# Patient Record
Sex: MALE | Race: WHITE | NOT HISPANIC OR LATINO | Employment: OTHER | URBAN - METROPOLITAN AREA
[De-identification: names, ages, dates, MRNs, and addresses within clinical notes are randomized per-mention and may not be internally consistent; named-entity substitution may affect disease eponyms.]

---

## 2017-01-03 ENCOUNTER — TELEPHONE (OUTPATIENT)
Dept: RHEUMATOLOGY | Facility: CLINIC | Age: 67
End: 2017-01-03

## 2017-01-03 DIAGNOSIS — Z79.899 HIGH RISK MEDICATIONS (NOT ANTICOAGULANTS) LONG-TERM USE: ICD-10-CM

## 2017-01-03 DIAGNOSIS — M06.09 RHEUMATOID ARTHRITIS OF MULTIPLE SITES WITH NEGATIVE RHEUMATOID FACTOR (H): ICD-10-CM

## 2017-01-03 DIAGNOSIS — M06.019 RHEUMATOID ARTHRITIS INVOLVING SHOULDER WITH NEGATIVE RHEUMATOID FACTOR, UNSPECIFIED LATERALITY (H): Primary | ICD-10-CM

## 2017-01-03 DIAGNOSIS — M79.674 PAIN IN TOES OF BOTH FEET: ICD-10-CM

## 2017-01-03 DIAGNOSIS — M79.675 PAIN IN TOES OF BOTH FEET: ICD-10-CM

## 2017-01-03 RX ORDER — HYDROXYCHLOROQUINE SULFATE 200 MG/1
200 TABLET, FILM COATED ORAL 2 TIMES DAILY
Qty: 180 TABLET | Refills: 0 | Status: SHIPPED
Start: 2017-01-03 | End: 2017-04-18

## 2017-01-03 NOTE — Clinical Note
Charmaine Valdes,    Regular eye exams are required while taking Hydroxychloroquine (Plaquenil). These may be yearly or as determined by your eye specialist.    Although vision problems and loss of sight while taking hydroxchloroquine are very rare, notify your doctor if you notice changes in your vision. Visual changes experienced early on the medication or seen early during regular eye exams usually improve after stopping the medication.     Eye exams should be completed by an ophthalmologist who is experienced in monitoring for Hydroxchloroquine toxicity.    Exam may include visual field testing and slit lamp exam or other testing as determined by the ophthalmologist.     We received a refill request from your pharmacy. In reviewing your medical record it is noted that you are due for your next eye exam in March of 2017. Your Hydroxychloroquine prescription has been refilled for 3 months.  Please request your eye clinic to fax or mail a copy of your eye exam report to our clinic indicating that testing was completed for toxicity screening.        Sincerely,  Rheumatology Staff

## 2017-01-03 NOTE — TELEPHONE ENCOUNTER
Pt called with three issues:    1) Pt saw Dr. Goodrich last week, who recommended pt see Dr. Camargo for possible injection into his hand.  Pt was apparently scheduled with Dr. Han when he was d/c from appointment.  He canceled that appointment and is requesting appt with Dr. Camargo.  I will ask BRI Kramer, to schedule appointment and f/u with pt re:  Date and time with Dr. Camargo.  He does leave to go to [place] in 2 weeks.    2) Pt wishes to tell Dr. Goodrich that he is concerned about the side effects of Celebrex, and though he filled the Rx, he does not wish to begin taking it (for muscle/joint pain and epicondilitis).  Instead, he would like to continue the Plaquenil, as he has felt stable with this medication.    3)  Pt requires a refill of his Plaquenil prior to going to [place] in 2 weeks.  I will send to refill team.

## 2017-01-03 NOTE — TELEPHONE ENCOUNTER
Plaquenil      Last Written Prescription Date:  7-13-16  Last Fill Quantity: 180,   # refills: 1  Last Office Visit: 12-28-16  Future Office visit: 6-26-17  Last Eye Exam:3-14-16    Message sent to patient notifying him that the refill has been completed. Another message was sent reminding him he is due for his next eye exam in March of 2017.      Kathleen M Doege RN

## 2017-01-10 ENCOUNTER — MYC MEDICAL ADVICE (OUTPATIENT)
Dept: FAMILY MEDICINE | Facility: CLINIC | Age: 67
End: 2017-01-10

## 2017-01-10 ENCOUNTER — OFFICE VISIT (OUTPATIENT)
Dept: FAMILY MEDICINE | Facility: CLINIC | Age: 67
End: 2017-01-10
Payer: MEDICARE

## 2017-01-10 VITALS
BODY MASS INDEX: 38.37 KG/M2 | OXYGEN SATURATION: 99 % | HEART RATE: 72 BPM | TEMPERATURE: 98.4 F | SYSTOLIC BLOOD PRESSURE: 126 MMHG | DIASTOLIC BLOOD PRESSURE: 78 MMHG | RESPIRATION RATE: 16 BRPM | WEIGHT: 299 LBS

## 2017-01-10 DIAGNOSIS — M06.09 RHEUMATOID ARTHRITIS OF MULTIPLE SITES WITH NEGATIVE RHEUMATOID FACTOR (H): ICD-10-CM

## 2017-01-10 DIAGNOSIS — J01.90 ACUTE SINUSITIS WITH SYMPTOMS > 10 DAYS: Primary | ICD-10-CM

## 2017-01-10 DIAGNOSIS — G62.9 PERIPHERAL POLYNEUROPATHY: ICD-10-CM

## 2017-01-10 DIAGNOSIS — M25.522 LEFT ELBOW PAIN: ICD-10-CM

## 2017-01-10 DIAGNOSIS — Z71.89 ADVANCED DIRECTIVES, COUNSELING/DISCUSSION: ICD-10-CM

## 2017-01-10 DIAGNOSIS — D84.9 IMMUNOSUPPRESSED STATUS (H): ICD-10-CM

## 2017-01-10 PROCEDURE — 99214 OFFICE O/P EST MOD 30 MIN: CPT | Performed by: FAMILY MEDICINE

## 2017-01-10 RX ORDER — PREGABALIN 50 MG/1
50 CAPSULE ORAL 3 TIMES DAILY
Qty: 270 CAPSULE | Refills: 1 | Status: SHIPPED | OUTPATIENT
Start: 2017-01-10 | End: 2017-07-07

## 2017-01-10 RX ORDER — AZITHROMYCIN 250 MG/1
TABLET, FILM COATED ORAL
Qty: 6 TABLET | Refills: 0 | Status: SHIPPED | OUTPATIENT
Start: 2017-01-10 | End: 2017-03-06

## 2017-01-10 NOTE — NURSING NOTE
"Chief Complaint   Patient presents with     Other     Neuropathy and Low WBC       Initial /78 mmHg  Pulse 72  Temp(Src) 98.4  F (36.9  C) (Oral)  Resp 16  Wt 299 lb (135.626 kg)  SpO2 99% Estimated body mass index is 38.37 kg/(m^2) as calculated from the following:    Height as of 12/28/16: 6' 2\" (1.88 m).    Weight as of this encounter: 299 lb (135.626 kg).  BP completed using cuff size: andrew Rivera CMA      "

## 2017-01-10 NOTE — ASSESSMENT & PLAN NOTE
Advance Care Planning 1/10/2017: ACP Review of Chart / Resources Provided:  Reviewed chart for advance care plan.  Lucio Daly has no plan or code status on file. Discussed available resources and provided with information. Confirmed code status reflects current choices pending further ACP discussions.  Confirmed/documented legally designated decision makers.  Added by Marlena Rivera

## 2017-01-10 NOTE — MR AVS SNAPSHOT
After Visit Summary   1/10/2017    Lucio Daly    MRN: [id number]           Patient Information     Date Of Birth          1950        Visit Information        Provider Department      1/10/2017 11:00 AM Jah Corley MD [facility]        Today's Diagnoses     Acute sinusitis with symptoms > 10 days    -  1     Peripheral polyneuropathy (H)         Rheumatoid arthritis of multiple sites with negative rheumatoid factor (H)         Immunosuppressed status (H)         Advanced directives, counseling/discussion         Left elbow pain            Follow-ups after your visit        Your next 10 appointments already scheduled     Mar 03, 2017  9:40 AM   (Arrive by 9:25 AM)   NEW HAND with Ramón Camargo MD   [facility] ([facility] [place] [facility])    [address]  [address] [facility] [id number] [phone]            May 11, 2017  9:30 AM   SIX MINUTE WALK with UC PFL 6 MINUTE WALK 1, UC PFL C   [facility] Pulmonary Function Testing ([facility])    [address]  [address] [facility] [id number] [phone]            May 11, 2017 10:30 AM   (Arrive by 10:15 AM)   Return Interstitial Lung with Harsha Mccarthy MD   [facility] ([facility])    [address] [place] [address]  [address] [facility] [id number] [phone]            Jun 26, 2017  9:30 AM   (Arrive by 9:15 AM)   Return Visit with MEGAN Ayers CNP   [facility] ([facility])    [address] [place] [address]  [address] [facility] [id number] [phone]            Aug 29, 2017  8:10 AM   (Arrive by 7:55 AM)   Return Movement Disorder with Thong Montes MD   [facility] Neurology ([facility])    [address] [place] [address]  [address] [facility] [id number] [phone]              Who to contact     If you  have questions or need follow up information about today's clinic visit or your schedule please contact [facility] directly at [phone].  Normal or non-critical lab and imaging results will be communicated to you by [employer], letter or phone within 4 business days after the clinic has received the results. If you do not hear from us within 7 days, please contact the clinic through [employer] or phone. If you have a critical or abnormal lab result, we will notify you by phone as soon as possible.  Submit refill requests through [employer] or call your pharmacy and they will forward the refill request to us. Please allow 3 business days for your refill to be completed.          Additional Information About Your Visit        [employer] Information     [employer] gives you secure access to your electronic health record. If you see a primary care provider, you can also send messages to your care team and make appointments. If you have questions, please call your primary care clinic.  If you do not have a primary care provider, please call [phone] and they will assist you.        Care EveryWhere ID     This is your Care EveryWhere ID. This could be used by other organizations to access your [place] medical records  [id number]        Your Vitals Were     Pulse Temperature Respirations Pulse Oximetry          72 98.4  F (36.9  C) (Oral) 16 99%         Blood Pressure from Last 3 Encounters:   01/10/17 126/78   12/28/16 157/98   12/07/16 157/78    Weight from Last 3 Encounters:   01/10/17 299 lb (135.626 kg)   12/28/16 299 lb (135.626 kg)   12/07/16 302 lb (136.986 kg)              Today, you had the following     No orders found for display         Today's Medication Changes          These changes are accurate as of: 1/10/17  5:11 PM.  If you have any questions, ask your nurse or doctor.               Start taking these medicines.        Dose/Directions    azithromycin 250 MG tablet   Commonly known as:   ZITHROMAX   Used for:  Acute sinusitis with symptoms > 10 days   Started by:  Jah Corley MD        Two tablets first day, then one tablet daily for four days.   Quantity:  6 tablet   Refills:  0            Where to get your medicines      These medications were sent to [facility] [place] - [facility] - [address]   [address], [place] [address]     Phone:  [phone]    - azithromycin 250 MG tablet      Some of these will need a paper prescription and others can be bought over the counter.  Ask your nurse if you have questions.     Bring a paper prescription for each of these medications    - pregabalin 50 MG capsule             Primary Care Provider Office Phone # Fax #    Jah Corley [phone]       [facility] [address]  [facility] [address]        Thank you!     Thank you for choosing [facility]  for your care. Our goal is always to provide you with excellent care. Hearing back from our patients is one way we can continue to improve our services. Please take a few minutes to complete the written survey that you may receive in the mail after your visit with us. Thank you!             Your Updated Medication List - Protect others around you: Learn how to safely use, store and throw away your medicines at www.disposemymeds.org.          This list is accurate as of: 1/10/17  5:11 PM.  Always use your most recent med list.                   Brand Name Dispense Instructions for use    azithromycin 250 MG tablet    ZITHROMAX    6 tablet    Two tablets first day, then one tablet daily for four days.       celecoxib 100 MG capsule    celeBREX    60 capsule    Take 1 capsule (100 mg) by mouth 2 times daily For pain       diclofenac 1 % Gel topical gel    VOLTAREN    100 g    Apply 4 grams to knees or 2 grams to hands four times daily using enclosed dosing card.       folic acid 1 MG tablet    FOLVITE    180  tablet    Take 2 tablets (2 mg) by mouth daily       hydroxychloroquine 200 MG tablet    PLAQUENIL    180 tablet    Take 1 tablet (200 mg) by mouth 2 times daily Send copy of recent eye exam as need for refills. Please have a copy of your eye exam faxed to [phone].       methotrexate 2.5 MG tablet CHEMO     28 tablet    Take 7 tablets (17.5 mg) by mouth once a week Labs due every 8-12 weeks - last done 5-5-16       metoprolol 50 MG tablet    LOPRESSOR    90 tablet    Take 1 tablet (50 mg) by mouth daily Take 1 daily       omeprazole 20 MG CR capsule    priLOSEC    180 capsule    TAKE ONE CAPSULE BY MOUTH TWICE A DAY       order for DME      Use your CPAP device as directed by your provider.       * order for DME     2 each    Please measure and distribute 1 pair of 20mmHg - 30mmHg knee high open or closed toe compression stockings. Jobst ultrasheer or equivalent.       * order for DME     1 each    Please measure and distribute 1 pair of 20mmHg - 30mmHg thigh high open or closed toe compression stockings. Jobst ultrasheer or equivalent.       oxybutynin 5 MG tablet    DITROPAN    90 tablet    Take 1 tablet (5 mg) by mouth daily       pregabalin 50 MG capsule    LYRICA    270 capsule    Take 1 capsule (50 mg) by mouth 3 times daily       selegiline 5 MG Tabs     180 tablet    1 tablet In the morning       TYLENOL 500 MG tablet   Generic drug:  acetaminophen      Take 1 tablet by mouth as needed. For pain       vitamin D 1000 UNITS capsule      Take 2 capsules by mouth daily.       * Notice:  This list has 2 medication(s) that are the same as other medications prescribed for you. Read the directions carefully, and ask your doctor or other care provider to review them with you.

## 2017-01-10 NOTE — PROGRESS NOTES
SUBJECTIVE:                                                    Lucio Daly is a 66 year old male who presents to clinic today for the following health issues:    1. Neuropathy-may be progressing slowly. He is followed by Dr Montes in nuerology but this is more for his movement disorder. The lyrica is helpful.     2. Low WBC-this was noted on last routine cbc from rheumatology. Overall his cbc has fluctuated from about 3.5-6 over the years.     3: uri/sinuses. Present for 10 days including sinus congestion, cough, lower energy, not draining much. Exposed to sick grandkids about xmas. Not improving.     His mood has been stable. He is concerned about the lower white count. He continues to have a lot of arthritis pain along with the muscular and neuropathic pain. 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 Has upcoming ortho appt.    He is planning to go to [place] the end of the month.     med hx, family hx, and soc hx reviewed and updated     OBJECTIVE: /78 mmHg  Pulse 72  Temp(Src) 98.4  F (36.9  C) (Oral)  Resp 16  Wt 299 lb (135.626 kg)  SpO2 99% no apparent distress   Exam:  GENERAL APPEARANCE: healthy, alert and no distress  EYES: Eyes grossly normal to inspection  HENT: ear canals and TM's normal, nose and mouth without ulcers or lesions and maxillary sinus tenderness bilateral  NECK: no adenopathy, no asymmetry, masses, or scars and thyroid normal to palpation  RESP: a few crackles at the bases.   CV: regular rates and rhythm, normal S1 S2, no S3 or S4 and no murmur, click or rub -  SKIN: no suspicious lesions or rashes  PSYCH: mentation appears normal and affect flat.       ICD-10-CM    1. Acute sinusitis with symptoms > 10 days J01.90 azithromycin (ZITHROMAX) 250 MG tablet   2. Peripheral polyneuropathy (H) G62.9 pregabalin (LYRICA) 50 MG capsule   3. Rheumatoid arthritis of multiple sites with negative rheumatoid factor (H)  M06.09    4. Immunosuppressed status (H) D89.9    5. Advanced directives, counseling/discussion Z71.89     given immunosuprression and persistent symptoms will treat with antibiotics. zpack has been beneficial in the past. lyrica for he neuropathy. rec he try the celebrex prescribed for arthritis and muscular pain.

## 2017-01-16 ENCOUNTER — TELEPHONE (OUTPATIENT)
Dept: RHEUMATOLOGY | Facility: CLINIC | Age: 67
End: 2017-01-16

## 2017-01-17 DIAGNOSIS — N39.498 OTHER URINARY INCONTINENCE: Primary | ICD-10-CM

## 2017-01-17 NOTE — TELEPHONE ENCOUNTER
Oxybutynin Chloride 5 mg tablet  Last Written Prescription Date: 05/23/16  Last Fill Quantity: 90,  # refills: 2  Last Office Visit with [facility], [facility] or [facility] prescribing provider: 01/10/17                                     Cinthia Leija CPhT  On Behalf of [facility]

## 2017-01-18 RX ORDER — OXYBUTYNIN CHLORIDE 5 MG/1
5 TABLET ORAL DAILY
Qty: 90 TABLET | Refills: 3 | Status: SHIPPED | OUTPATIENT
Start: 2017-01-18 | End: 2017-12-21

## 2017-01-18 NOTE — TELEPHONE ENCOUNTER
Prescription approved per [facility] Refill Protocol.  Patricia Gilmore PharmD   [facility]  [email or web address].

## 2017-01-31 ENCOUNTER — PRE VISIT (OUTPATIENT)
Dept: ORTHOPEDICS | Facility: CLINIC | Age: 67
End: 2017-01-31

## 2017-01-31 NOTE — TELEPHONE ENCOUNTER
1.  Date/reason for appt: 3/3/17 -- Primary osteoarthritis of both first carpometacarpal joints  2.  Referring provider: Jeremy Goodrich  3.  Call to patient (Yes / No - short description): no, referred  4.  Previous care at / records requested from: [facility] -- records and referral in Epic.  5.  Other: [facility] -- saw Dr. Campuzano -- records and imaging in epic/pacs

## 2017-02-01 ENCOUNTER — TELEPHONE (OUTPATIENT)
Dept: SLEEP MEDICINE | Facility: CLINIC | Age: 67
End: 2017-02-01

## 2017-02-01 NOTE — TELEPHONE ENCOUNTER
Orders have been rev'd from [facility] and have been signed, faxed back and scanned in.    CONNIE Pascual

## 2017-02-08 NOTE — TELEPHONE ENCOUNTER
[facility] Prior Authorization Team   Phone: [phone]  Fax: [phone]    Tier Exception Initiation    Medication: hydroxychloroquine (PLAQUENIL) 200 MG tablet  Insurance Company: [employer] Part D - Phone [phone] Fax [phone]  Pharmacy Filling the Rx: [facility] [place] - [address]   Filling Pharmacy Phone: [phone]  Filling Pharmacy Fax: [phone]  Start Date: 2/8/2017    Urgent tier exception request was submitted via phone.

## 2017-02-08 NOTE — TELEPHONE ENCOUNTER
Silver script called regarding additional questions of tried/failed meds.These info were relay to the insurance.

## 2017-02-14 NOTE — TELEPHONE ENCOUNTER
Tier Exception Authorization Approval    Authorization Effective Date: 11/10/2016  Authorization Expiration Date: 2/8/2018  Medication: hydroxychloroquine (PLAQUENIL) 200 MG tablet - TIER EXCEPTION APPROVED  Approved Dose/Quantity:   Reference #:     Insurance Company: [employer] Part D - Phone [phone] Fax [phone]  Expected CoPay: $30     CoPay Card Available:      Foundation Assistance Needed:    Which Pharmacy is filling the prescription (Not needed for infusion/clinic administered): [facility] - [address]   Pharmacy Notified: Yes  Patient Notified: Yes    Called pharmacy and pharmacist states pt paid $181. 1/21/17 - pharmacist reran script and copay went down to $30. Pharmacist is notifying patient to come back in to get reimbursed $151.00.

## 2017-02-17 ASSESSMENT — ENCOUNTER SYMPTOMS
DYSURIA: 0
ABDOMINAL PAIN: 1
ORTHOPNEA: 0
STIFFNESS: 1
TACHYCARDIA: 0
POOR WOUND HEALING: 1
COUGH: 1
SPEECH CHANGE: 0
HOARSE VOICE: 1
NECK MASS: 0
TREMORS: 1
SORE THROAT: 0
SNORES LOUDLY: 0
EYE REDNESS: 1
RECTAL PAIN: 0
JAUNDICE: 0
LEG PAIN: 1
BOWEL INCONTINENCE: 0
SMELL DISTURBANCE: 0
SINUS CONGESTION: 1
EYE PAIN: 1
CHILLS: 0
RESPIRATORY PAIN: 0
ARTHRALGIAS: 1
WEAKNESS: 1
TROUBLE SWALLOWING: 1
HYPERTENSION: 1
DISTURBANCES IN COORDINATION: 1
JOINT SWELLING: 1
WEIGHT GAIN: 0
SINUS PAIN: 1
EXERCISE INTOLERANCE: 1
SLEEP DISTURBANCES DUE TO BREATHING: 0
MEMORY LOSS: 0
HYPOTENSION: 0
DYSPNEA ON EXERTION: 1
SHORTNESS OF BREATH: 1
VOMITING: 0
HEARTBURN: 1
DIARRHEA: 1
POLYDIPSIA: 0
HEADACHES: 0
LEG SWELLING: 1
NIGHT SWEATS: 0
HEMOPTYSIS: 0
PARALYSIS: 0
HALLUCINATIONS: 0
CLAUDICATION: 1
RECTAL BLEEDING: 1
SEIZURES: 0
SPUTUM PRODUCTION: 0
COUGH DISTURBING SLEEP: 0
DIFFICULTY URINATING: 0
MUSCLE WEAKNESS: 1
FEVER: 0
LOSS OF CONSCIOUSNESS: 0
WEIGHT LOSS: 0
FLANK PAIN: 1
DOUBLE VISION: 1
FATIGUE: 1
BLOOD IN STOOL: 1
SYNCOPE: 0
EYE IRRITATION: 0
EYE WATERING: 0
POSTURAL DYSPNEA: 0
SKIN CHANGES: 0
NAUSEA: 0
PALPITATIONS: 1
TASTE DISTURBANCE: 0
TINGLING: 1
BACK PAIN: 1
NAIL CHANGES: 1
MUSCLE CRAMPS: 0
DIZZINESS: 1
NUMBNESS: 1
ALTERED TEMPERATURE REGULATION: 1
HEMATURIA: 0
NECK PAIN: 1
POLYPHAGIA: 0
MYALGIAS: 1
LIGHT-HEADEDNESS: 1
WHEEZING: 0
BLOATING: 1
DECREASED APPETITE: 0
CONSTIPATION: 1
INCREASED ENERGY: 0

## 2017-03-03 ENCOUNTER — OFFICE VISIT (OUTPATIENT)
Dept: ORTHOPEDICS | Facility: CLINIC | Age: 67
End: 2017-03-03

## 2017-03-03 DIAGNOSIS — M79.644 PAIN OF RIGHT THUMB: Primary | ICD-10-CM

## 2017-03-03 NOTE — MR AVS SNAPSHOT
After Visit Summary   3/3/2017    Lucio Daly    MRN: [id number]           Patient Information     Date Of Birth          1950        Visit Information        Provider Department      3/3/2017 9:40 AM Ramón Camargo MD [facility]        Today's Diagnoses     Pain of right thumb    -  1       Follow-ups after your visit        Additional Services     HAND THERAPY       Hand Therapy Referral    Thumb spica splint, Right CMC, hand based                  Your next 10 appointments already scheduled     Mar 17, 2017  9:45 AM CDT   LAB with  LAB   [facility] ([facility])    [address] [place] [address] [id number] [phone]           Patient must bring picture ID.  Patient should be prepared to give a urine specimen  Please do not eat 10-12 hours before your appointment if you are coming in fasting for labs on lipids, cholesterol, or glucose (sugar).  Pregnant women should follow their Care Team instructions. Water with medications is okay. Do not drink coffee or other fluids.   If you have concerns about taking  your medications, please ask at office or if scheduling via [employer], send a message by clicking on Secure Messaging, Message Your Care Team.            Mar 17, 2017 10:00 AM CDT   (Arrive by 9:45 AM)   ESTEFANY Hand with Myrna Campos OT   [facility] ([facility])    [address] [place] [id number] [phone]            Mar 22, 2017 11:00 AM CDT   ESTEFANY Hand with Myrna Campos OT   [facility] ([facility])    [address] [place] [id number] [phone]            May 05, 2017  9:30 AM CDT   (Arrive by 9:15 AM)   RETURN HAND with Ramón Camargo MD   [facility] ([facility])    [address] [place] [phone]    [phone]            May 18, 2017  9:00 AM CDT   SIX MINUTE WALK with UC PFL C, UC PFL 6 MINUTE WALK 2   [facility] Pul[facility] Function Testing ([facility])    [address] [place] [id number] [phone]            May 18, 2017 10:30 AM CDT   (Arrive by 10:15 AM)   Return Interstitial Lung with Harsha Mccarthy MD   [facility] ([facility])    [address] [place] [id number] [phone]            Jun 26, 2017  9:30 AM CDT   (Arrive by 9:15 AM)   Return Visit with MEGAN Ayers [facility] ([facility])    [address] [place] [id number] [phone]            Aug 29, 2017  8:10 AM CDT   (Arrive by 7:55 AM)   Return Movement Disorder with Thong Montes MD   [facility] ([facility])    [address] [place] [phone] [phone]            Mar 06, 2018  9:00 AM CST   Return Sleep Patient with Kristen Martinez MD   [facility] ([facility])    [address] [place] [address] [phone]              Who to contact     Please call your clinic at [phone] to:    Ask questions about your health    Make or cancel appointments    Discuss your medicines    Learn about your test results    Speak to your doctor   If you have compliments or concerns about an experience at your clinic, or if you wish to file a complaint, please contact [facility] Patient Relations at [phone] or email us at [email or web address]         Additional Information About Your Visit        MyChart Information     MyChart gives you secure access to your electronic health record. If you see a primary care provider, you can also  send messages to your care team and make appointments. If you have questions, please call your primary care clinic.  If you do not have a primary care provider, please call [phone] and they will assist you.      [employer] is an electronic gateway that provides easy, online access to your medical records. With [employer], you can request a clinic appointment, read your test results, renew a prescription or communicate with your care team.     To access your existing account, please contact your [facility] or call [phone] for assistance.        Care EveryWhere ID     This is your Care EveryWhere ID. This could be used by other organizations to access your [place] medical records  [id number]         Blood Pressure from Last 3 Encounters:   03/06/17 136/78   01/10/17 126/78   12/28/16 (!) 157/98    Weight from Last 3 Encounters:   03/06/17 (!) 137 kg (302 lb)   01/10/17 135.6 kg (299 lb)   12/28/16 135.6 kg (299 lb)              We Performed the Following     HAND THERAPY        Primary Care Provider Office Phone # Fax #    Jah Corley [phone]       [address]  [facility] [address]        Thank you!     Thank you for choosing [facility]  for your care. Our goal is always to provide you with excellent care. Hearing back from our patients is one way we can continue to improve our services. Please take a few minutes to complete the written survey that you may receive in the mail after your visit with us. Thank you!             Your Updated Medication List - Protect others around you: Learn how to safely use, store and throw away your medicines at www.disposemymeds.org.          This list is accurate as of: 3/3/17 11:59 PM.  Always use your most recent med list.                   Brand Name Dispense Instructions for use    folic acid 1 MG tablet    FOLVITE    180 tablet    Take 2 tablets (2 mg) by mouth daily        hydroxychloroquine 200 MG tablet    PLAQUENIL    180 tablet    Take 1 tablet (200 mg) by mouth 2 times daily Send copy of recent eye exam as need for refills. Please have a copy of your eye exam faxed to [phone].       methotrexate 2.5 MG tablet CHEMO     28 tablet    Take 7 tablets (17.5 mg) by mouth once a week Labs due every 8-12 weeks - last done 5-5-16       metoprolol 50 MG tablet    LOPRESSOR    90 tablet    Take 1 tablet (50 mg) by mouth daily Take 1 daily       omeprazole 20 MG CR capsule    priLOSEC    180 capsule    TAKE ONE CAPSULE BY MOUTH TWICE A DAY       order for DME      Use your CPAP device as directed by your provider.       * order for DME     2 each    Please measure and distribute 1 pair of 20mmHg - 30mmHg knee high open or closed toe compression stockings. Jobst ultrasheer or equivalent.       * order for DME     1 each    Please measure and distribute 1 pair of 20mmHg - 30mmHg thigh high open or closed toe compression stockings. Jobst ultrasheer or equivalent.       oxybutynin 5 MG tablet    DITROPAN    90 tablet    Take 1 tablet (5 mg) by mouth daily       pregabalin 50 MG capsule    LYRICA    270 capsule    Take 1 capsule (50 mg) by mouth 3 times daily       selegiline 5 MG Tabs     180 tablet    1 tablet In the morning       TYLENOL 500 MG tablet   Generic drug:  acetaminophen      Take 1 tablet by mouth as needed. For pain       vitamin D 1000 UNITS capsule      Take 2 capsules by mouth daily.       * Notice:  This list has 2 medication(s) that are the same as other medications prescribed for you. Read the directions carefully, and ask your doctor or other care provider to review them with you.

## 2017-03-03 NOTE — LETTER
3/3/2017       RE: Lucio Daly  [address]  [place] [address]     Dear Colleague,    Thank you for referring your patient, Lucio Daly, to the [facility] at [facility]. Please see a copy of my visit note below.    REFERRING PHYSICIAN:  Dr. Jeremy Goodrich from Rheumatology.      CHIEF COMPLAINT:  Right wrist pain.      HISTORY OF PRESENT ILLNESS:  Patient is a very pleasant 66-year-old gentleman who is left-hand dominant, who comes in today for primarily evaluation of his right wrist pain.  He has a notable history of rheumatoid arthritis, Sjogren's syndrome, peripheral neuropathy and interstitial lung disease.      He initially made the appointment to have his left elbow evaluated.  He has a diagnosis of tennis elbow which started late last summer.  The patient states that he has undergone physical therapy and over time it seems to have gotten better.  He states that at its worst over the summer it was about near 10/10 pain, but now he is in the low 2-3/10 pain occasionally.      The problem he wishes to discuss today is his right thumb; he states he has ongoing pain and aching sensation in his right thumb that is worse with activities.  He also notes that he occasionally has swelling over the base of the thumb.  He denies any injuries.  He reports that it was recommended to him by physical therapist that he try a brace so he had gotten an over-the-counter thumb spica brace from [employer] and has been wearing that occasionally.  He states that when he wears this, this does help with the pain symptoms at the base of the thumb.  He has not tried any other interventions.      PAST MEDICAL HISTORY:  Rheumatoid arthritis, interstitial lung disease, peripheral artery disease, peripheral polyneuropathy, gait disturbances with possible Parkinsonian's characteristics.   Urinary incontinence with neurogenic bladder, GALO and GERD.      PAST SURGICAL HISTORY:  No  significant past surgeries.      ALLERGIES:  Restasis and adhesive.      FAMILY HISTORY:  Noncontributory.      SOCIAL HISTORY:  The patient denies tobacco use, denies alcohol use.  He lives at home with his family.      REVIEW OF SYSTEMS:  See the addendum below.      PHYSICAL EXAMINATION:  In general, he is well-developed, well-nourished, in no acute distress.  Breathing comfortably on room air.  His pulse is regular rate and rhythm.  Focused exam of the right hand/wrist reveals that he has no significant swelling, erythema or skin breakdown.  He has some focal tenderness to palpation at the level of the CMC joint.  He has positive grind test.  He has no hyperextension deformity of the metacarpophalangeal joint.  Sensation is intact and his thumb is warm and well perfused.       IMAGING:  We have bilateral hand films available for review today which demonstrates early stage osteoarthritic changes seen in the CMC joint.  There is CMP hyperextension deformity seen on plain films.  No other significant pathology seen.      ASSESSMENT:  66-year-old gentleman, left-hand dominant, with rheumatoid arthritis and other multiple medical conditions who has a primary complaint of right thumb CMC joint osteoarthrosis.      PLAN:  We had a long conversation with the patient regarding the above-mentioned diagnosis.  We discussed treatment options, which would include splinting, occupational therapy, steroid injections, and of course surgery.      After reviewing the above, patient has elected to undergo a trial splinting alone.  He is a little concerned about steroid injections as he had a bad reaction to a steroid injection to a knee in the past.  He states that this affected his neurogenic bladder.      We have a CMC splint made for him today that he try using as much as possible during the day, especially when he is doing heavier activities and then he may consider sleeping in it.  We will see how this works for him.  There  is always the option of is trying a steroid injection if he wishes.      We will see the patient back in about 2-3 months for followup exam.  No x-rays needed at that visit.  We discussed how the splinting is going.      The patient was seen with Dr. Camargo who agrees with the above.         Again, thank you for allowing me to participate in the care of your patient.      Sincerely,    Ramón Camargo MD

## 2017-03-03 NOTE — NURSING NOTE
Reason For Visit:   Chief Complaint   Patient presents with     Musculoskeletal Problem     Consult Right thumb/forearm pain, Rheumatology referral, secondary left elbow concerns     Age: 66 year old    Date of injury: 6 years, chronic on/off    Date of surgery: NO surgical history    Smoker: No, former quit in 2000    Pain Assessment  Patient Currently in Pain: Yes  Primary Pain Location: Hand  Pain Orientation: Right, Left  Aggravating Factors:  (Gripping, complains of dropping things)    Uses cane/walker daily    Hand Dominance Evaluation  Hand Dominance: Left

## 2017-03-06 ENCOUNTER — OFFICE VISIT (OUTPATIENT)
Dept: SLEEP MEDICINE | Facility: CLINIC | Age: 67
End: 2017-03-06
Attending: INTERNAL MEDICINE
Payer: MEDICARE

## 2017-03-06 VITALS
HEART RATE: 52 BPM | SYSTOLIC BLOOD PRESSURE: 136 MMHG | RESPIRATION RATE: 18 BRPM | WEIGHT: 302 LBS | DIASTOLIC BLOOD PRESSURE: 78 MMHG | OXYGEN SATURATION: 95 % | HEIGHT: 74 IN | BODY MASS INDEX: 38.76 KG/M2

## 2017-03-06 DIAGNOSIS — G47.33 OSA TREATED WITH BIPAP: Primary | ICD-10-CM

## 2017-03-06 PROCEDURE — 99211 OFF/OP EST MAY X REQ PHY/QHP: CPT | Mod: ZF

## 2017-03-06 NOTE — PROGRESS NOTES
Sleep Follow-Up Visit:    Date on this visit: 3/6/2017    Lucio Daly comes in today for follow-up of GALO. PS2015 - AHI of 28.3 per hour and REM sleep was not observed during the diagnostic portion. Optimum titration with bilevel 15/10 cwp.    He continues to endorse benefit from bilevel therapy. Denies any shortness of breath while sleeping as he had prior to therapy. He does have chronic joint pain that does cause sleep disruptions throughout the night. If the pain were to be completed resolved, he believes that he would be sleeping throughout the night. He has a history of ILD possibly secondary to arthritis and is on treatment with plaquenil and methotrexate. He was evaluated by his Pulmonologist and Rheumatologist last November and  and was told that his lung function and arthritis are stable.    PAP download was reviewed with the patient. It indicates    Used the device 30/30 days  More than 4 hours of use: 100%  Average daily use on the days used was 6 hours 23 min  Pressure 15/10 cm H2O  Residual AHI 2.1  Leak 2 min    Past medical/surgical history, family history, social history, medications and allergies were reviewed.      Problem List:  Patient Active Problem List    Diagnosis Date Noted     [facility] 2011     Priority: High     EMERGENCY CARE PLAN  Presenting Problem Signs and Symptoms Treatment Plan    Questions or conerns during clinic hours    I will call the clinic directly     Questions or conerns outside clinic hours    I will call the 24 hour nurse line at [phone]    Patient needs to schedule an appointment    I will call the 24 hour scheduling team at [phone] or clinic directly    Same day treatment     I will call the clinic first, nurse line if after hours, urgent care and express care if needed                            DX V65.8 REPLACED WITH [id number] (2013)       Interstitial lung disease (H) 2016     Priority: Medium      PAD (peripheral artery disease) (H) 03/22/2016     Priority: Medium     Morbid obesity (H) 03/22/2016     Priority: Medium     Rheumatoid arthritis of multiple sites with negative rheumatoid factor (H) 03/21/2016     Priority: Medium     Peripheral polyneuropathy (H) 03/15/2016     Priority: Medium     Seronegative arthritis 11/18/2013     Priority: Medium     Adenomatous polyp of colon 02/26/2015     Pain in limb 04/07/2014     History of MRI of cervical spine 11/18/2013     EXAMINATION: CERVICAL SPINE G/E 5 VIEWS* 4/19/2013 4:18 PM    CLINICAL HISTORY: Pain in limb,Performing Location?->[facility]  ([facility]),    COMPARISON:    FINDINGS: AP and lateral views in flexion and extension, as well as  odontoid view of the cervical spine was obtained. There is no  comparison available. The vertebral bodies of the cervical spine are  normally aligned. There is posterior spurring and disk space  narrowing at the level of C5-C6. No change in alignment with flexion  and extension is noted. Uncinate spurring is visualized more  pronounced on the left than on the right.            Result Impression       IMPRESSION: Posterior spurring at the level of C5-C6. Normal  alignment of the cervical spine, which does not change in flexion or  extension.    JUTTA ELLERMAN, MD            Abnormal EMG 04/18/2013     EMG 2/2012 study by Dr. Gonzalez   Interpretation:   1. Attenuation of left sural sensory nerve action potential.  2. No evidence of myopathy  3.        Pain in joint, lower leg 04/05/2012     AK (actinic keratosis) 12/18/2011     Personal history of other malignant neoplasm of skin 12/06/2011     High risk medications (not anticoagulants) long-term use 11/23/2011     Tremor 11/01/2011     Visual changes 11/01/2011     Some blurring of vision and double vision. Eye evaluation was supposedly okay. Had noticed some problem with displacement of words. Has had some chronic right eye pain that radiates to his temple and jaw.         "Incomplete defecation 11/01/2011     Gait disorder 11/01/2011     Balance problems 11/01/2011     Fatigue 11/01/2011     Advanced directives, counseling/discussion 10/19/2011     Advance Directive Problem List Overview:   Name Relationship Phone    Primary Health Care Agent            Alternative Health Care Agent          Discussed advance care planning with patient; information given to patient to review. 10/19/2011          Sicca syndrome (H) 05/16/2011     Lip biopsy was done and was negative?  Has dry mouth.  Seronegative sicca complex       H. pylori infection 05/12/2011     Urinary incontinence 03/28/2011     Chronic maxillary sinusitis 09/14/2009     GALO (obstructive sleep apnea) 07/06/2009     Hx of melanoma of skin 08/06/2008     Rt leg Dr Ez NGYUEN-COMPLETE EXCISION       Malignant basal cell neoplasm of skin 08/06/2008     ARMS EXCISED DR EZ NGUYEN  Do you wish to do the replacement in the background? yes         Diaphragmatic hernia 06/30/2004     Problem list name updated by automated process. Provider to review       Esophageal reflux 06/30/2004        PHYSICAL EXAM:  /78 (BP Location: Right arm, Patient Position: Supine, Cuff Size: Adult Large)  Pulse 52  Resp 18  Ht 1.88 m (6' 2\")  Wt (!) 137 kg (302 lb)  SpO2 95%  BMI 38.77 kg/m2    GEN: obese male, in no apparent distrerss  CV: S1 S2 normal  PULM: CTAB  EXT: no edema      Impression/Plan:    Obstructive Sleep Apnea  Chronic insomnia - chronic joint pain    Data from his machine shows 100% compliance. His residual AHI is 2.1. Recommended to continue bilevel therapy at his current settings regularly during sleep and he was instructed to get the supplies replaced regularly. For his insomnia, recommend to continue follow up with Primary and Rheumatology for optimal pain management.    Encourage weight loss, healthy diet, and exercise.    Patient was strongly advised to avoid driving, operating any heavy machinery or other hazardous situations " while drowsy or sleepy.  Patient was counseled on the importance of driving while alert, to pull over if drowsy, or nap before getting into the vehicle if sleepy.        Follow up in 1 year or sooner if needed.  ?  Seen and examined with Dr. Martinez?  Gómez Pulido  Clinical Sleep Medicine Fellow    Attending: As the attending physician I was present with  Dr.James JUAN ANTONIO Pulido  during this clinic visit. I personally reviewed the bird aspects of the history, compliance measures and I agree with the above documentation.  Twentyfive minutes spent with patient> 50 % spent counseling and coordinating plan of care.      Kristen Martinez MD   of Medicine,  Division of Pulmonary/Sleep Medicine  [facility].

## 2017-03-06 NOTE — PATIENT INSTRUCTIONS

## 2017-03-06 NOTE — NURSING NOTE
"Chief Complaint   Patient presents with     RECHECK     Follow up cpap       Initial Ht 1.88 m (6' 2\")  Wt (!) 137 kg (302 lb)  BMI 38.77 kg/m2 Estimated body mass index is 38.77 kg/(m^2) as calculated from the following:    Height as of this encounter: 1.88 m (6' 2\").    Weight as of this encounter: 137 kg (302 lb).  Medication Reconciliation: complete     CONNIE Pascual        "

## 2017-03-06 NOTE — MR AVS SNAPSHOT
After Visit Summary   3/6/2017    Lucio Daly    MRN: [id number]           Patient Information     Date Of Birth          1950        Visit Information        Provider Department      3/6/2017 9:20 AM Gómez Pulido MD [facility] [place], Sleep Study        Today's Diagnoses     GALO treated with BiPAP    -  1      Care Instructions      Your BMI is Body mass index is 38.77 kg/(m^2).  Weight management is a personal decision.  If you are interested in exploring weight loss strategies, the following discussion covers the approaches that may be successful. Body mass index (BMI) is one way to tell whether you are at a healthy weight, overweight, or obese. It measures your weight in relation to your height.  A BMI of 18.5 to 24.9 is in the healthy range. A person with a BMI of 25 to 29.9 is considered overweight, and someone with a BMI of 30 or greater is considered obese. More than two-thirds of American adults are considered overweight or obese.  Being overweight or obese increases the risk for further weight gain. Excess weight may lead to heart disease and diabetes.  Creating and following plans for healthy eating and physical activity may help you improve your health.  Weight control is part of healthy lifestyle and includes exercise, emotional health, and healthy eating habits. Careful eating habits lifelong are the mainstay of weight control. Though there are significant health benefits from weight loss, long-term weight loss with diet alone may be very difficult to achieve- studies show long-term success with dietary management in less than 10% of people. Attaining a healthy weight may be especially difficult to achieve in those with severe obesity. In some cases, medications, devices and surgical management might be considered.  What can you do?  If you are overweight or obese and are interested in methods for weight loss, you should discuss this with your provider.     Consider reducing  daily calorie intake by 500 calories.     Keep a food journal.     Avoiding skipping meals, consider cutting portions instead.    Diet combined with exercise helps maintain muscle while optimizing fat loss. Strength training is particularly important for building and maintaining muscle mass. Exercise helps reduce stress, increase energy, and improves fitness. Increasing exercise without diet control, however, may not burn enough calories to loose weight.       Start walking three days a week 10-20 minutes at a time    Work towards walking thirty minutes five days a week     Eventually, increase the speed of your walking for 1-2 minutes at time    In addition, we recommend that you review healthy lifestyles and methods for weight loss available through the National Institutes of Health patient information sites:  http://win.niddk.nih.gov/publications/index.htm    And look into health and wellness programs that may be available through your health insurance provider, employer, local community center, or grace club.    Weight management plan: Patient was referred to their PCP to discuss a diet and exercise plan.            Follow-ups after your visit        Follow-up notes from your care team     Return in about 1 year (around 3/6/2018).      Your next 10 appointments already scheduled     Mar 17, 2017 10:00 AM CDT   (Arrive by 9:45 AM)   ESTEFANY Hand with Myrna Campos OT   [facility] ([facility])    [address] [place] [address]   [phone]            Mar 22, 2017 11:00 AM CDT   ESTEFANY Hand with Myrna Campos OT   [facility] ([facility])    [address] [place] [address]   [phone]            May 05, 2017  9:30 AM CDT   (Arrive by 9:15 AM)   RETURN HAND with Ramón Camargo MD   [facility] ([facility])    [address] [place] [address]  [facility] [address]   [phone]            May 18, 2017  9:00 AM CDT   SIX MINUTE WALK with UC PFL C, UC PFL 6 MINUTE WALK 2   [facility] Pulmonary Function Testing ([facility])    [address] [address]   [phone]            May 18, 2017 10:30 AM CDT   (Arrive by 10:15 AM)   Return Interstitial Lung with Harsha Mccarthy MD   [facility] ([facility])    [address] [place] [address]   [phone]            Jun 26, 2017  9:30 AM CDT   (Arrive by 9:15 AM)   Return Visit with MEGAN Ayers [facility] ([facility])    [address] [place] [address]   [phone]            Aug 29, 2017  8:10 AM CDT   (Arrive by 7:55 AM)   Return Movement Disorder with Thong Montes MD   [facility] Neurology ([facility])    [address] [place] [address]   [phone]            Mar 06, 2018  9:00 AM CST   Return Sleep Patient with Kristen Martinez MD   [facility])    [address] [place] [id number] [phone]              Who to contact     If you have questions or need follow up information about today's clinic visit or your schedule please contact [facility] directly at [phone].  Normal or non-critical lab and imaging results will be communicated to you by MyChart, letter or phone within 4 business days after the clinic has received the results. If you do not hear from us within 7 days, please contact the clinic through MyChart or phone. If you have a critical or abnormal lab result, we will notify you by phone as soon as possible.  Submit refill requests through [employer] or call your pharmacy and they  "will forward the refill request to us. Please allow 3 business days for your refill to be completed.          Additional Information About Your Visit        [employer] Information     [employer] gives you secure access to your electronic health record. If you see a primary care provider, you can also send messages to your care team and make appointments. If you have questions, please call your primary care clinic.  If you do not have a primary care provider, please call [phone] and they will assist you.        Care EveryWhere ID     This is your Care EveryWhere ID. This could be used by other organizations to access your [facility] medical records  [id number]        Your Vitals Were     Pulse Respirations Height Pulse Oximetry BMI (Body Mass Index)       52 18 1.88 m (6' 2\") 95% 38.77 kg/m2        Blood Pressure from Last 3 Encounters:   03/06/17 136/78   01/10/17 126/78   12/28/16 (!) 157/98    Weight from Last 3 Encounters:   03/06/17 (!) 137 kg (302 lb)   01/10/17 135.6 kg (299 lb)   12/28/16 135.6 kg (299 lb)              We Performed the Following     Comprehensive DME        Primary Care Provider Office Phone # Fax #    Jah Corley [phone]       [address] [address]        Thank you!     Thank you for choosing [facility]EP [facility]  for your care. Our goal is always to provide you with excellent care. Hearing back from our patients is one way we can continue to improve our services. Please take a few minutes to complete the written survey that you may receive in the mail after your visit with us. Thank you!             Your Updated Medication List - Protect others around you: Learn how to safely use, store and throw away your medicines at www.disposemymeds.org.          This list is accurate as of: 3/6/17 11:59 PM.  Always use your most recent med list.                   Brand Name Dispense Instructions for use    folic acid 1 MG tablet    " FOLVITE    180 tablet    Take 2 tablets (2 mg) by mouth daily       hydroxychloroquine 200 MG tablet    PLAQUENIL    180 tablet    Take 1 tablet (200 mg) by mouth 2 times daily Send copy of recent eye exam as need for refills. Please have a copy of your eye exam faxed to [phone].       methotrexate 2.5 MG tablet CHEMO     28 tablet    Take 7 tablets (17.5 mg) by mouth once a week Labs due every 8-12 weeks - last done 5-5-16       metoprolol 50 MG tablet    LOPRESSOR    90 tablet    Take 1 tablet (50 mg) by mouth daily Take 1 daily       omeprazole 20 MG CR capsule    priLOSEC    180 capsule    TAKE ONE CAPSULE BY MOUTH TWICE A DAY       order for DME      Use your CPAP device as directed by your provider.       * order for DME     2 each    Please measure and distribute 1 pair of 20mmHg - 30mmHg knee high open or closed toe compression stockings. Jobst ultrasheer or equivalent.       * order for DME     1 each    Please measure and distribute 1 pair of 20mmHg - 30mmHg thigh high open or closed toe compression stockings. Jobst ultrasheer or equivalent.       oxybutynin 5 MG tablet    DITROPAN    90 tablet    Take 1 tablet (5 mg) by mouth daily       pregabalin 50 MG capsule    LYRICA    270 capsule    Take 1 capsule (50 mg) by mouth 3 times daily       selegiline 5 MG Tabs     180 tablet    1 tablet In the morning       TYLENOL 500 MG tablet   Generic drug:  acetaminophen      Take 1 tablet by mouth as needed. For pain       vitamin D 1000 UNITS capsule      Take 2 capsules by mouth daily.       * Notice:  This list has 2 medication(s) that are the same as other medications prescribed for you. Read the directions carefully, and ask your doctor or other care provider to review them with you.

## 2017-03-17 ENCOUNTER — THERAPY VISIT (OUTPATIENT)
Dept: OCCUPATIONAL THERAPY | Facility: CLINIC | Age: 67
End: 2017-03-17
Payer: MEDICARE

## 2017-03-17 DIAGNOSIS — M79.644 PAIN OF RIGHT THUMB: Primary | ICD-10-CM

## 2017-03-17 DIAGNOSIS — M06.09 RHEUMATOID ARTHRITIS OF MULTIPLE SITES WITH NEGATIVE RHEUMATOID FACTOR (H): ICD-10-CM

## 2017-03-17 DIAGNOSIS — Z79.899 HIGH RISK MEDICATIONS (NOT ANTICOAGULANTS) LONG-TERM USE: ICD-10-CM

## 2017-03-17 LAB
ALBUMIN SERPL-MCNC: 3.6 G/DL (ref 3.4–5)
ALT SERPL W P-5'-P-CCNC: 23 U/L (ref 0–70)
AST SERPL W P-5'-P-CCNC: 14 U/L (ref 0–45)
CREAT SERPL-MCNC: 1.06 MG/DL (ref 0.66–1.25)
CRP SERPL-MCNC: 9.4 MG/L (ref 0–8)
ERYTHROCYTE [DISTWIDTH] IN BLOOD BY AUTOMATED COUNT: 13.4 % (ref 10–15)
GFR SERPL CREATININE-BSD FRML MDRD: 69 ML/MIN/1.7M2
HCT VFR BLD AUTO: 44.4 % (ref 40–53)
HGB BLD-MCNC: 14.4 G/DL (ref 13.3–17.7)
MCH RBC QN AUTO: 30.1 PG (ref 26.5–33)
MCHC RBC AUTO-ENTMCNC: 32.4 G/DL (ref 31.5–36.5)
MCV RBC AUTO: 93 FL (ref 78–100)
PLATELET # BLD AUTO: 178 10E9/L (ref 150–450)
RBC # BLD AUTO: 4.78 10E12/L (ref 4.4–5.9)
WBC # BLD AUTO: 4.9 10E9/L (ref 4–11)

## 2017-03-17 PROCEDURE — G8985 CARRY GOAL STATUS: HCPCS | Mod: GO | Performed by: OCCUPATIONAL THERAPIST

## 2017-03-17 PROCEDURE — 29130 APPL FINGER SPLINT STATIC: CPT | Mod: GO | Performed by: OCCUPATIONAL THERAPIST

## 2017-03-17 PROCEDURE — 97165 OT EVAL LOW COMPLEX 30 MIN: CPT | Mod: GO | Performed by: OCCUPATIONAL THERAPIST

## 2017-03-17 PROCEDURE — G8984 CARRY CURRENT STATUS: HCPCS | Mod: GO | Performed by: OCCUPATIONAL THERAPIST

## 2017-03-17 PROCEDURE — 97140 MANUAL THERAPY 1/> REGIONS: CPT | Mod: GO | Performed by: OCCUPATIONAL THERAPIST

## 2017-03-17 NOTE — LETTER
DEPARTMENT OF HEALTH AND HUMAN SERVICES  CENTERS FOR MEDICARE & MEDICAID SERVICES    PLAN/UPDATED PLAN OF PROGRESS FOR OUTPATIENT REHABILITATION    PATIENTS NAME:  Lucio Daly     : 1950    PROVIDER NUMBER:  [id number]    [id number]    PROVIDER NAME: [facility] [facility]    MEDICAL RECORD NUMBER: [id number]     START OF CARE DATE:    SOC Date: 17     TYPE:  OT    PRIMARY/TREATMENT DIAGNOSIS: (Pertinent Medical Diagnosis)  Pain of right thumb    VISITS FROM START OF CARE:  Rxs Used: 1     Hand Therapy Initial Evaluation  Current Date:  3/17/2017    Subjective:  Lucio Daly is a 66 year old left hand dominant male.    Diagnosis:   Right CMC DJD  DOI:  MD order 3/3/2017  Post:  > 3 years since onset of pain    Patient reports symptoms of pain, stiffness, weakness of the right thumb which occurred due to CMC DJD. Since onset symptoms are gradually getting worse - patient reports that it may be due to can use on R side that has exacerbated symptoms.  Special tests:  X ray.  Previous treatment: OTC thumb splint from [facility] - helps a little.    General health as reported by patient is fair.  Pertinent medical history includes:  Rheumatoid Arthritis, Osteoarthritis, cancer, overweight, high blood pressure, thyroid problems, sleep disorder/apnea, pain at rest/night, numbness/tingling, weakness, Sjogrens, peripheral neuropathy.  Medical allergies: adhesives.  Surgical history: skin cancers.  Medication history: anti-inflammatory, high blood pressure medication.    Current occupation is Retired from [employer]  Barriers include:  none  Prior functional level:  independent     Red flags:  none        PATIENTS NAME:  Lucio Daly     Additional Occupational Profile Information (patterns of daily living, interests, values and needs): patient ambulates with assist from cane, was having pain on left side/elbow so switched to using cane in R hand - has flared up pain at  CMC.    Functional Outcome Measure:   Upper Extremity Functional Index Score:  SCORE:   Column Totals: /80: 47   (A lower score indicates greater disability.)    Objective:    Pain Level Report - On scale 0-10/10  Date 3/17/17         Left Right Left Right Left Right Left Right Left Right Left Right   at Rest  3-4             with Activity  5-6               Primary Report:  Location: R CMCJ, thenars and MPJ  Radiation: none  Pain Quality: aching, intense ache and throbbing at times  Frequency: constant and intermittent  Pain is worse: early in the morning, afternoon/evening after use  Pain is exacerbated by: cane use, pinching, gripping, use of the hand  Pain is relieved by: OTC splint, tylenol helps a little  Progression since onset: gradually worsening    Tenderness:  Tender to touch over the Right CMC of the Thumb   5/10    Appearance:  Shoulder sign is present over the CMC  Swollen over the CMC joint.  Noted collapse of MP into hyperextension during pinch.    Thumb Range of Motion:  Date 3/17/17        AROM  (PROM) Left Right Left Right Left Right Left Right Left Right Left Right   MP ext 0 0             MP flex 35 37             IP ext 0 10             IP flex 35 37             RABD 50 45             PABD 60 50+               PATIENTS NAME:  Lucio Daly     Special Tests:  Grind test to CMC: +    Strength: (Measured in pounds)    Date 3/17/17        Trials Left Right Left Right Left Right Left Right Left Right Left Right   1 50 25             2               3               Avg 50 25               3 Point Pinch  Date 3/17/17        Trials Left Right Left Right Left Right Left Right Left Right Left Right   1 13 9+             2               3               Avg 13 9               Lateral Pinch  Date 3/17/17        Trials Left Right Left Right Left Right Left Right Left Right Left Right   1 12 11+             2               3               Avg 12 11               Assessment:  Patient presents with  symptoms consistent with diagnosis of CMC thumb arthritis, with conservative intervention.    Patient s limitations or Problem List includes: Pain, Decreased ROM/motion, weakness, decreased stability of the CMC joint, decreased  and pinch strength of the thumb which interferes with patients ability to perform  Self Care Tasks (dressing, eating, bathing, hygiene/toileting), Recreational Activities, Household Chores and ambulation with cane  as compared to previous level of function.    Rehab Potential: Good- Return to full activity, some limitations.    Patient will benefit from skilled Occupational Therapy to increase ROM, flexibility, pinch strength, and stability of the thumb and decrease pain to return to previous activity level and resume normal daily tasks and to reach their rehab potential.    Barriers to Learning:  No barrier      PATIENTS NAME:  Lucio Daly     Communication Issues: Patient appears to be able to clearly communicate and understand verbal and written communication and follow directions correctly.    Assessment of Occupational Performance:  3-5 Performance Deficits  Identified Performance Deficits: bathing/showering, dressing, hygiene and grooming, home establishment and management and meal preparation and cleanup      Clinical Decision Making (Complexity): Low complexity    Treatment Explanations:  The following has been discussed with the patient,  Rx ordered/plan of care  Anticipated outcomes  Possible risks and side effects    Treatment Plan:  Frequency:  1 X week, once daily  Duration:  for 4 weeks    Clinic:  Therapeutic Exercise: AROM, Isometrics, and Stabilization exercises of the Thumb CMC, including  active and resisted abduction, 1st DI strengthening.  Manual Techniques: Joint Mobilization or reseating of the trapezium  Splinting:  Hand based Thumb Spica, CMC support (allowing more thumb mobility)  Education: anatomy of CMC, joint protection principles, adaptive equipment as  "needed.    Home Program:  R HBTSS  Heat when sore/stiff  Ice if inflamed/swollen  Self massage to thenars with marble  Self adductor release with clip    Discharge Plan:  Achieve all LTG  [place] in home treatment program.  Reach maximal therapeutic benefit.  Please refer to the daily flowsheet for treatment today and total treatment time.      Next Visit:  Check splint  Patient tried on R Large+ comfort cool splint - may be interested in purchasing  Progress thumb stabilization  Future visits: joint protection principles and AE review      Caregiver Signature/Credentials ______________________________ Date ________       Treating Provider: Myrna Campos OTR/L    I have reviewed and certified the need for these services and plan of treatment while under my care.  PATIENTS NAME:  Lucio Daly       PHYSICIAN'S SIGNATURE:   _________________________________________  Date___________    Ramón Camargo MD    Certification period: Beginning of Cert date period: 03/17/17 End of Cert period date: 06/14/17     Functional Level Progress Report: Please see attached \"Goal Flow sheet for Functional level.\"    ___X_____ Continue Services or       ________ DC Services                Service dates: SOC Date: 03/17/17  to present                                                                     "

## 2017-03-17 NOTE — PROGRESS NOTES
Hand Therapy Initial Evaluation  Current Date:  3/17/2017    Subjective:  Lucio Daly is a 66 year old left hand dominant male.    Diagnosis:   Right CMC DJD  DOI:  MD order 3/3/2017  Post:  > 3 years since onset of pain    Patient reports symptoms of pain, stiffness, weakness of the right thumb which occurred due to CMC DJD. Since onset symptoms are gradually getting worse - patient reports that it may be due to can use on R side that has exacerbated symptoms.  Special tests:  X ray.  Previous treatment: OTC thumb splint from [employer] - helps a little.    General health as reported by patient is fair.  Pertinent medical history includes:  Rheumatoid Arthritis, Osteoarthritis, cancer, overweight, high blood pressure, thyroid problems, sleep disorder/apnea, pain at rest/night, numbness/tingling, weakness, Sjogrens, peripheral neuropathy.  Medical allergies: adhesives.  Surgical history: skin cancers.  Medication history: anti-inflammatory, high blood pressure medication.    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    Functional Outcome Measure:   Upper Extremity Functional Index Score:  SCORE:   Column Totals: /80: 47   (A lower score indicates greater disability.)    Objective:    Pain Level Report - On scale 0-10/10  Date 3/17/17         Left Right Left Right Left Right Left Right Left Right Left Right   at Rest  3-4             with Activity  5-6               Primary Report:  Location: R CMCJ, thenars and MPJ  Radiation: none  Pain Quality: aching, intense ache and throbbing at times  Frequency: constant and intermittent  Pain is worse: early in the morning,  afternoon/evening after use  Pain is exacerbated by: cane use, pinching, gripping, use of the hand  Pain is relieved by: OTC splint, tylenol helps a little  Progression since onset: gradually worsening    Tenderness:  Tender to touch over the Right CMC of the Thumb   5/10    Appearance:  Shoulder sign is present over the CMC  Swollen over the CMC joint.  Noted collapse of MP into hyperextension during pinch.    Thumb Range of Motion:  Date 3/17/17        AROM  (PROM) Left Right Left Right Left Right Left Right Left Right Left Right   MP ext 0 0             MP flex 35 37             IP ext 0 10             IP flex 35 37             RABD 50 45             PABD 60 50+               Special Tests:  Grind test to CMC: +    Strength: (Measured in pounds)    Date 3/17/17        Trials Left Right Left Right Left Right Left Right Left Right Left Right   1 50 25             2               3               Avg 50 25               3 Point Pinch  Date 3/17/17        Trials Left Right Left Right Left Right Left Right Left Right Left Right   1 13 9+             2               3               Avg 13 9               Lateral Pinch  Date 3/17/17        Trials Left Right Left Right Left Right Left Right Left Right Left Right   1 12 11+             2               3               Avg 12 11               Assessment:  Patient presents with symptoms consistent with diagnosis of CMC thumb arthritis, with conservative intervention.    Patient s limitations or Problem List includes: Pain, Decreased ROM/motion, weakness, decreased stability of the CMC joint, decreased  and pinch strength of the thumb which interferes with patients ability to perform  Self Care Tasks (dressing, eating, bathing, hygiene/toileting), Recreational Activities, Household Chores and ambulation with cane  as compared to previous level of function.    Rehab Potential: Good- Return to full activity, some limitations.    Patient will benefit from skilled  Occupational Therapy to increase ROM, flexibility, pinch strength, and stability of the thumb and decrease pain to return to previous activity level and resume normal daily tasks and to reach their rehab potential.    Barriers to Learning:  No barrier    Communication Issues: Patient appears to be able to clearly communicate and understand verbal and written communication and follow directions correctly.    Assessment of Occupational Performance:  3-5 Performance Deficits  Identified Performance Deficits: bathing/showering, dressing, hygiene and grooming, home establishment and management and meal preparation and cleanup      Clinical Decision Making (Complexity): Low complexity    Treatment Explanations:  The following has been discussed with the patient,  Rx ordered/plan of care  Anticipated outcomes  Possible risks and side effects    Treatment Plan:  Frequency:  1 X week, once daily  Duration:  for 4 weeks    Clinic:  Therapeutic Exercise: AROM, Isometrics, and Stabilization exercises of the Thumb CMC, including  active and resisted abduction, 1st DI strengthening.  Manual Techniques: Joint Mobilization or reseating of the trapezium  Splinting:  Hand based Thumb Spica, CMC support (allowing more thumb mobility)  Education: anatomy of CMC, joint protection principles, adaptive equipment as needed.    Home Program:  R HBTSS  Heat when sore/stiff  Ice if inflamed/swollen  Self massage to thenars with marble  Self adductor release with clip    Discharge Plan:  Achieve all LTG  [place] in home treatment program.  Reach maximal therapeutic benefit.  Please refer to the daily flowsheet for treatment today and total treatment time.      Next Visit:  Check splint  Patient tried on R Large+ comfort cool splint - may be interested in purchasing  Progress thumb stabilization  Future visits: joint protection principles and AE review

## 2017-03-17 NOTE — PROGRESS NOTES
The blood counts, liver, kidney labs are normal. Very mild elevated CRP     Sin GUZMAN, CNP, MSN  3/17/2017  3:24 PM

## 2017-03-23 ENCOUNTER — TELEPHONE (OUTPATIENT)
Dept: NEUROLOGY | Facility: CLINIC | Age: 67
End: 2017-03-23

## 2017-03-23 NOTE — TELEPHONE ENCOUNTER
Tier Exception Approval    Authorization Effective Date: 1/1/2017  Authorization Expiration Date: 3/23/2018  Medication: selegiline 5 MG TABS- approved  Approved Dose/Quantity:   Reference #:     Insurance Company: Medicare Blue - Phone [phone] Fax [phone]  Expected CoPay: $40.00     CoPay Card Available:      Foundation Assistance Needed:    Which Pharmacy is filling the prescription (Not needed for infusion/clinic administered): [facility] - [id number] [facility]   Pharmacy Notified: Yes  Patient Notified: Yes

## 2017-03-23 NOTE — TELEPHONE ENCOUNTER
[facility] Prior Authorization Team   Phone: [phone]  Fax: [phone]    [place] Initiation    Medication: selegiline 5 MG TABS  Insurance Company: Medicare Blue - Phone [phone] Fax [phone]  Pharmacy Filling the Rx: [facility] - [id number] [facility]   Filling Pharmacy Phone: [phone]  Filling Pharmacy Fax:    Start Date: 3/23/2017

## 2017-03-24 ENCOUNTER — THERAPY VISIT (OUTPATIENT)
Dept: OCCUPATIONAL THERAPY | Facility: CLINIC | Age: 67
End: 2017-03-24
Payer: MEDICARE

## 2017-03-24 DIAGNOSIS — M79.644 PAIN OF RIGHT THUMB: ICD-10-CM

## 2017-03-24 PROCEDURE — 97110 THERAPEUTIC EXERCISES: CPT | Mod: GO | Performed by: OCCUPATIONAL THERAPIST

## 2017-03-24 PROCEDURE — 97140 MANUAL THERAPY 1/> REGIONS: CPT | Mod: GO | Performed by: OCCUPATIONAL THERAPIST

## 2017-03-24 NOTE — PROGRESS NOTES
"SOAP note objective information for 3/24/2017.    Objective:    Pain Level Report - On scale 0-10/10  Date 3/17/17 3/24/17        Left Right Left Right Left Right Left Right Left Right Left Right   at Rest  3-4  4           with Activity  5-6  5-6             Primary Report:  Location: R CMCJ, thenars and MPJ  Radiation: none  Pain Quality: aching, intense ache and throbbing at times  Frequency: constant and intermittent  Pain is worse: early in the morning, afternoon/evening after use  Pain is exacerbated by: cane use, pinching, gripping, use of the hand  Pain is relieved by: OTC splint, tylenol helps a little  Progression since onset: gradually worsening    Tenderness:  Tender to touch over the Right CMC of the Thumb   3/10      Thumb Range of Motion:  Date 3/17/17        AROM  (PROM) Left Right Left Right Left Right Left Right Left Right Left Right   MP ext 0 0  0           MP flex 35 37  37           IP ext 0 10  0           IP flex 35 37  50           RABD 50 45  45           PABD 60 50+  49               Home Program:  R HBTSS  Heat when sore/stiff  Ice if inflamed/swollen  Self massage to thenars with marble  Self adductor release with clip  Isometric \"C\" and 1st DI isometrics      Next Visit:  Progress thumb stabilization  Future visits: joint protection principles and AE review  Paraffin trial for benefits of heat    Please refer to the daily flowsheet for treatment today, total treatment time and time spent performing 1:1 timed codes.       "

## 2017-03-24 NOTE — MR AVS SNAPSHOT
After Visit Summary   3/24/2017    Lucio Daly    MRN: [id number]           Patient Information     Date Of Birth          1950        Visit Information        Provider Department      3/24/2017 9:30 AM Amada Castañeda OT [facility]        Today's Diagnoses     Pain of right thumb           Follow-ups after your visit        Your next 10 appointments already scheduled     Apr 03, 2017 11:30 AM CDT   [facility] with Janet ALLISON [facility] ([facility]  )    [address] [facility]  [address]  [facility] [address]   [phone]            Apr 06, 2017  9:40 AM CDT   Office Visit with Jah Corley MD   [facility] ([facility]    [address] [place] [id number] [phone]           Bring a current list of meds and any records pertaining to this visit.  For Physicals, please bring immunization records and any forms needing to be filled out.  Please arrive 10 minutes early to complete paperwork.            May 05, 2017  9:30 AM CDT   (Arrive by 9:15 AM)   RETURN HAND with Ramón Camargo MD   [facility] ([facility])    [address] [id number] [phone]            May 18, 2017  9:00 AM CDT   SIX MINUTE WALK with UC PFL C, UC PFL 6 MINUTE WALK 2   [facility] Pulmonary Function Testing ([facility])    [address] [id number] [phone]            May 18, 2017 10:30 AM CDT   (Arrive by 10:15 AM)   Return Interstitial Lung with Harsha Mccarthy MD   [facility] ([facility])    [address] [id number] [phone]            Jun 26, 2017  9:30 AM CDT   (Arrive by 9:15 AM)   Return Visit with MEGAN Ayers [facility] ([facility])     [address] [facility]  [address] [facility] [address] [phone]            Aug 29, 2017  8:10 AM CDT   (Arrive by 7:55 AM)   Return Movement Disorder with Thong Montes MD   [facility] Neurology ([facility])    [address] [facility]  [address] [facility] [address] [phone]            Mar 06, 2018  9:00 AM CST   Return Sleep Patient with Kristen Martinez MD   [facility] [place] Sleep Study ([facility])    [address] [place] [id number] [phone]              Who to contact     If you have questions or need follow up information about today's clinic visit or your schedule please contact [facility] directly at [phone].  Normal or non-critical lab and imaging results will be communicated to you by MyChart, letter or phone within 4 business days after the clinic has received the results. If you do not hear from us within 7 days, please contact the clinic through [employer] or phone. If you have a critical or abnormal lab result, we will notify you by phone as soon as possible.  Submit refill requests through [employer] or call your pharmacy and they will forward the refill request to us. Please allow 3 business days for your refill to be completed.          Additional Information About Your Visit        MyChart Information     [employer] gives you secure access to your electronic health record. If you see a primary care provider, you can also send messages to your care team and make appointments. If you have questions, please call your primary care clinic.  If you do not have a primary care provider, please call [phone] and they will assist you.        Care EveryWhere ID     This is your Care EveryWhere ID. This could be used by other organizations to access your [place] medical records  [id number]         Blood Pressure from Last 3 Encounters:   03/06/17 136/78   01/10/17  126/78   12/28/16 (!) 157/98    Weight from Last 3 Encounters:   03/06/17 (!) 137 kg (302 lb)   01/10/17 135.6 kg (299 lb)   12/28/16 135.6 kg (299 lb)              We Performed the Following     MANUAL THER TECH,1+REGIONS,EA 15 MIN     THERAPEUTIC EXERCISES        Primary Care Provider Office Phone # Fax #    Jah Corley [phone]       [address] [address]        Thank you!     Thank you for choosing [facility]  for your care. Our goal is always to provide you with excellent care. Hearing back from our patients is one way we can continue to improve our services. Please take a few minutes to complete the written survey that you may receive in the mail after your visit with us. Thank you!             Your Updated Medication List - Protect others around you: Learn how to safely use, store and throw away your medicines at www.disposemymeds.org.          This list is accurate as of: 3/24/17 10:22 AM.  Always use your most recent med list.                   Brand Name Dispense Instructions for use    folic acid 1 MG tablet    FOLVITE    180 tablet    Take 2 tablets (2 mg) by mouth daily       hydroxychloroquine 200 MG tablet    PLAQUENIL    180 tablet    Take 1 tablet (200 mg) by mouth 2 times daily Send copy of recent eye exam as need for refills. Please have a copy of your eye exam faxed to [phone].       methotrexate 2.5 MG tablet CHEMO     28 tablet    Take 7 tablets (17.5 mg) by mouth once a week Labs due every 8-12 weeks - last done 5-5-16       metoprolol 50 MG tablet    LOPRESSOR    90 tablet    Take 1 tablet (50 mg) by mouth daily Take 1 daily       omeprazole 20 MG CR capsule    priLOSEC    180 capsule    TAKE ONE CAPSULE BY MOUTH TWICE A DAY       order for DME      Use your CPAP device as directed by your provider.       * order for DME     2 each    Please measure and distribute 1 pair of 20mmHg - 30mmHg knee high open or closed toe  compression stockings. Jobst ultrasheer or equivalent.       * order for DME     1 each    Please measure and distribute 1 pair of 20mmHg - 30mmHg thigh high open or closed toe compression stockings. Jobst ultrasheer or equivalent.       oxybutynin 5 MG tablet    DITROPAN    90 tablet    Take 1 tablet (5 mg) by mouth daily       pregabalin 50 MG capsule    LYRICA    270 capsule    Take 1 capsule (50 mg) by mouth 3 times daily       selegiline 5 MG Tabs     180 tablet    1 tablet In the morning       TYLENOL 500 MG tablet   Generic drug:  acetaminophen      Take 1 tablet by mouth as needed. For pain       vitamin D 1000 UNITS capsule      Take 2 capsules by mouth daily.       * Notice:  This list has 2 medication(s) that are the same as other medications prescribed for you. Read the directions carefully, and ask your doctor or other care provider to review them with you.

## 2017-04-07 ENCOUNTER — THERAPY VISIT (OUTPATIENT)
Dept: OCCUPATIONAL THERAPY | Facility: CLINIC | Age: 67
End: 2017-04-07
Payer: MEDICARE

## 2017-04-07 DIAGNOSIS — M79.644 PAIN OF RIGHT THUMB: ICD-10-CM

## 2017-04-07 PROCEDURE — 97535 SELF CARE MNGMENT TRAINING: CPT | Mod: GO | Performed by: OCCUPATIONAL THERAPIST

## 2017-04-07 NOTE — PROGRESS NOTES
"SOAP note objective information for 4/7/2017    Pain Level Report - On scale 0-10/10  Date 3/17/17 3/24/17 4/7/17       Left Right Left Right Left Right Left Right Left Right Left Right   at Rest  3-4  4  4         with Activity  5-6  5-6  5           Primary Report:  Location: R CMCJ, thenars and MPJ  Radiation: none  Pain Quality: aching, intense ache and throbbing at times   Frequency: constant and intermittent  Pain is worse: early in the morning, afternoon/evening after use  Pain is exacerbated by: cane use, pinching, gripping, use of the hand  Pain is relieved by: OTC splint, tylenol helps a little  Progression since onset: slightly improved    Home Program:  R HBTSS  Heat when sore/stiff  Ice if inflamed/swollen  Self massage to thenars with marble  Self adductor release with clip  Isometric \"C\" and 1st DI isometrics  Joint protection principles  AE review - recommend jar opener, electric mixers/choppers, book farrar, built up handles  Comfort Cool CMC splint - Large + purchased    Next Visit:  Check splint  Review thumb stabilization   Paraffin trial for benefits of heat   DC to HEP    Please refer to the daily flowsheet for treatment today, total treatment time and time spent performing 1:1 timed codes.       "

## 2017-04-07 NOTE — MR AVS SNAPSHOT
After Visit Summary   4/7/2017    Lucio Daly    MRN: [id number]           Patient Information     Date Of Birth          1950        Visit Information        Provider Department      4/7/2017 10:30 AM Myrna Campos OT [facility] Hand Therapy        Today's Diagnoses     Pain of right thumb           Follow-ups after your visit        Your next 10 appointments already scheduled     May 05, 2017  9:30 AM CDT   (Arrive by 9:15 AM)   RETURN HAND with Ramón Camargo MD   [facility] ([facility])    [address] [place] [id number]   [phone]            May 18, 2017  9:00 AM CDT   SIX MINUTE WALK with  PFL C, UC PFL 6 MINUTE WALK 2   [facility] Pulmonary Function Testing ([facility])    [address] [place] [address]   [phone]            May 18, 2017 10:30 AM CDT   (Arrive by 10:15 AM)   Return Interstitial Lung with Harsha Mccarthy MD   [facility] ([facility])    [address] [place] [address]   [phone]            Jun 26, 2017  9:30 AM CDT   (Arrive by 9:15 AM)   Return Visit with MEGAN Ayers [facility] ([facility])    [address] [place] [address]   [phone]            Aug 29, 2017  8:10 AM CDT   (Arrive by 7:55 AM)   Return Movement Disorder with Thong Montes MD   [facility] Neurology ([facility])    [address] [place] [address]   [phone]            Mar 06, 2018  9:00 AM CST   Return Sleep Patient with Kristen Martinez MD   [facility] [place], Sleep Study ([facility])    [address] [place] [address] [phone]              Who to contact      If you have questions or need follow up information about today's clinic visit or your schedule please contact  [facility] directly at [phone].  Normal or non-critical lab and imaging results will be communicated to you by MyChart, letter or phone within 4 business days after the clinic has received the results. If you do not hear from us within 7 days, please contact the clinic through [employer] or phone. If you have a critical or abnormal lab result, we will notify you by phone as soon as possible.  Submit refill requests through [employer] or call your pharmacy and they will forward the refill request to us. Please allow 3 business days for your refill to be completed.          Additional Information About Your Visit        [employer] Information     [employer] gives you secure access to your electronic health record. If you see a primary care provider, you can also send messages to your care team and make appointments. If you have questions, please call your primary care clinic.  If you do not have a primary care provider, please call [phone] and they will assist you.        Care EveryWhere ID     This is your Care EveryWhere ID. This could be used by other organizations to access your [facility] medical records  [id number]         Blood Pressure from Last 3 Encounters:   03/06/17 136/78   01/10/17 126/78   12/28/16 (!) 157/98    Weight from Last 3 Encounters:   03/06/17 (!) 137 kg (302 lb)   01/10/17 135.6 kg (299 lb)   12/28/16 135.6 kg (299 lb)              We Performed the Following     SELF CARE MNGMENT TRAINING        Primary Care Provider Office Phone # Fax #    Jah Corley [phone]       [facility] [address]  [facility] [address]        Thank you!     Thank you for choosing [facility]  for your care. Our goal is always to provide you with excellent care. Hearing back from our patients is one way we can continue to improve our services. Please  take a few minutes to complete the written survey that you may receive in the mail after your visit with us. Thank you!             Your Updated Medication List - Protect others around you: Learn how to safely use, store and throw away your medicines at www.disposemymeds.org.          This list is accurate as of: 4/7/17 11:10 AM.  Always use your most recent med list.                   Brand Name Dispense Instructions for use    folic acid 1 MG tablet    FOLVITE    180 tablet    Take 2 tablets (2 mg) by mouth daily       hydroxychloroquine 200 MG tablet    PLAQUENIL    180 tablet    Take 1 tablet (200 mg) by mouth 2 times daily Send copy of recent eye exam as need for refills. Please have a copy of your eye exam faxed to [phone].       methotrexate 2.5 MG tablet CHEMO     28 tablet    Take 7 tablets (17.5 mg) by mouth once a week Labs due every 8-12 weeks - last done 5-5-16       metoprolol 50 MG tablet    LOPRESSOR    90 tablet    Take 1 tablet (50 mg) by mouth daily Take 1 daily       omeprazole 20 MG CR capsule    priLOSEC    180 capsule    TAKE ONE CAPSULE BY MOUTH TWICE A DAY       order for DME      Use your CPAP device as directed by your provider.       * order for DME     2 each    Please measure and distribute 1 pair of 20mmHg - 30mmHg knee high open or closed toe compression stockings. Jobst ultrasheer or equivalent.       * order for DME     1 each    Please measure and distribute 1 pair of 20mmHg - 30mmHg thigh high open or closed toe compression stockings. Jobst ultrasheer or equivalent.       oxybutynin 5 MG tablet    DITROPAN    90 tablet    Take 1 tablet (5 mg) by mouth daily       pregabalin 50 MG capsule    LYRICA    270 capsule    Take 1 capsule (50 mg) by mouth 3 times daily       selegiline 5 MG Tabs     180 tablet    1 tablet In the morning       TYLENOL 500 MG tablet   Generic drug:  acetaminophen      Take 1 tablet by mouth as needed. For pain       vitamin D 1000 UNITS capsule       Take 2 capsules by mouth daily.       * Notice:  This list has 2 medication(s) that are the same as other medications prescribed for you. Read the directions carefully, and ask your doctor or other care provider to review them with you.

## 2017-04-18 DIAGNOSIS — M79.674 PAIN IN TOES OF BOTH FEET: ICD-10-CM

## 2017-04-18 DIAGNOSIS — M06.09 RHEUMATOID ARTHRITIS OF MULTIPLE SITES WITH NEGATIVE RHEUMATOID FACTOR (H): ICD-10-CM

## 2017-04-18 DIAGNOSIS — Z79.899 HIGH RISK MEDICATIONS (NOT ANTICOAGULANTS) LONG-TERM USE: ICD-10-CM

## 2017-04-18 DIAGNOSIS — M79.675 PAIN IN TOES OF BOTH FEET: ICD-10-CM

## 2017-04-18 DIAGNOSIS — M06.019 RHEUMATOID ARTHRITIS INVOLVING SHOULDER WITH NEGATIVE RHEUMATOID FACTOR, UNSPECIFIED LATERALITY (H): ICD-10-CM

## 2017-04-18 NOTE — TELEPHONE ENCOUNTER
Plaquenil 200mg      Last Written Prescription Date:  01/03/2017  Last Fill Quantity: 180,   # refills: 0  Last Office Visit with [facility], [facility] or [facility] prescribing provider: 12/28/2016  Future Office visit:       Routing refill request to provider for review/approval because:  Drug not on the [facility], [facility] or [facility] refill protocol or controlled substance      Jossy Bazan CPhT  [facility] [address]  Phone: [phone]  Fax: [phone]

## 2017-04-19 RX ORDER — HYDROXYCHLOROQUINE SULFATE 200 MG/1
200 TABLET, FILM COATED ORAL 2 TIMES DAILY
Qty: 180 TABLET | Refills: 0 | Status: SHIPPED | OUTPATIENT
Start: 2017-04-19 | End: 2017-07-25

## 2017-05-15 DIAGNOSIS — Z79.899 HIGH RISK MEDICATIONS (NOT ANTICOAGULANTS) LONG-TERM USE: ICD-10-CM

## 2017-05-15 DIAGNOSIS — M06.09 RHEUMATOID ARTHRITIS OF MULTIPLE SITES WITH NEGATIVE RHEUMATOID FACTOR (H): ICD-10-CM

## 2017-05-15 LAB
ALBUMIN SERPL-MCNC: 3.7 G/DL (ref 3.4–5)
ALT SERPL W P-5'-P-CCNC: 24 U/L (ref 0–70)
AST SERPL W P-5'-P-CCNC: 18 U/L (ref 0–45)
CREAT SERPL-MCNC: 1.06 MG/DL (ref 0.66–1.25)
CRP SERPL-MCNC: 4 MG/L (ref 0–8)
ERYTHROCYTE [DISTWIDTH] IN BLOOD BY AUTOMATED COUNT: 14.7 % (ref 10–15)
GFR SERPL CREATININE-BSD FRML MDRD: 69 ML/MIN/1.7M2
HCT VFR BLD AUTO: 43.3 % (ref 40–53)
HGB BLD-MCNC: 13.6 G/DL (ref 13.3–17.7)
MCH RBC QN AUTO: 30.3 PG (ref 26.5–33)
MCHC RBC AUTO-ENTMCNC: 31.4 G/DL (ref 31.5–36.5)
MCV RBC AUTO: 96 FL (ref 78–100)
PLATELET # BLD AUTO: 183 10E9/L (ref 150–450)
RBC # BLD AUTO: 4.49 10E12/L (ref 4.4–5.9)
WBC # BLD AUTO: 4.8 10E9/L (ref 4–11)

## 2017-05-15 ASSESSMENT — ENCOUNTER SYMPTOMS
HEMATURIA: 0
HYPOTENSION: 0
DOUBLE VISION: 1
PALPITATIONS: 1
SYNCOPE: 0
SPEECH CHANGE: 0
TREMORS: 1
HEMOPTYSIS: 0
NUMBNESS: 1
CONSTIPATION: 1
SORE THROAT: 0
CLAUDICATION: 0
SHORTNESS OF BREATH: 1
EYE PAIN: 1
BLOATING: 1
SINUS CONGESTION: 1
LIGHT-HEADEDNESS: 1
SEIZURES: 0
MUSCLE CRAMPS: 1
MUSCLE WEAKNESS: 1
STIFFNESS: 1
LOSS OF CONSCIOUSNESS: 0
HEARTBURN: 1
EXERCISE INTOLERANCE: 1
EYE WATERING: 0
BLOOD IN STOOL: 0
WHEEZING: 0
BOWEL INCONTINENCE: 0
HYPERTENSION: 0
DIFFICULTY URINATING: 0
TASTE DISTURBANCE: 0
DISTURBANCES IN COORDINATION: 1
RECTAL BLEEDING: 1
PARALYSIS: 0
TROUBLE SWALLOWING: 0
POSTURAL DYSPNEA: 0
DIZZINESS: 1
EYE REDNESS: 0
COUGH: 1
EYE IRRITATION: 1
JAUNDICE: 0
SLEEP DISTURBANCES DUE TO BREATHING: 0
TINGLING: 1
RESPIRATORY PAIN: 0
DYSURIA: 0
HEADACHES: 0
MYALGIAS: 1
JOINT SWELLING: 1
HOARSE VOICE: 0
FLANK PAIN: 1
ARTHRALGIAS: 1
VOMITING: 0
DYSPNEA ON EXERTION: 1
LEG PAIN: 1
RECTAL PAIN: 0
NAUSEA: 0
SNORES LOUDLY: 0
ORTHOPNEA: 0
LEG SWELLING: 0
MEMORY LOSS: 0
WEAKNESS: 1
DIARRHEA: 0
TACHYCARDIA: 0
NECK PAIN: 1
COUGH DISTURBING SLEEP: 0
SPUTUM PRODUCTION: 0
SMELL DISTURBANCE: 0
SINUS PAIN: 0
BACK PAIN: 1

## 2017-05-15 NOTE — PROGRESS NOTES
The blood counts, liver, kidney and CRP inflammation labs are normal.     Sin GUZMAN, CNP, MSN  5/15/2017  2:37 PM

## 2017-05-18 ENCOUNTER — OFFICE VISIT (OUTPATIENT)
Dept: PULMONOLOGY | Facility: CLINIC | Age: 67
End: 2017-05-18
Attending: INTERNAL MEDICINE
Payer: MEDICARE

## 2017-05-18 ENCOUNTER — TELEPHONE (OUTPATIENT)
Dept: CARDIOLOGY | Facility: CLINIC | Age: 67
End: 2017-05-18

## 2017-05-18 VITALS
BODY MASS INDEX: 38.89 KG/M2 | WEIGHT: 303 LBS | OXYGEN SATURATION: 94 % | SYSTOLIC BLOOD PRESSURE: 133 MMHG | HEIGHT: 74 IN | HEART RATE: 50 BPM | RESPIRATION RATE: 16 BRPM | DIASTOLIC BLOOD PRESSURE: 82 MMHG

## 2017-05-18 DIAGNOSIS — J84.9 INTERSTITIAL LUNG DISEASE (H): Primary | ICD-10-CM

## 2017-05-18 DIAGNOSIS — J84.9 ILD (INTERSTITIAL LUNG DISEASE) (H): ICD-10-CM

## 2017-05-18 DIAGNOSIS — K21.00 GASTROESOPHAGEAL REFLUX DISEASE WITH ESOPHAGITIS: ICD-10-CM

## 2017-05-18 DIAGNOSIS — R91.8 PULMONARY NODULES: Primary | ICD-10-CM

## 2017-05-18 LAB
6 MIN WALK (FT): 847 FT
6 MIN WALK (M): 258 M
DLCOCOR-%PRED-PRE: 101 %
DLCOCOR-PRE: 28.58 ML/MIN/MMHG
DLCOUNC-%PRED-PRE: 98 %
DLCOUNC-PRE: 27.74 ML/MIN/MMHG
DLCOUNC-PRED: 28.09 ML/MIN/MMHG
ERV-%PRED-PRE: 58 %
ERV-PRE: 0.47 L
ERV-PRED: 0.81 L
EXPTIME-PRE: 6.84 SEC
FEF2575-%PRED-PRE: 68 %
FEF2575-PRE: 1.88 L/SEC
FEF2575-PRED: 2.76 L/SEC
FEFMAX-%PRED-PRE: 95 %
FEFMAX-PRE: 8.94 L/SEC
FEFMAX-PRED: 9.41 L/SEC
FEV1-%PRED-PRE: 72 %
FEV1-PRE: 2.66 L
FEV1FEV6-PRE: 74 %
FEV1FEV6-PRED: 78 %
FEV1FVC-PRE: 74 %
FEV1FVC-PRED: 75 %
FEV1SVC-PRE: 85 %
FEV1SVC-PRED: 67 %
FIFMAX-PRE: 7.69 L/SEC
FRCPLETH-%PRED-PRE: 88 %
FRCPLETH-PRE: 3.46 L
FRCPLETH-PRED: 3.93 L
FVC-%PRED-PRE: 73 %
FVC-PRE: 3.59 L
FVC-PRED: 4.91 L
IC-%PRED-PRE: 55 %
IC-PRE: 2.61 L
IC-PRED: 4.68 L
RVPLETH-%PRED-PRE: 111 %
RVPLETH-PRE: 3.06 L
RVPLETH-PRED: 2.75 L
TLCPLETH-%PRED-PRE: 78 %
TLCPLETH-PRE: 6.2 L
TLCPLETH-PRED: 7.94 L
VA-%PRED-PRE: 64 %
VA-PRE: 4.9 L
VC-%PRED-PRE: 56 %
VC-PRE: 3.08 L
VC-PRED: 5.49 L

## 2017-05-18 PROCEDURE — 99212 OFFICE O/P EST SF 10 MIN: CPT | Mod: ZF

## 2017-05-18 ASSESSMENT — PAIN SCALES - GENERAL: PAINLEVEL: NO PAIN (0)

## 2017-05-18 NOTE — NURSING NOTE
Chief Complaint   Patient presents with     Interstitial Lung Disease (ILD)     Patient is being seen for ILD follow up      Nia Dooley CMA at 10:24 AM on 5/18/2017  Chief Complaint   Patient presents with     Interstitial Lung Disease (ILD)     Patient is being seen for ILD follow up      Nia Dooley CMA at 10:24 AM on 5/18/2017

## 2017-05-18 NOTE — TELEPHONE ENCOUNTER
Omeprazole 40mg requires a prior authorization for the twice daily dosing.    Please contact [employer] at [phone]    ID: [id number]  Group: [id number]  PCN: [id number]    Please let pharmacy know when this has been approved or denied.    Thank you!    Annamaria Mendoza CPhT  [facility]

## 2017-05-18 NOTE — PROGRESS NOTES
Reason for Visit  Lucio Daly is a 66 year old year old male who is being seen for Interstitial Lung Disease (ILD) (Patient is being seen for ILD follow up)    HPI    The patient was seen and examined by Harsha Mccarthy     Mr. Daly comes in today for a followup visit.  His last clinic visit in the ILD Clinic was in April, when the plan was to continue his current dose of Plaquenil and methotrexate and follow up with PFTs and plan for a chest CT for lung nodule followup.       He tells me that overall he has done quite well.  He feels quite well with the change in his overnight positive airway pressure support and is on bilevel ventilation at night.  He, however, does describe a number of GI symptoms including odynophagia, reflux and frequent belching.       He continues to have dyspnea on exertion, but he is trying to walk 10-15 minutes 3-4 times a week.  This has been stable.         Current Outpatient Prescriptions   Medication     omeprazole (PRILOSEC) 20 MG CR capsule     hydroxychloroquine (PLAQUENIL) 200 MG tablet     oxybutynin (DITROPAN) 5 MG tablet     pregabalin (LYRICA) 50 MG capsule     methotrexate 2.5 MG tablet CHEMO     folic acid (FOLVITE) 1 MG tablet     metoprolol (LOPRESSOR) 50 MG tablet     selegiline 5 MG TABS     ORDER FOR DME     ORDER FOR DME     ORDER FOR DME     acetaminophen (TYLENOL) 500 MG tablet     Cholecalciferol (VITAMIN D) 1000 UNITS capsule     No current facility-administered medications for this visit.      Allergies   Allergen Reactions     Restasis      Burning eyes, problems with breathing, tightness in chest     Adhesive Tape      Bandages misc     Allegra      EXCESSIVE URINATION AND WEAKNESS, LIGHT-HEADED     Allergy      Dust     Animal Dander      Benadryl Allergy      EXCESSIVE URINATION AND WEAKNESS, LIGHT-HEADED     Cephalexin      Joint pain or gerd aggravation. Bloating excessive urination      Cephalosporins      Doxycycline Hyclate Nausea      SWEATING,MIGRAINES,LOSS OF APPETITE,SWEATING,LIGHT HEADED, EXCESSIVE URINATION     Flonase [Fluticasone Propionate]      Gabapentin      Neurontin: mosd changes and excess urination     Iodine Solution [Povidone Iodine]      SKIN MELTS     Levaquin      From surgeon--? Joint pain ? Gerd aggravation. Insomnia, excess urination     Mylanta      EXCESSIVE URINATION AND WEAKNESS, LIGHT-HEADED     Prednisone      Weakness, elevated bp, headache, eye pain, congestion      Seafood [Seafood]      Shellfish Allergy      Hives       Sulfamethoxazole-Trimethoprim      Chest pain, angina     Trees      Trileptal      SEVERE JOINT AND TENDON PAIN, INSOMNIA, RESTLESSNESS, NAUSEA, EXCESS URINATION     Cortizone Rash     EXCESS URINATION,WEAKNESS,NAUSEA, HEADACHE     Past Medical History:   Diagnosis Date     Abnormal EMG 4/18/2013     Abnormal involuntary movements(781.0)     Movement Disorder     AK (actinic keratosis) 12/18/2011     Allergic rhinitis, cause unspecified     Allergic rhinitis     Balance problems 11/1/2011     Basal cell carcinoma      Bladder spasms 11/1/2011     Chronic osteoarthritis      Diaphragmatic hernia without mention of obstruction or gangrene      Earache or other ear, nose, or throat complaint      Esophageal reflux      Fatigue 11/1/2011     Fracture      H. pylori infection 5/12/2011     History of MRI of cervical spine 11/18/2013    EXAMINATION: CERVICAL SPINE G/E 5 VIEWS* 4/19/2013 4:18 PM  CLINICAL HISTORY: Pain in limb,Performing Location?->[facility]),  COMPARISON:  FINDINGS: AP and lateral views in flexion and extension, as well as odontoid view of the cervical spine was obtained. There is no comparison available. The vertebral bodies of the cervical spine are normally aligned. There is posterior spurring and d     Incomplete defecation 11/1/2011     Interstitial lung disease (H) 11/29/2016     Laboratory test 8/7/2012     Lung disease June 2015     Malignant basal cell neoplasm of  skin 8/6/2008     Melanoma (H) 8/6/2008     Melanoma in situ of lower leg (H)     R calf     Neuropathy (H) 5/16/2011     Other bladder disorder      Other color vision deficiencies      Other nervous system complications      Parkinsonism (H) 11/1/2011     Personal history of colonic polyps      Polyneuropathy in other diseases classified elsewhere (H)      RA (rheumatoid arthritis) (H)      Rheumatoid arthritis of multiple sites with negative rheumatoid factor (H) 3/21/2016     Seronegative arthritis 11/18/2013     Shortness of breath      Shoulder arthritis 2016    acromioclavicular joint      Somatization disorder 5/12/2011     Squamous cell carcinoma (H)      Tremor 11/1/2011     Unspecified essential hypertension      Unspecified hypothyroidism      Urinary tract infection      Urinary urgency 11/1/2011     Wears glasses 11/1/2011       Past Surgical History:   Procedure Laterality Date     BIOPSY OF SKIN LESION       COLONOSCOPY       HEMORRHOID SURGERY       lip biopsy      for sicca complex     MOHS MICROGRAPHIC PROCEDURE       SOFT TISSUE SURGERY      removeal of basel cell carcinoma       Social History     Social History     Marital status:      Spouse name: N/A     Number of children: N/A     Years of education: N/A     Occupational History     Not on file.     Social History Main Topics     Smoking status: Former Smoker     Packs/day: 1.50     Years: 37.00     Types: Cigarettes     Start date: 6/15/1963     Quit date: 9/30/2000     Smokeless tobacco: Never Used     Alcohol use No     Drug use: No     Sexual activity: No     Other Topics Concern     Parent/Sibling W/ Cabg, Mi Or Angioplasty Before 65f 55m? No     Social History Narrative    2013: Living in [place] in a townhouse with no steps    Has 3 sons that are doing okay.         Dairy/d 1 servings/d.     Caffeine 0 servings/d    Exercise 0 x week    Sunscreen used - No    Seatbelts used - Yes    Working smoke/CO detectors in the home - Yes     "Guns stored in the home - Yes    Self Breast Exams - NA    Self Testicular Exam - Yes    Eye Exam up to date - Yes 2008    Dental Exam up to date - Yes 2006    Pap Smear up to date - NA    Mammogram up to date - NA    PSA up to date - Yes 2008    Dexa Scan up to date - No    Flex Sig / Colonoscopy up to date - Yes less than 5 yrs ago    Immunizations up to date -today    Abuse: Current or Past(Physical, Sexual or Emotional)- No    Do you feel safe in your environment - Yes    2008                   ROS Pulmonary  A complete ROS was otherwise negative except as noted in the HPI.  /82 (BP Location: Right arm, Patient Position: Chair, Cuff Size: Adult Large)  Pulse 50  Resp 16  Ht 1.88 m (6' 2\")  Wt (!) 137.4 kg (303 lb)  SpO2 94%  BMI 38.9 kg/m2  Exam:   GENERAL APPEARANCE: Alert, and in no apparent distress.  EYES: PERRL, EOMI  MOUTH: Oral mucosa is moist, without any lesions,, no oropharyngeal exudate.  NECK: supple, no masses, no thyromegaly.  LYMPHATICS: No significant axillary, cervical, or supraclavicular nodes.  RESP: normal percussion, good air flow throughout.  No crackles. No rhonchi. No wheezes.  CV: Normal S1, S2, regular rhythm, normal rate. No murmur.  No rub. No gallop. No LE edema.   ABDOMEN:  Bowel sounds normal, soft, nontender, no HSM or masses.   MS: extremities normal. No clubbing. No cyanosis.  SKIN: no rash on limited exam  NEURO: Mentation intact, speech normal, normal strength and tone, normal gait and stance  Results:    PFTs done today were reviewed by me with the patient.  He also had a 6-minute walk test done.  DLCO was measured though it did not meet criteria for acceptability.  The 6-minute walk distance was lower than expected, suggesting some exercise limitation.  The patient tells me that he usually uses a walker when he walks.  There was no desaturation.  FVC is 3.59 liters, which is 73% of predicted.  FEV1 is 2.66 liters, which is 72% of predicted.  The ratio is 74.  " Total lung capacity is 6.07 liters, which is 76% of predicted.  Diffusion capacity is 27.74 which is 98% of predicted.  My interpretation is that there is mild restriction and normal diffusion capacity.  There is also evidence of exercise limitation based on the 6-minute walk test.  The DLCO measurement was not acceptable as they did not meet ATS criterion.      Assessment and plan: Mr. 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  He has poorly controlled reflux.   1.  Follicular bronchiolitis.  Stable PFTs.  We will continue current dose of methotrexate.  From a pulmonary standpoint, he should be able to tolerate a lower dose; it could at least be tried.   2.  Sleep-disordered breathing.  On Bi-level ventilation with good control.   3.  Lung nodules.  A chest CT is being ordered today.  This will complete a 2-year followup as his initial CT was in 04/2015.   4.  We will increase his Prilosec to 40 mg b.i.d.  He might also benefit from evaluation by GI.  The question is does he have an esophageal motility issue? Dr Corley would want a GI consult placed.     I will see the patient back in 6 months, or sooner if needed.                 Answers for HPI/ROS submitted by the patient on 5/15/2017   General Symptoms: No  Skin Symptoms: No  HENT Symptoms: Yes  EYE SYMPTOMS: Yes  HEART SYMPTOMS: Yes  LUNG SYMPTOMS: Yes  INTESTINAL SYMPTOMS: Yes  URINARY SYMPTOMS: Yes  REPRODUCTIVE SYMPTOMS: No  SKELETAL SYMPTOMS: Yes  BLOOD SYMPTOMS: No  NERVOUS SYSTEM SYMPTOMS: Yes  MENTAL HEALTH SYMPTOMS: No  Ear pain: No  Ear discharge: No  Hearing loss: No  Tinnitus: Yes  Nosebleeds: No  Congestion: Yes  Sinus pain: No  Trouble swallowing: No   Voice hoarseness: No  Mouth sores: No  Sore throat: No  Tooth pain: No  Gum tenderness: No  Bleeding gums: No  Change in taste: No  Change in sense of smell: No  Dry  mouth: Yes  Hearing aid used: No  Eye pain: Yes  Vision loss: No  Dry eyes: Yes  Watery eyes: No  Eye bulging: No  Double vision: Yes  Flashing of lights: No  Spots: No  Floaters: No  Redness: No  Crossed eyes: No  Tunnel Vision: No  Yellowing of eyes: No  Eye irritation: Yes  Cough: Yes  Sputum or phlegm: No  Coughing up blood: No  Difficulty breating or shortness of breath: Yes  Snoring: No  Wheezing: No  Difficulty breathing on exertion: Yes  Respiratory pain: No  Nighttime Cough: No  Difficulty breathing when lying flat: No  Chest pain or pressure: No  Fast or irregular heartbeat: Yes  Pain in legs with walking: Yes  Swelling in feet or ankles: No  Trouble breathing while lying down: No  Fingers or Toes appear blue: No  High blood pressure: No  Low blood pressure: No  Fainting: No  Murmurs: No  Chest pain on exertion: No  Chest pain at rest: No  Cramping pain in leg during exercise: No  Pacemaker: No  Varicose veins: No  Edema or swelling: No  Fast heart beat: No  Wake up at night with shortness of breath: No  Heart flutters: No  Light-headedness: Yes  Exercise intolerance: Yes  Heart burn or indigestion: Yes  Nausea: No  Vomiting: No  Bloating: Yes  Constipation: Yes  Diarrhea: No  Blood in stool: No  Black stools: No  Rectal or Anal pain: No  Fecal incontinence: No  Rectal bleeding: Yes  Yellowing of skin or eyes: No  Vomit with blood: No  Change in stools: No  Hemorrhoids: Yes  Trouble holding urine or incontinence: Yes  Pain or burning: No  Trouble starting or stopping: No  Increased frequency of urination: Yes  Blood in urine: No  Decreased frequency of urination: Yes  Frequent nighttime urination: No  Flank pain: Yes  Difficulty emptying bladder: No  Back pain: Yes  Muscle aches: Yes  Neck pain: Yes  Swollen joints: Yes  Joint pain: Yes  Bone pain: Yes  Muscle cramps: Yes  Muscle weakness: Yes  Joint stiffness: Yes  Bone fracture: No  Trouble with coordination: Yes  Dizziness or trouble with balance:  Yes  Fainting or black-out spells: No  Memory loss: No  Headache: No  Seizures: No  Speech problems: No  Tingling: Yes  Tremor: Yes  Weakness: Yes  Difficulty walking: Yes  Paralysis: No  Numbness: Yes

## 2017-05-18 NOTE — LETTER
5/18/2017       RE: Lucio Daly  [address]  [place] [address]     Dear Colleague,    Thank you for referring your patient, Lucio Daly, to the [facility]. Please see a copy of my visit note below.    Reason for Visit  Lucio Daly is a 66 year old year old male who is being seen for Interstitial Lung Disease (ILD) (Patient is being seen for ILD follow up)    HPI    The patient was seen and examined by Harsha Mccarthy     Mr. Daly comes in today for a followup visit.  His last clinic visit in the ILD Clinic was in April, when the plan was to continue his current dose of Plaquenil and methotrexate and follow up with PFTs and plan for a chest CT for lung nodule followup.       He tells me that overall he has done quite well.  He feels quite well with the change in his overnight positive airway pressure support and is on bilevel ventilation at night.  He, however, does describe a number of GI symptoms including odynophagia, reflux and frequent belching.       He continues to have dyspnea on exertion, but he is trying to walk 10-15 minutes 3-4 times a week.  This has been stable.         Current Outpatient Prescriptions   Medication     omeprazole (PRILOSEC) 20 MG CR capsule     hydroxychloroquine (PLAQUENIL) 200 MG tablet     oxybutynin (DITROPAN) 5 MG tablet     pregabalin (LYRICA) 50 MG capsule     methotrexate 2.5 MG tablet CHEMO     folic acid (FOLVITE) 1 MG tablet     metoprolol (LOPRESSOR) 50 MG tablet     selegiline 5 MG TABS     ORDER FOR DME     ORDER FOR DME     ORDER FOR DME     acetaminophen (TYLENOL) 500 MG tablet     Cholecalciferol (VITAMIN D) 1000 UNITS capsule     No current facility-administered medications for this visit.      Allergies   Allergen Reactions     Restasis      Burning eyes, problems with breathing, tightness in chest     Adhesive Tape      Bandages misc     Allegra      EXCESSIVE  URINATION AND WEAKNESS, LIGHT-HEADED     Allergy      Dust     Animal Dander      Benadryl Allergy      EXCESSIVE URINATION AND WEAKNESS, LIGHT-HEADED     Cephalexin      Joint pain or gerd aggravation. Bloating excessive urination      Cephalosporins      Doxycycline Hyclate Nausea     SWEATING,MIGRAINES,LOSS OF APPETITE,SWEATING,LIGHT HEADED, EXCESSIVE URINATION     Flonase [Fluticasone Propionate]      Gabapentin      Neurontin: mosd changes and excess urination     Iodine Solution [Povidone Iodine]      SKIN MELTS     Levaquin      From surgeon--? Joint pain ? Gerd aggravation. Insomnia, excess urination     Mylanta      EXCESSIVE URINATION AND WEAKNESS, LIGHT-HEADED     Prednisone      Weakness, elevated bp, headache, eye pain, congestion      Seafood [Seafood]      Shellfish Allergy      Hives       Sulfamethoxazole-Trimethoprim      Chest pain, angina     Trees      Trileptal      SEVERE JOINT AND TENDON PAIN, INSOMNIA, RESTLESSNESS, NAUSEA, EXCESS URINATION     Cortizone Rash     EXCESS URINATION,WEAKNESS,NAUSEA, HEADACHE     Past Medical History:   Diagnosis Date     Abnormal EMG 4/18/2013     Abnormal involuntary movements(781.0)     Movement Disorder     AK (actinic keratosis) 12/18/2011     Allergic rhinitis, cause unspecified     Allergic rhinitis     Balance problems 11/1/2011     Basal cell carcinoma      Bladder spasms 11/1/2011     Chronic osteoarthritis      Diaphragmatic hernia without mention of obstruction or gangrene      Earache or other ear, nose, or throat complaint      Esophageal reflux      Fatigue 11/1/2011     Fracture      H. pylori infection 5/12/2011     History of MRI of cervical spine 11/18/2013    EXAMINATION: CERVICAL SPINE G/E 5 VIEWS* 4/19/2013 4:18 PM  CLINICAL HISTORY: Pain in limb,Performing Location?->[facility]),  COMPARISON:  FINDINGS: AP and lateral views in flexion and extension, as well as odontoid view of the cervical spine was obtained. There is no  comparison available. The vertebral bodies of the cervical spine are normally aligned. There is posterior spurring and d     Incomplete defecation 11/1/2011     Interstitial lung disease (H) 11/29/2016     Laboratory test 8/7/2012     Lung disease June 2015     Malignant basal cell neoplasm of skin 8/6/2008     Melanoma (H) 8/6/2008     Melanoma in situ of lower leg (H)     R calf     Neuropathy (H) 5/16/2011     Other bladder disorder      Other color vision deficiencies      Other nervous system complications      Parkinsonism (H) 11/1/2011     Personal history of colonic polyps      Polyneuropathy in other diseases classified elsewhere (H)      RA (rheumatoid arthritis) (H)      Rheumatoid arthritis of multiple sites with negative rheumatoid factor (H) 3/21/2016     Seronegative arthritis 11/18/2013     Shortness of breath      Shoulder arthritis 2016    acromioclavicular joint      Somatization disorder 5/12/2011     Squamous cell carcinoma (H)      Tremor 11/1/2011     Unspecified essential hypertension      Unspecified hypothyroidism      Urinary tract infection      Urinary urgency 11/1/2011     Wears glasses 11/1/2011       Past Surgical History:   Procedure Laterality Date     BIOPSY OF SKIN LESION       COLONOSCOPY       HEMORRHOID SURGERY       lip biopsy      for sicca complex     MOHS MICROGRAPHIC PROCEDURE       SOFT TISSUE SURGERY      removeal of basel cell carcinoma       Social History     Social History     Marital status:      Spouse name: N/A     Number of children: N/A     Years of education: N/A     Occupational History     Not on file.     Social History Main Topics     Smoking status: Former Smoker     Packs/day: 1.50     Years: 37.00     Types: Cigarettes     Start date: 6/15/1963     Quit date: 9/30/2000     Smokeless tobacco: Never Used     Alcohol use No     Drug use: No     Sexual activity: No     Other Topics Concern     Parent/Sibling W/ Cabg, Mi Or Angioplasty Before 65f 55m?  "No     Social History Narrative    2013: Living in [place] in a townhouse with no steps    Has 3 sons that are doing okay.         Dairy/d 1 servings/d.     Caffeine 0 servings/d    Exercise 0 x week    Sunscreen used - No    Seatbelts used - Yes    Working smoke/CO detectors in the home - Yes    Guns stored in the home - Yes    Self Breast Exams - NA    Self Testicular Exam - Yes    Eye Exam up to date - Yes 2008    Dental Exam up to date - Yes 2006    Pap Smear up to date - NA    Mammogram up to date - NA    PSA up to date - Yes 2008    Dexa Scan up to date - No    Flex Sig / Colonoscopy up to date - Yes less than 5 yrs ago    Immunizations up to date -today    Abuse: Current or Past(Physical, Sexual or Emotional)- No    Do you feel safe in your environment - Yes    2008                   ROS Pulmonary  A complete ROS was otherwise negative except as noted in the HPI.  /82 (BP Location: Right arm, Patient Position: Chair, Cuff Size: Adult Large)  Pulse 50  Resp 16  Ht 1.88 m (6' 2\")  Wt (!) 137.4 kg (303 lb)  SpO2 94%  BMI 38.9 kg/m2  Exam:   GENERAL APPEARANCE: Alert, and in no apparent distress.  EYES: PERRL, EOMI  MOUTH: Oral mucosa is moist, without any lesions,, no oropharyngeal exudate.  NECK: supple, no masses, no thyromegaly.  LYMPHATICS: No significant axillary, cervical, or supraclavicular nodes.  RESP: normal percussion, good air flow throughout.  No crackles. No rhonchi. No wheezes.  CV: Normal S1, S2, regular rhythm, normal rate. No murmur.  No rub. No gallop. No LE edema.   ABDOMEN:  Bowel sounds normal, soft, nontender, no HSM or masses.   MS: extremities normal. No clubbing. No cyanosis.  SKIN: no rash on limited exam  NEURO: Mentation intact, speech normal, normal strength and tone, normal gait and stance  Results:    PFTs done today were reviewed by me with the patient.  He also had a 6-minute walk test done.  DLCO was measured though it did not meet criteria for acceptability.  The " 6-minute walk distance was lower than expected, suggesting some exercise limitation.  The patient tells me that he usually uses a walker when he walks.  There was no desaturation.  FVC is 3.59 liters, which is 73% of predicted.  FEV1 is 2.66 liters, which is 72% of predicted.  The ratio is 74.  Total lung capacity is 6.07 liters, which is 76% of predicted.  Diffusion capacity is 27.74 which is 98% of predicted.  My interpretation is that there is mild restriction and normal diffusion capacity.  There is also evidence of exercise limitation based on the 6-minute walk test.  The DLCO measurement was not acceptable as they did not meet ATS criterion.      Assessment and plan: Mr. 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  He has poorly controlled reflux.   1.  Follicular bronchiolitis.  Stable PFTs.  We will continue current dose of methotrexate.  From a pulmonary standpoint, he should be able to tolerate a lower dose; it could at least be tried.   2.  Sleep-disordered breathing.  On Bi-level ventilation with good control.   3.  Lung nodules.  A chest CT is being ordered today.  This will complete a 2-year followup as his initial CT was in 04/2015.   4.  We will increase his Prilosec to 40 mg b.i.d.  He might also benefit from evaluation by GI.  The question is does he have an esophageal motility issue? Dr Corley would want a GI consult placed.     I will see the patient back in 6 months, or sooner if needed.         Again, thank you for allowing me to participate in the care of your patient.      Sincerely,    Harsha Mccarthy MD

## 2017-05-18 NOTE — MR AVS SNAPSHOT
After Visit Summary   5/18/2017    Lucio Daly    MRN: [id number]           Patient Information     Date Of Birth          1950        Visit Information        Provider Department      5/18/2017 10:30 AM Harsha Mccarthy MD [facility]        Today's Diagnoses     Pulmonary nodules    -  1    ILD (interstitial lung disease) (H)        Gastroesophageal reflux disease with esophagitis           Follow-ups after your visit        Follow-up notes from your care team     Return in about 6 months (around 11/18/2017).      Your next 10 appointments already scheduled     May 18, 2017 12:00 PM CDT   (Arrive by 11:45 AM)   CT CHEST W/O CONTRAST with UCCT2   [facility] [place] [facility] ([facility])    [address] [place] [id number] [phone]           Please bring any scans or X-rays taken at other hospitals, if similar tests were done. Also bring a list of your medicines, including vitamins, minerals and over-the-counter drugs. It is safest to leave personal items at home.  Be sure to tell your doctor:   If you have any allergies.   If there s any chance you are pregnant.   If you are breastfeeding.   If you have any special needs.  You do not need to do anything special to prepare.  Please wear loose clothing, such as a sweat suit or jogging clothes. Avoid snaps, zippers and other metal. We may ask you to undress and put on a hospital gown.            Jun 26, 2017  9:30 AM CDT   (Arrive by 9:15 AM)   Return Visit with MEGAN Ayers CNP   [facility] Rheumatology ([facility])    [address] [place] [address] [phone]            Aug 29, 2017  8:10 AM CDT   (Arrive by 7:55 AM)   Return Movement Disorder with Thong Montes MD   [facility] Neurology ([facility])    [address] [place] [id number]    [phone]            Nov 16, 2017  9:30 AM CST   FULL PULMONARY FUNCTION with UC PFL A   [facility] Pulmonary Function Testing ([facility])    [address]  [address] [facility] [address] [phone]            Nov 16, 2017 10:30 AM CST   (Arrive by 10:15 AM)   Return Interstitial Lung with Harsha Mccarthy MD   [facility] ([facility])    [address]  [address] [facility] [address] [phone]            Mar 06, 2018  9:00 AM CST   Return Sleep Patient with Kristen Martinez MD   [facility] [place], Sleep Study ([facility])    [address] [place] [id number] [phone]              Future tests that were ordered for you today     Open Future Orders        Priority Expected Expires Ordered    6 minute walk test Routine  5/18/2018 5/18/2017    General PFT Lab (Please always keep checked) Routine  5/18/2018 5/18/2017    Pulmonary Function Test Routine  5/18/2018 5/18/2017    CT Chest w/o Contrast Routine  6/17/2017 5/18/2017            Who to contact     If you have questions or need follow up information about today's clinic visit or your schedule please contact [facility] directly at [phone].  Normal or non-critical lab and imaging results will be communicated to you by MyChart, letter or phone within 4 business days after the clinic has received the results. If you do not hear from us within 7 days, please contact the clinic through DoubleRecallhart or phone. If you have a critical or abnormal lab result, we will notify you by phone as soon as possible.  Submit refill requests through [employer] or call your pharmacy and they will forward the refill request to us. Please allow 3 business days for your refill to be completed.          Additional Information About Your Visit        DoubleRecallhart  "Information     Gerda gives you secure access to your electronic health record. If you see a primary care provider, you can also send messages to your care team and make appointments. If you have questions, please call your primary care clinic.  If you do not have a primary care provider, please call [phone] and they will assist you.        Care EveryWhere ID     This is your Care EveryWhere ID. This could be used by other organizations to access your [facility] medical records  [id number]        Your Vitals Were     Pulse Respirations Height Pulse Oximetry BMI (Body Mass Index)       50 16 1.88 m (6' 2\") 94% 38.9 kg/m2        Blood Pressure from Last 3 Encounters:   05/18/17 133/82   03/06/17 136/78   01/10/17 126/78    Weight from Last 3 Encounters:   05/18/17 (!) 137.4 kg (303 lb)   03/06/17 (!) 137 kg (302 lb)   01/10/17 135.6 kg (299 lb)              We Performed the Following     6 minute walk test     General PFT Lab (Please always keep checked)     Pulmonary Function Test          Today's Medication Changes          These changes are accurate as of: 5/18/17 11:23 AM.  If you have any questions, ask your nurse or doctor.               These medicines have changed or have updated prescriptions.        Dose/Directions    omeprazole 20 MG CR capsule   Commonly known as:  priLOSEC   This may have changed:  See the new instructions.   Used for:  Gastroesophageal reflux disease with esophagitis   Changed by:  Harsha Mccarthy MD        Dose:  40 mg   Take 2 capsules (40 mg) by mouth 2 times daily   Quantity:  60 capsule   Refills:  3            Where to get your medicines      These medications were sent to [facility] - [address]   [address]  [address], [place] [address]     Phone:  [phone]     omeprazole 20 MG CR capsule                Primary Care Provider Office Phone # Fax #    Jah Corley [phone]       Hope " [place] [id number]  [facility] [address]        Thank you!     Thank you for choosing [facility]  for your care. Our goal is always to provide you with excellent care. Hearing back from our patients is one way we can continue to improve our services. Please take a few minutes to complete the written survey that you may receive in the mail after your visit with us. Thank you!             Your Updated Medication List - Protect others around you: Learn how to safely use, store and throw away your medicines at www.disposemymeds.org.          This list is accurate as of: 5/18/17 11:23 AM.  Always use your most recent med list.                   Brand Name Dispense Instructions for use    folic acid 1 MG tablet    FOLVITE    180 tablet    Take 2 tablets (2 mg) by mouth daily       hydroxychloroquine 200 MG tablet    PLAQUENIL    180 tablet    Take 1 tablet (200 mg) by mouth 2 times daily Send copy of recent eye exam as need for refills. Please have a copy of your eye exam faxed to [phone].       methotrexate 2.5 MG tablet CHEMO     28 tablet    Take 7 tablets (17.5 mg) by mouth once a week Labs due every 8-12 weeks - last done 5-5-16       metoprolol 50 MG tablet    LOPRESSOR    90 tablet    Take 1 tablet (50 mg) by mouth daily Take 1 daily       omeprazole 20 MG CR capsule    priLOSEC    60 capsule    Take 2 capsules (40 mg) by mouth 2 times daily       order for DME      Use your CPAP device as directed by your provider.       * order for DME     2 each    Please measure and distribute 1 pair of 20mmHg - 30mmHg knee high open or closed toe compression stockings. Jobst ultrasheer or equivalent.       * order for DME     1 each    Please measure and distribute 1 pair of 20mmHg - 30mmHg thigh high open or closed toe compression stockings. Jobst ultrasheer or equivalent.       oxybutynin 5 MG tablet    DITROPAN    90 tablet    Take 1 tablet (5 mg) by mouth daily       pregabalin  50 MG capsule    LYRICA    270 capsule    Take 1 capsule (50 mg) by mouth 3 times daily       selegiline 5 MG Tabs     180 tablet    1 tablet In the morning       TYLENOL 500 MG tablet   Generic drug:  acetaminophen      Take 1 tablet by mouth as needed. For pain       vitamin D 1000 UNITS capsule      Take 2 capsules by mouth daily.       * Notice:  This list has 2 medication(s) that are the same as other medications prescribed for you. Read the directions carefully, and ask your doctor or other care provider to review them with you.

## 2017-05-19 ENCOUNTER — TELEPHONE (OUTPATIENT)
Dept: PULMONOLOGY | Facility: CLINIC | Age: 67
End: 2017-05-19

## 2017-05-19 PROBLEM — M79.644 PAIN OF RIGHT THUMB: Status: RESOLVED | Noted: 2017-03-17 | Resolved: 2017-04-07

## 2017-05-19 NOTE — PROGRESS NOTES
Discharge Summary - Hand Therapy    Patient did not return to therapy.  We will assume that patient's goals were met.    D/C from [facility].

## 2017-05-22 NOTE — TELEPHONE ENCOUNTER
[facility] Prior Authorization Team   Phone: [phone]  Fax: [phone]      PA Initiation    Medication: omeprazole (PRILOSEC) 20 MG CR capsule  Insurance Company: Silver Script Part D - Phone [phone] Fax [phone]  Pharmacy Filling the Rx: [facility] - [address]   Filling Pharmacy Phone: [phone]  Filling Pharmacy Fax: [phone]  Start Date: 5/22/2017

## 2017-05-23 NOTE — TELEPHONE ENCOUNTER
Prior Authorization Approval    Authorization Effective Date: 2/21/2017  Authorization Expiration Date: 5/22/2018  Medication: omeprazole (PRILOSEC) 20 MG CR capsule-APPROVED  Approved Dose/Quantity:   Reference #:     Insurance Company: Silver Script Part D - Phone [phone] Fax [phone]  Expected CoPay:       CoPay Card Available:      Foundation Assistance Needed:    Which Pharmacy is filling the prescription (Not needed for infusion/clinic administered): [facility] [place], [place] - [address]   Pharmacy Notified: Yes  Patient Notified: Yes      PHARMACY STATED [facility] WAS DOWN. THEY WILL RUN THE CLAIM ONCE IT IS BACK UP AND WILL CONTACT PATIENT. THANKS.

## 2017-05-26 DIAGNOSIS — K21.9 ESOPHAGEAL REFLUX: Primary | ICD-10-CM

## 2017-06-15 ENCOUNTER — TELEPHONE (OUTPATIENT)
Dept: FAMILY MEDICINE | Facility: CLINIC | Age: 67
End: 2017-06-15

## 2017-06-15 ENCOUNTER — OFFICE VISIT (OUTPATIENT)
Dept: FAMILY MEDICINE | Facility: CLINIC | Age: 67
End: 2017-06-15
Payer: MEDICARE

## 2017-06-15 VITALS
WEIGHT: 300 LBS | TEMPERATURE: 97.4 F | HEART RATE: 56 BPM | SYSTOLIC BLOOD PRESSURE: 127 MMHG | BODY MASS INDEX: 38.52 KG/M2 | OXYGEN SATURATION: 97 % | RESPIRATION RATE: 18 BRPM | DIASTOLIC BLOOD PRESSURE: 83 MMHG

## 2017-06-15 DIAGNOSIS — M48.061 SPINAL STENOSIS OF LUMBAR REGION: Primary | ICD-10-CM

## 2017-06-15 DIAGNOSIS — M48.061 SPINAL STENOSIS OF LUMBAR REGION WITHOUT NEUROGENIC CLAUDICATION: Primary | ICD-10-CM

## 2017-06-15 PROCEDURE — 99213 OFFICE O/P EST LOW 20 MIN: CPT | Performed by: FAMILY MEDICINE

## 2017-06-15 NOTE — TELEPHONE ENCOUNTER
Patient called explaining he seen Dr Gil Jacques today who gave a referral to a neurosurgeon however, patient states they will not schedule until he has an MRI    Patient states an MRI order has to come from PCP they will not order     Patient is requesting an order for MRI    Please advise and ok to leave a message

## 2017-06-15 NOTE — PATIENT INSTRUCTIONS
Consider loratadine 10mg daily.  Fluids.  Rest.    It is reasonable to see a neurosurgeon to review your symptoms, previous therapies tried, change in symptoms, and consider alternative interventions or studies. I also recommend checking in with Dr Corley before starting any new medications.

## 2017-06-15 NOTE — TELEPHONE ENCOUNTER
Dr. Gil Jacques,     Do you agree with MRI referral? Writer unsure which MRI options to choose. Please advise.     Thanks! Cinthia Hill RN

## 2017-06-15 NOTE — PROGRESS NOTES
SUBJECTIVE:                                                    Lucio Daly is a 66 year old male who presents to clinic today for the following health issues:      RESPIRATORY SYMPTOMS      Duration: 4 days    Description  cough, headache and hoarse voice    Severity: moderate    Accompanying signs and symptoms: chest tightness    History (predisposing factors):  none    Precipitating or alleviating factors: None    Therapies tried and outcome:  rest and fluids       Had laryngitis initially, but now voice returned.    He does take methotrexate. Wonders if this relates to being immunocompromised and getting a cold.    Does hurt to swallow a bit.    Back pain    During the last few months he has had increased pain in his spine and his left flank. He did have a diagnosis of spinal stenosis and a herniated disc. Saw a a sports medicine doctor for this. Did not have surgery. That consult was in 2014 with Dr Villatoro.    Takes lyrica for neuropathy and tylenol for pain. Takes 1000 mg of tylenol for rheumatology 3 times a day.    His concern is that he is having more pain than before, wants to know if that is a reason to have a re-evaluation or take a different course of treatment.    No difficulty with bowel or bladder control. No difficulty with erectile function. He is noting some numbness in feet. Legs and ankles have given out a bit now and then as well.    EXAM:  /83  Pulse 56  Temp 97.4  F (36.3  C) (Oral)  Resp 18  Wt 300 lb (136.1 kg)  SpO2 97%  BMI 38.52 kg/m2  Constitutional: Healthy, alert, no distress   Cardiovascular: RRR. No murmurs   Respiratory: Clear to auscultation   Musculoskeletal: No discomfort with palpation of lumbar region, patellar reflexes intact and equal bilaterally.    ASSESSMENT    ICD-10-CM    1. Spinal stenosis of lumbar region M48.06 NEUROSURGERY REFERRAL      Plan:  Patient Instructions   Consider loratadine 10mg daily.  Fluids.  Rest.    It is reasonable to see a  neurosurgeon to review your symptoms, previous therapies tried, change in symptoms, and consider alternative interventions or studies. I also recommend checking in with Dr Corley before starting any new medications.     Ivan Jacques MD  Family Medicine Physician

## 2017-06-15 NOTE — TELEPHONE ENCOUNTER
Writer called patient regarding below information. Patient stated MRI has to be completed within the last 6 months.     Thanks! Cinthia Hill RN

## 2017-06-15 NOTE — NURSING NOTE
"Chief Complaint   Patient presents with     URI     Back Pain       Initial /83  Pulse 56  Temp 97.4  F (36.3  C) (Oral)  Resp 18  Wt 300 lb (136.1 kg)  SpO2 97%  BMI 38.52 kg/m2 Estimated body mass index is 38.52 kg/(m^2) as calculated from the following:    Height as of 5/18/17: 6' 2\" (1.88 m).    Weight as of this encounter: 300 lb (136.1 kg).  Medication Reconciliation: complete     Joan Rodrigues MA    "

## 2017-06-15 NOTE — MR AVS SNAPSHOT
After Visit Summary   6/15/2017    Lucio Daly    MRN: [id number]           Patient Information     Date Of Birth          1950        Visit Information        Provider Department      6/15/2017 11:00 AM Ivan Aguilera MD [facility]        Today's Diagnoses     Spinal stenosis of lumbar region    -  1      Care Instructions    Consider loratadine 10mg daily.  Fluids.  Rest.    It is reasonable to see a neurosurgeon to review your symptoms, previous therapies tried, change in symptoms, and consider alternative interventions or studies. I also recommend checking in with Dr Corley before starting any new medications.          Follow-ups after your visit        Additional Services     NEUROSURGERY REFERRAL       Your provider has referred you to: [facility]: Neurosurgery Clinic [facility] [phone]   [email or web address]    Please be aware that coverage of these services is subject to the terms and limitations of your health insurance plan.  Call member services at your health plan with any benefit or coverage questions.      Please bring the following with you to your appointment:    (1) Any X-Rays, CTs or MRIs which have been performed.  Contact the facility where they were done to arrange for  prior to your scheduled appointment.   (2) List of current medications  (3) This referral request   (4) Any documents/labs given to you for this referral                  Your next 10 appointments already scheduled     Jun 26, 2017  9:30 AM CDT   (Arrive by 9:15 AM)   Return Visit with MEGAN Ayers CNP   [facility] Rheumatology ([facility])    [address] [place] [address]  [address]rd [facility] [id number] [phone]            Aug 29, 2017  8:10 AM CDT   (Arrive by 7:55 AM)   Return Movement Disorder with Thong Montes MD   [facility] Neurology ([facility])    [facility]  [address] [facility] [address]   [phone]            Oct 05, 2017 10:00 AM CDT   (Arrive by 9:45 AM)   New Patient Visit with MEGAN Pendleton CNP   [facility] Gastroenterology and IBD ([facility])    [address]  [address] [facility] [address]   [phone]            Nov 16, 2017  9:00 AM CST   Six Minute Walk with UC PFL 6 MINUTE WALK 1   [facility] Pulmonary Function Testing ([facility])    [address]  [address] [facility] [address]   [phone]            Nov 16, 2017  9:30 AM CST   FULL PULMONARY FUNCTION with UC PFL A   [facility] Pulmonary Function Testing ([facility])    [address] [place] [address]   [phone]            Nov 16, 2017 10:30 AM CST   (Arrive by 10:15 AM)   Return Interstitial Lung with Harsha Mccarthy MD   [facility] ([facility])    [address] [place] [address]  [address] [facility] [address]   [phone]            Mar 06, 2018  9:00 AM CST   Return Sleep Patient with Kristen Martinez MD   [facility] [place] Sleep Study ([facility])    [address] [place] [id number] [phone]              Who to contact     If you have questions or need follow up information about today's clinic visit or your schedule please contact [facility] directly at [phone].  Normal or non-critical lab and imaging results will be communicated to you by MyChart, letter or phone within 4 business days after the clinic has received the results. If you do not hear from us within 7 days, please contact the clinic through MyChart or phone. If you have a critical or abnormal lab result, we will notify you by phone as soon as possible.  Submit refill requests through [employer] or call your pharmacy  and they will forward the refill request to us. Please allow 3 business days for your refill to be completed.          Additional Information About Your Visit        Off & Awayhart Information     [employer] gives you secure access to your electronic health record. If you see a primary care provider, you can also send messages to your care team and make appointments. If you have questions, please call your primary care clinic.  If you do not have a primary care provider, please call [phone] and they will assist you.        Care EveryWhere ID     This is your Care EveryWhere ID. This could be used by other organizations to access your [facility] medical records  [id number]        Your Vitals Were     Pulse Temperature Respirations Pulse Oximetry BMI (Body Mass Index)       56 97.4  F (36.3  C) (Oral) 18 97% 38.52 kg/m2        Blood Pressure from Last 3 Encounters:   06/15/17 127/83   05/18/17 133/82   03/06/17 136/78    Weight from Last 3 Encounters:   06/15/17 300 lb (136.1 kg)   05/18/17 (!) 303 lb (137.4 kg)   03/06/17 (!) 302 lb (137 kg)              We Performed the Following     NEUROSURGERY REFERRAL        Primary Care Provider Office Phone # Fax #    Jah Corley [phone]       [address] [address]        Thank you!     Thank you for choosing [facility]  for your care. Our goal is always to provide you with excellent care. Hearing back from our patients is one way we can continue to improve our services. Please take a few minutes to complete the written survey that you may receive in the mail after your visit with us. Thank you!             Your Updated Medication List - Protect others around you: Learn how to safely use, store and throw away your medicines at www.disposemymeds.org.          This list is accurate as of: 6/15/17 11:38 AM.  Always use your most recent med list.                   Brand Name Dispense Instructions for use     folic acid 1 MG tablet    FOLVITE    180 tablet    Take 2 tablets (2 mg) by mouth daily       hydroxychloroquine 200 MG tablet    PLAQUENIL    180 tablet    Take 1 tablet (200 mg) by mouth 2 times daily Send copy of recent eye exam as need for refills. Please have a copy of your eye exam faxed to [phone].       methotrexate 2.5 MG tablet CHEMO     28 tablet    Take 7 tablets (17.5 mg) by mouth once a week Labs due every 8-12 weeks - last done 5-5-16       metoprolol 50 MG tablet    LOPRESSOR    30 tablet    TAKE ONE TABLET BY MOUTH EVERY DAY       omeprazole 20 MG CR capsule    priLOSEC    60 capsule    Take 2 capsules (40 mg) by mouth 2 times daily       order for DME      Use your CPAP device as directed by your provider.       * order for DME     2 each    Please measure and distribute 1 pair of 20mmHg - 30mmHg knee high open or closed toe compression stockings. Jobst ultrasheer or equivalent.       * order for DME     1 each    Please measure and distribute 1 pair of 20mmHg - 30mmHg thigh high open or closed toe compression stockings. Jobst ultrasheer or equivalent.       oxybutynin 5 MG tablet    DITROPAN    90 tablet    Take 1 tablet (5 mg) by mouth daily       pregabalin 50 MG capsule    LYRICA    270 capsule    Take 1 capsule (50 mg) by mouth 3 times daily       selegiline 5 MG Tabs     180 tablet    1 tablet In the morning       TYLENOL 500 MG tablet   Generic drug:  acetaminophen      Take 1 tablet by mouth as needed. For pain       vitamin D 1000 UNITS capsule      Take 2 capsules by mouth daily.       * Notice:  This list has 2 medication(s) that are the same as other medications prescribed for you. Read the directions carefully, and ask your doctor or other care provider to review them with you.

## 2017-06-19 DIAGNOSIS — I10 ESSENTIAL HYPERTENSION: ICD-10-CM

## 2017-06-19 RX ORDER — METOPROLOL TARTRATE 50 MG
50 TABLET ORAL DAILY
Qty: 30 TABLET | Refills: 0 | Status: SHIPPED | OUTPATIENT
Start: 2017-06-19 | End: 2017-07-07 | Stop reason: ALTCHOICE

## 2017-06-19 NOTE — TELEPHONE ENCOUNTER
Reason for Call:  Medication or medication refill:    Do you use a [facility]?  Name of the pharmacy and phone number for the current request:  [facility] - [phone]    Name of the medication requested: Metoprolol    Other request: Pt has appt scheduled for 7/7 with Dr. Corley and is requesting a temporary refill    Can we leave a detailed message on this number? YES    Phone number patient can be reached at: Home number on file [phone] (home) or [phone]    Best Time: Anytime    Call taken on 6/19/2017 at 11:10 AM by Kaya Gracia

## 2017-06-19 NOTE — TELEPHONE ENCOUNTER
Routing refill request to provider for review/approval because:  Tiffanie given x1 and patient did not follow up, please advise

## 2017-06-20 ENCOUNTER — OFFICE VISIT (OUTPATIENT)
Dept: URGENT CARE | Facility: URGENT CARE | Age: 67
End: 2017-06-20
Payer: MEDICARE

## 2017-06-20 VITALS
WEIGHT: 301 LBS | OXYGEN SATURATION: 96 % | HEART RATE: 52 BPM | TEMPERATURE: 96.6 F | SYSTOLIC BLOOD PRESSURE: 124 MMHG | BODY MASS INDEX: 38.65 KG/M2 | DIASTOLIC BLOOD PRESSURE: 66 MMHG

## 2017-06-20 DIAGNOSIS — H10.9 BACTERIAL CONJUNCTIVITIS OF BOTH EYES: ICD-10-CM

## 2017-06-20 DIAGNOSIS — J20.9 ACUTE BRONCHITIS WITH SYMPTOMS > 10 DAYS: Primary | ICD-10-CM

## 2017-06-20 DIAGNOSIS — B96.89 BACTERIAL CONJUNCTIVITIS OF BOTH EYES: ICD-10-CM

## 2017-06-20 PROCEDURE — 99214 OFFICE O/P EST MOD 30 MIN: CPT | Performed by: FAMILY MEDICINE

## 2017-06-20 RX ORDER — AZITHROMYCIN 250 MG/1
TABLET, FILM COATED ORAL
Qty: 6 TABLET | Refills: 0 | Status: SHIPPED | OUTPATIENT
Start: 2017-06-20 | End: 2017-07-07

## 2017-06-20 RX ORDER — TOBRAMYCIN 3 MG/ML
1 SOLUTION/ DROPS OPHTHALMIC EVERY 4 HOURS
Qty: 1 BOTTLE | Refills: 0 | Status: SHIPPED | OUTPATIENT
Start: 2017-06-20 | End: 2017-06-27

## 2017-06-20 NOTE — PATIENT INSTRUCTIONS
Take full course of antibiotic for bronchitis.  Use tobrex for both eyes until symptoms resolves, then use for 1 more day, should not exceed 7 days.      Bronchitis, Antibiotic Treatment (Adult)    Bronchitis is an infection of the air passages (bronchial tubes) in your lungs. It often occurs when you have a cold. This illness is contagious during the first few days and is spread through the air by coughing and sneezing, or by direct contact (touching the sick person and then touching your own eyes, nose, or mouth).  Symptoms of bronchitis include cough with mucus (phlegm) and low-grade fever. Bronchitis usually lasts 7 to 14 days. Mild cases can be treated with simple home remedies. More severe infection is treated with an antibiotic.  Home care  Follow these guidelines when caring for yourself at home:    If your symptoms are severe, rest at home for the first 2 to 3 days. When you go back to your usual activities, don't let yourself get too tired.    Do not smoke. Also avoid being exposed to secondhand smoke.    You may use over-the-counter medicines to control fever or pain, unless another medicine was prescribed. (Note: If you have chronic liver or kidney disease or have ever had a stomach ulcer or gastrointestinal bleeding, talk with your healthcare provider before using these medicines. Also talk to your provider if you are taking medicine to prevent blood clots.) Aspirin should never be given to anyone younger than 18 years of age who is ill with a viral infection or fever. It may cause severe liver or brain damage.    Your appetite may be poor, so a light diet is fine. Avoid dehydration by drinking 6 to 8 glasses of fluids per day (such as water, soft drinks, sports drinks, juices, tea, or soup). Extra fluids will help loosen secretions in the nose and lungs.    Over-the-counter cough, cold, and sore-throat medicines will not shorten the length of the illness, but they may be helpful to reduce symptoms.  (Note: Do not use decongestants if you have high blood pressure.)    Finish all antibiotic medicine. Do this even if you are feeling better after only a few days.  Follow-up care  Follow up with your healthcare provider, or as advised. If you had an X-ray or ECG (electrocardiogram), a specialist will review it. You will be notified of any new findings that may affect your care.  Note: If you are age 65 or older, or if you have a chronic lung disease or condition that affects your immune system, or you smoke, talk to your healthcare provider about having pneumococcal vaccinations and a yearly influenza vaccination (flu shot).  When to seek medical advice  Call your healthcare provider right away if any of these occur:    Fever of 100.4 F (38 C) or higher    Coughing up increased amounts of colored sputum    Weakness, drowsiness, headache, facial pain, ear pain, or a stiff neck  Call 911, or get immediate medical care  Contact emergency services right away if any of these occur.    Coughing up blood    Worsening weakness, drowsiness, headache, or stiff neck    Trouble breathing, wheezing, or pain with breathing  Date Last Reviewed: 9/13/2015 2000-2017 [employer]. [address] [place] [address]. All rights reserved. This information is not intended as a substitute for professional medical care. Always follow your healthcare professional's instructions.        Conjunctivitis, Bacterial    You have an infection in the membranes covering the white part of the eye. This part of the eye is called the conjunctiva. The infection is called conjunctivitis. The most common symptoms of conjunctivitis include a thick, pus-like discharge from the eye, swollen eyelids, redness, eyelids sticking together upon awakening, and a gritty or scratchy feeling in the eye. Your infection was caused by bacteria. It may be treated with medicine. With treatment, the infection takes about 7 to 10 days to  resolve.  Home care    Use prescribed antibiotic eye drops or ointment as directed to treat the infection.    Apply a warm compress (towel soaked in warm water) to the affected eye 3 to 4 times a day. Do this just before applying medicine to the eye.    Use a warm, wet cloth to wipe away crusting of the eyelids in the morning. This is caused by mucus drainage during the night. You may also use saline irrigating solution or artificial tears to rinse away mucus in the eye. Do not put a patch over the eye.    Wash your hands before and after touching the infected eye. This is to prevent spreading the infection to the other eye, and to other people. Do not share your towels or washcloths with others.    You may use acetaminophen or ibuprofen to control pain, unless another medicine was prescribed. (Note: If you have chronic liver or kidney disease or have ever had a stomach ulcer or gastrointestinal bleeding, talk with your doctor before using these medicines.)    Do not wear contact lenses until your eyes have healed and all symptoms are gone.  Follow-up care  Follow up with your healthcare provider, or as advised.  When to seek medical advice  Call your healthcare provider right away if any of these occur:    Worsening vision    Increasing pain in the eye    Increasing swelling or redness of the eyelid    Redness spreading around the eye  Date Last Reviewed: 6/14/2015 2000-2017 [employer]. [address] [place] [address], [place] [address]. All rights reserved. This information is not intended as a substitute for professional medical care. Always follow your healthcare professional's instructions.

## 2017-06-20 NOTE — NURSING NOTE
"Chief Complaint   Patient presents with     Urgent Care     Eye Problem     Pt states has had crust, redness, and mild pain in right eye.      URI     Pt states x 10 days has had cough and congestion.        Initial /66 (BP Location: Right arm, Patient Position: Chair, Cuff Size: Adult Large)  Pulse 52  Temp 96.6  F (35.9  C) (Tympanic)  Wt (!) 301 lb (136.5 kg)  SpO2 96%  BMI 38.65 kg/m2 Estimated body mass index is 38.65 kg/(m^2) as calculated from the following:    Height as of 5/18/17: 6' 2\" (1.88 m).    Weight as of this encounter: 301 lb (136.5 kg).  Medication Reconciliation: unable or not appropriate to perform   Vic Tran CMA ([facility]) 6/20/2017 9:29 AM    "

## 2017-06-20 NOTE — MR AVS SNAPSHOT
After Visit Summary   6/20/2017    Lucio Daly    MRN: [id number]           Patient Information     Date Of Birth          1950        Visit Information        Provider Department      6/20/2017 9:15 AM Stevo Garcia MD [facility]        Today's Diagnoses     Acute bronchitis with symptoms > 10 days    -  1    Bacterial conjunctivitis of both eyes          Care Instructions    Take full course of antibiotic for bronchitis.  Use tobrex for both eyes until symptoms resolves, then use for 1 more day, should not exceed 7 days.      Bronchitis, Antibiotic Treatment (Adult)    Bronchitis is an infection of the air passages (bronchial tubes) in your lungs. It often occurs when you have a cold. This illness is contagious during the first few days and is spread through the air by coughing and sneezing, or by direct contact (touching the sick person and then touching your own eyes, nose, or mouth).  Symptoms of bronchitis include cough with mucus (phlegm) and low-grade fever. Bronchitis usually lasts 7 to 14 days. Mild cases can be treated with simple home remedies. More severe infection is treated with an antibiotic.  Home care  Follow these guidelines when caring for yourself at home:    If your symptoms are severe, rest at home for the first 2 to 3 days. When you go back to your usual activities, don't let yourself get too tired.    Do not smoke. Also avoid being exposed to secondhand smoke.    You may use over-the-counter medicines to control fever or pain, unless another medicine was prescribed. (Note: If you have chronic liver or kidney disease or have ever had a stomach ulcer or gastrointestinal bleeding, talk with your healthcare provider before using these medicines. Also talk to your provider if you are taking medicine to prevent blood clots.) Aspirin should never be given to anyone younger than 18 years of age who is ill with a viral infection or fever. It may cause severe  liver or brain damage.    Your appetite may be poor, so a light diet is fine. Avoid dehydration by drinking 6 to 8 glasses of fluids per day (such as water, soft drinks, sports drinks, juices, tea, or soup). Extra fluids will help loosen secretions in the nose and lungs.    Over-the-counter cough, cold, and sore-throat medicines will not shorten the length of the illness, but they may be helpful to reduce symptoms. (Note: Do not use decongestants if you have high blood pressure.)    Finish all antibiotic medicine. Do this even if you are feeling better after only a few days.  Follow-up care  Follow up with your healthcare provider, or as advised. If you had an X-ray or ECG (electrocardiogram), a specialist will review it. You will be notified of any new findings that may affect your care.  Note: If you are age 65 or older, or if you have a chronic lung disease or condition that affects your immune system, or you smoke, talk to your healthcare provider about having pneumococcal vaccinations and a yearly influenza vaccination (flu shot).  When to seek medical advice  Call your healthcare provider right away if any of these occur:    Fever of 100.4 F (38 C) or higher    Coughing up increased amounts of colored sputum    Weakness, drowsiness, headache, facial pain, ear pain, or a stiff neck  Call 911, or get immediate medical care  Contact emergency services right away if any of these occur.    Coughing up blood    Worsening weakness, drowsiness, headache, or stiff neck    Trouble breathing, wheezing, or pain with breathing  Date Last Reviewed: 9/13/2015 2000-2017 [employer]. [address] [place] [address]. All rights reserved. This information is not intended as a substitute for professional medical care. Always follow your healthcare professional's instructions.        Conjunctivitis, Bacterial    You have an infection in the membranes covering the white part of the eye. This part of the  eye is called the conjunctiva. The infection is called conjunctivitis. The most common symptoms of conjunctivitis include a thick, pus-like discharge from the eye, swollen eyelids, redness, eyelids sticking together upon awakening, and a gritty or scratchy feeling in the eye. Your infection was caused by bacteria. It may be treated with medicine. With treatment, the infection takes about 7 to 10 days to resolve.  Home care    Use prescribed antibiotic eye drops or ointment as directed to treat the infection.    Apply a warm compress (towel soaked in warm water) to the affected eye 3 to 4 times a day. Do this just before applying medicine to the eye.    Use a warm, wet cloth to wipe away crusting of the eyelids in the morning. This is caused by mucus drainage during the night. You may also use saline irrigating solution or artificial tears to rinse away mucus in the eye. Do not put a patch over the eye.    Wash your hands before and after touching the infected eye. This is to prevent spreading the infection to the other eye, and to other people. Do not share your towels or washcloths with others.    You may use acetaminophen or ibuprofen to control pain, unless another medicine was prescribed. (Note: If you have chronic liver or kidney disease or have ever had a stomach ulcer or gastrointestinal bleeding, talk with your doctor before using these medicines.)    Do not wear contact lenses until your eyes have healed and all symptoms are gone.  Follow-up care  Follow up with your healthcare provider, or as advised.  When to seek medical advice  Call your healthcare provider right away if any of these occur:    Worsening vision    Increasing pain in the eye    Increasing swelling or redness of the eyelid    Redness spreading around the eye  Date Last Reviewed: 6/14/2015 2000-2017 [employer]. [address] [place] [address], [place] [address]. All rights reserved. This information is not intended as a substitute  for professional medical care. Always follow your healthcare professional's instructions.                Follow-ups after your visit        Your next 10 appointments already scheduled     Jun 26, 2017  9:30 AM CDT   (Arrive by 9:15 AM)   Return Visit with MEGAN Ayers [facility] ([facility])    [address] [place] [address]   [phone]            Jun 27, 2017  4:00 PM CDT   (Arrive by 3:45 PM)   MR LUMBAR SPINE W/O CONTRAST with [facility] ([facility])    [address] [id number] [phone]           Take your medicines as usual, unless your doctor tells you not to. Bring a list of your current medicines to your exam (including vitamins, minerals and over-the-counter drugs). Also bring the results of similar scans you may have had.  Please remove any body piercings and hair extensions before you arrive.  Follow your doctor s orders. If you do not, we may have to postpone your exam.  You will not have contrast for this exam. You do not need to do anything special to prepare.  The MRI machine uses a strong magnet. Please wear clothes without metal (snaps, zippers). A sweatsuit works well, or we may give you a hospital gown.   **IMPORTANT** THE INSTRUCTIONS BELOW ARE ONLY FOR THOSE PATIENTS WHO HAVE BEEN TOLD THEY WILL RECEIVE SEDATION OR GENERAL ANESTHESIA DURING THEIR MRI PROCEDURE:  IF YOU WILL RECEIVE SEDATION (take medicine to help you relax during your exam):   You must get the medicine from your doctor before you arrive. Bring the medicine to the exam. Do not take it at home.   Arrive one hour early. Bring someone who can take you home after the test. Your medicine will make you sleepy. After the exam, you may not drive, take a bus or take a taxi by yourself.   No eating 8 hours before your exam. You may have clear liquids up until 4 hours  before your exam. (Clear liquids include water, clear tea, black coffee and fruit juice without pulp.)  IF YOU WILL RECEIVE ANESTHESIA (be asleep for your exam):   Arrive 1 1/2 hours early. Bring someone who can take you home after the test. You may not drive, take a bus or take a taxi by yourself.   No eating 8 hours before your exam. You may have clear liquids up until 4 hours before your exam. (Clear liquids include water, clear tea, black coffee and fruit juice without pulp.)   You will spend four to five hours in the recovery room.  Please call the Imaging Department at your exam site with any questions.            Jul 07, 2017 11:00 AM CDT   [facility]   [facility]    [address] [place] [id number] [phone]           Bring a current list of meds and any records pertaining to this visit.  For Physicals, please bring immunization records and any forms needing to be filled out.  Please arrive 10 minutes early to complete paperwork.            Aug 29, 2017  8:10 AM CDT   (Arrive by 7:55 AM)   Return Movement Disorder with Thong Montes MD   [facility] ([facility])    [address] [place] [phone]   [phone]            Oct 05, 2017 10:00 AM CDT   (Arrive by 9:45 AM)   New Patient Visit with MEGAN Pendleton CNP   [facility] ([facility])    [address] [facility] [address]   [phone]            Nov 16, 2017  9:00 AM CST   Six Minute Walk with UC PFL 6 MINUTE WALK 1   [facility] ([facility])    [address] [address]   [phone]            Nov 16, 2017  9:30 AM CST   FULL PULMONARY FUNCTION with UC PFL A   [facility] ([facility])     [address] [facility] [address]   [phone]            Nov 16, 2017 10:30 AM CST   (Arrive by 10:15 AM)   Return Interstitial Lung with Harsha Mccarthy MD   [facility] ([facility])    [address] [facility] [address]   [phone]            Mar 06, 2018  9:00 AM CST   Return Sleep Patient with Kristen Martinez MD   [facility] [place], Sleep Study ([facility])    [address] [place] [id number] [phone]              Who to contact     If you have questions or need follow up information about today's clinic visit or your schedule please contact [facility] directly at [phone].  Normal or non-critical lab and imaging results will be communicated to you by MyChart, letter or phone within 4 business days after the clinic has received the results. If you do not hear from us within 7 days, please contact the clinic through [employer] or phone. If you have a critical or abnormal lab result, we will notify you by phone as soon as possible.  Submit refill requests through [employer] or call your pharmacy and they will forward the refill request to us. Please allow 3 business days for your refill to be completed.          Additional Information About Your Visit        [employer] Information     [employer] gives you secure access to your electronic health record. If you see a primary care provider, you can also send messages to your care team and make appointments. If you have questions, please call your primary care clinic.  If you do not have a primary care provider, please call [phone] and they will assist you.        Care EveryWhere ID     This is your Care EveryWhere ID. This could be used by other organizations to access your [facility] medical records  [id number]        Your Vitals Were     Pulse Temperature Pulse  Oximetry BMI (Body Mass Index)          52 96.6  F (35.9  C) (Tympanic) 96% 38.65 kg/m2         Blood Pressure from Last 3 Encounters:   06/20/17 124/66   06/15/17 127/83   05/18/17 133/82    Weight from Last 3 Encounters:   06/20/17 (!) 301 lb (136.5 kg)   06/15/17 300 lb (136.1 kg)   05/18/17 (!) 303 lb (137.4 kg)              Today, you had the following     No orders found for display         Today's Medication Changes          These changes are accurate as of: 6/20/17  9:47 AM.  If you have any questions, ask your nurse or doctor.               Start taking these medicines.        Dose/Directions    azithromycin 250 MG tablet   Commonly known as:  ZITHROMAX   Used for:  Acute bronchitis with symptoms > 10 days   Started by:  Stevo Garcia MD        Two tablets first day, then one tablet daily for four days.   Quantity:  6 tablet   Refills:  0       tobramycin 0.3 % ophthalmic solution   Commonly known as:  TOBREX   Used for:  Bacterial conjunctivitis of both eyes   Started by:  Stevo Garcia MD        Dose:  1 drop   Apply 1 drop to eye every 4 hours for 7 days   Quantity:  1 Bottle   Refills:  0            Where to get your medicines      These medications were sent to [facility] - [address]   [address]  [address], [place] [address]     Phone:  [phone]     azithromycin 250 MG tablet    tobramycin 0.3 % ophthalmic solution                Primary Care Provider Office Phone # Fax #    Jah Corley [phone]       [address]  [facility] [address]        Thank you!     Thank you for choosing [facility]  for your care. Our goal is always to provide you with excellent care. Hearing back from our patients is one way we can continue to improve our services. Please take a few minutes to complete the written survey that you may receive in the mail after your visit with us. Thank you!             Your  Updated Medication List - Protect others around you: Learn how to safely use, store and throw away your medicines at www.disposemymeds.org.          This list is accurate as of: 6/20/17  9:47 AM.  Always use your most recent med list.                   Brand Name Dispense Instructions for use    azithromycin 250 MG tablet    ZITHROMAX    6 tablet    Two tablets first day, then one tablet daily for four days.       folic acid 1 MG tablet    FOLVITE    180 tablet    Take 2 tablets (2 mg) by mouth daily       hydroxychloroquine 200 MG tablet    PLAQUENIL    180 tablet    Take 1 tablet (200 mg) by mouth 2 times daily Send copy of recent eye exam as need for refills. Please have a copy of your eye exam faxed to [phone].       methotrexate 2.5 MG tablet CHEMO     28 tablet    Take 7 tablets (17.5 mg) by mouth once a week Labs due every 8-12 weeks - last done 5-5-16       metoprolol 50 MG tablet    LOPRESSOR    30 tablet    Take 1 tablet (50 mg) by mouth daily       omeprazole 20 MG CR capsule    priLOSEC    60 capsule    Take 2 capsules (40 mg) by mouth 2 times daily       order for DME      Use your CPAP device as directed by your provider.       * order for DME     2 each    Please measure and distribute 1 pair of 20mmHg - 30mmHg knee high open or closed toe compression stockings. Jobst ultrasheer or equivalent.       * order for DME     1 each    Please measure and distribute 1 pair of 20mmHg - 30mmHg thigh high open or closed toe compression stockings. Jobst ultrasheer or equivalent.       oxybutynin 5 MG tablet    DITROPAN    90 tablet    Take 1 tablet (5 mg) by mouth daily       pregabalin 50 MG capsule    LYRICA    270 capsule    Take 1 capsule (50 mg) by mouth 3 times daily       selegiline 5 MG Tabs     180 tablet    1 tablet In the morning       tobramycin 0.3 % ophthalmic solution    TOBREX    1 Bottle    Apply 1 drop to eye every 4 hours for 7 days       TYLENOL 500 MG tablet   Generic drug:   acetaminophen      Take 1 tablet by mouth as needed. For pain       vitamin D 1000 UNITS capsule      Take 2 capsules by mouth daily.       * Notice:  This list has 2 medication(s) that are the same as other medications prescribed for you. Read the directions carefully, and ask your doctor or other care provider to review them with you.

## 2017-06-20 NOTE — PROGRESS NOTES
SUBJECTIVE:   Lucio Daly is a 66 year old male presenting with a chief complaint of nasal congestion and cough .  Had sore throat and hoarseness, went to his head but now more in his chest.  Eye started to bother him on Sunday, developed mattering the following day but now both eyes.  Tried lubricating eyedrops due to dry eyes but not helping.  Denies any fever.  Onset of symptoms was 12 day(s) ago.  Course of illness is worsening.    Severity moderate  Current and Associated symptoms: cough, congestion, right eye redness, pain and drainage.  Treatment measures tried include Fluids, OTC meds and Rest.  Predisposing factors include : immunosuppression, allergic rhinitis, interstitial lung disease.    Past Medical History:   Diagnosis Date     Abnormal EMG 4/18/2013     Abnormal involuntary movements(781.0)     Movement Disorder     AK (actinic keratosis) 12/18/2011     Allergic rhinitis, cause unspecified     Allergic rhinitis     Balance problems 11/1/2011     Basal cell carcinoma      Bladder spasms 11/1/2011     Chronic osteoarthritis      Diaphragmatic hernia without mention of obstruction or gangrene      Earache or other ear, nose, or throat complaint      Esophageal reflux      Fatigue 11/1/2011     Fracture      H. pylori infection 5/12/2011     History of MRI of cervical spine 11/18/2013    EXAMINATION: CERVICAL SPINE G/E 5 VIEWS* 4/19/2013 4:18 PM  CLINICAL HISTORY: Pain in limb,Performing Location?->[facility] ([facility]),  COMPARISON:  FINDINGS: AP and lateral views in flexion and extension, as well as odontoid view of the cervical spine was obtained. There is no comparison available. The vertebral bodies of the cervical spine are normally aligned. There is posterior spurring and d     Incomplete defecation 11/1/2011     Interstitial lung disease (H) 11/29/2016     Laboratory test 8/7/2012     Lung disease June 2015     Malignant basal cell neoplasm of skin 8/6/2008     Melanoma (H) 8/6/2008      Melanoma in situ of lower leg (H)     R calf     Neuropathy (H) 5/16/2011     Other bladder disorder      Other color vision deficiencies      Other nervous system complications      Parkinsonism (H) 11/1/2011     Personal history of colonic polyps      Polyneuropathy in other diseases classified elsewhere (H)      RA (rheumatoid arthritis) (H)      Rheumatoid arthritis of multiple sites with negative rheumatoid factor (H) 3/21/2016     Seronegative arthritis 11/18/2013     Shortness of breath      Shoulder arthritis 2016    acromioclavicular joint      Somatization disorder 5/12/2011     Squamous cell carcinoma (H)      Tremor 11/1/2011     Unspecified essential hypertension      Unspecified hypothyroidism      Urinary tract infection      Urinary urgency 11/1/2011     Wears glasses 11/1/2011     Current Outpatient Prescriptions   Medication Sig Dispense Refill     metoprolol (LOPRESSOR) 50 MG tablet Take 1 tablet (50 mg) by mouth daily 30 tablet 0     omeprazole (PRILOSEC) 20 MG CR capsule Take 2 capsules (40 mg) by mouth 2 times daily 60 capsule 3     hydroxychloroquine (PLAQUENIL) 200 MG tablet Take 1 tablet (200 mg) by mouth 2 times daily Send copy of recent eye exam as need for refills. Please have a copy of your eye exam faxed to [phone]. 180 tablet 0     oxybutynin (DITROPAN) 5 MG tablet Take 1 tablet (5 mg) by mouth daily 90 tablet 3     pregabalin (LYRICA) 50 MG capsule Take 1 capsule (50 mg) by mouth 3 times daily 270 capsule 1     methotrexate 2.5 MG tablet CHEMO Take 7 tablets (17.5 mg) by mouth once a week Labs due every 8-12 weeks - last done 5-5-16 28 tablet 4     folic acid (FOLVITE) 1 MG tablet Take 2 tablets (2 mg) by mouth daily 180 tablet 4     selegiline 5 MG TABS 1 tablet In the morning 180 tablet 3     ORDER FOR DME Please measure and distribute 1 pair of 20mmHg - 30mmHg thigh high open or closed toe compression stockings. Jobst ultrasheer or equivalent. 1 each 0     ORDER FOR DME Please  measure and distribute 1 pair of 20mmHg - 30mmHg knee high open or closed toe compression stockings. Jobst ultrasheer or equivalent. 2 each 10     ORDER FOR DME Use your CPAP device as directed by your provider.       acetaminophen (TYLENOL) 500 MG tablet Take 1 tablet by mouth as needed. For pain       Cholecalciferol (VITAMIN D) 1000 UNITS capsule Take 2 capsules by mouth daily.       Social History   Substance Use Topics     Smoking status: Former Smoker     Packs/day: 1.50     Years: 37.00     Types: Cigarettes     Start date: 6/15/1963     Quit date: 9/30/2000     Smokeless tobacco: Never Used     Alcohol use No       ROS:  CONSTITUTIONAL:NEGATIVE for fever, chills, change in weight and POSITIVE  for fatigue and malaise  EYES: POSITIVE for discharge, mattering and redness   ENT/MOUTH: NEGATIVE for ear, mouth and throat problems and POSITIVE for hoarseness, Hx sinus infections and nasal congestion  RESP:POSITIVE for cough-non productive, cough-productive and SOB/dyspnea  CV: NEGATIVE for chest pain, palpitations or peripheral edema  GI: NEGATIVE for nausea, abdominal pain, heartburn, or change in bowel habits  MUSCULOSKELETAL: POSITIVE  for arthralgias   PSYCHIATRIC: NEGATIVE for changes in mood or affect    OBJECTIVE  :/66 (BP Location: Right arm, Patient Position: Chair, Cuff Size: Adult Large)  Pulse 52  Temp 96.6  F (35.9  C) (Tympanic)  Wt (!) 301 lb (136.5 kg)  SpO2 96%  BMI 38.65 kg/m2  GENERAL APPEARANCE: healthy, alert and no distress  EYES: EOMI,  PERRL, conjunctiva mildly irritated bilaterally  HENT: ear canals and TM's normal.  Nose and mouth without ulcers, erythema or lesions.  No sinus tenderness  NECK: supple, nontender, no lymphadenopathy  RESP: lungs with right basilar rhonchi, no crackles or wheezes  CV: regular rates and rhythm, normal S1 S2, no murmur noted  Extremities: no peripheral edema or tenderness, peripheral pulses normal  NEURO: Normal strength and tone, sensory exam  grossly normal,  normal speech and mentation  SKIN: no suspicious lesions or rashes  PSYCH: mentation appears normal and affect normal/bright    ASSESSMENT/PLAN:  (J20.9) Acute bronchitis with symptoms > 10 days  (primary encounter diagnosis)  Plan: azithromycin (ZITHROMAX) 250 MG tablet            (H10.9) Bacterial conjunctivitis of both eyes  Plan: tobramycin (TOBREX) 0.3 % ophthalmic solution            Reassurance given, reviewed symptomatic treatment, plenty of fluids and rest.  RX for Zpak given for treatment due to worsening symptoms and prolonged symptoms.  Patient with underlying immunosuppression and discussed that may take longer for resolution of symptoms at times.  Offered inhaler but patient states that due to current medication for Parkinson that there was a contraindication, offered jessica AC but patient declined, states that has been able to sleep at night.  RX for tobrex for presumptive bacterial conjunctivitis.  Good hand washing.    Return to clinic if no resolution of symptoms.    Stevo Garcia MD  June 20, 2017 9:53 AM

## 2017-06-26 ENCOUNTER — OFFICE VISIT (OUTPATIENT)
Dept: RHEUMATOLOGY | Facility: CLINIC | Age: 67
End: 2017-06-26
Attending: NURSE PRACTITIONER
Payer: MEDICARE

## 2017-06-26 VITALS
HEIGHT: 74 IN | DIASTOLIC BLOOD PRESSURE: 88 MMHG | SYSTOLIC BLOOD PRESSURE: 139 MMHG | TEMPERATURE: 98 F | WEIGHT: 302.1 LBS | HEART RATE: 52 BPM | OXYGEN SATURATION: 96 % | BODY MASS INDEX: 38.77 KG/M2

## 2017-06-26 DIAGNOSIS — Z79.899 HIGH RISK MEDICATIONS (NOT ANTICOAGULANTS) LONG-TERM USE: ICD-10-CM

## 2017-06-26 DIAGNOSIS — M06.09 RHEUMATOID ARTHRITIS OF MULTIPLE SITES WITH NEGATIVE RHEUMATOID FACTOR (H): Primary | ICD-10-CM

## 2017-06-26 PROCEDURE — 99212 OFFICE O/P EST SF 10 MIN: CPT | Mod: ZF

## 2017-06-26 ASSESSMENT — PAIN SCALES - GENERAL: PAINLEVEL: SEVERE PAIN (6)

## 2017-06-26 NOTE — PATIENT INSTRUCTIONS
Thank-you for allowing me to participate in your care.     Sin GUZMAN, CNP, MSN  [facility]  Department of Rheumatology & Autoimmune Disorders  [facility]: [phone] Opt 2, then Opt 1 Scheduling   [facility]: [phone]; [phone] Option 7 scheduling

## 2017-06-26 NOTE — MR AVS SNAPSHOT
After Visit Summary   6/26/2017    Lucio Daly    MRN: [id number]           Patient Information     Date Of Birth          1950        Visit Information        Provider Department      6/26/2017 9:30 AM Sin Snow APRN CNP [facility]        Today's Diagnoses     Rheumatoid arthritis of multiple sites with negative rheumatoid factor (H)    -  1    High risk medications (not anticoagulants) long-term use          Care Instructions    Thank-you for allowing me to participate in your care.     Sin GUZMAN, BRODIE, MSN  [facility]  Department of Rheumatology & Autoimmune Disorders  [facility]: [phone] Opt 2, then Opt 1 Scheduling   [facility]: [phone]; [phone] Option 7 scheduling              Follow-ups after your visit        Follow-up notes from your care team     Return in about 6 months (around 12/26/2017) for Labs every 8-12 weeks.      Your next 10 appointments already scheduled     Jun 27, 2017  4:00 PM CDT   (Arrive by 3:45 PM)   MR LUMBAR SPINE W/O CONTRAST with [address] [facility] ([facility])    [address] [place] [address] [phone]           Take your medicines as usual, unless your doctor tells you not to. Bring a list of your current medicines to your exam (including vitamins, minerals and over-the-counter drugs). Also bring the results of similar scans you may have had.  Please remove any body piercings and hair extensions before you arrive.  Follow your doctor s orders. If you do not, we may have to postpone your exam.  You will not have contrast for this exam. You do not need to do anything special to prepare.  The MRI machine uses a strong magnet. Please wear clothes without metal (snaps, zippers). A sweatsuit works well, or we may give you a hospital gown.   **IMPORTANT** THE INSTRUCTIONS BELOW ARE ONLY FOR  THOSE PATIENTS WHO HAVE BEEN TOLD THEY WILL RECEIVE SEDATION OR GENERAL ANESTHESIA DURING THEIR MRI PROCEDURE:  IF YOU WILL RECEIVE SEDATION (take medicine to help you relax during your exam):   You must get the medicine from your doctor before you arrive. Bring the medicine to the exam. Do not take it at home.   Arrive one hour early. Bring someone who can take you home after the test. Your medicine will make you sleepy. After the exam, you may not drive, take a bus or take a taxi by yourself.   No eating 8 hours before your exam. You may have clear liquids up until 4 hours before your exam. (Clear liquids include water, clear tea, black coffee and fruit juice without pulp.)  IF YOU WILL RECEIVE ANESTHESIA (be asleep for your exam):   Arrive 1 1/2 hours early. Bring someone who can take you home after the test. You may not drive, take a bus or take a taxi by yourself.   No eating 8 hours before your exam. You may have clear liquids up until 4 hours before your exam. (Clear liquids include water, clear tea, black coffee and fruit juice without pulp.)   You will spend four to five hours in the recovery room.  Please call the Imaging Department at your exam site with any questions.            Jul 07, 2017 11:00 AM CDT   Office Visit with Jah Corley MD   [facility]    [address] [place] [id number] [phone]           Bring a current list of meds and any records pertaining to this visit.  For Physicals, please bring immunization records and any forms needing to be filled out.  Please arrive 10 minutes early to complete paperwork.            Aug 29, 2017  8:10 AM CDT   (Arrive by 7:55 AM)   Return Movement Disorder with Thong Montes MD   [facility] ([facility])    [address] [facility]  [address] [facility] [address] [phone]            Oct 05, 2017 10:00 AM CDT   (Arrive by 9:45 AM)   New Patient  Visit with MEGAN Pendleton CNP   [facility] IBD ([facility])    [address] [facility]  [address] [facility] [address]   [phone]            Nov 16, 2017  9:00 AM CST   Six Minute Walk with UC PFL 6 MINUTE WALK 1   [facility] Pulmonary Function Testing ([facility])    [address] [facility]  [address] [facility] [address]   [phone]            Nov 16, 2017  9:30 AM CST   FULL PULMONARY FUNCTION with UC PFL A   [facility] Pulmonary Function Testing ([facility])    [address] [facility]  [address] [facility] [address]   [phone]            Nov 16, 2017 10:30 AM CST   (Arrive by 10:15 AM)   Return Interstitial Lung with Harsha Mccarthy MD   [facility] ([facility])    [address] [address]   [phone]            Dec 20, 2017  9:30 AM CST   (Arrive by 9:15 AM)   Return Visit with Jeremy Goodrich MD   [facility] Rheumatology ([facility])    [address] [address]   [phone]            Mar 06, 2018  9:00 AM CST   Return Sleep Patient with Kristen Martinez MD   [facility] [place] Sleep [facility] ([facility])    [address] [place] [id number] [phone]              Future tests that were ordered for you today     Open Standing Orders        Priority Remaining Interval Expires Ordered    AST Routine 6/6 every 8-12 weeks 6/26/2018 6/26/2017    ALT Routine 6/6 every 8-12 weeks 6/26/2018 6/26/2017    Albumin level Routine 6/6 every 8-12 weeks 6/26/2018 6/26/2017    Creatinine Routine 6/6 every 8-12 weeks 6/26/2018 6/26/2017    CRP inflammation Routine 6/6 every 8-12 weeks 6/26/2018 6/26/2017    CBC with platelets Routine 6/6 every 8-12 weeks 6/26/2018 6/26/2017           "  Who to contact     If you have questions or need follow up information about today's clinic visit or your schedule please contact [facility] directly at [phone].  Normal or non-critical lab and imaging results will be communicated to you by "[employer]"hart, letter or phone within 4 business days after the clinic has received the results. If you do not hear from us within 7 days, please contact the clinic through [employer] or phone. If you have a critical or abnormal lab result, we will notify you by phone as soon as possible.  Submit refill requests through [employer] or call your pharmacy and they will forward the refill request to us. Please allow 3 business days for your refill to be completed.          Additional Information About Your Visit        [employer] Information     [employer] gives you secure access to your electronic health record. If you see a primary care provider, you can also send messages to your care team and make appointments. If you have questions, please call your primary care clinic.  If you do not have a primary care provider, please call [phone] and they will assist you.        Care EveryWhere ID     This is your Care EveryWhere ID. This could be used by other organizations to access your [place] medical records  [id number]        Your Vitals Were     Pulse Temperature Height Pulse Oximetry BMI (Body Mass Index)       52 98  F (36.7  C) (Oral) 1.88 m (6' 2\") 96% 38.79 kg/m2        Blood Pressure from Last 3 Encounters:   06/26/17 139/88   06/20/17 124/66   06/15/17 127/83    Weight from Last 3 Encounters:   06/26/17 (!) 137 kg (302 lb 1.6 oz)   06/20/17 (!) 136.5 kg (301 lb)   06/15/17 136.1 kg (300 lb)                 Today's Medication Changes          These changes are accurate as of: 6/26/17 10:35 AM.  If you have any questions, ask your nurse or doctor.               These medicines have changed or have updated prescriptions.        Dose/Directions    methotrexate 2.5 MG tablet " CHEMO   This may have changed:  additional instructions   Used for:  Rheumatoid arthritis of multiple sites with negative rheumatoid factor (H), High risk medications (not anticoagulants) long-term use   Changed by:  Sin Snow APRN CNP        Dose:  17.5 mg   Take 7 tablets (17.5 mg) by mouth once a week Labs due every 8-12 weeks   Quantity:  28 tablet   Refills:  4            Where to get your medicines      These medications were sent to [facility] - [address]   [address]  [address], [place] [address]     Phone:  [phone]     methotrexate 2.5 MG tablet CHEMO                Primary Care Provider Office Phone # Fax #    Jah Corley [phone]       [facility] [address]  [facility] [address]        Equal Access to Services     ALINE QUIÑONES : Hadii helen oneill hadasho Soomaali, waaxda luqadaha, qaybta kaalmada adebettyyaleonel, mariana salcedo . So [facility] [phone].    ATENCIÓN: Si habla español, tiene a rudd disposición servicios gratuitos de asistencia lingüística. [facility] [phone].    We comply with applicable federal civil rights laws and [place] laws. We do not discriminate on the basis of race, color, national origin, age, disability sex, sexual orientation or gender identity.            Thank you!     Thank you for choosing [facility] RHEUMATOLOG[facility]  for your care. Our goal is always to provide you with excellent care. Hearing back from our patients is one way we can continue to improve our services. Please take a few minutes to complete the written survey that you may receive in the mail after your visit with us. Thank you!             Your Updated Medication List - Protect others around you: Learn how to safely use, store and throw away your medicines at www.disposemymeds.org.          This list is accurate as of: 6/26/17 10:35 AM.  Always use your most recent med list.                    Brand Name Dispense Instructions for use Diagnosis    azithromycin 250 MG tablet    ZITHROMAX    6 tablet    Two tablets first day, then one tablet daily for four days.    Acute bronchitis with symptoms > 10 days       folic acid 1 MG tablet    FOLVITE    180 tablet    Take 2 tablets (2 mg) by mouth daily    Pain in toes of both feet, Rheumatoid arthritis of multiple sites with negative rheumatoid factor (H), Rheumatoid arthritis involving shoulder with negative rheumatoid factor, unspecified laterality (H), High risk medications (not anticoagulants) long-term use       hydroxychloroquine 200 MG tablet    PLAQUENIL    180 tablet    Take 1 tablet (200 mg) by mouth 2 times daily Send copy of recent eye exam as need for refills. Please have a copy of your eye exam faxed to [phone].    Rheumatoid arthritis involving shoulder with negative rheumatoid factor, unspecified laterality (H), High risk medications (not anticoagulants) long-term use, Pain in toes of both feet, Rheumatoid arthritis of multiple sites with negative rheumatoid factor (H)       methotrexate 2.5 MG tablet CHEMO     28 tablet    Take 7 tablets (17.5 mg) by mouth once a week Labs due every 8-12 weeks    Rheumatoid arthritis of multiple sites with negative rheumatoid factor (H), High risk medications (not anticoagulants) long-term use       metoprolol 50 MG tablet    LOPRESSOR    30 tablet    Take 1 tablet (50 mg) by mouth daily    Essential hypertension       omeprazole 20 MG CR capsule    priLOSEC    60 capsule    Take 2 capsules (40 mg) by mouth 2 times daily    Gastroesophageal reflux disease with esophagitis       order for DME      Use your CPAP device as directed by your provider.        * order for DME     2 each    Please measure and distribute 1 pair of 20mmHg - 30mmHg knee high open or closed toe compression stockings. Jobst ultrasheer or equivalent.    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 complications       * order for DME      1 each    Please measure and distribute 1 pair of 20mmHg - 30mmHg thigh high open or closed toe compression stockings. Jobst ultrasheer or equivalent.    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 complications       oxybutynin 5 MG tablet    DITROPAN    90 tablet    Take 1 tablet (5 mg) by mouth daily    Other urinary incontinence       pregabalin 50 MG capsule    LYRICA    270 capsule    Take 1 capsule (50 mg) by mouth 3 times daily    Peripheral polyneuropathy (H)       selegiline 5 MG Tabs     180 tablet    1 tablet In the morning    Paralysis agitans (H)       tobramycin 0.3 % ophthalmic solution    TOBREX    1 Bottle    Apply 1 drop to eye every 4 hours for 7 days    Bacterial conjunctivitis of both eyes       TYLENOL 500 MG tablet   Generic drug:  acetaminophen      Take 1 tablet by mouth as needed. For pain        vitamin D 1000 UNITS capsule      Take 2 capsules by mouth daily.    Other specified disorder of skin       * Notice:  This list has 2 medication(s) that are the same as other medications prescribed for you. Read the directions carefully, and ask your doctor or other care provider to review them with you.

## 2017-06-26 NOTE — PROGRESS NOTES
Rheumatology Visit     Lucio Daly MRN# [id number]   YOB: 1950 Age: 66 year old     Date of Visit: June 26, 2017  Last visit: December 28, 2016  Primary care provider: Jah Corley         Assessment and Plan:   1. Seronegative inflammatory arthritis RA: symptoms of inflammatory arthritis are stable; exam neg no active synovitis or tendonitis. Labs in 5-17 include normal CBC, stable NL renal and hepatic function and CRP WNL. RA activity=low. Based on this, will continue dose. Patient seeing gastro in the future for GI issues. If in the future need to switch the MTX,  We could substitute arava 20 mg daily for methotrexate if PFT decline and/or suggest increasing fibrosis. Plan  # Continue methotrexate at 17.5 mg weekly. While on drug  --High risk medication monitoring--labs q 3 mo AST/ALT, Albumin, CBC with plts, CRP   --Limit EtOH intake to 2 drinks weekly; use folate 2 mg daily  --Tylenol 325mg qid prn nausea/HA associated with dosing.    #  mg daily due to poor tolerance--annual eye exam done 2017. GABI to get recent exam   2. 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 Continue regular ophthalmologic care and dental care, and continue using topical therapies (ie. Artificial tears and lozenges).   3. 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 PFT in 5-17 stable. Doubt association with arthritis. Plan f/u Dr. Mccarthy.   4. L>R Knee pain:  OA and L anserine bursitis-future may need TKR. Continue isometric quad strengthening exercises twice daily to improve support around the joint.  5. Plantar fasciitis/heel pain with hx R foot drop/instability: stable s/p PTx  6. 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 Camargo. Plan continue splinting/hand therapy and acetaminophen 1000 mg tid ATC.  7. Lateral epicondylitis: suggested L-sided  "compression band for daily use for elbow pain. Stable   RTC 6 mos   Update us prn on spinal stenosis (getting MRI tomorrow)           History of Present Illness:   Lucio Daly \"Rich\" presents for f/u seronegative rheumatoid arthritis, sicca complex. Last seen 12-16 when MTX was continued at 17.5 mg per week and plaquenil stopped due to poor tolerance (he never stopped this as felt was not the cause)   Interval history:  He has ongoing pain in his R > L wrists, hands, and elbows. He was referred to PTx for L elbow pain several months ago; he has had only modest relief with a course of Ptx for 4-6 weeks. Now, L elbow is worse in the morning, worsening with use. Tylenol provides minimal relief. Using a cane exacerbates wrist/hand pain. He had some functional improvement with Ptx for his feet and ankles. EAS is < 30 minutes. The left knee is unstable with locking/buckling, but doesn't swell much- the R knee is also unstable. The left leg hurts and keeps him awake at night.  Reports ongoing R thumb/wrist pain, worse with movement, progressive despite use of tylenol and use of a thumb splint.  He reduced methotrexate to 7 tabs weekly; he uses HCQ but it continues to upset his stomach.    June 26, 2017  Dr. Goodrich seen him in Dec 2016   Back pain, seen PCP. MRI of his back to f/u spinal stenosis and herniated disc will be having this tomorrow then maybe seeing neurosurgeon (needed updated MRI) if this is indicated, did PT but stopped as was making this worse. Previous ortho was going to refer him to neurosurgeon but he didn't want surgery at this time. Last months, more increased numbness in feet and sciatica right thigh/calf \"uncomfortable\", more pain in mid and upper back then some in low back if stands too long, then gets this in rib cage. Had problems with \"numbness\" for some time, pain in arms. EMG testing in past peripheral polyneuropathy. Stumbling more, but had trouble with this in the past, and previous " "foot droop. More leg pain for \"quite a few months\".  Lay in bed or sit with feet up more in leg pain, back. Nueropathy is the bottom of his feet.   Tennis elbows, thumbs, hands pains are the same stable this comes and goes.   Continues the methotrexate 17.5 mg PO Q 7 days Tue or WED and the plaqeunil (he felt the stomach issues were due to other medications) this is tolerating well Didn't stop the plaquenil \"didnt want to rock the boat\" . Reports is RA is controlled, no flaring and is controlled with these medications so wishes to continue this plan.   Pulmonary Dr. Mccarthy had referred him to gastro for GERD in Oct. Seen him 5-2017 --ILD \"follicular bronchiolitis\" and pulmonary nodule stable (more SOB sit to stand then walking). His prilosec was increased. Checking neuromuscular \"thing\" when he sees him in Nov. Methotrexate was at 9 tab 22.5 mg a week tapered to 17.5 mg once a week --his RA remains controlled.   On ABX completed this Saturday for bronchitis, early pnuemonia and then infection in both his eyes. Started with laryngitis. ABX helped. No fever, chills, discolored sputum. Fatigue.    PMH   1. Sensory neuropathy of unclear etiology - negative work up, managed on Lyrica.  Has chronic dysesthesia in pads of feet.  2. Parkinsonisim/Tremor disorder; stable on selegiline; some somatic sensations present  3. Headaches   4. History of basal cell, squamous (most recent +bx 8-2014)  5. History of melanoma   6. GALO on CPAP.  7. HTN.   8. Seronegative RA, diagnosed 2009. SICCA  9. JUARES. Work-up 4-2015. 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 Lumbar stenosis per MRI   11. Bronchitis 5-2017, early pneumonia   Past Medical History:   Diagnosis Date     Abn involun movement NEC     Movement Disorder     Abnormal EMG 4/18/2013     AK (actinic keratosis) 12/18/2011     Allergic rhinitis, cause unspecified     Allergic rhinitis     Balance problems 11/1/2011     Basal cell carcinoma      Bladder spasms " 11/1/2011     Chronic osteoarthritis      Diaphragmatic hernia without mention of obstruction or gangrene      Earache or other ear, nose, or throat complaint      Esophageal reflux      Fatigue 11/1/2011     Fracture      H. pylori infection 5/12/2011     History of MRI of cervical spine 11/18/2013    EXAMINATION: CERVICAL SPINE G/E 5 VIEWS* 4/19/2013 4:18 PM  CLINICAL HISTORY: Pain in limb,Performing Location?->[facility]),  COMPARISON:  FINDINGS: AP and lateral views in flexion and extension, as well as odontoid view of the cervical spine was obtained. There is no comparison available. The vertebral bodies of the cervical spine are normally aligned. There is posterior spurring and d     Incomplete defecation 11/1/2011     Interstitial lung disease (H) 11/29/2016     Laboratory test 8/7/2012     Lung disease June 2015     Malignant basal cell neoplasm of skin 8/6/2008     Melanoma (H) 8/6/2008     Melanoma in situ of lower leg (H)     R calf     Neuropathy (H) 5/16/2011     Other bladder disorder      Other color vision deficiencies      Other nervous system complications      Parkinsonism (H) 11/1/2011     Personal history of colonic polyps      Polyneuropathy in other diseases classified elsewhere (H)      RA (rheumatoid arthritis) (H)      Rheumatoid arthritis of multiple sites with negative rheumatoid factor (H) 3/21/2016     Seronegative arthritis 11/18/2013     Shortness of breath      Shoulder arthritis 2016    acromioclavicular joint      Somatization disorder 5/12/2011     Squamous cell carcinoma      Tremor 11/1/2011     Unspecified essential hypertension      Unspecified hypothyroidism      Urinary tract infection      Urinary urgency 11/1/2011     Wears glasses 11/1/2011     Family Hx   MGM Psoriasis  Family History   Problem Relation Age of Onset     Hypertension Mother      HEART DISEASE Mother      a fib     Arthritis Mother      Other Cancer Mother      OSTEOPOROSIS Mother      CANCER  Mother      Skin Cancer Mother      Hypertension Brother      DIABETES Brother      Neurologic Disorder Sister      multiple sclerosis     Hypertension Sister      HEART DISEASE Sister      HEART DISEASE Sister      a fib     Arthritis Sister      Hypertension Father      CEREBROVASCULAR DISEASE Father      ,     Skin Cancer Father      Psoriasis Maternal Grandfather      CANCER Maternal Grandfather      Other Cancer Maternal Grandfather      Congenital Anomalies Other      CEREBROVASCULAR DISEASE Paternal Grandmother      ,     CEREBROVASCULAR DISEASE Paternal Grandfather      ,     Other Cancer Sister      OSTEOPOROSIS Sister      Melanoma No family hx of      Personal Hx   Home environment: No secondhand tobacco smoke in home.    History     Social History     Marital Status:      Spouse Name: N/A     Number of Children: N/A     Years of Education: N/A     Social History Main Topics     Smoking status: Former Smoker     Quit date: 09/30/2003     Smokeless tobacco: Never Used     Alcohol Use: No     Drug Use: No     Sexually Active: Not Currently -- Female partner(s)     Other Topics Concern     None     Social History Narrative    2013: [place] with no stepsHas 3 sons that are doing okay.             Review of Systems:     14 points all negative except what is mentioned in HPI.  See HPI               Past Surgical History:   Past Surgical History:   Procedure Laterality Date     BIOPSY OF SKIN LESION       COLONOSCOPY       HEMORRHOID SURGERY       lip biopsy      for sicca complex     MOHS MICROGRAPHIC PROCEDURE       SOFT TISSUE SURGERY      removeal of basel cell carcinoma              Allergies:   Allergies   Allergen Reactions     Restasis      Burning eyes, problems with breathing, tightness in chest     Adhesive Tape      Bandages misc     Allegra      EXCESSIVE URINATION AND WEAKNESS, LIGHT-HEADED     Allergy      Dust     Animal Dander      Benadryl Allergy       EXCESSIVE URINATION AND WEAKNESS, LIGHT-HEADED     Cephalexin      Joint pain or gerd aggravation. Bloating excessive urination      Cephalosporins      Doxycycline Hyclate Nausea     SWEATING,MIGRAINES,LOSS OF APPETITE,SWEATING,LIGHT HEADED, EXCESSIVE URINATION     Flonase [Fluticasone Propionate]      Gabapentin      Neurontin: mosd changes and excess urination     Iodine Solution [Povidone Iodine]      SKIN MELTS     Levaquin      From surgeon--? Joint pain ? Gerd aggravation. Insomnia, excess urination     Mylanta      EXCESSIVE URINATION AND WEAKNESS, LIGHT-HEADED     Prednisone      Weakness, elevated bp, headache, eye pain, congestion      Seafood [Seafood]      Shellfish Allergy      Hives       Sulfamethoxazole-Trimethoprim      Chest pain, angina     Trees      Trileptal      SEVERE JOINT AND TENDON PAIN, INSOMNIA, RESTLESSNESS, NAUSEA, EXCESS URINATION     Cortizone Rash     EXCESS URINATION,WEAKNESS,NAUSEA, HEADACHE            Medications:   Current Outpatient Prescriptions   Medication Sig Dispense Refill     methotrexate 2.5 MG tablet CHEMO Take 7 tablets (17.5 mg) by mouth once a week Labs due every 8-12 weeks 28 tablet 4     azithromycin (ZITHROMAX) 250 MG tablet Two tablets first day, then one tablet daily for four days. 6 tablet 0     tobramycin (TOBREX) 0.3 % ophthalmic solution Apply 1 drop to eye every 4 hours for 7 days 1 Bottle 0     metoprolol (LOPRESSOR) 50 MG tablet Take 1 tablet (50 mg) by mouth daily 30 tablet 0     omeprazole (PRILOSEC) 20 MG CR capsule Take 2 capsules (40 mg) by mouth 2 times daily 60 capsule 3     hydroxychloroquine (PLAQUENIL) 200 MG tablet Take 1 tablet (200 mg) by mouth 2 times daily Send copy of recent eye exam as need for refills. Please have a copy of your eye exam faxed to [phone]. 180 tablet 0     oxybutynin (DITROPAN) 5 MG tablet Take 1 tablet (5 mg) by mouth daily 90 tablet 3     pregabalin (LYRICA) 50 MG capsule Take 1 capsule (50 mg) by mouth 3 times  "daily 270 capsule 1     folic acid (FOLVITE) 1 MG tablet Take 2 tablets (2 mg) by mouth daily 180 tablet 4     selegiline 5 MG TABS 1 tablet In the morning 180 tablet 3     ORDER FOR DME Please measure and distribute 1 pair of 20mmHg - 30mmHg thigh high open or closed toe compression stockings. Jobst ultrasheer or equivalent. 1 each 0     ORDER FOR DME Please measure and distribute 1 pair of 20mmHg - 30mmHg knee high open or closed toe compression stockings. Jobst ultrasheer or equivalent. 2 each 10     ORDER FOR DME Use your CPAP device as directed by your provider.       acetaminophen (TYLENOL) 500 MG tablet Take 1 tablet by mouth as needed. For pain       Cholecalciferol (VITAMIN D) 1000 UNITS capsule Take 2 capsules by mouth daily.       [DISCONTINUED] methotrexate 2.5 MG tablet CHEMO Take 7 tablets (17.5 mg) by mouth once a week Labs due every 8-12 weeks - last done 5-5-16 28 tablet 4              Physical Exam:   Blood pressure 139/88, pulse 52, temperature 98  F (36.7  C), temperature source Oral, height 1.88 m (6' 2\"), weight (!) 137 kg (302 lb 1.6 oz), SpO2 96 %.  Wt Readings from Last 4 Encounters:   06/26/17 (!) 137 kg (302 lb 1.6 oz)   06/20/17 (!) 136.5 kg (301 lb)   06/15/17 136.1 kg (300 lb)   05/18/17 (!) 137.4 kg (303 lb)       Constitutional: obese, appearing stated age; cooperative  Eyes: nl EOM, PERRLA, vision, conjunctiva, sclera  ENT: nl external ears, nose, hearing, lips, teeth, gums, throat  No mucous membrane lesions, normal saliva pool  Neck: no mass or thyroid enlargement  Resp: lungs clear to auscultation, nl to palpation  CV: RRR, no murmurs, rubs or gallops, no edema  GI: no ABD mass or tenderness, no HSM  : not tested  Lymph: no cervical, supraclavicular, inguinal or epitrochlear nodes  MS:  All TMJ, neck, shoulder, elbow, wrist, MCP/PIP/DIP, spine, hip, knee, ankle, and foot MTP/IP joints were examined. Tender 1rst CMC on R. Wearing left thumb brace. Knees without effusion; + " crepitus on L; +tender L lateral epicondyle. No effusions. Mild Kyphosis. No S/T across the ankle joints. -MTP/MCP squeeze.  Skin: no nail pitting, alopecia, rash, nodules or lesions.  Neuro: No tremor today. Walks with cane  Psych: nl judgement, orientation, memory, affect.         Data:     Results for orders placed or performed in visit on 05/18/17   CT Chest w/o Contrast    Narrative    CT of the chest without contrast    HISTORY: Pulmonary nodules multiple.    COMPARISON STUDY: 7/13/2016, 10/12/2015, 4/24/2015    FINDINGS: There are no enlarged mediastinal, hilar or axillary lymph  nodes. Heart, Main pulmonary artery and aorta are not enlarged. Trace  coronary calcifications of the LAD. No pleural pericardial effusion.  Thin bands of linear atelectasis in the mid to lower lungs bilaterally  not significantly changed. Numerous scattered pulmonary nodules  measuring up to 8 x 5 mm are unchanged dating back to 4/24/2015. No  new nodules identified.    Evaluation of the upper abdomen is limited and is without contrast.  Scattered cysts again noted in the partially imaged liver and kidney.  Adrenal glands are not enlarged.    Bones: No suspicious bony lesions      Impression    IMPRESSION: Stable pulmonary nodules dating back to 4/24/2015. These  nodules have over 2 years of stability and are considered  statistically benign.    CATHIE ANDREWS MD     Component      Latest Ref Rng & Units 5/15/2017   WBC      4.0 - 11.0 10e9/L 4.8   RBC Count      4.4 - 5.9 10e12/L 4.49   Hemoglobin      13.3 - 17.7 g/dL 13.6   Hematocrit      40.0 - 53.0 % 43.3   MCV      78 - 100 fl 96   MCH      26.5 - 33.0 pg 30.3   MCHC      31.5 - 36.5 g/dL 31.4 (L)   RDW      10.0 - 15.0 % 14.7   Platelet Count      150 - 450 10e9/L 183   Creatinine      0.66 - 1.25 mg/dL 1.06   GFR Estimate      >60 mL/min/1.7m2 69   GFR Estimate If Black      >60 mL/min/1.7m2 84   Albumin      3.4 - 5.0 g/dL 3.7   ALT      0 - 70 U/L 24   AST      0 - 45 U/L  18   CRP Inflammation      0.0 - 8.0 mg/L 4.0     Recent Labs   Lab Test  05/05/16   1403  03/15/16   0958  01/04/16   1057  11/05/15   1546   06/26/15   1004   03/04/14   1548   11/30/12   0827   03/07/12   1144   05/27/10   1207   WBC  4.9   --   4.8  6.4   < >  4.6   < >   --    < >   --    < >  5.9   < >  5.9   HCT  42.3   --   42.8  42.6   < >  44.4   < >   --    < >   --    < >  44.2   < >  43.4   MCV  96   --   93  93   < >  93   < >   --    < >   --    < >  92   < >  87   RDW  14.6   --   15.1*  15.1*   < >  14.7   < >   --    < >   --    < >  15.1*   < >  14.3   PLT  171   --   174  180   < >  177   < >   --    < >   --    < >  196   < >  195   ALBUMIN  3.6   --   3.7  3.7   < >   --    < >   --    < >   --    < >  4.3   < >   --    AST  23   --   18  21   < >   --    < >   --    < >   --    < >  23   < >  28   ALT  37   --   28  26   < >   --    < >   --    < >   --    < >  21   < >  15   CRP  7.0   --   6.9  9.7*   < >  12.0*   < >   --    < >  13.5*   < >  16.2*   < >  16.3*   TSH   --   1.95   --    --    --    --    --   2.42   --    --    --   2.61   --   2.58   ANCA   --    --    --    --    --   <1:20  Reference range: <1:20  (Note)  The ANCA IFA is <1:20; therefore, no further testing will  be performed.  INTERPRETIVE INFORMATION: Anti-Neutrophil Cyto Ab, IgG  Neutrophil Cytoplasmic Antibodies (C-ANCA = granular  cytoplasmic staining, P-ANCA = perinuclear staining) are  found in the serum of over 90 percent of patients with  certain necrotizing systemic vasculitides, and usually in  less than 5 percent of patients with collagen vascular  disease or arthritis.  Performed by [facility],  [address] [place] [address],[place] [address] [phone]  [email or web address], Brad Fall MD, Lab. Director     --    --    --   <1:20 Reference range: <1:20 (Note) The ANCA IFA is <1:20; therefore, no further testing will be performed. INTERPRETIVE INFORMATION: Anti-Neutrophil Cyto Ab, IgG  Neutrophil Cytoplasmic  Antibodies (C-ANCA = granular cytoplasmic staining, P-ANCA = perinuclear staining) are found in the serum of over 90 percent of patients with certain necrotizing systemic vasculitides, and usually in less than 5 percent of patients with collagen vascular disease or arthritis. Performed by [facility], [address] [place] [address] [phone] [email or web address], Chely Leija MD, Lab. Director   --    --    --   <1:20    < > = values in this interval not displayed.     Thank-you for allowing me to participate in your care.     Sin GUZMAN, CNP, MSN  [facility]  Department of Rheumatology & Autoimmune Disorders  [facility]: [phone] Opt 2, then Opt 1 Scheduling   [facility]: [phone]; [phone] Option 7 scheduling

## 2017-06-26 NOTE — LETTER
6/26/2017      RE: Lucio Daly  [address]  [place] [address]       Rheumatology Visit     Lucio Daly MRN# [id number]   YOB: 1950 Age: 66 year old     Date of Visit: June 26, 2017  Last visit: December 28, 2016  Primary care provider: Jah Corley         Assessment and Plan:   1. Seronegative inflammatory arthritis RA: symptoms of inflammatory arthritis are stable; exam neg no active synovitis or tendonitis. Labs in 5-17 include normal CBC, stable NL renal and hepatic function and CRP WNL. RA activity=low. Based on this, will continue dose. Patient seeing gastro in the future for GI issues. If in the future need to switch the MTX,  We could substitute arava 20 mg daily for methotrexate if PFT decline and/or suggest increasing fibrosis. Plan  # Continue methotrexate at 17.5 mg weekly. While on drug  --High risk medication monitoring--labs q 3 mo AST/ALT, Albumin, CBC with plts, CRP   --Limit EtOH intake to 2 drinks weekly; use folate 2 mg daily  --Tylenol 325mg qid prn nausea/HA associated with dosing.    #  mg daily due to poor tolerance--annual eye exam done 2017. GABI to get recent exam   2. 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 Continue regular ophthalmologic care and dental care, and continue using topical therapies (ie. Artificial tears and lozenges).   3. 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 PFT in 5-17 stable. Doubt association with arthritis. Plan f/u Dr. Mccarthy.   4. L>R Knee pain:  OA and L anserine bursitis-future may need TKR. Continue isometric quad strengthening exercises twice daily to improve support around the joint.  5. Plantar fasciitis/heel pain with hx R foot drop/instability: stable s/p PTx  6. 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 Camargo. Plan continue splinting/hand therapy and  "acetaminophen 1000 mg tid ATC.  7. Lateral epicondylitis: suggested L-sided compression band for daily use for elbow pain. Stable   RTC 6 mos   Update us prn on spinal stenosis (getting MRI tomorrow)           History of Present Illness:   Lucio Dayl \"Rich\" presents for f/u seronegative rheumatoid arthritis, sicca complex. Last seen 12-16 when MTX was continued at 17.5 mg per week and plaquenil stopped due to poor tolerance (he never stopped this as felt was not the cause)   Interval history:  He has ongoing pain in his R > L wrists, hands, and elbows. He was referred to PTx for L elbow pain several months ago; he has had only modest relief with a course of Ptx for 4-6 weeks. Now, L elbow is worse in the morning, worsening with use. Tylenol provides minimal relief. Using a cane exacerbates wrist/hand pain. He had some functional improvement with Ptx for his feet and ankles. EAS is < 30 minutes. The left knee is unstable with locking/buckling, but doesn't swell much- the R knee is also unstable. The left leg hurts and keeps him awake at night.  Reports ongoing R thumb/wrist pain, worse with movement, progressive despite use of tylenol and use of a thumb splint.  He reduced methotrexate to 7 tabs weekly; he uses HCQ but it continues to upset his stomach.    June 26, 2017  Dr. Goodrich seen him in Dec 2016   Back pain, seen PCP. MRI of his back to f/u spinal stenosis and herniated disc will be having this tomorrow then maybe seeing neurosurgeon (needed updated MRI) if this is indicated, did PT but stopped as was making this worse. Previous ortho was going to refer him to neurosurgeon but he didn't want surgery at this time. Last months, more increased numbness in feet and sciatica right thigh/calf \"uncomfortable\", more pain in mid and upper back then some in low back if stands too long, then gets this in rib cage. Had problems with \"numbness\" for some time, pain in arms. EMG testing in past peripheral " "polyneuropathy. Stumbling more, but had trouble with this in the past, and previous foot droop. More leg pain for \"quite a few months\".  Lay in bed or sit with feet up more in leg pain, back. Nueropathy is the bottom of his feet.   Tennis elbows, thumbs, hands pains are the same stable this comes and goes.   Continues the methotrexate 17.5 mg PO Q 7 days Tue or WED and the plaqeunil (he felt the stomach issues were due to other medications) this is tolerating well Didn't stop the plaquenil \"didnt want to rock the boat\" . Reports is RA is controlled, no flaring and is controlled with these medications so wishes to continue this plan.   Pulmonary Dr. Mccarthy had referred him to gastro for GERD in Oct. Seen him 5-2017 --ILD \"follicular bronchiolitis\" and pulmonary nodule stable (more SOB sit to stand then walking). His prilosec was increased. Checking neuromuscular \"thing\" when he sees him in Nov. Methotrexate was at 9 tab 22.5 mg a week tapered to 17.5 mg once a week --his RA remains controlled.   On ABX completed this Saturday for bronchitis, early pnuemonia and then infection in both his eyes. Started with laryngitis. ABX helped. No fever, chills, discolored sputum. Fatigue.    PMH   1. Sensory neuropathy of unclear etiology - negative work up, managed on Lyrica.  Has chronic dysesthesia in pads of feet.  2. Parkinsonisim/Tremor disorder; stable on selegiline; some somatic sensations present  3. Headaches   4. History of basal cell, squamous (most recent +bx 8-2014)  5. History of melanoma   6. GALO on CPAP.  7. HTN.   8. Seronegative RA, diagnosed 2009. SICCA  9. JUARES. Work-up 4-2015. 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 Lumbar stenosis per MRI   11. Bronchitis 5-2017, early pneumonia   Past Medical History:   Diagnosis Date     Abn involun movement NEC     Movement Disorder     Abnormal EMG 4/18/2013     AK (actinic keratosis) 12/18/2011     Allergic rhinitis, cause unspecified     Allergic " rhinitis     Balance problems 11/1/2011     Basal cell carcinoma      Bladder spasms 11/1/2011     Chronic osteoarthritis      Diaphragmatic hernia without mention of obstruction or gangrene      Earache or other ear, nose, or throat complaint      Esophageal reflux      Fatigue 11/1/2011     Fracture      H. pylori infection 5/12/2011     History of MRI of cervical spine 11/18/2013    EXAMINATION: CERVICAL SPINE G/E 5 VIEWS* 4/19/2013 4:18 PM  CLINICAL HISTORY: Pain in limb,Performing Location?->[facility]),  COMPARISON:  FINDINGS: AP and lateral views in flexion and extension, as well as odontoid view of the cervical spine was obtained. There is no comparison available. The vertebral bodies of the cervical spine are normally aligned. There is posterior spurring and d     Incomplete defecation 11/1/2011     Interstitial lung disease (H) 11/29/2016     Laboratory test 8/7/2012     Lung disease June 2015     Malignant basal cell neoplasm of skin 8/6/2008     Melanoma (H) 8/6/2008     Melanoma in situ of lower leg (H)     R calf     Neuropathy (H) 5/16/2011     Other bladder disorder      Other color vision deficiencies      Other nervous system complications      Parkinsonism (H) 11/1/2011     Personal history of colonic polyps      Polyneuropathy in other diseases classified elsewhere (H)      RA (rheumatoid arthritis) (H)      Rheumatoid arthritis of multiple sites with negative rheumatoid factor (H) 3/21/2016     Seronegative arthritis 11/18/2013     Shortness of breath      Shoulder arthritis 2016    acromioclavicular joint      Somatization disorder 5/12/2011     Squamous cell carcinoma      Tremor 11/1/2011     Unspecified essential hypertension      Unspecified hypothyroidism      Urinary tract infection      Urinary urgency 11/1/2011     Wears glasses 11/1/2011     Family Hx   MGM Psoriasis  Family History   Problem Relation Age of Onset     Hypertension Mother      HEART DISEASE Mother      a fib      Arthritis Mother      Other Cancer Mother      OSTEOPOROSIS Mother      CANCER Mother      Skin Cancer Mother      Hypertension Brother      DIABETES Brother      Neurologic Disorder Sister      multiple sclerosis     Hypertension Sister      HEART DISEASE Sister      HEART DISEASE Sister      a fib     Arthritis Sister      Hypertension Father      CEREBROVASCULAR DISEASE Father      ,     Skin Cancer Father      Psoriasis Maternal Grandfather      CANCER Maternal Grandfather      Other Cancer Maternal Grandfather      Congenital Anomalies Other      CEREBROVASCULAR DISEASE Paternal Grandmother      ,     CEREBROVASCULAR DISEASE Paternal Grandfather      ,     Other Cancer Sister      OSTEOPOROSIS Sister      Melanoma No family hx of      Personal Hx   Home environment: No secondhand tobacco smoke in home.    History     Social History     Marital Status:      Spouse Name: N/A     Number of Children: N/A     Years of Education: N/A     Social History Main Topics     Smoking status: Former Smoker     Quit date: 09/30/2003     Smokeless tobacco: Never Used     Alcohol Use: No     Drug Use: No     Sexually Active: Not Currently -- Female partner(s)     Other Topics Concern     None     Social History Narrative    2013: Living in [place] townhouse with no stepsHas 3 sons that are doing okay.             Review of Systems:     14 points all negative except what is mentioned in HPI.  See HPI               Past Surgical History:   Past Surgical History:   Procedure Laterality Date     BIOPSY OF SKIN LESION       COLONOSCOPY       HEMORRHOID SURGERY       lip biopsy      for sicca complex     MOHS MICROGRAPHIC PROCEDURE       SOFT TISSUE SURGERY      removeal of basel cell carcinoma              Allergies:   Allergies   Allergen Reactions     Restasis      Burning eyes, problems with breathing, tightness in chest     Adhesive Tape      Bandages misc     Allegra      EXCESSIVE URINATION AND WEAKNESS,  LIGHT-HEADED     Allergy      Dust     Animal Dander      Benadryl Allergy      EXCESSIVE URINATION AND WEAKNESS, LIGHT-HEADED     Cephalexin      Joint pain or gerd aggravation. Bloating excessive urination      Cephalosporins      Doxycycline Hyclate Nausea     SWEATING,MIGRAINES,LOSS OF APPETITE,SWEATING,LIGHT HEADED, EXCESSIVE URINATION     Flonase [Fluticasone Propionate]      Gabapentin      Neurontin: mosd changes and excess urination     Iodine Solution [Povidone Iodine]      SKIN MELTS     Levaquin      From surgeon--? Joint pain ? Gerd aggravation. Insomnia, excess urination     Mylanta      EXCESSIVE URINATION AND WEAKNESS, LIGHT-HEADED     Prednisone      Weakness, elevated bp, headache, eye pain, congestion      Seafood [Seafood]      Shellfish Allergy      Hives       Sulfamethoxazole-Trimethoprim      Chest pain, angina     Trees      Trileptal      SEVERE JOINT AND TENDON PAIN, INSOMNIA, RESTLESSNESS, NAUSEA, EXCESS URINATION     Cortizone Rash     EXCESS URINATION,WEAKNESS,NAUSEA, HEADACHE            Medications:   Current Outpatient Prescriptions   Medication Sig Dispense Refill     methotrexate 2.5 MG tablet CHEMO Take 7 tablets (17.5 mg) by mouth once a week Labs due every 8-12 weeks 28 tablet 4     azithromycin (ZITHROMAX) 250 MG tablet Two tablets first day, then one tablet daily for four days. 6 tablet 0     tobramycin (TOBREX) 0.3 % ophthalmic solution Apply 1 drop to eye every 4 hours for 7 days 1 Bottle 0     metoprolol (LOPRESSOR) 50 MG tablet Take 1 tablet (50 mg) by mouth daily 30 tablet 0     omeprazole (PRILOSEC) 20 MG CR capsule Take 2 capsules (40 mg) by mouth 2 times daily 60 capsule 3     hydroxychloroquine (PLAQUENIL) 200 MG tablet Take 1 tablet (200 mg) by mouth 2 times daily Send copy of recent eye exam as need for refills. Please have a copy of your eye exam faxed to [phone]. 180 tablet 0     oxybutynin (DITROPAN) 5 MG tablet Take 1 tablet (5 mg) by mouth daily 90 tablet 3  "    pregabalin (LYRICA) 50 MG capsule Take 1 capsule (50 mg) by mouth 3 times daily 270 capsule 1     folic acid (FOLVITE) 1 MG tablet Take 2 tablets (2 mg) by mouth daily 180 tablet 4     selegiline 5 MG TABS 1 tablet In the morning 180 tablet 3     ORDER FOR DME Please measure and distribute 1 pair of 20mmHg - 30mmHg thigh high open or closed toe compression stockings. Jobst ultrasheer or equivalent. 1 each 0     ORDER FOR DME Please measure and distribute 1 pair of 20mmHg - 30mmHg knee high open or closed toe compression stockings. Jobst ultrasheer or equivalent. 2 each 10     ORDER FOR DME Use your CPAP device as directed by your provider.       acetaminophen (TYLENOL) 500 MG tablet Take 1 tablet by mouth as needed. For pain       Cholecalciferol (VITAMIN D) 1000 UNITS capsule Take 2 capsules by mouth daily.       [DISCONTINUED] methotrexate 2.5 MG tablet CHEMO Take 7 tablets (17.5 mg) by mouth once a week Labs due every 8-12 weeks - last done 5-5-16 28 tablet 4              Physical Exam:   Blood pressure 139/88, pulse 52, temperature 98  F (36.7  C), temperature source Oral, height 1.88 m (6' 2\"), weight (!) 137 kg (302 lb 1.6 oz), SpO2 96 %.  Wt Readings from Last 4 Encounters:   06/26/17 (!) 137 kg (302 lb 1.6 oz)   06/20/17 (!) 136.5 kg (301 lb)   06/15/17 136.1 kg (300 lb)   05/18/17 (!) 137.4 kg (303 lb)       Constitutional: obese, appearing stated age; cooperative  Eyes: nl EOM, PERRLA, vision, conjunctiva, sclera  ENT: nl external ears, nose, hearing, lips, teeth, gums, throat  No mucous membrane lesions, normal saliva pool  Neck: no mass or thyroid enlargement  Resp: lungs clear to auscultation, nl to palpation  CV: RRR, no murmurs, rubs or gallops, no edema  GI: no ABD mass or tenderness, no HSM  : not tested  Lymph: no cervical, supraclavicular, inguinal or epitrochlear nodes  MS:  All TMJ, neck, shoulder, elbow, wrist, MCP/PIP/DIP, spine, hip, knee, ankle, and foot MTP/IP joints were examined. " Tender 1rst CMC on R. Wearing left thumb brace. Knees without effusion; + crepitus on L; +tender L lateral epicondyle. No effusions. Mild Kyphosis. No S/T across the ankle joints. -MTP/MCP squeeze.  Skin: no nail pitting, alopecia, rash, nodules or lesions.  Neuro: No tremor today. Walks with cane  Psych: nl judgement, orientation, memory, affect.         Data:     Results for orders placed or performed in visit on 05/18/17   CT Chest w/o Contrast    Narrative    CT of the chest without contrast    HISTORY: Pulmonary nodules multiple.    COMPARISON STUDY: 7/13/2016, 10/12/2015, 4/24/2015    FINDINGS: There are no enlarged mediastinal, hilar or axillary lymph  nodes. Heart, Main pulmonary artery and aorta are not enlarged. Trace  coronary calcifications of the LAD. No pleural pericardial effusion.  Thin bands of linear atelectasis in the mid to lower lungs bilaterally  not significantly changed. Numerous scattered pulmonary nodules  measuring up to 8 x 5 mm are unchanged dating back to 4/24/2015. No  new nodules identified.    Evaluation of the upper abdomen is limited and is without contrast.  Scattered cysts again noted in the partially imaged liver and kidney.  Adrenal glands are not enlarged.    Bones: No suspicious bony lesions      Impression    IMPRESSION: Stable pulmonary nodules dating back to 4/24/2015. These  nodules have over 2 years of stability and are considered  statistically benign.    CATHIE ANDREWS MD     Component      Latest Ref Rng & Units 5/15/2017   WBC      4.0 - 11.0 10e9/L 4.8   RBC Count      4.4 - 5.9 10e12/L 4.49   Hemoglobin      13.3 - 17.7 g/dL 13.6   Hematocrit      40.0 - 53.0 % 43.3   MCV      78 - 100 fl 96   MCH      26.5 - 33.0 pg 30.3   MCHC      31.5 - 36.5 g/dL 31.4 (L)   RDW      10.0 - 15.0 % 14.7   Platelet Count      150 - 450 10e9/L 183   Creatinine      0.66 - 1.25 mg/dL 1.06   GFR Estimate      >60 mL/min/1.7m2 69   GFR Estimate If Black      >60 mL/min/1.7m2 84    Albumin      3.4 - 5.0 g/dL 3.7   ALT      0 - 70 U/L 24   AST      0 - 45 U/L 18   CRP Inflammation      0.0 - 8.0 mg/L 4.0     Recent Labs   Lab Test  05/05/16   1403  03/15/16   0958  01/04/16   1057  11/05/15   1546   06/26/15   1004   03/04/14   1548   11/30/12   0827   03/07/12   1144   05/27/10   1207   WBC  4.9   --   4.8  6.4   < >  4.6   < >   --    < >   --    < >  5.9   < >  5.9   HCT  42.3   --   42.8  42.6   < >  44.4   < >   --    < >   --    < >  44.2   < >  43.4   MCV  96   --   93  93   < >  93   < >   --    < >   --    < >  92   < >  87   RDW  14.6   --   15.1*  15.1*   < >  14.7   < >   --    < >   --    < >  15.1*   < >  14.3   PLT  171   --   174  180   < >  177   < >   --    < >   --    < >  196   < >  195   ALBUMIN  3.6   --   3.7  3.7   < >   --    < >   --    < >   --    < >  4.3   < >   --    AST  23   --   18  21   < >   --    < >   --    < >   --    < >  23   < >  28   ALT  37   --   28  26   < >   --    < >   --    < >   --    < >  21   < >  15   CRP  7.0   --   6.9  9.7*   < >  12.0*   < >   --    < >  13.5*   < >  16.2*   < >  16.3*   TSH   --   1.95   --    --    --    --    --   2.42   --    --    --   2.61   --   2.58   ANCA   --    --    --    --    --   <1:20  Reference range: <1:20  (Note)  The ANCA IFA is <1:20; therefore, no further testing will  be performed.  INTERPRETIVE INFORMATION: Anti-Neutrophil Cyto Ab, IgG  Neutrophil Cytoplasmic Antibodies (C-ANCA = granular  cytoplasmic staining, P-ANCA = perinuclear staining) are  found in the serum of over 90 percent of patients with  certain necrotizing systemic vasculitides, and usually in  less than 5 percent of patients with collagen vascular  disease or arthritis.  Performed by [facility],  [address] [place] [address] [phone]  [email or web address], Brad Fall MD, Lab. Director     --    --    --   <1:20 Reference range: <1:20 (Note) The ANCA IFA is <1:20; therefore, no further testing will be performed.  INTERPRETIVE INFORMATION: Anti-Neutrophil Cyto Ab, IgG  Neutrophil Cytoplasmic Antibodies (C-ANCA = granular cytoplasmic staining, P-ANCA = perinuclear staining) are found in the serum of over 90 percent of patients with certain necrotizing systemic vasculitides, and usually in less than 5 percent of patients with collagen vascular disease or arthritis. Performed by [facility], [address] [place] [id number] [phone] [email or web address], Chely Leija MD, Lab. Director   --    --    --   <1:20    < > = values in this interval not displayed.     Thank-you for allowing me to participate in your care.     Sin Snow APRN, CNP, MSN  [facility]  Department of Rheumatology & Autoimmune Disorders  [facility] Rheumatology: [phone] Opt 2, then Opt 1 Scheduling   [facility] [place]: [phone]; [phone] Option 7 scheduling

## 2017-06-26 NOTE — NURSING NOTE
"Chief Complaint   Patient presents with     RECHECK     Follow up RA.       Initial /88  Pulse 52  Temp 98  F (36.7  C) (Oral)  Ht 1.88 m (6' 2\")  Wt (!) 137 kg (302 lb 1.6 oz)  SpO2 96%  BMI 38.79 kg/m2 Estimated body mass index is 38.79 kg/(m^2) as calculated from the following:    Height as of this encounter: 1.88 m (6' 2\").    Weight as of this encounter: 137 kg (302 lb 1.6 oz).  Medication Reconciliation: complete   Jossy Price., CMA    "

## 2017-07-07 ENCOUNTER — OFFICE VISIT (OUTPATIENT)
Dept: FAMILY MEDICINE | Facility: CLINIC | Age: 67
End: 2017-07-07
Payer: MEDICARE

## 2017-07-07 ENCOUNTER — TELEPHONE (OUTPATIENT)
Dept: PALLIATIVE MEDICINE | Facility: CLINIC | Age: 67
End: 2017-07-07

## 2017-07-07 VITALS
SYSTOLIC BLOOD PRESSURE: 142 MMHG | WEIGHT: 298.2 LBS | TEMPERATURE: 97.7 F | HEART RATE: 54 BPM | RESPIRATION RATE: 16 BRPM | OXYGEN SATURATION: 96 % | DIASTOLIC BLOOD PRESSURE: 79 MMHG | BODY MASS INDEX: 38.29 KG/M2

## 2017-07-07 DIAGNOSIS — M48.062 SPINAL STENOSIS OF LUMBAR REGION WITH NEUROGENIC CLAUDICATION: Primary | ICD-10-CM

## 2017-07-07 DIAGNOSIS — Z23 NEED FOR PROPHYLACTIC VACCINATION AGAINST STREPTOCOCCUS PNEUMONIAE (PNEUMOCOCCUS): ICD-10-CM

## 2017-07-07 DIAGNOSIS — I10 ESSENTIAL HYPERTENSION: ICD-10-CM

## 2017-07-07 DIAGNOSIS — G62.9 PERIPHERAL POLYNEUROPATHY: ICD-10-CM

## 2017-07-07 DIAGNOSIS — G89.29 OTHER CHRONIC PAIN: ICD-10-CM

## 2017-07-07 LAB
ANION GAP SERPL CALCULATED.3IONS-SCNC: 6 MMOL/L (ref 3–14)
BUN SERPL-MCNC: 28 MG/DL (ref 7–30)
CALCIUM SERPL-MCNC: 9.2 MG/DL (ref 8.5–10.1)
CHLORIDE SERPL-SCNC: 109 MMOL/L (ref 94–109)
CHOLEST SERPL-MCNC: 185 MG/DL
CO2 SERPL-SCNC: 28 MMOL/L (ref 20–32)
CREAT SERPL-MCNC: 1.12 MG/DL (ref 0.66–1.25)
GFR SERPL CREATININE-BSD FRML MDRD: 65 ML/MIN/1.7M2
GLUCOSE SERPL-MCNC: 100 MG/DL (ref 70–99)
HDLC SERPL-MCNC: 49 MG/DL
LDLC SERPL CALC-MCNC: 112 MG/DL
NONHDLC SERPL-MCNC: 136 MG/DL
POTASSIUM SERPL-SCNC: 4.3 MMOL/L (ref 3.4–5.3)
SODIUM SERPL-SCNC: 143 MMOL/L (ref 133–144)
TRIGL SERPL-MCNC: 118 MG/DL

## 2017-07-07 PROCEDURE — 90732 PPSV23 VACC 2 YRS+ SUBQ/IM: CPT | Performed by: FAMILY MEDICINE

## 2017-07-07 PROCEDURE — G0009 ADMIN PNEUMOCOCCAL VACCINE: HCPCS | Performed by: FAMILY MEDICINE

## 2017-07-07 PROCEDURE — 80061 LIPID PANEL: CPT | Performed by: FAMILY MEDICINE

## 2017-07-07 PROCEDURE — 99214 OFFICE O/P EST MOD 30 MIN: CPT | Mod: 25 | Performed by: FAMILY MEDICINE

## 2017-07-07 PROCEDURE — 36415 COLL VENOUS BLD VENIPUNCTURE: CPT | Performed by: FAMILY MEDICINE

## 2017-07-07 PROCEDURE — 80048 BASIC METABOLIC PNL TOTAL CA: CPT | Performed by: FAMILY MEDICINE

## 2017-07-07 RX ORDER — METOPROLOL SUCCINATE 50 MG/1
50 TABLET, EXTENDED RELEASE ORAL DAILY
Qty: 90 TABLET | Refills: 3 | Status: SHIPPED | OUTPATIENT
Start: 2017-07-07 | End: 2018-05-22

## 2017-07-07 RX ORDER — PREGABALIN 50 MG/1
CAPSULE ORAL
Qty: 360 CAPSULE | Refills: 1 | Status: SHIPPED | OUTPATIENT
Start: 2017-07-07 | End: 2017-12-21

## 2017-07-07 NOTE — PATIENT INSTRUCTIONS
We will try a slightly higher dose of lyrica.    The once daily metoprolol formulation rather than the twice daily    See the pain clinic for you back.

## 2017-07-07 NOTE — LETTER
July 7, 2017    Lucio Daly  [address]  [place] [address]    Dear Lucio                                                                     Welcome to the [facility] at the [facility] [place]. We are located on the 6th floor, [address], of the [facility] located at [address] [place] [address]. For general parking, the [facility] [address] is the closest to our building.     Your appointment at the [facility] has been scheduled on Wednesday, August 16th at 9:30 am with Bibi Isabel NP.    At your first visit, you will meet your team of caregivers who will help you to develop pain management strategies that will last a lifetime. You will meet with our support staff to validate parking at a reduced rate, review your insurance information, and collect your co-payment if required by your insurance company. You will also meet with a medical pain specialist and care coordinator who will assess your pain and develop a plan of care for your successful pain rehabilitation. You should expect to spend 1-2 hours at your first visit with us. Usually, patients work with us for a period of 6-12 months, and eventually return to their primary doctor once their pain management has stabilized.      To help us make your visit go as smoothly as possible, please bring the following items with you on your visit:   Completed Pain Questionnaire enclosed in this packet.  If you do not bring the completed questionnaire, we may have to reschedule your appointment.  List of any medicines that you are currently taking or have been prescribed  Important NON-[place] medical information such as medical records or tests results (X-rays, or laboratory tests)  Your health insurance card  Financial resources to cover your co-payment or balance due at the time of service (cash, personal check, Visa, and MasterCard are acceptable methods of  payment)     Due to the demand for new patient evaluations, you must notify the scheduling department 48 hours in advance if you are not able to keep this appointment.  Failure to do so could affect your ability to reschedule with our clinic. Please do not assume that you will  receive any prescription medications at your first visit.    Please call [phone] with any questions regarding your appointment.  We look forward to meeting you and working to address your health care needs.       Sincerely,      [facility]

## 2017-07-07 NOTE — TELEPHONE ENCOUNTER
Pain Management Center Referral      1. Confirmed address with patient? Yes  2. Confirmed phone number with patient? Yes  3. Confirmed referring provider? Yes  4. Is the PCP the same as the referring provider? Yes  5. Has the patient been to any previous pain clinics? Yes, about a year ago at [facility]  (If yes, send GABI with welcome letter)  6. Which insurance are we to bill for this appointment?  BCBS and Medicare    7. Informed pt of cancellation (48 hour) policy? Yes    REGARDING OPIOID MEDICATIONS: We will always address appropriateness of opioid pain medications, but we generally will not automatically take on a prescribing role. When we do take on prescribing of opioids for chronic pain, it is in collaboration with the referring physician for an intermediate period of time (months), with an expectation that the primary physician or provider will assume the prescribing role if medications are effective at stable doses with demonstrated compliance. Therefore, please do not assume that your prescribing responsibilities end on the day of pain clinic consultation.  7. Informed pt of prescribing policy? Yes      8. Referring Provider: Jah Corley     9. Criteria for Triage Eval:   -Missed/Failed 1st DUAL appointment? N/A   -Medication Focused? N/A   -Mental Health Concerns? (e.g. Recent psych hospitalization/snap shot)? N/A   -Active substance abuse? N/A   -Patient behaviors (e.g. Offensive language/raised voice)? N/A      Tata GUERRA    [facility]

## 2017-07-07 NOTE — NURSING NOTE
Screening Questionnaire for Adult Immunization    Are you sick today?   No   Do you have allergies to medications, food, a vaccine component or latex?   Yes   Have you ever had a serious reaction after receiving a vaccination?   No   Do you have a long-term health problem with heart disease, lung disease, asthma, kidney disease, metabolic disease (e.g. diabetes), anemia, or other blood disorder?   Yes   Do you have cancer, leukemia, HIV/AIDS, or any other immune system problem?   No   In the past 3 months, have you taken medications that affect  your immune system, such as prednisone, other steroids, or anticancer drugs; drugs for the treatment of rheumatoid arthritis, Crohn s disease, or psoriasis; or have you had radiation treatments?   No   Have you had a seizure, or a brain or other nervous system problem?   No   During the past year, have you received a transfusion of blood or blood     products, or been given immune (gamma) globulin or antiviral drug?   No   For women: Are you pregnant or is there a chance you could become        pregnant during the next month?   No   Have you received any vaccinations in the past 4 weeks?   No     Immunization questionnaire was positive for at least one answer.  Notified Dr. Corley.      MNVFC doesn't apply on this patient    Per orders of Dr. Corley, injection of Pneumovax given by Symone Avelar. Patient instructed to remain in clinic for 15 minutes afterwards, and to report any adverse reaction to me immediately.     Prior to injection verified patient identity using patient's name and date of birth.    Screening performed by Symone Avelar on 7/7/2017 at 11:51 AM.

## 2017-07-07 NOTE — NURSING NOTE
"Chief Complaint   Patient presents with     Hypertension     check     Follow Up For     bronchitis - Urgent Care       Initial /79 (BP Location: Left arm, Patient Position: Chair, Cuff Size: Adult Large)  Pulse 54  Temp 97.7  F (36.5  C) (Oral)  Resp 16  Wt 298 lb 3.2 oz (135.3 kg)  SpO2 96%  BMI 38.29 kg/m2 Estimated body mass index is 38.29 kg/(m^2) as calculated from the following:    Height as of 6/26/17: 6' 2\" (1.88 m).    Weight as of this encounter: 298 lb 3.2 oz (135.3 kg).  Medication Reconciliation: complete     Symone Avelar CMA      "

## 2017-07-07 NOTE — MR AVS SNAPSHOT
After Visit Summary   7/7/2017    Lucio Daly    MRN: [id number]           Patient Information     Date Of Birth          1950        Visit Information        Provider Department      7/7/2017 11:00 AM Jah Corley MD [facility]        Today's Diagnoses     Spinal stenosis of lumbar region with neurogenic claudication    -  1    Need for prophylactic vaccination against Streptococcus pneumoniae (pneumococcus)        Essential hypertension        Other chronic pain        Peripheral polyneuropathy (H)          Care Instructions    We will try a slightly higher dose of lyrica.    The once daily metoprolol formulation rather than the twice daily    See the pain clinic for you back.           Follow-ups after your visit        Additional Services     [facility] ([place]) REFERRAL       Your provider has referred you to the [facility].    Reason for Referral: Comprehensive Evaluation and Management    Please complete the following questions:    What is your diagnosis for the patient's pain? Lumbar stenosis and multifactorial    Do you have any specific questions for the pain specialist? No    Are there any red flags that may impact the assessment or management of the patient? None    **ANY DIAGNOSTIC TESTS THAT ARE NOT IN EPIC SHOULD BE SENT TO THE PAIN CENTER**    Please note the Pre-Op Pain Consult must be scheduled 2-3 weeks prior to the patient's surgery.  Patient's trying to schedule within 2 weeks of surgery may not be accommodated.     Pre-Op Pain Consults are only good for 30 days.    REGARDING OPIOID MEDICATIONS:  We will always address appropriateness of opioid pain medications, but we generally will not automatically take on a prescribing role. When we do take on prescribing of opioids for chronic pain, it is in collaboration with the referring physician for an intermediate period of time (months), with an expectation that the  primary physician or provider will assume the prescribing role if medications are effective at stable doses with demonstrated compliance.  Therefore, please do not assume that your prescribing responsibilities end on the day of pain clinic consultation.  Is this agreeable to you? YES    For any questions, contact the [facility] at [phone].    Please be aware that coverage of these services is subject to the terms and limitations of your health insurance plan.  Call member services at your health plan with any benefit or coverage questions.      Please bring the following with you to your appointment:    (1) Any X-Rays, CTs or MRIs which have been performed.  Contact the facility where they were done to arrange for  prior to your scheduled appointment.    (2) List of current medications   (3) This referral request   (4) Any documents/labs given to you for this referral                  Your next 10 appointments already scheduled     Aug 29, 2017  8:10 AM CDT   (Arrive by 7:55 AM)   Return Movement Disorder with Thong Montes MD   [facility] Neurology ([facility])    [address] [place] [address]   [phone]            Oct 05, 2017 10:00 AM CDT   (Arrive by 9:45 AM)   New Patient Visit with MEGAN Pendleton [facility] Gastroenterology and IBD ([facility])    [address] [place] [address]   [phone]            Nov 16, 2017  9:00 AM CST   Six Minute Walk with UC PFL 6 MINUTE WALK 1   [facility] Pulmonary Function Testing ([facility])    [address] [place] [address]   [phone]            Nov 16, 2017  9:30 AM CST   FULL PULMONARY FUNCTION with UC PFL A   [facility] Pulmonary Function Testing ([facility])    [address] [place] [id number] [phone]             Nov 16, 2017 10:30 AM CST   (Arrive by 10:15 AM)   Return Interstitial Lung with Harsha Mccarthy MD   [facility] ([facility] [place])    [address] [facility] [address] [phone]            Dec 20, 2017  9:30 AM CST   (Arrive by 9:15 AM)   Return Visit with Jeremy Goodrich MD   [facility] Rheumatology ([facility])    [address] [address] [phone]            Mar 06, 2018  9:00 AM CST   Return Sleep Patient with Kristen Martinez MD   [facility] Sleep Study ([facility])    [address] [place] [id number] [phone]              Who to contact     If you have questions or need follow up information about today's clinic visit or your schedule please contact [facility] directly at [phone].  Normal or non-critical lab and imaging results will be communicated to you by "[employer]hart, letter or phone within 4 business days after the clinic has received the results. If you do not hear from us within 7 days, please contact the clinic through "[employer]hart or phone. If you have a critical or abnormal lab result, we will notify you by phone as soon as possible.  Submit refill requests through [employer] or call your pharmacy and they will forward the refill request to us. Please allow 3 business days for your refill to be completed.          Additional Information About Your Visit        "[employer] Information     [employer] gives you secure access to your electronic health record. If you see a primary care provider, you can also send messages to your care team and make appointments. If you have questions, please call your primary care clinic.  If you do not have a primary care provider, please call [phone] and they will assist you.        Care EveryWhere ID     This is your  Care EveryWhere ID. This could be used by other organizations to access your [place] medical records  [id number]        Your Vitals Were     Pulse Temperature Respirations Pulse Oximetry BMI (Body Mass Index)       54 97.7  F (36.5  C) (Oral) 16 96% 38.29 kg/m2        Blood Pressure from Last 3 Encounters:   07/07/17 142/79   06/26/17 139/88   06/20/17 124/66    Weight from Last 3 Encounters:   07/07/17 298 lb 3.2 oz (135.3 kg)   06/26/17 (!) 302 lb 1.6 oz (137 kg)   06/20/17 (!) 301 lb (136.5 kg)              We Performed the Following          ADMIN VACCINE, ADDL [07766]     ADMIN MEDICARE: Pneumococcal Vaccine ()     Basic metabolic panel     Lipid Profile with reflex to direct [facility]     [facility] ([place]) REFERRAL     Pneumococcal vaccine 23 valent PPSV23  (Pneumovax) [09560]          Today's Medication Changes          These changes are accurate as of: 7/7/17 11:44 AM.  If you have any questions, ask your nurse or doctor.               Start taking these medicines.        Dose/Directions    metoprolol 50 MG 24 hr tablet   Commonly known as:  TOPROL-XL   Used for:  Essential hypertension   Started by:  Jah Corley MD        Dose:  50 mg   Take 1 tablet (50 mg) by mouth daily   Quantity:  90 tablet   Refills:  3         These medicines have changed or have updated prescriptions.        Dose/Directions    pregabalin 50 MG capsule   Commonly known as:  LYRICA   This may have changed:    - how much to take  - how to take this  - when to take this  - additional instructions   Used for:  Peripheral polyneuropathy (H)   Changed by:  Jah Corley MD        One in the am, one in the afternoon and 2 at night.   Quantity:  360 capsule   Refills:  1         Stop taking these medicines if you haven't already. Please contact your care team if you have questions.     metoprolol 50 MG tablet   Commonly known as:  LOPRESSOR   Stopped by:  Jah Corley MD                Where to get your  medicines      These medications were sent to [facility] - [address]   [address]  [address], [place] [address]     Phone:  [phone]     metoprolol 50 MG 24 hr tablet         Some of these will need a paper prescription and others can be bought over the counter.  Ask your nurse if you have questions.     Bring a paper prescription for each of these medications     pregabalin 50 MG capsule                Primary Care Provider Office Phone # Fax #    Jah Corley [phone]       [facility] [address]  [facility] [address]        Equal Access to Services     [facility] Hospital[facility]ESH : Hadii aad ku hadasho Soomaali, waaxda luqadaha, qaybta kaalmada adeegyada, waxay aureain hayaan adebetty salcedo . So [facility] [phone].    ATENCIÓN: Si habla español, tiene a rudd disposición servicios gratuitos de asistencia lingüística. Ll[facility] Medical [facility] [place] [phone].    We comply with applicable federal civil rights laws and [place] laws. We do not discriminate on the basis of race, color, national origin, age, disability sex, sexual orientation or gender identity.            Thank you!     Thank you for choosing [facility]  for your care. Our goal is always to provide you with excellent care. Hearing back from our patients is one way we can continue to improve our services. Please take a few minutes to complete the written survey that you may receive in the mail after your visit with us. Thank you!             Your Updated Medication List - Protect others around you: Learn how to safely use, store and throw away your medicines at www.disposemymeds.org.          This list is accurate as of: 7/7/17 11:44 AM.  Always use your most recent med list.                   Brand Name Dispense Instructions for use Diagnosis    folic acid 1 MG tablet    FOLVITE    180 tablet    Take 2 tablets (2 mg) by mouth daily    Pain in toes of both feet,  Rheumatoid arthritis of multiple sites with negative rheumatoid factor (H), Rheumatoid arthritis involving shoulder with negative rheumatoid factor, unspecified laterality (H), High risk medications (not anticoagulants) long-term use       hydroxychloroquine 200 MG tablet    PLAQUENIL    180 tablet    Take 1 tablet (200 mg) by mouth 2 times daily Send copy of recent eye exam as need for refills. Please have a copy of your eye exam faxed to [phone].    Rheumatoid arthritis involving shoulder with negative rheumatoid factor, unspecified laterality (H), High risk medications (not anticoagulants) long-term use, Pain in toes of both feet, Rheumatoid arthritis of multiple sites with negative rheumatoid factor (H)       methotrexate 2.5 MG tablet CHEMO     28 tablet    Take 7 tablets (17.5 mg) by mouth once a week Labs due every 8-12 weeks    Rheumatoid arthritis of multiple sites with negative rheumatoid factor (H), High risk medications (not anticoagulants) long-term use       metoprolol 50 MG 24 hr tablet    TOPROL-XL    90 tablet    Take 1 tablet (50 mg) by mouth daily    Essential hypertension       omeprazole 20 MG CR capsule    priLOSEC    60 capsule    Take 2 capsules (40 mg) by mouth 2 times daily    Gastroesophageal reflux disease with esophagitis       order for DME      Use your CPAP device as directed by your provider.        * order for DME     2 each    Please measure and distribute 1 pair of 20mmHg - 30mmHg knee high open or closed toe compression stockings. Jobst ultrasheer or equivalent.    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 complications       * order for DME     1 each    Please measure and distribute 1 pair of 20mmHg - 30mmHg thigh high open or closed toe compression stockings. Jobst ultrasheer or equivalent.    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 complications       oxybutynin 5 MG tablet    DITROPAN    90 tablet    Take 1 tablet (5 mg) by mouth daily    Other urinary  incontinence       pregabalin 50 MG capsule    LYRICA    360 capsule    One in the am, one in the afternoon and 2 at night.    Peripheral polyneuropathy (H)       selegiline 5 MG Tabs     180 tablet    1 tablet In the morning    Paralysis agitans (H)       TYLENOL 500 MG tablet   Generic drug:  acetaminophen      Take 1 tablet by mouth as needed. For pain        vitamin D 1000 UNITS capsule      Take 2 capsules by mouth daily.    Other specified disorder of skin       * Notice:  This list has 2 medication(s) that are the same as other medications prescribed for you. Read the directions carefully, and ask your doctor or other care provider to review them with you.

## 2017-07-07 NOTE — PROGRESS NOTES
SUBJECTIVE:                                                    Lucio Daly is a 66 year old male who presents to clinic today for the following health issues:      Hypertension Follow-up      Outpatient blood pressures are not being checked.    Low Salt Diet: no added salt      Amount of exercise or physical activity: 6-7 days/week for an average of less than 15 minutes    Problems taking medications regularly: No    Medication side effects: none    Diet: regular (no restrictions)      Patient would like to review MRI. Done for worsening pain and numbness. Seen by MD who recommended neurogsurgery reevlaution due to the spinal stenosis symptoms worsneing and MRI showing the same.     He wonders about nonsurgical potions.     His mood has been a bit worse due to his multiple medication problems but not really depressed. No interested in counseling.     No cv, const symptoms except fatigue.     OBJECTIVE: /79 (BP Location: Left arm, Patient Position: Chair, Cuff Size: Adult Large)  Pulse 54  Temp 97.7  F (36.5  C) (Oral)  Resp 16  Wt 298 lb 3.2 oz (135.3 kg)  SpO2 96%  BMI 38.29 kg/m2 Exam:  GENERAL APPEARANCE: healthy, alert and no distress  HENT: ear canals and TM's normal and nose and mouth without ulcers or lesions  NECK: no adenopathy, no asymmetry, masses, or scars and thyroid normal to palpation  RESP: lungs clear to auscultation - no rales, rhonchi or wheezes  CV: regular rates and rhythm, normal S1 S2, no S3 or S4 and no murmur, click or rub -  ABDOMEN:  soft, nontender, no HSM or masses and bowel sounds normal       ICD-10-CM    1. Spinal stenosis of lumbar region with neurogenic claudication M48.06 [facility] ([place] REFERRAL   2. Essential hypertension I10 metoprolol (TOPROL-XL) 50 MG 24 hr tablet     Basic metabolic panel     Lipid Profile with reflex to direct LDL   3. Other chronic pain G89.29 [facility] ([place] REFERRAL   4. Peripheral polyneuropathy (H)  G62.9 pregabalin (LYRICA) 50 MG capsule   5. Need for prophylactic vaccination against Streptococcus pneumoniae (pneumococcus) Z23      ADMIN VACCINE, ADDL [70699]     Pneumococcal vaccine 23 valent PPSV23  (Pneumovax) [18974]     ADMIN MEDICARE: Pneumococcal Vaccine ()    increase nighttime dose of lyrica. Increase metoprolol to 50mg daily. To pain clinic for alternatives for the spinal stenosis.

## 2017-07-10 ENCOUNTER — MYC MEDICAL ADVICE (OUTPATIENT)
Dept: FAMILY MEDICINE | Facility: CLINIC | Age: 67
End: 2017-07-10

## 2017-07-17 ASSESSMENT — ENCOUNTER SYMPTOMS
INCREASED ENERGY: 1
HALLUCINATIONS: 0
VOMITING: 0
TROUBLE SWALLOWING: 0
JAUNDICE: 0
LIGHT-HEADEDNESS: 1
COUGH: 1
WHEEZING: 0
EYE REDNESS: 1
HOARSE VOICE: 1
EYE IRRITATION: 1
DIZZINESS: 1
LOSS OF CONSCIOUSNESS: 0
DOUBLE VISION: 1
WEIGHT GAIN: 0
SHORTNESS OF BREATH: 1
ABDOMINAL PAIN: 1
RESPIRATORY PAIN: 0
TASTE DISTURBANCE: 0
DEPRESSION: 0
EYE PAIN: 1
SNORES LOUDLY: 0
RECTAL PAIN: 0
TACHYCARDIA: 0
JOINT SWELLING: 0
POLYPHAGIA: 0
DISTURBANCES IN COORDINATION: 1
MEMORY LOSS: 0
EYE WATERING: 1
WEAKNESS: 1
SORE THROAT: 0
BOWEL INCONTINENCE: 0
RECTAL BLEEDING: 1
SINUS PAIN: 0
PARALYSIS: 0
NUMBNESS: 1
PANIC: 0
NIGHT SWEATS: 0
HEADACHES: 1
HYPERTENSION: 1
ORTHOPNEA: 0
POLYDIPSIA: 0
WEIGHT LOSS: 0
TINGLING: 1
NECK PAIN: 1
CLAUDICATION: 1
BACK PAIN: 1
DECREASED CONCENTRATION: 0
POSTURAL DYSPNEA: 0
LEG SWELLING: 0
DECREASED APPETITE: 0
CHILLS: 0
MYALGIAS: 1
BLOOD IN STOOL: 1
MUSCLE CRAMPS: 1
ALTERED TEMPERATURE REGULATION: 0
MUSCLE WEAKNESS: 1
DIARRHEA: 0
INSOMNIA: 0
HEARTBURN: 1
NERVOUS/ANXIOUS: 0
COUGH DISTURBING SLEEP: 0
SPEECH CHANGE: 0
NAUSEA: 0
HEMOPTYSIS: 0
HYPOTENSION: 0
CONSTIPATION: 1
LEG PAIN: 1
DYSPNEA ON EXERTION: 1
STIFFNESS: 1
SEIZURES: 0
TREMORS: 1
BLOATING: 1
SPUTUM PRODUCTION: 0
FATIGUE: 1
SYNCOPE: 0
ARTHRALGIAS: 1
SMELL DISTURBANCE: 0
SINUS CONGESTION: 1
SLEEP DISTURBANCES DUE TO BREATHING: 0
PALPITATIONS: 0
NECK MASS: 0
EXERCISE INTOLERANCE: 1
FEVER: 0

## 2017-07-20 ENCOUNTER — DOCUMENTATION ONLY (OUTPATIENT)
Dept: VASCULAR SURGERY | Facility: CLINIC | Age: 67
End: 2017-07-20

## 2017-07-20 DIAGNOSIS — Z13.6 ENCOUNTER FOR ABDOMINAL AORTIC ANEURYSM (AAA) SCREENING: Primary | ICD-10-CM

## 2017-07-24 DIAGNOSIS — M54.50 LUMBAGO: Primary | ICD-10-CM

## 2017-07-25 DIAGNOSIS — M79.675 PAIN IN TOES OF BOTH FEET: ICD-10-CM

## 2017-07-25 DIAGNOSIS — M06.09 RHEUMATOID ARTHRITIS OF MULTIPLE SITES WITH NEGATIVE RHEUMATOID FACTOR (H): ICD-10-CM

## 2017-07-25 DIAGNOSIS — M79.674 PAIN IN TOES OF BOTH FEET: ICD-10-CM

## 2017-07-25 DIAGNOSIS — Z79.899 HIGH RISK MEDICATIONS (NOT ANTICOAGULANTS) LONG-TERM USE: ICD-10-CM

## 2017-07-25 DIAGNOSIS — M06.019 RHEUMATOID ARTHRITIS INVOLVING SHOULDER WITH NEGATIVE RHEUMATOID FACTOR, UNSPECIFIED LATERALITY (H): ICD-10-CM

## 2017-07-25 RX ORDER — HYDROXYCHLOROQUINE SULFATE 200 MG/1
200 TABLET, FILM COATED ORAL 2 TIMES DAILY
Qty: 180 TABLET | Refills: 0 | Status: SHIPPED | OUTPATIENT
Start: 2017-07-25 | End: 2017-11-13

## 2017-07-25 NOTE — TELEPHONE ENCOUNTER
Plaquenil 200mg      Last Written Prescription Date:  04/19/2017  Last Fill Quantity: 180,   # refills: 0  Last Office Visit with [facility] prescribing provider: 06/26/2017  Future Office visit:       Routing refill request to provider for review/approval because:  Drug not on the [facility] refill protocol or controlled substance      Jossy Bazan CPhT  [facility]   Phone: [phone]  Fax: [phone]

## 2017-07-27 ENCOUNTER — OFFICE VISIT (OUTPATIENT)
Dept: NEUROSURGERY | Facility: CLINIC | Age: 67
End: 2017-07-27

## 2017-07-27 VITALS
SYSTOLIC BLOOD PRESSURE: 139 MMHG | WEIGHT: 301 LBS | HEIGHT: 73 IN | DIASTOLIC BLOOD PRESSURE: 82 MMHG | HEART RATE: 47 BPM | BODY MASS INDEX: 39.89 KG/M2

## 2017-07-27 DIAGNOSIS — M48.062 SPINAL STENOSIS OF LUMBAR REGION WITH NEUROGENIC CLAUDICATION: ICD-10-CM

## 2017-07-27 DIAGNOSIS — Z79.899 HIGH RISK MEDICATIONS (NOT ANTICOAGULANTS) LONG-TERM USE: ICD-10-CM

## 2017-07-27 DIAGNOSIS — M06.09 RHEUMATOID ARTHRITIS OF MULTIPLE SITES WITH NEGATIVE RHEUMATOID FACTOR (H): ICD-10-CM

## 2017-07-27 DIAGNOSIS — M47.12 CERVICAL SPONDYLOSIS WITH MYELOPATHY: Primary | ICD-10-CM

## 2017-07-27 DIAGNOSIS — M43.16 SPONDYLOLISTHESIS, LUMBAR REGION: ICD-10-CM

## 2017-07-27 LAB
ALBUMIN SERPL-MCNC: 3.8 G/DL (ref 3.4–5)
ALT SERPL W P-5'-P-CCNC: 23 U/L (ref 0–70)
AST SERPL W P-5'-P-CCNC: 18 U/L (ref 0–45)
CREAT SERPL-MCNC: 1.1 MG/DL (ref 0.66–1.25)
CRP SERPL-MCNC: 6.2 MG/L (ref 0–8)
ERYTHROCYTE [DISTWIDTH] IN BLOOD BY AUTOMATED COUNT: 14.1 % (ref 10–15)
GFR SERPL CREATININE-BSD FRML MDRD: 66 ML/MIN/1.7M2
HCT VFR BLD AUTO: 43.9 % (ref 40–53)
HGB BLD-MCNC: 13.7 G/DL (ref 13.3–17.7)
MCH RBC QN AUTO: 30.4 PG (ref 26.5–33)
MCHC RBC AUTO-ENTMCNC: 31.2 G/DL (ref 31.5–36.5)
MCV RBC AUTO: 97 FL (ref 78–100)
PLATELET # BLD AUTO: 166 10E9/L (ref 150–450)
RBC # BLD AUTO: 4.51 10E12/L (ref 4.4–5.9)
WBC # BLD AUTO: 3.9 10E9/L (ref 4–11)

## 2017-07-27 ASSESSMENT — PAIN SCALES - GENERAL: PAINLEVEL: SEVERE PAIN (6)

## 2017-07-27 NOTE — NURSING NOTE
Chief Complaint   Patient presents with     Consult     Spinal stenosis of lumbar region   Jessa Fuchs CMA

## 2017-07-27 NOTE — PROGRESS NOTES
Neurosurgery Clinic Consult  Date of Visit: 7/27/2017  Referred for: Back pain and bilateral leg pain left worse than right.  Referring Provider: Ivan Aguilera      Dear Dr. Gil Jacques,    We were happy to see Lucio Daly , a pleasant 66 year old year old male for back and leg pain.    HPI:  As you may recall this nice gentleman has a long and complicated past medical history. He's had suspicions of a rheumatologic disorder raised as far back as age 28 but since he is seronegative nothing was ever found. About 5 years ago, perhaps more, he started seeing Dr. Gonzalez for problems with neuropathy. There was no specific cause identified but there were some concerns about possible Parkinson's disease, a referral was made to  who pretty much ruled out Parkinson's but did note an elevated CRP and referred hand to rheumatology who ultimately did diagnose rheumatoid arthritis, sicca syndrome, and since then he's been noted to have multiple peripheral joints affected, his hands and knees ankles many fingers thumbs. He also has interstitial lung disease as a result, and while he is not on supplemental oxygen as yet his pulmonologist feels that he would be too bigger risk for surgery on his joints and expressed that opinion to the patient about 6 months ago.   Other medical history includes a malignant melanoma removed from his right leg 4 or 5 years ago, he believes it was a very low-grade, probably grade 1.    Regarding his back and the consult today, he first started developing back pain a few years ago and saw a sports medicine physician who diagnosed him with spinal stenosis after an MRI was done. He was told that surgery would probably be needed eventually and the patient was asked if he wanted to see a surgeon. He did not at that time so referral was not made. Physical therapy was tried, it made it worse. Over time his back pain is leg pain got worse so we went back to the sports medicine, the  same opinion was offered and he still declined a surgical opinion.  A number of years ago he had a few shots in the back but they didn't seem to help much.    Over the last couple of years pain in his back and his legs has gotten increasingly worse. Located in the bilateral lumbar spine down the backs of the legs to the calves. His feet are clumsy and he's catching his toes sometimes on stairs and on carpet.   He feels off balance. He has numbness in his calves and his toes. His pain and numbness is worse when he is standing and walking around, he can only walk sometimes a few feet. It's better if he sits down, if he is going shopping he must lean over a shopping cart or he can't make it all the way around the store.    Other symptoms include pain in the trapezii bilaterally, forearms on the way down to all the fingers both hands, numbness and fingers, clumsiness in the hands and fingers. He doesn't button buttons very well. His balance is off. He has to use a cane because of the balance. And his legs sometimes give out this can appear randomly, even if there is no pain. He's had a recent MRI of the lumbar spine and presents with that. Last MRI of the cervical spine was in 2006.    He's had no recent treatment to speak of.  His/Her current symptoms (pain, numbness and weakness), a detailed description of alleviating or exacerbating factors, and pain scores are outlined below.    Patient Supplied Answers To the  Pain Questionnaire  [facility] -  Patient Entered Questionnaire/Answers 7/17/2017   What number best describes your pain right now:  0 = No pain  to  10 = Worst pain imaginable 6   How would you describe the pain? burning, sharp, numbness, dull, aching, throbbing   Which of the following worsen your pain? lying down, standing, sitting, walking   Which of the following improve or reduce your pain?  sitting   What number best describes your average pain for the past week:  0 = No pain  to  10 = Worst pain  imaginable 6   What number best describes your LOWEST pain in past 24 hours:  0 = No pain  to  10 = Worst pain imaginable 2   What number best describes your WORST pain in past 24 hours:  0 = No pain  to  10 = Worst pain imaginable 9   When is your pain worst? AM, PM   What non-medicine treatments have you already had for your pain? physical therapy, spine injections (shots)   Have you tried treating your pain with medication?  Yes   Are you currently taking medications for your pain? Yes       strongly positive shopping cart sign.   *  PAIN:  See diagram  *  NUMBNESS:   Feet and both hands          QUALITY:   tingling,   *  WEAKNESS:  Left greater than right collapses after walking sometimes short distance. Sometimes random  *  BOWEL/BLADDER FUNCTION: No change  *  OTHER SYMPTOMS:   Balance. Hands feel uncoordinated      Current Outpatient Prescriptions:      hydroxychloroquine (PLAQUENIL) 200 MG tablet, Take 1 tablet (200 mg) by mouth 2 times daily Send copy of recent eye exam as need for refills. Please have a copy of your eye exam faxed to [phone]., Disp: 180 tablet, Rfl: 0     metoprolol (TOPROL-XL) 50 MG 24 hr tablet, Take 1 tablet (50 mg) by mouth daily, Disp: 90 tablet, Rfl: 3     pregabalin (LYRICA) 50 MG capsule, One in the am, one in the afternoon and 2 at night., Disp: 360 capsule, Rfl: 1     methotrexate 2.5 MG tablet CHEMO, Take 7 tablets (17.5 mg) by mouth once a week Labs due every 8-12 weeks, Disp: 28 tablet, Rfl: 4     omeprazole (PRILOSEC) 20 MG CR capsule, Take 2 capsules (40 mg) by mouth 2 times daily, Disp: 60 capsule, Rfl: 3     oxybutynin (DITROPAN) 5 MG tablet, Take 1 tablet (5 mg) by mouth daily, Disp: 90 tablet, Rfl: 3     folic acid (FOLVITE) 1 MG tablet, Take 2 tablets (2 mg) by mouth daily, Disp: 180 tablet, Rfl: 4     selegiline 5 MG TABS, 1 tablet In the morning, Disp: 180 tablet, Rfl: 3     ORDER FOR DME, Please measure and distribute 1 pair of 20mmHg - 30mmHg thigh high open or  closed toe compression stockings. Jobst ultrasheer or equivalent., Disp: 1 each, Rfl: 0     ORDER FOR DME, Please measure and distribute 1 pair of 20mmHg - 30mmHg knee high open or closed toe compression stockings. Jobst ultrasheer or equivalent., Disp: 2 each, Rfl: 10     ORDER FOR DME, Use your CPAP device as directed by your provider., Disp: , Rfl:      acetaminophen (TYLENOL) 500 MG tablet, Take 1 tablet by mouth as needed. For pain, Disp: , Rfl:      Cholecalciferol (VITAMIN D) 1000 UNITS capsule, Take 2 capsules by mouth daily., Disp: , Rfl:     Allergies   Allergen Reactions     Restasis      Burning eyes, problems with breathing, tightness in chest     Adhesive Tape      Bandages misc     Allegra      EXCESSIVE URINATION AND WEAKNESS, LIGHT-HEADED     Allergy      Dust     Animal Dander      Benadryl Allergy      EXCESSIVE URINATION AND WEAKNESS, LIGHT-HEADED     Cephalexin      Joint pain or gerd aggravation. Bloating excessive urination      Cephalosporins      Doxycycline Hyclate Nausea     SWEATING,MIGRAINES,LOSS OF APPETITE,SWEATING,LIGHT HEADED, EXCESSIVE URINATION     Flonase [Fluticasone Propionate]      Gabapentin      Neurontin: mosd changes and excess urination     Iodine Solution [Povidone Iodine]      SKIN MELTS     Levaquin      From surgeon--? Joint pain ? Gerd aggravation. Insomnia, excess urination     Mylanta      EXCESSIVE URINATION AND WEAKNESS, LIGHT-HEADED     Prednisone      Weakness, elevated bp, headache, eye pain, congestion      Seafood [Seafood]      Shellfish Allergy      Hives       Sulfamethoxazole-Trimethoprim      Chest pain, angina     Trees      Trileptal      SEVERE JOINT AND TENDON PAIN, INSOMNIA, RESTLESSNESS, NAUSEA, EXCESS URINATION     Cortizone Rash     EXCESS URINATION,WEAKNESS,NAUSEA, HEADACHE       Past Medical History:   Diagnosis Date     Abn involun movement NEC     Movement Disorder     Abnormal EMG 4/18/2013     AK (actinic keratosis) 12/18/2011     Allergic  rhinitis, cause unspecified     Allergic rhinitis     Balance problems 11/1/2011     Basal cell carcinoma      Bladder spasms 11/1/2011     Chronic osteoarthritis      Diaphragmatic hernia without mention of obstruction or gangrene      Earache or other ear, nose, or throat complaint      Esophageal reflux      Fatigue 11/1/2011     Fracture      H. pylori infection 5/12/2011     History of MRI of cervical spine 11/18/2013    EXAMINATION: CERVICAL SPINE G/E 5 VIEWS* 4/19/2013 4:18 PM  CLINICAL HISTORY: Pain in limb,Performing Location?->[facility] ([facility]),  COMPARISON:  FINDINGS: AP and lateral views in flexion and extension, as well as odontoid view of the cervical spine was obtained. There is no comparison available. The vertebral bodies of the cervical spine are normally aligned. There is posterior spurring and d     Incomplete defecation 11/1/2011     Interstitial lung disease (H) 11/29/2016     Laboratory test 8/7/2012     Lung disease June 2015     Malignant basal cell neoplasm of skin 8/6/2008     Melanoma (H) 8/6/2008     Melanoma in situ of lower leg (H)     R calf     Neuropathy (H) 5/16/2011     Other bladder disorder      Other color vision deficiencies      Other nervous system complications      Parkinsonism (H) 11/1/2011     Personal history of colonic polyps      Polyneuropathy in other diseases classified elsewhere (H)      RA (rheumatoid arthritis) (H)      Rheumatoid arthritis of multiple sites with negative rheumatoid factor (H) 3/21/2016     Seronegative arthritis 11/18/2013     Shortness of breath      Shoulder arthritis 2016    acromioclavicular joint      Somatization disorder 5/12/2011     Squamous cell carcinoma      Tremor 11/1/2011     Unspecified essential hypertension      Unspecified hypothyroidism      Urinary tract infection      Urinary urgency 11/1/2011     Wears glasses 11/1/2011       Past Surgical History:   Procedure Laterality Date     BIOPSY OF SKIN LESION        COLONOSCOPY       HEMORRHOID SURGERY       lip biopsy      for sicca complex     MOHS MICROGRAPHIC PROCEDURE       SOFT TISSUE SURGERY      removeal of basel cell carcinoma       Family History   Problem Relation Age of Onset     Hypertension Mother      HEART DISEASE Mother      a fib     Arthritis Mother      Other Cancer Mother      OSTEOPOROSIS Mother      CANCER Mother      Skin Cancer Mother      Hypertension Brother      DIABETES Brother      Neurologic Disorder Sister      multiple sclerosis     Hypertension Sister      HEART DISEASE Sister      HEART DISEASE Sister      a fib     Arthritis Sister      Hypertension Father      CEREBROVASCULAR DISEASE Father      ,     Skin Cancer Father      Psoriasis Maternal Grandfather      CANCER Maternal Grandfather      Other Cancer Maternal Grandfather      Congenital Anomalies Other      CEREBROVASCULAR DISEASE Paternal Grandmother      ,     CEREBROVASCULAR DISEASE Paternal Grandfather      ,     Other Cancer Sister      OSTEOPOROSIS Sister      Melanoma No family hx of        Social History     Social History     Marital status:      Spouse name: N/A     Number of children: N/A     Years of education: N/A     Occupational History     Not on file.     Social History Main Topics     Smoking status: Former Smoker     Packs/day: 1.50     Years: 37.00     Types: Cigarettes     Start date: 6/15/1963     Quit date: 9/30/2000     Smokeless tobacco: Never Used     Alcohol use No     Drug use: No     Sexual activity: No     Other Topics Concern     Parent/Sibling W/ Cabg, Mi Or Angioplasty Before 65f 55m? No     Social History Narrative    2013: Living in [place] in a townhouse with no steps    Has 3 sons that are doing okay.         Dairy/d 1 servings/d.     Caffeine 0 servings/d    Exercise 0 x week    Sunscreen used - No    Seatbelts used - Yes    Working smoke/CO detectors in the home - Yes    Guns stored in the home - Yes    Self Breast Exams - NA    Self  "Testicular Exam - Yes    Eye Exam up to date - Yes 2008    Dental Exam up to date - Yes 2006    Pap Smear up to date - NA    Mammogram up to date - NA    PSA up to date - Yes 2008    Dexa Scan up to date - No    Flex Sig / Colonoscopy up to date - Yes less than 5 yrs ago    Immunizations up to date -today    Abuse: Current or Past(Physical, Sexual or Emotional)- No    Do you feel safe in your environment - Yes    2008                 Problem list and 13 point review of systems: is reviewed in Epic and is negative with the exception of those symptoms associated with HPI and PMH.      OBJECTIVE:   /82  Pulse (!) 47  Ht 1.854 m (6' 1\")  Wt (!) 136.5 kg (301 lb)  BMI 39.71 kg/m2    Imaging:  These are the pertinent radiologist's findings from:  MRI lumbar WO 6/27/17@ [facility]. Spinal stenosis noted at L4 5 is not as severe as I would expect given the severity of the symptoms and the redundancy of the nerve roots. My suspicion is that his listhesis at that level worsens when he is standing. Standing flexion-extension views were performed today..   1. Multilevel lumbar spondylosis with increased moderate to severe  spinal canal stenosis at L4-5. Unchanged mild to moderate bilateral  neural foraminal stenosis at L5-S1.  2. Mild periarticular edema associated L5-S1 joints bilaterally, not  significantly changed from prior and likely represents active  degenerative arthropathy    Standing flexion-extension x-rays lumbar spine 7/27/17  The reading below indicates there is no evidence of motion.  The flexion images are extremely difficult to see, but in looking at them closely I would disagree with that assessment.  1. Grade I anterolisthesis of L4 on L5, as previously seen, with no  evidence of motion on flexion-extension views.  2.  Multilevel degenerative changes most pronounced of the lumbar  spine.  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 Compared to the previous study, there is some progression, " manifesting with significant drop of the right sural SNAP amplitude, whereas the left remains unchanged. Given that sural nerve responses are still elicitable in a 65 year-old patient, the neuropathy is still considered mild.  It is notable that the sural sensory responses were asymmetric in previous studies done in 2010 and 2012 (left >50% smaller than the right) and they have now become equal. 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 Clinical correlation is recommended.  2. No electrodiagnostic evidence of a neuromuscular junction disorder or myopathy.              3. Possible mild left median and ulnar sensory neuropathies at the wrist.    See the full report in EPIC/Media tab.  I personally reviewed the images with the patient.      Exam:  Pertinent positives:  He has brisk reflexes at the bilateral patella, bilateral biceps, no Sancho's or clonus and a mute Babinski's. Atrophy in distal musculature right gastroc, left foot. He has a thumb splint on the right hand/wrist. Sensation is diminished in fingers and toes bilaterally. His gait is slightly wide and tentative.  There is no focal specific weakness detectable.    *   Well developed, well nourished male found seated comfortably in exam room chair.  He is able to sit and rise independently.  He is unaccompanied.    *  Mental Status  A&O X3.  Bright, alert, affable, interactive. Language fluid, fund of knowledge intact. Good historian.  Mood and affect congruent and WNL.   *  Cranial Nerves  II: Able to read printed forms, VF full to gross confrontation.  III: IV, VI:  PERRLA, EOMI, No nystagmus, no ptosis.  V: Sensation intact in bilateral V1, V2, and V3. Jaw clench symmetric.    VII:  Intact to voice bilaterally   Pushes tongue against bilateral cheeks.  X:  Palate elevates, uvula midline, phonation intact.  XI: Elevates shoulders, head turn intact.  XII: Tongue midline. No fasciculations.  *  Cerebellar:  Finger nose slow but accurate.  Heel shin intact but slowed.     *  Frankie-cranial:    No drift.  *  Cervical Spine:  DTR's at Biceps 3/4 bilaterally. Triceps 2/4 bilaterally.   Sensation diminished in fingertips..    No Raygoza's. No Phalen's.  No Tinel's. No fasciculations.   Muscle bulk diminished distally.   tone WNL.  Upper Extremity Strength.              RIGHT                LEFT     Deltoid              5/5                   5/5       Biceps              5/5                   5/5        Triceps              5/5                   5/5       Wrist Extensor              5/5                   5/5                     5/5                    5/5       Interossei              5/5                   5/5       EPL    painful; did not examine                   5/5       Pinch              5/5                  5/5            *  Lumbar Spine:    DTR's Patellar 3/4 and Achilles 0/4 and symmetric.   No fasciculations.  Muscle bulk diminished distally as above and tone WNL.  No Clonus.  Sensation diminished distally.    Lower Extremity Strength                   RIGHT                   LEFT     Iliopsoas                    5/5                      5/5       Quad                    5/5                        5/5       Hamstring                      5/5                        5/5         Gastrocs                    5/5                        5/5       Tib. Anterior                     5/5                        5/5       EHL                      5/5                        5/5       Babinski               mute                                  Down                     *  Structural Exam  Inspection of the spine reveals no scoliosis, exaggerated kyphosis or lordosis. Cervical spine ROM tight. No spasming. No tenderness to palpation.  No Spurling's or L'Hermitte's.   Lumbar ROM tight  Able to doff and don shoes minimal difficulty. No spasming, no tenderness, no step offs. No SLR.  No SI tenderness. No trochanteric bursal tenderness.   Feet are warm, intact  dorsalis pedis pulses, pink, brisk capillary refill.  Gait slightly wide., tentative, no scissoring. No antalgia. Tandem gait tentative.   *  Sensory level intact.     ASSESSMENT/PLAN:  1. Cervical spondylosis with myelopathy    2. Spondylolisthesis, lumbar region    3. Spinal stenosis of lumbar region with neurogenic claudication      I believe Mr. Barbosa suffers from lumbar spondylolisthesis, and neurogenic claudication, worsened by mechanical slip of L4 on 5. A simple decompression would be reasonable, but because of the listhesis a fusion will most likely be required. This of course is a much more invasive surgery, much longer time under anesthesia, bigger blood loss, more stress on the system, a much bigger cardiopulmonary challenge.  The surgical challenges are daunting.   I discussed all this with him today. As a result my plan will be to try to manage him with nonoperative therapy for as long as possible.   He is already established with palliative/pain management and I plan to partner with them for ongoing care.  Should he come to need neurosurgical intervention we will work closely with his pulmonologist for pulmonary tune up and perioperative risk assessment and recommendations.  I answered all of his questions about this aspect of his care. He understands the issues and the concerns and is in agreement with the plan as we laid out today.    Additionally I believe he also showing signs of cervical myelopathy, his reflexes are significantly brisker than they were as documented year ago. As early as 2006 he had cervical spondylosis, but there was no cord impingement at that time. He's had a long history of gradually increasing numbness, clumsiness, and atrophy of musculature but without change in reflexes until now there was no reason to think spinal cord was playing a role. I'll plan to repeat his cervical MRI and see him in clinic after that's completed to review the findings.    I have not made interim  treatment plans    I discussed all this in depth with him. I answered his questions and those questions indicate he has a good understanding of the situation. We have supplied him with this as cards and telephone numbers and urged him to call if he has any questions comments or concerns.    Thank you for allowing us to participate in the care of this very pleasant patient. We appreciate your confidence in the [facility], Department of Neurosurgery.      Best Regards,    Cara Carrillo PA-C  [facility]  Department of Neurosurgery  Phone: [phone]  Fax: [phone]        Total time: 60 minutes with more than 30 minutes spent in direct face to face contact reviewing films, providing education, counseling, the importance of good health habits, non-operative therapies,and indications for surgery as well as further follow up.

## 2017-07-27 NOTE — PROGRESS NOTES
The blood counts, liver, kidney and CRP inflammation labs are normal.     Sin GUZMAN, CNP, MSN  7/27/2017  11:54 AM

## 2017-07-27 NOTE — MR AVS SNAPSHOT
After Visit Summary   7/27/2017    Lucio Daly    MRN: [id number]           Patient Information     Date Of Birth          1950        Visit Information        Provider Department      7/27/2017 11:00 AM Cara Carrillo PA-C [facility]        Today's Diagnoses     Cervical spondylosis with myelopathy    -  1    Spondylolisthesis, lumbar region        Spinal stenosis of lumbar region with neurogenic claudication           Follow-ups after your visit        Your next 10 appointments already scheduled     Aug 02, 2017  6:15 PM CDT   (Arrive by 6:00 PM)   MR CERVICAL SPINE W/O CONTRAST with [id number]   [facility] ([facility])    [address]  [facility] [address] [phone]           Take your medicines as usual, unless your doctor tells you not to. Bring a list of your current medicines to your exam (including vitamins, minerals and over-the-counter drugs). Also bring the results of similar scans you may have had.  Please remove any body piercings and hair extensions before you arrive.  Follow your doctor s orders. If you do not, we may have to postpone your exam.  You will not have contrast for this exam. You do not need to do anything special to prepare.  The MRI machine uses a strong magnet. Please wear clothes without metal (snaps, zippers). A sweatsuit works well, or we may give you a hospital gown.   **IMPORTANT** THE INSTRUCTIONS BELOW ARE ONLY FOR THOSE PATIENTS WHO HAVE BEEN TOLD THEY WILL RECEIVE SEDATION OR GENERAL ANESTHESIA DURING THEIR MRI PROCEDURE:  IF YOU WILL RECEIVE SEDATION (take medicine to help you relax during your exam):   You must get the medicine from your doctor before you arrive. Bring the medicine to the exam. Do not take it at home.   Arrive one hour early. Bring someone who can take you home after the test. Your medicine will make you sleepy. After the exam, you may not drive, take a  bus or take a taxi by yourself.   No eating 8 hours before your exam. You may have clear liquids up until 4 hours before your exam. (Clear liquids include water, clear tea, black coffee and fruit juice without pulp.)  IF YOU WILL RECEIVE ANESTHESIA (be asleep for your exam):   Arrive 1 1/2 hours early. Bring someone who can take you home after the test. You may not drive, take a bus or take a taxi by yourself.   No eating 8 hours before your exam. You may have clear liquids up until 4 hours before your exam. (Clear liquids include water, clear tea, black coffee and fruit juice without pulp.)   You will spend four to five hours in the recovery room.  Please call the Imaging Department at your exam site with any questions.            Aug 22, 2017  1:30 PM CDT   (Arrive by 1:15 PM)   Return Visit with Cara Carrillo PA-C   [facility] ([facility])    [address] [place] [address]   [phone]            Aug 24, 2017 10:00 AM CDT   New Visit with MEGAN Washington CNP   [facility] ([facility])    [address]  [address]  [facility] [address]   [phone]            Aug 29, 2017  8:10 AM CDT   (Arrive by 7:55 AM)   Return Movement Disorder with Thong Montes MD   [facility] ([facility])    [address] [place] [address]   [phone]            Oct 05, 2017 10:00 AM CDT   (Arrive by 9:45 AM)   New Patient Visit with MEGAN Pendleton CNP   [facility] IBD ([facility])    [address] [place] [address]  [address] [facility] [address]   [phone]            Nov 16, 2017  9:00 AM CST   Six Minute Walk with UC PFL 6 MINUTE WALK 1   [facility] ([facility])    [address] [place] [address]   [phone]            Nov 16,  2017  9:30 AM CST   FULL PULMONARY FUNCTION with UC PFL A   [facility] Pulmonary Function Testing ([facility])    [address] [facility]  [address]rd [facility] [id number] [phone]            Nov 16, 2017 10:30 AM CST   (Arrive by 10:15 AM)   Return Interstitial Lung with Harsha Mccarthy MD   [facility] ([facility])    [address] [facility]  [address] [facility] [id number] [phone]            Dec 20, 2017  9:30 AM CST   (Arrive by 9:15 AM)   Return Visit with Jeremy Goodrich MD   [facility] Rheumatology ([facility])    [address] [facility]  [address] [facility] [id number] [phone]            Mar 06, 2018  9:20 AM CST   Return Sleep Patient with Kristen Martinez MD   [facility] [place] ([facility])    [address] [place] [address] [phone]              Who to contact     Please call your clinic at [phone] to:    Ask questions about your health    Make or cancel appointments    Discuss your medicines    Learn about your test results    Speak to your doctor   If you have compliments or concerns about an experience at your clinic, or if you wish to file a complaint, please contact [facility] Patient Relations at [phone] or email us at [email or web address]         Additional Information About Your Visit        [employer] Information     [facility] gives you secure access to your electronic health record. If you see a primary care provider, you can also send messages to your care team and make appointments. If you have questions, please call your primary care clinic.  If you do not have a primary care provider, please call [phone] and they will assist you.      [facility] is an electronic gateway that provides easy, online access to your medical  "records. With [employer], you can request a clinic appointment, read your test results, renew a prescription or communicate with your care team.     To access your existing account, please contact your [facility] or call [phone] for assistance.        Care EveryWhere ID     This is your Care EveryWhere ID. This could be used by other organizations to access your [facility] medical records  [id number]        Your Vitals Were     Pulse Height BMI (Body Mass Index)             47 1.854 m (6' 1\") 39.71 kg/m2          Blood Pressure from Last 3 Encounters:   07/27/17 139/82   07/07/17 142/79   06/26/17 139/88    Weight from Last 3 Encounters:   07/27/17 (!) 136.5 kg (301 lb)   07/07/17 135.3 kg (298 lb 3.2 oz)   06/26/17 (!) 137 kg (302 lb 1.6 oz)              We Performed the Following     MRI Cervical spine without gadolinium [[id number]]        Primary Care Provider Office Phone # Fax #    Jah Corley [phone]       [address]  [facility] [address]        Equal Access to Services     TERENCE QUIÑONES : Hadii aad ku hadasho Soomaali, waaxda luqadaha, qaybta kaalmada adeegyada, waxay aureain hayanshuln arabella johnson. So [facility] [phone].    ATENCIÓN: Si habla español, tiene a rudd disposición servicios gratuitos de asistencia lingüística. Llame al [phone].    We comply with applicable federal civil rights laws and [place] laws. We do not discriminate on the basis of race, color, national origin, age, disability sex, sexual orientation or gender identity.            Thank you!     Thank you for choosing [facility]  for your care. Our goal is always to provide you with excellent care. Hearing back from our patients is one way we can continue to improve our services. Please take a few minutes to complete the written survey that you may receive in the mail after your visit with us. Thank you!             Your Updated Medication List " - Protect others around you: Learn how to safely use, store and throw away your medicines at www.disposemymeds.org.          This list is accurate as of: 7/27/17 12:27 PM.  Always use your most recent med list.                   Brand Name Dispense Instructions for use Diagnosis    folic acid 1 MG tablet    FOLVITE    180 tablet    Take 2 tablets (2 mg) by mouth daily    Pain in toes of both feet, Rheumatoid arthritis of multiple sites with negative rheumatoid factor (H), Rheumatoid arthritis involving shoulder with negative rheumatoid factor, unspecified laterality (H), High risk medications (not anticoagulants) long-term use       hydroxychloroquine 200 MG tablet    PLAQUENIL    180 tablet    Take 1 tablet (200 mg) by mouth 2 times daily Send copy of recent eye exam as need for refills. Please have a copy of your eye exam faxed to [phone].    Rheumatoid arthritis involving shoulder with negative rheumatoid factor, unspecified laterality (H), High risk medications (not anticoagulants) long-term use, Pain in toes of both feet, Rheumatoid arthritis of multiple sites with negative rheumatoid factor (H)       methotrexate 2.5 MG tablet CHEMO     28 tablet    Take 7 tablets (17.5 mg) by mouth once a week Labs due every 8-12 weeks    Rheumatoid arthritis of multiple sites with negative rheumatoid factor (H), High risk medications (not anticoagulants) long-term use       metoprolol 50 MG 24 hr tablet    TOPROL-XL    90 tablet    Take 1 tablet (50 mg) by mouth daily    Essential hypertension       omeprazole 20 MG CR capsule    priLOSEC    60 capsule    Take 2 capsules (40 mg) by mouth 2 times daily    Gastroesophageal reflux disease with esophagitis       order for DME      Use your CPAP device as directed by your provider.        * order for DME     2 each    Please measure and distribute 1 pair of 20mmHg - 30mmHg knee high open or closed toe compression stockings. Jobst ultrasheer or equivalent.    Varicose veins  of lower extremities with other complications       * order for DME     1 each    Please measure and distribute 1 pair of 20mmHg - 30mmHg thigh high open or closed toe compression stockings. Jobst ultrasheer or equivalent.    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 complications       oxybutynin 5 MG tablet    DITROPAN    90 tablet    Take 1 tablet (5 mg) by mouth daily    Other urinary incontinence       pregabalin 50 MG capsule    LYRICA    360 capsule    One in the am, one in the afternoon and 2 at night.    Peripheral polyneuropathy (H)       selegiline 5 MG Tabs     180 tablet    1 tablet In the morning    Paralysis agitans (H)       TYLENOL 500 MG tablet   Generic drug:  acetaminophen      Take 1 tablet by mouth as needed. For pain        vitamin D 1000 UNITS capsule      Take 2 capsules by mouth daily.    Other specified disorder of skin       * Notice:  This list has 2 medication(s) that are the same as other medications prescribed for you. Read the directions carefully, and ask your doctor or other care provider to review them with you.

## 2017-07-27 NOTE — LETTER
7/27/2017       RE: Lucio Daly  [address]  [place] [address]     Dear Colleague,    Thank you for referring your patient, Lucio Daly, to the [facility] NEUROSURGERY at [facility]. Please see a copy of my visit note below.      Neurosurgery Clinic Consult  Date of Visit: 7/27/2017  Referred for: Back pain and bilateral leg pain left worse than right.  Referring Provider: Ivan Aguilera    Dear Dr. Gil Jacques,    We were happy to see Lucio Daly , a pleasant 66 year old year old male for back and leg pain.    HPI:  As you may recall this nice gentleman has a long and complicated past medical history. He's had suspicions of a rheumatologic disorder raised as far back as age 28 but since he is seronegative nothing was ever found. About 5 years ago, perhaps more, he started seeing Dr. Gonzalez for problems with neuropathy. There was no specific cause identified but there were some concerns about possible Parkinson's disease, a referral was made to  who pretty much ruled out Parkinson's but did note an elevated CRP and referred hand to rheumatology who ultimately did diagnose rheumatoid arthritis, sicca syndrome, and since then he's been noted to have multiple peripheral joints affected, his hands and knees ankles many fingers thumbs. He also has interstitial lung disease as a result, and while he is not on supplemental oxygen as yet his pulmonologist feels that he would be too bigger risk for surgery on his joints and expressed that opinion to the patient about 6 months ago.   Other medical history includes a malignant melanoma removed from his right leg 4 or 5 years ago, he believes it was a very low-grade, probably grade 1.    Regarding his back and the consult today, he first started developing back pain a few years ago and saw a sports medicine physician who diagnosed him with spinal stenosis after an MRI was done. He was told that surgery would  probably be needed eventually and the patient was asked if he wanted to see a surgeon. He did not at that time so referral was not made. Physical therapy was tried, it made it worse. Over time his back pain is leg pain got worse so we went back to the sports medicine, the same opinion was offered and he still declined a surgical opinion.  A number of years ago he had a few shots in the back but they didn't seem to help much.    Over the last couple of years pain in his back and his legs has gotten increasingly worse. Located in the bilateral lumbar spine down the backs of the legs to the calves. His feet are clumsy and he's catching his toes sometimes on stairs and on carpet.   He feels off balance. He has numbness in his calves and his toes. His pain and numbness is worse when he is standing and walking around, he can only walk sometimes a few feet. It's better if he sits down, if he is going shopping he must lean over a shopping cart or he can't make it all the way around the store.    Other symptoms include pain in the trapezii bilaterally, forearms on the way down to all the fingers both hands, numbness and fingers, clumsiness in the hands and fingers. He doesn't button buttons very well. His balance is off. He has to use a cane because of the balance. And his legs sometimes give out this can appear randomly, even if there is no pain. He's had a recent MRI of the lumbar spine and presents with that. Last MRI of the cervical spine was in 2006.    He's had no recent treatment to speak of.  His/Her current symptoms (pain, numbness and weakness), a detailed description of alleviating or exacerbating factors, and pain scores are outlined below.    Patient Supplied Answers To the  Pain Questionnaire  [facility] -  Patient Entered Questionnaire/Answers 7/17/2017   What number best describes your pain right now:  0 = No pain  to  10 = Worst pain imaginable 6   How would you describe the pain? burning, sharp, numbness,  dull, aching, throbbing   Which of the following worsen your pain? lying down, standing, sitting, walking   Which of the following improve or reduce your pain?  sitting   What number best describes your average pain for the past week:  0 = No pain  to  10 = Worst pain imaginable 6   What number best describes your LOWEST pain in past 24 hours:  0 = No pain  to  10 = Worst pain imaginable 2   What number best describes your WORST pain in past 24 hours:  0 = No pain  to  10 = Worst pain imaginable 9   When is your pain worst? AM, PM   What non-medicine treatments have you already had for your pain? physical therapy, spine injections (shots)   Have you tried treating your pain with medication?  Yes   Are you currently taking medications for your pain? Yes       strongly positive shopping cart sign.   *  PAIN:  See diagram  *  NUMBNESS:   Feet and both hands          QUALITY:   tingling,   *  WEAKNESS:  Left greater than right collapses after walking sometimes short distance. Sometimes random  *  BOWEL/BLADDER FUNCTION: No change  *  OTHER SYMPTOMS:   Balance. Hands feel uncoordinated      Current Outpatient Prescriptions:      hydroxychloroquine (PLAQUENIL) 200 MG tablet, Take 1 tablet (200 mg) by mouth 2 times daily Send copy of recent eye exam as need for refills. Please have a copy of your eye exam faxed to [phone]., Disp: 180 tablet, Rfl: 0     metoprolol (TOPROL-XL) 50 MG 24 hr tablet, Take 1 tablet (50 mg) by mouth daily, Disp: 90 tablet, Rfl: 3     pregabalin (LYRICA) 50 MG capsule, One in the am, one in the afternoon and 2 at night., Disp: 360 capsule, Rfl: 1     methotrexate 2.5 MG tablet CHEMO, Take 7 tablets (17.5 mg) by mouth once a week Labs due every 8-12 weeks, Disp: 28 tablet, Rfl: 4     omeprazole (PRILOSEC) 20 MG CR capsule, Take 2 capsules (40 mg) by mouth 2 times daily, Disp: 60 capsule, Rfl: 3     oxybutynin (DITROPAN) 5 MG tablet, Take 1 tablet (5 mg) by mouth daily, Disp: 90 tablet, Rfl:  3     folic acid (FOLVITE) 1 MG tablet, Take 2 tablets (2 mg) by mouth daily, Disp: 180 tablet, Rfl: 4     selegiline 5 MG TABS, 1 tablet In the morning, Disp: 180 tablet, Rfl: 3     ORDER FOR DME, Please measure and distribute 1 pair of 20mmHg - 30mmHg thigh high open or closed toe compression stockings. Jobst ultrasheer or equivalent., Disp: 1 each, Rfl: 0     ORDER FOR DME, Please measure and distribute 1 pair of 20mmHg - 30mmHg knee high open or closed toe compression stockings. Jobst ultrasheer or equivalent., Disp: 2 each, Rfl: 10     ORDER FOR DME, Use your CPAP device as directed by your provider., Disp: , Rfl:      acetaminophen (TYLENOL) 500 MG tablet, Take 1 tablet by mouth as needed. For pain, Disp: , Rfl:      Cholecalciferol (VITAMIN D) 1000 UNITS capsule, Take 2 capsules by mouth daily., Disp: , Rfl:     Allergies   Allergen Reactions     Restasis      Burning eyes, problems with breathing, tightness in chest     Adhesive Tape      Bandages misc     Allegra      EXCESSIVE URINATION AND WEAKNESS, LIGHT-HEADED     Allergy      Dust     Animal Dander      Benadryl Allergy      EXCESSIVE URINATION AND WEAKNESS, LIGHT-HEADED     Cephalexin      Joint pain or gerd aggravation. Bloating excessive urination      Cephalosporins      Doxycycline Hyclate Nausea     SWEATING,MIGRAINES,LOSS OF APPETITE,SWEATING,LIGHT HEADED, EXCESSIVE URINATION     Flonase [Fluticasone Propionate]      Gabapentin      Neurontin: mosd changes and excess urination     Iodine Solution [Povidone Iodine]      SKIN MELTS     Levaquin      From surgeon--? Joint pain ? Gerd aggravation. Insomnia, excess urination     Mylanta      EXCESSIVE URINATION AND WEAKNESS, LIGHT-HEADED     Prednisone      Weakness, elevated bp, headache, eye pain, congestion      Seafood [Seafood]      Shellfish Allergy      Hives       Sulfamethoxazole-Trimethoprim      Chest pain, angina     Trees      Trileptal      SEVERE JOINT AND TENDON PAIN, INSOMNIA,  RESTLESSNESS, NAUSEA, EXCESS URINATION     Cortizone Rash     EXCESS URINATION,WEAKNESS,NAUSEA, HEADACHE       Past Medical History:   Diagnosis Date     Abn involun movement NEC     Movement Disorder     Abnormal EMG 4/18/2013     AK (actinic keratosis) 12/18/2011     Allergic rhinitis, cause unspecified     Allergic rhinitis     Balance problems 11/1/2011     Basal cell carcinoma      Bladder spasms 11/1/2011     Chronic osteoarthritis      Diaphragmatic hernia without mention of obstruction or gangrene      Earache or other ear, nose, or throat complaint      Esophageal reflux      Fatigue 11/1/2011     Fracture      H. pylori infection 5/12/2011     History of MRI of cervical spine 11/18/2013    EXAMINATION: CERVICAL SPINE G/E 5 VIEWS* 4/19/2013 4:18 PM  CLINICAL HISTORY: Pain in limb,Performing Location?->[facility]),  COMPARISON:  FINDINGS: AP and lateral views in flexion and extension, as well as odontoid view of the cervical spine was obtained. There is no comparison available. The vertebral bodies of the cervical spine are normally aligned. There is posterior spurring and d     Incomplete defecation 11/1/2011     Interstitial lung disease (H) 11/29/2016     Laboratory test 8/7/2012     Lung disease June 2015     Malignant basal cell neoplasm of skin 8/6/2008     Melanoma (H) 8/6/2008     Melanoma in situ of lower leg (H)     R calf     Neuropathy (H) 5/16/2011     Other bladder disorder      Other color vision deficiencies      Other nervous system complications      Parkinsonism (H) 11/1/2011     Personal history of colonic polyps      Polyneuropathy in other diseases classified elsewhere (H)      RA (rheumatoid arthritis) (H)      Rheumatoid arthritis of multiple sites with negative rheumatoid factor (H) 3/21/2016     Seronegative arthritis 11/18/2013     Shortness of breath      Shoulder arthritis 2016    acromioclavicular joint      Somatization disorder 5/12/2011     Squamous cell carcinoma       Tremor 11/1/2011     Unspecified essential hypertension      Unspecified hypothyroidism      Urinary tract infection      Urinary urgency 11/1/2011     Wears glasses 11/1/2011       Past Surgical History:   Procedure Laterality Date     BIOPSY OF SKIN LESION       COLONOSCOPY       HEMORRHOID SURGERY       lip biopsy      for sicca complex     MOHS MICROGRAPHIC PROCEDURE       SOFT TISSUE SURGERY      removeal of basel cell carcinoma       Family History   Problem Relation Age of Onset     Hypertension Mother      HEART DISEASE Mother      a fib     Arthritis Mother      Other Cancer Mother      OSTEOPOROSIS Mother      CANCER Mother      Skin Cancer Mother      Hypertension Brother      DIABETES Brother      Neurologic Disorder Sister      multiple sclerosis     Hypertension Sister      HEART DISEASE Sister      HEART DISEASE Sister      a fib     Arthritis Sister      Hypertension Father      CEREBROVASCULAR DISEASE Father      ,     Skin Cancer Father      Psoriasis Maternal Grandfather      CANCER Maternal Grandfather      Other Cancer Maternal Grandfather      Congenital Anomalies Other      CEREBROVASCULAR DISEASE Paternal Grandmother      ,     CEREBROVASCULAR DISEASE Paternal Grandfather      ,     Other Cancer Sister      OSTEOPOROSIS Sister      Melanoma No family hx of        Social History     Social History     Marital status:      Spouse name: N/A     Number of children: N/A     Years of education: N/A     Occupational History     Not on file.     Social History Main Topics     Smoking status: Former Smoker     Packs/day: 1.50     Years: 37.00     Types: Cigarettes     Start date: 6/15/1963     Quit date: 9/30/2000     Smokeless tobacco: Never Used     Alcohol use No     Drug use: No     Sexual activity: No     Other Topics Concern     Parent/Sibling W/ Cabg, Mi Or Angioplasty Before 65f 55m? No     Social History Narrative    2013: Living in [place] in a townhouse with no steps    Has 3 sons  "that are doing okay.         Dairy/d 1 servings/d.     Caffeine 0 servings/d    Exercise 0 x week    Sunscreen used - No    Seatbelts used - Yes    Working smoke/CO detectors in the home - Yes    Guns stored in the home - Yes    Self Breast Exams - NA    Self Testicular Exam - Yes    Eye Exam up to date - Yes 2008    Dental Exam up to date - Yes 2006    Pap Smear up to date - NA    Mammogram up to date - NA    PSA up to date - Yes 2008    Dexa Scan up to date - No    Flex Sig / Colonoscopy up to date - Yes less than 5 yrs ago    Immunizations up to date -today    Abuse: Current or Past(Physical, Sexual or Emotional)- No    Do you feel safe in your environment - Yes    2008                 Problem list and 13 point review of systems: is reviewed in Epic and is negative with the exception of those symptoms associated with HPI and PMH.      OBJECTIVE:   /82  Pulse (!) 47  Ht 1.854 m (6' 1\")  Wt (!) 136.5 kg (301 lb)  BMI 39.71 kg/m2    Imaging:  These are the pertinent radiologist's findings from:  MRI lumbar WO 6/27/17@ [facility]. Spinal stenosis noted at L4 5 is not as severe as I would expect given the severity of the symptoms and the redundancy of the nerve roots. My suspicion is that his listhesis at that level worsens when he is standing. Standing flexion-extension views were performed today..   1. Multilevel lumbar spondylosis with increased moderate to severe  spinal canal stenosis at L4-5. Unchanged mild to moderate bilateral  neural foraminal stenosis at L5-S1.  2. Mild periarticular edema associated L5-S1 joints bilaterally, not  significantly changed from prior and likely represents active  degenerative arthropathy    Standing flexion-extension x-rays lumbar spine 7/27/17  The reading below indicates there is no evidence of motion.  The flexion images are extremely difficult to see, but in looking at them closely I would disagree with that assessment.  1. Grade I anterolisthesis of L4 on L5, as " previously seen, with no  evidence of motion on flexion-extension views.  2.  Multilevel degenerative changes most pronounced of the lumbar  spine.  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 Compared to the previous study, there is some progression, manifesting with significant drop of the right sural SNAP amplitude, whereas the left remains unchanged. Given that sural nerve responses are still elicitable in a 65 year-old patient, the neuropathy is still considered mild.  It is notable that the sural sensory responses were asymmetric in previous studies done in 2010 and 2012 (left >50% smaller than the right) and they have now become equal. 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 Clinical correlation is recommended.  2. No electrodiagnostic evidence of a neuromuscular junction disorder or myopathy.              3. Possible mild left median and ulnar sensory neuropathies at the wrist.    See the full report in EPIC/Media tab.  I personally reviewed the images with the patient.    Exam:  Pertinent positives:  He has brisk reflexes at the bilateral patella, bilateral biceps, no Sancho's or clonus and a mute Babinski's. Atrophy in distal musculature right gastroc, left foot. He has a thumb splint on the right hand/wrist. Sensation is diminished in fingers and toes bilaterally. His gait is slightly wide and tentative.  There is no focal specific weakness detectable.    *   Well developed, well nourished male found seated comfortably in exam room chair.  He is able to sit and rise independently.  He is unaccompanied.    *  Mental Status  A&O X3.  Bright, alert, affable, interactive. Language fluid, fund of knowledge intact. Good historian.  Mood and affect congruent and WNL.   *  Cranial Nerves  II: Able to read printed forms, VF full to gross confrontation.  III: IV, VI:  PERRLA, EOMI, No nystagmus, no ptosis.  V: Sensation intact in bilateral V1, V2, and V3. Jaw clench symmetric.     VII:  Intact to voice bilaterally   Pushes tongue against bilateral cheeks.  X:  Palate elevates, uvula midline, phonation intact.  XI: Elevates shoulders, head turn intact.  XII: Tongue midline. No fasciculations.  *  Cerebellar:  Finger nose slow but accurate. Heel shin intact but slowed.     *  Frankie-cranial:    No drift.  *  Cervical Spine:  DTR's at Biceps 3/4 bilaterally. Triceps 2/4 bilaterally.   Sensation diminished in fingertips..    No Raygoza's. No Phalen's.  No Tinel's. No fasciculations.   Muscle bulk diminished distally.   tone WNL.  Upper Extremity Strength.              RIGHT                LEFT     Deltoid              5/5                   5/5       Biceps              5/5                   5/5        Triceps              5/5                   5/5       Wrist Extensor              5/5                   5/5                     5/5                    5/5       Interossei              5/5                   5/5       EPL    painful; did not examine                   5/5       Pinch              5/5                  5/5            *  Lumbar Spine:    DTR's Patellar 3/4 and Achilles 0/4 and symmetric.   No fasciculations.  Muscle bulk diminished distally as above and tone WNL.  No Clonus.  Sensation diminished distally.    Lower Extremity Strength                   RIGHT                   LEFT     Iliopsoas                    5/5                      5/5       Quad                    5/5                        5/5       Hamstring                      5/5                        5/5         Gastrocs                    5/5                        5/5       Tib. Anterior                     5/5                        5/5       EHL                      5/5                        5/5       Babinski               mute                                  Down                     *  Structural Exam  Inspection of the spine reveals no scoliosis, exaggerated kyphosis or lordosis. Cervical spine ROM tight. No  spasming. No tenderness to palpation.  No Spurling's or L'Hermitte's.   Lumbar ROM tight  Able to doff and don shoes minimal difficulty. No spasming, no tenderness, no step offs. No SLR.  No SI tenderness. No trochanteric bursal tenderness.   Feet are warm, intact dorsalis pedis pulses, pink, brisk capillary refill.  Gait slightly wide., tentative, no scissoring. No antalgia. Tandem gait tentative.   *  Sensory level intact.     ASSESSMENT/PLAN:  1. Cervical spondylosis with myelopathy    2. Spondylolisthesis, lumbar region    3. Spinal stenosis of lumbar region with neurogenic claudication      I believe Mr. Barbosa suffers from lumbar spondylolisthesis, and neurogenic claudication, worsened by mechanical slip of L4 on 5. A simple decompression would be reasonable, but because of the listhesis a fusion will most likely be required. This of course is a much more invasive surgery, much longer time under anesthesia, bigger blood loss, more stress on the system, a much bigger cardiopulmonary challenge.  The surgical challenges are daunting.   I discussed all this with him today. As a result my plan will be to try to manage him with nonoperative therapy for as long as possible.   He is already established with palliative/pain management and I plan to partner with them for ongoing care.  Should he come to need neurosurgical intervention we will work closely with his pulmonologist for pulmonary tune up and perioperative risk assessment and recommendations.  I answered all of his questions about this aspect of his care. He understands the issues and the concerns and is in agreement with the plan as we laid out today.    Additionally I believe he also showing signs of cervical myelopathy, his reflexes are significantly brisker than they were as documented year ago. As early as 2006 he had cervical spondylosis, but there was no cord impingement at that time. He's had a long history of gradually increasing numbness,  clumsiness, and atrophy of musculature but without change in reflexes until now there was no reason to think spinal cord was playing a role. I'll plan to repeat his cervical MRI and see him in clinic after that's completed to review the findings.    I have not made interim treatment plans    I discussed all this in depth with him. I answered his questions and those questions indicate he has a good understanding of the situation. We have supplied him with this as cards and telephone numbers and urged him to call if he has any questions comments or concerns.    Thank you for allowing us to participate in the care of this very pleasant patient. We appreciate your confidence in the [facility], Department of Neurosurgery.    Total time: 60 minutes with more than 30 minutes spent in direct face to face contact reviewing films, providing education, counseling, the importance of good health habits, non-operative therapies,and indications for surgery as well as further follow up.    Again, thank you for allowing me to participate in the care of your patient.      Sincerely,    Cara Carrillo PA-C

## 2017-08-22 ENCOUNTER — OFFICE VISIT (OUTPATIENT)
Dept: NEUROSURGERY | Facility: CLINIC | Age: 67
End: 2017-08-22

## 2017-08-22 VITALS
SYSTOLIC BLOOD PRESSURE: 148 MMHG | HEART RATE: 59 BPM | HEIGHT: 74 IN | DIASTOLIC BLOOD PRESSURE: 79 MMHG | WEIGHT: 305.1 LBS | BODY MASS INDEX: 39.16 KG/M2 | TEMPERATURE: 98.2 F | RESPIRATION RATE: 28 BRPM | OXYGEN SATURATION: 96 %

## 2017-08-22 DIAGNOSIS — M48.062 SPINAL STENOSIS OF LUMBAR REGION WITH NEUROGENIC CLAUDICATION: Primary | ICD-10-CM

## 2017-08-22 ASSESSMENT — PAIN SCALES - GENERAL: PAINLEVEL: SEVERE PAIN (6)

## 2017-08-22 NOTE — MR AVS SNAPSHOT
After Visit Summary   8/22/2017    Lucio Daly    MRN: [id number]           Patient Information     Date Of Birth          1950        Visit Information        Provider Department      8/22/2017 1:30 PM Cara Carrillo PA-C [facility]        Today's Diagnoses     Spinal stenosis of lumbar region with neurogenic claudication    -  1       Follow-ups after your visit        Your next 10 appointments already scheduled     Aug 29, 2017  8:10 AM CDT   (Arrive by 7:55 AM)   Return Movement Disorder with Thong Montes MD   [facility] ([facility] [place])    [address] [place] [address]   [phone]            Oct 05, 2017 10:00 AM CDT   (Arrive by 9:45 AM)   New Patient Visit with MEGAN Pendleton CNP   [facility] ([facility])    [address] [place] [id number]   [phone]            Oct 09, 2017  9:00 AM CDT   New Visit with MEGAN Washington CNP   [facility] ([facility])    [address]  [facility] [address]   [phone]            Nov 16, 2017  9:00 AM CST   Six Minute Walk with UC PFL 6 MINUTE WALK 1   [facility] Pul[facility] Function Testing ([facility])    [address] [place] [address]   [phone]            Nov 16, 2017  9:30 AM CST   FULL PULMONARY FUNCTION with UC PFL A   [facility] ([facility])    [address] [place] [address]   [phone]            Nov 16, 2017 10:30 AM CST   (Arrive by 10:15 AM)   Return Interstitial Lung with Harsha Mccarthy MD   [facility] ([facility])    [address] [place] [address]   [phone]            Dec 20, 2017  9:30 AM CST    (Arrive by 9:15 AM)   Return Visit with Jeremy Goodrich MD   [facility] ([facility])    [address]  [address] [facility] [address] [phone]            Mar 06, 2018  9:20 AM CST   Return Sleep Patient with Kristen Martinez MD   [facility] [place] Sleep Study ([facility])    [address] [place] [address] [phone]              Who to contact     Please call your clinic at [phone] to:    Ask questions about your health    Make or cancel appointments    Discuss your medicines    Learn about your test results    Speak to your doctor   If you have compliments or concerns about an experience at your clinic, or if you wish to file a complaint, please contact [facility] Patient Relations at [phone] or email us at [email or web address]         Additional Information About Your Visit        [employer] Information     [employer] gives you secure access to your electronic health record. If you see a primary care provider, you can also send messages to your care team and make appointments. If you have questions, please call your primary care clinic.  If you do not have a primary care provider, please call [phone] and they will assist you.      [employer] is an electronic gateway that provides easy, online access to your medical records. With [employer], you can request a clinic appointment, read your test results, renew a prescription or communicate with your care team.     To access your existing account, please contact your [facility] Clinic or call [phone] for assistance.        Care EveryWhere ID     This is your Care EveryWhere ID. This could be used by other organizations to access your [place] medical records  [id number]        Your Vitals Were     Pulse Temperature Respirations Height Pulse Oximetry BMI (Body  "Mass Index)    59 98.2  F (36.8  C) 28 1.88 m (6' 2\") 96% 39.17 kg/m2       Blood Pressure from Last 3 Encounters:   08/22/17 148/79   07/27/17 139/82   07/07/17 142/79    Weight from Last 3 Encounters:   08/22/17 (!) 138.4 kg (305 lb 1.6 oz)   07/27/17 (!) 136.5 kg (301 lb)   07/07/17 135.3 kg (298 lb 3.2 oz)              Today, you had the following     No orders found for display       Primary Care Provider Office Phone # Fax #    Jah Corley [phone] [address] [facility] [address]        Equal Access to Services     ALINE QUIÑONES : Hadmarcus campoo Sodionicio, waaxda luqadaha, qaybta kaalmada adeegyada, mariana salcedo . So [facility] [phone].    ATENCIÓN: Si habla español, tiene a rudd disposición servicios gratuitos de asistencia lingüística. Llame al [phone].    We comply with applicable federal civil rights laws and [place] laws. We do not discriminate on the basis of race, color, national origin, age, disability sex, sexual orientation or gender identity.            Thank you!     Thank you for choosing [facility]  for your care. Our goal is always to provide you with excellent care. Hearing back from our patients is one way we can continue to improve our services. Please take a few minutes to complete the written survey that you may receive in the mail after your visit with us. Thank you!             Your Updated Medication List - Protect others around you: Learn how to safely use, store and throw away your medicines at www.disposemymeds.org.          This list is accurate as of: 8/22/17 11:59 PM.  Always use your most recent med list.                   Brand Name Dispense Instructions for use Diagnosis    folic acid 1 MG tablet    FOLVITE    180 tablet    Take 2 tablets (2 mg) by mouth daily    Pain in toes of both feet, Rheumatoid arthritis of multiple sites with negative rheumatoid factor (H), Rheumatoid arthritis involving " shoulder with negative rheumatoid factor, unspecified laterality (H), High risk medications (not anticoagulants) long-term use       hydroxychloroquine 200 MG tablet    PLAQUENIL    180 tablet    Take 1 tablet (200 mg) by mouth 2 times daily Send copy of recent eye exam as need for refills. Please have a copy of your eye exam faxed to [phone].    Rheumatoid arthritis involving shoulder with negative rheumatoid factor, unspecified laterality (H), High risk medications (not anticoagulants) long-term use, Pain in toes of both feet, Rheumatoid arthritis of multiple sites with negative rheumatoid factor (H)       methotrexate 2.5 MG tablet CHEMO     28 tablet    Take 7 tablets (17.5 mg) by mouth once a week Labs due every 8-12 weeks    Rheumatoid arthritis of multiple sites with negative rheumatoid factor (H), High risk medications (not anticoagulants) long-term use       metoprolol 50 MG 24 hr tablet    TOPROL-XL    90 tablet    Take 1 tablet (50 mg) by mouth daily    Essential hypertension       omeprazole 20 MG CR capsule    priLOSEC    60 capsule    Take 2 capsules (40 mg) by mouth 2 times daily    Gastroesophageal reflux disease with esophagitis       order for DME      Use your CPAP device as directed by your provider.        oxybutynin 5 MG tablet    DITROPAN    90 tablet    Take 1 tablet (5 mg) by mouth daily    Other urinary incontinence       pregabalin 50 MG capsule    LYRICA    360 capsule    One in the am, one in the afternoon and 2 at night.    Peripheral polyneuropathy (H)       selegiline 5 MG Tabs     180 tablet    1 tablet In the morning    Paralysis agitans (H)       TYLENOL 500 MG tablet   Generic drug:  acetaminophen      Take 1 tablet by mouth as needed. For pain        vitamin D 1000 UNITS capsule      Take 2 capsules by mouth daily.    Other specified disorder of skin

## 2017-08-22 NOTE — NURSING NOTE
Chief Complaint   Patient presents with     RECHECK     [facility]- LOWER BACK PAIN, F/U AFTER MRI     Jr Samuel, CMA

## 2017-08-22 NOTE — PROGRESS NOTES
Neurosurgery Clinic   Date of Visit:   8/22/2017  Referred for: Back pain and bilateral leg pain left worse than right.  Referring Provider: Ivan Aguilera      Dear Dr. Gil Jacques,    We were happy to see Lucio Daly , a pleasant 66 year old year old male for back and leg pain.    HPI:  As you may recall this nice gentleman has a long and complicated past medical history. He's had suspicions of a rheumatologic disorder raised as far back as age 28 but since he is seronegative nothing was ever found. About 5 years ago, perhaps more, he started seeing Dr. Gonzalez for problems with neuropathy. There was no specific cause identified but there were some concerns about possible Parkinson's disease, a referral was made to  who pretty much ruled out Parkinson's but did note an elevated CRP and referred hand to rheumatology who ultimately did diagnose rheumatoid arthritis, sicca syndrome, and since then he's been noted to have multiple peripheral joints affected, his hands and knees ankles many fingers thumbs. He also has interstitial lung disease as a result, and while he is not on supplemental oxygen as yet his pulmonologist feels that he would be too bigger risk for surgery on his joints and expressed that opinion to the patient about 6 months ago.   ( pulmonologist, Dr Mccarthy. ) Other medical history includes a malignant melanoma removed from his right leg 4 or 5 years ago, he believes it was a very low-grade, probably grade 1.    Regarding his back, he first started developing back pain a few years ago and saw a sports medicine physician who diagnosed him with spinal stenosis after an MRI. He was told that surgery would probably be needed eventually.. Physical therapy was tried, it made it worse.  A number of years ago he had a few shots in the back but they didn't seem to help much.    Over the last couple of years pain in his back and his legs has gotten increasingly worse. Located in the  bilateral lumbar spine down the backs of the legs to the calves. His feet are clumsy and he's catching his toes sometimes on stairs and on carpet.   He feels off balance. He has numbness in his calves and his toes. His pain and numbness is worse when he is standing and walking around, he can only walk sometimes a few feet. It's better if he sits down, if he is going shopping he must lean over a shopping cart or he can't make it all the way around the store.    Other symptoms include pain in the trapezii bilaterally, forearms on the way down to all the fingers both hands, numbness and fingers, clumsiness in the hands and fingers. He doesn't button buttons very well. His balance is off. He has to use a cane because of the balance. And his legs sometimes give out this can appear randomly, even if there is no pain , that they can also give out if he's been walking too far.  He's had a recent MRI of the lumbar spine and presents with that. Last MRI of the cervical spine was in 2006 , which we updated now..    He's had no recent treatment to speak of.    At last visit his imaging was remarkable for L4/5 listhesis, mild canal stenosis at that level, and bilateral L5-S1 foraminal narrowing. I believed he  suffered from lumbar spondylosis, and neurogenic claudication, worsened by mechanical slip of L4 on 5. A simple decompression would be reasonable, but because of the listhesis a fusion will most likely be required. This of course is a much more invasive surgery, much longer time under anesthesia, bigger blood loss, more stress on the system, a much bigger cardiopulmonary challenge.  Because of the surgical challenges  my plan was to manage him with nonoperative therapy for as long as possible. He is already established with palliative/pain management.  However there is a chance we might be able to get by with a simple decompression and so I'm referring him on to a neurosurgeon for an opinion.  He hasn't decided if he wants  to me with a surgeon yet. He'll think about it and let us know. Should he come to need neurosurgical intervention we will work closely with his pulmonologist for pulmonary tune up and perioperative risk assessment and recommendations.     His exam was also remarkable for signs of cervical myelopathy, his reflexes  were significantly brisker than they were as documented year ago. As early as 2006 he had cervical spondylosis, but there was no cord impingement at that time. He's had a long history of gradually increasing numbness, clumsiness, and atrophy of musculature but without change in reflexes until now there was no reason to think spinal cord was playing a role.  His cervical MRI has been repeated and he presents for that review today.    Current Outpatient Prescriptions:      hydroxychloroquine (PLAQUENIL) 200 MG tablet, Take 1 tablet (200 mg) by mouth 2 times daily Send copy of recent eye exam as need for refills. Please have a copy of your eye exam faxed to [phone]., Disp: 180 tablet, Rfl: 0     metoprolol (TOPROL-XL) 50 MG 24 hr tablet, Take 1 tablet (50 mg) by mouth daily, Disp: 90 tablet, Rfl: 3     pregabalin (LYRICA) 50 MG capsule, One in the am, one in the afternoon and 2 at night., Disp: 360 capsule, Rfl: 1     methotrexate 2.5 MG tablet CHEMO, Take 7 tablets (17.5 mg) by mouth once a week Labs due every 8-12 weeks, Disp: 28 tablet, Rfl: 4     omeprazole (PRILOSEC) 20 MG CR capsule, Take 2 capsules (40 mg) by mouth 2 times daily, Disp: 60 capsule, Rfl: 3     oxybutynin (DITROPAN) 5 MG tablet, Take 1 tablet (5 mg) by mouth daily, Disp: 90 tablet, Rfl: 3     folic acid (FOLVITE) 1 MG tablet, Take 2 tablets (2 mg) by mouth daily, Disp: 180 tablet, Rfl: 4     selegiline 5 MG TABS, 1 tablet In the morning, Disp: 180 tablet, Rfl: 3     ORDER FOR DME, Use your CPAP device as directed by your provider., Disp: , Rfl:      acetaminophen (TYLENOL) 500 MG tablet, Take 1 tablet by mouth as needed. For pain,  "Disp: , Rfl:      Cholecalciferol (VITAMIN D) 1000 UNITS capsule, Take 2 capsules by mouth daily., Disp: , Rfl:     Allergies   Allergen Reactions     Restasis      Burning eyes, problems with breathing, tightness in chest     Adhesive Tape      Bandages misc     Allegra      EXCESSIVE URINATION AND WEAKNESS, LIGHT-HEADED     Allergy      Dust     Animal Dander      Benadryl Allergy      EXCESSIVE URINATION AND WEAKNESS, LIGHT-HEADED     Cephalexin      Joint pain or gerd aggravation. Bloating excessive urination      Cephalosporins      Doxycycline Hyclate Nausea     SWEATING,MIGRAINES,LOSS OF APPETITE,SWEATING,LIGHT HEADED, EXCESSIVE URINATION     Flonase [Fluticasone Propionate]      Gabapentin      Neurontin: mosd changes and excess urination     Iodine Solution [Povidone Iodine]      SKIN MELTS     Levaquin      From surgeon--? Joint pain ? Gerd aggravation. Insomnia, excess urination     Mylanta      EXCESSIVE URINATION AND WEAKNESS, LIGHT-HEADED     Prednisone      Weakness, elevated bp, headache, eye pain, congestion      Seafood [Seafood]      Shellfish Allergy      Hives       Sulfamethoxazole-Trimethoprim      Chest pain, angina     Trees      Trileptal      SEVERE JOINT AND TENDON PAIN, INSOMNIA, RESTLESSNESS, NAUSEA, EXCESS URINATION     Cortizone Rash     EXCESS URINATION,WEAKNESS,NAUSEA, HEADACHE      PMH, social, family, and Problem list : reviewed in Epic..    OBJECTIVE:   /79 (BP Location: Right arm, Patient Position: Sitting, Cuff Size: Adult Large)  Pulse 59  Temp 98.2  F (36.8  C)  Resp 28  Ht 1.88 m (6' 2\")  Wt (!) 138.4 kg (305 lb 1.6 oz)  SpO2 96%  BMI 39.17 kg/m2    Imaging:  These are the pertinent radiologist's findings from:  MRI cervical 7/27/17  Cervical spondylosis resulting in moderate bilateral neural foraminal  stenosis and mild spinal canal stenosis C5-C7, slightly progressed at  the C6-7 level since 3/21/2006. No cord signal abnormality.    MRI lumbar WO 6/27/17@ " [facility]. Spinal stenosis noted at L4 5 is not as severe as I would expect given the severity of the symptoms and the redundancy of the nerve roots. My suspicion is that his listhesis at that level worsens when he is standing. Standing flexion-extension views were performed today..   1. Multilevel lumbar spondylosis with increased moderate to severe  spinal canal stenosis at L4-5. Unchanged mild to moderate bilateral  neural foraminal stenosis at L5-S1.  2. Mild periarticular edema associated L5-S1 joints bilaterally, not  significantly changed from prior and likely represents active  degenerative arthropathy    Standing flexion-extension x-rays lumbar spine 7/27/17  The reading below indicates there is no evidence of motion.  The flexion images are extremely difficult to see, but in looking at them closely I would disagree with that assessment.  1. Grade I anterolisthesis of L4 on L5, as previously seen, with no  evidence of motion on flexion-extension views.  2.  Multilevel degenerative changes most pronounced of the lumbar  spine.  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 Compared to the previous study, there is some progression, manifesting with significant drop of the right sural SNAP amplitude, whereas the left remains unchanged. Given that sural nerve responses are still elicitable in a 65 year-old patient, the neuropathy is still considered mild.  It is notable that the sural sensory responses were asymmetric in previous studies done in 2010 and 2012 (left >50% smaller than the right) and they have now become equal. 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 Clinical correlation is recommended.  2. No electrodiagnostic evidence of a neuromuscular junction disorder or myopathy.              3. Possible mild left median and ulnar sensory neuropathies at the wrist.    See the full report in EPIC/Media tab.  I personally reviewed the images with the patient.      Exam:  Pertinent  positives:  He has brisk reflexes at the bilateral patella, bilateral biceps, no Sancho's or clonus and a mute Babinski's. Atrophy in distal musculature right gastroc. He has a thumb splint on the right hand/wrist. Sensation is diminished in fingers and toes bilaterally. His gait is slightly wide and tentative.  There is no focal specific weakness detectable.    *   Well developed, well nourished male found seated comfortably in exam room chair.  He is able to sit and rise independently.  He is unaccompanied.    *  Mental Status  A&O X3.  Bright, alert, affable, interactive. Language fluid, fund of knowledge intact. Good historian.  Mood and affect congruent and WNL.   I  *  Cerebellar:  Finger nose slow but accurate. Heel shin intact but slowed.     *  Frankie-cranial:    No drift.  *  Cervical Spine:  DTR's at Biceps 3/4 bilaterally. Triceps 2/4 bilaterally.   Sensation diminished in fingertips..    No Raygoza's. No Phalen's.  No Tinel's. No fasciculations.   Muscle bulk diminished distally.   tone WNL.  Upper Extremity Strength.              RIGHT                LEFT     Deltoid              5/5                   5/5       Biceps              5/5                   5/5        Triceps              5/5                   5/5       Wrist Extensor              5/5                   5/5                     5/5                    5/5       Interossei              5/5                   5/5       EPL    painful; did not examine                   5/5       Pinch              5/5                  5/5            *  Lumbar Spine:    DTR's Patellar 3/4 and Achilles 0/4 and symmetric.   No fasciculations.  Muscle bulk diminished distally as above and tone WNL.  No Clonus.  Sensation diminished distally.    Lower Extremity Strength                   RIGHT                   LEFT     Iliopsoas                    5/5                      5/5       Quad                    5/5                        5/5       Hamstring                       5/5                        5/5         Gastrocs                    5/5                        5/5       Tib. Anterior                     5/5                        5/5       EHL                      5/5                        5/5       Babinski               mute                                  Down                     *  Structural Exam  Inspection of the spine reveals no scoliosis, exaggerated kyphosis or lordosis. Cervical spine ROM tight. No spasming. No tenderness to palpation.  No Spurling's or L'Hermitte's.   Lumbar ROM tight  Able to doff and don shoes minimal difficulty. No spasming, no tenderness, no step offs. No SLR.  No SI tenderness. No trochanteric bursal tenderness.   Feet are warm, intact dorsalis pedis pulses, pink, brisk capillary refill.  Gait slightly wide., tentative, no scissoring. No antalgia. Tandem gait tentative.   *  Sensory level intact.     ASSESSMENT/PLAN:  1. Spinal stenosis of lumbar region with neurogenic claudication      He has L4/5 listhesis, mild canal stenosis at that level, and bilateral L5-S1 foraminal narrowing. I believe he suffers from lumbar spondylosis, and neurogenic claudication, worsened by mechanical slip of L4 on 5. A simple decompression would be reasonable, but because of the listhesis a fusion will most likely be required. This of course is a much more invasive surgery, much longer time under anesthesia, bigger blood loss, more stress on the system, a much bigger cardiopulmonary challenge.  Because of the surgical challenges  my plan was to manage him with nonoperative therapy for as long as possible. He is already established with palliative/pain management.  However there is a chance we might be able to get by with a simple decompression and so I'm referring him on to a neurosurgeon for an opinion.  He hasn't decided if he wants to me with a surgeon yet. He'll think about it and let us know. Should he come to need neurosurgical intervention we will  work closely with his pulmonologist for pulmonary tune up and perioperative risk assessment and recommendations.     His exam was also remarkable for signs of cervical myelopathy, his 4 limb reflexes were significantly brisker than they were as documented year ago, but he had no Sancho's clonus or Babinski's. As early as 2006 he had cervical spondylosis, without cord impingement. He's had a long history of gradually increasing numbness, clumsiness, and atrophy of musculature but without change in reflexes until now there was no reason to think spinal cord was playing a role.  His cervical MRI has been repeated and fortunately is unremarkable for clinically significant central stenosis or cord compromise.    I discussed all this in depth with him. I answered his questions and those questions indicate he has a good understanding of the situation. We have supplied him with this as cards and telephone numbers and urged him to call if he has any questions comments or concerns.    Thank you for allowing us to participate in the care of this very pleasant patient. We appreciate your confidence in the [facility], Department of Neurosurgery.      Best Regards,    Cara Carrillo PA-C  [facility]  Department of Neurosurgery  Phone: [phone]  Fax: [phone]    This note was generated using voice recognition software. While edited for content some inaccurate phrasing may be found.

## 2017-08-22 NOTE — LETTER
8/22/2017       RE: Lucio Daly  [address]  [place] [address]     Dear Colleague,    Thank you for referring your patient, Lucio Daly, to the [facility] NEUROSURGERY at [facility]. Please see a copy of my visit note below.      Neurosurgery Clinic   Date of Visit:   8/22/2017  Referred for: Back pain and bilateral leg pain left worse than right.  Referring Provider: Ivan Aguilera      Dear Dr. Gil Jacques,    We were happy to see Lucio Daly , a pleasant 66 year old year old male for back and leg pain.    HPI:  As you may recall this nice gentleman has a long and complicated past medical history. He's had suspicions of a rheumatologic disorder raised as far back as age 28 but since he is seronegative nothing was ever found. About 5 years ago, perhaps more, he started seeing Dr. Gonzalez for problems with neuropathy. There was no specific cause identified but there were some concerns about possible Parkinson's disease, a referral was made to  who pretty much ruled out Parkinson's but did note an elevated CRP and referred hand to rheumatology who ultimately did diagnose rheumatoid arthritis, sicca syndrome, and since then he's been noted to have multiple peripheral joints affected, his hands and knees ankles many fingers thumbs. He also has interstitial lung disease as a result, and while he is not on supplemental oxygen as yet his pulmonologist feels that he would be too bigger risk for surgery on his joints and expressed that opinion to the patient about 6 months ago.   ( pulmonologist, Dr Mccarthy. ) Other medical history includes a malignant melanoma removed from his right leg 4 or 5 years ago, he believes it was a very low-grade, probably grade 1.    Regarding his back, he first started developing back pain a few years ago and saw a sports medicine physician who diagnosed him with spinal stenosis after an MRI. He was told that surgery would  probably be needed eventually.. Physical therapy was tried, it made it worse.  A number of years ago he had a few shots in the back but they didn't seem to help much.    Over the last couple of years pain in his back and his legs has gotten increasingly worse. Located in the bilateral lumbar spine down the backs of the legs to the calves. His feet are clumsy and he's catching his toes sometimes on stairs and on carpet.   He feels off balance. He has numbness in his calves and his toes. His pain and numbness is worse when he is standing and walking around, he can only walk sometimes a few feet. It's better if he sits down, if he is going shopping he must lean over a shopping cart or he can't make it all the way around the store.    Other symptoms include pain in the trapezii bilaterally, forearms on the way down to all the fingers both hands, numbness and fingers, clumsiness in the hands and fingers. He doesn't button buttons very well. His balance is off. He has to use a cane because of the balance. And his legs sometimes give out this can appear randomly, even if there is no pain , that they can also give out if he's been walking too far.  He's had a recent MRI of the lumbar spine and presents with that. Last MRI of the cervical spine was in 2006 , which we updated now..    He's had no recent treatment to speak of.    At last visit his imaging was remarkable for L4/5 listhesis, mild canal stenosis at that level, and bilateral L5-S1 foraminal narrowing. I believed he  suffered from lumbar spondylosis, and neurogenic claudication, worsened by mechanical slip of L4 on 5. A simple decompression would be reasonable, but because of the listhesis a fusion will most likely be required. This of course is a much more invasive surgery, much longer time under anesthesia, bigger blood loss, more stress on the system, a much bigger cardiopulmonary challenge.  Because of the surgical challenges  my plan was to manage him with  nonoperative therapy for as long as possible. He is already established with palliative/pain management.  However there is a chance we might be able to get by with a simple decompression and so I'm referring him on to a neurosurgeon for an opinion.  He hasn't decided if he wants to me with a surgeon yet. He'll think about it and let us know. Should he come to need neurosurgical intervention we will work closely with his pulmonologist for pulmonary tune up and perioperative risk assessment and recommendations.     His exam was also remarkable for signs of cervical myelopathy, his reflexes  were significantly brisker than they were as documented year ago. As early as 2006 he had cervical spondylosis, but there was no cord impingement at that time. He's had a long history of gradually increasing numbness, clumsiness, and atrophy of musculature but without change in reflexes until now there was no reason to think spinal cord was playing a role.  His cervical MRI has been repeated and he presents for that review today.    Current Outpatient Prescriptions:      hydroxychloroquine (PLAQUENIL) 200 MG tablet, Take 1 tablet (200 mg) by mouth 2 times daily Send copy of recent eye exam as need for refills. Please have a copy of your eye exam faxed to [phone]., Disp: 180 tablet, Rfl: 0     metoprolol (TOPROL-XL) 50 MG 24 hr tablet, Take 1 tablet (50 mg) by mouth daily, Disp: 90 tablet, Rfl: 3     pregabalin (LYRICA) 50 MG capsule, One in the am, one in the afternoon and 2 at night., Disp: 360 capsule, Rfl: 1     methotrexate 2.5 MG tablet CHEMO, Take 7 tablets (17.5 mg) by mouth once a week Labs due every 8-12 weeks, Disp: 28 tablet, Rfl: 4     omeprazole (PRILOSEC) 20 MG CR capsule, Take 2 capsules (40 mg) by mouth 2 times daily, Disp: 60 capsule, Rfl: 3     oxybutynin (DITROPAN) 5 MG tablet, Take 1 tablet (5 mg) by mouth daily, Disp: 90 tablet, Rfl: 3     folic acid (FOLVITE) 1 MG tablet, Take 2 tablets (2 mg) by mouth  "daily, Disp: 180 tablet, Rfl: 4     selegiline 5 MG TABS, 1 tablet In the morning, Disp: 180 tablet, Rfl: 3     ORDER FOR DME, Use your CPAP device as directed by your provider., Disp: , Rfl:      acetaminophen (TYLENOL) 500 MG tablet, Take 1 tablet by mouth as needed. For pain, Disp: , Rfl:      Cholecalciferol (VITAMIN D) 1000 UNITS capsule, Take 2 capsules by mouth daily., Disp: , Rfl:     Allergies   Allergen Reactions     Restasis      Burning eyes, problems with breathing, tightness in chest     Adhesive Tape      Bandages misc     Allegra      EXCESSIVE URINATION AND WEAKNESS, LIGHT-HEADED     Allergy      Dust     Animal Dander      Benadryl Allergy      EXCESSIVE URINATION AND WEAKNESS, LIGHT-HEADED     Cephalexin      Joint pain or gerd aggravation. Bloating excessive urination      Cephalosporins      Doxycycline Hyclate Nausea     SWEATING,MIGRAINES,LOSS OF APPETITE,SWEATING,LIGHT HEADED, EXCESSIVE URINATION     Flonase [Fluticasone Propionate]      Gabapentin      Neurontin: mosd changes and excess urination     Iodine Solution [Povidone Iodine]      SKIN MELTS     Levaquin      From surgeon--? Joint pain ? Gerd aggravation. Insomnia, excess urination     Mylanta      EXCESSIVE URINATION AND WEAKNESS, LIGHT-HEADED     Prednisone      Weakness, elevated bp, headache, eye pain, congestion      Seafood [Seafood]      Shellfish Allergy      Hives       Sulfamethoxazole-Trimethoprim      Chest pain, angina     Trees      Trileptal      SEVERE JOINT AND TENDON PAIN, INSOMNIA, RESTLESSNESS, NAUSEA, EXCESS URINATION     Cortizone Rash     EXCESS URINATION,WEAKNESS,NAUSEA, HEADACHE      PMH, social, family, and Problem list : reviewed in Epic..    OBJECTIVE:   /79 (BP Location: Right arm, Patient Position: Sitting, Cuff Size: Adult Large)  Pulse 59  Temp 98.2  F (36.8  C)  Resp 28  Ht 1.88 m (6' 2\")  Wt (!) 138.4 kg (305 lb 1.6 oz)  SpO2 96%  BMI 39.17 kg/m2    Imaging:  These are the pertinent " radiologist's findings from:  MRI cervical 7/27/17  Cervical spondylosis resulting in moderate bilateral neural foraminal  stenosis and mild spinal canal stenosis C5-C7, slightly progressed at  the C6-7 level since 3/21/2006. No cord signal abnormality.    MRI lumbar WO 6/27/17@ [facility]. Spinal stenosis noted at L4 5 is not as severe as I would expect given the severity of the symptoms and the redundancy of the nerve roots. My suspicion is that his listhesis at that level worsens when he is standing. Standing flexion-extension views were performed today..   1. Multilevel lumbar spondylosis with increased moderate to severe  spinal canal stenosis at L4-5. Unchanged mild to moderate bilateral  neural foraminal stenosis at L5-S1.  2. Mild periarticular edema associated L5-S1 joints bilaterally, not  significantly changed from prior and likely represents active  degenerative arthropathy    Standing flexion-extension x-rays lumbar spine 7/27/17  The reading below indicates there is no evidence of motion.  The flexion images are extremely difficult to see, but in looking at them closely I would disagree with that assessment.  1. Grade I anterolisthesis of L4 on L5, as previously seen, with no  evidence of motion on flexion-extension views.  2.  Multilevel degenerative changes most pronounced of the lumbar  spine.  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 Compared to the previous study, there is some progression, manifesting with significant drop of the right sural SNAP amplitude, whereas the left remains unchanged. Given that sural nerve responses are still elicitable in a 65 year-old patient, the neuropathy is still considered mild.  It is notable that the sural sensory responses were asymmetric in previous studies done in 2010 and 2012 (left >50% smaller than the right) and they have now become equal. 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 Clinical correlation is recommended.  2. No  electrodiagnostic evidence of a neuromuscular junction disorder or myopathy.              3. Possible mild left median and ulnar sensory neuropathies at the wrist.    See the full report in EPIC/Media tab.  I personally reviewed the images with the patient.      Exam:  Pertinent positives:  He has brisk reflexes at the bilateral patella, bilateral biceps, no Sancho's or clonus and a mute Babinski's. Atrophy in distal musculature right gastroc. He has a thumb splint on the right hand/wrist. Sensation is diminished in fingers and toes bilaterally. His gait is slightly wide and tentative.  There is no focal specific weakness detectable.    *   Well developed, well nourished male found seated comfortably in exam room chair.  He is able to sit and rise independently.  He is unaccompanied.    *  Mental Status  A&O X3.  Bright, alert, affable, interactive. Language fluid, fund of knowledge intact. Good historian.  Mood and affect congruent and WNL.   I  *  Cerebellar:  Finger nose slow but accurate. Heel shin intact but slowed.     *  Frankie-cranial:    No drift.  *  Cervical Spine:  DTR's at Biceps 3/4 bilaterally. Triceps 2/4 bilaterally.   Sensation diminished in fingertips..    No Raygoza's. No Phalen's.  No Tinel's. No fasciculations.   Muscle bulk diminished distally.   tone WNL.  Upper Extremity Strength.              RIGHT                LEFT     Deltoid              5/5                   5/5       Biceps              5/5                   5/5        Triceps              5/5                   5/5       Wrist Extensor              5/5                   5/5                     5/5                    5/5       Interossei              5/5                   5/5       EPL    painful; did not examine                   5/5       Pinch              5/5                  5/5            *  Lumbar Spine:    DTR's Patellar 3/4 and Achilles 0/4 and symmetric.   No fasciculations.  Muscle bulk diminished distally as above and  tone WNL.  No Clonus.  Sensation diminished distally.    Lower Extremity Strength                   RIGHT                   LEFT     Iliopsoas                    5/5                      5/5       Quad                    5/5                        5/5       Hamstring                      5/5                        5/5         Gastrocs                    5/5                        5/5       Tib. Anterior                     5/5                        5/5       EHL                      5/5                        5/5       Babinski               mute                                  Down                     *  Structural Exam  Inspection of the spine reveals no scoliosis, exaggerated kyphosis or lordosis. Cervical spine ROM tight. No spasming. No tenderness to palpation.  No Spurling's or L'Hermitte's.   Lumbar ROM tight  Able to doff and don shoes minimal difficulty. No spasming, no tenderness, no step offs. No SLR.  No SI tenderness. No trochanteric bursal tenderness.   Feet are warm, intact dorsalis pedis pulses, pink, brisk capillary refill.  Gait slightly wide., tentative, no scissoring. No antalgia. Tandem gait tentative.   *  Sensory level intact.     ASSESSMENT/PLAN:  1. Spinal stenosis of lumbar region with neurogenic claudication      He has L4/5 listhesis, mild canal stenosis at that level, and bilateral L5-S1 foraminal narrowing. I believe he suffers from lumbar spondylosis, and neurogenic claudication, worsened by mechanical slip of L4 on 5. A simple decompression would be reasonable, but because of the listhesis a fusion will most likely be required. This of course is a much more invasive surgery, much longer time under anesthesia, bigger blood loss, more stress on the system, a much bigger cardiopulmonary challenge.  Because of the surgical challenges  my plan was to manage him with nonoperative therapy for as long as possible. He is already established with palliative/pain management.  However there is  a chance we might be able to get by with a simple decompression and so I'm referring him on to a neurosurgeon for an opinion.  He hasn't decided if he wants to me with a surgeon yet. He'll think about it and let us know. Should he come to need neurosurgical intervention we will work closely with his pulmonologist for pulmonary tune up and perioperative risk assessment and recommendations.     His exam was also remarkable for signs of cervical myelopathy, his 4 limb reflexes were significantly brisker than they were as documented year ago, but he had no Sancho's clonus or Babinski's. As early as 2006 he had cervical spondylosis, without cord impingement. He's had a long history of gradually increasing numbness, clumsiness, and atrophy of musculature but without change in reflexes until now there was no reason to think spinal cord was playing a role.  His cervical MRI has been repeated and fortunately is unremarkable for clinically significant central stenosis or cord compromise.    I discussed all this in depth with him. I answered his questions and those questions indicate he has a good understanding of the situation. We have supplied him with this as cards and telephone numbers and urged him to call if he has any questions comments or concerns.    Thank you for allowing us to participate in the care of this very pleasant patient. We appreciate your confidence in the [facility], Department of Neurosurgery.    This note was generated using voice recognition software. While edited for content some inaccurate phrasing may be found.    Again, thank you for allowing me to participate in the care of your patient.      Sincerely,    Cara Carrillo PA-C

## 2017-08-24 ASSESSMENT — ENCOUNTER SYMPTOMS
HALLUCINATIONS: 0
CHILLS: 0
DYSPNEA ON EXERTION: 1
DIARRHEA: 0
HEADACHES: 0
NUMBNESS: 1
SNORES LOUDLY: 0
COUGH DISTURBING SLEEP: 0
DOUBLE VISION: 1
FATIGUE: 1
DISTURBANCES IN COORDINATION: 1
ALTERED TEMPERATURE REGULATION: 1
CONSTIPATION: 1
SINUS CONGESTION: 1
TASTE DISTURBANCE: 0
BACK PAIN: 1
EYE IRRITATION: 1
RECTAL PAIN: 0
DIZZINESS: 1
BLOATING: 1
WHEEZING: 1
WEIGHT GAIN: 0
BOWEL INCONTINENCE: 0
LOSS OF CONSCIOUSNESS: 0
JOINT SWELLING: 1
MYALGIAS: 1
SPUTUM PRODUCTION: 1
SPEECH CHANGE: 0
HEMOPTYSIS: 0
HEMATURIA: 0
SINUS PAIN: 0
VOMITING: 0
MUSCLE CRAMPS: 1
HOARSE VOICE: 1
BLOOD IN STOOL: 1
COUGH: 1
NECK MASS: 0
EYE REDNESS: 1
SMELL DISTURBANCE: 0
ARTHRALGIAS: 1
RECTAL BLEEDING: 1
NIGHT SWEATS: 0
DIFFICULTY URINATING: 0
NECK PAIN: 1
EYE WATERING: 0
MEMORY LOSS: 0
TINGLING: 1
HEARTBURN: 1
POLYDIPSIA: 0
RESPIRATORY PAIN: 0
NAUSEA: 0
DYSURIA: 0
DECREASED APPETITE: 0
SHORTNESS OF BREATH: 1
SORE THROAT: 1
JAUNDICE: 0
MUSCLE WEAKNESS: 1
PARALYSIS: 0
POLYPHAGIA: 0
WEAKNESS: 1
EYE PAIN: 1
WEIGHT LOSS: 0
STIFFNESS: 1
INCREASED ENERGY: 1
SEIZURES: 0
ABDOMINAL PAIN: 1
FLANK PAIN: 1
FEVER: 0
TROUBLE SWALLOWING: 1
POSTURAL DYSPNEA: 0

## 2017-08-26 ENCOUNTER — OFFICE VISIT (OUTPATIENT)
Dept: URGENT CARE | Facility: URGENT CARE | Age: 67
End: 2017-08-26
Payer: MEDICARE

## 2017-08-26 VITALS
WEIGHT: 303 LBS | RESPIRATION RATE: 16 BRPM | OXYGEN SATURATION: 98 % | DIASTOLIC BLOOD PRESSURE: 70 MMHG | SYSTOLIC BLOOD PRESSURE: 110 MMHG | BODY MASS INDEX: 38.9 KG/M2 | TEMPERATURE: 98 F | HEART RATE: 72 BPM

## 2017-08-26 DIAGNOSIS — J20.9 ACUTE BRONCHITIS WITH COEXISTING CONDITION REQUIRING PROPHYLACTIC TREATMENT: Primary | ICD-10-CM

## 2017-08-26 DIAGNOSIS — J02.9 ACUTE PHARYNGITIS, UNSPECIFIED ETIOLOGY: ICD-10-CM

## 2017-08-26 LAB
DEPRECATED S PYO AG THROAT QL EIA: NORMAL
SPECIMEN SOURCE: NORMAL

## 2017-08-26 PROCEDURE — 99214 OFFICE O/P EST MOD 30 MIN: CPT | Performed by: PHYSICIAN ASSISTANT

## 2017-08-26 PROCEDURE — 87081 CULTURE SCREEN ONLY: CPT | Performed by: PHYSICIAN ASSISTANT

## 2017-08-26 PROCEDURE — 87880 STREP A ASSAY W/OPTIC: CPT | Performed by: PHYSICIAN ASSISTANT

## 2017-08-26 RX ORDER — AZITHROMYCIN 250 MG/1
TABLET, FILM COATED ORAL
Qty: 6 TABLET | Refills: 0 | Status: SHIPPED | OUTPATIENT
Start: 2017-08-26 | End: 2017-10-05

## 2017-08-26 NOTE — NURSING NOTE
"Lucio Daly is a 67 year old male.      Chief Complaint   Patient presents with     Urgent Care     Pharyngitis     pt is here for a ST and upper res issues that have been going on for a week - grandson has strep       Initial /70 (BP Location: Right arm, Cuff Size: Adult Large)  Pulse 72  Temp 98  F (36.7  C) (Oral)  Resp 16  Wt (!) 303 lb (137.4 kg)  SpO2 98%  BMI 38.9 kg/m2 Estimated body mass index is 38.9 kg/(m^2) as calculated from the following:    Height as of 8/22/17: 6' 2\" (1.88 m).    Weight as of this encounter: 303 lb (137.4 kg).  Medication Reconciliation: complete      Questioned patient about current smoking habits.  Pt. quit smoking some time ago.      Mmii Wright CMA      "

## 2017-08-26 NOTE — MR AVS SNAPSHOT
After Visit Summary   8/26/2017    Lucio Daly    MRN: [id number]           Patient Information     Date Of Birth          1950        Visit Information        Provider Department      8/26/2017 12:25 PM Yolanda Garsia PA-C [facility]        Today's Diagnoses     Acute bronchitis with coexisting condition requiring prophylactic treatment    -  1    Acute pharyngitis, unspecified etiology           Follow-ups after your visit        Your next 10 appointments already scheduled     Aug 29, 2017  8:10 AM CDT   (Arrive by 7:55 AM)   Return Movement Disorder with Thong Montes MD   [facility] ([facility] [place])    [address] [place] [address]  [address]rd [facility] [address]   [phone]            Oct 05, 2017 10:00 AM CDT   (Arrive by 9:45 AM)   New Patient Visit with MEGAN Pendleton CNP   [facility] ([facility])    [address] [place] [address]  4th [facility] [address]   [phone]            Oct 09, 2017  9:00 AM CDT   New Visit with MEGAN Washington CNP   [facility] ([facility])    [address]  [address]  [facility] [address]   [phone]            Nov 16, 2017  9:00 AM CST   Six Minute Walk with UC PFL 6 MINUTE WALK 1   [facility] Pulmonary Function Testing ([facility])    [address] [place] [address]  [address]rd [facility] [address]   [phone]            Nov 16, 2017  9:30 AM CST   FULL PULMONARY FUNCTION with UC PFL A   [facility] Pul[facility] Function Testing ([facility] [place])    [address] [place] [address]  [address]rd [facility] [address]   [phone]            Nov 16, 2017 10:30 AM CST   (Arrive by 10:15 AM)   Return Interstitial Lung with Harsha Mccarthy MD   [facility] ([facility])    [address] [place] [address]  Floor  [facility] [address]   [phone]            Dec 20, 2017  9:30 AM CST   (Arrive by 9:15 AM)   Return Visit with Jeremy Goodrich MD   [facility] ([facility])    [address]  3rd Floor  [facility] [address] [phone]            Mar 06, 2018  9:20 AM CST   Return Sleep Patient with Kristen Martinez MD   [facility] [place], Sleep Study ([facility])    [address] [place] [id number] [phone]              Who to contact     If you have questions or need follow up information about today's clinic visit or your schedule please contact [facility] directly at [phone].  Normal or non-critical lab and imaging results will be communicated to you by MyChart, letter or phone within 4 business days after the clinic has received the results. If you do not hear from us within 7 days, please contact the clinic through [employer] or phone. If you have a critical or abnormal lab result, we will notify you by phone as soon as possible.  Submit refill requests through [employer] or call your pharmacy and they will forward the refill request to us. Please allow 3 business days for your refill to be completed.          Additional Information About Your Visit        MyChart Information     [employer] gives you secure access to your electronic health record. If you see a primary care provider, you can also send messages to your care team and make appointments. If you have questions, please call your primary care clinic.  If you do not have a primary care provider, please call [phone] and they will assist you.        Care EveryWhere ID     This is your Care EveryWhere ID. This could be used by other organizations to access your [place] medical records  [id number]        Your Vitals Were     Pulse Temperature Respirations Pulse Oximetry BMI (Body Mass Index)       72 98  F  (36.7  C) (Oral) 16 98% 38.9 kg/m2        Blood Pressure from Last 3 Encounters:   08/26/17 110/70   08/22/17 148/79   07/27/17 139/82    Weight from Last 3 Encounters:   08/26/17 (!) 303 lb (137.4 kg)   08/22/17 (!) 305 lb 1.6 oz (138.4 kg)   07/27/17 (!) 301 lb (136.5 kg)              We Performed the Following     Beta strep group A culture     Rapid strep screen          Today's Medication Changes          These changes are accurate as of: 8/26/17  3:30 PM.  If you have any questions, ask your nurse or doctor.               Start taking these medicines.        Dose/Directions    azithromycin 250 MG tablet   Commonly known as:  ZITHROMAX   Used for:  Acute bronchitis with coexisting condition requiring prophylactic treatment   Started by:  Yolanda Garsia PA-C        Two tablets first day, then one tablet daily for four days.   Quantity:  6 tablet   Refills:  0            Where to get your medicines      These medications were sent to [facility] [address] [place] [address] [address] [place] [address]  [address] [facility] [address]     Phone:  [phone]     azithromycin 250 MG tablet                Primary Care Provider Office Phone # Fax #    Jah Corley [phone] [address] [facility] [address]        Equal Access to Services     [facility]: Hadii helen campoo Sodionicio, waaxda luqadaha, qaybta kaalmada richar, mariana johnson. [facility] [phone].    ATENCIÓN: Si habla español, tiene a rudd disposición servicios gratuitos de asistencia lingüística. Melina al [phone].    We comply with applicable federal civil rights laws and [place] laws. We do not discriminate on the basis of race, color, national origin, age, disability sex, sexual orientation or gender identity.            Thank you!     Thank you for choosing [facility]  for your care. Our goal is always to provide you with excellent care. Hearing back from our  patients is one way we can continue to improve our services. Please take a few minutes to complete the written survey that you may receive in the mail after your visit with us. Thank you!             Your Updated Medication List - Protect others around you: Learn how to safely use, store and throw away your medicines at www.disposemymeds.org.          This list is accurate as of: 8/26/17  3:30 PM.  Always use your most recent med list.                   Brand Name Dispense Instructions for use Diagnosis    azithromycin 250 MG tablet    ZITHROMAX    6 tablet    Two tablets first day, then one tablet daily for four days.    Acute bronchitis with coexisting condition requiring prophylactic treatment       folic acid 1 MG tablet    FOLVITE    180 tablet    Take 2 tablets (2 mg) by mouth daily    Pain in toes of both feet, Rheumatoid arthritis of multiple sites with negative rheumatoid factor (H), Rheumatoid arthritis involving shoulder with negative rheumatoid factor, unspecified laterality (H), High risk medications (not anticoagulants) long-term use       hydroxychloroquine 200 MG tablet    PLAQUENIL    180 tablet    Take 1 tablet (200 mg) by mouth 2 times daily Send copy of recent eye exam as need for refills. Please have a copy of your eye exam faxed to [phone].    Rheumatoid arthritis involving shoulder with negative rheumatoid factor, unspecified laterality (H), High risk medications (not anticoagulants) long-term use, Pain in toes of both feet, Rheumatoid arthritis of multiple sites with negative rheumatoid factor (H)       methotrexate 2.5 MG tablet CHEMO     28 tablet    Take 7 tablets (17.5 mg) by mouth once a week Labs due every 8-12 weeks    Rheumatoid arthritis of multiple sites with negative rheumatoid factor (H), High risk medications (not anticoagulants) long-term use       metoprolol 50 MG 24 hr tablet    TOPROL-XL    90 tablet    Take 1 tablet (50 mg) by mouth daily    Essential hypertension        omeprazole 20 MG CR capsule    priLOSEC    60 capsule    Take 2 capsules (40 mg) by mouth 2 times daily    Gastroesophageal reflux disease with esophagitis       order for DME      Use your CPAP device as directed by your provider.        oxybutynin 5 MG tablet    DITROPAN    90 tablet    Take 1 tablet (5 mg) by mouth daily    Other urinary incontinence       pregabalin 50 MG capsule    LYRICA    360 capsule    One in the am, one in the afternoon and 2 at night.    Peripheral polyneuropathy (H)       selegiline 5 MG Tabs     180 tablet    1 tablet In the morning    Paralysis agitans (H)       TYLENOL 500 MG tablet   Generic drug:  acetaminophen      Take 1 tablet by mouth as needed. For pain        vitamin D 1000 UNITS capsule      Take 2 capsules by mouth daily.    Other specified disorder of skin

## 2017-08-26 NOTE — PROGRESS NOTES
SUBJECTIVE:   Lucio Daly is a 67 year old male presenting with a chief complaint of ST and cough with chest congestion for over a week.  No fevers that aware of.  Also hoarse voice.  Cough is dry but feels like needs to cough stuff up.  Was just in [place] with Grandchildren and exposed to strep.  Hx of RA and on Methotrexate and Plaquenil .  Also scarring on lungs and hx of respiratory issues.    Onset of symptoms was over a week(s) ago.  Course of illness is worsening.    Severity moderate  Current and Associated symptoms: none  Does not have cold sx   Treatment measures tried include Fluids, OTC meds and Rest.  Predisposing factors include former smoker and hx as noted below.    Past Medical History:   Diagnosis Date     Abn involun movement NEC     Movement Disorder     Abnormal EMG 4/18/2013     AK (actinic keratosis) 12/18/2011     Allergic rhinitis, cause unspecified     Allergic rhinitis     Balance problems 11/1/2011     Basal cell carcinoma      Bladder spasms 11/1/2011     Chronic osteoarthritis      Diaphragmatic hernia without mention of obstruction or gangrene      Earache or other ear, nose, or throat complaint      Esophageal reflux      Fatigue 11/1/2011     Fracture      H. pylori infection 5/12/2011     History of MRI of cervical spine 11/18/2013    EXAMINATION: CERVICAL SPINE G/E 5 VIEWS* 4/19/2013 4:18 PM  CLINICAL HISTORY: Pain in limb,Performing Location?->[facility]),  COMPARISON:  FINDINGS: AP and lateral views in flexion and extension, as well as odontoid view of the cervical spine was obtained. There is no comparison available. The vertebral bodies of the cervical spine are normally aligned. There is posterior spurring and d     Incomplete defecation 11/1/2011     Interstitial lung disease (H) 11/29/2016     Laboratory test 8/7/2012     Lung disease June 2015     Malignant basal cell neoplasm of skin 8/6/2008     Melanoma (H) 8/6/2008     Melanoma in situ of lower leg (H)      R calf     Neuropathy (H) 5/16/2011     Other bladder disorder      Other color vision deficiencies      Other nervous system complications      Parkinsonism (H) 11/1/2011     Personal history of colonic polyps      Polyneuropathy in other diseases classified elsewhere (H)      RA (rheumatoid arthritis) (H)      Rheumatoid arthritis of multiple sites with negative rheumatoid factor (H) 3/21/2016     Seronegative arthritis 11/18/2013     Shortness of breath      Shoulder arthritis 2016    acromioclavicular joint      Somatization disorder 5/12/2011     Squamous cell carcinoma      Tremor 11/1/2011     Unspecified essential hypertension      Unspecified hypothyroidism      Urinary tract infection      Urinary urgency 11/1/2011     Wears glasses 11/1/2011     Current Outpatient Prescriptions   Medication Sig Dispense Refill     azithromycin (ZITHROMAX) 250 MG tablet Two tablets first day, then one tablet daily for four days. 6 tablet 0     hydroxychloroquine (PLAQUENIL) 200 MG tablet Take 1 tablet (200 mg) by mouth 2 times daily Send copy of recent eye exam as need for refills. Please have a copy of your eye exam faxed to [phone]. 180 tablet 0     metoprolol (TOPROL-XL) 50 MG 24 hr tablet Take 1 tablet (50 mg) by mouth daily 90 tablet 3     pregabalin (LYRICA) 50 MG capsule One in the am, one in the afternoon and 2 at night. 360 capsule 1     methotrexate 2.5 MG tablet CHEMO Take 7 tablets (17.5 mg) by mouth once a week Labs due every 8-12 weeks 28 tablet 4     omeprazole (PRILOSEC) 20 MG CR capsule Take 2 capsules (40 mg) by mouth 2 times daily 60 capsule 3     oxybutynin (DITROPAN) 5 MG tablet Take 1 tablet (5 mg) by mouth daily 90 tablet 3     folic acid (FOLVITE) 1 MG tablet Take 2 tablets (2 mg) by mouth daily 180 tablet 4     selegiline 5 MG TABS 1 tablet In the morning 180 tablet 3     ORDER FOR DME Use your CPAP device as directed by your provider.       acetaminophen (TYLENOL) 500 MG tablet Take 1  tablet by mouth as needed. For pain       Cholecalciferol (VITAMIN D) 1000 UNITS capsule Take 2 capsules by mouth daily.       Social History   Substance Use Topics     Smoking status: Former Smoker     Packs/day: 1.50     Years: 37.00     Types: Cigarettes     Start date: 6/15/1963     Quit date: 9/30/2000     Smokeless tobacco: Never Used     Alcohol use No       ROS:  Review of systems negative except as stated above.    OBJECTIVE  :/70 (BP Location: Right arm, Cuff Size: Adult Large)  Pulse 72  Temp 98  F (36.7  C) (Oral)  Resp 16  Wt (!) 303 lb (137.4 kg)  SpO2 98%  BMI 38.9 kg/m2  GENERAL APPEARANCE: healthy, alert and no distress  EYES: EOMI,  PERRL, conjunctiva clear  HENT: ear canals and TM's normal.  Nose and mouth without ulcers, erythema or lesions  NECK: supple, nontender, no lymphadenopathy  RESP: scattered rhonchi throughout.  No wheezing or crackles.    CV: regular rates and rhythm, normal S1 S2, no murmur noted  NEURO: Normal strength and tone, sensory exam grossly normal,  normal speech and mentation  SKIN: no suspicious lesions or rashes    Results for orders placed or performed in visit on 08/26/17   Rapid strep screen   Result Value Ref Range    Specimen Description Throat     Rapid Strep A Screen       NEGATIVE: No Group A streptococcal antigen detected by immunoassay, await culture report.         assessment/plan:  (J20.9) Acute bronchitis with coexisting condition requiring prophylactic treatment  (primary encounter diagnosis)  Comment:   Plan: azithromycin (ZITHROMAX) 250 MG tablet        Patient with over a week of cough and immunocompromised.  Will treat due to hx and risk of complications with infection.  Zithromax as directed.  OTC med for sx relief and increased clear fluids.  FU with PCP as needed if sx worsen or new sx develop    (J02.9) Acute pharyngitis, unspecified etiology  Comment:   Plan: Rapid strep screen, Beta strep group A culture

## 2017-08-27 LAB
BACTERIA SPEC CULT: NORMAL
SPECIMEN SOURCE: NORMAL

## 2017-08-28 PROBLEM — Z92.89 HISTORY OF MRI OF LUMBAR SPINE: Status: ACTIVE | Noted: 2017-06-27

## 2017-08-28 NOTE — PROGRESS NOTES
Diagnosis/Summary/Recommendations:    PATIENT: Lucio Daly  67 year old male     : 1950    EVELIA: 2017    Tremor - is about the same and is not markedly worse.  Selegiline 5 mg in the am  Held off on dopamine (datscan) scan - was concerned about iodine contrast.  Discussed weaning off the selegiline and the option of using rasagiline if needed.     Neck and back issues - seeing neurosurgery and has had imaging done of his cervical and lumbar spine  Mri c spine  Cervical spondylosis resulting in moderate bilateral neural foraminal  stenosis and mild spinal canal stenosis C5-C7, slightly progressed at  the C6-7 level since 3/21/2006. No cord signal abnormality.    Mri lumbar spine Impression:   1. Multilevel lumbar spondylosis with increased moderate to severe  spinal canal stenosis at L4-5. Unchanged mild to moderate bilateral  neural foraminal stenosis at L5-S1.  2. Mild periarticular edema associated L5-S1 joints bilaterally, not  significantly changed from prior and likely represents active  degenerative arthropathy.    Neuropathy  lyrica 3-4 times per day. 1-1-2 is his scheduled and medication is coming from his pcp  He had problems with trileptal and neurontin in the past.     Cognitive testing - has not been repeated.     RA issues.   On plaquenil and methotrexate.  Still taking folate, vitamin d    Taking zithromax for bronchitis    Taking metoprolol and omeprazole    Still using bipap    Bladder = remains on the ditropan    He is going to pain management clinic to see if there is anything besides pain medications for his symptoms, ie h e has not yet done pool therapy.      PLAN  Go off the selegiline by going to every other day for a week or so and then stop it and see how you are doing.     If needed you could go back on this or try 1mg of rasagiline    Return back in 9months to one year.       Over 50% of this visit was spent in patient care and care coordination.     History obtained  from patient    Total visit time was 25 minutes      Thong Montes MD     ______________________________________    Last visit date and details:     Tremor  Has intermittent tremors.   Has feeling of internal tremor  Taking selegiline 5mg once daily   May have had some improvement with this medication. Has not recently gone off this or tried rasagiline instead.  Potential interaction with other medications with selegiline; less so with the more expensive rasagline.      Neuropathy - worse based on testing.  - see Dr. Gonzalez's note below.  Presumed autoimmune vs idiopathic related.   No scheduled return to see Dr. Gonzalez at this time. Therapy had been arranged.   lyrica is coming from his primary doctor and he is taking 50mg 3/day     Seen by Dr. Ríos and had improvement in his ankle flexibility. Has had numbness in his feet.      Has had muscle pain. Arthritis  Methotrexate 2.5mg 7 tabs once weekly  Plaquenil 200mg 1 tablet twice daily  Taking folic acid  Has voltaren gel.   Sees Dr. Goodrich     Following dermatology  Dr. Corley who left and now seeing Dr. Najera     cv - hypertension.   Taking metroprolol 50mg per day      Lung problems. Presumed interstitial lung disease associated with RA  Dr. Mccarthy.   Not on inhaler because of possible interaction with the selegiline.   Not clear if rasagiline could be taken instead of selegiline.      Has had problems with sleep despite u sing bipap. Goes to bed at 11/12 and wakes up at 5am and alays in bed till 630  Has nightmares. Sees Dr. Martinez     Bowel issues with frequent bm's or constipation  Has had pain in esophagus, or other areas with pain on swallowing and has had gas, belching  Taking omeprazole 20mg twice daily      Bladder   Continues on the oxybutynin 5mg once daily     Has had changes in body temperature     Has had changes in his voice     Has had tinnitus and light headedness that is worse in evening     Has had increased saliva despite having a  dry mouth, eyes and skin  He has been emotional with low libido     Has had nasal congestion  Has had low libido     PLAN  Talked about mood issues and the possibility of seeing a counselor a few times to discuss his health. This is something i highly recommended to him to help him cope with his health issues as well as how to interact with his wife in travel plans, etc.   He is not keen on starting another medication to help his mood and does not think he has the same symptoms as his sister does (who is depressed); many antidepressants have the potential to interact with selegiline; less so interactions with rasagiline which is more expensive than selegiline.      Discussed considering weaning off selegiline or/and switching to rasagiline if rasagiline can be taken with less interactions.      He had cognitive testing in the past; would hold off on this for the time being - future order placed with Dr. Mojica for neuropsychological evaluation if desired.      He does not appear to have parkinson's disease. Discussed the pros and cons of doing a dopamine (DATSCAN) scan to evaluate for a dopamine deficiency as the cause of some of his symptoms, i.e., look for a degenerative parkinsonian condition which was be seen with an abnormal scan. I suspect that his scan would be normal. i put in a future order so that it could be done if desired.     Encouraged him to do pool therapy if able.      Return back in 9 months.     Neurosurgery Clinic Consult  Date of Visit: 7/27/2017  Referred for: Back pain and bilateral leg pain left worse than right.  Referring Provider: Ivan Aguilera        Dear Dr. Gil Jacques,     We were happy to see Lucio Daly , a pleasant 66 year old year old male for back and leg pain.     HPI:  As you may recall this nice gentleman has a long and complicated past medical history. He's had suspicions of a rheumatologic disorder raised as far back as age 28 but since he is seronegative  nothing was ever found. About 5 years ago, perhaps more, he started seeing Dr. Gonzalez for problems with neuropathy. There was no specific cause identified but there were some concerns about possible Parkinson's disease, a referral was made to  who pretty much ruled out Parkinson's but did note an elevated CRP and referred hand to rheumatology who ultimately did diagnose rheumatoid arthritis, sicca syndrome, and since then he's been noted to have multiple peripheral joints affected, his hands and knees ankles many fingers thumbs. He also has interstitial lung disease as a result, and while he is not on supplemental oxygen as yet his pulmonologist feels that he would be too bigger risk for surgery on his joints and expressed that opinion to the patient about 6 months ago.   Other medical history includes a malignant melanoma removed from his right leg 4 or 5 years ago, he believes it was a very low-grade, probably grade 1.     Regarding his back and the consult today, he first started developing back pain a few years ago and saw a sports medicine physician who diagnosed him with spinal stenosis after an MRI was done. He was told that surgery would probably be needed eventually and the patient was asked if he wanted to see a surgeon. He did not at that time so referral was not made. Physical therapy was tried, it made it worse. Over time his back pain is leg pain got worse so we went back to the sports medicine, the same opinion was offered and he still declined a surgical opinion.  A number of years ago he had a few shots in the back but they didn't seem to help much.     Over the last couple of years pain in his back and his legs has gotten increasingly worse. Located in the bilateral lumbar spine down the backs of the legs to the calves. His feet are clumsy and he's catching his toes sometimes on stairs and on carpet.   He feels off balance. He has numbness in his calves and his toes. His pain and numbness is  worse when he is standing and walking around, he can only walk sometimes a few feet. It's better if he sits down, if he is going shopping he must lean over a shopping cart or he can't make it all the way around the store.     Other symptoms include pain in the trapezii bilaterally, forearms on the way down to all the fingers both hands, numbness and fingers, clumsiness in the hands and fingers. He doesn't button buttons very well. His balance is off. He has to use a cane because of the balance. And his legs sometimes give out this can appear randomly, even if there is no pain. He's had a recent MRI of the lumbar spine and presents with that. Last MRI of the cervical spine was in 2006.     He's had no recent treatment to speak of.  His/Her current symptoms (pain, numbness and weakness), a detailed description of alleviating or exacerbating factors, and pain scores are outlined below.     Patient Supplied Answers To the  Pain Questionnaire   Pain -  Patient Entered Questionnaire/Answers 7/17/2017   What number best describes your pain right now:  0 = No pain  to  10 = Worst pain imaginable 6   How would you describe the pain? burning, sharp, numbness, dull, aching, throbbing   Which of the following worsen your pain? lying down, standing, sitting, walking   Which of the following improve or reduce your pain?  sitting   What number best describes your average pain for the past week:  0 = No pain  to  10 = Worst pain imaginable 6   What number best describes your LOWEST pain in past 24 hours:  0 = No pain  to  10 = Worst pain imaginable 2   What number best describes your WORST pain in past 24 hours:  0 = No pain  to  10 = Worst pain imaginable 9   When is your pain worst? AM, PM   What non-medicine treatments have you already had for your pain? physical therapy, spine injections (shots)   Have you tried treating your pain with medication?  Yes   Are you currently taking medications for your pain? Yes       strongly  positive shopping cart sign.   *  PAIN:  See diagram  *  NUMBNESS:   Feet and both hands          QUALITY:   tingling,   *  WEAKNESS:  Left greater than right collapses after walking sometimes short distance. Sometimes random  *  BOWEL/BLADDER FUNCTION: No change  *  OTHER SYMPTOMS:   Balance. Hands feel uncoordinated        Current Outpatient Prescriptions:      hydroxychloroquine (PLAQUENIL) 200 MG tablet, Take 1 tablet (200 mg) by mouth 2 times daily Send copy of recent eye exam as need for refills. Please have a copy of your eye exam faxed to [phone]., Disp: 180 tablet, Rfl: 0     metoprolol (TOPROL-XL) 50 MG 24 hr tablet, Take 1 tablet (50 mg) by mouth daily, Disp: 90 tablet, Rfl: 3     pregabalin (LYRICA) 50 MG capsule, One in the am, one in the afternoon and 2 at night., Disp: 360 capsule, Rfl: 1     methotrexate 2.5 MG tablet CHEMO, Take 7 tablets (17.5 mg) by mouth once a week Labs due every 8-12 weeks, Disp: 28 tablet, Rfl: 4     omeprazole (PRILOSEC) 20 MG CR capsule, Take 2 capsules (40 mg) by mouth 2 times daily, Disp: 60 capsule, Rfl: 3     oxybutynin (DITROPAN) 5 MG tablet, Take 1 tablet (5 mg) by mouth daily, Disp: 90 tablet, Rfl: 3     folic acid (FOLVITE) 1 MG tablet, Take 2 tablets (2 mg) by mouth daily, Disp: 180 tablet, Rfl: 4     selegiline 5 MG TABS, 1 tablet In the morning, Disp: 180 tablet, Rfl: 3     ORDER FOR DME, Please measure and distribute 1 pair of 20mmHg - 30mmHg thigh high open or closed toe compression stockings. Jobst ultrasheer or equivalent., Disp: 1 each, Rfl: 0     ORDER FOR DME, Please measure and distribute 1 pair of 20mmHg - 30mmHg knee high open or closed toe compression stockings. Jobst ultrasheer or equivalent., Disp: 2 each, Rfl: 10     ORDER FOR DME, Use your CPAP device as directed by your provider., Disp: , Rfl:      acetaminophen (TYLENOL) 500 MG tablet, Take 1 tablet by mouth as needed. For pain, Disp: , Rfl:      Cholecalciferol (VITAMIN D) 1000 UNITS  capsule, Take 2 capsules by mouth daily., Disp: , Rfl:           Allergies   Allergen Reactions     Restasis         Burning eyes, problems with breathing, tightness in chest     Adhesive Tape         Bandages misc     Allegra         EXCESSIVE URINATION AND WEAKNESS, LIGHT-HEADED     Allergy         Dust     Animal Dander       Benadryl Allergy         EXCESSIVE URINATION AND WEAKNESS, LIGHT-HEADED     Cephalexin         Joint pain or gerd aggravation. Bloating excessive urination      Cephalosporins       Doxycycline Hyclate Nausea       SWEATING,MIGRAINES,LOSS OF APPETITE,SWEATING,LIGHT HEADED, EXCESSIVE URINATION     Flonase [Fluticasone Propionate]       Gabapentin         Neurontin: mosd changes and excess urination     Iodine Solution [Povidone Iodine]         SKIN MELTS     Levaquin         From surgeon--? Joint pain ? Gerd aggravation. Insomnia, excess urination     Mylanta         EXCESSIVE URINATION AND WEAKNESS, LIGHT-HEADED     Prednisone         Weakness, elevated bp, headache, eye pain, congestion      Seafood [Seafood]       Shellfish Allergy         Hives     Sulfamethoxazole-Trimethoprim         Chest pain, angina     Trees       Trileptal         SEVERE JOINT AND TENDON PAIN, INSOMNIA, RESTLESSNESS, NAUSEA, EXCESS URINATION     Cortizone Rash       EXCESS URINATION,WEAKNESS,NAUSEA, HEADACHE        Past Medical History         Past Medical History:   Diagnosis Date     Abn involun movement NEC       Movement Disorder     Abnormal EMG 4/18/2013     AK (actinic keratosis) 12/18/2011     Allergic rhinitis, cause unspecified       Allergic rhinitis     Balance problems 11/1/2011     Basal cell carcinoma       Bladder spasms 11/1/2011     Chronic osteoarthritis       Diaphragmatic hernia without mention of obstruction or gangrene       Earache or other ear, nose, or throat complaint       Esophageal reflux       Fatigue 11/1/2011     Fracture       H. pylori infection 5/12/2011     History of MRI of  cervical spine 11/18/2013     EXAMINATION: CERVICAL SPINE G/E 5 VIEWS* 4/19/2013 4:18 PM  CLINICAL HISTORY: Pain in limb,Performing Location?->[facility]),  COMPARISON:  FINDINGS: AP and lateral views in flexion and extension, as well as odontoid view of the cervical spine was obtained. There is no comparison available. The vertebral bodies of the cervical spine are normally aligned. There is posterior spurring and d     Incomplete defecation 11/1/2011     Interstitial lung disease (H) 11/29/2016     Laboratory test 8/7/2012     Lung disease June 2015     Malignant basal cell neoplasm of skin 8/6/2008     Melanoma (H) 8/6/2008     Melanoma in situ of lower leg (H)       R calf     Neuropathy (H) 5/16/2011     Other bladder disorder       Other color vision deficiencies       Other nervous system complications       Parkinsonism (H) 11/1/2011     Personal history of colonic polyps       Polyneuropathy in other diseases classified elsewhere (H)       RA (rheumatoid arthritis) (H)       Rheumatoid arthritis of multiple sites with negative rheumatoid factor (H) 3/21/2016     Seronegative arthritis 11/18/2013     Shortness of breath       Shoulder arthritis 2016     acromioclavicular joint      Somatization disorder 5/12/2011     Squamous cell carcinoma       Tremor 11/1/2011     Unspecified essential hypertension       Unspecified hypothyroidism       Urinary tract infection       Urinary urgency 11/1/2011     Wears glasses 11/1/2011           Past Surgical History    Past Surgical History:   Procedure Laterality Date     BIOPSY OF SKIN LESION         COLONOSCOPY         HEMORRHOID SURGERY         lip biopsy         for sicca complex     MOHS MICROGRAPHIC PROCEDURE         SOFT TISSUE SURGERY         removeal of basel cell carcinoma           Family History           Family History   Problem Relation Age of Onset     Hypertension Mother       HEART DISEASE Mother         a fib     Arthritis Mother       Other  Cancer Mother       OSTEOPOROSIS Mother       CANCER Mother       Skin Cancer Mother       Hypertension Brother       DIABETES Brother       Neurologic Disorder Sister         multiple sclerosis     Hypertension Sister       HEART DISEASE Sister       HEART DISEASE Sister         a fib     Arthritis Sister       Hypertension Father       CEREBROVASCULAR DISEASE Father         ,     Skin Cancer Father       Psoriasis Maternal Grandfather       CANCER Maternal Grandfather       Other Cancer Maternal Grandfather       Congenital Anomalies Other       CEREBROVASCULAR DISEASE Paternal Grandmother         ,     CEREBROVASCULAR DISEASE Paternal Grandfather         ,     Other Cancer Sister       OSTEOPOROSIS Sister       Melanoma No family hx of             Social History    Social History            Social History     Marital status:        Spouse name: N/A     Number of children: N/A     Years of education: N/A          Occupational History     Not on file.            Social History Main Topics     Smoking status: Former Smoker       Packs/day: 1.50       Years: 37.00       Types: Cigarettes       Start date: 6/15/1963       Quit date: 9/30/2000     Smokeless tobacco: Never Used     Alcohol use No     Drug use: No     Sexual activity: No           Other Topics Concern     Parent/Sibling W/ Cabg, Mi Or Angioplasty Before 65f 55m? No          Social History Narrative     2013: Living in [place] in a townhouse with no steps     Has 3 sons that are doing okay.            Dairy/d 1 servings/d.      Caffeine 0 servings/d     Exercise 0 x week     Sunscreen used - No     Seatbelts used - Yes     Working smoke/CO detectors in the home - Yes     Guns stored in the home - Yes     Self Breast Exams - NA     Self Testicular Exam - Yes     Eye Exam up to date - Yes 2008     Dental Exam up to date - Yes 2006     Pap Smear up to date - NA     Mammogram up to date - NA     PSA up to date - Yes 2008     Dexa Scan up to date - No      "Flex Sig / Colonoscopy up to date - Yes less than 5 yrs ago     Immunizations up to date -today     Abuse: Current or Past(Physical, Sexual or Emotional)- No     Do you feel safe in your environment - Yes     2008                           Problem list and 13 point review of systems: is reviewed in Epic and is negative with the exception of those symptoms associated with HPI and PMH.       OBJECTIVE:   /82  Pulse (!) 47  Ht 1.854 m (6' 1\")  Wt (!) 136.5 kg (301 lb)  BMI 39.71 kg/m2     Imaging:  These are the pertinent radiologist's findings from:  MRI lumbar WO 6/27/17@ [facility]. Spinal stenosis noted at L4 5 is not as severe as I would expect given the severity of the symptoms and the redundancy of the nerve roots. My suspicion is that his listhesis at that level worsens when he is standing. Standing flexion-extension views were performed today..   1. Multilevel lumbar spondylosis with increased moderate to severe  spinal canal stenosis at L4-5. Unchanged mild to moderate bilateral  neural foraminal stenosis at L5-S1.  2. Mild periarticular edema associated L5-S1 joints bilaterally, not  significantly changed from prior and likely represents active  degenerative arthropathy     Standing flexion-extension x-rays lumbar spine 7/27/17  The reading below indicates there is no evidence of motion.  The flexion images are extremely difficult to see, but in looking at them closely I would disagree with that assessment.  1. Grade I anterolisthesis of L4 on L5, as previously seen, with no  evidence of motion on flexion-extension views.  2.  Multilevel degenerative changes most pronounced of the lumbar  spine.  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 Compared to the previous study, there is some progression, manifesting with significant drop of the right sural SNAP amplitude, whereas the left remains unchanged. Given that sural nerve responses are still elicitable in a 65 year-old patient, the neuropathy " is still considered mild.  It is notable that the sural sensory responses were asymmetric in previous studies done in 2010 and 2012 (left >50% smaller than the right) and they have now become equal. 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 Clinical correlation is recommended.  2. No electrodiagnostic evidence of a neuromuscular junction disorder or myopathy.              3. Possible mild left median and ulnar sensory neuropathies at the wrist.     See the full report in EPIC/Media tab.  I personally reviewed the images with the patient.       Exam:  Pertinent positives:  He has brisk reflexes at the bilateral patella, bilateral biceps, no Sancho's or clonus and a mute Babinski's. Atrophy in distal musculature right gastroc, left foot. He has a thumb splint on the right hand/wrist. Sensation is diminished in fingers and toes bilaterally. His gait is slightly wide and tentative.  There is no focal specific weakness detectable.     *   Well developed, well nourished male found seated comfortably in exam room chair.  He is able to sit and rise independently.  He is unaccompanied.    *  Mental Status  A&O X3.  Bright, alert, affable, interactive. Language fluid, fund of knowledge intact. Good historian.  Mood and affect congruent and WNL.   *  Cranial Nerves  II: Able to read printed forms, VF full to gross confrontation.  III: IV, VI:  PERRLA, EOMI, No nystagmus, no ptosis.  V: Sensation intact in bilateral V1, V2, and V3. Jaw clench symmetric.    VII:  Intact to voice bilaterally   Pushes tongue against bilateral cheeks.  X:  Palate elevates, uvula midline, phonation intact.  XI: Elevates shoulders, head turn intact.  XII: Tongue midline. No fasciculations.  *  Cerebellar:  Finger nose slow but accurate. Heel shin intact but slowed.     *  Frankie-cranial:    No drift.  *  Cervical Spine:  DTR's at Biceps 3/4 bilaterally. Triceps 2/4 bilaterally.   Sensation diminished in fingertips..    No Raygoza's.  No Phalen's.  No Tinel's. No fasciculations.   Muscle bulk diminished distally.   tone WNL.  Upper Extremity Strength.               RIGHT                LEFT     Deltoid              5/5                   5/5       Biceps              5/5                   5/5        Triceps              5/5                   5/5       Wrist Extensor              5/5                   5/5                     5/5                    5/5       Interossei              5/5                   5/5       EPL    painful; did not examine                   5/5       Pinch              5/5                  5/5                *  Lumbar Spine:    DTR's Patellar 3/4 and Achilles 0/4 and symmetric.   No fasciculations.  Muscle bulk diminished distally as above and tone WNL.  No Clonus.  Sensation diminished distally.    Lower Extremity Strength                    RIGHT                   LEFT     Iliopsoas                    5/5                      5/5       Quad                    5/5                        5/5       Hamstring                      5/5                        5/5         Gastrocs                    5/5                        5/5       Tib. Anterior                     5/5                        5/5       EHL                      5/5                        5/5       Babinski               mute                                  Down                         *  Structural Exam  Inspection of the spine reveals no scoliosis, exaggerated kyphosis or lordosis. Cervical spine ROM tight. No spasming. No tenderness to palpation.  No Spurling's or L'Hermitte's.   Lumbar ROM tight  Able to doff and don shoes minimal difficulty. No spasming, no tenderness, no step offs. No SLR.  No SI tenderness. No trochanteric bursal tenderness.   Feet are warm, intact dorsalis pedis pulses, pink, brisk capillary refill.  Gait slightly wide., tentative, no scissoring. No antalgia. Tandem gait tentative.   *  Sensory level intact.      ASSESSMENT/PLAN:  1.  Cervical spondylosis with myelopathy    2. Spondylolisthesis, lumbar region    3. Spinal stenosis of lumbar region with neurogenic claudication       I believe Mr. Barbosa suffers from lumbar spondylolisthesis, and neurogenic claudication, worsened by mechanical slip of L4 on 5. A simple decompression would be reasonable, but because of the listhesis a fusion will most likely be required. This of course is a much more invasive surgery, much longer time under anesthesia, bigger blood loss, more stress on the system, a much bigger cardiopulmonary challenge.  The surgical challenges are daunting.   I discussed all this with him today. As a result my plan will be to try to manage him with nonoperative therapy for as long as possible.   He is already established with palliative/pain management and I plan to partner with them for ongoing care.  Should he come to need neurosurgical intervention we will work closely with his pulmonologist for pulmonary tune up and perioperative risk assessment and recommendations.  I answered all of his questions about this aspect of his care. He understands the issues and the concerns and is in agreement with the plan as we laid out today.     Additionally I believe he also showing signs of cervical myelopathy, his reflexes are significantly brisker than they were as documented year ago. As early as 2006 he had cervical spondylosis, but there was no cord impingement at that time. He's had a long history of gradually increasing numbness, clumsiness, and atrophy of musculature but without change in reflexes until now there was no reason to think spinal cord was playing a role. I'll plan to repeat his cervical MRI and see him in clinic after that's completed to review the findings.     I have not made interim treatment plans     I discussed all this in depth with him. I answered his questions and those questions indicate he has a good understanding of the situation. We have supplied him with  this as cards and telephone numbers and urged him to call if he has any questions comments or concerns.     Thank you for allowing us to participate in the care of this very pleasant patient. We appreciate your confidence in the [facility], Department of Neurosurgery.        Best Regards,     Cara Carrillo PA-C  [facility]  Department of Neurosurgery  Phone: [phone]  Fax: [phone]           Total time: 60 minutes with more than 30 minutes spent in direct face to face contact reviewing films, providing education, counseling, the importance of good health habits, non-operative therapies,and indications for surgery as well as further follow up.      ______________________________________      Patient was asked about 14 Review of systems including changes in vision (dry eyes, double vision), hearing, heart, lungs, musculoskeletal, depression, anxiety, snoring, RBD, insomnia, urinary frequency, urinary urgency, constipation, swallowing problems, hematological, ID, allergies, skin problems: seborrhea, endocrinological: thyroid, diabetes, cholesterol; balance, weight changes, and other neurological problems and these were not significant at this time except for   Patient Active Problem List   Diagnosis     Diaphragmatic hernia     Esophageal reflux     Hx of melanoma of skin     Malignant basal cell neoplasm of skin     GALO (obstructive sleep apnea)     Chronic maxillary sinusitis     Urinary incontinence     H. pylori infection     Sicca syndrome (H)     Health Care Home     Advanced directives, counseling/discussion     Tremor     Visual changes     Incomplete defecation     Gait disorder     Balance problems     Fatigue     High risk medications (not anticoagulants) long-term use     Personal history of other malignant neoplasm of skin     AK (actinic keratosis)     Pain in joint, lower leg     Abnormal EMG     Seronegative arthritis     History of MRI of cervical spine      Pain in limb     Adenomatous polyp of colon     Peripheral polyneuropathy (H)     Rheumatoid arthritis of multiple sites with negative rheumatoid factor (H)     PAD (peripheral artery disease) (H)     Morbid obesity (H)     Interstitial lung disease (H)     History of MRI of lumbar spine          Allergies   Allergen Reactions     Restasis      Burning eyes, problems with breathing, tightness in chest     Adhesive Tape      Bandages misc     Allegra      EXCESSIVE URINATION AND WEAKNESS, LIGHT-HEADED     Allergy      Dust     Animal Dander      Benadryl Allergy      EXCESSIVE URINATION AND WEAKNESS, LIGHT-HEADED     Cephalexin      Joint pain or gerd aggravation. Bloating excessive urination      Cephalosporins      Doxycycline Hyclate Nausea     SWEATING,MIGRAINES,LOSS OF APPETITE,SWEATING,LIGHT HEADED, EXCESSIVE URINATION     Flonase [Fluticasone Propionate]      Gabapentin      Neurontin: mosd changes and excess urination     Iodine Solution [Povidone Iodine]      SKIN MELTS     Levaquin      From surgeon--? Joint pain ? Gerd aggravation. Insomnia, excess urination     Mylanta      EXCESSIVE URINATION AND WEAKNESS, LIGHT-HEADED     Prednisone      Weakness, elevated bp, headache, eye pain, congestion      Seafood [Seafood]      Shellfish Allergy      Hives       Sulfamethoxazole-Trimethoprim      Chest pain, angina     Trees      Trileptal      SEVERE JOINT AND TENDON PAIN, INSOMNIA, RESTLESSNESS, NAUSEA, EXCESS URINATION     Cortizone Rash     EXCESS URINATION,WEAKNESS,NAUSEA, HEADACHE     Past Surgical History:   Procedure Laterality Date     BIOPSY OF SKIN LESION       COLONOSCOPY       HEMORRHOID SURGERY       lip biopsy      for sicca complex     MOHS MICROGRAPHIC PROCEDURE       SOFT TISSUE SURGERY      removeal of basel cell carcinoma     Past Medical History:   Diagnosis Date     Abn involun movement NEC     Movement Disorder     Abnormal EMG 4/18/2013     AK (actinic keratosis) 12/18/2011     Allergic  rhinitis, cause unspecified     Allergic rhinitis     Balance problems 11/1/2011     Basal cell carcinoma      Bladder spasms 11/1/2011     Chronic osteoarthritis      Diaphragmatic hernia without mention of obstruction or gangrene      Earache or other ear, nose, or throat complaint      Esophageal reflux      Fatigue 11/1/2011     Fracture      H. pylori infection 5/12/2011     History of MRI of cervical spine 11/18/2013    EXAMINATION: CERVICAL SPINE G/E 5 VIEWS* 4/19/2013 4:18 PM  CLINICAL HISTORY: Pain in limb,Performing Location?->[facility]),  COMPARISON:  FINDINGS: AP and lateral views in flexion and extension, as well as odontoid view of the cervical spine was obtained. There is no comparison available. The vertebral bodies of the cervical spine are normally aligned. There is posterior spurring and d     Incomplete defecation 11/1/2011     Interstitial lung disease (H) 11/29/2016     Laboratory test 8/7/2012     Lung disease June 2015     Malignant basal cell neoplasm of skin 8/6/2008     Melanoma (H) 8/6/2008     Melanoma in situ of lower leg (H)     R calf     Neuropathy (H) 5/16/2011     Other bladder disorder      Other color vision deficiencies      Other nervous system complications      Parkinsonism (H) 11/1/2011     Personal history of colonic polyps      Polyneuropathy in other diseases classified elsewhere (H)      RA (rheumatoid arthritis) (H)      Rheumatoid arthritis of multiple sites with negative rheumatoid factor (H) 3/21/2016     Seronegative arthritis 11/18/2013     Shortness of breath      Shoulder arthritis 2016    acromioclavicular joint      Somatization disorder 5/12/2011     Squamous cell carcinoma      Tremor 11/1/2011     Unspecified essential hypertension      Unspecified hypothyroidism      Urinary tract infection      Urinary urgency 11/1/2011     Wears glasses 11/1/2011     Social History     Social History     Marital status:      Spouse name: N/A     Number  of children: N/A     Years of education: N/A     Occupational History     Not on file.     Social History Main Topics     Smoking status: Former Smoker     Packs/day: 1.50     Years: 37.00     Types: Cigarettes     Start date: 6/15/1963     Quit date: 9/30/2000     Smokeless tobacco: Never Used     Alcohol use No     Drug use: No     Sexual activity: No     Other Topics Concern     Parent/Sibling W/ Cabg, Mi Or Angioplasty Before 65f 55m? No     Social History Narrative    2013: Living in [place] in a townhouse with no steps    Has 3 sons that are doing okay.         Dairy/d 1 servings/d.     Caffeine 0 servings/d    Exercise 0 x week    Sunscreen used - No    Seatbelts used - Yes    Working smoke/CO detectors in the home - Yes    Guns stored in the home - Yes    Self Breast Exams - NA    Self Testicular Exam - Yes    Eye Exam up to date - Yes 2008    Dental Exam up to date - Yes 2006    Pap Smear up to date - NA    Mammogram up to date - NA    PSA up to date - Yes 2008    Dexa Scan up to date - No    Flex Sig / Colonoscopy up to date - Yes less than 5 yrs ago    Immunizations up to date -today    Abuse: Current or Past(Physical, Sexual or Emotional)- No    Do you feel safe in your environment - Yes    2008                   Drug and lactation database from the [place] [employer]:  http://toxnet.nlm.nih.gov/cgi-bin/sis/htmlgen?LACT      B/P: Data Unavailable, T: Data Unavailable, P: Data Unavailable, R: Data Unavailable 0 lbs 0 oz  There were no vitals taken for this visit., There is no height or weight on file to calculate BMI.  Medications and Vitals not listed above were documented in the cart and reviewed by me.     Current Outpatient Prescriptions   Medication Sig Dispense Refill     azithromycin (ZITHROMAX) 250 MG tablet Two tablets first day, then one tablet daily for four days. 6 tablet 0     hydroxychloroquine (PLAQUENIL) 200 MG tablet Take 1 tablet (200 mg) by mouth 2 times daily Send  copy of recent eye exam as need for refills. Please have a copy of your eye exam faxed to [phone]. 180 tablet 0     metoprolol (TOPROL-XL) 50 MG 24 hr tablet Take 1 tablet (50 mg) by mouth daily 90 tablet 3     pregabalin (LYRICA) 50 MG capsule One in the am, one in the afternoon and 2 at night. 360 capsule 1     methotrexate 2.5 MG tablet CHEMO Take 7 tablets (17.5 mg) by mouth once a week Labs due every 8-12 weeks 28 tablet 4     omeprazole (PRILOSEC) 20 MG CR capsule Take 2 capsules (40 mg) by mouth 2 times daily 60 capsule 3     oxybutynin (DITROPAN) 5 MG tablet Take 1 tablet (5 mg) by mouth daily 90 tablet 3     folic acid (FOLVITE) 1 MG tablet Take 2 tablets (2 mg) by mouth daily 180 tablet 4     selegiline 5 MG TABS 1 tablet In the morning 180 tablet 3     ORDER FOR DME Use your CPAP device as directed by your provider.       acetaminophen (TYLENOL) 500 MG tablet Take 1 tablet by mouth as needed. For pain       Cholecalciferol (VITAMIN D) 1000 UNITS capsule Take 2 capsules by mouth daily.           Thnog Yeh for HPI/ROS submitted by the patient on 8/24/2017   General Symptoms: Yes  Skin Symptoms: No  HENT Symptoms: Yes  EYE SYMPTOMS: Yes  HEART SYMPTOMS: No  LUNG SYMPTOMS: Yes  INTESTINAL SYMPTOMS: Yes  URINARY SYMPTOMS: Yes  REPRODUCTIVE SYMPTOMS: No  SKELETAL SYMPTOMS: Yes  BLOOD SYMPTOMS: No  NERVOUS SYSTEM SYMPTOMS: Yes  MENTAL HEALTH SYMPTOMS: No  Fever: No  Loss of appetite: No  Weight loss: No  Weight gain: No  Fatigue: Yes  Night sweats: No  Chills: No  Increased stress: No  Excessive hunger: No  Excessive thirst: No  Feeling hot or cold when others believe the temperature is normal: Yes  Loss of height: No  Post-operative complications: No  Surgical site pain: No  Hallucinations: No  Change in or Loss of Energy: Yes  Hyperactivity: No  Confusion: No  Ear pain: No  Ear discharge: No  Hearing loss: No  Tinnitus: Yes  Nosebleeds: No  Congestion: Yes  Sinus pain: No  Trouble swallowing:  Yes   Voice hoarseness: Yes  Mouth sores: No  Sore throat: Yes  Tooth pain: No  Gum tenderness: No  Bleeding gums: No  Change in taste: No  Change in sense of smell: No  Dry mouth: Yes  Hearing aid used: No  Neck lump: No  Eye pain: Yes  Vision loss: No  Dry eyes: Yes  Watery eyes: No  Eye bulging: No  Double vision: Yes  Flashing of lights: No  Spots: No  Floaters: No  Redness: Yes  Crossed eyes: No  Tunnel Vision: No  Yellowing of eyes: No  Eye irritation: Yes  Cough: Yes  Sputum or phlegm: Yes  Coughing up blood: No  Difficulty breating or shortness of breath: Yes  Snoring: No  Wheezing: Yes  Difficulty breathing on exertion: Yes  Respiratory pain: No  Nighttime Cough: No  Difficulty breathing when lying flat: No  Heart burn or indigestion: Yes  Nausea: No  Vomiting: No  Abdominal pain: Yes  Bloating: Yes  Constipation: Yes  Diarrhea: No  Blood in stool: Yes  Black stools: No  Rectal or Anal pain: No  Fecal incontinence: No  Rectal bleeding: Yes  Yellowing of skin or eyes: No  Vomit with blood: No  Change in stools: No  Hemorrhoids: Yes  Trouble holding urine or incontinence: Yes  Pain or burning: No  Trouble starting or stopping: No  Increased frequency of urination: No  Blood in urine: No  Decreased frequency of urination: No  Frequent nighttime urination: No  Flank pain: Yes  Difficulty emptying bladder: No  Back pain: Yes  Muscle aches: Yes  Neck pain: Yes  Swollen joints: Yes  Joint pain: Yes  Bone pain: Yes  Muscle cramps: Yes  Muscle weakness: Yes  Joint stiffness: Yes  Bone fracture: No  Trouble with coordination: Yes  Dizziness or trouble with balance: Yes  Fainting or black-out spells: No  Memory loss: No  Headache: No  Seizures: No  Speech problems: No  Tingling: Yes  Weakness: Yes  Difficulty walking: Yes  Paralysis: No  Numbness: Yes

## 2017-08-29 ENCOUNTER — OFFICE VISIT (OUTPATIENT)
Dept: NEUROLOGY | Facility: CLINIC | Age: 67
End: 2017-08-29

## 2017-08-29 VITALS
DIASTOLIC BLOOD PRESSURE: 85 MMHG | BODY MASS INDEX: 40.16 KG/M2 | SYSTOLIC BLOOD PRESSURE: 142 MMHG | HEART RATE: 59 BPM | WEIGHT: 303 LBS | HEIGHT: 73 IN

## 2017-08-29 DIAGNOSIS — G20.A1 PARALYSIS AGITANS (H): ICD-10-CM

## 2017-08-29 DIAGNOSIS — R25.1 TREMOR: Primary | ICD-10-CM

## 2017-08-29 RX ORDER — RASAGILINE 1 MG/1
1 TABLET ORAL DAILY
Qty: 90 TABLET | Refills: 3 | Status: SHIPPED | OUTPATIENT
Start: 2017-08-29 | End: 2017-10-06

## 2017-08-29 RX ORDER — SELEGILINE HYDROCHLORIDE 5 MG/1
TABLET ORAL
Qty: 180 TABLET | Refills: 3 | Status: SHIPPED | OUTPATIENT
Start: 2017-08-29 | End: 2017-10-06

## 2017-08-29 ASSESSMENT — PAIN SCALES - GENERAL: PAINLEVEL: NO PAIN (0)

## 2017-08-29 NOTE — MR AVS SNAPSHOT
After Visit Summary   2017    Lucio Daly    MRN: [id number]           Patient Information     Date Of Birth          1950        Visit Information        Provider Department      2017 8:10 AM Thong Montes MD [facility] Neurology        Today's Diagnoses     Tremor    -  1    Paralysis agitans (H)          Care Instructions    Diagnosis/Summary/Recommendations:    PATIENT: Lucio Daly  67 year old male     : 1950    EVELIA: 2017    Tremor - is about the same and is not markedly worse.  Selegiline 5 mg in the am  Held off on dopamine (datscan) scan - was concerned about iodine contrast.  Discussed weaning off the selegiline and the option of using rasagiline if needed.     Neck and back issues - seeing neurosurgery and has had imaging done of his cervical and lumbar spine  Mri c spine  Cervical spondylosis resulting in moderate bilateral neural foraminal  stenosis and mild spinal canal stenosis C5-C7, slightly progressed at  the C6-7 level since 3/21/2006. No cord signal abnormality.    Mri lumbar spine Impression:   1. Multilevel lumbar spondylosis with increased moderate to severe  spinal canal stenosis at L4-5. Unchanged mild to moderate bilateral  neural foraminal stenosis at L5-S1.  2. Mild periarticular edema associated L5-S1 joints bilaterally, not  significantly changed from prior and likely represents active  degenerative arthropathy.    Neuropathy  lyrica 3-4 times per day. 1-1-2 is his scheduled and medication is coming from his pcp  He had problems with trileptal and neurontin in the past.     Cognitive testing - has not been repeated.     RA issues.   On plaquenil and methotrexate.  Still taking folate, vitamin d    Taking zithromax for bronchitis    Taking metoprolol and omeprazole    Still using bipap    Bladder = remains on the ditropan    He is going to pain management clinic to see if there is anything besides pain medications for his  symptoms, ie h e has not yet done pool therapy.      PLAN  Go off the selegiline by going to every other day for a week or so and then stop it and see how you are doing.     If needed you could go back on this or try 1mg of rasagiline    Return back in 9months to one year.       Over 50% of this visit was spent in patient care and care coordination.     History obtained from patient    Total visit time was 25 minutes      Thong Montes MD           Follow-ups after your visit        Follow-up notes from your care team     Return in about 9 months (around 5/29/2018).      Your next 10 appointments already scheduled     Oct 05, 2017 10:00 AM CDT   (Arrive by 9:45 AM)   New Patient Visit with MEGAN Pendleton CNP   [facility] ([facility])    [address] [place] [address]  4th [facility] [address]   [phone]            Oct 09, 2017  9:00 AM CDT   New Visit with MEGAN Washington CNP   [facility] ([facility])    [address]  [facility] [address]   [phone]            Nov 16, 2017  9:00 AM CST   Six Minute Walk with UC PFL 6 MINUTE WALK 1   [facility] Pulmonary Function Testing ([facility])    [address] [address]   [phone]            Nov 16, 2017  9:30 AM CST   FULL PULMONARY FUNCTION with UC PFL A   [facility] Function Testing ([facility])    [address] [address]   [phone]            Nov 16, 2017 10:30 AM CST   (Arrive by 10:15 AM)   Return Interstitial Lung with Harsha Mccarthy MD   [facility] ([facility])    [address] [place] [address]   [phone]            Dec 20, 2017  9:30 AM CST   (Arrive by 9:15 AM)   Return Visit with Jeremy Goodrich MD   [facility]  Rheumatology ([facility])    [address] [facility]  3rd [facility] [address] [phone]            Mar 06, 2018  9:20 AM CST   Return Sleep Patient with Kristen Martinez MD   [facility] [place] Sleep Study ([facility])    [address] [place] [address]   [phone]            May 22, 2018  8:40 AM CDT   (Arrive by 8:25 AM)   Return Movement Disorder with Thong Montes MD   [facility] Neurology ([facility])    [address] [facility]  3rd [facility] [id number] [phone]              Who to contact     Please call your clinic at [phone] to:    Ask questions about your health    Make or cancel appointments    Discuss your medicines    Learn about your test results    Speak to your doctor   If you have compliments or concerns about an experience at your clinic, or if you wish to file a complaint, please contact [facility] Patient Relations at [phone] or email us at [email or web address]         Additional Information About Your Visit        Get[employer]dhar[employer] Futures Information     [employer] gives you secure access to your electronic health record. If you see a primary care provider, you can also send messages to your care team and make appointments. If you have questions, please call your primary care clinic.  If you do not have a primary care provider, please call [phone] and they will assist you.      [employer] is an electronic gateway that provides easy, online access to your medical records. With [employer], you can request a clinic appointment, read your test results, renew a prescription or communicate with your care team.     To access your existing account, please contact your [facility] or call [phone] for assistance.        Care EveryWhere ID     This is your Care EveryWhere ID. This could  "be used by other organizations to access your [facility] records  [id number]        Your Vitals Were     Pulse Height BMI (Body Mass Index)             59 1.854 m (6' 1\") 39.98 kg/m2          Blood Pressure from Last 3 Encounters:   08/29/17 142/85   08/26/17 110/70   08/22/17 148/79    Weight from Last 3 Encounters:   08/29/17 (!) 137.4 kg (303 lb)   08/26/17 (!) 137.4 kg (303 lb)   08/22/17 (!) 138.4 kg (305 lb 1.6 oz)              Today, you had the following     No orders found for display         Today's Medication Changes          These changes are accurate as of: 8/29/17  8:31 AM.  If you have any questions, ask your nurse or doctor.               Start taking these medicines.        Dose/Directions    rasagiline 1 MG Tabs tablet   Commonly known as:  AZILECT   Used for:  Tremor   Started by:  Thong Montes MD        Dose:  1 mg   Take 1 tablet (1 mg) by mouth daily   Quantity:  90 tablet   Refills:  3            Where to get your medicines      These medications were sent to [facility] - [address]   [address]  [address], [place] [address]     Phone:  [phone]     selegiline 5 MG Tabs         Some of these will need a paper prescription and others can be bought over the counter.  Ask your nurse if you have questions.     Bring a paper prescription for each of these medications     rasagiline 1 MG Tabs tablet                Primary Care Provider Office Phone # Fax #    Jah Corley [phone]       [address]  [facility] [address]        Equal Access to Services     ALINE QUIÑONES AH: Hadii helen kingston Sodionicio, waaxda luqadaha, qaybta kaalmariana madera. So [facility] [phone].    ATENCIÓN: Si habla español, tiene a rudd disposición servicios gratuitos de asistencia lingüística. Llame al [phone].    We comply with applicable federal civil rights laws and [place] laws. " We do not discriminate on the basis of race, color, national origin, age, disability sex, sexual orientation or gender identity.            Thank you!     Thank you for choosing [facility] NEUROLOGY  for your care. Our goal is always to provide you with excellent care. Hearing back from our patients is one way we can continue to improve our services. Please take a few minutes to complete the written survey that you may receive in the mail after your visit with us. Thank you!             Your Updated Medication List - Protect others around you: Learn how to safely use, store and throw away your medicines at www.disposemymeds.org.          This list is accurate as of: 8/29/17  8:31 AM.  Always use your most recent med list.                   Brand Name Dispense Instructions for use Diagnosis    azithromycin 250 MG tablet    ZITHROMAX    6 tablet    Two tablets first day, then one tablet daily for four days.    Acute bronchitis with coexisting condition requiring prophylactic treatment       folic acid 1 MG tablet    FOLVITE    180 tablet    Take 2 tablets (2 mg) by mouth daily    Pain in toes of both feet, Rheumatoid arthritis of multiple sites with negative rheumatoid factor (H), Rheumatoid arthritis involving shoulder with negative rheumatoid factor, unspecified laterality (H), High risk medications (not anticoagulants) long-term use       hydroxychloroquine 200 MG tablet    PLAQUENIL    180 tablet    Take 1 tablet (200 mg) by mouth 2 times daily Send copy of recent eye exam as need for refills. Please have a copy of your eye exam faxed to [phone].    Rheumatoid arthritis involving shoulder with negative rheumatoid factor, unspecified laterality (H), High risk medications (not anticoagulants) long-term use, Pain in toes of both feet, Rheumatoid arthritis of multiple sites with negative rheumatoid factor (H)       methotrexate 2.5 MG tablet CHEMO     28 tablet    Take 7 tablets (17.5 mg) by mouth once a week Labs  due every 8-12 weeks    Rheumatoid arthritis of multiple sites with negative rheumatoid factor (H), High risk medications (not anticoagulants) long-term use       metoprolol 50 MG 24 hr tablet    TOPROL-XL    90 tablet    Take 1 tablet (50 mg) by mouth daily    Essential hypertension       omeprazole 20 MG CR capsule    priLOSEC    60 capsule    Take 2 capsules (40 mg) by mouth 2 times daily    Gastroesophageal reflux disease with esophagitis       order for DME      Use your CPAP device as directed by your provider.        oxybutynin 5 MG tablet    DITROPAN    90 tablet    Take 1 tablet (5 mg) by mouth daily    Other urinary incontinence       pregabalin 50 MG capsule    LYRICA    360 capsule    One in the am, one in the afternoon and 2 at night.    Peripheral polyneuropathy (H)       rasagiline 1 MG Tabs tablet    AZILECT    90 tablet    Take 1 tablet (1 mg) by mouth daily    Tremor       selegiline 5 MG Tabs     180 tablet    1 tablet In the morning    Paralysis agitans (H)       TYLENOL 500 MG tablet   Generic drug:  acetaminophen      Take 1 tablet by mouth as needed. For pain        vitamin D 1000 UNITS capsule      Take 2 capsules by mouth daily.    Other specified disorder of skin

## 2017-08-29 NOTE — Clinical Note
2017       RE: Lucio Daly  [address]  [place] [address]     Dear Colleague,    Thank you for referring your patient, Lucio Daly, to the [facility] NEUROLOGY at [facility]. Please see a copy of my visit note below.    Diagnosis/Summary/Recommendations:    PATIENT: Lucio Daly  67 year old male     : 1950    EVELIA: 2017    tremor    Over 50% of this visit was spent in patient care and care coordination.     History obtained from patient    Total visit time was 25 minutes      Thong Montes MD     ______________________________________    Last visit date and details:     Tremor  Has intermittent tremors.   Has feeling of internal tremor  Taking selegiline 5mg once daily   May have had some improvement with this medication. Has not recently gone off this or tried rasagiline instead.  Potential interaction with other medications with selegiline; less so with the more expensive rasagline.      Neuropathy - worse based on testing.  - see Dr. Gonzalez's note below.  Presumed autoimmune vs idiopathic related.   No scheduled return to see Dr. Gonzalez at this time. Therapy had been arranged.   lyrica is coming from his primary doctor and he is taking 50mg 3/day     Seen by Dr. Ríos and had improvement in his ankle flexibility. Has had numbness in his feet.      Has had muscle pain. Arthritis  Methotrexate 2.5mg 7 tabs once weekly  Plaquenil 200mg 1 tablet twice daily  Taking folic acid  Has voltaren gel.   Sees Dr. Goodrich     Following dermatology  Dr. Corley who left and now seeing Dr. Najera     cv - hypertension.   Taking metroprolol 50mg per day      Lung problems. Presumed interstitial lung disease associated with RA  Dr. Mccarthy.   Not on inhaler because of possible interaction with the selegiline.   Not clear if rasagiline could be taken instead of selegiline.      Has had problems with sleep despite u sing bipap. Goes to bed at 11/12 and wakes up at  5am and alays in bed till 630  Has nightmares. Sees Dr. Martinez     Bowel issues with frequent bm's or constipation  Has had pain in esophagus, or other areas with pain on swallowing and has had gas, belching  Taking omeprazole 20mg twice daily      Bladder   Continues on the oxybutynin 5mg once daily     Has had changes in body temperature     Has had changes in his voice     Has had tinnitus and light headedness that is worse in evening     Has had increased saliva despite having a dry mouth, eyes and skin  He has been emotional with low libido     Has had nasal congestion  Has had low libido     PLAN  Talked about mood issues and the possibility of seeing a counselor a few times to discuss his health. This is something i highly recommended to him to help him cope with his health issues as well as how to interact with his wife in travel plans, etc.   He is not keen on starting another medication to help his mood and does not think he has the same symptoms as his sister does (who is depressed); many antidepressants have the potential to interact with selegiline; less so interactions with rasagiline which is more expensive than selegiline.      Discussed considering weaning off selegiline or/and switching to rasagiline if rasagiline can be taken with less interactions.      He had cognitive testing in the past; would hold off on this for the time being - future order placed with Dr. Mojica for neuropsychological evaluation if desired.      He does not appear to have parkinson's disease. Discussed the pros and cons of doing a dopamine (DATSCAN) scan to evaluate for a dopamine deficiency as the cause of some of his symptoms, i.e., look for a degenerative parkinsonian condition which was be seen with an abnormal scan. I suspect that his scan would be normal. i put in a future order so that it could be done if desired.     Encouraged him to do pool therapy if able.      Return back in 9 months.     Neurosurgery  Clinic Consult  Date of Visit: 7/27/2017  Referred for: Back pain and bilateral leg pain left worse than right.  Referring Provider: Ivan Aguilera        Dear Dr. Gil Jacques,     We were happy to see Lucio Daly , a pleasant 66 year old year old male for back and leg pain.     HPI:  As you may recall this nice gentleman has a long and complicated past medical history. He's had suspicions of a rheumatologic disorder raised as far back as age 28 but since he is seronegative nothing was ever found. About 5 years ago, perhaps more, he started seeing Dr. Gonzalez for problems with neuropathy. There was no specific cause identified but there were some concerns about possible Parkinson's disease, a referral was made to  who pretty much ruled out Parkinson's but did note an elevated CRP and referred hand to rheumatology who ultimately did diagnose rheumatoid arthritis, sicca syndrome, and since then he's been noted to have multiple peripheral joints affected, his hands and knees ankles many fingers thumbs. He also has interstitial lung disease as a result, and while he is not on supplemental oxygen as yet his pulmonologist feels that he would be too bigger risk for surgery on his joints and expressed that opinion to the patient about 6 months ago.   Other medical history includes a malignant melanoma removed from his right leg 4 or 5 years ago, he believes it was a very low-grade, probably grade 1.     Regarding his back and the consult today, he first started developing back pain a few years ago and saw a sports medicine physician who diagnosed him with spinal stenosis after an MRI was done. He was told that surgery would probably be needed eventually and the patient was asked if he wanted to see a surgeon. He did not at that time so referral was not made. Physical therapy was tried, it made it worse. Over time his back pain is leg pain got worse so we went back to the sports medicine, the same opinion  was offered and he still declined a surgical opinion.  A number of years ago he had a few shots in the back but they didn't seem to help much.     Over the last couple of years pain in his back and his legs has gotten increasingly worse. Located in the bilateral lumbar spine down the backs of the legs to the calves. His feet are clumsy and he's catching his toes sometimes on stairs and on carpet.   He feels off balance. He has numbness in his calves and his toes. His pain and numbness is worse when he is standing and walking around, he can only walk sometimes a few feet. It's better if he sits down, if he is going shopping he must lean over a shopping cart or he can't make it all the way around the store.     Other symptoms include pain in the trapezii bilaterally, forearms on the way down to all the fingers both hands, numbness and fingers, clumsiness in the hands and fingers. He doesn't button buttons very well. His balance is off. He has to use a cane because of the balance. And his legs sometimes give out this can appear randomly, even if there is no pain. He's had a recent MRI of the lumbar spine and presents with that. Last MRI of the cervical spine was in 2006.     He's had no recent treatment to speak of.  His/Her current symptoms (pain, numbness and weakness), a detailed description of alleviating or exacerbating factors, and pain scores are outlined below.     Patient Supplied Answers To the  Pain Questionnaire  [facility] -  Patient Entered Questionnaire/Answers 7/17/2017   What number best describes your pain right now:  0 = No pain  to  10 = Worst pain imaginable 6   How would you describe the pain? burning, sharp, numbness, dull, aching, throbbing   Which of the following worsen your pain? lying down, standing, sitting, walking   Which of the following improve or reduce your pain?  sitting   What number best describes your average pain for the past week:  0 = No pain  to  10 = Worst pain imaginable 6    What number best describes your LOWEST pain in past 24 hours:  0 = No pain  to  10 = Worst pain imaginable 2   What number best describes your WORST pain in past 24 hours:  0 = No pain  to  10 = Worst pain imaginable 9   When is your pain worst? AM, PM   What non-medicine treatments have you already had for your pain? physical therapy, spine injections (shots)   Have you tried treating your pain with medication?  Yes   Are you currently taking medications for your pain? Yes       strongly positive shopping cart sign.   *  PAIN:  See diagram  *  NUMBNESS:   Feet and both hands          QUALITY:   tingling,   *  WEAKNESS:  Left greater than right collapses after walking sometimes short distance. Sometimes random  *  BOWEL/BLADDER FUNCTION: No change  *  OTHER SYMPTOMS:   Balance. Hands feel uncoordinated        Current Outpatient Prescriptions:      hydroxychloroquine (PLAQUENIL) 200 MG tablet, Take 1 tablet (200 mg) by mouth 2 times daily Send copy of recent eye exam as need for refills. Please have a copy of your eye exam faxed to [phone]., Disp: 180 tablet, Rfl: 0     metoprolol (TOPROL-XL) 50 MG 24 hr tablet, Take 1 tablet (50 mg) by mouth daily, Disp: 90 tablet, Rfl: 3     pregabalin (LYRICA) 50 MG capsule, One in the am, one in the afternoon and 2 at night., Disp: 360 capsule, Rfl: 1     methotrexate 2.5 MG tablet CHEMO, Take 7 tablets (17.5 mg) by mouth once a week Labs due every 8-12 weeks, Disp: 28 tablet, Rfl: 4     omeprazole (PRILOSEC) 20 MG CR capsule, Take 2 capsules (40 mg) by mouth 2 times daily, Disp: 60 capsule, Rfl: 3     oxybutynin (DITROPAN) 5 MG tablet, Take 1 tablet (5 mg) by mouth daily, Disp: 90 tablet, Rfl: 3     folic acid (FOLVITE) 1 MG tablet, Take 2 tablets (2 mg) by mouth daily, Disp: 180 tablet, Rfl: 4     selegiline 5 MG TABS, 1 tablet In the morning, Disp: 180 tablet, Rfl: 3     ORDER FOR DME, Please measure and distribute 1 pair of 20mmHg - 30mmHg thigh high open or closed toe  compression stockings. Jobst ultrasheer or equivalent., Disp: 1 each, Rfl: 0     ORDER FOR DME, Please measure and distribute 1 pair of 20mmHg - 30mmHg knee high open or closed toe compression stockings. Jobst ultrasheer or equivalent., Disp: 2 each, Rfl: 10     ORDER FOR DME, Use your CPAP device as directed by your provider., Disp: , Rfl:      acetaminophen (TYLENOL) 500 MG tablet, Take 1 tablet by mouth as needed. For pain, Disp: , Rfl:      Cholecalciferol (VITAMIN D) 1000 UNITS capsule, Take 2 capsules by mouth daily., Disp: , Rfl:           Allergies   Allergen Reactions     Restasis         Burning eyes, problems with breathing, tightness in chest     Adhesive Tape         Bandages misc     Allegra         EXCESSIVE URINATION AND WEAKNESS, LIGHT-HEADED     Allergy         Dust     Animal Dander       Benadryl Allergy         EXCESSIVE URINATION AND WEAKNESS, LIGHT-HEADED     Cephalexin         Joint pain or gerd aggravation. Bloating excessive urination      Cephalosporins       Doxycycline Hyclate Nausea       SWEATING,MIGRAINES,LOSS OF APPETITE,SWEATING,LIGHT HEADED, EXCESSIVE URINATION     Flonase [Fluticasone Propionate]       Gabapentin         Neurontin: mosd changes and excess urination     Iodine Solution [Povidone Iodine]         SKIN MELTS     Levaquin         From surgeon--? Joint pain ? Gerd aggravation. Insomnia, excess urination     Mylanta         EXCESSIVE URINATION AND WEAKNESS, LIGHT-HEADED     Prednisone         Weakness, elevated bp, headache, eye pain, congestion      Seafood [Seafood]       Shellfish Allergy         Hives     Sulfamethoxazole-Trimethoprim         Chest pain, angina     Trees       Trileptal         SEVERE JOINT AND TENDON PAIN, INSOMNIA, RESTLESSNESS, NAUSEA, EXCESS URINATION     Cortizone Rash       EXCESS URINATION,WEAKNESS,NAUSEA, HEADACHE        Past Medical History         Past Medical History:   Diagnosis Date     Abn involun movement NEC       Movement Disorder      Abnormal EMG 4/18/2013     AK (actinic keratosis) 12/18/2011     Allergic rhinitis, cause unspecified       Allergic rhinitis     Balance problems 11/1/2011     Basal cell carcinoma       Bladder spasms 11/1/2011     Chronic osteoarthritis       Diaphragmatic hernia without mention of obstruction or gangrene       Earache or other ear, nose, or throat complaint       Esophageal reflux       Fatigue 11/1/2011     Fracture       H. pylori infection 5/12/2011     History of MRI of cervical spine 11/18/2013     EXAMINATION: CERVICAL SPINE G/E 5 VIEWS* 4/19/2013 4:18 PM  CLINICAL HISTORY: Pain in limb,Performing Location?->[facility]),  COMPARISON:  FINDINGS: AP and lateral views in flexion and extension, as well as odontoid view of the cervical spine was obtained. There is no comparison available. The vertebral bodies of the cervical spine are normally aligned. There is posterior spurring and d     Incomplete defecation 11/1/2011     Interstitial lung disease (H) 11/29/2016     Laboratory test 8/7/2012     Lung disease June 2015     Malignant basal cell neoplasm of skin 8/6/2008     Melanoma (H) 8/6/2008     Melanoma in situ of lower leg (H)       R calf     Neuropathy (H) 5/16/2011     Other bladder disorder       Other color vision deficiencies       Other nervous system complications       Parkinsonism (H) 11/1/2011     Personal history of colonic polyps       Polyneuropathy in other diseases classified elsewhere (H)       RA (rheumatoid arthritis) (H)       Rheumatoid arthritis of multiple sites with negative rheumatoid factor (H) 3/21/2016     Seronegative arthritis 11/18/2013     Shortness of breath       Shoulder arthritis 2016     acromioclavicular joint      Somatization disorder 5/12/2011     Squamous cell carcinoma       Tremor 11/1/2011     Unspecified essential hypertension       Unspecified hypothyroidism       Urinary tract infection       Urinary urgency 11/1/2011     Wears glasses  11/1/2011           Past Surgical History    Past Surgical History:   Procedure Laterality Date     BIOPSY OF SKIN LESION         COLONOSCOPY         HEMORRHOID SURGERY         lip biopsy         for sicca complex     MOHS MICROGRAPHIC PROCEDURE         SOFT TISSUE SURGERY         removeal of basel cell carcinoma           Family History           Family History   Problem Relation Age of Onset     Hypertension Mother       HEART DISEASE Mother         a fib     Arthritis Mother       Other Cancer Mother       OSTEOPOROSIS Mother       CANCER Mother       Skin Cancer Mother       Hypertension Brother       DIABETES Brother       Neurologic Disorder Sister         multiple sclerosis     Hypertension Sister       HEART DISEASE Sister       HEART DISEASE Sister         a fib     Arthritis Sister       Hypertension Father       CEREBROVASCULAR DISEASE Father         ,     Skin Cancer Father       Psoriasis Maternal Grandfather       CANCER Maternal Grandfather       Other Cancer Maternal Grandfather       Congenital Anomalies Other       CEREBROVASCULAR DISEASE Paternal Grandmother         ,     CEREBROVASCULAR DISEASE Paternal Grandfather         ,     Other Cancer Sister       OSTEOPOROSIS Sister       Melanoma No family hx of             Social History    Social History            Social History     Marital status:        Spouse name: N/A     Number of children: N/A     Years of education: N/A          Occupational History     Not on file.            Social History Main Topics     Smoking status: Former Smoker       Packs/day: 1.50       Years: 37.00       Types: Cigarettes       Start date: 6/15/1963       Quit date: 9/30/2000     Smokeless tobacco: Never Used     Alcohol use No     Drug use: No     Sexual activity: No           Other Topics Concern     Parent/Sibling W/ Cabg, Mi Or Angioplasty Before 65f 55m? No          Social History Narrative     2013: Living in [place] in a townhouse with no steps     Has 3  "sons that are doing okay.            Dairy/d 1 servings/d.      Caffeine 0 servings/d     Exercise 0 x week     Sunscreen used - No     Seatbelts used - Yes     Working smoke/CO detectors in the home - Yes     Guns stored in the home - Yes     Self Breast Exams - NA     Self Testicular Exam - Yes     Eye Exam up to date - Yes 2008     Dental Exam up to date - Yes 2006     Pap Smear up to date - NA     Mammogram up to date - NA     PSA up to date - Yes 2008     Dexa Scan up to date - No     Flex Sig / Colonoscopy up to date - Yes less than 5 yrs ago     Immunizations up to date -today     Abuse: Current or Past(Physical, Sexual or Emotional)- No     Do you feel safe in your environment - Yes     2008                           Problem list and 13 point review of systems: is reviewed in Epic and is negative with the exception of those symptoms associated with HPI and PMH.       OBJECTIVE:   /82  Pulse (!) 47  Ht 1.854 m (6' 1\")  Wt (!) 136.5 kg (301 lb)  BMI 39.71 kg/m2     Imaging:  These are the pertinent radiologist's findings from:  MRI lumbar WO 6/27/17@ [facility]. Spinal stenosis noted at L4 5 is not as severe as I would expect given the severity of the symptoms and the redundancy of the nerve roots. My suspicion is that his listhesis at that level worsens when he is standing. Standing flexion-extension views were performed today..   1. Multilevel lumbar spondylosis with increased moderate to severe  spinal canal stenosis at L4-5. Unchanged mild to moderate bilateral  neural foraminal stenosis at L5-S1.  2. Mild periarticular edema associated L5-S1 joints bilaterally, not  significantly changed from prior and likely represents active  degenerative arthropathy     Standing flexion-extension x-rays lumbar spine 7/27/17  The reading below indicates there is no evidence of motion.  The flexion images are extremely difficult to see, but in looking at them closely I would disagree with that assessment.  1. Grade I " anterolisthesis of L4 on L5, as previously seen, with no  evidence of motion on flexion-extension views.  2.  Multilevel degenerative changes most pronounced of the lumbar  spine.  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 Compared to the previous study, there is some progression, manifesting with significant drop of the right sural SNAP amplitude, whereas the left remains unchanged. Given that sural nerve responses are still elicitable in a 65 year-old patient, the neuropathy is still considered mild.  It is notable that the sural sensory responses were asymmetric in previous studies done in 2010 and 2012 (left >50% smaller than the right) and they have now become equal. 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 Clinical correlation is recommended.  2. No electrodiagnostic evidence of a neuromuscular junction disorder or myopathy.              3. Possible mild left median and ulnar sensory neuropathies at the wrist.     See the full report in EPIC/Media tab.  I personally reviewed the images with the patient.       Exam:  Pertinent positives:  He has brisk reflexes at the bilateral patella, bilateral biceps, no Sancho's or clonus and a mute Babinski's. Atrophy in distal musculature right gastroc, left foot. He has a thumb splint on the right hand/wrist. Sensation is diminished in fingers and toes bilaterally. His gait is slightly wide and tentative.  There is no focal specific weakness detectable.     *   Well developed, well nourished male found seated comfortably in exam room chair.  He is able to sit and rise independently.  He is unaccompanied.    *  Mental Status  A&O X3.  Bright, alert, affable, interactive. Language fluid, fund of knowledge intact. Good historian.  Mood and affect congruent and WNL.   *  Cranial Nerves  II: Able to read printed forms, VF full to gross confrontation.  III: IV, VI:  PERRLA, EOMI, No nystagmus, no ptosis.  V: Sensation intact in bilateral V1, V2,  and V3. Jaw clench symmetric.    VII:  Intact to voice bilaterally   Pushes tongue against bilateral cheeks.  X:  Palate elevates, uvula midline, phonation intact.  XI: Elevates shoulders, head turn intact.  XII: Tongue midline. No fasciculations.  *  Cerebellar:  Finger nose slow but accurate. Heel shin intact but slowed.     *  Frankie-cranial:    No drift.  *  Cervical Spine:  DTR's at Biceps 3/4 bilaterally. Triceps 2/4 bilaterally.   Sensation diminished in fingertips..    No Raygoza's. No Phalen's.  No Tinel's. No fasciculations.   Muscle bulk diminished distally.   tone WNL.  Upper Extremity Strength.               RIGHT                LEFT     Deltoid              5/5                   5/5       Biceps              5/5                   5/5        Triceps              5/5                   5/5       Wrist Extensor              5/5                   5/5                     5/5                    5/5       Interossei              5/5                   5/5       EPL    painful; did not examine                   5/5       Pinch              5/5                  5/5                *  Lumbar Spine:    DTR's Patellar 3/4 and Achilles 0/4 and symmetric.   No fasciculations.  Muscle bulk diminished distally as above and tone WNL.  No Clonus.  Sensation diminished distally.    Lower Extremity Strength                    RIGHT                   LEFT     Iliopsoas                    5/5                      5/5       Quad                    5/5                        5/5       Hamstring                      5/5                        5/5         Gastrocs                    5/5                        5/5       Tib. Anterior                     5/5                        5/5       EHL                      5/5                        5/5       Babinski               mute                                  Down                         *  Structural Exam  Inspection of the spine reveals no scoliosis, exaggerated kyphosis or  lordosis. Cervical spine ROM tight. No spasming. No tenderness to palpation.  No Spurling's or L'Hermitte's.   Lumbar ROM tight  Able to doff and don shoes minimal difficulty. No spasming, no tenderness, no step offs. No SLR.  No SI tenderness. No trochanteric bursal tenderness.   Feet are warm, intact dorsalis pedis pulses, pink, brisk capillary refill.  Gait slightly wide., tentative, no scissoring. No antalgia. Tandem gait tentative.   *  Sensory level intact.      ASSESSMENT/PLAN:  1. Cervical spondylosis with myelopathy    2. Spondylolisthesis, lumbar region    3. Spinal stenosis of lumbar region with neurogenic claudication       I believe Mr. Barbosa suffers from lumbar spondylolisthesis, and neurogenic claudication, worsened by mechanical slip of L4 on 5. A simple decompression would be reasonable, but because of the listhesis a fusion will most likely be required. This of course is a much more invasive surgery, much longer time under anesthesia, bigger blood loss, more stress on the system, a much bigger cardiopulmonary challenge.  The surgical challenges are daunting.   I discussed all this with him today. As a result my plan will be to try to manage him with nonoperative therapy for as long as possible.   He is already established with palliative/pain management and I plan to partner with them for ongoing care.  Should he come to need neurosurgical intervention we will work closely with his pulmonologist for pulmonary tune up and perioperative risk assessment and recommendations.  I answered all of his questions about this aspect of his care. He understands the issues and the concerns and is in agreement with the plan as we laid out today.     Additionally I believe he also showing signs of cervical myelopathy, his reflexes are significantly brisker than they were as documented year ago. As early as 2006 he had cervical spondylosis, but there was no cord impingement at that time. He's had a long history of  gradually increasing numbness, clumsiness, and atrophy of musculature but without change in reflexes until now there was no reason to think spinal cord was playing a role. I'll plan to repeat his cervical MRI and see him in clinic after that's completed to review the findings.     I have not made interim treatment plans     I discussed all this in depth with him. I answered his questions and those questions indicate he has a good understanding of the situation. We have supplied him with this as cards and telephone numbers and urged him to call if he has any questions comments or concerns.     Thank you for allowing us to participate in the care of this very pleasant patient. We appreciate your confidence in the [facility], Department of Neurosurgery.        Best Regards,     Cara Carrillo PA-C  [facility]  Department of Neurosurgery  Phone: [phone]  Fax: [phone]           Total time: 60 minutes with more than 30 minutes spent in direct face to face contact reviewing films, providing education, counseling, the importance of good health habits, non-operative therapies,and indications for surgery as well as further follow up.      ______________________________________      Patient was asked about 14 Review of systems including changes in vision (dry eyes, double vision), hearing, heart, lungs, musculoskeletal, depression, anxiety, snoring, RBD, insomnia, urinary frequency, urinary urgency, constipation, swallowing problems, hematological, ID, allergies, skin problems: seborrhea, endocrinological: thyroid, diabetes, cholesterol; balance, weight changes, and other neurological problems and these were not significant at this time except for   Patient Active Problem List   Diagnosis     Diaphragmatic hernia     Esophageal reflux     Hx of melanoma of skin     Malignant basal cell neoplasm of skin     GALO (obstructive sleep apnea)     Chronic maxillary sinusitis     Urinary  incontinence     H. pylori infection     Sicca syndrome (H)     Health Care Home     Advanced directives, counseling/discussion     Tremor     Visual changes     Incomplete defecation     Gait disorder     Balance problems     Fatigue     High risk medications (not anticoagulants) long-term use     Personal history of other malignant neoplasm of skin     AK (actinic keratosis)     Pain in joint, lower leg     Abnormal EMG     Seronegative arthritis     History of MRI of cervical spine     Pain in limb     Adenomatous polyp of colon     Peripheral polyneuropathy (H)     Rheumatoid arthritis of multiple sites with negative rheumatoid factor (H)     PAD (peripheral artery disease) (H)     Morbid obesity (H)     Interstitial lung disease (H)     History of MRI of lumbar spine          Allergies   Allergen Reactions     Restasis      Burning eyes, problems with breathing, tightness in chest     Adhesive Tape      Bandages misc     Allegra      EXCESSIVE URINATION AND WEAKNESS, LIGHT-HEADED     Allergy      Dust     Animal Dander      Benadryl Allergy      EXCESSIVE URINATION AND WEAKNESS, LIGHT-HEADED     Cephalexin      Joint pain or gerd aggravation. Bloating excessive urination      Cephalosporins      Doxycycline Hyclate Nausea     SWEATING,MIGRAINES,LOSS OF APPETITE,SWEATING,LIGHT HEADED, EXCESSIVE URINATION     Flonase [Fluticasone Propionate]      Gabapentin      Neurontin: mosd changes and excess urination     Iodine Solution [Povidone Iodine]      SKIN MELTS     Levaquin      From surgeon--? Joint pain ? Gerd aggravation. Insomnia, excess urination     Mylanta      EXCESSIVE URINATION AND WEAKNESS, LIGHT-HEADED     Prednisone      Weakness, elevated bp, headache, eye pain, congestion      Seafood [Seafood]      Shellfish Allergy      Hives       Sulfamethoxazole-Trimethoprim      Chest pain, angina     Trees      Trileptal      SEVERE JOINT AND TENDON PAIN, INSOMNIA, RESTLESSNESS, NAUSEA, EXCESS URINATION      Cortizone Rash     EXCESS URINATION,WEAKNESS,NAUSEA, HEADACHE     Past Surgical History:   Procedure Laterality Date     BIOPSY OF SKIN LESION       COLONOSCOPY       HEMORRHOID SURGERY       lip biopsy      for sicca complex     MOHS MICROGRAPHIC PROCEDURE       SOFT TISSUE SURGERY      removeal of basel cell carcinoma     Past Medical History:   Diagnosis Date     Abn involun movement NEC     Movement Disorder     Abnormal EMG 4/18/2013     AK (actinic keratosis) 12/18/2011     Allergic rhinitis, cause unspecified     Allergic rhinitis     Balance problems 11/1/2011     Basal cell carcinoma      Bladder spasms 11/1/2011     Chronic osteoarthritis      Diaphragmatic hernia without mention of obstruction or gangrene      Earache or other ear, nose, or throat complaint      Esophageal reflux      Fatigue 11/1/2011     Fracture      H. pylori infection 5/12/2011     History of MRI of cervical spine 11/18/2013    EXAMINATION: CERVICAL SPINE G/E 5 VIEWS* 4/19/2013 4:18 PM  CLINICAL HISTORY: Pain in limb,Performing Location?->[facility]),  COMPARISON:  FINDINGS: AP and lateral views in flexion and extension, as well as odontoid view of the cervical spine was obtained. There is no comparison available. The vertebral bodies of the cervical spine are normally aligned. There is posterior spurring and d     Incomplete defecation 11/1/2011     Interstitial lung disease (H) 11/29/2016     Laboratory test 8/7/2012     Lung disease June 2015     Malignant basal cell neoplasm of skin 8/6/2008     Melanoma (H) 8/6/2008     Melanoma in situ of lower leg (H)     R calf     Neuropathy (H) 5/16/2011     Other bladder disorder      Other color vision deficiencies      Other nervous system complications      Parkinsonism (H) 11/1/2011     Personal history of colonic polyps      Polyneuropathy in other diseases classified elsewhere (H)      RA (rheumatoid arthritis) (H)      Rheumatoid arthritis of multiple sites with negative  rheumatoid factor (H) 3/21/2016     Seronegative arthritis 11/18/2013     Shortness of breath      Shoulder arthritis 2016    acromioclavicular joint      Somatization disorder 5/12/2011     Squamous cell carcinoma      Tremor 11/1/2011     Unspecified essential hypertension      Unspecified hypothyroidism      Urinary tract infection      Urinary urgency 11/1/2011     Wears glasses 11/1/2011     Social History     Social History     Marital status:      Spouse name: N/A     Number of children: N/A     Years of education: N/A     Occupational History     Not on file.     Social History Main Topics     Smoking status: Former Smoker     Packs/day: 1.50     Years: 37.00     Types: Cigarettes     Start date: 6/15/1963     Quit date: 9/30/2000     Smokeless tobacco: Never Used     Alcohol use No     Drug use: No     Sexual activity: No     Other Topics Concern     Parent/Sibling W/ Cabg, Mi Or Angioplasty Before 65f 55m? No     Social History Narrative    2013: Living in [place] in a townhouse with no steps    Has 3 sons that are doing okay.         Dairy/d 1 servings/d.     Caffeine 0 servings/d    Exercise 0 x week    Sunscreen used - No    Seatbelts used - Yes    Working smoke/CO detectors in the home - Yes    Guns stored in the home - Yes    Self Breast Exams - NA    Self Testicular Exam - Yes    Eye Exam up to date - Yes 2008    Dental Exam up to date - Yes 2006    Pap Smear up to date - NA    Mammogram up to date - NA    PSA up to date - Yes 2008    Dexa Scan up to date - No    Flex Sig / Colonoscopy up to date - Yes less than 5 yrs ago    Immunizations up to date -today    Abuse: Current or Past(Physical, Sexual or Emotional)- No    Do you feel safe in your environment - Yes    2008                   Drug and lactation database from the [place] [employer]:  http://toxnet.nlm.nih.gov/cgi-bin/sis/htmlgen?LACT      B/P: Data Unavailable, T: Data Unavailable, P: Data Unavailable, R: Data  Unavailable 0 lbs 0 oz  There were no vitals taken for this visit., There is no height or weight on file to calculate BMI.  Medications and Vitals not listed above were documented in the cart and reviewed by me.     Current Outpatient Prescriptions   Medication Sig Dispense Refill     azithromycin (ZITHROMAX) 250 MG tablet Two tablets first day, then one tablet daily for four days. 6 tablet 0     hydroxychloroquine (PLAQUENIL) 200 MG tablet Take 1 tablet (200 mg) by mouth 2 times daily Send copy of recent eye exam as need for refills. Please have a copy of your eye exam faxed to [phone]. 180 tablet 0     metoprolol (TOPROL-XL) 50 MG 24 hr tablet Take 1 tablet (50 mg) by mouth daily 90 tablet 3     pregabalin (LYRICA) 50 MG capsule One in the am, one in the afternoon and 2 at night. 360 capsule 1     methotrexate 2.5 MG tablet CHEMO Take 7 tablets (17.5 mg) by mouth once a week Labs due every 8-12 weeks 28 tablet 4     omeprazole (PRILOSEC) 20 MG CR capsule Take 2 capsules (40 mg) by mouth 2 times daily 60 capsule 3     oxybutynin (DITROPAN) 5 MG tablet Take 1 tablet (5 mg) by mouth daily 90 tablet 3     folic acid (FOLVITE) 1 MG tablet Take 2 tablets (2 mg) by mouth daily 180 tablet 4     selegiline 5 MG TABS 1 tablet In the morning 180 tablet 3     ORDER FOR DME Use your CPAP device as directed by your provider.       acetaminophen (TYLENOL) 500 MG tablet Take 1 tablet by mouth as needed. For pain       Cholecalciferol (VITAMIN D) 1000 UNITS capsule Take 2 capsules by mouth daily.           Thong Yeh for HPI/ROS submitted by the patient on 8/24/2017   General Symptoms: Yes  Skin Symptoms: No  HENT Symptoms: Yes  EYE SYMPTOMS: Yes  HEART SYMPTOMS: No  LUNG SYMPTOMS: Yes  INTESTINAL SYMPTOMS: Yes  URINARY SYMPTOMS: Yes  REPRODUCTIVE SYMPTOMS: No  SKELETAL SYMPTOMS: Yes  BLOOD SYMPTOMS: No  NERVOUS SYSTEM SYMPTOMS: Yes  MENTAL HEALTH SYMPTOMS: No  Fever: No  Loss of appetite: No  Weight loss:  No  Weight gain: No  Fatigue: Yes  Night sweats: No  Chills: No  Increased stress: No  Excessive hunger: No  Excessive thirst: No  Feeling hot or cold when others believe the temperature is normal: Yes  Loss of height: No  Post-operative complications: No  Surgical site pain: No  Hallucinations: No  Change in or Loss of Energy: Yes  Hyperactivity: No  Confusion: No  Ear pain: No  Ear discharge: No  Hearing loss: No  Tinnitus: Yes  Nosebleeds: No  Congestion: Yes  Sinus pain: No  Trouble swallowing: Yes   Voice hoarseness: Yes  Mouth sores: No  Sore throat: Yes  Tooth pain: No  Gum tenderness: No  Bleeding gums: No  Change in taste: No  Change in sense of smell: No  Dry mouth: Yes  Hearing aid used: No  Neck lump: No  Eye pain: Yes  Vision loss: No  Dry eyes: Yes  Watery eyes: No  Eye bulging: No  Double vision: Yes  Flashing of lights: No  Spots: No  Floaters: No  Redness: Yes  Crossed eyes: No  Tunnel Vision: No  Yellowing of eyes: No  Eye irritation: Yes  Cough: Yes  Sputum or phlegm: Yes  Coughing up blood: No  Difficulty breating or shortness of breath: Yes  Snoring: No  Wheezing: Yes  Difficulty breathing on exertion: Yes  Respiratory pain: No  Nighttime Cough: No  Difficulty breathing when lying flat: No  Heart burn or indigestion: Yes  Nausea: No  Vomiting: No  Abdominal pain: Yes  Bloating: Yes  Constipation: Yes  Diarrhea: No  Blood in stool: Yes  Black stools: No  Rectal or Anal pain: No  Fecal incontinence: No  Rectal bleeding: Yes  Yellowing of skin or eyes: No  Vomit with blood: No  Change in stools: No  Hemorrhoids: Yes  Trouble holding urine or incontinence: Yes  Pain or burning: No  Trouble starting or stopping: No  Increased frequency of urination: No  Blood in urine: No  Decreased frequency of urination: No  Frequent nighttime urination: No  Flank pain: Yes  Difficulty emptying bladder: No  Back pain: Yes  Muscle aches: Yes  Neck pain: Yes  Swollen joints: Yes  Joint pain: Yes  Bone pain:  Yes  Muscle cramps: Yes  Muscle weakness: Yes  Joint stiffness: Yes  Bone fracture: No  Trouble with coordination: Yes  Dizziness or trouble with balance: Yes  Fainting or black-out spells: No  Memory loss: No  Headache: No  Seizures: No  Speech problems: No  Tingling: Yes  Weakness: Yes  Difficulty walking: Yes  Paralysis: No  Numbness: Yes      Again, thank you for allowing me to participate in the care of your patient.      Sincerely,    Thong Montes MD

## 2017-08-29 NOTE — PATIENT INSTRUCTIONS
Diagnosis/Summary/Recommendations:    PATIENT: Lucio Daly  67 year old male     : 1950    EVELIA: 2017    Tremor - is about the same and is not markedly worse.  Selegiline 5 mg in the am  Held off on dopamine (datscan) scan - was concerned about iodine contrast.  Discussed weaning off the selegiline and the option of using rasagiline if needed.     Neck and back issues - seeing neurosurgery and has had imaging done of his cervical and lumbar spine  Mri c spine  Cervical spondylosis resulting in moderate bilateral neural foraminal  stenosis and mild spinal canal stenosis C5-C7, slightly progressed at  the C6-7 level since 3/21/2006. No cord signal abnormality.    Mri lumbar spine Impression:   1. Multilevel lumbar spondylosis with increased moderate to severe  spinal canal stenosis at L4-5. Unchanged mild to moderate bilateral  neural foraminal stenosis at L5-S1.  2. Mild periarticular edema associated L5-S1 joints bilaterally, not  significantly changed from prior and likely represents active  degenerative arthropathy.    Neuropathy  lyrica 3-4 times per day. 1-1-2 is his scheduled and medication is coming from his pcp  He had problems with trileptal and neurontin in the past.     Cognitive testing - has not been repeated.     RA issues.   On plaquenil and methotrexate.  Still taking folate, vitamin d    Taking zithromax for bronchitis    Taking metoprolol and omeprazole    Still using bipap    Bladder = remains on the ditropan    He is going to pain management clinic to see if there is anything besides pain medications for his symptoms, ie h e has not yet done pool therapy.      PLAN  Go off the selegiline by going to every other day for a week or so and then stop it and see how you are doing.     If needed you could go back on this or try 1mg of rasagiline    Return back in 9months to one year.       Over 50% of this visit was spent in patient care and care coordination.     History obtained  from patient    Total visit time was 25 minutes      Thong Montes MD

## 2017-08-29 NOTE — LETTER
2017      RE: Lucio Daly  [address]  [place] [address]       Diagnosis/Summary/Recommendations:    PATIENT: Lucio Daly  67 year old male     : 1950    EVELIA: 2017    Tremor - is about the same and is not markedly worse.  Selegiline 5 mg in the am  Held off on dopamine (datscan) scan - was concerned about iodine contrast.  Discussed weaning off the selegiline and the option of using rasagiline if needed.     Neck and back issues - seeing neurosurgery and has had imaging done of his cervical and lumbar spine  Mri c spine  Cervical spondylosis resulting in moderate bilateral neural foraminal  stenosis and mild spinal canal stenosis C5-C7, slightly progressed at  the C6-7 level since 3/21/2006. No cord signal abnormality.    Mri lumbar spine Impression:   1. Multilevel lumbar spondylosis with increased moderate to severe  spinal canal stenosis at L4-5. Unchanged mild to moderate bilateral  neural foraminal stenosis at L5-S1.  2. Mild periarticular edema associated L5-S1 joints bilaterally, not  significantly changed from prior and likely represents active  degenerative arthropathy.    Neuropathy  lyrica 3-4 times per day. 1-1-2 is his scheduled and medication is coming from his pcp  He had problems with trileptal and neurontin in the past.     Cognitive testing - has not been repeated.     RA issues.   On plaquenil and methotrexate.  Still taking folate, vitamin d    Taking zithromax for bronchitis    Taking metoprolol and omeprazole    Still using bipap    Bladder = remains on the ditropan    He is going to pain management clinic to see if there is anything besides pain medications for his symptoms, ie h e has not yet done pool therapy.      PLAN  Go off the selegiline by going to every other day for a week or so and then stop it and see how you are doing.     If needed you could go back on this or try 1mg of rasagiline    Return back in 9months to one year.       Over 50% of this  visit was spent in patient care and care coordination.     History obtained from patient    Total visit time was 25 minutes      Thong Montes MD     ______________________________________    Last visit date and details:     Tremor  Has intermittent tremors.   Has feeling of internal tremor  Taking selegiline 5mg once daily   May have had some improvement with this medication. Has not recently gone off this or tried rasagiline instead.  Potential interaction with other medications with selegiline; less so with the more expensive rasagline.      Neuropathy - worse based on testing.  - see Dr. Gonzalez's note below.  Presumed autoimmune vs idiopathic related.   No scheduled return to see Dr. Gonzalez at this time. Therapy had been arranged.   lyrica is coming from his primary doctor and he is taking 50mg 3/day     Seen by Dr. Ríos and had improvement in his ankle flexibility. Has had numbness in his feet.      Has had muscle pain. Arthritis  Methotrexate 2.5mg 7 tabs once weekly  Plaquenil 200mg 1 tablet twice daily  Taking folic acid  Has voltaren gel.   Sees Dr. Goodrich     Following dermatology  Dr. Corley who left and now seeing Dr. Najera     cv - hypertension.   Taking metroprolol 50mg per day      Lung problems. Presumed interstitial lung disease associated with RA  Dr. Mccarthy.   Not on inhaler because of possible interaction with the selegiline.   Not clear if rasagiline could be taken instead of selegiline.      Has had problems with sleep despite u sing bipap. Goes to bed at 11/12 and wakes up at 5am and alays in bed till 630  Has nightmares. Sees Dr. Martinez     Bowel issues with frequent bm's or constipation  Has had pain in esophagus, or other areas with pain on swallowing and has had gas, belching  Taking omeprazole 20mg twice daily      Bladder   Continues on the oxybutynin 5mg once daily     Has had changes in body temperature     Has had changes in his voice     Has had tinnitus and light  headedness that is worse in evening     Has had increased saliva despite having a dry mouth, eyes and skin  He has been emotional with low libido     Has had nasal congestion  Has had low libido     PLAN  Talked about mood issues and the possibility of seeing a counselor a few times to discuss his health. This is something i highly recommended to him to help him cope with his health issues as well as how to interact with his wife in travel plans, etc.   He is not keen on starting another medication to help his mood and does not think he has the same symptoms as his sister does (who is depressed); many antidepressants have the potential to interact with selegiline; less so interactions with rasagiline which is more expensive than selegiline.      Discussed considering weaning off selegiline or/and switching to rasagiline if rasagiline can be taken with less interactions.      He had cognitive testing in the past; would hold off on this for the time being - future order placed with Dr. Mojica for neuropsychological evaluation if desired.      He does not appear to have parkinson's disease. Discussed the pros and cons of doing a dopamine (DATSCAN) scan to evaluate for a dopamine deficiency as the cause of some of his symptoms, i.e., look for a degenerative parkinsonian condition which was be seen with an abnormal scan. I suspect that his scan would be normal. i put in a future order so that it could be done if desired.     Encouraged him to do pool therapy if able.      Return back in 9 months.     Neurosurgery Clinic Consult  Date of Visit: 7/27/2017  Referred for: Back pain and bilateral leg pain left worse than right.  Referring Provider: Ivan Aguilera        Dear Dr. Gil Jacques,     We were happy to see Lucio GRIFFIN Malika , a pleasant 66 year old year old male for back and leg pain.     HPI:  As you may recall this nice gentleman has a long and complicated past medical history. He's had suspicions of a  rheumatologic disorder raised as far back as age 28 but since he is seronegative nothing was ever found. About 5 years ago, perhaps more, he started seeing Dr. Gonzalez for problems with neuropathy. There was no specific cause identified but there were some concerns about possible Parkinson's disease, a referral was made to  who pretty much ruled out Parkinson's but did note an elevated CRP and referred hand to rheumatology who ultimately did diagnose rheumatoid arthritis, sicca syndrome, and since then he's been noted to have multiple peripheral joints affected, his hands and knees ankles many fingers thumbs. He also has interstitial lung disease as a result, and while he is not on supplemental oxygen as yet his pulmonologist feels that he would be too bigger risk for surgery on his joints and expressed that opinion to the patient about 6 months ago.   Other medical history includes a malignant melanoma removed from his right leg 4 or 5 years ago, he believes it was a very low-grade, probably grade 1.     Regarding his back and the consult today, he first started developing back pain a few years ago and saw a sports medicine physician who diagnosed him with spinal stenosis after an MRI was done. He was told that surgery would probably be needed eventually and the patient was asked if he wanted to see a surgeon. He did not at that time so referral was not made. Physical therapy was tried, it made it worse. Over time his back pain is leg pain got worse so we went back to the sports medicine, the same opinion was offered and he still declined a surgical opinion.  A number of years ago he had a few shots in the back but they didn't seem to help much.     Over the last couple of years pain in his back and his legs has gotten increasingly worse. Located in the bilateral lumbar spine down the backs of the legs to the calves. His feet are clumsy and he's catching his toes sometimes on stairs and on carpet.   He feels  off balance. He has numbness in his calves and his toes. His pain and numbness is worse when he is standing and walking around, he can only walk sometimes a few feet. It's better if he sits down, if he is going shopping he must lean over a shopping cart or he can't make it all the way around the store.     Other symptoms include pain in the trapezii bilaterally, forearms on the way down to all the fingers both hands, numbness and fingers, clumsiness in the hands and fingers. He doesn't button buttons very well. His balance is off. He has to use a cane because of the balance. And his legs sometimes give out this can appear randomly, even if there is no pain. He's had a recent MRI of the lumbar spine and presents with that. Last MRI of the cervical spine was in 2006.     He's had no recent treatment to speak of.  His/Her current symptoms (pain, numbness and weakness), a detailed description of alleviating or exacerbating factors, and pain scores are outlined below.     Patient Supplied Answers To the  Pain Questionnaire  [facility] -  Patient Entered Questionnaire/Answers 7/17/2017   What number best describes your pain right now:  0 = No pain  to  10 = Worst pain imaginable 6   How would you describe the pain? burning, sharp, numbness, dull, aching, throbbing   Which of the following worsen your pain? lying down, standing, sitting, walking   Which of the following improve or reduce your pain?  sitting   What number best describes your average pain for the past week:  0 = No pain  to  10 = Worst pain imaginable 6   What number best describes your LOWEST pain in past 24 hours:  0 = No pain  to  10 = Worst pain imaginable 2   What number best describes your WORST pain in past 24 hours:  0 = No pain  to  10 = Worst pain imaginable 9   When is your pain worst? AM, PM   What non-medicine treatments have you already had for your pain? physical therapy, spine injections (shots)   Have you tried treating your pain with  medication?  Yes   Are you currently taking medications for your pain? Yes       strongly positive shopping cart sign.   *  PAIN:  See diagram  *  NUMBNESS:   Feet and both hands          QUALITY:   tingling,   *  WEAKNESS:  Left greater than right collapses after walking sometimes short distance. Sometimes random  *  BOWEL/BLADDER FUNCTION: No change  *  OTHER SYMPTOMS:   Balance. Hands feel uncoordinated        Current Outpatient Prescriptions:      hydroxychloroquine (PLAQUENIL) 200 MG tablet, Take 1 tablet (200 mg) by mouth 2 times daily Send copy of recent eye exam as need for refills. Please have a copy of your eye exam faxed to [phone]., Disp: 180 tablet, Rfl: 0     metoprolol (TOPROL-XL) 50 MG 24 hr tablet, Take 1 tablet (50 mg) by mouth daily, Disp: 90 tablet, Rfl: 3     pregabalin (LYRICA) 50 MG capsule, One in the am, one in the afternoon and 2 at night., Disp: 360 capsule, Rfl: 1     methotrexate 2.5 MG tablet CHEMO, Take 7 tablets (17.5 mg) by mouth once a week Labs due every 8-12 weeks, Disp: 28 tablet, Rfl: 4     omeprazole (PRILOSEC) 20 MG CR capsule, Take 2 capsules (40 mg) by mouth 2 times daily, Disp: 60 capsule, Rfl: 3     oxybutynin (DITROPAN) 5 MG tablet, Take 1 tablet (5 mg) by mouth daily, Disp: 90 tablet, Rfl: 3     folic acid (FOLVITE) 1 MG tablet, Take 2 tablets (2 mg) by mouth daily, Disp: 180 tablet, Rfl: 4     selegiline 5 MG TABS, 1 tablet In the morning, Disp: 180 tablet, Rfl: 3     ORDER FOR DME, Please measure and distribute 1 pair of 20mmHg - 30mmHg thigh high open or closed toe compression stockings. Jobst ultrasheer or equivalent., Disp: 1 each, Rfl: 0     ORDER FOR DME, Please measure and distribute 1 pair of 20mmHg - 30mmHg knee high open or closed toe compression stockings. Jobst ultrasheer or equivalent., Disp: 2 each, Rfl: 10     ORDER FOR DME, Use your CPAP device as directed by your provider., Disp: , Rfl:      acetaminophen (TYLENOL) 500 MG tablet, Take 1 tablet by  mouth as needed. For pain, Disp: , Rfl:      Cholecalciferol (VITAMIN D) 1000 UNITS capsule, Take 2 capsules by mouth daily., Disp: , Rfl:           Allergies   Allergen Reactions     Restasis         Burning eyes, problems with breathing, tightness in chest     Adhesive Tape         Bandages misc     Allegra         EXCESSIVE URINATION AND WEAKNESS, LIGHT-HEADED     Allergy         Dust     Animal Dander       Benadryl Allergy         EXCESSIVE URINATION AND WEAKNESS, LIGHT-HEADED     Cephalexin         Joint pain or gerd aggravation. Bloating excessive urination      Cephalosporins       Doxycycline Hyclate Nausea       SWEATING,MIGRAINES,LOSS OF APPETITE,SWEATING,LIGHT HEADED, EXCESSIVE URINATION     Flonase [Fluticasone Propionate]       Gabapentin         Neurontin: mosd changes and excess urination     Iodine Solution [Povidone Iodine]         SKIN MELTS     Levaquin         From surgeon--? Joint pain ? Gerd aggravation. Insomnia, excess urination     Mylanta         EXCESSIVE URINATION AND WEAKNESS, LIGHT-HEADED     Prednisone         Weakness, elevated bp, headache, eye pain, congestion      Seafood [Seafood]       Shellfish Allergy         Hives     Sulfamethoxazole-Trimethoprim         Chest pain, angina     Trees       Trileptal         SEVERE JOINT AND TENDON PAIN, INSOMNIA, RESTLESSNESS, NAUSEA, EXCESS URINATION     Cortizone Rash       EXCESS URINATION,WEAKNESS,NAUSEA, HEADACHE        Past Medical History         Past Medical History:   Diagnosis Date     Abn involun movement NEC       Movement Disorder     Abnormal EMG 4/18/2013     AK (actinic keratosis) 12/18/2011     Allergic rhinitis, cause unspecified       Allergic rhinitis     Balance problems 11/1/2011     Basal cell carcinoma       Bladder spasms 11/1/2011     Chronic osteoarthritis       Diaphragmatic hernia without mention of obstruction or gangrene       Earache or other ear, nose, or throat complaint       Esophageal reflux       Fatigue  11/1/2011     Fracture       H. pylori infection 5/12/2011     History of MRI of cervical spine 11/18/2013     EXAMINATION: CERVICAL SPINE G/E 5 VIEWS* 4/19/2013 4:18 PM  CLINICAL HISTORY: Pain in limb,Performing Location?->[facility] ([facility]),  COMPARISON:  FINDINGS: AP and lateral views in flexion and extension, as well as odontoid view of the cervical spine was obtained. There is no comparison available. The vertebral bodies of the cervical spine are normally aligned. There is posterior spurring and d     Incomplete defecation 11/1/2011     Interstitial lung disease (H) 11/29/2016     Laboratory test 8/7/2012     Lung disease June 2015     Malignant basal cell neoplasm of skin 8/6/2008     Melanoma (H) 8/6/2008     Melanoma in situ of lower leg (H)       R calf     Neuropathy (H) 5/16/2011     Other bladder disorder       Other color vision deficiencies       Other nervous system complications       Parkinsonism (H) 11/1/2011     Personal history of colonic polyps       Polyneuropathy in other diseases classified elsewhere (H)       RA (rheumatoid arthritis) (H)       Rheumatoid arthritis of multiple sites with negative rheumatoid factor (H) 3/21/2016     Seronegative arthritis 11/18/2013     Shortness of breath       Shoulder arthritis 2016     acromioclavicular joint      Somatization disorder 5/12/2011     Squamous cell carcinoma       Tremor 11/1/2011     Unspecified essential hypertension       Unspecified hypothyroidism       Urinary tract infection       Urinary urgency 11/1/2011     Wears glasses 11/1/2011           Past Surgical History          Past Surgical History:   Procedure Laterality Date     BIOPSY OF SKIN LESION         COLONOSCOPY         HEMORRHOID SURGERY         lip biopsy         for sicca complex     MOHS MICROGRAPHIC PROCEDURE         SOFT TISSUE SURGERY         removeal of basel cell carcinoma           Family History           Family History   Problem Relation Age of Onset      Hypertension Mother       HEART DISEASE Mother         a fib     Arthritis Mother       Other Cancer Mother       OSTEOPOROSIS Mother       CANCER Mother       Skin Cancer Mother       Hypertension Brother       DIABETES Brother       Neurologic Disorder Sister         multiple sclerosis     Hypertension Sister       HEART DISEASE Sister       HEART DISEASE Sister         a fib     Arthritis Sister       Hypertension Father       CEREBROVASCULAR DISEASE Father         ,     Skin Cancer Father       Psoriasis Maternal Grandfather       CANCER Maternal Grandfather       Other Cancer Maternal Grandfather       Congenital Anomalies Other       CEREBROVASCULAR DISEASE Paternal Grandmother         ,     CEREBROVASCULAR DISEASE Paternal Grandfather         ,     Other Cancer Sister       OSTEOPOROSIS Sister       Melanoma No family hx of             Social History    Social History            Social History     Marital status:        Spouse name: N/A     Number of children: N/A     Years of education: N/A          Occupational History     Not on file.            Social History Main Topics     Smoking status: Former Smoker       Packs/day: 1.50       Years: 37.00       Types: Cigarettes       Start date: 6/15/1963       Quit date: 9/30/2000     Smokeless tobacco: Never Used     Alcohol use No     Drug use: No     Sexual activity: No           Other Topics Concern     Parent/Sibling W/ Cabg, Mi Or Angioplasty Before 65f 55m? No          Social History Narrative     2013: Living in [place] in a townhouse with no steps     Has 3 sons that are doing okay.            Dairy/d 1 servings/d.      Caffeine 0 servings/d     Exercise 0 x week     Sunscreen used - No     Seatbelts used - Yes     Working smoke/CO detectors in the home - Yes     Guns stored in the home - Yes     Self Breast Exams - NA     Self Testicular Exam - Yes     Eye Exam up to date - Yes 2008     Dental Exam up to date - Yes 2006     Pap Smear up to date - NA  "    Mammogram up to date - NA     PSA up to date - Yes 2008     Dexa Scan up to date - No     Flex Sig / Colonoscopy up to date - Yes less than 5 yrs ago     Immunizations up to date -today     Abuse: Current or Past(Physical, Sexual or Emotional)- No     Do you feel safe in your environment - Yes     2008                           Problem list and 13 point review of systems: is reviewed in Epic and is negative with the exception of those symptoms associated with HPI and PMH.       OBJECTIVE:   /82  Pulse (!) 47  Ht 1.854 m (6' 1\")  Wt (!) 136.5 kg (301 lb)  BMI 39.71 kg/m2     Imaging:  These are the pertinent radiologist's findings from:  MRI lumbar WO 6/27/17@ [facility]. Spinal stenosis noted at L4 5 is not as severe as I would expect given the severity of the symptoms and the redundancy of the nerve roots. My suspicion is that his listhesis at that level worsens when he is standing. Standing flexion-extension views were performed today..   1. Multilevel lumbar spondylosis with increased moderate to severe  spinal canal stenosis at L4-5. Unchanged mild to moderate bilateral  neural foraminal stenosis at L5-S1.  2. Mild periarticular edema associated L5-S1 joints bilaterally, not  significantly changed from prior and likely represents active  degenerative arthropathy     Standing flexion-extension x-rays lumbar spine 7/27/17  The reading below indicates there is no evidence of motion.  The flexion images are extremely difficult to see, but in looking at them closely I would disagree with that assessment.  1. Grade I anterolisthesis of L4 on L5, as previously seen, with no  evidence of motion on flexion-extension views.  2.  Multilevel degenerative changes most pronounced of the lumbar  spine.  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 Compared to the previous study, there is some progression, manifesting with significant drop of the right sural SNAP amplitude, whereas the left remains unchanged. " Given that sural nerve responses are still elicitable in a 65 year-old patient, the neuropathy is still considered mild.  It is notable that the sural sensory responses were asymmetric in previous studies done in 2010 and 2012 (left >50% smaller than the right) and they have now become equal. 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 Clinical correlation is recommended.  2. No electrodiagnostic evidence of a neuromuscular junction disorder or myopathy.              3. Possible mild left median and ulnar sensory neuropathies at the wrist.     See the full report in EPIC/Media tab.  I personally reviewed the images with the patient.       Exam:  Pertinent positives:  He has brisk reflexes at the bilateral patella, bilateral biceps, no Sancho's or clonus and a mute Babinski's. Atrophy in distal musculature right gastroc, left foot. He has a thumb splint on the right hand/wrist. Sensation is diminished in fingers and toes bilaterally. His gait is slightly wide and tentative.  There is no focal specific weakness detectable.     *   Well developed, well nourished male found seated comfortably in exam room chair.  He is able to sit and rise independently.  He is unaccompanied.    *  Mental Status  A&O X3.  Bright, alert, affable, interactive. Language fluid, fund of knowledge intact. Good historian.  Mood and affect congruent and WNL.   *  Cranial Nerves  II: Able to read printed forms, VF full to gross confrontation.  III: IV, VI:  PERRLA, EOMI, No nystagmus, no ptosis.  V: Sensation intact in bilateral V1, V2, and V3. Jaw clench symmetric.    VII:  Intact to voice bilaterally   Pushes tongue against bilateral cheeks.  X:  Palate elevates, uvula midline, phonation intact.  XI: Elevates shoulders, head turn intact.  XII: Tongue midline. No fasciculations.  *  Cerebellar:  Finger nose slow but accurate. Heel shin intact but slowed.     *  Frankie-cranial:    No drift.  *  Cervical Spine:  DTR's at Biceps  3/4 bilaterally. Triceps 2/4 bilaterally.   Sensation diminished in fingertips..    No Raygoza's. No Phalen's.  No Tinel's. No fasciculations.   Muscle bulk diminished distally.   tone WNL.  Upper Extremity Strength.               RIGHT                LEFT     Deltoid              5/5                   5/5       Biceps              5/5                   5/5        Triceps              5/5                   5/5       Wrist Extensor              5/5                   5/5                     5/5                    5/5       Interossei              5/5                   5/5       EPL    painful; did not examine                   5/5       Pinch              5/5                  5/5                *  Lumbar Spine:    DTR's Patellar 3/4 and Achilles 0/4 and symmetric.   No fasciculations.  Muscle bulk diminished distally as above and tone WNL.  No Clonus.  Sensation diminished distally.    Lower Extremity Strength                    RIGHT                   LEFT     Iliopsoas                    5/5                      5/5       Quad                    5/5                        5/5       Hamstring                      5/5                        5/5         Gastrocs                    5/5                        5/5       Tib. Anterior                     5/5                        5/5       EHL                      5/5                        5/5       Babinski               mute                                  Down                         *  Structural Exam  Inspection of the spine reveals no scoliosis, exaggerated kyphosis or lordosis. Cervical spine ROM tight. No spasming. No tenderness to palpation.  No Spurling's or L'Hermitte's.   Lumbar ROM tight  Able to doff and don shoes minimal difficulty. No spasming, no tenderness, no step offs. No SLR.  No SI tenderness. No trochanteric bursal tenderness.   Feet are warm, intact dorsalis pedis pulses, pink, brisk capillary refill.  Gait slightly wide., tentative, no  scissoring. No antalgia. Tandem gait tentative.   *  Sensory level intact.      ASSESSMENT/PLAN:  1. Cervical spondylosis with myelopathy    2. Spondylolisthesis, lumbar region    3. Spinal stenosis of lumbar region with neurogenic claudication       I believe Mr. Barbosa suffers from lumbar spondylolisthesis, and neurogenic claudication, worsened by mechanical slip of L4 on 5. A simple decompression would be reasonable, but because of the listhesis a fusion will most likely be required. This of course is a much more invasive surgery, much longer time under anesthesia, bigger blood loss, more stress on the system, a much bigger cardiopulmonary challenge.  The surgical challenges are daunting.   I discussed all this with him today. As a result my plan will be to try to manage him with nonoperative therapy for as long as possible.   He is already established with palliative/pain management and I plan to partner with them for ongoing care.  Should he come to need neurosurgical intervention we will work closely with his pulmonologist for pulmonary tune up and perioperative risk assessment and recommendations.  I answered all of his questions about this aspect of his care. He understands the issues and the concerns and is in agreement with the plan as we laid out today.     Additionally I believe he also showing signs of cervical myelopathy, his reflexes are significantly brisker than they were as documented year ago. As early as 2006 he had cervical spondylosis, but there was no cord impingement at that time. He's had a long history of gradually increasing numbness, clumsiness, and atrophy of musculature but without change in reflexes until now there was no reason to think spinal cord was playing a role. I'll plan to repeat his cervical MRI and see him in clinic after that's completed to review the findings.     I have not made interim treatment plans     I discussed all this in depth with him. I answered his questions  and those questions indicate he has a good understanding of the situation. We have supplied him with this as cards and telephone numbers and urged him to call if he has any questions comments or concerns.     Thank you for allowing us to participate in the care of this very pleasant patient. We appreciate your confidence in the [facility], Department of Neurosurgery.        Best Regards,     Cara Carrillo PA-C  [facility]  Department of Neurosurgery  Phone: [phone]  Fax: [phone]           Total time: 60 minutes with more than 30 minutes spent in direct face to face contact reviewing films, providing education, counseling, the importance of good health habits, non-operative therapies,and indications for surgery as well as further follow up.      ______________________________________      Patient was asked about 14 Review of systems including changes in vision (dry eyes, double vision), hearing, heart, lungs, musculoskeletal, depression, anxiety, snoring, RBD, insomnia, urinary frequency, urinary urgency, constipation, swallowing problems, hematological, ID, allergies, skin problems: seborrhea, endocrinological: thyroid, diabetes, cholesterol; balance, weight changes, and other neurological problems and these were not significant at this time except for   Patient Active Problem List   Diagnosis     Diaphragmatic hernia     Esophageal reflux     Hx of melanoma of skin     Malignant basal cell neoplasm of skin     GALO (obstructive sleep apnea)     Chronic maxillary sinusitis     Urinary incontinence     H. pylori infection     Sicca syndrome (H)     Health Care Home     Advanced directives, counseling/discussion     Tremor     Visual changes     Incomplete defecation     Gait disorder     Balance problems     Fatigue     High risk medications (not anticoagulants) long-term use     Personal history of other malignant neoplasm of skin     AK (actinic keratosis)     Pain in  joint, lower leg     Abnormal EMG     Seronegative arthritis     History of MRI of cervical spine     Pain in limb     Adenomatous polyp of colon     Peripheral polyneuropathy (H)     Rheumatoid arthritis of multiple sites with negative rheumatoid factor (H)     PAD (peripheral artery disease) (H)     Morbid obesity (H)     Interstitial lung disease (H)     History of MRI of lumbar spine          Allergies   Allergen Reactions     Restasis      Burning eyes, problems with breathing, tightness in chest     Adhesive Tape      Bandages misc     Allegra      EXCESSIVE URINATION AND WEAKNESS, LIGHT-HEADED     Allergy      Dust     Animal Dander      Benadryl Allergy      EXCESSIVE URINATION AND WEAKNESS, LIGHT-HEADED     Cephalexin      Joint pain or gerd aggravation. Bloating excessive urination      Cephalosporins      Doxycycline Hyclate Nausea     SWEATING,MIGRAINES,LOSS OF APPETITE,SWEATING,LIGHT HEADED, EXCESSIVE URINATION     Flonase [Fluticasone Propionate]      Gabapentin      Neurontin: mosd changes and excess urination     Iodine Solution [Povidone Iodine]      SKIN MELTS     Levaquin      From surgeon--? Joint pain ? Gerd aggravation. Insomnia, excess urination     Mylanta      EXCESSIVE URINATION AND WEAKNESS, LIGHT-HEADED     Prednisone      Weakness, elevated bp, headache, eye pain, congestion      Seafood [Seafood]      Shellfish Allergy      Hives       Sulfamethoxazole-Trimethoprim      Chest pain, angina     Trees      Trileptal      SEVERE JOINT AND TENDON PAIN, INSOMNIA, RESTLESSNESS, NAUSEA, EXCESS URINATION     Cortizone Rash     EXCESS URINATION,WEAKNESS,NAUSEA, HEADACHE     Past Surgical History:   Procedure Laterality Date     BIOPSY OF SKIN LESION       COLONOSCOPY       HEMORRHOID SURGERY       lip biopsy      for sicca complex     MOHS MICROGRAPHIC PROCEDURE       SOFT TISSUE SURGERY      removeal of basel cell carcinoma     Past Medical History:   Diagnosis Date     Abn involun movement NEC      Movement Disorder     Abnormal EMG 4/18/2013     AK (actinic keratosis) 12/18/2011     Allergic rhinitis, cause unspecified     Allergic rhinitis     Balance problems 11/1/2011     Basal cell carcinoma      Bladder spasms 11/1/2011     Chronic osteoarthritis      Diaphragmatic hernia without mention of obstruction or gangrene      Earache or other ear, nose, or throat complaint      Esophageal reflux      Fatigue 11/1/2011     Fracture      H. pylori infection 5/12/2011     History of MRI of cervical spine 11/18/2013    EXAMINATION: CERVICAL SPINE G/E 5 VIEWS* 4/19/2013 4:18 PM  CLINICAL HISTORY: Pain in limb,Performing Location?->[facility]),  COMPARISON:  FINDINGS: AP and lateral views in flexion and extension, as well as odontoid view of the cervical spine was obtained. There is no comparison available. The vertebral bodies of the cervical spine are normally aligned. There is posterior spurring and d     Incomplete defecation 11/1/2011     Interstitial lung disease (H) 11/29/2016     Laboratory test 8/7/2012     Lung disease June 2015     Malignant basal cell neoplasm of skin 8/6/2008     Melanoma (H) 8/6/2008     Melanoma in situ of lower leg (H)     R calf     Neuropathy (H) 5/16/2011     Other bladder disorder      Other color vision deficiencies      Other nervous system complications      Parkinsonism (H) 11/1/2011     Personal history of colonic polyps      Polyneuropathy in other diseases classified elsewhere (H)      RA (rheumatoid arthritis) (H)      Rheumatoid arthritis of multiple sites with negative rheumatoid factor (H) 3/21/2016     Seronegative arthritis 11/18/2013     Shortness of breath      Shoulder arthritis 2016    acromioclavicular joint      Somatization disorder 5/12/2011     Squamous cell carcinoma      Tremor 11/1/2011     Unspecified essential hypertension      Unspecified hypothyroidism      Urinary tract infection      Urinary urgency 11/1/2011     Wears glasses 11/1/2011      Social History     Social History     Marital status:      Spouse name: N/A     Number of children: N/A     Years of education: N/A     Occupational History     Not on file.     Social History Main Topics     Smoking status: Former Smoker     Packs/day: 1.50     Years: 37.00     Types: Cigarettes     Start date: 6/15/1963     Quit date: 9/30/2000     Smokeless tobacco: Never Used     Alcohol use No     Drug use: No     Sexual activity: No     Other Topics Concern     Parent/Sibling W/ Cabg, Mi Or Angioplasty Before 65f 55m? No     Social History Narrative    2013: Living in [place] in a townhouse with no steps    Has 3 sons that are doing okay.         Dairy/d 1 servings/d.     Caffeine 0 servings/d    Exercise 0 x week    Sunscreen used - No    Seatbelts used - Yes    Working smoke/CO detectors in the home - Yes    Guns stored in the home - Yes    Self Breast Exams - NA    Self Testicular Exam - Yes    Eye Exam up to date - Yes 2008    Dental Exam up to date - Yes 2006    Pap Smear up to date - NA    Mammogram up to date - NA    PSA up to date - Yes 2008    Dexa Scan up to date - No    Flex Sig / Colonoscopy up to date - Yes less than 5 yrs ago    Immunizations up to date -today    Abuse: Current or Past(Physical, Sexual or Emotional)- No    Do you feel safe in your environment - Yes    2008                   Drug and lactation database from the [place] [employer] of Medicine:  http://toxnet.nlm.nih.gov/cgi-bin/sis/htmlgen?LACT      B/P: Data Unavailable, T: Data Unavailable, P: Data Unavailable, R: Data Unavailable 0 lbs 0 oz  There were no vitals taken for this visit., There is no height or weight on file to calculate BMI.  Medications and Vitals not listed above were documented in the cart and reviewed by me.     Current Outpatient Prescriptions   Medication Sig Dispense Refill     azithromycin (ZITHROMAX) 250 MG tablet Two tablets first day, then one tablet daily for four days. 6 tablet 0      hydroxychloroquine (PLAQUENIL) 200 MG tablet Take 1 tablet (200 mg) by mouth 2 times daily Send copy of recent eye exam as need for refills. Please have a copy of your eye exam faxed to [phone]. 180 tablet 0     metoprolol (TOPROL-XL) 50 MG 24 hr tablet Take 1 tablet (50 mg) by mouth daily 90 tablet 3     pregabalin (LYRICA) 50 MG capsule One in the am, one in the afternoon and 2 at night. 360 capsule 1     methotrexate 2.5 MG tablet CHEMO Take 7 tablets (17.5 mg) by mouth once a week Labs due every 8-12 weeks 28 tablet 4     omeprazole (PRILOSEC) 20 MG CR capsule Take 2 capsules (40 mg) by mouth 2 times daily 60 capsule 3     oxybutynin (DITROPAN) 5 MG tablet Take 1 tablet (5 mg) by mouth daily 90 tablet 3     folic acid (FOLVITE) 1 MG tablet Take 2 tablets (2 mg) by mouth daily 180 tablet 4     selegiline 5 MG TABS 1 tablet In the morning 180 tablet 3     ORDER FOR DME Use your CPAP device as directed by your provider.       acetaminophen (TYLENOL) 500 MG tablet Take 1 tablet by mouth as needed. For pain       Cholecalciferol (VITAMIN D) 1000 UNITS capsule Take 2 capsules by mouth daily.         Thong Montes MD

## 2017-09-26 ENCOUNTER — PRE VISIT (OUTPATIENT)
Dept: GASTROENTEROLOGY | Facility: CLINIC | Age: 67
End: 2017-09-26

## 2017-09-26 NOTE — TELEPHONE ENCOUNTER
Records received from [facility].    Included  Op/Procedure notes:colonoscopy 3/30/15  Upper GI endoscopy 3/30/15  Pathology reports: 3/30/15

## 2017-09-26 NOTE — TELEPHONE ENCOUNTER
1.  Date/reason for appt: 10/05/17 10AM GERD Sx's   2.  Referring provider: CAREY GUZMAN MD   3.  Call to patient (Yes / No - short description): No, pt was referred.   4.  Previous care at / records requested from:  Referral Fabio RAINES 5/26/17    Clinics Chauncey Corley MD -- Recs are in Epic / Images in PACS  Upper GI 3/30/15  GIOVANY Shaikh MD - Faxed cover sheet to req. recs

## 2017-09-29 ASSESSMENT — ENCOUNTER SYMPTOMS
RESPIRATORY PAIN: 0
BLOOD IN STOOL: 1
TINGLING: 1
POLYPHAGIA: 0
WEIGHT GAIN: 0
COUGH: 0
NAUSEA: 1
EYE WATERING: 0
SPEECH CHANGE: 0
INCREASED ENERGY: 0
CONSTIPATION: 1
EYE REDNESS: 0
PARALYSIS: 0
DIZZINESS: 1
BLOATING: 1
TREMORS: 1
WHEEZING: 1
VOMITING: 0
SHORTNESS OF BREATH: 1
COUGH DISTURBING SLEEP: 0
JOINT SWELLING: 1
MYALGIAS: 1
DOUBLE VISION: 1
NECK PAIN: 1
BOWEL INCONTINENCE: 0
SPUTUM PRODUCTION: 0
HEARTBURN: 1
NUMBNESS: 1
POSTURAL DYSPNEA: 0
FEVER: 0
NIGHT SWEATS: 0
DYSPNEA ON EXERTION: 1
LOSS OF CONSCIOUSNESS: 0
WEAKNESS: 1
HEADACHES: 0
DIARRHEA: 0
ARTHRALGIAS: 1
FATIGUE: 1
HALLUCINATIONS: 0
MUSCLE WEAKNESS: 1
EYE PAIN: 1
ALTERED TEMPERATURE REGULATION: 1
ABDOMINAL PAIN: 1
JAUNDICE: 0
SEIZURES: 0
SNORES LOUDLY: 0
MUSCLE CRAMPS: 1
STIFFNESS: 1
MEMORY LOSS: 0
RECTAL BLEEDING: 1
DISTURBANCES IN COORDINATION: 1
BACK PAIN: 1
HEMOPTYSIS: 0
RECTAL PAIN: 0
DECREASED APPETITE: 0
POLYDIPSIA: 0
WEIGHT LOSS: 0
CHILLS: 1
EYE IRRITATION: 0

## 2017-10-05 ENCOUNTER — OFFICE VISIT (OUTPATIENT)
Dept: GASTROENTEROLOGY | Facility: CLINIC | Age: 67
End: 2017-10-05

## 2017-10-05 VITALS
WEIGHT: 304 LBS | HEART RATE: 63 BPM | DIASTOLIC BLOOD PRESSURE: 98 MMHG | HEIGHT: 74 IN | SYSTOLIC BLOOD PRESSURE: 166 MMHG | OXYGEN SATURATION: 94 % | BODY MASS INDEX: 39.01 KG/M2

## 2017-10-05 DIAGNOSIS — R14.1 FLATULENCE, ERUCTATION AND GAS PAIN: ICD-10-CM

## 2017-10-05 DIAGNOSIS — D12.6 ADENOMATOUS POLYP OF COLON, UNSPECIFIED PART OF COLON: ICD-10-CM

## 2017-10-05 DIAGNOSIS — R10.84 ABDOMINAL PAIN, GENERALIZED: ICD-10-CM

## 2017-10-05 DIAGNOSIS — R10.13 ABDOMINAL PAIN, EPIGASTRIC: ICD-10-CM

## 2017-10-05 DIAGNOSIS — R14.3 FLATULENCE, ERUCTATION AND GAS PAIN: ICD-10-CM

## 2017-10-05 DIAGNOSIS — K21.9 GASTROESOPHAGEAL REFLUX DISEASE WITHOUT ESOPHAGITIS: Primary | ICD-10-CM

## 2017-10-05 DIAGNOSIS — R14.2 FLATULENCE, ERUCTATION AND GAS PAIN: ICD-10-CM

## 2017-10-05 RX ORDER — OMEPRAZOLE 40 MG/1
40 CAPSULE, DELAYED RELEASE ORAL DAILY
Qty: 30 CAPSULE | Refills: 3 | Status: SHIPPED | OUTPATIENT
Start: 2017-10-05 | End: 2017-11-14

## 2017-10-05 ASSESSMENT — PAIN SCALES - GENERAL: PAINLEVEL: NO PAIN (0)

## 2017-10-05 NOTE — NURSING NOTE
"Chief Complaint   Patient presents with     Consult     Gerd       Vitals:    10/05/17 1005   BP: (!) 166/98   Pulse: 63   SpO2: 94%   Weight: (!) 137.9 kg (304 lb)   Height: 1.88 m (6' 2\")       Body mass index is 39.03 kg/(m^2).                          "

## 2017-10-05 NOTE — LETTER
10/5/2017       RE: Lucio Daly  [address]  [place] [address]     Dear Colleague,    Thank you for referring your patient, Lucio Daly, to the [facility] AND IBD CLIN[facility] at [facility]. Please see a copy of my visit note below.    CHIEF COMPLAINT:  Acid reflux, also belch, bloat, esophageal spasm.      HISTORY OF PRESENT ILLNESS:  Lucio is a 67-year-old man treated for interstitial lung disease with seronegative Sjogren's syndrome diagnosed at the same time, perhaps about 2011.  He describes acid reflux over years with persistence of symptoms despite being on omeprazole daily.  He has also been on a few other proton pump inhibitors and medications.  He was instructed to increase Prilosec after his negative upper GI endoscopy about 2 years ago and rather split the dose because the Pharmacy spoke up with some questions and contrary thinking.  Lucio is using Omeprazole 20 mg daily with appropriate timing before meals.      Lucio feels as though his throat is on fire much of the time.  He notices also dyspepsia, burping or belching frequently 10-15 minutes after a meal and otherwise more often about 2 hours later.  He has hoarse voice off and on over the weeks and months.  He has a dry cough of uncertain cause though wonders whether it is related to acid reflux.  He tastes acid to the throat occasionally, either early or later in the day.  He does belch frequently and feels as though there is gas buildup.  His abdomen feels full and he has abdominal bloat with discomfort sometimes in the general abdomen, sometimes in the upper half of the abdomen.  He has had a sense of esophageal spasm several times.  The first time he went to his primary provider and had an EKG, which was fine.      During meals, Lucio feels fine at times but may other times feel food going down slowly.  He has gagging and choking during meals about 3 times a week.  At times, he will  be eating cold cereal with milk in the morning and be fine until midway through the cereal.  It takes his breath away and he feels he cannot get a breath.  He only has dysphagia with drier foods.  He has worse sense of either choking or acid reflux with vinaigrette and spice.  With very cold beverages, he has increase in this feeling of esophageal spasm.  He is now allowing his bottled water to remain at room temperature and has much less spasm.      Lucio is on Plaquenil and soon after taking it, has a sense of being lightheaded or weak.  He believes he has more symptoms from the Plaquenil than from the methotrexate.  He has been on Selegiline for years, which was stopped in late August, was given a refill as well as given an alternate medication, Rasagiline, neither of which he uses.      Lucio awakens at 4-5 a.m. and feels hot all over as well as has a sense of indigestion.  He typically passes gas at that time as well.  An hour or so later, he often wakes up and has a bowel movement and feels better.  He does sense that he has primarily an elimination problem intermittently.  He frequently has rapid gastrocolic reflex, even of normal stool.  In the distant past, he was treated for irritable bowel syndrome, though cannot describe exactly what that was or was like.      Lucio had very low vitamin D and since starting vitamin D, he has had less sinus infection.  He has followed with ENT and Allergy in the past.  He notices rapid increase in nasal congestion through the day several times, including anytime he talks on the phone.  He frequently has postnasal drip.  He has had bronchitis 2 times this summer, which rapidly responded to antibiotics.      MEDICAL HISTORY:  Reviewed in the medical record.  Most notable for interstitial lung disease, Sjogren's syndrome, rheumatoid arthritis with a negative rheumatoid factor, polyneuropathy, Parkinsonism, hypothyroidism, essential hypertension, and numerous others as  in the medical record.      SURGICAL HISTORY:  Negative for abdominal surgeries.  He has had MOHS procedures, colonoscopies, hemorrhoid surgery, skin and lip biopsies.      FAMILY HISTORY:  Positive for a sister with multiple sclerosis, brother with history of gallstone pancreatitis, all 3 of his siblings having had their gallbladder out.  Colon polyps were present in his father.  Maternal grandfather had stomach cancer.  No other GI disease or GI condition is known.  Additional autoimmune disorder in maternal grandfather with psoriasis.      SOCIAL HISTORY:  He is a former smoker with no history of smokeless tobacco.  He is not using alcohol or drugs.  He is  and retired with 3 well sons.      REVIEW OF SYSTEMS:  Reviewed and discussed in clinic.  Significant as in HPI.  He has blood from hemorrhoids, he believes, but has declined to have them treated.      OBJECTIVE:   VITAL SIGNS:  Reviewed.  Note hypertension at 166/98, which I do not see occurring previously in the record.  Heart rate 63.  Weight is 304 pounds is within his range of a number of years, at BMI 39.   GENERAL:  Clean, pleasant, well-nourished, casually groomed and dressed man who appears mildly chronically ill.  His eyes are clear.  His breathing is calm and grossly normal.  He has burp a couple times during the visit.  He wears braces on both feet up to the top of the calf.      RESULTS:   Upper GI endoscopy 03/30/2015 with monitored anesthesia care showing fundic gland polyps, normal esophagus and duodenum.  Statement that there had been previous [facility] endoscopies.   Upper GI endoscopy 05/2007 with normal esophagus, erythematous stomach, normal duodenum, H. pylori found.     Colonoscopy 03/30/2015 due to history of adenomatous polyps, small adenomas found ascending and descending 3-4 mm, repeat in 5 years.  These 2015 procedures were done with monitored anesthesia care.   H. pylori antigen was negative 12/2013.   Right  upper quadrant ultrasound 2003 showed gallbladder polyps, simple cysts in the liver.  Cysts in the liver are commented on again at chest CTs, but there are no studies that includes the gallbladder on review thus far.      Laboratory studies including near normal CBC, considering medications, normal complete metabolic panel and a lipid panel over the course of July.  Last normal TSH appears to have been 03/2016, uric acid normal that year.  Distant past normal methylmalonic acid and vitamin B12.      ASSESSMENT:  Lucio is a 67-year-old man with interstitial lung disease, likely Sjogren's syndrome and rheumatoid arthritis, with a number of GI complaints including inadequately treated acid reflux despite no visible signs of erosion at endoscopy 2015, no improvement with slight increase of omeprazole to 20 mg twice daily, additional symptoms of frequent burping, belching, history of constipation, history of IBS, now with occasional symptoms of esophageal spasm triggered primarily by cold, occasional choking, particularly after vinaigrette or spice, and sometimes associated with a sensation of regurgitation.  He may have had additional symptoms while on Selegiline, which is noted to have up to 30% nausea association.  He believes he has side effect soon after taking Plaquenil, moreso than any side effect from his methotrexate.      In view of normal upper GI endoscopy just over 2 years ago, with no odynophagia or dysphagia without spasm sensation and this largely reduced with avoiding cold triggers, we will approach his postprandial symptoms, which occur  10-15 minutes and 2 hours later, and followup on the gallbladder polyps seen years ago.  Recommendations will be made stepwise for his comfort and we will add further studies as needed thereafter.      PLAN:   1.  Abdominal ultrasound right upper quadrant.   2.  HIDA scan.   3.  Omeprazole 40 mg, increase to twice daily for 2 weeks, return to once daily, report how  that goes.   4.  Trial dairy-free to overlap with higher dose omeprazole if he still has symptoms, continue dairy-free at least 2 weeks before adding it back in related to GERD, bloat, flatus.   5.  Follow GERD precautions, including elevation of the head of the bed.   6.  After omeprazole 40 b.i.d., consideration with or without the second omeprazole dose for ranitidine 150-300 mg at bedtime.   7.  Trial recommended of Zyrtec 10 mg daily for 2 weeks.  Does he have less congestion, postnasal drip, dry cough?   8.  Later consideration for esophageal motility and 24-hour pH impedance.   9.  Consider scheduling an appointment with Colon and Rectal Surgery for hemorrhoid treatment.   10.  Practice Kegel exercises occasionally through the day and use them to identify relaxation, attempt to avoid strain.   11.  If he still has difficulty with evacuation or sense of incomplete evacuation, note first that it is common with irritable bowel syndrome and that he may do better adding docusate sodium 200-300 mg twice daily due to his Sjogrens.   12.  Note if stool is often too skinny, add soluble fiber, instructions provided.   13.  Return to GI Clinic in 2 months.      We reviewed his history in detail, discussed the above considerations and plan.  His 4-5 a.m. awakening feeling hot may be secondary to acid reflux.  His esophageal spasm sensation is likely exactly that as he has had normal cardiac studies.  With his interstitial lung disease and Sjogren's syndrome, we may proceed to esophageal motility to find additional associated disorders.  We did also discuss use of lilliana as well as peppermint, though the latter is more likely to increase GERD.      Total visit 50 minutes with counseling time 30 minutes.         MEGAN VELARDE CNP             D: 10/05/2017 14:49   T: 10/06/2017 15:12   MT: CARROLL      Name:     RANJIT AGUSTIN   MRN:      [id number]        Account:      [id number]   :      1950            Service Date: 10/05/2017      Document: [id number]        Again, thank you for allowing me to participate in the care of your patient.      Sincerely,    MEGAN Pendleton CNP

## 2017-10-05 NOTE — MR AVS SNAPSHOT
After Visit Summary   10/5/2017    Lucio Daly    MRN: [id number]           Patient Information     Date Of Birth          1950        Visit Information        Provider Department      10/5/2017 10:00 AM Blanche Meade APRN CNP [facility]        Today's Diagnoses     Gastroesophageal reflux disease without esophagitis    -  1    Abdominal pain, epigastric        Flatulence, eructation and gas pain        Abdominal pain, generalized        Adenomatous polyp of colon, unspecified part of colon          Care Instructions    Ultrasound of gall bladder.   If normal, then Hida scan of gall bladder function.    Follow lifestyle precautions against acid reflux (GERD)    Do not overeat.   Avoid meals and snacks within three or four hours of lying down.   Avoid alcohol and mint. Decrease or quit smoking.   Do not drink carbonation - it IS acid. Decrease or quit caffeine.   If you have nighttime symptoms, elevate the head of the bed    first 3 inches, then 6 to 8 inches.    A foam wedge from the hip may be helpful, if a person lies on their back.    Pillows do NOT work. The entire back must be straight.   Lose weight if you are overweight.    Even ten pounds weight loss can make a difference.  Some people also have specific triggers: tomato, spice, chocolate, citrus/orange juice.      Studies:  Ultrasound of the abdomen  If normal, Hida scan of gall bladder function.  (if already being sent to the surgeon, OK to cancel the Hida scan, if not done the same day )    Now increase omeprazole (Prilosec) 40 mg to two times daily   For two weeks.  Call or message in two weeks, dose this change anything for you.  If no better, return to 40 mg daily, or split 20 mg two times daily.     If you find such improvement on omeprazole (Prilosec) 40 mg two times daily, then methotrexate level needs to be checked.    [consideration for bedtime dose of ranitidine (Zantac) 150-300  mg]    Trial recommended of Zyrtec 10 mg daily for two weeks. Is there less congestion and post nasal drip ?    Trial of no dairy for 2-4 weeks.         Follow lifestyle precautions against acid reflux (GERD)    Do not overeat.   Avoid meals and snacks within three or four hours of lying down.   Avoid alcohol and mint. Decrease or quit smoking.   Do not drink carbonation - it IS acid. Decrease or quit caffeine.   If you have nighttime symptoms, elevate the head of the bed    first 3 inches, then 6 to 8 inches.    A foam wedge from the hip may be helpful, if a person lies on their back.    Pillows do NOT work. The entire back must be straight.   Lose weight if you are overweight.    Even ten pounds weight loss can make a difference.  Some people also have specific triggers: tomato, spice, chocolate, citrus/orange juice.      Other studies likely to be done: if still acid reflux (GERD) with two times daily omeprazole (Prilosec) 40 and after a week of dairy free. -     Esophageal manometry (motility) and pH impedence studies are advised.  This is done by a nurse in the Endoscopy department in the [facility] hospital.     To schedule/rescheduled the tests or for questions regarding the tests please call [phone].     Esophageal Manometry (AKA Motility Study) - Esophageal manometry is a test to measure the strength and function of the esophagus (the  food pipe ). During the test, lubrication will be placed in your nose and a small tube is placed through your nose and moved down your esophagus and into your stomach. You will usually lie down for this test. You will be given 10 swallows of salt water.   This test takes 1 hour. After this test is completed, a 24 hour PH test will be completed.     24 Hour PH Impedance - In this test, the first tube, from the Manometry, is replaced with a smaller one (the size of a spaghetti noodle). The tube is taped into place and attached to a recorder that you will wear home. This machine  "will record how many episodes of reflux you have during the next 24 hours.   You will return the next day to return the recorder and the tube will be removed (this part only takes 5 minutes).    Preparations for Manometry and 24 Hour PH Impedance Testing:   Be sure to talk to your doctor about medications you take.    Sometimes certain medications are stopped.   Be sure not to smoke or chew gum for up to 12 hours before the test.   Do not eat or drink for 4 hours before the test.     The results of these studies often take up to 4 weeks to get back.   If you have not received your results within 5 weeks please call the nurse coordinator to check the status at [phone].        Consider making an appointment with Colon and Rectal Surgery regarding hemorrhoids.  An option to consider is banding of hemorrhoids. This is a procedure done in clinic.   There is no colon preparation for this treatment and no need to take additional time off work. It usually requires two or three brief visits.  To schedule with Colon and Rectal Surgery, call [phone].  To schedule regarding hemorrhoids, ask for Susi Juarez NP.    Learn and do Kegel exercises:    slowly tighten muscles inside the bottom as if to hold urine or gas,    then relax those muscles.  Do this exercise a few times, a few times each day.  AND when you sit on the toilet,   do a Kegel and then relax those muscles to have a BM.  If still difficult to evacuate  Docusate sodium (Colace) 200-300 mg two times daily   Makes stool slide-y.    If stool is often skinney, then add soluble fiber   Soluble fiber sops up water and gives soft stool and formed stool.    This also supports healthy gut bacteria.    Is a prebiotic that supports the \"pro-biotics\"  Psyllium (Metamucil) is most natural.   Powder - mix and drink immediately, -  or use the capsules or tablets.  or  Wheat dextrin (Benefiber) which has no taste or texture.   Available as powder, capsule, " chewable.  When in doubt, return to powder.  If using the fiber discussed in clinic, start as discussed in clinic.  Or: Take the dose on the label.  If it causes distress, start at half the dose and work up to full dose.  If you have no improvement after two weeks, increase the dose and be sure to use it twice daily.  You may feel bloat at the beginning.   Improvement comes gradually.  Continue this regularly for a couple months before deciding whether it is helpful for you.        [facility]   For appointments call Genia, [phone]  Coordinator  [phone] [place] or call -  GI Nurse Triage  [phone] for Medical Questions, # 3  Fax results to                 Follow-ups after your visit        Follow-up notes from your care team     Return in about 2 months (around 12/5/2017).      Your next 10 appointments already scheduled     Nov 16, 2017  9:00 AM CST   Six Minute Walk with UC PFL 6 MINUTE WALK 1   [facility] Pulmonary Function Testing ([facility])    [address] [place] [id number]   [phone]            Nov 16, 2017  9:30 AM CST   FULL PULMONARY FUNCTION with UC PFL A   [facility] Pulmonary Function Testing ([facility])    [address] [place] [address]   [phone]            Nov 16, 2017 10:30 AM CST   (Arrive by 10:15 AM)   Return Interstitial Lung with Harsha Mccarthy MD   [facility] ([facility])    [address] [place] [address]   [phone]            Dec 20, 2017  9:30 AM CST   (Arrive by 9:15 AM)   Return Visit with Jeremy Goodrich MD   [facility] Rheumatology ([facility])    [address] [place] [address]   [phone]            Mar 06, 2018  9:20 AM CST   Return Sleep Patient with Kristen Harrell  MD Juan   [facility] [place], Sleep Study ([facility])    [address] [place] [id number] [phone]            May 22, 2018  8:40 AM CDT   (Arrive by 8:25 AM)   Return Movement Disorder with Thong Montes MD   [facility] ([facility] [place])    [address] [facility]  [address] [facility] [address] [phone]              Future tests that were ordered for you today     Open Future Orders        Priority Expected Expires Ordered    US Abdomen Limited Routine  10/5/2018 10/5/2017    NM Hepatobiliary Scan w GB EF Routine  10/5/2018 10/5/2017            Who to contact     Please call your clinic at [phone] to:    Ask questions about your health    Make or cancel appointments    Discuss your medicines    Learn about your test results    Speak to your doctor   If you have compliments or concerns about an experience at your clinic, or if you wish to file a complaint, please contact [facility] Patient Relations at [phone] or email us at [email or web address]         Additional Information About Your Visit        Ayannah Information     Ayannah gives you secure access to your electronic health record. If you see a primary care provider, you can also send messages to your care team and make appointments. If you have questions, please call your primary care clinic.  If you do not have a primary care provider, please call [phone] and they will assist you.      Ayannah is an electronic gateway that provides easy, online access to your medical records. With Ayannah, you can request a clinic appointment, read your test results, renew a prescription or communicate with your care team.     To access your existing account, please contact your [facility] or call [phone] for assistance.        Care EveryWhere ID     This is your Care  "EveryWhere ID. This could be used by other organizations to access your [place] medical records  [id number]        Your Vitals Were     Pulse Height Pulse Oximetry BMI (Body Mass Index)          63 1.88 m (6' 2\") 94% 39.03 kg/m2         Blood Pressure from Last 3 Encounters:   10/05/17 (!) 166/98   08/29/17 142/85   08/26/17 110/70    Weight from Last 3 Encounters:   10/05/17 (!) 137.9 kg (304 lb)   08/29/17 (!) 137.4 kg (303 lb)   08/26/17 (!) 137.4 kg (303 lb)                 Today's Medication Changes          These changes are accurate as of: 10/5/17 11:40 AM.  If you have any questions, ask your nurse or doctor.               These medicines have changed or have updated prescriptions.        Dose/Directions    * omeprazole 20 MG CR capsule   Commonly known as:  priLOSEC   This may have changed:  how much to take   Used for:  Gastroesophageal reflux disease with esophagitis   Changed by:  Harsha Mccarthy MD        Dose:  40 mg   Take 2 capsules (40 mg) by mouth 2 times daily   Quantity:  60 capsule   Refills:  3       * omeprazole 40 MG capsule   Commonly known as:  priLOSEC   This may have changed:  You were already taking a medication with the same name, and this prescription was added. Make sure you understand how and when to take each.   Used for:  Gastroesophageal reflux disease without esophagitis   Changed by:  Blanche Meade APRN CNP        Dose:  40 mg   Take 1 capsule (40 mg) by mouth daily   Quantity:  30 capsule   Refills:  3       * Notice:  This list has 2 medication(s) that are the same as other medications prescribed for you. Read the directions carefully, and ask your doctor or other care provider to review them with you.         Where to get your medicines      These medications were sent to [facility] [address]   [address]  [address], [place] [address]     Phone:  [phone]     omeprazole 40 MG capsule                " Primary Care Provider Office Phone # Fax #    Jah Corley [phone]       [address]  [facility] [address]        Equal Access to Services     DOTTERENCE ISHMAEL : Hadii aad ku hadharpal Ga, waguillermoda lubilly, abdonta kajulio mcqueen, mariana torrez laAnamikarosetta johnson. So [facility] [phone].    ATENCIÓN: Si habla español, tiene a rudd disposición servicios gratuitos de asistencia lingüística. Llame al [phone].    We comply with applicable federal civil rights laws and [place] laws. We do not discriminate on the basis of race, color, national origin, age, disability, sex, sexual orientation, or gender identity.            Thank you!     Thank you for choosing [facility]  for your care. Our goal is always to provide you with excellent care. Hearing back from our patients is one way we can continue to improve our services. Please take a few minutes to complete the written survey that you may receive in the mail after your visit with us. Thank you!             Your Updated Medication List - Protect others around you: Learn how to safely use, store and throw away your medicines at www.disposemymeds.org.          This list is accurate as of: 10/5/17 11:40 AM.  Always use your most recent med list.                   Brand Name Dispense Instructions for use Diagnosis    folic acid 1 MG tablet    FOLVITE    180 tablet    Take 2 tablets (2 mg) by mouth daily    Pain in toes of both feet, Rheumatoid arthritis of multiple sites with negative rheumatoid factor (H), Rheumatoid arthritis involving shoulder with negative rheumatoid factor, unspecified laterality (H), High risk medications (not anticoagulants) long-term use       hydroxychloroquine 200 MG tablet    PLAQUENIL    180 tablet    Take 1 tablet (200 mg) by mouth 2 times daily Send copy of recent eye exam as need for refills. Please have a copy of your eye exam faxed to [phone].    Rheumatoid arthritis  involving shoulder with negative rheumatoid factor, unspecified laterality (H), High risk medications (not anticoagulants) long-term use, Pain in toes of both feet, Rheumatoid arthritis of multiple sites with negative rheumatoid factor (H)       methotrexate 2.5 MG tablet CHEMO     28 tablet    Take 7 tablets (17.5 mg) by mouth once a week Labs due every 8-12 weeks    Rheumatoid arthritis of multiple sites with negative rheumatoid factor (H), High risk medications (not anticoagulants) long-term use       metoprolol 50 MG 24 hr tablet    TOPROL-XL    90 tablet    Take 1 tablet (50 mg) by mouth daily    Essential hypertension       * omeprazole 20 MG CR capsule    priLOSEC    60 capsule    Take 2 capsules (40 mg) by mouth 2 times daily    Gastroesophageal reflux disease with esophagitis       * omeprazole 40 MG capsule    priLOSEC    30 capsule    Take 1 capsule (40 mg) by mouth daily    Gastroesophageal reflux disease without esophagitis       order for DME      Use your CPAP device as directed by your provider.        oxybutynin 5 MG tablet    DITROPAN    90 tablet    Take 1 tablet (5 mg) by mouth daily    Other urinary incontinence       pregabalin 50 MG capsule    LYRICA    360 capsule    One in the am, one in the afternoon and 2 at night.    Peripheral polyneuropathy       rasagiline 1 MG Tabs tablet    AZILECT    90 tablet    Take 1 tablet (1 mg) by mouth daily    Tremor       selegiline 5 MG Tabs     180 tablet    1 tablet In the morning    Paralysis agitans (H)       TYLENOL 500 MG tablet   Generic drug:  acetaminophen      Take 1 tablet by mouth as needed. For pain        vitamin D 1000 UNITS capsule      Take 2 capsules by mouth daily.    Other specified disorder of skin       * Notice:  This list has 2 medication(s) that are the same as other medications prescribed for you. Read the directions carefully, and ask your doctor or other care provider to review them with you.

## 2017-10-05 NOTE — PATIENT INSTRUCTIONS
Ultrasound of gall bladder.   If normal, then Hida scan of gall bladder function.    Follow lifestyle precautions against acid reflux (GERD)    Do not overeat.   Avoid meals and snacks within three or four hours of lying down.   Avoid alcohol and mint. Decrease or quit smoking.   Do not drink carbonation - it IS acid. Decrease or quit caffeine.   If you have nighttime symptoms, elevate the head of the bed    first 3 inches, then 6 to 8 inches.    A foam wedge from the hip may be helpful, if a person lies on their back.    Pillows do NOT work. The entire back must be straight.   Lose weight if you are overweight.    Even ten pounds weight loss can make a difference.  Some people also have specific triggers: tomato, spice, chocolate, citrus/orange juice.      Studies:  Ultrasound of the abdomen  If normal, Hida scan of gall bladder function.  (if already being sent to the surgeon, OK to cancel the Hida scan, if not done the same day )    Now increase omeprazole (Prilosec) 40 mg to two times daily   For two weeks.  Call or message in two weeks, dose this change anything for you.  If no better, return to 40 mg daily, or split 20 mg two times daily.     If you find such improvement on omeprazole (Prilosec) 40 mg two times daily, then methotrexate level needs to be checked.    [consideration for bedtime dose of ranitidine (Zantac) 150-300 mg]    Trial recommended of Zyrtec 10 mg daily for two weeks. Is there less congestion and post nasal drip ?    Trial of no dairy for 2-4 weeks.         Follow lifestyle precautions against acid reflux (GERD)    Do not overeat.   Avoid meals and snacks within three or four hours of lying down.   Avoid alcohol and mint. Decrease or quit smoking.   Do not drink carbonation - it IS acid. Decrease or quit caffeine.   If you have nighttime symptoms, elevate the head of the bed    first 3 inches, then 6 to 8 inches.    A foam wedge from the hip may be helpful, if a person lies on their  back.    Pillows do NOT work. The entire back must be straight.   Lose weight if you are overweight.    Even ten pounds weight loss can make a difference.  Some people also have specific triggers: tomato, spice, chocolate, citrus/orange juice.      Other studies likely to be done: if still acid reflux (GERD) with two times daily omeprazole (Prilosec) 40 and after a week of dairy free. -     Esophageal manometry (motility) and pH impedence studies are advised.  This is done by a nurse in the Endoscopy department in the [facility] hospital.     To schedule/rescheduled the tests or for questions regarding the tests please call [phone].     Esophageal Manometry (AKA Motility Study) - Esophageal manometry is a test to measure the strength and function of the esophagus (the  food pipe ). During the test, lubrication will be placed in your nose and a small tube is placed through your nose and moved down your esophagus and into your stomach. You will usually lie down for this test. You will be given 10 swallows of salt water.   This test takes 1 hour. After this test is completed, a 24 hour PH test will be completed.     24 Hour PH Impedance - In this test, the first tube, from the Manometry, is replaced with a smaller one (the size of a spaghetti noodle). The tube is taped into place and attached to a recorder that you will wear home. This machine will record how many episodes of reflux you have during the next 24 hours.   You will return the next day to return the recorder and the tube will be removed (this part only takes 5 minutes).    Preparations for Manometry and 24 Hour PH Impedance Testing:   Be sure to talk to your doctor about medications you take.    Sometimes certain medications are stopped.   Be sure not to smoke or chew gum for up to 12 hours before the test.   Do not eat or drink for 4 hours before the test.     The results of these studies often take up to 4 weeks to get back.   If you have not received your  "results within 5 weeks please call the nurse coordinator to check the status at [phone].        Consider making an appointment with Colon and Rectal Surgery regarding hemorrhoids.  An option to consider is banding of hemorrhoids. This is a procedure done in clinic.   There is no colon preparation for this treatment and no need to take additional time off work. It usually requires two or three brief visits.  To schedule with Colon and Rectal Surgery, call [phone].  To schedule regarding hemorrhoids, ask for Susi Juarez NP.    Learn and do Kegel exercises:    slowly tighten muscles inside the bottom as if to hold urine or gas,    then relax those muscles.  Do this exercise a few times, a few times each day.  AND when you sit on the toilet,   do a Kegel and then relax those muscles to have a BM.  If still difficult to evacuate  Docusate sodium (Colace) 200-300 mg two times daily   Makes stool slide-y.    If stool is often skinney, then add soluble fiber   Soluble fiber sops up water and gives soft stool and formed stool.    This also supports healthy gut bacteria.    Is a prebiotic that supports the \"pro-biotics\"  Psyllium (Metamucil) is most natural.   Powder - mix and drink immediately, -  or use the capsules or tablets.  or  Wheat dextrin (Benefiber) which has no taste or texture.   Available as powder, capsule, chewable.  When in doubt, return to powder.  If using the fiber discussed in clinic, start as discussed in clinic.  Or: Take the dose on the label.  If it causes distress, start at half the dose and work up to full dose.  If you have no improvement after two weeks, increase the dose and be sure to use it twice daily.  You may feel bloat at the beginning.   Improvement comes gradually.  Continue this regularly for a couple months before deciding whether it is helpful for you.        [facility]   For appointments call Genia [phone]  Coordinator  " [phone] [place] or call -  GI Nurse Triage  [phone] for Medical Questions, # 3  Fax results to

## 2017-10-06 ENCOUNTER — TELEPHONE (OUTPATIENT)
Dept: NEUROLOGY | Facility: CLINIC | Age: 67
End: 2017-10-06

## 2017-10-06 NOTE — TELEPHONE ENCOUNTER
Patient discontinued his selegiline as he did not think it was doing much and his gall bladder may need to come out. [place] cabin. He will be seeing an ENT doctor to see if this coordination problem has to do with his ears. When he went off of the selegiline his coordination decreased, he does feel like it is a bit better these last couple of days. He has been resting and taking his turns slower and that has been helping. He did not go on carbidopa/levodopa due to his history of malignant melanoma. I explained that there seems to be an increased risk of melanoma in Parkinson's disease patients but we don't know if it is due to the disease or the medications to treat it.    Rheumatoid arthritis in ankles/knees, neuropathy, Sjogren's - he believes his problem could be combination of all the issues. Med list updated.

## 2017-10-06 NOTE — PROGRESS NOTES
CHIEF COMPLAINT:  Acid reflux, also belch, bloat, esophageal spasm.      HISTORY OF PRESENT ILLNESS:  Lucio is a 67-year-old man treated for interstitial lung disease with seronegative Sjogren's syndrome diagnosed at the same time, perhaps about 2011.  He describes acid reflux over years with persistence of symptoms despite being on omeprazole daily.  He has also been on a few other proton pump inhibitors and medications.  He was instructed to increase Prilosec after his negative upper GI endoscopy about 2 years ago and rather split the dose because the Pharmacy spoke up with some questions and contrary thinking.  Lucio is using Omeprazole 20 mg daily with appropriate timing before meals.      Lucio feels as though his throat is on fire much of the time.  He notices also dyspepsia, burping or belching frequently 10-15 minutes after a meal and otherwise more often about 2 hours later.  He has hoarse voice off and on over the weeks and months.  He has a dry cough of uncertain cause though wonders whether it is related to acid reflux.  He tastes acid to the throat occasionally, either early or later in the day.  He does belch frequently and feels as though there is gas buildup.  His abdomen feels full and he has abdominal bloat with discomfort sometimes in the general abdomen, sometimes in the upper half of the abdomen.  He has had a sense of esophageal spasm several times.  The first time he went to his primary provider and had an EKG, which was fine.      During meals, Lucio feels fine at times but may other times feel food going down slowly.  He has gagging and choking during meals about 3 times a week.  At times, he will be eating cold cereal with milk in the morning and be fine until midway through the cereal.  It takes his breath away and he feels he cannot get a breath.  He only has dysphagia with drier foods.  He has worse sense of either choking or acid reflux with vinaigrette and spice.  With very  cold beverages, he has increase in this feeling of esophageal spasm.  He is now allowing his bottled water to remain at room temperature and has much less spasm.      Lucio is on Plaquenil and soon after taking it, has a sense of being lightheaded or weak.  He believes he has more symptoms from the Plaquenil than from the methotrexate.  He has been on Selegiline for years, which was stopped in late August, was given a refill as well as given an alternate medication, Rasagiline, neither of which he uses.      Lucio awakens at 4-5 a.m. and feels hot all over as well as has a sense of indigestion.  He typically passes gas at that time as well.  An hour or so later, he often wakes up and has a bowel movement and feels better.  He does sense that he has primarily an elimination problem intermittently.  He frequently has rapid gastrocolic reflex, even of normal stool.  In the distant past, he was treated for irritable bowel syndrome, though cannot describe exactly what that was or was like.      Lucio had very low vitamin D and since starting vitamin D, he has had less sinus infection.  He has followed with ENT and Allergy in the past.  He notices rapid increase in nasal congestion through the day several times, including anytime he talks on the phone.  He frequently has postnasal drip.  He has had bronchitis 2 times this summer, which rapidly responded to antibiotics.      MEDICAL HISTORY:  Reviewed in the medical record.  Most notable for interstitial lung disease, Sjogren's syndrome, rheumatoid arthritis with a negative rheumatoid factor, polyneuropathy, Parkinsonism, hypothyroidism, essential hypertension, and numerous others as in the medical record.      SURGICAL HISTORY:  Negative for abdominal surgeries.  He has had MOHS procedures, colonoscopies, hemorrhoid surgery, skin and lip biopsies.      FAMILY HISTORY:  Positive for a sister with multiple sclerosis, brother with history of gallstone pancreatitis, all  3 of his siblings having had their gallbladder out.  Colon polyps were present in his father.  Maternal grandfather had stomach cancer.  No other GI disease or GI condition is known.  Additional autoimmune disorder in maternal grandfather with psoriasis.      SOCIAL HISTORY:  He is a former smoker with no history of smokeless tobacco.  He is not using alcohol or drugs.  He is  and retired with 3 well sons.      REVIEW OF SYSTEMS:  Reviewed and discussed in clinic.  Significant as in HPI.  He has blood from hemorrhoids, he believes, but has declined to have them treated.      OBJECTIVE:   VITAL SIGNS:  Reviewed.  Note hypertension at 166/98, which I do not see occurring previously in the record.  Heart rate 63.  Weight is 304 pounds is within his range of a number of years, at BMI 39.   GENERAL:  Clean, pleasant, well-nourished, casually groomed and dressed man who appears mildly chronically ill.  His eyes are clear.  His breathing is calm and grossly normal.  He has burp a couple times during the visit.  He wears braces on both feet up to the top of the calf.      RESULTS:   Upper GI endoscopy 03/30/2015 with monitored anesthesia care showing fundic gland polyps, normal esophagus and duodenum.  Statement that there had been previous [facility] endoscopies.   Upper GI endoscopy 05/2007 with normal esophagus, erythematous stomach, normal duodenum, H. pylori found.     Colonoscopy 03/30/2015 due to history of adenomatous polyps, small adenomas found ascending and descending 3-4 mm, repeat in 5 years.  These 2015 procedures were done with monitored anesthesia care.   H. pylori antigen was negative 12/2013.   Right upper quadrant ultrasound 2003 showed gallbladder polyps, simple cysts in the liver.  Cysts in the liver are commented on again at chest CTs, but there are no studies that includes the gallbladder on review thus far.      Laboratory studies including near normal CBC, considering  medications, normal complete metabolic panel and a lipid panel over the course of July.  Last normal TSH appears to have been 03/2016, uric acid normal that year.  Distant past normal methylmalonic acid and vitamin B12.      ASSESSMENT:  Lucio is a 67-year-old man with interstitial lung disease, likely Sjogren's syndrome and rheumatoid arthritis, with a number of GI complaints including inadequately treated acid reflux despite no visible signs of erosion at endoscopy 2015, no improvement with slight increase of omeprazole to 20 mg twice daily, additional symptoms of frequent burping, belching, history of constipation, history of IBS, now with occasional symptoms of esophageal spasm triggered primarily by cold, occasional choking, particularly after vinaigrette or spice, and sometimes associated with a sensation of regurgitation.  He may have had additional symptoms while on Selegiline, which is noted to have up to 30% nausea association.  He believes he has side effect soon after taking Plaquenil, moreso than any side effect from his methotrexate.      In view of normal upper GI endoscopy just over 2 years ago, with no odynophagia or dysphagia without spasm sensation and this largely reduced with avoiding cold triggers, we will approach his postprandial symptoms, which occur  10-15 minutes and 2 hours later, and followup on the gallbladder polyps seen years ago.  Recommendations will be made stepwise for his comfort and we will add further studies as needed thereafter.      PLAN:   1.  Abdominal ultrasound right upper quadrant.   2.  HIDA scan.   3.  Omeprazole 40 mg, increase to twice daily for 2 weeks, return to once daily, report how that goes.   4.  Trial dairy-free to overlap with higher dose omeprazole if he still has symptoms, continue dairy-free at least 2 weeks before adding it back in related to GERD, bloat, flatus.   5.  Follow GERD precautions, including elevation of the head of the bed.   6.  After  omeprazole 40 b.i.d., consideration with or without the second omeprazole dose for ranitidine 150-300 mg at bedtime.   7.  Trial recommended of Zyrtec 10 mg daily for 2 weeks.  Does he have less congestion, postnasal drip, dry cough?   8.  Later consideration for esophageal motility and 24-hour pH impedance.   9.  Consider scheduling an appointment with Colon and Rectal Surgery for hemorrhoid treatment.   10.  Practice Kegel exercises occasionally through the day and use them to identify relaxation, attempt to avoid strain.   11.  If he still has difficulty with evacuation or sense of incomplete evacuation, note first that it is common with irritable bowel syndrome and that he may do better adding docusate sodium 200-300 mg twice daily due to his Sjogrens.   12.  Note if stool is often too skinny, add soluble fiber, instructions provided.   13.  Return to GI Clinic in 2 months.      We reviewed his history in detail, discussed the above considerations and plan.  His 4-5 a.m. awakening feeling hot may be secondary to acid reflux.  His esophageal spasm sensation is likely exactly that as he has had normal cardiac studies.  With his interstitial lung disease and Sjogren's syndrome, we may proceed to esophageal motility to find additional associated disorders.  We did also discuss use of lilliana as well as peppermint, though the latter is more likely to increase GERD.      Total visit 50 minutes with counseling time 30 minutes.         MEGAN VELARDE CNP             D: 10/05/2017 14:49   T: 10/06/2017 15:12   MT: CARROLL      Name:     RANJIT AGUSTIN   MRN:      [id number]        Account:      [id number]   :      1950           Service Date: 10/05/2017      Document: [id number]

## 2017-10-28 ENCOUNTER — OFFICE VISIT (OUTPATIENT)
Dept: URGENT CARE | Facility: URGENT CARE | Age: 67
End: 2017-10-28
Payer: MEDICARE

## 2017-10-28 VITALS
HEART RATE: 64 BPM | RESPIRATION RATE: 16 BRPM | OXYGEN SATURATION: 98 % | SYSTOLIC BLOOD PRESSURE: 132 MMHG | WEIGHT: 304 LBS | TEMPERATURE: 97.7 F | DIASTOLIC BLOOD PRESSURE: 78 MMHG | BODY MASS INDEX: 39.03 KG/M2

## 2017-10-28 DIAGNOSIS — J00 ACUTE RHINITIS, UNSPECIFIED TYPE: Primary | ICD-10-CM

## 2017-10-28 PROCEDURE — 99213 OFFICE O/P EST LOW 20 MIN: CPT | Performed by: FAMILY MEDICINE

## 2017-10-28 RX ORDER — AZITHROMYCIN 250 MG/1
TABLET, FILM COATED ORAL
Qty: 6 TABLET | Refills: 0 | Status: SHIPPED | OUTPATIENT
Start: 2017-10-28 | End: 2017-12-08

## 2017-10-28 NOTE — MR AVS SNAPSHOT
After Visit Summary   10/28/2017    Lucio Daly    MRN: [id number]           Patient Information     Date Of Birth          1950        Visit Information        Provider Department      10/28/2017 11:45 AM Sandeep Jamil MD [facility]        Care Instructions    Steam    Drink plenty of liquids    Saline nasal rinses    Fill the printed prescription for Azithromycin if not better in 2-3 days.     follow up with your primary care provider if not better in 10 days.           Follow-ups after your visit        Your next 10 appointments already scheduled     Nov 16, 2017  9:00 AM CST   Six Minute Walk with UC PFL 6 MINUTE WALK 1   [facility] Pulmonary Function Testing ([facility])    [address] [address]   [phone]            Nov 16, 2017  9:30 AM CST   FULL PULMONARY FUNCTION with UC PFL A   [facility] Pulmonary Function Testing ([facility])    [address] [place] [address]   [phone]            Nov 16, 2017 10:30 AM CST   (Arrive by 10:15 AM)   Return Interstitial Lung with Harsha Mccarthy MD   [facility] ([facility])    [address] [place] [address]   [phone]            Dec 08, 2017 11:00 AM CST   (Arrive by 10:45 AM)   Return Visit with MEGAN Pendleton CNP   [facility] ([facility])    [address] [place] [address]   [phone]            Dec 20, 2017  9:30 AM CST   (Arrive by 9:15 AM)   Return Visit with Jeremy Goodrich MD   [facility] Rheumatology ([facility])    [address] [place] [address]   [phone]            Mar 06, 2018  9:20 AM CST   Return Sleep Patient with Kristen Martinez MD   [facility]  [place] ([facility])    [address] [place] [id number] [phone]            May 22, 2018  8:40 AM CDT   (Arrive by 8:25 AM)   Return Movement Disorder with Thong Montes MD   [facility] Neurology ([facility])    [address] [facility]  3rd Floor  [facility] [id number] [phone]              Who to contact     If you have questions or need follow up information about today's clinic visit or your schedule please contact [facility] directly at [phone].  Normal or non-critical lab and imaging results will be communicated to you by MyChart, letter or phone within 4 business days after the clinic has received the results. If you do not hear from us within 7 days, please contact the clinic through [email or web address] or phone. If you have a critical or abnormal lab result, we will notify you by phone as soon as possible.  Submit refill requests through [employer] or call your pharmacy and they will forward the refill request to us. Please allow 3 business days for your refill to be completed.          Additional Information About Your Visit        365[employer] Information     [employer] gives you secure access to your electronic health record. If you see a primary care provider, you can also send messages to your care team and make appointments. If you have questions, please call your primary care clinic.  If you do not have a primary care provider, please call [phone] and they will assist you.        Care EveryWhere ID     This is your Care EveryWhere ID. This could be used by other organizations to access your [place] medical records  [id number]        Your Vitals Were     Pulse Temperature Respirations Pulse Oximetry BMI (Body Mass Index)       64 97.7  F (36.5  C) (Oral) 16 98% 39.03 kg/m2        Blood Pressure from Last 3 Encounters:   10/28/17 132/78   10/05/17 (!) 166/98   08/29/17 142/85    Weight  from Last 3 Encounters:   10/28/17 (!) 304 lb (137.9 kg)   10/05/17 (!) 304 lb (137.9 kg)   08/29/17 (!) 303 lb (137.4 kg)              Today, you had the following     No orders found for display         Today's Medication Changes          These changes are accurate as of: 10/28/17  1:01 PM.  If you have any questions, ask your nurse or doctor.               These medicines have changed or have updated prescriptions.        Dose/Directions    * omeprazole 20 MG CR capsule   Commonly known as:  priLOSEC   This may have changed:  how much to take   Used for:  Gastroesophageal reflux disease with esophagitis   Changed by:  Harsha Mccarthy MD        Dose:  40 mg   Take 2 capsules (40 mg) by mouth 2 times daily   Quantity:  60 capsule   Refills:  3       * omeprazole 40 MG capsule   Commonly known as:  priLOSEC   This may have changed:  Another medication with the same name was changed. Make sure you understand how and when to take each.   Used for:  Gastroesophageal reflux disease without esophagitis   Changed by:  Blanche Meade APRN CNP        Dose:  40 mg   Take 1 capsule (40 mg) by mouth daily   Quantity:  30 capsule   Refills:  3       * Notice:  This list has 2 medication(s) that are the same as other medications prescribed for you. Read the directions carefully, and ask your doctor or other care provider to review them with you.             Primary Care Provider Office Phone # Fax #    Jah Adan Corley [phone]       [address]  [facility] [address]        Equal Access to Services     ALINE QUIÑONES AH: Hadii helen oneill hadasho Soomaali, waaxda luqadaha, qaybta kaalmada adeegyada, mariana salcedo . So [facility] [phone].    ATENCIÓN: Si habla español, tiene a rudd disposición servicios gratuitos de asistencia lingüística. Llame al [phone].    We comply with applicable federal civil rights laws and [place] laws. We do not discriminate on the basis of race, color,  national origin, age, disability, sex, sexual orientation, or gender identity.            Thank you!     Thank you for choosing [facility]  for your care. Our goal is always to provide you with excellent care. Hearing back from our patients is one way we can continue to improve our services. Please take a few minutes to complete the written survey that you may receive in the mail after your visit with us. Thank you!             Your Updated Medication List - Protect others around you: Learn how to safely use, store and throw away your medicines at www.disposemymeds.org.          This list is accurate as of: 10/28/17  1:01 PM.  Always use your most recent med list.                   Brand Name Dispense Instructions for use Diagnosis    folic acid 1 MG tablet    FOLVITE    180 tablet    Take 2 tablets (2 mg) by mouth daily    Pain in toes of both feet, Rheumatoid arthritis of multiple sites with negative rheumatoid factor (H), Rheumatoid arthritis involving shoulder with negative rheumatoid factor, unspecified laterality (H), High risk medications (not anticoagulants) long-term use       hydroxychloroquine 200 MG tablet    PLAQUENIL    180 tablet    Take 1 tablet (200 mg) by mouth 2 times daily Send copy of recent eye exam as need for refills. Please have a copy of your eye exam faxed to [phone].    Rheumatoid arthritis involving shoulder with negative rheumatoid factor, unspecified laterality (H), High risk medications (not anticoagulants) long-term use, Pain in toes of both feet, Rheumatoid arthritis of multiple sites with negative rheumatoid factor (H)       methotrexate 2.5 MG tablet CHEMO     28 tablet    Take 7 tablets (17.5 mg) by mouth once a week Labs due every 8-12 weeks    Rheumatoid arthritis of multiple sites with negative rheumatoid factor (H), High risk medications (not anticoagulants) long-term use       metoprolol 50 MG 24 hr tablet    TOPROL-XL    90 tablet    Take 1 tablet (50  mg) by mouth daily    Essential hypertension       * omeprazole 20 MG CR capsule    priLOSEC    60 capsule    Take 2 capsules (40 mg) by mouth 2 times daily    Gastroesophageal reflux disease with esophagitis       * omeprazole 40 MG capsule    priLOSEC    30 capsule    Take 1 capsule (40 mg) by mouth daily    Gastroesophageal reflux disease without esophagitis       order for DME      Use your CPAP device as directed by your provider.        oxybutynin 5 MG tablet    DITROPAN    90 tablet    Take 1 tablet (5 mg) by mouth daily    Other urinary incontinence       pregabalin 50 MG capsule    LYRICA    360 capsule    One in the am, one in the afternoon and 2 at night.    Peripheral polyneuropathy       TYLENOL 500 MG tablet   Generic drug:  acetaminophen      Take 1 tablet by mouth as needed. For pain        vitamin D 1000 UNITS capsule      Take 2 capsules by mouth daily.    Other specified disorder of skin       * Notice:  This list has 2 medication(s) that are the same as other medications prescribed for you. Read the directions carefully, and ask your doctor or other care provider to review them with you.

## 2017-10-28 NOTE — PATIENT INSTRUCTIONS
Steam    Drink plenty of liquids    Saline nasal rinses    Fill the printed prescription for Azithromycin if not better in 2-3 days.     follow up with your primary care provider if not better in 10 days.

## 2017-10-28 NOTE — PROGRESS NOTES
SUBJECTIVE:   Lucio Daly is a 67 year old male presenting with a chief complaint of sinus congestion, head congestion (stuffy nose), headache (at the bilateral forehead), chest congestion.  No fevers. No nasal discharge.   Onset of symptoms was one week ago.  Course of illness is still present. .    Severity severe  Current and Associated symptoms: as listed above.   Treatment measures tried include Saline nasal spray.  Predisposing factors include none. .    Past Medical History:   Diagnosis Date     Abnormal EMG 4/18/2013     Abnormal involuntary movements(781.0)     Movement Disorder     AK (actinic keratosis) 12/18/2011     Allergic rhinitis, cause unspecified     Allergic rhinitis     Balance problems 11/1/2011     Basal cell carcinoma      Bladder spasms 11/1/2011     Chronic osteoarthritis      Diaphragmatic hernia without mention of obstruction or gangrene      Earache or other ear, nose, or throat complaint      Esophageal reflux      Fatigue 11/1/2011     Fracture      H. pylori infection 5/12/2011     History of MRI of cervical spine 11/18/2013    EXAMINATION: CERVICAL SPINE G/E 5 VIEWS* 4/19/2013 4:18 PM  CLINICAL HISTORY: Pain in limb,Performing Location?->[facility]),  COMPARISON:  FINDINGS: AP and lateral views in flexion and extension, as well as odontoid view of the cervical spine was obtained. There is no comparison available. The vertebral bodies of the cervical spine are normally aligned. There is posterior spurring and d     Incomplete defecation 11/1/2011     Interstitial lung disease (H) 11/29/2016     Laboratory test 8/7/2012     Lung disease June 2015     Malignant basal cell neoplasm of skin 8/6/2008     Melanoma (H) 8/6/2008     Melanoma in situ of lower leg (H)     R calf     Neuropathy 5/16/2011     Other bladder disorder      Other color vision deficiencies      Other nervous system complications      Parkinsonism (H) 11/1/2011     Personal history of colonic polyps       Polyneuropathy in other diseases classified elsewhere (H)      RA (rheumatoid arthritis) (H)      Rheumatoid arthritis of multiple sites with negative rheumatoid factor (H) 3/21/2016     Seronegative arthritis 11/18/2013     Shortness of breath      Shoulder arthritis 2016    acromioclavicular joint      Somatization disorder 5/12/2011     Squamous cell carcinoma      Tremor 11/1/2011     Unspecified essential hypertension      Unspecified hypothyroidism      Urinary tract infection      Urinary urgency 11/1/2011     Wears glasses 11/1/2011     Current Outpatient Prescriptions   Medication Sig Dispense Refill     omeprazole (PRILOSEC) 40 MG capsule Take 1 capsule (40 mg) by mouth daily 30 capsule 3     hydroxychloroquine (PLAQUENIL) 200 MG tablet Take 1 tablet (200 mg) by mouth 2 times daily Send copy of recent eye exam as need for refills. Please have a copy of your eye exam faxed to [phone]. 180 tablet 0     metoprolol (TOPROL-XL) 50 MG 24 hr tablet Take 1 tablet (50 mg) by mouth daily 90 tablet 3     pregabalin (LYRICA) 50 MG capsule One in the am, one in the afternoon and 2 at night. 360 capsule 1     methotrexate 2.5 MG tablet CHEMO Take 7 tablets (17.5 mg) by mouth once a week Labs due every 8-12 weeks 28 tablet 4     omeprazole (PRILOSEC) 20 MG CR capsule Take 2 capsules (40 mg) by mouth 2 times daily (Patient taking differently: Take 20 mg by mouth 2 times daily ) 60 capsule 3     oxybutynin (DITROPAN) 5 MG tablet Take 1 tablet (5 mg) by mouth daily 90 tablet 3     folic acid (FOLVITE) 1 MG tablet Take 2 tablets (2 mg) by mouth daily 180 tablet 4     ORDER FOR DME Use your CPAP device as directed by your provider.       acetaminophen (TYLENOL) 500 MG tablet Take 1 tablet by mouth as needed. For pain       Cholecalciferol (VITAMIN D) 1000 UNITS capsule Take 2 capsules by mouth daily.       Social History   Substance Use Topics     Smoking status: Former Smoker     Packs/day: 1.50     Years: 37.00      Types: Cigarettes     Start date: 6/15/1963     Quit date: 9/30/2000     Smokeless tobacco: Never Used     Alcohol use No       ROS:  Review of systems negative except as stated above.    OBJECTIVE:  /78  Pulse 64  Temp 97.7  F (36.5  C) (Oral)  Resp 16  Wt (!) 304 lb (137.9 kg)  SpO2 98%  BMI 39.03 kg/m2  GENERAL APPEARANCE: healthy, alert and no distress.  Voice sounds as if the nose is congested.   HENT: TM's normal bilaterally, nasal turbinates erythematous, swollen and oral mucous membranes moist, no erythema noted  NECK: supple, nontender, no lymphadenopathy  RESP: lungs clear to auscultation - no rales, rhonchi or wheezes  CV: regular rates and rhythm, normal S1 S2, no murmur noted    ASSESSMENT:  Acute Rhinitis    PLAN:  Saline nasal rinses  Steam  Fill the printed Rx for Azithromycin if not better in 2-3 days.   follow up with the primary care provider if not better in 10 days.   See orders in Epic    Sandeep Jamil MD

## 2017-10-30 ENCOUNTER — TRANSFERRED RECORDS (OUTPATIENT)
Dept: HEALTH INFORMATION MANAGEMENT | Facility: CLINIC | Age: 67
End: 2017-10-30

## 2017-11-03 ENCOUNTER — TELEPHONE (OUTPATIENT)
Dept: RHEUMATOLOGY | Facility: CLINIC | Age: 67
End: 2017-11-03

## 2017-11-03 DIAGNOSIS — D72.819 LEUKOPENIA, UNSPECIFIED TYPE: Primary | ICD-10-CM

## 2017-11-03 DIAGNOSIS — M06.09 RHEUMATOID ARTHRITIS OF MULTIPLE SITES WITH NEGATIVE RHEUMATOID FACTOR (H): ICD-10-CM

## 2017-11-03 DIAGNOSIS — Z79.899 HIGH RISK MEDICATIONS (NOT ANTICOAGULANTS) LONG-TERM USE: ICD-10-CM

## 2017-11-03 LAB
ALBUMIN SERPL-MCNC: 3.4 G/DL (ref 3.4–5)
ALT SERPL W P-5'-P-CCNC: 24 U/L (ref 0–70)
AST SERPL W P-5'-P-CCNC: 16 U/L (ref 0–45)
CREAT SERPL-MCNC: 1.08 MG/DL (ref 0.66–1.25)
CRP SERPL-MCNC: 5.6 MG/L (ref 0–8)
ERYTHROCYTE [DISTWIDTH] IN BLOOD BY AUTOMATED COUNT: 14.1 % (ref 10–15)
GFR SERPL CREATININE-BSD FRML MDRD: 68 ML/MIN/1.7M2
HCT VFR BLD AUTO: 41.5 % (ref 40–53)
HGB BLD-MCNC: 13.2 G/DL (ref 13.3–17.7)
MCH RBC QN AUTO: 30.5 PG (ref 26.5–33)
MCHC RBC AUTO-ENTMCNC: 31.8 G/DL (ref 31.5–36.5)
MCV RBC AUTO: 96 FL (ref 78–100)
PLATELET # BLD AUTO: 141 10E9/L (ref 150–450)
RBC # BLD AUTO: 4.33 10E12/L (ref 4.4–5.9)
WBC # BLD AUTO: 3.2 10E9/L (ref 4–11)

## 2017-11-03 NOTE — TELEPHONE ENCOUNTER
Labs--reduced WBC and PLT    Please call patient for an update. Hold MTX for 2 weeks. Repeat labs next week CBC, ALT, AST, creat . Notify on-call if any concerns       Results for orders placed or performed in visit on 11/03/17   AST   Result Value Ref Range    AST 16 0 - 45 U/L   ALT   Result Value Ref Range    ALT 24 0 - 70 U/L   Albumin level   Result Value Ref Range    Albumin 3.4 3.4 - 5.0 g/dL   Creatinine   Result Value Ref Range    Creatinine 1.08 0.66 - 1.25 mg/dL    GFR Estimate 68 >60 mL/min/1.7m2    GFR Estimate If Black 82 >60 mL/min/1.7m2   CRP inflammation   Result Value Ref Range    CRP Inflammation 5.6 0.0 - 8.0 mg/L   CBC with platelets   Result Value Ref Range    WBC 3.2 (L) 4.0 - 11.0 10e9/L    RBC Count 4.33 (L) 4.4 - 5.9 10e12/L    Hemoglobin 13.2 (L) 13.3 - 17.7 g/dL    Hematocrit 41.5 40.0 - 53.0 %    MCV 96 78 - 100 fl    MCH 30.5 26.5 - 33.0 pg    MCHC 31.8 31.5 - 36.5 g/dL    RDW 14.1 10.0 - 15.0 %    Platelet Count 141 (L) 150 - 450 10e9/L

## 2017-11-06 ENCOUNTER — CARE COORDINATION (OUTPATIENT)
Dept: PULMONOLOGY | Facility: CLINIC | Age: 67
End: 2017-11-06

## 2017-11-06 DIAGNOSIS — J01.90 ACUTE INFECTION OF NASAL SINUS: ICD-10-CM

## 2017-11-06 DIAGNOSIS — R05.9 COUGH: Primary | ICD-10-CM

## 2017-11-06 NOTE — PROGRESS NOTES
Telephone Encounter: Incoming    Reason for Call: Has had bronchitis 4 time in the last few month, has been treated at urgent cares with Zpak's. Current bronchitis/sinus infection is still hanging on. Coughing, not bring much up, feels looser but hard to get any production up. Still having sinus pain. Lab done for Rheumatologist showed low WBC and PLT, was told to hold Methotrexate. Wondering if he should do anything before his appointment with Dr Mccarthy next week.     Nursing Action/Patient Instruction: Discussed with Dr Mccarthy, want to get a sputum sample and will treat him with Augmentin. Informed patient.     Patient Response/Questions/Concerns: Agreed with plan, will go to [facility] lab to do sputum sample.

## 2017-11-07 ASSESSMENT — ENCOUNTER SYMPTOMS
WEAKNESS: 1
DOUBLE VISION: 1
SPUTUM PRODUCTION: 1
NIGHT SWEATS: 0
VOMITING: 0
HEARTBURN: 1
ABDOMINAL PAIN: 0
NUMBNESS: 1
DECREASED APPETITE: 0
TINGLING: 1
DIZZINESS: 1
SINUS CONGESTION: 1
HALLUCINATIONS: 0
SLEEP DISTURBANCES DUE TO BREATHING: 0
STIFFNESS: 1
SNORES LOUDLY: 0
LEG PAIN: 1
EXERCISE INTOLERANCE: 1
LOSS OF CONSCIOUSNESS: 0
HOARSE VOICE: 1
POLYDIPSIA: 0
EYE IRRITATION: 0
NAIL CHANGES: 0
COUGH: 1
NAUSEA: 0
ORTHOPNEA: 0
RECTAL PAIN: 0
EYE REDNESS: 1
INCREASED ENERGY: 1
BLOATING: 1
SORE THROAT: 1
BRUISES/BLEEDS EASILY: 0
WHEEZING: 0
JOINT SWELLING: 1
SYNCOPE: 0
NECK PAIN: 1
POLYPHAGIA: 0
FEVER: 1
HEMOPTYSIS: 0
TREMORS: 1
SHORTNESS OF BREATH: 1
MUSCLE CRAMPS: 1
WEIGHT LOSS: 0
POSTURAL DYSPNEA: 0
CONSTIPATION: 1
MUSCLE WEAKNESS: 1
HEADACHES: 1
BLOOD IN STOOL: 1
TASTE DISTURBANCE: 1
NECK MASS: 0
ALTERED TEMPERATURE REGULATION: 1
POOR WOUND HEALING: 1
BOWEL INCONTINENCE: 0
SMELL DISTURBANCE: 1
MYALGIAS: 1
BACK PAIN: 1
PALPITATIONS: 0
CHILLS: 1
DISTURBANCES IN COORDINATION: 1
ARTHRALGIAS: 1
WEIGHT GAIN: 0
SEIZURES: 0
DYSPNEA ON EXERTION: 1
COUGH DISTURBING SLEEP: 0
LIGHT-HEADEDNESS: 1
EYE WATERING: 1
PARALYSIS: 0
HYPERTENSION: 0
DIARRHEA: 0
FATIGUE: 1
TROUBLE SWALLOWING: 1
SINUS PAIN: 1
MEMORY LOSS: 0
JAUNDICE: 0
SKIN CHANGES: 0
SPEECH CHANGE: 0
HYPOTENSION: 0
EYE PAIN: 1
SWOLLEN GLANDS: 0

## 2017-11-10 DIAGNOSIS — D72.819 LEUKOPENIA, UNSPECIFIED TYPE: ICD-10-CM

## 2017-11-10 LAB
ALBUMIN SERPL-MCNC: 3.6 G/DL (ref 3.4–5)
ALT SERPL W P-5'-P-CCNC: 25 U/L (ref 0–70)
AST SERPL W P-5'-P-CCNC: 16 U/L (ref 0–45)
ERYTHROCYTE [DISTWIDTH] IN BLOOD BY AUTOMATED COUNT: 14.3 % (ref 10–15)
HCT VFR BLD AUTO: 42.7 % (ref 40–53)
HGB BLD-MCNC: 13.5 G/DL (ref 13.3–17.7)
MCH RBC QN AUTO: 30.6 PG (ref 26.5–33)
MCHC RBC AUTO-ENTMCNC: 31.6 G/DL (ref 31.5–36.5)
MCV RBC AUTO: 97 FL (ref 78–100)
PLATELET # BLD AUTO: 173 10E9/L (ref 150–450)
RBC # BLD AUTO: 4.41 10E12/L (ref 4.4–5.9)
WBC # BLD AUTO: 3.4 10E9/L (ref 4–11)

## 2017-11-13 ENCOUNTER — TELEPHONE (OUTPATIENT)
Dept: GASTROENTEROLOGY | Facility: CLINIC | Age: 67
End: 2017-11-13

## 2017-11-13 DIAGNOSIS — M79.675 PAIN IN TOES OF BOTH FEET: ICD-10-CM

## 2017-11-13 DIAGNOSIS — Z79.899 HIGH RISK MEDICATIONS (NOT ANTICOAGULANTS) LONG-TERM USE: ICD-10-CM

## 2017-11-13 DIAGNOSIS — M06.019 RHEUMATOID ARTHRITIS INVOLVING SHOULDER WITH NEGATIVE RHEUMATOID FACTOR, UNSPECIFIED LATERALITY (H): ICD-10-CM

## 2017-11-13 DIAGNOSIS — M79.674 PAIN IN TOES OF BOTH FEET: ICD-10-CM

## 2017-11-13 DIAGNOSIS — M06.09 RHEUMATOID ARTHRITIS OF MULTIPLE SITES WITH NEGATIVE RHEUMATOID FACTOR (H): ICD-10-CM

## 2017-11-13 RX ORDER — HYDROXYCHLOROQUINE SULFATE 200 MG/1
200 TABLET, FILM COATED ORAL 2 TIMES DAILY
Qty: 180 TABLET | Refills: 0 | Status: SHIPPED | OUTPATIENT
Start: 2017-11-13 | End: 2018-05-22

## 2017-11-13 RX ORDER — OMEPRAZOLE 40 MG/1
40 CAPSULE, DELAYED RELEASE ORAL
Qty: 60 CAPSULE | Refills: 1 | Status: CANCELLED | OUTPATIENT
Start: 2017-11-13

## 2017-11-13 NOTE — TELEPHONE ENCOUNTER
Plaqeunil 200mg    Last Written Prescription Date:  07/25/2017  Last Fill Quantity: 180,   # refills: 0  Future Office visit:       Routing refill request to provider for review/approval because:  Drug not on the [facility], [facility] or [facility] refill protocol or controlled substance      Jossy Bazan CPhT  [facility]   Phone: [phone]  Fax: [phone]

## 2017-11-14 DIAGNOSIS — K21.9 GASTROESOPHAGEAL REFLUX DISEASE WITHOUT ESOPHAGITIS: Primary | ICD-10-CM

## 2017-11-14 DIAGNOSIS — Z79.899 MEDICATION MANAGEMENT: ICD-10-CM

## 2017-11-14 RX ORDER — OMEPRAZOLE 40 MG/1
40 CAPSULE, DELAYED RELEASE ORAL
Qty: 60 CAPSULE | Refills: 0 | Status: SHIPPED | OUTPATIENT
Start: 2017-11-14 | End: 2017-12-08

## 2017-11-16 ENCOUNTER — OFFICE VISIT (OUTPATIENT)
Dept: PULMONOLOGY | Facility: CLINIC | Age: 67
End: 2017-11-16
Attending: INTERNAL MEDICINE
Payer: MEDICARE

## 2017-11-16 VITALS
HEART RATE: 58 BPM | RESPIRATION RATE: 18 BRPM | HEIGHT: 74 IN | DIASTOLIC BLOOD PRESSURE: 105 MMHG | BODY MASS INDEX: 39.01 KG/M2 | OXYGEN SATURATION: 96 % | SYSTOLIC BLOOD PRESSURE: 164 MMHG | WEIGHT: 304 LBS

## 2017-11-16 DIAGNOSIS — J84.9 INTERSTITIAL LUNG DISEASE (H): Primary | ICD-10-CM

## 2017-11-16 DIAGNOSIS — J84.9 ILD (INTERSTITIAL LUNG DISEASE) (H): Primary | ICD-10-CM

## 2017-11-16 DIAGNOSIS — J84.9 ILD (INTERSTITIAL LUNG DISEASE) (H): ICD-10-CM

## 2017-11-16 LAB
6 MIN WALK (FT): 860 FT
6 MIN WALK (M): 262 M

## 2017-11-16 PROCEDURE — 99212 OFFICE O/P EST SF 10 MIN: CPT | Mod: ZF

## 2017-11-16 ASSESSMENT — PAIN SCALES - GENERAL: PAINLEVEL: NO PAIN (0)

## 2017-11-16 NOTE — LETTER
11/16/2017       RE: Lucio Daly  [address]  [place] [address]     Dear Colleague,    Thank you for referring your patient, Lucio Daly, to the [facility] at [facility]. Please see a copy of my visit note below.    Reason for Visit  Lucio Daly is a 67 year old year old male who is being seen for   HPI    The patient was seen and examined by Harsha Mccarthy      Mr. Daly comes in for followup of his connective tissue disease-related ILD.  He follows up in rheumatology  with Dr. Goodrich and Ms. Snow for her seronegative rheumatoid arthritis.  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  He was on Plaquenil and methotrexate.       He called our clinic on 11/06 stating that he has had multiple episodes of bronchitis and pharyngitis which have not improved with antibiotics.  Considering that he had a number of allergies, the choice of antibiotics was somewhat difficult.  The plan was to get a sputum culture and consider adding Augmentin, as he had some sinus symptoms also.  He tells me that the next day while he was getting sputum sample, it got contaminated, but he has since started feeling better and is back to his baseline right now.  He has been working with Rheumatology Clinic for management of his methotrexate around the time of this infection.  His WBC count has been on the lower side and the methotrexate has been held since 10/24/2017.       Overall, he tells me that his endurance is back into the normal range.  He denies any cough or sputum production.  He does not have any chest pain.       Current Outpatient Prescriptions   Medication     omeprazole (PRILOSEC) 40 MG capsule     hydroxychloroquine (PLAQUENIL) 200 MG tablet     azithromycin (ZITHROMAX) 250 MG tablet      metoprolol (TOPROL-XL) 50 MG 24 hr tablet     pregabalin (LYRICA) 50 MG capsule     methotrexate 2.5 MG tablet CHEMO     oxybutynin (DITROPAN) 5 MG tablet     folic acid (FOLVITE) 1 MG tablet     ORDER FOR DME     acetaminophen (TYLENOL) 500 MG tablet     Cholecalciferol (VITAMIN D) 1000 UNITS capsule     No current facility-administered medications for this visit.      Allergies   Allergen Reactions     Restasis      Burning eyes, problems with breathing, tightness in chest     Adhesive Tape      Bandages misc     Allegra      EXCESSIVE URINATION AND WEAKNESS, LIGHT-HEADED     Allergy      Dust     Animal Dander      Benadryl Allergy      EXCESSIVE URINATION AND WEAKNESS, LIGHT-HEADED     Cephalexin      Joint pain or gerd aggravation. Bloating excessive urination      Cephalosporins      Diphenhydramine Other (See Comments)     Doxycycline Hyclate Nausea     SWEATING,MIGRAINES,LOSS OF APPETITE,SWEATING,LIGHT HEADED, EXCESSIVE URINATION     Fexofenadine Other (See Comments)     Flonase [Fluticasone Propionate]      Gabapentin      Neurontin: mosd changes and excess urination     Iodine Solution [Povidone Iodine]      SKIN MELTS     Levaquin      From surgeon--? Joint pain ? Gerd aggravation. Insomnia, excess urination     Mylanta      EXCESSIVE URINATION AND WEAKNESS, LIGHT-HEADED     Prednisone      Weakness, elevated bp, headache, eye pain, congestion      Seafood [Seafood]      Shellfish Allergy      Hives       Sulfamethoxazole-Trimethoprim      Chest pain, angina     Trees      Trileptal      SEVERE JOINT AND TENDON PAIN, INSOMNIA, RESTLESSNESS, NAUSEA, EXCESS URINATION     Cortizone Rash     EXCESS URINATION,WEAKNESS,NAUSEA, HEADACHE     Past Medical History:   Diagnosis Date     Abnormal EMG 4/18/2013     Abnormal involuntary movements(781.0)     Movement Disorder     AK (actinic keratosis) 12/18/2011     Allergic rhinitis, cause unspecified     Allergic rhinitis     Balance problems 11/1/2011     Basal cell  carcinoma      Bladder spasms 11/1/2011     Chronic osteoarthritis      Diaphragmatic hernia without mention of obstruction or gangrene      Earache or other ear, nose, or throat complaint      Esophageal reflux      Fatigue 11/1/2011     Fracture      H. pylori infection 5/12/2011     History of MRI of cervical spine 11/18/2013    EXAMINATION: CERVICAL SPINE G/E 5 VIEWS* 4/19/2013 4:18 PM  CLINICAL HISTORY: Pain in limb,Performing Location?->[facility] ([facility]),  COMPARISON:  FINDINGS: AP and lateral views in flexion and extension, as well as odontoid view of the cervical spine was obtained. There is no comparison available. The vertebral bodies of the cervical spine are normally aligned. There is posterior spurring and d     Incomplete defecation 11/1/2011     Interstitial lung disease (H) 11/29/2016     Laboratory test 8/7/2012     Lung disease June 2015     Malignant basal cell neoplasm of skin 8/6/2008     Melanoma (H) 8/6/2008     Melanoma in situ of lower leg (H)     R calf     Neuropathy 5/16/2011     Other bladder disorder      Other color vision deficiencies      Other nervous system complications      Parkinsonism (H) 11/1/2011     Personal history of colonic polyps      Polyneuropathy in other diseases classified elsewhere (H)      RA (rheumatoid arthritis) (H)      Rheumatoid arthritis of multiple sites with negative rheumatoid factor (H) 3/21/2016     Seronegative arthritis 11/18/2013     Shortness of breath      Shoulder arthritis 2016    acromioclavicular joint      Somatization disorder 5/12/2011     Squamous cell carcinoma      Tremor 11/1/2011     Unspecified essential hypertension      Unspecified hypothyroidism      Urinary tract infection      Urinary urgency 11/1/2011     Wears glasses 11/1/2011       Past Surgical History:   Procedure Laterality Date     BIOPSY OF SKIN LESION       COLONOSCOPY       HEMORRHOID SURGERY       lip biopsy      for sicca complex     MOHS MICROGRAPHIC PROCEDURE    "    SOFT TISSUE SURGERY      removeal of basel cell carcinoma       Social History     Social History     Marital status:      Spouse name: N/A     Number of children: N/A     Years of education: N/A     Occupational History     Not on file.     Social History Main Topics     Smoking status: Former Smoker     Packs/day: 1.50     Years: 37.00     Types: Cigarettes     Start date: 6/15/1963     Quit date: 9/30/2000     Smokeless tobacco: Never Used     Alcohol use No     Drug use: No     Sexual activity: No     Other Topics Concern     Parent/Sibling W/ Cabg, Mi Or Angioplasty Before 65f 55m? No     Social History Narrative    2013: Living in [place] in a townhouse with no steps    Has 3 sons that are doing okay.         Dairy/d 1 servings/d.     Caffeine 0 servings/d    Exercise 0 x week    Sunscreen used - No    Seatbelts used - Yes    Working smoke/CO detectors in the home - Yes    Guns stored in the home - Yes    Self Breast Exams - NA    Self Testicular Exam - Yes    Eye Exam up to date - Yes 2008    Dental Exam up to date - Yes 2006    Pap Smear up to date - NA    Mammogram up to date - NA    PSA up to date - Yes 2008    Dexa Scan up to date - No    Flex Sig / Colonoscopy up to date - Yes less than 5 yrs ago    Immunizations up to date -today    Abuse: Current or Past(Physical, Sexual or Emotional)- No    Do you feel safe in your environment - Yes    2008                   ROS Pulmonary  A complete ROS was otherwise negative except as noted in the HPI.  BP (!) 164/105  Pulse 58  Resp 18  Ht 1.88 m (6' 2\")  Wt (!) 137.9 kg (304 lb)  SpO2 96%  BMI 39.03 kg/m2  Exam:   GENERAL APPEARANCE: Alert, and in no apparent distress.  EYES: PERRL, EOMI  MOUTH: Oral mucosa is moist, without any lesions,, no oropharyngeal exudate.  NECK: supple, no masses, no thyromegaly.  LYMPHATICS: No significant axillary, cervical, or supraclavicular nodes.  RESP: normal percussion, good air flow throughout.  No crackles. No " rhonchi. No wheezes.  CV: Normal S1, S2, regular rhythm, normal rate. No murmur.  No rub. No gallop. No LE edema.   ABDOMEN:  Bowel sounds normal, soft, nontender, no HSM or masses.   MS: extremities normal. No clubbing. No cyanosis.  SKIN: no rash on limited exam  NEURO: Mentation intact, speech normal, normal strength and tone, normal gait and stance    Results:    PFTs done today were reviewed by me with the patient.  He also had a 6-minute walk test done which he did using a cane.  His 6-minute walk distance was below the lower limit of normal.  He did not have any O2 saturation problems.  FVC is 3.61 liters which is 73% of predicted.  FEV1 is 2.67 which is 72% of predicted.  The ratio is 74.  Total lung capacity is 6.87 which is 86% of predicted.  DLCO is 27.42 which is 98% of predicted.  He also had a maximum expiratory pressure (MEP) which was 65 cm of water and MIP which was -25 cm of water.  The MIP is slightly lower than what it was in the past, but MEP is slightly better.  They were -40 and +45 earlier.       Assessment and plan:   1. Mr. Daly is a very pleasant 67-year-old male with a history of seronegative rheumatoid arthritis, on Plaquenil and methotrexate (currently methotrexate last dose was 10/24/2017) who is here for routine followup.  PFTs appear to be significantly better, as his total lung capacity is 6.87 liters which is up from his previous total lung capacity of 6.2 liters in May.  The DLCO is also in the normal range.  The patient is planning to reach out to Rheumatology to restart his methotrexate.  His WBC count was low and he will follow up with Rheumatology on that.   2.  Lung nodules which have been deemed to be stable.   3.  Sleep-disordered breathing, on bilevel ventilation with good control.    4.  Gastroesophageal reflux, now being managed with assistance from GI with a followup scheduled in December.      I will see him back in 3-4 months or sooner if needed.        Sincerely,    Harsha Mccarthy MD

## 2017-11-16 NOTE — NURSING NOTE
Chief Complaint   Patient presents with     Interstitial Lung Disease (ILD)     Follow up on Lucio and his ILD     Sarbjit Pennington CMA at 10:30 AM on 11/16/2017

## 2017-11-16 NOTE — MR AVS SNAPSHOT
After Visit Summary   11/16/2017    Lucio Daly    MRN: [id number]           Patient Information     Date Of Birth          1950        Visit Information        Provider Department      11/16/2017 10:30 AM Harsha Mccarthy MD [facility]        Today's Diagnoses     ILD (interstitial lung disease) (H)    -  1       Follow-ups after your visit        Follow-up notes from your care team     Return in about 6 months (around 5/16/2018).      Your next 10 appointments already scheduled     Dec 08, 2017 11:00 AM CST   (Arrive by 10:45 AM)   Return Visit with MEGAN Pendleton CNP   [facility] Gastroenterology and IBD Clinic ([facility])    [address]  [address] [facility] [id number] [phone]            Dec 20, 2017  9:30 AM CST   (Arrive by 9:15 AM)   Return Visit with Jeremy Goodrich MD   [facility] Rheumatology ([facility])    [address] [place] [address]  [address] [facility] [id number] [phone]            Mar 06, 2018  9:20 AM CST   Return Sleep Patient with Kristen Martinez MD   [facility] [place] Sleep Study ([facility])    [address] [place] [id number] [phone]            May 15, 2018  1:00 PM CDT   SIX MINUTE WALK with UC PFL C, UC PFL 6 MINUTE WALK 2   [facility] Pulmonary Function Testing ([facility])    [address]  [address] [facility] [id number] [phone]            May 15, 2018  2:00 PM CDT   (Arrive by 1:45 PM)   Return Interstitial Lung with Harsha Mccarthy MD   [facility] ([facility])    [address]  [address] [facility] [id number] [phone]            May 22, 2018  8:40 AM CDT   (Arrive by 8:25 AM)   Return Movement Disorder with Thong Montes MD   [facility] Neurology  "([facility])    [address]  3rd Floor  [facility] [address] [phone]              Who to contact     If you have questions or need follow up information about today's clinic visit or your schedule please contact [facility] directly at [phone].  Normal or non-critical lab and imaging results will be communicated to you by MyChart, letter or phone within 4 business days after the clinic has received the results. If you do not hear from us within 7 days, please contact the clinic through [employer] or phone. If you have a critical or abnormal lab result, we will notify you by phone as soon as possible.  Submit refill requests through [employer] or call your pharmacy and they will forward the refill request to us. Please allow 3 business days for your refill to be completed.          Additional Information About Your Visit        [employer] Information     [employer] gives you secure access to your electronic health record. If you see a primary care provider, you can also send messages to your care team and make appointments. If you have questions, please call your primary care clinic.  If you do not have a primary care provider, please call [phone] and they will assist you.        Care EveryWhere ID     This is your Care EveryWhere ID. This could be used by other organizations to access your [facility] medical records  [id number]        Your Vitals Were     Pulse Respirations Height Pulse Oximetry BMI (Body Mass Index)       58 18 1.88 m (6' 2\") 96% 39.03 kg/m2        Blood Pressure from Last 3 Encounters:   11/16/17 (!) 164/105   10/28/17 132/78   10/05/17 (!) 166/98    Weight from Last 3 Encounters:   11/16/17 (!) 137.9 kg (304 lb)   10/28/17 (!) 137.9 kg (304 lb)   10/05/17 (!) 137.9 kg (304 lb)               Primary Care Provider Office Phone # Fax #    Jah Corley [phone]       [address]  [facility] [address]   "      Equal Access to Services     [facility] UCLAMAHESH : Hadii aad ku hadcarmelaaisha Katali, waguillermoda luqadaha, qaybta herminioyaritzamariana jackson. So [facility] [phone].    ATENCIÓN: Si habla español, tiene a rudd disposición servicios gratuitos de asistencia lingüística. Llame al [phone].    We comply with applicable federal civil rights laws and [place] laws. We do not discriminate on the basis of race, color, national origin, age, disability, sex, sexual orientation, or gender identity.            Thank you!     Thank you for choosing [facility]  for your care. Our goal is always to provide you with excellent care. Hearing back from our patients is one way we can continue to improve our services. Please take a few minutes to complete the written survey that you may receive in the mail after your visit with us. Thank you!             Your Updated Medication List - Protect others around you: Learn how to safely use, store and throw away your medicines at www.disposemymeds.org.          This list is accurate as of: 11/16/17 11:59 PM.  Always use your most recent med list.                   Brand Name Dispense Instructions for use Diagnosis    azithromycin 250 MG tablet    ZITHROMAX    6 tablet    Two tablets first day, then one tablet daily for four days.    Acute rhinitis, unspecified type       folic acid 1 MG tablet    FOLVITE    180 tablet    Take 2 tablets (2 mg) by mouth daily    Pain in toes of both feet, Rheumatoid arthritis of multiple sites with negative rheumatoid factor (H), Rheumatoid arthritis involving shoulder with negative rheumatoid factor, unspecified laterality (H), High risk medications (not anticoagulants) long-term use       hydroxychloroquine 200 MG tablet    PLAQUENIL    180 tablet    Take 1 tablet (200 mg) by mouth 2 times daily Send copy of recent eye exam as need for refills. Please have a copy of your eye exam faxed to  [phone].    Rheumatoid arthritis involving shoulder with negative rheumatoid factor, unspecified laterality (H), High risk medications (not anticoagulants) long-term use, Pain in toes of both feet, Rheumatoid arthritis of multiple sites with negative rheumatoid factor (H)       methotrexate 2.5 MG tablet CHEMO     28 tablet    Take 7 tablets (17.5 mg) by mouth once a week Labs due every 8-12 weeks    Rheumatoid arthritis of multiple sites with negative rheumatoid factor (H), High risk medications (not anticoagulants) long-term use       metoprolol 50 MG 24 hr tablet    TOPROL-XL    90 tablet    Take 1 tablet (50 mg) by mouth daily    Essential hypertension       omeprazole 40 MG capsule    priLOSEC    60 capsule    Take 1 capsule (40 mg) by mouth 2 times daily (before meals)    Gastroesophageal reflux disease without esophagitis       order for DME      Use your CPAP device as directed by your provider.        oxybutynin 5 MG tablet    DITROPAN    90 tablet    Take 1 tablet (5 mg) by mouth daily    Other urinary incontinence       pregabalin 50 MG capsule    LYRICA    360 capsule    One in the am, one in the afternoon and 2 at night.    Peripheral polyneuropathy       TYLENOL 500 MG tablet   Generic drug:  acetaminophen      Take 1 tablet by mouth as needed. For pain        vitamin D 1000 UNITS capsule      Take 2 capsules by mouth daily.    Other specified disorder of skin

## 2017-11-16 NOTE — PROGRESS NOTES
Reason for Visit  Lucio Daly is a 67 year old year old male who is being seen for   HPI    The patient was seen and examined by Harsha Mccarthy      Mr. Daly comes in for followup of his connective tissue disease-related ILD.  He follows up in rheumatology  with Dr. Goodrich and Ms. Snow for her seronegative rheumatoid arthritis.  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  He was on Plaquenil and methotrexate.       He called our clinic on 11/06 stating that he has had multiple episodes of bronchitis and pharyngitis which have not improved with antibiotics.  Considering that he had a number of allergies, the choice of antibiotics was somewhat difficult.  The plan was to get a sputum culture and consider adding Augmentin, as he had some sinus symptoms also.  He tells me that the next day while he was getting sputum sample, it got contaminated, but he has since started feeling better and is back to his baseline right now.  He has been working with Rheumatology Clinic for management of his methotrexate around the time of this infection.  His WBC count has been on the lower side and the methotrexate has been held since 10/24/2017.       Overall, he tells me that his endurance is back into the normal range.  He denies any cough or sputum production.  He does not have any chest pain.       Current Outpatient Prescriptions   Medication     omeprazole (PRILOSEC) 40 MG capsule     hydroxychloroquine (PLAQUENIL) 200 MG tablet     azithromycin (ZITHROMAX) 250 MG tablet     metoprolol (TOPROL-XL) 50 MG 24 hr tablet     pregabalin (LYRICA) 50 MG capsule     methotrexate 2.5 MG tablet CHEMO     oxybutynin (DITROPAN) 5 MG tablet     folic acid (FOLVITE) 1 MG tablet     ORDER FOR DME     acetaminophen (TYLENOL) 500 MG tablet     Cholecalciferol (VITAMIN D) 1000 UNITS capsule      No current facility-administered medications for this visit.      Allergies   Allergen Reactions     Restasis      Burning eyes, problems with breathing, tightness in chest     Adhesive Tape      Bandages misc     Allegra      EXCESSIVE URINATION AND WEAKNESS, LIGHT-HEADED     Allergy      Dust     Animal Dander      Benadryl Allergy      EXCESSIVE URINATION AND WEAKNESS, LIGHT-HEADED     Cephalexin      Joint pain or gerd aggravation. Bloating excessive urination      Cephalosporins      Diphenhydramine Other (See Comments)     Doxycycline Hyclate Nausea     SWEATING,MIGRAINES,LOSS OF APPETITE,SWEATING,LIGHT HEADED, EXCESSIVE URINATION     Fexofenadine Other (See Comments)     Flonase [Fluticasone Propionate]      Gabapentin      Neurontin: mosd changes and excess urination     Iodine Solution [Povidone Iodine]      SKIN MELTS     Levaquin      From surgeon--? Joint pain ? Gerd aggravation. Insomnia, excess urination     Mylanta      EXCESSIVE URINATION AND WEAKNESS, LIGHT-HEADED     Prednisone      Weakness, elevated bp, headache, eye pain, congestion      Seafood [Seafood]      Shellfish Allergy      Hives       Sulfamethoxazole-Trimethoprim      Chest pain, angina     Trees      Trileptal      SEVERE JOINT AND TENDON PAIN, INSOMNIA, RESTLESSNESS, NAUSEA, EXCESS URINATION     Cortizone Rash     EXCESS URINATION,WEAKNESS,NAUSEA, HEADACHE     Past Medical History:   Diagnosis Date     Abnormal EMG 4/18/2013     Abnormal involuntary movements(781.0)     Movement Disorder     AK (actinic keratosis) 12/18/2011     Allergic rhinitis, cause unspecified     Allergic rhinitis     Balance problems 11/1/2011     Basal cell carcinoma      Bladder spasms 11/1/2011     Chronic osteoarthritis      Diaphragmatic hernia without mention of obstruction or gangrene      Earache or other ear, nose, or throat complaint      Esophageal reflux      Fatigue 11/1/2011     Fracture      H. pylori infection 5/12/2011     History of MRI  of cervical spine 11/18/2013    EXAMINATION: CERVICAL SPINE G/E 5 VIEWS* 4/19/2013 4:18 PM  CLINICAL HISTORY: Pain in limb,Performing Location?->[facility]),  COMPARISON:  FINDINGS: AP and lateral views in flexion and extension, as well as odontoid view of the cervical spine was obtained. There is no comparison available. The vertebral bodies of the cervical spine are normally aligned. There is posterior spurring and d     Incomplete defecation 11/1/2011     Interstitial lung disease (H) 11/29/2016     Laboratory test 8/7/2012     Lung disease June 2015     Malignant basal cell neoplasm of skin 8/6/2008     Melanoma (H) 8/6/2008     Melanoma in situ of lower leg (H)     R calf     Neuropathy 5/16/2011     Other bladder disorder      Other color vision deficiencies      Other nervous system complications      Parkinsonism (H) 11/1/2011     Personal history of colonic polyps      Polyneuropathy in other diseases classified elsewhere (H)      RA (rheumatoid arthritis) (H)      Rheumatoid arthritis of multiple sites with negative rheumatoid factor (H) 3/21/2016     Seronegative arthritis 11/18/2013     Shortness of breath      Shoulder arthritis 2016    acromioclavicular joint      Somatization disorder 5/12/2011     Squamous cell carcinoma      Tremor 11/1/2011     Unspecified essential hypertension      Unspecified hypothyroidism      Urinary tract infection      Urinary urgency 11/1/2011     Wears glasses 11/1/2011       Past Surgical History:   Procedure Laterality Date     BIOPSY OF SKIN LESION       COLONOSCOPY       HEMORRHOID SURGERY       lip biopsy      for sicca complex     MOHS MICROGRAPHIC PROCEDURE       SOFT TISSUE SURGERY      removeal of basel cell carcinoma       Social History     Social History     Marital status:      Spouse name: N/A     Number of children: N/A     Years of education: N/A     Occupational History     Not on file.     Social History Main Topics     Smoking status:  "Former Smoker     Packs/day: 1.50     Years: 37.00     Types: Cigarettes     Start date: 6/15/1963     Quit date: 9/30/2000     Smokeless tobacco: Never Used     Alcohol use No     Drug use: No     Sexual activity: No     Other Topics Concern     Parent/Sibling W/ Cabg, Mi Or Angioplasty Before 65f 55m? No     Social History Narrative    2013: Living in [place] in a townhouse with no steps    Has 3 sons that are doing okay.         Dairy/d 1 servings/d.     Caffeine 0 servings/d    Exercise 0 x week    Sunscreen used - No    Seatbelts used - Yes    Working smoke/CO detectors in the home - Yes    Guns stored in the home - Yes    Self Breast Exams - NA    Self Testicular Exam - Yes    Eye Exam up to date - Yes 2008    Dental Exam up to date - Yes 2006    Pap Smear up to date - NA    Mammogram up to date - NA    PSA up to date - Yes 2008    Dexa Scan up to date - No    Flex Sig / Colonoscopy up to date - Yes less than 5 yrs ago    Immunizations up to date -today    Abuse: Current or Past(Physical, Sexual or Emotional)- No    Do you feel safe in your environment - Yes    2008                   ROS Pulmonary  A complete ROS was otherwise negative except as noted in the HPI.  BP (!) 164/105  Pulse 58  Resp 18  Ht 1.88 m (6' 2\")  Wt (!) 137.9 kg (304 lb)  SpO2 96%  BMI 39.03 kg/m2  Exam:   GENERAL APPEARANCE: Alert, and in no apparent distress.  EYES: PERRL, EOMI  MOUTH: Oral mucosa is moist, without any lesions,, no oropharyngeal exudate.  NECK: supple, no masses, no thyromegaly.  LYMPHATICS: No significant axillary, cervical, or supraclavicular nodes.  RESP: normal percussion, good air flow throughout.  No crackles. No rhonchi. No wheezes.  CV: Normal S1, S2, regular rhythm, normal rate. No murmur.  No rub. No gallop. No LE edema.   ABDOMEN:  Bowel sounds normal, soft, nontender, no HSM or masses.   MS: extremities normal. No clubbing. No cyanosis.  SKIN: no rash on limited exam  NEURO: Mentation intact, speech " normal, normal strength and tone, normal gait and stance    Results:    PFTs done today were reviewed by me with the patient.  He also had a 6-minute walk test done which he did using a cane.  His 6-minute walk distance was below the lower limit of normal.  He did not have any O2 saturation problems.  FVC is 3.61 liters which is 73% of predicted.  FEV1 is 2.67 which is 72% of predicted.  The ratio is 74.  Total lung capacity is 6.87 which is 86% of predicted.  DLCO is 27.42 which is 98% of predicted.  He also had a maximum expiratory pressure (MEP) which was 65 cm of water and MIP which was -25 cm of water.  The MIP is slightly lower than what it was in the past, but MEP is slightly better.  They were -40 and +45 earlier.       Assessment and plan:   1. Mr. Daly is a very pleasant 67-year-old male with a history of seronegative rheumatoid arthritis, on Plaquenil and methotrexate (currently methotrexate last dose was 10/24/2017) who is here for routine followup.  PFTs appear to be significantly better, as his total lung capacity is 6.87 liters which is up from his previous total lung capacity of 6.2 liters in May.  The DLCO is also in the normal range.  The patient is planning to reach out to Rheumatology to restart his methotrexate.  His WBC count was low and he will follow up with Rheumatology on that.   2.  Lung nodules which have been deemed to be stable.   3.  Sleep-disordered breathing, on bilevel ventilation with good control.    4.  Gastroesophageal reflux, now being managed with assistance from GI with a followup scheduled in December.      I will see him back in 3-4 months or sooner if needed.               Answers for HPI/ROS submitted by the patient on 11/7/2017   General Symptoms: Yes  Skin Symptoms: Yes  HENT Symptoms: Yes  EYE SYMPTOMS: Yes  HEART SYMPTOMS: Yes  LUNG SYMPTOMS: Yes  INTESTINAL SYMPTOMS: Yes  URINARY SYMPTOMS: No  REPRODUCTIVE SYMPTOMS: No  SKELETAL SYMPTOMS: Yes  BLOOD SYMPTOMS:  Yes  NERVOUS SYSTEM SYMPTOMS: Yes  MENTAL HEALTH SYMPTOMS: No  Fever: Yes  Loss of appetite: No  Weight loss: No  Weight gain: No  Fatigue: Yes  Night sweats: No  Chills: Yes  Increased stress: No  Excessive hunger: No  Excessive thirst: No  Feeling hot or cold when others believe the temperature is normal: Yes  Loss of height: No  Post-operative complications: No  Surgical site pain: No  Hallucinations: No  Change in or Loss of Energy: Yes  Hyperactivity: No  Confusion: No  Changes in hair: No  Changes in moles/birth marks: No  Itching: No  Rashes: No  Changes in nails: No  Acne: No  Change in facial hair: No  Warts: No  Non-healing sores: Yes  Scarring: No  Flaking of skin: No  Color changes of hands/feet in cold : No  Sun sensitivity: No  Skin thickening: No  Ear pain: Yes  Ear discharge: No  Hearing loss: No  Tinnitus: Yes  Nosebleeds: No  Congestion: Yes  Sinus pain: Yes  Trouble swallowing: Yes   Voice hoarseness: Yes  Mouth sores: No  Sore throat: Yes  Tooth pain: No  Gum tenderness: No  Bleeding gums: No  Change in taste: Yes  Change in sense of smell: Yes  Dry mouth: Yes  Hearing aid used: No  Neck lump: No  Eye pain: Yes  Vision loss: No  Dry eyes: Yes  Watery eyes: Yes  Eye bulging: No  Double vision: Yes  Flashing of lights: No  Spots: No  Floaters: Yes  Redness: Yes  Crossed eyes: No  Tunnel Vision: No  Yellowing of eyes: No  Eye irritation: No  Cough: Yes  Sputum or phlegm: Yes  Coughing up blood: No  Difficulty breating or shortness of breath: Yes  Snoring: No  Wheezing: No  Difficulty breathing on exertion: Yes  Nighttime Cough: No  Difficulty breathing when lying flat: No  Chest pain or pressure: Yes  Fast or irregular heartbeat: No  Pain in legs with walking: Yes  Trouble breathing while lying down: No  Fingers or toes appear blue: No  High blood pressure: No  Low blood pressure: No  Fainting: No  Pacemaker: No  Varicose veins: No  Edema or swelling: No  Wake up at night with shortness of breath:  No  Light-headedness: Yes  Exercise intolerance: Yes  Heart burn or indigestion: Yes  Nausea: No  Vomiting: No  Abdominal pain: No  Bloating: Yes  Constipation: Yes  Diarrhea: No  Blood in stool: Yes  Black stools: No  Rectal or Anal pain: No  Fecal incontinence: No  Yellowing of skin or eyes: No  Vomit with blood: No  Change in stools: No  Back pain: Yes  Muscle aches: Yes  Neck pain: Yes  Swollen joints: Yes  Joint pain: Yes  Bone pain: Yes  Muscle cramps: Yes  Muscle weakness: Yes  Joint stiffness: Yes  Bone fracture: No  Anemia: Yes  Swollen glands: No  Easy bleeding or bruising: No  Trouble with coordination: Yes  Dizziness or trouble with balance: Yes  Fainting or black-out spells: No  Memory loss: No  Headache: Yes  Seizures: No  Speech problems: No  Tingling: Yes  Tremor: Yes  Weakness: Yes  Difficulty walking: Yes  Paralysis: No  Numbness: Yes

## 2017-11-17 NOTE — PROGRESS NOTES
The blood counts--improved and liver, kidney labs are normal.     Sin GUZMAN, CNP, MSN  11/17/2017  12:07 PM

## 2017-11-21 LAB
DLCOCOR-%PRED-PRE: 101 %
DLCOCOR-PRE: 28.34 ML/MIN/MMHG
DLCOUNC-%PRED-PRE: 98 %
DLCOUNC-PRE: 27.42 ML/MIN/MMHG
DLCOUNC-PRED: 27.98 ML/MIN/MMHG
ERV-%PRED-PRE: 22 %
ERV-PRE: 0.18 L
ERV-PRED: 0.8 L
EXPTIME-PRE: 8.57 SEC
FEF2575-%PRED-PRE: 69 %
FEF2575-PRE: 1.89 L/SEC
FEF2575-PRED: 2.73 L/SEC
FEFMAX-%PRED-PRE: 92 %
FEFMAX-PRE: 8.7 L/SEC
FEFMAX-PRED: 9.36 L/SEC
FEV1-%PRED-PRE: 72 %
FEV1-PRE: 2.67 L
FEV1FEV6-PRE: 76 %
FEV1FEV6-PRED: 78 %
FEV1FVC-PRE: 74 %
FEV1FVC-PRED: 75 %
FEV1SVC-PRE: 74 %
FEV1SVC-PRED: 67 %
FIFMAX-PRE: 7.46 L/SEC
FRCPLETH-%PRED-PRE: 87 %
FRCPLETH-PRE: 3.46 L
FRCPLETH-PRED: 3.93 L
FVC-%PRED-PRE: 73 %
FVC-PRE: 3.61 L
FVC-PRED: 4.89 L
IC-%PRED-PRE: 72 %
IC-PRE: 3.42 L
IC-PRED: 4.68 L
MEP-PRE: 65 CMH2O
MIP-PRE: -25 CMH2O
RVPLETH-%PRED-PRE: 118 %
RVPLETH-PRE: 3.27 L
RVPLETH-PRED: 2.76 L
TLCPLETH-%PRED-PRE: 86 %
TLCPLETH-PRE: 6.87 L
TLCPLETH-PRED: 7.94 L
VA-%PRED-PRE: 59 %
VA-PRE: 4.54 L
VC-%PRED-PRE: 65 %
VC-PRE: 3.6 L
VC-PRED: 5.48 L

## 2017-12-05 ASSESSMENT — ENCOUNTER SYMPTOMS
BACK PAIN: 1
WEAKNESS: 1
HOARSE VOICE: 0
JAUNDICE: 0
FEVER: 0
TINGLING: 1
SKIN CHANGES: 0
SEIZURES: 0
BRUISES/BLEEDS EASILY: 0
NIGHT SWEATS: 0
BOWEL INCONTINENCE: 0
SPEECH CHANGE: 0
HALLUCINATIONS: 0
SPUTUM PRODUCTION: 0
SHORTNESS OF BREATH: 1
ALTERED TEMPERATURE REGULATION: 1
CONSTIPATION: 1
NUMBNESS: 1
NAUSEA: 1
HEMOPTYSIS: 0
DIARRHEA: 0
SINUS CONGESTION: 1
WHEEZING: 1
DYSPNEA ON EXERTION: 1
MEMORY LOSS: 0
TASTE DISTURBANCE: 0
STIFFNESS: 1
ABDOMINAL PAIN: 1
SNORES LOUDLY: 0
COUGH: 0
VOMITING: 0
CHILLS: 0
RECTAL PAIN: 0
DISTURBANCES IN COORDINATION: 1
POLYPHAGIA: 0
INCREASED ENERGY: 0
TREMORS: 1
JOINT SWELLING: 1
DECREASED APPETITE: 0
POOR WOUND HEALING: 1
WEIGHT LOSS: 0
COUGH DISTURBING SLEEP: 0
MYALGIAS: 1
DIZZINESS: 1
SORE THROAT: 0
TROUBLE SWALLOWING: 1
NAIL CHANGES: 0
NECK MASS: 0
PARALYSIS: 0
BLOOD IN STOOL: 0
EYE IRRITATION: 0
BLOATING: 0
SWOLLEN GLANDS: 0
MUSCLE CRAMPS: 1
MUSCLE WEAKNESS: 1
SMELL DISTURBANCE: 1
EYE WATERING: 0
NECK PAIN: 1
ARTHRALGIAS: 1
HEADACHES: 0
LOSS OF CONSCIOUSNESS: 0
EYE REDNESS: 0
SINUS PAIN: 1
POLYDIPSIA: 0
HEARTBURN: 1
DOUBLE VISION: 1
FATIGUE: 1
EYE PAIN: 1
WEIGHT GAIN: 0
POSTURAL DYSPNEA: 0

## 2017-12-06 ENCOUNTER — TELEPHONE (OUTPATIENT)
Dept: GASTROENTEROLOGY | Facility: CLINIC | Age: 67
End: 2017-12-06

## 2017-12-08 ENCOUNTER — OFFICE VISIT (OUTPATIENT)
Dept: GASTROENTEROLOGY | Facility: CLINIC | Age: 67
End: 2017-12-08

## 2017-12-08 VITALS
DIASTOLIC BLOOD PRESSURE: 87 MMHG | WEIGHT: 303.8 LBS | BODY MASS INDEX: 38.99 KG/M2 | HEIGHT: 74 IN | TEMPERATURE: 97.6 F | HEART RATE: 56 BPM | SYSTOLIC BLOOD PRESSURE: 150 MMHG | OXYGEN SATURATION: 98 %

## 2017-12-08 DIAGNOSIS — K21.9 GASTROESOPHAGEAL REFLUX DISEASE WITHOUT ESOPHAGITIS: ICD-10-CM

## 2017-12-08 DIAGNOSIS — M35.9 CONNECTIVE TISSUE DISORDER (H): Primary | ICD-10-CM

## 2017-12-08 RX ORDER — OMEPRAZOLE 40 MG/1
40 CAPSULE, DELAYED RELEASE ORAL
Qty: 60 CAPSULE | Refills: 0 | COMMUNITY
Start: 2017-11-29 | End: 2017-12-08

## 2017-12-08 RX ORDER — OMEPRAZOLE 40 MG/1
40 CAPSULE, DELAYED RELEASE ORAL
Qty: 180 CAPSULE | Refills: 3 | Status: SHIPPED | OUTPATIENT
Start: 2017-12-08 | End: 2018-05-22

## 2017-12-08 ASSESSMENT — PAIN SCALES - GENERAL: PAINLEVEL: NO PAIN (0)

## 2017-12-08 NOTE — NURSING NOTE
"Chief Complaint   Patient presents with     RECHECK     F/U       Vitals:    12/08/17 1058   BP: 150/87   BP Location: Left arm   Patient Position: Chair   Cuff Size: Adult Regular   Pulse: 56   Temp: 97.6  F (36.4  C)   SpO2: 98%   Weight: (!) 303 lb 12.8 oz   Height: 6' 2\"       Body mass index is 39.01 kg/(m^2).      Linh Engel                          "

## 2017-12-08 NOTE — PATIENT INSTRUCTIONS
Even with normal magnesium, would you have less lower extremity cramping with a supplement? Is it safe for you?    You are better with the PPI medication  High dose and two times daily.   Now on omeprazole (Prilosec). Stronger for = mg is likely pantoprazole (Protonix) .     Still OK to add ranitidine (Zantac) as needed 150 or 300 mg night time.   Best is to avoid the need.    Re stomach emptying.   A Gastric Emptying study is recommended.  A gastric emptying scan checks the time it takes for your stomach to empty. You will eat scrambled eggs and toast that contain a tiny amount of radioactive material. We will then take a set of pictures for the next 11/2 to 4 hours. Please allow 2 to 5 hours for this exam.    You may schedule this at . Specify with oatmeal (rather than with toast)  Come to the [facility] waiting room.  Preparation  Do not eat or drink after midnight before your study.  Do not use medications against nausea for 48 hours before this study.   These include ondansetron (Zofran), prochlorperazine (Compazine) and others.  If you are using metoclopramide (Reglan) or erythromycin, call GI if you do not know  whether you are to be on the medication or off of it for the study.      When the stomach does not empty normally, but rather very slowly, this is called gastroparesis. It may cause a person to lose appetite, to feel full fast eating only small amounts, or to have nausea.    Gastroparesis may occur with diabetes, in which case it is worse any time blood sugar is very high and also worse over time when blood sugar is chronically or repeatedly high. Gastroparesis also occurs with certain other conditions including with 35% of people with connective tissue disorder. Slow stomach emptying also occurs as a side effect of medications, especially with opioid pain medications and a few others.    The most important treatment for gastroparesis is to eat low fat  "and low fiber meals. Fat and fiber both stay in the stomach longer; they cause slower stomach emptying in normal stomachs. They generally make gastroparesis much worse. We recommend a visit with a dietitian focused on how to eat this diet and maintain good nutrition.    Brief tips for the gastroparesis diet are here:  In the low fat diet, do not eat anything deep fried or fried in oil, anything that makes your fingers greasy, or leaves oily spots on a paper napkin. Read labels to eat foods with 20% fat or less.  For low fiber, most important is to avoid popcorn, corn, and raw fruits and vegetables - especially the skins and seeds. Also avoid or eat very little peanut butter, nuts and legumes. Iceberg lettuce is low fiber, but other greens have higher fiber content, with cabbage being especially high fiber.    If there is ANY slow stomach emptying, then eating even further before bedtime helps.      Always  Follow lifestyle precautions against acid reflux (GERD)    Do not overeat.   Avoid meals and snacks within three or four hours of lying down.   Avoid alcohol and mint. Decrease or quit smoking.   Do not drink carbonation - it IS acid. Decrease or quit caffeine.   If you have nighttime symptoms, elevate the head of the bed    first 3 inches, then 6 to 8 inches.    A foam wedge from the hip may be helpful, if a person lies on their back.    Pillows do NOT work. The entire back must be straight.   Lose weight if you are overweight.    Even ten pounds weight loss can make a difference.  Some people also have specific triggers: tomato, spice, chocolate, citrus/orange juice.    Swallowing precautions for esophageal dysmotility and for after anti-reflux surgery.    (poor muscle action in the esophagus)   Take small bites.   Chew well   Moisten solid or dry food with applesauce, yogurt or other sauces.   Take sips of water between bites.   \"Swallow\" twice for each bite of food.   Wait for each bite to go down before " "taking the next bite.   Drink liquids slowly, a swallow at a time, rather than in continuous gulps.    Best for stool form is psyllium of benefiber. These are NOT laxatives.  Will not overcome the effects of lactose.    Soluble fiber sops up water and gives soft stool and formed stool.    This also supports healthy gut bacteria.    Is a prebiotic that supports the \"pro-biotics\"  Psyllium (Metamucil) is most natural.   Powder - mix and drink immediately, -  or use the capsules or tablets.  or  Wheat dextrin (Benefiber) which has no taste or texture.   Available as powder, capsule, chewable.  When in doubt, return to powder.  If using the fiber discussed in clinic, start as discussed in clinic.  Or: Take the dose on the label.  If it causes distress, start at half the dose and work up to full dose.  If you have no improvement after two weeks, increase the dose and be sure to use it twice daily.  You may feel bloat at the beginning.   Improvement comes gradually.  Continue this regularly for a couple months before deciding whether it is helpful for you.    [facility]       Return to GI Clinic as needed     [facility]   For appointments call [facility] [phone]  For GI Nurse Triage  [phone], then, \"For Medical Questions, option 3\"  Fax results to         "

## 2017-12-08 NOTE — MR AVS SNAPSHOT
After Visit Summary   12/8/2017    Lucio Daly    MRN: [id number]           Patient Information     Date Of Birth          1950        Visit Information        Provider Department      12/8/2017 11:00 AM Blanche Meade, MEGAN [facility]        Today's Diagnoses     Gastroesophageal reflux disease without esophagitis          Care Instructions    Even with normal magnesium, would you have less lower extremity cramping with a supplement? Is it safe for you?    You are better with the PPI medication  High dose and two times daily.   Now on omeprazole (Prilosec). Stronger for = mg is likely pantoprazole (Protonix) .     Still OK to add ranitidine (Zantac) as needed 150 or 300 mg night time.   Best is to avoid the need.    Re stomach emptying.   A Gastric Emptying study is recommended.  A gastric emptying scan checks the time it takes for your stomach to empty. You will eat scrambled eggs and toast that contain a tiny amount of radioactive material. We will then take a set of pictures for the next 11/2 to 4 hours. Please allow 2 to 5 hours for this exam.    You may schedule this at . Specify with oatmeal (rather than with toast)  Come to the [facility] waiting room.  Preparation  Do not eat or drink after midnight before your study.  Do not use medications against nausea for 48 hours before this study.   These include ondansetron (Zofran), prochlorperazine (Compazine) and others.  If you are using metoclopramide (Reglan) or erythromycin, call GI if you do not know  whether you are to be on the medication or off of it for the study.      When the stomach does not empty normally, but rather very slowly, this is called gastroparesis. It may cause a person to lose appetite, to feel full fast eating only small amounts, or to have nausea.    Gastroparesis may occur with diabetes, in which case it is worse any time blood  sugar is very high and also worse over time when blood sugar is chronically or repeatedly high. Gastroparesis also occurs with certain other conditions including with 35% of people with connective tissue disorder. Slow stomach emptying also occurs as a side effect of medications, especially with opioid pain medications and a few others.    The most important treatment for gastroparesis is to eat low fat and low fiber meals. Fat and fiber both stay in the stomach longer; they cause slower stomach emptying in normal stomachs. They generally make gastroparesis much worse. We recommend a visit with a dietitian focused on how to eat this diet and maintain good nutrition.    Brief tips for the gastroparesis diet are here:  In the low fat diet, do not eat anything deep fried or fried in oil, anything that makes your fingers greasy, or leaves oily spots on a paper napkin. Read labels to eat foods with 20% fat or less.  For low fiber, most important is to avoid popcorn, corn, and raw fruits and vegetables - especially the skins and seeds. Also avoid or eat very little peanut butter, nuts and legumes. Iceberg lettuce is low fiber, but other greens have higher fiber content, with cabbage being especially high fiber.    If there is ANY slow stomach emptying, then eating even further before bedtime helps.      Always  Follow lifestyle precautions against acid reflux (GERD)    Do not overeat.   Avoid meals and snacks within three or four hours of lying down.   Avoid alcohol and mint. Decrease or quit smoking.   Do not drink carbonation - it IS acid. Decrease or quit caffeine.   If you have nighttime symptoms, elevate the head of the bed    first 3 inches, then 6 to 8 inches.    A foam wedge from the hip may be helpful, if a person lies on their back.    Pillows do NOT work. The entire back must be straight.   Lose weight if you are overweight.    Even ten pounds weight loss can make a difference.  Some people also have specific  "triggers: tomato, spice, chocolate, citrus/orange juice.    Swallowing precautions for esophageal dysmotility and for after anti-reflux surgery.    (poor muscle action in the esophagus)   Take small bites.   Chew well   Moisten solid or dry food with applesauce, yogurt or other sauces.   Take sips of water between bites.   \"Swallow\" twice for each bite of food.   Wait for each bite to go down before taking the next bite.   Drink liquids slowly, a swallow at a time, rather than in continuous gulps.    Best for stool form is psyllium of benefiber. These are NOT laxatives.  Will not overcome the effects of lactose.    Soluble fiber sops up water and gives soft stool and formed stool.    This also supports healthy gut bacteria.    Is a prebiotic that supports the \"pro-biotics\"  Psyllium (Metamucil) is most natural.   Powder - mix and drink immediately, -  or use the capsules or tablets.  or  Wheat dextrin (Benefiber) which has no taste or texture.   Available as powder, capsule, chewable.  When in doubt, return to powder.  If using the fiber discussed in clinic, start as discussed in clinic.  Or: Take the dose on the label.  If it causes distress, start at half the dose and work up to full dose.  If you have no improvement after two weeks, increase the dose and be sure to use it twice daily.  You may feel bloat at the beginning.   Improvement comes gradually.  Continue this regularly for a couple months before deciding whether it is helpful for you.    [facility]       Return to GI Clinic as needed     [facility]   For appointments call [facility] [phone]  For GI Nurse Triage  [phone], then, \"For Medical Questions, option 3\"  Fax results to                 Follow-ups after your visit        Your next 10 appointments already scheduled     Dec 20, 2017  9:30 AM CST   (Arrive by 9:15 AM)   Return Visit with Jeremy Goodrich MD   [facility] " Rheumatology ([facility])    [address] [id number] [phone]            Mar 06, 2018  9:20 AM CST   Return Sleep Patient with Kristen Martinez MD   [facility] [place], Sleep Study ([facility])    [address] [place] [id number] [phone]            May 15, 2018  1:00 PM CDT   SIX MINUTE WALK with UC PFL C, UC PFL 6 MINUTE WALK 2   [facility] Pulmonary Function Testing ([facility])    [address] [id number] [phone]            May 15, 2018  2:00 PM CDT   (Arrive by 1:45 PM)   Return Interstitial Lung with Harsha Mccarthy MD   [facility] ([facility])    [address] [place] [id number] [phone]            May 22, 2018  8:40 AM CDT   (Arrive by 8:25 AM)   Return Movement Disorder with Thong Montes MD   [facility] Neurology ([facility])    [address] [place] [id number] [phone]              Who to contact     Please call your clinic at [phone] to:    Ask questions about your health    Make or cancel appointments    Discuss your medicines    Learn about your test results    Speak to your doctor   If you have compliments or concerns about an experience at your clinic, or if you wish to file a complaint, please contact [facility] Patient Relations at [phone] or email us at [email or web address]         Additional Information About Your Visit        MyChart Information     [employer] gives you secure access to your electronic health record. If you see a primary care provider, you can also send messages to your care team and make appointments. If you have questions, please call your primary care clinic.  If you do not  "have a primary care provider, please call [phone] and they will assist you.      [employer] is an electronic gateway that provides easy, online access to your medical records. With [employer], you can request a clinic appointment, read your test results, renew a prescription or communicate with your care team.     To access your existing account, please contact your [facility] or call [phone] for assistance.        Care EveryWhere ID     This is your Care EveryWhere ID. This could be used by other organizations to access your [place] medical records  [id number]        Your Vitals Were     Pulse Temperature Height Pulse Oximetry BMI (Body Mass Index)       56 97.6  F (36.4  C) 1.88 m (6' 2\") 98% 39.01 kg/m2        Blood Pressure from Last 3 Encounters:   12/08/17 150/87   11/16/17 (!) 164/105   10/28/17 132/78    Weight from Last 3 Encounters:   12/08/17 (!) 137.8 kg (303 lb 12.8 oz)   11/16/17 (!) 137.9 kg (304 lb)   10/28/17 (!) 137.9 kg (304 lb)              Today, you had the following     No orders found for display         Today's Medication Changes          These changes are accurate as of: 12/8/17 11:55 AM.  If you have any questions, ask your nurse or doctor.               Start taking these medicines.        Dose/Directions    omeprazole 40 MG capsule   Commonly known as:  priLOSEC   Used for:  Gastroesophageal reflux disease without esophagitis   Started by:  Blanche Meade APRN CNP        Dose:  40 mg   Take 1 capsule (40 mg) by mouth 2 times daily (before meals)   Quantity:  180 capsule   Refills:  3            Where to get your medicines      These medications were sent to [facility] - [place], [place] - [address] [place] [address]  [address] [facility] [address], [facility] [address]    Hours:  Mon-Fri 8:30am-5:00pm Toll Free [phone] Phone:  [phone]     omeprazole 40 MG capsule                Primary Care Provider Office Phone # Fax #    " Jah Corley [phone]       [address]  [facility] [address]        Equal Access to Services     ALINE QUIÑONES : Hadmarcus aad ku hadharpal Ga, kevenda ingrissusanha, abdonta herminiojulio mcqueen, mariana jimdaryl elizabeth. So [facility] [phone].    ATENCIÓN: Si habla español, tiene a rudd disposición servicios gratuitos de asistencia lingüística. Llame al [phone].    We comply with applicable federal civil rights laws and [place] laws. We do not discriminate on the basis of race, color, national origin, age, disability, sex, sexual orientation, or gender identity.            Thank you!     Thank you for choosing [facility]  for your care. Our goal is always to provide you with excellent care. Hearing back from our patients is one way we can continue to improve our services. Please take a few minutes to complete the written survey that you may receive in the mail after your visit with us. Thank you!             Your Updated Medication List - Protect others around you: Learn how to safely use, store and throw away your medicines at www.disposemymeds.org.          This list is accurate as of: 12/8/17 11:55 AM.  Always use your most recent med list.                   Brand Name Dispense Instructions for use Diagnosis    folic acid 1 MG tablet    FOLVITE    180 tablet    Take 2 tablets (2 mg) by mouth daily    Pain in toes of both feet, Rheumatoid arthritis of multiple sites with negative rheumatoid factor (H), Rheumatoid arthritis involving shoulder with negative rheumatoid factor, unspecified laterality (H), High risk medications (not anticoagulants) long-term use       hydroxychloroquine 200 MG tablet    PLAQUENIL    180 tablet    Take 1 tablet (200 mg) by mouth 2 times daily Send copy of recent eye exam as need for refills. Please have a copy of your eye exam faxed to [phone].    Rheumatoid arthritis involving shoulder with negative rheumatoid  factor, unspecified laterality (H), High risk medications (not anticoagulants) long-term use, Pain in toes of both feet, Rheumatoid arthritis of multiple sites with negative rheumatoid factor (H)       methotrexate 2.5 MG tablet CHEMO     28 tablet    Take 7 tablets (17.5 mg) by mouth once a week Labs due every 8-12 weeks    Rheumatoid arthritis of multiple sites with negative rheumatoid factor (H), High risk medications (not anticoagulants) long-term use       metoprolol 50 MG 24 hr tablet    TOPROL-XL    90 tablet    Take 1 tablet (50 mg) by mouth daily    Essential hypertension       omeprazole 40 MG capsule    priLOSEC    180 capsule    Take 1 capsule (40 mg) by mouth 2 times daily (before meals)    Gastroesophageal reflux disease without esophagitis       order for DME      Use your CPAP device as directed by your provider.        oxybutynin 5 MG tablet    DITROPAN    90 tablet    Take 1 tablet (5 mg) by mouth daily    Other urinary incontinence       pregabalin 50 MG capsule    LYRICA    360 capsule    One in the am, one in the afternoon and 2 at night.    Peripheral polyneuropathy       TYLENOL 500 MG tablet   Generic drug:  acetaminophen      Take 1 tablet by mouth as needed. For pain        vitamin D 1000 UNITS capsule      Take 2 capsules by mouth daily.    Other specified disorder of skin

## 2017-12-08 NOTE — LETTER
12/8/2017       RE: Lucio Daly  [address]  [place] [address]     Dear Colleague,    Thank you for referring your patient, Lucio Daly, to the [facility] at [facility]. Please see a copy of my visit note below.    CHIEF COMPLAINT:  Acid reflux, followup.      HISTORY OF PRESENT ILLNESS:  Lucio is a 67-year-old man whom I met 10/05/2017 regarding the above complaint.  He also had bloat, belch, esophageal spasm and disadvantageous stool form.  His background is one of interstitial lung disease, seronegative Sjogren syndrome and recurrent sinusitis.  He has a distant past history of H. pylori documented absent.  His heartburn had been going on for many years, but symptoms were increased.  Please see the previous note for details.  We reviewed GERD precautions, recommended the proton pump inhibitor twice daily for now and if no better to return to once daily.  If he is better twice daily, methotrexate level needed to be checked.  He could consider added ranitidine for bedtime at 150 or 300 mg as needed.  Question regarding a trial of Zyrtec daily was raised.  Also, a trial of dairy-free 2-4 weeks was recommended.  He could call to update us as needed, and if he wished to proceed, esophageal motility and pH impedance testing would be ordered.  Regarding his bowel habits, he was informed at the [facility] and instructed on Kegel exercises and soluble fiber.      Lucio tried to increase dose of proton pump inhibitor and believed that he had considerably improved his symptoms.  He also found that the esophageal spasm was partially triggered by cold and is able to avoid that.  He was off methotrexate due to recurrent upper respiratory infection.  During that time off, his stomach felt better.  He restarted his weekly dose 11/29 and had his second quickly dose 12/06.  Even now, he continues to feel okay in his  stomach.  He also has decreased esophageal dysphagia, decreased belch and bloat with a higher dose proton pump inhibitor.  He considers whether the selegiline also worsened his GI symptoms.  He recalls that he was dairy-free some years ago and that decreased his congestion.  At this time, he eats low gluten but not gluten-free diet and low dairy.  He notes that even walking down the bread isle in the grocery store will increase his congestion.  He wonders whether that somehow relates to yeast or something else.      MEDICAL HISTORY, MEDICATIONS, ADVERSE DRUG REACTIONS:  Reviewed.      REVIEW OF SYSTEMS:  He has finished his azathioprine for his recent infection.  Weight is stable.  Energy is stable.      Questionnaire reviewed, significant elements discussed.      OBJECTIVE:   VITAL SIGNS:  Reviewed, stable.  He is hypertensive and aware.   GENERAL:  Clean, snf age, neatly groomed, large man who shows no distress.  He is fluent, and speech is logical.   HEENT:  Eyes are clear.   RESPIRATIONS:  Breathing is calm and grossly normal.        Not otherwise examined.      RESULTS:  Right upper quadrant ultrasound showing stable cysts, increased echogenicity of steatosis, normal bile ducts and gallbladder.      Other results reviewed.       ASSESSMENT:  A 67-year-old man with interstitial lung disease and Sjogren's syndrome who seems to have more than dysphagia contributing to his acid reflux.  He has met with considerable benefit using the higher dose proton pump inhibitor omeprazole 40 mg twice daily and coming closer to following GERD precautions.      PLAN:   1.  Continue the higher dose omeprazole 40 mg twice daily with optimal timing.   2.  Continue to follow GERD precautions.   3.  He may add ranitidine as needed, as he anticipates bedtime or nighttime symptoms due to timing ingestions.   4.  Follow through with methotrexate level within the next 2 weeks.   5.  Gastric emptying study, as he reports tasting  food from the day before in a.m., sometimes fullness despite hours between meals.   6.  We discussed esophageal motility and pH impedance testing.  He does not wish to do these before his 3 months in [place] but expressed a call to schedule these on return, perhaps April.  He would like the information as to whether his dysmotility includes lower esophageal sphincter or anything beyond what was suggested by an esophagram.      Last EGD and colonoscopy were both 03/30/2015 at the [facility] done with monitored anesthesia care.  He had 2 small polyps, tubular adenoma, with the recommendation for repeat in 5 years.  There were no biopsies taken from the upper GI endoscopy.  The esophagus appeared normal.  The small polyps in the stomach had previously been found to be fundic gland polyps.      Lucio has lower extremity muscle cramps at times.  Though his magnesium has tested positive in the past, this may yet relate to proton pump inhibitor use and may decrease with the use of magnesium.  He may ask Dr. Corley about that.      We discussed the assessment and plan.  We also discussed possible long-term risks of proton pump inhibitors which may be dose related and certainly are suggested to be duration related.  These risks increase with increasing age and include decreased intestinal absorption, though that has not proved to coincide with increase fractures in most studies, and decreased iron absorption, though the level of decrease in hemoglobin has not yet been significant.  The question of dementia has been disproved.  The question renal failure is confirmed and also presents with H2 blockers.  Lucio, however, with interstitial lung disease is advised to manage his acid reflux aggressively.  We did speak of his high weight as a possible contributor and that less would be healthier for him in many regards.      Total visit 30 minutes with counseling time 20 minutes.         MEGAN VELARDE CNP              D: 12/10/2017 15:07   T: 2017 09:33   MT: JUANCARLOS      Name:     RANJIT AGUSTIN   MRN:      [id number]        Account:      [id number]   :      1950           Service Date: 2017      Document: [id number]       Again, thank you for allowing me to participate in the care of your patient.      Sincerely,    [facility] CNP

## 2017-12-11 NOTE — PROGRESS NOTES
CHIEF COMPLAINT:  Acid reflux, followup.      HISTORY OF PRESENT ILLNESS:  Lucio is a 67-year-old man whom I met 10/05/2017 regarding the above complaint.  He also had bloat, belch, esophageal spasm and disadvantageous stool form.  His background is one of interstitial lung disease, seronegative Sjogren syndrome and recurrent sinusitis.  He has a distant past history of H. pylori documented absent.  His heartburn had been going on for many years, but symptoms were increased.  Please see the previous note for details.  We reviewed GERD precautions, recommended the proton pump inhibitor twice daily for now and if no better to return to once daily.  If he is better twice daily, methotrexate level needed to be checked.  He could consider added ranitidine for bedtime at 150 or 300 mg as needed.  Question regarding a trial of Zyrtec daily was raised.  Also, a trial of dairy-free 2-4 weeks was recommended.  He could call to update us as needed, and if he wished to proceed, esophageal motility and pH impedance testing would be ordered.  Regarding his bowel habits, he was informed [facility] and instructed on Kegel exercises and soluble fiber.      Lucio tried to increase dose of proton pump inhibitor and believed that he had considerably improved his symptoms.  He also found that the esophageal spasm was partially triggered by cold intake and is able to avoid that.  He was off methotrexate due to recurrent upper respiratory infection.  During that time off, his stomach felt better.  He restarted his weekly dose 11/29 and had his second dose 12/06.  Even now, he continues to feel okay in his stomach.  He also has decreased esophageal dysphagia, decreased belch and bloat with a higher dose proton pump inhibitor.  He considers whether the selegiline also worsened his GI symptoms.  He recalls that he was dairy-free some years ago and that decreased his congestion.  At this time, he eats  low gluten but not gluten-free diet and low dairy.  He notes that even walking down the bread isle in the grocery store will increase his congestion.  He wonders whether that somehow relates to yeast or something else.      MEDICAL HISTORY, MEDICATIONS, ADVERSE DRUG REACTIONS:  Reviewed.      REVIEW OF SYSTEMS:  He has finished his azathioprine for his recent infection.  Weight is stable.  Energy is stable.   Questionnaire reviewed, significant elements discussed.      OBJECTIVE:   VITAL SIGNS:  Reviewed, stable.  He is hypertensive and aware.   GENERAL:  Clean, long term age, neatly groomed, large man who shows no distress.  He is fluent, and speech is logical.   HEENT:  Eyes are clear.   RESPIRATIONS:  Breathing is calm and grossly normal.        Not otherwise examined.      RESULTS:  Right upper quadrant ultrasound showing stable cysts, increased echogenicity of steatosis, normal bile ducts and gallbladder.   Recall previous upper GI endoscopy (EGD) with no Cuellar's esophagus, normal esophagus visually, most recently 3/2015.   Other results reviewed.       ASSESSMENT:  A 67-year-old man with interstitial lung disease and Sjogren's syndrome who seems to have more than dysphagia contributing to his acid reflux. His weight may contribute.  He has been shown to have no Cuellar's esophagus. He has met with considerable benefit using the higher dose proton pump inhibitor omeprazole 40 mg twice daily and coming closer to following GERD precautions.      PLAN:   1.  Continue the higher dose omeprazole 40 mg twice daily with optimal timing.   2.  Continue to follow GERD precautions.   3.  He may add ranitidine as needed, as he anticipates bedtime or nighttime symptoms due to timing ingestions.   4.  Follow through with methotrexate level within the next 2 weeks.   5.  Gastric emptying study, as he reports tasting food from the day before in a.m., sometimes fullness despite hours between meals.   6.  We discussed  esophageal motility and pH impedance testing.  He does not wish to do these before his 3 months in [place] but expressed he is likely to call to schedule these on return, perhaps April.  He would like the information as to whether his dysmotility includes lower esophageal sphincter disorder or anything beyond what was suggested by an esophagram.      Last EGD and colonoscopy were both 03/30/2015 at the [facility] done with monitored anesthesia care.  He had 2 small polyps, tubular adenoma, with the recommendation for repeat in 5 years.  There were no biopsies taken from the upper GI endoscopy.  The esophagus appeared normal.  The small polyps in the stomach had previously been found to be fundic gland polyps.      Lucio has lower extremity muscle cramps at times.  Though his magnesium has tested positive in the past, this may yet relate to proton pump inhibitor use and may decrease with the use of magnesium.  He may ask Dr. Corley about that.      We discussed the assessment and plan.  We also discussed possible long-term risks of proton pump inhibitors which may be dose related and certainly are suggested to be duration related.  These risks increase with increasing age and include decreased intestinal absorption, though that has not proved to coincide with increase fractures in most studies, and decreased iron absorption, though the level of decrease in hemoglobin has not yet been significant.  The question of dementia has been disproved.  The risk of renal failure is confirmed and also presents with H2 blockers, though number of persons affected appears to be very small.  Lucio, however, with interstitial lung disease is advised to manage his acid reflux aggressively.  We did speak of his high weight as a possible contributor and that less would be healthier for him in many regards.      Total visit 30 minutes with counseling time 20 minutes.         MEGAN VELARDE CNP             D:  12/10/2017 15:07   T: 2017 09:33   MT: JUANCARLOS      Name:     RANJIT AGUSTIN   MRN:      [id number]        Account:      [id number]   :      1950           Service Date: 2017      Document: [id number]

## 2017-12-20 ENCOUNTER — OFFICE VISIT (OUTPATIENT)
Dept: RHEUMATOLOGY | Facility: CLINIC | Age: 67
End: 2017-12-20
Attending: INTERNAL MEDICINE
Payer: MEDICARE

## 2017-12-20 VITALS
HEART RATE: 52 BPM | BODY MASS INDEX: 38.94 KG/M2 | SYSTOLIC BLOOD PRESSURE: 155 MMHG | HEIGHT: 74 IN | TEMPERATURE: 97.7 F | WEIGHT: 303.4 LBS | DIASTOLIC BLOOD PRESSURE: 91 MMHG | OXYGEN SATURATION: 95 %

## 2017-12-20 DIAGNOSIS — M06.09 RHEUMATOID ARTHRITIS OF MULTIPLE SITES WITHOUT RHEUMATOID FACTOR (H): Primary | ICD-10-CM

## 2017-12-20 DIAGNOSIS — M06.019 RHEUMATOID ARTHRITIS INVOLVING SHOULDER WITH NEGATIVE RHEUMATOID FACTOR, UNSPECIFIED LATERALITY (H): ICD-10-CM

## 2017-12-20 DIAGNOSIS — M06.09 RHEUMATOID ARTHRITIS OF MULTIPLE SITES WITH NEGATIVE RHEUMATOID FACTOR (H): ICD-10-CM

## 2017-12-20 DIAGNOSIS — M79.675 PAIN IN TOES OF BOTH FEET: ICD-10-CM

## 2017-12-20 DIAGNOSIS — M79.674 PAIN IN TOES OF BOTH FEET: ICD-10-CM

## 2017-12-20 DIAGNOSIS — Z79.899 HIGH RISK MEDICATIONS (NOT ANTICOAGULANTS) LONG-TERM USE: ICD-10-CM

## 2017-12-20 PROCEDURE — 99212 OFFICE O/P EST SF 10 MIN: CPT | Mod: ZF

## 2017-12-20 RX ORDER — HYDROXYCHLOROQUINE SULFATE 200 MG/1
TABLET, FILM COATED ORAL
Qty: 180 TABLET | Refills: 3 | Status: SHIPPED | OUTPATIENT
Start: 2017-12-20 | End: 2018-05-22

## 2017-12-20 ASSESSMENT — PAIN SCALES - GENERAL: PAINLEVEL: SEVERE PAIN (7)

## 2017-12-20 NOTE — NURSING NOTE
"Chief Complaint   Patient presents with     RECHECK     Follow up RA.       Initial BP (!) 155/91  Pulse 52  Temp 97.7  F (36.5  C) (Oral)  Ht 1.88 m (6' 2\")  Wt (!) 137.6 kg (303 lb 6.4 oz)  SpO2 95%  BMI 38.95 kg/m2 Estimated body mass index is 38.95 kg/(m^2) as calculated from the following:    Height as of this encounter: 1.88 m (6' 2\").    Weight as of this encounter: 137.6 kg (303 lb 6.4 oz).  Medication Reconciliation: complete   Jossy Price., CMA.    "

## 2017-12-20 NOTE — MR AVS SNAPSHOT
After Visit Summary   12/20/2017    Lucio Daly    MRN: [id number]           Patient Information     Date Of Birth          1950        Visit Information        Provider Department      12/20/2017 9:30 AM Jeremy Goodrich MD [facility] Rheumatology        Today's Diagnoses     Rheumatoid arthritis of multiple sites without rheumatoid factor (H)    -  1    Rheumatoid arthritis involving shoulder with negative rheumatoid factor, unspecified laterality (H)        High risk medications (not anticoagulants) long-term use        Pain in toes of both feet        Rheumatoid arthritis of multiple sites with negative rheumatoid factor (H)          Care Instructions    Increase methotrexate to 8 tabs weekly.  Continue plaquenil 2 tabs daily.          Follow-ups after your visit        Follow-up notes from your care team     Return in about 6 months (around 6/20/2018).      Your next 10 appointments already scheduled     Mar 06, 2018  9:30 AM CST   Return Sleep Patient with Kristen Martinez MD   [facility] [place], Sleep Study ([facility])    [address] [place] [id number]   [phone]            May 15, 2018  1:00 PM CDT   SIX MINUTE WALK with UC PFL C, UC PFL 6 MINUTE WALK 2   [facility] Pulmonary Function Testing ([facility])    [address] [place] [id number] [phone]            May 15, 2018  2:00 PM CDT   (Arrive by 1:45 PM)   Return Interstitial Lung with Harsha Mccarthy MD   [facility] ([facility])    [address] [place] [id number]   [phone]            May 22, 2018  8:40 AM CDT   (Arrive by 8:25 AM)   Return Movement Disorder with Thong Montes MD   [facility] Neurology ([facility])    [address] [place] [id number]    [phone]            Jun 20, 2018  9:00 AM CDT   (Arrive by 8:45 AM)   Return Visit with MEGAN Ayers CNP   [facility] Rheumatology ([facility])    [address] [id number] [phone]              Future tests that were ordered for you today     Open Future Orders        Priority Expected Expires Ordered    CRP inflammation Routine  12/20/2018 12/20/2017    ALT Routine  12/20/2018 12/20/2017    AST Routine  12/20/2018 12/20/2017    Creatinine Routine  12/20/2018 12/20/2017            Who to contact     If you have questions or need follow up information about today's clinic visit or your schedule please contact [facility] RHEUMATOLOGY directly at [phone].  Normal or non-critical lab and imaging results will be communicated to you by [employer], letter or phone within 4 business days after the clinic has received the results. If you do not hear from us within 7 days, please contact the clinic through T[employer] or phone. If you have a critical or abnormal lab result, we will notify you by phone as soon as possible.  Submit refill requests through [employer] or call your pharmacy and they will forward the refill request to us. Please allow 3 business days for your refill to be completed.          Additional Information About Your Visit        [employer] Information     [employer] gives you secure access to your electronic health record. If you see a primary care provider, you can also send messages to your care team and make appointments. If you have questions, please call your primary care clinic.  If you do not have a primary care provider, please call [phone] and they will assist you.        Care EveryWhere ID     This is your Care EveryWhere ID. This could be used by other organizations to access your [facility] medical records  [id number]        Your Vitals Were     Pulse Temperature Height Pulse Oximetry BMI (Body Mass Index)       52 97.7  F  "(36.5  C) (Oral) 1.88 m (6' 2\") 95% 38.95 kg/m2        Blood Pressure from Last 3 Encounters:   12/20/17 (!) 155/91   12/08/17 150/87   11/16/17 (!) 164/105    Weight from Last 3 Encounters:   12/20/17 (!) 137.6 kg (303 lb 6.4 oz)   12/08/17 (!) 137.8 kg (303 lb 12.8 oz)   11/16/17 (!) 137.9 kg (304 lb)              We Performed the Following     Magnesium          Today's Medication Changes          These changes are accurate as of: 12/20/17 10:18 AM.  If you have any questions, ask your nurse or doctor.               These medicines have changed or have updated prescriptions.        Dose/Directions    * hydroxychloroquine 200 MG tablet   Commonly known as:  PLAQUENIL   This may have changed:  Another medication with the same name was added. Make sure you understand how and when to take each.   Used for:  Rheumatoid arthritis involving shoulder with negative rheumatoid factor, unspecified laterality (H), High risk medications (not anticoagulants) long-term use, Pain in toes of both feet, Rheumatoid arthritis of multiple sites with negative rheumatoid factor (H)   Changed by:  Jeremy Goodrich MD        Dose:  200 mg   Take 1 tablet (200 mg) by mouth 2 times daily Send copy of recent eye exam as need for refills. Please have a copy of your eye exam faxed to [phone].   Quantity:  180 tablet   Refills:  0       * hydroxychloroquine 200 MG tablet   Commonly known as:  PLAQUENIL   This may have changed:  You were already taking a medication with the same name, and this prescription was added. Make sure you understand how and when to take each.   Used for:  Rheumatoid arthritis involving shoulder with negative rheumatoid factor, unspecified laterality (H)   Changed by:  Jeremy Goodrich MD        2 tabs daily.   Quantity:  180 tablet   Refills:  3       methotrexate 2.5 MG tablet CHEMO   This may have changed:  how much to take   Used for:  Rheumatoid arthritis of multiple sites with negative rheumatoid factor " (H), High risk medications (not anticoagulants) long-term use   Changed by:  Jeremy Goodrich MD        Dose:  20 mg   Take 8 tablets (20 mg) by mouth once a week Labs due every 8-12 weeks   Quantity:  96 tablet   Refills:  1       * Notice:  This list has 2 medication(s) that are the same as other medications prescribed for you. Read the directions carefully, and ask your doctor or other care provider to review them with you.         Where to get your medicines      These medications were sent to [facility] - [place], [place] - [address]  [address] [facility] [address]    Hours:  Mon-Fri 8:30am-5:00pm Toll Free [phone] Phone:  [phone]     hydroxychloroquine 200 MG tablet    methotrexate 2.5 MG tablet CHEMO                Primary Care Provider Office Phone # Fax #    Jah Corley [phone] [address]  [facility] [address]        Equal Access to Services     [facility] AH: Hadii aad ku hadasho Soomaali, waaxda luqadaha, qaybta kaalmada adeegyada, waxay idiin hayanshuln arabella salcedo . So [facility] [phone].    ATENCIÓN: Si habla español, tiene a rudd disposición servicios gratuitos de asistencia lingüística. [facility] [phone].    We comply with applicable federal civil rights laws and [place] laws. We do not discriminate on the basis of race, color, national origin, age, disability, sex, sexual orientation, or gender identity.            Thank you!     Thank you for choosing [facility] RHEUMATOLOGY  for your care. Our goal is always to provide you with excellent care. Hearing back from our patients is one way we can continue to improve our services. Please take a few minutes to complete the written survey that you may receive in the mail after your visit with us. Thank you!             Your Updated Medication List - Protect others around you: Learn how to safely use, store and throw away your medicines at  www.disposemymeds.org.          This list is accurate as of: 12/20/17 10:18 AM.  Always use your most recent med list.                   Brand Name Dispense Instructions for use Diagnosis    folic acid 1 MG tablet    FOLVITE    180 tablet    Take 2 tablets (2 mg) by mouth daily    Pain in toes of both feet, Rheumatoid arthritis of multiple sites with negative rheumatoid factor (H), Rheumatoid arthritis involving shoulder with negative rheumatoid factor, unspecified laterality (H), High risk medications (not anticoagulants) long-term use       * hydroxychloroquine 200 MG tablet    PLAQUENIL    180 tablet    Take 1 tablet (200 mg) by mouth 2 times daily Send copy of recent eye exam as need for refills. Please have a copy of your eye exam faxed to [phone].    Rheumatoid arthritis involving shoulder with negative rheumatoid factor, unspecified laterality (H), High risk medications (not anticoagulants) long-term use, Pain in toes of both feet, Rheumatoid arthritis of multiple sites with negative rheumatoid factor (H)       * hydroxychloroquine 200 MG tablet    PLAQUENIL    180 tablet    2 tabs daily.    Rheumatoid arthritis involving shoulder with negative rheumatoid factor, unspecified laterality (H)       methotrexate 2.5 MG tablet CHEMO     96 tablet    Take 8 tablets (20 mg) by mouth once a week Labs due every 8-12 weeks    Rheumatoid arthritis of multiple sites with negative rheumatoid factor (H), High risk medications (not anticoagulants) long-term use       metoprolol 50 MG 24 hr tablet    TOPROL-XL    90 tablet    Take 1 tablet (50 mg) by mouth daily    Essential hypertension       omeprazole 40 MG capsule    priLOSEC    180 capsule    Take 1 capsule (40 mg) by mouth 2 times daily (before meals)    Gastroesophageal reflux disease without esophagitis       order for DME      Use your CPAP device as directed by your provider.        oxybutynin 5 MG tablet    DITROPAN    90 tablet    Take 1 tablet (5 mg) by  mouth daily    Other urinary incontinence       pregabalin 50 MG capsule    LYRICA    360 capsule    One in the am, one in the afternoon and 2 at night.    Peripheral polyneuropathy       TYLENOL 500 MG tablet   Generic drug:  acetaminophen      Take 1 tablet by mouth as needed. For pain        vitamin D 1000 UNITS capsule      Take 2 capsules by mouth daily.    Other specified disorder of skin       * Notice:  This list has 2 medication(s) that are the same as other medications prescribed for you. Read the directions carefully, and ask your doctor or other care provider to review them with you.

## 2017-12-20 NOTE — LETTER
12/20/2017      RE: Lucio Daly  [address]  [place] [address]       Rheumatology Visit     Lucio Daly MRN# [id number]   YOB: 1950 Age: 67 year old     Date of Visit: December 20, 2017  Primary care provider: Jah Corley         Assessment and Plan:   1. Seronegative RA: symptoms of inflammatory arthritis are persistent; however, exam neg no active synovitis or tendonitis. Labs in 11-17 include normal CBC except for mild leukopenia, stable NL renal and hepatic function and CRP WNL. RA activity=low. Plan continue methotrexate. If in the future need to switch the MTX,  we could substitute arava 20 mg daily for methotrexate if PFT decline and/or suggest increasing fibrosis. Plan  # Increase methotrexate to 20 mg weekly. While on drug  --High risk medication monitoring--labs q 3 mo AST/ALT, Albumin, CBC with plts, CRP   --Limit EtOH intake to 2 drinks weekly; use folate 2 mg daily  --Tylenol 325mg qid prn nausea/HA associated with dosing.    #  mg daily--annual eye exam done in February 2017, planned in early 2018 in [facility].  2. 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 Continue regular ophthalmologic care and dental care, and continue using topical therapies (ie. Artificial tears and lozenges).   3. 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 PFT in 5-17 stable. Doubt association with arthritis. Plan f/u Dr. Mccarthy.   4. L>R Knee pain:  OA and L anserine bursitis- in future may need TKR. Continue isometric quad strengthening exercises twice daily to improve support around the joint.  5. Plantar fasciitis/heel pain with hx R foot drop/instability: stable s/p PTx  6. 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 Camargo. Plan continue splinting/hand therapy and acetaminophen 1000 mg tid ATC.  7. Lateral epicondylitis:  "suggested L-sided compression band for daily use for elbow pain. Stable   8. L subacromial bursitis: continue home exercise program    RTC 6 mos with AFTAB Goodrich MD  Rheumatologist, [facility]           History of Present Illness:   Lucio Daly \"Rich\" presents for f/u seronegative rheumatoid arthritis, sicca complex. Last seen by AFTAB Snow in 6-17, when MTX was continued at 17.5 mg per week and plaquenil continued at 400 mg daily.  Interval history:  He has ongoing pain in his R > L wrists, hands, and elbows. He was referred to PTx for L elbow pain several months ago; he has had only modest relief with a course of Ptx for 4-6 weeks. Now, L elbow is worse in the morning, worsening with use. Tylenol provides minimal relief. Using a cane exacerbates wrist/hand pain. He had some functional improvement with Ptx for his feet and ankles. EAS is < 30 minutes. The left knee is unstable with locking/buckling, but doesn't swell much- the R knee is also unstable. The left leg hurts and keeps him awake at night.  Reports ongoing R thumb/wrist pain, worse with movement, progressive despite use of tylenol and use of a thumb splint.  He reduced methotrexate to 7 tabs weekly; he uses HCQ but it continues to upset his stomach.    June 26, 2017  Dr. Goodrich saw him in Dec 2016   Back pain, seen PCP. MRI of his back to f/u spinal stenosis and herniated disc will be having this tomorrow then maybe seeing neurosurgeon (needed updated MRI) if this is indicated, did PT but stopped as was making this worse. Previous ortho was going to refer him to neurosurgeon but he didn't want surgery at this time. Last months, more increased numbness in feet and sciatica right thigh/calf \"uncomfortable\", more pain in mid and upper back then some in low back if stands too long, then gets this in rib cage. Had problems with \"numbness\" for some time, pain in arms. EMG testing in past peripheral polyneuropathy. Stumbling more, but had " "trouble with this in the past, and previous foot droop. More leg pain for \"quite a few months\".  Lay in bed or sit with feet up more in leg pain, back. Nueropathy is the bottom of his feet.   Tennis elbows, thumbs, hands pains are the same stable this comes and goes.   Continues the methotrexate 17.5 mg PO Q 7 days Tue or WED and the plaqeunil (he felt the stomach issues were due to other medications) this is tolerating well Didn't stop the plaquenil \"didnt want to rock the boat\" . Reports is RA is controlled, no flaring and is controlled with these medications so wishes to continue this plan.   Pulmonary Dr. Mccarthy had referred him to gastro for GERD in Oct. Seen him 5-2017 --ILD \"follicular bronchiolitis\" and pulmonary nodule stable (more SOB sit to stand then walking). His prilosec was increased. Checking neuromuscular \"thing\" when he sees him in Nov. Methotrexate was at 9 tab 22.5 mg a week tapered to 17.5 mg once a week --his RA remains controlled.   On ABX completed this Saturday for bronchitis, early pnuemonia and then infection in both his eyes. Started with laryngitis. ABX helped. No fever, chills, discolored sputum. Fatigue.    December 2017  H notes pain on the R side of his head. Pain is chronic, seems to be improved with Mtx administration.  \"Spine pain\" continues--he has been told that there is \"narrowing of the lumbar spine\" with stenosis, surgery has been offered, but he has declined.  Polyneuropathy pain in feet/calves persists; he continues lyrica with modest relief. He deals with frequent flares of pain by increasing walking. Walking is difficult due to \"weakness\" in the feet.  He was off mtx for a month in 10-17 due to bronchitis X 3. He had more discomfort in his fingers and swelling in his toes while off the drug. He restarted  17.5 mg per week in November 2017, and these symptoms improved. He thinks that he was better while taking 9 tabs weekly. He has continued plaquenil 1 tab twice " daily.  There is calf and shin pain bilaterally, worse with weight-bearing.    PMH   1. Sensory neuropathy of unclear etiology - negative work up, managed on Lyrica.  Has chronic dysesthesia in pads of feet.  2. Parkinsonisim/Tremor disorder; stable on selegiline; some somatic sensations present  3. Headaches   4. History of basal cell, squamous (most recent +bx 8-2014)  5. History of melanoma   6. GALO on CPAP.  7. HTN.   8. Seronegative RA, diagnosed 2009. SICCA  9. JUARES. Work-up 4-2015. 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 Lumbar stenosis per MRI   11. Bronchitis 5-2017, early pneumonia   Past Medical History:   Diagnosis Date     Abnormal EMG 4/18/2013     Abnormal involuntary movements(781.0)     Movement Disorder     AK (actinic keratosis) 12/18/2011     Allergic rhinitis, cause unspecified     Allergic rhinitis     Balance problems 11/1/2011     Basal cell carcinoma      Bladder spasms 11/1/2011     Chronic osteoarthritis      Diaphragmatic hernia without mention of obstruction or gangrene      Earache or other ear, nose, or throat complaint      Esophageal reflux      Fatigue 11/1/2011     Fracture      H. pylori infection 5/12/2011     History of MRI of cervical spine 11/18/2013    EXAMINATION: CERVICAL SPINE G/E 5 VIEWS* 4/19/2013 4:18 PM  CLINICAL HISTORY: Pain in limb,Performing Location?->[facility] ([facility]),  COMPARISON:  FINDINGS: AP and lateral views in flexion and extension, as well as odontoid view of the cervical spine was obtained. There is no comparison available. The vertebral bodies of the cervical spine are normally aligned. There is posterior spurring and d     Incomplete defecation 11/1/2011     Interstitial lung disease (H) 11/29/2016     Laboratory test 8/7/2012     Lung disease June 2015     Malignant basal cell neoplasm of skin 8/6/2008     Melanoma (H) 8/6/2008     Melanoma in situ of lower leg (H)     R calf     Neuropathy 5/16/2011     Other bladder disorder       "Other color vision deficiencies      Other nervous system complications      Parkinsonism (H) 11/1/2011     Personal history of colonic polyps      Polyneuropathy in other diseases classified elsewhere (H)      RA (rheumatoid arthritis) (H)      Rheumatoid arthritis of multiple sites with negative rheumatoid factor (H) 3/21/2016     Seronegative arthritis 11/18/2013     Shortness of breath      Shoulder arthritis 2016    acromioclavicular joint      Somatization disorder 5/12/2011     Squamous cell carcinoma      Tremor 11/1/2011     Unspecified essential hypertension      Unspecified hypothyroidism      Urinary tract infection      Urinary urgency 11/1/2011     Wears glasses 11/1/2011     Family Hx   MGM Psoriasis  Family History   Problem Relation Age of Onset     Hypertension Mother      HEART DISEASE Mother      a fib     Arthritis Mother      \"osteo\"     OSTEOPOROSIS Mother      Skin Cancer Mother      Uterine Cancer Mother      Hypertension Brother      DIABETES Brother      \" post pancreatitis\"     Neurologic Disorder Sister      multiple sclerosis     Hypertension Sister      HEART DISEASE Sister      HEART DISEASE Sister      a fib     Arthritis Sister      Hypertension Father      CEREBROVASCULAR DISEASE Father      ,     Skin Cancer Father      Colon Polyps Father      Psoriasis Maternal Grandfather      Stomach Cancer Maternal Grandfather      Congenital Anomalies Other      granddaughter with a chromosome defect     CEREBROVASCULAR DISEASE Paternal Grandmother      ,     CEREBROVASCULAR DISEASE Paternal Grandfather      ,     Melanoma No family hx of      Colon Cancer No family hx of      Personal Hx   Home environment: No secondhand tobacco smoke in home.    History     Social History     Marital Status:      Spouse Name: N/A     Number of Children: N/A     Years of Education: N/A     Social History Main Topics     Smoking status: Former Smoker     Quit date: 09/30/2003     Smokeless tobacco: " Never Used     Alcohol Use: No     Drug Use: No     Sexually Active: Not Currently -- Female partner(s)     Other Topics Concern     None     Social History Narrative    2013: [place] with no stepsHas 3 sons that are doing okay.             Review of Systems:     14 points all negative except what is mentioned in HPI.  See HPI               Past Surgical History:   Past Surgical History:   Procedure Laterality Date     BIOPSY OF SKIN LESION       COLONOSCOPY       HEMORRHOID SURGERY       lip biopsy      for sicca complex     MOHS MICROGRAPHIC PROCEDURE       SOFT TISSUE SURGERY      removeal of basel cell carcinoma              Allergies:   Allergies   Allergen Reactions     Restasis      Burning eyes, problems with breathing, tightness in chest     Adhesive Tape      Bandages misc     Allegra      EXCESSIVE URINATION AND WEAKNESS, LIGHT-HEADED     Allergy      Dust     Animal Dander      Benadryl Allergy      EXCESSIVE URINATION AND WEAKNESS, LIGHT-HEADED     Cephalexin      Joint pain or gerd aggravation. Bloating excessive urination      Cephalosporins      Diphenhydramine Other (See Comments)     Doxycycline Hyclate Nausea     SWEATING,MIGRAINES,LOSS OF APPETITE,SWEATING,LIGHT HEADED, EXCESSIVE URINATION     Fexofenadine Other (See Comments)     Flonase [Fluticasone Propionate]      Gabapentin      Neurontin: mosd changes and excess urination     Iodine Solution [Povidone Iodine]      SKIN MELTS     Levaquin      From surgeon--? Joint pain ? Gerd aggravation. Insomnia, excess urination     Mylanta      EXCESSIVE URINATION AND WEAKNESS, LIGHT-HEADED     Prednisone      Weakness, elevated bp, headache, eye pain, congestion      Seafood [Seafood]      Shellfish Allergy      Hives       Sulfamethoxazole-Trimethoprim      Chest pain, angina     Trees      Trileptal      SEVERE JOINT AND TENDON PAIN, INSOMNIA, RESTLESSNESS, NAUSEA, EXCESS URINATION     Cortizone Rash     EXCESS  "URINATION,WEAKNESS,NAUSEA, HEADACHE            Medications:   Current Outpatient Prescriptions   Medication Sig Dispense Refill     hydroxychloroquine (PLAQUENIL) 200 MG tablet 2 tabs daily. 180 tablet 3     methotrexate 2.5 MG tablet CHEMO Take 8 tablets (20 mg) by mouth once a week Labs due every 8-12 weeks 96 tablet 1     omeprazole (PRILOSEC) 40 MG capsule Take 1 capsule (40 mg) by mouth 2 times daily (before meals) 180 capsule 3     hydroxychloroquine (PLAQUENIL) 200 MG tablet Take 1 tablet (200 mg) by mouth 2 times daily Send copy of recent eye exam as need for refills. Please have a copy of your eye exam faxed to [phone]. 180 tablet 0     metoprolol (TOPROL-XL) 50 MG 24 hr tablet Take 1 tablet (50 mg) by mouth daily 90 tablet 3     folic acid (FOLVITE) 1 MG tablet Take 2 tablets (2 mg) by mouth daily 180 tablet 4     ORDER FOR DME Use your CPAP device as directed by your provider.       acetaminophen (TYLENOL) 500 MG tablet Take 1 tablet by mouth as needed. For pain       Cholecalciferol (VITAMIN D) 1000 UNITS capsule Take 2 capsules by mouth daily.       oxybutynin (DITROPAN) 5 MG tablet Take 1 tablet (5 mg) by mouth daily 90 tablet 1     pregabalin (LYRICA) 50 MG capsule One in the am, one in the afternoon and 2 at night. 360 capsule 0              Physical Exam:   Blood pressure (!) 155/91, pulse 52, temperature 97.7  F (36.5  C), temperature source Oral, height 1.88 m (6' 2\"), weight (!) 137.6 kg (303 lb 6.4 oz), SpO2 95 %.  Wt Readings from Last 4 Encounters:   12/20/17 (!) 137.6 kg (303 lb 6.4 oz)   12/08/17 (!) 137.8 kg (303 lb 12.8 oz)   11/16/17 (!) 137.9 kg (304 lb)   10/28/17 (!) 137.9 kg (304 lb)       Constitutional: obese, appearing stated age; cooperative  Eyes: nl EOM, PERRLA, vision, conjunctiva, sclera  ENT: nl external ears, nose, hearing, lips, teeth, gums, throat  No mucous membrane lesions, normal saliva pool  Neck: no mass or thyroid enlargement  Resp: lungs clear to auscultation, " nl to palpation  CV: RRR, no murmurs, rubs or gallops, no edema  GI: no ABD mass or tenderness, no HSM  : not tested  Lymph: no cervical, supraclavicular, inguinal or epitrochlear nodes  MS:  All TMJ, neck, shoulder, elbow, wrist, MCP/PIP/DIP, spine, hip, knee, ankle, and foot MTP/IP joints were examined. Tender 1rst CMC on R. Knees without effusion; + crepitus on L; +tender L lateral epicondyle. No effusions. Mild Kyphosis. No S/T across the ankle joints. -MTP/MCP squeeze.  Skin: no nail pitting, alopecia, rash, nodules or lesions.  Neuro: No tremor today. Walks with cane  Psych: nl judgement, orientation, memory, affect.         Data:     Results for orders placed or performed in visit on 11/16/17   General PFT Lab (Please always keep checked)   Result Value Ref Range    FVC-Pred 4.89 L    FVC-Pre 3.61 L    FVC-%Pred-Pre 73 %    FEV1-Pre 2.67 L    FEV1-%Pred-Pre 72 %    FEV1FVC-Pred 75 %    FEV1FVC-Pre 74 %    FEFMax-Pred 9.36 L/sec    FEFMax-Pre 8.70 L/sec    FEFMax-%Pred-Pre 92 %    FEF2575-Pred 2.73 L/sec    FEF2575-Pre 1.89 L/sec    CSY3371-%Pred-Pre 69 %    ExpTime-Pre 8.57 sec    FIFMax-Pre 7.46 L/sec    MEP-Pre 65 cmH2O    MIP-Pre -25 cmH2O    VC-Pred 5.48 L    VC-Pre 3.60 L    VC-%Pred-Pre 65 %    IC-Pred 4.68 L    IC-Pre 3.42 L    IC-%Pred-Pre 72 %    ERV-Pred 0.80 L    ERV-Pre 0.18 L    ERV-%Pred-Pre 22 %    FEV1FEV6-Pred 78 %    FEV1FEV6-Pre 76 %    FRCPleth-Pred 3.93 L    FRCPleth-Pre 3.46 L    FRCPleth-%Pred-Pre 87 %    RVPleth-Pred 2.76 L    RVPleth-Pre 3.27 L    RVPleth-%Pred-Pre 118 %    TLCPleth-Pred 7.94 L    TLCPleth-Pre 6.87 L    TLCPleth-%Pred-Pre 86 %    DLCOunc-Pred 27.98 ml/min/mmHg    DLCOunc-Pre 27.42 ml/min/mmHg    DLCOunc-%Pred-Pre 98 %    DLCOcor-Pre 28.34 ml/min/mmHg    DLCOcor-%Pred-Pre 101 %    VA-Pre 4.54 L    VA-%Pred-Pre 59 %    FEV1SVC-Pred 67 %    FEV1SVC-Pre 74 %    Narrative    The FEV1 and FVC are reduced but the FEV1/FVC ratio is normal.  TLC is within normal limits.     The diffusing capacity is normal.    IMPRESSION:  Non specific spirometry pattern.  Normal lung volumes.  Normal diffusion.  Six minute walk test:  Distance walked is reduced.  On room air, there is no significant desaturation or hypoxia   ____________________________________________Tiffani Thomson    6 minute walk test   Result Value Ref Range    6 min walk (FT) 860 ft    6 Min Walk (M) 262 m     *Note: Due to a large number of results and/or encounters for the requested time period, some results have not been displayed. A complete set of results can be found in Results Review.     Component      Latest Ref Rng & Units 5/15/2017   WBC      4.0 - 11.0 10e9/L 4.8   RBC Count      4.4 - 5.9 10e12/L 4.49   Hemoglobin      13.3 - 17.7 g/dL 13.6   Hematocrit      40.0 - 53.0 % 43.3   MCV      78 - 100 fl 96   MCH      26.5 - 33.0 pg 30.3   MCHC      31.5 - 36.5 g/dL 31.4 (L)   RDW      10.0 - 15.0 % 14.7   Platelet Count      150 - 450 10e9/L 183   Creatinine      0.66 - 1.25 mg/dL 1.06   GFR Estimate      >60 mL/min/1.7m2 69   GFR Estimate If Black      >60 mL/min/1.7m2 84   Albumin      3.4 - 5.0 g/dL 3.7   ALT      0 - 70 U/L 24   AST      0 - 45 U/L 18   CRP Inflammation      0.0 - 8.0 mg/L 4.0     RHEUM RESULTS Latest Ref Rng & Units 7/27/2017 11/3/2017 11/10/2017   DNA-DS 0 - 29 IU/mL - - -   SED RATE 0 - 20 mm/h - - -   CRP, INFLAMMATION 0.0 - 8.0 mg/L 6.2 5.6 -   CYCLIC CIT PEPT IGG <5 U/mL - - -   RHEUMATOID FACTOR 0 - 14 IU/mL - - -   MONICA SCREEN BY EIA 0 - 1.0 - - -   AST 0 - 45 U/L 18 16 16   ALT 0 - 70 U/L 23 24 25   ALBUMIN 3.4 - 5.0 g/dL 3.8 3.4 3.6   WBC 4.0 - 11.0 10e9/L 3.9(L) 3.2(L) 3.4(L)   RBC 4.4 - 5.9 10e12/L 4.51 4.33(L) 4.41   HGB 13.3 - 17.7 g/dL 13.7 13.2(L) 13.5   HCT 40.0 - 53.0 % 43.9 41.5 42.7   MCV 78 - 100 fl 97 96 97   MCHC 31.5 - 36.5 g/dL 31.2(L) 31.8 31.6   RDW 10.0 - 15.0 % 14.1 14.1 14.3    - 450 10e9/L 166 141(L) 173   CREATININE 0.66 - 1.25 mg/dL 1.10 1.08 -   GFR  ESTIMATE, IF BLACK >60 mL/min/1.7m2 80 82 -   GFR ESTIMATE >60 mL/min/1.7m2 66 68 -    - 1620 mg/dL - - -   IGA 70 - 380 mg/dL - - -   IGM 60 - 265 mg/dL - - -   HEPATITIS C ANTIBODY NEG - - -       Jeremy Goodrich MD

## 2017-12-21 DIAGNOSIS — G62.9 PERIPHERAL POLYNEUROPATHY: ICD-10-CM

## 2017-12-21 DIAGNOSIS — N39.498 OTHER URINARY INCONTINENCE: ICD-10-CM

## 2017-12-21 RX ORDER — PREGABALIN 50 MG/1
CAPSULE ORAL
Qty: 360 CAPSULE | Refills: 0 | Status: SHIPPED | OUTPATIENT
Start: 2017-12-21 | End: 2018-04-25

## 2017-12-21 RX ORDER — OXYBUTYNIN CHLORIDE 5 MG/1
5 TABLET ORAL DAILY
Qty: 90 TABLET | Refills: 1 | Status: SHIPPED | OUTPATIENT
Start: 2017-12-21 | End: 2018-05-22

## 2017-12-21 NOTE — TELEPHONE ENCOUNTER
Pt was last seen in clinic July 2017 by PCP Barney at which time Lyrica dose was increased.    Dr Corley out of office until next week. To covering provider for Lyrica request. (I did refill the oxybutinin per protocol)    Cristina Goodrich, RN, BSN

## 2017-12-22 NOTE — TELEPHONE ENCOUNTER
Faxed Rx for (LYRICA) 50 MG capsule to the [facility]. Fax:[phone]      Joan Rodrigues MA

## 2018-01-15 DIAGNOSIS — Z79.899 HIGH RISK MEDICATIONS (NOT ANTICOAGULANTS) LONG-TERM USE: ICD-10-CM

## 2018-01-15 DIAGNOSIS — M06.09 RHEUMATOID ARTHRITIS OF MULTIPLE SITES WITH NEGATIVE RHEUMATOID FACTOR (H): ICD-10-CM

## 2018-01-15 DIAGNOSIS — M06.09 RHEUMATOID ARTHRITIS OF MULTIPLE SITES WITHOUT RHEUMATOID FACTOR (H): ICD-10-CM

## 2018-01-15 LAB
ALBUMIN SERPL-MCNC: 3.6 G/DL (ref 3.4–5)
ALT SERPL W P-5'-P-CCNC: 28 U/L (ref 0–70)
AST SERPL W P-5'-P-CCNC: 18 U/L (ref 0–45)
CREAT SERPL-MCNC: 1.08 MG/DL (ref 0.66–1.25)
CRP SERPL-MCNC: 4.6 MG/L (ref 0–8)
ERYTHROCYTE [DISTWIDTH] IN BLOOD BY AUTOMATED COUNT: 13.8 % (ref 10–15)
GFR SERPL CREATININE-BSD FRML MDRD: 68 ML/MIN/1.7M2
HCT VFR BLD AUTO: 42.7 % (ref 40–53)
HGB BLD-MCNC: 13.6 G/DL (ref 13.3–17.7)
MAGNESIUM SERPL-MCNC: 2.1 MG/DL (ref 1.6–2.3)
MCH RBC QN AUTO: 30.2 PG (ref 26.5–33)
MCHC RBC AUTO-ENTMCNC: 31.9 G/DL (ref 31.5–36.5)
MCV RBC AUTO: 95 FL (ref 78–100)
MTX SERPL-SCNC: <0.04 UMOL/L
PLATELET # BLD AUTO: 169 10E9/L (ref 150–450)
RBC # BLD AUTO: 4.51 10E12/L (ref 4.4–5.9)
WBC # BLD AUTO: 4.5 10E9/L (ref 4–11)

## 2018-01-15 PROCEDURE — 80299 QUANTITATIVE ASSAY DRUG: CPT | Performed by: REGISTERED NURSE

## 2018-01-16 NOTE — PROGRESS NOTES
The blood counts, liver, kidney and CRP inflammation labs are normal.     Sin GUZMAN, CNP, MSN  1/16/2018  9:25 AM

## 2018-03-08 ENCOUNTER — TELEPHONE (OUTPATIENT)
Dept: FAMILY MEDICINE | Facility: CLINIC | Age: 68
End: 2018-03-08

## 2018-03-08 NOTE — TELEPHONE ENCOUNTER
"Patient's call warm transferred to writer via emergent line.    Per patient:  1. Calf pain   A. Left > Right   B. Duration: since returned home from [place], on 2/28/18   C. More persistent, but intermittent in pattern   D. Pain worse when laying down or sitting   E. Quality: cramping, like the beginning of a Vicente horse but feel different than a muscle/tendon pain   F. Intensity: \"nagging moderate\"  2. Has had vericose vein surgery of LLE  3. Sore to the touch  4. Denied:   A. Chest pain   B. SOB-\"no more than usual\"  Being treated for a lung issue related to rheumatoid arthritis, which is under control    C. Erythema    D. Edema  5. Lots of walking while in [place] and did experience left ankle and pedal edema, which resolved since returning home      Writer recommended:  1. Office visit for evaluation.  Appt scheduled on 3/9/18 with Dr. Corley.  Appt date, time and location confirmed with patient  2. If any chest pain/SOB develops, call 911  3. Any new, changing or worsening symptoms, call clinic    Patient verbalized understanding and in agreement with plan.  JUAN ANTONIO Blunt, ANTOINEN, RN        "

## 2018-03-09 ENCOUNTER — RADIANT APPOINTMENT (OUTPATIENT)
Dept: ULTRASOUND IMAGING | Facility: CLINIC | Age: 68
End: 2018-03-09
Attending: FAMILY MEDICINE
Payer: MEDICARE

## 2018-03-09 ENCOUNTER — OFFICE VISIT (OUTPATIENT)
Dept: FAMILY MEDICINE | Facility: CLINIC | Age: 68
End: 2018-03-09
Payer: MEDICARE

## 2018-03-09 VITALS
RESPIRATION RATE: 16 BRPM | SYSTOLIC BLOOD PRESSURE: 153 MMHG | OXYGEN SATURATION: 97 % | WEIGHT: 306.6 LBS | TEMPERATURE: 96.3 F | HEIGHT: 74 IN | HEART RATE: 55 BPM | DIASTOLIC BLOOD PRESSURE: 92 MMHG | BODY MASS INDEX: 39.35 KG/M2

## 2018-03-09 DIAGNOSIS — M79.662 PAIN OF LEFT CALF: Primary | ICD-10-CM

## 2018-03-09 DIAGNOSIS — M79.662 PAIN OF LEFT CALF: ICD-10-CM

## 2018-03-09 PROCEDURE — 99214 OFFICE O/P EST MOD 30 MIN: CPT | Performed by: FAMILY MEDICINE

## 2018-03-09 NOTE — MR AVS SNAPSHOT
After Visit Summary   3/9/2018    Lucio Daly    MRN: [id number]           Patient Information     Date Of Birth          1950        Visit Information        Provider Department      3/9/2018 10:40 AM Jah Corley MD [facility]        Today's Diagnoses     Pain of left calf    -  1       Follow-ups after your visit        Your next 10 appointments already scheduled     Mar 14, 2018  9:30 AM CDT   Return Sleep Patient with Kristen Martinez MD   [facility] [place], Sleep Study ([facility])    [address] [place] [address]   [phone]            May 15, 2018  1:00 PM CDT   SIX MINUTE WALK with UC PFL C, UC PFL 6 MINUTE WALK 2   [facility] Pulmonary Function Testing ([facility])    [address]  [address]rd [facility] [id number] [phone]            May 15, 2018  2:00 PM CDT   (Arrive by 1:45 PM)   Return Interstitial Lung with Harsha Mccarthy MD   [facility] ([facility])    [address]  [address]  [facility] [id number] [phone]            May 22, 2018  8:40 AM CDT   (Arrive by 8:25 AM)   Return Movement Disorder with Thong Montes MD   [facility] Neurology ([facility])    [address]  [address] [facility] [id number] [phone]            Jun 20, 2018  9:00 AM CDT   (Arrive by 8:45 AM)   Return Visit with MEGAN Ayers [facility] Rheumatology ([facility])    [address]  [address]  [facility] [id number] [phone]              Who to contact     If you have questions or need follow up information about today's clinic visit or your schedule please contact [facility] directly at [phone].  Normal or non-critical lab and imaging results will be communicated  "to you by Prestaderohart, letter or phone within 4 business days after the clinic has received the results. If you do not hear from us within 7 days, please contact the clinic through [employer] or phone. If you have a critical or abnormal lab result, we will notify you by phone as soon as possible.  Submit refill requests through [employer] or call your pharmacy and they will forward the refill request to us. Please allow 3 business days for your refill to be completed.          Additional Information About Your Visit        [employer] Information     [employer] gives you secure access to your electronic health record. If you see a primary care provider, you can also send messages to your care team and make appointments. If you have questions, please call your primary care clinic.  If you do not have a primary care provider, please call [phone] and they will assist you.        Care EveryWhere ID     This is your Care EveryWhere ID. This could be used by other organizations to access your [place] medical records  [id number]        Your Vitals Were     Pulse Temperature Respirations Height Pulse Oximetry BMI (Body Mass Index)    55 96.3  F (35.7  C) (Tympanic) 16 6' 2\" (1.88 m) 97% 39.37 kg/m2       Blood Pressure from Last 3 Encounters:   03/09/18 (!) 153/92   12/20/17 (!) 155/91   12/08/17 150/87    Weight from Last 3 Encounters:   03/09/18 (!) 306 lb 9.6 oz (139.1 kg)   12/20/17 (!) 303 lb 6.4 oz (137.6 kg)   12/08/17 (!) 303 lb 12.8 oz (137.8 kg)               Primary Care Provider Office Phone # Fax #    Jah Corley [phone] [facility] [address]        Equal Access to Services     ALINE QUIÑONES : Yifan Ga, seymour roca, qagianni kaalmada richar, maraina johnson. So [facility] [phone].    ATENCIÓN: Si habla español, tiene a rudd disposición servicios gratuitos de asistencia lingüística. Melina al [phone].    We comply with " applicable federal civil rights laws and [place] laws. We do not discriminate on the basis of race, color, national origin, age, disability, sex, sexual orientation, or gender identity.            Thank you!     Thank you for choosing [facility]  for your care. Our goal is always to provide you with excellent care. Hearing back from our patients is one way we can continue to improve our services. Please take a few minutes to complete the written survey that you may receive in the mail after your visit with us. Thank you!             Your Updated Medication List - Protect others around you: Learn how to safely use, store and throw away your medicines at www.disposemymeds.org.          This list is accurate as of 3/9/18  1:57 PM.  Always use your most recent med list.                   Brand Name Dispense Instructions for use Diagnosis    folic acid 1 MG tablet    FOLVITE    180 tablet    Take 2 tablets (2 mg) by mouth daily    Pain in toes of both feet, Rheumatoid arthritis of multiple sites with negative rheumatoid factor (H), Rheumatoid arthritis involving shoulder with negative rheumatoid factor, unspecified laterality (H), High risk medications (not anticoagulants) long-term use       * hydroxychloroquine 200 MG tablet    PLAQUENIL    180 tablet    Take 1 tablet (200 mg) by mouth 2 times daily Send copy of recent eye exam as need for refills. Please have a copy of your eye exam faxed to [phone].    Rheumatoid arthritis involving shoulder with negative rheumatoid factor, unspecified laterality (H), High risk medications (not anticoagulants) long-term use, Pain in toes of both feet, Rheumatoid arthritis of multiple sites with negative rheumatoid factor (H)       * hydroxychloroquine 200 MG tablet    PLAQUENIL    180 tablet    2 tabs daily.    Rheumatoid arthritis involving shoulder with negative rheumatoid factor, unspecified laterality (H)       methotrexate 2.5 MG tablet CHEMO     96  tablet    Take 8 tablets (20 mg) by mouth once a week Labs due every 8-12 weeks    Rheumatoid arthritis of multiple sites with negative rheumatoid factor (H), High risk medications (not anticoagulants) long-term use       metoprolol succinate 50 MG 24 hr tablet    TOPROL-XL    90 tablet    Take 1 tablet (50 mg) by mouth daily    Essential hypertension       omeprazole 40 MG capsule    priLOSEC    180 capsule    Take 1 capsule (40 mg) by mouth 2 times daily (before meals)    Gastroesophageal reflux disease without esophagitis       order for DME      Use your CPAP device as directed by your provider.        oxybutynin 5 MG tablet    DITROPAN    90 tablet    Take 1 tablet (5 mg) by mouth daily    Other urinary incontinence       pregabalin 50 MG capsule    LYRICA    360 capsule    One in the am, one in the afternoon and 2 at night.    Peripheral polyneuropathy       TYLENOL 500 MG tablet   Generic drug:  acetaminophen      Take 1 tablet by mouth as needed. For pain        vitamin D 1000 UNITS capsule      Take 2 capsules by mouth daily.    Other specified disorder of skin       * Notice:  This list has 2 medication(s) that are the same as other medications prescribed for you. Read the directions carefully, and ask your doctor or other care provider to review them with you.

## 2018-03-14 ENCOUNTER — OFFICE VISIT (OUTPATIENT)
Dept: SLEEP MEDICINE | Facility: CLINIC | Age: 68
End: 2018-03-14
Payer: MEDICARE

## 2018-03-14 VITALS
RESPIRATION RATE: 18 BRPM | WEIGHT: 308 LBS | HEART RATE: 53 BPM | DIASTOLIC BLOOD PRESSURE: 82 MMHG | SYSTOLIC BLOOD PRESSURE: 133 MMHG | BODY MASS INDEX: 39.53 KG/M2 | OXYGEN SATURATION: 96 % | HEIGHT: 74 IN

## 2018-03-14 DIAGNOSIS — G47.33 OSA TREATED WITH BIPAP: Primary | ICD-10-CM

## 2018-03-14 PROCEDURE — 99214 OFFICE O/P EST MOD 30 MIN: CPT | Performed by: INTERNAL MEDICINE

## 2018-03-14 NOTE — NURSING NOTE
"Chief Complaint   Patient presents with     CPAP Follow Up       Initial /82  Pulse 53  Resp 18  Ht 1.88 m (6' 2.02\")  Wt (!) 139.7 kg (308 lb)  SpO2 96%  BMI 39.53 kg/m2 Estimated body mass index is 39.53 kg/(m^2) as calculated from the following:    Height as of this encounter: 1.88 m (6' 2.02\").    Weight as of this encounter: 139.7 kg (308 lb).  Medication Reconciliation: complete   Cinthia Cowart [facility] ~[place]      "

## 2018-03-14 NOTE — PATIENT INSTRUCTIONS

## 2018-03-14 NOTE — MR AVS SNAPSHOT
After Visit Summary   3/14/2018    Lucio Daly    MRN: [id number]           Patient Information     Date Of Birth          1950        Visit Information        Provider Department      3/14/2018 9:30 AM Kristen Martinez MD [facility] [place], Sleep Study        Today's Diagnoses     GALO treated with BiPAP    -  1      Care Instructions      Your BMI is Body mass index is 39.53 kg/(m^2).  Weight management is a personal decision.  If you are interested in exploring weight loss strategies, the following discussion covers the approaches that may be successful. Body mass index (BMI) is one way to tell whether you are at a healthy weight, overweight, or obese. It measures your weight in relation to your height.  A BMI of 18.5 to 24.9 is in the healthy range. A person with a BMI of 25 to 29.9 is considered overweight, and someone with a BMI of 30 or greater is considered obese. More than two-thirds of American adults are considered overweight or obese.  Being overweight or obese increases the risk for further weight gain. Excess weight may lead to heart disease and diabetes.  Creating and following plans for healthy eating and physical activity may help you improve your health.  Weight control is part of healthy lifestyle and includes exercise, emotional health, and healthy eating habits. Careful eating habits lifelong are the mainstay of weight control. Though there are significant health benefits from weight loss, long-term weight loss with diet alone may be very difficult to achieve- studies show long-term success with dietary management in less than 10% of people. Attaining a healthy weight may be especially difficult to achieve in those with severe obesity. In some cases, medications, devices and surgical management might be considered.  What can you do?  If you are overweight or obese and are interested in methods for weight loss, you should discuss this with your provider.      Consider reducing daily calorie intake by 500 calories.     Keep a food journal.     Avoiding skipping meals, consider cutting portions instead.    Diet combined with exercise helps maintain muscle while optimizing fat loss. Strength training is particularly important for building and maintaining muscle mass. Exercise helps reduce stress, increase energy, and improves fitness. Increasing exercise without diet control, however, may not burn enough calories to loose weight.       Start walking three days a week 10-20 minutes at a time    Work towards walking thirty minutes five days a week     Eventually, increase the speed of your walking for 1-2 minutes at time    In addition, we recommend that you review healthy lifestyles and methods for weight loss available through the National Institutes of Health patient information sites:  http://win.niddk.nih.gov/publications/index.htm    And look into health and wellness programs that may be available through your health insurance provider, employer, local community center, or grace club.    Weight management plan: Patient was referred to their PCP to discuss a diet and exercise plan.              Follow-ups after your visit        Follow-up notes from your care team     Return in about 1 year (around 3/14/2019).      Your next 10 appointments already scheduled     May 15, 2018  1:00 PM CDT   SIX MINUTE WALK with UC PFL C, UC PFL 6 MINUTE WALK 2   [facility] Function Testing ([facility])    [address]  3rd [facility] [address]   [phone]            May 15, 2018  2:00 PM CDT   (Arrive by 1:45 PM)   Return Interstitial Lung with Harsha Mccarthy MD   [facility] ([facility])    [address] [place] [address]   [phone]            May 22, 2018  8:40 AM CDT   (Arrive by 8:25 AM)   Return Movement Disorder with Thong Stevens  "MD YESENIA Montes [facility] Neurology ([facility])    [address]  3rd Floor  [facility] [address] [phone]            Jun 20, 2018  9:00 AM CDT   (Arrive by 8:45 AM)   Return Visit with MEGAN Ayers CNP   [facility] Rheumatology ([facility])    [address]  [address]  [facility] [address] [phone]              Who to contact     If you have questions or need follow up information about today's clinic visit or your schedule please [facility] directly at [phone].  Normal or non-critical lab and imaging results will be communicated to you by Maven[employer], letter or phone within 4 business days after the clinic has received the results. If you do not hear from us within 7 days, please contact the clinic through [employer] or phone. If you have a critical or abnormal lab result, we will notify you by phone as soon as possible.  Submit refill requests through [employer] or call your pharmacy and they will forward the refill request to us. Please allow 3 business days for your refill to be completed.          Additional Information About Your Visit        [employer] Information     [employer] gives you secure access to your electronic health record. If you see a primary care provider, you can also send messages to your care team and make appointments. If you have questions, please call your primary care clinic.  If you do not have a primary care provider, please call [phone] and they will assist you.        Care EveryWhere ID     This is your Care EveryWhere ID. This could be used by other organizations to access your [place] medical records  [id number]        Your Vitals Were     Pulse Respirations Height Pulse Oximetry BMI (Body Mass Index)       53 18 1.88 m (6' 2.02\") 96% 39.53 kg/m2        Blood Pressure from Last 3 Encounters:   03/14/18 133/82   03/09/18 (!) 153/92   12/20/17 (!) 155/91    Weight from " Last 3 Encounters:   03/14/18 (!) 139.7 kg (308 lb)   03/09/18 (!) 139.1 kg (306 lb 9.6 oz)   12/20/17 (!) 137.6 kg (303 lb 6.4 oz)              We Performed the Following     Comprehensive DME        Primary Care Provider Office Phone # Fax #    Jah Corley [phone] [address]  [facility] [address]        Equal Access to Services     ALINE QUIÑONES AH: Hadii aad ku hadasho Soomaali, waaxda luqadaha, qaybta kaalmada adeegyada, waxay idiin hayaan adeeg kharatriston lasonia ah. So [facility] [phone].    ATENCIÓN: Si habla español, tiene a rudd disposición servicios gratuitos de asistencia lingüística. [facility] [phone].    We comply with applicable federal civil rights laws and [place] laws. We do not discriminate on the basis of race, color, national origin, age, disability, sex, sexual orientation, or gender identity.            Thank you!     Thank you for choosing [facility] [place], SLEEP STUDY  for your care. Our goal is always to provide you with excellent care. Hearing back from our patients is one way we can continue to improve our services. Please take a few minutes to complete the written survey that you may receive in the mail after your visit with us. Thank you!             Your Updated Medication List - Protect others around you: Learn how to safely use, store and throw away your medicines at www.disposemymeds.org.          This list is accurate as of 3/14/18 10:01 AM.  Always use your most recent med list.                   Brand Name Dispense Instructions for use Diagnosis    folic acid 1 MG tablet    FOLVITE    180 tablet    Take 2 tablets (2 mg) by mouth daily    Pain in toes of both feet, Rheumatoid arthritis of multiple sites with negative rheumatoid factor (H), Rheumatoid arthritis involving shoulder with negative rheumatoid factor, unspecified laterality (H), High risk medications (not anticoagulants) long-term use       * hydroxychloroquine 200 MG tablet    PLAQUENIL    180  tablet    Take 1 tablet (200 mg) by mouth 2 times daily Send copy of recent eye exam as need for refills. Please have a copy of your eye exam faxed to [phone].    Rheumatoid arthritis involving shoulder with negative rheumatoid factor, unspecified laterality (H), High risk medications (not anticoagulants) long-term use, Pain in toes of both feet, Rheumatoid arthritis of multiple sites with negative rheumatoid factor (H)       * hydroxychloroquine 200 MG tablet    PLAQUENIL    180 tablet    2 tabs daily.    Rheumatoid arthritis involving shoulder with negative rheumatoid factor, unspecified laterality (H)       methotrexate 2.5 MG tablet CHEMO     96 tablet    Take 8 tablets (20 mg) by mouth once a week Labs due every 8-12 weeks    Rheumatoid arthritis of multiple sites with negative rheumatoid factor (H), High risk medications (not anticoagulants) long-term use       metoprolol succinate 50 MG 24 hr tablet    TOPROL-XL    90 tablet    Take 1 tablet (50 mg) by mouth daily    Essential hypertension       omeprazole 40 MG capsule    priLOSEC    180 capsule    Take 1 capsule (40 mg) by mouth 2 times daily (before meals)    Gastroesophageal reflux disease without esophagitis       order for DME      Use your CPAP device as directed by your provider.        oxybutynin 5 MG tablet    DITROPAN    90 tablet    Take 1 tablet (5 mg) by mouth daily    Other urinary incontinence       pregabalin 50 MG capsule    LYRICA    360 capsule    One in the am, one in the afternoon and 2 at night.    Peripheral polyneuropathy       TYLENOL 500 MG tablet   Generic drug:  acetaminophen      Take 1 tablet by mouth as needed. For pain        vitamin D 1000 UNITS capsule      Take 2 capsules by mouth daily.    Other specified disorder of skin       * Notice:  This list has 2 medication(s) that are the same as other medications prescribed for you. Read the directions carefully, and ask your doctor or other care provider to review them with  you.

## 2018-03-15 NOTE — PROGRESS NOTES
Sleep Follow-Up Visit:     Date on this visit: 3/14/2018     Mr. Lucio Daly is a 67-year-old male who  comes in today for follow-up of GALO. PS2015 - AHI of 28.3 per hour and REM sleep was not observed during the diagnostic portion. Optimum titration with bilevel 15/10 cwp.    He reports using the BiPAP device regularly during sleep.  There are no reports of snoring or awakenings due to gasping for air or choking with the BiPAP use.  He reports continues to report positive benefits with the BiPAP use. He goes to bed between 11:30 PM to 12 midnight and wakes up  around 5:30 AM but sometimes does not leave the bed until  6:30 AM.  His [place] sleepiness score is 8 out of 24.  He weighed 302 pounds during his last sleep clinic visit and his current weight is 308 pounds.    Downloadable compliance data for the past 1 month shows 100% of usage of the device with an average use of 6 hours and 23 minutes on the days used.  There was no evidence of significant air leak.  Residual AHI was 2.1/hr.    Chronic joint pain has been the main factor that has been affecting his sleep quality and he has been following up the Rheumatology. 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 PFTs obtained at that visit:  He  had a 6-minute walk test done which he did using a cane.  His 6-minute walk distance was below the lower limit of normal.  He did not have any O2 saturation problems.  FVC is 3.61 liters which is 73% of predicted.  FEV1 is 2.67 which is 72% of predicted.  The ratio is 74.  Total lung capacity is 6.87 which is 86% of predicted.  DLCO is 27.42 which is 98% of predicted.  He also had a maximum expiratory pressure (MEP) which was 65 cm of water and MIP which was -25 cm of water.  The MIP is slightly lower than what it was in the past, but MEP is slightly better.  They were -40 and +45 earlier.      Current  meds:  Current Outpatient Prescriptions   Medication Sig Dispense Refill     oxybutynin (DITROPAN) 5 MG tablet Take 1 tablet (5 mg) by mouth daily 90 tablet 1     pregabalin (LYRICA) 50 MG capsule One in the am, one in the afternoon and 2 at night. 360 capsule 0     hydroxychloroquine (PLAQUENIL) 200 MG tablet 2 tabs daily. 180 tablet 3     methotrexate 2.5 MG tablet CHEMO Take 8 tablets (20 mg) by mouth once a week Labs due every 8-12 weeks 96 tablet 1     omeprazole (PRILOSEC) 40 MG capsule Take 1 capsule (40 mg) by mouth 2 times daily (before meals) 180 capsule 3     hydroxychloroquine (PLAQUENIL) 200 MG tablet Take 1 tablet (200 mg) by mouth 2 times daily Send copy of recent eye exam as need for refills. Please have a copy of your eye exam faxed to [phone]. 180 tablet 0     metoprolol (TOPROL-XL) 50 MG 24 hr tablet Take 1 tablet (50 mg) by mouth daily 90 tablet 3     folic acid (FOLVITE) 1 MG tablet Take 2 tablets (2 mg) by mouth daily 180 tablet 4     ORDER FOR DME Use your CPAP device as directed by your provider.       acetaminophen (TYLENOL) 500 MG tablet Take 1 tablet by mouth as needed. For pain       Cholecalciferol (VITAMIN D) 1000 UNITS capsule Take 2 capsules by mouth daily.       Past medical history:  Past Medical History:   Diagnosis Date     Abnormal EMG 4/18/2013     Abnormal involuntary movements(781.0)     Movement Disorder     AK (actinic keratosis) 12/18/2011     Allergic rhinitis, cause unspecified     Allergic rhinitis     Balance problems 11/1/2011     Basal cell carcinoma      Bladder spasms 11/1/2011     Chronic osteoarthritis      Diaphragmatic hernia without mention of obstruction or gangrene      Earache or other ear, nose, or throat complaint      Esophageal reflux      Fatigue 11/1/2011     Fracture      H. pylori infection 5/12/2011     History of MRI of cervical spine 11/18/2013    EXAMINATION: CERVICAL SPINE G/E 5 VIEWS* 4/19/2013 4:18 PM  CLINICAL HISTORY: Pain in  limb,Performing Location?->[facility] ([facility]),  COMPARISON:  FINDINGS: AP and lateral views in flexion and extension, as well as odontoid view of the cervical spine was obtained. There is no comparison available. The vertebral bodies of the cervical spine are normally aligned. There is posterior spurring and d     Incomplete defecation 11/1/2011     Interstitial lung disease (H) 11/29/2016     Laboratory test 8/7/2012     Lung disease June 2015     Malignant basal cell neoplasm of skin 8/6/2008     Melanoma (H) 8/6/2008     Melanoma in situ of lower leg (H)     R calf     Neuropathy 5/16/2011     Other bladder disorder      Other color vision deficiencies      Other nervous system complications      Parkinsonism (H) 11/1/2011     Personal history of colonic polyps      Polyneuropathy in other diseases classified elsewhere (H)      RA (rheumatoid arthritis) (H)      Rheumatoid arthritis of multiple sites with negative rheumatoid factor (H) 3/21/2016     Seronegative arthritis 11/18/2013     Shortness of breath      Shoulder arthritis 2016    acromioclavicular joint      Somatization disorder 5/12/2011     Squamous cell carcinoma      Tremor 11/1/2011     Unspecified essential hypertension      Unspecified hypothyroidism      Urinary tract infection      Urinary urgency 11/1/2011     Wears glasses 11/1/2011     Patient Active Problem List   Diagnosis     Diaphragmatic hernia     Esophageal reflux     Hx of melanoma of skin     Malignant basal cell neoplasm of skin     GALO (obstructive sleep apnea)     Chronic maxillary sinusitis     Urinary incontinence     H. pylori infection     Sicca syndrome (H)     Health Care Home     Advanced directives, counseling/discussion     Tremor     Visual changes     Incomplete defecation     Gait disorder     Balance problems     Fatigue     High risk medications (not anticoagulants) long-term use     Personal history of other malignant neoplasm of skin     AK (actinic keratosis)      Pain in joint, lower leg     Abnormal EMG     Seronegative arthritis     History of MRI of cervical spine     Pain in limb     Adenomatous polyp of colon     Peripheral polyneuropathy     Rheumatoid arthritis of multiple sites with negative rheumatoid factor (H)     PAD (peripheral artery disease) (H)     Morbid obesity (H)     Interstitial lung disease (H)     History of MRI of lumbar spine      Past surgical history:  Past Surgical History:   Procedure Laterality Date     BIOPSY OF SKIN LESION       COLONOSCOPY       HEMORRHOID SURGERY       lip biopsy      for sicca complex     MOHS MICROGRAPHIC PROCEDURE       SOFT TISSUE SURGERY      removeal of basel cell carcinoma     Allergies:  Allergies   Allergen Reactions     Restasis      Burning eyes, problems with breathing, tightness in chest     Adhesive Tape      Bandages misc     Allegra      EXCESSIVE URINATION AND WEAKNESS, LIGHT-HEADED     Allergy      Dust     Animal Dander      Benadryl Allergy      EXCESSIVE URINATION AND WEAKNESS, LIGHT-HEADED     Cephalexin      Joint pain or gerd aggravation. Bloating excessive urination      Cephalosporins      Diphenhydramine Other (See Comments)     Doxycycline Hyclate Nausea     SWEATING,MIGRAINES,LOSS OF APPETITE,SWEATING,LIGHT HEADED, EXCESSIVE URINATION     Fexofenadine Other (See Comments)     Flonase [Fluticasone Propionate]      Gabapentin      Neurontin: mosd changes and excess urination     Iodine Solution [Povidone Iodine]      SKIN MELTS     Levaquin      From surgeon--? Joint pain ? Gerd aggravation. Insomnia, excess urination     Mylanta      EXCESSIVE URINATION AND WEAKNESS, LIGHT-HEADED     Prednisone      Weakness, elevated bp, headache, eye pain, congestion      Seafood [Seafood]      Shellfish Allergy      Hives       Sulfamethoxazole-Trimethoprim      Chest pain, angina     Trees      Trileptal      SEVERE JOINT AND TENDON PAIN, INSOMNIA, RESTLESSNESS, NAUSEA, EXCESS URINATION     Cortizone Rash  "    EXCESS URINATION,WEAKNESS,NAUSEA, HEADACHE       Physical exam:  /82  Pulse 53  Resp 18  Ht 1.88 m (6' 2.02\")  Wt (!) 139.7 kg (308 lb)  SpO2 96%  BMI 39.53 kg/m2  General appearance:  in no apparent distress  Pt is dressed casually, cooperative with good eye contact.   Cardiovascular: regular S1 and S2 no gallops or murmurs  Respiratory: clear to auscultation bilaterally,  No crackles. No rhonchi. No wheezes.  Speech is spontaneous with regular rate and volume.   Mood: euthymic; affect congruent with full range and intensity.   Sensorium: awake, alert and oriented to person, place, time, and situation.    Assessment/plan:  Previously diagnosed moderate obstructive sleep apnea.  Patient reports adequate compliance of the BiPAP device and continues to report positive benefits from the device use.  Based on the compliance measures obstructive sleep apnea appears to be adequately controlled with the bilevel at the current settings.  Prescription was provided for renewal of all the BiPAP supplies today.  Patient was recommended to continue using the device regularly during sleep and was instructed to get the supplies for the device regularly replaced.    We discussed increasing the sleep duration to at least 7-8 hours per night by gradually advancing his bedtime . We discussed weight management with diet and exercise.  He was instructed to avoid driving and operating heavy machinery if drowsy or sleepy to prevent accidents.  Recommended to continue follow-up with primary care provider and rheumatology for optimizing the pain management since it has been a major contributing factor for his sleep disturbance.  He is scheduled to follow-up at the pulmonary clinic on May 15th 2018.    He will follow-up at the sleep clinic in 1 year or sooner if any concerns.      The above note was dictated using voice recognition software. Although reviewed after completion, some word and grammatical error may remain . " "Please contact the author for any clarifications.    \"I spent a total of 25 minutes face to face with Lucio Daly during today's office visit. Over 50% of this time was spent counseling the patient and  coordinating care regarding GALO, BIPAP use, weight management, chronic pain  .\"       Kristen Martinez MD   of Medicine,  Division of Pulmonary/Sleep Medicine  [facility].      "

## 2018-04-05 DIAGNOSIS — Z79.899 HIGH RISK MEDICATIONS (NOT ANTICOAGULANTS) LONG-TERM USE: ICD-10-CM

## 2018-04-05 DIAGNOSIS — M06.09 RHEUMATOID ARTHRITIS OF MULTIPLE SITES WITH NEGATIVE RHEUMATOID FACTOR (H): ICD-10-CM

## 2018-04-05 LAB
ALBUMIN SERPL-MCNC: 3.6 G/DL (ref 3.4–5)
ALT SERPL W P-5'-P-CCNC: 25 U/L (ref 0–70)
AST SERPL W P-5'-P-CCNC: 20 U/L (ref 0–45)
CREAT SERPL-MCNC: 1.18 MG/DL (ref 0.66–1.25)
CRP SERPL-MCNC: 5.4 MG/L (ref 0–8)
ERYTHROCYTE [DISTWIDTH] IN BLOOD BY AUTOMATED COUNT: 14.4 % (ref 10–15)
GFR SERPL CREATININE-BSD FRML MDRD: 61 ML/MIN/1.7M2
HCT VFR BLD AUTO: 41.9 % (ref 40–53)
HGB BLD-MCNC: 13.5 G/DL (ref 13.3–17.7)
MCH RBC QN AUTO: 30.6 PG (ref 26.5–33)
MCHC RBC AUTO-ENTMCNC: 32.2 G/DL (ref 31.5–36.5)
MCV RBC AUTO: 95 FL (ref 78–100)
PLATELET # BLD AUTO: 161 10E9/L (ref 150–450)
RBC # BLD AUTO: 4.41 10E12/L (ref 4.4–5.9)
WBC # BLD AUTO: 3.4 10E9/L (ref 4–11)

## 2018-04-05 NOTE — PROGRESS NOTES
The blood counts, liver, kidney and CRP inflammation labs are normal with stable mild reduced white blood cell counts     Sin GUZMAN, CNP, MSN  4/5/2018  1:28 PM

## 2018-04-09 ENCOUNTER — TRANSFERRED RECORDS (OUTPATIENT)
Dept: HEALTH INFORMATION MANAGEMENT | Facility: CLINIC | Age: 68
End: 2018-04-09

## 2018-04-25 DIAGNOSIS — G62.9 PERIPHERAL POLYNEUROPATHY: ICD-10-CM

## 2018-04-25 RX ORDER — PREGABALIN 50 MG/1
CAPSULE ORAL
Qty: 360 CAPSULE | Refills: 1
Start: 2018-04-25 | End: 2018-05-22

## 2018-04-25 NOTE — TELEPHONE ENCOUNTER
Problem List Complete:  Yes    Clinic visit frequency required: unspecified     Future Office visit:     Controlled substance agreement on file: No.     Processing:  may be called or faxed   checked in past 6 months?  No, route to RN no chronic pain - please advise RN if you would like [place]  review      Do you want office visit for controlled substance refill?    Thank you,  Suellen Gonzales RN

## 2018-04-25 NOTE — TELEPHONE ENCOUNTER
pregabalin (LYRICA) 50 MG capsule        Last Written Prescription Date:  12/21/2017  Last Fill Quantity: 360 capsule,   # refills: 0  Last Office Visit: 3/9/2018  Future Office visit:       Routing refill request to provider for review/approval because:  Drug not on the [facility], P or [facility] refill protocol or controlled substance

## 2018-05-09 ASSESSMENT — ENCOUNTER SYMPTOMS
EYE WATERING: 0
COUGH DISTURBING SLEEP: 0
ORTHOPNEA: 0
SKIN CHANGES: 0
HYPERTENSION: 1
NAUSEA: 0
DOUBLE VISION: 1
POOR WOUND HEALING: 0
FEVER: 0
POLYPHAGIA: 0
SPUTUM PRODUCTION: 0
HOARSE VOICE: 0
MUSCLE CRAMPS: 0
ALTERED TEMPERATURE REGULATION: 1
BACK PAIN: 1
SKIN CHANGES: 0
SNORES LOUDLY: 0
SMELL DISTURBANCE: 0
POLYPHAGIA: 0
WEIGHT LOSS: 0
POSTURAL DYSPNEA: 0
WEAKNESS: 1
MYALGIAS: 1
RECTAL PAIN: 0
DIARRHEA: 1
HOARSE VOICE: 0
HEADACHES: 0
BACK PAIN: 1
SINUS PAIN: 0
PALPITATIONS: 0
TREMORS: 1
WHEEZING: 1
SPEECH CHANGE: 0
SLEEP DISTURBANCES DUE TO BREATHING: 0
SHORTNESS OF BREATH: 1
EYE PAIN: 1
TINGLING: 1
DIARRHEA: 1
CHILLS: 0
STIFFNESS: 1
POOR WOUND HEALING: 0
JAUNDICE: 0
SYNCOPE: 0
RECTAL PAIN: 0
SPEECH CHANGE: 0
PALPITATIONS: 0
TROUBLE SWALLOWING: 0
WEIGHT GAIN: 1
NAUSEA: 0
BLOATING: 1
EYE WATERING: 0
SEIZURES: 0
JOINT SWELLING: 1
SORE THROAT: 0
FATIGUE: 1
JOINT SWELLING: 1
NECK MASS: 0
ABDOMINAL PAIN: 1
ALTERED TEMPERATURE REGULATION: 1
SHORTNESS OF BREATH: 1
WEIGHT LOSS: 0
DOUBLE VISION: 1
WHEEZING: 1
HALLUCINATIONS: 0
POSTURAL DYSPNEA: 0
WEAKNESS: 1
MEMORY LOSS: 0
BOWEL INCONTINENCE: 0
BLOATING: 1
MYALGIAS: 1
HEMOPTYSIS: 0
EYE PAIN: 1
NAIL CHANGES: 1
DIZZINESS: 1
CHILLS: 0
ARTHRALGIAS: 1
DYSPNEA ON EXERTION: 1
BLOOD IN STOOL: 1
SORE THROAT: 0
CONSTIPATION: 1
COUGH DISTURBING SLEEP: 0
NIGHT SWEATS: 1
HEADACHES: 0
SINUS CONGESTION: 0
MUSCLE CRAMPS: 0
LIGHT-HEADEDNESS: 0
BLOOD IN STOOL: 1
DECREASED APPETITE: 0
NECK PAIN: 1
TINGLING: 1
VOMITING: 0
DIZZINESS: 1
EXERCISE INTOLERANCE: 1
VOMITING: 0
EYE REDNESS: 0
CONSTIPATION: 1
EYE REDNESS: 0
TREMORS: 1
HEMOPTYSIS: 0
ARTHRALGIAS: 1
COUGH: 1
INCREASED ENERGY: 0
DECREASED APPETITE: 0
EYE IRRITATION: 1
TROUBLE SWALLOWING: 0
NIGHT SWEATS: 1
SYNCOPE: 0
SPUTUM PRODUCTION: 0
TASTE DISTURBANCE: 0
HEARTBURN: 1
LEG PAIN: 1
LIGHT-HEADEDNESS: 0
TASTE DISTURBANCE: 0
HEARTBURN: 1
STIFFNESS: 1
SINUS PAIN: 0
POLYDIPSIA: 0
SMELL DISTURBANCE: 0
SINUS CONGESTION: 0
NUMBNESS: 1
MUSCLE WEAKNESS: 1
INCREASED ENERGY: 0
MUSCLE WEAKNESS: 1
SNORES LOUDLY: 0
HALLUCINATIONS: 0
SEIZURES: 0
COUGH: 1
SLEEP DISTURBANCES DUE TO BREATHING: 0
NECK PAIN: 1
EYE IRRITATION: 1
FATIGUE: 1
WEIGHT GAIN: 1
LOSS OF CONSCIOUSNESS: 0
DISTURBANCES IN COORDINATION: 1
LOSS OF CONSCIOUSNESS: 0
DYSPNEA ON EXERTION: 1
ABDOMINAL PAIN: 1
DISTURBANCES IN COORDINATION: 1
EXERCISE INTOLERANCE: 1
MEMORY LOSS: 0
NUMBNESS: 1
ORTHOPNEA: 0
JAUNDICE: 0
POLYDIPSIA: 0
NECK MASS: 0
PARALYSIS: 0
BOWEL INCONTINENCE: 0
HYPERTENSION: 1
HYPOTENSION: 0
FEVER: 0
LEG PAIN: 1
PARALYSIS: 0
HYPOTENSION: 0
NAIL CHANGES: 1

## 2018-05-10 DIAGNOSIS — M06.019 RHEUMATOID ARTHRITIS INVOLVING SHOULDER WITH NEGATIVE RHEUMATOID FACTOR, UNSPECIFIED LATERALITY (H): ICD-10-CM

## 2018-05-10 DIAGNOSIS — M79.675 PAIN IN TOES OF BOTH FEET: ICD-10-CM

## 2018-05-10 DIAGNOSIS — M06.09 RHEUMATOID ARTHRITIS OF MULTIPLE SITES WITH NEGATIVE RHEUMATOID FACTOR (H): ICD-10-CM

## 2018-05-10 DIAGNOSIS — Z79.899 HIGH RISK MEDICATIONS (NOT ANTICOAGULANTS) LONG-TERM USE: ICD-10-CM

## 2018-05-10 DIAGNOSIS — M79.674 PAIN IN TOES OF BOTH FEET: ICD-10-CM

## 2018-05-11 RX ORDER — FOLIC ACID 1 MG/1
2 TABLET ORAL DAILY
Qty: 180 TABLET | Refills: 3 | Status: SHIPPED | OUTPATIENT
Start: 2018-05-11 | End: 2018-05-22

## 2018-05-14 NOTE — PROGRESS NOTES
Diagnosis/Summary/Recommendations:    PATIENT: Lucio Daly  67 year old male     : 1950    EVELIA: May 22, 2018    Last seen by me about 9 months ago.     Tremor - is about the same and is not markedly worse but varies. It is on the left side.  Not taking selegiline or other dopaminergic agent.   Held off on dopamine (datscan) scan - was concerned about iodine contrast.    Neuropathy  lyrica 3-4 times per day. 1-1-1 or -2 is his scheduled and medication is coming from his pcp  He had problems with trileptal and neurontin in the past.  He has had increased numbness in his feet and has more problems with perception in the bottom of his feet.     He has chronic plantar fascitis    He has increased left ankle swelling    He has had problems with the 6 minute walk due to a variety of factors.   He has muscle pain and problems moving    Wearing braces.    Cognitive testing - has not been repeated.     Neck and back issues   Had seen Neurosurgery - [facility] issues.   On plaquenil and methotrexate.  Still taking folate, vitamin d  Had eye examination.  Seeing MONROE Snow and ZACK Goodrich    Still having pain - seen pain clinic over a year ago.     Gastorenterology Dr. IDALIA Meade    Still using bipap  Seeing [facility] sleep medicine    intersititial lung disease seeing [facility] pulmonary medicine     Bladder = remains on the ditropan    Jah Corley is his pcp    Medications     7/730am 9am 2pm 4pm 11pm     Acetaminophen tylenol 500mg 2   2 2     Cholecalciferol vitamin D 1000 1 capsule         Folic acid folvite 1mg  1  1      Hydroxychloroquine plaquenil 200mg  1  1      Methotrexate 2.5mg   8 tabs on  @ 2pm       Metoprolol toprolol XL 50mg 24 hr tablet 1         Omeprazole prilosec 40mg 1   1      Oxybutynin ditropan 5mg    1      Pregabalin lyrica 50mg 1   1 2                 History obtained from patient     IN summary, will make no changes in his management as his tremor seems stable but he has a  constellation on going medical problems and will need to discuss whether there are any other pain strategies for his symptoms. He has not tried cbd oil or other products. He had been seen in pain management but they primarily were discussing narcotics and not medical marijuana, etc.     Return back to see me in one year.         Coding statement:   Duration of  Services: patient care and care coordination was 25 minutes  Greater than 50% of this visit was spent in counseling and coordination of care.     Thong Montes MD     ______________________________________    Last visit date and details:      PATIENT: Lucio Daly  67 year old male      : 1950     EVELIA: 2017     Tremor - is about the same and is not markedly worse.  Selegiline 5 mg in the am  Held off on dopamine (datscan) scan - was concerned about iodine contrast.  Discussed weaning off the selegiline and the option of using rasagiline if needed.      Neck and back issues - seeing neurosurgery and has had imaging done of his cervical and lumbar spine  Mri c spine  Cervical spondylosis resulting in moderate bilateral neural foraminal  stenosis and mild spinal canal stenosis C5-C7, slightly progressed at  the C6-7 level since 3/21/2006. No cord signal abnormality.     Mri lumbar spine Impression:   1. Multilevel lumbar spondylosis with increased moderate to severe  spinal canal stenosis at L4-5. Unchanged mild to moderate bilateral  neural foraminal stenosis at L5-S1.  2. Mild periarticular edema associated L5-S1 joints bilaterally, not  significantly changed from prior and likely represents active  degenerative arthropathy.     Neuropathy  lyrica 3-4 times per day. 1-1-2 is his scheduled and medication is coming from his pcp  He had problems with trileptal and neurontin in the past.      Cognitive testing - has not been repeated.      RA issues.   On plaquenil and methotrexate.  Still taking folate, vitamin d     Taking zithromax for  bronchitis     Taking metoprolol and omeprazole     Still using bipap     Bladder = remains on the ditropan     He is going to pain management clinic to see if there is anything besides pain medications for his symptoms, ie h e has not yet done pool therapy.        PLAN  Go off the selegiline by going to every other day for a week or so and then stop it and see how you are doing.      If needed you could go back on this or try 1mg of rasagiline     Return back in 9months to one year.            ______________________________________      Patient was asked about 14 Review of systems including changes in vision (dry eyes, double vision), hearing, heart, lungs, musculoskeletal, depression, anxiety, snoring, RBD, insomnia, urinary frequency, urinary urgency, constipation, swallowing problems, hematological, ID, allergies, skin problems: seborrhea, endocrinological: thyroid, diabetes, cholesterol; balance, weight changes, and other neurological problems and these were not significant at this time except for   Patient Active Problem List   Diagnosis     Diaphragmatic hernia     Esophageal reflux     Hx of melanoma of skin     Malignant basal cell neoplasm of skin     GALO (obstructive sleep apnea)     Chronic maxillary sinusitis     Urinary incontinence     H. pylori infection     Sicca syndrome (H)     Health Care Home     Advanced directives, counseling/discussion     Tremor     Visual changes     Incomplete defecation     Gait disorder     Balance problems     Fatigue     High risk medications (not anticoagulants) long-term use     Personal history of other malignant neoplasm of skin     AK (actinic keratosis)     Pain in joint, lower leg     Abnormal EMG     Seronegative arthritis     History of MRI of cervical spine     Pain in limb     Adenomatous polyp of colon     Peripheral polyneuropathy     Rheumatoid arthritis of multiple sites with negative rheumatoid factor (H)     PAD (peripheral artery disease) (H)      Morbid obesity (H)     Interstitial lung disease (H)     History of MRI of lumbar spine          Allergies   Allergen Reactions     Restasis      Burning eyes, problems with breathing, tightness in chest     Adhesive Tape      Bandages misc     Allegra      EXCESSIVE URINATION AND WEAKNESS, LIGHT-HEADED     Allergy      Dust     Animal Dander      Benadryl Allergy      EXCESSIVE URINATION AND WEAKNESS, LIGHT-HEADED     Cephalexin      Joint pain or gerd aggravation. Bloating excessive urination      Cephalosporins      Diphenhydramine Other (See Comments)     Doxycycline Hyclate Nausea     SWEATING,MIGRAINES,LOSS OF APPETITE,SWEATING,LIGHT HEADED, EXCESSIVE URINATION     Fexofenadine Other (See Comments)     Flonase [Fluticasone Propionate]      Gabapentin      Neurontin: mosd changes and excess urination     Iodine Solution [Povidone Iodine]      SKIN MELTS     Levaquin      From surgeon--? Joint pain ? Gerd aggravation. Insomnia, excess urination     Mylanta      EXCESSIVE URINATION AND WEAKNESS, LIGHT-HEADED     Prednisone      Weakness, elevated bp, headache, eye pain, congestion      Seafood [Seafood]      Shellfish Allergy      Hives       Sulfamethoxazole-Trimethoprim      Chest pain, angina     Trees      Trileptal      SEVERE JOINT AND TENDON PAIN, INSOMNIA, RESTLESSNESS, NAUSEA, EXCESS URINATION     Cortizone Rash     EXCESS URINATION,WEAKNESS,NAUSEA, HEADACHE     Past Surgical History:   Procedure Laterality Date     BIOPSY OF SKIN LESION       COLONOSCOPY       HEMORRHOID SURGERY       lip biopsy      for sicca complex     MOHS MICROGRAPHIC PROCEDURE       SOFT TISSUE SURGERY      removeal of basel cell carcinoma     Past Medical History:   Diagnosis Date     Abnormal EMG 4/18/2013     Abnormal involuntary movements(781.0)     Movement Disorder     AK (actinic keratosis) 12/18/2011     Allergic rhinitis, cause unspecified     Allergic rhinitis     Balance problems 11/1/2011     Basal cell carcinoma       Bladder spasms 11/1/2011     Chronic osteoarthritis      Diaphragmatic hernia without mention of obstruction or gangrene      Earache or other ear, nose, or throat complaint      Esophageal reflux      Fatigue 11/1/2011     Fracture      H. pylori infection 5/12/2011     History of MRI of cervical spine 11/18/2013    EXAMINATION: CERVICAL SPINE G/E 5 VIEWS* 4/19/2013 4:18 PM  CLINICAL HISTORY: Pain in limb,Performing Location?->[facility] ([facility]),  COMPARISON:  FINDINGS: AP and lateral views in flexion and extension, as well as odontoid view of the cervical spine was obtained. There is no comparison available. The vertebral bodies of the cervical spine are normally aligned. There is posterior spurring and d     Incomplete defecation 11/1/2011     Interstitial lung disease (H) 11/29/2016     Laboratory test 8/7/2012     Lung disease June 2015     Malignant basal cell neoplasm of skin 8/6/2008     Melanoma (H) 8/6/2008     Melanoma in situ of lower leg (H)     R calf     Neuropathy 5/16/2011     Other bladder disorder      Other color vision deficiencies      Other nervous system complications      Parkinsonism (H) 11/1/2011     Personal history of colonic polyps      Polyneuropathy in other diseases classified elsewhere (H)      RA (rheumatoid arthritis) (H)      Rheumatoid arthritis of multiple sites with negative rheumatoid factor (H) 3/21/2016     Seronegative arthritis 11/18/2013     Shortness of breath      Shoulder arthritis 2016    acromioclavicular joint      Somatization disorder 5/12/2011     Squamous cell carcinoma      Tremor 11/1/2011     Unspecified essential hypertension      Unspecified hypothyroidism      Urinary tract infection      Urinary urgency 11/1/2011     Wears glasses 11/1/2011     Social History     Social History     Marital status:      Spouse name: N/A     Number of children: N/A     Years of education: N/A     Occupational History     Not on file.     Social History Main  Topics     Smoking status: Former Smoker     Packs/day: 1.50     Years: 37.00     Types: Cigarettes     Start date: 6/15/1963     Quit date: 9/30/2000     Smokeless tobacco: Never Used     Alcohol use No     Drug use: No     Sexual activity: No     Other Topics Concern     Parent/Sibling W/ Cabg, Mi Or Angioplasty Before 65f 55m? No     Social History Narrative    2013: Living in [place] in a townhouse with no steps    Has 3 sons that are doing okay.         Dairy/d 1 servings/d.     Caffeine 0 servings/d    Exercise 0 x week    Sunscreen used - No    Seatbelts used - Yes    Working smoke/CO detectors in the home - Yes    Guns stored in the home - Yes    Self Breast Exams - NA    Self Testicular Exam - Yes    Eye Exam up to date - Yes 2008    Dental Exam up to date - Yes 2006    Pap Smear up to date - NA    Mammogram up to date - NA    PSA up to date - Yes 2008    Dexa Scan up to date - No    Flex Sig / Colonoscopy up to date - Yes less than 5 yrs ago    Immunizations up to date -today    Abuse: Current or Past(Physical, Sexual or Emotional)- No    Do you feel safe in your environment - Yes    2008                   Drug and lactation database from the [place] National [employer] of Medicine:  http://toxnet.nlm.nih.gov/cgi-bin/sis/htmlgen?LACT      B/P: Data Unavailable, T: Data Unavailable, P: Data Unavailable, R: Data Unavailable 0 lbs 0 oz  There were no vitals taken for this visit., There is no height or weight on file to calculate BMI.  Medications and Vitals not listed above were documented in the cart and reviewed by me.     Current Outpatient Prescriptions   Medication Sig Dispense Refill     acetaminophen (TYLENOL) 500 MG tablet Take 1 tablet by mouth as needed. For pain       Cholecalciferol (VITAMIN D) 1000 UNITS capsule Take 2 capsules by mouth daily.       folic acid (FOLVITE) 1 MG tablet Take 2 tablets (2 mg) by mouth daily 180 tablet 3     hydroxychloroquine (PLAQUENIL) 200 MG tablet Take 1 tablet  (200 mg) by mouth 2 times daily Send copy of recent eye exam as need for refills. Please have a copy of your eye exam faxed to [phone]. 180 tablet 0     hydroxychloroquine (PLAQUENIL) 200 MG tablet 2 tabs daily. 180 tablet 3     methotrexate 2.5 MG tablet CHEMO Take 8 tablets (20 mg) by mouth once a week Labs due every 8-12 weeks 96 tablet 1     metoprolol (TOPROL-XL) 50 MG 24 hr tablet Take 1 tablet (50 mg) by mouth daily 90 tablet 3     omeprazole (PRILOSEC) 40 MG capsule Take 1 capsule (40 mg) by mouth 2 times daily (before meals) 180 capsule 3     ORDER FOR DME Use your CPAP device as directed by your provider.       oxybutynin (DITROPAN) 5 MG tablet Take 1 tablet (5 mg) by mouth daily 90 tablet 1     pregabalin (LYRICA) 50 MG capsule One in the am, one in the afternoon and 2 at night. 360 capsule 1     Answers for HPI/ROS submitted by the patient on 5/9/2018   General Symptoms: Yes  Skin Symptoms: Yes  HENT Symptoms: Yes  EYE SYMPTOMS: Yes  HEART SYMPTOMS: Yes  LUNG SYMPTOMS: Yes  INTESTINAL SYMPTOMS: Yes  URINARY SYMPTOMS: No  REPRODUCTIVE SYMPTOMS: No  SKELETAL SYMPTOMS: Yes  BLOOD SYMPTOMS: No  NERVOUS SYSTEM SYMPTOMS: Yes  MENTAL HEALTH SYMPTOMS: No  Fever: No  Loss of appetite: No  Weight loss: No  Weight gain: Yes  Fatigue: Yes  Night sweats: Yes  Chills: No  Increased stress: No  Excessive hunger: No  Excessive thirst: No  Feeling hot or cold when others believe the temperature is normal: Yes  Loss of height: No  Post-operative complications: No  Surgical site pain: No  Hallucinations: No  Change in or Loss of Energy: No  Hyperactivity: No  Confusion: No  Changes in hair: No  Changes in moles/birth marks: No  Itching: No  Rashes: No  Changes in nails: Yes  Acne: No  Change in facial hair: No  Warts: No  Non-healing sores: No  Scarring: No  Flaking of skin: Yes  Color changes of hands/feet in cold : No  Sun sensitivity: No  Skin thickening: No  Ear pain: No  Ear discharge: No  Hearing loss:  No  Tinnitus: Yes  Nosebleeds: No  Congestion: No  Sinus pain: No  Trouble swallowing: No   Voice hoarseness: No  Mouth sores: No  Sore throat: No  Tooth pain: No  Gum tenderness: No  Bleeding gums: No  Change in taste: No  Change in sense of smell: No  Dry mouth: Yes  Hearing aid used: No  Neck lump: No  Eye pain: Yes  Vision loss: No  Dry eyes: Yes  Watery eyes: No  Eye bulging: No  Double vision: Yes  Flashing of lights: No  Spots: No  Floaters: No  Redness: No  Crossed eyes: No  Tunnel Vision: No  Yellowing of eyes: No  Eye irritation: Yes  Cough: Yes  Sputum or phlegm: No  Coughing up blood: No  Difficulty breating or shortness of breath: Yes  Snoring: No  Wheezing: Yes  Difficulty breathing on exertion: Yes  Nighttime Cough: No  Difficulty breathing when lying flat: No  Chest pain or pressure: No  Fast or irregular heartbeat: No  Pain in legs with walking: Yes  Trouble breathing while lying down: No  Fingers or toes appear blue: No  High blood pressure: Yes  Low blood pressure: No  Fainting: No  Murmurs: No  Pacemaker: No  Varicose veins: No  Edema or swelling: Yes  Wake up at night with shortness of breath: No  Light-headedness: No  Exercise intolerance: Yes  Heart burn or indigestion: Yes  Nausea: No  Vomiting: No  Abdominal pain: Yes  Bloating: Yes  Constipation: Yes  Diarrhea: Yes  Blood in stool: Yes  Black stools: No  Rectal or Anal pain: No  Fecal incontinence: No  Yellowing of skin or eyes: No  Vomit with blood: No  Change in stools: No  Back pain: Yes  Muscle aches: Yes  Neck pain: Yes  Swollen joints: Yes  Joint pain: Yes  Bone pain: No  Muscle cramps: No  Muscle weakness: Yes  Joint stiffness: Yes  Bone fracture: No  Trouble with coordination: Yes  Dizziness or trouble with balance: Yes  Fainting or black-out spells: No  Memory loss: No  Headache: No  Seizures: No  Speech problems: No  Tingling: Yes  Tremor: Yes  Weakness: Yes  Difficulty walking: Yes  Paralysis: No  Numbness: Yes          Thong  Jyoti RAINES

## 2018-05-15 ENCOUNTER — OFFICE VISIT (OUTPATIENT)
Dept: PULMONOLOGY | Facility: CLINIC | Age: 68
End: 2018-05-15
Attending: INTERNAL MEDICINE
Payer: MEDICARE

## 2018-05-15 VITALS
RESPIRATION RATE: 18 BRPM | HEART RATE: 66 BPM | HEIGHT: 74 IN | OXYGEN SATURATION: 95 % | BODY MASS INDEX: 39.14 KG/M2 | WEIGHT: 305 LBS | SYSTOLIC BLOOD PRESSURE: 143 MMHG | DIASTOLIC BLOOD PRESSURE: 82 MMHG

## 2018-05-15 DIAGNOSIS — J84.9 ILD (INTERSTITIAL LUNG DISEASE) (H): ICD-10-CM

## 2018-05-15 DIAGNOSIS — J84.9 ILD (INTERSTITIAL LUNG DISEASE) (H): Primary | ICD-10-CM

## 2018-05-15 DIAGNOSIS — R06.00 DYSPNEA, UNSPECIFIED TYPE: ICD-10-CM

## 2018-05-15 LAB
6 MIN WALK (FT): 930 FT
6 MIN WALK (M): 283 M
ALBUMIN SERPL-MCNC: 4 G/DL (ref 3.4–5)
ALP SERPL-CCNC: 68 U/L (ref 40–150)
ALT SERPL W P-5'-P-CCNC: 32 U/L (ref 0–70)
ANION GAP SERPL CALCULATED.3IONS-SCNC: 4 MMOL/L (ref 3–14)
AST SERPL W P-5'-P-CCNC: 25 U/L (ref 0–45)
BASOPHILS # BLD AUTO: 0.1 10E9/L (ref 0–0.2)
BASOPHILS NFR BLD AUTO: 0.8 %
BILIRUB DIRECT SERPL-MCNC: 0.1 MG/DL (ref 0–0.2)
BILIRUB SERPL-MCNC: 0.4 MG/DL (ref 0.2–1.3)
BUN SERPL-MCNC: 28 MG/DL (ref 7–30)
CALCIUM SERPL-MCNC: 9.1 MG/DL (ref 8.5–10.1)
CHLORIDE SERPL-SCNC: 112 MMOL/L (ref 94–109)
CK SERPL-CCNC: 149 U/L (ref 30–300)
CO2 SERPL-SCNC: 27 MMOL/L (ref 20–32)
CREAT SERPL-MCNC: 1.16 MG/DL (ref 0.66–1.25)
DIFFERENTIAL METHOD BLD: NORMAL
EOSINOPHIL # BLD AUTO: 0.1 10E9/L (ref 0–0.7)
EOSINOPHIL NFR BLD AUTO: 1 %
ERYTHROCYTE [DISTWIDTH] IN BLOOD BY AUTOMATED COUNT: 14.6 % (ref 10–15)
GFR SERPL CREATININE-BSD FRML MDRD: 62 ML/MIN/1.7M2
GLUCOSE SERPL-MCNC: 96 MG/DL (ref 70–99)
HCT VFR BLD AUTO: 42.6 % (ref 40–53)
HGB BLD-MCNC: 14.1 G/DL (ref 13.3–17.7)
IMM GRANULOCYTES # BLD: 0 10E9/L (ref 0–0.4)
IMM GRANULOCYTES NFR BLD: 0.3 %
LYMPHOCYTES # BLD AUTO: 1.1 10E9/L (ref 0.8–5.3)
LYMPHOCYTES NFR BLD AUTO: 17.7 %
MCH RBC QN AUTO: 31.2 PG (ref 26.5–33)
MCHC RBC AUTO-ENTMCNC: 33.1 G/DL (ref 31.5–36.5)
MCV RBC AUTO: 94 FL (ref 78–100)
MONOCYTES # BLD AUTO: 0.3 10E9/L (ref 0–1.3)
MONOCYTES NFR BLD AUTO: 5 %
NEUTROPHILS # BLD AUTO: 4.5 10E9/L (ref 1.6–8.3)
NEUTROPHILS NFR BLD AUTO: 75.2 %
NRBC # BLD AUTO: 0 10*3/UL
NRBC BLD AUTO-RTO: 0 /100
NT-PROBNP SERPL-MCNC: 272 PG/ML (ref 0–125)
PLATELET # BLD AUTO: 183 10E9/L (ref 150–450)
POTASSIUM SERPL-SCNC: 4.2 MMOL/L (ref 3.4–5.3)
PROT SERPL-MCNC: 7.5 G/DL (ref 6.8–8.8)
RBC # BLD AUTO: 4.52 10E12/L (ref 4.4–5.9)
SODIUM SERPL-SCNC: 143 MMOL/L (ref 133–144)
WBC # BLD AUTO: 6 10E9/L (ref 4–11)

## 2018-05-15 PROCEDURE — 83880 ASSAY OF NATRIURETIC PEPTIDE: CPT | Performed by: INTERNAL MEDICINE

## 2018-05-15 PROCEDURE — 36415 COLL VENOUS BLD VENIPUNCTURE: CPT | Performed by: INTERNAL MEDICINE

## 2018-05-15 PROCEDURE — 80076 HEPATIC FUNCTION PANEL: CPT | Performed by: INTERNAL MEDICINE

## 2018-05-15 PROCEDURE — G0463 HOSPITAL OUTPT CLINIC VISIT: HCPCS | Mod: ZF

## 2018-05-15 PROCEDURE — 80048 BASIC METABOLIC PNL TOTAL CA: CPT | Performed by: INTERNAL MEDICINE

## 2018-05-15 PROCEDURE — 82550 ASSAY OF CK (CPK): CPT | Performed by: INTERNAL MEDICINE

## 2018-05-15 PROCEDURE — 85025 COMPLETE CBC W/AUTO DIFF WBC: CPT | Performed by: INTERNAL MEDICINE

## 2018-05-15 ASSESSMENT — PAIN SCALES - GENERAL: PAINLEVEL: NO PAIN (0)

## 2018-05-15 NOTE — MR AVS SNAPSHOT
After Visit Summary   5/15/2018    Lucio Daly    MRN: [id number]           Patient Information     Date Of Birth          1950        Visit Information        Provider Department      5/15/2018 2:00 PM Harsha Mccarthy MD [facility]        Today's Diagnoses     ILD (interstitial lung disease) (H)    -  1    Dyspnea, unspecified type           Follow-ups after your visit        Follow-up notes from your care team     Return in about 3 months (around 8/15/2018).      Your next 10 appointments already scheduled     May 15, 2018  3:00 PM CDT   Lab with UC LAB   [facility] ([facility])    [address]  1st Floor  [facility] [address]   [phone]            May 22, 2018  8:40 AM CDT   (Arrive by 8:25 AM)   Return Movement Disorder with Thong Montes MD   [facility] Neurology ([facility])    [address] [place] [address]  [address] [facility] [address]   [phone]            Jun 20, 2018  9:00 AM CDT   (Arrive by 8:45 AM)   Return Visit with MEGAN Ayers [facility] Rheumatology ([facility])    [address] [place] [address]  [address]  [facility] [address]   [phone]            Aug 21, 2018  1:30 PM CDT   Six Minute Walk with UC PFL 6 MINUTE WALK 1   [facility] Pulmonary Function Testing ([facility])    [address]  3rd Floor  [facility] [address]   [phone]            Aug 21, 2018  2:00 PM CDT   FULL PULMONARY FUNCTION with UC PFL C   [facility] Pulmonary Function Testing ([facility])    [address] [place] [address]  [address] [facility] [address]   [phone]            Aug 21, 2018  3:00 PM CDT   (Arrive by 2:45 PM)   Return Interstitial Lung with Harsha Mccarthy MD   [facility] ([facility])    [address] [place] [address]  [address]  [facility] [id number] [phone]              Future tests that were ordered for you today     Open Future Orders        Priority Expected Expires Ordered    MIP/MEP Routine  6/14/2018 5/15/2018    General PFT Lab (Please always keep checked) Routine  5/15/2019 5/15/2018    6 minute walk test Routine  5/15/2019 5/15/2018    Pulmonary Function Test Routine  5/15/2019 5/15/2018    Hepatic panel Today  6/14/2018 5/15/2018    CBC with platelets differential Today  6/14/2018 5/15/2018    Basic metabolic panel Today  6/14/2018 5/15/2018    CK total Today  6/14/2018 5/15/2018    N terminal pro BNP outpatient Today  6/14/2018 5/15/2018            Who to contact     If you have questions or need follow up information about today's clinic visit or your schedule please contact [facility] directly at [phone].  Normal or non-critical lab and imaging results will be communicated to you by Vendigihart, letter or phone within 4 business days after the clinic has received the results. If you do not hear from us within 7 days, please contact the clinic through Siteheart or phone. If you have a critical or abnormal lab result, we will notify you by phone as soon as possible.  Submit refill requests through Siteheart or call your pharmacy and they will forward the refill request to us. Please allow 3 business days for your refill to be completed.          Additional Information About Your Visit        VendigiharImagine K12 Information     Siteheart gives you secure access to your electronic health record. If you see a primary care provider, you can also send messages to your care team and make appointments. If you have questions, please call your primary care clinic.  If you do not have a primary care provider, please call [phone] and they will assist you.        Care EveryWhere ID     This is your Care EveryWhere ID. This could be used by other organizations to access your [facility]  "records  [id number]        Your Vitals Were     Pulse Respirations Height Pulse Oximetry BMI (Body Mass Index)       66 18 1.88 m (6' 2\") 95% 39.16 kg/m2        Blood Pressure from Last 3 Encounters:   05/15/18 143/82   03/14/18 133/82   03/09/18 (!) 153/92    Weight from Last 3 Encounters:   05/15/18 138.3 kg (305 lb)   03/14/18 (!) 139.7 kg (308 lb)   03/09/18 (!) 139.1 kg (306 lb 9.6 oz)               Primary Care Provider Office Phone # Fax #    Jah Corley [phone] [address] [facility] [address]        Equal Access to Services     ALINE QUIÑONES : Hadii helen oneill hadasho Soomaali, waaxda luqadaha, qaybta kaalmada adeegyada, mariana salcedo . So [facility] [phone].    ATENCIÓN: Si habla español, tiene a rudd disposición servicios gratuitos de asistencia lingüística. Llame al [phone].    We comply with applicable federal civil rights laws and [place] laws. We do not discriminate on the basis of race, color, national origin, age, disability, sex, sexual orientation, or gender identity.            Thank you!     Thank you for choosing [facility]  for your care. Our goal is always to provide you with excellent care. Hearing back from our patients is one way we can continue to improve our services. Please take a few minutes to complete the written survey that you may receive in the mail after your visit with us. Thank you!             Your Updated Medication List - Protect others around you: Learn how to safely use, store and throw away your medicines at www.disposemymeds.org.          This list is accurate as of 5/15/18  2:52 PM.  Always use your most recent med list.                   Brand Name Dispense Instructions for use Diagnosis    folic acid 1 MG tablet    FOLVITE    180 tablet    Take 2 tablets (2 mg) by mouth daily    Pain in toes of both feet, Rheumatoid arthritis of multiple sites with negative rheumatoid factor " (H), Rheumatoid arthritis involving shoulder with negative rheumatoid factor, unspecified laterality (H), High risk medications (not anticoagulants) long-term use       * hydroxychloroquine 200 MG tablet    PLAQUENIL    180 tablet    Take 1 tablet (200 mg) by mouth 2 times daily Send copy of recent eye exam as need for refills. Please have a copy of your eye exam faxed to [phone].    Rheumatoid arthritis involving shoulder with negative rheumatoid factor, unspecified laterality (H), High risk medications (not anticoagulants) long-term use, Pain in toes of both feet, Rheumatoid arthritis of multiple sites with negative rheumatoid factor (H)       * hydroxychloroquine 200 MG tablet    PLAQUENIL    180 tablet    2 tabs daily.    Rheumatoid arthritis involving shoulder with negative rheumatoid factor, unspecified laterality (H)       methotrexate 2.5 MG tablet CHEMO     96 tablet    Take 8 tablets (20 mg) by mouth once a week Labs due every 8-12 weeks    Rheumatoid arthritis of multiple sites with negative rheumatoid factor (H), High risk medications (not anticoagulants) long-term use       metoprolol succinate 50 MG 24 hr tablet    TOPROL-XL    90 tablet    Take 1 tablet (50 mg) by mouth daily    Essential hypertension       omeprazole 40 MG capsule    priLOSEC    180 capsule    Take 1 capsule (40 mg) by mouth 2 times daily (before meals)    Gastroesophageal reflux disease without esophagitis       order for DME      Use your CPAP device as directed by your provider.        oxybutynin 5 MG tablet    DITROPAN    90 tablet    Take 1 tablet (5 mg) by mouth daily    Other urinary incontinence       pregabalin 50 MG capsule    LYRICA    360 capsule    One in the am, one in the afternoon and 2 at night.    Peripheral polyneuropathy       TYLENOL 500 MG tablet   Generic drug:  acetaminophen      Take 1 tablet by mouth as needed. For pain        vitamin D 1000 units capsule      Take 2 capsules by mouth daily.    Other  specified disorder of skin       * Notice:  This list has 2 medication(s) that are the same as other medications prescribed for you. Read the directions carefully, and ask your doctor or other care provider to review them with you.

## 2018-05-15 NOTE — LETTER
5/15/2018       RE: Lucio Daly  [id number]  [place] [address]     Dear Colleague,    Thank you for referring your patient, Lucio Daly, to the [facility]. Please see a copy of my visit note below.    Reason for Visit  Lucio Daly is a 67 year old year old male who is being seen for connective tissue disease-related ILD.      The patient was seen and examined by Germán L. Vélez Reyes (MS3) and Dr. Harsha Mccarthy       Mr. Daly comes in for followup of his connective tissue disease-related ILD.  He follows up in rheumatology with Dr. Goodrich and Ms. Snow for her seronegative rheumatoid arthritis.  He was last seen in the Pulmonary Clinic on 1/16/2017. He is currently on plaquenil and methotrexate. He is here for routine follow up.     Mr Daly says that he has been feeling worse since last time he was seen in clinic. He has been having trouble walking long distances, even with the help of a cane, and waling upstairs is quite difficult at the moment. He  Gets short of breath with activity. He says he can do work around the house and yard, but needs some help from his wife. Everything takes longer as he needs to take breaks. He also says that he feel short of breath when he sits down, either sitting with his back upright or laying supine. This shortness of breath is accompanied by feeling like his heart skipped a beat. He also says that he has to put a lot more effort into his breathing. Mr Daly denies,any cough or sputum production.  He does not have any chest pain, headches, night sweats, wheezing, and unexplained weight loss.         Current Outpatient Prescriptions   Medication     omeprazole (PRILOSEC) 40 MG capsule     hydroxychloroquine (PLAQUENIL) 200 MG tablet     azithromycin (ZITHROMAX) 250 MG tablet     metoprolol (TOPROL-XL) 50 MG 24 hr tablet     pregabalin (LYRICA) 50 MG capsule     methotrexate  "2.5 MG tablet CHEMO     oxybutynin (DITROPAN) 5 MG tablet     folic acid (FOLVITE) 1 MG tablet     ORDER FOR DME     acetaminophen (TYLENOL) 500 MG tablet     Cholecalciferol (VITAMIN D) 1000 UNITS capsule     ROS Pulmonary  A complete ROS was otherwise negative except as noted in the HPI.  BP (!) 143/82  Pulse 66  Resp 18  Ht 1.88 m (6' 2\")  Wt (!) 137.9 kg (305 lb)  SpO2 95%   Exam:   GENERAL APPEARANCE: Alert, and in no apparent distress.  EYES: PERRL, EOMI  MOUTH: Oral mucosa is moist, without any lesions,, no oropharyngeal exudate.  NECK: supple, no masses, no thyromegaly.  LYMPHATICS: No significant axillary, cervical, or supraclavicular nodes.  RESP: normal percussion, good air flow throughout.  No crackles. No rhonchi. No wheezes.  CV:  regular heartbeat. Distant S1, loud S2.   ABDOMEN:  Bowel sounds normal, soft, nontender, no HSM or masses.   MS: extremities normal. No clubbing. No cyanosis.  SKIN: no rash on limited exam  NEURO: Mentation intact, speech normal, normal strength and tone, normal gait and stance     Results:     PFTs done today were reviewed by me with the patient.  He also had a 6-minute walk test done which he did using a cane.  His 6-minute walk distance was below the lower limit of normal by 441ft.  He did not have any O2 saturation problems.  FVC is 3.72 liters which is 76% of predicted.  FEV1 is 2.80 which is 76% of predicted.  The ratio is 75.  Total lung capacity is 6.17 which is 77% of predicted.  DLCO is 27.50 which is 98% of predicted. My interpretation is that he has mild restriction with normal diffusion capacity. TLC is lower than the last visit.           Assessment and plan:    Mr. 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     1. Possible CTD related ILD: he has worsening " symptoms, decreased endurance and possibly more shortness of breath, He is also describing dyspnea on laying down which improves when he puts on his NIPPV mas. He believes that he has granulized weakness that is contributing to this (for which he is seeing neurology), I think he might have some respiratory muscle weakness. For this I would like to get a MIP and a MEP to determine if he has compromized  respiratory muscle strength. We wll also evaluate him for other possible etiologies. We are obtain a CK, BNP, EKG and CBC.   2.  Sleep-disordered breathing, on bilevel ventilation with good control.    3.  Gastroesophageal reflux, now being managed with assistance from GI. He is now taking 20mg of omeprazole a day.    4. Drug monitoring: CBC with differential and LFTs.     I will see him back in 3 months    Addendum: EKG, MIP and MEP not done yesterday. Will ask care coordinators to follow up.        Again, thank you for allowing me to participate in the care of your patient.      Sincerely,    Harsha Mccarthy MD

## 2018-05-15 NOTE — NURSING NOTE
Chief Complaint   Patient presents with     Interstitial Lung Disease (ILD)     RA/ILD     Kennedy Munguia

## 2018-05-15 NOTE — PROGRESS NOTES
Reason for Visit  Lucio Daly is a 67 year old year old male who is being seen for connective tissue disease-related ILD.      The patient was seen and examined by Germán L. Vélez Reyes (MS3) and Dr. Harsha Mccarthy       Mr. Daly comes in for followup of his connective tissue disease-related ILD.  He follows up in rheumatology with Dr. Goodrich and Ms. Snow for her seronegative rheumatoid arthritis.  He was last seen in the Pulmonary Clinic on 1/16/2017. He is currently on plaquenil and methotrexate. He is here for routine follow up.     Mr Daly says that he has been feeling worse since last time he was seen in clinic. He has been having trouble walking long distances, even with the help of a cane, and waling upstairs is quite difficult at the moment. He  Gets short of breath with activity. He says he can do work around the house and yard, but needs some help from his wife. Everything takes longer as he needs to take breaks. He also says that he feel short of breath when he sits down, either sitting with his back upright or laying supine. This shortness of breath is accompanied by feeling like his heart skipped a beat. He also says that he has to put a lot more effort into his breathing. Mr Daly denies,any cough or sputum production.  He does not have any chest pain, headches, night sweats, wheezing, and unexplained weight loss.     Current Outpatient Prescriptions   Medication     omeprazole (PRILOSEC) 40 MG capsule     hydroxychloroquine (PLAQUENIL) 200 MG tablet     azithromycin (ZITHROMAX) 250 MG tablet     metoprolol (TOPROL-XL) 50 MG 24 hr tablet     pregabalin (LYRICA) 50 MG capsule     methotrexate 2.5 MG tablet CHEMO     oxybutynin (DITROPAN) 5 MG tablet     folic acid (FOLVITE) 1 MG tablet     ORDER FOR DME     acetaminophen (TYLENOL) 500 MG tablet     Cholecalciferol (VITAMIN D) 1000 UNITS capsule     ROS Pulmonary  A complete ROS was otherwise negative except as noted in the HPI.  BP (!)  "143/82  Pulse 66  Resp 18  Ht 1.88 m (6' 2\")  Wt (!) 137.9 kg (305 lb)  SpO2 95%   Exam:   GENERAL APPEARANCE: Alert, and in no apparent distress.  EYES: PERRL, EOMI  MOUTH: Oral mucosa is moist, without any lesions,, no oropharyngeal exudate.  NECK: supple, no masses, no thyromegaly.  LYMPHATICS: No significant axillary, cervical, or supraclavicular nodes.  RESP: normal percussion, good air flow throughout.  No crackles. No rhonchi. No wheezes.  CV:  regular heartbeat. Distant S1, loud S2.   ABDOMEN:  Bowel sounds normal, soft, nontender, no HSM or masses.   MS: extremities normal. No clubbing. No cyanosis.  SKIN: no rash on limited exam  NEURO: Mentation intact, speech normal, normal strength and tone, normal gait and stance     Results:     PFTs done today were reviewed by me with the patient.  He also had a 6-minute walk test done which he did using a cane.  His 6-minute walk distance was below the lower limit of normal by 441ft.  He did not have any O2 saturation problems.  FVC is 3.72 liters which is 76% of predicted.  FEV1 is 2.80 which is 76% of predicted.  The ratio is 75.  Total lung capacity is 6.17 which is 77% of predicted.  DLCO is 27.50 which is 98% of predicted. My interpretation is that he has mild restriction with normal diffusion capacity. TLC is lower than the last visit.           Assessment and plan:    Mr. 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     1. Possible CTD related ILD: he has worsening symptoms, decreased endurance and possibly more shortness of breath, He is also describing dyspnea on laying down which improves when he puts on his NIPPV mas. He believes that he has granulized weakness that is contributing to this (for which he is seeing neurology), I think he might have some " respiratory muscle weakness. For this I would like to get a MIP and a MEP to determine if he has compromized  respiratory muscle strength. We wll also evaluate him for other possible etiologies. We are obtain a CK, BNP, EKG and CBC.   2.  Sleep-disordered breathing, on bilevel ventilation with good control.    3.  Gastroesophageal reflux, now being managed with assistance from GI. He is now taking 20mg of omeprazole a day.    4. Drug monitoring: CBC with differential and LFTs.     I will see him back in 3 months    Addendum: EKG, MIP and MEP not done yesterday. Will ask care coordinators to follow up.      Answers for HPI/ROS submitted by the patient on 5/9/2018   General Symptoms: Yes  Skin Symptoms: Yes  HENT Symptoms: Yes  EYE SYMPTOMS: Yes  HEART SYMPTOMS: Yes  LUNG SYMPTOMS: Yes  INTESTINAL SYMPTOMS: Yes  URINARY SYMPTOMS: No  REPRODUCTIVE SYMPTOMS: No  SKELETAL SYMPTOMS: Yes  BLOOD SYMPTOMS: No  NERVOUS SYSTEM SYMPTOMS: Yes  MENTAL HEALTH SYMPTOMS: No  Fever: No  Loss of appetite: No  Weight loss: No  Weight gain: Yes  Fatigue: Yes  Night sweats: Yes  Chills: No  Increased stress: No  Excessive hunger: No  Excessive thirst: No  Feeling hot or cold when others believe the temperature is normal: Yes  Loss of height: No  Post-operative complications: No  Surgical site pain: No  Hallucinations: No  Change in or Loss of Energy: No  Hyperactivity: No  Confusion: No  Changes in hair: No  Changes in moles/birth marks: No  Itching: No  Rashes: No  Changes in nails: Yes  Acne: No  Change in facial hair: No  Warts: No  Non-healing sores: No  Scarring: No  Flaking of skin: Yes  Color changes of hands/feet in cold : No  Sun sensitivity: No  Skin thickening: No  Ear pain: No  Ear discharge: No  Hearing loss: No  Tinnitus: Yes  Nosebleeds: No  Congestion: No  Sinus pain: No  Trouble swallowing: No   Voice hoarseness: No  Mouth sores: No  Sore throat: No  Tooth pain: No  Gum tenderness: No  Bleeding gums: No  Change in taste:  No  Change in sense of smell: No  Dry mouth: Yes  Hearing aid used: No  Neck lump: No  Eye pain: Yes  Vision loss: No  Dry eyes: Yes  Watery eyes: No  Eye bulging: No  Double vision: Yes  Flashing of lights: No  Spots: No  Floaters: No  Redness: No  Crossed eyes: No  Tunnel Vision: No  Yellowing of eyes: No  Eye irritation: Yes  Cough: Yes  Sputum or phlegm: No  Coughing up blood: No  Difficulty breating or shortness of breath: Yes  Snoring: No  Wheezing: Yes  Difficulty breathing on exertion: Yes  Nighttime Cough: No  Difficulty breathing when lying flat: No  Chest pain or pressure: No  Fast or irregular heartbeat: No  Pain in legs with walking: Yes  Trouble breathing while lying down: No  Fingers or toes appear blue: No  High blood pressure: Yes  Low blood pressure: No  Fainting: No  Murmurs: No  Pacemaker: No  Varicose veins: No  Edema or swelling: Yes  Wake up at night with shortness of breath: No  Light-headedness: No  Exercise intolerance: Yes  Heart burn or indigestion: Yes  Nausea: No  Vomiting: No  Abdominal pain: Yes  Bloating: Yes  Constipation: Yes  Diarrhea: Yes  Blood in stool: Yes  Black stools: No  Rectal or Anal pain: No  Fecal incontinence: No  Yellowing of skin or eyes: No  Vomit with blood: No  Change in stools: No  Back pain: Yes  Muscle aches: Yes  Neck pain: Yes  Swollen joints: Yes  Joint pain: Yes  Bone pain: No  Muscle cramps: No  Muscle weakness: Yes  Joint stiffness: Yes  Bone fracture: No  Trouble with coordination: Yes  Dizziness or trouble with balance: Yes  Fainting or black-out spells: No  Memory loss: No  Headache: No  Seizures: No  Speech problems: No  Tingling: Yes  Tremor: Yes  Weakness: Yes  Difficulty walking: Yes  Paralysis: No  Numbness: Yes

## 2018-05-18 DIAGNOSIS — J84.9 ILD (INTERSTITIAL LUNG DISEASE) (H): Primary | ICD-10-CM

## 2018-05-22 ENCOUNTER — OFFICE VISIT (OUTPATIENT)
Dept: NEUROLOGY | Facility: CLINIC | Age: 68
End: 2018-05-22
Payer: MEDICARE

## 2018-05-22 ENCOUNTER — APPOINTMENT (OUTPATIENT)
Dept: GENERAL RADIOLOGY | Facility: CLINIC | Age: 68
End: 2018-05-22
Payer: MEDICARE

## 2018-05-22 VITALS
SYSTOLIC BLOOD PRESSURE: 152 MMHG | DIASTOLIC BLOOD PRESSURE: 85 MMHG | HEIGHT: 74 IN | OXYGEN SATURATION: 95 % | WEIGHT: 308.6 LBS | BODY MASS INDEX: 39.61 KG/M2 | HEART RATE: 64 BPM

## 2018-05-22 DIAGNOSIS — M79.674 PAIN IN TOES OF BOTH FEET: ICD-10-CM

## 2018-05-22 DIAGNOSIS — R25.1 TREMOR: Primary | ICD-10-CM

## 2018-05-22 DIAGNOSIS — I10 ESSENTIAL HYPERTENSION: ICD-10-CM

## 2018-05-22 DIAGNOSIS — G62.9 PERIPHERAL POLYNEUROPATHY: ICD-10-CM

## 2018-05-22 DIAGNOSIS — M79.675 PAIN IN TOES OF BOTH FEET: ICD-10-CM

## 2018-05-22 DIAGNOSIS — N39.498 OTHER URINARY INCONTINENCE: ICD-10-CM

## 2018-05-22 DIAGNOSIS — K21.9 GASTROESOPHAGEAL REFLUX DISEASE WITHOUT ESOPHAGITIS: ICD-10-CM

## 2018-05-22 DIAGNOSIS — M06.09 RHEUMATOID ARTHRITIS OF MULTIPLE SITES WITH NEGATIVE RHEUMATOID FACTOR (H): ICD-10-CM

## 2018-05-22 DIAGNOSIS — M06.019 RHEUMATOID ARTHRITIS INVOLVING SHOULDER WITH NEGATIVE RHEUMATOID FACTOR, UNSPECIFIED LATERALITY (H): ICD-10-CM

## 2018-05-22 DIAGNOSIS — Z79.899 HIGH RISK MEDICATIONS (NOT ANTICOAGULANTS) LONG-TERM USE: ICD-10-CM

## 2018-05-22 DIAGNOSIS — J84.9 ILD (INTERSTITIAL LUNG DISEASE) (H): ICD-10-CM

## 2018-05-22 RX ORDER — METOPROLOL SUCCINATE 50 MG/1
TABLET, EXTENDED RELEASE ORAL
Qty: 90 TABLET | Refills: 3 | COMMUNITY
Start: 2018-05-22 | End: 2018-08-21

## 2018-05-22 RX ORDER — OXYBUTYNIN CHLORIDE 5 MG/1
TABLET ORAL
Qty: 90 TABLET | Refills: 1 | COMMUNITY
Start: 2018-05-22 | End: 2018-08-23

## 2018-05-22 RX ORDER — ACETAMINOPHEN 500 MG
TABLET ORAL
COMMUNITY
Start: 2018-05-22

## 2018-05-22 RX ORDER — PREGABALIN 50 MG/1
CAPSULE ORAL
Qty: 360 CAPSULE | Refills: 3 | COMMUNITY
Start: 2018-05-22 | End: 2018-12-07

## 2018-05-22 RX ORDER — HYDROXYCHLOROQUINE SULFATE 200 MG/1
TABLET, FILM COATED ORAL
Qty: 180 TABLET | Refills: 3 | COMMUNITY
Start: 2018-05-22 | End: 2019-02-13

## 2018-05-22 RX ORDER — OMEPRAZOLE 40 MG/1
CAPSULE, DELAYED RELEASE ORAL
Qty: 180 CAPSULE | Refills: 3 | COMMUNITY
Start: 2018-05-22 | End: 2018-12-07

## 2018-05-22 RX ORDER — FOLIC ACID 1 MG/1
TABLET ORAL
Qty: 180 TABLET | Refills: 3 | COMMUNITY
Start: 2018-05-22 | End: 2018-12-03

## 2018-05-22 ASSESSMENT — PAIN SCALES - GENERAL: PAINLEVEL: MODERATE PAIN (5)

## 2018-05-22 NOTE — PATIENT INSTRUCTIONS
PATIENT: Lucio Daly  67 year old male     : 1950    EVELIA: May 22, 2018    Last seen by me about 9 months ago.     Tremor - is about the same and is not markedly worse but varies. It is on the left side.  Not taking selegiline or other dopaminergic agent.   Held off on dopamine (datscan) scan - was concerned about iodine contrast.    Neuropathy  lyrica 3-4 times per day. - or -2 is his scheduled and medication is coming from his pcp  He had problems with trileptal and neurontin in the past.  He has had increased numbness in his feet and has more problems with perception in the bottom of his feet.     He has chronic plantar fascitis    He has increased left ankle swelling    He has had problems with the 6 minute walk due to a variety of factors.   He has muscle pain and problems moving    Wearing braces.    Cognitive testing - has not been repeated.     Neck and back issues   Had seen Neurosurgery - Carrillo    RA issues.   On plaquenil and methotrexate.  Still taking folate, vitamin d  Had eye examination.  Seeing MONROE Snow and ZACK Goodrich    Still having pain - seen pain clinic over a year ago.     Gastorenterology Dr. IDALIA Meade    Still using bipap  Seeing [facility] sleep medicine    intersititial lung disease seeing [facility] pulmonary medicine     Bladder = remains on the ditropan    Jah Corley is his pcp    Medications     7/730am 9am 2pm 4pm 11pm     Acetaminophen tylenol 500mg 2   2 2     Cholecalciferol vitamin D 1000 1 capsule         Folic acid folvite 1mg  1  1      Hydroxychloroquine plaquenil 200mg  1  1      Methotrexate 2.5mg   8 tabs on  @ 2pm       Metoprolol toprolol XL 50mg 24 hr tablet 1         Omeprazole prilosec 40mg 1   1      Oxybutynin ditropan 5mg    1      Pregabalin lyrica 50mg 1   1 2                 History obtained from patient     IN summary, will make no changes in his management as his tremor seems stable but he has a constellation on going medical  problems and will need to discuss whether there are any other pain strategies for his symptoms. He has not tried cbd oil or other products. He had been seen in pain management but they primarily were discussing narcotics and not medical marijuana, etc.     Return back to see me in one year.

## 2018-05-22 NOTE — LETTER
2018       RE: Lucio Daly  [address]  [place] [address]     Dear Colleague,    Thank you for referring your patient, Lucio Daly, to the [facility] NEUROLOGY at [facility]. Please see a copy of my visit note below.    Diagnosis/Summary/Recommendations:    PATIENT: Lucio Daly  67 year old male     : 1950    EVELIA: May 22, 2018    Last seen by me about 9 months ago.     Tremor - is about the same and is not markedly worse but varies. It is on the left side.  Not taking selegiline or other dopaminergic agent.   Held off on dopamine (datscan) scan - was concerned about iodine contrast.    Neuropathy  lyrica 3-4 times per day. - or -2 is his scheduled and medication is coming from his pcp  He had problems with trileptal and neurontin in the past.  He has had increased numbness in his feet and has more problems with perception in the bottom of his feet.     He has chronic plantar fascitis    He has increased left ankle swelling    He has had problems with the 6 minute walk due to a variety of factors.   He has muscle pain and problems moving    Wearing braces.    Cognitive testing - has not been repeated.     Neck and back issues   Had seen Neurosurgery - Carrillo    RA issues.   On plaquenil and methotrexate.  Still taking folate, vitamin d  Had eye examination.  Seeing MONROE Snow and ZACK Goodrich    Still having pain - seen pain clinic over a year ago.     Gastorenterology Dr. IDALIA Meade    Still using bipap  Seeing [facility] sleep medicine    intersititial lung disease seeing [facility] pulmonary medicine     Bladder = remains on the ditropan    Jah Corley is his pcp    Medications     7/730am 9am 2pm 4pm 11pm     Acetaminophen tylenol 500mg 2   2 2     Cholecalciferol vitamin D 1000 1 capsule         Folic acid folvite 1mg  1  1      Hydroxychloroquine plaquenil 200mg  1  1      Methotrexate 2.5mg   8 tabs on  @ 2pm       Metoprolol toprolol XL  50mg 24 hr tablet 1         Omeprazole prilosec 40mg 1   1      Oxybutynin ditropan 5mg    1      Pregabalin lyrica 50mg 1   1 2                 History obtained from patient     IN summary, will make no changes in his management as his tremor seems stable but he has a constellation on going medical problems and will need to discuss whether there are any other pain strategies for his symptoms. He has not tried cbd oil or other products. He had been seen in pain management but they primarily were discussing narcotics and not medical marijuana, etc.     Return back to see me in one year.         Coding statement:   Duration of  Services: patient care and care coordination was 25 minutes  Greater than 50% of this visit was spent in counseling and coordination of care.     Thong Montes MD     ______________________________________    Last visit date and details:      PATIENT: Lucio Daly  67 year old male      : 1950     EVELIA: 2017     Tremor - is about the same and is not markedly worse.  Selegiline 5 mg in the am  Held off on dopamine (datscan) scan - was concerned about iodine contrast.  Discussed weaning off the selegiline and the option of using rasagiline if needed.      Neck and back issues - seeing neurosurgery and has had imaging done of his cervical and lumbar spine  Mri c spine  Cervical spondylosis resulting in moderate bilateral neural foraminal  stenosis and mild spinal canal stenosis C5-C7, slightly progressed at  the C6-7 level since 3/21/2006. No cord signal abnormality.     Mri lumbar spine Impression:   1. Multilevel lumbar spondylosis with increased moderate to severe  spinal canal stenosis at L4-5. Unchanged mild to moderate bilateral  neural foraminal stenosis at L5-S1.  2. Mild periarticular edema associated L5-S1 joints bilaterally, not  significantly changed from prior and likely represents active  degenerative arthropathy.     Neuropathy  lyrica 3-4 times per day. 1-1-2 is  his scheduled and medication is coming from his pcp  He had problems with trileptal and neurontin in the past.      Cognitive testing - has not been repeated.      RA issues.   On plaquenil and methotrexate.  Still taking folate, vitamin d     Taking zithromax for bronchitis     Taking metoprolol and omeprazole     Still using bipap     Bladder = remains on the ditropan     He is going to pain management clinic to see if there is anything besides pain medications for his symptoms, ie h e has not yet done pool therapy.        PLAN  Go off the selegiline by going to every other day for a week or so and then stop it and see how you are doing.      If needed you could go back on this or try 1mg of rasagiline     Return back in 9months to one year.            ______________________________________      Patient was asked about 14 Review of systems including changes in vision (dry eyes, double vision), hearing, heart, lungs, musculoskeletal, depression, anxiety, snoring, RBD, insomnia, urinary frequency, urinary urgency, constipation, swallowing problems, hematological, ID, allergies, skin problems: seborrhea, endocrinological: thyroid, diabetes, cholesterol; balance, weight changes, and other neurological problems and these were not significant at this time except for   Patient Active Problem List   Diagnosis     Diaphragmatic hernia     Esophageal reflux     Hx of melanoma of skin     Malignant basal cell neoplasm of skin     GALO (obstructive sleep apnea)     Chronic maxillary sinusitis     Urinary incontinence     H. pylori infection     Sicca syndrome (H)     Health Care Home     Advanced directives, counseling/discussion     Tremor     Visual changes     Incomplete defecation     Gait disorder     Balance problems     Fatigue     High risk medications (not anticoagulants) long-term use     Personal history of other malignant neoplasm of skin     AK (actinic keratosis)     Pain in joint, lower leg     Abnormal EMG      Seronegative arthritis     History of MRI of cervical spine     Pain in limb     Adenomatous polyp of colon     Peripheral polyneuropathy     Rheumatoid arthritis of multiple sites with negative rheumatoid factor (H)     PAD (peripheral artery disease) (H)     Morbid obesity (H)     Interstitial lung disease (H)     History of MRI of lumbar spine          Allergies   Allergen Reactions     Restasis      Burning eyes, problems with breathing, tightness in chest     Adhesive Tape      Bandages misc     Allegra      EXCESSIVE URINATION AND WEAKNESS, LIGHT-HEADED     Allergy      Dust     Animal Dander      Benadryl Allergy      EXCESSIVE URINATION AND WEAKNESS, LIGHT-HEADED     Cephalexin      Joint pain or gerd aggravation. Bloating excessive urination      Cephalosporins      Diphenhydramine Other (See Comments)     Doxycycline Hyclate Nausea     SWEATING,MIGRAINES,LOSS OF APPETITE,SWEATING,LIGHT HEADED, EXCESSIVE URINATION     Fexofenadine Other (See Comments)     Flonase [Fluticasone Propionate]      Gabapentin      Neurontin: mosd changes and excess urination     Iodine Solution [Povidone Iodine]      SKIN MELTS     Levaquin      From surgeon--? Joint pain ? Gerd aggravation. Insomnia, excess urination     Mylanta      EXCESSIVE URINATION AND WEAKNESS, LIGHT-HEADED     Prednisone      Weakness, elevated bp, headache, eye pain, congestion      Seafood [Seafood]      Shellfish Allergy      Hives       Sulfamethoxazole-Trimethoprim      Chest pain, angina     Trees      Trileptal      SEVERE JOINT AND TENDON PAIN, INSOMNIA, RESTLESSNESS, NAUSEA, EXCESS URINATION     Cortizone Rash     EXCESS URINATION,WEAKNESS,NAUSEA, HEADACHE     Past Surgical History:   Procedure Laterality Date     BIOPSY OF SKIN LESION       COLONOSCOPY       HEMORRHOID SURGERY       lip biopsy      for sicca complex     MOHS MICROGRAPHIC PROCEDURE       SOFT TISSUE SURGERY      removeal of basel cell carcinoma     Past Medical History:    Diagnosis Date     Abnormal EMG 4/18/2013     Abnormal involuntary movements(781.0)     Movement Disorder     AK (actinic keratosis) 12/18/2011     Allergic rhinitis, cause unspecified     Allergic rhinitis     Balance problems 11/1/2011     Basal cell carcinoma      Bladder spasms 11/1/2011     Chronic osteoarthritis      Diaphragmatic hernia without mention of obstruction or gangrene      Earache or other ear, nose, or throat complaint      Esophageal reflux      Fatigue 11/1/2011     Fracture      H. pylori infection 5/12/2011     History of MRI of cervical spine 11/18/2013    EXAMINATION: CERVICAL SPINE G/E 5 VIEWS* 4/19/2013 4:18 PM  CLINICAL HISTORY: Pain in limb,Performing Location?->[facility]  COMPARISON:  FINDINGS: AP and lateral views in flexion and extension, as well as odontoid view of the cervical spine was obtained. There is no comparison available. The vertebral bodies of the cervical spine are normally aligned. There is posterior spurring and d     Incomplete defecation 11/1/2011     Interstitial lung disease (H) 11/29/2016     Laboratory test 8/7/2012     Lung disease June 2015     Malignant basal cell neoplasm of skin 8/6/2008     Melanoma (H) 8/6/2008     Melanoma in situ of lower leg (H)     R calf     Neuropathy 5/16/2011     Other bladder disorder      Other color vision deficiencies      Other nervous system complications      Parkinsonism (H) 11/1/2011     Personal history of colonic polyps      Polyneuropathy in other diseases classified elsewhere (H)      RA (rheumatoid arthritis) (H)      Rheumatoid arthritis of multiple sites with negative rheumatoid factor (H) 3/21/2016     Seronegative arthritis 11/18/2013     Shortness of breath      Shoulder arthritis 2016    acromioclavicular joint      Somatization disorder 5/12/2011     Squamous cell carcinoma      Tremor 11/1/2011     Unspecified essential hypertension      Unspecified hypothyroidism      Urinary tract infection       Urinary urgency 11/1/2011     Wears glasses 11/1/2011     Social History     Social History     Marital status:      Spouse name: N/A     Number of children: N/A     Years of education: N/A     Occupational History     Not on file.     Social History Main Topics     Smoking status: Former Smoker     Packs/day: 1.50     Years: 37.00     Types: Cigarettes     Start date: 6/15/1963     Quit date: 9/30/2000     Smokeless tobacco: Never Used     Alcohol use No     Drug use: No     Sexual activity: No     Other Topics Concern     Parent/Sibling W/ Cabg, Mi Or Angioplasty Before 65f 55m? No     Social History Narrative    2013: Living in [place] in a townhouse with no steps    Has 3 sons that are doing okay.         Dairy/d 1 servings/d.     Caffeine 0 servings/d    Exercise 0 x week    Sunscreen used - No    Seatbelts used - Yes    Working smoke/CO detectors in the home - Yes    Guns stored in the home - Yes    Self Breast Exams - NA    Self Testicular Exam - Yes    Eye Exam up to date - Yes 2008    Dental Exam up to date - Yes 2006    Pap Smear up to date - NA    Mammogram up to date - NA    PSA up to date - Yes 2008    Dexa Scan up to date - No    Flex Sig / Colonoscopy up to date - Yes less than 5 yrs ago    Immunizations up to date -today    Abuse: Current or Past(Physical, Sexual or Emotional)- No    Do you feel safe in your environment - Yes    2008                   Drug and lactation database from the [place] [employer]:  http://toxnet.nlm.nih.gov/cgi-bin/sis/htmlgen?LACT      B/P: Data Unavailable, T: Data Unavailable, P: Data Unavailable, R: Data Unavailable 0 lbs 0 oz  There were no vitals taken for this visit., There is no height or weight on file to calculate BMI.  Medications and Vitals not listed above were documented in the cart and reviewed by me.     Current Outpatient Prescriptions   Medication Sig Dispense Refill     acetaminophen (TYLENOL) 500 MG tablet Take 1 tablet  by mouth as needed. For pain       Cholecalciferol (VITAMIN D) 1000 UNITS capsule Take 2 capsules by mouth daily.       folic acid (FOLVITE) 1 MG tablet Take 2 tablets (2 mg) by mouth daily 180 tablet 3     hydroxychloroquine (PLAQUENIL) 200 MG tablet Take 1 tablet (200 mg) by mouth 2 times daily Send copy of recent eye exam as need for refills. Please have a copy of your eye exam faxed to [phone]. 180 tablet 0     hydroxychloroquine (PLAQUENIL) 200 MG tablet 2 tabs daily. 180 tablet 3     methotrexate 2.5 MG tablet CHEMO Take 8 tablets (20 mg) by mouth once a week Labs due every 8-12 weeks 96 tablet 1     metoprolol (TOPROL-XL) 50 MG 24 hr tablet Take 1 tablet (50 mg) by mouth daily 90 tablet 3     omeprazole (PRILOSEC) 40 MG capsule Take 1 capsule (40 mg) by mouth 2 times daily (before meals) 180 capsule 3     ORDER FOR DME Use your CPAP device as directed by your provider.       oxybutynin (DITROPAN) 5 MG tablet Take 1 tablet (5 mg) by mouth daily 90 tablet 1     pregabalin (LYRICA) 50 MG capsule One in the am, one in the afternoon and 2 at night. 360 capsule 1       Thong Montes MD

## 2018-05-22 NOTE — NURSING NOTE
"Chief Complaint   Patient presents with     RECHECK     movement disorder      /85  Pulse 64  Ht 1.88 m (6' 2\")  Wt 140 kg (308 lb 9.6 oz)  SpO2 95%  BMI 39.62 kg/m2  Noel Abad, ABIMAEL    "

## 2018-05-22 NOTE — MR AVS SNAPSHOT
After Visit Summary   2018    Lucio Daly    MRN: [id number]           Patient Information     Date Of Birth          1950        Visit Information        Provider Department      2018 8:40 AM Thong Montes MD [facility] Neurology        Today's Diagnoses     Tremor    -  1    Pain in toes of both feet        Rheumatoid arthritis of multiple sites with negative rheumatoid factor (H)        Rheumatoid arthritis involving shoulder with negative rheumatoid factor, unspecified laterality (H)        High risk medications (not anticoagulants) long-term use        Essential hypertension        Gastroesophageal reflux disease without esophagitis        Other urinary incontinence        Peripheral polyneuropathy          Care Instructions      PATIENT: Lucio Daly  67 year old male     : 1950    EVELIA: May 22, 2018    Last seen by me about 9 months ago.     Tremor - is about the same and is not markedly worse but varies. It is on the left side.  Not taking selegiline or other dopaminergic agent.   Held off on dopamine (datscan) scan - was concerned about iodine contrast.    Neuropathy  lyrica 3-4 times per day. - or -2 is his scheduled and medication is coming from his pcp  He had problems with trileptal and neurontin in the past.  He has had increased numbness in his feet and has more problems with perception in the bottom of his feet.     He has chronic plantar fascitis    He has increased left ankle swelling    He has had problems with the 6 minute walk due to a variety of factors.   He has muscle pain and problems moving    Wearing braces.    Cognitive testing - has not been repeated.     Neck and back issues   Had seen Neurosurgery - Carrillo    RA issues.   On plaquenil and methotrexate.  Still taking folate, vitamin d  Had eye examination.  Seeing MONROE Snow and ZACK Goodrich    Still having pain - seen pain clinic over a year ago.     Gastorenterology Dr. FRIEDMAN  [place]    Still using bipap  [facility]    intersititial lung disease seeing [facility] pulmonary medicine     Bladder = remains on the ditropan    Jah Corley is his pcp    Medications     7/730am 9am 2pm 4pm 11pm     Acetaminophen tylenol 500mg 2   2 2     Cholecalciferol vitamin D 1000 1 capsule         Folic acid folvite 1mg  1  1      Hydroxychloroquine plaquenil 200mg  1  1      Methotrexate 2.5mg   8 tabs on Mondays @ 2pm       Metoprolol toprolol XL 50mg 24 hr tablet 1         Omeprazole prilosec 40mg 1   1      Oxybutynin ditropan 5mg    1      Pregabalin lyrica 50mg 1   1 2                 History obtained from patient     IN summary, will make no changes in his management as his tremor seems stable but he has a constellation on going medical problems and will need to discuss whether there are any other pain strategies for his symptoms. He has not tried cbd oil or other products. He had been seen in pain management but they primarily were discussing narcotics and not medical marijuana, etc.     Return back to see me in one year.             Follow-ups after your visit        Follow-up notes from your care team     Return in about 1 year (around 5/22/2019).      Your next 10 appointments already scheduled     May 22, 2018  9:30 AM CDT   PFT VISIT with VAIBHAV PFL ENID   [facility] Pulmonary Function Testing ([facility])    [address]  3rd Floor  [facility] [id number] [phone]            May 22, 2018 10:00 AM CDT   ecg with  CV EKG   [facility] Xray ([facility])    [address] [place] [address]  1st Floor  [facility] [id number] [phone]            Jun 20, 2018  9:00 AM CDT   (Arrive by 8:45 AM)   Return Visit with MEGAN Ayers CNP   [facility] Rheumatology ([facility])    [address] [place] [address]  [address]  [facility] [id number] [phone]            Aug 21, 2018  1:30 PM  CDT   Six Minute Walk with UC PFL 6 MINUTE WALK 1   [facility] Pulmonary Function Testing ([facility])    [address]  3rd [facility] [address]   [phone]            Aug 21, 2018  2:00 PM CDT   FULL PULMONARY FUNCTION with UC PFL C   [facility] Pulmonary Function Testing ([facility])    [address]  3rd Floor  [facility] [address]   [phone]            Aug 21, 2018  3:00 PM CDT   (Arrive by 2:45 PM)   Return Interstitial Lung with Harsha Mccarthy MD   [facility] ([facility])    [address]  [address]  [facility] [address]   [phone]            May 21, 2019  8:40 AM CDT   (Arrive by 8:25 AM)   Return Movement Disorder with Thong Montes MD   [facility] Neurology ([facility])    [address]  3rd [facility] [id number] [phone]              Who to contact     Please call your clinic at [phone] to:    Ask questions about your health    Make or cancel appointments    Discuss your medicines    Learn about your test results    Speak to your doctor            Additional Information About Your Visit        [email or web address] Information     [email or web address] gives you secure access to your electronic health record. If you see a primary care provider, you can also send messages to your care team and make appointments. If you have questions, please call your primary care clinic.  If you do not have a primary care provider, please call [phone] and they will assist you.      [email or web address] is an electronic gateway that provides easy, online access to your medical records. With [email or web address], you can request a clinic appointment, read your test results, renew a prescription or communicate with your care team.     To access your existing account, please contact your [facility] or call [phone] for  "assistance.        Care EveryWhere ID     This is your Care EveryWhere ID. This could be used by other organizations to access your [facility] medical records  [id number]        Your Vitals Were     Pulse Height Pulse Oximetry BMI (Body Mass Index)          64 1.88 m (6' 2\") 95% 39.62 kg/m2         Blood Pressure from Last 3 Encounters:   05/22/18 152/85   05/15/18 143/82   03/14/18 133/82    Weight from Last 3 Encounters:   05/22/18 140 kg (308 lb 9.6 oz)   05/15/18 138.3 kg (305 lb)   03/14/18 (!) 139.7 kg (308 lb)              Today, you had the following     No orders found for display         Today's Medication Changes          These changes are accurate as of 5/22/18  9:04 AM.  If you have any questions, ask your nurse or doctor.               These medicines have changed or have updated prescriptions.        Dose/Directions    folic acid 1 MG tablet   Commonly known as:  FOLVITE   This may have changed:    - how much to take  - how to take this  - when to take this  - additional instructions   Used for:  Pain in toes of both feet, Rheumatoid arthritis of multiple sites with negative rheumatoid factor (H), Rheumatoid arthritis involving shoulder with negative rheumatoid factor, unspecified laterality (H), High risk medications (not anticoagulants) long-term use   Changed by:  Thong Montes MD        1mg tab by mouth twice daily @ 9am and 4pm   Quantity:  180 tablet   Refills:  3       LYRICA 50 MG capsule   This may have changed:  additional instructions   Used for:  Peripheral polyneuropathy   Generic drug:  pregabalin   Changed by:  Thong Montes MD        1 tab by mouth @ 7/730am, 1 tab @ 4pm and 1-2 tab @ 11pm   Quantity:  360 capsule   Refills:  3       metoprolol succinate 50 MG 24 hr tablet   Commonly known as:  TOPROL-XL   This may have changed:    - how much to take  - how to take this  - when to take this  - additional instructions   Used for:  Essential hypertension   Changed by:  Jyoti" Thong Stevens MD        50mg tab by mouth daily @ 7/730am   Quantity:  90 tablet   Refills:  3       omeprazole 40 MG capsule   Commonly known as:  priLOSEC   This may have changed:    - how much to take  - how to take this  - when to take this  - additional instructions   Used for:  Gastroesophageal reflux disease without esophagitis   Changed by:  Thong Montes MD        40mg capsule by mouth twice daily @ 7/730am and 4pm   Quantity:  180 capsule   Refills:  3       oxybutynin 5 MG tablet   Commonly known as:  DITROPAN   This may have changed:    - how much to take  - how to take this  - when to take this  - additional instructions   Used for:  Other urinary incontinence   Changed by:  Thong Montes MD        5mg tab by mouth daily @ 4pm   Quantity:  90 tablet   Refills:  1       PLAQUENIL 200 MG tablet   This may have changed:    - additional instructions  - Another medication with the same name was removed. Continue taking this medication, and follow the directions you see here.   Used for:  Rheumatoid arthritis involving shoulder with negative rheumatoid factor, unspecified laterality (H)   Generic drug:  hydroxychloroquine   Changed by:  Thong Montes MD        200mg tab by mouth twice daily @ 9am and 4pm   Quantity:  180 tablet   Refills:  3       TYLENOL 500 MG tablet   This may have changed:    - how much to take  - how to take this  - when to take this  - reasons to take this  - additional instructions   Generic drug:  acetaminophen   Changed by:  Thong Montes MD        2 x 500mg by mouth 3 times per day: 7/730am, 4pm and 11pm  = 6/day   Refills:  0       vitamin D3 1000 units Caps   This may have changed:    - how much to take  - how to take this  - when to take this  - additional instructions   Changed by:  Thong Montes MD        1000 unit capsule by mouth daily @ 7am   Quantity:  30 capsule   Refills:  0                Primary Care Provider Office Phone # Fax #    Jah  Adan Corley [phone]       [address]  [facility] [address]        Equal Access to Services     ALINE QUIÑONES : Hadii aad ku hadharpal Stephnesali, seymour ingrissusanha, niurka herminiojulio mcqueen, mariana jimdaryl elizabeth. So [facility] [phone].    ATENCIÓN: Si habla español, tiene a rudd disposición servicios gratuitos de asistencia lingüística. Llame al [phone].    We comply with applicable federal civil rights laws and [place] laws. We do not discriminate on the basis of race, color, national origin, age, disability, sex, sexual orientation, or gender identity.            Thank you!     Thank you for choosing [facility]UROLOG[facility]  for your care. Our goal is always to provide you with excellent care. Hearing back from our patients is one way we can continue to improve our services. Please take a few minutes to complete the written survey that you may receive in the mail after your visit with us. Thank you!             Your Updated Medication List - Protect others around you: Learn how to safely use, store and throw away your medicines at www.disposemymeds.org.          This list is accurate as of 5/22/18  9:04 AM.  Always use your most recent med list.                   Brand Name Dispense Instructions for use Diagnosis    folic acid 1 MG tablet    FOLVITE    180 tablet    1mg tab by mouth twice daily @ 9am and 4pm    Pain in toes of both feet, Rheumatoid arthritis of multiple sites with negative rheumatoid factor (H), Rheumatoid arthritis involving shoulder with negative rheumatoid factor, unspecified laterality (H), High risk medications (not anticoagulants) long-term use       LYRICA 50 MG capsule   Generic drug:  pregabalin     360 capsule    1 tab by mouth @ 7/730am, 1 tab @ 4pm and 1-2 tab @ 11pm    Peripheral polyneuropathy       methotrexate 2.5 MG tablet CHEMO     96 tablet    Take 8 tablets (20 mg) by mouth once a week Labs due every 8-12 weeks    Rheumatoid arthritis of  multiple sites with negative rheumatoid factor (H), High risk medications (not anticoagulants) long-term use       metoprolol succinate 50 MG 24 hr tablet    TOPROL-XL    90 tablet    50mg tab by mouth daily @ 7/730am    Essential hypertension       omeprazole 40 MG capsule    priLOSEC    180 capsule    40mg capsule by mouth twice daily @ 7/730am and 4pm    Gastroesophageal reflux disease without esophagitis       order for DME      Use your CPAP device as directed by your provider.        oxybutynin 5 MG tablet    DITROPAN    90 tablet    5mg tab by mouth daily @ 4pm    Other urinary incontinence       PLAQUENIL 200 MG tablet   Generic drug:  hydroxychloroquine     180 tablet    200mg tab by mouth twice daily @ 9am and 4pm    Rheumatoid arthritis involving shoulder with negative rheumatoid factor, unspecified laterality (H)       TYLENOL 500 MG tablet   Generic drug:  acetaminophen      2 x 500mg by mouth 3 times per day: 7/730am, 4pm and 11pm  = 6/day        vitamin D3 1000 units Caps     30 capsule    1000 unit capsule by mouth daily @ 7am

## 2018-05-24 LAB — INTERPRETATION ECG - MUSE: NORMAL

## 2018-05-27 LAB
MEP-PRE: 60 CMH2O
MIP-PRE: -45 CMH2O

## 2018-05-29 LAB
DLCOUNC-%PRED-PRE: 98 %
DLCOUNC-PRE: 27.5 ML/MIN/MMHG
DLCOUNC-PRED: 27.86 ML/MIN/MMHG
ERV-%PRED-PRE: 56 %
ERV-PRE: 0.43 L
ERV-PRED: 0.76 L
EXPTIME-PRE: 7.7 SEC
FEF2575-%PRED-PRE: 77 %
FEF2575-PRE: 2.11 L/SEC
FEF2575-PRED: 2.71 L/SEC
FEFMAX-%PRED-PRE: 101 %
FEFMAX-PRE: 9.46 L/SEC
FEFMAX-PRED: 9.31 L/SEC
FEV1-%PRED-PRE: 76 %
FEV1-PRE: 2.8 L
FEV1FEV6-PRE: 76 %
FEV1FEV6-PRED: 78 %
FEV1FVC-PRE: 75 %
FEV1FVC-PRED: 74 %
FEV1SVC-PRE: 80 %
FEV1SVC-PRED: 67 %
FIFMAX-PRE: 7.61 L/SEC
FRCPLETH-%PRED-PRE: 78 %
FRCPLETH-PRE: 3.11 L
FRCPLETH-PRED: 3.94 L
FVC-%PRED-PRE: 76 %
FVC-PRE: 3.72 L
FVC-PRED: 4.87 L
IC-%PRED-PRE: 65 %
IC-PRE: 3.06 L
IC-PRED: 4.71 L
RVPLETH-%PRED-PRE: 96 %
RVPLETH-PRE: 2.68 L
RVPLETH-PRED: 2.77 L
TLCPLETH-%PRED-PRE: 77 %
TLCPLETH-PRE: 6.17 L
TLCPLETH-PRED: 7.94 L
VA-%PRED-PRE: 53 %
VA-PRE: 4.08 L
VC-%PRED-PRE: 63 %
VC-PRE: 3.49 L
VC-PRED: 5.47 L

## 2018-06-01 ENCOUNTER — CARE COORDINATION (OUTPATIENT)
Dept: PULMONOLOGY | Facility: CLINIC | Age: 68
End: 2018-06-01

## 2018-06-01 NOTE — PROGRESS NOTES
Telephone Encounter: Incoming    Reason for Call: Wondering about result of recent testing EKG and PFT's.    Nursing Action/Patient Instruction: Reviewed results with Dr Mccarthy, both looked okay, MIP/MEP slightly improved from last one and EKG show sinus bradycardia and arrhythmia but unchanged from 2 years ago. Nothing too concerning but could follow up with PCP regarding EKG and any further testing or evaluation recommended. Informed patient.    Patient Response/Questions/Concerns: Will discuss with PCP at visit coming up soon.

## 2018-06-08 DIAGNOSIS — Z79.899 HIGH RISK MEDICATIONS (NOT ANTICOAGULANTS) LONG-TERM USE: ICD-10-CM

## 2018-06-08 DIAGNOSIS — M06.09 RHEUMATOID ARTHRITIS OF MULTIPLE SITES WITH NEGATIVE RHEUMATOID FACTOR (H): ICD-10-CM

## 2018-06-09 ENCOUNTER — TELEPHONE (OUTPATIENT)
Dept: PEDIATRICS | Facility: CLINIC | Age: 68
End: 2018-06-09

## 2018-06-09 NOTE — TELEPHONE ENCOUNTER
Prior Authorization Retail Medication Request    Medication/Dose: PA Required on Omeprazole 40mg  ICD code (if different than what is on RX):  K219  Previously Tried and Failed:  n/a  Rationale:  PA Renewal    Insurance Name:  [employer] Part D   Insurance ID:  [id number]       Pharmacy Information (if different than what is on RX)  Name:  [facility]  Phone:  [phone]        [facility] [place]   Phone: [phone]  Fax: [phone]

## 2018-06-11 NOTE — TELEPHONE ENCOUNTER
methotrexate 2.5 MG   Last Written Prescription Date:  12/20/17  Last Fill Quantity: 96  # refills: 1  Last Office Visit: 12/20/17  Future Office visit: 6/20/18    CBC RESULTS:   Recent Labs   Lab Test  05/15/18   1512   WBC  6.0   RBC  4.52   HGB  14.1   HCT  42.6   MCV  94   MCH  31.2   MCHC  33.1   RDW  14.6   PLT  183       Creatinine   Date Value Ref Range Status   05/15/2018 1.16 0.66 - 1.25 mg/dL Final   ]    Liver Function Studies -   Recent Labs   Lab Test  05/15/18   1512   PROTTOTAL  7.5   ALBUMIN  4.0   BILITOTAL  0.4   ALKPHOS  68   AST  25   ALT  32       Routing refill request to provider for review/approval DMARD        PENDING APPT.  6/20/17

## 2018-06-11 NOTE — TELEPHONE ENCOUNTER
Prior Authorization Approval    Authorization Effective Date: 3/13/2018  Authorization Expiration Date: 6/11/2019  Medication: PA Required on Omeprazole 40mg  Approved Dose/Quantity:    Reference #:     Insurance Company: [employer] - Phone [phone] Fax [phone]  Expected CoPay:       CoPay Card Available:      Foundation Assistance Needed:    Which Pharmacy is filling the prescription (Not needed for infusion/clinic administered): [facility] [place], [place] - [address]   Pharmacy Notified: Yes  Patient Notified: Yes

## 2018-06-11 NOTE — TELEPHONE ENCOUNTER
[facility] Call Center    Phone Message    May a detailed message be left on voicemail: yes    Reason for Call: Other: Patient is out of his Methotrexate.  Please sign the RX and send back asap, TODAY     Action Taken: Message routed to:  [facility] ([facility] GRACE

## 2018-06-11 NOTE — TELEPHONE ENCOUNTER
Central Prior Authorization Team   Phone: [phone]    PA Initiation    Medication: PA Required on Omeprazole 40mg  Insurance Company: [employer] - Phone [phone] Fax [phone]  Pharmacy Filling the Rx: [facility] - [address]   Filling Pharmacy Phone: [phone]  Filling Pharmacy Fax:    Start Date: 6/11/2018

## 2018-06-20 ENCOUNTER — OFFICE VISIT (OUTPATIENT)
Dept: RHEUMATOLOGY | Facility: CLINIC | Age: 68
End: 2018-06-20
Attending: NURSE PRACTITIONER
Payer: MEDICARE

## 2018-06-20 VITALS
OXYGEN SATURATION: 97 % | RESPIRATION RATE: 18 BRPM | WEIGHT: 309.1 LBS | TEMPERATURE: 97.6 F | HEIGHT: 74 IN | SYSTOLIC BLOOD PRESSURE: 147 MMHG | BODY MASS INDEX: 39.67 KG/M2 | DIASTOLIC BLOOD PRESSURE: 89 MMHG | HEART RATE: 51 BPM

## 2018-06-20 DIAGNOSIS — M06.09 RHEUMATOID ARTHRITIS OF MULTIPLE SITES WITH NEGATIVE RHEUMATOID FACTOR (H): Primary | ICD-10-CM

## 2018-06-20 DIAGNOSIS — D72.819 LEUKOPENIA, UNSPECIFIED TYPE: ICD-10-CM

## 2018-06-20 DIAGNOSIS — Z79.899 HIGH RISK MEDICATIONS (NOT ANTICOAGULANTS) LONG-TERM USE: ICD-10-CM

## 2018-06-20 DIAGNOSIS — M06.09 RHEUMATOID ARTHRITIS OF MULTIPLE SITES WITH NEGATIVE RHEUMATOID FACTOR (H): ICD-10-CM

## 2018-06-20 LAB
ALBUMIN SERPL-MCNC: 3.7 G/DL (ref 3.4–5)
ALT SERPL W P-5'-P-CCNC: 24 U/L (ref 0–70)
AST SERPL W P-5'-P-CCNC: 17 U/L (ref 0–45)
CREAT SERPL-MCNC: 1.12 MG/DL (ref 0.66–1.25)
CRP SERPL-MCNC: 6.1 MG/L (ref 0–8)
ERYTHROCYTE [DISTWIDTH] IN BLOOD BY AUTOMATED COUNT: 14.4 % (ref 10–15)
GFR SERPL CREATININE-BSD FRML MDRD: 65 ML/MIN/1.7M2
HCT VFR BLD AUTO: 42.7 % (ref 40–53)
HGB BLD-MCNC: 13.7 G/DL (ref 13.3–17.7)
MCH RBC QN AUTO: 30.7 PG (ref 26.5–33)
MCHC RBC AUTO-ENTMCNC: 32.1 G/DL (ref 31.5–36.5)
MCV RBC AUTO: 96 FL (ref 78–100)
PLATELET # BLD AUTO: 148 10E9/L (ref 150–450)
RBC # BLD AUTO: 4.46 10E12/L (ref 4.4–5.9)
WBC # BLD AUTO: 3.6 10E9/L (ref 4–11)

## 2018-06-20 PROCEDURE — 85027 COMPLETE CBC AUTOMATED: CPT | Performed by: NURSE PRACTITIONER

## 2018-06-20 PROCEDURE — 84460 ALANINE AMINO (ALT) (SGPT): CPT | Performed by: NURSE PRACTITIONER

## 2018-06-20 PROCEDURE — 82040 ASSAY OF SERUM ALBUMIN: CPT | Performed by: NURSE PRACTITIONER

## 2018-06-20 PROCEDURE — G0463 HOSPITAL OUTPT CLINIC VISIT: HCPCS | Mod: ZF

## 2018-06-20 PROCEDURE — 86140 C-REACTIVE PROTEIN: CPT | Performed by: NURSE PRACTITIONER

## 2018-06-20 PROCEDURE — 82565 ASSAY OF CREATININE: CPT | Performed by: NURSE PRACTITIONER

## 2018-06-20 PROCEDURE — 36415 COLL VENOUS BLD VENIPUNCTURE: CPT | Performed by: NURSE PRACTITIONER

## 2018-06-20 PROCEDURE — 84450 TRANSFERASE (AST) (SGOT): CPT | Performed by: NURSE PRACTITIONER

## 2018-06-20 ASSESSMENT — PAIN SCALES - GENERAL: PAINLEVEL: SEVERE PAIN (6)

## 2018-06-20 NOTE — PROGRESS NOTES
All labs reviewed and discussed with patient at office visit. Instructed to call for any further questions.       Sin GUZMAN CNP, MSN  6/20/2018  10:07 AM

## 2018-06-20 NOTE — LETTER
6/20/2018      RE: Lucio Daly  [address]  [place] [address]       Rheumatology Visit     Lucio Daly MRN# [id number]   YOB: 1950 Age: 67 year old            Assessment and Plan:   1. Seronegative inflammatory arthritis RA: symptoms of inflammatory arthritis are stable; exam neg no active synovitis or tendonitis. Some improvement in ankles since incresaed MTX.  Labs today showing mild leukopenia and lower PLT. No s/sx infection. Repeat labs in 2-3 week if low, then will have to reduce MTX. No SLE sx. RA activity=low. If in the future need to switch the MTX,  We could substitute arava 20 mg daily for methotrexate if PFT decline and/or suggest increasing fibrosis. Plan  # Continue methotrexate at 20 mg weekly, folic acid 1 mg day. While on drug  --High risk medication monitoring--labs q 3 mo AST/ALT, Albumin, CBC with plts, CRP   --Limit EtOH intake to 2 drinks weekly; use folate 2 mg daily  --Tylenol 325mg qid prn nausea/HA associated with dosing.    #  mg BID -annual eye exam done March 2018. I gave him the letter and form for eye provider to fill out and fax back. .   2. 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 Continue regular ophthalmologic care and dental care, and continue using topical therapies (ie. Artificial tears and lozenges).   3. 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 Mild restriction. Plan f/u Dr. Mccarthy in Aug.   4. L>R Knee pain:  OA and L anserine bursitis-future may need TKR. Continue isometric quad strengthening exercises twice daily to improve support around the joint.  5. Plantar fasciitis/heel pain with hx R foot drop/instability: stable s/p PTx  6. 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 Camargo. Plan continue splinting/hand therapy  "and acetaminophen 1000 mg tid ATC.  Spinal stenosis, informed if he wishes to see physiatry to notify us or PCP.F/U PCP and other providers. Informed of redflags, when call 911 or if any falls.          History of Present Illness:   Lucio Daly \"Rich\" presents for f/u seronegative rheumatoid arthritis, sicca complex. Last seen 12-17 when MTX was increased 20 mg per week  and continued plaquenil     Copy forward June 26, 2017  Dr. Goodrich seen him in Dec 2016   Back pain, seen PCP. MRI of his back to f/u spinal stenosis and herniated disc will be having this tomorrow then maybe seeing neurosurgeon (needed updated MRI) if this is indicated, did PT but stopped as was making this worse. Previous ortho was going to refer him to neurosurgeon but he didn't want surgery at this time. Last months, more increased numbness in feet and sciatica right thigh/calf \"uncomfortable\", more pain in mid and upper back then some in low back if stands too long, then gets this in rib cage. Had problems with \"numbness\" for some time, pain in arms. EMG testing in past peripheral polyneuropathy. Stumbling more, but had trouble with this in the past, and previous foot droop. More leg pain for \"quite a few months\".  Lay in bed or sit with feet up more in leg pain, back. Nueropathy is the bottom of his feet.   Tennis elbows, thumbs, hands pains are the same stable this comes and goes.   Continues the methotrexate 17.5 mg PO Q 7 days Tue or WED and the plaqeunil (he felt the stomach issues were due to other medications) this is tolerating well Didn't stop the plaquenil \"didnt want to rock the boat\" . Reports is RA is controlled, no flaring and is controlled with these medications so wishes to continue this plan.   Pulmonary Dr. Mccarthy had referred him to gastro for GERD in Oct. Seen him 5-2017 --ILD \"follicular bronchiolitis\" and pulmonary nodule stable (more SOB sit to stand then walking). His prilosec was increased. Checking " "neuromuscular \"thing\" when he sees him in Nov. Methotrexate was at 9 tab 22.5 mg a week tapered to 17.5 mg once a week --his RA remains controlled.   On ABX completed this Saturday for bronchitis, early pnuemonia and then infection in both his eyes. Started with laryngitis. ABX helped. No fever, chills, discolored sputum. Fatigue.    June 20, 2018  Seen Dr. Mccarthy 5-2018 pulmonary, f/u Aug. \"Stable\" \"didnt do as well on the walking\" \"more physical with legs\" will repeat testing in august. EKG similar to low pulse and \"similar to arrthymias\" will be talking to Dr. Corley about this and ASCVD.   Seen NATALEE Carrillo PAC-C low back pain and neck pain advised seeing neurosurgery didn't seen the neurosurgeon not seen does not wish to do surgery and in the past advised no surgery \"not a good candidate\" Dr. Mccarthy. Nerve pain into calves into feet, this leg pain due to his back/neck pain. Seen Dr. Villatoro in sports medicine  and then seen Dr. Campuzano for hands. PT for back. Dr Montes recommended pain management, but he didn't want narcotics.  Prior 2014 had injections of low back \"neuromas\" in the spine, injection not lasted and reaction \"neurologic bladder\" and same as when cortisone injections. Did PT in the past. Dr. Montes recommended possible CBD oils so will take to PCP. Nerve, tendon pain and joint pain. \"if this for a while right thigh severe pain and cant stand on leg\" \"left drop foot\" told was d/t spine. On lyrica   Taking methotrexate 20 mg PO Q 7 days WED (mild stomach 1-2 day, not every time or every dose), folic acid 1 mg day and the plaqeunil 200 mg BID (6 days week, then WED once day). Tolerating. Dr. Goodrich increased this d/t justus swelling, this is improved. Some swelling on and off with walking. Stiff in back, in the mornings hands stiff \"fingers will curl\" this is unchanged[place] [employer] [place] with this hand. Reports x-rays were not fracture     Seen gastro who " "increased omprezole 40 mg BID, this has helped. Pain affected by change in seasons. Higher MTX dose is helping since increased in . Feet, ankles, toes and fingers pain lingers. \"stepped out of pain left heel and left ankle\" went to [facility] strain achilled sprain left ankle, this was about 3 weeks ago. Has AFO brace, and feet/ankle brace from Dr. Martinez now wearing, got a gel heel insert insert this is helping still wearing. Has GERD and esophagus so hard to tell so will see PCP for complete CVD workup. Nuc 6-2016 NL. Sees dermatology, no skin issues. Has golf cart to get around. No tick bites      Goes to [facility], no notes in EHR. Last visit in March 2018 did given him the letter. Other [facility] reviewed and updated.      PMH   1. Sensory neuropathy of unclear etiology - negative work up, managed on Lyrica.  Has chronic dysesthesia in pads of feet.  2. Parkinsonisim/Tremor disorder; sees neurology   3. Headaches   4. History of basal cell, squamous (most recent +bx 8-2014)  5. History of melanoma   6. GALO on CPAP.  7. HTN.   8. Seronegative RA, diagnosed 2009. SICCA  9. JUARES. Work-up 4-2015. 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 Lumbar stenosis per MRI 2017   11. Bronchitis 5-2017, early pneumonia   12. Chicken pox   Past Medical History:   Diagnosis Date     Abnormal EMG 4/18/2013     Abnormal involuntary movements(781.0)     Movement Disorder     AK (actinic keratosis) 12/18/2011     Allergic rhinitis, cause unspecified     Allergic rhinitis     Balance problems 11/1/2011     Basal cell carcinoma      Bladder spasms 11/1/2011     Chronic osteoarthritis      Diaphragmatic hernia without mention of obstruction or gangrene      Earache or other ear, nose, or throat complaint      Esophageal reflux      Fatigue 11/1/2011     Fracture      H. pylori infection 5/12/2011     History of MRI of cervical spine 11/18/2013    EXAMINATION: CERVICAL SPINE G/E 5 VIEWS* 4/19/2013 4:18 PM  CLINICAL " "HISTORY: Pain in limb,Performing Location?->[facility] ([facility]),  COMPARISON:  FINDINGS: AP and lateral views in flexion and extension, as well as odontoid view of the cervical spine was obtained. There is no comparison available. The vertebral bodies of the cervical spine are normally aligned. There is posterior spurring and d     Incomplete defecation 11/1/2011     Interstitial lung disease (H) 11/29/2016     Laboratory test 8/7/2012     Lung disease June 2015     Malignant basal cell neoplasm of skin 8/6/2008     Melanoma (H) 8/6/2008     Melanoma in situ of lower leg (H)     R calf     Neuropathy 5/16/2011     Other bladder disorder      Other color vision deficiencies      Other nervous system complications      Parkinsonism (H) 11/1/2011     Personal history of colonic polyps      Polyneuropathy in other diseases classified elsewhere (H)      RA (rheumatoid arthritis) (H)      Rheumatoid arthritis of multiple sites with negative rheumatoid factor (H) 3/21/2016     Seronegative arthritis 11/18/2013     Shortness of breath      Shoulder arthritis 2016    acromioclavicular joint      Somatization disorder 5/12/2011     Squamous cell carcinoma      Tremor 11/1/2011     Unspecified essential hypertension      Unspecified hypothyroidism      Urinary tract infection      Urinary urgency 11/1/2011     Wears glasses 11/1/2011     Injuries-ankle sprains, left heel fracture    Family Hx   MGM Psoriasis  Sister -PPM  Family History   Problem Relation Age of Onset     Hypertension Mother      HEART DISEASE Mother      a fib     Arthritis Mother      \"osteo\"     Osteoperosis Mother      Skin Cancer Mother      Uterine Cancer Mother      Hypertension Brother      Diabetes Brother      \" post pancreatitis\"     Neurologic Disorder Sister      multiple sclerosis     Hypertension Sister      HEART DISEASE Sister      HEART DISEASE Sister      a fib     Arthritis Sister      Hypertension Father      Cerebrovascular Disease " Father      ,     Skin Cancer Father      Colon Polyps Father      Psoriasis Maternal Grandfather      Stomach Cancer Maternal Grandfather      Congenital Anomalies Other      granddaughter with a chromosome defect     Cerebrovascular Disease Paternal Grandmother      ,     Cerebrovascular Disease Paternal Grandfather      ,     Melanoma No family hx of      Colon Cancer No family hx of      Personal Hx   Home environment: No secondhand tobacco smoke in home.    History     Social History     Marital Status:      Spouse Name: N/A     Number of Children: N/A     Years of Education: N/A     Social History Main Topics     Smoking status: Former Smoker     Quit date: 09/30/2003     Smokeless tobacco: Never Used     Alcohol Use: No     Drug Use: No     Sexually Active: Not Currently -- Female partner(s)     Other Topics Concern     None     Social History Narrative    2013: Living in [place] in a townhouse with no stepsHas 3 sons that are doing okay.             Review of Systems:     14 points all negative except what is mentioned in HPI.  Review Of Systems  Skin: negative  Eyes: negative  Ears/Nose/Throat: negative  Respiratory: No shortness of breath, dyspnea on exertion, cough, or hemoptysis  Cardiovascular: negative  Gastrointestinal: negative  Genitourinary: negative  Musculoskeletal: as above  Neurologic: as above  Psychiatric: negative  Hematologic/Lymphatic/Immunologic: negative  Endocrine: negative             Past Surgical History:   Past Surgical History:   Procedure Laterality Date     BIOPSY OF SKIN LESION       COLONOSCOPY       HEMORRHOID SURGERY       lip biopsy      for sicca complex     MOHS MICROGRAPHIC PROCEDURE       SOFT TISSUE SURGERY      removeal of basel cell carcinoma              Allergies:   Allergies   Allergen Reactions     Restasis      Burning eyes, problems with breathing, tightness in chest     Adhesive Tape      Bandages misc     Allegra      EXCESSIVE URINATION AND  WEAKNESS, LIGHT-HEADED     Allergy      Dust     Animal Dander      Benadryl Allergy      EXCESSIVE URINATION AND WEAKNESS, LIGHT-HEADED     Cephalexin      Joint pain or gerd aggravation. Bloating excessive urination      Cephalosporins      Diphenhydramine Other (See Comments)     Doxycycline Hyclate Nausea     SWEATING,MIGRAINES,LOSS OF APPETITE,SWEATING,LIGHT HEADED, EXCESSIVE URINATION     Fexofenadine Other (See Comments)     Flonase [Fluticasone Propionate]      Gabapentin      Neurontin: mosd changes and excess urination     Iodine Solution [Povidone Iodine]      SKIN MELTS     Levaquin      From surgeon--? Joint pain ? Gerd aggravation. Insomnia, excess urination     Mylanta      EXCESSIVE URINATION AND WEAKNESS, LIGHT-HEADED     Prednisone      Weakness, elevated bp, headache, eye pain, congestion      Seafood [Seafood]      Shellfish Allergy      Hives       Sulfamethoxazole-Trimethoprim      Chest pain, angina     Trees      Trileptal      SEVERE JOINT AND TENDON PAIN, INSOMNIA, RESTLESSNESS, NAUSEA, EXCESS URINATION     Cortizone Rash     EXCESS URINATION,WEAKNESS,NAUSEA, HEADACHE            Medications:   Current Outpatient Prescriptions   Medication Sig Dispense Refill     acetaminophen (TYLENOL) 500 MG tablet 2 x 500mg by mouth 3 times per day: 7/730am, 4pm and 11pm  = 6/day       Cholecalciferol (VITAMIN D3) 1000 units CAPS 1000 unit capsule by mouth daily @ 7am 30 capsule      folic acid (FOLVITE) 1 MG tablet 1mg tab by mouth twice daily @ 9am and 4pm 180 tablet 3     hydroxychloroquine (PLAQUENIL) 200 MG tablet 200mg tab by mouth twice daily @ 9am and 4pm 180 tablet 3     methotrexate 2.5 MG tablet CHEMO TAKE 8 TABLETS BY MOUTH ONCE WEEKLY. LABS DUE EVERY 8 - 12 WEEKS. 96 tablet 1     metoprolol succinate (TOPROL-XL) 50 MG 24 hr tablet 50mg tab by mouth daily @ 7/730am 90 tablet 3     omeprazole (PRILOSEC) 40 MG capsule 40mg capsule by mouth twice daily @ 7/730am and 4pm 180 capsule 3     ORDER  "FOR DME Use your CPAP device as directed by your provider.       oxybutynin (DITROPAN) 5 MG tablet 5mg tab by mouth daily @ 4pm 90 tablet 1     pregabalin (LYRICA) 50 MG capsule 1 tab by mouth @ 7/730am, 1 tab @ 4pm and 1-2 tab @ 11pm 360 capsule 3              Physical Exam:   Blood pressure 147/89, pulse 51, temperature 97.6  F (36.4  C), temperature source Oral, resp. rate 18, height 1.88 m (6' 2.02\"), weight 140.2 kg (309 lb 1.6 oz), SpO2 97 %.  Wt Readings from Last 4 Encounters:   06/20/18 140.2 kg (309 lb 1.6 oz)   05/22/18 140 kg (308 lb 9.6 oz)   05/15/18 138.3 kg (305 lb)   03/14/18 (!) 139.7 kg (308 lb)       Constitutional: obese, appearing stated age; cooperative  Eyes: nl EOM, PERRLA, vision, conjunctiva, sclera  ENT: nl external ears, nose, hearing, lips, teeth, gums, throat  No mucous membrane lesions, normal saliva pool  Neck: no mass or thyroid enlargement  Resp: lungs clear to auscultation, nl to palpation  CV: RRR, no murmurs, rubs or gallops, no edema  GI: no ABD mass or tenderness, no HSM  : not tested  Lymph: no cervical, supraclavicular, inguinal or epitrochlear nodes  MS:  All TMJ, neck, shoulder, elbow, wrist, MCP/PIP/DIP, spine, hip, knee, ankle, and foot MTP/IP joints were examined.  Knees without effusion; Mild Kyphosis. No S/T across the ankle joints. -MTP/MCP squeeze.  Skin: no nail pitting, alopecia, rash, nodules or lesions.  Neuro: No tremor today. Walks with cane  Psych: nl judgement, orientation, memory, affect.         Data:       Results for orders placed or performed in visit on 06/20/18   AST   Result Value Ref Range    AST 17 0 - 45 U/L   ALT   Result Value Ref Range    ALT 24 0 - 70 U/L   Albumin level   Result Value Ref Range    Albumin 3.7 3.4 - 5.0 g/dL   Creatinine   Result Value Ref Range    Creatinine 1.12 0.66 - 1.25 mg/dL    GFR Estimate 65 >60 mL/min/1.7m2    GFR Estimate If Black 78 >60 mL/min/1.7m2   CRP inflammation   Result Value Ref Range    CRP Inflammation " 6.1 0.0 - 8.0 mg/L   CBC with platelets   Result Value Ref Range    WBC 3.6 (L) 4.0 - 11.0 10e9/L    RBC Count 4.46 4.4 - 5.9 10e12/L    Hemoglobin 13.7 13.3 - 17.7 g/dL    Hematocrit 42.7 40.0 - 53.0 %    MCV 96 78 - 100 fl    MCH 30.7 26.5 - 33.0 pg    MCHC 32.1 31.5 - 36.5 g/dL    RDW 14.4 10.0 - 15.0 %    Platelet Count 148 (L) 150 - 450 10e9/L     *Note: Due to a large number of results and/or encounters for the requested time period, some results have not been displayed. A complete set of results can be found in Results Review.       Thank-you for allowing me to participate in your care.     Sin GUZMAN, CNP, MSN  [facility]  Department of Rheumatology & Autoimmune Disorders  [facility] Rheumatology: [phone] Opt 2, then Opt 1 Scheduling   [facility]: [phone]; [phone] Option 7 scheduling

## 2018-06-20 NOTE — NURSING NOTE
"Chief Complaint   Patient presents with     RECHECK     RA       /89 (BP Location: Right arm, Patient Position: Sitting, Cuff Size: Adult Large)  Pulse 51  Temp 97.6  F (36.4  C) (Oral)  Resp 18  Ht 1.88 m (6' 2.02\")  Wt 140.2 kg (309 lb 1.6 oz)  SpO2 97%  BMI 39.67 kg/m2    Stacey Duron [facility]  6/20/2018 9:07 AM      "

## 2018-06-20 NOTE — PROGRESS NOTES
Rheumatology Visit     Lucio Daly MRN# [id number]   YOB: 1950 Age: 67 year old            Assessment and Plan:   1. Seronegative inflammatory arthritis RA: symptoms of inflammatory arthritis are stable; exam neg no active synovitis or tendonitis. Some improvement in ankles since incresaed MTX.  Labs today showing mild leukopenia and lower PLT. No s/sx infection. Repeat labs in 2-3 week if low, then will have to reduce MTX. No SLE sx. RA activity=low. If in the future need to switch the MTX,  We could substitute arava 20 mg daily for methotrexate if PFT decline and/or suggest increasing fibrosis. Plan  # Continue methotrexate at 20 mg weekly, folic acid 1 mg day. While on drug  --High risk medication monitoring--labs q 3 mo AST/ALT, Albumin, CBC with plts, CRP   --Limit EtOH intake to 2 drinks weekly; use folate 2 mg daily  --Tylenol 325mg qid prn nausea/HA associated with dosing.    #  mg BID -annual eye exam done March 2018. I gave him the letter and form for eye provider to fill out and fax back. .   2. 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 Continue regular ophthalmologic care and dental care, and continue using topical therapies (ie. Artificial tears and lozenges).   3. 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 Mild restriction. Plan f/u Dr. Mccarthy in Aug.   4. L>R Knee pain:  OA and L anserine bursitis-future may need TKR. Continue isometric quad strengthening exercises twice daily to improve support around the joint.  5. Plantar fasciitis/heel pain with hx R foot drop/instability: stable s/p PTx  6. 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 Camargo. Plan continue splinting/hand therapy and acetaminophen 1000 mg tid ATC.  Spinal stenosis, informed if he wishes  "to see physiatry to notify us or PCP.F/U PCP and other providers. Informed of redflags, when call 911 or if any falls.          History of Present Illness:   Lucio Daly \"Rich\" presents for f/u seronegative rheumatoid arthritis, sicca complex. Last seen 12-17 when MTX was increased 20 mg per week  and continued plaquenil     Copy forward June 26, 2017  Dr. Goodrich seen him in Dec 2016   Back pain, seen PCP. MRI of his back to f/u spinal stenosis and herniated disc will be having this tomorrow then maybe seeing neurosurgeon (needed updated MRI) if this is indicated, did PT but stopped as was making this worse. Previous ortho was going to refer him to neurosurgeon but he didn't want surgery at this time. Last months, more increased numbness in feet and sciatica right thigh/calf \"uncomfortable\", more pain in mid and upper back then some in low back if stands too long, then gets this in rib cage. Had problems with \"numbness\" for some time, pain in arms. EMG testing in past peripheral polyneuropathy. Stumbling more, but had trouble with this in the past, and previous foot droop. More leg pain for \"quite a few months\".  Lay in bed or sit with feet up more in leg pain, back. Nueropathy is the bottom of his feet.   Tennis elbows, thumbs, hands pains are the same stable this comes and goes.   Continues the methotrexate 17.5 mg PO Q 7 days Tue or WED and the plaqeunil (he felt the stomach issues were due to other medications) this is tolerating well Didn't stop the plaquenil \"didnt want to rock the boat\" . Reports is RA is controlled, no flaring and is controlled with these medications so wishes to continue this plan.   Pulmonary Dr. Mccarthy had referred him to gastro for GERD in Oct. Seen him 5-2017 --ILD \"follicular bronchiolitis\" and pulmonary nodule stable (more SOB sit to stand then walking). His prilosec was increased. Checking neuromuscular \"thing\" when he sees him in Nov. Methotrexate was at 9 tab 22.5 mg a " "week tapered to 17.5 mg once a week --his RA remains controlled.   On ABX completed this Saturday for bronchitis, early pnuemonia and then infection in both his eyes. Started with laryngitis. ABX helped. No fever, chills, discolored sputum. Fatigue.    June 20, 2018  Seen Dr. Mccarthy 5-2018 pulmonary, f/u Aug. \"Stable\" \"didnt do as well on the walking\" \"more physical with legs\" will repeat testing in august. EKG similar to low pulse and \"similar to arrthymias\" will be talking to Dr. Corley about this and ASCVD.   Seen NATALEE Carrillo PAC-C low back pain and neck pain advised seeing neurosurgery didn't seen the neurosurgeon not seen does not wish to do surgery and in the past advised no surgery \"not a good candidate\" Dr. Mccarthy. Nerve pain into calves into feet, this leg pain due to his back/neck pain. Seen Dr. Villatoro in sports medicine  and then seen Dr. Campuzano for hands. PT for back. Dr Montes recommended pain management, but he didn't want narcotics.  Prior 2014 had injections of low back \"neuromas\" in the spine, injection not lasted and reaction \"neurologic bladder\" and same as when cortisone injections. Did PT in the past. Dr. Montes recommended possible CBD oils so will take to PCP. Nerve, tendon pain and joint pain. \"if this for a while right thigh severe pain and cant stand on leg\" \"left drop foot\" told was d/t spine. On lyrica   Taking methotrexate 20 mg PO Q 7 days WED (mild stomach 1-2 day, not every time or every dose), folic acid 1 mg day and the plaqeunil 200 mg BID (6 days week, then WED once day). Tolerating. Dr. Goodrich increased this d/t justus swelling, this is improved. Some swelling on and off with walking. Stiff in back, in the mornings hands stiff \"fingers will curl\" this is unchanged[place] [employer] boat with this hand. Reports x-rays were not fracture     Seen gastro who increased omprezole 40 mg BID, this has helped. Pain affected by change in seasons. Higher " "MTX dose is helping since increased in . Feet, ankles, toes and fingers pain lingers. \"stepped out of pain left heel and left ankle\" went to [facility] strain achilled sprain left ankle, this was about 3 weeks ago. Has AFO brace, and feet/ankle brace from Dr. Martinez now wearing, got a gel heel insert insert this is helping still wearing. Has GERD and esophagus so hard to tell so will see PCP for complete CVD workup. Nuc 6-2016 NL. Sees dermatology, no skin issues. Has golf cart to get around. No tick bites      Goes to [facility], no notes in EHR. Last visit in March 2018 did given him the letter. Other [facility] reviewed and updated.      PMH   1. Sensory neuropathy of unclear etiology - negative work up, managed on Lyrica.  Has chronic dysesthesia in pads of feet.  2. Parkinsonisim/Tremor disorder; sees neurology   3. Headaches   4. History of basal cell, squamous (most recent +bx 8-2014)  5. History of melanoma   6. GALO on CPAP.  7. HTN.   8. Seronegative RA, diagnosed 2009. SICCA  9. JUARES. Work-up 4-2015. 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 Lumbar stenosis per MRI 2017   11. Bronchitis 5-2017, early pneumonia   12. Chicken pox   Past Medical History:   Diagnosis Date     Abnormal EMG 4/18/2013     Abnormal involuntary movements(781.0)     Movement Disorder     AK (actinic keratosis) 12/18/2011     Allergic rhinitis, cause unspecified     Allergic rhinitis     Balance problems 11/1/2011     Basal cell carcinoma      Bladder spasms 11/1/2011     Chronic osteoarthritis      Diaphragmatic hernia without mention of obstruction or gangrene      Earache or other ear, nose, or throat complaint      Esophageal reflux      Fatigue 11/1/2011     Fracture      H. pylori infection 5/12/2011     History of MRI of cervical spine 11/18/2013    EXAMINATION: CERVICAL SPINE G/E 5 VIEWS* 4/19/2013 4:18 PM  CLINICAL HISTORY: Pain in limb,Performing Location?->[facility]),  COMPARISON:  FINDINGS: AP and " "lateral views in flexion and extension, as well as odontoid view of the cervical spine was obtained. There is no comparison available. The vertebral bodies of the cervical spine are normally aligned. There is posterior spurring and d     Incomplete defecation 11/1/2011     Interstitial lung disease (H) 11/29/2016     Laboratory test 8/7/2012     Lung disease June 2015     Malignant basal cell neoplasm of skin 8/6/2008     Melanoma (H) 8/6/2008     Melanoma in situ of lower leg (H)     R calf     Neuropathy 5/16/2011     Other bladder disorder      Other color vision deficiencies      Other nervous system complications      Parkinsonism (H) 11/1/2011     Personal history of colonic polyps      Polyneuropathy in other diseases classified elsewhere (H)      RA (rheumatoid arthritis) (H)      Rheumatoid arthritis of multiple sites with negative rheumatoid factor (H) 3/21/2016     Seronegative arthritis 11/18/2013     Shortness of breath      Shoulder arthritis 2016    acromioclavicular joint      Somatization disorder 5/12/2011     Squamous cell carcinoma      Tremor 11/1/2011     Unspecified essential hypertension      Unspecified hypothyroidism      Urinary tract infection      Urinary urgency 11/1/2011     Wears glasses 11/1/2011     Injuries-ankle sprains, left heel fracture    Family Hx   MGM Psoriasis  Sister -PPM  Family History   Problem Relation Age of Onset     Hypertension Mother      HEART DISEASE Mother      a fib     Arthritis Mother      \"osteo\"     Osteoperosis Mother      Skin Cancer Mother      Uterine Cancer Mother      Hypertension Brother      Diabetes Brother      \" post pancreatitis\"     Neurologic Disorder Sister      multiple sclerosis     Hypertension Sister      HEART DISEASE Sister      HEART DISEASE Sister      a fib     Arthritis Sister      Hypertension Father      Cerebrovascular Disease Father      ,     Skin Cancer Father      Colon Polyps Father      Psoriasis Maternal Grandfather      " Stomach Cancer Maternal Grandfather      Congenital Anomalies Other      granddaughter with a chromosome defect     Cerebrovascular Disease Paternal Grandmother      ,     Cerebrovascular Disease Paternal Grandfather      ,     Melanoma No family hx of      Colon Cancer No family hx of      Personal Hx   Home environment: No secondhand tobacco smoke in home.    History     Social History     Marital Status:      Spouse Name: N/A     Number of Children: N/A     Years of Education: N/A     Social History Main Topics     Smoking status: Former Smoker     Quit date: 09/30/2003     Smokeless tobacco: Never Used     Alcohol Use: No     Drug Use: No     Sexually Active: Not Currently -- Female partner(s)     Other Topics Concern     None     Social History Narrative    2013: Living in [place] in a townhouse with no stepsHas 3 sons that are doing okay.             Review of Systems:     14 points all negative except what is mentioned in HPI.  Review Of Systems  Skin: negative  Eyes: negative  Ears/Nose/Throat: negative  Respiratory: No shortness of breath, dyspnea on exertion, cough, or hemoptysis  Cardiovascular: negative  Gastrointestinal: negative  Genitourinary: negative  Musculoskeletal: as above  Neurologic: as above  Psychiatric: negative  Hematologic/Lymphatic/Immunologic: negative  Endocrine: negative             Past Surgical History:   Past Surgical History:   Procedure Laterality Date     BIOPSY OF SKIN LESION       COLONOSCOPY       HEMORRHOID SURGERY       lip biopsy      for sicca complex     MOHS MICROGRAPHIC PROCEDURE       SOFT TISSUE SURGERY      removeal of basel cell carcinoma              Allergies:   Allergies   Allergen Reactions     Restasis      Burning eyes, problems with breathing, tightness in chest     Adhesive Tape      Bandages misc     Allegra      EXCESSIVE URINATION AND WEAKNESS, LIGHT-HEADED     Allergy      Dust     Animal Dander      Benadryl Allergy      EXCESSIVE URINATION  AND WEAKNESS, LIGHT-HEADED     Cephalexin      Joint pain or gerd aggravation. Bloating excessive urination      Cephalosporins      Diphenhydramine Other (See Comments)     Doxycycline Hyclate Nausea     SWEATING,MIGRAINES,LOSS OF APPETITE,SWEATING,LIGHT HEADED, EXCESSIVE URINATION     Fexofenadine Other (See Comments)     Flonase [Fluticasone Propionate]      Gabapentin      Neurontin: mosd changes and excess urination     Iodine Solution [Povidone Iodine]      SKIN MELTS     Levaquin      From surgeon--? Joint pain ? Gerd aggravation. Insomnia, excess urination     Mylanta      EXCESSIVE URINATION AND WEAKNESS, LIGHT-HEADED     Prednisone      Weakness, elevated bp, headache, eye pain, congestion      Seafood [Seafood]      Shellfish Allergy      Hives       Sulfamethoxazole-Trimethoprim      Chest pain, angina     Trees      Trileptal      SEVERE JOINT AND TENDON PAIN, INSOMNIA, RESTLESSNESS, NAUSEA, EXCESS URINATION     Cortizone Rash     EXCESS URINATION,WEAKNESS,NAUSEA, HEADACHE            Medications:   Current Outpatient Prescriptions   Medication Sig Dispense Refill     acetaminophen (TYLENOL) 500 MG tablet 2 x 500mg by mouth 3 times per day: 7/730am, 4pm and 11pm  = 6/day       Cholecalciferol (VITAMIN D3) 1000 units CAPS 1000 unit capsule by mouth daily @ 7am 30 capsule      folic acid (FOLVITE) 1 MG tablet 1mg tab by mouth twice daily @ 9am and 4pm 180 tablet 3     hydroxychloroquine (PLAQUENIL) 200 MG tablet 200mg tab by mouth twice daily @ 9am and 4pm 180 tablet 3     methotrexate 2.5 MG tablet CHEMO TAKE 8 TABLETS BY MOUTH ONCE WEEKLY. LABS DUE EVERY 8 - 12 WEEKS. 96 tablet 1     metoprolol succinate (TOPROL-XL) 50 MG 24 hr tablet 50mg tab by mouth daily @ 7/730am 90 tablet 3     omeprazole (PRILOSEC) 40 MG capsule 40mg capsule by mouth twice daily @ 7/730am and 4pm 180 capsule 3     ORDER FOR DME Use your CPAP device as directed by your provider.       oxybutynin (DITROPAN) 5 MG tablet 5mg tab by  "mouth daily @ 4pm 90 tablet 1     pregabalin (LYRICA) 50 MG capsule 1 tab by mouth @ 7/730am, 1 tab @ 4pm and 1-2 tab @ 11pm 360 capsule 3              Physical Exam:   Blood pressure 147/89, pulse 51, temperature 97.6  F (36.4  C), temperature source Oral, resp. rate 18, height 1.88 m (6' 2.02\"), weight 140.2 kg (309 lb 1.6 oz), SpO2 97 %.  Wt Readings from Last 4 Encounters:   06/20/18 140.2 kg (309 lb 1.6 oz)   05/22/18 140 kg (308 lb 9.6 oz)   05/15/18 138.3 kg (305 lb)   03/14/18 (!) 139.7 kg (308 lb)       Constitutional: obese, appearing stated age; cooperative  Eyes: nl EOM, PERRLA, vision, conjunctiva, sclera  ENT: nl external ears, nose, hearing, lips, teeth, gums, throat  No mucous membrane lesions, normal saliva pool  Neck: no mass or thyroid enlargement  Resp: lungs clear to auscultation, nl to palpation  CV: RRR, no murmurs, rubs or gallops, no edema  GI: no ABD mass or tenderness, no HSM  : not tested  Lymph: no cervical, supraclavicular, inguinal or epitrochlear nodes  MS:  All TMJ, neck, shoulder, elbow, wrist, MCP/PIP/DIP, spine, hip, knee, ankle, and foot MTP/IP joints were examined.  Knees without effusion; Mild Kyphosis. No S/T across the ankle joints. -MTP/MCP squeeze.  Skin: no nail pitting, alopecia, rash, nodules or lesions.  Neuro: No tremor today. Walks with cane  Psych: nl judgement, orientation, memory, affect.         Data:       Results for orders placed or performed in visit on 06/20/18   AST   Result Value Ref Range    AST 17 0 - 45 U/L   ALT   Result Value Ref Range    ALT 24 0 - 70 U/L   Albumin level   Result Value Ref Range    Albumin 3.7 3.4 - 5.0 g/dL   Creatinine   Result Value Ref Range    Creatinine 1.12 0.66 - 1.25 mg/dL    GFR Estimate 65 >60 mL/min/1.7m2    GFR Estimate If Black 78 >60 mL/min/1.7m2   CRP inflammation   Result Value Ref Range    CRP Inflammation 6.1 0.0 - 8.0 mg/L   CBC with platelets   Result Value Ref Range    WBC 3.6 (L) 4.0 - 11.0 10e9/L    RBC Count " 4.46 4.4 - 5.9 10e12/L    Hemoglobin 13.7 13.3 - 17.7 g/dL    Hematocrit 42.7 40.0 - 53.0 %    MCV 96 78 - 100 fl    MCH 30.7 26.5 - 33.0 pg    MCHC 32.1 31.5 - 36.5 g/dL    RDW 14.4 10.0 - 15.0 %    Platelet Count 148 (L) 150 - 450 10e9/L     *Note: Due to a large number of results and/or encounters for the requested time period, some results have not been displayed. A complete set of results can be found in Results Review.       Thank-you for allowing me to participate in your care.     Sin GUZMAN, CNP, MSN  [facility]  Department of Rheumatology & Autoimmune Disorders  [facility] Rheumatology: [phone] Opt 2, then Opt 1 Scheduling   [facility]: [phone]; [phone] Option 7 scheduling

## 2018-06-20 NOTE — MR AVS SNAPSHOT
After Visit Summary   6/20/2018    Lucio Daly    MRN: [id number]           Patient Information     Date Of Birth          1950        Visit Information        Provider Department      6/20/2018 9:00 AM Sin Snow APRN CNP [facility]        Today's Diagnoses     Rheumatoid arthritis of multiple sites with negative rheumatoid factor (H)    -  1    High risk medications (not anticoagulants) long-term use        Leukopenia, unspecified type          Care Instructions    PHYSIATRY OR PHYSICAL MEDICINE (NON SURGERICAL OPTIONS FOR SPINE)          Follow-ups after your visit        Follow-up notes from your care team     Return in about 6 months (around 12/20/2018) for Labs every 8-12 weeks; labs in 2-3 week , Labs in 1 month-Schedule.      Your next 10 appointments already scheduled     Aug 21, 2018  1:30 PM CDT   Six Minute Walk with UC PFL 6 MINUTE WALK 1   [facility] ([facility])    [address]  [address] [facility] [address]   [phone]            Aug 21, 2018  2:00 PM CDT   FULL PULMONARY FUNCTION with UC PFL C   [facility] ([facility])    [address] [address]   [phone]            Aug 21, 2018  3:00 PM CDT   (Arrive by 2:45 PM)   Return Interstitial Lung with Harsha Mccarthy MD   [facility] ([facility])    [address]  [address]  [facility] [address]   [phone]            Dec 19, 2018  9:30 AM CST   (Arrive by 9:15 AM)   Return Visit with Jeremy Goodrich MD   [facility] ([facility])    [address]  [address]  [facility] [address]   [phone]            May 21, 2019  8:40 AM CDT   (Arrive by 8:25 AM)   Return Movement Disorder with Thong Montes MD   [facility] ([facility])    [address] [address]  [address] [facility] [address] [phone]              Future tests that were ordered for you today     Open Standing Orders        Priority Remaining Interval Expires Ordered    AST Routine 5/5 every 8-12 weeks 6/20/2019 6/20/2018    ALT Routine 5/5 every 8-12 weeks 6/20/2019 6/20/2018    Albumin level Routine 5/5 every 8-12 weeks 6/20/2019 6/20/2018    Creatinine Routine 5/5 every 8-12 weeks 6/20/2019 6/20/2018    CBC with platelets differential Routine 5/5 every 8-12 weeks 6/20/2019 6/20/2018    CRP inflammation Routine 5/5 every 8-12 weeks 6/20/2019 6/20/2018          Open Future Orders        Priority Expected Expires Ordered    CBC with platelets Routine  6/20/2019 6/20/2018            Who to contact     If you have questions or need follow up information about today's clinic visit or your schedule please contact [facility] directly at [phone].  Normal or non-critical lab and imaging results will be communicated to you by [employer], letter or phone within 4 business days after the clinic has received the results. If you do not hear from us within 7 days, please contact the clinic through [employer] or phone. If you have a critical or abnormal lab result, we will notify you by phone as soon as possible.  Submit refill requests through [employer] or call your pharmacy and they will forward the refill request to us. Please allow 3 business days for your refill to be completed.          Additional Information About Your Visit        [employer] Information     [employer] gives you secure access to your electronic health record. If you see a primary care provider, you can also send messages to your care team and make appointments. If you have questions, please call your primary care clinic.  If you do not have a primary care provider, please call [phone] and they will assist you.        Care EveryWhere ID     This is your Care EveryWhere ID. This  "could be used by other organizations to access your [place] medical records  [id number]        Your Vitals Were     Pulse Temperature Respirations Height Pulse Oximetry BMI (Body Mass Index)    51 97.6  F (36.4  C) (Oral) 18 1.88 m (6' 2.02\") 97% 39.67 kg/m2       Blood Pressure from Last 3 Encounters:   06/20/18 147/89   05/22/18 152/85   05/15/18 143/82    Weight from Last 3 Encounters:   06/20/18 140.2 kg (309 lb 1.6 oz)   05/22/18 140 kg (308 lb 9.6 oz)   05/15/18 138.3 kg (305 lb)               Primary Care Provider Office Phone # Fax #    Jah Corley [phone] [address]  [facility] [address]        Equal Access to Services     [facility] : Hadii helen kingston Sodionicio, waaxda luqadaha, qaybta kaalmada adebettyyaleonel, mariana salcedo . So [facility] [phone].    ATENCIÓN: Si habla español, tiene a rudd disposición servicios gratuitos de asistencia lingüística. Llame al [phone].    We comply with applicable federal civil rights laws and [place] laws. We do not discriminate on the basis of race, color, national origin, age, disability, sex, sexual orientation, or gender identity.            Thank you!     Thank you for choosing [facility]  for your care. Our goal is always to provide you with excellent care. Hearing back from our patients is one way we can continue to improve our services. Please take a few minutes to complete the written survey that you may receive in the mail after your visit with us. Thank you!             Your Updated Medication List - Protect others around you: Learn how to safely use, store and throw away your medicines at www.disposemymeds.org.          This list is accurate as of 6/20/18 10:20 AM.  Always use your most recent med list.                   Brand Name Dispense Instructions for use Diagnosis    folic acid 1 MG tablet    FOLVITE    180 tablet    1mg tab by mouth twice daily @ 9am and 4pm    Pain in toes of " both feet, Rheumatoid arthritis of multiple sites with negative rheumatoid factor (H), Rheumatoid arthritis involving shoulder with negative rheumatoid factor, unspecified laterality (H), High risk medications (not anticoagulants) long-term use       LYRICA 50 MG capsule   Generic drug:  pregabalin     360 capsule    1 tab by mouth @ 7/730am, 1 tab @ 4pm and 1-2 tab @ 11pm    Peripheral polyneuropathy       methotrexate 2.5 MG tablet CHEMO     96 tablet    TAKE 8 TABLETS BY MOUTH ONCE WEEKLY. LABS DUE EVERY 8 - 12 WEEKS.    Rheumatoid arthritis of multiple sites with negative rheumatoid factor (H), High risk medications (not anticoagulants) long-term use       metoprolol succinate 50 MG 24 hr tablet    TOPROL-XL    90 tablet    50mg tab by mouth daily @ 7/730am    Essential hypertension       omeprazole 40 MG capsule    priLOSEC    180 capsule    40mg capsule by mouth twice daily @ 7/730am and 4pm    Gastroesophageal reflux disease without esophagitis       order for DME      Use your CPAP device as directed by your provider.        oxybutynin 5 MG tablet    DITROPAN    90 tablet    5mg tab by mouth daily @ 4pm    Other urinary incontinence       PLAQUENIL 200 MG tablet   Generic drug:  hydroxychloroquine     180 tablet    200mg tab by mouth twice daily @ 9am and 4pm    Rheumatoid arthritis involving shoulder with negative rheumatoid factor, unspecified laterality (H)       TYLENOL 500 MG tablet   Generic drug:  acetaminophen      2 x 500mg by mouth 3 times per day: 7/730am, 4pm and 11pm  = 6/day        vitamin D3 1000 units Caps     30 capsule    1000 unit capsule by mouth daily @ 7am

## 2018-06-20 NOTE — LETTER
Dear Lucio Daly,     Regular eye exams are required while taking hydroxychloroquine (Plaquenil). Eye exams should be completed by an eye specialist who is experienced in monitoring for hydroxychloroquine toxicity (a rare effect of the drug that can damage your eyes and vision).  These may be yearly or as determined by your eye specialist.     Although vision problems and loss of sight while taking hydroxychloroquine are very rare, notify your doctor immediately if you notice changes in your vision. The goal of screening is to detect toxicity before your vision is significantly or noticeably impacted. Failing to get proper screening exams puts you at risk of vision changes which may or may not be reversible.    Per the American Academy of Ophthalmology recommendations (2016), screening tests performed may include a 10-2 visual field test, spectral-domain optical coherence tomography (SD OCT), or other screening tests as determined by the eye specialist, including a multifocal electroretinogram (mfERG) or fundus auto-fluorescence (FAF).    We received a refill request from your pharmacy. A copy of your current eye exam was not found in your medical record. Your hydroxychloroquine prescription has been refilled with a limited supply pending confirmation you have had an eye exam to test for hydroxychloroquine toxicity.      We encourage you to bring this letter to your eye exam to discuss the exams that will be performed during your visit. Please request your eye clinic fax or mail a copy of your eye exam report to our clinic indicating that testing was completed for hydroxychloroquine toxicity screening. The exam notes must specifically comment on the potential for hydroxychloroquine toxicity and outline recommended follow-up.    If you have questions about hydroxychloroquine or the information in this letter, please call the clinic at [phone] or talk to your provider at your next office  visit.                        1    Eye Specialist Letter  Dear Eye Specialist,  To ensure safe use of Plaquenil (hydroxychloroquine), we are requesting your assistance in providing the following information. Please return this form to our clinic via mail or fax, or incorporate this information into your visit summary. Your note must indicate whether or not there is evidence of toxicity from Plaquenil use. For questions regarding this form, please call [phone].  Patient Name:   :             Date of Exam:    The following exams were completed during this visit in accordance with the American Academy of Ophthalmology recommendations (2016):  ? 10-2 automated visual field  ? Spectral-domain optical coherence tomography (SD OCT)  ? Multifocal electroretinogram (mfERG)  ? Fundus autofluorescence (FAF)  ? Other (please specify)  Please select from the following:  ? The findings from the above exams are not suggestive of toxicity from Plaquenil (hydroxychloroquine).  ? The findings from the above exams may suggest toxicity from Plaquenil (hydroxychloroquine).  Directly contact the clinic and fax this form.  Please provide additional guidance on whether or not the medication may be continued from your perspective:  Date of next recommended Plaquenil (hydroxychloroquine) screening eye exam:   ? 5 years  ? 1 year  ? 6 months  Other (please specify):   Eye Specialist Name (print):                                                                Date:  Eye Specialist Signature:

## 2018-06-20 NOTE — LETTER
6/20/2018       RE: Lucio Daly  [address]  [place] [address]     Dear Colleague,    Thank you for referring your patient, Lucio Daly, to the [facility] RHEUMATOLOGY at [facility]. Please see a copy of my visit note below.    Rheumatology Visit     Lucio Daly MRN# [id number]   YOB: 1950 Age: 67 year old            Assessment and Plan:   1. Seronegative inflammatory arthritis RA: symptoms of inflammatory arthritis are stable; exam neg no active synovitis or tendonitis. Some improvement in ankles since incresaed MTX.  Labs today showing mild leukopenia and lower PLT. No s/sx infection. Repeat labs in 2-3 week if low, then will have to reduce MTX. No SLE sx. RA activity=low. If in the future need to switch the MTX,  We could substitute arava 20 mg daily for methotrexate if PFT decline and/or suggest increasing fibrosis. Plan  # Continue methotrexate at 20 mg weekly, folic acid 1 mg day. While on drug  --High risk medication monitoring--labs q 3 mo AST/ALT, Albumin, CBC with plts, CRP   --Limit EtOH intake to 2 drinks weekly; use folate 2 mg daily  --Tylenol 325mg qid prn nausea/HA associated with dosing.    #  mg BID -annual eye exam done March 2018. I gave him the letter and form for eye provider to fill out and fax back. .   2. 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 Continue regular ophthalmologic care and dental care, and continue using topical therapies (ie. Artificial tears and lozenges).   3. 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 Mild restriction. Plan f/u Dr. Mccarthy in Aug.   4. L>R Knee pain:  OA and L anserine bursitis-future may need TKR. Continue isometric quad strengthening exercises twice daily to improve support around the joint.  5. Plantar fasciitis/heel pain  "with hx R foot drop/instability: stable s/p PTx  6. 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 Camargo. Plan continue splinting/hand therapy and acetaminophen 1000 mg tid ATC.  Spinal stenosis, informed if he wishes to see physiatry to notify us or PCP.F/U PCP and other providers. Informed of redflags, when call 911 or if any falls.          History of Present Illness:   Lucoi GRIFFIN Gradyen \"Rich\" presents for f/u seronegative rheumatoid arthritis, sicca complex. Last seen 12-17 when MTX was increased 20 mg per week  and continued plaquenil     Copy forward June 26, 2017  Dr. Goodrich seen him in Dec 2016   Back pain, seen PCP. MRI of his back to f/u spinal stenosis and herniated disc will be having this tomorrow then maybe seeing neurosurgeon (needed updated MRI) if this is indicated, did PT but stopped as was making this worse. Previous ortho was going to refer him to neurosurgeon but he didn't want surgery at this time. Last months, more increased numbness in feet and sciatica right thigh/calf \"uncomfortable\", more pain in mid and upper back then some in low back if stands too long, then gets this in rib cage. Had problems with \"numbness\" for some time, pain in arms. EMG testing in past peripheral polyneuropathy. Stumbling more, but had trouble with this in the past, and previous foot droop. More leg pain for \"quite a few months\".  Lay in bed or sit with feet up more in leg pain, back. Nueropathy is the bottom of his feet.   Tennis elbows, thumbs, hands pains are the same stable this comes and goes.   Continues the methotrexate 17.5 mg PO Q 7 days Tue or WED and the plaqeunil (he felt the stomach issues were due to other medications) this is tolerating well Didn't stop the plaquenil \"didnt want to rock the boat\" . Reports is RA is controlled, no flaring and is controlled with these medications so wishes to continue this plan.   Pulmonary Dr. Mccarthy had " "referred him to gastro for GERD in Oct. Seen him 5-2017 --ILD \"follicular bronchiolitis\" and pulmonary nodule stable (more SOB sit to stand then walking). His prilosec was increased. Checking neuromuscular \"thing\" when he sees him in Nov. Methotrexate was at 9 tab 22.5 mg a week tapered to 17.5 mg once a week --his RA remains controlled.   On ABX completed this Saturday for bronchitis, early pnuemonia and then infection in both his eyes. Started with laryngitis. ABX helped. No fever, chills, discolored sputum. Fatigue.    June 20, 2018  Seen Dr. Mccarthy 5-2018 pulmonary, f/u Aug. \"Stable\" \"didnt do as well on the walking\" \"more physical with legs\" will repeat testing in august. EKG similar to low pulse and \"similar to arrthymias\" will be talking to Dr. Corley about this and ASCVD.   Seen NATALEE Carrillo PAC-C low back pain and neck pain advised seeing neurosurgery didn't seen the neurosurgeon not seen does not wish to do surgery and in the past advised no surgery \"not a good candidate\" Dr. Mccarthy. Nerve pain into calves into feet, this leg pain due to his back/neck pain. Seen Dr. Villatoro in sports medicine  and then seen Dr. Campuzano for hands. PT for back. Dr Montes recommended pain management, but he didn't want narcotics.  Prior 2014 had injections of low back \"neuromas\" in the spine, injection not lasted and reaction \"neurologic bladder\" and same as when cortisone injections. Did PT in the past. Dr. Montes recommended possible CBD oils so will take to PCP. Nerve, tendon pain and joint pain. \"if this for a while right thigh severe pain and cant stand on leg\" \"left drop foot\" told was d/t spine. On lyrica   Taking methotrexate 20 mg PO Q 7 days WED (mild stomach 1-2 day, not every time or every dose), folic acid 1 mg day and the plaqeunil 200 mg BID (6 days week, then WED once day). Tolerating. Dr. Goodrich increased this d/t justus swelling, this is improved. Some swelling on and off with walking. Stiff in back, in the " "mornings hands stiff \"fingers will curl\" this is unchanged[place] pontoon boat with this hand. Reports x-rays were not fracture     Seen gastro who increased omprezole 40 mg BID, this has helped. Pain affected by change in seasons. Higher MTX dose is helping since increased in . Feet, ankles, toes and fingers pain lingers. \"stepped out of pain left heel and left ankle\" [facility] left ankle, this was about 3 weeks ago. Has AFO brace, and feet/ankle brace from Dr. Martinez now wearing, got a gel heel insert insert this is helping still wearing. Has GERD and esophagus so hard to tell so will see PCP for complete CVD workup. Nuc 6-2016 NL. Sees dermatology, no skin issues. Has golf cart to get around. No tick bites      Goes to [facility], no notes in EHR. Last visit in March 2018 did given him the letter. Other [facility] reviewed and updated.      PMH   1. Sensory neuropathy of unclear etiology - negative work up, managed on Lyrica.  Has chronic dysesthesia in pads of feet.  2. Parkinsonisim/Tremor disorder; sees neurology   3. Headaches   4. History of basal cell, squamous (most recent +bx 8-2014)  5. History of melanoma   6. GALO on CPAP.  7. HTN.   8. Seronegative RA, diagnosed 2009. SICCA  9. JUARES. Work-up 4-2015. 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 Lumbar stenosis per MRI 2017   11. Bronchitis 5-2017, early pneumonia   12. Chicken pox   Past Medical History:   Diagnosis Date     Abnormal EMG 4/18/2013     Abnormal involuntary movements(781.0)     Movement Disorder     AK (actinic keratosis) 12/18/2011     Allergic rhinitis, cause unspecified     Allergic rhinitis     Balance problems 11/1/2011     Basal cell carcinoma      Bladder spasms 11/1/2011     Chronic osteoarthritis      Diaphragmatic hernia without mention of obstruction or gangrene      Earache or other ear, nose, or throat complaint      Esophageal reflux      Fatigue " "11/1/2011     Fracture      H. pylori infection 5/12/2011     History of MRI of cervical spine 11/18/2013    EXAMINATION: CERVICAL SPINE G/E 5 VIEWS* 4/19/2013 4:18 PM  CLINICAL HISTORY: Pain in limb,Performing Location?->[facility] ([facility]),  COMPARISON:  FINDINGS: AP and lateral views in flexion and extension, as well as odontoid view of the cervical spine was obtained. There is no comparison available. The vertebral bodies of the cervical spine are normally aligned. There is posterior spurring and d     Incomplete defecation 11/1/2011     Interstitial lung disease (H) 11/29/2016     Laboratory test 8/7/2012     Lung disease June 2015     Malignant basal cell neoplasm of skin 8/6/2008     Melanoma (H) 8/6/2008     Melanoma in situ of lower leg (H)     R calf     Neuropathy 5/16/2011     Other bladder disorder      Other color vision deficiencies      Other nervous system complications      Parkinsonism (H) 11/1/2011     Personal history of colonic polyps      Polyneuropathy in other diseases classified elsewhere (H)      RA (rheumatoid arthritis) (H)      Rheumatoid arthritis of multiple sites with negative rheumatoid factor (H) 3/21/2016     Seronegative arthritis 11/18/2013     Shortness of breath      Shoulder arthritis 2016    acromioclavicular joint      Somatization disorder 5/12/2011     Squamous cell carcinoma      Tremor 11/1/2011     Unspecified essential hypertension      Unspecified hypothyroidism      Urinary tract infection      Urinary urgency 11/1/2011     Wears glasses 11/1/2011     Injuries-ankle sprains, left heel fracture    Family Hx   MGM Psoriasis  Sister -PPM  Family History   Problem Relation Age of Onset     Hypertension Mother      HEART DISEASE Mother      a fib     Arthritis Mother      \"osteo\"     Osteoperosis Mother      Skin Cancer Mother      Uterine Cancer Mother      Hypertension Brother      Diabetes Brother      \" post pancreatitis\"     Neurologic Disorder Sister      " multiple sclerosis     Hypertension Sister      HEART DISEASE Sister      HEART DISEASE Sister      a fib     Arthritis Sister      Hypertension Father      Cerebrovascular Disease Father      ,     Skin Cancer Father      Colon Polyps Father      Psoriasis Maternal Grandfather      Stomach Cancer Maternal Grandfather      Congenital Anomalies Other      granddaughter with a chromosome defect     Cerebrovascular Disease Paternal Grandmother      ,     Cerebrovascular Disease Paternal Grandfather      ,     Melanoma No family hx of      Colon Cancer No family hx of      Personal Hx   Home environment: No secondhand tobacco smoke in home.    History     Social History     Marital Status:      Spouse Name: N/A     Number of Children: N/A     Years of Education: N/A     Social History Main Topics     Smoking status: Former Smoker     Quit date: 09/30/2003     Smokeless tobacco: Never Used     Alcohol Use: No     Drug Use: No     Sexually Active: Not Currently -- Female partner(s)     Other Topics Concern     None     Social History Narrative    2013: Living in [place] in a townhouse with no stepsHas 3 sons that are doing okay.             Review of Systems:     14 points all negative except what is mentioned in HPI.  Review Of Systems  Skin: negative  Eyes: negative  Ears/Nose/Throat: negative  Respiratory: No shortness of breath, dyspnea on exertion, cough, or hemoptysis  Cardiovascular: negative  Gastrointestinal: negative  Genitourinary: negative  Musculoskeletal: as above  Neurologic: as above  Psychiatric: negative  Hematologic/Lymphatic/Immunologic: negative  Endocrine: negative             Past Surgical History:   Past Surgical History:   Procedure Laterality Date     BIOPSY OF SKIN LESION       COLONOSCOPY       HEMORRHOID SURGERY       lip biopsy      for sicca complex     MOHS MICROGRAPHIC PROCEDURE       SOFT TISSUE SURGERY      removeal of basel cell carcinoma              Allergies:   Allergies    Allergen Reactions     Restasis      Burning eyes, problems with breathing, tightness in chest     Adhesive Tape      Bandages misc     Allegra      EXCESSIVE URINATION AND WEAKNESS, LIGHT-HEADED     Allergy      Dust     Animal Dander      Benadryl Allergy      EXCESSIVE URINATION AND WEAKNESS, LIGHT-HEADED     Cephalexin      Joint pain or gerd aggravation. Bloating excessive urination      Cephalosporins      Diphenhydramine Other (See Comments)     Doxycycline Hyclate Nausea     SWEATING,MIGRAINES,LOSS OF APPETITE,SWEATING,LIGHT HEADED, EXCESSIVE URINATION     Fexofenadine Other (See Comments)     Flonase [Fluticasone Propionate]      Gabapentin      Neurontin: mosd changes and excess urination     Iodine Solution [Povidone Iodine]      SKIN MELTS     Levaquin      From surgeon--? Joint pain ? Gerd aggravation. Insomnia, excess urination     Mylanta      EXCESSIVE URINATION AND WEAKNESS, LIGHT-HEADED     Prednisone      Weakness, elevated bp, headache, eye pain, congestion      Seafood [Seafood]      Shellfish Allergy      Hives       Sulfamethoxazole-Trimethoprim      Chest pain, angina     Trees      Trileptal      SEVERE JOINT AND TENDON PAIN, INSOMNIA, RESTLESSNESS, NAUSEA, EXCESS URINATION     Cortizone Rash     EXCESS URINATION,WEAKNESS,NAUSEA, HEADACHE            Medications:   Current Outpatient Prescriptions   Medication Sig Dispense Refill     acetaminophen (TYLENOL) 500 MG tablet 2 x 500mg by mouth 3 times per day: 7/730am, 4pm and 11pm  = 6/day       Cholecalciferol (VITAMIN D3) 1000 units CAPS 1000 unit capsule by mouth daily @ 7am 30 capsule      folic acid (FOLVITE) 1 MG tablet 1mg tab by mouth twice daily @ 9am and 4pm 180 tablet 3     hydroxychloroquine (PLAQUENIL) 200 MG tablet 200mg tab by mouth twice daily @ 9am and 4pm 180 tablet 3     methotrexate 2.5 MG tablet CHEMO TAKE 8 TABLETS BY MOUTH ONCE WEEKLY. LABS DUE EVERY 8 - 12 WEEKS. 96 tablet 1     metoprolol succinate (TOPROL-XL) 50 MG 24  "hr tablet 50mg tab by mouth daily @ 7/730am 90 tablet 3     omeprazole (PRILOSEC) 40 MG capsule 40mg capsule by mouth twice daily @ 7/730am and 4pm 180 capsule 3     ORDER FOR DME Use your CPAP device as directed by your provider.       oxybutynin (DITROPAN) 5 MG tablet 5mg tab by mouth daily @ 4pm 90 tablet 1     pregabalin (LYRICA) 50 MG capsule 1 tab by mouth @ 7/730am, 1 tab @ 4pm and 1-2 tab @ 11pm 360 capsule 3              Physical Exam:   Blood pressure 147/89, pulse 51, temperature 97.6  F (36.4  C), temperature source Oral, resp. rate 18, height 1.88 m (6' 2.02\"), weight 140.2 kg (309 lb 1.6 oz), SpO2 97 %.  Wt Readings from Last 4 Encounters:   06/20/18 140.2 kg (309 lb 1.6 oz)   05/22/18 140 kg (308 lb 9.6 oz)   05/15/18 138.3 kg (305 lb)   03/14/18 (!) 139.7 kg (308 lb)       Constitutional: obese, appearing stated age; cooperative  Eyes: nl EOM, PERRLA, vision, conjunctiva, sclera  ENT: nl external ears, nose, hearing, lips, teeth, gums, throat  No mucous membrane lesions, normal saliva pool  Neck: no mass or thyroid enlargement  Resp: lungs clear to auscultation, nl to palpation  CV: RRR, no murmurs, rubs or gallops, no edema  GI: no ABD mass or tenderness, no HSM  : not tested  Lymph: no cervical, supraclavicular, inguinal or epitrochlear nodes  MS:  All TMJ, neck, shoulder, elbow, wrist, MCP/PIP/DIP, spine, hip, knee, ankle, and foot MTP/IP joints were examined.  Knees without effusion; Mild Kyphosis. No S/T across the ankle joints. -MTP/MCP squeeze.  Skin: no nail pitting, alopecia, rash, nodules or lesions.  Neuro: No tremor today. Walks with cane  Psych: nl judgement, orientation, memory, affect.         Data:       Results for orders placed or performed in visit on 06/20/18   AST   Result Value Ref Range    AST 17 0 - 45 U/L   ALT   Result Value Ref Range    ALT 24 0 - 70 U/L   Albumin level   Result Value Ref Range    Albumin 3.7 3.4 - 5.0 g/dL   Creatinine   Result Value Ref Range    " Creatinine 1.12 0.66 - 1.25 mg/dL    GFR Estimate 65 >60 mL/min/1.7m2    GFR Estimate If Black 78 >60 mL/min/1.7m2   CRP inflammation   Result Value Ref Range    CRP Inflammation 6.1 0.0 - 8.0 mg/L   CBC with platelets   Result Value Ref Range    WBC 3.6 (L) 4.0 - 11.0 10e9/L    RBC Count 4.46 4.4 - 5.9 10e12/L    Hemoglobin 13.7 13.3 - 17.7 g/dL    Hematocrit 42.7 40.0 - 53.0 %    MCV 96 78 - 100 fl    MCH 30.7 26.5 - 33.0 pg    MCHC 32.1 31.5 - 36.5 g/dL    RDW 14.4 10.0 - 15.0 %    Platelet Count 148 (L) 150 - 450 10e9/L     *Note: Due to a large number of results and/or encounters for the requested time period, some results have not been displayed. A complete set of results can be found in Results Review.       Thank-you for allowing me to participate in your care.     Sin Snow APRN, CNP, MSN  [facility]  Department of Rheumatology & Autoimmune Disorders  [facility] Rheumatology: [phone] Opt 2, then Opt 1 Scheduling   [facility]: [phone]; [phone] Option 7 scheduling

## 2018-07-05 ENCOUNTER — DOCUMENTATION ONLY (OUTPATIENT)
Dept: RHEUMATOLOGY | Facility: CLINIC | Age: 68
End: 2018-07-05

## 2018-07-05 VITALS — WEIGHT: 309.08 LBS | BODY MASS INDEX: 39.67 KG/M2

## 2018-07-11 DIAGNOSIS — M06.09 RHEUMATOID ARTHRITIS OF MULTIPLE SITES WITH NEGATIVE RHEUMATOID FACTOR (H): ICD-10-CM

## 2018-07-11 DIAGNOSIS — Z79.899 HIGH RISK MEDICATIONS (NOT ANTICOAGULANTS) LONG-TERM USE: ICD-10-CM

## 2018-07-11 LAB
ALBUMIN SERPL-MCNC: 3.7 G/DL (ref 3.4–5)
ALT SERPL W P-5'-P-CCNC: 26 U/L (ref 0–70)
AST SERPL W P-5'-P-CCNC: 20 U/L (ref 0–45)
BASOPHILS # BLD AUTO: 0 10E9/L (ref 0–0.2)
BASOPHILS NFR BLD AUTO: 0.9 %
CREAT SERPL-MCNC: 1.13 MG/DL (ref 0.66–1.25)
CRP SERPL-MCNC: 7.3 MG/L (ref 0–8)
DIFFERENTIAL METHOD BLD: ABNORMAL
EOSINOPHIL # BLD AUTO: 0.1 10E9/L (ref 0–0.7)
EOSINOPHIL NFR BLD AUTO: 1.7 %
ERYTHROCYTE [DISTWIDTH] IN BLOOD BY AUTOMATED COUNT: 14.3 % (ref 10–15)
GFR SERPL CREATININE-BSD FRML MDRD: 64 ML/MIN/1.7M2
HCT VFR BLD AUTO: 43 % (ref 40–53)
HGB BLD-MCNC: 13.7 G/DL (ref 13.3–17.7)
IMM GRANULOCYTES # BLD: 0 10E9/L (ref 0–0.4)
IMM GRANULOCYTES NFR BLD: 0.3 %
LYMPHOCYTES # BLD AUTO: 0.7 10E9/L (ref 0.8–5.3)
LYMPHOCYTES NFR BLD AUTO: 20.7 %
MCH RBC QN AUTO: 30 PG (ref 26.5–33)
MCHC RBC AUTO-ENTMCNC: 31.9 G/DL (ref 31.5–36.5)
MCV RBC AUTO: 94 FL (ref 78–100)
MONOCYTES # BLD AUTO: 0.2 10E9/L (ref 0–1.3)
MONOCYTES NFR BLD AUTO: 6.6 %
NEUTROPHILS # BLD AUTO: 2.4 10E9/L (ref 1.6–8.3)
NEUTROPHILS NFR BLD AUTO: 69.8 %
NRBC # BLD AUTO: 0 10*3/UL
NRBC BLD AUTO-RTO: 0 /100
PLATELET # BLD AUTO: 160 10E9/L (ref 150–450)
RBC # BLD AUTO: 4.56 10E12/L (ref 4.4–5.9)
WBC # BLD AUTO: 3.5 10E9/L (ref 4–11)

## 2018-07-13 NOTE — PROGRESS NOTES
Your white blood cell counts are mildly reduced but stable. It appears it has been reduced, and even before the increase in methotrexate from 7 (17.5mg)  to 8 tablet (20 mg) in December 2017. If you havent had infections, then we can continue this dose.     Sin WILHELM, CNP

## 2018-07-18 DIAGNOSIS — I10 ESSENTIAL HYPERTENSION: ICD-10-CM

## 2018-07-18 NOTE — TELEPHONE ENCOUNTER
"Requested Prescriptions   Pending Prescriptions Disp Refills     metoprolol succinate (TOPROL-XL) 50 MG 24 hr tablet [Pharmacy Med Name: METOPROLOL SUCCINATE ER 50MG TB24]      Last Written Prescription Date:  5/22/2018  Last Fill Quantity: 90 tablet    ,  # refills: 3   Last Office Visit: 3/9/2018   Future Office Visit:      90 tablet 3     Sig: TAKE ONE TABLET BY MOUTH EVERY DAY    Beta-Blockers Protocol Failed    7/18/2018  3:44 PM       Failed - Blood pressure under 140/90 in past 12 months    BP Readings from Last 3 Encounters:   06/20/18 147/89   05/22/18 152/85   05/15/18 143/82          Passed - Patient is age 6 or older       Passed - Recent (12 mo) or future (30 days) visit within the authorizing provider's specialty    Patient had office visit in the last 12 months or has a visit in the next 30 days with authorizing provider or within the authorizing provider's specialty.  See \"Patient Info\" tab in inbasket, or \"Choose Columns\" in Meds & Orders section of the refill encounter.              "

## 2018-07-20 NOTE — TELEPHONE ENCOUNTER
Patient called to inquire about the status of this request. States he is out and needs this through the weekend. Please assist. Thanks!

## 2018-07-23 RX ORDER — METOPROLOL SUCCINATE 50 MG/1
TABLET, EXTENDED RELEASE ORAL
Qty: 90 TABLET | Refills: 0 | Status: SHIPPED | OUTPATIENT
Start: 2018-07-23 | End: 2018-08-21

## 2018-07-23 NOTE — TELEPHONE ENCOUNTER
Routing refill request to provider for review/approval because:  --Last blood pressure's not at goal.  --On 5/22/18 neuro entered a historical entry with a different form of metoprolol so this needs [facility] PCP to authorize this time.  --I sent MyChart to patient asking him to schedule blood pressure f/u visit as soon as he has time.    BP Readings from Last 6 Encounters:   06/20/18 147/89   05/22/18 152/85   05/15/18 143/82   03/14/18 133/82   03/09/18 (!) 153/92   12/20/17 (!) 155/91

## 2018-08-17 ASSESSMENT — ENCOUNTER SYMPTOMS
RECTAL PAIN: 0
COUGH: 0
SMELL DISTURBANCE: 0
JOINT SWELLING: 1
DEPRESSION: 0
LOSS OF CONSCIOUSNESS: 0
PALPITATIONS: 1
HEMATURIA: 0
NUMBNESS: 1
POLYPHAGIA: 0
NIGHT SWEATS: 0
LEG PAIN: 1
SNORES LOUDLY: 0
EYE WATERING: 0
SKIN CHANGES: 0
ARTHRALGIAS: 1
EYE PAIN: 0
DECREASED CONCENTRATION: 0
PARALYSIS: 0
ABDOMINAL PAIN: 1
FATIGUE: 1
PANIC: 0
BACK PAIN: 1
HEMOPTYSIS: 0
POOR WOUND HEALING: 0
DOUBLE VISION: 1
DECREASED APPETITE: 0
SHORTNESS OF BREATH: 1
JAUNDICE: 0
WEAKNESS: 1
CONSTIPATION: 0
DIZZINESS: 1
SYNCOPE: 0
SPEECH CHANGE: 0
TREMORS: 1
WEIGHT LOSS: 0
HYPERTENSION: 1
POLYDIPSIA: 0
SEIZURES: 0
INCREASED ENERGY: 0
STIFFNESS: 1
NAIL CHANGES: 1
FLANK PAIN: 1
HALLUCINATIONS: 0
BOWEL INCONTINENCE: 0
NECK PAIN: 1
SINUS PAIN: 0
FEVER: 0
NAUSEA: 0
DISTURBANCES IN COORDINATION: 1
TROUBLE SWALLOWING: 1
SINUS CONGESTION: 0
LIGHT-HEADEDNESS: 0
COUGH DISTURBING SLEEP: 0
VOMITING: 0
TINGLING: 1
DYSURIA: 0
MEMORY LOSS: 0
DIARRHEA: 0
POSTURAL DYSPNEA: 0
HEARTBURN: 1
MUSCLE WEAKNESS: 1
HYPOTENSION: 0
BLOATING: 1
ORTHOPNEA: 0
MUSCLE CRAMPS: 1
SLEEP DISTURBANCES DUE TO BREATHING: 0
BLOOD IN STOOL: 1
EYE REDNESS: 0
EXERCISE INTOLERANCE: 1
NECK MASS: 0
SPUTUM PRODUCTION: 0
HOARSE VOICE: 1
WEIGHT GAIN: 0
DIFFICULTY URINATING: 0
TASTE DISTURBANCE: 0
HEADACHES: 0
INSOMNIA: 0
NERVOUS/ANXIOUS: 0
MYALGIAS: 1
SORE THROAT: 0
DYSPNEA ON EXERTION: 1
ALTERED TEMPERATURE REGULATION: 1
EYE IRRITATION: 0
WHEEZING: 1

## 2018-08-21 ENCOUNTER — OFFICE VISIT (OUTPATIENT)
Dept: FAMILY MEDICINE | Facility: CLINIC | Age: 68
End: 2018-08-21
Payer: MEDICARE

## 2018-08-21 ENCOUNTER — OFFICE VISIT (OUTPATIENT)
Dept: PULMONOLOGY | Facility: CLINIC | Age: 68
End: 2018-08-21
Attending: INTERNAL MEDICINE
Payer: MEDICARE

## 2018-08-21 VITALS
OXYGEN SATURATION: 96 % | WEIGHT: 309.38 LBS | DIASTOLIC BLOOD PRESSURE: 90 MMHG | BODY MASS INDEX: 39.7 KG/M2 | TEMPERATURE: 97.9 F | HEART RATE: 55 BPM | RESPIRATION RATE: 16 BRPM | SYSTOLIC BLOOD PRESSURE: 142 MMHG

## 2018-08-21 VITALS
DIASTOLIC BLOOD PRESSURE: 83 MMHG | TEMPERATURE: 97.9 F | BODY MASS INDEX: 39.66 KG/M2 | OXYGEN SATURATION: 96 % | WEIGHT: 309 LBS | RESPIRATION RATE: 16 BRPM | SYSTOLIC BLOOD PRESSURE: 146 MMHG | HEIGHT: 74 IN

## 2018-08-21 DIAGNOSIS — J84.9 INTERSTITIAL LUNG DISEASE (H): Primary | ICD-10-CM

## 2018-08-21 DIAGNOSIS — R06.00 DYSPNEA, UNSPECIFIED TYPE: Primary | ICD-10-CM

## 2018-08-21 DIAGNOSIS — D84.9 IMMUNOSUPPRESSED STATUS (H): ICD-10-CM

## 2018-08-21 DIAGNOSIS — I10 ESSENTIAL HYPERTENSION: Primary | ICD-10-CM

## 2018-08-21 DIAGNOSIS — Z23 NEED FOR TDAP VACCINATION: ICD-10-CM

## 2018-08-21 DIAGNOSIS — G70.9 NEUROMUSCULAR DISEASE (H): ICD-10-CM

## 2018-08-21 DIAGNOSIS — G20.A1 PARALYSIS AGITANS (H): ICD-10-CM

## 2018-08-21 DIAGNOSIS — F39 MOOD DISORDER (H): ICD-10-CM

## 2018-08-21 DIAGNOSIS — R06.00 DYSPNEA, UNSPECIFIED TYPE: ICD-10-CM

## 2018-08-21 DIAGNOSIS — E66.01 MORBID OBESITY (H): ICD-10-CM

## 2018-08-21 LAB
6 MIN WALK (FT): 780 FT
6 MIN WALK (M): 238 M
DLCOUNC-%PRED-PRE: 100 %
DLCOUNC-PRE: 27.96 ML/MIN/MMHG
DLCOUNC-PRED: 27.8 ML/MIN/MMHG
ERV-%PRED-PRE: 21 %
ERV-PRE: 0.16 L
ERV-PRED: 0.74 L
EXPTIME-PRE: 8.82 SEC
FEF2575-%PRED-PRE: 57 %
FEF2575-PRE: 1.54 L/SEC
FEF2575-PRED: 2.69 L/SEC
FEFMAX-%PRED-PRE: 90 %
FEFMAX-PRE: 8.38 L/SEC
FEFMAX-PRED: 9.28 L/SEC
FEV1-%PRED-PRE: 64 %
FEV1-PRE: 2.36 L
FEV1FEV6-PRE: 73 %
FEV1FEV6-PRED: 78 %
FEV1FVC-PRE: 72 %
FEV1FVC-PRED: 74 %
FEV1SVC-PRE: 63 %
FEV1SVC-PRED: 67 %
FIFMAX-PRE: 6.81 L/SEC
FRCPLETH-%PRED-PRE: 78 %
FRCPLETH-PRE: 3.07 L
FRCPLETH-PRED: 3.94 L
FVC-%PRED-PRE: 67 %
FVC-PRE: 3.27 L
FVC-PRED: 4.86 L
IC-%PRED-PRE: 75 %
IC-PRE: 3.58 L
IC-PRED: 4.72 L
MEP-PRE: 65 CMH2O
MIP-PRE: -25 CMH2O
RVPLETH-%PRED-PRE: 105 %
RVPLETH-PRE: 2.91 L
RVPLETH-PRED: 2.77 L
TLCPLETH-%PRED-PRE: 83 %
TLCPLETH-PRE: 6.65 L
TLCPLETH-PRED: 7.94 L
VA-%PRED-PRE: 61 %
VA-PRE: 4.7 L
VC-%PRED-PRE: 68 %
VC-PRE: 3.74 L
VC-PRED: 5.46 L

## 2018-08-21 PROCEDURE — 99214 OFFICE O/P EST MOD 30 MIN: CPT | Performed by: FAMILY MEDICINE

## 2018-08-21 PROCEDURE — G0463 HOSPITAL OUTPT CLINIC VISIT: HCPCS | Mod: ZF

## 2018-08-21 RX ORDER — ALBUTEROL SULFATE 90 UG/1
2 AEROSOL, METERED RESPIRATORY (INHALATION) EVERY 6 HOURS PRN
Qty: 1 INHALER | Refills: 0 | Status: SHIPPED | OUTPATIENT
Start: 2018-08-21 | End: 2019-01-11

## 2018-08-21 RX ORDER — METOPROLOL SUCCINATE 50 MG/1
50 TABLET, EXTENDED RELEASE ORAL DAILY
Qty: 90 TABLET | Refills: 11 | Status: SHIPPED | OUTPATIENT
Start: 2018-08-21 | End: 2018-12-07

## 2018-08-21 ASSESSMENT — PAIN SCALES - GENERAL: PAINLEVEL: NO PAIN (0)

## 2018-08-21 NOTE — PROGRESS NOTES
Reason for Visit  Lucio Daly is a 68 year old year old man who is being seen for connective tissue disease-related ILD.    The patient was seen and examined by Germán L. Vélez Reyes (MS3) and Dr. Harsha Mccarthy .    Pulmonary HPI:     Mr. Daly comes in for followup of his connective tissue disease-related ILD.  He follows up in rheumatology for seronegative rheumatoid arthritis.  He was last seen in the Pulmonary Clinic on 1/16/2017. He is currently on plaquenil and methotrexate. He is here for routine follow up.     In June 20, 2018 he was found to have lymphopenia after a CBC with differential was ordered by Rheumatology (Dr. Goodrich) for drug monitoring. CRP was also elevated, showing inflammation and he was continued on methotrexate, up now to 20mg weekly.       Today, Mr Daly feels like he is doing well from a breathing standpoint. He is still having problem with positional dyspnea worse with laying down. He is on NIPPV at night and last evaluated in the sleep clinic in March with a recommendation to increase sleep duration to 7-8 hours / night.    He denies any changes in endurance.  He is still able to go on walks for 15-20 minutes without taking a break, but he cannot talk during. He avoids stairs. He continues to have shortness of breath with activity with occasional wheezing at night.     Heart burn is better with 40mg of omeprazole twice daily. Neuropathy continues to be a problem, with increasing numbness of his feet.    Current Outpatient Prescriptions   Medication     acetaminophen (TYLENOL) 500 MG tablet     Cholecalciferol (VITAMIN D3) 1000 units CAPS     folic acid (FOLVITE) 1 MG tablet     hydroxychloroquine (PLAQUENIL) 200 MG tablet     methotrexate 2.5 MG tablet CHEMO     metoprolol succinate (TOPROL-XL) 50 MG 24 hr tablet     omeprazole (PRILOSEC) 40 MG capsule     ORDER FOR DME     oxybutynin (DITROPAN) 5 MG tablet     pregabalin (LYRICA) 50 MG capsule     Tdap,  tetanus-diphtheria-acell pertussis, (ADACEL) 5-2-15.5 LF-MCG/0.5 injection     [DISCONTINUED] metoprolol succinate (TOPROL-XL) 50 MG 24 hr tablet     [DISCONTINUED] metoprolol succinate (TOPROL-XL) 50 MG 24 hr tablet     No current facility-administered medications for this visit.       PAST MEDICAL HISTORY:   Past Medical History:   Diagnosis Date     Abnormal EMG 4/18/2013     Abnormal involuntary movements(781.0)     Movement Disorder     AK (actinic keratosis) 12/18/2011     Allergic rhinitis, cause unspecified     Allergic rhinitis     Balance problems 11/1/2011     Basal cell carcinoma      Bladder spasms 11/1/2011     Chronic osteoarthritis      Diaphragmatic hernia without mention of obstruction or gangrene      Earache or other ear, nose, or throat complaint      Esophageal reflux      Fatigue 11/1/2011     Fracture      H. pylori infection 5/12/2011     History of MRI of cervical spine 11/18/2013    EXAMINATION: CERVICAL SPINE G/E 5 VIEWS* 4/19/2013 4:18 PM  CLINICAL HISTORY: Pain in limb,Performing Location?->[facility]),  COMPARISON:  FINDINGS: AP and lateral views in flexion and extension, as well as odontoid view of the cervical spine was obtained. There is no comparison available. The vertebral bodies of the cervical spine are normally aligned. There is posterior spurring and d     Incomplete defecation 11/1/2011     Interstitial lung disease (H) 11/29/2016     Laboratory test 8/7/2012     Lung disease June 2015     Malignant basal cell neoplasm of skin 8/6/2008     Melanoma (H) 8/6/2008     Melanoma in situ of lower leg (H)     R calf     Neuropathy 5/16/2011     Other bladder disorder      Other color vision deficiencies      Other nervous system complications      Parkinsonism (H) 11/1/2011     Personal history of colonic polyps      Polyneuropathy in other diseases classified elsewhere (H)      RA (rheumatoid arthritis) (H)      Rheumatoid arthritis of multiple sites with negative  "rheumatoid factor (H) 3/21/2016     Seronegative arthritis 11/18/2013     Shortness of breath      Shoulder arthritis 2016    acromioclavicular joint      Somatization disorder 5/12/2011     Squamous cell carcinoma      Tremor 11/1/2011     Unspecified essential hypertension      Unspecified hypothyroidism      Urinary tract infection      Urinary urgency 11/1/2011     Wears glasses 11/1/2011       PAST SURGICAL HISTORY:   Past Surgical History:   Procedure Laterality Date     BIOPSY OF SKIN LESION       COLONOSCOPY       HEMORRHOID SURGERY       lip biopsy      for sicca complex     MOHS MICROGRAPHIC PROCEDURE       SOFT TISSUE SURGERY      removeal of basel cell carcinoma       FAMILY HISTORY:   Family History   Problem Relation Age of Onset     Hypertension Mother      HEART DISEASE Mother      a fib     Arthritis Mother      \"osteo\"     Osteoperosis Mother      Skin Cancer Mother      Uterine Cancer Mother      Hypertension Brother      Diabetes Brother      \" post pancreatitis\"     Neurologic Disorder Sister      multiple sclerosis     Hypertension Sister      HEART DISEASE Sister      HEART DISEASE Sister      a fib     Arthritis Sister      Hypertension Father      Cerebrovascular Disease Father      ,     Skin Cancer Father      Colon Polyps Father      Psoriasis Maternal Grandfather      Stomach Cancer Maternal Grandfather      Congenital Anomalies Other      granddaughter with a chromosome defect     Cerebrovascular Disease Paternal Grandmother      ,     Cerebrovascular Disease Paternal Grandfather      ,     Melanoma No family hx of      Colon Cancer No family hx of        SOCIAL HISTORY:   Social History   Substance Use Topics     Smoking status: Former Smoker     Packs/day: 1.50     Years: 37.00     Types: Cigarettes     Start date: 6/15/1963     Quit date: 9/30/2000     Smokeless tobacco: Never Used     Alcohol use No    ROS Pulmonary  A complete ROS was otherwise negative except as noted in the " "HPI.  BP (!) 146/93  Pulse 55  Resp 16  Ht 1.88 m (6' 2\")  Wt (!) 130.3 kg (309 lb 6oz)  SpO2 96%   Exam:   GENERAL APPEARANCE: Alert, and in no apparent distress.  EYES: PERRL, EOMI  MOUTH: Oral mucosa is moist, without any lesions,, no oropharyngeal exudate.  NECK: supple, no masses, no thyromegaly.  LYMPHATICS: No significant axillary, cervical, or supraclavicular nodes.  RESP: normal percussion, diminished heart sounds.  No crackles. No rhonchi. No wheezes.  CV:  regular heartbeat. Distant S1, loud S2.   MS: extremities normal. No clubbing. No cyanosis.  SKIN: dry skin. No rash on limited exam  NEURO: Mentation intact, speech normal. No obvious focal findings.     Results:    PFTs done today were reviewed by me with the patient.  He also had a 6-minute walk test done which he did using a cane.  His 6-minute walk distance was below the lower limit of normal by 572ft.  He did not have any O2 saturation problems.  FVC is 3.27 liters which is 67% of predicted.  FEV1 is 2.36 which is 64% of predicted.  The ratio is 72. Total lung capacity is 6.65 which is 83% of predicted.  DLCO is 27.96 which is 100% of predicted. My interpretation is that he has mild restriction with normal diffusion capacity. TLC increased to previous levels in comparison to last visit. FVC however is decreased by ~450 ml.     A MIP done today was -25 and MEP 65. On May 22, 2018 a MIP was -45 and a MEP of 60.       EKG showed sinus bradychardia and sinus arrhythmia, with no significat changes when compared to an EKG from 6/26/2015. BNP was increased, at 272 (125 ULN), and LFTs were normal.       Assessment and plan:    Mr. 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 He is not " able to walk more than 15 - 20 minutes because of increasing shortness of breath. He may benefit form . This was previously contraindicated as he was on selegiline. He has now been taken off for months. A new prescription has been provieded.     2. Respiratory Muscle Weakness: MEP and MIP reflect respiratory muscle weakness. We will discuss this with neurology.     3.  Sleep-disordered breathing, on bilevel ventilation with good compliance    4. Orthopnea:. A cardiac echocardiogram was ordered, Differential includes diaphragmatic weakness.     4.  Gastroesophageal reflux, now being managed with assistance from GI. He is now taking 40mg of omeprazole twice daily    5. Drug monitoring: Labs last done by Rheumatology on 7/11.      I will see him back in 3 months    Attending statement:    The patient was seen and examined by me with house staff.  The case was discussed at length.    Vitals, lab results and imaging from today were reviewed.  The note reflects our joint assessment and plan.    Harsha Mccarthy MD  Pulmonary, Critical Care Medicine    Answers for HPI/ROS submitted by the patient on 8/17/2018   General Symptoms: Yes  Skin Symptoms: Yes  HENT Symptoms: Yes  EYE SYMPTOMS: Yes  HEART SYMPTOMS: Yes  LUNG SYMPTOMS: Yes  INTESTINAL SYMPTOMS: Yes  URINARY SYMPTOMS: Yes  REPRODUCTIVE SYMPTOMS: No  SKELETAL SYMPTOMS: Yes  BLOOD SYMPTOMS: No  NERVOUS SYSTEM SYMPTOMS: Yes  MENTAL HEALTH SYMPTOMS: Yes  Fever: No  Loss of appetite: No  Weight loss: No  Weight gain: No  Fatigue: Yes  Night sweats: No  Increased stress: No  Excessive hunger: No  Excessive thirst: No  Feeling hot or cold when others believe the temperature is normal: Yes  Loss of height: No  Post-operative complications: No  Surgical site pain: No  Hallucinations: No  Change in or Loss of Energy: No  Hyperactivity: No  Confusion: No  Changes in hair: No  Changes in moles/birth marks: No  Itching: Yes  Rashes: No  Changes in nails: Yes  Acne: No  Change in  facial hair: No  Warts: No  Non-healing sores: No  Scarring: No  Flaking of skin: No  Color changes of hands/feet in cold : No  Sun sensitivity: Yes  Skin thickening: No  Ear pain: No  Ear discharge: No  Hearing loss: No  Tinnitus: Yes  Nosebleeds: No  Congestion: No  Sinus pain: No  Trouble swallowing: Yes   Voice hoarseness: Yes  Mouth sores: No  Sore throat: No  Tooth pain: No  Gum tenderness: No  Bleeding gums: No  Change in taste: No  Change in sense of smell: No  Dry mouth: Yes  Hearing aid used: No  Neck lump: No  Eye pain: No  Vision loss: No  Dry eyes: Yes  Watery eyes: No  Eye bulging: No  Double vision: Yes  Flashing of lights: No  Spots: No  Floaters: No  Redness: No  Crossed eyes: No  Tunnel Vision: No  Yellowing of eyes: No  Eye irritation: No  Cough: No  Sputum or phlegm: No  Coughing up blood: No  Difficulty breating or shortness of breath: Yes  Snoring: No  Wheezing: Yes  Difficulty breathing on exertion: Yes  Nighttime Cough: No  Difficulty breathing when lying flat: No  Fast or irregular heartbeat: Yes  Pain in legs with walking: Yes  Trouble breathing while lying down: No  Fingers or toes appear blue: No  High blood pressure: Yes  Low blood pressure: No  Fainting: No  Murmurs: No  Pacemaker: No  Varicose veins: No  Edema or swelling: No  Wake up at night with shortness of breath: No  Light-headedness: No  Exercise intolerance: Yes  Heart burn or indigestion: Yes  Nausea: No  Vomiting: No  Abdominal pain: Yes  Bloating: Yes  Constipation: No  Diarrhea: No  Blood in stool: Yes  Black stools: No  Rectal or Anal pain: No  Fecal incontinence: No  Yellowing of skin or eyes: No  Vomit with blood: No  Change in stools: No  Trouble holding urine or incontinence: Yes  Pain or burning: No  Trouble starting or stopping: No  Increased frequency of urination: No  Blood in urine: No  Decreased frequency of urination: No  Frequent nighttime urination: No  Flank pain: Yes  Difficulty emptying bladder: No  Back  pain: Yes  Muscle aches: Yes  Neck pain: Yes  Swollen joints: Yes  Joint pain: Yes  Bone pain: No  Muscle cramps: Yes  Muscle weakness: Yes  Joint stiffness: Yes  Bone fracture: No  Trouble with coordination: Yes  Dizziness or trouble with balance: Yes  Fainting or black-out spells: No  Memory loss: No  Headache: No  Seizures: No  Speech problems: No  Tingling: Yes  Tremor: Yes  Weakness: Yes  Difficulty walking: Yes  Paralysis: No  Numbness: Yes  Nervous or Anxious: No  Depression: No  Trouble sleeping: No  Trouble thinking or concentrating: No  Mood changes: Yes  Panic attacks: No

## 2018-08-21 NOTE — MR AVS SNAPSHOT
After Visit Summary   8/21/2018    Lucio Daly    MRN: [id number]           Patient Information     Date Of Birth          1950        Visit Information        Provider Department      8/21/2018 3:00 PM Harsha Mccarthy MD [facility]        Today's Diagnoses     Dyspnea, unspecified type    -  1       Follow-ups after your visit        Follow-up notes from your care team     Return in about 3 months (around 11/21/2018).      Your next 10 appointments already scheduled     Nov 21, 2018  7:30 AM CST   FULL PULMONARY FUNCTION with UC PFL C   [facility] Pulmonary Function Testing ([facility])    [address]  3rd [address]  [facility] [address]   [phone]            Nov 21, 2018  8:30 AM CST   Six Minute Walk with UC PFL 6 MINUTE WALK 1   [facility] Pulmonary Function Testing ([facility])    [address]  [address] Floor  [facility] [address]   [phone]            Nov 21, 2018  9:00 AM CST   (Arrive by 8:45 AM)   Return Interstitial Lung with Harsha Mccarthy MD   [facility] ([facility])    [address]  [address]  [facility] [address]   [phone]            Dec 19, 2018  9:30 AM CST   (Arrive by 9:15 AM)   Return Visit with Jeremy Goodrich MD   [facility] Rheumatology ([facility])    [address]  [address]  [facility] [address]   [phone]            May 21, 2019  8:40 AM CDT   (Arrive by 8:25 AM)   Return Movement Disorder with Thong Montes MD   [facility] Neurology ([facility])    [address]  [address]  [facility] [address]   [phone]              Future tests that were ordered for you today     Open Future Orders        Priority Expected Expires Ordered    General PFT Lab (Please always keep checked) Routine  8/21/2019  "8/21/2018    6 minute walk test Routine  8/21/2019 8/21/2018    Pulmonary Function Test Routine  8/21/2019 8/21/2018    Echo Complete Bubble Routine  8/21/2019 8/21/2018            Who to contact     If you have questions or need follow up information about today's clinic visit or your schedule please contact [facility] directly at [phone].  Normal or non-critical lab and imaging results will be communicated to you by DocOnYouhart, letter or phone within 4 business days after the clinic has received the results. If you do not hear from us within 7 days, please contact the clinic through Plexisoft or phone. If you have a critical or abnormal lab result, we will notify you by phone as soon as possible.  Submit refill requests through Plexisoft or call your pharmacy and they will forward the refill request to us. Please allow 3 business days for your refill to be completed.          Additional Information About Your Visit        Plex[employer]ft Information     P[employer] gives you secure access to your electronic health record. If you see a primary care provider, you can also send messages to your care team and make appointments. If you have questions, please call your primary care clinic.  If you do not have a primary care provider, please call [phone] and they will assist you.        Care EveryWhere ID     This is your Care EveryWhere ID. This could be used by other organizations to access your [facility] medical records  [id number]        Your Vitals Were     Temperature Respirations Height Pulse Oximetry BMI (Body Mass Index)       97.9  F (36.6  C) (Oral) 16 1.88 m (6' 2\") 96% 39.67 kg/m2        Blood Pressure from Last 3 Encounters:   08/21/18 146/83   08/21/18 142/90   06/20/18 147/89    Weight from Last 3 Encounters:   08/21/18 140.2 kg (309 lb)   08/21/18 140.3 kg (309 lb 6 oz)   07/05/18 140.2 kg (309 lb 1.4 oz)                 Today's Medication Changes          These changes are " accurate as of 8/21/18  3:44 PM.  If you have any questions, ask your nurse or doctor.               Start taking these medicines.        Dose/Directions    albuterol 108 (90 Base) MCG/ACT inhaler   Commonly known as:  PROAIR HFA/PROVENTIL HFA/VENTOLIN HFA   Used for:  Dyspnea, unspecified type   Started by:  Harsha Mccarthy MD        Dose:  2 puff   Inhale 2 puffs into the lungs every 6 hours as needed for shortness of breath / dyspnea or wheezing   Quantity:  1 Inhaler   Refills:  0       Tdap (tetanus-diphtheria-acell pertussis) 5-2-15.5 LF-MCG/0.5 injection   Commonly known as:  ADACEL   Used for:  Need for Tdap vaccination   Started by:  Jah Corley MD        Dose:  0.5 mL   Inject 0.5 mLs into the muscle once for 1 dose   Quantity:  0.5 mL   Refills:  0         These medicines have changed or have updated prescriptions.        Dose/Directions    metoprolol succinate 50 MG 24 hr tablet   Commonly known as:  TOPROL-XL   This may have changed:    - See the new instructions.  - Another medication with the same name was removed. Continue taking this medication, and follow the directions you see here.   Used for:  Essential hypertension   Changed by:  Jah Corley MD        Dose:  50 mg   Take 1 tablet (50 mg) by mouth daily   Quantity:  90 tablet   Refills:  11            Where to get your medicines      These medications were sent to [facility] - [address]   [address]  [address], [place] [address]     Phone:  [phone]     albuterol 108 (90 Base) MCG/ACT inhaler    metoprolol succinate 50 MG 24 hr tablet         These medications were sent to [facility] [place], [place] - [address]  [address] [place] [address]     Phone:  [phone]     Tdap (tetanus-diphtheria-acell pertussis) 5-2-15.5 LF-MCG/0.5 injection                Primary Care Provider Office Phone # Fax #    Jah Corley [phone]  [phone]       [address]  [facility] [address]        Equal Access to Services     ALINE QUIÑONES : Hadii helen oneill vashti Ga, waguillermoda luqcarina, qaybta kayaritzada guccikrystian, mariana aureain hayaadaryl duckworthbetty torrez denisse johnson. So [facility] [phone].    ATENCIÓN: Si habla español, tiene a rudd disposición servicios gratuitos de asistencia lingüística. Jeanneame al [phone].    We comply with applicable federal civil rights laws and [place] laws. We do not discriminate on the basis of race, color, national origin, age, disability, sex, sexual orientation, or gender identity.            Thank you!     Thank you for choosing [facility]  for your care. Our goal is always to provide you with excellent care. Hearing back from our patients is one way we can continue to improve our services. Please take a few minutes to complete the written survey that you may receive in the mail after your visit with us. Thank you!             Your Updated Medication List - Protect others around you: Learn how to safely use, store and throw away your medicines at www.disposemymeds.org.          This list is accurate as of 8/21/18  3:44 PM.  Always use your most recent med list.                   Brand Name Dispense Instructions for use Diagnosis    albuterol 108 (90 Base) MCG/ACT inhaler    PROAIR HFA/PROVENTIL HFA/VENTOLIN HFA    1 Inhaler    Inhale 2 puffs into the lungs every 6 hours as needed for shortness of breath / dyspnea or wheezing    Dyspnea, unspecified type       folic acid 1 MG tablet    FOLVITE    180 tablet    1mg tab by mouth twice daily @ 9am and 4pm    Pain in toes of both feet, Rheumatoid arthritis of multiple sites with negative rheumatoid factor (H), Rheumatoid arthritis involving shoulder with negative rheumatoid factor, unspecified laterality (H), High risk medications (not anticoagulants) long-term use       LYRICA 50 MG capsule   Generic drug:  pregabalin     360 capsule    1 tab by mouth  @ 7/730am, 1 tab @ 4pm and 1-2 tab @ 11pm    Peripheral polyneuropathy       methotrexate 2.5 MG tablet CHEMO     96 tablet    TAKE 8 TABLETS BY MOUTH ONCE WEEKLY. LABS DUE EVERY 8 - 12 WEEKS.    Rheumatoid arthritis of multiple sites with negative rheumatoid factor (H), High risk medications (not anticoagulants) long-term use       metoprolol succinate 50 MG 24 hr tablet    TOPROL-XL    90 tablet    Take 1 tablet (50 mg) by mouth daily    Essential hypertension       omeprazole 40 MG capsule    priLOSEC    180 capsule    40mg capsule by mouth twice daily @ 7/730am and 4pm    Gastroesophageal reflux disease without esophagitis       order for DME      Use your CPAP device as directed by your provider.        oxybutynin 5 MG tablet    DITROPAN    90 tablet    5mg tab by mouth daily @ 4pm    Other urinary incontinence       PLAQUENIL 200 MG tablet   Generic drug:  hydroxychloroquine     180 tablet    200mg tab by mouth twice daily @ 9am and 4pm    Rheumatoid arthritis involving shoulder with negative rheumatoid factor, unspecified laterality (H)       Tdap (tetanus-diphtheria-acell pertussis) 5-2-15.5 LF-MCG/0.5 injection    ADACEL    0.5 mL    Inject 0.5 mLs into the muscle once for 1 dose    Need for Tdap vaccination       TYLENOL 500 MG tablet   Generic drug:  acetaminophen      2 x 500mg by mouth 3 times per day: 7/730am, 4pm and 11pm  = 6/day        vitamin D3 1000 units Caps     30 capsule    1000 unit capsule by mouth daily @ 7am

## 2018-08-21 NOTE — MR AVS SNAPSHOT
After Visit Summary   8/21/2018    Lucio Daly    MRN: [id number]           Patient Information     Date Of Birth          1950        Visit Information        Provider Department      8/21/2018 9:40 AM Jah Corley MD [facility]        Today's Diagnoses     Essential hypertension    -  1    Need for Tdap vaccination        BMI > 35 with comorbidity        Immunosuppressed status (H)        Mood disorder (H)        Neuromuscular disease (H)        Paralysis agitans (H)           Follow-ups after your visit        Follow-up notes from your care team     Return in about 6 months (around 2/21/2019) for Routine Visit.      Your next 10 appointments already scheduled     Aug 21, 2018  1:30 PM CDT   Six Minute Walk with UC PFL 6 MINUTE WALK 1   [facility] Pulmonary Function Testing ([facility])    [address] [place] [address]  3rd [facility] [address]   [phone]            Aug 21, 2018  2:00 PM CDT   FULL PULMONARY FUNCTION with UC PFL C   [facility] Function Testing ([facility])    [address] [place] [address]  3rd [facility] [address]   [phone]            Aug 21, 2018  3:00 PM CDT   (Arrive by 2:45 PM)   Return Interstitial Lung with Harsha Mccarthy MD   [facility] ([facility]    [address] [place] [address]  [address]  [facility] [address]   [phone]            Dec 19, 2018  9:30 AM CST   (Arrive by 9:15 AM)   Return Visit with Jeremy Goodrich MD   [facility] Rheumatology ([facility])    [address] [place] [address]  [address]  [facility] [address]   [phone]            May 21, 2019  8:40 AM CDT   (Arrive by 8:25 AM)   Return Movement Disorder with Thong Montes MD   [facility] Neurology ([facility])    [address] [place] [address]   [phone]               Who to contact     If you have questions or need follow up information about today's clinic visit or your schedule please contact [facility] directly at [phone].  Normal or non-critical lab and imaging results will be communicated to you by [employer], letter or phone within 4 business days after the clinic has received the results. If you do not hear from us within 7 days, please contact the clinic through [employer] or phone. If you have a critical or abnormal lab result, we will notify you by phone as soon as possible.  Submit refill requests through [employer] or call your pharmacy and they will forward the refill request to us. Please allow 3 business days for your refill to be completed.          Additional Information About Your Visit        [employer] Information     [employer] gives you secure access to your electronic health record. If you see a primary care provider, you can also send messages to your care team and make appointments. If you have questions, please call your primary care clinic.  If you do not have a primary care provider, please call [phone] and they will assist you.        Care EveryWhere ID     This is your Care EveryWhere ID. This could be used by other organizations to access your [facility] medical records  [id number]        Your Vitals Were     Pulse Temperature Respirations Pulse Oximetry BMI (Body Mass Index)       55 97.9  F (36.6  C) (Oral) 16 96% 39.7 kg/m2        Blood Pressure from Last 3 Encounters:   08/21/18 146/83   06/20/18 147/89   05/22/18 152/85    Weight from Last 3 Encounters:   08/21/18 309 lb 6 oz (140.3 kg)   07/05/18 309 lb 1.4 oz (140.2 kg)   06/20/18 309 lb 1.6 oz (140.2 kg)              Today, you had the following     No orders found for display         Today's Medication Changes          These changes are accurate as of 8/21/18 10:15 AM.  If you have any questions, ask your nurse or doctor.               Start taking these medicines.         Dose/Directions    Tdap (tetanus-diphtheria-acell pertussis) 5-2-15.5 LF-MCG/0.5 injection   Commonly known as:  ADACEL   Used for:  Need for Tdap vaccination   Started by:  Jah Corley MD        Dose:  0.5 mL   Inject 0.5 mLs into the muscle once for 1 dose   Quantity:  0.5 mL   Refills:  0         These medicines have changed or have updated prescriptions.        Dose/Directions    metoprolol succinate 50 MG 24 hr tablet   Commonly known as:  TOPROL-XL   This may have changed:    - See the new instructions.  - Another medication with the same name was removed. Continue taking this medication, and follow the directions you see here.   Used for:  Essential hypertension   Changed by:  Jah Corley MD        Dose:  50 mg   Take 1 tablet (50 mg) by mouth daily   Quantity:  90 tablet   Refills:  11            Where to get your medicines      These medications were sent to [facility] - [address]  [address]  [address], [place] [address]     Phone:  [phone]     metoprolol succinate 50 MG 24 hr tablet         These medications were sent to [facility] - [place], [place] - [address]  [address], [place] [address]     Phone:  [phone]     Tdap (tetanus-diphtheria-acell pertussis) 5-2-15.5 LF-MCG/0.5 injection                Primary Care Provider Office Phone # Fax #    Jah Corley [phone]       [address] [facility] [facility] [address]        Equal Access to Services     [facility] AH: Hadii aad ku hadasho Soomaali, waaxda luqadaha, qaybta kaalmada adeegyada, waxay emmy johnson. So [facility] [phone].    ATENCIÓN: Si faizala español, tiene a rudd disposición servicios gratuitos de asistencia lingüística. Llame al [phone].    We comply with applicable federal civil rights laws and [place] laws. We do not discriminate on the basis of race, color, national origin, age,  disability, sex, sexual orientation, or gender identity.            Thank you!     Thank you for choosing [facility]  for your care. Our goal is always to provide you with excellent care. Hearing back from our patients is one way we can continue to improve our services. Please take a few minutes to complete the written survey that you may receive in the mail after your visit with us. Thank you!             Your Updated Medication List - Protect others around you: Learn how to safely use, store and throw away your medicines at www.disposemymeds.org.          This list is accurate as of 8/21/18 10:15 AM.  Always use your most recent med list.                   Brand Name Dispense Instructions for use Diagnosis    folic acid 1 MG tablet    FOLVITE    180 tablet    1mg tab by mouth twice daily @ 9am and 4pm    Pain in toes of both feet, Rheumatoid arthritis of multiple sites with negative rheumatoid factor (H), Rheumatoid arthritis involving shoulder with negative rheumatoid factor, unspecified laterality (H), High risk medications (not anticoagulants) long-term use       LYRICA 50 MG capsule   Generic drug:  pregabalin     360 capsule    1 tab by mouth @ 7/730am, 1 tab @ 4pm and 1-2 tab @ 11pm    Peripheral polyneuropathy       methotrexate 2.5 MG tablet CHEMO     96 tablet    TAKE 8 TABLETS BY MOUTH ONCE WEEKLY. LABS DUE EVERY 8 - 12 WEEKS.    Rheumatoid arthritis of multiple sites with negative rheumatoid factor (H), High risk medications (not anticoagulants) long-term use       metoprolol succinate 50 MG 24 hr tablet    TOPROL-XL    90 tablet    Take 1 tablet (50 mg) by mouth daily    Essential hypertension       omeprazole 40 MG capsule    priLOSEC    180 capsule    40mg capsule by mouth twice daily @ 7/730am and 4pm    Gastroesophageal reflux disease without esophagitis       order for DME      Use your CPAP device as directed by your provider.        oxybutynin 5 MG tablet    DITROPAN    90  tablet    5mg tab by mouth daily @ 4pm    Other urinary incontinence       PLAQUENIL 200 MG tablet   Generic drug:  hydroxychloroquine     180 tablet    200mg tab by mouth twice daily @ 9am and 4pm    Rheumatoid arthritis involving shoulder with negative rheumatoid factor, unspecified laterality (H)       Tdap (tetanus-diphtheria-acell pertussis) 5-2-15.5 LF-MCG/0.5 injection    ADACEL    0.5 mL    Inject 0.5 mLs into the muscle once for 1 dose    Need for Tdap vaccination       TYLENOL 500 MG tablet   Generic drug:  acetaminophen      2 x 500mg by mouth 3 times per day: 7/730am, 4pm and 11pm  = 6/day        vitamin D3 1000 units Caps     30 capsule    1000 unit capsule by mouth daily @ 7am

## 2018-08-21 NOTE — LETTER
8/21/2018       RE: Lucio Daly  [address]  [place] [address]     Dear Colleague,    Thank you for referring your patient, Lucio Daly, to the [facility] at [facility]. Please see a copy of my visit note below.    Reason for Visit  Lucio Daly is a 68 year old year old man who is being seen for connective tissue disease-related ILD.    The patient was seen and examined by Germán L. Vélez Reyes (MS3) and Dr. Harsha Mccarthy .    Pulmonary HPI:     Mr. Daly comes in for followup of his connective tissue disease-related ILD.  He follows up in rheumatology for seronegative rheumatoid arthritis.  He was last seen in the Pulmonary Clinic on 1/16/2017. He is currently on plaquenil and methotrexate. He is here for routine follow up.     In June 20, 2018 he was found to have lymphopenia after a CBC with differential was ordered by Rheumatology (Dr. Goodrich) for drug monitoring. CRP was also elevated, showing inflammation and he was continued on methotrexate, up now to 20mg weekly.       Today, Mr Daly feels like he is doing well from a breathing standpoint. He is still having problem with positional dyspnea worse with laying down. He is on NIPPV at night and last evaluated in the sleep clinic in March with a recommendation to increase sleep duration to 7-8 hours / night.    He denies any changes in endurance.  He is still able to go on walks for 15-20 minutes without taking a break, but he cannot talk during. He avoids stairs. He continues to have shortness of breath with activity with occasional wheezing at night.     Heart burn is better with 40mg of omeprazole twice daily. Neuropathy continues to be a problem, with increasing numbness of his feet.    Current Outpatient Prescriptions   Medication     acetaminophen (TYLENOL) 500 MG tablet     Cholecalciferol (VITAMIN D3) 1000 units CAPS     folic acid (FOLVITE) 1 MG tablet      hydroxychloroquine (PLAQUENIL) 200 MG tablet     methotrexate 2.5 MG tablet CHEMO     metoprolol succinate (TOPROL-XL) 50 MG 24 hr tablet     omeprazole (PRILOSEC) 40 MG capsule     ORDER FOR DME     oxybutynin (DITROPAN) 5 MG tablet     pregabalin (LYRICA) 50 MG capsule     Tdap, tetanus-diphtheria-acell pertussis, (ADACEL) 5-2-15.5 LF-MCG/0.5 injection     [DISCONTINUED] metoprolol succinate (TOPROL-XL) 50 MG 24 hr tablet     [DISCONTINUED] metoprolol succinate (TOPROL-XL) 50 MG 24 hr tablet     No current facility-administered medications for this visit.       PAST MEDICAL HISTORY:   Past Medical History:   Diagnosis Date     Abnormal EMG 4/18/2013     Abnormal involuntary movements(781.0)     Movement Disorder     AK (actinic keratosis) 12/18/2011     Allergic rhinitis, cause unspecified     Allergic rhinitis     Balance problems 11/1/2011     Basal cell carcinoma      Bladder spasms 11/1/2011     Chronic osteoarthritis      Diaphragmatic hernia without mention of obstruction or gangrene      Earache or other ear, nose, or throat complaint      Esophageal reflux      Fatigue 11/1/2011     Fracture      H. pylori infection 5/12/2011     History of MRI of cervical spine 11/18/2013    EXAMINATION: CERVICAL SPINE G/E 5 VIEWS* 4/19/2013 4:18 PM  CLINICAL HISTORY: Pain in limb,Performing Location?->[facility]),  COMPARISON:  FINDINGS: AP and lateral views in flexion and extension, as well as odontoid view of the cervical spine was obtained. There is no comparison available. The vertebral bodies of the cervical spine are normally aligned. There is posterior spurring and d     Incomplete defecation 11/1/2011     Interstitial lung disease (H) 11/29/2016     Laboratory test 8/7/2012     Lung disease June 2015     Malignant basal cell neoplasm of skin 8/6/2008     Melanoma (H) 8/6/2008     Melanoma in situ of lower leg (H)     R calf     Neuropathy 5/16/2011     Other bladder disorder      Other color vision  "deficiencies      Other nervous system complications      Parkinsonism (H) 11/1/2011     Personal history of colonic polyps      Polyneuropathy in other diseases classified elsewhere (H)      RA (rheumatoid arthritis) (H)      Rheumatoid arthritis of multiple sites with negative rheumatoid factor (H) 3/21/2016     Seronegative arthritis 11/18/2013     Shortness of breath      Shoulder arthritis 2016    acromioclavicular joint      Somatization disorder 5/12/2011     Squamous cell carcinoma      Tremor 11/1/2011     Unspecified essential hypertension      Unspecified hypothyroidism      Urinary tract infection      Urinary urgency 11/1/2011     Wears glasses 11/1/2011       PAST SURGICAL HISTORY:   Past Surgical History:   Procedure Laterality Date     BIOPSY OF SKIN LESION       COLONOSCOPY       HEMORRHOID SURGERY       lip biopsy      for sicca complex     MOHS MICROGRAPHIC PROCEDURE       SOFT TISSUE SURGERY      removeal of basel cell carcinoma       FAMILY HISTORY:   Family History   Problem Relation Age of Onset     Hypertension Mother      HEART DISEASE Mother      a fib     Arthritis Mother      \"osteo\"     Osteoperosis Mother      Skin Cancer Mother      Uterine Cancer Mother      Hypertension Brother      Diabetes Brother      \" post pancreatitis\"     Neurologic Disorder Sister      multiple sclerosis     Hypertension Sister      HEART DISEASE Sister      HEART DISEASE Sister      a fib     Arthritis Sister      Hypertension Father      Cerebrovascular Disease Father      ,     Skin Cancer Father      Colon Polyps Father      Psoriasis Maternal Grandfather      Stomach Cancer Maternal Grandfather      Congenital Anomalies Other      granddaughter with a chromosome defect     Cerebrovascular Disease Paternal Grandmother      ,     Cerebrovascular Disease Paternal Grandfather      ,     Melanoma No family hx of      Colon Cancer No family hx of        SOCIAL HISTORY:   Social History   Substance Use Topics " "    Smoking status: Former Smoker     Packs/day: 1.50     Years: 37.00     Types: Cigarettes     Start date: 6/15/1963     Quit date: 9/30/2000     Smokeless tobacco: Never Used     Alcohol use No    ROS Pulmonary  A complete ROS was otherwise negative except as noted in the HPI.  BP (!) 146/93  Pulse 55  Resp 16  Ht 1.88 m (6' 2\")  Wt (!) 130.3 kg (309 lb  6oz)  SpO2 96%   Exam:   GENERAL APPEARANCE: Alert, and in no apparent distress.  EYES: PERRL, EOMI  MOUTH: Oral mucosa is moist, without any lesions,, no oropharyngeal exudate.  NECK: supple, no masses, no thyromegaly.  LYMPHATICS: No significant axillary, cervical, or supraclavicular nodes.  RESP: normal percussion, diminished heart sounds.  No crackles. No rhonchi. No wheezes.  CV:  regular heartbeat. Distant S1, loud S2.   MS: extremities normal. No clubbing. No cyanosis.  SKIN: dry skin. No rash on limited exam  NEURO: Mentation intact, speech normal. No obvious focal findings.     Results:    PFTs done today were reviewed by me with the patient.  He also had a 6-minute walk test done which he did using a cane.  His 6-minute walk distance was below the lower limit of normal by 572ft.  He did not have any O2 saturation problems.  FVC is 3.27 liters which is 67% of predicted.  FEV1 is 2.36 which is 64% of predicted.  The ratio is 72. Total lung capacity is 6.65 which is 83% of predicted.  DLCO is 27.96 which is 100% of predicted. My interpretation is that he has mild restriction with normal diffusion capacity. TLC increased to previous levels in comparison to last visit. FVC however is decreased by ~450 ml.     A MIP done today was -25 and MEP 65. On May 22, 2018 a MIP was -45 and a MEP of 60.       EKG showed sinus bradychardia and sinus arrhythmia, with no significat changes when compared to an EKG from 6/26/2015. BNP was increased, at 272 (125 ULN), and LFTs were normal.       Assessment and plan:    Mr. 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 He is not able to walk more than 15 - 20 minutes because of increasing shortness of breath. He may benefit form . This was previously contraindicated as he was on selegiline. He has now been taken off for months. A new prescription has been provieded.     2. Respiratory Muscle Weakness: MEP and MIP reflect respiratory muscle weakness. We will discuss this with neurology.     3.  Sleep-disordered breathing, on bilevel ventilation with good compliance    4. Orthopnea:. A cardiac echocardiogram was ordered, Differential includes diaphragmatic weakness.     4.  Gastroesophageal reflux, now being managed with assistance from GI. He is now taking 40mg of omeprazole twice daily    5. Drug monitoring: Labs last done by Rheumatology on 7/11.      I will see him back in 3 months    Attending statement:    The patient was seen and examined by me with house staff.  The case was discussed at length.    Vitals, lab results and imaging from today were reviewed.  The note reflects our joint assessment and plan.    Harsha Mccarthy MD  Pulmonary, Critical Care Medicine

## 2018-08-21 NOTE — PROGRESS NOTES
SUBJECTIVE:   Lucio Daly is a 68 year old male who presents to clinic today for the following health issues:    Hypertension Follow-up      Outpatient blood pressures are not being checked.    Low Salt Diet: not monitoring salt      Amount of exercise or physical activity: 2-3 days/week for an average of 15-30 minutes    Problems taking medications regularly: No    Medication side effects: mood changes     Diet: regular (no restrictions)    He is followed by neurology for parkinsons no currently on any meds for this. He also has neuropathy. For which he takes lyrica. This is tolerated well.     His mood has been a bit worse as of late. Not interested in meds nor counseling at present.     No const, cv, neuro symptoms otherwise.     OBJECTIVE: /90  Pulse 55  Temp 97.9  F (36.6  C) (Oral)  Resp 16  Wt 309 lb 6 oz (140.3 kg)  SpO2 96%  BMI 39.7 kg/m2 Exam:  GENERAL APPEARANCE: healthy, alert and no distress  EYES: Eyes grossly normal to inspection  HENT: ear canals and TM's normal and nose and mouth without ulcers or lesions  NECK: no adenopathy, no asymmetry, masses, or scars and thyroid normal to palpation  RESP: lungs clear to auscultation - no rales, rhonchi or wheezes  CV: regular rates and rhythm, normal S1 S2, no S3 or S4 and no murmur, click or rub -  ABDOMEN:  soft, nontender, no HSM or masses and bowel sounds normal  SKIN: no suspicious lesions or rashes     Peripheral pulses are normal.        ICD-10-CM    1. Essential hypertension I10 metoprolol succinate (TOPROL-XL) 50 MG 24 hr tablet   2. Need for Tdap vaccination Z23 Tdap, tetanus-diphtheria-acell pertussis, (ADACEL) 5-2-15.5 LF-MCG/0.5 injection   3. BMI > 35 with comorbidity E66.01    4. Immunosuppressed status (H) D89.9    5. Mood disorder (H) F39    6. Neuromuscular disease (H) G70.9    7. Paralysis agitans (H) G20     overall stable. Discussed meds for mood briefly. follow up 6 months or sooner. Did not change the dose of his  blood pressure meds at present. Would add a med rather than increase metoprolol. Updated tetanus. Weight discussed briefly.

## 2018-08-22 DIAGNOSIS — G62.9 NEUROPATHY: ICD-10-CM

## 2018-08-22 DIAGNOSIS — G70.9 NEUROMUSCULAR RESPIRATORY WEAKNESS (H): Primary | ICD-10-CM

## 2018-08-22 DIAGNOSIS — J99 NEUROMUSCULAR RESPIRATORY WEAKNESS (H): Primary | ICD-10-CM

## 2018-08-23 DIAGNOSIS — N39.498 OTHER URINARY INCONTINENCE: ICD-10-CM

## 2018-08-23 NOTE — TELEPHONE ENCOUNTER
"Requested Prescriptions   Pending Prescriptions Disp Refills     oxybutynin (DITROPAN) 5 MG tablet  Last Written Prescription Date:  18  Last Fill Quantity: 90 tab,  # refills: 1   Last office visit: 2018 with prescribing provider:  Jah Corley    Future Office Visit:    90 tablet 1     Simg tab by mouth daily @ 4pm    Muscarinic Antagonists (Urinary Incontinence Agents) Passed    2018  9:22 AM       Passed - Recent (12 mo) or future (30 days) visit within the authorizing provider's specialty    Patient had office visit in the last 12 months or has a visit in the next 30 days with authorizing provider or within the authorizing provider's specialty.  See \"Patient Info\" tab in inbasket, or \"Choose Columns\" in Meds & Orders section of the refill encounter.           Passed - Medication is Oxybutynin and patient is 5 years of age or older       Passed - Patient does not have a diagnosis of glaucoma on the problem list    If glaucoma diagnosis is new, refer refill to physician.         Passed - Patient is 18 years of age or older          "

## 2018-08-23 NOTE — TELEPHONE ENCOUNTER
"Requested Prescriptions   Pending Prescriptions Disp Refills     oxybutynin (DITROPAN) 5 MG tablet [Pharmacy Med Name: OXYBUTYNIN CHLORIDE 5MG TABS]  Last Written Prescription Date:  5/22/2018  Last Fill Quantity: 90 tablet,  # refills: 1   Last Office Visit: 8/21/2018   Future Office Visit:      90 tablet 1     Sig: TAKE ONE TABLET BY MOUTH EVERY DAY    Muscarinic Antagonists (Urinary Incontinence Agents) Passed    8/23/2018  1:24 PM       Passed - Recent (12 mo) or future (30 days) visit within the authorizing provider's specialty    Patient had office visit in the last 12 months or has a visit in the next 30 days with authorizing provider or within the authorizing provider's specialty.  See \"Patient Info\" tab in inbasket, or \"Choose Columns\" in Meds & Orders section of the refill encounter.           Passed - Medication is Oxybutynin and patient is 5 years of age or older       Passed - Patient does not have a diagnosis of glaucoma on the problem list    If glaucoma diagnosis is new, refer refill to physician.       Passed - Patient is 18 years of age or older          "

## 2018-08-24 RX ORDER — OXYBUTYNIN CHLORIDE 5 MG/1
TABLET ORAL
Qty: 90 TABLET | Refills: 1 | Status: SHIPPED | OUTPATIENT
Start: 2018-08-24 | End: 2018-12-07

## 2018-08-30 ENCOUNTER — RADIANT APPOINTMENT (OUTPATIENT)
Dept: CARDIOLOGY | Facility: CLINIC | Age: 68
End: 2018-08-30
Attending: INTERNAL MEDICINE
Payer: MEDICARE

## 2018-08-30 DIAGNOSIS — R06.00 DYSPNEA, UNSPECIFIED TYPE: ICD-10-CM

## 2018-08-30 RX ORDER — ACYCLOVIR 200 MG/1
12 CAPSULE ORAL ONCE
Status: DISCONTINUED | OUTPATIENT
Start: 2018-08-30 | End: 2018-08-30 | Stop reason: HOSPADM

## 2018-08-30 RX ADMIN — Medication 4 ML: at 08:45

## 2018-09-07 ASSESSMENT — ENCOUNTER SYMPTOMS
TREMORS: 1
SMELL DISTURBANCE: 0
HEADACHES: 0
WEAKNESS: 1
TASTE DISTURBANCE: 0
INCREASED ENERGY: 0
WEIGHT LOSS: 0
DIFFICULTY URINATING: 0
SORE THROAT: 0
WEIGHT GAIN: 0
MEMORY LOSS: 0
PARALYSIS: 0
DOUBLE VISION: 1
POLYDIPSIA: 0
SEIZURES: 0
SLEEP DISTURBANCES DUE TO BREATHING: 0
NECK MASS: 0
HALLUCINATIONS: 0
ORTHOPNEA: 1
SPUTUM PRODUCTION: 0
MUSCLE WEAKNESS: 1
SPEECH CHANGE: 0
SINUS PAIN: 0
BLOOD IN STOOL: 1
SNORES LOUDLY: 0
PANIC: 0
EYE WATERING: 0
LIGHT-HEADEDNESS: 0
HYPOTENSION: 0
EYE PAIN: 1
NUMBNESS: 1
DIZZINESS: 1
JAUNDICE: 0
DEPRESSION: 0
DYSPNEA ON EXERTION: 1
DISTURBANCES IN COORDINATION: 1
EYE IRRITATION: 1
JOINT SWELLING: 1
HEMATURIA: 0
DECREASED APPETITE: 0
DYSURIA: 0
SKIN CHANGES: 0
HEMOPTYSIS: 0
CHILLS: 0
POSTURAL DYSPNEA: 1
MUSCLE CRAMPS: 1
POOR WOUND HEALING: 0
STIFFNESS: 1
POLYPHAGIA: 0
PALPITATIONS: 1
SYNCOPE: 0
TINGLING: 1
FLANK PAIN: 1
EXERCISE INTOLERANCE: 1
NERVOUS/ANXIOUS: 0
NECK PAIN: 1
BOWEL INCONTINENCE: 0
MYALGIAS: 1
WHEEZING: 1
FEVER: 0
LEG PAIN: 1
COUGH: 0
HOARSE VOICE: 1
ALTERED TEMPERATURE REGULATION: 1
COUGH DISTURBING SLEEP: 0
FATIGUE: 1
EYE REDNESS: 0
INSOMNIA: 0
DECREASED CONCENTRATION: 0
VOMITING: 0
LOSS OF CONSCIOUSNESS: 0
DIARRHEA: 0
SINUS CONGESTION: 0
BLOATING: 1
NAUSEA: 0
BACK PAIN: 1
RECTAL PAIN: 0
ABDOMINAL PAIN: 1
ARTHRALGIAS: 1
SHORTNESS OF BREATH: 1
NAIL CHANGES: 0
HEARTBURN: 1
CONSTIPATION: 1
NIGHT SWEATS: 0
HYPERTENSION: 1
TROUBLE SWALLOWING: 1

## 2018-09-12 ENCOUNTER — PRE VISIT (OUTPATIENT)
Dept: NEUROLOGY | Facility: CLINIC | Age: 68
End: 2018-09-12

## 2018-09-12 ENCOUNTER — OFFICE VISIT (OUTPATIENT)
Dept: NEUROLOGY | Facility: CLINIC | Age: 68
End: 2018-09-12
Payer: MEDICARE

## 2018-09-12 VITALS
OXYGEN SATURATION: 97 % | HEIGHT: 73 IN | HEART RATE: 55 BPM | SYSTOLIC BLOOD PRESSURE: 188 MMHG | WEIGHT: 313.23 LBS | TEMPERATURE: 97.5 F | DIASTOLIC BLOOD PRESSURE: 110 MMHG | BODY MASS INDEX: 41.51 KG/M2

## 2018-09-12 DIAGNOSIS — M06.09 RHEUMATOID ARTHRITIS OF MULTIPLE SITES WITH NEGATIVE RHEUMATOID FACTOR (H): ICD-10-CM

## 2018-09-12 DIAGNOSIS — J99 NEUROMUSCULAR RESPIRATORY WEAKNESS (H): Primary | ICD-10-CM

## 2018-09-12 DIAGNOSIS — G70.9 NEUROMUSCULAR RESPIRATORY WEAKNESS (H): Primary | ICD-10-CM

## 2018-09-12 DIAGNOSIS — G60.9 HEREDITARY AND IDIOPATHIC PERIPHERAL NEUROPATHY: ICD-10-CM

## 2018-09-12 DIAGNOSIS — R25.1 TREMOR: ICD-10-CM

## 2018-09-12 DIAGNOSIS — Z79.899 HIGH RISK MEDICATIONS (NOT ANTICOAGULANTS) LONG-TERM USE: ICD-10-CM

## 2018-09-12 LAB
ALBUMIN SERPL-MCNC: 3.8 G/DL (ref 3.4–5)
ALT SERPL W P-5'-P-CCNC: 29 U/L (ref 0–70)
AST SERPL W P-5'-P-CCNC: 17 U/L (ref 0–45)
BASOPHILS # BLD AUTO: 0 10E9/L (ref 0–0.2)
BASOPHILS NFR BLD AUTO: 0.9 %
CREAT SERPL-MCNC: 1.19 MG/DL (ref 0.66–1.25)
CRP SERPL-MCNC: 5.7 MG/L (ref 0–8)
DIFFERENTIAL METHOD BLD: NORMAL
EOSINOPHIL # BLD AUTO: 0.1 10E9/L (ref 0–0.7)
EOSINOPHIL NFR BLD AUTO: 1.1 %
ERYTHROCYTE [DISTWIDTH] IN BLOOD BY AUTOMATED COUNT: 14.6 % (ref 10–15)
GFR SERPL CREATININE-BSD FRML MDRD: 60 ML/MIN/1.7M2
HCT VFR BLD AUTO: 42.8 % (ref 40–53)
HGB BLD-MCNC: 13.6 G/DL (ref 13.3–17.7)
IMM GRANULOCYTES # BLD: 0 10E9/L (ref 0–0.4)
IMM GRANULOCYTES NFR BLD: 0.5 %
LYMPHOCYTES # BLD AUTO: 1 10E9/L (ref 0.8–5.3)
LYMPHOCYTES NFR BLD AUTO: 21.6 %
MCH RBC QN AUTO: 30.9 PG (ref 26.5–33)
MCHC RBC AUTO-ENTMCNC: 31.8 G/DL (ref 31.5–36.5)
MCV RBC AUTO: 97 FL (ref 78–100)
MONOCYTES # BLD AUTO: 0.3 10E9/L (ref 0–1.3)
MONOCYTES NFR BLD AUTO: 7 %
NEUTROPHILS # BLD AUTO: 3 10E9/L (ref 1.6–8.3)
NEUTROPHILS NFR BLD AUTO: 68.9 %
NRBC # BLD AUTO: 0 10*3/UL
NRBC BLD AUTO-RTO: 0 /100
PLATELET # BLD AUTO: 176 10E9/L (ref 150–450)
RBC # BLD AUTO: 4.4 10E12/L (ref 4.4–5.9)
WBC # BLD AUTO: 4.4 10E9/L (ref 4–11)

## 2018-09-12 ASSESSMENT — PAIN SCALES - GENERAL: PAINLEVEL: MODERATE PAIN (5)

## 2018-09-12 NOTE — LETTER
9/12/2018       RE: Lucio Daly  [address]  [place] [address]     Dear Colleague,    Thank you for referring your patient, Lucio Daly, to the [facility] NEUROLOGY at [facility]. Please see a copy of my visit note below.    [facility] Neuromuscular Neuropathy Clinic  09/12/18    68 year old man with PMH of interstitial lung disease, rheumatoid arthritis, GALO on CPAP  presents for evaluation of 6 months history of increasing fatigue and shortness of breath, especially when walking and bending over. His pulmonologist referred him for changes seen in PFTs. There is no specific time of day when he is especially short of breath. When sitting in his recliner or lying back in bed, he reports having trouble catching his breath. He needs 2 pillows when sleeping at night. He wears his BiPAP every night for a year now and reports sleeping well. He does drop items from his hands, has a hard time walking up the stairs and getting up out of a chair mostly because of fatigue.  In the morning his voice gets weak and raspy and when he feels fatigue, when he talks for long periods of time. He occasionally drools while talking or watching TV. He gets soreness in legs, arms and trunk especially after activity that can last a day or 2. He gets cramps in his calves ocasionally. He feels he's lost muscle mass in his legs distally and his arms proximally. He has gained weight over the past year.   He wears leg braces for difficulty raising his feet with his left more than right, worse with exertion.   He sometimes stumbles but never falls. He uses a cane and a walker for longer walks. ADLs and IADLs. Of note, he follows with Dr. Vanegas for left tremor and rigidity. He was previously on Selegline but was taken off; he was not officially diagnosed with Parkinsonism. He follows with rheumatology continues on Plaquenil and Methotrexate.   He will occasionally get blurred  especially when exerting himself or changing position from sitting to standing. This doesn't occur with headaches. He denies ptosis, head drop, chest pain, palpitations, twitching, change in bowel or bladder function (on Oxybutynin). He has not started any new medications.       Labs:  8/30/2018 EF 55-60%    8/21/2018   PFTs:   MIP -25 (was this same value 11/2017, -45 In 5/2018)  MEP 65  FVC- 72%    He had had normal muscle enzymes (CK) and the normal muscle EMG 3/2016.        8 sec left, 0 sec on on R  5 sec mall, left 5 sec  Reduced proprioception at the toes   hyperreflexive knees   Absent ankles   Can stand on his toes and heels  Unable to tandem walk    Right artihritis thumb- wears brace      Past Medical History:     Past Surgical History:    Family history:    There is no known family history of hereditary neuropathies or other neuromuscular disorders.    Social History:    She denies tobacco, alcohol, or illicit drug use. There is no known exposure to toxins or heavy metals.     Medical Allergies:  NKDA     Current Medications:      Review of Systems: A complete review of systems was obtained and was negative except for what was noted above.    Physical examination:      General Appearance: NAD, morbidly obese    Neurologic examination:  Mental status:  Patient is alert, attentive, and oriented x 3.  Language is coherent and fluent without dysarthria or aphasia.  Memory, comprehension and ability to follow commands were intact.       Cranial nerves:  Pupils were round and reacted to light.  Extraocular movements were full. No fatiguable ptosis, There was no face, jaw, palate or tongue weakness or atrophy. Hearing was grossly intact.  Shoulder shrug was normal.      Motor exam: Reduced muscle bulk at the distal legs. No action or percussion myotonia   Manual muscle testing revealed the following MRC grade muscle power:   Right Left   Neck flexion 5 5   Neck extension: 5 5   Shoulder abduction:  5 5   Elbow  extension: 5 5   Elbow flexion:  5 5   Wrist flexion:  5 5   Wrist extension:  5 5   Finger flexion 5 5   FDI 5 5   ADM 5 5   Hip flexion 5 5   Hip extension 5 5   Knee flexion 5 5   Knee extension 5 5   Dorsiflexion 5 5   Plantar flexion 5 5     No dysmetria on finger to nose.   Left postural and action tremor  Reduced CALLIE left side; slow foot tap and left    Sensory exam   Absent vibration left toe, 3 seconds on right. 5 seconds b/l media malleoli.  Reduced proprioception at the toes.   Pinprick and temperature were decreased to the ankles bilaterally.    +Romberg sign     Wide based gait; uses a cane. Difficulty clearing right foot when walking.   He was able to stand on his heels, toes  Unable to tandem without any difficulty.       Deep tendon reflexes:   Right Left   Triceps 2 2   Biceps 2 2   Brachioradialis 2 2   Knee jerk 3 3   Ankle jerk 0 0   Plantar responses were flexor bilaterally.       Assessment:    68 year old man with PMH of interstitial lung disease, rheumatoid arthritis, GALO on CPAP  presents for evaluation of 6 months history of increasing fatigue and shortness of breath. Given an unremarkable neuromuscular exam, normal previous CKs and EMG in 2016, the most likely cause of his dyspnea is a combination of deconditioning, obesity hypoventilation syndrome and his interstitial lung disease.     Plan:      - encouraged the patient to lose weight  - no further neuromuscular work up recommended     Kathy See DO  [facility] Neuromuscular Fellow [id number]    ATTENDING ADDENDUM: Patient seen and examined with Fellow Dr See today at the Neuromuscular Clinic. Agree with her assessment and plan as above. TT spent for patient care 45 minutes; more than half was counseling.Mr Daly does have signs of diaphragm muscle weakness (low MIP, restrictive respiratory physiology, orthopnea requiring BiPAP) but I do not believe there is any systemic neuromuscular disorder to explain this for several  reasons, includin) Normal exam of cranial and proximal muscles showing no signs of myasthenia, myopathy, MND, etc, 2) Normal needle EMG and RNS studies performed by myself, for the same reason, in 3/2016 (See Epic), and 3) No real progression of respiratory dysfunction between 2015 and 2018. If one looks at the PFT results, the FVC is identical in 2015 and 2018 (72% predicted). MIP was low even back in  (-45). If patient had a progressive neuromuscular disorder causing diaphragm weakness this should have declared itself during those 3 years. Suspect deconditioning/morbid obesity. Recommendations as above.  The patient has a couple of other neurological issues that we did not analyze further. The one is a very longstanding neuropathy. The patient was followed for many years by Dr Gonzalez, Director of the Neuromuscular Division, for this neuropathy. He did not feel that additional investigations would be beneficial at the last visit in , and felt this is idiopathic neuropathy or related to connective tissue disorder. This does not have anything to do with the respiratory problem and frankly I have nothing to add. Second, there is a postural tremor of the left>right hand. Our Fellow found signs of bradykinesia of left hand and cogwheel rigidity. We will leave further management of this to Dr Montes. Follow up frankie Reyes MD

## 2018-09-12 NOTE — PROGRESS NOTES
The blood counts, liver, kidney (reduced but stable) and CRP inflammation labs are normal.     Sin GUZMAN, CNP, MSN  9/12/2018  3:45 PM

## 2018-09-12 NOTE — NURSING NOTE
Chief Complaint   Patient presents with     Consult     [facility] - NEUROPATHY       Mike Flores, EMT

## 2018-09-12 NOTE — MR AVS SNAPSHOT
After Visit Summary   9/12/2018    Lucio Daly    MRN: [id number]           Patient Information     Date Of Birth          1950        Visit Information        Provider Department      9/12/2018 10:50 AM Shon Reyes MD [facility] Neurology        Care Instructions    I do not believe you have a serious neuromuscular disorder explaining the weakness of your breathing muscles. This is ruled out by your neurological exam, the normal muscle enzymes (CK) and the normal muscle EMG that I did on you for the same reason, on 3/2016.    I think your problem reflects deconditioning, aging, and excess weight. I would recommend attempting to lose weight, regular exercise, and follow up with Pulmonology (Dr Joaquin) and Dr Montes for your tremor.             Follow-ups after your visit        Your next 10 appointments already scheduled     Nov 21, 2018  7:30 AM CST   FULL PULMONARY FUNCTION with UC PFL C   [facility] Pulmonary Function Testing ([facility])    [address] [address]   [phone]            Nov 21, 2018  8:30 AM CST   Six Minute Walk with UC PFL 6 MINUTE WALK 1   [facility] Pulmonary Function Testing ([facility])    [address]  [address] [place] [address]   [phone]            Nov 21, 2018  9:00 AM CST   (Arrive by 8:45 AM)   Return Interstitial Lung with Harsha Mccarthy MD   [facility] ([facility])    [address]  [address]  [facility] [facility] [address]   [phone]            Dec 19, 2018  9:30 AM CST   (Arrive by 9:15 AM)   Return Visit with Jeremy Goodrich MD   [facility] Rheumatology ([facility])    [address]  [address]  [place] [facility] [place] [address]   [phone]            May 21, 2019  8:40 AM CDT   (Arrive by 8:25 AM)   Return Movement Disorder with Thong Stevens  "MD Jyoti   [facility] Neurology ([facility])    [address]  3rd [address]  [facility] [address] [phone]              Who to contact     Please call your clinic at [phone] to:    Ask questions about your health    Make or cancel appointments    Discuss your medicines    Learn about your test results    Speak to your doctor            Additional Information About Your Visit        [employer] Information     [employer] gives you secure access to your electronic health record. If you see a primary care provider, you can also send messages to your care team and make appointments. If you have questions, please call your primary care clinic.  If you do not have a primary care provider, please call [phone] and they will assist you.      [employer] is an electronic gateway that provides easy, online access to your medical records. With [employer], you can request a clinic appointment, read your test results, renew a prescription or communicate with your care team.     To access your existing account, please contact your [facility] or call [phone] for assistance.        Care EveryWhere ID     This is your Care EveryWhere ID. This could be used by other organizations to access your [facility] medical records  [id number]        Your Vitals Were     Pulse Temperature Height Pulse Oximetry BMI (Body Mass Index)       55 97.5  F (36.4  C) (Oral) 1.854 m (6' 1\") 97% 41.33 kg/m2        Blood Pressure from Last 3 Encounters:   09/12/18 (!) 188/110   08/21/18 146/83   08/21/18 142/90    Weight from Last 3 Encounters:   09/12/18 142.1 kg (313 lb 3.7 oz)   08/21/18 140.2 kg (309 lb)   08/21/18 140.3 kg (309 lb 6 oz)              Today, you had the following     No orders found for display       Primary Care Provider Office Phone # Fax #    Jah Corley [phone]       [address]  [facility] [address]        Equal Access to Services     " ALINE QUIÑONES : Hadii aad ku vashti Ga, waaxda luqadaha, qaybta kaalmada richar, mariana aureain hayaadaryl duckworthbetty torrez denisse . So [facility] [phone].    ATENCIÓN: Si habla lisandro, tiene a rudd disposición servicios gratuitos de asistencia lingüística. Llame al [phone].    We comply with applicable federal civil rights laws and [place] laws. We do not discriminate on the basis of race, color, national origin, age, disability, sex, sexual orientation, or gender identity.            Thank you!     Thank you for choosing [facility] NEUROLOGY  for your care. Our goal is always to provide you with excellent care. Hearing back from our patients is one way we can continue to improve our services. Please take a few minutes to complete the written survey that you may receive in the mail after your visit with us. Thank you!             Your Updated Medication List - Protect others around you: Learn how to safely use, store and throw away your medicines at www.disposemymeds.org.          This list is accurate as of 9/12/18 12:11 PM.  Always use your most recent med list.                   Brand Name Dispense Instructions for use Diagnosis    albuterol 108 (90 Base) MCG/ACT inhaler    PROAIR HFA/PROVENTIL HFA/VENTOLIN HFA    1 Inhaler    Inhale 2 puffs into the lungs every 6 hours as needed for shortness of breath / dyspnea or wheezing    Dyspnea, unspecified type       folic acid 1 MG tablet    FOLVITE    180 tablet    1mg tab by mouth twice daily @ 9am and 4pm    Pain in toes of both feet, Rheumatoid arthritis of multiple sites with negative rheumatoid factor (H), Rheumatoid arthritis involving shoulder with negative rheumatoid factor, unspecified laterality (H), High risk medications (not anticoagulants) long-term use       LYRICA 50 MG capsule   Generic drug:  pregabalin     360 capsule    1 tab by mouth @ 7/730am, 1 tab @ 4pm and 1-2 tab @ 11pm    Peripheral polyneuropathy       methotrexate 2.5 MG tablet CHEMO      96 tablet    TAKE 8 TABLETS BY MOUTH ONCE WEEKLY. LABS DUE EVERY 8 - 12 WEEKS.    Rheumatoid arthritis of multiple sites with negative rheumatoid factor (H), High risk medications (not anticoagulants) long-term use       metoprolol succinate 50 MG 24 hr tablet    TOPROL-XL    90 tablet    Take 1 tablet (50 mg) by mouth daily    Essential hypertension       omeprazole 40 MG capsule    priLOSEC    180 capsule    40mg capsule by mouth twice daily @ 7/730am and 4pm    Gastroesophageal reflux disease without esophagitis       order for DME      Use your CPAP device as directed by your provider.        * oxybutynin 5 MG tablet    DITROPAN    90 tablet    5mg tab by mouth daily @ 4pm    Other urinary incontinence       * oxybutynin 5 MG tablet    DITROPAN    90 tablet    TAKE ONE TABLET BY MOUTH EVERY DAY    Other urinary incontinence       PLAQUENIL 200 MG tablet   Generic drug:  hydroxychloroquine     180 tablet    200mg tab by mouth twice daily @ 9am and 4pm    Rheumatoid arthritis involving shoulder with negative rheumatoid factor, unspecified laterality (H)       TYLENOL 500 MG tablet   Generic drug:  acetaminophen      2 x 500mg by mouth 3 times per day: 7/730am, 4pm and 11pm  = 6/day        vitamin D3 1000 units Caps     30 capsule    1000 unit capsule by mouth daily @ 7am        * Notice:  This list has 2 medication(s) that are the same as other medications prescribed for you. Read the directions carefully, and ask your doctor or other care provider to review them with you.

## 2018-09-12 NOTE — PATIENT INSTRUCTIONS
I do not believe you have a serious neuromuscular disorder explaining the weakness of your breathing muscles. This is ruled out by your neurological exam, the normal muscle enzymes (CK) and the normal muscle EMG that I did on you for the same reason, on 3/2016.    I think your problem reflects deconditioning, aging, and excess weight. I would recommend attempting to lose weight, regular exercise, and follow up with Pulmonology (Dr Joaquin) and Dr Montes for your tremor.

## 2018-09-12 NOTE — TELEPHONE ENCOUNTER
FUTURE VISIT INFORMATION      FUTURE VISIT INFORMATION:    Date: 09/12/2017    Time: 10:50 am     [place] [facility] [place]   REFERRAL INFORMATION:  Referring provider:  Thong Montes        Referring providers clinic:      Reason for visit/diagnosis  APPT PER PATIENT, REFERRED BY DR. MONTES FOR NEUROPATHY. RECORDS AND REFERRAL IN EPIC. PT FORMER PT OF DR. HELM'S, HE DOES NOT WANT TO SCHEDULE WITH DR. HELM.       NOTES (FOR ALL VISITS) STATUS DETAILS   OFFICE NOTE from referring provider Internal  08/22/2014,    OFFICE NOTE from other specialist N/A    DISCHARGE SUMMARY from hospital Internal 07/29/2015, 06/30/2015   DISCHARGE REPORT from the ER Internal 12/04/2013,    OPERATIVE REPORT Internal 06/06/2005   MEDICATION LIST Internal    IMAGING  (FOR ALL VISITS)     EMG Internal 08/22/2018, 05/22/2018,    EEG N/A    ECT N/A    MRI (HEAD, NECK, SPINE) Internal  PACS  08/07/2017, 06/27/2017, 07/27/2017, 06/27/2017, 12/02/2014, 02/14/2012   CT (HEAD, NECK, SPINE) Internal  PACS  02/24/2006   OTHER     X-ray Lumbar spine G/E 4 views (AP, lateral, flexion, extension - standing views preferred) [[id number]]    XR Cervical Spine Comp w Obl & Flex/Ext    XR Cervical Spine Comp w Obl & Flex/Ext Internal   PACS 07/27/2017,         04/18/2013,

## 2018-11-02 ENCOUNTER — OFFICE VISIT (OUTPATIENT)
Dept: ORTHOPEDICS | Facility: CLINIC | Age: 68
End: 2018-11-02
Payer: MEDICARE

## 2018-11-02 VITALS
SYSTOLIC BLOOD PRESSURE: 168 MMHG | WEIGHT: 313 LBS | HEART RATE: 75 BPM | BODY MASS INDEX: 41.48 KG/M2 | DIASTOLIC BLOOD PRESSURE: 99 MMHG | HEIGHT: 73 IN

## 2018-11-02 DIAGNOSIS — M48.062 LUMBAR STENOSIS WITH NEUROGENIC CLAUDICATION: Primary | ICD-10-CM

## 2018-11-02 DIAGNOSIS — M79.631 PAIN IN RIGHT FOREARM: ICD-10-CM

## 2018-11-02 NOTE — PROGRESS NOTES
[facility]  Orthopedics  Heladio Calderón,   2018     Name: Lucio Daly  MRN: [id number]  Age: 68 year old  : 1950  Referring provider: Referred Self     Chief Complaint:   Lumbar back pain with sciatica      History of Present Illness:   Lucio Daly is a 68 year old, right handed male who presents today for evaluation of multiple complaints, the worst being his sciatica. The patient has a history of underlying lower extremity neuropathy, lumbar spondylosis, neurogenic claudication, and cervical spondylosis with possible cervical myelopathy diagnosed in 2017, seronegative rheumatoid arthritis, and lumbar spondylolisthesis of L4-L5. He has had extensive evaluation with Cara Carrillo PA-C of neurosurgery. Treatment has included physical therapy - which worsened his symptoms, cortisone injections - allergic reaction to this, Gabapentin - allergic, Prednisone - allergic, AFO braces, foot and ankle braces. He has also been evaluated at a pain clinic but he was not interested in narcotics. Neurosurgery recommended decompression and fusion; however, his pulmonologist in the past has recommended that he avoid all surgery due to his interstitial lung disease.     Today, he is here primarily for a second opinion on how to manage his symptoms going forward.     Lumbar: the patient reports that he has bilateral sciatica and lumbar back pain, worse on the left than the right. He feels that his current weakness in the lower extremities is unchanged today. He has left foot drop, but this has not caused him to be terribly clumsy. He has an AFO brace that he can use for this. His last MRI revealed  Mod to severe L4-L5 stenosis.      Cervical: the patient states that his right arm pain begins in the hand and radiates up to the elbow. It was initially painful only with use but has now become painful even at rest. He is taking Lyrica as well as 3000 mg Tylenol daily for pain only mild pain relief. In the  past, Dr. Meléndez has noted the possibility of right median/ulnar neuropathy of the wrist. MRI without significant compression.  He endorses quite a bit of tingling to his bilateral hands as well as clumsiness. He used to have a walker but this was painful for his hands; using a cane today.     EMG 2016 possible median neuropathy.    Review of Systems:   A 10-point review of systems was obtained and is negative except for as noted in the HPI.     Medications:     Current Outpatient Prescriptions:      acetaminophen (TYLENOL) 500 MG tablet, 2 x 500mg by mouth 3 times per day: 7/730am, 4pm and 11pm  = 6/day, Disp: , Rfl:      albuterol (PROAIR HFA/PROVENTIL HFA/VENTOLIN HFA) 108 (90 Base) MCG/ACT inhaler, Inhale 2 puffs into the lungs every 6 hours as needed for shortness of breath / dyspnea or wheezing, Disp: 1 Inhaler, Rfl: 0     Cholecalciferol (VITAMIN D3) 1000 units CAPS, 1000 unit capsule by mouth daily @ 7am, Disp: 30 capsule, Rfl:      folic acid (FOLVITE) 1 MG tablet, 1mg tab by mouth twice daily @ 9am and 4pm, Disp: 180 tablet, Rfl: 3     hydroxychloroquine (PLAQUENIL) 200 MG tablet, 200mg tab by mouth twice daily @ 9am and 4pm, Disp: 180 tablet, Rfl: 3     methotrexate 2.5 MG tablet CHEMO, TAKE 8 TABLETS BY MOUTH ONCE WEEKLY. LABS DUE EVERY 8 - 12 WEEKS., Disp: 96 tablet, Rfl: 1     metoprolol succinate (TOPROL-XL) 50 MG 24 hr tablet, Take 1 tablet (50 mg) by mouth daily, Disp: 90 tablet, Rfl: 11     omeprazole (PRILOSEC) 40 MG capsule, 40mg capsule by mouth twice daily @ 7/730am and 4pm, Disp: 180 capsule, Rfl: 3     ORDER FOR DME, Use your CPAP device as directed by your provider., Disp: , Rfl:      oxybutynin (DITROPAN) 5 MG tablet, 5mg tab by mouth daily @ 4pm, Disp: 90 tablet, Rfl: 1     oxybutynin (DITROPAN) 5 MG tablet, TAKE ONE TABLET BY MOUTH EVERY DAY, Disp: 90 tablet, Rfl: 1     pregabalin (LYRICA) 50 MG capsule, 1 tab by mouth @ 7/730am, 1 tab @ 4pm and 1-2 tab @ 11pm, Disp: 360 capsule, Rfl:  3    Allergies:  Restasis - burning eyes, dyspnea, chest tightness  Allegra - excessive urination and weakness  Cephalexin - joint pain or GERD aggravation. Bloating excessive urination   Cephalosporins   Diphenhydramine   Doxycycline hyclate - nausea   Gabapentin - mood changes and excess urination   Levaquin - joint pain, GERD, insomnia   Mylanta - urination and weakness   Prednisone - weakness - elevated BP, headache, eye pain, congestion   Sulfamethoxazole-Trimethoprim - chest pain   Trileptal - severe joint pain and tendon pain, insomnia, restlessness, nausea, excess urination   Cortizone - excess urination, weakness, nausea, headache   Iodine solution     Past Medical History:  Abnormal EMG   Involuntary movements   AK  Balance problems   BCC  Bladder spasms   Chronic osteoarthritis   Diaphragmatic hernia without obstruction or gangrene  Esophageal reflux   H. Pylori   Incomplete defecation    Interstitial lung disease  Malignant basal cell neoplasm of skin   Melanoma   Neuropathy   Color vision deficiencies   Parkinsonism   Colonic polyps   RA at multiple sites   Acromioclavicular joint arthritis   Somatization disorder  Tremor   Essential hypertension   Hypothyroidism   UTI     Past Surgical History:  Biopsy of skin lesion   Colonoscopy    Hemorrhoid surgery   Lip biopsy   MOHS micrographic procedure   Soft tissue surgery, removal BCC     Social History:  Presents alone   Exercise: None  Tobacco use: Former smoker, quit in 2000 after 37 years at 1.5 PPD   Alcohol consumption: No   Marital status:     PCP: Jah Corley       Family History:  Mother - hypertension, atrial fibrillation, arthritis, osteoporosis, skin cancer, uterine cancer  Brother  - hypertension, diabetes mellitus post pancreatitis  Sister - MS, hypertension, atrial fibrillation, arthritis   Father - hypertension, cerebrovascular disease, skin cancer, colon polyps  Maternal grandfather  - psoriasis, stomach cancer     Physical  "Examination:  Blood pressure (!) 168/99, pulse 75, height 1.854 m (6' 1\"), weight 142 kg (313 lb).    General  - normal appearance, in no obvious distress  CV  - normal peripheral perfusion  Pulm  - normal respiratory pattern, non-labored  Musculoskeletal - cervical spine  - inspection: normal bone and joint alignment, no obvious kyphosis  - palpation: no paravertebral or bony tenderness  - ROM: normal and painless flexion, extension, left and right sidebending and rotation  - strength:  strength 4/5 but symmetric.  5/5 shoulder shrug, 5/5 biceps, 5/5 triceps, 5/5 wrist extension, 5/5 hand intrinsics.   - special tests:  (-) Spurling bilaterally  (-) Sancho's reflex  Lumbar spine:   Palpation: L3-L5 paraspinous tenderness. Left gluteal region tenderness.   Strength: hip flexion 4/5 on the left , 5/5 on right, ankle dorsi flexion 4-/5 on the left, 5/5 on the right, 5/5 hallux extension bilaterally, 4/5 eversion on left   (-)Slump test negative   (-)SLR  Neuro  - no sensory deficit, grossly normal coordination, normal muscle tone  Skin  - no ecchymosis, erythema, warmth, or induration, no obvious rash  Psych  - interactive, appropriate, normal mood and affect    Imaging:   MRI of the cervical spine without contrast on 7/27/18:  Impression:   Cervical spondylosis resulting in moderate bilateral neural foraminal  stenosis and mild spinal canal stenosis C5-C7, slightly progressed at  the C6-7 level since 3/21/2006. No cord signal abnormality.  Per radiology report     MRI lumbar spine without contrast 6/27/18  Impression:   1. Multilevel lumbar spondylosis with increased moderate to severe  spinal canal stenosis at L4-5. Unchanged mild to moderate bilateral  neural foraminal stenosis at L5-S1.  2. Mild periarticular edema associated L5-S1 joints bilaterally, not  significantly changed from prior and likely represents active  degenerative arthropathy.  Per radiology report     Electrodiagnostic Studies:   EMG " performed on 3/8/2016, interpreted by Dr. Shon Reyes:   1. 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 Compared to the previous study, there is some progression, manifesting with  significant drop of the right sural SNAP amplitude, whereas the left remains unchanged. Given that sural nerve  responses are still elicitable in a 65 year-old patient, the neuropathy is still considered mild.   It is notable that the sural sensory responses were asymmetric in previous studies done in 2010 and 2012 (left >50%  smaller than the right) and they have now become equal. 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  Clinical correlation is recommended.  2. No electrodiagnostic evidence of a neuromuscular junction disorder or myopathy.  3. Possible mild left median and ulnar sensory neuropathies at the wrist.    EMG performed on 10/15/2010, interpreted by Dr. Heladio Gonzalez:   1. No evidence of large-fiber polyneuropathy.  2. Probable mild bilateral median neuropathy at the wrist.  3. Probable mild to moderate right ulnar neuropathy at the elbow. Right ulnar neuropathy at the wrist may also be present    Assessment:   68 year old male with past medical history of lumbar stenosis with neurologic claudication and left foot drop, seronegative rheumatoid arthritis, interstitial lung disease, polyneuropathy, and right upper extremity pain presenting for second opinion of lumbar stenosis and pain of right upper extremity.  Patient has seen and been followed with neurology as well as neurosurgery and pain management. At this point, we discussed that his nonsurgical options are quit limited due to failed physical therapy, injections, and gabapentin use. At this point, I recommend that he discusses second opinion with one of our spine orthopedics specialists to explore options including surgical intervention.     Plan:   1. EMG of the upper extremities to identify any progression of median or ulnar neuropathies.   2. Continue  AFO for left foot drop   3. Follow-up with spine orthopedic surgery     IHeladio DO, have reviewed the above note and agree with the scribe's notation as written.    Scribe Disclosure:   I, Nikunj Thorne, am serving as a scribe to document services personally performed by Heladio Calderón DO at this visit, based upon the provider's statements to me. All documentation has been reviewed by the aforementioned provider prior to being entered into the official medical record.

## 2018-11-02 NOTE — PROGRESS NOTES
SPORTS & ORTHOPEDIC WALK-IN VISIT 11/2/2018    Primary Care Physician: Dr. Corley    Trouble with back and sciatica pain. Been going on for some time. Would like to focus on back. Sciatica on left side but back pain is bilateral. Also having problems with left foot and ankle. Has been told he has arthritis and neuropathy, history of ankle sprain and achilles strain. Has weakness and numbness. Has seen neurosurgery, repeated lumbar and cervical MRIs (last MRIs done summer 2017). Has been told he needs surgery but pulmonologist told him he shouldn't have surgery. Has done PT, has had allergic reactions to prednisone and cortisone. Not sure what his other options are. Pain is worse in the morning, subsides once he gets up and walking.   Also has pain in right arm from hand up to elbow, has had this for quite a while too. Now having pain in anterior arm from hand up to armpit. Has had a similar problem in the past but not able to get any relief.       Reason for visit:     What part of your body is injured / painful?  bilateral low back    What caused the injury /pain? chronic    How long ago did your injury occur or pain begin? problem is longstanding    What are your most bothersome symptoms? Pain, Numbness, Tingling and Weakness, swelling in left foot and ankle off and on    How would you characterize your symptom?  aching and sharp    What makes your symptoms better? Nothing, PT made things worse. Used to get injections in spine which didn't help, caused allergic reactions, not interested in narcotics, takes 3000mg tylenol/day, takes lyrica for nerve pain    What makes your symptoms worse? Sitting for long periods of time    Have you been previously seen for this problem? Yes, Neurosurgery    Medical History:    Any recent changes to your medical history? No    Any new medication prescribed since last visit? No    Have you had surgery on this body part before? No    Social History:    Occupation:  Retired    Handedness: Left    Exercise:     Review of Systems:    Do you have fever, chills, weight loss? No    Do you have any vision problems? Yes, Neuropathy in eye    Do you have any chest pain or edema? No    Do you have any shortness of breath or wheezing?  Yes, RA, interstitial lung disease    Do you have stomach problems? Yes, Reflux Disease    Do you have any numbness or focal weakness? Yes, neuropathy    Do you have diabetes? No    Do you have problems with bleeding or clotting? No    Do you have an rashes or other skin lesions? No

## 2018-11-02 NOTE — LETTER
11/2/2018     RE: Lucio Daly  [address]  [place] [address]     Dear Colleague,    Thank you for referring your patient, Lucio Daly, to the [facility] SPORTS AND ORTHOPAEDIC WALK IN CLINIC at [facility]. Please see a copy of my visit note below.          SPORTS & ORTHOPEDIC WALK-IN VISIT 11/2/2018    Primary Care Physician: Dr. Corley    Trouble with back and sciatica pain. Been going on for some time. Would like to focus on back. Sciatica on left side but back pain is bilateral. Also having problems with left foot and ankle. Has been told he has arthritis and neuropathy, history of ankle sprain and achilles strain. Has weakness and numbness. Has seen neurosurgery, repeated lumbar and cervical MRIs (last MRIs done summer 2017). Has been told he needs surgery but pulmonologist told him he shouldn't have surgery. Has done PT, has had allergic reactions to prednisone and cortisone. Not sure what his other options are. Pain is worse in the morning, subsides once he gets up and walking.   Also has pain in right arm from hand up to elbow, has had this for quite a while too. Now having pain in anterior arm from hand up to armpit. Has had a similar problem in the past but not able to get any relief.       Reason for visit:     What part of your body is injured / painful?  bilateral low back    What caused the injury /pain? chronic    How long ago did your injury occur or pain begin? problem is longstanding    What are your most bothersome symptoms? Pain, Numbness, Tingling and Weakness, swelling in left foot and ankle off and on    How would you characterize your symptom?  aching and sharp    What makes your symptoms better? Nothing, PT made things worse. Used to get injections in spine which didn't help, caused allergic reactions, not interested in narcotics, takes 3000mg tylenol/day, takes lyrica for nerve pain    What makes your symptoms worse? Sitting for long periods of  time    Have you been previously seen for this problem? Yes, Neurosurgery    Medical History:    Any recent changes to your medical history? No    Any new medication prescribed since last visit? No    Have you had surgery on this body part before? No    Social History:    Occupation: Retired    Handedness: Left    Exercise:     Review of Systems:    Do you have fever, chills, weight loss? No    Do you have any vision problems? Yes, Neuropathy in eye    Do you have any chest pain or edema? No    Do you have any shortness of breath or wheezing?  Yes, RA, interstitial lung disease    Do you have stomach problems? Yes, Reflux Disease    Do you have any numbness or focal weakness? Yes, neuropathy    Do you have diabetes? No    Do you have problems with bleeding or clotting? No    Do you have an rashes or other skin lesions? No           [facility]  Orthopedics  Heladio Calderón,   2018     Name: Lucio Daly  MRN: [id number]  Age: 68 year old  : 1950  Referring provider: Referred Self     Chief Complaint:   Lumbar back pain with sciatica      History of Present Illness:   Lucio Daly is a 68 year old, right handed male who presents today for evaluation of multiple complaints, the worst being his sciatica. The patient has a history of underlying lower extremity neuropathy, lumbar spondylosis, neurogenic claudication, and cervical spondylosis with possible cervical myelopathy diagnosed in 2017, seronegative rheumatoid arthritis, and lumbar spondylolisthesis of L4-L5. He has had extensive evaluation with Cara Carrillo PA-C of neurosurgery. Treatment has included physical therapy - which worsened his symptoms, cortisone injections - allergic reaction to this, Gabapentin - allergic, Prednisone - allergic, AFO braces, foot and ankle braces. He has also been evaluated at a pain clinic but he was not interested in narcotics. Neurosurgery recommended decompression and fusion; however, his pulmonologist  in the past has recommended that he avoid all surgery due to his interstitial lung disease.     Today, he is here primarily for a second opinion on how to manage his symptoms going forward.     Lumbar: the patient reports that he has bilateral sciatica and lumbar back pain, worse on the left than the right. He feels that his current weakness in the lower extremities is unchanged today. He has left foot drop, but this has not caused him to be terribly clumsy. He has an AFO brace that he can use for this. His last MRI revealed  Mod to severe L4-L5 stenosis.      Cervical: the patient states that his right arm pain begins in the hand and radiates up to the elbow. It was initially painful only with use but has now become painful even at rest. He is taking Lyrica as well as 3000 mg Tylenol daily for pain only mild pain relief. In the past, Dr. Meléndez has noted the possibility of right median/ulnar neuropathy of the wrist. MRI without significant compression.  He endorses quite a bit of tingling to his bilateral hands as well as clumsiness. He used to have a walker but this was painful for his hands; using a cane today.     EMG 2016 possible median neuropathy.    Review of Systems:   A 10-point review of systems was obtained and is negative except for as noted in the HPI.     Medications:     Current Outpatient Prescriptions:      acetaminophen (TYLENOL) 500 MG tablet, 2 x 500mg by mouth 3 times per day: 7/730am, 4pm and 11pm  = 6/day, Disp: , Rfl:      albuterol (PROAIR HFA/PROVENTIL HFA/VENTOLIN HFA) 108 (90 Base) MCG/ACT inhaler, Inhale 2 puffs into the lungs every 6 hours as needed for shortness of breath / dyspnea or wheezing, Disp: 1 Inhaler, Rfl: 0     Cholecalciferol (VITAMIN D3) 1000 units CAPS, 1000 unit capsule by mouth daily @ 7am, Disp: 30 capsule, Rfl:      folic acid (FOLVITE) 1 MG tablet, 1mg tab by mouth twice daily @ 9am and 4pm, Disp: 180 tablet, Rfl: 3     hydroxychloroquine (PLAQUENIL) 200 MG tablet,  200mg tab by mouth twice daily @ 9am and 4pm, Disp: 180 tablet, Rfl: 3     methotrexate 2.5 MG tablet CHEMO, TAKE 8 TABLETS BY MOUTH ONCE WEEKLY. LABS DUE EVERY 8 - 12 WEEKS., Disp: 96 tablet, Rfl: 1     metoprolol succinate (TOPROL-XL) 50 MG 24 hr tablet, Take 1 tablet (50 mg) by mouth daily, Disp: 90 tablet, Rfl: 11     omeprazole (PRILOSEC) 40 MG capsule, 40mg capsule by mouth twice daily @ 7/730am and 4pm, Disp: 180 capsule, Rfl: 3     ORDER FOR DME, Use your CPAP device as directed by your provider., Disp: , Rfl:      oxybutynin (DITROPAN) 5 MG tablet, 5mg tab by mouth daily @ 4pm, Disp: 90 tablet, Rfl: 1     oxybutynin (DITROPAN) 5 MG tablet, TAKE ONE TABLET BY MOUTH EVERY DAY, Disp: 90 tablet, Rfl: 1     pregabalin (LYRICA) 50 MG capsule, 1 tab by mouth @ 7/730am, 1 tab @ 4pm and 1-2 tab @ 11pm, Disp: 360 capsule, Rfl: 3    Allergies:  Restasis - burning eyes, dyspnea, chest tightness  Allegra - excessive urination and weakness  Cephalexin - joint pain or GERD aggravation. Bloating excessive urination   Cephalosporins   Diphenhydramine   Doxycycline hyclate - nausea   Gabapentin - mood changes and excess urination   Levaquin - joint pain, GERD, insomnia   Mylanta - urination and weakness   Prednisone - weakness - elevated BP, headache, eye pain, congestion   Sulfamethoxazole-Trimethoprim - chest pain   Trileptal - severe joint pain and tendon pain, insomnia, restlessness, nausea, excess urination   Cortizone - excess urination, weakness, nausea, headache   Iodine solution     Past Medical History:  Abnormal EMG   Involuntary movements   AK  Balance problems   BCC  Bladder spasms   Chronic osteoarthritis   Diaphragmatic hernia without obstruction or gangrene  Esophageal reflux   H. Pylori   Incomplete defecation    Interstitial lung disease  Malignant basal cell neoplasm of skin   Melanoma   Neuropathy   Color vision deficiencies   Parkinsonism   Colonic polyps   RA at multiple sites   Acromioclavicular joint  "arthritis   Somatization disorder  Tremor   Essential hypertension   Hypothyroidism   UTI     Past Surgical History:  Biopsy of skin lesion   Colonoscopy    Hemorrhoid surgery   Lip biopsy   MOHS micrographic procedure   Soft tissue surgery, removal BCC     Social History:  Presents alone   Exercise: None  Tobacco use: Former smoker, quit in 2000 after 37 years at 1.5 PPD   Alcohol consumption: No   Marital status:     PCP: Jah Corley       Family History:  Mother - hypertension, atrial fibrillation, arthritis, osteoporosis, skin cancer, uterine cancer  Brother  - hypertension, diabetes mellitus post pancreatitis  Sister - MS, hypertension, atrial fibrillation, arthritis   Father - hypertension, cerebrovascular disease, skin cancer, colon polyps  Maternal grandfather  - psoriasis, stomach cancer     Physical Examination:  Blood pressure (!) 168/99, pulse 75, height 1.854 m (6' 1\"), weight 142 kg (313 lb).    General  - normal appearance, in no obvious distress  CV  - normal peripheral perfusion  Pulm  - normal respiratory pattern, non-labored  Musculoskeletal - cervical spine  - inspection: normal bone and joint alignment, no obvious kyphosis  - palpation: no paravertebral or bony tenderness  - ROM: normal and painless flexion, extension, left and right sidebending and rotation  - strength:  strength 4/5 but symmetric.  5/5 shoulder shrug, 5/5 biceps, 5/5 triceps, 5/5 wrist extension, 5/5 hand intrinsics.   - special tests:  (-) Spurling bilaterally  (-) Sancho's reflex  Lumbar spine:   Palpation: L3-L5 paraspinous tenderness. Left gluteal region tenderness.   Strength: hip flexion 4/5 on the left , 5/5 on right, ankle dorsi flexion 4-/5 on the left, 5/5 on the right, 5/5 hallux extension bilaterally, 4/5 eversion on left   (-)Slump test negative   (-)SLR  Neuro  - no sensory deficit, grossly normal coordination, normal muscle tone  Skin  - no ecchymosis, erythema, warmth, or induration, no obvious " rash  Psych  - interactive, appropriate, normal mood and affect    Imaging:   MRI of the cervical spine without contrast on 7/27/18:  Impression:   Cervical spondylosis resulting in moderate bilateral neural foraminal  stenosis and mild spinal canal stenosis C5-C7, slightly progressed at  the C6-7 level since 3/21/2006. No cord signal abnormality.  Per radiology report     MRI lumbar spine without contrast 6/27/18  Impression:   1. Multilevel lumbar spondylosis with increased moderate to severe  spinal canal stenosis at L4-5. Unchanged mild to moderate bilateral  neural foraminal stenosis at L5-S1.  2. Mild periarticular edema associated L5-S1 joints bilaterally, not  significantly changed from prior and likely represents active  degenerative arthropathy.  Per radiology report     Electrodiagnostic Studies:   EMG performed on 3/8/2016, interpreted by Dr. Shon Reyes:   10. 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 Compared to the previous study, there is some progression, manifesting with  significant drop of the right sural SNAP amplitude, whereas the left remains unchanged. Given that sural nerve  responses are still elicitable in a 65 year-old patient, the neuropathy is still considered mild.   It is notable that the sural sensory responses were asymmetric in previous studies done in 2010 and 2012 (left >50%  smaller than the right) and they have now become equal. 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  Clinical correlation is recommended.  11. No electrodiagnostic evidence of a neuromuscular junction disorder or myopathy.  12. Possible mild left median and ulnar sensory neuropathies at the wrist.    EMG performed on 10/15/2010, interpreted by Dr. Heladio Gonzalez:   4. No evidence of large-fiber polyneuropathy.  5. Probable mild bilateral median neuropathy at the wrist.  6. Probable mild to moderate right ulnar neuropathy at the elbow. Right ulnar neuropathy at the wrist may also be  present    Assessment:   68 year old male with past medical history of lumbar stenosis with neurologic claudication and left foot drop, seronegative rheumatoid arthritis, interstitial lung disease, polyneuropathy, and right upper extremity pain presenting for second opinion of lumbar stenosis and pain of right upper extremity.  Patient has seen and been followed with neurology as well as neurosurgery and pain management. At this point, we discussed that his nonsurgical options are quit limited due to failed physical therapy, injections, and gabapentin use. At this point, I recommend that he discusses second opinion with one of our spine orthopedics specialists to explore options including surgical intervention.     Plan:   9. EMG of the upper extremities to identify any progression of median or ulnar neuropathies.   10. Continue AFO for left foot drop   11. Follow-up with spine orthopedic surgery     I, Heladio Calderón DO, have reviewed the above note and agree with the scribe's notation as written.    Scribe Disclosure:   I, Nikunj Thorne, am serving as a scribe to document services personally performed by Heladio Calderón DO at this visit, based upon the provider's statements to me. All documentation has been reviewed by the aforementioned provider prior to being entered into the official medical record.     Again, thank you for allowing me to participate in the care of your patient.      Sincerely,    Heladio Calderón DO

## 2018-11-02 NOTE — MR AVS SNAPSHOT
After Visit Summary   11/2/2018    Lucio Daly    MRN: [id number]           Patient Information     Date Of Birth          1950        Visit Information        Provider Department      11/2/2018 11:10 AM Heladio Calderón DO [facility]        Today's Diagnoses     Lumbar stenosis with neurogenic claudication    -  1    Pain in right forearm           Follow-ups after your visit        Additional Services     ORTHOPEDICS ADULT REFERRAL       Your provider has referred you to: Spine Orthopedic Surgery.    Please be aware that coverage of these services is subject to the terms and limitations of your health insurance plan.  Call member services at your health plan with any benefit or coverage questions.      Please bring the following to your appointment:    >>   Any x-rays, CTs or MRIs which have been performed.  Contact the facility where they were done to arrange for  prior to your scheduled appointment.    >>   List of current medications   >>   This referral request   >>   Any documents/labs given to you for this referral                  Your next 10 appointments already scheduled     Nov 15, 2018  9:00 AM CST   (Arrive by 8:45 AM)   EMG with Rodney Garcia MD   [facility] ([facility] [place])    [address] [place] [address]   [phone]            Nov 20, 2018  9:00 AM CST   FULL PULMONARY FUNCTION with UC PFL D   [facility] Function Testing ([facility])    [address] [place] [address]   [phone]            Nov 20, 2018 10:00 AM CST   Six Minute Walk with UC PFL 6 MINUTE WALK 2   [facility] ([facility])    [address] [place] [address]   [phone]            Nov 20, 2018 10:00 AM CST   (Arrive by 9:45 AM)   Return Interstitial Lung with Harsha  MD Fabio   [facility] ([facility] [place])    [address]  [address]  [place] [address]   [phone]            Nov 27, 2018  9:00 AM CST   (Arrive by 8:30 AM)   NEW SPINE with Cuong Khan MD   [facility] ([facility] [place])    [address]  4th [address]  [place] [id number] [phone]            Dec 19, 2018  9:30 AM CST   (Arrive by 9:15 AM)   Return Visit with Jeremy Goodrich MD   [facility] Rheumatology ([facility] [place])    [address]  [address]  [place] [id number] [phone]            May 21, 2019  8:40 AM CDT   (Arrive by 8:25 AM)   Return Movement Disorder with Thong Montes MD   [facility] Neurology ([facility] [place])    [address]  3rd Floor  [place] [id number] [phone]              Future tests that were ordered for you today     Open Future Orders        Priority Expected Expires Ordered    EMG Routine  1/1/2019 11/2/2018            Who to contact     Please call your clinic at [phone] to:    Ask questions about your health    Make or cancel appointments    Discuss your medicines    Learn about your test results    Speak to your doctor            Additional Information About Your Visit        [employer] Information     [employer] gives you secure access to your electronic health record. If you see a primary care provider, you can also send messages to your care team and make appointments. If you have questions, please call your primary care clinic.  If you do not have a primary care provider, please call [phone] and they will assist you.      [employer] is an electronic gateway that provides easy, online access to your medical records. With [employer], you can request a clinic appointment, read your test results, renew a prescription or communicate with your care team.     To access your  "existing account, please contact your [facility] or call [phone] for assistance.        Care EveryWhere ID     This is your Care EveryWhere ID. This could be used by other organizations to access your [place] medical records  [id number]        Your Vitals Were     Pulse Height BMI (Body Mass Index)             75 1.854 m (6' 1\") 41.3 kg/m2          Blood Pressure from Last 3 Encounters:   11/02/18 (!) 168/99   09/12/18 (!) 188/110   08/21/18 146/83    Weight from Last 3 Encounters:   11/02/18 142 kg (313 lb)   09/12/18 142.1 kg (313 lb 3.7 oz)   08/21/18 140.2 kg (309 lb)              We Performed the Following     ORTHOPEDICS ADULT REFERRAL        Primary Care Provider Office Phone # Fax #    Jah Corley [phone] [address]  [facility] [address]        Equal Access to Services     ALINE QUIÑONES : Hadii aad ku hadasho Soomaali, waaxda luqadaha, qaybta kaalmada adeegyada, waxay idiin hayaan arabella chinaratriston lasonia . So [facility] [phone].    ATENCIÓN: Si habla español, tiene a rudd disposición servicios gratuitos de asistencia lingüística. Llame al [phone].    We comply with applicable federal civil rights laws and [place] laws. We do not discriminate on the basis of race, color, national origin, age, disability, sex, sexual orientation, or gender identity.            Thank you!     Thank you for choosing [facility] SPORTS AND ORTHOPAEDIC WALK IN CLINIC  for your care. Our goal is always to provide you with excellent care. Hearing back from our patients is one way we can continue to improve our services. Please take a few minutes to complete the written survey that you may receive in the mail after your visit with us. Thank you!             Your Updated Medication List - Protect others around you: Learn how to safely use, store and throw away your medicines at www.disposemymeds.org.          This list is accurate as of 11/2/18 12:12 PM.  Always use " your most recent med list.                   Brand Name Dispense Instructions for use Diagnosis    albuterol 108 (90 Base) MCG/ACT inhaler    PROAIR HFA/PROVENTIL HFA/VENTOLIN HFA    1 Inhaler    Inhale 2 puffs into the lungs every 6 hours as needed for shortness of breath / dyspnea or wheezing    Dyspnea, unspecified type       folic acid 1 MG tablet    FOLVITE    180 tablet    1mg tab by mouth twice daily @ 9am and 4pm    Pain in toes of both feet, Rheumatoid arthritis of multiple sites with negative rheumatoid factor (H), Rheumatoid arthritis involving shoulder with negative rheumatoid factor, unspecified laterality (H), High risk medications (not anticoagulants) long-term use       LYRICA 50 MG capsule   Generic drug:  pregabalin     360 capsule    1 tab by mouth @ 7/730am, 1 tab @ 4pm and 1-2 tab @ 11pm    Peripheral polyneuropathy       methotrexate 2.5 MG tablet CHEMO     96 tablet    TAKE 8 TABLETS BY MOUTH ONCE WEEKLY. LABS DUE EVERY 8 - 12 WEEKS.    Rheumatoid arthritis of multiple sites with negative rheumatoid factor (H), High risk medications (not anticoagulants) long-term use       metoprolol succinate 50 MG 24 hr tablet    TOPROL-XL    90 tablet    Take 1 tablet (50 mg) by mouth daily    Essential hypertension       omeprazole 40 MG capsule    priLOSEC    180 capsule    40mg capsule by mouth twice daily @ 7/730am and 4pm    Gastroesophageal reflux disease without esophagitis       order for DME      Use your CPAP device as directed by your provider.        * oxybutynin 5 MG tablet    DITROPAN    90 tablet    5mg tab by mouth daily @ 4pm    Other urinary incontinence       * oxybutynin 5 MG tablet    DITROPAN    90 tablet    TAKE ONE TABLET BY MOUTH EVERY DAY    Other urinary incontinence       PLAQUENIL 200 MG tablet   Generic drug:  hydroxychloroquine     180 tablet    200mg tab by mouth twice daily @ 9am and 4pm    Rheumatoid arthritis involving shoulder with negative rheumatoid factor, unspecified  laterality (H)       TYLENOL 500 MG tablet   Generic drug:  acetaminophen      2 x 500mg by mouth 3 times per day: 7/730am, 4pm and 11pm  = 6/day        vitamin D3 1000 units Caps     30 capsule    1000 unit capsule by mouth daily @ 7am        * Notice:  This list has 2 medication(s) that are the same as other medications prescribed for you. Read the directions carefully, and ask your doctor or other care provider to review them with you.

## 2018-11-07 ASSESSMENT — ENCOUNTER SYMPTOMS
POSTURAL DYSPNEA: 0
NECK MASS: 0
DIARRHEA: 0
VOMITING: 0
DISTURBANCES IN COORDINATION: 1
DECREASED APPETITE: 0
MUSCLE WEAKNESS: 1
ARTHRALGIAS: 1
HEMATURIA: 0
HALLUCINATIONS: 0
DYSURIA: 0
SMELL DISTURBANCE: 0
LIGHT-HEADEDNESS: 0
BOWEL INCONTINENCE: 0
COUGH DISTURBING SLEEP: 0
DYSPNEA ON EXERTION: 1
NECK MASS: 0
SPUTUM PRODUCTION: 0
BLOOD IN STOOL: 1
ALTERED TEMPERATURE REGULATION: 0
SHORTNESS OF BREATH: 1
PARALYSIS: 0
LOSS OF CONSCIOUSNESS: 0
SINUS PAIN: 0
TINGLING: 1
RECTAL PAIN: 0
CHILLS: 0
RECTAL PAIN: 0
CONSTIPATION: 1
MUSCLE CRAMPS: 0
WHEEZING: 1
SYNCOPE: 0
HOARSE VOICE: 1
SORE THROAT: 0
LEG PAIN: 1
FATIGUE: 1
WEAKNESS: 1
EYE WATERING: 0
TROUBLE SWALLOWING: 0
FEVER: 0
DOUBLE VISION: 1
NECK PAIN: 1
BACK PAIN: 1
HEADACHES: 0
SYNCOPE: 0
FLANK PAIN: 1
SPEECH CHANGE: 0
SPEECH CHANGE: 0
HYPOTENSION: 0
POLYDIPSIA: 0
PARALYSIS: 0
NAUSEA: 0
DISTURBANCES IN COORDINATION: 1
NECK PAIN: 1
EYE PAIN: 1
COUGH: 0
DIFFICULTY URINATING: 0
DECREASED APPETITE: 0
POLYPHAGIA: 0
POLYPHAGIA: 0
DIFFICULTY URINATING: 0
WEIGHT GAIN: 0
ALTERED TEMPERATURE REGULATION: 0
TREMORS: 1
HYPERTENSION: 1
LEG PAIN: 1
DIZZINESS: 1
SLEEP DISTURBANCES DUE TO BREATHING: 0
HOARSE VOICE: 1
FATIGUE: 1
EXERCISE INTOLERANCE: 1
COUGH: 0
EYE REDNESS: 0
DIZZINESS: 1
BACK PAIN: 1
DYSURIA: 0
HALLUCINATIONS: 0
WEIGHT GAIN: 0
HYPOTENSION: 0
MEMORY LOSS: 0
LOSS OF CONSCIOUSNESS: 0
TREMORS: 1
SLEEP DISTURBANCES DUE TO BREATHING: 0
POLYDIPSIA: 0
DIARRHEA: 0
MYALGIAS: 1
BLOATING: 1
JAUNDICE: 0
TASTE DISTURBANCE: 0
PALPITATIONS: 1
ORTHOPNEA: 0
VOMITING: 0
MEMORY LOSS: 0
JOINT SWELLING: 1
MUSCLE CRAMPS: 0
SEIZURES: 0
BOWEL INCONTINENCE: 0
HEMOPTYSIS: 0
INCREASED ENERGY: 0
HYPERTENSION: 1
SMELL DISTURBANCE: 0
WEIGHT LOSS: 0
HEMOPTYSIS: 0
WEIGHT LOSS: 0
MYALGIAS: 1
STIFFNESS: 1
ARTHRALGIAS: 1
SPUTUM PRODUCTION: 0
ORTHOPNEA: 0
JAUNDICE: 0
CHILLS: 0
EYE PAIN: 1
HEMATURIA: 0
FLANK PAIN: 1
TINGLING: 1
SNORES LOUDLY: 0
COUGH DISTURBING SLEEP: 0
DYSPNEA ON EXERTION: 1
POSTURAL DYSPNEA: 0
LIGHT-HEADEDNESS: 0
HEADACHES: 0
NUMBNESS: 1
EYE WATERING: 0
MUSCLE WEAKNESS: 1
NUMBNESS: 1
INCREASED ENERGY: 0
EXERCISE INTOLERANCE: 1
DOUBLE VISION: 1
SINUS CONGESTION: 0
EYE REDNESS: 0
BLOATING: 1
STIFFNESS: 1
FEVER: 0
PALPITATIONS: 1
CONSTIPATION: 1
SNORES LOUDLY: 0
SINUS PAIN: 0
TASTE DISTURBANCE: 0
JOINT SWELLING: 1
SEIZURES: 0
NIGHT SWEATS: 0
WHEEZING: 1
SHORTNESS OF BREATH: 1
WEAKNESS: 1
NAUSEA: 0
BLOOD IN STOOL: 1
EYE IRRITATION: 0
NIGHT SWEATS: 0
SORE THROAT: 0
EYE IRRITATION: 0
TROUBLE SWALLOWING: 0
SINUS CONGESTION: 0

## 2018-11-12 NOTE — TELEPHONE ENCOUNTER
FUTURE VISIT INFORMATION      FUTURE VISIT INFORMATION:    Date: 11/20/18    Time: 0800    Location: ORTHO  REFERRAL INFORMATION:    Referring provider:  DR HILARIO    Referring providers clinic:  WALK IN    Reason for visit/diagnosis  LUMBAR STENOSIS     RECORDS REQUESTED FROM:       Clinic name Comments Records Status Imaging Status                                         RECORDS AND IMAGES IN CHART

## 2018-11-15 ENCOUNTER — OFFICE VISIT (OUTPATIENT)
Dept: NEUROLOGY | Facility: CLINIC | Age: 68
End: 2018-11-15
Payer: MEDICARE

## 2018-11-15 DIAGNOSIS — M79.631 PAIN IN RIGHT FOREARM: ICD-10-CM

## 2018-11-15 NOTE — MR AVS SNAPSHOT
After Visit Summary   11/15/2018    Lucio Daly    MRN: [id number]           Patient Information     Date Of Birth          1950        Visit Information        Provider Department      11/15/2018 9:00 AM Rodney Garcia MD [facility] EMG        Today's Diagnoses     Pain in right forearm           Follow-ups after your visit        Your next 10 appointments already scheduled     Nov 20, 2018  8:00 AM CST   (Arrive by 7:30 AM)   NEW SPINE with Cuong Khan MD   [facility] ([facility])    [address] [place] [address]  [address] [facility] [address]   [phone]            Nov 20, 2018  9:00 AM CST   FULL PULMONARY FUNCTION with UC PFL D   [facility] Pulmonary Function Testing ([facility])    [address] [place] [address]  [address] [facility] [address]   [phone]            Nov 20, 2018 10:00 AM CST   Six Minute Walk with UC PFL 6 MINUTE WALK 2   [facility] Pul[facility] Function Testing ([facility])    [address] [place] [address]  [address] [facility] [address]   [phone]            Nov 20, 2018 10:00 AM CST   (Arrive by 9:45 AM)   Return Interstitial Lung with Harsha Mccarthy MD   [facility] ([facility])    [address] [place] [address]  [address]  [facility] [address]   [phone]            Dec 19, 2018  9:30 AM CST   (Arrive by 9:15 AM)   Return Visit with Jeremy Goodrich MD   [facility] Rheumatology ([facility])    [address] [place] [address]  [address]  [facility] [address]   [phone]            May 21, 2019  8:40 AM CDT   (Arrive by 8:25 AM)   Return Movement Disorder with Thong Montes MD   [facility] Neurology ([facility])    [address] [place] [address]  [address] [facility] [address]   [phone]              Future tests that were ordered for you today     Open Future  Orders        Priority Expected Expires Ordered    Needle EMG Each Extremity w/Paraspinal Area Complete (99917) Routine  11/15/2019 11/15/2018            Who to contact     Please call your clinic at [phone] to:    Ask questions about your health    Make or cancel appointments    Discuss your medicines    Learn about your test results    Speak to your doctor            Additional Information About Your Visit        [employer] Information     [facility] gives you secure access to your electronic health record. If you see a primary care provider, you can also send messages to your care team and make appointments. If you have questions, please call your primary care clinic.  If you do not have a primary care provider, please call [phone] and they will assist you.      [facility] [address] is an electronic gateway that provides easy, online access to your medical records. With [facility] [address], you can request a clinic appointment, read your test results, renew a prescription or communicate with your care team.     To access your existing account, please contact your [facility] or call [phone] for assistance.        Care EveryWhere ID     This is your Care EveryWhere ID. This could be used by other organizations to access your [facility] medical records  [id number]         Blood Pressure from Last 3 Encounters:   11/02/18 (!) 168/99   09/12/18 (!) 188/110   08/21/18 146/83    Weight from Last 3 Encounters:   11/02/18 142 kg (313 lb)   09/12/18 142.1 kg (313 lb 3.7 oz)   08/21/18 140.2 kg (309 lb)              We Performed the Following     EMG     NCS Motor with or without F-Wave, 7-8 nerves (00540)        Primary Care Provider Office Phone # Fax #    Jah Corley [phone] [address] [facility] [address]        Equal Access to Services     ALINE QUIÑONES : Yifan Ga, seymour roca, mariana gama .  So [facility] [phone].    ATENCIÓN: Si jyoti mcmahon, tiene a rudd disposición servicios gratuitos de asistencia lingüística. Melina mcmanus [phone].    We comply with applicable federal civil rights laws and [place] laws. We do not discriminate on the basis of race, color, national origin, age, disability, sex, sexual orientation, or gender identity.            Thank you!     Thank you for choosing [facility]  for your care. Our goal is always to provide you with excellent care. Hearing back from our patients is one way we can continue to improve our services. Please take a few minutes to complete the written survey that you may receive in the mail after your visit with us. Thank you!             Your Updated Medication List - Protect others around you: Learn how to safely use, store and throw away your medicines at www.disposemymeds.org.          This list is accurate as of 11/15/18 10:37 AM.  Always use your most recent med list.                   Brand Name Dispense Instructions for use Diagnosis    albuterol 108 (90 Base) MCG/ACT inhaler    PROAIR HFA/PROVENTIL HFA/VENTOLIN HFA    1 Inhaler    Inhale 2 puffs into the lungs every 6 hours as needed for shortness of breath / dyspnea or wheezing    Dyspnea, unspecified type       folic acid 1 MG tablet    FOLVITE    180 tablet    1mg tab by mouth twice daily @ 9am and 4pm    Pain in toes of both feet, Rheumatoid arthritis of multiple sites with negative rheumatoid factor (H), Rheumatoid arthritis involving shoulder with negative rheumatoid factor, unspecified laterality (H), High risk medications (not anticoagulants) long-term use       LYRICA 50 MG capsule   Generic drug:  pregabalin     360 capsule    1 tab by mouth @ 7/730am, 1 tab @ 4pm and 1-2 tab @ 11pm    Peripheral polyneuropathy       methotrexate 2.5 MG tablet CHEMO     96 tablet    TAKE 8 TABLETS BY MOUTH ONCE WEEKLY. LABS DUE EVERY 8 - 12 WEEKS.    Rheumatoid arthritis of multiple sites with negative  rheumatoid factor (H), High risk medications (not anticoagulants) long-term use       metoprolol succinate 50 MG 24 hr tablet    TOPROL-XL    90 tablet    Take 1 tablet (50 mg) by mouth daily    Essential hypertension       omeprazole 40 MG capsule    priLOSEC    180 capsule    40mg capsule by mouth twice daily @ 7/730am and 4pm    Gastroesophageal reflux disease without esophagitis       order for DME      Use your CPAP device as directed by your provider.        * oxybutynin 5 MG tablet    DITROPAN    90 tablet    5mg tab by mouth daily @ 4pm    Other urinary incontinence       * oxybutynin 5 MG tablet    DITROPAN    90 tablet    TAKE ONE TABLET BY MOUTH EVERY DAY    Other urinary incontinence       PLAQUENIL 200 MG tablet   Generic drug:  hydroxychloroquine     180 tablet    200mg tab by mouth twice daily @ 9am and 4pm    Rheumatoid arthritis involving shoulder with negative rheumatoid factor, unspecified laterality (H)       TYLENOL 500 MG tablet   Generic drug:  acetaminophen      2 x 500mg by mouth 3 times per day: 7/730am, 4pm and 11pm  = 6/day        vitamin D3 1000 units Caps     30 capsule    1000 unit capsule by mouth daily @ 7am        * Notice:  This list has 2 medication(s) that are the same as other medications prescribed for you. Read the directions carefully, and ask your doctor or other care provider to review them with you.

## 2018-11-15 NOTE — LETTER
11/15/2018       RE: Lucio Daly  [address]  [place] [address]     Dear Colleague,    Thank you for referring your patient, Lucio Daly, to the [facility] EMG at [facility]. Please see a copy of my visit note below.      [facility]  Electrodiagnostic Laboratory    Nerve Conduction & EMG Report          Patient:       Lucio Daly  Patient ID:    [id number]  Gender:        Male  YOB: 1950  Age:           68 Years 3 Months        History & Examination: This is a 68-year-old gentleman with a known history of peripheral neuropathy.  He is having increasing numbness in his hands.  We are asked to evaluate for worsening ulnar neuropathy or carpal tunnel syndrome.    Techniques: Motor and sensory conduction studies were done with surface recording electrodes. EMG was done with a concentric needle electrode.        Results: Median sensory nerve action potentials are normal bilaterally.  There is mild slowing of distal median sensory conduction on the left.  The ulnar sensory nerve action potentials are reduced bilaterally.  The right median palmar sensory distal latency is mildly prolonged when compared to its  ulnar study.  Median motor conduction studies are normal.  Bilateral ulnar motor conduction studies revealed focal conduction slowing of left ulnar motor conduction at the elbow.    Needle examination revealed markedly prolonged motor unit potential durations in the left ulnar muscles including the flexor carpi ulnaris.  Mild motor unit potential duration prolongation was noted in the right first dorsal interosseous muscle.      Interpretation: The EMG is abnormal.  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  There is also evidence for mild bilateral median neuropathies at the wrists (carpal tunnel syndrome) that have not changed from the previous  exam.  There is no evidence for worsening sensorimotor peripheral neuropathy or for an active right or left cervical radiculopathy on this examination.    EMG Physician: Rodney Garcia MD         Sensory NCS      Nerve / Sites Rec. Site Onset Peak Ref. NP Amp Ref. PP Amp Dist Robert Ref. Temp     ms ms ms  V  V  V cm m/s m/s  C   R MEDIAN - Dig II Anti      Wrist Dig II 2.81 3.75  11.8 10.0 12.9 14 49.8 48.0 33.6   L MEDIAN - Dig II Anti      Wrist Dig II 3.18 4.01  14.2 10.0 30.0 14 44.1 48.0 33.9   R ULNAR - Dig V Anti      Wrist Dig V 2.97 3.65  6.2 8.0 5.4 12.5 42.1 48.0 33.8   L ULNAR - Dig V Anti      Wrist Dig V 2.71 3.23  2.6 8.0 3.5 12.5 46.2 48.0 33.3   R MEDIAN - Ulnar - Palmar      Median Wrist 1.98 2.50 2.40 10.6  16.9 8 40.4  34      Ulnar Wrist 1.51 2.08 2.40 11.1  10.4 8 53.0  33.9   L MEDIAN - Ulnar - Palmar      Median Wrist 1.93 2.40 2.40 23.4  87.6 8 41.5  33.4      Ulnar Wrist 1.56 1.98 2.40 3.1  1.9 8 51.2  33.4       Motor NCS      Nerve / Sites Rec. Site Lat Ref. Amp Ref. Rel Amp Dist Robert Ref. Dur. Area Temp.     ms ms mV mV % cm m/s m/s ms %  C   R MEDIAN - APB      Wrist APB 3.96 4.40 10.8 5.0 100 8   5.73 100 33.9      Elbow APB 8.75  10.2  94.4 27 56.3 48.0 6.09 95 34   L MEDIAN - APB      Wrist APB 4.17 4.40 11.9 5.0 100 8   6.72 100 33.4      Elbow APB 8.75  10.9  91.8 26.5 57.8 48.0 6.98 92.2 33.4   R ULNAR - ADM      Wrist ADM 2.97 3.50 8.2 5.0 100 8   7.14 100 33.6      B.Elbow ADM 7.45  7.8  94.7 24 53.6 48.0 7.19 98.4 33.6      A.Elbow ADM 9.64  7.3  89.2 11 50.3 48.0 7.60 95 33.9   L ULNAR - ADM      Wrist ADM 2.76 3.50 6.7 5.0 100 8   7.76 100 33.5      B.Elbow ADM 6.93  6.5  96.2 23.5 56.4 48.0 7.81 105 33.5      A.Elbow ADM 9.48  6.5  97 11 43.1 48.0 7.97 101 33.6      Axilla ADM 10.63  6.5  97 7 61.1 48.0 7.71 100 33.8   L ULNAR - FDI      Wrist FDI 3.96  10.4  100    4.53 100 34      B.Elbow FDI 8.33  8.9  85.9 23.5 53.7  4.74 94.3 34      A.Elbow FDI 10.10  8.1  78 11 62.1   5.63 90.4 33.9       Needle EMG (W)      EMG Summary Table     Spontaneous MUAP Recruitment    IA Fib/PSW Fasc H.F. Amp Dur. PPP Pattern   R. FIRST D INTEROSS N None None None 1+ 1+ N N   R. EXT INDICIS N None None None N N N N   R. PRON TERES N None None None N N N N   R. BICEPS N None None None 1+ 1+ N N   R. TRICEPS N None None None N N N N   R. DELTOID N None None None N N N N   L. FIRST D INTEROSS N None None None 2+ 2+ N Moderately Reduced   L. EXT DIG COMM N None None None N N N N   L. FLEX CARPI ULN N None None None Giant 3+ N Severly reduced   L. BICEPS N None None None N N N N   L. TRICEPS N None None None N N N N                                  Again, thank you for allowing me to participate in the care of your patient.      Sincerely,    Rodney Garcia MD

## 2018-11-15 NOTE — PROGRESS NOTES
[facility]  Electrodiagnostic Laboratory    Nerve Conduction & EMG Report          Patient:       Lucio Daly  Patient ID:    [id number]  Gender:        Male  YOB: 1950  Age:           68 Years 3 Months        History & Examination: This is a 68-year-old gentleman with a known history of peripheral neuropathy.  He is having increasing numbness in his hands.  We are asked to evaluate for worsening ulnar neuropathy or carpal tunnel syndrome.    Techniques: Motor and sensory conduction studies were done with surface recording electrodes. EMG was done with a concentric needle electrode.        Results: Median sensory nerve action potentials are normal bilaterally.  There is mild slowing of distal median sensory conduction on the left.  The ulnar sensory nerve action potentials are reduced bilaterally.  The right median palmar sensory distal latency is mildly prolonged when compared to its  ulnar study.  Median motor conduction studies are normal.  Bilateral ulnar motor conduction studies revealed focal conduction slowing of left ulnar motor conduction at the elbow.    Needle examination revealed markedly prolonged motor unit potential durations in the left ulnar muscles including the flexor carpi ulnaris.  Mild motor unit potential duration prolongation was noted in the right first dorsal interosseous muscle.      Interpretation: The EMG is abnormal.  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  There is also evidence for mild bilateral median neuropathies at the wrists (carpal tunnel syndrome) that have not changed from the previous exam.  There is no evidence for worsening sensorimotor peripheral neuropathy or for an active right or left cervical radiculopathy on this examination.    EMG Physician: Rodney Garcia MD         Sensory NCS      Nerve / Sites Rec. Site Onset Peak Ref. NP Amp Ref. PP  Amp Dist Robert Ref. Temp     ms ms ms  V  V  V cm m/s m/s  C   R MEDIAN - Dig II Anti      Wrist Dig II 2.81 3.75  11.8 10.0 12.9 14 49.8 48.0 33.6   L MEDIAN - Dig II Anti      Wrist Dig II 3.18 4.01  14.2 10.0 30.0 14 44.1 48.0 33.9   R ULNAR - Dig V Anti      Wrist Dig V 2.97 3.65  6.2 8.0 5.4 12.5 42.1 48.0 33.8   L ULNAR - Dig V Anti      Wrist Dig V 2.71 3.23  2.6 8.0 3.5 12.5 46.2 48.0 33.3   R MEDIAN - Ulnar - Palmar      Median Wrist 1.98 2.50 2.40 10.6  16.9 8 40.4  34      Ulnar Wrist 1.51 2.08 2.40 11.1  10.4 8 53.0  33.9   L MEDIAN - Ulnar - Palmar      Median Wrist 1.93 2.40 2.40 23.4  87.6 8 41.5  33.4      Ulnar Wrist 1.56 1.98 2.40 3.1  1.9 8 51.2  33.4       Motor NCS      Nerve / Sites Rec. Site Lat Ref. Amp Ref. Rel Amp Dist Robert Ref. Dur. Area Temp.     ms ms mV mV % cm m/s m/s ms %  C   R MEDIAN - APB      Wrist APB 3.96 4.40 10.8 5.0 100 8   5.73 100 33.9      Elbow APB 8.75  10.2  94.4 27 56.3 48.0 6.09 95 34   L MEDIAN - APB      Wrist APB 4.17 4.40 11.9 5.0 100 8   6.72 100 33.4      Elbow APB 8.75  10.9  91.8 26.5 57.8 48.0 6.98 92.2 33.4   R ULNAR - ADM      Wrist ADM 2.97 3.50 8.2 5.0 100 8   7.14 100 33.6      B.Elbow ADM 7.45  7.8  94.7 24 53.6 48.0 7.19 98.4 33.6      A.Elbow ADM 9.64  7.3  89.2 11 50.3 48.0 7.60 95 33.9   L ULNAR - ADM      Wrist ADM 2.76 3.50 6.7 5.0 100 8   7.76 100 33.5      B.Elbow ADM 6.93  6.5  96.2 23.5 56.4 48.0 7.81 105 33.5      A.Elbow ADM 9.48  6.5  97 11 43.1 48.0 7.97 101 33.6      Axilla ADM 10.63  6.5  97 7 61.1 48.0 7.71 100 33.8   L ULNAR - FDI      Wrist FDI 3.96  10.4  100    4.53 100 34      B.Elbow FDI 8.33  8.9  85.9 23.5 53.7  4.74 94.3 34      A.Elbow FDI 10.10  8.1  78 11 62.1  5.63 90.4 33.9       Needle EMG (W)      EMG Summary Table     Spontaneous MUAP Recruitment    IA Fib/PSW Fasc H.F. Amp Dur. PPP Pattern   R. FIRST D INTEROSS N None None None 1+ 1+ N N   R. EXT INDICIS N None None None N N N N   R. PRON TERES N None None None N N N N    R. BICEPS N None None None 1+ 1+ N N   R. TRICEPS N None None None N N N N   R. DELTOID N None None None N N N N   L. FIRST D INTEROSS N None None None 2+ 2+ N Moderately Reduced   L. EXT DIG COMM N None None None N N N N   L. FLEX CARPI ULN N None None None Giant 3+ N Severly reduced   L. BICEPS N None None None N N N N   L. TRICEPS N None None None N N N N

## 2018-11-16 DIAGNOSIS — M54.9 BACK PAIN: Primary | ICD-10-CM

## 2018-11-20 ENCOUNTER — PRE VISIT (OUTPATIENT)
Dept: ORTHOPEDICS | Facility: CLINIC | Age: 68
End: 2018-11-20

## 2018-11-20 ENCOUNTER — TELEPHONE (OUTPATIENT)
Dept: FAMILY MEDICINE | Facility: CLINIC | Age: 68
End: 2018-11-20

## 2018-11-20 ENCOUNTER — RADIANT APPOINTMENT (OUTPATIENT)
Dept: GENERAL RADIOLOGY | Facility: CLINIC | Age: 68
End: 2018-11-20
Attending: ORTHOPAEDIC SURGERY
Payer: MEDICARE

## 2018-11-20 ENCOUNTER — OFFICE VISIT (OUTPATIENT)
Dept: PULMONOLOGY | Facility: CLINIC | Age: 68
End: 2018-11-20
Attending: INTERNAL MEDICINE
Payer: MEDICARE

## 2018-11-20 ENCOUNTER — OFFICE VISIT (OUTPATIENT)
Dept: ORTHOPEDICS | Facility: CLINIC | Age: 68
End: 2018-11-20
Payer: MEDICARE

## 2018-11-20 VITALS — WEIGHT: 315 LBS | HEIGHT: 73 IN | BODY MASS INDEX: 41.75 KG/M2

## 2018-11-20 VITALS
HEIGHT: 73 IN | BODY MASS INDEX: 41.75 KG/M2 | SYSTOLIC BLOOD PRESSURE: 129 MMHG | DIASTOLIC BLOOD PRESSURE: 86 MMHG | OXYGEN SATURATION: 96 % | RESPIRATION RATE: 18 BRPM | HEART RATE: 65 BPM | WEIGHT: 315 LBS

## 2018-11-20 DIAGNOSIS — Z79.52 LONG TERM CURRENT USE OF SYSTEMIC STEROIDS: ICD-10-CM

## 2018-11-20 DIAGNOSIS — Z79.899 HIGH RISK MEDICATION USE: ICD-10-CM

## 2018-11-20 DIAGNOSIS — M43.16 SPONDYLOLISTHESIS OF LUMBAR REGION: Primary | ICD-10-CM

## 2018-11-20 DIAGNOSIS — M54.9 BACK PAIN: ICD-10-CM

## 2018-11-20 DIAGNOSIS — R06.00 DYSPNEA: Primary | ICD-10-CM

## 2018-11-20 DIAGNOSIS — Z79.899 HIGH RISK MEDICATIONS (NOT ANTICOAGULANTS) LONG-TERM USE: ICD-10-CM

## 2018-11-20 DIAGNOSIS — M06.09 RHEUMATOID ARTHRITIS OF MULTIPLE SITES WITH NEGATIVE RHEUMATOID FACTOR (H): ICD-10-CM

## 2018-11-20 DIAGNOSIS — R06.00 DYSPNEA, UNSPECIFIED TYPE: ICD-10-CM

## 2018-11-20 DIAGNOSIS — J84.9 ILD (INTERSTITIAL LUNG DISEASE) (H): Primary | ICD-10-CM

## 2018-11-20 DIAGNOSIS — M06.09 RHEUMATOID ARTHRITIS OF MULTIPLE SITES WITH NEGATIVE RHEUMATOID FACTOR (H): Primary | ICD-10-CM

## 2018-11-20 LAB
6 MIN WALK (FT): 800 FT
6 MIN WALK (M): 244 M
ALBUMIN SERPL-MCNC: 3.9 G/DL (ref 3.4–5)
ALT SERPL W P-5'-P-CCNC: 28 U/L (ref 0–70)
AST SERPL W P-5'-P-CCNC: 20 U/L (ref 0–45)
BASOPHILS # BLD AUTO: 0 10E9/L (ref 0–0.2)
BASOPHILS NFR BLD AUTO: 0.8 %
CREAT SERPL-MCNC: 1.13 MG/DL (ref 0.66–1.25)
CRP SERPL-MCNC: 6.4 MG/L (ref 0–8)
DIFFERENTIAL METHOD BLD: ABNORMAL
EOSINOPHIL # BLD AUTO: 0 10E9/L (ref 0–0.7)
EOSINOPHIL NFR BLD AUTO: 0.8 %
ERYTHROCYTE [DISTWIDTH] IN BLOOD BY AUTOMATED COUNT: 13.9 % (ref 10–15)
GFR SERPL CREATININE-BSD FRML MDRD: 64 ML/MIN/1.7M2
HCT VFR BLD AUTO: 42.2 % (ref 40–53)
HGB BLD-MCNC: 13.9 G/DL (ref 13.3–17.7)
IMM GRANULOCYTES # BLD: 0 10E9/L (ref 0–0.4)
IMM GRANULOCYTES NFR BLD: 0.6 %
LYMPHOCYTES # BLD AUTO: 0.8 10E9/L (ref 0.8–5.3)
LYMPHOCYTES NFR BLD AUTO: 14.6 %
MCH RBC QN AUTO: 31.7 PG (ref 26.5–33)
MCHC RBC AUTO-ENTMCNC: 32.9 G/DL (ref 31.5–36.5)
MCV RBC AUTO: 96 FL (ref 78–100)
MONOCYTES # BLD AUTO: 0.2 10E9/L (ref 0–1.3)
MONOCYTES NFR BLD AUTO: 4.7 %
NEUTROPHILS # BLD AUTO: 4 10E9/L (ref 1.6–8.3)
NEUTROPHILS NFR BLD AUTO: 78.5 %
NRBC # BLD AUTO: 0 10*3/UL
NRBC BLD AUTO-RTO: 0 /100
PLATELET # BLD AUTO: 185 10E9/L (ref 150–450)
RBC # BLD AUTO: 4.39 10E12/L (ref 4.4–5.9)
WBC # BLD AUTO: 5.1 10E9/L (ref 4–11)

## 2018-11-20 PROCEDURE — 85025 COMPLETE CBC W/AUTO DIFF WBC: CPT | Performed by: NURSE PRACTITIONER

## 2018-11-20 PROCEDURE — 84460 ALANINE AMINO (ALT) (SGPT): CPT | Performed by: NURSE PRACTITIONER

## 2018-11-20 PROCEDURE — 86140 C-REACTIVE PROTEIN: CPT | Performed by: NURSE PRACTITIONER

## 2018-11-20 PROCEDURE — 82565 ASSAY OF CREATININE: CPT | Performed by: NURSE PRACTITIONER

## 2018-11-20 PROCEDURE — 36415 COLL VENOUS BLD VENIPUNCTURE: CPT | Performed by: NURSE PRACTITIONER

## 2018-11-20 PROCEDURE — 84450 TRANSFERASE (AST) (SGOT): CPT | Performed by: NURSE PRACTITIONER

## 2018-11-20 PROCEDURE — G0463 HOSPITAL OUTPT CLINIC VISIT: HCPCS | Mod: ZF

## 2018-11-20 PROCEDURE — 82040 ASSAY OF SERUM ALBUMIN: CPT | Performed by: NURSE PRACTITIONER

## 2018-11-20 ASSESSMENT — PAIN SCALES - GENERAL: PAINLEVEL: NO PAIN (0)

## 2018-11-20 NOTE — LETTER
11/20/2018       RE: Lucio Daly  [address]  [place] [address]     Dear Colleague,    Thank you for referring your patient, Lucio Daly, to the [facility]. Please see a copy of my visit note below.    REASON FOR CONSULTATION: Consult (Low back pain. Lumbar stenosis. )     REFERRING PHYSICIAN: Heladio Calderón   PCP:Jah Corley    History of Present Illness:  68/m, back pain x many years (since he was in his 30s/40s).  Over the years, has had several MRI's showing spinal stenosis and disc problems.  Last 2 yrs, sciatica pain had gotten worse.  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    Back 60%, Leg 40%,  Left > Right.  Posterolateral thigh, calf.  Worse: Standing, walking (worse for the back).  Walking distance 15 mins /1 block; then needs to sit.  Better: sitting (better for back pain, but after a while, leg pain sets in); moving around.  (+) shopping cart.    Previous treatment:   PT (2 yrs ago at [place]) - stopped because therapist thought it was making things worse.  Had ROLA ~ 10 yrs ago (c/o neurologist) - no relief.  Developed adverse reaction (to prednisone/cortisone).  No surgeries.    Takes Lyrica 75mg/tab (3-4x/day).  Tylenol 500 mg (6 per day).  For RA - takes methotrexate 8 tabs once/week; Plaquenil 2 tabs/day.    Worked up by Dr. Vanegas (Neurology) for balance problems, movement disorder, stiffness.  Did not think it was Parkinson's per se.  (+) hx of melanoma (R leg) 5-7 yrs ago.  In total, has had ~ 13 skin cancer resections (basal cell CA, SCCA).  One of them was complicated by infection (L leg) that took 1 yr to heal.    No previous dx of osteoporosis.  No previous DEXA.  Previously diagnosed with Vit D deficiency; on Vit D supplements.  PCP Dr. Jah Corley ([facility]).        Oswestry (TIMBO) Questionnaire    OSWESTRY DISABILITY INDEX 11/7/2018   Count 9   Sum 16    Oswestry Score (%) 35.56   Some recent data might be hidden        Visual Analog Pain Scale  Back Pain Scale 0-10: 6  Right leg pain: 2  Left leg pain: 5.5    PROMIS-10 Scores  Global Mental Health Score: (P) 13  Global Physical Health Score: (P) 11  PROMIS TOTAL - SUBSCORES: (P) 24    ROS:  A 12-point review of systems was completed and is negative except for otherwise noted above in the history of present illness.    Med Hx:  Past Medical History:   Diagnosis Date     Abnormal EMG 4/18/2013     Abnormal involuntary movements(781.0)     Movement Disorder     AK (actinic keratosis) 12/18/2011     Allergic rhinitis, cause unspecified     Allergic rhinitis     Balance problems 11/1/2011     Basal cell carcinoma      Bladder spasms 11/1/2011     Chronic osteoarthritis      Diaphragmatic hernia without mention of obstruction or gangrene      Earache or other ear, nose, or throat complaint      Esophageal reflux      Fatigue 11/1/2011     Fracture      H. pylori infection 5/12/2011     History of MRI of cervical spine 11/18/2013    EXAMINATION: CERVICAL SPINE G/E 5 VIEWS* 4/19/2013 4:18 PM  CLINICAL HISTORY: Pain in limb,Performing Location?->[facility] ([facility]),  COMPARISON:  FINDINGS: AP and lateral views in flexion and extension, as well as odontoid view of the cervical spine was obtained. There is no comparison available. The vertebral bodies of the cervical spine are normally aligned. There is posterior spurring and d     Incomplete defecation 11/1/2011     Interstitial lung disease (H) 11/29/2016     Laboratory test 8/7/2012     Lung disease June 2015     Malignant basal cell neoplasm of skin 8/6/2008     Melanoma (H) 8/6/2008     Melanoma in situ of lower leg (H)     R calf     Neuropathy 5/16/2011     Other bladder disorder      Other color vision deficiencies      Other nervous system complications      Parkinsonism (H) 11/1/2011     Personal history of colonic polyps      Polyneuropathy in other diseases  classified elsewhere (H)      RA (rheumatoid arthritis) (H)      Rheumatoid arthritis of multiple sites with negative rheumatoid factor (H) 3/21/2016     Seronegative arthritis 11/18/2013     Shortness of breath      Shoulder arthritis 2016    acromioclavicular joint      Somatization disorder 5/12/2011     Squamous cell carcinoma      Tremor 11/1/2011     Unspecified essential hypertension      Unspecified hypothyroidism      Urinary tract infection      Urinary urgency 11/1/2011     Wears glasses 11/1/2011       Surg Hx:  Past Surgical History:   Procedure Laterality Date     BIOPSY OF SKIN LESION       COLONOSCOPY       HEMORRHOID SURGERY       lip biopsy      for sicca complex     MOHS MICROGRAPHIC PROCEDURE       SOFT TISSUE SURGERY      removeal of basel cell carcinoma       Allergies:  Allergies   Allergen Reactions     Restasis      Burning eyes, problems with breathing, tightness in chest     Adhesive Tape      Bandages misc     Allegra      EXCESSIVE URINATION AND WEAKNESS, LIGHT-HEADED     Allergy      Dust     Animal Dander      Benadryl Allergy      EXCESSIVE URINATION AND WEAKNESS, LIGHT-HEADED     Cephalexin      Joint pain or gerd aggravation. Bloating excessive urination      Cephalosporins      Diphenhydramine Other (See Comments)     Doxycycline Hyclate Nausea     SWEATING,MIGRAINES,LOSS OF APPETITE,SWEATING,LIGHT HEADED, EXCESSIVE URINATION     Fexofenadine Other (See Comments)     Flonase [Fluticasone Propionate]      Gabapentin      Neurontin: mosd changes and excess urination     Iodine Solution [Povidone Iodine]      SKIN MELTS     Levaquin      From surgeon--? Joint pain ? Gerd aggravation. Insomnia, excess urination     Mylanta      EXCESSIVE URINATION AND WEAKNESS, LIGHT-HEADED     Prednisone      Weakness, elevated bp, headache, eye pain, congestion      Seafood [Seafood]      Shellfish Allergy      Hives       Sulfamethoxazole-Trimethoprim      Chest pain, angina     Trees      Trileptal  "     SEVERE JOINT AND TENDON PAIN, INSOMNIA, RESTLESSNESS, NAUSEA, EXCESS URINATION     Cortizone Rash     EXCESS URINATION,WEAKNESS,NAUSEA, HEADACHE       Meds:  Current Outpatient Prescriptions   Medication     acetaminophen (TYLENOL) 500 MG tablet     albuterol (PROAIR HFA/PROVENTIL HFA/VENTOLIN HFA) 108 (90 Base) MCG/ACT inhaler     Cholecalciferol (VITAMIN D3) 1000 units CAPS     folic acid (FOLVITE) 1 MG tablet     hydroxychloroquine (PLAQUENIL) 200 MG tablet     methotrexate 2.5 MG tablet CHEMO     metoprolol succinate (TOPROL-XL) 50 MG 24 hr tablet     omeprazole (PRILOSEC) 40 MG capsule     ORDER FOR DME     oxybutynin (DITROPAN) 5 MG tablet     oxybutynin (DITROPAN) 5 MG tablet     pregabalin (LYRICA) 50 MG capsule     No current facility-administered medications for this visit.        Fam Hx:  Family History   Problem Relation Age of Onset     Hypertension Mother      HEART DISEASE Mother      a fib     Arthritis Mother      \"osteo\"     Osteoporosis Mother      Skin Cancer Mother      Uterine Cancer Mother      Hypertension Brother      Diabetes Brother      \" post pancreatitis\"     Neurologic Disorder Sister      multiple sclerosis     Hypertension Sister      HEART DISEASE Sister      HEART DISEASE Sister      a fib     Arthritis Sister      Hypertension Father      Cerebrovascular Disease Father      ,     Skin Cancer Father      Colon Polyps Father      Psoriasis Maternal Grandfather      Stomach Cancer Maternal Grandfather      Congenital Anomalies Other      granddaughter with a chromosome defect     Cerebrovascular Disease Paternal Grandmother      ,     Cerebrovascular Disease Paternal Grandfather      ,     Melanoma No family hx of      Colon Cancer No family hx of        P/S Hx:  Social History   Substance Use Topics     Smoking status: Former Smoker     Packs/day: 1.50     Years: 37.00     Types: Cigarettes     Start date: 6/15/1963     Quit date: 9/30/2000     Smokeless tobacco: Never Used     " "Alcohol use No     Went on disability in late 50's.    , lives with wife; kids all  and moved (has 8 grandchildren).  Lives in [address] [facility].  Quit smoking 2000.  (-) EtOH      Physical Exam:  Very pleasant, healthy appearing, alert, oriented x 3, cooperative.  Normal mood and affect.  Not in cardiorespiratory distress.  Ht 1.854 m (6' 1\")  Wt 143.3 kg (316 lb)  BMI 41.69 kg/m2  Slightly hunched forward posture.  Ambulates with cane; however, able to walk without it.  Slow gait, small steps, but not shuffling.  Mild imbalance.  Some difficulty walking on toes/heels.  Says it just feels weak; seems to have some imbalance when attempting to do so.  Back: no deformity, no skin lesions or surgical scars.  Localizes pain across lower lumbar area, slightly more to R paraspinal.  (+) R lower lumbar paraspinal tenderness R>L.  (-) PSIS tenderness.  Limited extension (barely beyond neutral) and flexion (can only reach down to knees).  Not much discomfort.    Wears R wrist brace (neoprene).    Neuro Exam:  Motor: 5/5 strength for all muscle groups in both LE's.  Sensory:  Intact to light touch in both LE's; slightly decreased at R S1 (lateral foot).   Reflexes:  Knee 2+ bilat.  Ankle 0 bilat.  (-) Babinski, (-) clonus.    Lower Extremity:  Equal leg lengths, palpable but not strong pulses, (-) atrophy / asymmetry.  Full painless passive knee and ankle motion.  Straight leg raise: (-) right, (-) left.  Hip impingement: (-) right, (-) left.  PRAVEEN/Bipin's: (-) right, (-) left.          Imaging:   Lumbar MRI 6/27/17 shows L4-5 degenerative spondylolisthesis with moderate-severe stenosis.      Full spine ap-lat x-rays today show Gr 1 degen spondy L4-5;  (+) thoracic hyperkyphosis, with positive sagittal alignment.    Lumbar flex-ext x-rays show worsened spondylolisthesis compared to neutral/supine imaging; indicative of abnormal motion / instability at this level.    Chest CT 05/2017 opportunistic BMD " measurement L1 shows very poor bone density = 48 HU.    Reveiwed UE EMG (Dr. Garcia 11/15/18) with patient.  (+) L cubital tunnel synd; (+) mild bilateral CTS.  No evidence of cervical radiculopathy; nor of worsening neuropathy.      Impression:   - Gr. 1 degenerative spondylolisthesis with instability L4-5.  - Spinal stenosis with neurogenic claudication L4-5.  - Multiple medical co-morbidities, including Class III obesity (BMI 41.69), interstitial lung disease, GALO on CPAP, neuropathy, rheumatoid arthritis (on methotrexate and plaquenil), parkinsonism, hx of skin CA (melanoma, basal cell CA, SCCA).     Plan:   Had good long discussion with patient.  Discussed his lumbar condition, its degenerative nature, and nonoperative and operative tx options; including observation, rpt PT, rpt ROLA, ultimately surgery in form of fusion-decompression L4-5.  May be done via MIS lateral approach and percutaneous screw fixation +/- SPO or laminectomy.    Will send copy of note to PCP; recommend BMD testing with DEXA scans, Vit D and Ca levels, etc.  If low, recommend aggressive BMD treatment.    Also discussed possible referral to Pain Clinic for further discussion of nonop treatment options.    All questions and concerns were answered to the patient's apparent satisfaction before leaving the clinic.     Total visit time > 45 mins, > 50% counseling and coordination of care.    Respectfully,    Cuong Khan MD    Orthopaedic Spine Surgery  Dept Orthopaedic Surgery, [facility]  [phone] office, [phone] pager  [email or web address]

## 2018-11-20 NOTE — PROGRESS NOTES
Reason for Visit  Lucio Daly is a 68 year old year old male who is being seen for Sarcoidosis  Pulmonary HPI    The patient was seen and examined by Harsha Mccarthy     Mr. Daly comes in for followup.  He was last seen in the Pulmonary Clinic on 08/21/2018 and at that time he had orthopnea for which an echo was ordered.  He also had a neurologic evaluation since then.  He is currently on Plaquenil and methotrexate for seronegative rheumatoid arthritis and tolerating them well.      Overall, he tells that he is doing quite well.  He denies any new pulmonary symptoms.  Somewhat limited in the amount of physical activity he can perform due to back pain.  He saw a back spine surgeon today and there is a concern about osteopenia for which workup is currently being pursued.      No fever, chills, cough, sputum production or other pulmonary symptoms.  He continues to have some exertional dyspnea and orthopnea.       Current Outpatient Prescriptions   Medication     acetaminophen (TYLENOL) 500 MG tablet     albuterol (PROAIR HFA/PROVENTIL HFA/VENTOLIN HFA) 108 (90 Base) MCG/ACT inhaler     Cholecalciferol (VITAMIN D3) 1000 units CAPS     folic acid (FOLVITE) 1 MG tablet     hydroxychloroquine (PLAQUENIL) 200 MG tablet     methotrexate 2.5 MG tablet CHEMO     metoprolol succinate (TOPROL-XL) 50 MG 24 hr tablet     omeprazole (PRILOSEC) 40 MG capsule     ORDER FOR DME     oxybutynin (DITROPAN) 5 MG tablet     oxybutynin (DITROPAN) 5 MG tablet     pregabalin (LYRICA) 50 MG capsule     No current facility-administered medications for this visit.      Allergies   Allergen Reactions     Restasis      Burning eyes, problems with breathing, tightness in chest     Adhesive Tape      Bandages misc     Allegra      EXCESSIVE URINATION AND WEAKNESS, LIGHT-HEADED     Allergy      Dust     Animal Dander      Benadryl Allergy      EXCESSIVE URINATION AND WEAKNESS, LIGHT-HEADED     Cephalexin      Joint pain or gerd aggravation.  Bloating excessive urination      Cephalosporins      Diphenhydramine Other (See Comments)     Doxycycline Hyclate Nausea     SWEATING,MIGRAINES,LOSS OF APPETITE,SWEATING,LIGHT HEADED, EXCESSIVE URINATION     Fexofenadine Other (See Comments)     Flonase [Fluticasone Propionate]      Gabapentin      Neurontin: mosd changes and excess urination     Iodine Solution [Povidone Iodine]      SKIN MELTS     Levaquin      From surgeon--? Joint pain ? Gerd aggravation. Insomnia, excess urination     Mylanta      EXCESSIVE URINATION AND WEAKNESS, LIGHT-HEADED     Prednisone      Weakness, elevated bp, headache, eye pain, congestion      Seafood [Seafood]      Shellfish Allergy      Hives       Sulfamethoxazole-Trimethoprim      Chest pain, angina     Trees      Trileptal      SEVERE JOINT AND TENDON PAIN, INSOMNIA, RESTLESSNESS, NAUSEA, EXCESS URINATION     Cortizone Rash     EXCESS URINATION,WEAKNESS,NAUSEA, HEADACHE     Past Medical History:   Diagnosis Date     Abnormal EMG 4/18/2013     Abnormal involuntary movements(781.0)     Movement Disorder     AK (actinic keratosis) 12/18/2011     Allergic rhinitis, cause unspecified     Allergic rhinitis     Balance problems 11/1/2011     Basal cell carcinoma      Bladder spasms 11/1/2011     Chronic osteoarthritis      Diaphragmatic hernia without mention of obstruction or gangrene      Earache or other ear, nose, or throat complaint      Esophageal reflux      Fatigue 11/1/2011     Fracture      H. pylori infection 5/12/2011     History of MRI of cervical spine 11/18/2013    EXAMINATION: CERVICAL SPINE G/E 5 VIEWS* 4/19/2013 4:18 PM  CLINICAL HISTORY: Pain in limb,Performing Location?->[facility] ([facility]),  COMPARISON:  FINDINGS: AP and lateral views in flexion and extension, as well as odontoid view of the cervical spine was obtained. There is no comparison available. The vertebral bodies of the cervical spine are normally aligned. There is posterior spurring and d      Incomplete defecation 11/1/2011     Interstitial lung disease (H) 11/29/2016     Laboratory test 8/7/2012     Lung disease June 2015     Malignant basal cell neoplasm of skin 8/6/2008     Melanoma (H) 8/6/2008     Melanoma in situ of lower leg (H)     R calf     Neuropathy 5/16/2011     Other bladder disorder      Other color vision deficiencies      Other nervous system complications      Parkinsonism (H) 11/1/2011     Personal history of colonic polyps      Polyneuropathy in other diseases classified elsewhere (H)      RA (rheumatoid arthritis) (H)      Rheumatoid arthritis of multiple sites with negative rheumatoid factor (H) 3/21/2016     Seronegative arthritis 11/18/2013     Shortness of breath      Shoulder arthritis 2016    acromioclavicular joint      Somatization disorder 5/12/2011     Squamous cell carcinoma      Tremor 11/1/2011     Unspecified essential hypertension      Unspecified hypothyroidism      Urinary tract infection      Urinary urgency 11/1/2011     Wears glasses 11/1/2011       Past Surgical History:   Procedure Laterality Date     BIOPSY OF SKIN LESION       COLONOSCOPY       HEMORRHOID SURGERY       lip biopsy      for sicca complex     MOHS MICROGRAPHIC PROCEDURE       SOFT TISSUE SURGERY      removeal of basel cell carcinoma       Social History     Social History     Marital status:      Spouse name: N/A     Number of children: N/A     Years of education: N/A     Occupational History     Not on file.     Social History Main Topics     Smoking status: Former Smoker     Packs/day: 1.50     Years: 37.00     Types: Cigarettes     Start date: 6/15/1963     Quit date: 9/30/2000     Smokeless tobacco: Never Used     Alcohol use No     Drug use: No     Sexual activity: No     Other Topics Concern     Parent/Sibling W/ Cabg, Mi Or Angioplasty Before 65f 55m? No     Social History Narrative    2013: Living in [place] in a townhouse with no steps    Has 3 sons that are doing okay.          "Dairy/d 1 servings/d.     Caffeine 0 servings/d    Exercise 0 x week    Sunscreen used - No    Seatbelts used - Yes    Working smoke/CO detectors in the home - Yes    Guns stored in the home - Yes    Self Breast Exams - NA    Self Testicular Exam - Yes    Eye Exam up to date - Yes 2008    Dental Exam up to date - Yes 2006    Pap Smear up to date - NA    Mammogram up to date - NA    PSA up to date - Yes 2008    Dexa Scan up to date - No    Flex Sig / Colonoscopy up to date - Yes less than 5 yrs ago    Immunizations up to date -today    Abuse: Current or Past(Physical, Sexual or Emotional)- No    Do you feel safe in your environment - Yes    2008                   ROS Pulmonary  A complete ROS was otherwise negative except as noted in the HPI.  /86  Pulse 65  Resp 18  Ht 1.854 m (6' 1\")  Wt 143.3 kg (316 lb)  SpO2 96%  BMI 41.69 kg/m2  Exam:   GENERAL APPEARANCE: Well developed, well nourished, alert, and in no apparent distress.  EYES: PERRL, EOMI  HENT: Nasal mucosa with no edema and no hyperemia. No nasal polyps.  MOUTH: Oral mucosa is moist, without any lesions, no tonsillar enlargement, no oropharyngeal exudate.  NECK: supple, no masses, no thyromegaly.  LYMPHATICS: No significant axillary, cervical, or supraclavicular nodes.  RESP: normal percussion, good air flow throughout.  No crackles. No rhonchi. No wheezes.  CV: Normal S1, S2, regular rhythm, normal rate. No murmur.  No rub. No gallop. No LE edema.   ABDOMEN:  Bowel sounds normal, soft, nontender, no HSM or masses.   MS: extremities normal. No clubbing. No cyanosis.  SKIN: no rash on limited exam  NEURO: Mentation intact, speech normal, normal strength and tone, normal gait and stance  Results PFTs done today were reviewed by me with the patient.  FVC is 3.89 liters, which is 80% of predicted.  FEV1 is 2.88 liters, which is 79% predicted.  The ratio is 74.  Total lung capacity is 6.43 which is 80% of predicted.  DLCO not corrected for " hemoglobin is 27.90 which is 100% predicted.  He also had a 6-minute walk test done where the 6-minute walk distance was below the lower limit of normal.  No significant desaturation was noted.     Assessment and plan: Mr. Daly is a pleasant 68-year-old male with rheumatoid arthritis, currently on Plaquenil and methotrexate.  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   1.  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  PFTs are stable with minimal symptoms.  I have asked him to see if he can do a daily exercise routine on most days of the week to improve his conditioning.   2.  Diaphragmatic muscle weakness, seen by Neurology.  Unlikely to be related to an active process.  Plan to continue NIPPV at night.  I think he has a clinic visit scheduled with Neurology in the future.   3.  Sleep disordered breathing.  Will continue NIPPV.   4.  Gastroesophageal reflux.  Continue omeprazole.   5.  Drug monitoring has been performed by Rheumatology.      I will see Mr. Daly back in my clinic in 6 months.             Spine problems worse. Not walking as much due to pain.       Shortness of breath better. No new symptoms          Answers for HPI/ROS submitted by the patient on 11/7/2018   General Symptoms: Yes  Skin Symptoms: No  HENT Symptoms: Yes  EYE SYMPTOMS: Yes  HEART SYMPTOMS: Yes  LUNG SYMPTOMS: Yes  INTESTINAL SYMPTOMS: Yes  URINARY SYMPTOMS: Yes  REPRODUCTIVE SYMPTOMS: No  SKELETAL SYMPTOMS: Yes  BLOOD SYMPTOMS: No  NERVOUS SYSTEM SYMPTOMS: Yes  MENTAL HEALTH SYMPTOMS: No  Fever: No  Loss of appetite: No  Weight loss: No  Weight gain: No  Fatigue: Yes  Night sweats: No  Chills: No  Increased stress: No  Excessive hunger: No  Excessive thirst: No  Feeling hot or cold when others believe the temperature is normal: No  Loss of height: No  Post-operative complications: No  Surgical site pain: No  Hallucinations: No  Change in or Loss of Energy: No  Hyperactivity: No  Confusion: No  Ear pain: No  Ear discharge:  No  Hearing loss: No  Tinnitus: Yes  Nosebleeds: No  Congestion: No  Sinus pain: No  Trouble swallowing: No   Voice hoarseness: Yes  Mouth sores: No  Sore throat: No  Tooth pain: No  Gum tenderness: No  Bleeding gums: No  Change in taste: No  Change in sense of smell: No  Dry mouth: Yes  Hearing aid used: No  Neck lump: No  Eye pain: Yes  Vision loss: No  Dry eyes: Yes  Watery eyes: No  Eye bulging: No  Double vision: Yes  Flashing of lights: No  Spots: No  Floaters: No  Redness: No  Crossed eyes: No  Tunnel Vision: No  Yellowing of eyes: No  Eye irritation: No  Cough: No  Sputum or phlegm: No  Coughing up blood: No  Difficulty breating or shortness of breath: Yes  Snoring: No  Wheezing: Yes  Difficulty breathing on exertion: Yes  Nighttime Cough: No  Difficulty breathing when lying flat: No  Chest pain or pressure: No  Fast or irregular heartbeat: Yes  Pain in legs with walking: Yes  Trouble breathing while lying down: No  Fingers or toes appear blue: No  High blood pressure: Yes  Low blood pressure: No  Fainting: No  Murmurs: No  Pacemaker: No  Varicose veins: No  Edema or swelling: No  Wake up at night with shortness of breath: No  Light-headedness: No  Exercise intolerance: Yes  Nausea: No  Vomiting: No  Bloating: Yes  Constipation: Yes  Diarrhea: No  Blood in stool: Yes  Black stools: No  Rectal or Anal pain: No  Fecal incontinence: No  Yellowing of skin or eyes: No  Vomit with blood: No  Change in stools: No  Trouble holding urine or incontinence: Yes  Pain or burning: No  Trouble starting or stopping: No  Increased frequency of urination: No  Blood in urine: No  Decreased frequency of urination: No  Frequent nighttime urination: No  Flank pain: Yes  Difficulty emptying bladder: No  Back pain: Yes  Muscle aches: Yes  Neck pain: Yes  Swollen joints: Yes  Joint pain: Yes  Bone pain: Yes  Muscle cramps: No  Muscle weakness: Yes  Joint stiffness: Yes  Bone fracture: No  Trouble with coordination: Yes  Dizziness or  trouble with balance: Yes  Fainting or black-out spells: No  Memory loss: No  Headache: No  Seizures: No  Speech problems: No  Tingling: Yes  Tremor: Yes  Weakness: Yes  Difficulty walking: Yes  Paralysis: No  Numbness: Yes

## 2018-11-20 NOTE — NURSING NOTE
"Reason For Visit:   Chief Complaint   Patient presents with     Consult     Low back pain. Lumbar stenosis.        Primary MD: Jah Corley      ?  No  Occupation Retired, .    Date of injury: None    Date of surgery: None    Smoker: No      Ht 1.854 m (6' 1\")  Wt 143.3 kg (316 lb)  BMI 41.69 kg/m2    Pain Assessment  Patient Currently in Pain: Yes  0-10 Pain Scale: 6  Primary Pain Location: Back  Pain Orientation: Lower  Pain Descriptors: Aching, Discomfort  Aggravating Factors: Movement    Oswestry (TIMBO) Questionnaire    OSWESTRY DISABILITY INDEX 11/7/2018   Count 9   Sum 16   Oswestry Score (%) 35.56   Some recent data might be hidden          Visual Analog Pain Scale  Back Pain Scale 0-10: 6  Right leg pain: 2  Left leg pain: 5.5    Promis 10 Assessment    PROMIS 10 11/7/2018   In general, would you say your health is: Good   In general, would you say your quality of life is: Good   In general, how would you rate your physical health? Fair   In general, how would you rate your mental health, including your mood and your ability to think? Very good   In general, how would you rate your satisfaction with your social activities and relationships? Good   In general, please rate how well you carry out your usual social activities and roles Good   To what extent are you able to carry out your everyday physical activities such as walking, climbing stairs, carrying groceries, or moving a chair? Moderately   How often have you been bothered by emotional problems such as feeling anxious, depressed or irritable? Sometimes   How would you rate your fatigue on average? Moderate   How would you rate your pain on average?   0 = No Pain  to  10 = Worst Imaginable Pain 6   Global Physical Health Score : Raw Score -   Global Mental Health Score : Raw Score -   Total (Physical + Mental Health Score) -   In general, would you say your health is: 3   In general, would you say your quality of life is: 3   In general, " how would you rate your physical health? 2   In general, how would you rate your mental health, including your mood and your ability to think? 4   In general, how would you rate your satisfaction with your social activities and relationships? 3   In general, please rate how well you carry out your usual social activities and roles. (This includes activities at home, at work and in your community, and responsibilities as a parent, child, spouse, employee, friend, etc.) 3   To what extent are you able to carry out your everyday physical activities such as walking, climbing stairs, carrying groceries, or moving a chair? 3   In the past 7 days, how often have you been bothered by emotional problems such as feeling anxious, depressed, or irritable? 3   In the past 7 days, how would you rate your fatigue on average? 3   In the past 7 days, how would you rate your pain on average, where 0 means no pain, and 10 means worst imaginable pain? 6   Global Mental Health Score 13   Global Physical Health Score 11   PROMIS TOTAL - SUBSCORES 24   Some recent data might be hidden                Edda Vivar LPN

## 2018-11-20 NOTE — MR AVS SNAPSHOT
After Visit Summary   11/20/2018    Lucio Daly    MRN: [id number]           Patient Information     Date Of Birth          1950        Visit Information        Provider Department      11/20/2018 8:00 AM Cuong Khan MD [facility]         Follow-ups after your visit        Your next 10 appointments already scheduled     Nov 20, 2018  9:00 AM CST   FULL PULMONARY FUNCTION with UC PFL D   [facility] ([facility])    [address]  3rd Floor  [place] [facility] [address]   [phone]            Nov 20, 2018 10:00 AM CST   Six Minute Walk with UC PFL 6 MINUTE WALK 2   [facility] ([facility])    [address]  [address] [facility] [address]   [phone]            Nov 20, 2018 10:00 AM CST   (Arrive by 9:45 AM)   Return Interstitial Lung with Harsha Mccarthy MD   [facility] ([facility])    [address] [place] [address]  [address]  [facility] [address]   [phone]            Nov 20, 2018 10:45 AM CST   Lab with UC LAB   [facility] ([facility])    [address]  [address] [facility] [id number] [phone]            Dec 19, 2018  9:30 AM CST   (Arrive by 9:15 AM)   Return Visit with Jeremy Goodrich MD   [facility] ([facility])    [address] [place] [address]  [address]  [facility] [id number] [phone]            May 21, 2019  8:40 AM CDT   (Arrive by 8:25 AM)   Return Movement Disorder with Thong Montes MD   [facility] ([facility])    [address]  [address] [facility] [id number] [phone]              Who to contact     Please call your clinic at [phone] to:    Ask questions about your health    Make or cancel appointments    Discuss your  "medicines    Learn about your test results    Speak to your doctor            Additional Information About Your Visit        MyChart Information     [employer] gives you secure access to your electronic health record. If you see a primary care provider, you can also send messages to your care team and make appointments. If you have questions, please call your primary care clinic.  If you do not have a primary care provider, please call [phone] and they will assist you.      [employer] is an electronic gateway that provides easy, online access to your medical records. With [employer], you can request a clinic appointment, read your test results, renew a prescription or communicate with your care team.     To access your existing account, please contact your [facility] or call [phone] for assistance.        Care EveryWhere ID     This is your Care EveryWhere ID. This could be used by other organizations to access your [facility] medical records  [id number]        Your Vitals Were     Height BMI (Body Mass Index)                1.854 m (6' 1\") 41.69 kg/m2           Blood Pressure from Last 3 Encounters:   11/02/18 (!) 168/99   09/12/18 (!) 188/110   08/21/18 146/83    Weight from Last 3 Encounters:   11/20/18 143.3 kg (316 lb)   11/02/18 142 kg (313 lb)   09/12/18 142.1 kg (313 lb 3.7 oz)              Today, you had the following     No orders found for display       Primary Care Provider Office Phone # Fax #    Jah Corley [phone]       [address] [facility] [address]        Equal Access to Services     ALINE QUIÑONES AH: Hadii helen oneill hadcarmelao Sodionicio, waaxda luqadaha, qaybta kaalmada adekrystian, mariana johnson. So [facility] [phone].    ATENCIÓN: Si habla español, tiene a rudd disposición servicios gratuitos de asistencia lingüística. Llame al [phone].    We comply with applicable federal civil rights laws and [place] laws. We do not " discriminate on the basis of race, color, national origin, age, disability, sex, sexual orientation, or gender identity.            Thank you!     Thank you for choosing [facility]  for your care. Our goal is always to provide you with excellent care. Hearing back from our patients is one way we can continue to improve our services. Please take a few minutes to complete the written survey that you may receive in the mail after your visit with us. Thank you!             Your Updated Medication List - Protect others around you: Learn how to safely use, store and throw away your medicines at www.disposemymeds.org.          This list is accurate as of 11/20/18  8:56 AM.  Always use your most recent med list.                   Brand Name Dispense Instructions for use Diagnosis    albuterol 108 (90 Base) MCG/ACT inhaler    PROAIR HFA/PROVENTIL HFA/VENTOLIN HFA    1 Inhaler    Inhale 2 puffs into the lungs every 6 hours as needed for shortness of breath / dyspnea or wheezing    Dyspnea, unspecified type       folic acid 1 MG tablet    FOLVITE    180 tablet    1mg tab by mouth twice daily @ 9am and 4pm    Pain in toes of both feet, Rheumatoid arthritis of multiple sites with negative rheumatoid factor (H), Rheumatoid arthritis involving shoulder with negative rheumatoid factor, unspecified laterality (H), High risk medications (not anticoagulants) long-term use       LYRICA 50 MG capsule   Generic drug:  pregabalin     360 capsule    1 tab by mouth @ 7/730am, 1 tab @ 4pm and 1-2 tab @ 11pm    Peripheral polyneuropathy       methotrexate 2.5 MG tablet CHEMO     96 tablet    TAKE 8 TABLETS BY MOUTH ONCE WEEKLY. LABS DUE EVERY 8 - 12 WEEKS.    Rheumatoid arthritis of multiple sites with negative rheumatoid factor (H), High risk medications (not anticoagulants) long-term use       metoprolol succinate 50 MG 24 hr tablet    TOPROL-XL    90 tablet    Take 1 tablet (50 mg) by mouth daily    Essential hypertension        omeprazole 40 MG capsule    priLOSEC    180 capsule    40mg capsule by mouth twice daily @ 7/730am and 4pm    Gastroesophageal reflux disease without esophagitis       order for DME      Use your CPAP device as directed by your provider.        * oxybutynin 5 MG tablet    DITROPAN    90 tablet    5mg tab by mouth daily @ 4pm    Other urinary incontinence       * oxybutynin 5 MG tablet    DITROPAN    90 tablet    TAKE ONE TABLET BY MOUTH EVERY DAY    Other urinary incontinence       PLAQUENIL 200 MG tablet   Generic drug:  hydroxychloroquine     180 tablet    200mg tab by mouth twice daily @ 9am and 4pm    Rheumatoid arthritis involving shoulder with negative rheumatoid factor, unspecified laterality (H)       TYLENOL 500 MG tablet   Generic drug:  acetaminophen      2 x 500mg by mouth 3 times per day: 7/730am, 4pm and 11pm  = 6/day        vitamin D3 1000 units Caps     30 capsule    1000 unit capsule by mouth daily @ 7am        * Notice:  This list has 2 medication(s) that are the same as other medications prescribed for you. Read the directions carefully, and ask your doctor or other care provider to review them with you.

## 2018-11-20 NOTE — PROGRESS NOTES
REASON FOR CONSULTATION: Consult (Low back pain. Lumbar stenosis. )     REFERRING PHYSICIAN: Heladio Calderón   PCP:Jah Corley    History of Present Illness:  68/m, back pain x many years (since he was in his 30s/40s).  Over the years, has had several MRI's showing spinal stenosis and disc problems.  Last 2 yrs, sciatica pain had gotten worse.  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    Back 60%, Leg 40%,  Left > Right.  Posterolateral thigh, calf.  Worse: Standing, walking (worse for the back).  Walking distance 15 mins /1 block; then needs to sit.  Better: sitting (better for back pain, but after a while, leg pain sets in); moving around.  (+) shopping cart.    Previous treatment:   PT (2 yrs ago at [place]) - stopped because therapist thought it was making things worse.  Had ROLA ~ 10 yrs ago (c/o neurologist) - no relief.  Developed adverse reaction (to prednisone/cortisone).  No surgeries.    Takes Lyrica 75mg/tab (3-4x/day).  Tylenol 500 mg (6 per day).  For RA - takes methotrexate 8 tabs once/week; Plaquenil 2 tabs/day.    Worked up by Dr. Vanegas (Neurology) for balance problems, movement disorder, stiffness.  Did not think it was Parkinson's per se.  (+) hx of melanoma (R leg) 5-7 yrs ago.  In total, has had ~ 13 skin cancer resections (basal cell CA, SCCA).  One of them was complicated by infection (L leg) that took 1 yr to heal.    No previous dx of osteoporosis.  No previous DEXA.  Previously diagnosed with Vit D deficiency; on Vit D supplements.  PCP Dr. Jah Corley ([facility]).        Oswestry (TIMBO) Questionnaire    OSWESTRY DISABILITY INDEX 11/7/2018   Count 9   Sum 16   Oswestry Score (%) 35.56   Some recent data might be hidden        Visual Analog Pain Scale  Back Pain Scale 0-10: 6  Right leg pain: 2  Left leg pain: 5.5    PROMIS-10 Scores  Global Mental Health Score: (P) 13  Global Physical Health Score: (P) 11  PROMIS TOTAL - SUBSCORES: (P)  24    ROS:  A 12-point review of systems was completed and is negative except for otherwise noted above in the history of present illness.    Med Hx:  Past Medical History:   Diagnosis Date     Abnormal EMG 4/18/2013     Abnormal involuntary movements(781.0)     Movement Disorder     AK (actinic keratosis) 12/18/2011     Allergic rhinitis, cause unspecified     Allergic rhinitis     Balance problems 11/1/2011     Basal cell carcinoma      Bladder spasms 11/1/2011     Chronic osteoarthritis      Diaphragmatic hernia without mention of obstruction or gangrene      Earache or other ear, nose, or throat complaint      Esophageal reflux      Fatigue 11/1/2011     Fracture      H. pylori infection 5/12/2011     History of MRI of cervical spine 11/18/2013    EXAMINATION: CERVICAL SPINE G/E 5 VIEWS* 4/19/2013 4:18 PM  CLINICAL HISTORY: Pain in limb,Performing Location?[facility]),  COMPARISON:  FINDINGS: AP and lateral views in flexion and extension, as well as odontoid view of the cervical spine was obtained. There is no comparison available. The vertebral bodies of the cervical spine are normally aligned. There is posterior spurring and d     Incomplete defecation 11/1/2011     Interstitial lung disease (H) 11/29/2016     Laboratory test 8/7/2012     Lung disease June 2015     Malignant basal cell neoplasm of skin 8/6/2008     Melanoma (H) 8/6/2008     Melanoma in situ of lower leg (H)     R calf     Neuropathy 5/16/2011     Other bladder disorder      Other color vision deficiencies      Other nervous system complications      Parkinsonism (H) 11/1/2011     Personal history of colonic polyps      Polyneuropathy in other diseases classified elsewhere (H)      RA (rheumatoid arthritis) (H)      Rheumatoid arthritis of multiple sites with negative rheumatoid factor (H) 3/21/2016     Seronegative arthritis 11/18/2013     Shortness of breath      Shoulder arthritis 2016    acromioclavicular joint      Somatization  disorder 5/12/2011     Squamous cell carcinoma      Tremor 11/1/2011     Unspecified essential hypertension      Unspecified hypothyroidism      Urinary tract infection      Urinary urgency 11/1/2011     Wears glasses 11/1/2011       Surg Hx:  Past Surgical History:   Procedure Laterality Date     BIOPSY OF SKIN LESION       COLONOSCOPY       HEMORRHOID SURGERY       lip biopsy      for sicca complex     MOHS MICROGRAPHIC PROCEDURE       SOFT TISSUE SURGERY      removeal of basel cell carcinoma       Allergies:  Allergies   Allergen Reactions     Restasis      Burning eyes, problems with breathing, tightness in chest     Adhesive Tape      Bandages misc     Allegra      EXCESSIVE URINATION AND WEAKNESS, LIGHT-HEADED     Allergy      Dust     Animal Dander      Benadryl Allergy      EXCESSIVE URINATION AND WEAKNESS, LIGHT-HEADED     Cephalexin      Joint pain or gerd aggravation. Bloating excessive urination      Cephalosporins      Diphenhydramine Other (See Comments)     Doxycycline Hyclate Nausea     SWEATING,MIGRAINES,LOSS OF APPETITE,SWEATING,LIGHT HEADED, EXCESSIVE URINATION     Fexofenadine Other (See Comments)     Flonase [Fluticasone Propionate]      Gabapentin      Neurontin: mosd changes and excess urination     Iodine Solution [Povidone Iodine]      SKIN MELTS     Levaquin      From surgeon--? Joint pain ? Gerd aggravation. Insomnia, excess urination     Mylanta      EXCESSIVE URINATION AND WEAKNESS, LIGHT-HEADED     Prednisone      Weakness, elevated bp, headache, eye pain, congestion      Seafood [Seafood]      Shellfish Allergy      Hives       Sulfamethoxazole-Trimethoprim      Chest pain, angina     Trees      Trileptal      SEVERE JOINT AND TENDON PAIN, INSOMNIA, RESTLESSNESS, NAUSEA, EXCESS URINATION     Cortizone Rash     EXCESS URINATION,WEAKNESS,NAUSEA, HEADACHE       Meds:  Current Outpatient Prescriptions   Medication     acetaminophen (TYLENOL) 500 MG tablet     albuterol (PROAIR  "HFA/PROVENTIL HFA/VENTOLIN HFA) 108 (90 Base) MCG/ACT inhaler     Cholecalciferol (VITAMIN D3) 1000 units CAPS     folic acid (FOLVITE) 1 MG tablet     hydroxychloroquine (PLAQUENIL) 200 MG tablet     methotrexate 2.5 MG tablet CHEMO     metoprolol succinate (TOPROL-XL) 50 MG 24 hr tablet     omeprazole (PRILOSEC) 40 MG capsule     ORDER FOR DME     oxybutynin (DITROPAN) 5 MG tablet     oxybutynin (DITROPAN) 5 MG tablet     pregabalin (LYRICA) 50 MG capsule     No current facility-administered medications for this visit.        Fam Hx:  Family History   Problem Relation Age of Onset     Hypertension Mother      HEART DISEASE Mother      a fib     Arthritis Mother      \"osteo\"     Osteoporosis Mother      Skin Cancer Mother      Uterine Cancer Mother      Hypertension Brother      Diabetes Brother      \" post pancreatitis\"     Neurologic Disorder Sister      multiple sclerosis     Hypertension Sister      HEART DISEASE Sister      HEART DISEASE Sister      a fib     Arthritis Sister      Hypertension Father      Cerebrovascular Disease Father      ,     Skin Cancer Father      Colon Polyps Father      Psoriasis Maternal Grandfather      Stomach Cancer Maternal Grandfather      Congenital Anomalies Other      granddaughter with a chromosome defect     Cerebrovascular Disease Paternal Grandmother      ,     Cerebrovascular Disease Paternal Grandfather      ,     Melanoma No family hx of      Colon Cancer No family hx of        P/S Hx:  Social History   Substance Use Topics     Smoking status: Former Smoker     Packs/day: 1.50     Years: 37.00     Types: Cigarettes     Start date: 6/15/1963     Quit date: 9/30/2000     Smokeless tobacco: Never Used     Alcohol use No     Went on disability in late 50's.    , lives with wife; kids all  and moved (has 8 grandchildren).  Lives in [address] [place] [address].  Quit smoking 2000.  (-) EtOH      Physical Exam:  Very pleasant, healthy appearing, alert, oriented x 3, " "cooperative.  Normal mood and affect.  Not in cardiorespiratory distress.  Ht 1.854 m (6' 1\")  Wt 143.3 kg (316 lb)  BMI 41.69 kg/m2  Slightly hunched forward posture.  Ambulates with cane; however, able to walk without it.  Slow gait, small steps, but not shuffling.  Mild imbalance.  Some difficulty walking on toes/heels.  Says it just feels weak; seems to have some imbalance when attempting to do so.  Back: no deformity, no skin lesions or surgical scars.  Localizes pain across lower lumbar area, slightly more to R paraspinal.  (+) R lower lumbar paraspinal tenderness R>L.  (-) PSIS tenderness.  Limited extension (barely beyond neutral) and flexion (can only reach down to knees).  Not much discomfort.    Wears R wrist brace (neoprene).    Neuro Exam:  Motor: 5/5 strength for all muscle groups in both LE's.  Sensory:  Intact to light touch in both LE's; slightly decreased at R S1 (lateral foot).   Reflexes:  Knee 2+ bilat.  Ankle 0 bilat.  (-) Babinski, (-) clonus.    Lower Extremity:  Equal leg lengths, palpable but not strong pulses, (-) atrophy / asymmetry.  Full painless passive knee and ankle motion.  Straight leg raise: (-) right, (-) left.  Hip impingement: (-) right, (-) left.  PRAVEEN/Bipin's: (-) right, (-) left.          Imaging:   Lumbar MRI 6/27/17 shows L4-5 degenerative spondylolisthesis with moderate-severe stenosis.      Full spine ap-lat x-rays today show Gr 1 degen spondy L4-5;  (+) thoracic hyperkyphosis, with positive sagittal alignment.    Lumbar flex-ext x-rays show worsened spondylolisthesis compared to neutral/supine imaging; indicative of abnormal motion / instability at this level.    Chest CT 05/2017 opportunistic BMD measurement L1 shows very poor bone density = 48 HU.    Reveiwed UE EMG (Dr. Garcia 11/15/18) with patient.  (+) L cubital tunnel synd; (+) mild bilateral CTS.  No evidence of cervical radiculopathy; nor of worsening neuropathy.      Impression:   - Gr. 1 degenerative " spondylolisthesis with instability L4-5.  - Spinal stenosis with neurogenic claudication L4-5.  - Multiple medical co-morbidities, including Class III obesity (BMI 41.69), interstitial lung disease, GALO on CPAP, neuropathy, rheumatoid arthritis (on methotrexate and plaquenil), parkinsonism, hx of skin CA (melanoma, basal cell CA, SCCA).     Plan:   Had good long discussion with patient.  Discussed his lumbar condition, its degenerative nature, and nonoperative and operative tx options; including observation, rpt PT, rpt ROLA, ultimately surgery in form of fusion-decompression L4-5.  May be done via MIS lateral approach and percutaneous screw fixation +/- SPO or laminectomy.    Will send copy of note to PCP; recommend BMD testing with DEXA scans, Vit D and Ca levels, etc.  If low, recommend aggressive BMD treatment.    Also discussed possible referral to Pain Clinic for further discussion of nonop treatment options.    All questions and concerns were answered to the patient's apparent satisfaction before leaving the clinic.     Total visit time > 45 mins, > 50% counseling and coordination of care.    Respectfully,    Cuong Khan MD    Orthopaedic Spine Surgery  Dept Orthopaedic Surgery, [facility]  [phone] office, [phone] pager  [email or web address]    Answers for HPI/ROS submitted by the patient on 11/7/2018   General Symptoms: Yes  Skin Symptoms: No  HENT Symptoms: Yes  EYE SYMPTOMS: Yes  HEART SYMPTOMS: Yes  LUNG SYMPTOMS: Yes  INTESTINAL SYMPTOMS: Yes  URINARY SYMPTOMS: Yes  REPRODUCTIVE SYMPTOMS: No  SKELETAL SYMPTOMS: Yes  BLOOD SYMPTOMS: No  NERVOUS SYSTEM SYMPTOMS: Yes  MENTAL HEALTH SYMPTOMS: No  Fever: No  Loss of appetite: No  Weight loss: No  Weight gain: No  Fatigue: Yes  Night sweats: No  Chills: No  Increased stress: No  Excessive hunger: No  Excessive thirst: No  Feeling hot or cold when others believe the temperature is normal: No  Loss of height:  No  Post-operative complications: No  Surgical site pain: No  Hallucinations: No  Change in or Loss of Energy: No  Hyperactivity: No  Confusion: No  Ear pain: No  Ear discharge: No  Hearing loss: No  Tinnitus: Yes  Nosebleeds: No  Congestion: No  Sinus pain: No  Trouble swallowing: No   Voice hoarseness: Yes  Mouth sores: No  Sore throat: No  Tooth pain: No  Gum tenderness: No  Bleeding gums: No  Change in taste: No  Change in sense of smell: No  Dry mouth: Yes  Hearing aid used: No  Neck lump: No  Eye pain: Yes  Vision loss: No  Dry eyes: Yes  Watery eyes: No  Eye bulging: No  Double vision: Yes  Flashing of lights: No  Spots: No  Floaters: No  Redness: No  Crossed eyes: No  Tunnel Vision: No  Yellowing of eyes: No  Eye irritation: No  Cough: No  Sputum or phlegm: No  Coughing up blood: No  Difficulty breating or shortness of breath: Yes  Snoring: No  Wheezing: Yes  Difficulty breathing on exertion: Yes  Nighttime Cough: No  Difficulty breathing when lying flat: No  Chest pain or pressure: No  Fast or irregular heartbeat: Yes  Pain in legs with walking: Yes  Trouble breathing while lying down: No  Fingers or toes appear blue: No  High blood pressure: Yes  Low blood pressure: No  Fainting: No  Murmurs: No  Pacemaker: No  Varicose veins: No  Edema or swelling: No  Wake up at night with shortness of breath: No  Light-headedness: No  Exercise intolerance: Yes  Nausea: No  Vomiting: No  Bloating: Yes  Constipation: Yes  Diarrhea: No  Blood in stool: Yes  Black stools: No  Rectal or Anal pain: No  Fecal incontinence: No  Yellowing of skin or eyes: No  Vomit with blood: No  Change in stools: No  Trouble holding urine or incontinence: Yes  Pain or burning: No  Trouble starting or stopping: No  Increased frequency of urination: No  Blood in urine: No  Decreased frequency of urination: No  Frequent nighttime urination: No  Flank pain: Yes  Difficulty emptying bladder: No  Back pain: Yes  Muscle aches: Yes  Neck pain:  Yes  Swollen joints: Yes  Joint pain: Yes  Bone pain: Yes  Muscle cramps: No  Muscle weakness: Yes  Joint stiffness: Yes  Bone fracture: No  Trouble with coordination: Yes  Dizziness or trouble with balance: Yes  Fainting or black-out spells: No  Memory loss: No  Headache: No  Seizures: No  Speech problems: No  Tingling: Yes  Tremor: Yes  Weakness: Yes  Difficulty walking: Yes  Paralysis: No  Numbness: Yes

## 2018-11-20 NOTE — LETTER
11/20/2018       RE: Lucio Daly  [address]  [place] [address]     Dear Colleague,    Thank you for referring your patient, Lucio Daly, to the [facility] at [facility]. Please see a copy of my visit note below.    Reason for Visit  Lucio Daly is a 68 year old year old male who is being seen for Sarcoidosis  Pulmonary HPI    The patient was seen and examined by Harsha Mccarthy     Mr. Daly comes in for followup.  He was last seen in the Pulmonary Clinic on 08/21/2018 and at that time he had orthopnea for which an echo was ordered.  He also had a neurologic evaluation since then.  He is currently on Plaquenil and methotrexate for seronegative rheumatoid arthritis and tolerating them well.      Overall, he tells that he is doing quite well.  He denies any new pulmonary symptoms.  Somewhat limited in the amount of physical activity he can perform due to back pain.  He saw a back spine surgeon today and there is a concern about osteopenia for which workup is currently being pursued.      No fever, chills, cough, sputum production or other pulmonary symptoms.  He continues to have some exertional dyspnea and orthopnea.       Current Outpatient Prescriptions   Medication     acetaminophen (TYLENOL) 500 MG tablet     albuterol (PROAIR HFA/PROVENTIL HFA/VENTOLIN HFA) 108 (90 Base) MCG/ACT inhaler     Cholecalciferol (VITAMIN D3) 1000 units CAPS     folic acid (FOLVITE) 1 MG tablet     hydroxychloroquine (PLAQUENIL) 200 MG tablet     methotrexate 2.5 MG tablet CHEMO     metoprolol succinate (TOPROL-XL) 50 MG 24 hr tablet     omeprazole (PRILOSEC) 40 MG capsule     ORDER FOR DME     oxybutynin (DITROPAN) 5 MG tablet     oxybutynin (DITROPAN) 5 MG tablet     pregabalin (LYRICA) 50 MG capsule     No current facility-administered medications for this visit.      Allergies   Allergen Reactions     Restasis      Burning eyes, problems with  breathing, tightness in chest     Adhesive Tape      Bandages misc     Allegra      EXCESSIVE URINATION AND WEAKNESS, LIGHT-HEADED     Allergy      Dust     Animal Dander      Benadryl Allergy      EXCESSIVE URINATION AND WEAKNESS, LIGHT-HEADED     Cephalexin      Joint pain or gerd aggravation. Bloating excessive urination      Cephalosporins      Diphenhydramine Other (See Comments)     Doxycycline Hyclate Nausea     SWEATING,MIGRAINES,LOSS OF APPETITE,SWEATING,LIGHT HEADED, EXCESSIVE URINATION     Fexofenadine Other (See Comments)     Flonase [Fluticasone Propionate]      Gabapentin      Neurontin: mosd changes and excess urination     Iodine Solution [Povidone Iodine]      SKIN MELTS     Levaquin      From surgeon--? Joint pain ? Gerd aggravation. Insomnia, excess urination     Mylanta      EXCESSIVE URINATION AND WEAKNESS, LIGHT-HEADED     Prednisone      Weakness, elevated bp, headache, eye pain, congestion      Seafood [Seafood]      Shellfish Allergy      Hives       Sulfamethoxazole-Trimethoprim      Chest pain, angina     Trees      Trileptal      SEVERE JOINT AND TENDON PAIN, INSOMNIA, RESTLESSNESS, NAUSEA, EXCESS URINATION     Cortizone Rash     EXCESS URINATION,WEAKNESS,NAUSEA, HEADACHE     Past Medical History:   Diagnosis Date     Abnormal EMG 4/18/2013     Abnormal involuntary movements(781.0)     Movement Disorder     AK (actinic keratosis) 12/18/2011     Allergic rhinitis, cause unspecified     Allergic rhinitis     Balance problems 11/1/2011     Basal cell carcinoma      Bladder spasms 11/1/2011     Chronic osteoarthritis      Diaphragmatic hernia without mention of obstruction or gangrene      Earache or other ear, nose, or throat complaint      Esophageal reflux      Fatigue 11/1/2011     Fracture      H. pylori infection 5/12/2011     History of MRI of cervical spine 11/18/2013    EXAMINATION: CERVICAL SPINE G/E 5 VIEWS* 4/19/2013 4:18 PM  CLINICAL HISTORY: Pain in limb,Performing  Location?->[facility] ([facility]),  COMPARISON:  FINDINGS: AP and lateral views in flexion and extension, as well as odontoid view of the cervical spine was obtained. There is no comparison available. The vertebral bodies of the cervical spine are normally aligned. There is posterior spurring and d     Incomplete defecation 11/1/2011     Interstitial lung disease (H) 11/29/2016     Laboratory test 8/7/2012     Lung disease June 2015     Malignant basal cell neoplasm of skin 8/6/2008     Melanoma (H) 8/6/2008     Melanoma in situ of lower leg (H)     R calf     Neuropathy 5/16/2011     Other bladder disorder      Other color vision deficiencies      Other nervous system complications      Parkinsonism (H) 11/1/2011     Personal history of colonic polyps      Polyneuropathy in other diseases classified elsewhere (H)      RA (rheumatoid arthritis) (H)      Rheumatoid arthritis of multiple sites with negative rheumatoid factor (H) 3/21/2016     Seronegative arthritis 11/18/2013     Shortness of breath      Shoulder arthritis 2016    acromioclavicular joint      Somatization disorder 5/12/2011     Squamous cell carcinoma      Tremor 11/1/2011     Unspecified essential hypertension      Unspecified hypothyroidism      Urinary tract infection      Urinary urgency 11/1/2011     Wears glasses 11/1/2011       Past Surgical History:   Procedure Laterality Date     BIOPSY OF SKIN LESION       COLONOSCOPY       HEMORRHOID SURGERY       lip biopsy      for sicca complex     MOHS MICROGRAPHIC PROCEDURE       SOFT TISSUE SURGERY      removeal of basel cell carcinoma       Social History     Social History     Marital status:      Spouse name: N/A     Number of children: N/A     Years of education: N/A     Occupational History     Not on file.     Social History Main Topics     Smoking status: Former Smoker     Packs/day: 1.50     Years: 37.00     Types: Cigarettes     Start date: 6/15/1963     Quit date: 9/30/2000      "Smokeless tobacco: Never Used     Alcohol use No     Drug use: No     Sexual activity: No     Other Topics Concern     Parent/Sibling W/ Cabg, Mi Or Angioplasty Before 65f 55m? No     Social History Narrative    2013: Living in [place] in a townhouse with no steps    Has 3 sons that are doing okay.         Dairy/d 1 servings/d.     Caffeine 0 servings/d    Exercise 0 x week    Sunscreen used - No    Seatbelts used - Yes    Working smoke/CO detectors in the home - Yes    Guns stored in the home - Yes    Self Breast Exams - NA    Self Testicular Exam - Yes    Eye Exam up to date - Yes 2008    Dental Exam up to date - Yes 2006    Pap Smear up to date - NA    Mammogram up to date - NA    PSA up to date - Yes 2008    Dexa Scan up to date - No    Flex Sig / Colonoscopy up to date - Yes less than 5 yrs ago    Immunizations up to date -today    Abuse: Current or Past(Physical, Sexual or Emotional)- No    Do you feel safe in your environment - Yes    2008                   ROS Pulmonary  A complete ROS was otherwise negative except as noted in the HPI.  /86  Pulse 65  Resp 18  Ht 1.854 m (6' 1\")  Wt 143.3 kg (316 lb)  SpO2 96%  BMI 41.69 kg/m2  Exam:   GENERAL APPEARANCE: Well developed, well nourished, alert, and in no apparent distress.  EYES: PERRL, EOMI  HENT: Nasal mucosa with no edema and no hyperemia. No nasal polyps.  MOUTH: Oral mucosa is moist, without any lesions, no tonsillar enlargement, no oropharyngeal exudate.  NECK: supple, no masses, no thyromegaly.  LYMPHATICS: No significant axillary, cervical, or supraclavicular nodes.  RESP: normal percussion, good air flow throughout.  No crackles. No rhonchi. No wheezes.  CV: Normal S1, S2, regular rhythm, normal rate. No murmur.  No rub. No gallop. No LE edema.   ABDOMEN:  Bowel sounds normal, soft, nontender, no HSM or masses.   MS: extremities normal. No clubbing. No cyanosis.  SKIN: no rash on limited exam  NEURO: Mentation intact, speech normal, normal " strength and tone, normal gait and stance  Results PFTs done today were reviewed by me with the patient.  FVC is 3.89 liters, which is 80% of predicted.  FEV1 is 2.88 liters, which is 79% predicted.  The ratio is 74.  Total lung capacity is 6.43 which is 80% of predicted.  DLCO not corrected for hemoglobin is 27.90 which is 100% predicted.  He also had a 6-minute walk test done where the 6-minute walk distance was below the lower limit of normal.  No significant desaturation was noted.     Assessment and plan: Mr. Daly is a pleasant 68-year-old male with rheumatoid arthritis, currently on Plaquenil and methotrexate.  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   1.  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  PFTs are stable with minimal symptoms.  I have asked him to see if he can do a daily exercise routine on most days of the week to improve his conditioning.   2.  Diaphragmatic muscle weakness, seen by Neurology.  Unlikely to be related to an active process.  Plan to continue NIPPV at night.  I think he has a clinic visit scheduled with Neurology in the future.   3.  Sleep disordered breathing.  Will continue NIPPV.   4.  Gastroesophageal reflux.  Continue omeprazole.   5.  Drug monitoring has been performed by Rheumatology.      I will see Mr. Daly back in my clinic in 6 months.     Spine problems worse. Not walking as much due to pain.     Shortness of breath better. No new symptoms    Mr. Daly comes in for routine followup.  He was last seen in the Pulmonary Clinic on      Again, thank you for allowing me to participate in the care of your patient.      Sincerely,    Harsha Mccarthy MD

## 2018-11-20 NOTE — TELEPHONE ENCOUNTER
Phone call to patient     1. Patient was seen today by ortho spine surgeon at [facility] who advised no surgery at this point     2. Patient was advised to have DEXA and labs completed (see note below)   Will send copy of note to PCP; recommend BMD testing with DEXA scans, Vit D and Ca levels, etc.  If low, recommend aggressive BMD treatment.    Patient is advised that pcp is out of the office at this time,  Patient is okay to wait for pcp return to address     Sandra Stevenson Registered Nurse   [facility]

## 2018-11-20 NOTE — MR AVS SNAPSHOT
After Visit Summary   11/20/2018    Lucio Daly    MRN: [id number]           Patient Information     Date Of Birth          1950        Visit Information        Provider Department      11/20/2018 10:00 AM Harsha Mccarthy MD [facility]        Today's Diagnoses     ILD (interstitial lung disease) (H)    -  1       Follow-ups after your visit        Follow-up notes from your care team     Return in about 6 months (around 5/20/2019).      Your next 10 appointments already scheduled     Nov 20, 2018 10:45 AM CST   Lab with UC LAB   [facility] Lab ([facility])    [address]  [place] [address]   [phone]            Dec 19, 2018  9:30 AM CST   (Arrive by 9:15 AM)   Return Visit with Jeremy Goodrich MD   [facility] Rheumatology ([facility])    [address]  [address]  [place] [address]   [phone]            May 15, 2019  9:00 AM CDT   FULL PULMONARY FUNCTION with  PFL A   [facility] Pulmonary Function Testing ([facility])    [address]  [address] [place] [address]   [phone]            May 15, 2019 10:00 AM CDT   Six Minute Walk with UC PFL 6 MINUTE WALK 2   [facility] Pulmonary Function Testing ([facility])    [address]  [address] [place] [address]   [phone]            May 15, 2019 10:30 AM CDT   (Arrive by 10:15 AM)   Return Interstitial Lung with Harsha Mccarthy MD   [facility] ([facility])    [address] [place] [address]  [address]  [place] [address]   [phone]            May 21, 2019  8:40 AM CDT   (Arrive by 8:25 AM)   Return Movement Disorder with Thong Montes MD   [facility] Neurology ([facility])    [address]  [address] [address]   [phone]  "             Future tests that were ordered for you today     Open Future Orders        Priority Expected Expires Ordered    General PFT Lab (Please always keep checked) Routine  11/20/2019 11/20/2018    6 minute walk test Routine  11/20/2019 11/20/2018    Pulmonary Function Test Routine  11/20/2019 11/20/2018            Who to contact     If you have questions or need follow up information about today's clinic visit or your schedule please contact [facility] directly at [phone].  Normal or non-critical lab and imaging results will be communicated to you by [employer], letter or phone within 4 business days after the clinic has received the results. If you do not hear from us within 7 days, please contact the clinic through [employer] or phone. If you have a critical or abnormal lab result, we will notify you by phone as soon as possible.  Submit refill requests through [employer] or call your pharmacy and they will forward the refill request to us. Please allow 3 business days for your refill to be completed.          Additional Information About Your Visit        [employer] Information     [employer] gives you secure access to your electronic health record. If you see a primary care provider, you can also send messages to your care team and make appointments. If you have questions, please call your primary care clinic.  If you do not have a primary care provider, please call [phone] and they will assist you.        Care EveryWhere ID     This is your Care EveryWhere ID. This could be used by other organizations to access your [place] medical records  [id number]        Your Vitals Were     Pulse Respirations Height Pulse Oximetry BMI (Body Mass Index)       65 18 1.854 m (6' 1\") 96% 41.69 kg/m2        Blood Pressure from Last 3 Encounters:   11/20/18 129/86   11/02/18 (!) 168/99   09/12/18 (!) 188/110    Weight from Last 3 Encounters:   11/20/18 143.3 kg (316 lb)   11/20/18 143.3 kg " (316 lb)   11/02/18 142 kg (313 lb)               Primary Care Provider Office Phone # Fax #    Jah Adan Corley [phone]  [facility] [address]        Equal Access to Services     ALINE QUIÑONES : Hadii helen ku hadcarmelao Soomaali, waaxda luqadaha, qaybta kaalmada adeegyada, mariana booth hayanshuln guccibetty torrez denisse johnson. So [facility] [phone].    ATENCIÓN: Si habla español, tiene a rudd disposición servicios gratuitos de asistencia lingüística. Llame al [phone].    We comply with applicable federal civil rights laws and [place] laws. We do not discriminate on the basis of race, color, national origin, age, disability, sex, sexual orientation, or gender identity.            Thank you!     Thank you for choosing [facility]  for your care. Our goal is always to provide you with excellent care. Hearing back from our patients is one way we can continue to improve our services. Please take a few minutes to complete the written survey that you may receive in the mail after your visit with us. Thank you!             Your Updated Medication List - Protect others around you: Learn how to safely use, store and throw away your medicines at www.disposemymeds.org.          This list is accurate as of 11/20/18 10:17 AM.  Always use your most recent med list.                   Brand Name Dispense Instructions for use Diagnosis    albuterol 108 (90 Base) MCG/ACT inhaler    PROAIR HFA/PROVENTIL HFA/VENTOLIN HFA    1 Inhaler    Inhale 2 puffs into the lungs every 6 hours as needed for shortness of breath / dyspnea or wheezing    Dyspnea, unspecified type       folic acid 1 MG tablet    FOLVITE    180 tablet    1mg tab by mouth twice daily @ 9am and 4pm    Pain in toes of both feet, Rheumatoid arthritis of multiple sites with negative rheumatoid factor (H), Rheumatoid arthritis involving shoulder with negative rheumatoid factor, unspecified laterality (H), High risk  medications (not anticoagulants) long-term use       LYRICA 50 MG capsule   Generic drug:  pregabalin     360 capsule    1 tab by mouth @ 7/730am, 1 tab @ 4pm and 1-2 tab @ 11pm    Peripheral polyneuropathy       methotrexate 2.5 MG tablet CHEMO     96 tablet    TAKE 8 TABLETS BY MOUTH ONCE WEEKLY. LABS DUE EVERY 8 - 12 WEEKS.    Rheumatoid arthritis of multiple sites with negative rheumatoid factor (H), High risk medications (not anticoagulants) long-term use       metoprolol succinate 50 MG 24 hr tablet    TOPROL-XL    90 tablet    Take 1 tablet (50 mg) by mouth daily    Essential hypertension       omeprazole 40 MG capsule    priLOSEC    180 capsule    40mg capsule by mouth twice daily @ 7/730am and 4pm    Gastroesophageal reflux disease without esophagitis       order for DME      Use your CPAP device as directed by your provider.        * oxybutynin 5 MG tablet    DITROPAN    90 tablet    5mg tab by mouth daily @ 4pm    Other urinary incontinence       * oxybutynin 5 MG tablet    DITROPAN    90 tablet    TAKE ONE TABLET BY MOUTH EVERY DAY    Other urinary incontinence       PLAQUENIL 200 MG tablet   Generic drug:  hydroxychloroquine     180 tablet    200mg tab by mouth twice daily @ 9am and 4pm    Rheumatoid arthritis involving shoulder with negative rheumatoid factor, unspecified laterality (H)       TYLENOL 500 MG tablet   Generic drug:  acetaminophen      2 x 500mg by mouth 3 times per day: 7/730am, 4pm and 11pm  = 6/day        vitamin D3 1000 units Caps     30 capsule    1000 unit capsule by mouth daily @ 7am        * Notice:  This list has 2 medication(s) that are the same as other medications prescribed for you. Read the directions carefully, and ask your doctor or other care provider to review them with you.

## 2018-11-28 LAB
DLCOUNC-%PRED-PRE: 100 %
DLCOUNC-PRE: 27.9 ML/MIN/MMHG
DLCOUNC-PRED: 27.74 ML/MIN/MMHG
ERV-%PRED-PRE: 33 %
ERV-PRE: 0.25 L
ERV-PRED: 0.73 L
EXPTIME-PRE: 9.24 SEC
FEF2575-%PRED-PRE: 76 %
FEF2575-PRE: 2.05 L/SEC
FEF2575-PRED: 2.68 L/SEC
FEFMAX-%PRED-PRE: 98 %
FEFMAX-PRE: 9.11 L/SEC
FEFMAX-PRED: 9.26 L/SEC
FEV1-%PRED-PRE: 79 %
FEV1-PRE: 2.88 L
FEV1FEV6-PRE: 76 %
FEV1FEV6-PRED: 78 %
FEV1FVC-PRE: 74 %
FEV1FVC-PRED: 74 %
FEV1SVC-PRE: 87 %
FEV1SVC-PRED: 66 %
FIFMAX-PRE: 8 L/SEC
FRCPLETH-%PRED-PRE: 85 %
FRCPLETH-PRE: 3.36 L
FRCPLETH-PRED: 3.94 L
FVC-%PRED-PRE: 80 %
FVC-PRE: 3.89 L
FVC-PRED: 4.85 L
IC-%PRED-PRE: 64 %
IC-PRE: 3.07 L
IC-PRED: 4.73 L
RVPLETH-%PRED-PRE: 112 %
RVPLETH-PRE: 3.11 L
RVPLETH-PRED: 2.78 L
TLCPLETH-%PRED-PRE: 80 %
TLCPLETH-PRE: 6.43 L
TLCPLETH-PRED: 7.94 L
VA-%PRED-PRE: 64 %
VA-PRE: 4.91 L
VC-%PRED-PRE: 60 %
VC-PRE: 3.32 L
VC-PRED: 5.46 L

## 2018-12-03 ENCOUNTER — TELEPHONE (OUTPATIENT)
Dept: NEUROLOGY | Facility: CLINIC | Age: 68
End: 2018-12-03

## 2018-12-03 ENCOUNTER — TELEPHONE (OUTPATIENT)
Dept: GASTROENTEROLOGY | Facility: CLINIC | Age: 68
End: 2018-12-03

## 2018-12-03 ENCOUNTER — RADIANT APPOINTMENT (OUTPATIENT)
Dept: BONE DENSITY | Facility: CLINIC | Age: 68
End: 2018-12-03
Attending: FAMILY MEDICINE
Payer: MEDICARE

## 2018-12-03 DIAGNOSIS — Z79.52 LONG TERM CURRENT USE OF SYSTEMIC STEROIDS: ICD-10-CM

## 2018-12-03 DIAGNOSIS — M79.675 PAIN IN TOES OF BOTH FEET: ICD-10-CM

## 2018-12-03 DIAGNOSIS — M06.09 RHEUMATOID ARTHRITIS OF MULTIPLE SITES WITH NEGATIVE RHEUMATOID FACTOR (H): ICD-10-CM

## 2018-12-03 DIAGNOSIS — Z79.899 HIGH RISK MEDICATIONS (NOT ANTICOAGULANTS) LONG-TERM USE: ICD-10-CM

## 2018-12-03 DIAGNOSIS — Z79.899 HIGH RISK MEDICATION USE: ICD-10-CM

## 2018-12-03 DIAGNOSIS — M06.019 RHEUMATOID ARTHRITIS INVOLVING SHOULDER WITH NEGATIVE RHEUMATOID FACTOR, UNSPECIFIED LATERALITY (H): ICD-10-CM

## 2018-12-03 DIAGNOSIS — M79.674 PAIN IN TOES OF BOTH FEET: ICD-10-CM

## 2018-12-03 PROCEDURE — 82310 ASSAY OF CALCIUM: CPT | Performed by: FAMILY MEDICINE

## 2018-12-03 PROCEDURE — 82306 VITAMIN D 25 HYDROXY: CPT | Performed by: FAMILY MEDICINE

## 2018-12-03 PROCEDURE — 36415 COLL VENOUS BLD VENIPUNCTURE: CPT | Performed by: FAMILY MEDICINE

## 2018-12-03 PROCEDURE — 77080 DXA BONE DENSITY AXIAL: CPT | Performed by: FAMILY MEDICINE

## 2018-12-03 RX ORDER — FOLIC ACID 1 MG/1
TABLET ORAL
Qty: 180 TABLET | Refills: 3 | Status: SHIPPED | OUTPATIENT
Start: 2018-12-03 | End: 2019-12-09

## 2018-12-03 NOTE — TELEPHONE ENCOUNTER
[facility] Center    Phone Message    May a detailed message be left on voicemail: yes    Reason for Call: Medication Question or concern regarding medication   Prescription Clarification  Name of Medication: omeprazole (PRILOSEC) 40 MG capsule  Prescribing Provider: [facility]   Pharmacy:    What on the order needs clarification? Refill request but Pt would like to discuss weather he should just get the over the counter option.          Action Taken: Message routed to:  [facility] ([facility]): Please call the Pt to discuss

## 2018-12-03 NOTE — TELEPHONE ENCOUNTER
Discussed with pt that he can make an appt with one of our physicain assistant or he can check with his primary care provider. We can not refill by gi until seen in clinic. Pt is in agreement with the plan to start with his primary car provider first and has number to call if GI appt is needed.

## 2018-12-03 NOTE — TELEPHONE ENCOUNTER
Health Call Center    Phone Message    May a detailed message be left on voicemail: yes    Reason for Call: Medication Refill Request    Has the patient contacted the pharmacy for the refill? Yes --pharmacy changed since last refill  Name of medication being requested: methotrexate 2.5 MG tablet CHEMO  Provider who prescribed the medication: Sin Snow NP  Pharmacy: [id number] - [place], [place] - [address] AT [facility]  Date medication is needed: ASAP--pt states he is out of med.       Action Taken: Message routed to:  Other: Med Refills

## 2018-12-03 NOTE — TELEPHONE ENCOUNTER
hydroxychloroquine (PLAQUENIL) 200 MG tablet 180 tablet 3 2018  No   Simg tab by mouth twice daily @ 9am and 4pm   Class: Historical   Order: [id number]     folic acid (FOLVITE) 1 MG tablet 180 tablet 3 12/3/2018  No   Simg tab by mouth twice daily @ 9am and 4pm   Class: E-Prescribe   Order: [id number]   E-Prescribing Status: Receipt confirmed by pharmacy (12/3/2018 12:12 PM CST)         Patient called regarding request for FOLVITE and PLAQUENIL refill. He was informed that we were able to refill the FOLVITE and that the PLAQUENIL will have to be refilled by his rheumatologist. Patient verbalized understanding of this.

## 2018-12-03 NOTE — TELEPHONE ENCOUNTER
[facility]    Phone Message    May a detailed message be left on voicemail: yes    Reason for Call: Medication Refill Request    Has the patient contacted the pharmacy for the refill? Yes   Name of medication being requested: folic acid (FOLVITE) 1 MG tablet and hydroxychloroquine (PLAQUENIL) 200 MG tablet  Provider who prescribed the medication: Dr. Thong Montes  Pharmacy: [id number] - [place], [place] - [address] AT [facility]  Date medication is needed: Next available--pt states he is due for refill of folic acid on 12/7, and isn't due for refill for hydroxycholoroquine until later, but he is going to [place] for the winter and would like to be able to obtain the med refill before he leaves. Please advise.       Action Taken: Message routed to:  [facility] ([facility]): Neurology

## 2018-12-03 NOTE — TELEPHONE ENCOUNTER
[facility] Call Center    Phone Message    May a detailed message be left on voicemail: yes    Reason for Call: Other: Pt called to make a follow up appt with Gardenia Coffey as he needs a refill of omeprazole so needs to see someone. I don't see that we have been given the go ahead to schedule for her. I looked at all of the providers that schedule for GERD, as a new pt the first avail is 3.2019. I said I would send a message to Gi about Gardenia, but strongly urged him to go to hios PCP asap to discuss this med as her thinks it is affecting his bones. Please call the pt if you are able to schedule with Gardenia. thanks.     Action Taken: Message routed to:  [facility] ([facility]): uc med gi

## 2018-12-03 NOTE — TELEPHONE ENCOUNTER
Left message notifying patient that dexa scan and lab orders were placed.    Berkley VILLANUEVA  [place]

## 2018-12-03 NOTE — TELEPHONE ENCOUNTER
Called pt back and let him know he is not a new pt and that we can schedule him with Ms. Coffey on December 19 at 11 am.  Did let pt know that his concerns about  Bone loss should probably be addressed with his primary. Pt does have an appt with is primary.

## 2018-12-03 NOTE — TELEPHONE ENCOUNTER
methotrexate 2.5 MG tablet      Last Written Prescription Date:  6-11-18  Last Fill Quantity: 96,   # refills: 1  Last Office Visit: 6-20-18  Future Office visit:  12-19-18    CBC RESULTS:   Recent Labs   Lab Test  11/20/18   1040   WBC  5.1   RBC  4.39*   HGB  13.9   HCT  42.2   MCV  96   MCH  31.7   MCHC  32.9   RDW  13.9   PLT  185       Creatinine   Date Value Ref Range Status   11/20/2018 1.13 0.66 - 1.25 mg/dL Final   ]    Liver Function Studies -   Recent Labs   Lab Test  11/20/18   1040   05/15/18   1512   PROTTOTAL   --    --   7.5   ALBUMIN  3.9   < >  4.0   BILITOTAL   --    --   0.4   ALKPHOS   --    --   68   AST  20   < >  25   ALT  28   < >  32    < > = values in this interval not displayed.       Kathleen M Doege RN

## 2018-12-04 LAB
CALCIUM SERPL-MCNC: 9.3 MG/DL (ref 8.5–10.1)
DEPRECATED CALCIDIOL+CALCIFEROL SERPL-MC: 38 UG/L (ref 20–75)

## 2018-12-07 ENCOUNTER — OFFICE VISIT (OUTPATIENT)
Dept: FAMILY MEDICINE | Facility: CLINIC | Age: 68
End: 2018-12-07
Payer: MEDICARE

## 2018-12-07 VITALS
DIASTOLIC BLOOD PRESSURE: 92 MMHG | BODY MASS INDEX: 40.56 KG/M2 | HEIGHT: 73 IN | SYSTOLIC BLOOD PRESSURE: 144 MMHG | RESPIRATION RATE: 16 BRPM | HEART RATE: 64 BPM | TEMPERATURE: 98.1 F | WEIGHT: 306 LBS

## 2018-12-07 DIAGNOSIS — N39.498 OTHER URINARY INCONTINENCE: ICD-10-CM

## 2018-12-07 DIAGNOSIS — M54.9 BACK PAIN, UNSPECIFIED BACK LOCATION, UNSPECIFIED BACK PAIN LATERALITY, UNSPECIFIED CHRONICITY: Primary | ICD-10-CM

## 2018-12-07 DIAGNOSIS — K21.9 GASTROESOPHAGEAL REFLUX DISEASE WITHOUT ESOPHAGITIS: ICD-10-CM

## 2018-12-07 DIAGNOSIS — I10 ESSENTIAL HYPERTENSION: ICD-10-CM

## 2018-12-07 DIAGNOSIS — L30.9 DERMATITIS: ICD-10-CM

## 2018-12-07 DIAGNOSIS — G62.9 PERIPHERAL POLYNEUROPATHY: ICD-10-CM

## 2018-12-07 DIAGNOSIS — L57.0 ACTINIC KERATOSES: ICD-10-CM

## 2018-12-07 PROCEDURE — 17003 DESTRUCT PREMALG LES 2-14: CPT | Performed by: FAMILY MEDICINE

## 2018-12-07 PROCEDURE — 17000 DESTRUCT PREMALG LESION: CPT | Performed by: FAMILY MEDICINE

## 2018-12-07 PROCEDURE — 99214 OFFICE O/P EST MOD 30 MIN: CPT | Mod: 25 | Performed by: FAMILY MEDICINE

## 2018-12-07 RX ORDER — OXYBUTYNIN CHLORIDE 5 MG/1
TABLET ORAL
Qty: 90 TABLET | Refills: 1 | Status: SHIPPED | OUTPATIENT
Start: 2018-12-07 | End: 2019-08-13

## 2018-12-07 RX ORDER — METOPROLOL SUCCINATE 50 MG/1
75 TABLET, EXTENDED RELEASE ORAL DAILY
Qty: 135 TABLET | Refills: 11 | Status: SHIPPED | OUTPATIENT
Start: 2018-12-07 | End: 2019-11-05

## 2018-12-07 RX ORDER — PREGABALIN 50 MG/1
CAPSULE ORAL
Qty: 360 CAPSULE | Refills: 3 | Status: SHIPPED | OUTPATIENT
Start: 2018-12-07 | End: 2019-05-23

## 2018-12-07 RX ORDER — OMEPRAZOLE 40 MG/1
CAPSULE, DELAYED RELEASE ORAL
Qty: 180 CAPSULE | Refills: 3 | Status: SHIPPED | OUTPATIENT
Start: 2018-12-07 | End: 2019-05-23

## 2018-12-07 RX ORDER — TRIAMCINOLONE ACETONIDE 1 MG/G
CREAM TOPICAL
Qty: 15 G | Refills: 0 | Status: SHIPPED | OUTPATIENT
Start: 2018-12-07 | End: 2019-05-23

## 2018-12-07 NOTE — PROGRESS NOTES
"  SUBJECTIVE:   Lucio Daly is a 68 year old male who presents to clinic today for the following health issues: follow up on multiple medical issue    Chronic back pain- he has been seen by orthopedics who thought his bones looked to be too weak to allow for successful surgery. The dexa scan however looked ok. He would prefer to avoid surgery and wonders about other options.     GERD-controlled on prilosec 40mg twice daily his insurance would like him to cut back to once daily or a lower dose. The GERD was not as well controlled when used once daily. His last EGD about 3 years ago was essentially normal. He does not have dysphagia.     Actinic keratoses-he has noted a new on of these on his left arm.    Skin lesion- present about a month asymptomatic except when used timi hc cream became a bit itchy. Nothing else tried. Located behind his right ear. No new contact exposures.     Neuropathy-stable. Uses lyrica and is abot due for a refill.     Incontinence. Uses ditropan. toerated well. Helpful. Soon will need a refill.     med hx, family hx, and soc hx reviewed and updated     No other cv, resp symptoms     OBJECTIVE: BP (!) 144/92  Pulse 64  Temp 98.1  F (36.7  C) (Oral)  Resp 16  Ht 6' 1\" (1.854 m)  Wt 306 lb (138.8 kg)  BMI 40.37 kg/m2 Exam:  GENERAL APPEARANCE: healthy, alert and no distress  EYES: Eyes grossly normal to inspection  HENT: ear canals and TM's normal and nose and mouth without ulcers or lesions  NECK: no adenopathy, no asymmetry, masses, or scars and thyroid normal to palpation  RESP: lungs clear to auscultation - no rales, rhonchi or wheezes  CV: regular rates and rhythm, normal S1 S2, no S3 or S4 and no murmur, click or rub -  ABDOMEN:  soft, nontender, no HSM or masses and bowel sounds normal  SKIN: keratoses - actinic # 2 on left arm and 2cm round erythematous plaque behind his right ear with scale.        1. Gastroesophageal reflux disease without esophagitis  Refilled. He esmer ltry " to cut back to once daily of the omeprazole  - omeprazole (PRILOSEC) 40 MG DR capsule; 40mg capsule by mouth twice daily @ 7/730am and 4pm  Dispense: 180 capsule; Refill: 3    2. Back pain, unspecified back location, unspecified back pain laterality, unspecified chronicity  offerred another surgery opinion or forwarded the results of the dexa to dr marie but wishes to start with pain mgmt.   - PAIN MANAGEMENT REFERRAL    3. Peripheral polyneuropathy  stable  - pregabalin (LYRICA) 50 MG capsule; 1 tab by mouth @ 7/730am, 1 tab @ 4pm and 1-2 tab @ 11pm  Dispense: 360 capsule; Refill: 3    4. Other urinary incontinence  stable  - oxybutynin (DITROPAN) 5 MG tablet; 5mg tab by mouth daily @ 4pm  Dispense: 90 tablet; Refill: 1    5. Essential hypertension  Uncontrolled. not at goal for htn. Increase toprol to 75mg daily  - metoprolol succinate ER (TOPROL-XL) 50 MG 24 hr tablet; Take 1.5 tablets (75 mg) by mouth daily  Dispense: 135 tablet; Refill: 11    6. Dermatitis  Does not appear to be fungal no bacterial. Trial tc crema for 2 weeks  - triamcinolone (KENALOG) 0.1 % external cream; Apply sparingly to affected area three times daily for 14 days.  Dispense: 15 g; Refill: 0    7. Actinic keratoses   I froze 2 areas with liquid N2 after risks and options discussed. Wound cares discussed.    - DESTRUCT PREMALIGNANT LESION, FIRST  - DESTRUCT PREMALIGNANT LESION, 2-14

## 2018-12-07 NOTE — MR AVS SNAPSHOT
After Visit Summary   12/7/2018    Lucio Daly    MRN: [id number]           Patient Information     Date Of Birth          1950        Visit Information        Provider Department      12/7/2018 3:00 PM Jah Corley MD [facility]        Today's Diagnoses     Back pain, unspecified back location, unspecified back pain laterality, unspecified chronicity    -  1    Gastroesophageal reflux disease without esophagitis        Peripheral polyneuropathy        Other urinary incontinence        Essential hypertension        Dermatitis        Actinic keratoses          Care Instructions    Increase the metoprolol to 75mg daily.               Follow-ups after your visit        Additional Services     PAIN MANAGEMENT REFERRAL       Your provider has referred you to: [facility]: [facility] -    Reason for Referral: Evaluation for comprehensive services- patients will be evaluated if appropriate for comprehensive service including medication changes, procedures, pain psychology, and pain physical therapy.  While involved with comprehensive services, pain providers will work with referring provider/PCP to stabilize appropriate medication management, with long-term plan of transition of prescribing back to referring provider/PCP upon completion of comprehensive services.      Please complete the following questions:    Do you have any specific questions for the pain specialist? No    Are there any red flags that may impact the assessment or management of the patient? None      What is your diagnosis for the patient's pain? Disc disease      For any questions, contact the [facility] at [phone].     **ANY DIAGNOSTIC TESTS THAT ARE NOT IN EPIC SHOULD BE SENT TO THE PAIN CENTER**    REGARDING OPIOID MEDICATIONS:  The discussion of opioids management, appropriateness of therapy, and dosing will be discussed in patients being seen for evaluation.   The pain management clinics are not long-term prescribing clinics, with transition of prescribing of medications ultimately going back to the referring provider/PCP.  If prescribing is taken over at the pain clinic, it is in actively involved patients whom are appropriate for opioids, urine drug screening is completed, and long-term prescribing plan has been determined.  Therefore, we will not be automatically taking over prescribing at the patient's first visit.  Is this agreeable to you? agrees.     Please be aware that coverage of these services is subject to the terms and limitations of your health insurance plan.  Call member services at your health plan with any benefit or coverage questions.      Please bring the following with you to your appointment:    (1) Any X-Rays, CTs or MRIs which have been performed.  Contact the facility where they were done to arrange for  prior to your scheduled appointment.    (2) List of current medications   (3) This referral request   (4) Any documents/labs given to you for this referral                  Follow-up notes from your care team     Return in about 4 months (around 4/7/2019) for Annual wellness exam.      Your next 10 appointments already scheduled     Dec 19, 2018  9:30 AM CST   (Arrive by 9:15 AM)   Return Visit with Jeremy Goodrich MD   [facility] Rheumatology ([facility])    [address]  [address]  [place] [address]   [phone]            Dec 19, 2018 11:00 AM CST   (Arrive by 10:45 AM)   Return Visit with Gardenia Coffey PA-C   [facility] Gastroenterology and IBD Clinic ([facility])    [address]  4th [address]  [place] [address]   [phone]            May 15, 2019  9:00 AM CDT   FULL PULMONARY FUNCTION with UC PFL A   [facility] Pulmonary Function Testing ([facility])    [address]  3rd Floor  [place] [address]    [phone]            May 15, 2019 10:00 AM CDT   Six Minute Walk with UC PFL 6 MINUTE WALK 2   [facility] Pulmonary Function Testing ([facility])    [address]  [address] [place] [address]   [phone]            May 15, 2019 10:30 AM CDT   (Arrive by 10:15 AM)   Return Interstitial Lung with Harsha Mccarthy MD   [facility] ([facility])    [address]  [address] [place] [address]   [phone]            May 21, 2019  8:40 AM CDT   (Arrive by 8:25 AM)   Return Movement Disorder with Thong Montes MD   [facility] Neurology ([facility])    [address]  [address] [facility] [address] [phone]              Who to contact     If you have questions or need follow up information about today's clinic visit or your schedule please contact [facility] directly at [phone].  Normal or non-critical lab and imaging results will be communicated to you by [employer], letter or phone within 4 business days after the clinic has received the results. If you do not hear from us within 7 days, please contact the clinic through [employer] or phone. If you have a critical or abnormal lab result, we will notify you by phone as soon as possible.  Submit refill requests through [employer] or call your pharmacy and they will forward the refill request to us. Please allow 3 business days for your refill to be completed.          Additional Information About Your Visit        [employer] Information     [employer] gives you secure access to your electronic health record. If you see a primary care provider, you can also send messages to your care team and make appointments. If you have questions, please call your primary care clinic.  If you do not have a primary care provider, please call [phone] and they will assist you.        Care EveryWhere ID     This  "is your Care EveryWhere ID. This could be used by other organizations to access your [facility] medical records  [id number]        Your Vitals Were     Pulse Temperature Respirations Height BMI (Body Mass Index)       64 98.1  F (36.7  C) (Oral) 16 6' 1\" (1.854 m) 40.37 kg/m2        Blood Pressure from Last 3 Encounters:   12/07/18 (!) 144/92   11/20/18 129/86   11/02/18 (!) 168/99    Weight from Last 3 Encounters:   12/07/18 306 lb (138.8 kg)   11/20/18 316 lb (143.3 kg)   11/20/18 316 lb (143.3 kg)              We Performed the Following     DESTRUCT PREMALIGNANT LESION, 2-14     DESTRUCT PREMALIGNANT LESION, FIRST     PAIN MANAGEMENT REFERRAL          Today's Medication Changes          These changes are accurate as of 12/7/18  4:00 PM.  If you have any questions, ask your nurse or doctor.               Start taking these medicines.        Dose/Directions    triamcinolone 0.1 % external cream   Commonly known as:  KENALOG   Used for:  Dermatitis   Started by:  Jah Corley MD        Apply sparingly to affected area three times daily for 14 days.   Quantity:  15 g   Refills:  0         These medicines have changed or have updated prescriptions.        Dose/Directions    metoprolol succinate ER 50 MG 24 hr tablet   Commonly known as:  TOPROL-XL   This may have changed:  how much to take   Used for:  Essential hypertension   Changed by:  Jah Corley MD        Dose:  75 mg   Take 1.5 tablets (75 mg) by mouth daily   Quantity:  135 tablet   Refills:  11       oxybutynin 5 MG tablet   Commonly known as:  DITROPAN   This may have changed:  Another medication with the same name was removed. Continue taking this medication, and follow the directions you see here.   Used for:  Other urinary incontinence   Changed by:  Jah Corley MD        5mg tab by mouth daily @ 4pm   Quantity:  90 tablet   Refills:  1            Where to get your medicines      These medications were sent to [id number] - [place], " [place] - [address] AT [place] [address], [place] [address]     Phone:  [phone]     metoprolol succinate ER 50 MG 24 hr tablet    omeprazole 40 MG DR capsule    oxybutynin 5 MG tablet    triamcinolone 0.1 % external cream         Some of these will need a paper prescription and others can be bought over the counter.  Ask your nurse if you have questions.     Bring a paper prescription for each of these medications     pregabalin 50 MG capsule                Primary Care Provider Office Phone # Fax #    Jah Corley [phone] [address]  [facility] [address]        Equal Access to Services     ALINE QUIÑONES : Hadii helen kingston Sodionicio, waaxda chanelle, qaybta kaalmada richar, mariana salcedo . So [facility] [phone].    ATENCIÓN: Si habla español, tiene a rudd disposición servicios gratuitos de asistencia lingüística. Llame al [phone].    We comply with applicable federal civil rights laws and [place] laws. We do not discriminate on the basis of race, color, national origin, age, disability, sex, sexual orientation, or gender identity.            Thank you!     Thank you for choosing [facility]  for your care. Our goal is always to provide you with excellent care. Hearing back from our patients is one way we can continue to improve our services. Please take a few minutes to complete the written survey that you may receive in the mail after your visit with us. Thank you!             Your Updated Medication List - Protect others around you: Learn how to safely use, store and throw away your medicines at www.disposemymeds.org.          This list is accurate as of 12/7/18  4:00 PM.  Always use your most recent med list.                   Brand Name Dispense Instructions for use Diagnosis    albuterol 108 (90 Base) MCG/ACT inhaler    PROAIR HFA/PROVENTIL HFA/VENTOLIN HFA    1 Inhaler    Inhale 2  puffs into the lungs every 6 hours as needed for shortness of breath / dyspnea or wheezing    Dyspnea, unspecified type       folic acid 1 MG tablet    FOLVITE    180 tablet    1mg tab by mouth twice daily @ 9am and 4pm    Pain in toes of both feet, Rheumatoid arthritis of multiple sites with negative rheumatoid factor (H), Rheumatoid arthritis involving shoulder with negative rheumatoid factor, unspecified laterality (H), High risk medications (not anticoagulants) long-term use       methotrexate 2.5 MG tablet     96 tablet    TAKE 8 TABLETS BY MOUTH ONCE WEEKLY. LABS DUE EVERY 8 - 12 WEEKS.    Rheumatoid arthritis of multiple sites with negative rheumatoid factor (H), High risk medications (not anticoagulants) long-term use       metoprolol succinate ER 50 MG 24 hr tablet    TOPROL-XL    135 tablet    Take 1.5 tablets (75 mg) by mouth daily    Essential hypertension       omeprazole 40 MG DR capsule    priLOSEC    180 capsule    40mg capsule by mouth twice daily @ 7/730am and 4pm    Gastroesophageal reflux disease without esophagitis       order for DME      Use your CPAP device as directed by your provider.        oxybutynin 5 MG tablet    DITROPAN    90 tablet    5mg tab by mouth daily @ 4pm    Other urinary incontinence       PLAQUENIL 200 MG tablet   Generic drug:  hydroxychloroquine     180 tablet    200mg tab by mouth twice daily @ 9am and 4pm    Rheumatoid arthritis involving shoulder with negative rheumatoid factor, unspecified laterality (H)       pregabalin 50 MG capsule    LYRICA    360 capsule    1 tab by mouth @ 7/730am, 1 tab @ 4pm and 1-2 tab @ 11pm    Peripheral polyneuropathy       triamcinolone 0.1 % external cream    KENALOG    15 g    Apply sparingly to affected area three times daily for 14 days.    Dermatitis       TYLENOL 500 MG tablet   Generic drug:  acetaminophen      2 x 500mg by mouth 3 times per day: 7/730am, 4pm and 11pm  = 6/day        vitamin D3 1000 units Caps     30 capsule     1000 unit capsule by mouth daily @ 7am

## 2018-12-08 ENCOUNTER — TELEPHONE (OUTPATIENT)
Dept: PALLIATIVE MEDICINE | Facility: CLINIC | Age: 68
End: 2018-12-08

## 2018-12-08 NOTE — LETTER
December 8, 2018    Lucio Daly  [address]  [place] [address]    Dear Lucio                                                                   Welcome to the [facility] at the [facility]. We are located at [address], [address], [place] [address]. Your appointment has been scheduled on Wednesday January 9, 2019 at 9:00 AM with Mercedes Nuñez MD .    At your first visit, you will meet your team of caregivers who will help you to develop pain management strategies that will last a lifetime. You will meet with our support staff to review your insurance information and collect your co-payment if required by your insurance company. You will meet with a medical pain specialist and care coordinator who will assess your pain and develop a plan of care for your successful pain rehabilitation. You should expect to spend 1-2 hours at your first visit with us. Usually, patients work with us for a period of 6-12 months, and eventually return to their primary doctor once their pain management has stabilized.      To help us make your visit go as smoothly as possible, please bring the following items with you on your visit:   Completed Pain Questionnaire enclosed in this packet.  If you do not bring the completed questionnaire, we may have to reschedule your appointment.  List of any medicines that you are currently taking or have been prescribed  Pertinent NON-[place] medical information such as medical records or tests results (X-rays, or laboratory tests)  Your health insurance card  Financial resources to cover your co-payment or balance due at the time of service (cash, personal check, Visa, and MasterCard are acceptable methods of payment)     Due to the high demand for new patient evaluations, you must notify the scheduling department 48 hours in advance if you are not able to keep this appointment.  Failure to do so could affect your ability to reschedule  with our clinic. Please do not assume that you will receive any prescription medications at your first visit.    Please call [phone] with any questions regarding your appointment. We look forward to meeting you and working to address your health care needs.     Sincerely,    [facility]

## 2018-12-08 NOTE — TELEPHONE ENCOUNTER
Pain Management Center Referral      1. Confirmed address with patient? Yes  2. Confirmed phone number with patient? Yes  3. Confirmed referring provider? Yes  4. Is the PCP the same as the referring provider? Yes  5. Has the patient been to any previous pain clinics? No  (If yes, send GABI with welcome letter)  6. Which insurance are we to bill for this appointment?  Medicare/bcbs    7. Informed pt of cancellation (48 hour) policy? Yes    REGARDING OPIOID MEDICATIONS: We will always address appropriateness of opioid pain medications, but we generally will not automatically take on a prescribing role. When we do take on prescribing of opioids for chronic pain, it is in collaboration with the referring physician for an intermediate period of time (months), with an expectation that the primary physician or provider will assume the prescribing role if medications are effective at stable doses with demonstrated compliance. Therefore, please do not assume that your prescribing responsibilities end on the day of pain clinic consultation.  7. Informed pt of prescribing policy? Yes      8. Referring Provider: Jah Corley     9. Criteria for Triage Eval:   -Missed/Failed 1st DUAL appointment? N/A   -Medication Focused? N/A   -Mental Health Concerns? (e.g. Recent psych hospitalization/snap shot)? N/A   -Active substance abuse? N/A   -Patient behaviors (e.g. Offensive language/raised voice)? N/A

## 2018-12-14 ENCOUNTER — TELEPHONE (OUTPATIENT)
Dept: GASTROENTEROLOGY | Facility: CLINIC | Age: 68
End: 2018-12-14

## 2018-12-18 ENCOUNTER — MYC MEDICAL ADVICE (OUTPATIENT)
Dept: ORTHOPEDICS | Facility: CLINIC | Age: 68
End: 2018-12-18

## 2018-12-18 DIAGNOSIS — M48.061 LUMBAR SPINAL STENOSIS: ICD-10-CM

## 2018-12-18 DIAGNOSIS — G89.29 CHRONIC LOW BACK PAIN: ICD-10-CM

## 2018-12-18 DIAGNOSIS — M47.816 LUMBAR SPONDYLOSIS: Primary | ICD-10-CM

## 2018-12-18 DIAGNOSIS — M54.50 CHRONIC LOW BACK PAIN: ICD-10-CM

## 2018-12-19 ENCOUNTER — OFFICE VISIT (OUTPATIENT)
Dept: RHEUMATOLOGY | Facility: CLINIC | Age: 68
End: 2018-12-19
Attending: INTERNAL MEDICINE
Payer: MEDICARE

## 2018-12-19 VITALS
HEIGHT: 73 IN | SYSTOLIC BLOOD PRESSURE: 160 MMHG | DIASTOLIC BLOOD PRESSURE: 92 MMHG | WEIGHT: 314.3 LBS | OXYGEN SATURATION: 94 % | HEART RATE: 55 BPM | BODY MASS INDEX: 41.65 KG/M2 | TEMPERATURE: 97.8 F

## 2018-12-19 DIAGNOSIS — G89.29 CHRONIC PAIN OF LEFT ANKLE: Primary | ICD-10-CM

## 2018-12-19 DIAGNOSIS — Z79.899 HIGH RISK MEDICATIONS (NOT ANTICOAGULANTS) LONG-TERM USE: ICD-10-CM

## 2018-12-19 DIAGNOSIS — M06.09 RHEUMATOID ARTHRITIS OF MULTIPLE SITES WITH NEGATIVE RHEUMATOID FACTOR (H): ICD-10-CM

## 2018-12-19 DIAGNOSIS — M25.572 CHRONIC PAIN OF LEFT ANKLE: Primary | ICD-10-CM

## 2018-12-19 PROCEDURE — G0463 HOSPITAL OUTPT CLINIC VISIT: HCPCS | Mod: ZF

## 2018-12-19 ASSESSMENT — MIFFLIN-ST. JEOR: SCORE: 2239.54

## 2018-12-19 ASSESSMENT — PAIN SCALES - GENERAL: PAINLEVEL: SEVERE PAIN (6)

## 2018-12-19 NOTE — PATIENT INSTRUCTIONS
1. Inflammatory arthritis: controlled overall.  Plan continue methotrexate and plaquenil   Bloodwork in 2-19    2. Left heel/ankle pain: query post-traumatic.  Plan MRI left ankle. Referral to Podiatry or Foot/ankle surgery pending results.

## 2018-12-19 NOTE — LETTER
2018      RE: Lucio Daly  [address]  [place] [address]           [facility]  Rheumatology Clinic  Jeremy Goodrich MD  2018     Name: Lucio Daly  MRN: [id number]  Age: 68 year old  : 1950  Referring provider: Jah Corley     Assessment and Plan:  # Seronegative rheumatoid arthritis:   Symptoms are stable overall with minimal morning stiffness, altered range of motion, or small joint pain. Exam shows no synovitis or altered range of motion. Labs from 2018 show stable kidney function, normal liver function, normal CRP, and normal white and red blood cell counts.    Seronegative inflammatory arthritis is overall well controlled. I recommend continuing methotrexate 20 mg weekly and hydroxychloroquine 400 mg daily. Patient should undergo annual retinal screening exam while on plaquenil, and should obtain blood work every 10-12 weeks while on methotrexate.     # Chronic, progressive left ankle pain:   Exam reveal tenderness at the medial malleolus, medial heel, and achilles insertion. I am most concerned about a ligamentous injury, as plain film performed in August reportedly showed no fracture. Pain is significantly affecting patient's ability to walk. Further investigation is warranted. I recommend MRI with & without contrast to evaluate for bone marrow edema, synovitis, and ligamentous integrity. Referral to orthopedics foot and ankle or sports medicine may be in order if inflammatory changes are not seen.     # Right elbow pain:   Exam is compatible with mild medial epicondylitis. I recommend alternating heat and ice prn.     Follow-up: follow up in 6 months with Sin Snow     HPI:   Lucio Daly is a 68 year old male with a history of seronegative rheumatoid arthritis, sicca complex who presents for follow up. The patient was last seen on 2018, at which time symptoms of inflammatory arthritis were stable. He had some improvement in his ankles since increasing  methotrexate. Plan was to continue methotrexate 20 mg weekly and plaquenil 200 mg BID.    Today, the patient reports that he has had increased pain in his left ankle and left heel. This is worse with weight bearing but also wakes him up at night. This summer, when stepping out of his car, he had sudden onset of left foot and ankle pain. He was seen at urgent care and impression was sprained ankle and strained achilles tendon. He also has chronic plantar fascitis and had a heel spur in the past. In the past month and a half, he has had severe intermittent lateral and medial left ankle pain. He had swelling to the ankle and top of the foot but this is now improved. He has been trying to rest and elevate the foot. He uses tylenol 1000 mg TID and lyrica for pain. He does have full length shoe inserts. He continues on methotrexate 20 mg weekly and plaquenil 400 mg daily.     He additionally reports right elbow pain and decreased  strength, although his pain is now somewhat improved. He does have bilateral carpal tunnel syndrome and a left ulnar nerve problem.    The patient was evaluated by Dr. Mccarthy of pulmonology on 11/20/2018, at which time PTSs were stable with minimal symptoms. Recommendation was made to start daily exercise program to improve conditioning.     The patient was evaluated by neurology on 09/12/2018, at which time the patient had signs of  diaphragm muscle weakness but no systemic neuromuscular disorder to explain for this. Dr. Reyes suspected deconditioning/morbid obesity.     Review of Systems:   Pertinent items are noted in HPI or as below, remainder of complete ROS is negative.      No recent problems with hearing or vision. No swallowing problems.   No breathing difficulty, shortness of breath, coughing, or wheezing  No chest pain or palpitations  No heart burn, indigestion, abdominal pain, nausea, vomiting, diarrhea  No urination problems, no bloody, cloudy urine, no dysuria  No  numbing, tingling, weakness  No headaches or confusion  No rashes. No easy bleeding or bruising.   + left ankle and foot pain   + right elbow pain     Active Medications:     Current Outpatient Medications:      acetaminophen (TYLENOL) 500 MG tablet, 2 x 500mg by mouth 3 times per day: 7/730am, 4pm and 11pm  = 6/day, Disp: , Rfl:      albuterol (PROAIR HFA/PROVENTIL HFA/VENTOLIN HFA) 108 (90 Base) MCG/ACT inhaler, Inhale 2 puffs into the lungs every 6 hours as needed for shortness of breath / dyspnea or wheezing, Disp: 1 Inhaler, Rfl: 0     Cholecalciferol (VITAMIN D3) 1000 units CAPS, 1000 unit capsule by mouth daily @ 7am, Disp: 30 capsule, Rfl:      folic acid (FOLVITE) 1 MG tablet, 1mg tab by mouth twice daily @ 9am and 4pm, Disp: 180 tablet, Rfl: 3     hydroxychloroquine (PLAQUENIL) 200 MG tablet, 200mg tab by mouth twice daily @ 9am and 4pm, Disp: 180 tablet, Rfl: 3     methotrexate 2.5 MG tablet, TAKE 8 TABLETS BY MOUTH ONCE WEEKLY. LABS DUE EVERY 8 - 12 WEEKS., Disp: 96 tablet, Rfl: 4     metoprolol succinate ER (TOPROL-XL) 50 MG 24 hr tablet, Take 1.5 tablets (75 mg) by mouth daily, Disp: 135 tablet, Rfl: 11     omeprazole (PRILOSEC) 40 MG DR capsule, 40mg capsule by mouth twice daily @ 7/730am and 4pm, Disp: 180 capsule, Rfl: 3     ORDER FOR DME, Use your CPAP device as directed by your provider., Disp: , Rfl:      oxybutynin (DITROPAN) 5 MG tablet, 5mg tab by mouth daily @ 4pm, Disp: 90 tablet, Rfl: 1     pregabalin (LYRICA) 50 MG capsule, 1 tab by mouth @ 7/730am, 1 tab @ 4pm and 1-2 tab @ 11pm, Disp: 360 capsule, Rfl: 3     triamcinolone (KENALOG) 0.1 % external cream, Apply sparingly to affected area three times daily for 14 days., Disp: 15 g, Rfl: 0      Allergies:   Restasis; Adhesive tape; Allegra; Allergy; Animal dander; Benadryl allergy; Cephalexin; Cephalosporins; Diphenhydramine; Doxycycline hyclate; Fexofenadine; Flonase [fluticasone propionate]; Gabapentin; Iodine solution [povidone iodine];  "Levaquin; Mylanta; Prednisone; Seafood [seafood]; Shellfish allergy; Sulfamethoxazole-trimethoprim; Trees; Trileptal; and Cortizone      Past Medical History:  Sensory neuropathy of unclear etiology - negative work up, managed on Lyrica.  Has chronic dysesthesia in pads of feet.  Basal cell carcinoma   Osteoarthritis   Diaphragmatic hernia  Esophageal reflux   H. Pylori infection   Interstitial lung disease   Melanoma   Parkinsonism   Rheumatoid arthritis   Somatization disorder   Tremor   Hypertension   Hypothyroidism    Obstructive sleep apnea   Chronic maxillary sinusitis   Sicca syndrome   Morbid obesity   Paralysis agitans     Past Surgical History:  Skin lesion biopsy   Colonoscopy   Hemorrhoid surgery   MOHS procedure   Removal of basal cell carcinoma     Family History:   Mother: hypertension, heart disease, atrial fibrillation, osteoarthritis, osteoporosis, skin cancer   Brother: hypertension, diabetes mellitus  Sister: multiple sclerosis, hypertension, heart disease, atrial fibrillation, arthritis, PPM  Father: hypertension, cerebrovascular disease, skin cancer   Maternal grandmother: psoriasis, stomach cancer  Paternal grandmother: heart disease   Paternal grandfather: heart disease       Social History:   Former smoker, 55 pack years, quit date 9/30/2000.   No alcohol use.      Physical Exam:   BP (!) 160/92   Pulse 55   Temp 97.8  F (36.6  C) (Oral)   Ht 1.854 m (6' 1\")   Wt 142.6 kg (314 lb 4.8 oz)   SpO2 94%   BMI 41.47 kg/m      Wt Readings from Last 4 Encounters:   12/19/18 142.6 kg (314 lb 4.8 oz)   12/07/18 138.8 kg (306 lb)   11/20/18 143.3 kg (316 lb)   11/20/18 143.3 kg (316 lb)     Constitutional: Well-developed, appearing stated age; cooperative  Eyes: Normal EOM, PERRLA, vision, conjunctiva, sclera  ENT: Normal external ears, nose, hearing, lips, teeth, gums, throat. No mucous membrane lesions, normal saliva pool  Neck: No mass or thyroid enlargement  Resp: Lungs clear to auscultation, " nl to palpation  CV: RRR, no murmurs, rubs or gallops, no edema  GI: No ABD mass or tenderness, no HSM  : Not tested  Lymph: No cervical, supraclavicular, inguinal or epitrochlear nodes  MS: No extra soft tissue around the knuckles or small joints today. More tenderness at the lateral than medial epicondyle at the right. Minimal tenderness of the true elbow joint. No olecranon mass or swelling. Full extension of the right elbow and full flexion. Right ankle without visible swelling. No pain with MTP compression. Heel cord is skinny. Left ankle is slightly warm. Some tenderness along the tibiotalar joint. Some tenderness at the insertion of the achilles tendon. No thickening of the tendon sheath. Tender first MTP on the left. Knees are cool without major effusion.   Skin: No nail pitting, alopecia, rash, nodules or lesions  Neuro: Normal cranial nerves, strength, sensation, DTRs.   Psych: Normal judgement, orientation, memory, affect.      EMG 11/15/2018:   The EMG is abnormal.  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  There is also evidence for mild bilateral median neuropathies at the wrists (carpal tunnel syndrome) that have not changed from the previous exam.  There is no evidence for worsening sensorimotor peripheral neuropathy or for an active right or left cervical radiculopathy on this     Imaging:   Right elbow x-ray from 2 years ago shows no loss of joint space, no fracture, and no effusion.     Laboratory:  RHEUM RESULTS Latest Ref Rng & Units 7/11/2018 9/12/2018 11/20/2018   DNA-DS 0 - 29 IU/mL - - -   SED RATE 0 - 20 mm/h - - -   CRP, INFLAMMATION 0.0 - 8.0 mg/L 7.3 5.7 6.4   CYCLIC CIT PEPT IGG <5 U/mL - - -   CK TOTAL 30 - 300 U/L - - -   RHEUMATOID FACTOR 0 - 14 IU/mL - - -   MONICA SCREEN BY EIA 0 - 1.0 - - -   AST 0 - 45 U/L 20 17 20   ALT 0 - 70 U/L 26 29 28   ALBUMIN 3.4 - 5.0 g/dL 3.7 3.8 3.9   WBC 4.0 - 11.0  10e9/L 3.5(L) 4.4 5.1   RBC 4.4 - 5.9 10e12/L 4.56 4.40 4.39(L)   HGB 13.3 - 17.7 g/dL 13.7 13.6 13.9   HCT 40.0 - 53.0 % 43.0 42.8 42.2   MCV 78 - 100 fl 94 97 96   MCHC 31.5 - 36.5 g/dL 31.9 31.8 32.9   RDW 10.0 - 15.0 % 14.3 14.6 13.9    - 450 10e9/L 160 176 185   CREATININE 0.66 - 1.25 mg/dL 1.13 1.19 1.13   GFR ESTIMATE, IF BLACK >60 mL/min/1.7m2 78 73 78   GFR ESTIMATE >60 mL/min/1.7m2 64 60(L) 64    - 1,620 mg/dL - - -   IGA 70 - 380 mg/dL - - -   IGM 60 - 265 mg/dL - - -   HEPATITIS C ANTIBODY NEG - - -       Rheumatoid Factor   Date Value Ref Range Status   05/27/2010 <7 0 - 14 IU/mL Final   ,  ,  ,   Cyclic Citrullinated Peptide IgG   Date Value Ref Range Status   05/27/2010 <2 <5 U/mL Final     Comment:     Interpretation:  Negative   ,  ,  ,   MONICA Screen by EIA   Date Value Ref Range Status   05/27/2010 <1.0 0 - 1.0 Final     Comment:     Interpretation:  Negative   ,   DNA-ds   Date Value Ref Range Status   05/27/2010 <15 0 - 29 IU/mL Final     Comment:     Interpretation:  Negative   ,  ,  ,  ,  ,  ,  ,  ,  ,  ,  ,  ,  ,  ,  ,  ,  ,   Neutrophil Cytoplasmic IgG Antibody   Date Value Ref Range Status   06/26/2015   Final    <1:20  Reference range: <1:20  (Note)  The ANCA IFA is <1:20; therefore, no further testing will  be performed.  INTERPRETIVE INFORMATION: Anti-Neutrophil Cyto Ab, IgG  Neutrophil Cytoplasmic Antibodies (C-ANCA = granular  cytoplasmic staining, P-ANCA = perinuclear staining) are  found in the serum of over 90 percent of patients with  certain necrotizing systemic vasculitides, and usually in  less than 5 percent of patients with collagen vascular  disease or arthritis.  Performed by [facility],  [address] [place] [id number] [phone]  [email or web address], Brad Fall MD, Lab. Director       ,  ,  ,  ,  ,   Albumin Fraction   Date Value Ref Range Status   05/27/2010 4.0 3.7 - 5.1 g/dL Final     Alpha 2 Fraction   Date Value Ref Range Status   05/27/2010 0.6  0.5 - 0.9 g/dL Final     Beta Fraction   Date Value Ref Range Status   05/27/2010 1.0 0.6 - 1.0 g/dL Final     Gamma Fraction   Date Value Ref Range Status   05/27/2010 1.0 0.7 - 1.6 g/dL Final     Monoclonal Peak   Date Value Ref Range Status   05/27/2010 0.0 0.0 g/dL Final     ELP Interpretation:   Date Value Ref Range Status   05/27/2010   Final    Essentially normal electrophoretic pattern.  No monoclonal protein seen.     Comment:      Pathologic significance requires clinical correlation.  ASHLEY Noble M.D.,   Ph.D., Pathologist ()   ,  ,   Immunofixation ELP   Date Value Ref Range Status   06/09/2016   Final    No monoclonal protein seen on immunofixation.  Pathological significance   requires clinical correlation.   ASHLEY Noble M.D., Ph.D   Pathologist ([phone])       IGG   Date Value Ref Range Status   06/09/2016 1,020 695 - 1,620 mg/dL Final     IGA   Date Value Ref Range Status   06/09/2016 374 70 - 380 mg/dL Final     IGM   Date Value Ref Range Status   06/09/2016 81 60 - 265 mg/dL Final     Scribe Disclosure:   I, Nafisa Acosta, am serving as a scribe to document services personally performed by Jeremy Goodrich MD at this visit, based upon the provider's statements to me. All documentation has been reviewed by the aforementioned provider prior to being entered into the official medical record.     Portions of this medical record were completed by a scribe. UPON MY REVIEW AND AUTHENTICATION BY ELECTRONIC SIGNATURE, this confirms (a) I performed the applicable clinical services, and (b) the record is accurate.  Jeremy Goodrich MD  Staff Rheumatologist, [facility]    Jeremy Goodrich MD

## 2018-12-19 NOTE — PROGRESS NOTES
[facility]  Rheumatology Clinic  Jeremy Goodrich MD  2018     Name: Lucio Daly  MRN: [id number]  Age: 68 year old  : 1950  Referring provider: Jah Corley     Assessment and Plan:  # Seronegative rheumatoid arthritis:   Symptoms are stable overall with minimal morning stiffness, altered range of motion, or small joint pain. Exam shows no synovitis or altered range of motion. Labs from 2018 show stable kidney function, normal liver function, normal CRP, and normal white and red blood cell counts.    Seronegative inflammatory arthritis is overall well controlled. I recommend continuing methotrexate 20 mg weekly and hydroxychloroquine 400 mg daily. Patient should undergo annual retinal screening exam while on plaquenil, and should obtain blood work every 10-12 weeks while on methotrexate.     # Chronic, progressive left ankle pain:   Exam reveal tenderness at the medial malleolus, medial heel, and achilles insertion. I am most concerned about a ligamentous injury, as plain film performed in August reportedly showed no fracture. Pain is significantly affecting patient's ability to walk. Further investigation is warranted. I recommend MRI with & without contrast to evaluate for bone marrow edema, synovitis, and ligamentous integrity. Referral to orthopedics foot and ankle or sports medicine may be in order if inflammatory changes are not seen.     # Right elbow pain:   Exam is compatible with mild medial epicondylitis. I recommend alternating heat and ice prn.     Follow-up: follow up in 6 months with Sin Snow     HPI:   Lucio Daly is a 68 year old male with a history of seronegative rheumatoid arthritis, sicca complex who presents for follow up. The patient was last seen on 2018, at which time symptoms of inflammatory arthritis were stable. He had some improvement in his ankles since increasing methotrexate. Plan was to continue methotrexate 20 mg weekly and plaquenil 200 mg  BID.    Today, the patient reports that he has had increased pain in his left ankle and left heel. This is worse with weight bearing but also wakes him up at night. This summer, when stepping out of his car, he had sudden onset of left foot and ankle pain. He was seen at urgent care and impression was sprained ankle and strained achilles tendon. He also has chronic plantar fascitis and had a heel spur in the past. In the past month and a half, he has had severe intermittent lateral and medial left ankle pain. He had swelling to the ankle and top of the foot but this is now improved. He has been trying to rest and elevate the foot. He uses tylenol 1000 mg TID and lyrica for pain. He does have full length shoe inserts. He continues on methotrexate 20 mg weekly and plaquenil 400 mg daily.     He additionally reports right elbow pain and decreased  strength, although his pain is now somewhat improved. He does have bilateral carpal tunnel syndrome and a left ulnar nerve problem.    The patient was evaluated by Dr. Mccarthy of pulmonology on 11/20/2018, at which time PTSs were stable with minimal symptoms. Recommendation was made to start daily exercise program to improve conditioning.     The patient was evaluated by neurology on 09/12/2018, at which time the patient had signs of  diaphragm muscle weakness but no systemic neuromuscular disorder to explain for this. Dr. Reyes suspected deconditioning/morbid obesity.     Review of Systems:   Pertinent items are noted in HPI or as below, remainder of complete ROS is negative.      No recent problems with hearing or vision. No swallowing problems.   No breathing difficulty, shortness of breath, coughing, or wheezing  No chest pain or palpitations  No heart burn, indigestion, abdominal pain, nausea, vomiting, diarrhea  No urination problems, no bloody, cloudy urine, no dysuria  No numbing, tingling, weakness  No headaches or confusion  No rashes. No easy bleeding or  bruising.   + left ankle and foot pain   + right elbow pain     Active Medications:     Current Outpatient Medications:      acetaminophen (TYLENOL) 500 MG tablet, 2 x 500mg by mouth 3 times per day: 7/730am, 4pm and 11pm  = 6/day, Disp: , Rfl:      albuterol (PROAIR HFA/PROVENTIL HFA/VENTOLIN HFA) 108 (90 Base) MCG/ACT inhaler, Inhale 2 puffs into the lungs every 6 hours as needed for shortness of breath / dyspnea or wheezing, Disp: 1 Inhaler, Rfl: 0     Cholecalciferol (VITAMIN D3) 1000 units CAPS, 1000 unit capsule by mouth daily @ 7am, Disp: 30 capsule, Rfl:      folic acid (FOLVITE) 1 MG tablet, 1mg tab by mouth twice daily @ 9am and 4pm, Disp: 180 tablet, Rfl: 3     hydroxychloroquine (PLAQUENIL) 200 MG tablet, 200mg tab by mouth twice daily @ 9am and 4pm, Disp: 180 tablet, Rfl: 3     methotrexate 2.5 MG tablet, TAKE 8 TABLETS BY MOUTH ONCE WEEKLY. LABS DUE EVERY 8 - 12 WEEKS., Disp: 96 tablet, Rfl: 4     metoprolol succinate ER (TOPROL-XL) 50 MG 24 hr tablet, Take 1.5 tablets (75 mg) by mouth daily, Disp: 135 tablet, Rfl: 11     omeprazole (PRILOSEC) 40 MG DR capsule, 40mg capsule by mouth twice daily @ 7/730am and 4pm, Disp: 180 capsule, Rfl: 3     ORDER FOR DME, Use your CPAP device as directed by your provider., Disp: , Rfl:      oxybutynin (DITROPAN) 5 MG tablet, 5mg tab by mouth daily @ 4pm, Disp: 90 tablet, Rfl: 1     pregabalin (LYRICA) 50 MG capsule, 1 tab by mouth @ 7/730am, 1 tab @ 4pm and 1-2 tab @ 11pm, Disp: 360 capsule, Rfl: 3     triamcinolone (KENALOG) 0.1 % external cream, Apply sparingly to affected area three times daily for 14 days., Disp: 15 g, Rfl: 0      Allergies:   Restasis; Adhesive tape; Allegra; Allergy; Animal dander; Benadryl allergy; Cephalexin; Cephalosporins; Diphenhydramine; Doxycycline hyclate; Fexofenadine; Flonase [fluticasone propionate]; Gabapentin; Iodine solution [povidone iodine]; Levaquin; Mylanta; Prednisone; Seafood [seafood]; Shellfish allergy;  "Sulfamethoxazole-trimethoprim; Trees; Trileptal; and Cortizone      Past Medical History:  Sensory neuropathy of unclear etiology - negative work up, managed on Lyrica.  Has chronic dysesthesia in pads of feet.  Basal cell carcinoma   Osteoarthritis   Diaphragmatic hernia  Esophageal reflux   H. Pylori infection   Interstitial lung disease   Melanoma   Parkinsonism   Rheumatoid arthritis   Somatization disorder   Tremor   Hypertension   Hypothyroidism    Obstructive sleep apnea   Chronic maxillary sinusitis   Sicca syndrome   Morbid obesity   Paralysis agitans     Past Surgical History:  Skin lesion biopsy   Colonoscopy   Hemorrhoid surgery   MOHS procedure   Removal of basal cell carcinoma     Family History:   Mother: hypertension, heart disease, atrial fibrillation, osteoarthritis, osteoporosis, skin cancer   Brother: hypertension, diabetes mellitus  Sister: multiple sclerosis, hypertension, heart disease, atrial fibrillation, arthritis, PPM  Father: hypertension, cerebrovascular disease, skin cancer   Maternal grandmother: psoriasis, stomach cancer  Paternal grandmother: heart disease   Paternal grandfather: heart disease       Social History:   Former smoker, 55 pack years, quit date 9/30/2000.   No alcohol use.      Physical Exam:   BP (!) 160/92   Pulse 55   Temp 97.8  F (36.6  C) (Oral)   Ht 1.854 m (6' 1\")   Wt 142.6 kg (314 lb 4.8 oz)   SpO2 94%   BMI 41.47 kg/m     Wt Readings from Last 4 Encounters:   12/19/18 142.6 kg (314 lb 4.8 oz)   12/07/18 138.8 kg (306 lb)   11/20/18 143.3 kg (316 lb)   11/20/18 143.3 kg (316 lb)     Constitutional: Well-developed, appearing stated age; cooperative  Eyes: Normal EOM, PERRLA, vision, conjunctiva, sclera  ENT: Normal external ears, nose, hearing, lips, teeth, gums, throat. No mucous membrane lesions, normal saliva pool  Neck: No mass or thyroid enlargement  Resp: Lungs clear to auscultation, nl to palpation  CV: RRR, no murmurs, rubs or gallops, no edema  GI: " No ABD mass or tenderness, no HSM  : Not tested  Lymph: No cervical, supraclavicular, inguinal or epitrochlear nodes  MS: No extra soft tissue around the knuckles or small joints today. More tenderness at the lateral than medial epicondyle at the right. Minimal tenderness of the true elbow joint. No olecranon mass or swelling. Full extension of the right elbow and full flexion. Right ankle without visible swelling. No pain with MTP compression. Heel cord is skinny. Left ankle is slightly warm. Some tenderness along the tibiotalar joint. Some tenderness at the insertion of the achilles tendon. No thickening of the tendon sheath. Tender first MTP on the left. Knees are cool without major effusion.   Skin: No nail pitting, alopecia, rash, nodules or lesions  Neuro: Normal cranial nerves, strength, sensation, DTRs.   Psych: Normal judgement, orientation, memory, affect.      EMG 11/15/2018:   The EMG is abnormal.  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  There is also evidence for mild bilateral median neuropathies at the wrists (carpal tunnel syndrome) that have not changed from the previous exam.  There is no evidence for worsening sensorimotor peripheral neuropathy or for an active right or left cervical radiculopathy on this     Imaging:   Right elbow x-ray from 2 years ago shows no loss of joint space, no fracture, and no effusion.     Laboratory:  RHEUM RESULTS Latest Ref Rng & Units 7/11/2018 9/12/2018 11/20/2018   DNA-DS 0 - 29 IU/mL - - -   SED RATE 0 - 20 mm/h - - -   CRP, INFLAMMATION 0.0 - 8.0 mg/L 7.3 5.7 6.4   CYCLIC CIT PEPT IGG <5 U/mL - - -   CK TOTAL 30 - 300 U/L - - -   RHEUMATOID FACTOR 0 - 14 IU/mL - - -   MONICA SCREEN BY EIA 0 - 1.0 - - -   AST 0 - 45 U/L 20 17 20   ALT 0 - 70 U/L 26 29 28   ALBUMIN 3.4 - 5.0 g/dL 3.7 3.8 3.9   WBC 4.0 - 11.0 10e9/L 3.5(L) 4.4 5.1   RBC 4.4 - 5.9 10e12/L 4.56 4.40 4.39(L)   HGB  13.3 - 17.7 g/dL 13.7 13.6 13.9   HCT 40.0 - 53.0 % 43.0 42.8 42.2   MCV 78 - 100 fl 94 97 96   MCHC 31.5 - 36.5 g/dL 31.9 31.8 32.9   RDW 10.0 - 15.0 % 14.3 14.6 13.9    - 450 10e9/L 160 176 185   CREATININE 0.66 - 1.25 mg/dL 1.13 1.19 1.13   GFR ESTIMATE, IF BLACK >60 mL/min/1.7m2 78 73 78   GFR ESTIMATE >60 mL/min/1.7m2 64 60(L) 64    - 1,620 mg/dL - - -   IGA 70 - 380 mg/dL - - -   IGM 60 - 265 mg/dL - - -   HEPATITIS C ANTIBODY NEG - - -       Rheumatoid Factor   Date Value Ref Range Status   05/27/2010 <7 0 - 14 IU/mL Final   ,  ,  ,   Cyclic Citrullinated Peptide IgG   Date Value Ref Range Status   05/27/2010 <2 <5 U/mL Final     Comment:     Interpretation:  Negative   ,  ,  ,   MONICA Screen by EIA   Date Value Ref Range Status   05/27/2010 <1.0 0 - 1.0 Final     Comment:     Interpretation:  Negative   ,   DNA-ds   Date Value Ref Range Status   05/27/2010 <15 0 - 29 IU/mL Final     Comment:     Interpretation:  Negative   ,  ,  ,  ,  ,  ,  ,  ,  ,  ,  ,  ,  ,  ,  ,  ,  ,   Neutrophil Cytoplasmic IgG Antibody   Date Value Ref Range Status   06/26/2015   Final    <1:20  Reference range: <1:20  (Note)  The ANCA IFA is <1:20; therefore, no further testing will  be performed.  INTERPRETIVE INFORMATION: Anti-Neutrophil Cyto Ab, IgG  Neutrophil Cytoplasmic Antibodies (C-ANCA = granular  cytoplasmic staining, P-ANCA = perinuclear staining) are  found in the serum of over 90 percent of patients with  certain necrotizing systemic vasculitides, and usually in  less than 5 percent of patients with collagen vascular  disease or arthritis.  Performed by [email or web address],  [address] [place] [id number] [phone]  [email or web address], Brad Fall MD, Lab. Director       ,  ,  ,  ,  ,   Albumin Fraction   Date Value Ref Range Status   05/27/2010 4.0 3.7 - 5.1 g/dL Final     Alpha 2 Fraction   Date Value Ref Range Status   05/27/2010 0.6 0.5 - 0.9 g/dL Final     Beta Fraction   Date Value Ref Range Status    05/27/2010 1.0 0.6 - 1.0 g/dL Final     Gamma Fraction   Date Value Ref Range Status   05/27/2010 1.0 0.7 - 1.6 g/dL Final     Monoclonal Peak   Date Value Ref Range Status   05/27/2010 0.0 0.0 g/dL Final     ELP Interpretation:   Date Value Ref Range Status   05/27/2010   Final    Essentially normal electrophoretic pattern.  No monoclonal protein seen.     Comment:      Pathologic significance requires clinical correlation.  ASHLEY Noble M.D.,   Ph.D., Pathologist ()   ,  ,   Immunofixation ELP   Date Value Ref Range Status   06/09/2016   Final    No monoclonal protein seen on immunofixation.  Pathological significance   requires clinical correlation.   ASHLEY Noble M.D., Ph.D   Pathologist ([phone])       IGG   Date Value Ref Range Status   06/09/2016 1,020 695 - 1,620 mg/dL Final     IGA   Date Value Ref Range Status   06/09/2016 374 70 - 380 mg/dL Final     IGM   Date Value Ref Range Status   06/09/2016 81 60 - 265 mg/dL Final     Scribe Disclosure:   I, Nafisa Acosta, am serving as a scribe to document services personally performed by Jeremy Goodrich MD at this visit, based upon the provider's statements to me. All documentation has been reviewed by the aforementioned provider prior to being entered into the official medical record.     Portions of this medical record were completed by a scribe. UPON MY REVIEW AND AUTHENTICATION BY ELECTRONIC SIGNATURE, this confirms (a) I performed the applicable clinical services, and (b) the record is accurate.  Jeremy Goodrich MD  Staff Rheumatologist,  Health

## 2018-12-19 NOTE — TELEPHONE ENCOUNTER
See My Chart  Message. See dictation.  I called pt. & reviewed  dictation plan.  Pt. States he wants  To  do nonop treatment now & he already made a new appt., with  pain clinic in [place] for eval.  For Jan 9.  Call back prn. Pt agreed.  W.GRIFFIN./Jaida Campbell RN.

## 2018-12-19 NOTE — LETTER
2018       RE: Lucio Daly  [address]  [place] [address]     Dear Colleague,    Thank you for referring your patient, Lucio Daly, to the [facility] RHEUMATOLOGY at [facility]. Please see a copy of my visit note below.        [facility]  Rheumatology Clinic  Jeremy Goodrich MD  2018     Name: Lucio Daly  MRN: [id number]  Age: 68 year old  : 1950  Referring provider: Jah Corley     Assessment and Plan:  # Seronegative rheumatoid arthritis:   Symptoms are stable overall with minimal morning stiffness, altered range of motion, or small joint pain. Exam shows no synovitis or altered range of motion. Labs from 2018 show stable kidney function, normal liver function, normal CRP, and normal white and red blood cell counts.    Seronegative inflammatory arthritis is overall well controlled. I recommend continuing methotrexate 20 mg weekly and hydroxychloroquine 400 mg daily. Patient should undergo annual retinal screening exam while on plaquenil, and should obtain blood work every 10-12 weeks while on methotrexate.     # Chronic, progressive left ankle pain:   Exam reveal tenderness at the medial malleolus, medial heel, and achilles insertion. I am most concerned about a ligamentous injury, as plain film performed in August reportedly showed no fracture. Pain is significantly affecting patient's ability to walk. Further investigation is warranted. I recommend MRI with & without contrast to evaluate for bone marrow edema, synovitis, and ligamentous integrity. Referral to orthopedics foot and ankle or sports medicine may be in order if inflammatory changes are not seen.     # Right elbow pain:   Exam is compatible with mild medial epicondylitis. I recommend alternating heat and ice prn.     Follow-up: follow up in 6 months with [facility]     HPI:   Lucio Daly is a 68 year old male with a history of seronegative rheumatoid arthritis, sicca  complex who presents for follow up. The patient was last seen on 06/20/2018, at which time symptoms of inflammatory arthritis were stable. He had some improvement in his ankles since increasing methotrexate. Plan was to continue methotrexate 20 mg weekly and plaquenil 200 mg BID.    Today, the patient reports that he has had increased pain in his left ankle and left heel. This is worse with weight bearing but also wakes him up at night. This summer, when stepping out of his car, he had sudden onset of left foot and ankle pain. He was seen at urgent care and impression was sprained ankle and strained achilles tendon. He also has chronic plantar fascitis and had a heel spur in the past. In the past month and a half, he has had severe intermittent lateral and medial left ankle pain. He had swelling to the ankle and top of the foot but this is now improved. He has been trying to rest and elevate the foot. He uses tylenol 1000 mg TID and lyrica for pain. He does have full length shoe inserts. He continues on methotrexate 20 mg weekly and plaquenil 400 mg daily.     He additionally reports right elbow pain and decreased  strength, although his pain is now somewhat improved. He does have bilateral carpal tunnel syndrome and a left ulnar nerve problem.    The patient was evaluated by Dr. Mccarthy of pulmonology on 11/20/2018, at which time PTSs were stable with minimal symptoms. Recommendation was made to start daily exercise program to improve conditioning.     The patient was evaluated by neurology on 09/12/2018, at which time the patient had signs of  diaphragm muscle weakness but no systemic neuromuscular disorder to explain for this. Dr. Reyes suspected deconditioning/morbid obesity.     Review of Systems:   Pertinent items are noted in HPI or as below, remainder of complete ROS is negative.      No recent problems with hearing or vision. No swallowing problems.   No breathing difficulty, shortness of breath,  coughing, or wheezing  No chest pain or palpitations  No heart burn, indigestion, abdominal pain, nausea, vomiting, diarrhea  No urination problems, no bloody, cloudy urine, no dysuria  No numbing, tingling, weakness  No headaches or confusion  No rashes. No easy bleeding or bruising.   + left ankle and foot pain   + right elbow pain     Active Medications:     Current Outpatient Medications:      acetaminophen (TYLENOL) 500 MG tablet, 2 x 500mg by mouth 3 times per day: 7/730am, 4pm and 11pm  = 6/day, Disp: , Rfl:      albuterol (PROAIR HFA/PROVENTIL HFA/VENTOLIN HFA) 108 (90 Base) MCG/ACT inhaler, Inhale 2 puffs into the lungs every 6 hours as needed for shortness of breath / dyspnea or wheezing, Disp: 1 Inhaler, Rfl: 0     Cholecalciferol (VITAMIN D3) 1000 units CAPS, 1000 unit capsule by mouth daily @ 7am, Disp: 30 capsule, Rfl:      folic acid (FOLVITE) 1 MG tablet, 1mg tab by mouth twice daily @ 9am and 4pm, Disp: 180 tablet, Rfl: 3     hydroxychloroquine (PLAQUENIL) 200 MG tablet, 200mg tab by mouth twice daily @ 9am and 4pm, Disp: 180 tablet, Rfl: 3     methotrexate 2.5 MG tablet, TAKE 8 TABLETS BY MOUTH ONCE WEEKLY. LABS DUE EVERY 8 - 12 WEEKS., Disp: 96 tablet, Rfl: 4     metoprolol succinate ER (TOPROL-XL) 50 MG 24 hr tablet, Take 1.5 tablets (75 mg) by mouth daily, Disp: 135 tablet, Rfl: 11     omeprazole (PRILOSEC) 40 MG DR capsule, 40mg capsule by mouth twice daily @ 7/730am and 4pm, Disp: 180 capsule, Rfl: 3     ORDER FOR DME, Use your CPAP device as directed by your provider., Disp: , Rfl:      oxybutynin (DITROPAN) 5 MG tablet, 5mg tab by mouth daily @ 4pm, Disp: 90 tablet, Rfl: 1     pregabalin (LYRICA) 50 MG capsule, 1 tab by mouth @ 7/730am, 1 tab @ 4pm and 1-2 tab @ 11pm, Disp: 360 capsule, Rfl: 3     triamcinolone (KENALOG) 0.1 % external cream, Apply sparingly to affected area three times daily for 14 days., Disp: 15 g, Rfl: 0      Allergies:   Restasis; Adhesive tape; Allegra; Allergy; Animal  "dander; Benadryl allergy; Cephalexin; Cephalosporins; Diphenhydramine; Doxycycline hyclate; Fexofenadine; Flonase [fluticasone propionate]; Gabapentin; Iodine solution [povidone iodine]; Levaquin; Mylanta; Prednisone; Seafood [seafood]; Shellfish allergy; Sulfamethoxazole-trimethoprim; Trees; Trileptal; and Cortizone      Past Medical History:  Sensory neuropathy of unclear etiology - negative work up, managed on Lyrica.  Has chronic dysesthesia in pads of feet.  Basal cell carcinoma   Osteoarthritis   Diaphragmatic hernia  Esophageal reflux   H. Pylori infection   Interstitial lung disease   Melanoma   Parkinsonism   Rheumatoid arthritis   Somatization disorder   Tremor   Hypertension   Hypothyroidism    Obstructive sleep apnea   Chronic maxillary sinusitis   Sicca syndrome   Morbid obesity   Paralysis agitans     Past Surgical History:  Skin lesion biopsy   Colonoscopy   Hemorrhoid surgery   MOHS procedure   Removal of basal cell carcinoma     Family History:   Mother: hypertension, heart disease, atrial fibrillation, osteoarthritis, osteoporosis, skin cancer   Brother: hypertension, diabetes mellitus  Sister: multiple sclerosis, hypertension, heart disease, atrial fibrillation, arthritis, PPM  Father: hypertension, cerebrovascular disease, skin cancer   Maternal grandmother: psoriasis, stomach cancer  Paternal grandmother: heart disease   Paternal grandfather: heart disease       Social History:   Former smoker, 55 pack years, quit date 9/30/2000.   No alcohol use.      Physical Exam:   BP (!) 160/92   Pulse 55   Temp 97.8  F (36.6  C) (Oral)   Ht 1.854 m (6' 1\")   Wt 142.6 kg (314 lb 4.8 oz)   SpO2 94%   BMI 41.47 kg/m      Wt Readings from Last 4 Encounters:   12/19/18 142.6 kg (314 lb 4.8 oz)   12/07/18 138.8 kg (306 lb)   11/20/18 143.3 kg (316 lb)   11/20/18 143.3 kg (316 lb)     Constitutional: Well-developed, appearing stated age; cooperative  Eyes: Normal EOM, PERRLA, vision, conjunctiva, " sclera  ENT: Normal external ears, nose, hearing, lips, teeth, gums, throat. No mucous membrane lesions, normal saliva pool  Neck: No mass or thyroid enlargement  Resp: Lungs clear to auscultation, nl to palpation  CV: RRR, no murmurs, rubs or gallops, no edema  GI: No ABD mass or tenderness, no HSM  : Not tested  Lymph: No cervical, supraclavicular, inguinal or epitrochlear nodes  MS: No extra soft tissue around the knuckles or small joints today. More tenderness at the lateral than medial epicondyle at the right. Minimal tenderness of the true elbow joint. No olecranon mass or swelling. Full extension of the right elbow and full flexion. Right ankle without visible swelling. No pain with MTP compression. Heel cord is skinny. Left ankle is slightly warm. Some tenderness along the tibiotalar joint. Some tenderness at the insertion of the achilles tendon. No thickening of the tendon sheath. Tender first MTP on the left. Knees are cool without major effusion.   Skin: No nail pitting, alopecia, rash, nodules or lesions  Neuro: Normal cranial nerves, strength, sensation, DTRs.   Psych: Normal judgement, orientation, memory, affect.      EMG 11/15/2018:   The EMG is abnormal.  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  There is also evidence for mild bilateral median neuropathies at the wrists (carpal tunnel syndrome) that have not changed from the previous exam.  There is no evidence for worsening sensorimotor peripheral neuropathy or for an active right or left cervical radiculopathy on this     Imaging:   Right elbow x-ray from 2 years ago shows no loss of joint space, no fracture, and no effusion.     Laboratory:  RHEUM RESULTS Latest Ref Rng & Units 7/11/2018 9/12/2018 11/20/2018   DNA-DS 0 - 29 IU/mL - - -   SED RATE 0 - 20 mm/h - - -   CRP, INFLAMMATION 0.0 - 8.0 mg/L 7.3 5.7 6.4   CYCLIC CIT PEPT IGG <5 U/mL - - -   CK TOTAL 30  - 300 U/L - - -   RHEUMATOID FACTOR 0 - 14 IU/mL - - -   MONICA SCREEN BY EIA 0 - 1.0 - - -   AST 0 - 45 U/L 20 17 20   ALT 0 - 70 U/L 26 29 28   ALBUMIN 3.4 - 5.0 g/dL 3.7 3.8 3.9   WBC 4.0 - 11.0 10e9/L 3.5(L) 4.4 5.1   RBC 4.4 - 5.9 10e12/L 4.56 4.40 4.39(L)   HGB 13.3 - 17.7 g/dL 13.7 13.6 13.9   HCT 40.0 - 53.0 % 43.0 42.8 42.2   MCV 78 - 100 fl 94 97 96   MCHC 31.5 - 36.5 g/dL 31.9 31.8 32.9   RDW 10.0 - 15.0 % 14.3 14.6 13.9    - 450 10e9/L 160 176 185   CREATININE 0.66 - 1.25 mg/dL 1.13 1.19 1.13   GFR ESTIMATE, IF BLACK >60 mL/min/1.7m2 78 73 78   GFR ESTIMATE >60 mL/min/1.7m2 64 60(L) 64    - 1,620 mg/dL - - -   IGA 70 - 380 mg/dL - - -   IGM 60 - 265 mg/dL - - -   HEPATITIS C ANTIBODY NEG - - -       Rheumatoid Factor   Date Value Ref Range Status   05/27/2010 <7 0 - 14 IU/mL Final   ,  ,  ,   Cyclic Citrullinated Peptide IgG   Date Value Ref Range Status   05/27/2010 <2 <5 U/mL Final     Comment:     Interpretation:  Negative   ,  ,  ,   MONICA Screen by EIA   Date Value Ref Range Status   05/27/2010 <1.0 0 - 1.0 Final     Comment:     Interpretation:  Negative   ,   DNA-ds   Date Value Ref Range Status   05/27/2010 <15 0 - 29 IU/mL Final     Comment:     Interpretation:  Negative   ,  ,  ,  ,  ,  ,  ,  ,  ,  ,  ,  ,  ,  ,  ,  ,  ,   Neutrophil Cytoplasmic IgG Antibody   Date Value Ref Range Status   06/26/2015   Final    <1:20  Reference range: <1:20  (Note)  The ANCA IFA is <1:20; therefore, no further testing will  be performed.  INTERPRETIVE INFORMATION: Anti-Neutrophil Cyto Ab, IgG  Neutrophil Cytoplasmic Antibodies (C-ANCA = granular  cytoplasmic staining, P-ANCA = perinuclear staining) are  found in the serum of over 90 percent of patients with  certain necrotizing systemic vasculitides, and usually in  less than 5 percent of patients with collagen vascular  disease or arthritis.  Performed by [facility],  [address] [place] [id number] [phone]  [email or web address], Brad DEGROOT  MD [place], Lab. Director       ,  ,  ,  ,  ,   Albumin Fraction   Date Value Ref Range Status   05/27/2010 4.0 3.7 - 5.1 g/dL Final     Alpha 2 Fraction   Date Value Ref Range Status   05/27/2010 0.6 0.5 - 0.9 g/dL Final     Beta Fraction   Date Value Ref Range Status   05/27/2010 1.0 0.6 - 1.0 g/dL Final     Gamma Fraction   Date Value Ref Range Status   05/27/2010 1.0 0.7 - 1.6 g/dL Final     Monoclonal Peak   Date Value Ref Range Status   05/27/2010 0.0 0.0 g/dL Final     ELP Interpretation:   Date Value Ref Range Status   05/27/2010   Final    Essentially normal electrophoretic pattern.  No monoclonal protein seen.     Comment:      Pathologic significance requires clinical correlation.  ASHLEY Noble M.D.,   Ph.D., Pathologist ()   ,  ,   Immunofixation ELP   Date Value Ref Range Status   06/09/2016   Final    No monoclonal protein seen on immunofixation.  Pathological significance   requires clinical correlation.   ASHLEY Noble M.D., Ph.D   Pathologist ([phone])       IGG   Date Value Ref Range Status   06/09/2016 1,020 695 - 1,620 mg/dL Final     IGA   Date Value Ref Range Status   06/09/2016 374 70 - 380 mg/dL Final     IGM   Date Value Ref Range Status   06/09/2016 81 60 - 265 mg/dL Final     Scribe Disclosure:   I, Nafisa Acosta, am serving as a scribe to document services personally performed by Jeremy Goodrich MD at this visit, based upon the provider's statements to me. All documentation has been reviewed by the aforementioned provider prior to being entered into the official medical record.     Portions of this medical record were completed by a scribe. UPON MY REVIEW AND AUTHENTICATION BY ELECTRONIC SIGNATURE, this confirms (a) I performed the applicable clinical services, and (b) the record is accurate.  Jeremy Goodrich MD  Staff Rheumatologist, [facility], thank you for allowing me to participate in the care of your patient.      Sincerely,    Jeremy Goodrich,  MD

## 2018-12-19 NOTE — NURSING NOTE
Chief Complaint   Patient presents with     RECHECK     RA   Pt roomed, vitals, meds, and allergies reviewed with pt. Pt ready for provider.  Cristobal Lombardi, CMA

## 2018-12-21 ENCOUNTER — TELEPHONE (OUTPATIENT)
Dept: FAMILY MEDICINE | Facility: CLINIC | Age: 68
End: 2018-12-21

## 2018-12-21 ENCOUNTER — HOSPITAL ENCOUNTER (OUTPATIENT)
Dept: MRI IMAGING | Facility: CLINIC | Age: 68
Discharge: HOME | End: 2018-12-21
Attending: INTERNAL MEDICINE | Admitting: INTERNAL MEDICINE
Payer: MEDICARE

## 2018-12-21 DIAGNOSIS — M25.572 CHRONIC PAIN OF LEFT ANKLE: ICD-10-CM

## 2018-12-21 DIAGNOSIS — G89.29 CHRONIC PAIN OF LEFT ANKLE: ICD-10-CM

## 2018-12-21 LAB
CREAT BLD-MCNC: 1.3 MG/DL (ref 0.66–1.25)
GFR SERPL CREATININE-BSD FRML MDRD: 55 ML/MIN/{1.73_M2}

## 2018-12-21 PROCEDURE — 73723 MRI JOINT LWR EXTR W/O&W/DYE: CPT | Mod: LT

## 2018-12-21 PROCEDURE — 25500064 ZZH RX 255 OP 636: Performed by: INTERNAL MEDICINE

## 2018-12-21 PROCEDURE — A9585 GADOBUTROL INJECTION: HCPCS | Performed by: INTERNAL MEDICINE

## 2018-12-21 PROCEDURE — 82565 ASSAY OF CREATININE: CPT

## 2018-12-21 RX ORDER — GADOBUTROL 604.72 MG/ML
15 INJECTION INTRAVENOUS ONCE
Status: COMPLETED | OUTPATIENT
Start: 2018-12-21 | End: 2018-12-21

## 2018-12-21 RX ADMIN — GADOBUTROL 14 ML: 604.72 INJECTION INTRAVENOUS at 07:41

## 2018-12-21 NOTE — TELEPHONE ENCOUNTER
Reason for Call:  Other call back    Detailed comments: Patient got the first shingrix shot done at [facility] pharmacy on 10/18 & had an allergic reaction to it. He's wondering if he should get the 2nd one. Please advise, thank you!    Phone Number Patient can be reached at: Cell number on file:    Telephone Information:   Mobile [phone]       Best Time: anytime    Can we leave a detailed message on this number? YES    Call taken on 12/21/2018 at 10:48 AM by Berkley Post

## 2018-12-21 NOTE — TELEPHONE ENCOUNTER
"Dr. Corley review: Patient reports an exaggerated reaction  To the shingrix vaccine given in October. Wonders if he should get the second one.    Reported 10 day swelling and redness of injection site (4-5 inches diameter) \"it was hard as a rock\"   Also had some nasal congestion and mild SOB during the days following the injection.  Malaise.  Also talked about his neurogenic bladder and how after the shot it was aggravated and causing some bladder symptoms.    Please advise patient if he should get the second shot.  Sonia Fairchild RN        "

## 2019-01-05 ENCOUNTER — MYC MEDICAL ADVICE (OUTPATIENT)
Dept: RHEUMATOLOGY | Facility: CLINIC | Age: 69
End: 2019-01-05

## 2019-01-05 DIAGNOSIS — M06.09 RHEUMATOID ARTHRITIS OF MULTIPLE SITES WITH NEGATIVE RHEUMATOID FACTOR (H): Primary | ICD-10-CM

## 2019-01-05 DIAGNOSIS — M25.572 LEFT ANKLE PAIN: ICD-10-CM

## 2019-01-08 NOTE — TELEPHONE ENCOUNTER
[facility] Call Center    Phone Message    May a detailed message be left on voicemail: yes    Reason for Call: Other: Pt following up to MyChart msg from 1/ 7. He states first that he would like the referral to orthopedics for the ankle pain, as he would like to schedule with their clinic.    Second, pt states he would do the creatinine lab as ordered, but wanted to know if he should complete the standing orders by Edy. He states he is not due for those draws till 1/20, but he is leaving for [place] next week and will not return till March. He wants to know if he should have these drawn next week, or wait until March. Please advise when available.    Action Taken: Message routed to:  [facility] ([facility] Rheumatology

## 2019-01-08 NOTE — TELEPHONE ENCOUNTER
Entered ortho referral order per Dr. Goodrich. Notified pt via phone. Will have a  contact pt to make appt. Advised pt to get labs from [facility]  [facility] before he leaves for his trip on 1/20. He also plans to get his repeat creatinine drawn next week.    Marianna Washington RN

## 2019-01-11 ENCOUNTER — OFFICE VISIT (OUTPATIENT)
Dept: URGENT CARE | Facility: URGENT CARE | Age: 69
End: 2019-01-11
Payer: MEDICARE

## 2019-01-11 VITALS
WEIGHT: 309 LBS | HEART RATE: 52 BPM | OXYGEN SATURATION: 96 % | TEMPERATURE: 98.3 F | DIASTOLIC BLOOD PRESSURE: 86 MMHG | BODY MASS INDEX: 40.77 KG/M2 | RESPIRATION RATE: 20 BRPM | SYSTOLIC BLOOD PRESSURE: 136 MMHG

## 2019-01-11 DIAGNOSIS — J20.9 ACUTE BRONCHITIS WITH COEXISTING CONDITION REQUIRING PROPHYLACTIC TREATMENT: Primary | ICD-10-CM

## 2019-01-11 PROCEDURE — 94640 AIRWAY INHALATION TREATMENT: CPT | Performed by: PHYSICIAN ASSISTANT

## 2019-01-11 PROCEDURE — 99214 OFFICE O/P EST MOD 30 MIN: CPT | Mod: 25 | Performed by: PHYSICIAN ASSISTANT

## 2019-01-11 RX ORDER — AZITHROMYCIN 250 MG/1
TABLET, FILM COATED ORAL
Qty: 6 TABLET | Refills: 0 | Status: SHIPPED | OUTPATIENT
Start: 2019-01-11 | End: 2019-03-09

## 2019-01-11 RX ORDER — ALBUTEROL SULFATE 0.83 MG/ML
2.5 SOLUTION RESPIRATORY (INHALATION) ONCE
Status: COMPLETED | OUTPATIENT
Start: 2019-01-11 | End: 2019-01-11

## 2019-01-11 RX ORDER — ALBUTEROL SULFATE 90 UG/1
2 AEROSOL, METERED RESPIRATORY (INHALATION) EVERY 4 HOURS PRN
Qty: 1 INHALER | Refills: 0 | Status: SHIPPED | OUTPATIENT
Start: 2019-01-11 | End: 2019-01-11

## 2019-01-11 RX ADMIN — ALBUTEROL SULFATE 2.5 MG: 0.83 SOLUTION RESPIRATORY (INHALATION) at 09:45

## 2019-01-11 NOTE — NURSING NOTE
The following nebulizer treatment was given:     MEDICATION: Albuterol Sulfate 2.5 mg  : [facility]  LOT #: [id number]  EXPIRATION DATE:  7/31/20  NDC # 32971-250-17     Nebulizer Start Time:  945 am  Nebulizer Stop Time:  955 am  See Vital Signs Flowsheet    Cory Sandoval CMA ([facility])

## 2019-01-11 NOTE — PROGRESS NOTES
SUBJECTIVE:   Lucio Daly is a 68 year old male presenting with a chief complaint of cough and wheezing.  Onset of symptoms was 10 days ago.  Course of illness is worsening, he has noted wheezing over the last couple of days. He has an albuterol inhaler, but has not been using inhaler  Severity moderate  Current and Associated symptoms: sinus congestion and runny nose  Treatment measures tried include OTC Cough med, Saline nasal rinse  Predisposing factors include Hx of ILD.    Denies fever/chills, HA, CP, pleuritic chest pain, hemoptysis, abd pain, N/V/D, rash, or any other symptoms. Patient denies history of DVT/PE, tobacco use, leg pain or swelling.    He has had recent travel to [place] in December. He has a history of melanoma.   Immunosuppressed with methotrexate.  Past Medical History:   Diagnosis Date     Abnormal EMG 4/18/2013     Abnormal involuntary movements(781.0)     Movement Disorder     AK (actinic keratosis) 12/18/2011     Allergic rhinitis, cause unspecified     Allergic rhinitis     Balance problems 11/1/2011     Basal cell carcinoma      Bladder spasms 11/1/2011     Chronic osteoarthritis      Diaphragmatic hernia without mention of obstruction or gangrene      Earache or other ear, nose, or throat complaint      Esophageal reflux      Fatigue 11/1/2011     Fracture      H. pylori infection 5/12/2011     History of MRI of cervical spine 11/18/2013    EXAMINATION: CERVICAL SPINE G/E 5 VIEWS* 4/19/2013 4:18 PM  CLINICAL HISTORY: Pain in limb,Performing Location?->[facility] ([facility]),  COMPARISON:  FINDINGS: AP and lateral views in flexion and extension, as well as odontoid view of the cervical spine was obtained. There is no comparison available. The vertebral bodies of the cervical spine are normally aligned. There is posterior spurring and d     Incomplete defecation 11/1/2011     Interstitial lung disease (H) 11/29/2016     Laboratory test 8/7/2012     Lung disease June 2015      Malignant basal cell neoplasm of skin 8/6/2008     Melanoma (H) 8/6/2008     Melanoma in situ of lower leg (H)     R calf     Neuropathy 5/16/2011     Other bladder disorder      Other color vision deficiencies      Other nervous system complications      Parkinsonism (H) 11/1/2011     Personal history of colonic polyps      Polyneuropathy in other diseases classified elsewhere (H)      RA (rheumatoid arthritis) (H)      Rheumatoid arthritis of multiple sites with negative rheumatoid factor (H) 3/21/2016     Seronegative arthritis 11/18/2013     Shortness of breath      Shoulder arthritis 2016    acromioclavicular joint      Somatization disorder 5/12/2011     Squamous cell carcinoma      Tremor 11/1/2011     Unspecified essential hypertension      Unspecified hypothyroidism      Urinary tract infection      Urinary urgency 11/1/2011     Wears glasses 11/1/2011     Current Outpatient Medications   Medication Sig Dispense Refill     acetaminophen (TYLENOL) 500 MG tablet 2 x 500mg by mouth 3 times per day: 7/730am, 4pm and 11pm  = 6/day       Cholecalciferol (VITAMIN D3) 1000 units CAPS 1000 unit capsule by mouth daily @ 7am 30 capsule      folic acid (FOLVITE) 1 MG tablet 1mg tab by mouth twice daily @ 9am and 4pm 180 tablet 3     hydroxychloroquine (PLAQUENIL) 200 MG tablet 200mg tab by mouth twice daily @ 9am and 4pm 180 tablet 3     methotrexate 2.5 MG tablet TAKE 8 TABLETS BY MOUTH ONCE WEEKLY. LABS DUE EVERY 8 - 12 WEEKS. 96 tablet 4     metoprolol succinate ER (TOPROL-XL) 50 MG 24 hr tablet Take 1.5 tablets (75 mg) by mouth daily 135 tablet 11     omeprazole (PRILOSEC) 40 MG DR capsule 40mg capsule by mouth twice daily @ 7/730am and 4pm 180 capsule 3     ORDER FOR DME Use your CPAP device as directed by your provider.       oxybutynin (DITROPAN) 5 MG tablet 5mg tab by mouth daily @ 4pm 90 tablet 1     pregabalin (LYRICA) 50 MG capsule 1 tab by mouth @ 7/730am, 1 tab @ 4pm and 1-2 tab @ 11pm 360 capsule 3      triamcinolone (KENALOG) 0.1 % external cream Apply sparingly to affected area three times daily for 14 days. 15 g 0     Social History     Tobacco Use     Smoking status: Former Smoker     Packs/day: 1.50     Years: 37.00     Pack years: 55.50     Types: Cigarettes     Start date: 6/15/1963     Last attempt to quit: 2000     Years since quittin.2     Smokeless tobacco: Never Used   Substance Use Topics     Alcohol use: No     Alcohol/week: 0.0 oz       ROS:  Review of systems otherwise negative except as stated above.    OBJECTIVE:  /86   Pulse 52   Temp 98.3  F (36.8  C) (Tympanic)   Resp 20   Wt 140.2 kg (309 lb)   SpO2 96%   BMI 40.77 kg/m    GENERAL APPEARANCE: healthy, alert and no distress  EYES: EOMI,  PERRL, conjunctiva clear  HENT: TM's normal bilaterally, nasal turbinates erythematous, swollen and oral mucous membranes moist, no erythema noted  NECK: supple, nontender, no lymphadenopathy  RESP: rales L lower posterior. Expiratory wheezing throughout  CV: regular rates and rhythm, normal S1 S2, no murmur noted  Extremities: Neg Homans sign  NEURO: Normal strength and tone, sensory exam grossly normal,  normal speech and mentation  SKIN: no suspicious lesions or rashes    ASSESSMENT / PLAN:  1. Acute bronchitis with coexisting condition requiring prophylactic treatment  Patient with 10d cough, now with wheezing for the last couple of days. Is currently immunosuppressed with Methotrexate.  He is given one duoneb treatment, which clears all expiratory wheezing, but rales still appreciated in RLL -- likely early pneumonia. Treat with azithromycin, as he has tolerated well in the past and albuterol to aid in bronchospasm. Follow-up with PCP in one week for recheck.   - azithromycin (ZITHROMAX) 250 MG tablet; Two tablets first day, then one tablet daily for four days.  Dispense: 6 tablet; Refill: 0  - albuterol (PROVENTIL) neb solution 2.5 mg; Take 3 mLs (2.5 mg) by nebulization once    I  have discussed the patient's diagnosis and my plan of treatment with the patient. We went over any labs or imaging. Patient is aware to come back in with worsening symptoms or if no relief despite treatment plan.  Patient verbalizes understanding. All questions were addressed and answered.   Melissa Gonzalez PA-C

## 2019-01-14 ENCOUNTER — OFFICE VISIT (OUTPATIENT)
Dept: ORTHOPEDICS | Facility: CLINIC | Age: 69
End: 2019-01-14
Payer: MEDICARE

## 2019-01-14 DIAGNOSIS — M25.572 LEFT ANKLE PAIN, UNSPECIFIED CHRONICITY: Primary | ICD-10-CM

## 2019-01-14 DIAGNOSIS — M06.09 RHEUMATOID ARTHRITIS OF MULTIPLE SITES WITH NEGATIVE RHEUMATOID FACTOR (H): ICD-10-CM

## 2019-01-14 DIAGNOSIS — M19.072 PRIMARY LOCALIZED OSTEOARTHROSIS OF LEFT ANKLE AND FOOT: ICD-10-CM

## 2019-01-14 DIAGNOSIS — M72.2 PLANTAR FASCIITIS: ICD-10-CM

## 2019-01-14 DIAGNOSIS — Z79.899 HIGH RISK MEDICATIONS (NOT ANTICOAGULANTS) LONG-TERM USE: ICD-10-CM

## 2019-01-14 LAB
ALBUMIN SERPL-MCNC: 3.7 G/DL (ref 3.4–5)
ALT SERPL W P-5'-P-CCNC: 24 U/L (ref 0–70)
AST SERPL W P-5'-P-CCNC: 18 U/L (ref 0–45)
BASOPHILS # BLD AUTO: 0.1 10E9/L (ref 0–0.2)
BASOPHILS NFR BLD AUTO: 1.2 %
CREAT SERPL-MCNC: 1.09 MG/DL (ref 0.66–1.25)
CRP SERPL-MCNC: 8.4 MG/L (ref 0–8)
DIFFERENTIAL METHOD BLD: ABNORMAL
EOSINOPHIL # BLD AUTO: 0.1 10E9/L (ref 0–0.7)
EOSINOPHIL NFR BLD AUTO: 1.9 %
ERYTHROCYTE [DISTWIDTH] IN BLOOD BY AUTOMATED COUNT: 13.2 % (ref 10–15)
GFR SERPL CREATININE-BSD FRML MDRD: 68 ML/MIN/{1.73_M2}
HCT VFR BLD AUTO: 42.8 % (ref 40–53)
HGB BLD-MCNC: 13.3 G/DL (ref 13.3–17.7)
IMM GRANULOCYTES # BLD: 0 10E9/L (ref 0–0.4)
IMM GRANULOCYTES NFR BLD: 0.5 %
LYMPHOCYTES # BLD AUTO: 0.8 10E9/L (ref 0.8–5.3)
LYMPHOCYTES NFR BLD AUTO: 19.6 %
MCH RBC QN AUTO: 29.6 PG (ref 26.5–33)
MCHC RBC AUTO-ENTMCNC: 31.1 G/DL (ref 31.5–36.5)
MCV RBC AUTO: 95 FL (ref 78–100)
MONOCYTES # BLD AUTO: 0.3 10E9/L (ref 0–1.3)
MONOCYTES NFR BLD AUTO: 6.1 %
NEUTROPHILS # BLD AUTO: 2.9 10E9/L (ref 1.6–8.3)
NEUTROPHILS NFR BLD AUTO: 70.7 %
NRBC # BLD AUTO: 0 10*3/UL
NRBC BLD AUTO-RTO: 0 /100
PLATELET # BLD AUTO: 208 10E9/L (ref 150–450)
RBC # BLD AUTO: 4.5 10E12/L (ref 4.4–5.9)
WBC # BLD AUTO: 4.1 10E9/L (ref 4–11)

## 2019-01-14 NOTE — PROGRESS NOTES
Past Medical History:   Diagnosis Date     Abnormal EMG 4/18/2013     Abnormal involuntary movements(781.0)     Movement Disorder     AK (actinic keratosis) 12/18/2011     Allergic rhinitis, cause unspecified     Allergic rhinitis     Balance problems 11/1/2011     Basal cell carcinoma      Bladder spasms 11/1/2011     Chronic osteoarthritis      Diaphragmatic hernia without mention of obstruction or gangrene      Earache or other ear, nose, or throat complaint      Esophageal reflux      Fatigue 11/1/2011     Fracture      H. pylori infection 5/12/2011     History of MRI of cervical spine 11/18/2013    EXAMINATION: CERVICAL SPINE G/E 5 VIEWS* 4/19/2013 4:18 PM  CLINICAL HISTORY: Pain in limb,Performing Location?->[facility] ([facility]),  COMPARISON:  FINDINGS: AP and lateral views in flexion and extension, as well as odontoid view of the cervical spine was obtained. There is no comparison available. The vertebral bodies of the cervical spine are normally aligned. There is posterior spurring and d     Incomplete defecation 11/1/2011     Interstitial lung disease (H) 11/29/2016     Laboratory test 8/7/2012     Lung disease June 2015     Malignant basal cell neoplasm of skin 8/6/2008     Melanoma (H) 8/6/2008     Melanoma in situ of lower leg (H)     R calf     Neuropathy 5/16/2011     Other bladder disorder      Other color vision deficiencies      Other nervous system complications      Parkinsonism (H) 11/1/2011     Personal history of colonic polyps      Polyneuropathy in other diseases classified elsewhere (H)      RA (rheumatoid arthritis) (H)      Rheumatoid arthritis of multiple sites with negative rheumatoid factor (H) 3/21/2016     Seronegative arthritis 11/18/2013     Shortness of breath      Shoulder arthritis 2016    acromioclavicular joint      Somatization disorder 5/12/2011     Squamous cell carcinoma      Tremor 11/1/2011     Unspecified essential hypertension      Unspecified hypothyroidism       Urinary tract infection      Urinary urgency 11/1/2011     Wears glasses 11/1/2011     Patient Active Problem List   Diagnosis     Diaphragmatic hernia     Esophageal reflux     Hx of melanoma of skin     Malignant basal cell neoplasm of skin     GALO (obstructive sleep apnea)     Chronic maxillary sinusitis     Urinary incontinence     H. pylori infection     Sicca syndrome (H)     Health Care Home     Advanced directives, counseling/discussion     Tremor     Visual changes     Incomplete defecation     Gait disorder     Balance problems     Fatigue     High risk medications (not anticoagulants) long-term use     Personal history of other malignant neoplasm of skin     AK (actinic keratosis)     Pain in joint, lower leg     Abnormal EMG     Seronegative arthritis     History of MRI of cervical spine     Pain in limb     Adenomatous polyp of colon     Peripheral polyneuropathy     Rheumatoid arthritis of multiple sites with negative rheumatoid factor (H)     Morbid obesity (H)     Interstitial lung disease (H)     History of MRI of lumbar spine     Mood disorder (H)     Paralysis agitans (H)     Past Surgical History:   Procedure Laterality Date     BIOPSY OF SKIN LESION       COLONOSCOPY       HEMORRHOID SURGERY       lip biopsy      for sicca complex     MOHS MICROGRAPHIC PROCEDURE       SOFT TISSUE SURGERY      removeal of basel cell carcinoma     Social History     Socioeconomic History     Marital status:      Spouse name: Not on file     Number of children: Not on file     Years of education: Not on file     Highest education level: Not on file   Social Needs     Financial resource strain: Not on file     Food insecurity - worry: Not on file     Food insecurity - inability: Not on file     Transportation needs - medical: Not on file     Transportation needs - non-medical: Not on file   Occupational History     Not on file   Tobacco Use     Smoking status: Former Smoker     Packs/day: 1.50     Years:  "37.00     Pack years: 55.50     Types: Cigarettes     Start date: 6/15/1963     Last attempt to quit: 2000     Years since quittin.3     Smokeless tobacco: Never Used   Substance and Sexual Activity     Alcohol use: No     Alcohol/week: 0.0 oz     Drug use: No     Sexual activity: No     Partners: Female     Birth control/protection: None   Other Topics Concern     Parent/sibling w/ CABG, MI or angioplasty before 65F 55M? No   Social History Narrative    2013: Living in [place] in a townhouse with no steps    Has 3 sons that are doing okay.         Dairy/d 1 servings/d.     Caffeine 0 servings/d    Exercise 0 x week    Sunscreen used - No    Seatbelts used - Yes    Working smoke/CO detectors in the home - Yes    Guns stored in the home - Yes    Self Breast Exams - NA    Self Testicular Exam - Yes    Eye Exam up to date - Yes     Dental Exam up to date - Yes     Pap Smear up to date - NA    Mammogram up to date - NA    PSA up to date - Yes 2008    Dexa Scan up to date - No    Flex Sig / Colonoscopy up to date - Yes less than 5 yrs ago    Immunizations up to date -today    Abuse: Current or Past(Physical, Sexual or Emotional)- No    Do you feel safe in your environment - Yes    2008                 Family History   Problem Relation Age of Onset     Hypertension Mother      Heart Disease Mother         a fib     Arthritis Mother         \"osteo\"     Osteoporosis Mother      Skin Cancer Mother      Uterine Cancer Mother      Hypertension Brother      Diabetes Brother         \" post pancreatitis\"     Neurologic Disorder Sister         multiple sclerosis     Hypertension Sister      Heart Disease Sister      Heart Disease Sister         a fib     Arthritis Sister      Hypertension Father      Cerebrovascular Disease Father         ,     Skin Cancer Father      Colon Polyps Father      Psoriasis Maternal Grandfather      Stomach Cancer Maternal Grandfather      Congenital Anomalies Other         " granddaughter with a chromosome defect     Cerebrovascular Disease Paternal Grandmother         ,     Cerebrovascular Disease Paternal Grandfather         ,     Melanoma No family hx of      Colon Cancer No family hx of      Lab Results   Component Value Date    WBC 4.1 01/14/2019     Lab Results   Component Value Date    RBC 4.50 01/14/2019     Lab Results   Component Value Date    HGB 13.3 01/14/2019     Lab Results   Component Value Date    HCT 42.8 01/14/2019     No components found for: MCT  Lab Results   Component Value Date    MCV 95 01/14/2019     Lab Results   Component Value Date    MCH 29.6 01/14/2019     Lab Results   Component Value Date    MCHC 31.1 01/14/2019     Lab Results   Component Value Date    RDW 13.2 01/14/2019     Lab Results   Component Value Date     01/14/2019     Lab Results   Component Value Date    ALBUMIN 3.9 11/20/2018     SUBJECTIVE FINDINGS:  A 68-year-old male returns to clinic for left ankle pain and foot pain.  Relates no injuries.  He relates it is getting worse.  He has an AFO and a Js brace.  He does not like wearing the Js brace.  It feels good but it squeaks in the shoe and he cannot figure out what is squeaking.  It is worn, and he has had it for several years.  He has seen Rheumatology, and they sent him over here for further evaluation and management.  Relates it hurts in the anterior ankle, through the whole ankle and then the lateral ankle.      OBJECTIVE FINDINGS:  DP and PT are 2/4, left.  He has pain on palpation of the anterior and lateral ankle.  There is a negative anterior drawer sign.  He has pain on palpation of the lateral ankle ligaments.  Negative anterior drawer sign.  He has varus foot and ankle.  He relates he has ankle instability and his ankle wants to roll.  He intermittently wears either the Js brace or the AFO he has.  His AFO is wearing.      ASSESSMENT AND PLAN:  Ankle pain, left.  He is getting plantar fasciitis.  He has pain  on palpation of the plantar fascia and the heel as well today on physical exam.  He has got osteoarthritic changes on MRI.  Those were reviewed with him in clinic today.  He is getting ankle instability.  Diagnosis and treatment options were discussed with him.  I gave him a prescription for a new Js brace and use discussed with him.  He will return to clinic and see me when he gets back from [place] in March.  I also gave him an order for physical therapy.  He was not sure if he had started that yet or not.

## 2019-01-14 NOTE — LETTER
1/14/2019        RE: Lucio Daly  [address]  [place] [address]     Dear Colleague,    Thank you for referring your patient, Lucio Daly, to the [facility]. Please see a copy of my visit note below.    Past Medical History:   Diagnosis Date     Abnormal EMG 4/18/2013     Abnormal involuntary movements(781.0)     Movement Disorder     AK (actinic keratosis) 12/18/2011     Allergic rhinitis, cause unspecified     Allergic rhinitis     Balance problems 11/1/2011     Basal cell carcinoma      Bladder spasms 11/1/2011     Chronic osteoarthritis      Diaphragmatic hernia without mention of obstruction or gangrene      Earache or other ear, nose, or throat complaint      Esophageal reflux      Fatigue 11/1/2011     Fracture      H. pylori infection 5/12/2011     History of MRI of cervical spine 11/18/2013    EXAMINATION: CERVICAL SPINE G/E 5 VIEWS* 4/19/2013 4:18 PM  CLINICAL HISTORY: Pain in limb,Performing Location?->[facility] ([facility]),  COMPARISON:  FINDINGS: AP and lateral views in flexion and extension, as well as odontoid view of the cervical spine was obtained. There is no comparison available. The vertebral bodies of the cervical spine are normally aligned. There is posterior spurring and d     Incomplete defecation 11/1/2011     Interstitial lung disease (H) 11/29/2016     Laboratory test 8/7/2012     Lung disease June 2015     Malignant basal cell neoplasm of skin 8/6/2008     Melanoma (H) 8/6/2008     Melanoma in situ of lower leg (H)     R calf     Neuropathy 5/16/2011     Other bladder disorder      Other color vision deficiencies      Other nervous system complications      Parkinsonism (H) 11/1/2011     Personal history of colonic polyps      Polyneuropathy in other diseases classified elsewhere (H)      RA (rheumatoid arthritis) (H)      Rheumatoid arthritis of multiple sites with negative rheumatoid factor (H) 3/21/2016      Seronegative arthritis 11/18/2013     Shortness of breath      Shoulder arthritis 2016    acromioclavicular joint      Somatization disorder 5/12/2011     Squamous cell carcinoma      Tremor 11/1/2011     Unspecified essential hypertension      Unspecified hypothyroidism      Urinary tract infection      Urinary urgency 11/1/2011     Wears glasses 11/1/2011     Patient Active Problem List   Diagnosis     Diaphragmatic hernia     Esophageal reflux     Hx of melanoma of skin     Malignant basal cell neoplasm of skin     GALO (obstructive sleep apnea)     Chronic maxillary sinusitis     Urinary incontinence     H. pylori infection     Sicca syndrome (H)     Health Care Home     Advanced directives, counseling/discussion     Tremor     Visual changes     Incomplete defecation     Gait disorder     Balance problems     Fatigue     High risk medications (not anticoagulants) long-term use     Personal history of other malignant neoplasm of skin     AK (actinic keratosis)     Pain in joint, lower leg     Abnormal EMG     Seronegative arthritis     History of MRI of cervical spine     Pain in limb     Adenomatous polyp of colon     Peripheral polyneuropathy     Rheumatoid arthritis of multiple sites with negative rheumatoid factor (H)     Morbid obesity (H)     Interstitial lung disease (H)     History of MRI of lumbar spine     Mood disorder (H)     Paralysis agitans (H)     Past Surgical History:   Procedure Laterality Date     BIOPSY OF SKIN LESION       COLONOSCOPY       HEMORRHOID SURGERY       lip biopsy      for sicca complex     MOHS MICROGRAPHIC PROCEDURE       SOFT TISSUE SURGERY      removeal of basel cell carcinoma     Social History     Socioeconomic History     Marital status:      Spouse name: Not on file     Number of children: Not on file     Years of education: Not on file     Highest education level: Not on file   Social Needs     Financial resource strain: Not on file     Food insecurity - worry:  "Not on file     Food insecurity - inability: Not on file     Transportation needs - medical: Not on file     Transportation needs - non-medical: Not on file   Occupational History     Not on file   Tobacco Use     Smoking status: Former Smoker     Packs/day: 1.50     Years: 37.00     Pack years: 55.50     Types: Cigarettes     Start date: 6/15/1963     Last attempt to quit: 2000     Years since quittin.3     Smokeless tobacco: Never Used   Substance and Sexual Activity     Alcohol use: No     Alcohol/week: 0.0 oz     Drug use: No     Sexual activity: No     Partners: Female     Birth control/protection: None   Other Topics Concern     Parent/sibling w/ CABG, MI or angioplasty before 65F 55M? No   Social History Narrative    2013: Living in [place] in a townhouse with no steps    Has 3 sons that are doing okay.         Dairy/d 1 servings/d.     Caffeine 0 servings/d    Exercise 0 x week    Sunscreen used - No    Seatbelts used - Yes    Working smoke/CO detectors in the home - Yes    Guns stored in the home - Yes    Self Breast Exams - NA    Self Testicular Exam - Yes    Eye Exam up to date - Yes     Dental Exam up to date - Yes 2006    Pap Smear up to date - NA    Mammogram up to date - NA    PSA up to date - Yes 2008    Dexa Scan up to date - No    Flex Sig / Colonoscopy up to date - Yes less than 5 yrs ago    Immunizations up to date -today    Abuse: Current or Past(Physical, Sexual or Emotional)- No    Do you feel safe in your environment - Yes    2008                 Family History   Problem Relation Age of Onset     Hypertension Mother      Heart Disease Mother         a fib     Arthritis Mother         \"osteo\"     Osteoporosis Mother      Skin Cancer Mother      Uterine Cancer Mother      Hypertension Brother      Diabetes Brother         \" post pancreatitis\"     Neurologic Disorder Sister         multiple sclerosis     Hypertension Sister      Heart Disease Sister      Heart Disease Sister         " a fib     Arthritis Sister      Hypertension Father      Cerebrovascular Disease Father         ,     Skin Cancer Father      Colon Polyps Father      Psoriasis Maternal Grandfather      Stomach Cancer Maternal Grandfather      Congenital Anomalies Other         granddaughter with a chromosome defect     Cerebrovascular Disease Paternal Grandmother         ,     Cerebrovascular Disease Paternal Grandfather         ,     Melanoma No family hx of      Colon Cancer No family hx of      Lab Results   Component Value Date    WBC 4.1 01/14/2019     Lab Results   Component Value Date    RBC 4.50 01/14/2019     Lab Results   Component Value Date    HGB 13.3 01/14/2019     Lab Results   Component Value Date    HCT 42.8 01/14/2019     No components found for: MCT  Lab Results   Component Value Date    MCV 95 01/14/2019     Lab Results   Component Value Date    MCH 29.6 01/14/2019     Lab Results   Component Value Date    MCHC 31.1 01/14/2019     Lab Results   Component Value Date    RDW 13.2 01/14/2019     Lab Results   Component Value Date     01/14/2019     Lab Results   Component Value Date    ALBUMIN 3.9 11/20/2018     SUBJECTIVE FINDINGS:  A 68-year-old male returns to clinic for left ankle pain and foot pain.  Relates no injuries.  He relates it is getting worse.  He has an AFO and a Js brace.  He does not like wearing the Js brace.  It feels good but it squeaks in the shoe and he cannot figure out what is squeaking.  It is worn, and he has had it for several years.  He has seen Rheumatology, and they sent him over here for further evaluation and management.  Relates it hurts in the anterior ankle, through the whole ankle and then the lateral ankle.      OBJECTIVE FINDINGS:  DP and PT are 2/4, left.  He has pain on palpation of the anterior and lateral ankle.  There is a negative anterior drawer sign.  He has pain on palpation of the lateral ankle ligaments.  Negative anterior drawer sign.  He has varus  foot and ankle.  He relates he has ankle instability and his ankle wants to roll.  He intermittently wears either the Js brace or the AFO he has.  His AFO is wearing.      ASSESSMENT AND PLAN:  Ankle pain, left.  He is getting plantar fasciitis.  He has pain on palpation of the plantar fascia and the heel as well today on physical exam.  He has got osteoarthritic changes on MRI.  Those were reviewed with him in clinic today.  He is getting ankle instability.  Diagnosis and treatment options were discussed with him.  I gave him a prescription for a new Js brace and use discussed with him.  He will return to clinic and see me when he gets back from [place] in March.  I also gave him an order for physical therapy.  He was not sure if he had started that yet or not.         Again, thank you for allowing me to participate in the care of your patient.      Sincerely,    Rodney Ríos DPM

## 2019-01-14 NOTE — NURSING NOTE
Reason For Visit:   Chief Complaint   Patient presents with     RECHECK     Left ankle and foot pain. Patient stated that he had an MRI completed that DR. Goodrich put orders in for. Patient stated that he was told to come back to DR. Ríos if he was still having issues.        Pain Assessment  Patient Currently in Pain: Yes  Primary Pain Location: Ankle(Left ankle)  Other Pain Locations: Left foot.   Alleviating Factors: (3,000 mg of tylenol and patient stated that he takes lyrica)  Aggravating Factors: Standing, Walking              Current Outpatient Medications   Medication Sig Dispense Refill     acetaminophen (TYLENOL) 500 MG tablet 2 x 500mg by mouth 3 times per day: 7/730am, 4pm and 11pm  = 6/day       azithromycin (ZITHROMAX) 250 MG tablet Two tablets first day, then one tablet daily for four days. 6 tablet 0     Cholecalciferol (VITAMIN D3) 1000 units CAPS 1000 unit capsule by mouth daily @ 7am 30 capsule      folic acid (FOLVITE) 1 MG tablet 1mg tab by mouth twice daily @ 9am and 4pm 180 tablet 3     hydroxychloroquine (PLAQUENIL) 200 MG tablet 200mg tab by mouth twice daily @ 9am and 4pm 180 tablet 3     methotrexate 2.5 MG tablet TAKE 8 TABLETS BY MOUTH ONCE WEEKLY. LABS DUE EVERY 8 - 12 WEEKS. 96 tablet 4     metoprolol succinate ER (TOPROL-XL) 50 MG 24 hr tablet Take 1.5 tablets (75 mg) by mouth daily 135 tablet 11     omeprazole (PRILOSEC) 40 MG DR capsule 40mg capsule by mouth twice daily @ 7/730am and 4pm 180 capsule 3     ORDER FOR DME Use your CPAP device as directed by your provider.       oxybutynin (DITROPAN) 5 MG tablet 5mg tab by mouth daily @ 4pm 90 tablet 1     pregabalin (LYRICA) 50 MG capsule 1 tab by mouth @ 7/730am, 1 tab @ 4pm and 1-2 tab @ 11pm 360 capsule 3     triamcinolone (KENALOG) 0.1 % external cream Apply sparingly to affected area three times daily for 14 days. 15 g 0          Allergies   Allergen Reactions     Restasis      Burning eyes, problems with breathing, tightness  in chest     Adhesive Tape      Bandages misc     Allegra      EXCESSIVE URINATION AND WEAKNESS, LIGHT-HEADED     Allergy      Dust     Animal Dander      Benadryl Allergy      EXCESSIVE URINATION AND WEAKNESS, LIGHT-HEADED     Cephalexin      Joint pain or gerd aggravation. Bloating excessive urination      Cephalosporins      Diphenhydramine Other (See Comments)     Doxycycline Hyclate Nausea     SWEATING,MIGRAINES,LOSS OF APPETITE,SWEATING,LIGHT HEADED, EXCESSIVE URINATION     Fexofenadine Other (See Comments)     Flonase [Fluticasone Propionate]      Gabapentin      Neurontin: mosd changes and excess urination     Iodine Solution [Povidone Iodine]      SKIN MELTS     Levaquin      From surgeon--? Joint pain ? Gerd aggravation. Insomnia, excess urination     Mylanta      EXCESSIVE URINATION AND WEAKNESS, LIGHT-HEADED     Prednisone      Weakness, elevated bp, headache, eye pain, congestion      Seafood [Seafood]      Shellfish Allergy      Hives       Sulfamethoxazole-Trimethoprim      Chest pain, angina     Trees      Trileptal      SEVERE JOINT AND TENDON PAIN, INSOMNIA, RESTLESSNESS, NAUSEA, EXCESS URINATION     Cortizone Rash     EXCESS URINATION,WEAKNESS,NAUSEA, HEADACHE

## 2019-01-14 NOTE — RESULT ENCOUNTER NOTE
Blood counts, liver and kidney tests are normal or stable. Elevated CRP I noted just dx with bronchitis. He should hold methotrexate if on antibiotics  And until he is healthy

## 2019-01-18 NOTE — PROGRESS NOTES
Rheumatology Visit     Lucio Daly MRN# [id number]   YOB: 1950 Age: 67 year old     Date of Visit: December 20, 2017  Primary care provider: Jah Corley         Assessment and Plan:   1. Seronegative RA: symptoms of inflammatory arthritis are persistent; however, exam neg no active synovitis or tendonitis. Labs in 11-17 include normal CBC except for mild leukopenia, stable NL renal and hepatic function and CRP WNL. RA activity=low. Plan continue methotrexate. If in the future need to switch the MTX,  we could substitute arava 20 mg daily for methotrexate if PFT decline and/or suggest increasing fibrosis. Plan  # Increase methotrexate to 20 mg weekly. While on drug  --High risk medication monitoring--labs q 3 mo AST/ALT, Albumin, CBC with plts, CRP   --Limit EtOH intake to 2 drinks weekly; use folate 2 mg daily  --Tylenol 325mg qid prn nausea/HA associated with dosing.    #  mg daily--annual eye exam done in February 2017, planned in early 2018 in [facility].  2. 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 Continue regular ophthalmologic care and dental care, and continue using topical therapies (ie. Artificial tears and lozenges).   3. 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 PFT in 5-17 stable. Doubt association with arthritis. Plan f/u Dr. Mccarthy.   4. L>R Knee pain:  OA and L anserine bursitis- in future may need TKR. Continue isometric quad strengthening exercises twice daily to improve support around the joint.  5. Plantar fasciitis/heel pain with hx R foot drop/instability: stable s/p PTx  6. 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 Camargo. Plan continue splinting/hand therapy and acetaminophen 1000 mg tid ATC.  7. Lateral epicondylitis: suggested L-sided compression band for daily use for elbow pain. Stable   8.  "L subacromial bursitis: continue home exercise program    RTC 6 mos with AFTAB Goodrich MD  Rheumatologist, [facility]           History of Present Illness:   Lucio Daly \"Rich\" presents for f/u seronegative rheumatoid arthritis, sicca complex. Last seen by AFTAB Snow in 6-17, when MTX was continued at 17.5 mg per week and plaquenil continued at 400 mg daily.  Interval history:  He has ongoing pain in his R > L wrists, hands, and elbows. He was referred to PTx for L elbow pain several months ago; he has had only modest relief with a course of Ptx for 4-6 weeks. Now, L elbow is worse in the morning, worsening with use. Tylenol provides minimal relief. Using a cane exacerbates wrist/hand pain. He had some functional improvement with Ptx for his feet and ankles. EAS is < 30 minutes. The left knee is unstable with locking/buckling, but doesn't swell much- the R knee is also unstable. The left leg hurts and keeps him awake at night.  Reports ongoing R thumb/wrist pain, worse with movement, progressive despite use of tylenol and use of a thumb splint.  He reduced methotrexate to 7 tabs weekly; he uses HCQ but it continues to upset his stomach.    June 26, 2017  Dr. Goodrich saw him in Dec 2016   Back pain, seen PCP. MRI of his back to f/u spinal stenosis and herniated disc will be having this tomorrow then maybe seeing neurosurgeon (needed updated MRI) if this is indicated, did PT but stopped as was making this worse. Previous ortho was going to refer him to neurosurgeon but he didn't want surgery at this time. Last months, more increased numbness in feet and sciatica right thigh/calf \"uncomfortable\", more pain in mid and upper back then some in low back if stands too long, then gets this in rib cage. Had problems with \"numbness\" for some time, pain in arms. EMG testing in past peripheral polyneuropathy. Stumbling more, but had trouble with this in the past, and previous foot droop. More leg pain for \"quite " "a few months\".  Lay in bed or sit with feet up more in leg pain, back. Nueropathy is the bottom of his feet.   Tennis elbows, thumbs, hands pains are the same stable this comes and goes.   Continues the methotrexate 17.5 mg PO Q 7 days Tue or WED and the plaqeunil (he felt the stomach issues were due to other medications) this is tolerating well Didn't stop the plaquenil \"didnt want to rock the boat\" . Reports is RA is controlled, no flaring and is controlled with these medications so wishes to continue this plan.   Pulmonary Dr. Mccarthy had referred him to gastro for GERD in Oct. Seen him 5-2017 --ILD \"follicular bronchiolitis\" and pulmonary nodule stable (more SOB sit to stand then walking). His prilosec was increased. Checking neuromuscular \"thing\" when he sees him in Nov. Methotrexate was at 9 tab 22.5 mg a week tapered to 17.5 mg once a week --his RA remains controlled.   On ABX completed this Saturday for bronchitis, early pnuemonia and then infection in both his eyes. Started with laryngitis. ABX helped. No fever, chills, discolored sputum. Fatigue.    December 2017  H notes pain on the R side of his head. Pain is chronic, seems to be improved with Mtx administration.  \"Spine pain\" continues--he has been told that there is \"narrowing of the lumbar spine\" with stenosis, surgery has been offered, but he has declined.  Polyneuropathy pain in feet/calves persists; he continues lyrica with modest relief. He deals with frequent flares of pain by increasing walking. Walking is difficult due to \"weakness\" in the feet.  He was off mtx for a month in 10-17 due to bronchitis X 3. He had more discomfort in his fingers and swelling in his toes while off the drug. He restarted  17.5 mg per week in November 2017, and these symptoms improved. He thinks that he was better while taking 9 tabs weekly. He has continued plaquenil 1 tab twice daily.  There is calf and shin pain bilaterally, worse with weight-bearing.    PMH   1. " Sensory neuropathy of unclear etiology - negative work up, managed on Lyrica.  Has chronic dysesthesia in pads of feet.  2. Parkinsonisim/Tremor disorder; stable on selegiline; some somatic sensations present  3. Headaches   4. History of basal cell, squamous (most recent +bx 8-2014)  5. History of melanoma   6. GALO on CPAP.  7. HTN.   8. Seronegative RA, diagnosed 2009. SICCA  9. JUARES. Work-up 4-2015. 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 Lumbar stenosis per MRI   11. Bronchitis 5-2017, early pneumonia   Past Medical History:   Diagnosis Date     Abnormal EMG 4/18/2013     Abnormal involuntary movements(781.0)     Movement Disorder     AK (actinic keratosis) 12/18/2011     Allergic rhinitis, cause unspecified     Allergic rhinitis     Balance problems 11/1/2011     Basal cell carcinoma      Bladder spasms 11/1/2011     Chronic osteoarthritis      Diaphragmatic hernia without mention of obstruction or gangrene      Earache or other ear, nose, or throat complaint      Esophageal reflux      Fatigue 11/1/2011     Fracture      H. pylori infection 5/12/2011     History of MRI of cervical spine 11/18/2013    EXAMINATION: CERVICAL SPINE G/E 5 VIEWS* 4/19/2013 4:18 PM  CLINICAL HISTORY: Pain in limb,Performing Location?->[facility]),  COMPARISON:  FINDINGS: AP and lateral views in flexion and extension, as well as odontoid view of the cervical spine was obtained. There is no comparison available. The vertebral bodies of the cervical spine are normally aligned. There is posterior spurring and d     Incomplete defecation 11/1/2011     Interstitial lung disease (H) 11/29/2016     Laboratory test 8/7/2012     Lung disease June 2015     Malignant basal cell neoplasm of skin 8/6/2008     Melanoma (H) 8/6/2008     Melanoma in situ of lower leg (H)     R calf     Neuropathy 5/16/2011     Other bladder disorder      Other color vision deficiencies      Other nervous system complications       Statement Selected Statement Selected Statement Selected Statement Selected "Parkinsonism (H) 11/1/2011     Personal history of colonic polyps      Polyneuropathy in other diseases classified elsewhere (H)      RA (rheumatoid arthritis) (H)      Rheumatoid arthritis of multiple sites with negative rheumatoid factor (H) 3/21/2016     Seronegative arthritis 11/18/2013     Shortness of breath      Shoulder arthritis 2016    acromioclavicular joint      Somatization disorder 5/12/2011     Squamous cell carcinoma      Tremor 11/1/2011     Unspecified essential hypertension      Unspecified hypothyroidism      Urinary tract infection      Urinary urgency 11/1/2011     Wears glasses 11/1/2011     Family Hx   MGM Psoriasis  Family History   Problem Relation Age of Onset     Hypertension Mother      HEART DISEASE Mother      a fib     Arthritis Mother      \"osteo\"     OSTEOPOROSIS Mother      Skin Cancer Mother      Uterine Cancer Mother      Hypertension Brother      DIABETES Brother      \" post pancreatitis\"     Neurologic Disorder Sister      multiple sclerosis     Hypertension Sister      HEART DISEASE Sister      HEART DISEASE Sister      a fib     Arthritis Sister      Hypertension Father      CEREBROVASCULAR DISEASE Father      ,     Skin Cancer Father      Colon Polyps Father      Psoriasis Maternal Grandfather      Stomach Cancer Maternal Grandfather      Congenital Anomalies Other      granddaughter with a chromosome defect     CEREBROVASCULAR DISEASE Paternal Grandmother      ,     CEREBROVASCULAR DISEASE Paternal Grandfather      ,     Melanoma No family hx of      Colon Cancer No family hx of      Personal Hx   Home environment: No secondhand tobacco smoke in home.    History     Social History     Marital Status:      Spouse Name: N/A     Number of Children: N/A     Years of Education: N/A     Social History Main Topics     Smoking status: Former Smoker     Quit date: 09/30/2003     Smokeless tobacco: Never Used     Alcohol Use: No     Drug Use: No     Sexually Active: Not " Currently -- Female partner(s)     Other Topics Concern     None     Social History Narrative    2013: [place] with no stepsHas 3 sons that are doing okay.             Review of Systems:     14 points all negative except what is mentioned in HPI.  See HPI               Past Surgical History:   Past Surgical History:   Procedure Laterality Date     BIOPSY OF SKIN LESION       COLONOSCOPY       HEMORRHOID SURGERY       lip biopsy      for sicca complex     MOHS MICROGRAPHIC PROCEDURE       SOFT TISSUE SURGERY      removeal of basel cell carcinoma              Allergies:   Allergies   Allergen Reactions     Restasis      Burning eyes, problems with breathing, tightness in chest     Adhesive Tape      Bandages misc     Allegra      EXCESSIVE URINATION AND WEAKNESS, LIGHT-HEADED     Allergy      Dust     Animal Dander      Benadryl Allergy      EXCESSIVE URINATION AND WEAKNESS, LIGHT-HEADED     Cephalexin      Joint pain or gerd aggravation. Bloating excessive urination      Cephalosporins      Diphenhydramine Other (See Comments)     Doxycycline Hyclate Nausea     SWEATING,MIGRAINES,LOSS OF APPETITE,SWEATING,LIGHT HEADED, EXCESSIVE URINATION     Fexofenadine Other (See Comments)     Flonase [Fluticasone Propionate]      Gabapentin      Neurontin: mosd changes and excess urination     Iodine Solution [Povidone Iodine]      SKIN MELTS     Levaquin      From surgeon--? Joint pain ? Gerd aggravation. Insomnia, excess urination     Mylanta      EXCESSIVE URINATION AND WEAKNESS, LIGHT-HEADED     Prednisone      Weakness, elevated bp, headache, eye pain, congestion      Seafood [Seafood]      Shellfish Allergy      Hives       Sulfamethoxazole-Trimethoprim      Chest pain, angina     Trees      Trileptal      SEVERE JOINT AND TENDON PAIN, INSOMNIA, RESTLESSNESS, NAUSEA, EXCESS URINATION     Cortizone Rash     EXCESS URINATION,WEAKNESS,NAUSEA, HEADACHE            Medications:   Current Outpatient  "Prescriptions   Medication Sig Dispense Refill     hydroxychloroquine (PLAQUENIL) 200 MG tablet 2 tabs daily. 180 tablet 3     methotrexate 2.5 MG tablet CHEMO Take 8 tablets (20 mg) by mouth once a week Labs due every 8-12 weeks 96 tablet 1     omeprazole (PRILOSEC) 40 MG capsule Take 1 capsule (40 mg) by mouth 2 times daily (before meals) 180 capsule 3     hydroxychloroquine (PLAQUENIL) 200 MG tablet Take 1 tablet (200 mg) by mouth 2 times daily Send copy of recent eye exam as need for refills. Please have a copy of your eye exam faxed to [phone]. 180 tablet 0     metoprolol (TOPROL-XL) 50 MG 24 hr tablet Take 1 tablet (50 mg) by mouth daily 90 tablet 3     folic acid (FOLVITE) 1 MG tablet Take 2 tablets (2 mg) by mouth daily 180 tablet 4     ORDER FOR DME Use your CPAP device as directed by your provider.       acetaminophen (TYLENOL) 500 MG tablet Take 1 tablet by mouth as needed. For pain       Cholecalciferol (VITAMIN D) 1000 UNITS capsule Take 2 capsules by mouth daily.       oxybutynin (DITROPAN) 5 MG tablet Take 1 tablet (5 mg) by mouth daily 90 tablet 1     pregabalin (LYRICA) 50 MG capsule One in the am, one in the afternoon and 2 at night. 360 capsule 0              Physical Exam:   Blood pressure (!) 155/91, pulse 52, temperature 97.7  F (36.5  C), temperature source Oral, height 1.88 m (6' 2\"), weight (!) 137.6 kg (303 lb 6.4 oz), SpO2 95 %.  Wt Readings from Last 4 Encounters:   12/20/17 (!) 137.6 kg (303 lb 6.4 oz)   12/08/17 (!) 137.8 kg (303 lb 12.8 oz)   11/16/17 (!) 137.9 kg (304 lb)   10/28/17 (!) 137.9 kg (304 lb)       Constitutional: obese, appearing stated age; cooperative  Eyes: nl EOM, PERRLA, vision, conjunctiva, sclera  ENT: nl external ears, nose, hearing, lips, teeth, gums, throat  No mucous membrane lesions, normal saliva pool  Neck: no mass or thyroid enlargement  Resp: lungs clear to auscultation, nl to palpation  CV: RRR, no murmurs, rubs or gallops, no edema  GI: no ABD mass " or tenderness, no HSM  : not tested  Lymph: no cervical, supraclavicular, inguinal or epitrochlear nodes  MS:  All TMJ, neck, shoulder, elbow, wrist, MCP/PIP/DIP, spine, hip, knee, ankle, and foot MTP/IP joints were examined. Tender 1rst CMC on R. Knees without effusion; + crepitus on L; +tender L lateral epicondyle. No effusions. Mild Kyphosis. No S/T across the ankle joints. -MTP/MCP squeeze.  Skin: no nail pitting, alopecia, rash, nodules or lesions.  Neuro: No tremor today. Walks with cane  Psych: nl judgement, orientation, memory, affect.         Data:     Results for orders placed or performed in visit on 11/16/17   General PFT Lab (Please always keep checked)   Result Value Ref Range    FVC-Pred 4.89 L    FVC-Pre 3.61 L    FVC-%Pred-Pre 73 %    FEV1-Pre 2.67 L    FEV1-%Pred-Pre 72 %    FEV1FVC-Pred 75 %    FEV1FVC-Pre 74 %    FEFMax-Pred 9.36 L/sec    FEFMax-Pre 8.70 L/sec    FEFMax-%Pred-Pre 92 %    FEF2575-Pred 2.73 L/sec    FEF2575-Pre 1.89 L/sec    LLE2087-%Pred-Pre 69 %    ExpTime-Pre 8.57 sec    FIFMax-Pre 7.46 L/sec    MEP-Pre 65 cmH2O    MIP-Pre -25 cmH2O    VC-Pred 5.48 L    VC-Pre 3.60 L    VC-%Pred-Pre 65 %    IC-Pred 4.68 L    IC-Pre 3.42 L    IC-%Pred-Pre 72 %    ERV-Pred 0.80 L    ERV-Pre 0.18 L    ERV-%Pred-Pre 22 %    FEV1FEV6-Pred 78 %    FEV1FEV6-Pre 76 %    FRCPleth-Pred 3.93 L    FRCPleth-Pre 3.46 L    FRCPleth-%Pred-Pre 87 %    RVPleth-Pred 2.76 L    RVPleth-Pre 3.27 L    RVPleth-%Pred-Pre 118 %    TLCPleth-Pred 7.94 L    TLCPleth-Pre 6.87 L    TLCPleth-%Pred-Pre 86 %    DLCOunc-Pred 27.98 ml/min/mmHg    DLCOunc-Pre 27.42 ml/min/mmHg    DLCOunc-%Pred-Pre 98 %    DLCOcor-Pre 28.34 ml/min/mmHg    DLCOcor-%Pred-Pre 101 %    VA-Pre 4.54 L    VA-%Pred-Pre 59 %    FEV1SVC-Pred 67 %    FEV1SVC-Pre 74 %    Narrative    The FEV1 and FVC are reduced but the FEV1/FVC ratio is normal.  TLC is within normal limits.    The diffusing capacity is normal.    IMPRESSION:  Non specific spirometry  pattern.  Normal lung volumes.  Normal diffusion.  Six minute walk test:  Distance walked is reduced.  On room air, there is no significant desaturation or hypoxia   ____________________________________________Tiffani Thomson    6 minute walk test   Result Value Ref Range    6 min walk (FT) 860 ft    6 Min Walk (M) 262 m     *Note: Due to a large number of results and/or encounters for the requested time period, some results have not been displayed. A complete set of results can be found in Results Review.     Component      Latest Ref Rng & Units 5/15/2017   WBC      4.0 - 11.0 10e9/L 4.8   RBC Count      4.4 - 5.9 10e12/L 4.49   Hemoglobin      13.3 - 17.7 g/dL 13.6   Hematocrit      40.0 - 53.0 % 43.3   MCV      78 - 100 fl 96   MCH      26.5 - 33.0 pg 30.3   MCHC      31.5 - 36.5 g/dL 31.4 (L)   RDW      10.0 - 15.0 % 14.7   Platelet Count      150 - 450 10e9/L 183   Creatinine      0.66 - 1.25 mg/dL 1.06   GFR Estimate      >60 mL/min/1.7m2 69   GFR Estimate If Black      >60 mL/min/1.7m2 84   Albumin      3.4 - 5.0 g/dL 3.7   ALT      0 - 70 U/L 24   AST      0 - 45 U/L 18   CRP Inflammation      0.0 - 8.0 mg/L 4.0     RHEUM RESULTS Latest Ref Rng & Units 7/27/2017 11/3/2017 11/10/2017   DNA-DS 0 - 29 IU/mL - - -   SED RATE 0 - 20 mm/h - - -   CRP, INFLAMMATION 0.0 - 8.0 mg/L 6.2 5.6 -   CYCLIC CIT PEPT IGG <5 U/mL - - -   RHEUMATOID FACTOR 0 - 14 IU/mL - - -   MONICA SCREEN BY EIA 0 - 1.0 - - -   AST 0 - 45 U/L 18 16 16   ALT 0 - 70 U/L 23 24 25   ALBUMIN 3.4 - 5.0 g/dL 3.8 3.4 3.6   WBC 4.0 - 11.0 10e9/L 3.9(L) 3.2(L) 3.4(L)   RBC 4.4 - 5.9 10e12/L 4.51 4.33(L) 4.41   HGB 13.3 - 17.7 g/dL 13.7 13.2(L) 13.5   HCT 40.0 - 53.0 % 43.9 41.5 42.7   MCV 78 - 100 fl 97 96 97   MCHC 31.5 - 36.5 g/dL 31.2(L) 31.8 31.6   RDW 10.0 - 15.0 % 14.1 14.1 14.3    - 450 10e9/L 166 141(L) 173   CREATININE 0.66 - 1.25 mg/dL 1.10 1.08 -   GFR ESTIMATE, IF BLACK >60 mL/min/1.7m2 80 82 -   GFR ESTIMATE >60 mL/min/1.7m2 66 68 -     - 1620 mg/dL - - -   IGA 70 - 380 mg/dL - - -   IGM 60 - 265 mg/dL - - -   HEPATITIS C ANTIBODY NEG - - -      Statement Selected Statement Selected Statement Selected

## 2019-01-22 NOTE — PROGRESS NOTES
"  SUBJECTIVE:   Lucio Daly is a 67 year old male who presents to clinic today for the following health issues:    Calf Pain      Duration: couple months but getting worse    Description (location/character/radiation): left calf pain- patient feels like the vein is tender.     Accompanying signs and symptoms: feels like charley horse almost. Did have some swelling the end of last month. Patient states his pain wakes him up. Also is having back pain- wondering if it is all related    History (similar episodes/previous evaluation): Patient had varicose vein surgery on that leg    Therapies tried and outcome: heating pad, tylenol        He has had sciatica down the legs in the past with pain with pain in the calf but this pain seem different. Sometimes feesl crampy. The pain is better when he is walking unless goes longer distances and often is bad when at rest or lying down    med hx, family hx, and soc hx reviewed and updated the problem list notes PAD but I cannot find the documentation around this.    No cv, resp symptoms along with the leg pain. He has neuropathy which has been slowly progressive over the years. The patient has a history of autoimmune disease. No hx of dvt/clotting disorder.     OBJECTIVE: BP (!) 153/92  Pulse 55  Temp 96.3  F (35.7  C) (Tympanic)  Resp 16  Ht 6' 2\" (1.88 m)  Wt (!) 306 lb 9.6 oz (139.1 kg)  SpO2 97%  BMI 39.37 kg/m2 Exam:  GENERAL APPEARANCE: healthy, alert and no distress  RESP: lungs clear to auscultation - no rales, rhonchi or wheezes  CV: regular rates and rhythm, normal S1 S2, no S3 or S4 and no murmur, click or rub -  ABDOMEN:  soft, nontender, no HSM or masses and bowel sounds normal  MS: some increase in size on the left compared to the right. About 1cm difference.   SKIN: varicose veins on the right > left some mild venous stasis changes.        ICD-10-CM    1. Pain of left calf M79.662 US Lower Extremity Venous Duplex Left   will do ultrasound today to rule " out DVT. DDX includes PAD and scaitica as causes of the pain along with neuropathy.    no nausea/no chills/no pain/no fever/no tingling/no decreased eating/drinking/no vomiting

## 2019-02-13 DIAGNOSIS — M06.019 RHEUMATOID ARTHRITIS INVOLVING SHOULDER WITH NEGATIVE RHEUMATOID FACTOR, UNSPECIFIED LATERALITY (H): ICD-10-CM

## 2019-02-14 RX ORDER — HYDROXYCHLOROQUINE SULFATE 200 MG/1
200 TABLET, FILM COATED ORAL 2 TIMES DAILY
Qty: 180 TABLET | Refills: 0 | Status: SHIPPED | OUTPATIENT
Start: 2019-02-14 | End: 2019-05-23

## 2019-02-14 NOTE — TELEPHONE ENCOUNTER
Aimee, please follow-up  On the hydroxychloroquine (plaquenil) monitoring and form. Document please. Follow-up  If this is not done     Julia if you can put a note in. Thanks

## 2019-02-14 NOTE — TELEPHONE ENCOUNTER
Hydroxychloroquine Monitoring  HCQ current dose: 200 mg BID    Current weight:   Wt Readings from Last 2 Encounters:   01/11/19 309 lb (140.2 kg)   12/19/18 314 lb 4.8 oz (142.6 kg)     Is the current dose < 5mg/kg? Yes 2.85 mg/kg    Patient risk factors for HCQ toxicity include: exposure of five years or greater. Per Epic, he has been on since at least 2010.  Estimated Creatinine Clearance: 92.8 mL/min (based on SCr of 1.09 mg/dL).    Date of last eye exam specifically commenting on HCQ toxicity: 4/9/2018 [facility]  The following were performed during the most recent eye exam: spectral-domain optical coherence tomography (SD OCT) and 10-2 visual field. Notes were read to the best of my ability - hand written, imaging included.     Date of next required eye exam: 1 year

## 2019-02-14 NOTE — TELEPHONE ENCOUNTER
Plaquenil      Last Written Prescription Date:  05/22/2018  Last Fill Quantity: 180,   # refills: 3  Last Office Visit: 12/19/2018  Future Office visit: 06/12/2019  Last Eye Exam:04/09/2018    Routing refill request to provider for review/approval because: dosing alert

## 2019-03-09 ENCOUNTER — OFFICE VISIT (OUTPATIENT)
Dept: URGENT CARE | Facility: URGENT CARE | Age: 69
End: 2019-03-09
Payer: MEDICARE

## 2019-03-09 VITALS
OXYGEN SATURATION: 99 % | WEIGHT: 314 LBS | BODY MASS INDEX: 40.3 KG/M2 | DIASTOLIC BLOOD PRESSURE: 82 MMHG | HEIGHT: 74 IN | SYSTOLIC BLOOD PRESSURE: 144 MMHG | HEART RATE: 68 BPM | TEMPERATURE: 100.2 F

## 2019-03-09 DIAGNOSIS — R30.0 DYSURIA: ICD-10-CM

## 2019-03-09 DIAGNOSIS — J10.1 INFLUENZA B: Primary | ICD-10-CM

## 2019-03-09 DIAGNOSIS — R50.9 FEVER AND CHILLS: ICD-10-CM

## 2019-03-09 LAB
ALBUMIN UR-MCNC: 30 MG/DL
APPEARANCE UR: CLEAR
BILIRUB UR QL STRIP: NEGATIVE
COLOR UR AUTO: YELLOW
FLUAV+FLUBV AG SPEC QL: NEGATIVE
FLUAV+FLUBV AG SPEC QL: POSITIVE
GLUCOSE UR STRIP-MCNC: NEGATIVE MG/DL
HGB UR QL STRIP: ABNORMAL
KETONES UR STRIP-MCNC: ABNORMAL MG/DL
LEUKOCYTE ESTERASE UR QL STRIP: NEGATIVE
MUCOUS THREADS #/AREA URNS LPF: PRESENT /LPF
NITRATE UR QL: NEGATIVE
NON-SQ EPI CELLS #/AREA URNS LPF: ABNORMAL /LPF
PH UR STRIP: 7 PH (ref 5–7)
RBC #/AREA URNS AUTO: ABNORMAL /HPF
SOURCE: ABNORMAL
SP GR UR STRIP: 1.02 (ref 1–1.03)
SPECIMEN SOURCE: ABNORMAL
UROBILINOGEN UR STRIP-ACNC: 0.2 EU/DL (ref 0.2–1)
WBC #/AREA URNS AUTO: ABNORMAL /HPF

## 2019-03-09 PROCEDURE — 99214 OFFICE O/P EST MOD 30 MIN: CPT | Performed by: PHYSICIAN ASSISTANT

## 2019-03-09 PROCEDURE — 81001 URINALYSIS AUTO W/SCOPE: CPT | Performed by: PHYSICIAN ASSISTANT

## 2019-03-09 PROCEDURE — 87804 INFLUENZA ASSAY W/OPTIC: CPT | Performed by: PHYSICIAN ASSISTANT

## 2019-03-09 RX ORDER — OSELTAMIVIR PHOSPHATE 75 MG/1
75 CAPSULE ORAL 2 TIMES DAILY
Qty: 10 CAPSULE | Refills: 0 | Status: SHIPPED | OUTPATIENT
Start: 2019-03-09 | End: 2019-05-17

## 2019-03-09 ASSESSMENT — MIFFLIN-ST. JEOR: SCORE: 2254.04

## 2019-03-09 NOTE — PATIENT INSTRUCTIONS
Patient Education     Influenza (Adult)    Influenza is also called the flu. It is a viral illness that affects the air passages of your lungs. It is different from the common cold. The flu can easily be passed from one to person to another. It may be spread through the air by coughing and sneezing. Or it can be spread by touching the sick person and then touching your own eyes, nose, or mouth.  The flu starts 1 to 3 days after you are exposed to the flu virus. It may last for 1 to 2 weeks but many people feel tired or fatigued for many weeks afterward. You usually don t need to take antibiotics unless you have a complication. This might be an ear or sinus infection or pneumonia.  Symptoms of the flu may be mild or severe. They can include extreme tiredness (wanting to stay in bed all day), chills, fevers, muscle aches, soreness with eye movement, headache, and a dry, hacking cough.  Home care  Follow these guidelines when caring for yourself at home:    Avoid being around cigarette smoke, whether yours or other people s.    Acetaminophen or ibuprofen will help ease your fever, muscle aches, and headache. Don t give aspirin to anyone younger than 18 who has the flu. Aspirin can harm the liver.    Nausea and loss of appetite are common with the flu. Eat light meals. Drink 6 to 8 glasses of liquids every day. Good choices are water, sport drinks, soft drinks without caffeine, juices, tea, and soup. Extra fluids will also help loosen secretions in your nose and lungs.    Over-the-counter cold medicines will not make the flu go away faster. But the medicines may help with coughing, sore throat, and congestion in your nose and sinuses. Don t use a decongestant if you have high blood pressure.    Stay home until your fever has been gone for at least 24 hours without using medicine to reduce fever.  Follow-up care  Follow up with your healthcare provider, or as advised, if you are not getting better over the next  week.  If you are age 65 or older, talk with your provider about getting a pneumococcal vaccine every 5 years. You should also get this vaccine if you have chronic asthma or COPD. All adults should get a flu vaccine every fall. Ask your provider about this.  When to seek medical advice  Call your healthcare provider right away if any of these occur:    Cough with lots of colored mucus (sputum) or blood in your mucus    Chest pain, shortness of breath, wheezing, or trouble breathing    Severe headache, or face, neck, or ear pain    New rash with fever    Fever of 100.4 F (38 C) or higher, or as directed by your healthcare provider    Confusion, behavior change, or seizure    Severe weakness or dizziness    You get a new fever or cough after getting better for a few days  Date Last Reviewed: 1/1/2017 2000-2018 [employer]. [address] [place] [address], [place] [address]. All rights reserved. This information is not intended as a substitute for professional medical care. Always follow your healthcare professional's instructions.

## 2019-03-09 NOTE — NURSING NOTE
"Lucio Daly;   Chief Complaint   Patient presents with     URI     onset this past week, chills, fever 100.3 today, chest congestion earlier in the week - no coughing      Urinary Problem     on oxybutynin, frequency, stomach was bothering him a little bit, stomach ache lower abdomen - denies dysuria, having urgency      Urgent Care     Initial /82 (BP Location: Right arm, Patient Position: Chair, Cuff Size: Adult Large)   Pulse 68   Temp 100.2  F (37.9  C) (Oral)   Ht 1.88 m (6' 2\")   Wt 142.4 kg (314 lb)   SpO2 99%   BMI 40.32 kg/m   Estimated body mass index is 40.32 kg/m  as calculated from the following:    Height as of this encounter: 1.88 m (6' 2\").    Weight as of this encounter: 142.4 kg (314 lb)..  BP completed using cuff size large.  Sandra Stevenson, Registered Nurse   [facility]   "

## 2019-03-09 NOTE — PROGRESS NOTES
SUBJECTIVE:   Lucio Daly is a 68 year old male presenting with a chief complaint of   Chief Complaint   Patient presents with     URI     onset this past week, chills, fever 100.3 today, chest congestion earlier in the week - no coughing      Urinary Problem     on oxybutynin, frequency, stomach was bothering him a little bit, stomach ache lower abdomen - denies dysuria, having urgency      Urgent Care       He is an established patient of [facility].    URI Adult  Onset of symptoms was 3 day(s) ago.  Course of illness is worsening.    Severity moderate  Current and Associated symptoms: fever, chills, runny nose, hoarse voice, headache and body aches  Treatment measures tried include APAP and throat lozenges .  Predisposing factors include ill contact: traveling through airports.    UTI  Onset of symptoms was 4 day(s).  Course of illness is same  Severity mild  Current and associated symptoms dysuria, frequency, urgency and chills  Treatment and measures tried oxybutynin and cranberry juice  Predisposing factors include history of UTIs in the past   Patient denies flank pain, temperature > 101 degrees F., vomiting and taking Coumadin. No history of prostatitis.     Review of Systems  Skin: negative  Eyes: negative  Ears/Nose/Throat: positive for negative, as above  Respiratory: No shortness of breath, dyspnea on exertion, cough, or hemoptysis  Cardiovascular: negative for and exertional chest pain or pressure  Gastrointestinal: negative  Genitourinary: positive for negative, dysuria and frequency  Musculoskeletal: negative  Neurologic: negative  Psychiatric: negative  Hematologic/Lymphatic/Immunologic: positive for negative, chills and fever      Past Medical History:   Diagnosis Date     Abnormal EMG 4/18/2013     Abnormal involuntary movements(781.0)     Movement Disorder     AK (actinic keratosis) 12/18/2011     Allergic rhinitis, cause unspecified     Allergic rhinitis     Balance problems 11/1/2011      "Basal cell carcinoma      Bladder spasms 11/1/2011     Chronic osteoarthritis      Diaphragmatic hernia without mention of obstruction or gangrene      Earache or other ear, nose, or throat complaint      Esophageal reflux      Fatigue 11/1/2011     Fracture      H. pylori infection 5/12/2011     History of MRI of cervical spine 11/18/2013    EXAMINATION: CERVICAL SPINE G/E 5 VIEWS* 4/19/2013 4:18 PM  CLINICAL HISTORY: Pain in limb,Performing Location?->[facility] ([facility]),  COMPARISON:  FINDINGS: AP and lateral views in flexion and extension, as well as odontoid view of the cervical spine was obtained. There is no comparison available. The vertebral bodies of the cervical spine are normally aligned. There is posterior spurring and d     Incomplete defecation 11/1/2011     Interstitial lung disease (H) 11/29/2016     Laboratory test 8/7/2012     Lung disease June 2015     Malignant basal cell neoplasm of skin 8/6/2008     Melanoma (H) 8/6/2008     Melanoma in situ of lower leg (H)     R calf     Neuropathy 5/16/2011     Other bladder disorder      Other color vision deficiencies      Other nervous system complications      Parkinsonism (H) 11/1/2011     Personal history of colonic polyps      Polyneuropathy in other diseases classified elsewhere (H)      RA (rheumatoid arthritis) (H)      Rheumatoid arthritis of multiple sites with negative rheumatoid factor (H) 3/21/2016     Seronegative arthritis 11/18/2013     Shortness of breath      Shoulder arthritis 2016    acromioclavicular joint      Somatization disorder 5/12/2011     Squamous cell carcinoma      Tremor 11/1/2011     Unspecified essential hypertension      Unspecified hypothyroidism      Urinary tract infection      Urinary urgency 11/1/2011     Wears glasses 11/1/2011     Family History   Problem Relation Age of Onset     Hypertension Mother      Heart Disease Mother         a fib     Arthritis Mother         \"osteo\"     Osteoporosis Mother      Skin " "Cancer Mother      Uterine Cancer Mother      Hypertension Brother      Diabetes Brother         \" post pancreatitis\"     Neurologic Disorder Sister         multiple sclerosis     Hypertension Sister      Heart Disease Sister      Heart Disease Sister         a fib     Arthritis Sister      Hypertension Father      Cerebrovascular Disease Father         ,     Skin Cancer Father      Colon Polyps Father      Psoriasis Maternal Grandfather      Stomach Cancer Maternal Grandfather      Congenital Anomalies Other         granddaughter with a chromosome defect     Cerebrovascular Disease Paternal Grandmother         ,     Cerebrovascular Disease Paternal Grandfather         ,     Melanoma No family hx of      Colon Cancer No family hx of      Current Outpatient Medications   Medication Sig Dispense Refill     acetaminophen (TYLENOL) 500 MG tablet 2 x 500mg by mouth 3 times per day: 7/730am, 4pm and 11pm  = 6/day       azithromycin (ZITHROMAX) 250 MG tablet Two tablets first day, then one tablet daily for four days. 6 tablet 0     Cholecalciferol (VITAMIN D3) 1000 units CAPS 1000 unit capsule by mouth daily @ 7am 30 capsule      folic acid (FOLVITE) 1 MG tablet 1mg tab by mouth twice daily @ 9am and 4pm 180 tablet 3     hydroxychloroquine (PLAQUENIL) 200 MG tablet Take 1 tablet (200 mg) by mouth 2 times daily Annual opthalmologist exam due 7-2019 180 tablet 0     methotrexate 2.5 MG tablet TAKE 8 TABLETS BY MOUTH ONCE WEEKLY. LABS DUE EVERY 8 - 12 WEEKS. 96 tablet 4     metoprolol succinate ER (TOPROL-XL) 50 MG 24 hr tablet Take 1.5 tablets (75 mg) by mouth daily 135 tablet 11     omeprazole (PRILOSEC) 40 MG DR capsule 40mg capsule by mouth twice daily @ 7/730am and 4pm 180 capsule 3     ORDER FOR DME Use your CPAP device as directed by your provider.       oxybutynin (DITROPAN) 5 MG tablet 5mg tab by mouth daily @ 4pm 90 tablet 1     pregabalin (LYRICA) 50 MG capsule 1 tab by mouth @ 7/730am, 1 tab @ 4pm and 1-2 tab @ " "11pm 360 capsule 3     triamcinolone (KENALOG) 0.1 % external cream Apply sparingly to affected area three times daily for 14 days. 15 g 0     Social History     Tobacco Use     Smoking status: Former Smoker     Packs/day: 1.50     Years: 37.00     Pack years: 55.50     Types: Cigarettes     Start date: 6/15/1963     Last attempt to quit: 2000     Years since quittin.4     Smokeless tobacco: Never Used   Substance Use Topics     Alcohol use: No     Alcohol/week: 0.0 oz       OBJECTIVE  /82 (BP Location: Right arm, Patient Position: Chair, Cuff Size: Adult Large)   Pulse 68   Temp 100.2  F (37.9  C) (Oral)   Ht 1.88 m (6' 2\")   Wt 142.4 kg (314 lb)   SpO2 99%   BMI 40.32 kg/m    Physical Exam  Constitutional: healthy, alert and no distress  Head: Normocephalic. No masses, lesions, tenderness or abnormalities  Neck: Neck supple. No adenopathy.   ENT: ENT exam normal, no neck nodes or sinus tenderness  Cardiovascular: Regular rate and rhythm  Respiratory: Clear lungs bilaterally, normal respiratory effort   Gastrointestinal: Abdomen soft, mild suprapubic tenderness. BS normal. No masses, organomegaly.  : Deferred.   Rectal: Prostate exam declined.   Musculoskeletal: extremities normal- no gross deformities noted, gait normal and normal muscle tone, no CVA tenderness  Psychiatric: mentation appears normal and affect normal/bright  Hematologic/Lymphatic/Immunologic: Normal cervical lymph nodes      Labs:  Results for orders placed or performed in visit on 19 (from the past 24 hour(s))   Influenza A/B antigen   Result Value Ref Range    Influenza A/B Agn Specimen Nasal     Influenza A Negative NEG^Negative    Influenza B Positive (A) NEG^Negative   *UA reflex to Microscopic and Culture ([place] [facility] (except [place] and [place])   Result Value Ref Range    Color Urine Yellow     Appearance Urine Clear     Glucose Urine Negative NEG^Negative mg/dL    Bilirubin Urine Negative " NEG^Negative    Ketones Urine Trace (A) NEG^Negative mg/dL    Specific Gravity Urine 1.020 1.003 - 1.035    Blood Urine Trace (A) NEG^Negative    pH Urine 7.0 5.0 - 7.0 pH    Protein Albumin Urine 30 (A) NEG^Negative mg/dL    Urobilinogen Urine 0.2 0.2 - 1.0 EU/dL    Nitrite Urine Negative NEG^Negative    Leukocyte Esterase Urine Negative NEG^Negative    Source Midstream Urine    Urine Microscopic   Result Value Ref Range    WBC Urine 0 - 5 OTO5^0 - 5 /HPF    RBC Urine 10-25 (A) OTO2^O - 2 /HPF    Squamous Epithelial /LPF Urine Few FEW^Few /LPF    Mucous Urine Present (A) NEG^Negative /LPF     *Note: Due to a large number of results and/or encounters for the requested time period, some results have not been displayed. A complete set of results can be found in Results Review.       X-Ray was not done.    ASSESSMENT:      ICD-10-CM    1. Dysuria R30.0 *UA reflex to Microscopic and Culture ([place] and [facility] (except [place] and [employer] [place])   2. Fever and chills R50.9 Influenza A/B antigen   3. Influenza B J10.1 oseltamivir (TAMIFLU) 75 MG capsule        Medical Decision Making:  Differential Diagnosis URI:  URI Adult/Peds:  Influenza, Viral pharyngitis, Viral syndrome and Viral upper respiratory illness    Differential Diagnosis UTI:  UTI: UTI, Dysuria, Urethritis, BPH and Prostatitis    Serious Comorbid Conditions:  Adult:  Rheumatoid Arthritis    PLAN:  URI Adult:  Tylenol, Ibuprofen, Fluids, Rest and RX influenza  Tamiflu 75 mg bid x 5 days  UTI Adult:  Patient reports his urinary symptoms sometimes flare when he is ill. Declined rectal exam today to inspect prostate. Advised patient he should follow up as soon as possible if his symptoms continue as could be dealing with acute prostatitis or other bladder pathology. Patient agreed with plan.       Followup:  If not improving or if condition worsens, follow up with your Primary Care Provider, If not improving or if conditions worsens over the next 12-24  hours, go to the Emergency Department    Patient Instructions     Patient Education     Influenza (Adult)    Influenza is also called the flu. It is a viral illness that affects the air passages of your lungs. It is different from the common cold. The flu can easily be passed from one to person to another. It may be spread through the air by coughing and sneezing. Or it can be spread by touching the sick person and then touching your own eyes, nose, or mouth.  The flu starts 1 to 3 days after you are exposed to the flu virus. It may last for 1 to 2 weeks but many people feel tired or fatigued for many weeks afterward. You usually don t need to take antibiotics unless you have a complication. This might be an ear or sinus infection or pneumonia.  Symptoms of the flu may be mild or severe. They can include extreme tiredness (wanting to stay in bed all day), chills, fevers, muscle aches, soreness with eye movement, headache, and a dry, hacking cough.  Home care  Follow these guidelines when caring for yourself at home:    Avoid being around cigarette smoke, whether yours or other people s.    Acetaminophen or ibuprofen will help ease your fever, muscle aches, and headache. Don t give aspirin to anyone younger than 18 who has the flu. Aspirin can harm the liver.    Nausea and loss of appetite are common with the flu. Eat light meals. Drink 6 to 8 glasses of liquids every day. Good choices are water, sport drinks, soft drinks without caffeine, juices, tea, and soup. Extra fluids will also help loosen secretions in your nose and lungs.    Over-the-counter cold medicines will not make the flu go away faster. But the medicines may help with coughing, sore throat, and congestion in your nose and sinuses. Don t use a decongestant if you have high blood pressure.    Stay home until your fever has been gone for at least 24 hours without using medicine to reduce fever.  Follow-up care  Follow up with your healthcare provider, or  as advised, if you are not getting better over the next week.  If you are age 65 or older, talk with your provider about getting a pneumococcal vaccine every 5 years. You should also get this vaccine if you have chronic asthma or COPD. All adults should get a flu vaccine every fall. Ask your provider about this.  When to seek medical advice  Call your healthcare provider right away if any of these occur:    Cough with lots of colored mucus (sputum) or blood in your mucus    Chest pain, shortness of breath, wheezing, or trouble breathing    Severe headache, or face, neck, or ear pain    New rash with fever    Fever of 100.4 F (38 C) or higher, or as directed by your healthcare provider    Confusion, behavior change, or seizure    Severe weakness or dizziness    You get a new fever or cough after getting better for a few days  Date Last Reviewed: 1/1/2017 2000-2018 [employer]. [address] [place] [address]. All rights reserved. This information is not intended as a substitute for professional medical care. Always follow your healthcare professional's instructions.               Saroj Tinoco PA-C

## 2019-03-11 ENCOUNTER — DOCUMENTATION ONLY (OUTPATIENT)
Dept: CARE COORDINATION | Facility: CLINIC | Age: 69
End: 2019-03-11

## 2019-04-05 DIAGNOSIS — M06.09 RHEUMATOID ARTHRITIS OF MULTIPLE SITES WITH NEGATIVE RHEUMATOID FACTOR (H): ICD-10-CM

## 2019-04-05 DIAGNOSIS — Z79.899 HIGH RISK MEDICATIONS (NOT ANTICOAGULANTS) LONG-TERM USE: ICD-10-CM

## 2019-04-05 LAB
ALBUMIN SERPL-MCNC: 3.7 G/DL (ref 3.4–5)
ALT SERPL W P-5'-P-CCNC: 26 U/L (ref 0–70)
AST SERPL W P-5'-P-CCNC: 16 U/L (ref 0–45)
BASOPHILS # BLD AUTO: 0 10E9/L (ref 0–0.2)
BASOPHILS NFR BLD AUTO: 0.8 %
CREAT SERPL-MCNC: 1.04 MG/DL (ref 0.66–1.25)
CRP SERPL-MCNC: 8.5 MG/L (ref 0–8)
DIFFERENTIAL METHOD BLD: ABNORMAL
EOSINOPHIL # BLD AUTO: 0.1 10E9/L (ref 0–0.7)
EOSINOPHIL NFR BLD AUTO: 2.1 %
ERYTHROCYTE [DISTWIDTH] IN BLOOD BY AUTOMATED COUNT: 14.6 % (ref 10–15)
GFR SERPL CREATININE-BSD FRML MDRD: 72 ML/MIN/{1.73_M2}
HCT VFR BLD AUTO: 43.1 % (ref 40–53)
HGB BLD-MCNC: 13.1 G/DL (ref 13.3–17.7)
IMM GRANULOCYTES # BLD: 0 10E9/L (ref 0–0.4)
IMM GRANULOCYTES NFR BLD: 0.3 %
LYMPHOCYTES # BLD AUTO: 1 10E9/L (ref 0.8–5.3)
LYMPHOCYTES NFR BLD AUTO: 25.1 %
MCH RBC QN AUTO: 29.1 PG (ref 26.5–33)
MCHC RBC AUTO-ENTMCNC: 30.4 G/DL (ref 31.5–36.5)
MCV RBC AUTO: 96 FL (ref 78–100)
MONOCYTES # BLD AUTO: 0.3 10E9/L (ref 0–1.3)
MONOCYTES NFR BLD AUTO: 6.7 %
NEUTROPHILS # BLD AUTO: 2.5 10E9/L (ref 1.6–8.3)
NEUTROPHILS NFR BLD AUTO: 65 %
NRBC # BLD AUTO: 0 10*3/UL
NRBC BLD AUTO-RTO: 0 /100
PLATELET # BLD AUTO: 142 10E9/L (ref 150–450)
RBC # BLD AUTO: 4.5 10E12/L (ref 4.4–5.9)
WBC # BLD AUTO: 3.9 10E9/L (ref 4–11)

## 2019-04-08 ENCOUNTER — TELEPHONE (OUTPATIENT)
Dept: RHEUMATOLOGY | Facility: CLINIC | Age: 69
End: 2019-04-08

## 2019-04-08 DIAGNOSIS — Z79.899 HIGH RISK MEDICATIONS (NOT ANTICOAGULANTS) LONG-TERM USE: ICD-10-CM

## 2019-04-08 DIAGNOSIS — D72.819 LEUKOPENIA, UNSPECIFIED TYPE: ICD-10-CM

## 2019-04-08 DIAGNOSIS — M06.09 RHEUMATOID ARTHRITIS OF MULTIPLE SITES WITH NEGATIVE RHEUMATOID FACTOR (H): Primary | ICD-10-CM

## 2019-04-08 NOTE — TELEPHONE ENCOUNTER
Left message for Syed to return my call at [phone].    Mary Beth Vivar MSN, RN  Rheumatology RN Care Coordinator  [facility]

## 2019-04-08 NOTE — TELEPHONE ENCOUNTER
[facility] Call Center    Phone Message    May a detailed message be left on voicemail: yes    Reason for Call: Other: Pt calling back to speak with Mary Beth. Please follow up when available.     Action Taken: Message routed to:  [facility] ([facility] Rheum

## 2019-04-08 NOTE — TELEPHONE ENCOUNTER
Labs reviewed and I note a reduced WBC and platelets, that he was ill influenza A in March. Follow-up call with him and if any concerns of infection, he should seek medical attention immediate. We should recheck his CBC in a week. If any infection, he should hold his immunosuppression or if on antibiotics      Results for orders placed or performed in visit on 04/05/19   CRP inflammation   Result Value Ref Range    CRP Inflammation 8.5 (H) 0.0 - 8.0 mg/L   CBC with platelets differential   Result Value Ref Range    WBC 3.9 (L) 4.0 - 11.0 10e9/L    RBC Count 4.50 4.4 - 5.9 10e12/L    Hemoglobin 13.1 (L) 13.3 - 17.7 g/dL    Hematocrit 43.1 40.0 - 53.0 %    MCV 96 78 - 100 fl    MCH 29.1 26.5 - 33.0 pg    MCHC 30.4 (L) 31.5 - 36.5 g/dL    RDW 14.6 10.0 - 15.0 %    Platelet Count 142 (L) 150 - 450 10e9/L    Diff Method Automated Method     % Neutrophils 65.0 %    % Lymphocytes 25.1 %    % Monocytes 6.7 %    % Eosinophils 2.1 %    % Basophils 0.8 %    % Immature Granulocytes 0.3 %    Nucleated RBCs 0 0 /100    Absolute Neutrophil 2.5 1.6 - 8.3 10e9/L    Absolute Lymphocytes 1.0 0.8 - 5.3 10e9/L    Absolute Monocytes 0.3 0.0 - 1.3 10e9/L    Absolute Eosinophils 0.1 0.0 - 0.7 10e9/L    Absolute Basophils 0.0 0.0 - 0.2 10e9/L    Abs Immature Granulocytes 0.0 0 - 0.4 10e9/L    Absolute Nucleated RBC 0.0    Creatinine   Result Value Ref Range    Creatinine 1.04 0.66 - 1.25 mg/dL    GFR Estimate 72 >60 mL/min/[1.73_m2]    GFR Estimate If Black 84 >60 mL/min/[1.73_m2]   Albumin level   Result Value Ref Range    Albumin 3.7 3.4 - 5.0 g/dL   ALT   Result Value Ref Range    ALT 26 0 - 70 U/L   AST   Result Value Ref Range    AST 16 0 - 45 U/L     *Note: Due to a large number of results and/or encounters for the requested time period, some results have not been displayed. A complete set of results can be found in Results Review.

## 2019-04-08 NOTE — TELEPHONE ENCOUNTER
Returned call to Syed and he will get his lab work next week as per Sin Snow.    Mary Beth Vivar MSN, RN  Rheumatology RN Care Coordinator  [facility]

## 2019-04-15 ENCOUNTER — TRANSFERRED RECORDS (OUTPATIENT)
Dept: HEALTH INFORMATION MANAGEMENT | Facility: CLINIC | Age: 69
End: 2019-04-15

## 2019-04-15 ENCOUNTER — HOSPITAL ENCOUNTER (OUTPATIENT)
Facility: CLINIC | Age: 69
Setting detail: SPECIMEN
Discharge: HOME | End: 2019-04-15
Admitting: NURSE PRACTITIONER
Payer: MEDICARE

## 2019-04-15 DIAGNOSIS — M06.09 RHEUMATOID ARTHRITIS OF MULTIPLE SITES WITH NEGATIVE RHEUMATOID FACTOR (H): ICD-10-CM

## 2019-04-15 DIAGNOSIS — D72.819 LEUKOPENIA, UNSPECIFIED TYPE: ICD-10-CM

## 2019-04-15 DIAGNOSIS — Z79.899 HIGH RISK MEDICATIONS (NOT ANTICOAGULANTS) LONG-TERM USE: ICD-10-CM

## 2019-04-15 LAB
ALBUMIN SERPL-MCNC: 3.6 G/DL (ref 3.4–5)
ALT SERPL W P-5'-P-CCNC: 23 U/L (ref 0–70)
AST SERPL W P-5'-P-CCNC: 20 U/L (ref 0–45)
CREAT SERPL-MCNC: 1.08 MG/DL (ref 0.66–1.25)
CRP SERPL-MCNC: 8 MG/L (ref 0–8)
ERYTHROCYTE [DISTWIDTH] IN BLOOD BY AUTOMATED COUNT: 14.6 % (ref 10–15)
GFR SERPL CREATININE-BSD FRML MDRD: 69 ML/MIN/{1.73_M2}
HCT VFR BLD AUTO: 42.9 % (ref 40–53)
HGB BLD-MCNC: 13.1 G/DL (ref 13.3–17.7)
MCH RBC QN AUTO: 29 PG (ref 26.5–33)
MCHC RBC AUTO-ENTMCNC: 30.5 G/DL (ref 31.5–36.5)
MCV RBC AUTO: 95 FL (ref 78–100)
PLATELET # BLD AUTO: 161 10E9/L (ref 150–450)
RBC # BLD AUTO: 4.51 10E12/L (ref 4.4–5.9)
WBC # BLD AUTO: 3.7 10E9/L (ref 4–11)

## 2019-04-15 PROCEDURE — 82565 ASSAY OF CREATININE: CPT | Performed by: NURSE PRACTITIONER

## 2019-04-15 PROCEDURE — 86140 C-REACTIVE PROTEIN: CPT | Performed by: NURSE PRACTITIONER

## 2019-04-15 PROCEDURE — 36415 COLL VENOUS BLD VENIPUNCTURE: CPT | Performed by: NURSE PRACTITIONER

## 2019-04-15 PROCEDURE — 82040 ASSAY OF SERUM ALBUMIN: CPT | Performed by: NURSE PRACTITIONER

## 2019-04-15 PROCEDURE — 84450 TRANSFERASE (AST) (SGOT): CPT | Performed by: NURSE PRACTITIONER

## 2019-04-15 PROCEDURE — 85027 COMPLETE CBC AUTOMATED: CPT | Performed by: NURSE PRACTITIONER

## 2019-04-15 PROCEDURE — 84460 ALANINE AMINO (ALT) (SGPT): CPT | Performed by: NURSE PRACTITIONER

## 2019-04-15 NOTE — RESULT ENCOUNTER NOTE
Dear Lucio,     Your blood counts (hemoglobin, platelets), liver (AST, ALT, platelets), kidney (creatinine, GFR) and CRP inflammation labs are normal. Your white blood cell count is reduced, but stable continue our plan. If your arthritis is controlled, you can try to come down on the methotrexate by 1 tablet (to 7 tablet or 17.5mg). If you dont feel well, please seek medical attention. Please contact our clinic if you have any questions.     Thank-you for allowing me to participate in your care.     Sin GUZMAN, CNP, MSN  [facility]  Department of Rheumatology & Autoimmune Disorders  [facility] Rheumatology: [phone] Opt 2, then Opt 1 Scheduling   [employer]: [phone] Option 7 scheduling

## 2019-04-18 ENCOUNTER — OFFICE VISIT (OUTPATIENT)
Dept: URGENT CARE | Facility: URGENT CARE | Age: 69
End: 2019-04-18
Payer: MEDICARE

## 2019-04-18 VITALS
TEMPERATURE: 97.6 F | WEIGHT: 314 LBS | DIASTOLIC BLOOD PRESSURE: 80 MMHG | OXYGEN SATURATION: 96 % | BODY MASS INDEX: 40.32 KG/M2 | RESPIRATION RATE: 20 BRPM | HEART RATE: 54 BPM | SYSTOLIC BLOOD PRESSURE: 132 MMHG

## 2019-04-18 DIAGNOSIS — M67.40 MUCOID CYST OF JOINT: Primary | ICD-10-CM

## 2019-04-18 PROCEDURE — 99213 OFFICE O/P EST LOW 20 MIN: CPT | Performed by: PHYSICIAN ASSISTANT

## 2019-04-18 NOTE — PROGRESS NOTES
"  SUBJECTIVE:      Lucio Daly is a 68 year old male, with an extensive pmhx outlined below that includes RA and OA,  who presents to  today for evaluation of \"hard lump\" over DIP joint index finger right hand x several weeks duration. Patient wonders if this could be an infection.     He denies any trauma to the area.  Denies any drainage. No fevers or chills noted.  No change in size of lump.     ROS:    Consitutional: Denies any fever or chills   RHEUM: Positive hx of RA with recent flare--working with rheumatologist   Skin: Positive as per above. Denies any new skin lesions elsewhere     Past Medical History:   Diagnosis Date     Abnormal EMG 4/18/2013     Abnormal involuntary movements(781.0)     Movement Disorder     AK (actinic keratosis) 12/18/2011     Allergic rhinitis, cause unspecified     Allergic rhinitis     Balance problems 11/1/2011     Basal cell carcinoma      Bladder spasms 11/1/2011     Chronic osteoarthritis      Diaphragmatic hernia without mention of obstruction or gangrene      Earache or other ear, nose, or throat complaint      Esophageal reflux      Fatigue 11/1/2011     Fracture      H. pylori infection 5/12/2011     History of MRI of cervical spine 11/18/2013    EXAMINATION: CERVICAL SPINE G/E 5 VIEWS* 4/19/2013 4:18 PM  CLINICAL HISTORY: Pain in limb,Performing Location?->[facility] ([facility]),  COMPARISON:  FINDINGS: AP and lateral views in flexion and extension, as well as odontoid view of the cervical spine was obtained. There is no comparison available. The vertebral bodies of the cervical spine are normally aligned. There is posterior spurring and d     Incomplete defecation 11/1/2011     Interstitial lung disease (H) 11/29/2016     Laboratory test 8/7/2012     Lung disease June 2015     Malignant basal cell neoplasm of skin 8/6/2008     Melanoma (H) 8/6/2008     Melanoma in situ of lower leg (H)     R calf     Neuropathy 5/16/2011     Other bladder disorder      Other color " vision deficiencies      Other nervous system complications      Parkinsonism (H) 11/1/2011     Personal history of colonic polyps      Polyneuropathy in other diseases classified elsewhere (H)      RA (rheumatoid arthritis) (H)      Rheumatoid arthritis of multiple sites with negative rheumatoid factor (H) 3/21/2016     Seronegative arthritis 11/18/2013     Shortness of breath      Shoulder arthritis 2016    acromioclavicular joint      Somatization disorder 5/12/2011     Squamous cell carcinoma      Tremor 11/1/2011     Unspecified essential hypertension      Unspecified hypothyroidism      Urinary tract infection      Urinary urgency 11/1/2011     Wears glasses 11/1/2011     Current Outpatient Medications   Medication Sig Dispense Refill     acetaminophen (TYLENOL) 500 MG tablet 2 x 500mg by mouth 3 times per day: 7/730am, 4pm and 11pm  = 6/day       Cholecalciferol (VITAMIN D3) 1000 units CAPS 1000 unit capsule by mouth daily @ 7am 30 capsule      folic acid (FOLVITE) 1 MG tablet 1mg tab by mouth twice daily @ 9am and 4pm 180 tablet 3     hydroxychloroquine (PLAQUENIL) 200 MG tablet Take 1 tablet (200 mg) by mouth 2 times daily Annual opthalmologist exam due 7-2019 180 tablet 0     methotrexate 2.5 MG tablet TAKE 8 TABLETS BY MOUTH ONCE WEEKLY. LABS DUE EVERY 8 - 12 WEEKS. 96 tablet 4     metoprolol succinate ER (TOPROL-XL) 50 MG 24 hr tablet Take 1.5 tablets (75 mg) by mouth daily 135 tablet 11     omeprazole (PRILOSEC) 40 MG DR capsule 40mg capsule by mouth twice daily @ 7/730am and 4pm 180 capsule 3     ORDER FOR DME Use your CPAP device as directed by your provider.       oxybutynin (DITROPAN) 5 MG tablet 5mg tab by mouth daily @ 4pm 90 tablet 1     pregabalin (LYRICA) 50 MG capsule 1 tab by mouth @ 7/730am, 1 tab @ 4pm and 1-2 tab @ 11pm 360 capsule 3     triamcinolone (KENALOG) 0.1 % external cream Apply sparingly to affected area three times daily for 14 days. 15 g 0     Social History     Tobacco Use      Smoking status: Former Smoker     Packs/day: 1.50     Years: 37.00     Pack years: 55.50     Types: Cigarettes     Start date: 6/15/1963     Last attempt to quit: 2000     Years since quittin.5     Smokeless tobacco: Never Used   Substance Use Topics     Alcohol use: No     Alcohol/week: 0.0 oz        Allergies   Allergen Reactions     Restasis      Burning eyes, problems with breathing, tightness in chest     Adhesive Tape      Bandages misc     Allegra      EXCESSIVE URINATION AND WEAKNESS, LIGHT-HEADED     Allergy      Dust     Animal Dander      Benadryl Allergy      EXCESSIVE URINATION AND WEAKNESS, LIGHT-HEADED     Cephalexin      Joint pain or gerd aggravation. Bloating excessive urination      Cephalosporins      Diphenhydramine Other (See Comments)     Doxycycline Hyclate Nausea     SWEATING,MIGRAINES,LOSS OF APPETITE,SWEATING,LIGHT HEADED, EXCESSIVE URINATION     Fexofenadine Other (See Comments)     Flonase [Fluticasone Propionate]      Gabapentin      Neurontin: mosd changes and excess urination     Iodine Solution [Povidone Iodine]      SKIN MELTS     Levaquin      From surgeon--? Joint pain ? Gerd aggravation. Insomnia, excess urination     Mylanta      EXCESSIVE URINATION AND WEAKNESS, LIGHT-HEADED     Prednisone      Weakness, elevated bp, headache, eye pain, congestion      Seafood [Seafood]      Shellfish Allergy      Hives       Sulfamethoxazole-Trimethoprim      Chest pain, angina     Trees      Trileptal      SEVERE JOINT AND TENDON PAIN, INSOMNIA, RESTLESSNESS, NAUSEA, EXCESS URINATION     Cortizone Rash     EXCESS URINATION,WEAKNESS,NAUSEA, HEADACHE             OBJECTIVE:  /80 (BP Location: Right arm, Patient Position: Sitting, Cuff Size: Adult Large)   Pulse 54   Temp 97.6  F (36.4  C)   Resp 20   Wt 142.4 kg (314 lb)   SpO2 96%   BMI 40.32 kg/m      Right Hand exam: PIP and DIP nodules multiple fingers right and left hand. Hard, white, smooth,round, cystic lesion  (approximately 2 mm in size) over right DIP. This does not extend to include nail bed. No erythema of cyst or  surrounding tissue.     ASSESSMENT/PLAN:    (M67.40) Mucoid cyst of joint  (primary encounter diagnosis)  Plan:   Patient advised to observe lesion closely for any acute growth or change in appearance. Follow-up with PCP if any growth, change or drainage. Advised he show lesion to rheumatologist and dermatologist at next pending visits.

## 2019-04-30 ENCOUNTER — DOCUMENTATION ONLY (OUTPATIENT)
Dept: RHEUMATOLOGY | Facility: CLINIC | Age: 69
End: 2019-04-30

## 2019-04-30 VITALS — BODY MASS INDEX: 40.31 KG/M2 | WEIGHT: 313.94 LBS

## 2019-05-17 RX ORDER — ALBUTEROL SULFATE 90 UG/1
AEROSOL, METERED RESPIRATORY (INHALATION)
Refills: 0 | COMMUNITY
Start: 2019-01-11 | End: 2019-06-12

## 2019-05-17 NOTE — PROGRESS NOTES
Diagnosis/Summary/Recommendations:    PATIENT: Lucio Daly  68 year old male     : 1950    EVELIA: May 23, 2019      Diaphragm weakness    emg      Interpretation: The EMG is abnormal.  There is clear evidence for a left ulnar neuropathy at the elbow which has worsened from the prior EMG study of 2016.  There is also evidence for mild bilateral median neuropathies at the wrists (carpal tunnel syndrome) that have not changed from the previous exam.  There is no evidence for worsening sensorimotor peripheral neuropathy or for an active right or left cervical radiculopathy on this examination.     EMG Physician: Rodney Garcia MD    Seen by dr linares.   Using bi pap at night.     He is stable overall.     He continues on the medication regimen as stated below with a reduction to one omeprazole per day.     Blood pressure has been good since his dose of metoprolol from 1 to 1.5 tabs.   166/81 and 55 heart rate.     Breather has been stable.     Wife Lo is good.     Neuropathy continues    Balance - using a cane  Still has issues with balance.     Not seeing neurosurgery anymore.   Had seen Lorena  Got another opinion and was not encouraged to get surgery.   This was for his back/spine issues.      RA issues.   On plaquenil and methotrexate.  Still taking folate, vitamin d  Had eye examination.  Seeing MONROE Snow and ZACK Goodrich     Still having pain - seen pain clinic over a year ago.      Gastorenterology - stable. Not seeing  Dr. IDALIA Meade anymore.   Has cut back on the omeprazole    Still having bladder issues.      Still using bipap  Seeing [facility] sleep medicine     intersititial lung disease seeing [facility] pulmonary medicine    pcp is Dr. Corley     Has problems sleeping - with waking at 2am - not every night.   It was happening for a few months and has not had this for the past week or so.         Medications     7/730am 9am 2pm 4pm 11pm       Acetaminophen tylenol 500mg 2     2 2        Cholecalciferol vitamin D 1000 1 capsule               Folic acid folvite 1mg   1   1         Hydroxychloroquine plaquenil 200mg   1   1         Methotrexate 2.5mg     8 tabs on Mondays @ 2pm           Metoprolol toprolol XL 50mg 24 hr tablet 1.5               Omeprazole prilosec 40mg 1              Oxybutynin ditropan 5mg       1         Pregabalin lyrica 50mg 1     1 1-2       Proair hfa 108 (90 base) mcg/act inhaler  As needed                Triamcinolone kenalog 0.1% external cream Not using                               History obtained from patient      No change in his regimen  Will continue to monitor how he is doing.   If he has more sleep issues then may need to review his medications and whether he should take melatonin at night or do other tests    Return back in one year.     Coding statement:   Duration of  Services: patient care and care coordination was 25 minutes  Greater than 50% of this visit was spent in counseling and coordination of care.     Thong Montes MD     ______________________________________    Last visit date and details:     Answers for HPI/ROS submitted by the patient on 5/21/2019   General Symptoms: Yes  Skin Symptoms: Yes  HENT Symptoms: Yes  EYE SYMPTOMS: Yes  HEART SYMPTOMS: Yes  LUNG SYMPTOMS: Yes  INTESTINAL SYMPTOMS: Yes  URINARY SYMPTOMS: Yes  REPRODUCTIVE SYMPTOMS: No  SKELETAL SYMPTOMS: Yes  BLOOD SYMPTOMS: No  NERVOUS SYSTEM SYMPTOMS: Yes  MENTAL HEALTH SYMPTOMS: No  Fever: No  Loss of appetite: No  Weight loss: No  Weight gain: No  Fatigue: Yes  Night sweats: No  Chills: No  Increased stress: No  Excessive hunger: No  Excessive thirst: No  Feeling hot or cold when others believe the temperature is normal: No  Loss of height: No  Post-operative complications: No  Surgical site pain: No  Hallucinations: No  Change in or Loss of Energy: No  Hyperactivity: No  Confusion: No  Changes in hair: No  Changes in moles/birth marks: No  Itching: No  Rashes: No  Changes in nails:  No  Acne: No  Change in facial hair: No  Warts: No  Non-healing sores: Yes  Scarring: No  Flaking of skin: No  Color changes of hands/feet in cold : No  Sun sensitivity: Yes  Skin thickening: No  Ear pain: No  Ear discharge: No  Hearing loss: No  Tinnitus: Yes  Nosebleeds: No  Congestion: No  Sinus pain: No  Trouble swallowing: No   Voice hoarseness: Yes  Mouth sores: No  Sore throat: No  Tooth pain: No  Gum tenderness: No  Bleeding gums: No  Change in taste: No  Change in sense of smell: No  Dry mouth: Yes  Hearing aid used: No  Neck lump: No  Eye pain: Yes  Vision loss: No  Dry eyes: Yes  Watery eyes: No  Eye bulging: No  Double vision: Yes  Flashing of lights: No  Spots: Yes  Floaters: No  Redness: No  Crossed eyes: No  Tunnel Vision: No  Yellowing of eyes: No  Eye irritation: Yes  Cough: No  Sputum or phlegm: No  Coughing up blood: No  Difficulty breating or shortness of breath: Yes  Snoring: No  Wheezing: No  Difficulty breathing on exertion: Yes  Nighttime Cough: No  Difficulty breathing when lying flat: No  Chest pain or pressure: No  Fast or irregular heartbeat: No  Pain in legs with walking: Yes  Trouble breathing while lying down: No  Fingers or toes appear blue: No  High blood pressure: No  Low blood pressure: No  Fainting: No  Murmurs: No  Pacemaker: No  Varicose veins: No  Edema or swelling: Yes  Wake up at night with shortness of breath: No  Light-headedness: No  Exercise intolerance: Yes  Heart burn or indigestion: Yes  Nausea: No  Vomiting: No  Abdominal pain: No  Bloating: Yes  Constipation: Yes  Diarrhea: No  Blood in stool: Yes  Black stools: No  Rectal or Anal pain: No  Fecal incontinence: No  Yellowing of skin or eyes: No  Vomit with blood: No  Change in stools: No  Trouble holding urine or incontinence: Yes  Pain or burning: No  Trouble starting or stopping: No  Increased frequency of urination: No  Blood in urine: No  Decreased frequency of urination: No  Frequent nighttime urination:  No  Flank pain: Yes  Difficulty emptying bladder: No  Back pain: Yes  Muscle aches: Yes  Neck pain: No  Swollen joints: Yes  Joint pain: Yes  Bone pain: No  Muscle cramps: Yes  Muscle weakness: Yes  Joint stiffness: Yes  Bone fracture: No  Trouble with coordination: Yes  Dizziness or trouble with balance: Yes  Fainting or black-out spells: No  Memory loss: No  Headache: No  Seizures: No  Speech problems: No  Tingling: Yes  Tremor: Yes  Weakness: Yes  Difficulty walking: Yes  Paralysis: No  Numbness: Yes       [facility]  09/12/18     68 year old man with PMH of interstitial lung disease, rheumatoid arthritis, GALO on CPAP  presents for evaluation of 6 months history of increasing fatigue and shortness of breath, especially when walking and bending over. His pulmonologist referred him for changes seen in PFTs. There is no specific time of day when he is especially short of breath. When sitting in his recliner or lying back in bed, he reports having trouble catching his breath. He needs 2 pillows when sleeping at night. He wears his BiPAP every night for a year now and reports sleeping well. He does drop items from his hands, has a hard time walking up the stairs and getting up out of a chair mostly because of fatigue.  In the morning his voice gets weak and raspy and when he feels fatigue, when he talks for long periods of time. He occasionally drools while talking or watching TV. He gets soreness in legs, arms and trunk especially after activity that can last a day or 2. He gets cramps in his calves ocasionally. He feels he's lost muscle mass in his legs distally and his arms proximally. He has gained weight over the past year.   He wears leg braces for difficulty raising his feet with his left more than right, worse with exertion.   He sometimes stumbles but never falls. He uses a cane and a walker for longer walks. ADLs and IADLs. Of note, he follows with  Dr. Vanegas for left tremor and rigidity. He was previously on Selegline but was taken off; he was not officially diagnosed with Parkinsonism. He follows with rheumatology continues on Plaquenil and Methotrexate.   He will occasionally get blurred especially when exerting himself or changing position from sitting to standing. This doesn't occur with headaches. He denies ptosis, head drop, chest pain, palpitations, twitching, change in bowel or bladder function (on Oxybutynin). He has not started any new medications.         Labs:  8/30/2018 EF 55-60%     8/21/2018   PFTs:   MIP -25 (was this same value 11/2017, -45 In 5/2018)  MEP 65  FVC- 72%     He had had normal muscle enzymes (CK) and the normal muscle EMG 3/2016.           8 sec left, 0 sec on on R  5 sec mall, left 5 sec  Reduced proprioception at the toes   hyperreflexive knees   Absent ankles   Can stand on his toes and heels  Unable to tandem walk     Right artihritis thumb- wears brace        Past Medical History:      Past Surgical History:     Family history:    There is no known family history of hereditary neuropathies or other neuromuscular disorders.     Social History:    She denies tobacco, alcohol, or illicit drug use. There is no known exposure to toxins or heavy metals.      Medical Allergies:  NKDA     Current Medications:      Review of Systems: A complete review of systems was obtained and was negative except for what was noted above.     Physical examination:       General Appearance: NAD, morbidly obese     Neurologic examination:  Mental status:  Patient is alert, attentive, and oriented x 3.  Language is coherent and fluent without dysarthria or aphasia.  Memory, comprehension and ability to follow commands were intact.        Cranial nerves:  Pupils were round and reacted to light.  Extraocular movements were full. No fatiguable ptosis, There was no face, jaw, palate or tongue weakness or atrophy. Hearing was grossly intact.  Shoulder  shrug was normal.       Motor exam: Reduced muscle bulk at the distal legs. No action or percussion myotonia   Manual muscle testing revealed the following MRC grade muscle power:    Right Left   Neck flexion 5 5   Neck extension: 5 5   Shoulder abduction:          5 5   Elbow extension: 5 5   Elbow flexion:              5 5   Wrist flexion:              5 5   Wrist extension:          5 5   Finger flexion 5 5   FDI 5 5   ADM 5 5   Hip flexion 5 5   Hip extension 5 5   Knee flexion 5 5   Knee extension 5 5   Dorsiflexion 5 5   Plantar flexion 5 5      No dysmetria on finger to nose.   Left postural and action tremor  Reduced CALLIE left side; slow foot tap and left     Sensory exam   Absent vibration left toe, 3 seconds on right. 5 seconds b/l media malleoli.  Reduced proprioception at the toes.   Pinprick and temperature were decreased to the ankles bilaterally.    +Romberg sign      Wide based gait; uses a cane. Difficulty clearing right foot when walking.   He was able to stand on his heels, toes  Unable to tandem without any difficulty.       Deep tendon reflexes:    Right Left   Triceps 2 2   Biceps 2 2   Brachioradialis 2 2   Knee jerk 3 3   Ankle jerk 0 0   Plantar responses were flexor bilaterally.       Assessment:    68 year old man with PMH of interstitial lung disease, rheumatoid arthritis, GALO on CPAP  presents for evaluation of 6 months history of increasing fatigue and shortness of breath. Given an unremarkable neuromuscular exam, normal previous CKs and EMG in 2016, the most likely cause of his dyspnea is a combination of deconditioning, obesity hypoventilation syndrome and his interstitial lung disease.      Plan:      - encouraged the patient to lose weight  - no further neuromuscular work up recommended      Kathy See DO  [facility] Neuromuscular Fellow [id number]     ATTENDING ADDENDUM: Patient seen and examined with Fellow Dr See today at the Neuromuscular Clinic. Agree with her  assessment and plan as above. TT spent for patient care 45 minutes; more than half was counseling.Mr Daly does have signs of diaphragm muscle weakness (low MIP, restrictive respiratory physiology, orthopnea requiring BiPAP) but I do not believe there is any systemic neuromuscular disorder to explain this for several reasons, includin) Normal exam of cranial and proximal muscles showing no signs of myasthenia, myopathy, MND, etc, 2) Normal needle EMG and RNS studies performed by myself, for the same reason, in 3/2016 (See Epic), and 3) No real progression of respiratory dysfunction between 2015 and 2018. If one looks at the PFT results, the FVC is identical in 2015 and 2018 (72% predicted). MIP was low even back in  (-45). If patient had a progressive neuromuscular disorder causing diaphragm weakness this should have declared itself during those 3 years. Suspect deconditioning/morbid obesity. Recommendations as above.  The patient has a couple of other neurological issues that we did not analyze further. The one is a very longstanding neuropathy. The patient was followed for many years by Dr Gonzalez, Director of the Neuromuscular Division, for this neuropathy. He did not feel that additional investigations would be beneficial at the last visit in , and felt this is idiopathic neuropathy or related to connective tissue disorder. This does not have anything to do with the respiratory problem and frankly I have nothing to add. Second, there is a postural tremor of the left>right hand. Our Fellow found signs of bradykinesia of left hand and cogwheel rigidity. We will leave further management of this to Dr Montes. Follow up prn     EVELIA: May 22, 2018     Last seen by me about 9 months ago.      Tremor - is about the same and is not markedly worse but varies. It is on the left side.  Not taking selegiline or other dopaminergic agent.   Held off on dopamine (datscan) scan - was concerned about iodine  contrast.     Neuropathy  lyrica 3-4 times per day. 1-1-1 or 1-1-2 is his scheduled and medication is coming from his pcp  He had problems with trileptal and neurontin in the past.  He has had increased numbness in his feet and has more problems with perception in the bottom of his feet.      He has chronic plantar fascitis     He has increased left ankle swelling     He has had problems with the 6 minute walk due to a variety of factors.   He has muscle pain and problems moving     Wearing braces.     Cognitive testing - has not been repeated.      Neck and back issues   Had seen Neurosurgery - Lorena JESSICA issues.   On plaquenil and methotrexate.  Still taking folate, vitamin d  Had eye examination.  Seeing MONROE Snow and ZACK Goodrich     Still having pain - seen pain clinic over a year ago.      Gastorenterology Dr. IDALIA Meade     Still using bipap  Seeing [facility] sleep medicine     intersititial lung disease seeing [facility] pulmonary medicine     Bladder = remains on the ditropan     Jah Corley is his pcp     Medications     7/730am 9am 2pm 4pm 11pm       Acetaminophen tylenol 500mg 2     2 2       Cholecalciferol vitamin D 1000 1 capsule               Folic acid folvite 1mg   1   1         Hydroxychloroquine plaquenil 200mg   1   1         Methotrexate 2.5mg     8 tabs on Mondays @ 2pm           Metoprolol toprolol XL 50mg 24 hr tablet 1               Omeprazole prilosec 40mg 1     1         Oxybutynin ditropan 5mg       1         Pregabalin lyrica 50mg 1     1 2                            History obtained from patient      IN summary, will make no changes in his management as his tremor seems stable but he has a constellation on going medical problems and will need to discuss whether there are any other pain strategies for his symptoms. He has not tried cbd oil or other products. He had been seen in pain management but they primarily were discussing narcotics and not medical marijuana, etc.      Return back  to see me in one year.           ______________________________________      Patient was asked about 14 Review of systems including changes in vision (dry eyes, double vision), hearing, heart, lungs, musculoskeletal, depression, anxiety, snoring, RBD, insomnia, urinary frequency, urinary urgency, constipation, swallowing problems, hematological, ID, allergies, skin problems: seborrhea, endocrinological: thyroid, diabetes, cholesterol; balance, weight changes, and other neurological problems and these were not significant at this time except for   Patient Active Problem List   Diagnosis     Diaphragmatic hernia     Esophageal reflux     Hx of melanoma of skin     Malignant basal cell neoplasm of skin     GALO (obstructive sleep apnea)     Chronic maxillary sinusitis     Urinary incontinence     H. pylori infection     Sicca syndrome (H)     Health Care Home     Advanced directives, counseling/discussion     Tremor     Visual changes     Incomplete defecation     Gait disorder     Balance problems     Fatigue     High risk medications (not anticoagulants) long-term use     Personal history of other malignant neoplasm of skin     AK (actinic keratosis)     Pain in joint, lower leg     Abnormal EMG     Seronegative arthritis     History of MRI of cervical spine     Pain in limb     Adenomatous polyp of colon     Peripheral polyneuropathy     Rheumatoid arthritis of multiple sites with negative rheumatoid factor (H)     Morbid obesity (H)     Interstitial lung disease (H)     History of MRI of lumbar spine     Mood disorder (H)     Paralysis agitans (H)          Allergies   Allergen Reactions     Restasis      Burning eyes, problems with breathing, tightness in chest     Adhesive Tape      Bandages misc     Allegra      EXCESSIVE URINATION AND WEAKNESS, LIGHT-HEADED     Allergy      Dust     Animal Dander      Benadryl Allergy      EXCESSIVE URINATION AND WEAKNESS, LIGHT-HEADED     Cephalexin      Joint pain or gerd  aggravation. Bloating excessive urination      Cephalosporins      Diphenhydramine Other (See Comments)     Doxycycline Hyclate Nausea     SWEATING,MIGRAINES,LOSS OF APPETITE,SWEATING,LIGHT HEADED, EXCESSIVE URINATION     Fexofenadine Other (See Comments)     Flonase [Fluticasone Propionate]      Gabapentin      Neurontin: mosd changes and excess urination     Iodine Solution [Povidone Iodine]      SKIN MELTS     Levaquin      From surgeon--? Joint pain ? Gerd aggravation. Insomnia, excess urination     Mylanta      EXCESSIVE URINATION AND WEAKNESS, LIGHT-HEADED     Prednisone      Weakness, elevated bp, headache, eye pain, congestion      Seafood [Seafood]      Shellfish Allergy      Hives       Sulfamethoxazole-Trimethoprim      Chest pain, angina     Trees      Trileptal      SEVERE JOINT AND TENDON PAIN, INSOMNIA, RESTLESSNESS, NAUSEA, EXCESS URINATION     Cortizone Rash     EXCESS URINATION,WEAKNESS,NAUSEA, HEADACHE     Past Surgical History:   Procedure Laterality Date     BIOPSY OF SKIN LESION       COLONOSCOPY       HEMORRHOID SURGERY       lip biopsy      for sicca complex     MOHS MICROGRAPHIC PROCEDURE       SOFT TISSUE SURGERY      removeal of basel cell carcinoma     Past Medical History:   Diagnosis Date     Abnormal EMG 4/18/2013     Abnormal involuntary movements(781.0)     Movement Disorder     AK (actinic keratosis) 12/18/2011     Allergic rhinitis, cause unspecified     Allergic rhinitis     Balance problems 11/1/2011     Basal cell carcinoma      Bladder spasms 11/1/2011     Chronic osteoarthritis      Diaphragmatic hernia without mention of obstruction or gangrene      Earache or other ear, nose, or throat complaint      Esophageal reflux      Fatigue 11/1/2011     Fracture      H. pylori infection 5/12/2011     History of MRI of cervical spine 11/18/2013    EXAMINATION: CERVICAL SPINE G/E 5 VIEWS* 4/19/2013 4:18 PM  CLINICAL HISTORY: Pain in limb,Performing Location?->[facility]),   COMPARISON:  FINDINGS: AP and lateral views in flexion and extension, as well as odontoid view of the cervical spine was obtained. There is no comparison available. The vertebral bodies of the cervical spine are normally aligned. There is posterior spurring and d     Incomplete defecation 11/1/2011     Interstitial lung disease (H) 11/29/2016     Laboratory test 8/7/2012     Lung disease June 2015     Malignant basal cell neoplasm of skin 8/6/2008     Melanoma (H) 8/6/2008     Melanoma in situ of lower leg (H)     R calf     Neuropathy 5/16/2011     Other bladder disorder      Other color vision deficiencies      Other nervous system complications      Parkinsonism (H) 11/1/2011     Personal history of colonic polyps      Polyneuropathy in other diseases classified elsewhere (H)      RA (rheumatoid arthritis) (H)      Rheumatoid arthritis of multiple sites with negative rheumatoid factor (H) 3/21/2016     Seronegative arthritis 11/18/2013     Shortness of breath      Shoulder arthritis 2016    acromioclavicular joint      Somatization disorder 5/12/2011     Squamous cell carcinoma      Tremor 11/1/2011     Unspecified essential hypertension      Unspecified hypothyroidism      Urinary tract infection      Urinary urgency 11/1/2011     Wears glasses 11/1/2011     Social History     Socioeconomic History     Marital status:      Spouse name: Not on file     Number of children: Not on file     Years of education: Not on file     Highest education level: Not on file   Occupational History     Not on file   Social Needs     Financial resource strain: Not on file     Food insecurity:     Worry: Not on file     Inability: Not on file     Transportation needs:     Medical: Not on file     Non-medical: Not on file   Tobacco Use     Smoking status: Former Smoker     Packs/day: 1.50     Years: 37.00     Pack years: 55.50     Types: Cigarettes     Start date: 6/15/1963     Last attempt to quit: 9/30/2000     Years since  quittin.6     Smokeless tobacco: Never Used   Substance and Sexual Activity     Alcohol use: No     Alcohol/week: 0.0 oz     Drug use: No     Sexual activity: Never     Partners: Female     Birth control/protection: None   Lifestyle     Physical activity:     Days per week: Not on file     Minutes per session: Not on file     Stress: Not on file   Relationships     Social connections:     Talks on phone: Not on file     Gets together: Not on file     Attends Orthodoxy service: Not on file     Active member of club or organization: Not on file     Attends meetings of clubs or organizations: Not on file     Relationship status: Not on file     Intimate partner violence:     Fear of current or ex partner: Not on file     Emotionally abused: Not on file     Physically abused: Not on file     Forced sexual activity: Not on file   Other Topics Concern     Parent/sibling w/ CABG, MI or angioplasty before 65F 55M? No   Social History Narrative    2013: Living in [place] in a townhouse with no steps    Has 3 sons that are doing okay.         Dairy/d 1 servings/d.     Caffeine 0 servings/d    Exercise 0 x week    Sunscreen used - No    Seatbelts used - Yes    Working smoke/CO detectors in the home - Yes    Guns stored in the home - Yes    Self Breast Exams - NA    Self Testicular Exam - Yes    Eye Exam up to date - Yes     Dental Exam up to date - Yes     Pap Smear up to date - NA    Mammogram up to date - NA    PSA up to date - Yes     Dexa Scan up to date - No    Flex Sig / Colonoscopy up to date - Yes less than 5 yrs ago    Immunizations up to date -today    Abuse: Current or Past(Physical, Sexual or Emotional)- No    Do you feel safe in your environment - Yes                       Drug and lactation database from the [place] [employer]:  http://toxnet.nlm.nih.gov/cgi-bin/sis/htmlgen?LACT      B/P: Data Unavailable, T: Data Unavailable, P: Data Unavailable, R: Data Unavailable 0  lbs 0 oz  There were no vitals taken for this visit., There is no height or weight on file to calculate BMI.  Medications and Vitals not listed above were documented in the cart and reviewed by me.     Current Outpatient Medications   Medication Sig Dispense Refill     acetaminophen (TYLENOL) 500 MG tablet 2 x 500mg by mouth 3 times per day: 7/730am, 4pm and 11pm  = 6/day       Cholecalciferol (VITAMIN D3) 1000 units CAPS 1000 unit capsule by mouth daily @ 7am 30 capsule      folic acid (FOLVITE) 1 MG tablet 1mg tab by mouth twice daily @ 9am and 4pm 180 tablet 3     hydroxychloroquine (PLAQUENIL) 200 MG tablet Take 1 tablet (200 mg) by mouth 2 times daily Annual opthalmologist exam due 7-2019 180 tablet 0     methotrexate 2.5 MG tablet TAKE 8 TABLETS BY MOUTH ONCE WEEKLY. LABS DUE EVERY 8 - 12 WEEKS. 96 tablet 4     metoprolol succinate ER (TOPROL-XL) 50 MG 24 hr tablet Take 1.5 tablets (75 mg) by mouth daily 135 tablet 11     omeprazole (PRILOSEC) 40 MG DR capsule 40mg capsule by mouth twice daily @ 7/730am and 4pm 180 capsule 3     ORDER FOR DME Use your CPAP device as directed by your provider.       oxybutynin (DITROPAN) 5 MG tablet 5mg tab by mouth daily @ 4pm 90 tablet 1     pregabalin (LYRICA) 50 MG capsule 1 tab by mouth @ 7/730am, 1 tab @ 4pm and 1-2 tab @ 11pm 360 capsule 3     triamcinolone (KENALOG) 0.1 % external cream Apply sparingly to affected area three times daily for 14 days. 15 g 0         Thong Montes MD

## 2019-05-21 RX ORDER — OSELTAMIVIR PHOSPHATE 75 MG/1
CAPSULE ORAL
Refills: 0 | COMMUNITY
Start: 2019-03-10 | End: 2019-05-23

## 2019-05-21 ASSESSMENT — ENCOUNTER SYMPTOMS
ARTHRALGIAS: 1
DYSURIA: 0
SPEECH CHANGE: 0
COUGH: 0
CONSTIPATION: 1
EYE REDNESS: 0
NECK MASS: 0
HALLUCINATIONS: 0
SORE THROAT: 0
NUMBNESS: 1
RECTAL PAIN: 0
INCREASED ENERGY: 0
TASTE DISTURBANCE: 0
EYE PAIN: 1
HEMOPTYSIS: 0
COUGH DISTURBING SLEEP: 0
DISTURBANCES IN COORDINATION: 1
ORTHOPNEA: 0
DYSPNEA ON EXERTION: 1
SYNCOPE: 0
SNORES LOUDLY: 0
SMELL DISTURBANCE: 0
FLANK PAIN: 1
JAUNDICE: 0
WEIGHT LOSS: 0
SKIN CHANGES: 0
DIARRHEA: 0
POSTURAL DYSPNEA: 0
TREMORS: 1
LEG PAIN: 1
NAIL CHANGES: 0
HYPERTENSION: 0
NECK PAIN: 0
FEVER: 0
POLYDIPSIA: 0
HOARSE VOICE: 1
DOUBLE VISION: 1
HEARTBURN: 1
DIZZINESS: 1
MUSCLE CRAMPS: 1
SEIZURES: 0
BOWEL INCONTINENCE: 0
POOR WOUND HEALING: 1
SINUS CONGESTION: 0
TINGLING: 1
SLEEP DISTURBANCES DUE TO BREATHING: 0
EYE IRRITATION: 1
BLOATING: 1
LIGHT-HEADEDNESS: 0
SPUTUM PRODUCTION: 0
NAUSEA: 0
WEIGHT GAIN: 0
VOMITING: 0
ABDOMINAL PAIN: 0
MUSCLE WEAKNESS: 1
SHORTNESS OF BREATH: 1
LOSS OF CONSCIOUSNESS: 0
NIGHT SWEATS: 0
HYPOTENSION: 0
BLOOD IN STOOL: 1
PARALYSIS: 0
WHEEZING: 0
BACK PAIN: 1
PALPITATIONS: 0
HEADACHES: 0
FATIGUE: 1
SINUS PAIN: 0
DECREASED APPETITE: 0
EYE WATERING: 0
EXERCISE INTOLERANCE: 1
WEAKNESS: 1
POLYPHAGIA: 0
CHILLS: 0
ALTERED TEMPERATURE REGULATION: 0
HEMATURIA: 0
MEMORY LOSS: 0
DIFFICULTY URINATING: 0
TROUBLE SWALLOWING: 0
MYALGIAS: 1
STIFFNESS: 1
JOINT SWELLING: 1

## 2019-05-23 ENCOUNTER — OFFICE VISIT (OUTPATIENT)
Dept: NEUROLOGY | Facility: CLINIC | Age: 69
End: 2019-05-23
Payer: MEDICARE

## 2019-05-23 VITALS
HEIGHT: 73 IN | HEART RATE: 55 BPM | SYSTOLIC BLOOD PRESSURE: 166 MMHG | WEIGHT: 315 LBS | DIASTOLIC BLOOD PRESSURE: 81 MMHG | OXYGEN SATURATION: 96 % | BODY MASS INDEX: 41.75 KG/M2

## 2019-05-23 DIAGNOSIS — G62.9 PERIPHERAL POLYNEUROPATHY: ICD-10-CM

## 2019-05-23 DIAGNOSIS — R25.1 TREMOR: ICD-10-CM

## 2019-05-23 DIAGNOSIS — G62.9 NEUROPATHY: ICD-10-CM

## 2019-05-23 DIAGNOSIS — M06.09 RHEUMATOID ARTHRITIS OF MULTIPLE SITES WITH NEGATIVE RHEUMATOID FACTOR (H): Primary | ICD-10-CM

## 2019-05-23 DIAGNOSIS — M06.019 RHEUMATOID ARTHRITIS INVOLVING SHOULDER WITH NEGATIVE RHEUMATOID FACTOR, UNSPECIFIED LATERALITY (H): ICD-10-CM

## 2019-05-23 DIAGNOSIS — K21.9 GASTROESOPHAGEAL REFLUX DISEASE WITHOUT ESOPHAGITIS: ICD-10-CM

## 2019-05-23 RX ORDER — OMEPRAZOLE 40 MG/1
CAPSULE, DELAYED RELEASE ORAL
Qty: 90 CAPSULE | Refills: 3 | COMMUNITY
Start: 2019-05-23 | End: 2019-12-30

## 2019-05-23 RX ORDER — PREGABALIN 50 MG/1
CAPSULE ORAL
Qty: 360 CAPSULE | Refills: 3 | Status: SHIPPED | OUTPATIENT
Start: 2019-05-23 | End: 2019-12-23

## 2019-05-23 RX ORDER — HYDROXYCHLOROQUINE SULFATE 200 MG/1
TABLET, FILM COATED ORAL
Qty: 180 TABLET | Refills: 3 | COMMUNITY
Start: 2019-05-23 | End: 2019-06-15

## 2019-05-23 ASSESSMENT — MIFFLIN-ST. JEOR: SCORE: 2245.43

## 2019-05-23 ASSESSMENT — PAIN SCALES - GENERAL: PAINLEVEL: NO PAIN (0)

## 2019-05-23 NOTE — NURSING NOTE
Chief Complaint   Patient presents with     Tremors     P RETURN - ANNUAL FOLLOW UP       Mike Flores, EMT

## 2019-05-23 NOTE — LETTER
2019       RE: Lucio Daly  [address]  [place] [address]     Dear Colleague,    Thank you for referring your patient, Lucio Daly, to the [facility] NEUROLOGY at [facility]. Please see a copy of my visit note below.    Diagnosis/Summary/Recommendations:    PATIENT: Lucio Daly  68 year old male     : 1950    EVELIA: May 23, 2019      Diaphragm weakness    emg      Interpretation: The EMG is abnormal.  There is clear evidence for a left ulnar neuropathy at the elbow which has worsened from the prior EMG study of 2016.  There is also evidence for mild bilateral median neuropathies at the wrists (carpal tunnel syndrome) that have not changed from the previous exam.  There is no evidence for worsening sensorimotor peripheral neuropathy or for an active right or left cervical radiculopathy on this examination.     EMG Physician: Rodney Garcia MD    Seen by dr linares.   Using bi pap at night.     He is stable overall.     He continues on the medication regimen as stated below with a reduction to one omeprazole per day.     Blood pressure has been good since his dose of metoprolol from 1 to 1.5 tabs.   166/81 and 55 heart rate.     Breather has been stable.     Wife Lo is good.     Neuropathy continues    Balance - using a cane  Still has issues with balance.     Not seeing neurosurgery anymore.   Had seen Lorena  Got another opinion and was not encouraged to get surgery.   This was for his back/spine issues.      RA issues.   On plaquenil and methotrexate.  Still taking folate, vitamin d  Had eye examination.  Seeing MONROE Snow and ZACK Goodrich     Still having pain - seen pain clinic over a year ago.      Gastorenterology - stable. Not seeing  Dr. IDALIA Meade anymore.   Has cut back on the omeprazole    Still having bladder issues.      Still using bipap  [facility]     intersititial lung disease seeing [facility] pulmonary  medicine    pcp is Dr. Corley     Has problems sleeping - with waking at 2am - not every night.   It was happening for a few months and has not had this for the past week or so.         Medications     7/730am 9am 2pm 4pm 11pm       Acetaminophen tylenol 500mg 2     2 2       Cholecalciferol vitamin D 1000 1 capsule               Folic acid folvite 1mg   1   1         Hydroxychloroquine plaquenil 200mg   1   1         Methotrexate 2.5mg     8 tabs on Mondays @ 2pm           Metoprolol toprolol XL 50mg 24 hr tablet 1.5               Omeprazole prilosec 40mg 1              Oxybutynin ditropan 5mg       1         Pregabalin lyrica 50mg 1     1 1-2       Proair hfa 108 (90 base) mcg/act inhaler  As needed                Triamcinolone kenalog 0.1% external cream Not using                               History obtained from patient      No change in his regimen  Will continue to monitor how he is doing.   If he has more sleep issues then may need to review his medications and whether he should take melatonin at night or do other tests    Return back in one year.     Coding statement:   Duration of  Services: patient care and care coordination was 25 minutes  Greater than 50% of this visit was spent in counseling and coordination of care.     Thong Montes MD     ______________________________________    Last visit date and details:     Answers for HPI/ROS submitted by the patient on 5/21/2019   General Symptoms: Yes  Skin Symptoms: Yes  HENT Symptoms: Yes  EYE SYMPTOMS: Yes  HEART SYMPTOMS: Yes  LUNG SYMPTOMS: Yes  INTESTINAL SYMPTOMS: Yes  URINARY SYMPTOMS: Yes  REPRODUCTIVE SYMPTOMS: No  SKELETAL SYMPTOMS: Yes  BLOOD SYMPTOMS: No  NERVOUS SYSTEM SYMPTOMS: Yes  MENTAL HEALTH SYMPTOMS: No  Fever: No  Loss of appetite: No  Weight loss: No  Weight gain: No  Fatigue: Yes  Night sweats: No  Chills: No  Increased stress: No  Excessive hunger: No  Excessive thirst: No  Feeling hot or cold when others believe the temperature is  normal: No  Loss of height: No  Post-operative complications: No  Surgical site pain: No  Hallucinations: No  Change in or Loss of Energy: No  Hyperactivity: No  Confusion: No  Changes in hair: No  Changes in moles/birth marks: No  Itching: No  Rashes: No  Changes in nails: No  Acne: No  Change in facial hair: No  Warts: No  Non-healing sores: Yes  Scarring: No  Flaking of skin: No  Color changes of hands/feet in cold : No  Sun sensitivity: Yes  Skin thickening: No  Ear pain: No  Ear discharge: No  Hearing loss: No  Tinnitus: Yes  Nosebleeds: No  Congestion: No  Sinus pain: No  Trouble swallowing: No   Voice hoarseness: Yes  Mouth sores: No  Sore throat: No  Tooth pain: No  Gum tenderness: No  Bleeding gums: No  Change in taste: No  Change in sense of smell: No  Dry mouth: Yes  Hearing aid used: No  Neck lump: No  Eye pain: Yes  Vision loss: No  Dry eyes: Yes  Watery eyes: No  Eye bulging: No  Double vision: Yes  Flashing of lights: No  Spots: Yes  Floaters: No  Redness: No  Crossed eyes: No  Tunnel Vision: No  Yellowing of eyes: No  Eye irritation: Yes  Cough: No  Sputum or phlegm: No  Coughing up blood: No  Difficulty breating or shortness of breath: Yes  Snoring: No  Wheezing: No  Difficulty breathing on exertion: Yes  Nighttime Cough: No  Difficulty breathing when lying flat: No  Chest pain or pressure: No  Fast or irregular heartbeat: No  Pain in legs with walking: Yes  Trouble breathing while lying down: No  Fingers or toes appear blue: No  High blood pressure: No  Low blood pressure: No  Fainting: No  Murmurs: No  Pacemaker: No  Varicose veins: No  Edema or swelling: Yes  Wake up at night with shortness of breath: No  Light-headedness: No  Exercise intolerance: Yes  Heart burn or indigestion: Yes  Nausea: No  Vomiting: No  Abdominal pain: No  Bloating: Yes  Constipation: Yes  Diarrhea: No  Blood in stool: Yes  Black stools: No  Rectal or Anal pain: No  Fecal incontinence: No  Yellowing of skin or eyes:  No  Vomit with blood: No  Change in stools: No  Trouble holding urine or incontinence: Yes  Pain or burning: No  Trouble starting or stopping: No  Increased frequency of urination: No  Blood in urine: No  Decreased frequency of urination: No  Frequent nighttime urination: No  Flank pain: Yes  Difficulty emptying bladder: No  Back pain: Yes  Muscle aches: Yes  Neck pain: No  Swollen joints: Yes  Joint pain: Yes  Bone pain: No  Muscle cramps: Yes  Muscle weakness: Yes  Joint stiffness: Yes  Bone fracture: No  Trouble with coordination: Yes  Dizziness or trouble with balance: Yes  Fainting or black-out spells: No  Memory loss: No  Headache: No  Seizures: No  Speech problems: No  Tingling: Yes  Tremor: Yes  Weakness: Yes  Difficulty walking: Yes  Paralysis: No  Numbness: Yes       [facility]  09/12/18     68 year old man with PMH of interstitial lung disease, rheumatoid arthritis, GALO on CPAP  presents for evaluation of 6 months history of increasing fatigue and shortness of breath, especially when walking and bending over. His pulmonologist referred him for changes seen in PFTs. There is no specific time of day when he is especially short of breath. When sitting in his recliner or lying back in bed, he reports having trouble catching his breath. He needs 2 pillows when sleeping at night. He wears his BiPAP every night for a year now and reports sleeping well. He does drop items from his hands, has a hard time walking up the stairs and getting up out of a chair mostly because of fatigue.  In the morning his voice gets weak and raspy and when he feels fatigue, when he talks for long periods of time. He occasionally drools while talking or watching TV. He gets soreness in legs, arms and trunk especially after activity that can last a day or 2. He gets cramps in his calves ocasionally. He feels he's lost muscle mass in his legs distally and his arms proximally. He  has gained weight over the past year.   He wears leg braces for difficulty raising his feet with his left more than right, worse with exertion.   He sometimes stumbles but never falls. He uses a cane and a walker for longer walks. ADLs and IADLs. Of note, he follows with Dr. Vanegas for left tremor and rigidity. He was previously on Selegline but was taken off; he was not officially diagnosed with Parkinsonism. He follows with rheumatology continues on Plaquenil and Methotrexate.   He will occasionally get blurred especially when exerting himself or changing position from sitting to standing. This doesn't occur with headaches. He denies ptosis, head drop, chest pain, palpitations, twitching, change in bowel or bladder function (on Oxybutynin). He has not started any new medications.         Labs:  8/30/2018 EF 55-60%     8/21/2018   PFTs:   MIP -25 (was this same value 11/2017, -45 In 5/2018)  MEP 65  FVC- 72%     He had had normal muscle enzymes (CK) and the normal muscle EMG 3/2016.           8 sec left, 0 sec on on R  5 sec mall, left 5 sec  Reduced proprioception at the toes   hyperreflexive knees   Absent ankles   Can stand on his toes and heels  Unable to tandem walk     Right artihritis thumb- wears brace        Past Medical History:      Past Surgical History:     Family history:    There is no known family history of hereditary neuropathies or other neuromuscular disorders.     Social History:    She denies tobacco, alcohol, or illicit drug use. There is no known exposure to toxins or heavy metals.      Medical Allergies:  NKDA     Current Medications:      Review of Systems: A complete review of systems was obtained and was negative except for what was noted above.     Physical examination:       General Appearance: NAD, morbidly obese     Neurologic examination:  Mental status:  Patient is alert, attentive, and oriented x 3.  Language is coherent and fluent without dysarthria or aphasia.  Memory,  comprehension and ability to follow commands were intact.        Cranial nerves:  Pupils were round and reacted to light.  Extraocular movements were full. No fatiguable ptosis, There was no face, jaw, palate or tongue weakness or atrophy. Hearing was grossly intact.  Shoulder shrug was normal.       Motor exam: Reduced muscle bulk at the distal legs. No action or percussion myotonia   Manual muscle testing revealed the following MRC grade muscle power:    Right Left   Neck flexion 5 5   Neck extension: 5 5   Shoulder abduction:          5 5   Elbow extension: 5 5   Elbow flexion:              5 5   Wrist flexion:              5 5   Wrist extension:          5 5   Finger flexion 5 5   FDI 5 5   ADM 5 5   Hip flexion 5 5   Hip extension 5 5   Knee flexion 5 5   Knee extension 5 5   Dorsiflexion 5 5   Plantar flexion 5 5      No dysmetria on finger to nose.   Left postural and action tremor  Reduced CALLIE left side; slow foot tap and left     Sensory exam   Absent vibration left toe, 3 seconds on right. 5 seconds b/l media malleoli.  Reduced proprioception at the toes.   Pinprick and temperature were decreased to the ankles bilaterally.    +Romberg sign      Wide based gait; uses a cane. Difficulty clearing right foot when walking.   He was able to stand on his heels, toes  Unable to tandem without any difficulty.       Deep tendon reflexes:    Right Left   Triceps 2 2   Biceps 2 2   Brachioradialis 2 2   Knee jerk 3 3   Ankle jerk 0 0   Plantar responses were flexor bilaterally.       Assessment:    68 year old man with PMH of interstitial lung disease, rheumatoid arthritis, GALO on CPAP  presents for evaluation of 6 months history of increasing fatigue and shortness of breath. Given an unremarkable neuromuscular exam, normal previous CKs and EMG in 2016, the most likely cause of his dyspnea is a combination of deconditioning, obesity hypoventilation syndrome and his interstitial lung disease.      Plan:      -  encouraged the patient to lose weight  - no further neuromuscular work up recommended      Kathy See DO  [facility] Neuromuscular Fellow [phone]     ATTENDING ADDENDUM: Patient seen and examined with Fellow Dr See today at the Neuromuscular Clinic. Agree with her assessment and plan as above. TT spent for patient care 45 minutes; more than half was counseling.Mr aDly does have signs of diaphragm muscle weakness (low MIP, restrictive respiratory physiology, orthopnea requiring BiPAP) but I do not believe there is any systemic neuromuscular disorder to explain this for several reasons, includin) Normal exam of cranial and proximal muscles showing no signs of myasthenia, myopathy, MND, etc, 2) Normal needle EMG and RNS studies performed by myself, for the same reason, in 3/2016 (See Epic), and 3) No real progression of respiratory dysfunction between 2015 and 2018. If one looks at the PFT results, the FVC is identical in 2015 and 2018 (72% predicted). MIP was low even back in  (-45). If patient had a progressive neuromuscular disorder causing diaphragm weakness this should have declared itself during those 3 years. Suspect deconditioning/morbid obesity. Recommendations as above.  The patient has a couple of other neurological issues that we did not analyze further. The one is a very longstanding neuropathy. The patient was followed for many years by Dr Gonzalez, Director of the Neuromuscular Division, for this neuropathy. He did not feel that additional investigations would be beneficial at the last visit in , and felt this is idiopathic neuropathy or related to connective tissue disorder. This does not have anything to do with the respiratory problem and frankly I have nothing to add. Second, there is a postural tremor of the left>right hand. Our Fellow found signs of bradykinesia of left hand and cogwheel rigidity. We will leave further management of this to Dr Montes. Follow up  prn     EVELIA: May 22, 2018     Last seen by me about 9 months ago.      Tremor - is about the same and is not markedly worse but varies. It is on the left side.  Not taking selegiline or other dopaminergic agent.   Held off on dopamine (datscan) scan - was concerned about iodine contrast.     Neuropathy  lyrica 3-4 times per day. 1-1-1 or 1-1-2 is his scheduled and medication is coming from his pcp  He had problems with trileptal and neurontin in the past.  He has had increased numbness in his feet and has more problems with perception in the bottom of his feet.      He has chronic plantar fascitis     He has increased left ankle swelling     He has had problems with the 6 minute walk due to a variety of factors.   He has muscle pain and problems moving     Wearing braces.     Cognitive testing - has not been repeated.      Neck and back issues   Had seen Neurosurgery - Lorena JESSICA issues.   On plaquenil and methotrexate.  Still taking folate, vitamin d  Had eye examination.  Seeing MONROE Snow and ZACK Goodrich     Still having pain - seen pain clinic over a year ago.      Gastorenterology Dr. IDALIA Meade     Still using bipap  Seeing [facility] sleep medicine     intersititial lung disease seeing [facility] pulmonary medicine     Bladder = remains on the ditropan     Jah Corley is his pcp     Medications     7/730am 9am 2pm 4pm 11pm       Acetaminophen tylenol 500mg 2     2 2       Cholecalciferol vitamin D 1000 1 capsule               Folic acid folvite 1mg   1   1         Hydroxychloroquine plaquenil 200mg   1   1         Methotrexate 2.5mg     8 tabs on Mondays @ 2pm           Metoprolol toprolol XL 50mg 24 hr tablet 1               Omeprazole prilosec 40mg 1     1         Oxybutynin ditropan 5mg       1         Pregabalin lyrica 50mg 1     1 2                            History obtained from patient      IN summary, will make no changes in his management as his tremor seems stable but he has a constellation on  going medical problems and will need to discuss whether there are any other pain strategies for his symptoms. He has not tried cbd oil or other products. He had been seen in pain management but they primarily were discussing narcotics and not medical marijuana, etc.      Return back to see me in one year.           ______________________________________      Patient was asked about 14 Review of systems including changes in vision (dry eyes, double vision), hearing, heart, lungs, musculoskeletal, depression, anxiety, snoring, RBD, insomnia, urinary frequency, urinary urgency, constipation, swallowing problems, hematological, ID, allergies, skin problems: seborrhea, endocrinological: thyroid, diabetes, cholesterol; balance, weight changes, and other neurological problems and these were not significant at this time except for   Patient Active Problem List   Diagnosis     Diaphragmatic hernia     Esophageal reflux     Hx of melanoma of skin     Malignant basal cell neoplasm of skin     GALO (obstructive sleep apnea)     Chronic maxillary sinusitis     Urinary incontinence     H. pylori infection     Sicca syndrome (H)     Health Care Home     Advanced directives, counseling/discussion     Tremor     Visual changes     Incomplete defecation     Gait disorder     Balance problems     Fatigue     High risk medications (not anticoagulants) long-term use     Personal history of other malignant neoplasm of skin     AK (actinic keratosis)     Pain in joint, lower leg     Abnormal EMG     Seronegative arthritis     History of MRI of cervical spine     Pain in limb     Adenomatous polyp of colon     Peripheral polyneuropathy     Rheumatoid arthritis of multiple sites with negative rheumatoid factor (H)     Morbid obesity (H)     Interstitial lung disease (H)     History of MRI of lumbar spine     Mood disorder (H)     Paralysis agitans (H)          Allergies   Allergen Reactions     Restasis      Burning eyes, problems with  breathing, tightness in chest     Adhesive Tape      Bandages misc     Allegra      EXCESSIVE URINATION AND WEAKNESS, LIGHT-HEADED     Allergy      Dust     Animal Dander      Benadryl Allergy      EXCESSIVE URINATION AND WEAKNESS, LIGHT-HEADED     Cephalexin      Joint pain or gerd aggravation. Bloating excessive urination      Cephalosporins      Diphenhydramine Other (See Comments)     Doxycycline Hyclate Nausea     SWEATING,MIGRAINES,LOSS OF APPETITE,SWEATING,LIGHT HEADED, EXCESSIVE URINATION     Fexofenadine Other (See Comments)     Flonase [Fluticasone Propionate]      Gabapentin      Neurontin: mosd changes and excess urination     Iodine Solution [Povidone Iodine]      SKIN MELTS     Levaquin      From surgeon--? Joint pain ? Gerd aggravation. Insomnia, excess urination     Mylanta      EXCESSIVE URINATION AND WEAKNESS, LIGHT-HEADED     Prednisone      Weakness, elevated bp, headache, eye pain, congestion      Seafood [Seafood]      Shellfish Allergy      Hives       Sulfamethoxazole-Trimethoprim      Chest pain, angina     Trees      Trileptal      SEVERE JOINT AND TENDON PAIN, INSOMNIA, RESTLESSNESS, NAUSEA, EXCESS URINATION     Cortizone Rash     EXCESS URINATION,WEAKNESS,NAUSEA, HEADACHE     Past Surgical History:   Procedure Laterality Date     BIOPSY OF SKIN LESION       COLONOSCOPY       HEMORRHOID SURGERY       lip biopsy      for sicca complex     MOHS MICROGRAPHIC PROCEDURE       SOFT TISSUE SURGERY      removeal of basel cell carcinoma     Past Medical History:   Diagnosis Date     Abnormal EMG 4/18/2013     Abnormal involuntary movements(781.0)     Movement Disorder     AK (actinic keratosis) 12/18/2011     Allergic rhinitis, cause unspecified     Allergic rhinitis     Balance problems 11/1/2011     Basal cell carcinoma      Bladder spasms 11/1/2011     Chronic osteoarthritis      Diaphragmatic hernia without mention of obstruction or gangrene      Earache or other ear, nose, or throat complaint       Esophageal reflux      Fatigue 11/1/2011     Fracture      H. pylori infection 5/12/2011     History of MRI of cervical spine 11/18/2013    EXAMINATION: CERVICAL SPINE G/E 5 VIEWS* 4/19/2013 4:18 PM  CLINICAL HISTORY: Pain in limb,Performing Location?->[facility]),  COMPARISON:  FINDINGS: AP and lateral views in flexion and extension, as well as odontoid view of the cervical spine was obtained. There is no comparison available. The vertebral bodies of the cervical spine are normally aligned. There is posterior spurring and d     Incomplete defecation 11/1/2011     Interstitial lung disease (H) 11/29/2016     Laboratory test 8/7/2012     Lung disease June 2015     Malignant basal cell neoplasm of skin 8/6/2008     Melanoma (H) 8/6/2008     Melanoma in situ of lower leg (H)     R calf     Neuropathy 5/16/2011     Other bladder disorder      Other color vision deficiencies      Other nervous system complications      Parkinsonism (H) 11/1/2011     Personal history of colonic polyps      Polyneuropathy in other diseases classified elsewhere (H)      RA (rheumatoid arthritis) (H)      Rheumatoid arthritis of multiple sites with negative rheumatoid factor (H) 3/21/2016     Seronegative arthritis 11/18/2013     Shortness of breath      Shoulder arthritis 2016    acromioclavicular joint      Somatization disorder 5/12/2011     Squamous cell carcinoma      Tremor 11/1/2011     Unspecified essential hypertension      Unspecified hypothyroidism      Urinary tract infection      Urinary urgency 11/1/2011     Wears glasses 11/1/2011     Social History     Socioeconomic History     Marital status:      Spouse name: Not on file     Number of children: Not on file     Years of education: Not on file     Highest education level: Not on file   Occupational History     Not on file   Social Needs     Financial resource strain: Not on file     Food insecurity:     Worry: Not on file     Inability: Not on file      Transportation needs:     Medical: Not on file     Non-medical: Not on file   Tobacco Use     Smoking status: Former Smoker     Packs/day: 1.50     Years: 37.00     Pack years: 55.50     Types: Cigarettes     Start date: 6/15/1963     Last attempt to quit: 2000     Years since quittin.6     Smokeless tobacco: Never Used   Substance and Sexual Activity     Alcohol use: No     Alcohol/week: 0.0 oz     Drug use: No     Sexual activity: Never     Partners: Female     Birth control/protection: None   Lifestyle     Physical activity:     Days per week: Not on file     Minutes per session: Not on file     Stress: Not on file   Relationships     Social connections:     Talks on phone: Not on file     Gets together: Not on file     Attends Taoist service: Not on file     Active member of club or organization: Not on file     Attends meetings of clubs or organizations: Not on file     Relationship status: Not on file     Intimate partner violence:     Fear of current or ex partner: Not on file     Emotionally abused: Not on file     Physically abused: Not on file     Forced sexual activity: Not on file   Other Topics Concern     Parent/sibling w/ CABG, MI or angioplasty before 65F 55M? No   Social History Narrative    2013: Living in [place] in a townhouse with no steps    Has 3 sons that are doing okay.         Dairy/d 1 servings/d.     Caffeine 0 servings/d    Exercise 0 x week    Sunscreen used - No    Seatbelts used - Yes    Working smoke/CO detectors in the home - Yes    Guns stored in the home - Yes    Self Breast Exams - NA    Self Testicular Exam - Yes    Eye Exam up to date - Yes     Dental Exam up to date - Yes     Pap Smear up to date - NA    Mammogram up to date - NA    PSA up to date - Yes     Dexa Scan up to date - No    Flex Sig / Colonoscopy up to date - Yes less than 5 yrs ago    Immunizations up to date -today    Abuse: Current or Past(Physical, Sexual or Emotional)- No    Do you feel  safe in your environment - Yes    2008                   Drug and lactation database from the [place] [employer] of Medicine:  http://toxnet.nlm.nih.gov/cgi-bin/sis/htmlgen?LACT      B/P: Data Unavailable, T: Data Unavailable, P: Data Unavailable, R: Data Unavailable 0 lbs 0 oz  There were no vitals taken for this visit., There is no height or weight on file to calculate BMI.  Medications and Vitals not listed above were documented in the cart and reviewed by me.     Current Outpatient Medications   Medication Sig Dispense Refill     acetaminophen (TYLENOL) 500 MG tablet 2 x 500mg by mouth 3 times per day: 7/730am, 4pm and 11pm  = 6/day       Cholecalciferol (VITAMIN D3) 1000 units CAPS 1000 unit capsule by mouth daily @ 7am 30 capsule      folic acid (FOLVITE) 1 MG tablet 1mg tab by mouth twice daily @ 9am and 4pm 180 tablet 3     hydroxychloroquine (PLAQUENIL) 200 MG tablet Take 1 tablet (200 mg) by mouth 2 times daily Annual opthalmologist exam due 7-2019 180 tablet 0     methotrexate 2.5 MG tablet TAKE 8 TABLETS BY MOUTH ONCE WEEKLY. LABS DUE EVERY 8 - 12 WEEKS. 96 tablet 4     metoprolol succinate ER (TOPROL-XL) 50 MG 24 hr tablet Take 1.5 tablets (75 mg) by mouth daily 135 tablet 11     omeprazole (PRILOSEC) 40 MG DR capsule 40mg capsule by mouth twice daily @ 7/730am and 4pm 180 capsule 3     ORDER FOR DME Use your CPAP device as directed by your provider.       oxybutynin (DITROPAN) 5 MG tablet 5mg tab by mouth daily @ 4pm 90 tablet 1     pregabalin (LYRICA) 50 MG capsule 1 tab by mouth @ 7/730am, 1 tab @ 4pm and 1-2 tab @ 11pm 360 capsule 3     triamcinolone (KENALOG) 0.1 % external cream Apply sparingly to affected area three times daily for 14 days. 15 g 0         Thong Montes MD    Again, thank you for allowing me to participate in the care of your patient.      Sincerely,    Thogn Montes MD

## 2019-05-23 NOTE — PATIENT INSTRUCTIONS
Medications     7/730am 9am 2pm 4pm 11pm       Acetaminophen tylenol 500mg 2     2 2       Cholecalciferol vitamin D 1000 1 capsule               Folic acid folvite 1mg   1   1         Hydroxychloroquine plaquenil 200mg   1   1         Methotrexate 2.5mg     8 tabs on Mondays @ 2pm           Metoprolol toprolol XL 50mg 24 hr tablet 1.5               Omeprazole prilosec 40mg 1              Oxybutynin ditropan 5mg       1         Pregabalin lyrica 50mg 1     1 1-2       Proair hfa 108 (90 base) mcg/act inhaler  As needed                Triamcinolone kenalog 0.1% external cream Not using                                     Diaphragm weakness    emg      Interpretation: The EMG is abnormal.  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  There is also evidence for mild bilateral median neuropathies at the wrists (carpal tunnel syndrome) that have not changed from the previous exam.  There is no evidence for worsening sensorimotor peripheral neuropathy or for an active right or left cervical radiculopathy on this examination.     EMG Physician: Rodney Garcia MD    Seen by dr linares.   Using bi pap at night.     He is stable overall.     He continues on the medication regimen as stated below with a reduction to one omeprazole per day.     Blood pressure has been good since his dose of metoprolol from 1 to 1.5 tabs.   166/81 and 55 heart rate.     Breather has been stable.     Wife Lo is good.     Neuropathy continues    Balance - using a cane  Still has issues with balance.     Not seeing neurosurgery anymore.   Had seen Carrillo  Got another opinion and was not encouraged to get surgery.   This was for his back/spine issues.      RA issues.   On plaquenil and methotrexate.  Still taking folate, vitamin d  Had eye examination.  Seeing MONROE Snow and ZACK Goodrich     Still having pain - seen pain clinic over a year ago.       Gastorenterology - stable. Not seeing  Dr. IDALIA Meade anymore.   Has cut back on the omeprazole    Still having bladder issues.      Still using bipap  Seeing [facility] sleep medicine     intersititial lung disease seeing [facility] pulmonary medicine    pcp is Dr. Corley     Has problems sleeping - with waking at 2am - not every night.   It was happening for a few months and has not had this for the past week or so.           No change in his regimen  Will continue to monitor how he is doing.   If he has more sleep issues then may need to review his medications and whether he should take melatonin at night or do other tests    Return back in one year.

## 2019-06-06 ASSESSMENT — ENCOUNTER SYMPTOMS
SKIN CHANGES: 0
MUSCLE CRAMPS: 1
EYE IRRITATION: 0
EYE PAIN: 1
DYSPNEA ON EXERTION: 1
HEMOPTYSIS: 0
RECTAL PAIN: 0
SEIZURES: 0
JOINT SWELLING: 1
VOMITING: 0
SHORTNESS OF BREATH: 1
ALTERED TEMPERATURE REGULATION: 1
DIARRHEA: 0
SLEEP DISTURBANCES DUE TO BREATHING: 0
HYPOTENSION: 0
HOARSE VOICE: 1
MYALGIAS: 1
WEIGHT GAIN: 0
FLANK PAIN: 1
HALLUCINATIONS: 0
DYSURIA: 0
CONSTIPATION: 1
EYE REDNESS: 0
BLOOD IN STOOL: 1
NAIL CHANGES: 0
LEG PAIN: 1
FEVER: 0
NUMBNESS: 1
PARALYSIS: 0
HEARTBURN: 1
DIZZINESS: 1
SNORES LOUDLY: 0
WEAKNESS: 1
STIFFNESS: 1
ARTHRALGIAS: 1
POSTURAL DYSPNEA: 0
SPEECH CHANGE: 0
BLOATING: 1
NECK PAIN: 1
EYE WATERING: 0
MEMORY LOSS: 0
MUSCLE WEAKNESS: 1
TROUBLE SWALLOWING: 0
POLYDIPSIA: 0
SYNCOPE: 0
LIGHT-HEADEDNESS: 1
DISTURBANCES IN COORDINATION: 1
TREMORS: 1
POOR WOUND HEALING: 1
HEADACHES: 0
BOWEL INCONTINENCE: 0
EXERCISE INTOLERANCE: 1
DOUBLE VISION: 1
HYPERTENSION: 1
ORTHOPNEA: 0
LOSS OF CONSCIOUSNESS: 0
TASTE DISTURBANCE: 0
CHILLS: 0
COUGH: 0
COUGH DISTURBING SLEEP: 0
WEIGHT LOSS: 0
DIFFICULTY URINATING: 0
SORE THROAT: 0
SINUS CONGESTION: 0
JAUNDICE: 0
TINGLING: 1
POLYPHAGIA: 0
SINUS PAIN: 0
FATIGUE: 1
NECK MASS: 0
HEMATURIA: 0
INCREASED ENERGY: 0
BACK PAIN: 1
ABDOMINAL PAIN: 1
WHEEZING: 0
NIGHT SWEATS: 0
SPUTUM PRODUCTION: 0
SMELL DISTURBANCE: 0
PALPITATIONS: 1
DECREASED APPETITE: 0
NAUSEA: 0

## 2019-06-12 ENCOUNTER — OFFICE VISIT (OUTPATIENT)
Dept: RHEUMATOLOGY | Facility: CLINIC | Age: 69
End: 2019-06-12
Attending: NURSE PRACTITIONER
Payer: MEDICARE

## 2019-06-12 ENCOUNTER — OFFICE VISIT (OUTPATIENT)
Dept: PULMONOLOGY | Facility: CLINIC | Age: 69
End: 2019-06-12
Attending: NURSE PRACTITIONER
Payer: MEDICARE

## 2019-06-12 VITALS
HEIGHT: 73 IN | TEMPERATURE: 98.3 F | BODY MASS INDEX: 41.75 KG/M2 | SYSTOLIC BLOOD PRESSURE: 159 MMHG | OXYGEN SATURATION: 94 % | DIASTOLIC BLOOD PRESSURE: 99 MMHG | WEIGHT: 315 LBS | HEART RATE: 65 BPM

## 2019-06-12 VITALS
HEART RATE: 65 BPM | HEIGHT: 73 IN | OXYGEN SATURATION: 94 % | DIASTOLIC BLOOD PRESSURE: 99 MMHG | SYSTOLIC BLOOD PRESSURE: 159 MMHG | BODY MASS INDEX: 41.64 KG/M2 | TEMPERATURE: 98.3 F

## 2019-06-12 DIAGNOSIS — J84.9 ILD (INTERSTITIAL LUNG DISEASE) (H): Primary | ICD-10-CM

## 2019-06-12 DIAGNOSIS — Z79.899 HIGH RISK MEDICATIONS (NOT ANTICOAGULANTS) LONG-TERM USE: ICD-10-CM

## 2019-06-12 DIAGNOSIS — J45.909 ASTHMA: Primary | ICD-10-CM

## 2019-06-12 DIAGNOSIS — M06.09 RHEUMATOID ARTHRITIS OF MULTIPLE SITES WITH NEGATIVE RHEUMATOID FACTOR (H): Primary | ICD-10-CM

## 2019-06-12 DIAGNOSIS — J44.81 OBLITERATIVE BRONCHIOLITIS (H): Primary | ICD-10-CM

## 2019-06-12 DIAGNOSIS — M06.09 RHEUMATOID ARTHRITIS OF MULTIPLE SITES WITH NEGATIVE RHEUMATOID FACTOR (H): ICD-10-CM

## 2019-06-12 LAB
6 MIN WALK (FT): 750 FT
6 MIN WALK (M): 229 M
ALBUMIN SERPL-MCNC: 3.8 G/DL (ref 3.4–5)
ALT SERPL W P-5'-P-CCNC: 23 U/L (ref 0–70)
AST SERPL W P-5'-P-CCNC: 16 U/L (ref 0–45)
CREAT SERPL-MCNC: 1.05 MG/DL (ref 0.66–1.25)
CRP SERPL-MCNC: 12.7 MG/L (ref 0–8)
ERYTHROCYTE [DISTWIDTH] IN BLOOD BY AUTOMATED COUNT: 14.7 % (ref 10–15)
GFR SERPL CREATININE-BSD FRML MDRD: 71 ML/MIN/{1.73_M2}
HCT VFR BLD AUTO: 43.3 % (ref 40–53)
HGB BLD-MCNC: 13.8 G/DL (ref 13.3–17.7)
MCH RBC QN AUTO: 30.8 PG (ref 26.5–33)
MCHC RBC AUTO-ENTMCNC: 31.9 G/DL (ref 31.5–36.5)
MCV RBC AUTO: 97 FL (ref 78–100)
PLATELET # BLD AUTO: 160 10E9/L (ref 150–450)
RBC # BLD AUTO: 4.48 10E12/L (ref 4.4–5.9)
WBC # BLD AUTO: 5.1 10E9/L (ref 4–11)

## 2019-06-12 PROCEDURE — G0463 HOSPITAL OUTPT CLINIC VISIT: HCPCS | Mod: ZF

## 2019-06-12 RX ORDER — ALBUTEROL SULFATE 90 UG/1
2 AEROSOL, METERED RESPIRATORY (INHALATION) EVERY 4 HOURS PRN
Qty: 1 INHALER | Refills: 3 | Status: SHIPPED | OUTPATIENT
Start: 2019-06-12 | End: 2021-12-23

## 2019-06-12 RX ORDER — ALBUTEROL SULFATE 90 UG/1
2 AEROSOL, METERED RESPIRATORY (INHALATION) EVERY 4 HOURS PRN
Qty: 1 INHALER | Refills: 3 | Status: SHIPPED | OUTPATIENT
Start: 2019-06-12 | End: 2019-11-05

## 2019-06-12 RX ORDER — ALBUTEROL SULFATE 90 UG/1
2 AEROSOL, METERED RESPIRATORY (INHALATION) EVERY 4 HOURS PRN
Qty: 1 INHALER | Refills: 3 | Status: SHIPPED | OUTPATIENT
Start: 2019-06-12 | End: 2019-06-12

## 2019-06-12 ASSESSMENT — PAIN SCALES - GENERAL: PAINLEVEL: SEVERE PAIN (6)

## 2019-06-12 ASSESSMENT — MIFFLIN-ST. JEOR: SCORE: 2245.43

## 2019-06-12 NOTE — PROGRESS NOTES
Reason for Visit  Lucio Daly is a 68 year old year old male who is being seen for possible follicular bronchilitis  HPI    The patient was seen and examined by Harsha Mccarthy      Mr. 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  He was last seen in the Pulmonary Clinic on 11/20/2018 when he was doing quite well from a pulmonary  standpoint.  He had stable PFTs.  He was on methotrexate and Plaquenil.  The dose was 20 mg per week of Plaquenil and 400 mg daily for hydroxychloroquine.  He has since been evaluated by Rheumatology and he continues to be on methotrexate 20 mg per week and Plaquenil.      He tells me that over the winter months he had a 3 different episodes of bronchitis.  When he had symptoms he reported to an urgent care close to his home and was given antibiotics.  He also needed inhaled bronchodilators, mostly inhalers, which improved his symptoms.      Today, he reports doing well.  He is trying to walk 3/4 of a mile 3-4 times a week and is able to do that without any major problems.  Denies any cough or sputum production.  He has a good energy level.  He denies any changes in his endurance.  He uses NIPPV for sleep disordered breathing and is tolerating this well.       Current Outpatient Medications   Medication     acetaminophen (TYLENOL) 500 MG tablet     albuterol (PROAIR HFA) 108 (90 Base) MCG/ACT inhaler     Cholecalciferol (VITAMIN D3) 1000 units CAPS     folic acid (FOLVITE) 1 MG tablet     hydroxychloroquine (PLAQUENIL) 200 MG tablet     methotrexate 2.5 MG tablet     metoprolol succinate ER (TOPROL-XL) 50 MG 24 hr tablet     omeprazole (PRILOSEC) 40 MG DR capsule     ORDER FOR DME     oxybutynin (DITROPAN) 5 MG tablet     pregabalin (LYRICA) 50 MG capsule     albuterol (PROAIR HFA/PROVENTIL HFA/VENTOLIN HFA) 108 (90 Base) MCG/ACT inhaler     No current facility-administered medications for this visit.      Allergies    Allergen Reactions     Restasis      Burning eyes, problems with breathing, tightness in chest     Adhesive Tape      Bandages misc     Allegra      EXCESSIVE URINATION AND WEAKNESS, LIGHT-HEADED     Allergy      Dust     Animal Dander      Benadryl Allergy      EXCESSIVE URINATION AND WEAKNESS, LIGHT-HEADED     Cephalexin      Joint pain or gerd aggravation. Bloating excessive urination      Cephalosporins      Diphenhydramine Other (See Comments)     Doxycycline Hyclate Nausea     SWEATING,MIGRAINES,LOSS OF APPETITE,SWEATING,LIGHT HEADED, EXCESSIVE URINATION     Fexofenadine Other (See Comments)     Flonase [Fluticasone Propionate]      Gabapentin      Neurontin: mosd changes and excess urination     Iodine Solution [Povidone Iodine]      SKIN MELTS     Levaquin      From surgeon--? Joint pain ? Gerd aggravation. Insomnia, excess urination     Mylanta      EXCESSIVE URINATION AND WEAKNESS, LIGHT-HEADED     Prednisone      Weakness, elevated bp, headache, eye pain, congestion      Seafood [Seafood]      Shellfish Allergy      Hives       Sulfamethoxazole-Trimethoprim      Chest pain, angina     Trees      Trileptal      SEVERE JOINT AND TENDON PAIN, INSOMNIA, RESTLESSNESS, NAUSEA, EXCESS URINATION     Cortizone Rash     EXCESS URINATION,WEAKNESS,NAUSEA, HEADACHE     Past Medical History:   Diagnosis Date     Abnormal EMG 4/18/2013     Abnormal involuntary movements(781.0)     Movement Disorder     AK (actinic keratosis) 12/18/2011     Allergic rhinitis, cause unspecified     Allergic rhinitis     Balance problems 11/1/2011     Basal cell carcinoma      Bladder spasms 11/1/2011     Chronic osteoarthritis      Diaphragmatic hernia without mention of obstruction or gangrene      Earache or other ear, nose, or throat complaint      Esophageal reflux      Fatigue 11/1/2011     Fracture      H. pylori infection 5/12/2011     History of MRI of cervical spine 11/18/2013    EXAMINATION: CERVICAL SPINE G/E 5 VIEWS*  4/19/2013 4:18 PM  CLINICAL HISTORY: Pain in limb,Performing Location?->[facility]),  COMPARISON:  FINDINGS: AP and lateral views in flexion and extension, as well as odontoid view of the cervical spine was obtained. There is no comparison available. The vertebral bodies of the cervical spine are normally aligned. There is posterior spurring and d     Incomplete defecation 11/1/2011     Interstitial lung disease (H) 11/29/2016     Laboratory test 8/7/2012     Lung disease June 2015     Malignant basal cell neoplasm of skin 8/6/2008     Melanoma (H) 8/6/2008     Melanoma in situ of lower leg (H)     R calf     Neuropathy 5/16/2011     Other bladder disorder      Other color vision deficiencies      Other nervous system complications      Parkinsonism (H) 11/1/2011     Personal history of colonic polyps      Polyneuropathy in other diseases classified elsewhere (H)      RA (rheumatoid arthritis) (H)      Rheumatoid arthritis of multiple sites with negative rheumatoid factor (H) 3/21/2016     Seronegative arthritis 11/18/2013     Shortness of breath      Shoulder arthritis 2016    acromioclavicular joint      Somatization disorder 5/12/2011     Squamous cell carcinoma      Tremor 11/1/2011     Unspecified essential hypertension      Unspecified hypothyroidism      Urinary tract infection      Urinary urgency 11/1/2011     Wears glasses 11/1/2011       Past Surgical History:   Procedure Laterality Date     BIOPSY OF SKIN LESION       COLONOSCOPY       HEMORRHOID SURGERY       lip biopsy      for sicca complex     MOHS MICROGRAPHIC PROCEDURE       SOFT TISSUE SURGERY      removeal of basel cell carcinoma       Social History     Socioeconomic History     Marital status:      Spouse name: Not on file     Number of children: Not on file     Years of education: Not on file     Highest education level: Not on file   Occupational History     Not on file   Social Needs     Financial resource strain: Not on  file     Food insecurity:     Worry: Not on file     Inability: Not on file     Transportation needs:     Medical: Not on file     Non-medical: Not on file   Tobacco Use     Smoking status: Former Smoker     Packs/day: 1.50     Years: 37.00     Pack years: 55.50     Types: Cigarettes     Start date: 6/15/1963     Last attempt to quit: 2000     Years since quittin.7     Smokeless tobacco: Never Used   Substance and Sexual Activity     Alcohol use: No     Alcohol/week: 0.0 oz     Drug use: No     Sexual activity: Never     Partners: Female     Birth control/protection: None   Lifestyle     Physical activity:     Days per week: Not on file     Minutes per session: Not on file     Stress: Not on file   Relationships     Social connections:     Talks on phone: Not on file     Gets together: Not on file     Attends [employer] service: Not on file     Active member of club or organization: Not on file     Attends meetings of clubs or organizations: Not on file     Relationship status: Not on file     Intimate partner violence:     Fear of current or ex partner: Not on file     Emotionally abused: Not on file     Physically abused: Not on file     Forced sexual activity: Not on file   Other Topics Concern     Parent/sibling w/ CABG, MI or angioplasty before 65F 55M? No   Social History Narrative    2013: Living in [place] in a townhouse with no steps    Has 3 sons that are doing okay.         Dairy/d 1 servings/d.     Caffeine 0 servings/d    Exercise 0 x week    Sunscreen used - No    Seatbelts used - Yes    Working smoke/CO detectors in the home - Yes    Guns stored in the home - Yes    Self Breast Exams - NA    Self Testicular Exam - Yes    Eye Exam up to date - Yes     Dental Exam up to date - Yes     Pap Smear up to date - NA    Mammogram up to date - NA    PSA up to date - Yes 2008    Dexa Scan up to date - No    Flex Sig / Colonoscopy up to date - Yes less than 5 yrs ago    Immunizations up to date  "-today    Abuse: Current or Past(Physical, Sexual or Emotional)- No    Do you feel safe in your environment - Yes    2008                   ROS Pulmonary  A complete ROS was otherwise negative except as noted in the HPI.  BP (!) 159/99   Pulse 65   Temp 98.3  F (36.8  C) (Oral)   Ht 1.854 m (6' 1\")   Wt 143.2 kg (315 lb 9.6 oz)   SpO2 94%   BMI 41.64 kg/m    Exam:   GENERAL APPEARANCE: Alert, and in no apparent distress.  EYES: PERRL, EOMI  MOUTH: Oral mucosa is moist, without any lesions,, no oropharyngeal exudate.  NECK: supple, no masses, no thyromegaly.  LYMPHATICS: No significant axillary, cervical, or supraclavicular nodes.  RESP: normal percussion, good air flow throughout.  No crackles. No rhonchi. No wheezes.  CV: Normal S1, S2, regular rhythm, normal rate. No murmur.  No rub. No gallop. No LE edema.   ABDOMEN:  Bowel sounds normal, soft, nontender, no HSM or masses.   MS: extremities normal. No clubbing. No cyanosis.  SKIN: no rash on limited exam  NEURO: Mentation intact, speech normal, normal strength and tone, normal gait and stance  Results:  PFTs done today were reviewed by me with the patient.  His FVC is 3.17 liters, which is 67% of predicted.  FEV1 is 3.35 liters, which is 66% of predicted.  The ratio of 74.  No significant bronchodilator response noted on the FVC and FEV1, but the mid flow rates increased from 1.66 liters to 2.18 liters, a 31% change.  Residual volume is 3.06, which is 111% of predicted.  Total lung capacity 7.20 which is 93% of predicted.  DLCO is 30.06 which is 105% of predicted.  My interpretation is restrictive physiology observed on spirometry that was not confirmed by total lung capacity.  Mild air trapping present as evidenced by increased residual volume, likely related to small airways disease with decreased mid-flow rates and a significant bronchodilator response.  Diffusion capacity is in the normal range.     While spirometry is lower, the lung volumes are higher " than last visit.    Assessment and plan: Mr. Daly is a pleasant 68-year-old male with seronegative rheumatoid arthritis, currently on methotrexate 20 mg per week and Plaquenil 400 mg daily with ILD likely follicular bronchiolitis related to his connective tissue disease.   1.  Interstitial lung disease.  His spirometry is lower today, however total lung capacity is much higher without a significant increase in residual volume.  I do not see any convincing evidence to suggest that the decreased spirometry is related to worsening follicular bronciolitis.  He does not have any changes in his endurance either.  We want to continue to monitor without any intervention for now.  If he notices a change in endurance he will call us and imaging studies could be considered at that point.   2.  Diaphragmatic muscle weakness.  Continue on NIPPV.   3.  Sleep disordered breathing.  We will need to set up a visit with Dr. Joseph.   4.  Gastroesophageal reflux, on omeprazole, will follow.      We discussed the plan for use of the inhaled medications at the time of respiratory infections.  The strategy could be more aggressive use of MDIs versus having nebulized medications or a short burst of prednisone with antibiotics.  I think it might also be reasonable to consider inhaled long-acting beta agonist with inhaled steroids as he does have some reversible component on his spirometry today specifically mid flow rates..        Answers for HPI/ROS submitted by the patient on 6/6/2019   General Symptoms: Yes  Skin Symptoms: Yes  HENT Symptoms: Yes  EYE SYMPTOMS: Yes  HEART SYMPTOMS: Yes  LUNG SYMPTOMS: Yes  INTESTINAL SYMPTOMS: Yes  URINARY SYMPTOMS: Yes  REPRODUCTIVE SYMPTOMS: No  SKELETAL SYMPTOMS: Yes  BLOOD SYMPTOMS: No  NERVOUS SYSTEM SYMPTOMS: Yes  MENTAL HEALTH SYMPTOMS: No  Fever: No  Loss of appetite: No  Weight loss: No  Weight gain: No  Fatigue: Yes  Night sweats: No  Chills: No  Increased stress: No  Excessive hunger:  No  Excessive thirst: No  Feeling hot or cold when others believe the temperature is normal: Yes  Loss of height: No  Post-operative complications: No  Surgical site pain: No  Hallucinations: No  Change in or Loss of Energy: No  Hyperactivity: No  Confusion: No  Changes in hair: No  Changes in moles/birth marks: No  Itching: No  Rashes: No  Changes in nails: No  Acne: No  Change in facial hair: No  Warts: No  Non-healing sores: Yes  Scarring: No  Flaking of skin: No  Color changes of hands/feet in cold : No  Sun sensitivity: Yes  Skin thickening: No  Ear pain: No  Ear discharge: No  Hearing loss: No  Tinnitus: Yes  Nosebleeds: No  Congestion: No  Sinus pain: No  Trouble swallowing: No   Voice hoarseness: Yes  Mouth sores: No  Sore throat: No  Tooth pain: No  Gum tenderness: No  Bleeding gums: No  Change in taste: No  Change in sense of smell: No  Dry mouth: Yes  Hearing aid used: No  Neck lump: No  Eye pain: Yes  Vision loss: No  Dry eyes: Yes  Watery eyes: No  Eye bulging: No  Double vision: Yes  Flashing of lights: No  Spots: Yes  Floaters: No  Redness: No  Crossed eyes: No  Tunnel Vision: Yes  Yellowing of eyes: No  Eye irritation: No  Cough: No  Sputum or phlegm: No  Coughing up blood: No  Difficulty breating or shortness of breath: Yes  Snoring: No  Wheezing: No  Difficulty breathing on exertion: Yes  Nighttime Cough: No  Difficulty breathing when lying flat: No  Chest pain or pressure: No  Fast or irregular heartbeat: Yes  Pain in legs with walking: Yes  Trouble breathing while lying down: No  Fingers or toes appear blue: No  High blood pressure: Yes  Low blood pressure: No  Fainting: No  Murmurs: No  Pacemaker: No  Varicose veins: No  Edema or swelling: Yes  Wake up at night with shortness of breath: No  Light-headedness: Yes  Exercise intolerance: Yes  Heart burn or indigestion: Yes  Nausea: No  Vomiting: No  Abdominal pain: Yes  Bloating: Yes  Constipation: Yes  Diarrhea: No  Blood in stool: Yes  Black  stools: No  Rectal or Anal pain: No  Fecal incontinence: No  Yellowing of skin or eyes: No  Vomit with blood: No  Change in stools: No  Trouble holding urine or incontinence: Yes  Pain or burning: No  Trouble starting or stopping: No  Increased frequency of urination: No  Blood in urine: No  Decreased frequency of urination: No  Frequent nighttime urination: No  Flank pain: Yes  Difficulty emptying bladder: No  Back pain: Yes  Muscle aches: Yes  Neck pain: Yes  Swollen joints: Yes  Joint pain: Yes  Bone pain: No  Muscle cramps: Yes  Muscle weakness: Yes  Joint stiffness: Yes  Bone fracture: No  Trouble with coordination: Yes  Dizziness or trouble with balance: Yes  Fainting or black-out spells: No  Memory loss: No  Headache: No  Seizures: No  Speech problems: No  Tingling: Yes  Tremor: Yes  Weakness: Yes  Difficulty walking: Yes  Paralysis: No  Numbness: Yes

## 2019-06-12 NOTE — PROGRESS NOTES
"Rheumatology Visit     Lucio Daly MRN# [id number]   YOB: 1950 Age: 67 year old            Assessment and Plan:   1. Seronegative inflammatory arthritis RA: controlled no active inflammatory arthritis, stable.  I dont feel more immunosuppressive therapy is indicated. I would advise reduction of hydroxychloroquine (plaquenil) in the future or stop given on 9 years.   Plan  # Continue methotrexate at 20 mg weekly, folic acid 1 mg day. While on drug  --High risk medication monitoring--labs q 3 mo AST/ALT, Albumin, CBC with plts, CRP   --Limit EtOH intake to 2 drinks weekly; use folate 2 mg daily  --Tylenol 325mg qid prn nausea/HA associated with dosing.    # hydroxychloroquine (plaquenil) 200 mg BID -annual eye exam done April 2019 [facility]. Taper next visit to reduce risk of retinal toxicity as on since 2010 approx. No vision issues   2. 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 Mccarthy today for follow-up    3. Osteoarthritis knees, ankle and hands/thumbs. Continue isometric quad strengthening exercises twice daily to improve support around the joint. Left ankle osteoarthritis and overall osteoarthritis  is contributing to the overall pain. Continue splinting/hand therapy and acetaminophen 1000 mg tid ATC.         History of Present Illness:   Lucio Daly \"Rich\" presents for f/u seronegative rheumatoid arthritis, sicca complex.   Copy forward June 26, 2017  Dr. Goodrich seen him in Dec 2016   Back pain, seen PCP. MRI of his back to f/u spinal stenosis and herniated disc will be having this tomorrow then maybe seeing neurosurgeon (needed updated MRI) if this is indicated, did PT but stopped as was making this worse. Previous ortho was going to refer him to neurosurgeon but he didn't want surgery at this time. Last months, more increased numbness in feet and sciatica right thigh/calf \"uncomfortable\", more pain in mid and upper " "back then some in low back if stands too long, then gets this in rib cage. Had problems with \"numbness\" for some time, pain in arms. EMG testing in past peripheral polyneuropathy. Stumbling more, but had trouble with this in the past, and previous foot droop. More leg pain for \"quite a few months\".  Lay in bed or sit with feet up more in leg pain, back. Nueropathy is the bottom of his feet.   Tennis elbows, thumbs, hands pains are the same stable this comes and goes.   Continues the methotrexate 17.5 mg PO Q 7 days Tue or WED and the plaqeunil (he felt the stomach issues were due to other medications) this is tolerating well Didn't stop the plaquenil \"didnt want to rock the boat\" . Reports is RA is controlled, no flaring and is controlled with these medications so wishes to continue this plan.   Pulmonary Dr. Mccarthy had referred him to gastro for GERD in Oct. Seen him 5-2017 --ILD \"follicular bronchiolitis\" and pulmonary nodule stable (more SOB sit to stand then walking). His prilosec was increased. Checking neuromuscular \"thing\" when he sees him in Nov. Methotrexate was at 9 tab 22.5 mg a week tapered to 17.5 mg once a week --his RA remains controlled.   On ABX completed this Saturday for bronchitis, early pnuemonia and then infection in both his eyes. Started with laryngitis. ABX helped. No fever, chills, discolored sputum. Fatigue.    June 20, 2018  Seen Dr. Mccarthy 5-2018 pulmonary, f/u Aug. \"Stable\" \"didnt do as well on the walking\" \"more physical with legs\" will repeat testing in august. EKG similar to low pulse and \"similar to arrthymias\" will be talking to Dr. Corley about this and ASCVD.   Seen NATALEE Carrillo PAC-C low back pain and neck pain advised seeing neurosurgery didn't seen the neurosurgeon not seen does not wish to do surgery and in the past advised no surgery \"not a good candidate\" Dr. Mccarthy. Nerve pain into calves into feet, this leg pain due to his back/neck pain. Seen Dr. Villatoro in sports " "medicine  and then seen Dr. Campuzano for hands. PT for back. Dr Montes recommended pain management, but he didn't want narcotics.  Prior 2014 had injections of low back \"neuromas\" in the spine, injection not lasted and reaction \"neurologic bladder\" and same as when cortisone injections. Did PT in the past. Dr. Montes recommended possible CBD oils so will take to PCP. Nerve, tendon pain and joint pain. \"if this for a while right thigh severe pain and cant stand on leg\" \"left drop foot\" told was d/t spine. On lyrica   Taking methotrexate 20 mg PO Q 7 days WED (mild stomach 1-2 day, not every time or every dose), folic acid 1 mg day and the plaqeunil 200 mg BID (6 days week, then WED once day). Tolerating. Dr. Goodrich increased this d/t justus swelling, this is improved. Some swelling on and off with walking. Stiff in back, in the mornings hands stiff \"fingers will curl\" this is unchanged[place] [employer] boat with this hand. Reports x-rays were not fracture     Seen gastro who increased omprezole 40 mg BID, this has helped. Pain affected by change in seasons. Higher MTX dose is helping since increased in . Feet, ankles, toes and fingers pain lingers. \"stepped out of pain left heel and left ankle\" went to [facility] strain achilled sprain left ankle, this was about 3 weeks ago. Has AFO brace, and feet/ankle brace from Dr. Martinez now wearing, got a gel heel insert insert this is helping still wearing. Has GERD and esophagus so hard to tell so will see PCP for complete CVD workup. Nuc 6-2016 NL. Sees dermatology, no skin issues. Has golf cart to get around. No tick bites      Goes to [facility], no notes in EHR. Last visit in March 2018 did given him the letter. Other [facility] reviewed and updated.     June 12, 2019 Jan 2019 Bronchitis then early pneumonia Dec, then Influenza B this winter March 2019. Denies any fever, chills, SOB, JUARES, night sweats, or chest pain, or cough. " "Reports healthy    Opthalmologist  Exam 4-2019 may continue hydroxychloroquine (plaquenil). No vision issues.   Lungs \"stable\" some SOB but waling 3/4 miles few times a week   Chronic ankle pain, this is not changed. Seen Dr. Martinez 1-2019, did not do Js brace or new orthotics as felt was not going to help and due to cost. Chronic sciatica pain and back \"Stenosis', down right leg and some in pelvic pain, hard to sleep and lay.  Seen spine specialist this year, no surgery not a good candidate due to risks.  Intermittent hand pain joints, no swelling. Parkinsonism is stable, body stiffness and neuropathy seeing neurology Dr. Montes, muscle discomfort from this. Lyrica now is helping some weight gain. Reactions to the others. \"Stable\" . Left knee bone on bone, not pursuing surgery at this time.     Methotrexate  20 mg Q Tues, folic acid  1 mg day, hydroxychloroquine (plaquenil) 200 mg BID, tolerating rare will get stomach issues or hot   PMH   1. Sensory neuropathy of unclear etiology - negative work up, managed on Lyrica.  Has chronic dysesthesia in pads of feet.  2. Parkinsonisim/Tremor disorder; sees neurology   3. Headaches   4. History of basal cell, squamous (most recent +bx 8-2014)  5. History of melanoma   6. GALO on CPAP.  7. HTN.   8. Seronegative RA, diagnosed 2009. SICCA  9. JUARES. Work-up 4-2015. 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 Lumbar stenosis per MRI 2017   11. Bronchitis 5-2017, early pneumonia   12. Chicken pox   13. DEXA  Normal   14. Left ankle DJD, MRI  --seen Dr. Martinez 2-2019   Past Medical History:   Diagnosis Date     Abnormal EMG 4/18/2013     Abnormal involuntary movements(781.0)     Movement Disorder     AK (actinic keratosis) 12/18/2011     Allergic rhinitis, cause unspecified     Allergic rhinitis     Balance problems 11/1/2011     Basal cell carcinoma      Bladder spasms 11/1/2011     Chronic osteoarthritis      Diaphragmatic hernia without mention of " "obstruction or gangrene      Earache or other ear, nose, or throat complaint      Esophageal reflux      Fatigue 11/1/2011     Fracture      H. pylori infection 5/12/2011     History of MRI of cervical spine 11/18/2013    EXAMINATION: CERVICAL SPINE G/E 5 VIEWS* 4/19/2013 4:18 PM  CLINICAL HISTORY: Pain in limb,Performing Location?->[facility] ([facility]),  COMPARISON:  FINDINGS: AP and lateral views in flexion and extension, as well as odontoid view of the cervical spine was obtained. There is no comparison available. The vertebral bodies of the cervical spine are normally aligned. There is posterior spurring and d     Incomplete defecation 11/1/2011     Interstitial lung disease (H) 11/29/2016     Laboratory test 8/7/2012     Lung disease June 2015     Malignant basal cell neoplasm of skin 8/6/2008     Melanoma (H) 8/6/2008     Melanoma in situ of lower leg (H)     R calf     Neuropathy 5/16/2011     Other bladder disorder      Other color vision deficiencies      Other nervous system complications      Parkinsonism (H) 11/1/2011     Personal history of colonic polyps      Polyneuropathy in other diseases classified elsewhere (H)      RA (rheumatoid arthritis) (H)      Rheumatoid arthritis of multiple sites with negative rheumatoid factor (H) 3/21/2016     Seronegative arthritis 11/18/2013     Shortness of breath      Shoulder arthritis 2016    acromioclavicular joint      Somatization disorder 5/12/2011     Squamous cell carcinoma      Tremor 11/1/2011     Unspecified essential hypertension      Unspecified hypothyroidism      Urinary tract infection      Urinary urgency 11/1/2011     Wears glasses 11/1/2011     Injuries-ankle sprains, left heel fracture    Family Hx   MGM Psoriasis  Sister -PPM  Family History   Problem Relation Age of Onset     Hypertension Mother      Heart Disease Mother         a fib     Arthritis Mother         \"osteo\"     Osteoporosis Mother      Skin Cancer Mother      Uterine Cancer " "Mother      Hypertension Brother      Diabetes Brother         \" post pancreatitis\"     Neurologic Disorder Sister         multiple sclerosis     Hypertension Sister      Heart Disease Sister      Multiple Sclerosis Sister      Heart Disease Sister         a fib     Arthritis Sister      Heart Failure Sister      Kidney Disease Sister      Dementia Sister      Hypertension Father      Cerebrovascular Disease Father         ,     Skin Cancer Father      Colon Polyps Father      Other - See Comments Son         [place]     Other - See Comments Abdon wagner     Other - See Comments Abdon gonzalez     Psoriasis Maternal Grandfather      Stomach Cancer Maternal Grandfather      Congenital Anomalies Other         granddaughter with a chromosome defect     Cerebrovascular Disease Paternal Grandmother         ,     Cerebrovascular Disease Paternal Grandfather         ,     Cancer Granddaughter         Langerhans Cell Histiocytosis     Genetic Disease Granddaughter         gene translocation     Melanoma No family hx of      Colon Cancer No family hx of      Personal Hx   Home environment: No secondhand tobacco smoke in home.    History     Social History     Marital Status:      Spouse Name: N/A     Number of Children: N/A     Years of Education: N/A     Social History Main Topics     Smoking status: Former Smoker     Quit date: 09/30/2003     Smokeless tobacco: Never Used     Alcohol Use: No     Drug Use: No     Sexually Active: Not Currently -- Female partner(s)     Other Topics Concern     None     Social History Narrative    2013: Living in [place] in a townhouse with no stepsHas 3 sons that are doing okay.             Review of Systems:     14 points all negative except what is mentioned in HPI.  Review Of Systems  Skin: negative  Eyes: negative  Ears/Nose/Throat: negative  Respiratory: No shortness of breath, dyspnea on exertion, cough, or hemoptysis  Cardiovascular: " "negative  Gastrointestinal: negative  Genitourinary: negative  Musculoskeletal: as above  Neurologic: as above  Psychiatric: negative  Hematologic/Lymphatic/Immunologic: negative  Endocrine: negative             Past Surgical History:   Past Surgical History:   Procedure Laterality Date     BIOPSY OF SKIN LESION       COLONOSCOPY       HEMORRHOID SURGERY       lip biopsy      for sicca complex     MOHS MICROGRAPHIC PROCEDURE       SOFT TISSUE SURGERY      removeal of basel cell carcinoma            Physical Exam:   Blood pressure (!) 159/99, pulse 65, temperature 98.3  F (36.8  C), temperature source Oral, height 1.854 m (6' 1\"), SpO2 94 %.  Wt Readings from Last 4 Encounters:   05/23/19 143.2 kg (315 lb 9.6 oz)   04/30/19 142.4 kg (313 lb 15 oz)   04/18/19 142.4 kg (314 lb)   03/09/19 142.4 kg (314 lb)       Constitutional: obese, appearing stated age; cooperative  Eyes: nl EOM, PERRLA, vision, conjunctiva, sclera  ENT: nl external ears, nose, hearing, lips, teeth, gums, throat  No mucous membrane lesions, normal saliva pool  Neck: no mass or thyroid enlargement  Resp: lungs clear to auscultation, nl to palpation  CV: RRR, no murmurs, rubs or gallops, no edema  GI: no ABD mass or tenderness, no HSM  : not tested  Lymph: no cervical, supraclavicular, inguinal or epitrochlear nodes  MS:  All TMJ, neck, shoulder, elbow, wrist, MCP/PIP/DIP, spine, hip, knee, ankle, and foot MTP/IP joints were examined.  Knees without effusion but +creptitus; Mild Kyphosis. Left ankle midfoot arthritis changes and base of thumbs. No S/T across the ankle joints. -MTP/MCP squeeze.  Skin: no nail pitting, alopecia, rash, nodules or lesions.  Neuro: No tremor today. Walks with cane  Psych: nl judgement, orientation, memory, affect.         Data:       Results for orders placed or performed in visit on 06/12/19   6 minute walk test   Result Value Ref Range    6 min walk (FT) 750 ft    6 Min Walk (M) 229 m   General PFT Lab (Please always " keep checked)   Result Value Ref Range    FVC-Pred 4.70 L    FVC-Pre 3.17 L    FVC-%Pred-Pre 67 %    FEV1-Pre 2.35 L    FEV1-%Pred-Pre 66 %    FEV1FVC-Pred 75 %    FEV1FVC-Pre 74 %    FEFMax-Pred 9.05 L/sec    FEFMax-Pre 8.10 L/sec    FEFMax-%Pred-Pre 89 %    FEF2575-Pred 2.63 L/sec    FEF2575-Pre 1.66 L/sec    OVA4559-%Pred-Pre 62 %    ExpTime-Pre 8.13 sec    FIFMax-Pre 7.00 L/sec    VC-Pred 5.29 L    VC-Pre 4.14 L    VC-%Pred-Pre 78 %    IC-Pred 4.66 L    IC-Pre 3.99 L    IC-%Pred-Pre 85 %    ERV-Pred 0.62 L    ERV-Pre 0.15 L    ERV-%Pred-Pre 23 %    FEV1FEV6-Pred 78 %    FEV1FEV6-Pre 74 %    FRCPleth-Pred 3.88 L    FRCPleth-Pre 3.21 L    FRCPleth-%Pred-Pre 82 %    RVPleth-Pred 2.74 L    RVPleth-Pre 3.06 L    RVPleth-%Pred-Pre 111 %    TLCPleth-Pred 7.74 L    TLCPleth-Pre 7.20 L    TLCPleth-%Pred-Pre 93 %    DLCOunc-Pred 28.38 ml/min/mmHg    DLCOunc-Pre 30.06 ml/min/mmHg    DLCOunc-%Pred-Pre 105 %    VA-Pre 5.07 L    VA-%Pred-Pre 71 %    FEV1SVC-Pred 67 %    FEV1SVC-Pre 57 %     *Note: Due to a large number of results and/or encounters for the requested time period, some results have not been displayed. A complete set of results can be found in Results Review.     Component      Latest Ref Rng & Units 4/15/2019 6/12/2019   WBC      4.0 - 11.0 10e9/L 3.7 (L) 5.1   RBC Count      4.4 - 5.9 10e12/L 4.51 4.48   Hemoglobin      13.3 - 17.7 g/dL 13.1 (L) 13.8   Hematocrit      40.0 - 53.0 % 42.9 43.3   MCV      78 - 100 fl 95 97   MCH      26.5 - 33.0 pg 29.0 30.8   MCHC      31.5 - 36.5 g/dL 30.5 (L) 31.9   RDW      10.0 - 15.0 % 14.6 14.7   Platelet Count      150 - 450 10e9/L 161 160   Creatinine      0.66 - 1.25 mg/dL 1.08 1.05   GFR Estimate      >60 mL/min/1.73:m2 69 71   GFR Estimate If Black      >60 mL/min/1.73:m2 80 82   CRP Inflammation      0.0 - 8.0 mg/L 8.0 12.7 (H)   AST      0 - 45 U/L 20 16   ALT      0 - 70 U/L 23 23   Albumin      3.4 - 5.0 g/dL 3.6 3.8       Thank-you for allowing me to participate  in your care.     Sin GUZMAN, CNP, MSN  [facility]  Department of Rheumatology & Autoimmune Disorders  [facility]: [phone] Opt 2, then Opt 1 Scheduling   [facility]: [phone]; [phone] Option 7 scheduling

## 2019-06-12 NOTE — LETTER
6/12/2019       RE: Lucio Daly  [address]  [place] [address]     Dear Colleague,    Thank you for referring your patient, Lucio Daly, to the [facility] at [facility]. Please see a copy of my visit note below.      Reason for Visit  Lucio Daly is a 68 year old year old male who is being seen for possible follicular bronchilitis  HPI    The patient was seen and examined by Harsha Mccarthy      Mr. 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  He was last seen in the Pulmonary Clinic on 11/20/2018 when he was doing quite well from a pulmonary  standpoint.  He had stable PFTs.  He was on methotrexate and Plaquenil.  The dose was 20 mg per week of Plaquenil and 400 mg daily for hydroxychloroquine.  He has since been evaluated by Rheumatology and he continues to be on methotrexate 20 mg per week and Plaquenil.      He tells me that over the winter months he had a 3 different episodes of bronchitis.  When he had symptoms he reported to an urgent care close to his home and was given antibiotics.  He also needed inhaled bronchodilators, mostly inhalers, which improved his symptoms.      Today, he reports doing well.  He is trying to walk 3/4 of a mile 3-4 times a week and is able to do that without any major problems.  Denies any cough or sputum production.  He has a good energy level.  He denies any changes in his endurance.  He uses NIPPV for sleep disordered breathing and is tolerating this well.       Current Outpatient Medications   Medication     acetaminophen (TYLENOL) 500 MG tablet     albuterol (PROAIR HFA) 108 (90 Base) MCG/ACT inhaler     Cholecalciferol (VITAMIN D3) 1000 units CAPS     folic acid (FOLVITE) 1 MG tablet     hydroxychloroquine (PLAQUENIL) 200 MG tablet     methotrexate 2.5 MG tablet     metoprolol succinate ER (TOPROL-XL) 50 MG 24 hr tablet      omeprazole (PRILOSEC) 40 MG DR capsule     ORDER FOR DME     oxybutynin (DITROPAN) 5 MG tablet     pregabalin (LYRICA) 50 MG capsule     albuterol (PROAIR HFA/PROVENTIL HFA/VENTOLIN HFA) 108 (90 Base) MCG/ACT inhaler     No current facility-administered medications for this visit.      Allergies   Allergen Reactions     Restasis      Burning eyes, problems with breathing, tightness in chest     Adhesive Tape      Bandages misc     Allegra      EXCESSIVE URINATION AND WEAKNESS, LIGHT-HEADED     Allergy      Dust     Animal Dander      Benadryl Allergy      EXCESSIVE URINATION AND WEAKNESS, LIGHT-HEADED     Cephalexin      Joint pain or gerd aggravation. Bloating excessive urination      Cephalosporins      Diphenhydramine Other (See Comments)     Doxycycline Hyclate Nausea     SWEATING,MIGRAINES,LOSS OF APPETITE,SWEATING,LIGHT HEADED, EXCESSIVE URINATION     Fexofenadine Other (See Comments)     Flonase [Fluticasone Propionate]      Gabapentin      Neurontin: mosd changes and excess urination     Iodine Solution [Povidone Iodine]      SKIN MELTS     Levaquin      From surgeon--? Joint pain ? Gerd aggravation. Insomnia, excess urination     Mylanta      EXCESSIVE URINATION AND WEAKNESS, LIGHT-HEADED     Prednisone      Weakness, elevated bp, headache, eye pain, congestion      Seafood [Seafood]      Shellfish Allergy      Hives       Sulfamethoxazole-Trimethoprim      Chest pain, angina     Trees      Trileptal      SEVERE JOINT AND TENDON PAIN, INSOMNIA, RESTLESSNESS, NAUSEA, EXCESS URINATION     Cortizone Rash     EXCESS URINATION,WEAKNESS,NAUSEA, HEADACHE     Past Medical History:   Diagnosis Date     Abnormal EMG 4/18/2013     Abnormal involuntary movements(781.0)     Movement Disorder     AK (actinic keratosis) 12/18/2011     Allergic rhinitis, cause unspecified     Allergic rhinitis     Balance problems 11/1/2011     Basal cell carcinoma      Bladder spasms 11/1/2011     Chronic osteoarthritis      Diaphragmatic  hernia without mention of obstruction or gangrene      Earache or other ear, nose, or throat complaint      Esophageal reflux      Fatigue 11/1/2011     Fracture      H. pylori infection 5/12/2011     History of MRI of cervical spine 11/18/2013    EXAMINATION: CERVICAL SPINE G/E 5 VIEWS* 4/19/2013 4:18 PM  CLINICAL HISTORY: Pain in limb,Performing Location?->[facility] ([facility]),  COMPARISON:  FINDINGS: AP and lateral views in flexion and extension, as well as odontoid view of the cervical spine was obtained. There is no comparison available. The vertebral bodies of the cervical spine are normally aligned. There is posterior spurring and d     Incomplete defecation 11/1/2011     Interstitial lung disease (H) 11/29/2016     Laboratory test 8/7/2012     Lung disease June 2015     Malignant basal cell neoplasm of skin 8/6/2008     Melanoma (H) 8/6/2008     Melanoma in situ of lower leg (H)     R calf     Neuropathy 5/16/2011     Other bladder disorder      Other color vision deficiencies      Other nervous system complications      Parkinsonism (H) 11/1/2011     Personal history of colonic polyps      Polyneuropathy in other diseases classified elsewhere (H)      RA (rheumatoid arthritis) (H)      Rheumatoid arthritis of multiple sites with negative rheumatoid factor (H) 3/21/2016     Seronegative arthritis 11/18/2013     Shortness of breath      Shoulder arthritis 2016    acromioclavicular joint      Somatization disorder 5/12/2011     Squamous cell carcinoma      Tremor 11/1/2011     Unspecified essential hypertension      Unspecified hypothyroidism      Urinary tract infection      Urinary urgency 11/1/2011     Wears glasses 11/1/2011       Past Surgical History:   Procedure Laterality Date     BIOPSY OF SKIN LESION       COLONOSCOPY       HEMORRHOID SURGERY       lip biopsy      for sicca complex     MOHS MICROGRAPHIC PROCEDURE       SOFT TISSUE SURGERY      removeal of basel cell carcinoma       Social History      Socioeconomic History     Marital status:      Spouse name: Not on file     Number of children: Not on file     Years of education: Not on file     Highest education level: Not on file   Occupational History     Not on file   Social Needs     Financial resource strain: Not on file     Food insecurity:     Worry: Not on file     Inability: Not on file     Transportation needs:     Medical: Not on file     Non-medical: Not on file   Tobacco Use     Smoking status: Former Smoker     Packs/day: 1.50     Years: 37.00     Pack years: 55.50     Types: Cigarettes     Start date: 6/15/1963     Last attempt to quit: 2000     Years since quittin.7     Smokeless tobacco: Never Used   Substance and Sexual Activity     Alcohol use: No     Alcohol/week: 0.0 oz     Drug use: No     Sexual activity: Never     Partners: Female     Birth control/protection: None   Lifestyle     Physical activity:     Days per week: Not on file     Minutes per session: Not on file     Stress: Not on file   Relationships     Social connections:     Talks on phone: Not on file     Gets together: Not on file     Attends Islam service: Not on file     Active member of club or organization: Not on file     Attends meetings of clubs or organizations: Not on file     Relationship status: Not on file     Intimate partner violence:     Fear of current or ex partner: Not on file     Emotionally abused: Not on file     Physically abused: Not on file     Forced sexual activity: Not on file   Other Topics Concern     Parent/sibling w/ CABG, MI or angioplasty before 65F 55M? No   Social History Narrative    2013: Living in [place] in a townhouse with no steps    Has 3 sons that are doing okay.         Dairy/d 1 servings/d.     Caffeine 0 servings/d    Exercise 0 x week    Sunscreen used - No    Seatbelts used - Yes    Working smoke/CO detectors in the home - Yes    Guns stored in the home - Yes    Self Breast Exams - NA    Self Testicular Exam  "- Yes    Eye Exam up to date - Yes 2008    Dental Exam up to date - Yes 2006    Pap Smear up to date - NA    Mammogram up to date - NA    PSA up to date - Yes 2008    Dexa Scan up to date - No    Flex Sig / Colonoscopy up to date - Yes less than 5 yrs ago    Immunizations up to date -today    Abuse: Current or Past(Physical, Sexual or Emotional)- No    Do you feel safe in your environment - Yes    2008                   ROS Pulmonary  A complete ROS was otherwise negative except as noted in the HPI.  BP (!) 159/99   Pulse 65   Temp 98.3  F (36.8  C) (Oral)   Ht 1.854 m (6' 1\")   Wt 143.2 kg (315 lb 9.6 oz)   SpO2 94%   BMI 41.64 kg/m     Exam:   GENERAL APPEARANCE: Alert, and in no apparent distress.  EYES: PERRL, EOMI  MOUTH: Oral mucosa is moist, without any lesions,, no oropharyngeal exudate.  NECK: supple, no masses, no thyromegaly.  LYMPHATICS: No significant axillary, cervical, or supraclavicular nodes.  RESP: normal percussion, good air flow throughout.  No crackles. No rhonchi. No wheezes.  CV: Normal S1, S2, regular rhythm, normal rate. No murmur.  No rub. No gallop. No LE edema.   ABDOMEN:  Bowel sounds normal, soft, nontender, no HSM or masses.   MS: extremities normal. No clubbing. No cyanosis.  SKIN: no rash on limited exam  NEURO: Mentation intact, speech normal, normal strength and tone, normal gait and stance  Results:  PFTs done today were reviewed by me with the patient.  His FVC is 3.17 liters, which is 67% of predicted.  FEV1 is 3.35 liters, which is 66% of predicted.  The ratio of 74.  No significant bronchodilator response noted on the FVC and FEV1, but the mid flow rates increased from 1.66 liters to 2.18 liters, a 31% change.  Residual volume is 3.06, which is 111% of predicted.  Total lung capacity 7.20 which is 93% of predicted.  DLCO is 30.06 which is 105% of predicted.  My interpretation is restrictive physiology observed on spirometry that was not confirmed by total lung capacity.  " Mild air trapping present as evidenced by increased residual volume, likely related to small airways disease with decreased mid-flow rates and a significant bronchodilator response.  Diffusion capacity is in the normal range.     While spirometry is lower, the lung volumes are higher than last visit.    Assessment and plan: Mr. Daly is a pleasant 68-year-old male with seronegative rheumatoid arthritis, currently on methotrexate 20 mg per week and Plaquenil 400 mg daily with ILD likely follicular bronchiolitis related to his connective tissue disease.   1.  Interstitial lung disease.  His spirometry is lower today, however total lung capacity is much higher without a significant increase in residual volume.  I do not see any convincing evidence to suggest that the decreased spirometry is related to worsening follicular bronciolitis.  He does not have any changes in his endurance either.  We want to continue to monitor without any intervention for now.  If he notices a change in endurance he will call us and imaging studies could be considered at that point.   2.  Diaphragmatic muscle weakness.  Continue on NIPPV.   3.  Sleep disordered breathing.  We will need to set up a visit with Dr. Joseph.   4.  Gastroesophageal reflux, on omeprazole, will follow.      We discussed the plan for use of the inhaled medications at the time of respiratory infections.  The strategy could be more aggressive use of MDIs versus having nebulized medications or a short burst of prednisone with antibiotics.  I think it might also be reasonable to consider inhaled long-acting beta agonist with inhaled steroids as he does have some reversible component on his spirometry today specifically mid flow rates..        Answers for HPI/ROS submitted by the patient on 6/6/2019   General Symptoms: Yes  Skin Symptoms: Yes  HENT Symptoms: Yes  EYE SYMPTOMS: Yes  HEART SYMPTOMS: Yes  LUNG SYMPTOMS: Yes  INTESTINAL SYMPTOMS: Yes  URINARY SYMPTOMS:  Yes  REPRODUCTIVE SYMPTOMS: No  SKELETAL SYMPTOMS: Yes  BLOOD SYMPTOMS: No  NERVOUS SYSTEM SYMPTOMS: Yes  MENTAL HEALTH SYMPTOMS: No  Fever: No  Loss of appetite: No  Weight loss: No  Weight gain: No  Fatigue: Yes  Night sweats: No  Chills: No  Increased stress: No  Excessive hunger: No  Excessive thirst: No  Feeling hot or cold when others believe the temperature is normal: Yes  Loss of height: No  Post-operative complications: No  Surgical site pain: No  Hallucinations: No  Change in or Loss of Energy: No  Hyperactivity: No  Confusion: No  Changes in hair: No  Changes in moles/birth marks: No  Itching: No  Rashes: No  Changes in nails: No  Acne: No  Change in facial hair: No  Warts: No  Non-healing sores: Yes  Scarring: No  Flaking of skin: No  Color changes of hands/feet in cold : No  Sun sensitivity: Yes  Skin thickening: No  Ear pain: No  Ear discharge: No  Hearing loss: No  Tinnitus: Yes  Nosebleeds: No  Congestion: No  Sinus pain: No  Trouble swallowing: No   Voice hoarseness: Yes  Mouth sores: No  Sore throat: No  Tooth pain: No  Gum tenderness: No  Bleeding gums: No  Change in taste: No  Change in sense of smell: No  Dry mouth: Yes  Hearing aid used: No  Neck lump: No  Eye pain: Yes  Vision loss: No  Dry eyes: Yes  Watery eyes: No  Eye bulging: No  Double vision: Yes  Flashing of lights: No  Spots: Yes  Floaters: No  Redness: No  Crossed eyes: No  Tunnel Vision: Yes  Yellowing of eyes: No  Eye irritation: No  Cough: No  Sputum or phlegm: No  Coughing up blood: No  Difficulty breating or shortness of breath: Yes  Snoring: No  Wheezing: No  Difficulty breathing on exertion: Yes  Nighttime Cough: No  Difficulty breathing when lying flat: No  Chest pain or pressure: No  Fast or irregular heartbeat: Yes  Pain in legs with walking: Yes  Trouble breathing while lying down: No  Fingers or toes appear blue: No  High blood pressure: Yes  Low blood pressure: No  Fainting: No  Murmurs: No  Pacemaker: No  Varicose  veins: No  Edema or swelling: Yes  Wake up at night with shortness of breath: No  Light-headedness: Yes  Exercise intolerance: Yes  Heart burn or indigestion: Yes  Nausea: No  Vomiting: No  Abdominal pain: Yes  Bloating: Yes  Constipation: Yes  Diarrhea: No  Blood in stool: Yes  Black stools: No  Rectal or Anal pain: No  Fecal incontinence: No  Yellowing of skin or eyes: No  Vomit with blood: No  Change in stools: No  Trouble holding urine or incontinence: Yes  Pain or burning: No  Trouble starting or stopping: No  Increased frequency of urination: No  Blood in urine: No  Decreased frequency of urination: No  Frequent nighttime urination: No  Flank pain: Yes  Difficulty emptying bladder: No  Back pain: Yes  Muscle aches: Yes  Neck pain: Yes  Swollen joints: Yes  Joint pain: Yes  Bone pain: No  Muscle cramps: Yes  Muscle weakness: Yes  Joint stiffness: Yes  Bone fracture: No  Trouble with coordination: Yes  Dizziness or trouble with balance: Yes  Fainting or black-out spells: No  Memory loss: No  Headache: No  Seizures: No  Speech problems: No  Tingling: Yes  Tremor: Yes  Weakness: Yes  Difficulty walking: Yes  Paralysis: No  Numbness: Yes      Again, thank you for allowing me to participate in the care of your patient.      Sincerely,    Harsha Mccarthy MD

## 2019-06-12 NOTE — NURSING NOTE
Chief Complaint   Patient presents with     RECHECK     ILD    .Medications reviewed and vital signs taken.   Jessa Fuchs CMA

## 2019-06-12 NOTE — NURSING NOTE
"Chief Complaint   Patient presents with     RECHECK     RA     BP (!) 159/99 (BP Location: Left arm, Patient Position: Sitting, Cuff Size: Adult Regular)   Pulse 65   Temp 98.3  F (36.8  C) (Oral)   Ht 1.854 m (6' 1\")   SpO2 94%   BMI 41.64 kg/m    Pari Marie CMA    6/12/2019 9:10 AM      "

## 2019-06-12 NOTE — RESULT ENCOUNTER NOTE
All labs reviewed and discussed with patient at office visit. Instructed to call for any further questions.       Sin GUZMAN CNP, MSN  6/12/2019  4:29 PM

## 2019-06-12 NOTE — LETTER
"6/12/2019      RE: Lucio Daly  [address] [facility]  [place] [address]       Rheumatology Visit     Lucio Daly MRN# [id number]   YOB: 1950 Age: 67 year old            Assessment and Plan:   1. Seronegative inflammatory arthritis RA: controlled no active inflammatory arthritis, stable.  I dont feel more immunosuppressive therapy is indicated. I would advise reduction of hydroxychloroquine (plaquenil) in the future or stop given on 9 years.   Plan  # Continue methotrexate at 20 mg weekly, folic acid 1 mg day. While on drug  --High risk medication monitoring--labs q 3 mo AST/ALT, Albumin, CBC with plts, CRP   --Limit EtOH intake to 2 drinks weekly; use folate 2 mg daily  --Tylenol 325mg qid prn nausea/HA associated with dosing.    # hydroxychloroquine (plaquenil) 200 mg BID -annual eye exam done April 2019 [facility]. Taper next visit to reduce risk of retinal toxicity as on since 2010 approx. No vision issues   2. 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 Mccarthy today for follow-up    3. Osteoarthritis knees, ankle and hands/thumbs. Continue isometric quad strengthening exercises twice daily to improve support around the joint. Left ankle osteoarthritis and overall osteoarthritis  is contributing to the overall pain. Continue splinting/hand therapy and acetaminophen 1000 mg tid ATC.         History of Present Illness:   Lucio Daly \"Rich\" presents for f/u seronegative rheumatoid arthritis, sicca complex.   Copy forward June 26, 2017  Dr. Goodrich seen him in Dec 2016   Back pain, seen PCP. MRI of his back to f/u spinal stenosis and herniated disc will be having this tomorrow then maybe seeing neurosurgeon (needed updated MRI) if this is indicated, did PT but stopped as was making this worse. Previous ortho was going to refer him to neurosurgeon but he didn't want surgery at this time. Last months, more increased numbness in feet and " "sciatica right thigh/calf \"uncomfortable\", more pain in mid and upper back then some in low back if stands too long, then gets this in rib cage. Had problems with \"numbness\" for some time, pain in arms. EMG testing in past peripheral polyneuropathy. Stumbling more, but had trouble with this in the past, and previous foot droop. More leg pain for \"quite a few months\".  Lay in bed or sit with feet up more in leg pain, back. Nueropathy is the bottom of his feet.   Tennis elbows, thumbs, hands pains are the same stable this comes and goes.   Continues the methotrexate 17.5 mg PO Q 7 days Tue or WED and the plaqeunil (he felt the stomach issues were due to other medications) this is tolerating well Didn't stop the plaquenil \"didnt want to rock the boat\" . Reports is RA is controlled, no flaring and is controlled with these medications so wishes to continue this plan.   Pulmonary Dr. Mccarthy had referred him to gastro for GERD in Oct. Seen him 5-2017 --ILD \"follicular bronchiolitis\" and pulmonary nodule stable (more SOB sit to stand then walking). His prilosec was increased. Checking neuromuscular \"thing\" when he sees him in Nov. Methotrexate was at 9 tab 22.5 mg a week tapered to 17.5 mg once a week --his RA remains controlled.   On ABX completed this Saturday for bronchitis, early pnuemonia and then infection in both his eyes. Started with laryngitis. ABX helped. No fever, chills, discolored sputum. Fatigue.    June 20, 2018  Seen Dr. Mccarthy 5-2018 pulmonary, f/u Aug. \"Stable\" \"didnt do as well on the walking\" \"more physical with legs\" will repeat testing in august. EKG similar to low pulse and \"similar to arrthymias\" will be talking to Dr. Corley about this and ASCVD.   Seen NATALEE Carrillo PAC-C low back pain and neck pain advised seeing neurosurgery didn't seen the neurosurgeon not seen does not wish to do surgery and in the past advised no surgery \"not a good candidate\" Dr. Mccarthy. Nerve pain into calves into feet, this " "leg pain due to his back/neck pain. Seen Dr. Villatoro in sports medicine  and then seen Dr. Campuzano for hands. PT for back. Dr Montes recommended pain management, but he didn't want narcotics.  Prior 2014 had injections of low back \"neuromas\" in the spine, injection not lasted and reaction \"neurologic bladder\" and same as when cortisone injections. Did PT in the past. Dr. Montes recommended possible CBD oils so will take to PCP. Nerve, tendon pain and joint pain. \"if this for a while right thigh severe pain and cant stand on leg\" \"left drop foot\" told was d/t spine. On lyrica   Taking methotrexate 20 mg PO Q 7 days WED (mild stomach 1-2 day, not every time or every dose), folic acid 1 mg day and the plaqeunil 200 mg BID (6 days week, then WED once day). Tolerating. Dr. Goodrich increased this d/t justus swelling, this is improved. Some swelling on and off with walking. Stiff in back, in the mornings hands stiff \"fingers will curl\" this is unchanged[place] [employer] boat with this hand. Reports x-rays were not fracture     Seen gastro who increased omprezole 40 mg BID, this has helped. Pain affected by change in seasons. Higher MTX dose is helping since increased in . Feet, ankles, toes and fingers pain lingers. \"stepped out of pain left heel and left ankle\" went to [place] [facility] strain achilled sprain left ankle, this was about 3 weeks ago. Has AFO brace, and feet/ankle brace from Dr. Martinez now wearing, got a gel heel insert insert this is helping still wearing. Has GERD and esophagus so hard to tell so will see PCP for complete CVD workup. Nuc 6-2016 NL. Sees dermatology, no skin issues. Has golf cart to get around. No tick bites      Goes to [facility], no notes in EHR. Last visit in March 2018 did given him the letter. Other [facility] reviewed and updated.     June 12, 2019 Jan 2019 Bronchitis then early pneumonia Dec, then Influenza B this winter March 2019. Denies any " "fever, chills, SOB, JUARES, night sweats, or chest pain, or cough. Reports healthy    Opthalmologist  Exam 4-2019 may continue hydroxychloroquine (plaquenil). No vision issues.   Lungs \"stable\" some SOB but waling 3/4 miles few times a week   Chronic ankle pain, this is not changed. Seen Dr. Martinez 1-2019, did not do Js brace or new orthotics as felt was not going to help and due to cost. Chronic sciatica pain and back \"Stenosis', down right leg and some in pelvic pain, hard to sleep and lay.  Seen spine specialist this year, no surgery not a good candidate due to risks.  Intermittent hand pain joints, no swelling. Parkinsonism is stable, body stiffness and neuropathy seeing neurology Dr. Montes, muscle discomfort from this. Lyrica now is helping some weight gain. Reactions to the others. \"Stable\" . Left knee bone on bone, not pursuing surgery at this time.     Methotrexate  20 mg Q Tues, folic acid  1 mg day, hydroxychloroquine (plaquenil) 200 mg BID, tolerating rare will get stomach issues or hot   PMH   1. Sensory neuropathy of unclear etiology - negative work up, managed on Lyrica.  Has chronic dysesthesia in pads of feet.  2. Parkinsonisim/Tremor disorder; sees neurology   3. Headaches   4. History of basal cell, squamous (most recent +bx 8-2014)  5. History of melanoma   6. GALO on CPAP.  7. HTN.   8. Seronegative RA, diagnosed 2009. SICCA  9. JUARES. Work-up 4-2015. 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 Lumbar stenosis per MRI 2017   11. Bronchitis 5-2017, early pneumonia   12. Chicken pox   13. DEXA  Normal   14. Left ankle DJD, MRI  --seen Dr. Martinez 2-2019   Past Medical History:   Diagnosis Date     Abnormal EMG 4/18/2013     Abnormal involuntary movements(781.0)     Movement Disorder     AK (actinic keratosis) 12/18/2011     Allergic rhinitis, cause unspecified     Allergic rhinitis     Balance problems 11/1/2011     Basal cell carcinoma      Bladder spasms 11/1/2011     Chronic " "osteoarthritis      Diaphragmatic hernia without mention of obstruction or gangrene      Earache or other ear, nose, or throat complaint      Esophageal reflux      Fatigue 11/1/2011     Fracture      H. pylori infection 5/12/2011     History of MRI of cervical spine 11/18/2013    EXAMINATION: CERVICAL SPINE G/E 5 VIEWS* 4/19/2013 4:18 PM  CLINICAL HISTORY: Pain in limb,Performing Location?->[facility] ([facility]),  COMPARISON:  FINDINGS: AP and lateral views in flexion and extension, as well as odontoid view of the cervical spine was obtained. There is no comparison available. The vertebral bodies of the cervical spine are normally aligned. There is posterior spurring and d     Incomplete defecation 11/1/2011     Interstitial lung disease (H) 11/29/2016     Laboratory test 8/7/2012     Lung disease June 2015     Malignant basal cell neoplasm of skin 8/6/2008     Melanoma (H) 8/6/2008     Melanoma in situ of lower leg (H)     R calf     Neuropathy 5/16/2011     Other bladder disorder      Other color vision deficiencies      Other nervous system complications      Parkinsonism (H) 11/1/2011     Personal history of colonic polyps      Polyneuropathy in other diseases classified elsewhere (H)      RA (rheumatoid arthritis) (H)      Rheumatoid arthritis of multiple sites with negative rheumatoid factor (H) 3/21/2016     Seronegative arthritis 11/18/2013     Shortness of breath      Shoulder arthritis 2016    acromioclavicular joint      Somatization disorder 5/12/2011     Squamous cell carcinoma      Tremor 11/1/2011     Unspecified essential hypertension      Unspecified hypothyroidism      Urinary tract infection      Urinary urgency 11/1/2011     Wears glasses 11/1/2011     Injuries-ankle sprains, left heel fracture    Family Hx   MGM Psoriasis  Sister -PPM  Family History   Problem Relation Age of Onset     Hypertension Mother      Heart Disease Mother         a fib     Arthritis Mother         \"osteo\"     " "Osteoporosis Mother      Skin Cancer Mother      Uterine Cancer Mother      Hypertension Brother      Diabetes Brother         \" post pancreatitis\"     Neurologic Disorder Sister         multiple sclerosis     Hypertension Sister      Heart Disease Sister      Multiple Sclerosis Sister      Heart Disease Sister         a fib     Arthritis Sister      Heart Failure Sister      Kidney Disease Sister      Dementia Sister      Hypertension Father      Cerebrovascular Disease Father         ,     Skin Cancer Father      Colon Polyps Father      Other - See Comments Son         [place]     Other - See Comments Abdon wagner     Other - See Comments Abdon gonzalez     Psoriasis Maternal Grandfather      Stomach Cancer Maternal Grandfather      Congenital Anomalies Other         granddaughter with a chromosome defect     Cerebrovascular Disease Paternal Grandmother         ,     Cerebrovascular Disease Paternal Grandfather         ,     Cancer Granddaughter         Langerhans Cell Histiocytosis     Genetic Disease Granddaughter         gene translocation     Melanoma No family hx of      Colon Cancer No family hx of      Personal Hx   Home environment: No secondhand tobacco smoke in home.    History     Social History     Marital Status:      Spouse Name: N/A     Number of Children: N/A     Years of Education: N/A     Social History Main Topics     Smoking status: Former Smoker     Quit date: 09/30/2003     Smokeless tobacco: Never Used     Alcohol Use: No     Drug Use: No     Sexually Active: Not Currently -- Female partner(s)     Other Topics Concern     None     Social History Narrative    2013: Living in [place] in a townhouse with no stepsHas 3 sons that are doing okay.             Review of Systems:     14 points all negative except what is mentioned in HPI.  Review Of Systems  Skin: negative  Eyes: negative  Ears/Nose/Throat: negative  Respiratory: No shortness of breath, " "dyspnea on exertion, cough, or hemoptysis  Cardiovascular: negative  Gastrointestinal: negative  Genitourinary: negative  Musculoskeletal: as above  Neurologic: as above  Psychiatric: negative  Hematologic/Lymphatic/Immunologic: negative  Endocrine: negative             Past Surgical History:   Past Surgical History:   Procedure Laterality Date     BIOPSY OF SKIN LESION       COLONOSCOPY       HEMORRHOID SURGERY       lip biopsy      for sicca complex     MOHS MICROGRAPHIC PROCEDURE       SOFT TISSUE SURGERY      removeal of basel cell carcinoma            Physical Exam:   Blood pressure (!) 159/99, pulse 65, temperature 98.3  F (36.8  C), temperature source Oral, height 1.854 m (6' 1\"), SpO2 94 %.  Wt Readings from Last 4 Encounters:   05/23/19 143.2 kg (315 lb 9.6 oz)   04/30/19 142.4 kg (313 lb 15 oz)   04/18/19 142.4 kg (314 lb)   03/09/19 142.4 kg (314 lb)       Constitutional: obese, appearing stated age; cooperative  Eyes: nl EOM, PERRLA, vision, conjunctiva, sclera  ENT: nl external ears, nose, hearing, lips, teeth, gums, throat  No mucous membrane lesions, normal saliva pool  Neck: no mass or thyroid enlargement  Resp: lungs clear to auscultation, nl to palpation  CV: RRR, no murmurs, rubs or gallops, no edema  GI: no ABD mass or tenderness, no HSM  : not tested  Lymph: no cervical, supraclavicular, inguinal or epitrochlear nodes  MS:  All TMJ, neck, shoulder, elbow, wrist, MCP/PIP/DIP, spine, hip, knee, ankle, and foot MTP/IP joints were examined.  Knees without effusion but +creptitus; Mild Kyphosis. Left ankle midfoot arthritis changes and base of thumbs. No S/T across the ankle joints. -MTP/MCP squeeze.  Skin: no nail pitting, alopecia, rash, nodules or lesions.  Neuro: No tremor today. Walks with cane  Psych: nl judgement, orientation, memory, affect.         Data:       Results for orders placed or performed in visit on 06/12/19   6 minute walk test   Result Value Ref Range    6 min walk (FT) 750 ft "    6 Min Walk (M) 229 m   General PFT Lab (Please always keep checked)   Result Value Ref Range    FVC-Pred 4.70 L    FVC-Pre 3.17 L    FVC-%Pred-Pre 67 %    FEV1-Pre 2.35 L    FEV1-%Pred-Pre 66 %    FEV1FVC-Pred 75 %    FEV1FVC-Pre 74 %    FEFMax-Pred 9.05 L/sec    FEFMax-Pre 8.10 L/sec    FEFMax-%Pred-Pre 89 %    FEF2575-Pred 2.63 L/sec    FEF2575-Pre 1.66 L/sec    UEW6707-%Pred-Pre 62 %    ExpTime-Pre 8.13 sec    FIFMax-Pre 7.00 L/sec    VC-Pred 5.29 L    VC-Pre 4.14 L    VC-%Pred-Pre 78 %    IC-Pred 4.66 L    IC-Pre 3.99 L    IC-%Pred-Pre 85 %    ERV-Pred 0.62 L    ERV-Pre 0.15 L    ERV-%Pred-Pre 23 %    FEV1FEV6-Pred 78 %    FEV1FEV6-Pre 74 %    FRCPleth-Pred 3.88 L    FRCPleth-Pre 3.21 L    FRCPleth-%Pred-Pre 82 %    RVPleth-Pred 2.74 L    RVPleth-Pre 3.06 L    RVPleth-%Pred-Pre 111 %    TLCPleth-Pred 7.74 L    TLCPleth-Pre 7.20 L    TLCPleth-%Pred-Pre 93 %    DLCOunc-Pred 28.38 ml/min/mmHg    DLCOunc-Pre 30.06 ml/min/mmHg    DLCOunc-%Pred-Pre 105 %    VA-Pre 5.07 L    VA-%Pred-Pre 71 %    FEV1SVC-Pred 67 %    FEV1SVC-Pre 57 %     *Note: Due to a large number of results and/or encounters for the requested time period, some results have not been displayed. A complete set of results can be found in Results Review.     Component      Latest Ref Rng & Units 4/15/2019 6/12/2019   WBC      4.0 - 11.0 10e9/L 3.7 (L) 5.1   RBC Count      4.4 - 5.9 10e12/L 4.51 4.48   Hemoglobin      13.3 - 17.7 g/dL 13.1 (L) 13.8   Hematocrit      40.0 - 53.0 % 42.9 43.3   MCV      78 - 100 fl 95 97   MCH      26.5 - 33.0 pg 29.0 30.8   MCHC      31.5 - 36.5 g/dL 30.5 (L) 31.9   RDW      10.0 - 15.0 % 14.6 14.7   Platelet Count      150 - 450 10e9/L 161 160   Creatinine      0.66 - 1.25 mg/dL 1.08 1.05   GFR Estimate      >60 mL/min/1.73:m2 69 71   GFR Estimate If Black      >60 mL/min/1.73:m2 80 82   CRP Inflammation      0.0 - 8.0 mg/L 8.0 12.7 (H)   AST      0 - 45 U/L 20 16   ALT      0 - 70 U/L 23 23   Albumin      3.4 - 5.0 g/dL  3.6 3.8       Thank-you for allowing me to participate in your care.     Sin GUZMAN, CNP, MSN  [facility]  Department of Rheumatology & Autoimmune Disorders  [facility]: [phone] Opt 2, then Opt 1 Scheduling   [facility]: [phone]; [phone] Option 7 scheduling

## 2019-06-14 DIAGNOSIS — J84.9 ILD (INTERSTITIAL LUNG DISEASE) (H): Primary | ICD-10-CM

## 2019-06-14 RX ORDER — BUDESONIDE AND FORMOTEROL FUMARATE DIHYDRATE 160; 4.5 UG/1; UG/1
2 AEROSOL RESPIRATORY (INHALATION) 2 TIMES DAILY
Qty: 1 INHALER | Refills: 11 | Status: SHIPPED | OUTPATIENT
Start: 2019-06-14 | End: 2019-07-03 | Stop reason: SINTOL

## 2019-06-15 DIAGNOSIS — M06.019 RHEUMATOID ARTHRITIS INVOLVING SHOULDER WITH NEGATIVE RHEUMATOID FACTOR, UNSPECIFIED LATERALITY (H): ICD-10-CM

## 2019-06-17 RX ORDER — HYDROXYCHLOROQUINE SULFATE 200 MG/1
TABLET, FILM COATED ORAL
Qty: 180 TABLET | Refills: 3 | Status: SHIPPED | OUTPATIENT
Start: 2019-06-17 | End: 2019-12-10

## 2019-06-17 NOTE — TELEPHONE ENCOUNTER
"Last Clinic Visit: 6/12/2019  [facility]  Future Visit: 12/10/19  Last Eye Exam: 4/15/19     *reviewed plan last visit-- \"Taper next visit to reduce risk of retinal toxicity as on since 2010 approx. No vision issues\"    Refilled thru future visit scheduled for 12/10/19        "

## 2019-06-19 LAB
DLCOUNC-%PRED-PRE: 105 %
DLCOUNC-PRE: 30.06 ML/MIN/MMHG
DLCOUNC-PRED: 28.38 ML/MIN/MMHG
ERV-%PRED-PRE: 23 %
ERV-PRE: 0.15 L
ERV-PRED: 0.62 L
EXPTIME-PRE: 8.13 SEC
FEF2575-%PRED-POST: 82 %
FEF2575-%PRED-PRE: 62 %
FEF2575-POST: 2.18 L/SEC
FEF2575-PRE: 1.66 L/SEC
FEF2575-PRED: 2.63 L/SEC
FEFMAX-%PRED-PRE: 89 %
FEFMAX-PRE: 8.1 L/SEC
FEFMAX-PRED: 9.05 L/SEC
FEV1-%PRED-PRE: 66 %
FEV1-PRE: 2.35 L
FEV1FEV6-PRE: 74 %
FEV1FEV6-PRED: 78 %
FEV1FVC-PRE: 74 %
FEV1FVC-PRED: 75 %
FEV1SVC-PRE: 57 %
FEV1SVC-PRED: 67 %
FIFMAX-PRE: 7 L/SEC
FRCPLETH-%PRED-PRE: 82 %
FRCPLETH-PRE: 3.21 L
FRCPLETH-PRED: 3.88 L
FVC-%PRED-PRE: 67 %
FVC-PRE: 3.17 L
FVC-PRED: 4.7 L
IC-%PRED-PRE: 85 %
IC-PRE: 3.99 L
IC-PRED: 4.66 L
RVPLETH-%PRED-PRE: 111 %
RVPLETH-PRE: 3.06 L
RVPLETH-PRED: 2.74 L
TLCPLETH-%PRED-PRE: 93 %
TLCPLETH-PRE: 7.2 L
TLCPLETH-PRED: 7.74 L
VA-%PRED-PRE: 71 %
VA-PRE: 5.07 L
VC-%PRED-PRE: 78 %
VC-PRE: 4.14 L
VC-PRED: 5.29 L

## 2019-06-28 ENCOUNTER — TRANSFERRED RECORDS (OUTPATIENT)
Dept: HEALTH INFORMATION MANAGEMENT | Facility: CLINIC | Age: 69
End: 2019-06-28

## 2019-07-01 ENCOUNTER — TELEPHONE (OUTPATIENT)
Dept: PULMONOLOGY | Facility: CLINIC | Age: 69
End: 2019-07-01

## 2019-07-01 DIAGNOSIS — J84.9 ILD (INTERSTITIAL LUNG DISEASE) (H): Primary | ICD-10-CM

## 2019-07-01 NOTE — TELEPHONE ENCOUNTER
[facility] Call Center    Phone Message    May a detailed message be left on voicemail: yes    Reason for Call: Other: Pt is having some side effects from taking the Symbicort. Pt stated having a hoarse voice along with some chest and stomach discomfort. The medications does help with the breathing but also noticed these new symptoms Pt is having.      Action Taken: Message routed to:  [facility] ([facility] Pulm

## 2019-07-03 NOTE — TELEPHONE ENCOUNTER
Spoke with patient. Discussed with Dr Mccarthy, plan to try just a long acting bronchodilator due to patient reactions to steriods. Rx for Serevent sent to pharmacy. Patient informed.

## 2019-07-10 ENCOUNTER — OFFICE VISIT (OUTPATIENT)
Dept: PODIATRY | Facility: CLINIC | Age: 69
End: 2019-07-10
Payer: MEDICARE

## 2019-07-10 ENCOUNTER — ANCILLARY PROCEDURE (OUTPATIENT)
Dept: GENERAL RADIOLOGY | Facility: CLINIC | Age: 69
End: 2019-07-10
Attending: PODIATRIST
Payer: MEDICARE

## 2019-07-10 VITALS
BODY MASS INDEX: 41.26 KG/M2 | SYSTOLIC BLOOD PRESSURE: 138 MMHG | WEIGHT: 311.3 LBS | HEIGHT: 73 IN | DIASTOLIC BLOOD PRESSURE: 68 MMHG

## 2019-07-10 DIAGNOSIS — R60.0 EDEMA OF TOE: ICD-10-CM

## 2019-07-10 DIAGNOSIS — M19.079 ARTHRITIS OF BIG TOE: ICD-10-CM

## 2019-07-10 DIAGNOSIS — M79.89 SOFT TISSUE MASS: Primary | ICD-10-CM

## 2019-07-10 DIAGNOSIS — M79.89 SOFT TISSUE MASS: ICD-10-CM

## 2019-07-10 PROCEDURE — 73660 X-RAY EXAM OF TOE(S): CPT | Mod: LT

## 2019-07-10 PROCEDURE — 99203 OFFICE O/P NEW LOW 30 MIN: CPT | Performed by: PODIATRIST

## 2019-07-10 ASSESSMENT — MIFFLIN-ST. JEOR: SCORE: 2225.93

## 2019-07-10 NOTE — PATIENT INSTRUCTIONS
Thank you for choosing [facility]!    DR. CHILD'S CLINIC LOCATIONS     MONDAY - [place] WEDNESDAY (AM ONLY) - [place]   [address] [place] [id number] [address] [address]   [phone] / [phone] / [phone]       THURSDAY - [place] SCHEDULE SURGERY: [phone]   [address] APPOINTMENTS: [phone]   [place] [address] BILLING QUESTIONS: [phone] / [phone]               FYI: BODY WEIGHT AND YOUR FEET  The following information is included in the after visit summary for all patients. Body weight can be a sensitive issue to discuss in clinic, but we think the following information is very important. Although we focus on the feet and ankles, we do support the overall health of our patients.     Many things can cause foot and ankle problems. Foot structure, activity level, foot mechanics and injuries are common causes of pain. One very important issue that often goes unmentioned, is body weight. Extra weight can cause increased stress on muscles, ligaments, bones and tendons. Sometimes just a few extra pounds is all it takes to put one over her/his threshold. Without reducing that stress, it can be difficult to alleviate pain. As Foot & Ankle specialists, our job is addressing the lower extremity problem and possible causes. Regarding extra body weight, we encourage patients to discuss diet and weight management plans with their primary care doctors. It is this team approach that gives you the best opportunity for pain relief and getting you back on your feet.      [facility] has a Comprehensive Weight Management Program. This program includes counseling, education, non-surgical and surgical approaches to weight loss. If you are interested in learning more either talk to you primary care provider or call [phone].

## 2019-07-10 NOTE — LETTER
7/10/2019         RE: Lucio Daly  [address]  [place] [address]        Dear Colleague,    Thank you for referring your patient, Lucio Daly, to the [facility] [place]. Please see a copy of my visit note below.      ASSESSMENT/PLAN:  Encounter Diagnoses   Name Primary?     Soft tissue mass, right great toe Yes     Edema of toe      Arthritis of big toe, left      I reviewed the XR images with the patient.  The relevant anatomy and function was discussed, in relation to the problem.      We discussed the cause and nature of ganglionic cysts.  I explained that they are benign, but can cause discomfort due to foot wear irritation.  We discussed the options of aspiration and surgical excision.  He is to monitor and return if any changes in pain, size, presentation.     The left hallux has generalized swelling, but does not appear acute. He describes periodic redness and swelling. This suggests a crystalline arthropathy.  He is advised to rest, ice consider NSAIDs with flare ups.  If it becomes more frequent, he is to discuss gout managemen with Dr. Corley.  We could try aspiration and crystal analysis, but best done closer to a period of flare up.         Body mass index is 41.07 kg/m .    Weight management plan: Patient was referred to their PCP to discuss a diet and exercise plan.      Ramón Garnica DPM, FACFAS, MS    [facility]      ____________________________________________________________________    HPI:       Chief Complaint: left big toe joint pain and swelling (chronic)  Onset of problem: months  Pain/ discomfort is described as:  Burning, throbbing, tingling, shooting  Frequency:  intermittent    The pain is made worse with walking, standing, uneven surfaces  Previous treatment: Tylenol    Secondary concerns:  He also reports a bump on his right great toe.  Noticed weeks ago. Minimal pain.  No injury.     He uses bilateral Js braces prescribed by  another podiatrist. He said that he has some foot drop on both sides.    *  Patient Active Problem List   Diagnosis     Diaphragmatic hernia     Esophageal reflux     Hx of melanoma of skin     Malignant basal cell neoplasm of skin     GALO (obstructive sleep apnea)     Chronic maxillary sinusitis     Urinary incontinence     H. pylori infection     Sicca syndrome (H)     Health Care Home     Advanced directives, counseling/discussion     Tremor     Visual changes     Incomplete defecation     Gait disorder     Balance problems     Fatigue     High risk medications (not anticoagulants) long-term use     Personal history of other malignant neoplasm of skin     AK (actinic keratosis)     Pain in joint, lower leg     Abnormal EMG     Seronegative arthritis     History of MRI of cervical spine     Pain in limb     Adenomatous polyp of colon     Peripheral polyneuropathy     Rheumatoid arthritis of multiple sites with negative rheumatoid factor (H)     Morbid obesity (H)     Interstitial lung disease (H)     History of MRI of lumbar spine     Mood disorder (H)     Paralysis agitans (H)   *  *  Past Surgical History:   Procedure Laterality Date     BIOPSY OF SKIN LESION       COLONOSCOPY       HEMORRHOID SURGERY       lip biopsy      for sicca complex     MOHS MICROGRAPHIC PROCEDURE       SOFT TISSUE SURGERY      removeal of basel cell carcinoma   *  *  Current Outpatient Medications   Medication Sig Dispense Refill     acetaminophen (TYLENOL) 500 MG tablet 2 x 500mg by mouth 3 times per day: 7/730am, 4pm and 11pm  = 6/day       albuterol (PROAIR HFA/PROVENTIL HFA/VENTOLIN HFA) 108 (90 Base) MCG/ACT inhaler Inhale 2 puffs into the lungs every 4 hours as needed for shortness of breath / dyspnea or wheezing 1 Inhaler 3     Cholecalciferol (VITAMIN D3) 1000 units CAPS 1000 unit capsule by mouth daily @ 7am 30 capsule      folic acid (FOLVITE) 1 MG tablet 1mg tab by mouth twice daily @ 9am and 4pm 180 tablet 3      hydroxychloroquine (PLAQUENIL) 200 MG tablet 200mg tab by mouth twice daily @ 9am and 4pm 180 tablet 3     methotrexate 2.5 MG tablet TAKE 8 TABLETS BY MOUTH ONCE WEEKLY. LABS DUE EVERY 8 - 12 WEEKS. 96 tablet 4     metoprolol succinate ER (TOPROL-XL) 50 MG 24 hr tablet Take 1.5 tablets (75 mg) by mouth daily 135 tablet 11     omeprazole (PRILOSEC) 40 MG DR capsule 40mg capsule by mouth daily @ 7/730am 90 capsule 3     ORDER FOR DME Use your CPAP device as directed by your provider.       oxybutynin (DITROPAN) 5 MG tablet 5mg tab by mouth daily @ 4pm 90 tablet 1     pregabalin (LYRICA) 50 MG capsule 1 tab by mouth @ 7/730am, 1 tab @ 4pm and 1-2 tab @ 11pm 360 capsule 3     salmeterol (SEREVENT) 50 MCG/DOSE inhaler Inhale 1 puff into the lungs 2 times daily 1 Inhaler 11     albuterol (PROAIR HFA) 108 (90 Base) MCG/ACT inhaler Inhale 2 puffs into the lungs every 4 hours as needed for shortness of breath / dyspnea or wheezing INHALE 2 PUFFS INTO THE LUNGS EVERY 4 HOURS AS NEEDED FOR SHORTNESS OF BREATH/DYSPNEA OR WHEEZING (Patient not taking: Reported on 7/10/2019) 1 Inhaler 3       ROS:     A 10-point review of systems was performed and is positive for that noted above in the HPI and as seen below.  All other areas are negative.     Numbness in feet?  yes   Calf pain with walking? no  Recent foot/ankle injury? no  Weight change over past 12 months? no  Self perception as overweight? yes  Recent flu-like symptoms? no  Joint pain other than feet ? RA, peripheral neuropathy, OA of left knee, spinal stenosis    Social History: Employment:  no;  Exercise/Physical activity:  no;  Tobacco use:  no  Social History     Socioeconomic History     Marital status:      Spouse name: Not on file     Number of children: Not on file     Years of education: Not on file     Highest education level: Not on file   Occupational History     Not on file   Social Needs     Financial resource strain: Not on file     Food insecurity:      Worry: Not on file     Inability: Not on file     Transportation needs:     Medical: Not on file     Non-medical: Not on file   Tobacco Use     Smoking status: Former Smoker     Packs/day: 1.50     Years: 37.00     Pack years: 55.50     Types: Cigarettes     Start date: 6/15/1963     Last attempt to quit: 2000     Years since quittin.7     Smokeless tobacco: Never Used   Substance and Sexual Activity     Alcohol use: No     Alcohol/week: 0.0 oz     Drug use: No     Sexual activity: Never     Partners: Female     Birth control/protection: None   Lifestyle     Physical activity:     Days per week: Not on file     Minutes per session: Not on file     Stress: Not on file   Relationships     Social connections:     Talks on phone: Not on file     Gets together: Not on file     Attends Anabaptism service: Not on file     Active member of club or organization: Not on file     Attends meetings of clubs or organizations: Not on file     Relationship status: Not on file     Intimate partner violence:     Fear of current or ex partner: Not on file     Emotionally abused: Not on file     Physically abused: Not on file     Forced sexual activity: Not on file   Other Topics Concern     Parent/sibling w/ CABG, MI or angioplasty before 65F 55M? No   Social History Narrative    2013: Living in [place] in a townhouse with no steps    Has 3 sons that are doing okay.         Dairy/d 1 servings/d.     Caffeine 0 servings/d    Exercise 0 x week    Sunscreen used - No    Seatbelts used - Yes    Working smoke/CO detectors in the home - Yes    Guns stored in the home - Yes    Self Breast Exams - NA    Self Testicular Exam - Yes    Eye Exam up to date - Yes     Dental Exam up to date - Yes 2006    Pap Smear up to date - NA    Mammogram up to date - NA    PSA up to date - Yes 2008    Dexa Scan up to date - No    Flex Sig / Colonoscopy up to date - Yes less than 5 yrs ago    Immunizations up to date -today    Abuse: Current or  "Past(Physical, Sexual or Emotional)- No    Do you feel safe in your environment - Yes    2008                   Family history:  Family History   Problem Relation Age of Onset     Hypertension Mother      Heart Disease Mother         a fib     Arthritis Mother         \"osteo\"     Osteoporosis Mother      Skin Cancer Mother      Uterine Cancer Mother      Hypertension Brother      Diabetes Brother         \" post pancreatitis\"     Neurologic Disorder Sister         multiple sclerosis     Hypertension Sister      Heart Disease Sister      Multiple Sclerosis Sister      Heart Disease Sister         a fib     Arthritis Sister      Heart Failure Sister      Kidney Disease Sister      Dementia Sister      Hypertension Father      Cerebrovascular Disease Father         ,     Skin Cancer Father      Colon Polyps Father      Other - See Comments Son         [place]     Other - See Comments Abdon wagner     Other - See Comments Abdon gonzalez     Psoriasis Maternal Grandfather      Stomach Cancer Maternal Grandfather      Congenital Anomalies Other         granddaughter with a chromosome defect     Cerebrovascular Disease Paternal Grandmother         ,     Cerebrovascular Disease Paternal Grandfather         ,     Cancer Granddaughter         Langerhans Cell Histiocytosis     Genetic Disease Granddaughter         gene translocation     Melanoma No family hx of      Colon Cancer No family hx of        Rheumatoid arthritis:  no  Foot Problems: parent- arthritis  Diabetes: sibling      EXAM:    Vitals: /68   Ht 1.854 m (6' 1\")   Wt 141.2 kg (311 lb 4.8 oz)   BMI 41.07 kg/m     BMI: Body mass index is 41.07 kg/m .  Height: 6' 1\"    Constitutional/ general:  Pt is in no apparent distress, appears well-nourished.  Cooperative with history and physical exam.     Vascular:  Pedal pulses are palpable bilaterally for both the DP and PT arteries.  CFT < 3 sec.   Pedal hair growth noted.     Neuro:  " "Alert and oriented x 3. Coordinated gait.  Light touch sensation is intact to the L4, L5, S1 distributions. No obvious deficits.  No evidence of neurological-based weakness, spasticity, or contracture in the lower extremities.     Derm: Normal texture and turgor.  No erythema, ecchymosis, or cyanosis.  No open lesions.     Musculoskeletal:    Lower extremity muscle strength is normal.  Patient is ambulatory without an assistive device or brace .  Decrease in medial longitudinal arch with weight bearing.   Generalized edema of he left lower extremity when compared to the right.  Generalized edema of the left great toe.  Reduced interphalangeal joint ROM, left hallux    Radiographic Exam:  X-Ray Findings:  I personally reviewed the bilateral toe films.  Right is negative. Left shows degenerative changes of the hallux interphalangeal joint with lateral narrowing. There appears to be a \"rats bite\" lesion laterally that might suggest gout.      Ramón Garnica DPM, FACFAS, MS    [facility]le [facility]                Again, thank you for allowing me to participate in the care of your patient.        Sincerely,        Ramón Garnica DPM    "

## 2019-07-10 NOTE — PROGRESS NOTES
ASSESSMENT/PLAN:  Encounter Diagnoses   Name Primary?     Soft tissue mass, right great toe Yes     Edema of toe      Arthritis of big toe, left      I reviewed the XR images with the patient.  The relevant anatomy and function was discussed, in relation to the problem.      We discussed the cause and nature of ganglionic cysts.  I explained that they are benign, but can cause discomfort due to foot wear irritation.  We discussed the options of aspiration and surgical excision.  He is to monitor and return if any changes in pain, size, presentation.     The left hallux has generalized swelling, but does not appear acute. He describes periodic redness and swelling. This suggests a crystalline arthropathy.  He is advised to rest, ice consider NSAIDs with flare ups.  If it becomes more frequent, he is to discuss gout managemen with Dr. Corley.  We could try aspiration and crystal analysis, but best done closer to a period of flare up.         Body mass index is 41.07 kg/m .    Weight management plan: Patient was referred to their PCP to discuss a diet and exercise plan.      Ramón Garnica DPM, FACFAS, MS    [facility]      ____________________________________________________________________    HPI:       Chief Complaint: left big toe joint pain and swelling (chronic)  Onset of problem: months  Pain/ discomfort is described as:  Burning, throbbing, tingling, shooting  Frequency:  intermittent    The pain is made worse with walking, standing, uneven surfaces  Previous treatment: Tylenol    Secondary concerns:  He also reports a bump on his right great toe.  Noticed weeks ago. Minimal pain.  No injury.     He uses bilateral Js braces prescribed by another podiatrist. He said that he has some foot drop on both sides.    *  Patient Active Problem List   Diagnosis     Diaphragmatic hernia     Esophageal reflux     Hx of melanoma of skin     Malignant basal cell neoplasm of skin      GALO (obstructive sleep apnea)     Chronic maxillary sinusitis     Urinary incontinence     H. pylori infection     Sicca syndrome (H)     Health Care Home     Advanced directives, counseling/discussion     Tremor     Visual changes     Incomplete defecation     Gait disorder     Balance problems     Fatigue     High risk medications (not anticoagulants) long-term use     Personal history of other malignant neoplasm of skin     AK (actinic keratosis)     Pain in joint, lower leg     Abnormal EMG     Seronegative arthritis     History of MRI of cervical spine     Pain in limb     Adenomatous polyp of colon     Peripheral polyneuropathy     Rheumatoid arthritis of multiple sites with negative rheumatoid factor (H)     Morbid obesity (H)     Interstitial lung disease (H)     History of MRI of lumbar spine     Mood disorder (H)     Paralysis agitans (H)   *  *  Past Surgical History:   Procedure Laterality Date     BIOPSY OF SKIN LESION       COLONOSCOPY       HEMORRHOID SURGERY       lip biopsy      for sicca complex     MOHS MICROGRAPHIC PROCEDURE       SOFT TISSUE SURGERY      removeal of basel cell carcinoma   *  *  Current Outpatient Medications   Medication Sig Dispense Refill     acetaminophen (TYLENOL) 500 MG tablet 2 x 500mg by mouth 3 times per day: 7/730am, 4pm and 11pm  = 6/day       albuterol (PROAIR HFA/PROVENTIL HFA/VENTOLIN HFA) 108 (90 Base) MCG/ACT inhaler Inhale 2 puffs into the lungs every 4 hours as needed for shortness of breath / dyspnea or wheezing 1 Inhaler 3     Cholecalciferol (VITAMIN D3) 1000 units CAPS 1000 unit capsule by mouth daily @ 7am 30 capsule      folic acid (FOLVITE) 1 MG tablet 1mg tab by mouth twice daily @ 9am and 4pm 180 tablet 3     hydroxychloroquine (PLAQUENIL) 200 MG tablet 200mg tab by mouth twice daily @ 9am and 4pm 180 tablet 3     methotrexate 2.5 MG tablet TAKE 8 TABLETS BY MOUTH ONCE WEEKLY. LABS DUE EVERY 8 - 12 WEEKS. 96 tablet 4     metoprolol succinate ER  (TOPROL-XL) 50 MG 24 hr tablet Take 1.5 tablets (75 mg) by mouth daily 135 tablet 11     omeprazole (PRILOSEC) 40 MG DR capsule 40mg capsule by mouth daily @ 7/730am 90 capsule 3     ORDER FOR DME Use your CPAP device as directed by your provider.       oxybutynin (DITROPAN) 5 MG tablet 5mg tab by mouth daily @ 4pm 90 tablet 1     pregabalin (LYRICA) 50 MG capsule 1 tab by mouth @ 7/730am, 1 tab @ 4pm and 1-2 tab @ 11pm 360 capsule 3     salmeterol (SEREVENT) 50 MCG/DOSE inhaler Inhale 1 puff into the lungs 2 times daily 1 Inhaler 11     albuterol (PROAIR HFA) 108 (90 Base) MCG/ACT inhaler Inhale 2 puffs into the lungs every 4 hours as needed for shortness of breath / dyspnea or wheezing INHALE 2 PUFFS INTO THE LUNGS EVERY 4 HOURS AS NEEDED FOR SHORTNESS OF BREATH/DYSPNEA OR WHEEZING (Patient not taking: Reported on 7/10/2019) 1 Inhaler 3       ROS:     A 10-point review of systems was performed and is positive for that noted above in the HPI and as seen below.  All other areas are negative.     Numbness in feet?  yes   Calf pain with walking? no  Recent foot/ankle injury? no  Weight change over past 12 months? no  Self perception as overweight? yes  Recent flu-like symptoms? no  Joint pain other than feet ? RA, peripheral neuropathy, OA of left knee, spinal stenosis    Social History: Employment:  no;  Exercise/Physical activity:  no;  Tobacco use:  no  Social History     Socioeconomic History     Marital status:      Spouse name: Not on file     Number of children: Not on file     Years of education: Not on file     Highest education level: Not on file   Occupational History     Not on file   Social Needs     Financial resource strain: Not on file     Food insecurity:     Worry: Not on file     Inability: Not on file     Transportation needs:     Medical: Not on file     Non-medical: Not on file   Tobacco Use     Smoking status: Former Smoker     Packs/day: 1.50     Years: 37.00     Pack years: 55.50      Types: Cigarettes     Start date: 6/15/1963     Last attempt to quit: 2000     Years since quittin.7     Smokeless tobacco: Never Used   Substance and Sexual Activity     Alcohol use: No     Alcohol/week: 0.0 oz     Drug use: No     Sexual activity: Never     Partners: Female     Birth control/protection: None   Lifestyle     Physical activity:     Days per week: Not on file     Minutes per session: Not on file     Stress: Not on file   Relationships     Social connections:     Talks on phone: Not on file     Gets together: Not on file     Attends Congregational service: Not on file     Active member of club or organization: Not on file     Attends meetings of clubs or organizations: Not on file     Relationship status: Not on file     Intimate partner violence:     Fear of current or ex partner: Not on file     Emotionally abused: Not on file     Physically abused: Not on file     Forced sexual activity: Not on file   Other Topics Concern     Parent/sibling w/ CABG, MI or angioplasty before 65F 55M? No   Social History Narrative    2013: Living in [place] in a townhouse with no steps    Has 3 sons that are doing okay.         Dairy/d 1 servings/d.     Caffeine 0 servings/d    Exercise 0 x week    Sunscreen used - No    Seatbelts used - Yes    Working smoke/CO detectors in the home - Yes    Guns stored in the home - Yes    Self Breast Exams - NA    Self Testicular Exam - Yes    Eye Exam up to date - Yes     Dental Exam up to date - Yes     Pap Smear up to date - NA    Mammogram up to date - NA    PSA up to date - Yes 2008    Dexa Scan up to date - No    Flex Sig / Colonoscopy up to date - Yes less than 5 yrs ago    Immunizations up to date -today    Abuse: Current or Past(Physical, Sexual or Emotional)- No    Do you feel safe in your environment - Yes    2008                   Family history:  Family History   Problem Relation Age of Onset     Hypertension Mother      Heart Disease Mother         a fib  "    Arthritis Mother         \"osteo\"     Osteoporosis Mother      Skin Cancer Mother      Uterine Cancer Mother      Hypertension Brother      Diabetes Brother         \" post pancreatitis\"     Neurologic Disorder Sister         multiple sclerosis     Hypertension Sister      Heart Disease Sister      Multiple Sclerosis Sister      Heart Disease Sister         a fib     Arthritis Sister      Heart Failure Sister      Kidney Disease Sister      Dementia Sister      Hypertension Father      Cerebrovascular Disease Father         ,     Skin Cancer Father      Colon Polyps Father      Other - See Comments Son         [place]     Other - See Comments Abdon wagner     Other - See Comments Abdon gonzalez     Psoriasis Maternal Grandfather      Stomach Cancer Maternal Grandfather      Congenital Anomalies Other         granddaughter with a chromosome defect     Cerebrovascular Disease Paternal Grandmother         ,     Cerebrovascular Disease Paternal Grandfather         ,     Cancer Granddaughter         Langerhans Cell Histiocytosis     Genetic Disease Granddaughter         gene translocation     Melanoma No family hx of      Colon Cancer No family hx of        Rheumatoid arthritis:  no  Foot Problems: parent- arthritis  Diabetes: sibling      EXAM:    Vitals: /68   Ht 1.854 m (6' 1\")   Wt 141.2 kg (311 lb 4.8 oz)   BMI 41.07 kg/m    BMI: Body mass index is 41.07 kg/m .  Height: 6' 1\"    Constitutional/ general:  Pt is in no apparent distress, appears well-nourished.  Cooperative with history and physical exam.     Vascular:  Pedal pulses are palpable bilaterally for both the DP and PT arteries.  CFT < 3 sec.   Pedal hair growth noted.     Neuro:  Alert and oriented x 3. Coordinated gait.  Light touch sensation is intact to the L4, L5, S1 distributions. No obvious deficits.  No evidence of neurological-based weakness, spasticity, or contracture in the lower extremities.     Derm: " "Normal texture and turgor.  No erythema, ecchymosis, or cyanosis.  No open lesions.     Musculoskeletal:    Lower extremity muscle strength is normal.  Patient is ambulatory without an assistive device or brace .  Decrease in medial longitudinal arch with weight bearing.   Generalized edema of he left lower extremity when compared to the right.  Generalized edema of the left great toe.  Reduced interphalangeal joint ROM, left hallux    Radiographic Exam:  X-Ray Findings:  I personally reviewed the bilateral toe films.  Right is negative. Left shows degenerative changes of the hallux interphalangeal joint with lateral narrowing. There appears to be a \"rats bite\" lesion laterally that might suggest gout.      Ramón Garnica DPM, FACFAS, MS    [place] [facility]              "

## 2019-08-05 ENCOUNTER — TRANSFERRED RECORDS (OUTPATIENT)
Dept: HEALTH INFORMATION MANAGEMENT | Facility: CLINIC | Age: 69
End: 2019-08-05

## 2019-08-06 DIAGNOSIS — M06.09 RHEUMATOID ARTHRITIS OF MULTIPLE SITES WITH NEGATIVE RHEUMATOID FACTOR (H): ICD-10-CM

## 2019-08-06 DIAGNOSIS — Z79.899 HIGH RISK MEDICATIONS (NOT ANTICOAGULANTS) LONG-TERM USE: ICD-10-CM

## 2019-08-06 LAB
ALBUMIN SERPL-MCNC: 3.7 G/DL (ref 3.4–5)
ALT SERPL W P-5'-P-CCNC: 28 U/L (ref 0–70)
AST SERPL W P-5'-P-CCNC: 16 U/L (ref 0–45)
CREAT SERPL-MCNC: 1.09 MG/DL (ref 0.66–1.25)
CRP SERPL-MCNC: 7 MG/L (ref 0–8)
ERYTHROCYTE [DISTWIDTH] IN BLOOD BY AUTOMATED COUNT: 14.4 % (ref 10–15)
GFR SERPL CREATININE-BSD FRML MDRD: 68 ML/MIN/{1.73_M2}
HCT VFR BLD AUTO: 43.2 % (ref 40–53)
HGB BLD-MCNC: 13.2 G/DL (ref 13.3–17.7)
MCH RBC QN AUTO: 30.1 PG (ref 26.5–33)
MCHC RBC AUTO-ENTMCNC: 30.6 G/DL (ref 31.5–36.5)
MCV RBC AUTO: 98 FL (ref 78–100)
PLATELET # BLD AUTO: 161 10E9/L (ref 150–450)
RBC # BLD AUTO: 4.39 10E12/L (ref 4.4–5.9)
WBC # BLD AUTO: 4.2 10E9/L (ref 4–11)

## 2019-08-07 NOTE — RESULT ENCOUNTER NOTE
The blood counts, liver, kidney and CRP inflammation labs are normal.     Sin GUZMAN, CNP, MSN  8/7/2019  7:37 AM

## 2019-08-13 DIAGNOSIS — N39.498 OTHER URINARY INCONTINENCE: ICD-10-CM

## 2019-08-13 RX ORDER — OXYBUTYNIN CHLORIDE 5 MG/1
TABLET ORAL
Qty: 90 TABLET | Refills: 1 | Status: SHIPPED | OUTPATIENT
Start: 2019-08-13 | End: 2019-12-30

## 2019-08-13 NOTE — TELEPHONE ENCOUNTER
"Requested Prescriptions   Pending Prescriptions Disp Refills     oxybutynin (DITROPAN) 5 MG tablet [Pharmacy Med Name: OXYBUTYNIN 5 MG TABLET]  Last Written Prescription Date:  12-7-18  Last Fill Quantity: 90 tab,  # refills: 1   Last office visit: 12/7/2018 with prescribing provider:  Jah Corley    Future Office Visit:    90 tablet 0     Sig: TAKE 1 TABLET BY MOUTH EVERY DAY AT 4PM       Muscarinic Antagonists (Urinary Incontinence Agents) Passed - 8/13/2019  9:13 AM        Passed - Recent (12 mo) or future (30 days) visit within the authorizing provider's specialty     Patient had office visit in the last 12 months or has a visit in the next 30 days with authorizing provider or within the authorizing provider's specialty.  See \"Patient Info\" tab in inbasket, or \"Choose Columns\" in Meds & Orders section of the refill encounter.          Passed - Medication is Oxybutynin and patient is 5 years of age or older        Passed - Patient does not have a diagnosis of glaucoma on the problem list     If glaucoma diagnosis is new, refer refill to physician.        Passed - Medication is active on med list        Passed - Patient is 18 years of age or older         "

## 2019-08-14 NOTE — TELEPHONE ENCOUNTER
Signed Prescriptions:                        Disp   Refills    oxybutynin (DITROPAN) 5 MG tablet          90 tab*1        Sig: TAKE 1 TABLET BY MOUTH EVERY DAY AT 4PM  Authorizing Provider: MEDARDO NGUYEN  Ordering User: SARIAH BRANTLEY

## 2019-10-04 ENCOUNTER — HEALTH MAINTENANCE LETTER (OUTPATIENT)
Age: 69
End: 2019-10-04

## 2019-11-02 ASSESSMENT — ENCOUNTER SYMPTOMS
DIARRHEA: 0
EYE PAIN: 0
MYALGIAS: 1
JOINT SWELLING: 1
WEAKNESS: 0
NERVOUS/ANXIOUS: 0
HEADACHES: 0
DYSURIA: 0
PARESTHESIAS: 0
ABDOMINAL PAIN: 0
DIZZINESS: 0
HEMATURIA: 0
HEARTBURN: 0
NAUSEA: 0
CHILLS: 0
ARTHRALGIAS: 1
PALPITATIONS: 0
CONSTIPATION: 0
FEVER: 0
FREQUENCY: 0
HEMATOCHEZIA: 0
SORE THROAT: 0
COUGH: 0
SHORTNESS OF BREATH: 0

## 2019-11-02 ASSESSMENT — ACTIVITIES OF DAILY LIVING (ADL): CURRENT_FUNCTION: NO ASSISTANCE NEEDED

## 2019-11-05 ENCOUNTER — OFFICE VISIT (OUTPATIENT)
Dept: FAMILY MEDICINE | Facility: CLINIC | Age: 69
End: 2019-11-05
Payer: MEDICARE

## 2019-11-05 VITALS
TEMPERATURE: 97.8 F | WEIGHT: 306 LBS | SYSTOLIC BLOOD PRESSURE: 136 MMHG | RESPIRATION RATE: 14 BRPM | HEIGHT: 73 IN | BODY MASS INDEX: 40.56 KG/M2 | DIASTOLIC BLOOD PRESSURE: 82 MMHG | HEART RATE: 62 BPM | OXYGEN SATURATION: 97 %

## 2019-11-05 DIAGNOSIS — E66.01 MORBID OBESITY (H): ICD-10-CM

## 2019-11-05 DIAGNOSIS — R00.2 PALPITATIONS: ICD-10-CM

## 2019-11-05 DIAGNOSIS — G70.9 NEUROMUSCULAR DISEASE (H): ICD-10-CM

## 2019-11-05 DIAGNOSIS — R31.29 MICROHEMATURIA: ICD-10-CM

## 2019-11-05 DIAGNOSIS — F39 MOOD DISORDER (H): ICD-10-CM

## 2019-11-05 DIAGNOSIS — Z00.00 ENCOUNTER FOR MEDICARE ANNUAL WELLNESS EXAM: Primary | ICD-10-CM

## 2019-11-05 DIAGNOSIS — Z87.891 HISTORY OF SMOKING: ICD-10-CM

## 2019-11-05 DIAGNOSIS — Z13.6 SCREENING FOR AAA (ABDOMINAL AORTIC ANEURYSM): ICD-10-CM

## 2019-11-05 DIAGNOSIS — Z13.1 DIABETES MELLITUS SCREENING: ICD-10-CM

## 2019-11-05 DIAGNOSIS — I10 ESSENTIAL HYPERTENSION: ICD-10-CM

## 2019-11-05 DIAGNOSIS — D64.9 ANEMIA, UNSPECIFIED TYPE: ICD-10-CM

## 2019-11-05 DIAGNOSIS — D84.9 IMMUNOSUPPRESSED STATUS (H): ICD-10-CM

## 2019-11-05 DIAGNOSIS — Z13.220 LIPID SCREENING: ICD-10-CM

## 2019-11-05 LAB
ALBUMIN UR-MCNC: NEGATIVE MG/DL
APPEARANCE UR: CLEAR
BILIRUB UR QL STRIP: NEGATIVE
CHOLEST SERPL-MCNC: 176 MG/DL
COLOR UR AUTO: YELLOW
GLUCOSE SERPL-MCNC: 108 MG/DL (ref 70–99)
GLUCOSE UR STRIP-MCNC: NEGATIVE MG/DL
HDLC SERPL-MCNC: 46 MG/DL
HGB UR QL STRIP: ABNORMAL
KETONES UR STRIP-MCNC: NEGATIVE MG/DL
LDLC SERPL CALC-MCNC: 108 MG/DL
LEUKOCYTE ESTERASE UR QL STRIP: NEGATIVE
MUCOUS THREADS #/AREA URNS LPF: PRESENT /LPF
NITRATE UR QL: NEGATIVE
NONHDLC SERPL-MCNC: 130 MG/DL
PH UR STRIP: 5.5 PH (ref 5–7)
RBC #/AREA URNS AUTO: ABNORMAL /HPF
SOURCE: ABNORMAL
SP GR UR STRIP: >1.03 (ref 1–1.03)
TRIGL SERPL-MCNC: 109 MG/DL
UROBILINOGEN UR STRIP-ACNC: 0.2 EU/DL (ref 0.2–1)
VIT B12 SERPL-MCNC: 526 PG/ML (ref 193–986)
WBC #/AREA URNS AUTO: ABNORMAL /HPF

## 2019-11-05 PROCEDURE — 82607 VITAMIN B-12: CPT | Performed by: FAMILY MEDICINE

## 2019-11-05 PROCEDURE — 81001 URINALYSIS AUTO W/SCOPE: CPT | Performed by: FAMILY MEDICINE

## 2019-11-05 PROCEDURE — 99213 OFFICE O/P EST LOW 20 MIN: CPT | Mod: 25 | Performed by: FAMILY MEDICINE

## 2019-11-05 PROCEDURE — 80061 LIPID PANEL: CPT | Performed by: FAMILY MEDICINE

## 2019-11-05 PROCEDURE — 82947 ASSAY GLUCOSE BLOOD QUANT: CPT | Performed by: FAMILY MEDICINE

## 2019-11-05 PROCEDURE — 36415 COLL VENOUS BLD VENIPUNCTURE: CPT | Performed by: FAMILY MEDICINE

## 2019-11-05 PROCEDURE — G0439 PPPS, SUBSEQ VISIT: HCPCS | Performed by: FAMILY MEDICINE

## 2019-11-05 RX ORDER — METOPROLOL SUCCINATE 50 MG/1
75 TABLET, EXTENDED RELEASE ORAL DAILY
Qty: 135 TABLET | Refills: 11 | Status: SHIPPED | OUTPATIENT
Start: 2019-11-05 | End: 2020-12-05

## 2019-11-05 ASSESSMENT — ENCOUNTER SYMPTOMS
ARTHRALGIAS: 1
PARESTHESIAS: 0
CHILLS: 0
COUGH: 0
HEARTBURN: 0
JOINT SWELLING: 1
DIZZINESS: 0
PALPITATIONS: 0
EYE PAIN: 0
NERVOUS/ANXIOUS: 0
HEMATOCHEZIA: 0
FREQUENCY: 0
NAUSEA: 0
HEMATURIA: 0
WEAKNESS: 0
MYALGIAS: 1
SHORTNESS OF BREATH: 0
CONSTIPATION: 0
SORE THROAT: 0
DIARRHEA: 0
FEVER: 0
DYSURIA: 0
HEADACHES: 0
ABDOMINAL PAIN: 0

## 2019-11-05 ASSESSMENT — ANXIETY QUESTIONNAIRES
IF YOU CHECKED OFF ANY PROBLEMS ON THIS QUESTIONNAIRE, HOW DIFFICULT HAVE THESE PROBLEMS MADE IT FOR YOU TO DO YOUR WORK, TAKE CARE OF THINGS AT HOME, OR GET ALONG WITH OTHER PEOPLE: NOT DIFFICULT AT ALL
1. FEELING NERVOUS, ANXIOUS, OR ON EDGE: NOT AT ALL
3. WORRYING TOO MUCH ABOUT DIFFERENT THINGS: NOT AT ALL
5. BEING SO RESTLESS THAT IT IS HARD TO SIT STILL: NOT AT ALL
GAD7 TOTAL SCORE: 0
7. FEELING AFRAID AS IF SOMETHING AWFUL MIGHT HAPPEN: NOT AT ALL
6. BECOMING EASILY ANNOYED OR IRRITABLE: NOT AT ALL
2. NOT BEING ABLE TO STOP OR CONTROL WORRYING: NOT AT ALL

## 2019-11-05 ASSESSMENT — ACTIVITIES OF DAILY LIVING (ADL): CURRENT_FUNCTION: NO ASSISTANCE NEEDED

## 2019-11-05 ASSESSMENT — PATIENT HEALTH QUESTIONNAIRE - PHQ9
SUM OF ALL RESPONSES TO PHQ QUESTIONS 1-9: 0
5. POOR APPETITE OR OVEREATING: NOT AT ALL

## 2019-11-05 ASSESSMENT — MIFFLIN-ST. JEOR: SCORE: 2196.89

## 2019-11-05 NOTE — PATIENT INSTRUCTIONS
Patient Education   Personalized Prevention Plan  You are due for the preventive services outlined below.  Your care team is available to assist you in scheduling these services.  If you have already completed any of these items, please share that information with your care team to update in your medical record.  Health Maintenance Due   Topic Date Due     COPD Action Plan  1950     Annual Wellness Visit  08/04/2013     Zoster (Shingles) Vaccine (3 of 3) 12/13/2018       Understanding Digital Path MyPlate  The USDA (U.S. Department of Agriculture) has guidelines to help you make healthy food choices. These are called MyPlate. MyPlate shows the food groups that make up healthy meals using the image of a place setting. Before you eat, think about the healthiest choices for what to put onto your plate or into your cup or bowl. To learn more about building a healthy plate, visit www.iSkootplate.gov.    The food groups    Fruits. Any fruit or 100% fruit juice counts as part of the Fruit Group. Fruits may be fresh, canned, frozen, or dried, and may be whole, cut-up, or pureed. Make half your plate fruits and vegetables.    Vegetables. Any vegetable or 100% vegetable juice counts as a member of the Vegetable Group. Vegetables may be fresh, frozen, canned, or dried. They can be served raw or cooked and may be whole, cut-up, or mashed. Make half your plate fruits and vegetables.    Grains. All foods made from grains are part of the Grains Group. These include wheat, rice, oats, cornmeal, and barley such as bread, pasta, oatmeal, cereal, tortillas, and grits. Grains should be no more than a quarter of your plate. At least half of your grains should be whole grains.    Protein. This group includes meat, poultry, seafood, beans and peas, eggs, processed soy products (like tofu), nuts (including nut butters), and seeds. Make protein choices no more than a quarter of your plate. Meat and poultry choices should be lean or low  fat.    Dairy. All fluid milk products and foods made from milk that contain calcium, like yogurt and cheese, are part of the Dairy Group. (Foods that have little calcium, such as cream, butter, and cream cheese, are not part of the group.) Most dairy choices should be low-fat or fat-free.    Oils. These are fats that are liquid at room temperature. They include canola, corn, olive, soybean, and sunflower oil. Foods that are mainly oil include mayonnaise, certain salad dressings, and soft margarines. You should have only 5 to 7 teaspoons of oils a day. You probably already get this much from the food you eat.  Date Last Reviewed: 8/1/2017 2000-2018 [employer]. [address] [place] [address]. All rights reserved. This information is not intended as a substitute for professional medical care. Always follow your healthcare professional's instructions.          Urinary Incontinence (Male)    Urinary incontinence means not being able to control the release of urine from the bladder.  Causes  Common causes of urinary incontinence in men include:    Infection    Certain medicines    Aging    Poor pelvic muscle tone    Bladder spasms    Obesity    Urinary retention  Nervous system diseases, diabetes, sleep apnea, urinary tract infections, prostate surgery, and pelvic trauma can also cause incontinence. Constipation and smoking have also been identified as risk factors.  Symptoms    Urge incontinence (also called  overactive bladder ) is a sudden urge to urinate even though there may not be much urine in the bladder. The need to urinate often during the night is common. It is due to bladder spasms.    Stress incontinence is involuntary urine leakage that can occur with sneezing, coughing, and other actions that put stress on the bladder.  Treatment  Treatment of urinary incontinence depends on the cause. Infections of the bladder are treated with antibiotics. Urinary retention is treated with a  bladder catheter.  Home care  Follow these guidelines when caring for yourself at home:    Don't consume foods and drinks that may irritate the bladder. These include drinks containing alcohol, caffeinate, or carbonation; chocolate; and acidic fruits and juices.    Limit fluid intake to 6 to 8 cups a day.    Lose weight if you are overweight. This will reduce your symptoms.    If needed, wear absorbent pads to catch urine. Change pads frequently to maintain hygiene and prevent skin and bladder infections.    Bathe daily to maintain good hygiene.    If an antibiotic was prescribed to treat a bladder infection, be sure to take it until finished, even if you are feeling better before then. This is to make sure your infection has cleared.    If a catheter was left in place, it is important to keep bacteria from getting into the collection bag. Don't disconnect the catheter from the collection bag.    Use a leg band to secure the catheter drainage tube, so it does not pull on the catheter. Drain the collection bag when it becomes full using the drain spout at the bottom of the bag. Don't disconnect the bag from the catheter.    Don't pull on or try to remove a catheter. The catheter must be removed by a healthcare provider.  Follow-up care  Follow up with your healthcare provider, or as advised.  When to seek medical advice  Call your healthcare provider right away if any of these occur:    Fever over 100.4 F (38 C), or as directed by your healthcare provider    Bladder pain or fullness    Abdominal swelling, nausea, or vomiting    Back pain    Weakness, dizziness, or fainting    If a catheter was left in place, return if:  ? Catheter falls out  ? Catheter stops draining for 6 hours  Date Last Reviewed: 10/1/2017    2377-2503 [employer]. [address] [place] [address]. All rights reserved. This information is not intended as a substitute for professional medical care. Always follow your  healthcare professional's instructions.    To schedule your event monitor and abdomenal ultrasound, call [phone] for [facility] scheduling.

## 2019-11-05 NOTE — PROGRESS NOTES
"SUBJECTIVE:   Lucio Daly is a 69 year old male who presents for Preventive Visit.    htn- tolerating meds. He has been having sensation of his heart racing when he exerts himself this last about 10-15 minutes then resolves with rest. No cp with this. He had been told that he had afib in the past but is not on medication for this other than metoprolol.     Are you in the first 12 months of your Medicare coverage?  No    Healthy Habits:     In general, how would you rate your overall health?  Good    Frequency of exercise:  2-3 days/week    Duration of exercise:  15-30 minutes    Do you usually eat at least 4 servings of fruit and vegetables a day, include whole grains    & fiber and avoid regularly eating high fat or \"junk\" foods?  No    Taking medications regularly:  Not Applicable    Medication side effects:  Not applicable    Ability to successfully perform activities of daily living:  No assistance needed    Home Safety:  No safety concerns identified    Hearing Impairment:  No hearing concerns    In the past 6 months, have you been bothered by leaking of urine? Yes    In general, how would you rate your overall mental or emotional health?  Good      PHQ-2 Total Score: 0    Do you feel safe in your environment? Yes    Fall risk  Fallen 2 or more times in the past year?: No  Any fall with injury in the past year?: No    Cognitive Screening   1) Repeat 3 items (Leader, Season, Table)    2) Clock draw: NORMAL  3) 3 item recall: Recalls 3 objects  Results: 3 items recalled: COGNITIVE IMPAIRMENT LESS LIKELY    Mini-CogTM Copyright IDALIA Cárdenas. Licensed by the author for use in [facility]; reprinted with permission (raul@.[email or web address]). All rights reserved.      Do you have sleep apnea, excessive snoring or daytime drowsiness?: Yes. COPD    Reviewed and updated as needed this visit by clinical staff  Tobacco  Allergies  Meds         Reviewed and updated as needed this visit by Provider        Social " History     Tobacco Use     Smoking status: Former Smoker     Packs/day: 1.50     Years: 37.00     Pack years: 55.50     Types: Cigarettes     Start date: 6/15/1963     Last attempt to quit: 2000     Years since quittin.1     Smokeless tobacco: Never Used   Substance Use Topics     Alcohol use: No     Alcohol/week: 0.0 standard drinks         Alcohol Use 2019   Prescreen: >3 drinks/day or >7 drinks/week? Not Applicable       Current providers sharing in care for this patient include:   Patient Care Team:  Jah Corley MD as PCP - General (Family Practice)  Jeremy Goodrich MD as MD (Rheumatology)  Benjamin Ordoñez MD as MD (Dermapathology)  Kristen Martinez MD as MD (Family Medicine, Sleep Medicine)  Rhett Shah MD as Surgeon (Surgery)  Sin Snow APRN CNP as Nurse Practitioner (Nurse Practitioner)  Leonel Farnsworth MD as MD (Ophthalmology)  Gómez Velásquez DPM (Podiatry)  Rodney Ríos DPM (Podiatry)  Heladio Gonzalez MD as MD (Neurology)  Robb Zavala MD as MD (Ophthalmology)  Bipin Villatoro MD as MD (Family Practice)  Thong Montes MD as MD (Neurology)  Harsha Mccarthy MD as MD (Pulmonary)  Sin Snow APRN CNP as Referring Physician (Nurse Practitioner)  Jeremy Goodrich MD as MD (Rheumatology)  Jah Corley MD as Assigned PCP  Ramón Jeronimo MD as MD (Family Medicine - Sports Medicine)  Ramón Camargo MD as MD (Hand Surgery)  Rodney Garcia MD as MD (Clinical Neurophysiology)    The following health maintenance items are reviewed in Epic and correct as of today:  Health Maintenance   Topic Date Due     COPD ACTION PLAN  1950     MEDICARE ANNUAL WELLNESS VISIT  2013     ZOSTER IMMUNIZATION (3 of 3) 2018     EYE EXAM  04/15/2020     ASTHMA CONTROL TEST  2020     ASTHMA ACTION PLAN  2020     FALL RISK ASSESSMENT  2020     ADVANCE CARE PLANNING   "01/10/2022     LIPID  07/07/2022     COLONOSCOPY  05/23/2026     DTAP/TDAP/TD IMMUNIZATION (4 - Td) 08/21/2028     SPIROMETRY  Completed     HEPATITIS C SCREENING  Completed     PHQ-2  Completed     INFLUENZA VACCINE  Completed     PNEUMOCOCCAL IMMUNIZATION 65+ LOW/MEDIUM RISK  Completed     AORTIC ANEURYSM SCREENING (SYSTEM ASSIGNED)  Completed     IPV IMMUNIZATION  Aged Out     MENINGITIS IMMUNIZATION  Aged Out     Lab work is in process      Review of Systems   Constitutional: Negative for chills and fever.   HENT: Negative for congestion, ear pain, hearing loss and sore throat.    Eyes: Negative for pain and visual disturbance.   Respiratory: Negative for cough and shortness of breath.    Cardiovascular: Positive for peripheral edema. Negative for chest pain and palpitations.   Gastrointestinal: Negative for abdominal pain, constipation, diarrhea, heartburn, hematochezia and nausea.   Genitourinary: Negative for discharge, dysuria, frequency, genital sores, hematuria, impotence and urgency.   Musculoskeletal: Positive for arthralgias, joint swelling and myalgias.   Skin: Negative for rash.   Neurological: Negative for dizziness, weakness, headaches and paresthesias.   Psychiatric/Behavioral: Negative for mood changes. The patient is not nervous/anxious.      Connected with rhmeatology, and pulmonology and neurology for his chronic medical problems including bronchiolitis obliterans, neuropathy and RA    OBJECTIVE:   /82   Pulse 62   Temp 97.8  F (36.6  C)   Resp 14   Ht 1.854 m (6' 1\")   Wt 138.8 kg (306 lb)   SpO2 97%   BMI 40.37 kg/m   Estimated body mass index is 40.37 kg/m  as calculated from the following:    Height as of this encounter: 1.854 m (6' 1\").    Weight as of this encounter: 138.8 kg (306 lb).  Physical Exam  GENERAL: healthy, alert and no distress  NECK: no adenopathy, no asymmetry, masses, or scars and thyroid normal to palpation  RESP: lungs clear to auscultation - no rales, " "rhonchi or wheezes  CV: regular rate and rhythm, normal S1 S2, no S3 or S4, no murmur, click or rub, no peripheral edema and peripheral pulses strong  ABDOMEN: soft, nontender, no hepatosplenomegaly, no masses and bowel sounds normal  PSYCH: mentation appears normal, affect normal/bright    Diagnostic Test Results:  pending    ASSESSMENT / PLAN:   1. Encounter for Medicare annual wellness exam  Discussed HCM. Immunizations discussed and updated where needed.   - Urine Microscopic    2. Immunosuppressed status (H)  3. Neuromuscular disease (H)  Followed by rheum for RA and neurology    4. Mood disorder (H)  Stable. On current meds     5. Morbid obesity (H)  Stable weight.     6. Microhematuria  This was present about 10 months ago. Will recheck today.   - *UA reflex to Microscopic and Culture ([facility] (except [place] and [place])    7. Diabetes mellitus screening     - Glucose    8. Lipid screening     - Lipid panel reflex to direct LDL Fasting    9. Anemia, unspecified type   mcv approaching 100  - Vitamin B12    10. Essential hypertension  At goal   - metoprolol succinate ER (TOPROL-XL) 50 MG 24 hr tablet; Take 1.5 tablets (75 mg) by mouth daily  Dispense: 135 tablet; Refill: 11    11. Palpitations  ? Afib. Not on anticoagulation.   - Cardiac Mobile Telemetry Monitor; Future    12. Screening for AAA (abdominal aortic aneurysm)     - Abdominal Aortic Aneurysm Screening/Tracking  - US Aorta Medicare AAA Screening; Future    13. History of smoking     - Abdominal Aortic Aneurysm Screening/Tracking  - US Aorta Medicare AAA Screening; Future    COUNSELING:  Reviewed preventive health counseling, as reflected in patient instructions       Regular exercise       Healthy diet/nutrition       Colon cancer screening       Osteoporosis Prevention/Bone Health    Estimated body mass index is 40.37 kg/m  as calculated from the following:    Height as of this encounter: 1.854 m (6' 1\").    Weight as of this " encounter: 138.8 kg (306 lb).    Weight management plan: Discussed healthy diet and exercise guidelines     reports that he quit smoking about 19 years ago. His smoking use included cigarettes. He started smoking about 56 years ago. He has a 55.50 pack-year smoking history. He has never used smokeless tobacco.      Appropriate preventive services were discussed with this patient, including applicable screening as appropriate for cardiovascular disease, diabetes, osteopenia/osteoporosis, and glaucoma.  As appropriate for age/gender, discussed screening for colorectal cancer, prostate cancer, breast cancer, and cervical cancer. Checklist reviewing preventive services available has been given to the patient.    Reviewed patients plan of care and provided an AVS. The Complex Care Plan (for patients with higher acuity and needing more deliberate coordination of services) for Lucio meets the Care Plan requirement. This Care Plan has been established and reviewed with the Patient.    Counseling Resources:  ATP IV Guidelines  Pooled Cohorts Equation Calculator  Breast Cancer Risk Calculator  FRAX Risk Assessment  ICSI Preventive Guidelines  Dietary Guidelines for Americans, 2010  USDA's MyPlate  ASA Prophylaxis  Lung CA Screening    Jah Corley MD  [facility]    Identified Health Risks:    The patient was counseled and encouraged to consider modifying their diet and eating habits. He was provided with information on recommended healthy diet options.  Information on urinary incontinence and treatment options given to patient.

## 2019-11-06 ASSESSMENT — ASTHMA QUESTIONNAIRES: ACT_TOTALSCORE: 25

## 2019-11-06 ASSESSMENT — ANXIETY QUESTIONNAIRES: GAD7 TOTAL SCORE: 0

## 2019-11-13 ENCOUNTER — ANCILLARY PROCEDURE (OUTPATIENT)
Dept: CARDIOLOGY | Facility: CLINIC | Age: 69
End: 2019-11-13
Attending: FAMILY MEDICINE
Payer: MEDICARE

## 2019-11-13 ENCOUNTER — ANCILLARY PROCEDURE (OUTPATIENT)
Dept: ULTRASOUND IMAGING | Facility: CLINIC | Age: 69
End: 2019-11-13
Attending: FAMILY MEDICINE
Payer: MEDICARE

## 2019-11-13 DIAGNOSIS — Z13.6 SCREENING FOR AAA (ABDOMINAL AORTIC ANEURYSM): ICD-10-CM

## 2019-11-13 DIAGNOSIS — Z87.891 HISTORY OF SMOKING: ICD-10-CM

## 2019-11-13 DIAGNOSIS — R00.2 PALPITATIONS: ICD-10-CM

## 2019-11-13 DIAGNOSIS — M06.09 RHEUMATOID ARTHRITIS OF MULTIPLE SITES WITH NEGATIVE RHEUMATOID FACTOR (H): ICD-10-CM

## 2019-11-13 DIAGNOSIS — Z79.899 HIGH RISK MEDICATIONS (NOT ANTICOAGULANTS) LONG-TERM USE: ICD-10-CM

## 2019-11-13 LAB
ALBUMIN SERPL-MCNC: 3.9 G/DL (ref 3.4–5)
ALT SERPL W P-5'-P-CCNC: 29 U/L (ref 0–70)
AST SERPL W P-5'-P-CCNC: 22 U/L (ref 0–45)
CREAT SERPL-MCNC: 1.17 MG/DL (ref 0.66–1.25)
CRP SERPL-MCNC: 8.5 MG/L (ref 0–8)
ERYTHROCYTE [DISTWIDTH] IN BLOOD BY AUTOMATED COUNT: 14.4 % (ref 10–15)
GFR SERPL CREATININE-BSD FRML MDRD: 62 ML/MIN/{1.73_M2}
HCT VFR BLD AUTO: 44.2 % (ref 40–53)
HGB BLD-MCNC: 14.2 G/DL (ref 13.3–17.7)
MCH RBC QN AUTO: 30.5 PG (ref 26.5–33)
MCHC RBC AUTO-ENTMCNC: 32.1 G/DL (ref 31.5–36.5)
MCV RBC AUTO: 95 FL (ref 78–100)
PLATELET # BLD AUTO: 170 10E9/L (ref 150–450)
RBC # BLD AUTO: 4.65 10E12/L (ref 4.4–5.9)
WBC # BLD AUTO: 3.6 10E9/L (ref 4–11)

## 2019-11-13 PROCEDURE — 86140 C-REACTIVE PROTEIN: CPT | Performed by: NURSE PRACTITIONER

## 2019-11-13 PROCEDURE — 85027 COMPLETE CBC AUTOMATED: CPT | Performed by: NURSE PRACTITIONER

## 2019-11-13 PROCEDURE — 84460 ALANINE AMINO (ALT) (SGPT): CPT | Performed by: NURSE PRACTITIONER

## 2019-11-13 PROCEDURE — 84450 TRANSFERASE (AST) (SGOT): CPT | Performed by: NURSE PRACTITIONER

## 2019-11-13 PROCEDURE — 36415 COLL VENOUS BLD VENIPUNCTURE: CPT | Performed by: NURSE PRACTITIONER

## 2019-11-13 PROCEDURE — 82040 ASSAY OF SERUM ALBUMIN: CPT | Performed by: NURSE PRACTITIONER

## 2019-11-13 PROCEDURE — 40000988 CARDIAC MOBILE TELEMETRY MONITOR: Mod: ZF

## 2019-11-13 PROCEDURE — 82565 ASSAY OF CREATININE: CPT | Performed by: NURSE PRACTITIONER

## 2019-11-13 PROCEDURE — 93228 REMOTE 30 DAY ECG REV/REPORT: CPT | Performed by: INTERNAL MEDICINE

## 2019-11-13 NOTE — LETTER
January 22, 2020      Lucio GRIFFIN Daly  [address]  [place] [address]        Dear ,    We are writing to inform you of your test results.    Rich your event monitor did not show any dangerous rhythms associated with symptoms. There was no atrial fibrillation present either. Call or send us a [employer] message if you have questions      If you have any questions or concerns, please call the clinic at the number listed above.       Sincerely,    Jah Corley MD/nr

## 2019-11-13 NOTE — PROGRESS NOTES
Per Dr. Jah Corley, patient to have 21 day Cardionet monitor placed.  Diagnosis: Palpitations (R00.2)  Monitor placed: Yes  Patient Instructed: Yes  Patient verbalized understanding: Yes  Monitor S/N: [id number]  Kit ID: [id number]    Placed by: Livia Villatoro MA

## 2019-11-19 NOTE — TELEPHONE ENCOUNTER
Patient updated of provider's note. Patient stated understanding and will schedule an appointment with the lab department. Patient reported that he did not take Methotrexate,10/31/2017, as he was taking an antibiotic for a sinus infection and bronchitis, currently he isn't taking the antibiotic. Lucio would like to know if he should take Methotrexate on 11/14/2017 as this will be two weeks since hislast scheduled dose or 11/21/2017 according to provider's instructions. Writer will route to provider to advise. Patient updated that provider isn't in clinic until 11/12/2017 and patient is okay with waiting for provider's response.   Cathy Babcock LPN     8

## 2019-12-07 ASSESSMENT — ENCOUNTER SYMPTOMS
TREMORS: 1
WHEEZING: 1
HEADACHES: 0
ARTHRALGIAS: 1
SPEECH CHANGE: 0
LIGHT-HEADEDNESS: 0
MYALGIAS: 1
SORE THROAT: 0
HEMOPTYSIS: 0
NECK MASS: 0
NUMBNESS: 1
ABDOMINAL PAIN: 1
HYPOTENSION: 0
JAUNDICE: 0
NAUSEA: 0
DYSPNEA ON EXERTION: 1
RECTAL PAIN: 0
EYE IRRITATION: 0
ORTHOPNEA: 1
SLEEP DISTURBANCES DUE TO BREATHING: 0
CONSTIPATION: 1
LEG PAIN: 1
LOSS OF CONSCIOUSNESS: 0
HEARTBURN: 1
HYPERTENSION: 0
JOINT SWELLING: 1
POSTURAL DYSPNEA: 1
MUSCLE CRAMPS: 1
SINUS PAIN: 1
NECK PAIN: 0
WEAKNESS: 1
DISTURBANCES IN COORDINATION: 1
SMELL DISTURBANCE: 0
MUSCLE WEAKNESS: 1
PALPITATIONS: 1
COUGH: 0
SEIZURES: 0
EXERCISE INTOLERANCE: 0
DOUBLE VISION: 0
BLOATING: 1
SPUTUM PRODUCTION: 0
TINGLING: 1
BOWEL INCONTINENCE: 0
SNORES LOUDLY: 0
HOARSE VOICE: 0
DIARRHEA: 0
COUGH DISTURBING SLEEP: 0
VOMITING: 0
TASTE DISTURBANCE: 0
EYE REDNESS: 0
BLOOD IN STOOL: 1
EYE WATERING: 0
STIFFNESS: 1
SYNCOPE: 0
DIZZINESS: 1
PARALYSIS: 0
TROUBLE SWALLOWING: 0
BACK PAIN: 1
MEMORY LOSS: 0
SHORTNESS OF BREATH: 1
EYE PAIN: 1

## 2019-12-09 DIAGNOSIS — Z79.899 HIGH RISK MEDICATIONS (NOT ANTICOAGULANTS) LONG-TERM USE: ICD-10-CM

## 2019-12-09 DIAGNOSIS — M79.675 PAIN IN TOES OF BOTH FEET: ICD-10-CM

## 2019-12-09 DIAGNOSIS — M06.09 RHEUMATOID ARTHRITIS OF MULTIPLE SITES WITH NEGATIVE RHEUMATOID FACTOR (H): ICD-10-CM

## 2019-12-09 DIAGNOSIS — M06.019 RHEUMATOID ARTHRITIS INVOLVING SHOULDER WITH NEGATIVE RHEUMATOID FACTOR, UNSPECIFIED LATERALITY (H): ICD-10-CM

## 2019-12-09 DIAGNOSIS — M79.674 PAIN IN TOES OF BOTH FEET: ICD-10-CM

## 2019-12-09 RX ORDER — FOLIC ACID 1 MG/1
TABLET ORAL
Qty: 180 TABLET | Refills: 3 | Status: SHIPPED | OUTPATIENT
Start: 2019-12-09 | End: 2019-12-10

## 2019-12-10 ENCOUNTER — TELEPHONE (OUTPATIENT)
Dept: RHEUMATOLOGY | Facility: CLINIC | Age: 69
End: 2019-12-10

## 2019-12-10 ENCOUNTER — OFFICE VISIT (OUTPATIENT)
Dept: RHEUMATOLOGY | Facility: CLINIC | Age: 69
End: 2019-12-10
Attending: INTERNAL MEDICINE
Payer: MEDICARE

## 2019-12-10 VITALS
DIASTOLIC BLOOD PRESSURE: 92 MMHG | OXYGEN SATURATION: 96 % | HEART RATE: 53 BPM | BODY MASS INDEX: 38.59 KG/M2 | SYSTOLIC BLOOD PRESSURE: 153 MMHG | WEIGHT: 310.4 LBS | HEIGHT: 75 IN | TEMPERATURE: 97.5 F

## 2019-12-10 DIAGNOSIS — M06.09 RHEUMATOID ARTHRITIS OF MULTIPLE SITES WITH NEGATIVE RHEUMATOID FACTOR (H): ICD-10-CM

## 2019-12-10 DIAGNOSIS — M79.675 PAIN IN TOES OF BOTH FEET: Primary | ICD-10-CM

## 2019-12-10 DIAGNOSIS — M79.674 PAIN IN TOES OF BOTH FEET: Primary | ICD-10-CM

## 2019-12-10 DIAGNOSIS — M06.019 RHEUMATOID ARTHRITIS INVOLVING SHOULDER WITH NEGATIVE RHEUMATOID FACTOR, UNSPECIFIED LATERALITY (H): ICD-10-CM

## 2019-12-10 DIAGNOSIS — Z79.899 HIGH RISK MEDICATIONS (NOT ANTICOAGULANTS) LONG-TERM USE: ICD-10-CM

## 2019-12-10 DIAGNOSIS — J84.9 ILD (INTERSTITIAL LUNG DISEASE) (H): ICD-10-CM

## 2019-12-10 PROCEDURE — G0463 HOSPITAL OUTPT CLINIC VISIT: HCPCS | Mod: ZF

## 2019-12-10 RX ORDER — FOLIC ACID 1 MG/1
TABLET ORAL
Qty: 180 TABLET | Refills: 3 | Status: SHIPPED | OUTPATIENT
Start: 2019-12-10 | End: 2020-12-16

## 2019-12-10 RX ORDER — HYDROXYCHLOROQUINE SULFATE 200 MG/1
TABLET, FILM COATED ORAL
Qty: 180 TABLET | Refills: 3 | Status: SHIPPED | OUTPATIENT
Start: 2019-12-10 | End: 2020-07-24

## 2019-12-10 ASSESSMENT — MIFFLIN-ST. JEOR: SCORE: 2248.6

## 2019-12-10 ASSESSMENT — PAIN SCALES - GENERAL: PAINLEVEL: MODERATE PAIN (4)

## 2019-12-10 NOTE — PROGRESS NOTES
[facility]  Rheumatology Clinic  Jeremy Goodrich MD  12/10/2019     Name: Lucio Daly  MRN: [id number]  Age: 69 year old  : 1950  Referring provider: Jah Corley     Assessment and Plan:  # Seronegative inflammatory arthritis:   Patient relates stable, intermittent hand and foot pain without significant swelling. No significant morning stiffness. Exam shows left medial mid-foot varus; and tenderness at multiple MTPs. Otherwise no inflammatory joint changes. Blood work on 2019 showed creatinine, LFTs, and CBC normal except for stable decreased white count of 3.6. CRP was minimally elevated at 3.5. X-ray from 2019 of the right great toe with no soft tissue calcification or foreign body.     Seronegative inflammatory arthritis remains reasonably controlled on combination therapy. I recommend continuing hydroxychloroquine 400 mg daily and methotrexate 20 mg weekly. While on the latter drug, patient should undergo every 3 month CBC, creatinine, and LFTs. He should complete annual ophthalmology examinations.     # Left forefoot pain:   Osteoarthritis of the great toe and mid-foot deformity on the left may contribute to pain with weight bearing. I will ask for Podiatry input regarding appropriate shoe wear.     Follow-up: Return in about 6 months (around 6/10/2020) with Sin Snow     HPI:   Lucio Daly is a 69 year old male with a history of seronegative rheumatoid arthritis, sicca complex who presents for follow up. He was last seen by Sin Snow on 2019. Seronegative rheumatoid arthritis was well controlled and stable. Plan was to reduce or stop hydroxychloroquine in the future given 9 years of use. Methotrexate 20 mg weekly was continued.     Annual eye exam in 2019 at [facility] showed no plaquenil toxicity.     Today the patient reports intermittent joint discomfort in hands, elbows, forearms, feet, ankles, and heels. Heel pain feels different from achilles tendonitis. He  also has hand numbness in the mornings. He has been told that he has nerve entrapment but does not wear splints. He continues on methotrexate 20 mg weekly and does notice improvement after dosing, as well as worsening when he misses a dose. He also continues on plaquenil 400 mg daily, acetaminophen 3000 mg daily, and folic acid twice daily but has not used prednisone.     Review of Systems:   Pertinent items are noted in HPI or as below, remainder of complete ROS is negative.      No recent problems with hearing or vision. No swallowing problems.   No breathing difficulty, shortness of breath, coughing, or wheezing  No chest pain or palpitations  No heart burn, indigestion, abdominal pain, nausea, vomiting, diarrhea  No urination problems, no bloody, cloudy urine, no dysuria  No headaches or confusion  No rashes. No easy bleeding or bruising.     Active Medications:     Current Outpatient Medications:      acetaminophen (TYLENOL) 500 MG tablet, 2 x 500mg by mouth 3 times per day: 7/730am, 4pm and 11pm  = 6/day, Disp: , Rfl:      albuterol (PROAIR HFA/PROVENTIL HFA/VENTOLIN HFA) 108 (90 Base) MCG/ACT inhaler, Inhale 2 puffs into the lungs every 4 hours as needed for shortness of breath / dyspnea or wheezing, Disp: 1 Inhaler, Rfl: 3     Cholecalciferol (VITAMIN D3) 1000 units CAPS, 1000 unit capsule by mouth daily @ 7am, Disp: 30 capsule, Rfl:      folic acid (FOLVITE) 1 MG tablet, TAKE 1 TABLET (1MG) BY MOUTH TWICE DAILY AT 9AM AND AT 4PM, Disp: 180 tablet, Rfl: 3     hydroxychloroquine (PLAQUENIL) 200 MG tablet, 200mg tab by mouth twice daily @ 9am and 4pm, Disp: 180 tablet, Rfl: 3     methotrexate 2.5 MG tablet, TAKE 8 TABLETS BY MOUTH ONCE WEEKLY. LABS DUE EVERY 8 - 12 WEEKS., Disp: 96 tablet, Rfl: 4     metoprolol succinate ER (TOPROL-XL) 50 MG 24 hr tablet, Take 1.5 tablets (75 mg) by mouth daily, Disp: 135 tablet, Rfl: 11     omeprazole (PRILOSEC) 40 MG DR capsule, 40mg capsule by mouth daily @ 7/730am, Disp:  90 capsule, Rfl: 3     ORDER FOR DME, Use your CPAP device as directed by your provider., Disp: , Rfl:      oxybutynin (DITROPAN) 5 MG tablet, TAKE 1 TABLET BY MOUTH EVERY DAY AT 4PM, Disp: 90 tablet, Rfl: 1     pregabalin (LYRICA) 50 MG capsule, 1 tab by mouth @ 7/730am, 1 tab @ 4pm and 1-2 tab @ 11pm, Disp: 360 capsule, Rfl: 3     salmeterol (SEREVENT) 50 MCG/DOSE inhaler, Inhale 1 puff into the lungs 2 times daily, Disp: 1 Inhaler, Rfl: 11      Allergies:   Restasis; Adhesive tape; Allegra; Allergy; Animal dander; Benadryl allergy; Cephalexin; Cephalosporins; Diphenhydramine; Doxycycline hyclate; Fexofenadine; Flonase [fluticasone propionate]; Gabapentin; Iodine solution [povidone iodine]; Levaquin; Mylanta; Prednisone; Seafood [seafood]; Shellfish allergy; Sulfamethoxazole-trimethoprim; Trees; Trileptal; and Cortizone      Past Medical History:  Sensory neuropathy of unclear etiology - negative work up, managed on Lyrica.  Has chronic dysesthesia in pads of feet.  Basal cell carcinoma   Osteoarthritis   Diaphragmatic hernia  Esophageal reflux   H. Pylori infection   Interstitial lung disease   Melanoma   Parkinsonism   Rheumatoid arthritis   Somatization disorder   Tremor   Hypertension   Hypothyroidism    Obstructive sleep apnea   Chronic maxillary sinusitis   Sicca syndrome   Morbid obesity   Paralysis agitans     Past Surgical History:  Skin lesion biopsy   Colonoscopy   Hemorrhoid surgery   MOHS procedure   Removal of basal cell carcinoma      Family History:   Mother: hypertension, heart disease, atrial fibrillation, osteoarthritis, osteoporosis, skin cancer   Brother: hypertension, diabetes mellitus  Sister: multiple sclerosis, hypertension, heart disease, atrial fibrillation, arthritis, PPM  Father: hypertension, cerebrovascular disease, skin cancer   Maternal grandmother: psoriasis, stomach cancer  Paternal grandmother: heart disease   Paternal grandfather: heart disease       Social History:   Former  "smoker, 55 pack years, quit date 9/30/2000.   No alcohol use.      Physical Exam:   BP (!) 153/92   Pulse 53   Temp 97.5  F (36.4  C) (Oral)   Ht 1.905 m (6' 3\")   Wt 140.8 kg (310 lb 6.4 oz)   SpO2 96%   BMI 38.80 kg/m     Wt Readings from Last 4 Encounters:   12/10/19 140.8 kg (310 lb 6.4 oz)   11/05/19 138.8 kg (306 lb)   07/10/19 141.2 kg (311 lb 4.8 oz)   06/12/19 143.2 kg (315 lb 9.6 oz)     Constitutional: Well-developed, appearing stated age; cooperative  Eyes: Normal EOM, PERRLA, vision, conjunctiva, sclera  ENT: Normal external ears, nose, hearing, lips, teeth, gums, throat. No mucous membrane lesions, normal saliva pool  Neck: No mass or thyroid enlargement  Resp: Lungs clear to auscultation, nl to palpation  CV: RRR, no murmurs, rubs or gallops, no edema  GI: No ABD mass or tenderness, no HSM  : Not tested  Lymph: No cervical, supraclavicular, inguinal or epitrochlear nodes  MS: Incomplete extension at right and left 2nd PIPs. No visible swelling or asymmetry otherwise. Incomplete fist formation, especially at the left 3rd digit. Mildly reduced  strength. Tenderness at the right 4th MCP. No synovitis at the wrists or in the small joints of the hands. Tenderness in the right elbow. Mild tenderness at multiple MTPs. There is a firm swelling on the lateral aspect of the right great toe IP.   Skin: No nail pitting, alopecia, rash, nodules or lesions  Neuro: Normal cranial nerves, strength, sensation, DTRs.   Psych: Normal judgement, orientation, memory, affect.     Laboratory:   RHEUM RESULTS Latest Ref Rng & Units 6/12/2019 8/6/2019 11/13/2019   DNA-DS 0 - 29 IU/mL - - -   SED RATE 0 - 20 mm/h - - -   CRP, INFLAMMATION 0.0 - 8.0 mg/L 12.7(H) 7.0 8.5(H)   CYCLIC CIT PEPT IGG <5 U/mL - - -   CK TOTAL 30 - 300 U/L - - -   RHEUMATOID FACTOR 0 - 14 IU/mL - - -   MONICA SCREEN BY EIA 0 - 1.0 - - -   AST 0 - 45 U/L 16 16 22   ALT 0 - 70 U/L 23 28 29   ALBUMIN 3.4 - 5.0 g/dL 3.8 3.7 3.9   WBC 4.0 - 11.0 " 10e9/L 5.1 4.2 3.6(L)   RBC 4.4 - 5.9 10e12/L 4.48 4.39(L) 4.65   HGB 13.3 - 17.7 g/dL 13.8 13.2(L) 14.2   HCT 40.0 - 53.0 % 43.3 43.2 44.2   MCV 78 - 100 fl 97 98 95   MCHC 31.5 - 36.5 g/dL 31.9 30.6(L) 32.1   RDW 10.0 - 15.0 % 14.7 14.4 14.4    - 450 10e9/L 160 161 170   CREATININE 0.66 - 1.25 mg/dL 1.05 1.09 1.17   GFR ESTIMATE, IF BLACK >60 mL/min/[1.73:m2] 82 79 72   GFR ESTIMATE >60 mL/min/[1.73:m2] 71 68 62    - 1,620 mg/dL - - -   IGA 70 - 380 mg/dL - - -   IGM 60 - 265 mg/dL - - -   HEPATITIS C ANTIBODY NEG - - -       Rheumatoid Factor   Date Value Ref Range Status   05/27/2010 <7 0 - 14 IU/mL Final     Cyclic Citrullinated Peptide IgG   Date Value Ref Range Status   05/27/2010 <2 <5 U/mL Final     Comment:     Interpretation:  Negative     SSA (RO) Antibody IgG   Date Value Ref Range Status   05/27/2010 1  Final     Comment:     Reference range: 0 to 40  Unit: AU/mL  (Note)  REFERENCE INTERVALS: SSA (Ro) (ELAN) Ab, IgG   29 AU/mL or Less ............. Negative   30 - 40 AU/mL ................ Equivocal   41 AU/mL or Greater .......... Positive    SSA (Ro) antibody is seen in 70-75% of Sjogren syndrome  cases, 30-40% of systemic lupus erythematosus (SLE) and  5-10% of progressive systemic sclerosis (PSS).  Performed by [facility],  [address] [place] [address],[place] [address] [phone]  [email or web address], Chely Leija MD, Lab. Director     SSB (LA) Antibody IgG   Date Value Ref Range Status   05/27/2010 8  Final     Comment:     Reference range: 0 to 40  Unit: AU/mL  (Note)  REFERENCE INTERVALS: SSB (La) (ELAN) Ab, IgG   29 AU/mL or Less ............. Negative   30 - 40 AU/mL ................ Equivocal   41 AU/mL or Greater .......... Positive    SSB (La) antibody is seen in 50-60% of Sjogren syndrome  cases and is specific if it is the only ELAN antibody  present. 15-25% of patients with systemic lupus  erythematosus (SLE) and 5-10% of patients with progressive  systemic sclerosis (PSS) also  have this antibody.  Performed by [facility],  [address] [facility] [place] [address] [phone]  [email or web address], Chely Leija MD, Lab. Director     MONICA Screen by EIA   Date Value Ref Range Status   05/27/2010 <1.0 0 - 1.0 Final     Comment:     Interpretation:  Negative     DNA-ds   Date Value Ref Range Status   05/27/2010 <15 0 - 29 IU/mL Final     Comment:     Interpretation:  Negative     Hep B Surface Agn   Date Value Ref Range Status   05/27/2010 Negative NEG Final     Neutrophil Cytoplasmic IgG Antibody   Date Value Ref Range Status   06/26/2015   Final    <1:20  Reference range: <1:20  (Note)  The ANCA IFA is <1:20; therefore, no further testing will  be performed.  INTERPRETIVE INFORMATION: Anti-Neutrophil Cyto Ab, IgG  Neutrophil Cytoplasmic Antibodies (C-ANCA = granular  cytoplasmic staining, P-ANCA = perinuclear staining) are  found in the serum of over 90 percent of patients with  certain necrotizing systemic vasculitides, and usually in  less than 5 percent of patients with collagen vascular  disease or arthritis.  Performed by [facility],  [address] [facility] [place] [address] [phone]  [email or web address], Brad Fall MD, Lab. Director       Albumin Fraction   Date Value Ref Range Status   05/27/2010 4.0 3.7 - 5.1 g/dL Final     Alpha 2 Fraction   Date Value Ref Range Status   05/27/2010 0.6 0.5 - 0.9 g/dL Final     Beta Fraction   Date Value Ref Range Status   05/27/2010 1.0 0.6 - 1.0 g/dL Final     Gamma Fraction   Date Value Ref Range Status   05/27/2010 1.0 0.7 - 1.6 g/dL Final     Monoclonal Peak   Date Value Ref Range Status   05/27/2010 0.0 0.0 g/dL Final     ELP Interpretation:   Date Value Ref Range Status   05/27/2010   Final    Essentially normal electrophoretic pattern.  No monoclonal protein seen.     Comment:      Pathologic significance requires clinical correlation.  ASHLEY Noble M.D.,   Ph.D., Pathologist ()     Immunofixation ELP   Date Value Ref Range  Status   06/09/2016   Final    No monoclonal protein seen on immunofixation.  Pathological significance   requires clinical correlation.   ASHLEY Noble M.D., Ph.D   Pathologist ([phone])       IGG   Date Value Ref Range Status   06/09/2016 1,020 695 - 1,620 mg/dL Final     IGA   Date Value Ref Range Status   06/09/2016 374 70 - 380 mg/dL Final     IGM   Date Value Ref Range Status   06/09/2016 81 60 - 265 mg/dL Final     Scribe Disclosure:  INafisa, am serving as a scribe to document services personally performed by Jeremy Goodrich MD at this visit, based upon the provider's statements to me. All documentation has been reviewed by the aforementioned provider prior to being entered into the official medical record.

## 2019-12-10 NOTE — NURSING NOTE
"Chief Complaint   Patient presents with     RECHECK     RA     BP (!) 153/92   Pulse 53   Temp 97.5  F (36.4  C) (Oral)   Ht 1.905 m (6' 3\")   Wt 140.8 kg (310 lb 6.4 oz)   SpO2 96%   BMI 38.80 kg/m    Nalini Heredia CMA    "

## 2019-12-10 NOTE — LETTER
12/10/2019      RE: Lucio Daly  [address]  [place] [address]       [facility]  Rheumatology Clinic  Jeremy Goodrich MD  12/10/2019     Name: Lucio Daly  MRN: [id number]  Age: 69 year old  : 1950  Referring provider: Jah Corley     Assessment and Plan:  # Seronegative inflammatory arthritis:   Patient relates stable, intermittent hand and foot pain without significant swelling. No significant morning stiffness. Exam shows left medial mid-foot varus; and tenderness at multiple MTPs. Otherwise no inflammatory joint changes. Blood work on 2019 showed creatinine, LFTs, and CBC normal except for stable decreased white count of 3.6. CRP was minimally elevated at 3.5. X-ray from 2019 of the right great toe with no soft tissue calcification or foreign body.     Seronegative inflammatory arthritis remains reasonably controlled on combination therapy. I recommend continuing hydroxychloroquine 400 mg daily and methotrexate 20 mg weekly. While on the latter drug, patient should undergo every 3 month CBC, creatinine, and LFTs. He should complete annual ophthalmology examinations.     # Left forefoot pain:   Osteoarthritis of the great toe and mid-foot deformity on the left may contribute to pain with weight bearing. I will ask for Podiatry input regarding appropriate shoe wear.     Follow-up: Return in about 6 months (around 6/10/2020) with Sin Snow     HPI:   Lucio Daly is a 69 year old male with a history of seronegative rheumatoid arthritis, sicca complex who presents for follow up. He was last seen by Sin Snow on 2019. Seronegative rheumatoid arthritis was well controlled and stable. Plan was to reduce or stop hydroxychloroquine in the future given 9 years of use. Methotrexate 20 mg weekly was continued.     Annual eye exam in 2019 at [facility] showed no plaquenil toxicity.     Today the patient reports intermittent joint discomfort in hands, elbows, forearms, feet,  ankles, and heels. Heel pain feels different from achilles tendonitis. He also has hand numbness in the mornings. He has been told that he has nerve entrapment but does not wear splints. He continues on methotrexate 20 mg weekly and does notice improvement after dosing, as well as worsening when he misses a dose. He also continues on plaquenil 400 mg daily, acetaminophen 3000 mg daily, and folic acid twice daily but has not used prednisone.     Review of Systems:   Pertinent items are noted in HPI or as below, remainder of complete ROS is negative.      No recent problems with hearing or vision. No swallowing problems.   No breathing difficulty, shortness of breath, coughing, or wheezing  No chest pain or palpitations  No heart burn, indigestion, abdominal pain, nausea, vomiting, diarrhea  No urination problems, no bloody, cloudy urine, no dysuria  No headaches or confusion  No rashes. No easy bleeding or bruising.     Active Medications:     Current Outpatient Medications:      acetaminophen (TYLENOL) 500 MG tablet, 2 x 500mg by mouth 3 times per day: 7/730am, 4pm and 11pm  = 6/day, Disp: , Rfl:      albuterol (PROAIR HFA/PROVENTIL HFA/VENTOLIN HFA) 108 (90 Base) MCG/ACT inhaler, Inhale 2 puffs into the lungs every 4 hours as needed for shortness of breath / dyspnea or wheezing, Disp: 1 Inhaler, Rfl: 3     Cholecalciferol (VITAMIN D3) 1000 units CAPS, 1000 unit capsule by mouth daily @ 7am, Disp: 30 capsule, Rfl:      folic acid (FOLVITE) 1 MG tablet, TAKE 1 TABLET (1MG) BY MOUTH TWICE DAILY AT 9AM AND AT 4PM, Disp: 180 tablet, Rfl: 3     hydroxychloroquine (PLAQUENIL) 200 MG tablet, 200mg tab by mouth twice daily @ 9am and 4pm, Disp: 180 tablet, Rfl: 3     methotrexate 2.5 MG tablet, TAKE 8 TABLETS BY MOUTH ONCE WEEKLY. LABS DUE EVERY 8 - 12 WEEKS., Disp: 96 tablet, Rfl: 4     metoprolol succinate ER (TOPROL-XL) 50 MG 24 hr tablet, Take 1.5 tablets (75 mg) by mouth daily, Disp: 135 tablet, Rfl: 11     omeprazole  (PRILOSEC) 40 MG DR capsule, 40mg capsule by mouth daily @ 7/730am, Disp: 90 capsule, Rfl: 3     ORDER FOR DME, Use your CPAP device as directed by your provider., Disp: , Rfl:      oxybutynin (DITROPAN) 5 MG tablet, TAKE 1 TABLET BY MOUTH EVERY DAY AT 4PM, Disp: 90 tablet, Rfl: 1     pregabalin (LYRICA) 50 MG capsule, 1 tab by mouth @ 7/730am, 1 tab @ 4pm and 1-2 tab @ 11pm, Disp: 360 capsule, Rfl: 3     salmeterol (SEREVENT) 50 MCG/DOSE inhaler, Inhale 1 puff into the lungs 2 times daily, Disp: 1 Inhaler, Rfl: 11      Allergies:   Restasis; Adhesive tape; Allegra; Allergy; Animal dander; Benadryl allergy; Cephalexin; Cephalosporins; Diphenhydramine; Doxycycline hyclate; Fexofenadine; Flonase [fluticasone propionate]; Gabapentin; Iodine solution [povidone iodine]; Levaquin; Mylanta; Prednisone; Seafood [seafood]; Shellfish allergy; Sulfamethoxazole-trimethoprim; Trees; Trileptal; and Cortizone      Past Medical History:  Sensory neuropathy of unclear etiology - negative work up, managed on Lyrica.  Has chronic dysesthesia in pads of feet.  Basal cell carcinoma   Osteoarthritis   Diaphragmatic hernia  Esophageal reflux   H. Pylori infection   Interstitial lung disease   Melanoma   Parkinsonism   Rheumatoid arthritis   Somatization disorder   Tremor   Hypertension   Hypothyroidism    Obstructive sleep apnea   Chronic maxillary sinusitis   Sicca syndrome   Morbid obesity   Paralysis agitans     Past Surgical History:  Skin lesion biopsy   Colonoscopy   Hemorrhoid surgery   MOHS procedure   Removal of basal cell carcinoma      Family History:   Mother: hypertension, heart disease, atrial fibrillation, osteoarthritis, osteoporosis, skin cancer   Brother: hypertension, diabetes mellitus  Sister: multiple sclerosis, hypertension, heart disease, atrial fibrillation, arthritis, PPM  Father: hypertension, cerebrovascular disease, skin cancer   Maternal grandmother: psoriasis, stomach cancer  Paternal grandmother: heart  "disease   Paternal grandfather: heart disease       Social History:   Former smoker, 55 pack years, quit date 9/30/2000.   No alcohol use.      Physical Exam:   BP (!) 153/92   Pulse 53   Temp 97.5  F (36.4  C) (Oral)   Ht 1.905 m (6' 3\")   Wt 140.8 kg (310 lb 6.4 oz)   SpO2 96%   BMI 38.80 kg/m      Wt Readings from Last 4 Encounters:   12/10/19 140.8 kg (310 lb 6.4 oz)   11/05/19 138.8 kg (306 lb)   07/10/19 141.2 kg (311 lb 4.8 oz)   06/12/19 143.2 kg (315 lb 9.6 oz)     Constitutional: Well-developed, appearing stated age; cooperative  Eyes: Normal EOM, PERRLA, vision, conjunctiva, sclera  ENT: Normal external ears, nose, hearing, lips, teeth, gums, throat. No mucous membrane lesions, normal saliva pool  Neck: No mass or thyroid enlargement  Resp: Lungs clear to auscultation, nl to palpation  CV: RRR, no murmurs, rubs or gallops, no edema  GI: No ABD mass or tenderness, no HSM  : Not tested  Lymph: No cervical, supraclavicular, inguinal or epitrochlear nodes  MS: Incomplete extension at right and left 2nd PIPs. No visible swelling or asymmetry otherwise. Incomplete fist formation, especially at the left 3rd digit. Mildly reduced  strength. Tenderness at the right 4th MCP. No synovitis at the wrists or in the small joints of the hands. Tenderness in the right elbow. Mild tenderness at multiple MTPs. There is a firm swelling on the lateral aspect of the right great toe IP.   Skin: No nail pitting, alopecia, rash, nodules or lesions  Neuro: Normal cranial nerves, strength, sensation, DTRs.   Psych: Normal judgement, orientation, memory, affect.     Laboratory:   RHEUM RESULTS Latest Ref Rng & Units 6/12/2019 8/6/2019 11/13/2019   DNA-DS 0 - 29 IU/mL - - -   SED RATE 0 - 20 mm/h - - -   CRP, INFLAMMATION 0.0 - 8.0 mg/L 12.7(H) 7.0 8.5(H)   CYCLIC CIT PEPT IGG <5 U/mL - - -   CK TOTAL 30 - 300 U/L - - -   RHEUMATOID FACTOR 0 - 14 IU/mL - - -   MONICA SCREEN BY EIA 0 - 1.0 - - -   AST 0 - 45 U/L 16 16 22 "   ALT 0 - 70 U/L 23 28 29   ALBUMIN 3.4 - 5.0 g/dL 3.8 3.7 3.9   WBC 4.0 - 11.0 10e9/L 5.1 4.2 3.6(L)   RBC 4.4 - 5.9 10e12/L 4.48 4.39(L) 4.65   HGB 13.3 - 17.7 g/dL 13.8 13.2(L) 14.2   HCT 40.0 - 53.0 % 43.3 43.2 44.2   MCV 78 - 100 fl 97 98 95   MCHC 31.5 - 36.5 g/dL 31.9 30.6(L) 32.1   RDW 10.0 - 15.0 % 14.7 14.4 14.4    - 450 10e9/L 160 161 170   CREATININE 0.66 - 1.25 mg/dL 1.05 1.09 1.17   GFR ESTIMATE, IF BLACK >60 mL/min/[1.73:m2] 82 79 72   GFR ESTIMATE >60 mL/min/[1.73:m2] 71 68 62    - 1,620 mg/dL - - -   IGA 70 - 380 mg/dL - - -   IGM 60 - 265 mg/dL - - -   HEPATITIS C ANTIBODY NEG - - -       Rheumatoid Factor   Date Value Ref Range Status   05/27/2010 <7 0 - 14 IU/mL Final     Cyclic Citrullinated Peptide IgG   Date Value Ref Range Status   05/27/2010 <2 <5 U/mL Final     Comment:     Interpretation:  Negative     SSA (RO) Antibody IgG   Date Value Ref Range Status   05/27/2010 1  Final     Comment:     Reference range: 0 to 40  Unit: AU/mL  (Note)  REFERENCE INTERVALS: SSA (Ro) (ELAN) Ab, IgG   29 AU/mL or Less ............. Negative   30 - 40 AU/mL ................ Equivocal   41 AU/mL or Greater .......... Positive    SSA (Ro) antibody is seen in 70-75% of Sjogren syndrome  cases, 30-40% of systemic lupus erythematosus (SLE) and  5-10% of progressive systemic sclerosis (PSS).  Performed by [facility],  [address] [place] [address] [id number] [phone]  [email or web address], Chely Leija MD, Lab. Director     SSB (LA) Antibody IgG   Date Value Ref Range Status   05/27/2010 8  Final     Comment:     Reference range: 0 to 40  Unit: AU/mL  (Note)  REFERENCE INTERVALS: SSB (La) (ELAN) Ab, IgG   29 AU/mL or Less ............. Negative   30 - 40 AU/mL ................ Equivocal   41 AU/mL or Greater .......... Positive    SSB (La) antibody is seen in 50-60% of Sjogren syndrome  cases and is specific if it is the only ELAN antibody  present. 15-25% of patients with systemic  lupus  erythematosus (SLE) and 5-10% of patients with progressive  systemic sclerosis (PSS) also have this antibody.  Performed by [facility],  [address] [facility],[place] [id number] [phone]  [email or web address], Chely Leija MD, Lab. Director     MONICA Screen by EIA   Date Value Ref Range Status   05/27/2010 <1.0 0 - 1.0 Final     Comment:     Interpretation:  Negative     DNA-ds   Date Value Ref Range Status   05/27/2010 <15 0 - 29 IU/mL Final     Comment:     Interpretation:  Negative     Hep B Surface Agn   Date Value Ref Range Status   05/27/2010 Negative NEG Final     Neutrophil Cytoplasmic IgG Antibody   Date Value Ref Range Status   06/26/2015   Final    <1:20  Reference range: <1:20  (Note)  The ANCA IFA is <1:20; therefore, no further testing will  be performed.  INTERPRETIVE INFORMATION: Anti-Neutrophil Cyto Ab, IgG  Neutrophil Cytoplasmic Antibodies (C-ANCA = granular  cytoplasmic staining, P-ANCA = perinuclear staining) are  found in the serum of over 90 percent of patients with  certain necrotizing systemic vasculitides, and usually in  less than 5 percent of patients with collagen vascular  disease or arthritis.  Performed by [facility],  [address] [place] [address] [phone]  [email or web address], Brad Fall MD, Lab. Director       Albumin Fraction   Date Value Ref Range Status   05/27/2010 4.0 3.7 - 5.1 g/dL Final     Alpha 2 Fraction   Date Value Ref Range Status   05/27/2010 0.6 0.5 - 0.9 g/dL Final     Beta Fraction   Date Value Ref Range Status   05/27/2010 1.0 0.6 - 1.0 g/dL Final     Gamma Fraction   Date Value Ref Range Status   05/27/2010 1.0 0.7 - 1.6 g/dL Final     Monoclonal Peak   Date Value Ref Range Status   05/27/2010 0.0 0.0 g/dL Final     ELP Interpretation:   Date Value Ref Range Status   05/27/2010   Final    Essentially normal electrophoretic pattern.  No monoclonal protein seen.     Comment:      Pathologic significance requires clinical correlation.  ASHLEY  MICAELA Noble,   Ph.D., Pathologist ()     Immunofixation ELP   Date Value Ref Range Status   06/09/2016   Final    No monoclonal protein seen on immunofixation.  Pathological significance   requires clinical correlation.   ASHLEY Noble M.D., Ph.D   Pathologist ([phone])       IGG   Date Value Ref Range Status   06/09/2016 1,020 695 - 1,620 mg/dL Final     IGA   Date Value Ref Range Status   06/09/2016 374 70 - 380 mg/dL Final     IGM   Date Value Ref Range Status   06/09/2016 81 60 - 265 mg/dL Final     Scribe Disclosure:  I, Nafisa Acosta, am serving as a scribe to document services personally performed by Jeremy Goodrich MD at this visit, based upon the provider's statements to me. All documentation has been reviewed by the aforementioned provider prior to being entered into the official medical record.       Jeremy Goodrich MD

## 2019-12-10 NOTE — TELEPHONE ENCOUNTER
Spoke to patient , called with a reminder about there upcoming appointment , also instructed to bring medications or medication list.    Renetta Vincent CMA

## 2019-12-10 NOTE — PATIENT INSTRUCTIONS
Diagnosis: Inflammatory arthritis    Plan:   - Continue methotrexate 20 mg weekly   - Continue plaquenil 400 mg daily   - Follow-up with podiatry   - Annual Ophthalmology exam to screen for plaquenil toxicity       While on methotrexate:   -- Check labs every 3 months (AST/ALT, Albumin, CBC with platelets)   -- Limit alcohol intake to 2 drinks weekly; use folate 1 mg daily.  --Tylenol 500 mg can be used as needed up to three times daily for nausea/headache associated with dosing.

## 2019-12-11 ENCOUNTER — OFFICE VISIT (OUTPATIENT)
Dept: PULMONOLOGY | Facility: CLINIC | Age: 69
End: 2019-12-11
Attending: INTERNAL MEDICINE
Payer: MEDICARE

## 2019-12-11 VITALS
DIASTOLIC BLOOD PRESSURE: 83 MMHG | SYSTOLIC BLOOD PRESSURE: 135 MMHG | BODY MASS INDEX: 38.54 KG/M2 | HEART RATE: 57 BPM | RESPIRATION RATE: 18 BRPM | OXYGEN SATURATION: 95 % | WEIGHT: 310 LBS | HEIGHT: 75 IN

## 2019-12-11 DIAGNOSIS — J44.81 OBLITERATIVE BRONCHIOLITIS (H): ICD-10-CM

## 2019-12-11 DIAGNOSIS — J84.9 ILD (INTERSTITIAL LUNG DISEASE) (H): Primary | ICD-10-CM

## 2019-12-11 LAB
6 MIN WALK (FT): 900 FT
6 MIN WALK (M): 274 M

## 2019-12-11 PROCEDURE — G0463 HOSPITAL OUTPT CLINIC VISIT: HCPCS | Mod: ZF

## 2019-12-11 ASSESSMENT — MIFFLIN-ST. JEOR: SCORE: 2246.78

## 2019-12-11 ASSESSMENT — PAIN SCALES - GENERAL: PAINLEVEL: NO PAIN (0)

## 2019-12-11 NOTE — PROGRESS NOTES
Reason for Visit  Lucio Daly is a 69 year old year old male who is being seen for Interstitial Lung Disease (ILD) (Sarcoids-FPFT's )  HPI  The patient was seen and examined by Harsha Mccarthy MD     Mr. 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    He reports good control of his symptoms.  Since initiation of Solu-Medrol his breathing has gotten better. He has occasional dry cough.  Otherwise no new symptoms.  He also has orthopnea which is longstanding and is using bilevel ventilation.  There has been a concern about diaphragmatic dysfunction.  Last echo did not show any HF.      Current Outpatient Medications   Medication     acetaminophen (TYLENOL) 500 MG tablet     albuterol (PROAIR HFA/PROVENTIL HFA/VENTOLIN HFA) 108 (90 Base) MCG/ACT inhaler     Cholecalciferol (VITAMIN D3) 1000 units CAPS     folic acid (FOLVITE) 1 MG tablet     hydroxychloroquine (PLAQUENIL) 200 MG tablet     methotrexate 2.5 MG tablet     metoprolol succinate ER (TOPROL-XL) 50 MG 24 hr tablet     omeprazole (PRILOSEC) 40 MG DR capsule     ORDER FOR DME     oxybutynin (DITROPAN) 5 MG tablet     pregabalin (LYRICA) 50 MG capsule     salmeterol (SEREVENT) 50 MCG/DOSE inhaler     No current facility-administered medications for this visit.      Allergies   Allergen Reactions     Restasis      Burning eyes, problems with breathing, tightness in chest     Adhesive Tape      Bandages misc     Allegra      EXCESSIVE URINATION AND WEAKNESS, LIGHT-HEADED     Allergy      Dust     Animal Dander      Benadryl Allergy      EXCESSIVE URINATION AND WEAKNESS, LIGHT-HEADED     Cephalexin      Joint pain or gerd aggravation. Bloating excessive urination      Cephalosporins      Diphenhydramine Other (See Comments)     Doxycycline Hyclate Nausea      SWEATING,MIGRAINES,LOSS OF APPETITE,SWEATING,LIGHT HEADED, EXCESSIVE URINATION     Fexofenadine Other (See Comments)     Flonase [Fluticasone Propionate]      Gabapentin      Neurontin: mosd changes and excess urination     Iodine Solution [Povidone Iodine]      SKIN MELTS     Levaquin      From surgeon--? Joint pain ? Gerd aggravation. Insomnia, excess urination     Mylanta      EXCESSIVE URINATION AND WEAKNESS, LIGHT-HEADED     Prednisone      Weakness, elevated bp, headache, eye pain, congestion      Seafood [Seafood]      Shellfish Allergy      Hives       Sulfamethoxazole-Trimethoprim      Chest pain, angina     Trees      Trileptal      SEVERE JOINT AND TENDON PAIN, INSOMNIA, RESTLESSNESS, NAUSEA, EXCESS URINATION     Cortizone Rash     EXCESS URINATION,WEAKNESS,NAUSEA, HEADACHE     Past Medical History:   Diagnosis Date     Abnormal EMG 4/18/2013     Abnormal involuntary movements(781.0)     Movement Disorder     AK (actinic keratosis) 12/18/2011     Allergic rhinitis, cause unspecified     Allergic rhinitis     Balance problems 11/1/2011     Basal cell carcinoma      Bladder spasms 11/1/2011     Chronic osteoarthritis      Diaphragmatic hernia without mention of obstruction or gangrene      Earache or other ear, nose, or throat complaint      Esophageal reflux      Fatigue 11/1/2011     Fracture      H. pylori infection 5/12/2011     History of MRI of cervical spine 11/18/2013    EXAMINATION: CERVICAL SPINE G/E 5 VIEWS* 4/19/2013 4:18 PM  CLINICAL HISTORY: Pain in limb,Performing Location?->[facility]),  COMPARISON:  FINDINGS: AP and lateral views in flexion and extension, as well as odontoid view of the cervical spine was obtained. There is no comparison available. The vertebral bodies of the cervical spine are normally aligned. There is posterior spurring and d     Incomplete defecation 11/1/2011     Interstitial lung disease (H) 11/29/2016     Laboratory test 8/7/2012     Lung disease June 2015      Malignant basal cell neoplasm of skin 2008     Melanoma (H) 2008     Melanoma in situ of lower leg (H)     R calf     Neuropathy 2011     Other bladder disorder      Other color vision deficiencies      Other nervous system complications      Parkinsonism (H) 2011     Personal history of colonic polyps      Polyneuropathy in other diseases classified elsewhere (H)      RA (rheumatoid arthritis) (H)      Rheumatoid arthritis of multiple sites with negative rheumatoid factor (H) 3/21/2016     Seronegative arthritis 2013     Shortness of breath      Shoulder arthritis 2016    acromioclavicular joint      Somatization disorder 2011     Squamous cell carcinoma      Tremor 2011     Unspecified essential hypertension      Unspecified hypothyroidism      Urinary tract infection      Urinary urgency 2011     Wears glasses 2011       Past Surgical History:   Procedure Laterality Date     BIOPSY OF SKIN LESION       COLONOSCOPY       HEMORRHOID SURGERY       lip biopsy      for sicca complex     MOHS MICROGRAPHIC PROCEDURE       SOFT TISSUE SURGERY      removeal of basel cell carcinoma       Social History     Socioeconomic History     Marital status:      Spouse name: Not on file     Number of children: Not on file     Years of education: Not on file     Highest education level: Not on file   Occupational History     Not on file   Social Needs     Financial resource strain: Not on file     Food insecurity:     Worry: Not on file     Inability: Not on file     Transportation needs:     Medical: Not on file     Non-medical: Not on file   Tobacco Use     Smoking status: Former Smoker     Packs/day: 1.50     Years: 37.00     Pack years: 55.50     Types: Cigarettes     Start date: 6/15/1963     Last attempt to quit: 2000     Years since quittin.2     Smokeless tobacco: Never Used   Substance and Sexual Activity     Alcohol use: No     Alcohol/week: 0.0 standard  "drinks     Drug use: No     Sexual activity: Never     Partners: Female     Birth control/protection: None   Lifestyle     Physical activity:     Days per week: Not on file     Minutes per session: Not on file     Stress: Not on file   Relationships     Social connections:     Talks on phone: Not on file     Gets together: Not on file     Attends [employer] service: Not on file     Active member of club or organization: Not on file     Attends meetings of clubs or organizations: Not on file     Relationship status: Not on file     Intimate partner violence:     Fear of current or ex partner: Not on file     Emotionally abused: Not on file     Physically abused: Not on file     Forced sexual activity: Not on file   Other Topics Concern     Parent/sibling w/ CABG, MI or angioplasty before 65F 55M? No   Social History Narrative    2013: Living in [place] in a townhouse with no steps    Has 3 sons that are doing okay.         Dairy/d 1 servings/d.     Caffeine 0 servings/d    Exercise 0 x week    Sunscreen used - No    Seatbelts used - Yes    Working smoke/CO detectors in the home - Yes    Guns stored in the home - Yes    Self Breast Exams - NA    Self Testicular Exam - Yes    Eye Exam up to date - Yes 2008    Dental Exam up to date - Yes 2006    Pap Smear up to date - NA    Mammogram up to date - NA    PSA up to date - Yes 2008    Dexa Scan up to date - No    Flex Sig / Colonoscopy up to date - Yes less than 5 yrs ago    Immunizations up to date -today    Abuse: Current or Past(Physical, Sexual or Emotional)- No    Do you feel safe in your environment - Yes    2008                   ROS Pulmonary  A complete ROS was otherwise negative except as noted in the HPI.  /83 (BP Location: Right arm, Patient Position: Chair, Cuff Size: Adult Large)   Pulse 57   Resp 18   Ht 1.905 m (6' 3\")   Wt 140.6 kg (310 lb)   SpO2 95%   BMI 38.75 kg/m    Exam:   GENERAL APPEARANCE: Alert, and in no apparent distress.  EYES: PERRL, " EOMI  MOUTH: Oral mucosa is moist, without any lesions,, no oropharyngeal exudate.  NECK: supple, no masses, no thyromegaly.  LYMPHATICS: No significant axillary, cervical, or supraclavicular nodes.  RESP: normal percussion, good air flow throughout.  No crackles. No rhonchi. No wheezes.  CV: Normal S1, S2, regular rhythm, normal rate. No murmur.  No rub. No gallop. No LE edema.   ABDOMEN:  Bowel sounds normal, soft, nontender, no HSM or masses.   MS: extremities normal. No clubbing. No cyanosis.  SKIN: no rash on limited exam  NEURO: Mentation intact, speech normal, normal strength and tone, normal gait and stance  Results:PFTs done today were reviewed by me with the patient FVC 3.35 L (71%), FEV1 2.54 L (72%) and the ratio is 76.  Total lung capacity 6.27 (81%) and diffusion capacity 27.5 (97%).  My interpretation is that spirometry is suggestive of restrictive process and will need lung volumes for confirmation.    Assessment and plan: Mr. 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   1.  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  He reports improved dyspnea after this change.  However, he is wondering if it would be okay to substitute based on formulary for his health plan.  This should work well.  2.  Diaphragmatic muscle weakness: Currently on NIPPV but reports orthopnea intermittently.  He will continue positive pressure ventilation at night.  3.  Gastroesophageal reflux: On antireflux therapy which she will continue.  4. Drug monitoring: Methotrexate labs with Dr Goodrich.      Answers for HPI/ROS submitted by the patient on 12/7/2019   General Symptoms: No  Skin Symptoms: No  HENT  Symptoms: Yes  EYE SYMPTOMS: Yes  HEART SYMPTOMS: Yes  LUNG SYMPTOMS: Yes  INTESTINAL SYMPTOMS: Yes  URINARY SYMPTOMS: No  REPRODUCTIVE SYMPTOMS: No  SKELETAL SYMPTOMS: Yes  BLOOD SYMPTOMS: No  NERVOUS SYSTEM SYMPTOMS: Yes  MENTAL HEALTH SYMPTOMS: No  Ear pain: No  Ear discharge: No  Hearing loss: No  Tinnitus: Yes  Nosebleeds: No  Sinus pain: Yes  Trouble swallowing: No   Voice hoarseness: No  Mouth sores: No  Sore throat: No  Tooth pain: No  Gum tenderness: No  Bleeding gums: No  Change in taste: No  Change in sense of smell: No  Dry mouth: Yes  Hearing aid used: No  Neck lump: No  Eye pain: Yes  Vision loss: No  Dry eyes: Yes  Watery eyes: No  Eye bulging: No  Double vision: No  Flashing of lights: No  Spots: No  Floaters: No  Redness: No  Crossed eyes: No  Tunnel Vision: No  Yellowing of eyes: No  Eye irritation: No  Cough: No  Sputum or phlegm: No  Coughing up blood: No  Difficulty breating or shortness of breath: Yes  Snoring: No  Wheezing: Yes  Difficulty breathing on exertion: Yes  Nighttime Cough: No  Difficulty breathing when lying flat: Yes  Chest pain or pressure: No  Fast or irregular heartbeat: Yes  Pain in legs with walking: Yes  Trouble breathing while lying down: Yes  Fingers or toes appear blue: No  High blood pressure: No  Low blood pressure: No  Fainting: No  Murmurs: No  Pacemaker: No  Varicose veins: Yes  Edema or swelling: Yes  Wake up at night with shortness of breath: No  Light-headedness: No  Exercise intolerance: No  Heart burn or indigestion: Yes  Nausea: No  Vomiting: No  Abdominal pain: Yes  Bloating: Yes  Constipation: Yes  Diarrhea: No  Blood in stool: Yes  Black stools: No  Rectal or Anal pain: No  Fecal incontinence: No  Yellowing of skin or eyes: No  Vomit with blood: No  Change in stools: No  Back pain: Yes  Muscle aches: Yes  Neck pain: No  Swollen joints: Yes  Joint pain: Yes  Bone pain: No  Muscle cramps: Yes  Muscle weakness: Yes  Joint stiffness: Yes  Bone fracture:  No  Trouble with coordination: Yes  Dizziness or trouble with balance: Yes  Fainting or black-out spells: No  Memory loss: No  Headache: No  Seizures: No  Speech problems: No  Tingling: Yes  Tremor: Yes  Weakness: Yes  Difficulty walking: Yes  Paralysis: No  Numbness: Yes

## 2019-12-11 NOTE — LETTER
12/11/2019       RE: Lucio Daly  [address]  [place] [address]     Dear Colleague,    Thank you for referring your patient, Lucio Dlay, to the [facility] at [facility]. Please see a copy of my visit note below.    Reason for Visit  Lucio Daly is a 69 year old year old male who is being seen for Interstitial Lung Disease (ILD) (Sarcoids-FPFT's )  HPI  The patient was seen and examined by Harsha Mccarthy MD     Mr. 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    He reports good control of his symptoms.  Since initiation of Solu-Medrol his breathing has gotten better. He has occasional dry cough.  Otherwise no new symptoms.  He also has orthopnea which is longstanding and is using bilevel ventilation.  There has been a concern about diaphragmatic dysfunction.  Last echo did not show any HF.      Current Outpatient Medications   Medication     acetaminophen (TYLENOL) 500 MG tablet     albuterol (PROAIR HFA/PROVENTIL HFA/VENTOLIN HFA) 108 (90 Base) MCG/ACT inhaler     Cholecalciferol (VITAMIN D3) 1000 units CAPS     folic acid (FOLVITE) 1 MG tablet     hydroxychloroquine (PLAQUENIL) 200 MG tablet     methotrexate 2.5 MG tablet     metoprolol succinate ER (TOPROL-XL) 50 MG 24 hr tablet     omeprazole (PRILOSEC) 40 MG DR capsule     ORDER FOR DME     oxybutynin (DITROPAN) 5 MG tablet     pregabalin (LYRICA) 50 MG capsule     salmeterol (SEREVENT) 50 MCG/DOSE inhaler     No current facility-administered medications for this visit.      Allergies   Allergen Reactions     Restasis      Burning eyes, problems with breathing, tightness in chest     Adhesive Tape      Bandages misc     Allegra      EXCESSIVE URINATION AND WEAKNESS, LIGHT-HEADED      Allergy      Dust     Animal Dander      Benadryl Allergy      EXCESSIVE URINATION AND WEAKNESS, LIGHT-HEADED     Cephalexin      Joint pain or gerd aggravation. Bloating excessive urination      Cephalosporins      Diphenhydramine Other (See Comments)     Doxycycline Hyclate Nausea     SWEATING,MIGRAINES,LOSS OF APPETITE,SWEATING,LIGHT HEADED, EXCESSIVE URINATION     Fexofenadine Other (See Comments)     Flonase [Fluticasone Propionate]      Gabapentin      Neurontin: mosd changes and excess urination     Iodine Solution [Povidone Iodine]      SKIN MELTS     Levaquin      From surgeon--? Joint pain ? Gerd aggravation. Insomnia, excess urination     Mylanta      EXCESSIVE URINATION AND WEAKNESS, LIGHT-HEADED     Prednisone      Weakness, elevated bp, headache, eye pain, congestion      Seafood [Seafood]      Shellfish Allergy      Hives       Sulfamethoxazole-Trimethoprim      Chest pain, angina     Trees      Trileptal      SEVERE JOINT AND TENDON PAIN, INSOMNIA, RESTLESSNESS, NAUSEA, EXCESS URINATION     Cortizone Rash     EXCESS URINATION,WEAKNESS,NAUSEA, HEADACHE     Past Medical History:   Diagnosis Date     Abnormal EMG 4/18/2013     Abnormal involuntary movements(781.0)     Movement Disorder     AK (actinic keratosis) 12/18/2011     Allergic rhinitis, cause unspecified     Allergic rhinitis     Balance problems 11/1/2011     Basal cell carcinoma      Bladder spasms 11/1/2011     Chronic osteoarthritis      Diaphragmatic hernia without mention of obstruction or gangrene      Earache or other ear, nose, or throat complaint      Esophageal reflux      Fatigue 11/1/2011     Fracture      H. pylori infection 5/12/2011     History of MRI of cervical spine 11/18/2013    EXAMINATION: CERVICAL SPINE G/E 5 VIEWS* 4/19/2013 4:18 PM  CLINICAL HISTORY: Pain in limb,Performing Location?->[facility] ([facility]),  COMPARISON:  FINDINGS: AP and lateral views in flexion and extension, as well as odontoid view of the cervical  spine was obtained. There is no comparison available. The vertebral bodies of the cervical spine are normally aligned. There is posterior spurring and d     Incomplete defecation 11/1/2011     Interstitial lung disease (H) 11/29/2016     Laboratory test 8/7/2012     Lung disease June 2015     Malignant basal cell neoplasm of skin 8/6/2008     Melanoma (H) 8/6/2008     Melanoma in situ of lower leg (H)     R calf     Neuropathy 5/16/2011     Other bladder disorder      Other color vision deficiencies      Other nervous system complications      Parkinsonism (H) 11/1/2011     Personal history of colonic polyps      Polyneuropathy in other diseases classified elsewhere (H)      RA (rheumatoid arthritis) (H)      Rheumatoid arthritis of multiple sites with negative rheumatoid factor (H) 3/21/2016     Seronegative arthritis 11/18/2013     Shortness of breath      Shoulder arthritis 2016    acromioclavicular joint      Somatization disorder 5/12/2011     Squamous cell carcinoma      Tremor 11/1/2011     Unspecified essential hypertension      Unspecified hypothyroidism      Urinary tract infection      Urinary urgency 11/1/2011     Wears glasses 11/1/2011       Past Surgical History:   Procedure Laterality Date     BIOPSY OF SKIN LESION       COLONOSCOPY       HEMORRHOID SURGERY       lip biopsy      for sicca complex     MOHS MICROGRAPHIC PROCEDURE       SOFT TISSUE SURGERY      removeal of basel cell carcinoma       Social History     Socioeconomic History     Marital status:      Spouse name: Not on file     Number of children: Not on file     Years of education: Not on file     Highest education level: Not on file   Occupational History     Not on file   Social Needs     Financial resource strain: Not on file     Food insecurity:     Worry: Not on file     Inability: Not on file     Transportation needs:     Medical: Not on file     Non-medical: Not on file   Tobacco Use     Smoking status: Former Smoker      Packs/day: 1.50     Years: 37.00     Pack years: 55.50     Types: Cigarettes     Start date: 6/15/1963     Last attempt to quit: 2000     Years since quittin.2     Smokeless tobacco: Never Used   Substance and Sexual Activity     Alcohol use: No     Alcohol/week: 0.0 standard drinks     Drug use: No     Sexual activity: Never     Partners: Female     Birth control/protection: None   Lifestyle     Physical activity:     Days per week: Not on file     Minutes per session: Not on file     Stress: Not on file   Relationships     Social connections:     Talks on phone: Not on file     Gets together: Not on file     Attends [employer] service: Not on file     Active member of club or organization: Not on file     Attends meetings of clubs or organizations: Not on file     Relationship status: Not on file     Intimate partner violence:     Fear of current or ex partner: Not on file     Emotionally abused: Not on file     Physically abused: Not on file     Forced sexual activity: Not on file   Other Topics Concern     Parent/sibling w/ CABG, MI or angioplasty before 65F 55M? No   Social History Narrative    2013: Living in [place] in a townhouse with no steps    Has 3 sons that are doing okay.         Dairy/d 1 servings/d.     Caffeine 0 servings/d    Exercise 0 x week    Sunscreen used - No    Seatbelts used - Yes    Working smoke/CO detectors in the home - Yes    Guns stored in the home - Yes    Self Breast Exams - NA    Self Testicular Exam - Yes    Eye Exam up to date - Yes     Dental Exam up to date - Yes 2006    Pap Smear up to date - NA    Mammogram up to date - NA    PSA up to date - Yes 2008    Dexa Scan up to date - No    Flex Sig / Colonoscopy up to date - Yes less than 5 yrs ago    Immunizations up to date -today    Abuse: Current or Past(Physical, Sexual or Emotional)- No    Do you feel safe in your environment - Yes    2008                   ROS Pulmonary  A complete ROS was otherwise negative  "except as noted in the HPI.  /83 (BP Location: Right arm, Patient Position: Chair, Cuff Size: Adult Large)   Pulse 57   Resp 18   Ht 1.905 m (6' 3\")   Wt 140.6 kg (310 lb)   SpO2 95%   BMI 38.75 kg/m     Exam:   GENERAL APPEARANCE: Alert, and in no apparent distress.  EYES: PERRL, EOMI  MOUTH: Oral mucosa is moist, without any lesions,, no oropharyngeal exudate.  NECK: supple, no masses, no thyromegaly.  LYMPHATICS: No significant axillary, cervical, or supraclavicular nodes.  RESP: normal percussion, good air flow throughout.  No crackles. No rhonchi. No wheezes.  CV: Normal S1, S2, regular rhythm, normal rate. No murmur.  No rub. No gallop. No LE edema.   ABDOMEN:  Bowel sounds normal, soft, nontender, no HSM or masses.   MS: extremities normal. No clubbing. No cyanosis.  SKIN: no rash on limited exam  NEURO: Mentation intact, speech normal, normal strength and tone, normal gait and stance  Results:PFTs done today were reviewed by me with the patient FVC 3.35 L (71%), FEV1 2.54 L (72%) and the ratio is 76.  Total lung capacity 6.27 (81%) and diffusion capacity 27.5 (97%).  My interpretation is that spirometry is suggestive of restrictive process and will need lung volumes for confirmation.    Assessment and plan: Mr. 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   1.  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  He reports improved dyspnea after this change.  However, he is wondering if it would be okay to substitute based on formulary for his health plan.  This should work well.  2.  Diaphragmatic muscle weakness: Currently on NIPPV but reports orthopnea intermittently.  He will " continue positive pressure ventilation at night.  3.  Gastroesophageal reflux: On antireflux therapy which she will continue.  4. Drug monitoring: Methotrexate labs with Dr Goodrich.      Answers for HPI/ROS submitted by the patient on 12/7/2019   General Symptoms: No  Skin Symptoms: No  HENT Symptoms: Yes  EYE SYMPTOMS: Yes  HEART SYMPTOMS: Yes  LUNG SYMPTOMS: Yes  INTESTINAL SYMPTOMS: Yes  URINARY SYMPTOMS: No  REPRODUCTIVE SYMPTOMS: No  SKELETAL SYMPTOMS: Yes  BLOOD SYMPTOMS: No  NERVOUS SYSTEM SYMPTOMS: Yes  MENTAL HEALTH SYMPTOMS: No  Ear pain: No  Ear discharge: No  Hearing loss: No  Tinnitus: Yes  Nosebleeds: No  Sinus pain: Yes  Trouble swallowing: No   Voice hoarseness: No  Mouth sores: No  Sore throat: No  Tooth pain: No  Gum tenderness: No  Bleeding gums: No  Change in taste: No  Change in sense of smell: No  Dry mouth: Yes  Hearing aid used: No  Neck lump: No  Eye pain: Yes  Vision loss: No  Dry eyes: Yes  Watery eyes: No  Eye bulging: No  Double vision: No  Flashing of lights: No  Spots: No  Floaters: No  Redness: No  Crossed eyes: No  Tunnel Vision: No  Yellowing of eyes: No  Eye irritation: No  Cough: No  Sputum or phlegm: No  Coughing up blood: No  Difficulty breating or shortness of breath: Yes  Snoring: No  Wheezing: Yes  Difficulty breathing on exertion: Yes  Nighttime Cough: No  Difficulty breathing when lying flat: Yes  Chest pain or pressure: No  Fast or irregular heartbeat: Yes  Pain in legs with walking: Yes  Trouble breathing while lying down: Yes  Fingers or toes appear blue: No  High blood pressure: No  Low blood pressure: No  Fainting: No  Murmurs: No  Pacemaker: No  Varicose veins: Yes  Edema or swelling: Yes  Wake up at night with shortness of breath: No  Light-headedness: No  Exercise intolerance: No  Heart burn or indigestion: Yes  Nausea: No  Vomiting: No  Abdominal pain: Yes  Bloating: Yes  Constipation: Yes  Diarrhea: No  Blood in stool: Yes  Black stools: No  Rectal or Anal pain:  No  Fecal incontinence: No  Yellowing of skin or eyes: No  Vomit with blood: No  Change in stools: No  Back pain: Yes  Muscle aches: Yes  Neck pain: No  Swollen joints: Yes  Joint pain: Yes  Bone pain: No  Muscle cramps: Yes  Muscle weakness: Yes  Joint stiffness: Yes  Bone fracture: No  Trouble with coordination: Yes  Dizziness or trouble with balance: Yes  Fainting or black-out spells: No  Memory loss: No  Headache: No  Seizures: No  Speech problems: No  Tingling: Yes  Tremor: Yes  Weakness: Yes  Difficulty walking: Yes  Paralysis: No  Numbness: Yes      Again, thank you for allowing me to participate in the care of your patient.      Sincerely,    Harsha Mccarthy MD

## 2019-12-11 NOTE — NURSING NOTE
Chief Complaint   Patient presents with     RECHECK     Sarcoids     Medications reviewed and vital signs taken.   Jessa Fuchs CMA

## 2019-12-11 NOTE — NURSING NOTE
Chief Complaint   Patient presents with     Interstitial Lung Disease (ILD)     Sarcoids-FPFT's      Medications reviewed and updated.  Vitals taken  Cate Pham CMA

## 2019-12-12 LAB
DLCOUNC-%PRED-PRE: 97 %
DLCOUNC-PRE: 27.59 ML/MIN/MMHG
DLCOUNC-PRED: 28.2 ML/MIN/MMHG
ERV-%PRED-PRE: 76 %
ERV-PRE: 0.48 L
ERV-PRED: 0.63 L
EXPTIME-PRE: 8.57 SEC
FEF2575-%PRED-PRE: 71 %
FEF2575-PRE: 1.84 L/SEC
FEF2575-PRED: 2.58 L/SEC
FEFMAX-%PRED-PRE: 100 %
FEFMAX-PRE: 9.02 L/SEC
FEFMAX-PRED: 8.95 L/SEC
FEV1-%PRED-PRE: 72 %
FEV1-PRE: 2.54 L
FEV1FEV6-PRE: 75 %
FEV1FEV6-PRED: 78 %
FEV1FVC-PRE: 76 %
FEV1FVC-PRED: 75 %
FEV1SVC-PRE: 77 %
FEV1SVC-PRED: 66 %
FIFMAX-PRE: 6.11 L/SEC
FRCPLETH-%PRED-PRE: 88 %
FRCPLETH-PRE: 3.44 L
FRCPLETH-PRED: 3.89 L
FVC-%PRED-PRE: 71 %
FVC-PRE: 3.35 L
FVC-PRED: 4.66 L
IC-%PRED-PRE: 60 %
IC-PRE: 2.83 L
IC-PRED: 4.64 L
RVPLETH-%PRED-PRE: 107 %
RVPLETH-PRE: 2.97 L
RVPLETH-PRED: 2.76 L
TLCPLETH-%PRED-PRE: 81 %
TLCPLETH-PRE: 6.27 L
TLCPLETH-PRED: 7.74 L
VA-%PRED-PRE: 71 %
VA-PRE: 5.05 L
VC-%PRED-PRE: 62 %
VC-PRE: 3.31 L
VC-PRED: 5.26 L

## 2019-12-19 DIAGNOSIS — G62.9 PERIPHERAL POLYNEUROPATHY: ICD-10-CM

## 2019-12-20 NOTE — TELEPHONE ENCOUNTER
Requested Prescriptions   Pending Prescriptions Disp Refills     pregabalin (LYRICA) 50 MG capsule [Pharmacy Med Name: PREGABALIN 50 MG CAPSULE  Last Written Prescription Date:  5-23-19  Last Fill Quantity: 360 cap,   # refills: 3  Last Office Visit: 11-5-19  Future Office visit:       Routing refill request to provider for review/approval because:  Drug not on the [facility] or [facility] refill protocol or controlled substance 360 capsule      Sig: TAKE 1 TAB BY MOUTH AT 7-7:30 AM, 1 TAB AT 4 PM, AND 1-2 TABS AT 11 PM       There is no refill protocol information for this order

## 2019-12-23 RX ORDER — PREGABALIN 50 MG/1
CAPSULE ORAL
Qty: 360 CAPSULE | Refills: 0 | Status: SHIPPED | OUTPATIENT
Start: 2019-12-23 | End: 2020-04-16

## 2019-12-24 DIAGNOSIS — N39.498 OTHER URINARY INCONTINENCE: ICD-10-CM

## 2019-12-24 DIAGNOSIS — K21.9 GASTROESOPHAGEAL REFLUX DISEASE WITHOUT ESOPHAGITIS: ICD-10-CM

## 2019-12-24 NOTE — TELEPHONE ENCOUNTER
"Requested Prescriptions   Pending Prescriptions Disp Refills     oxybutynin (DITROPAN) 5 MG tablet [Pharmacy Med Name: OXYBUTYNIN 5 MG TABLET]  This maybe too early.   Last Written Prescription Date:  8-13-19  Last Fill Quantity: 90 tab,  # refills: 1   Last office visit: 11/5/2019 with prescribing provider:  Jah Corley    Future Office Visit:   90 tablet 1     Sig: TAKE 1 TABLET BY MOUTH EVERY DAY AT 4PM       Muscarinic Antagonists (Urinary Incontinence Agents) Passed - 12/24/2019  9:22 AM        Passed - Recent (12 mo) or future (30 days) visit within the authorizing provider's specialty     Patient has had an office visit with the authorizing provider or a provider within the authorizing providers department within the previous 12 mos or has a future within next 30 days. See \"Patient Info\" tab in inbasket, or \"Choose Columns\" in Meds & Orders section of the refill encounter.            Passed - Medication is Oxybutynin and patient is 5 years of age or older        Passed - Patient does not have a diagnosis of glaucoma on the problem list     If glaucoma diagnosis is new, refer refill to physician.        Passed - Medication is active on med list        Passed - Patient is 18 years of age or older     ____________________________________         omeprazole (PRILOSEC) 40 MG DR capsule [Pharmacy Med Name: OMEPRAZOLE DR 40 MG CAPSULE]  This maybe too early.   Last Written Prescription Date:  5-23-19  Last Fill Quantity: 90 cap,  # refills: 3   Last office visit: 11/5/2019 with prescribing provider:  Jah Corley    Future Office Visit:   180 capsule 3     Sig: TAKE 1 CAPSULE BY MOUTH TWICE DAILY AT 7-7:30 AM AND 4 PM       PPI Protocol Passed - 12/24/2019  9:22 AM        Passed - Not on Clopidogrel (unless Pantoprazole ordered)        Passed - No diagnosis of osteoporosis on record        Passed - Recent (12 mo) or future (30 days) visit within the authorizing provider's specialty     Patient has had an office visit " "with the authorizing provider or a provider within the authorizing providers department within the previous 12 mos or has a future within next 30 days. See \"Patient Info\" tab in inbasket, or \"Choose Columns\" in Meds & Orders section of the refill encounter.            Passed - Medication is active on med list        Passed - Patient is age 18 or older         "

## 2019-12-30 RX ORDER — OXYBUTYNIN CHLORIDE 5 MG/1
TABLET ORAL
Qty: 90 TABLET | Refills: 1 | Status: SHIPPED | OUTPATIENT
Start: 2019-12-30 | End: 2020-06-30

## 2019-12-30 RX ORDER — OMEPRAZOLE 40 MG/1
CAPSULE, DELAYED RELEASE ORAL
Qty: 180 CAPSULE | Refills: 3 | Status: SHIPPED | OUTPATIENT
Start: 2019-12-30 | End: 2021-03-10

## 2020-01-27 DIAGNOSIS — M06.09 RHEUMATOID ARTHRITIS OF MULTIPLE SITES WITH NEGATIVE RHEUMATOID FACTOR (H): ICD-10-CM

## 2020-01-27 DIAGNOSIS — Z79.899 HIGH RISK MEDICATIONS (NOT ANTICOAGULANTS) LONG-TERM USE: ICD-10-CM

## 2020-01-27 LAB
ALBUMIN SERPL-MCNC: 3.7 G/DL (ref 3.4–5)
ALT SERPL W P-5'-P-CCNC: 29 U/L (ref 0–70)
AST SERPL W P-5'-P-CCNC: 16 U/L (ref 0–45)
CREAT SERPL-MCNC: 1.07 MG/DL (ref 0.66–1.25)
CRP SERPL-MCNC: 6.5 MG/L (ref 0–8)
ERYTHROCYTE [DISTWIDTH] IN BLOOD BY AUTOMATED COUNT: 14.7 % (ref 10–15)
GFR SERPL CREATININE-BSD FRML MDRD: 69 ML/MIN/{1.73_M2}
HCT VFR BLD AUTO: 43 % (ref 40–53)
HGB BLD-MCNC: 13.5 G/DL (ref 13.3–17.7)
MCH RBC QN AUTO: 30.1 PG (ref 26.5–33)
MCHC RBC AUTO-ENTMCNC: 31.4 G/DL (ref 31.5–36.5)
MCV RBC AUTO: 96 FL (ref 78–100)
PLATELET # BLD AUTO: 172 10E9/L (ref 150–450)
RBC # BLD AUTO: 4.49 10E12/L (ref 4.4–5.9)
WBC # BLD AUTO: 5.2 10E9/L (ref 4–11)

## 2020-01-27 NOTE — RESULT ENCOUNTER NOTE
The blood counts, liver, kidney and CRP inflammation labs are normal.     Sin GUZMAN, CNP, MSN  1/27/2020  5:41 PM

## 2020-04-13 DIAGNOSIS — G62.9 PERIPHERAL POLYNEUROPATHY: ICD-10-CM

## 2020-04-13 NOTE — TELEPHONE ENCOUNTER
PREGABALIN 50 MG CAPSULE       Last Written Prescription Date:  12/23/2019  Last Fill Quantity: 360 capsule,   # refills: 0  Last Office Visit: 11/5/2019  Future Office visit:       Routing refill request to provider for review/approval because:  Drug not on the [facility], [facility] or [facility] refill protocol or controlled substance

## 2020-04-14 NOTE — TELEPHONE ENCOUNTER
"No ov for some time in regards to medicine and needs to establish with new provider.  Check on urgency of refill then offer virtual visit options.  Thanks  Renetta \"Ugo\" DEANNE Lane   "

## 2020-04-15 RX ORDER — PREGABALIN 50 MG/1
CAPSULE ORAL
Qty: 360 CAPSULE | Refills: 0 | OUTPATIENT
Start: 2020-04-15

## 2020-04-16 ENCOUNTER — VIRTUAL VISIT (OUTPATIENT)
Dept: FAMILY MEDICINE | Facility: CLINIC | Age: 70
End: 2020-04-16
Payer: MEDICARE

## 2020-04-16 DIAGNOSIS — G62.9 PERIPHERAL POLYNEUROPATHY: ICD-10-CM

## 2020-04-16 PROCEDURE — 99442 ZZC PHYSICIAN TELEPHONE EVALUATION 11-20 MIN: CPT | Performed by: PHYSICIAN ASSISTANT

## 2020-04-16 RX ORDER — PREGABALIN 50 MG/1
CAPSULE ORAL
Qty: 360 CAPSULE | Refills: 0 | Status: SHIPPED | OUTPATIENT
Start: 2020-04-16 | End: 2020-07-20

## 2020-04-16 NOTE — PROGRESS NOTES
"Lucio Daly is a 69 year old male who is being evaluated via a billable telephone visit.      The patient has been notified of following:     \"This telephone visit will be conducted via a call between you and your physician/provider. We have found that certain health care needs can be provided without the need for a physical exam.  This service lets us provide the care you need with a short phone conversation.  If a prescription is necessary we can send it directly to your pharmacy.  If lab work is needed we can place an order for that and you can then stop by our lab to have the test done at a later time.    Telephone visits are billed at different rates depending on your insurance coverage. During this emergency period, for some insurers they may be billed the same as an in-person visit.  Please reach out to your insurance provider with any questions.    If during the course of the call the physician/provider feels a telephone visit is not appropriate, you will not be charged for this service.\"    Patient has given verbal consent for Telephone visit?  Yes    How would you like to obtain your AVS? MyChart    Subjective     Lucio Daly is a 69 year old male who presents to clinic today for the following health issues:    Medication Followup of LYRICA 50MG     Taking Medication as prescribed: yes    Side Effects:  None    Medication Helping Symptoms:  yes     Reviewed and updated as needed this visit by Provider         Review of Systems   ROS COMP: Constitutional, HEENT, cardiovascular, pulmonary, gi and gu systems are negative, except as otherwise noted.       Objective   Reported vitals:  There were no vitals taken for this visit.   no distress  PSYCH: Alert and oriented times 3; coherent speech, normal   rate and volume, able to articulate logical thoughts, able   to abstract reason, no tangential thoughts, no hallucinations   or delusions  His affect is normal  RESP: No cough, no audible wheezing, able " to talk in full sentences  Remainder of exam unable to be completed due to telephone visits    Diagnostic Test Results:  Labs reviewed in Epic        Assessment/Plan:  1. Peripheral polyneuropathy  - pregabalin (LYRICA) 50 MG capsule; TAKE 1 TAB BY MOUTH AT 7-7:30 AM, 1 TAB AT 4 PM, AND 1-2 TABS AT 11 PM  Dispense: 360 capsule; Refill: 0    Return in about 3 months (around 7/16/2020) for Routine Visit.      Phone call duration:  15 minutes    Saroj Tinoco PA-C

## 2020-05-14 PROBLEM — J44.81 BRONCHIOLITIS OBLITERANS (H): Status: ACTIVE | Noted: 2020-05-14

## 2020-05-14 PROBLEM — I10 HYPERTENSION: Status: ACTIVE | Noted: 2020-05-14

## 2020-05-14 PROBLEM — M06.9 RHEUMATOID ARTHRITIS (H): Status: ACTIVE | Noted: 2020-05-14

## 2020-05-19 ENCOUNTER — TELEPHONE (OUTPATIENT)
Dept: RHEUMATOLOGY | Facility: CLINIC | Age: 70
End: 2020-05-19

## 2020-05-19 DIAGNOSIS — M06.09 RHEUMATOID ARTHRITIS OF MULTIPLE SITES WITH NEGATIVE RHEUMATOID FACTOR (H): Primary | ICD-10-CM

## 2020-05-19 DIAGNOSIS — Z79.899 HIGH RISK MEDICATIONS (NOT ANTICOAGULANTS) LONG-TERM USE: ICD-10-CM

## 2020-05-19 NOTE — TELEPHONE ENCOUNTER
[facility] Call Center    Phone Message    May a detailed message be left on voicemail: yes     Reason for Call: Other: Pt called in stating that he scheduled a lab on 20 but the lab orders are .  Please go in and either extend the expiration date or put new lab orders in please.  Pt would like a call when this has been done so he knows he can go to the 20 lab appt     Action Taken: Message routed to:  [facility] Rheum    Travel Screening: Not Applicable

## 2020-05-20 NOTE — TELEPHONE ENCOUNTER
Left a message for Syed to return my call if needed.  Lab orders have been placed.     Mary Beth Vivar MSN, RN  Rheumatology RN Care Coordinator  [facility]

## 2020-05-26 DIAGNOSIS — M06.09 RHEUMATOID ARTHRITIS OF MULTIPLE SITES WITH NEGATIVE RHEUMATOID FACTOR (H): ICD-10-CM

## 2020-05-26 DIAGNOSIS — Z79.899 HIGH RISK MEDICATIONS (NOT ANTICOAGULANTS) LONG-TERM USE: ICD-10-CM

## 2020-05-26 DIAGNOSIS — J44.81 OBLITERATIVE BRONCHIOLITIS (H): Primary | ICD-10-CM

## 2020-05-26 LAB
ALBUMIN SERPL-MCNC: 3.7 G/DL (ref 3.4–5)
ALT SERPL W P-5'-P-CCNC: 29 U/L (ref 0–70)
AST SERPL W P-5'-P-CCNC: 22 U/L (ref 0–45)
CREAT SERPL-MCNC: 1.09 MG/DL (ref 0.66–1.25)
CRP SERPL-MCNC: 7.7 MG/L (ref 0–8)
ERYTHROCYTE [DISTWIDTH] IN BLOOD BY AUTOMATED COUNT: 15 % (ref 10–15)
GFR SERPL CREATININE-BSD FRML MDRD: 68 ML/MIN/{1.73_M2}
HCT VFR BLD AUTO: 43.1 % (ref 40–53)
HGB BLD-MCNC: 13.5 G/DL (ref 13.3–17.7)
MCH RBC QN AUTO: 29.7 PG (ref 26.5–33)
MCHC RBC AUTO-ENTMCNC: 31.3 G/DL (ref 31.5–36.5)
MCV RBC AUTO: 95 FL (ref 78–100)
PLATELET # BLD AUTO: 165 10E9/L (ref 150–450)
RBC # BLD AUTO: 4.55 10E12/L (ref 4.4–5.9)
WBC # BLD AUTO: 4.3 10E9/L (ref 4–11)

## 2020-06-10 ENCOUNTER — VIRTUAL VISIT (OUTPATIENT)
Dept: PULMONOLOGY | Facility: CLINIC | Age: 70
End: 2020-06-10
Attending: INTERNAL MEDICINE
Payer: MEDICARE

## 2020-06-10 ENCOUNTER — VIRTUAL VISIT (OUTPATIENT)
Dept: RHEUMATOLOGY | Facility: CLINIC | Age: 70
End: 2020-06-10
Attending: NURSE PRACTITIONER
Payer: MEDICARE

## 2020-06-10 ENCOUNTER — NURSE TRIAGE (OUTPATIENT)
Dept: NURSING | Facility: CLINIC | Age: 70
End: 2020-06-10

## 2020-06-10 DIAGNOSIS — Z79.899 HIGH RISK MEDICATIONS (NOT ANTICOAGULANTS) LONG-TERM USE: ICD-10-CM

## 2020-06-10 DIAGNOSIS — M06.09 RHEUMATOID ARTHRITIS OF MULTIPLE SITES WITH NEGATIVE RHEUMATOID FACTOR (H): Primary | ICD-10-CM

## 2020-06-10 DIAGNOSIS — J84.9 ILD (INTERSTITIAL LUNG DISEASE) (H): Primary | ICD-10-CM

## 2020-06-10 NOTE — LETTER
Dear Lucio Daly,     Regular eye exams are required while taking hydroxychloroquine (Plaquenil). Eye exams should be completed by an eye specialist who is experienced in monitoring for hydroxychloroquine toxicity (a rare effect of the drug that can damage your eyes and vision).  These may be yearly or as determined by your eye specialist.     Although vision problems and loss of sight while taking hydroxychloroquine are very rare, notify your doctor immediately if you notice changes in your vision. The goal of screening is to detect toxicity before your vision is significantly or noticeably impacted. Failing to get proper screening exams puts you at risk of vision changes which may or may not be reversible.    Per the American Academy of Ophthalmology recommendations (2016), screening tests performed may include a 10-2 visual field test, spectral-domain optical coherence tomography (SD OCT), or other screening tests as determined by the eye specialist, including a multifocal electroretinogram (mfERG) or fundus auto-fluorescence (FAF).    We received a refill request from your pharmacy. A copy of your current eye exam was not found in your medical record. Your hydroxychloroquine prescription has been refilled with a limited supply pending confirmation you have had an eye exam to test for hydroxychloroquine toxicity.      We encourage you to bring this letter to your eye exam to discuss the exams that will be performed during your visit. Please request your eye clinic fax or mail a copy of your eye exam report to our clinic indicating that testing was completed for hydroxychloroquine toxicity screening. The exam notes must specifically comment on the potential for hydroxychloroquine toxicity and outline recommended follow-up.    If you have questions about hydroxychloroquine or the information in this letter, please call the clinic at [phone] or talk to your provider at your next office  visit.                        1    Eye Specialist Letter  Dear Eye Specialist,  To ensure safe use of Plaquenil (hydroxychloroquine), we are requesting your assistance in providing the following information. Please return this form to our clinic via mail or fax, or incorporate this information into your visit summary. Your note must indicate whether or not there is evidence of toxicity from Plaquenil use. For questions regarding this form, please call [phone].  Patient Name:   :             Date of Exam:    The following exams were completed during this visit in accordance with the American Academy of Ophthalmology recommendations (2016):  ? 10-2 automated visual field  ? Spectral-domain optical coherence tomography (SD OCT)  ? Multifocal electroretinogram (mfERG)  ? Fundus autofluorescence (FAF)  ? Other (please specify)  Please select from the following:  ? The findings from the above exams are not suggestive of toxicity from Plaquenil (hydroxychloroquine).  ? The findings from the above exams may suggest toxicity from Plaquenil (hydroxychloroquine).  Directly contact the clinic and fax this form.  Please provide additional guidance on whether or not the medication may be continued from your perspective:  Date of next recommended Plaquenil (hydroxychloroquine) screening eye exam:   ? 5 years  ? 1 year  ? 6 months  Other (please specify):   Eye Specialist Name (print):                                                                Date:  Eye Specialist Signature:

## 2020-06-10 NOTE — PROGRESS NOTES
"Lucio Daly is a 69 year old male who is being evaluated via a billable video visit.      The patient has been notified of following:     \"This video visit will be conducted via a call between you and your physician/provider. We have found that certain health care needs can be provided without the need for an in-person physical exam.  This service lets us provide the care you need with a video conversation.  If a prescription is necessary we can send it directly to your pharmacy.  If lab work is needed we can place an order for that and you can then stop by our lab to have the test done at a later time.    Video visits are billed at different rates depending on your insurance coverage.  Please reach out to your insurance provider with any questions.    If during the course of the call the physician/provider feels a video visit is not appropriate, you will not be charged for this service.\"    Patient has given verbal consent for Video visit? Yes    How would you like to obtain your AVS? [facility]    Patient would like the video invitation sent by: Text to cell phone: [phone]    Will anyone else be joining your video visit? No        Video-Visit Details    Type of service:  Video Visit    Video Start Time: 8:57 AM  Video End Time: 915 AM    Originating Location (pt. Location): Home    Distant Location (provider location):  [facility] RHEUMATOLOGY     Platform used for Video Visit: MEGAN Beebe CNP        "

## 2020-06-10 NOTE — TELEPHONE ENCOUNTER
Patient is calling with questions regarding a refill for the Omeprazole (Prilosec). It was ordered on 12/30/19 with 180 capsules and 3 refills. There should be some refills still available at the pharmacy. He will call the pharmacy to check on getting a refill.     Additional Information    Negative: Drug overdose and triager unable to answer question    Negative: Caller requesting information unrelated to medicine    Negative: Caller requesting a prescription for Strep throat and has a positive culture result    Negative: Rash while taking a medication or within 3 days of stopping it    Negative: Immunization reaction suspected    Negative: Asthma and having symptoms of asthma (cough, wheezing, etc.)    Negative: Breastfeeding questions about mother's medicines and diet    Negative: MORE THAN A DOUBLE DOSE of a prescription or over-the-counter (OTC) drug    Negative: DOUBLE DOSE (an extra dose or lesser amount) of over-the-counter (OTC) drug and any symptoms (e.g., dizziness, nausea, pain, sleepiness)    Negative: DOUBLE DOSE (an extra dose or lesser amount) of prescription drug and any symptoms (e.g., dizziness, nausea, pain, sleepiness)    Negative: Took another person's prescription drug    Negative: DOUBLE DOSE (an extra dose or lesser amount) of prescription drug and NO symptoms (Exception: a double dose of antibiotics)    Negative: Diabetes drug error or overdose (e.g., took wrong type of insulin or took extra dose)    Negative: Caller has medication question about med not prescribed by PCP and triager unable to answer question (e.g., compatibility with other med, storage)    Negative: Request for URGENT new prescription or refill of 'essential' medication (i.e., likelihood of harm to patient if not taken) and triager unable to fill per department policy    Negative: Prescription not at pharmacy and was prescribed today by PCP    Negative: Pharmacy calling with prescription questions and triager unable to  "answer question    Negative: Caller has urgent medication question about med that PCP prescribed and triager unable to answer question    Negative: Caller has NON-URGENT medication question about med that PCP prescribed and triager unable to answer question    Negative: Caller requesting a NON-URGENT new prescription or refill and triager unable to refill per department policy    Caller requesting a refill, no triage required, and triager able to refill per department policy    Answer Assessment - Initial Assessment Questions  1. SYMPTOMS: \"Do you have any symptoms?\"      no  2. SEVERITY: If symptoms are present, ask \"Are they mild, moderate or severe?\"      na    Protocols used: MEDICATION QUESTION CALL-A-[place]    Flower Khan RN on 6/10/2020 at 11:47 AM    "

## 2020-06-10 NOTE — PROGRESS NOTES
"Lucio Daly is a 69 year old male who is being evaluated via a billable video visit.      The patient has been notified of following:     \"This video visit will be conducted via a call between you and your physician/provider. We have found that certain health care needs can be provided without the need for an in-person physical exam.  This service lets us provide the care you need with a video conversation.  If a prescription is necessary we can send it directly to your pharmacy.  If lab work is needed we can place an order for that and you can then stop by our lab to have the test done at a later time.    Video visits are billed at different rates depending on your insurance coverage.  Please reach out to your insurance provider with any questions.    If during the course of the call the physician/provider feels a video visit is not appropriate, you will not be charged for this service.\"    Patient has given verbal consent for Video visit? Yes    How would you like to obtain your AVS? [facility]    Patient would like the video invitation sent by: Text to cell phone: [phone]    Will anyone else be joining your video visit? No         Reason for Visit  Lucio Daly is a 69 year old year old male who is being seen for follow-up of ILD.  HPI    Mr. 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    Since then he reports having fever for several days in December.  He was evaluated for different infections with a negative work-up.  The fever improved.  He reports that he is now back to his baseline.  He is trying to wal up to three quarters of a mile on most days of the week.  He feels well during the walks but occasionally reports shortness of breath.  He also reports occasional worsening of tremor and black spots.  He has annual eye " exams which was deferred because of the call with pandemic.  A neurology appointmentrecently was also canceled.      Current Outpatient Medications   Medication     acetaminophen (TYLENOL) 500 MG tablet     albuterol (PROAIR HFA/PROVENTIL HFA/VENTOLIN HFA) 108 (90 Base) MCG/ACT inhaler     cholecalciferol (HM VITAMIN D3) 25 MCG (1000 UT) TABS     folic acid (FOLVITE) 1 MG tablet     hydroxychloroquine (PLAQUENIL) 200 MG tablet     methotrexate 2.5 MG tablet     metoprolol succinate ER (TOPROL-XL) 50 MG 24 hr tablet     omeprazole (PRILOSEC) 40 MG DR capsule     ORDER FOR DME     oxybutynin (DITROPAN) 5 MG tablet     pregabalin (LYRICA) 50 MG capsule     salmeterol (SEREVENT DISKUS) 50 MCG/DOSE inhaler     salmeterol (SEREVENT) 50 MCG/DOSE inhaler     No current facility-administered medications for this visit.      Allergies   Allergen Reactions     Restasis      Burning eyes, problems with breathing, tightness in chest     Adhesive Tape      Bandages misc     Allegra      EXCESSIVE URINATION AND WEAKNESS, LIGHT-HEADED     Allergy      Dust     Animal Dander      Benadryl Allergy      EXCESSIVE URINATION AND WEAKNESS, LIGHT-HEADED     Cephalexin      Joint pain or gerd aggravation. Bloating excessive urination      Cephalosporins      Cyclosporine      Diphenhydramine Other (See Comments)     Doxycycline      Doxycycline Hyclate Nausea     SWEATING,MIGRAINES,LOSS OF APPETITE,SWEATING,LIGHT HEADED, EXCESSIVE URINATION     Fexofenadine Other (See Comments)     Flonase [Fluticasone Propionate]      Gabapentin      Neurontin: mosd changes and excess urination     Hydrocortisone      Iodine      Iodine Solution [Povidone Iodine]      SKIN MELTS     Levaquin      From surgeon--? Joint pain ? Gerd aggravation. Insomnia, excess urination     Levofloxacin      Mylanta      EXCESSIVE URINATION AND WEAKNESS, LIGHT-HEADED     Oxcarbazepine      Prednisone      Weakness, elevated bp, headache, eye pain, congestion       Prednisone      Seafood [Seafood]      Shellfish Allergy      Hives       Simethicone      Sulfamethoxazole W/Trimethoprim      Sulfamethoxazole-Trimethoprim      Chest pain, angina     Symbicort GI Disturbance and Nausea     Trees      Trileptal      SEVERE JOINT AND TENDON PAIN, INSOMNIA, RESTLESSNESS, NAUSEA, EXCESS URINATION     Cortizone Rash     EXCESS URINATION,WEAKNESS,NAUSEA, HEADACHE     Past Medical History:   Diagnosis Date     Abnormal EMG 4/18/2013     Abnormal involuntary movements(781.0)     Movement Disorder     AK (actinic keratosis) 12/18/2011     Allergic rhinitis, cause unspecified     Allergic rhinitis     Balance problems 11/1/2011     Basal cell carcinoma      Bladder spasms 11/1/2011     Chronic osteoarthritis      Diaphragmatic hernia without mention of obstruction or gangrene      Earache or other ear, nose, or throat complaint      Esophageal reflux      Fatigue 11/1/2011     Fracture      H. pylori infection 5/12/2011     History of MRI of cervical spine 11/18/2013    EXAMINATION: CERVICAL SPINE G/E 5 VIEWS* 4/19/2013 4:18 PM  CLINICAL HISTORY: Pain in limb,Performing Location?->[facility]),  COMPARISON:  FINDINGS: AP and lateral views in flexion and extension, as well as odontoid view of the cervical spine was obtained. There is no comparison available. The vertebral bodies of the cervical spine are normally aligned. There is posterior spurring and d     Incomplete defecation 11/1/2011     Interstitial lung disease (H) 11/29/2016     Laboratory test 8/7/2012     Lung disease June 2015     Malignant basal cell neoplasm of skin 8/6/2008     Melanoma (H) 8/6/2008     Melanoma in situ of lower leg (H)     R calf     Neuropathy 5/16/2011     Other bladder disorder      Other color vision deficiencies      Other nervous system complications      Parkinsonism (H) 11/1/2011     Personal history of colonic polyps      Polyneuropathy in other diseases classified elsewhere (H)      RA  (rheumatoid arthritis) (H)      Rheumatoid arthritis of multiple sites with negative rheumatoid factor (H) 3/21/2016     Seronegative arthritis 2013     Shortness of breath      Shoulder arthritis 2016    acromioclavicular joint      Somatization disorder 2011     Squamous cell carcinoma      Tremor 2011     Unspecified essential hypertension      Unspecified hypothyroidism      Urinary tract infection      Urinary urgency 2011     Wears glasses 2011       Past Surgical History:   Procedure Laterality Date     BIOPSY OF SKIN LESION       COLONOSCOPY       HEMORRHOID SURGERY       lip biopsy      for sicca complex     MOHS MICROGRAPHIC PROCEDURE       SOFT TISSUE SURGERY      removeal of basel cell carcinoma       Social History     Socioeconomic History     Marital status:      Spouse name: Not on file     Number of children: Not on file     Years of education: Not on file     Highest education level: Not on file   Occupational History     Not on file   Social Needs     Financial resource strain: Not on file     Food insecurity     Worry: Not on file     Inability: Not on file     Transportation needs     Medical: Not on file     Non-medical: Not on file   Tobacco Use     Smoking status: Former Smoker     Packs/day: 1.50     Years: 37.00     Pack years: 55.50     Types: Cigarettes     Start date: 6/15/1963     Last attempt to quit: 2000     Years since quittin.7     Smokeless tobacco: Never Used   Substance and Sexual Activity     Alcohol use: No     Alcohol/week: 0.0 standard drinks     Drug use: No     Sexual activity: Never     Partners: Female     Birth control/protection: None   Lifestyle     Physical activity     Days per week: Not on file     Minutes per session: Not on file     Stress: Not on file   Relationships     Social connections     Talks on phone: Not on file     Gets together: Not on file     Attends Yazidism service: Not on file     Active member of club  or organization: Not on file     Attends meetings of clubs or organizations: Not on file     Relationship status: Not on file     Intimate partner violence     Fear of current or ex partner: Not on file     Emotionally abused: Not on file     Physically abused: Not on file     Forced sexual activity: Not on file   Other Topics Concern     Parent/sibling w/ CABG, MI or angioplasty before 65F 55M? No   Social History Narrative    2013: Living in [place] in a townhouse with no steps    Has 3 sons that are doing okay.         Dairy/d 1 servings/d.     Caffeine 0 servings/d    Exercise 0 x week    Sunscreen used - No    Seatbelts used - Yes    Working smoke/CO detectors in the home - Yes    Guns stored in the home - Yes    Self Breast Exams - NA    Self Testicular Exam - Yes    Eye Exam up to date - Yes 2008    Dental Exam up to date - Yes 2006    Pap Smear up to date - NA    Mammogram up to date - NA    PSA up to date - Yes 2008    Dexa Scan up to date - No    Flex Sig / Colonoscopy up to date - Yes less than 5 yrs ago    Immunizations up to date -today    Abuse: Current or Past(Physical, Sexual or Emotional)- No    Do you feel safe in your environment - Yes    2008                   ROS Pulmonary  A complete ROS was otherwise negative except as noted in the HPI.  There were no vitals taken for this visit.  Exam:   Limited exam because of COVID PHP.  The visit was initiated as a video visit but the video call disconnected.  Completed the visit over the phone.  Mr. Barbosa did not look in any distress and the breathing looked comfortable.  Results:  Recent lab studies included creatinine 1.09 and normal LFTs.  CBC was also in the normal range.  These labs were done on 5/26/2020.    Assessment and plan: Mr. 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  He is currently on methotrexate and hydroxychloroquine with  stable symptoms.  1.  ILD: No significant change in symptoms.  Will need PFTs which can be obtained in 3 to 4 months at the next visit.  2.  Diaphragmatic muscle weakness unclear etiology on NIPPV  3.  Eye symptoms of unclear significance.  He will reach out to his ophthalmologist to see if follow-up sooner than currently planned needs to be done.  4.  Acute symptoms in December with unclear etiology.  Will check COVID antibody.    Follow 3-4 months with PFTs      Video-Visit Details    Type of service:  Video Visit    Video Start Time: 10:39 AM  Video End Time: 10:57 AM    Originating Location (pt. Location): Home    Distant Location (provider location):  [facility]     Platform used for Video Visit: Beth Mccarthy MD

## 2020-06-10 NOTE — PROGRESS NOTES
"Rheumatology Visit     Lucio Daly MRN# [id number]   YOB: 1950 Age: 67 year old            Assessment and Plan:   1. Seronegative inflammatory arthritis RA: controlled no active inflammatory arthritis, stable.  I dont feel more immunosuppressive therapy is indicated.  Liver, blood counts and kidney tests normal 5-2020   Plan  # Continue methotrexate at 20 mg weekly, folic acid 1 mg day. While on drug  --High risk medication monitoring--labs q 3 mo AST/ALT, Albumin, CBC with plts, CRP   --Limit EtOH intake to 2 drinks weekly; use folate 2 mg daily  --Tylenol 325mg qid prn nausea/HA associated with dosing.    # hydroxychloroquine (plaquenil) 200 mg QD -annual eye exam done April 2019 [facility]. Taper to reduce risk of retinal toxicity as on since 2010 approx. No vision issues   2. 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 Mccarthy today for follow-up    3. Osteoarthritis knees, ankle and hands/thumbs. Continue isometric quad strengthening exercises twice daily to improve support around the joint. Left ankle osteoarthritis and overall osteoarthritis  is contributing to the overall pain. Continue splinting/hand therapy and acetaminophen 1000 mg tid ATC.         History of Present Illness:   Lucio Daly \"Rich\" presents for f/u seronegative rheumatoid arthritis, sicca complex.   Copy forward June 26, 2017  Dr. Goodrich seen him in Dec 2016   Back pain, seen PCP. MRI of his back to f/u spinal stenosis and herniated disc will be having this tomorrow then maybe seeing neurosurgeon (needed updated MRI) if this is indicated, did PT but stopped as was making this worse. Previous ortho was going to refer him to neurosurgeon but he didn't want surgery at this time. Last months, more increased numbness in feet and sciatica right thigh/calf \"uncomfortable\", more pain in mid and upper back then some in low back if stands too long, then gets " "this in rib cage. Had problems with \"numbness\" for some time, pain in arms. EMG testing in past peripheral polyneuropathy. Stumbling more, but had trouble with this in the past, and previous foot droop. More leg pain for \"quite a few months\".  Lay in bed or sit with feet up more in leg pain, back. Nueropathy is the bottom of his feet.   Tennis elbows, thumbs, hands pains are the same stable this comes and goes.   Continues the methotrexate 17.5 mg PO Q 7 days Tue or WED and the plaqeunil (he felt the stomach issues were due to other medications) this is tolerating well Didn't stop the plaquenil \"didnt want to rock the boat\" . Reports is RA is controlled, no flaring and is controlled with these medications so wishes to continue this plan.   Pulmonary Dr. Mccarthy had referred him to gastro for GERD in Oct. Seen him 5-2017 --ILD \"follicular bronchiolitis\" and pulmonary nodule stable (more SOB sit to stand then walking). His prilosec was increased. Checking neuromuscular \"thing\" when he sees him in Nov. Methotrexate was at 9 tab 22.5 mg a week tapered to 17.5 mg once a week --his RA remains controlled.   On ABX completed this Saturday for bronchitis, early pnuemonia and then infection in both his eyes. Started with laryngitis. ABX helped. No fever, chills, discolored sputum. Fatigue.    June 20, 2018  Seen Dr. Mccarthy 5-2018 pulmonary, f/u Aug. \"Stable\" \"didnt do as well on the walking\" \"more physical with legs\" will repeat testing in august. EKG similar to low pulse and \"similar to arrthymias\" will be talking to Dr. Corley about this and ASCVD.   Seen NATALEE Carrillo PAC-C low back pain and neck pain advised seeing neurosurgery didn't seen the neurosurgeon not seen does not wish to do surgery and in the past advised no surgery \"not a good candidate\" Dr. Mccarthy. Nerve pain into calves into feet, this leg pain due to his back/neck pain. Seen Dr. Villatoro in sports medicine  and then seen Dr. Campuzano for hands. PT for " "back. Dr Montes recommended pain management, but he didn't want narcotics.  Prior 2014 had injections of low back \"neuromas\" in the spine, injection not lasted and reaction \"neurologic bladder\" and same as when cortisone injections. Did PT in the past. Dr. Montes recommended possible CBD oils so will take to PCP. Nerve, tendon pain and joint pain. \"if this for a while right thigh severe pain and cant stand on leg\" \"left drop foot\" told was d/t spine. On lyrica   Taking methotrexate 20 mg PO Q 7 days WED (mild stomach 1-2 day, not every time or every dose), folic acid 1 mg day and the plaqeunil 200 mg BID (6 days week, then WED once day). Tolerating. Dr. Goodrich increased this d/t justus swelling, this is improved. Some swelling on and off with walking. Stiff in back, in the mornings hands stiff \"fingers will curl\" this is unchanged[place] [employer] boat with this hand. Reports x-rays were not fracture     Seen gastro who increased omprezole 40 mg BID, this has helped. Pain affected by change in seasons. Higher MTX dose is helping since increased in . Feet, ankles, toes and fingers pain lingers. \"stepped out of pain left heel and left ankle\" [facility] sp[facility] left ankle, this was about 3 weeks ago. Has AFO brace, and feet/ankle brace from Dr. Martinez now wearing, got a gel heel insert insert this is helping still wearing. Has GERD and esophagus so hard to tell so will see PCP for complete CVD workup. Nuc 6-2016 NL. Sees dermatology, no skin issues. Has golf cart to get around. No tick bites      Goes to [facility], no notes in EHR. Last visit in March 2018 did given him the letter. Other [facility] reviewed and updated.     Elina 10, 2020  Healthy. No infections as he did winter 2019 with influenza B and pneumonia. Doing quarantine and social distancing, hand washing. Healthiest he has been in along time. Denies any fever, chills, SOB, JUARES, night sweats, or " "chest pain, high fever, cough, travel in last 14 days or exposure to covid-19 (coronavirus). Reports healthy. Lost 5-7 lb,. Walks 3/4 mile a day harder to walk longer distance due to peripheral neuropathy and foot drop    Rheumatoid arthritis (RA) stable, no flares. Migratory arthralgias as before. Chronic sciatica pain and back \"Stenosis', down right leg and some in pelvic pain, hard to sleep and lay.  Seen spine specialist this year, no surgery not a good candidate due to risks. Left great toe swells at times. Notices DIP osteoarthritis more then rheumatoid arthritis (RA).       Methotrexate  20 mg Q Tues mild GI and diarrhea that day then ok, folic acid  1 mg day, hydroxychloroquine (plaquenil) 200 mg BID except TUES 200 mg, tolerating. Will notice if he misses methotrexate by a day. Does not take NSAIDs as brother in law passed from kidney and liver issues felt contributed by celebrex     PMH   1. Sensory neuropathy of unclear etiology - negative work up, managed on Lyrica.  Has chronic dysesthesia in pads of feet.  2. Parkinsonisim/Tremor disorder; sees neurology   3. Headaches   4. History of basal cell, squamous (most recent +bx 8-2014)  5. History of melanoma   6. GALO on CPAP.  7. HTN.   8. Seronegative RA, diagnosed 2009. SICCA  9. JUARES. Work-up 4-2015. 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 Lumbar stenosis per MRI 2017   11. Bronchitis 5-2017, early pneumonia   12. Chicken pox   13. DEXA  Normal   14. Left ankle DJD, MRI  --seen Dr. Martinez 2-2019   Past Medical History:   Diagnosis Date     Abnormal EMG 4/18/2013     Abnormal involuntary movements(781.0)     Movement Disorder     AK (actinic keratosis) 12/18/2011     Allergic rhinitis, cause unspecified     Allergic rhinitis     Balance problems 11/1/2011     Basal cell carcinoma      Bladder spasms 11/1/2011     Chronic osteoarthritis      Diaphragmatic hernia without mention of obstruction or gangrene      Earache or other ear, " "nose, or throat complaint      Esophageal reflux      Fatigue 11/1/2011     Fracture      H. pylori infection 5/12/2011     History of MRI of cervical spine 11/18/2013    EXAMINATION: CERVICAL SPINE G/E 5 VIEWS* 4/19/2013 4:18 PM  CLINICAL HISTORY: Pain in limb,Performing Location?->[facility] ([facility]),  COMPARISON:  FINDINGS: AP and lateral views in flexion and extension, as well as odontoid view of the cervical spine was obtained. There is no comparison available. The vertebral bodies of the cervical spine are normally aligned. There is posterior spurring and d     Incomplete defecation 11/1/2011     Interstitial lung disease (H) 11/29/2016     Laboratory test 8/7/2012     Lung disease June 2015     Malignant basal cell neoplasm of skin 8/6/2008     Melanoma (H) 8/6/2008     Melanoma in situ of lower leg (H)     R calf     Neuropathy 5/16/2011     Other bladder disorder      Other color vision deficiencies      Other nervous system complications      Parkinsonism (H) 11/1/2011     Personal history of colonic polyps      Polyneuropathy in other diseases classified elsewhere (H)      RA (rheumatoid arthritis) (H)      Rheumatoid arthritis of multiple sites with negative rheumatoid factor (H) 3/21/2016     Seronegative arthritis 11/18/2013     Shortness of breath      Shoulder arthritis 2016    acromioclavicular joint      Somatization disorder 5/12/2011     Squamous cell carcinoma      Tremor 11/1/2011     Unspecified essential hypertension      Unspecified hypothyroidism      Urinary tract infection      Urinary urgency 11/1/2011     Wears glasses 11/1/2011     Injuries-ankle sprains, left heel fracture    Family Hx   MGM Psoriasis  Sister -PPM  Family History   Problem Relation Age of Onset     Hypertension Mother      Heart Disease Mother         a fib     Arthritis Mother         \"osteo\"     Osteoporosis Mother      Skin Cancer Mother      Uterine Cancer Mother      Hypertension Brother      Diabetes Brother  " "       \" post pancreatitis\"     Neurologic Disorder Sister         multiple sclerosis     Hypertension Sister      Heart Disease Sister      Multiple Sclerosis Sister      Heart Disease Sister         a fib     Arthritis Sister      Heart Failure Sister      Kidney Disease Sister      Dementia Sister      Hypertension Father      Cerebrovascular Disease Father         ,     Skin Cancer Father      Colon Polyps Father      Other - See Comments Son         [place]     Other - See Comments Abdon wagner     Other - See Comments Abdon gonzalez     Psoriasis Maternal Grandfather      Stomach Cancer Maternal Grandfather      Congenital Anomalies Other         granddaughter with a chromosome defect     Cerebrovascular Disease Paternal Grandmother         ,     Cerebrovascular Disease Paternal Grandfather         ,     Cancer Granddaughter         Langerhans Cell Histiocytosis     Genetic Disease Granddaughter         gene translocation     Melanoma No family hx of      Colon Cancer No family hx of      Personal Hx   Home environment: No secondhand tobacco smoke in home.    History     Social History     Marital Status:      Spouse Name: N/A     Number of Children: N/A     Years of Education: N/A     Social History Main Topics     Smoking status: Former Smoker     Quit date: 09/30/2003     Smokeless tobacco: Never Used     Alcohol Use: No     Drug Use: No     Sexually Active: Not Currently -- Female partner(s)     Other Topics Concern     None     Social History Narrative    2013: Living in [place] in a townhouse with no stepsHas 3 sons that are doing okay.             Review of Systems:     14 points all negative except what is mentioned in HPI.  Review Of Systems  Skin: negative  Eyes: negative  Ears/Nose/Throat: negative  Respiratory: No shortness of breath, dyspnea on exertion, cough, or hemoptysis  Cardiovascular: negative  Gastrointestinal: negative  Genitourinary: " negative  Musculoskeletal: as above  Neurologic: as above  Psychiatric: negative  Hematologic/Lymphatic/Immunologic: negative  Endocrine: negative             Past Surgical History:   Past Surgical History:   Procedure Laterality Date     BIOPSY OF SKIN LESION       COLONOSCOPY       HEMORRHOID SURGERY       lip biopsy      for sicca complex     MOHS MICROGRAPHIC PROCEDURE       SOFT TISSUE SURGERY      removeal of basel cell carcinoma     Assessment via video:    Constitutional: [id number] cooperative  Eyes: nl EOM, PERRLA, vision, conjunctiva, sclera  ENT: nl external ears, nose, hearing, lips, teeth, gums, throat  Neck: no mass or thyroid enlargement  RESP: No cough, no audible wheezing, able to talk in full sentences  Skin: no nail pitting, alopecia, rash  Neuro: nl cranial nerves, strength, sensation  Psych: nl judgement, orientation, memory, affect.  PSYCH: Alert and oriented times 3; coherent speech, normal rate and volume, able to articulate logical thoughts, able   to abstract reason, no tangential thoughts, no hallucinations or delusions  His affect is normal and pleasant  MSK: Left ankle midfoot arthritis changes and base of thumbs. Osteoarthritis changes in hands. All other TMJ, neck, shoulder, elbow, wrist, hand, knee, ankle, and foot joints found normal. No deformity or nodule. Full ROM.Normal prayer sign. Negative Lhermitte's sign. No periuncle erythema  Remainder of exam unable to be completed due to video visits    Due to efforts to reduce the spread of COVID-19 in the clinic, state, nation, telephone or video visits are encouraged currently. Patient understands that diagnose and advice is limited by the inability to exam him/her/them face-to-face.  We also agree that the benefits of continued immunosuppression outweigh the risks of COVID-19 infection. We also agree that the benefits of continued immunosuppression outweigh the risks of COVID-19 infection. 6. Discussed corovid-19 prevention, CDC  guidelines/resources including hand washing, social distance and what to do for exposure signs or symptoms and where to go, to follow CDC/MDH guidelines, and if any signs or symptoms of infection to stop immunosuppression and seek immediate medical att. That prednisone can increase change of serious infections so should be avoided. COVID-19 Precautions. Discussed the importance of good hand washing techniques with soap and water for at least 20 seconds, avoid touching eyes, nose, mouth, avoiding crowds, social distancing           Data:     Component      Latest Ref Rng & Units 5/26/2020   WBC      4.0 - 11.0 10e9/L 4.3   RBC Count      4.4 - 5.9 10e12/L 4.55   Hemoglobin      13.3 - 17.7 g/dL 13.5   Hematocrit      40.0 - 53.0 % 43.1   MCV      78 - 100 fl 95   MCH      26.5 - 33.0 pg 29.7   MCHC      31.5 - 36.5 g/dL 31.3 (L)   RDW      10.0 - 15.0 % 15.0   Platelet Count      150 - 450 10e9/L 165   Creatinine      0.66 - 1.25 mg/dL 1.09   GFR Estimate      >60 mL/min/1.73:m2 68   GFR Estimate If Black      >60 mL/min/1.73:m2 78   Albumin      3.4 - 5.0 g/dL 3.7   ALT      0 - 70 U/L 29   AST      0 - 45 U/L 22   CRP Inflammation      0.0 - 8.0 mg/L 7.7           Thank-you for allowing me to participate in your care.     Sin Snow APRN, CNP, MSN  [facility]  Department of Rheumatology & Autoimmune Disorders  [facility]: [phone] Opt 2, then Opt 1 Scheduling   [facility]: [phone]; [phone] Option 7 scheduling

## 2020-06-10 NOTE — LETTER
"    6/10/2020         RE: Lucio Daly  [address]  [place] [address]        Dear Colleague,    Thank you for referring your patient, Lucio Daly, to the [facility]. Please see a copy of my visit note below.    Lucio Daly is a 69 year old male who is being evaluated via a billable video visit.      The patient has been notified of following:     \"This video visit will be conducted via a call between you and your physician/provider. We have found that certain health care needs can be provided without the need for an in-person physical exam.  This service lets us provide the care you need with a video conversation.  If a prescription is necessary we can send it directly to your pharmacy.  If lab work is needed we can place an order for that and you can then stop by our lab to have the test done at a later time.    Video visits are billed at different rates depending on your insurance coverage.  Please reach out to your insurance provider with any questions.    If during the course of the call the physician/provider feels a video visit is not appropriate, you will not be charged for this service.\"    Patient has given verbal consent for Video visit? Yes    How would you like to obtain your AVS? [facility]    Patient would like the video invitation sent by: Text to cell phone: [phone]    Will anyone else be joining your video visit? No         Reason for Visit  Lucio Daly is a 69 year old year old male who is being seen for follow-up of ILD.  HPI    Mr. 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    Since then he reports having fever for several days in December.  He was evaluated for different infections with a negative work-up.  The fever improved.  He reports that he is now back to " his baseline.  He is trying to wal up to three quarters of a mile on most days of the week.  He feels well during the walks but occasionally reports shortness of breath.  He also reports occasional worsening of tremor and black spots.  He has annual eye exams which was deferred because of the call with pandemic.  A neurology appointmentrecently was also canceled.      Current Outpatient Medications   Medication     acetaminophen (TYLENOL) 500 MG tablet     albuterol (PROAIR HFA/PROVENTIL HFA/VENTOLIN HFA) 108 (90 Base) MCG/ACT inhaler     cholecalciferol (HM VITAMIN D3) 25 MCG (1000 UT) TABS     folic acid (FOLVITE) 1 MG tablet     hydroxychloroquine (PLAQUENIL) 200 MG tablet     methotrexate 2.5 MG tablet     metoprolol succinate ER (TOPROL-XL) 50 MG 24 hr tablet     omeprazole (PRILOSEC) 40 MG DR capsule     ORDER FOR DME     oxybutynin (DITROPAN) 5 MG tablet     pregabalin (LYRICA) 50 MG capsule     salmeterol (SEREVENT DISKUS) 50 MCG/DOSE inhaler     salmeterol (SEREVENT) 50 MCG/DOSE inhaler     No current facility-administered medications for this visit.      Allergies   Allergen Reactions     Restasis      Burning eyes, problems with breathing, tightness in chest     Adhesive Tape      Bandages misc     Allegra      EXCESSIVE URINATION AND WEAKNESS, LIGHT-HEADED     Allergy      Dust     Animal Dander      Benadryl Allergy      EXCESSIVE URINATION AND WEAKNESS, LIGHT-HEADED     Cephalexin      Joint pain or gerd aggravation. Bloating excessive urination      Cephalosporins      Cyclosporine      Diphenhydramine Other (See Comments)     Doxycycline      Doxycycline Hyclate Nausea     SWEATING,MIGRAINES,LOSS OF APPETITE,SWEATING,LIGHT HEADED, EXCESSIVE URINATION     Fexofenadine Other (See Comments)     Flonase [Fluticasone Propionate]      Gabapentin      Neurontin: mosd changes and excess urination     Hydrocortisone      Iodine      Iodine Solution [Povidone Iodine]      SKIN MELTS     Levaquin      From  surgeon--? Joint pain ? Gerd aggravation. Insomnia, excess urination     Levofloxacin      Mylanta      EXCESSIVE URINATION AND WEAKNESS, LIGHT-HEADED     Oxcarbazepine      Prednisone      Weakness, elevated bp, headache, eye pain, congestion      Prednisone      Seafood [Seafood]      Shellfish Allergy      Hives       Simethicone      Sulfamethoxazole W/Trimethoprim      Sulfamethoxazole-Trimethoprim      Chest pain, angina     Symbicort GI Disturbance and Nausea     Trees      Trileptal      SEVERE JOINT AND TENDON PAIN, INSOMNIA, RESTLESSNESS, NAUSEA, EXCESS URINATION     Cortizone Rash     EXCESS URINATION,WEAKNESS,NAUSEA, HEADACHE     Past Medical History:   Diagnosis Date     Abnormal EMG 4/18/2013     Abnormal involuntary movements(781.0)     Movement Disorder     AK (actinic keratosis) 12/18/2011     Allergic rhinitis, cause unspecified     Allergic rhinitis     Balance problems 11/1/2011     Basal cell carcinoma      Bladder spasms 11/1/2011     Chronic osteoarthritis      Diaphragmatic hernia without mention of obstruction or gangrene      Earache or other ear, nose, or throat complaint      Esophageal reflux      Fatigue 11/1/2011     Fracture      H. pylori infection 5/12/2011     History of MRI of cervical spine 11/18/2013    EXAMINATION: CERVICAL SPINE G/E 5 VIEWS* 4/19/2013 4:18 PM  CLINICAL HISTORY: Pain in limb,Performing Location?->[facility]  COMPARISON:  FINDINGS: AP and lateral views in flexion and extension, as well as odontoid view of the cervical spine was obtained. There is no comparison available. The vertebral bodies of the cervical spine are normally aligned. There is posterior spurring and d     Incomplete defecation 11/1/2011     Interstitial lung disease (H) 11/29/2016     Laboratory test 8/7/2012     Lung disease June 2015     Malignant basal cell neoplasm of skin 8/6/2008     Melanoma (H) 8/6/2008     Melanoma in situ of lower leg (H)     R calf     Neuropathy 5/16/2011      Other bladder disorder      Other color vision deficiencies      Other nervous system complications      Parkinsonism (H) 2011     Personal history of colonic polyps      Polyneuropathy in other diseases classified elsewhere (H)      RA (rheumatoid arthritis) (H)      Rheumatoid arthritis of multiple sites with negative rheumatoid factor (H) 3/21/2016     Seronegative arthritis 2013     Shortness of breath      Shoulder arthritis     acromioclavicular joint      Somatization disorder 2011     Squamous cell carcinoma      Tremor 2011     Unspecified essential hypertension      Unspecified hypothyroidism      Urinary tract infection      Urinary urgency 2011     Wears glasses 2011       Past Surgical History:   Procedure Laterality Date     BIOPSY OF SKIN LESION       COLONOSCOPY       HEMORRHOID SURGERY       lip biopsy      for sicca complex     MOHS MICROGRAPHIC PROCEDURE       SOFT TISSUE SURGERY      removeal of basel cell carcinoma       Social History     Socioeconomic History     Marital status:      Spouse name: Not on file     Number of children: Not on file     Years of education: Not on file     Highest education level: Not on file   Occupational History     Not on file   Social Needs     Financial resource strain: Not on file     Food insecurity     Worry: Not on file     Inability: Not on file     Transportation needs     Medical: Not on file     Non-medical: Not on file   Tobacco Use     Smoking status: Former Smoker     Packs/day: 1.50     Years: 37.00     Pack years: 55.50     Types: Cigarettes     Start date: 6/15/1963     Last attempt to quit: 2000     Years since quittin.7     Smokeless tobacco: Never Used   Substance and Sexual Activity     Alcohol use: No     Alcohol/week: 0.0 standard drinks     Drug use: No     Sexual activity: Never     Partners: Female     Birth control/protection: None   Lifestyle     Physical activity     Days per  week: Not on file     Minutes per session: Not on file     Stress: Not on file   Relationships     Social connections     Talks on phone: Not on file     Gets together: Not on file     Attends Anabaptist service: Not on file     Active member of club or organization: Not on file     Attends meetings of clubs or organizations: Not on file     Relationship status: Not on file     Intimate partner violence     Fear of current or ex partner: Not on file     Emotionally abused: Not on file     Physically abused: Not on file     Forced sexual activity: Not on file   Other Topics Concern     Parent/sibling w/ CABG, MI or angioplasty before 65F 55M? No   Social History Narrative    2013: Living in [place] in a townhouse with no steps    Has 3 sons that are doing okay.         Dairy/d 1 servings/d.     Caffeine 0 servings/d    Exercise 0 x week    Sunscreen used - No    Seatbelts used - Yes    Working smoke/CO detectors in the home - Yes    Guns stored in the home - Yes    Self Breast Exams - NA    Self Testicular Exam - Yes    Eye Exam up to date - Yes 2008    Dental Exam up to date - Yes 2006    Pap Smear up to date - NA    Mammogram up to date - NA    PSA up to date - Yes 2008    Dexa Scan up to date - No    Flex Sig / Colonoscopy up to date - Yes less than 5 yrs ago    Immunizations up to date -today    Abuse: Current or Past(Physical, Sexual or Emotional)- No    Do you feel safe in your environment - Yes    2008                   ROS Pulmonary  A complete ROS was otherwise negative except as noted in the HPI.  There were no vitals taken for this visit.  Exam:   Limited exam because of COVID PHP.  The visit was initiated as a video visit but the video call disconnected.  Completed the visit over the phone.  Mr. Barbosa did not look in any distress and the breathing looked comfortable.  Results:  Recent lab studies included creatinine 1.09 and normal LFTs.  CBC was also in the normal range.  These labs were done on  5/26/2020.    Assessment and plan: Mr. 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  He is currently on methotrexate and hydroxychloroquine with stable symptoms.  1.  ILD: No significant change in symptoms.  Will need PFTs which can be obtained in 3 to 4 months at the next visit.  2.  Diaphragmatic muscle weakness unclear etiology on NIPPV  3.  Eye symptoms of unclear significance.  He will reach out to his ophthalmologist to see if follow-up sooner than currently planned needs to be done.  4.  Acute symptoms in December with unclear etiology.  Will check COVID antibody.    Follow 3-4 months with PFTs      Video-Visit Details    Type of service:  Video Visit    Video Start Time: 10:39 AM  Video End Time: 10:57 AM    Originating Location (pt. Location): Home    Distant Location (provider location):  [facility]     Platform used for Video Visit: Beth Mccarthy MD

## 2020-06-10 NOTE — LETTER
"6/10/2020       RE: Lucio Daly  [address] [place] [address]     Dear Colleague,    Thank you for referring your patient, Lucio Daly, to the [facility] RHEUMATOLOGY at [facility]. Please see a copy of my visit note below.    Lucio Daly is a 69 year old male who is being evaluated via a billable video visit.      The patient has been notified of following:     \"This video visit will be conducted via a call between you and your physician/provider. We have found that certain health care needs can be provided without the need for an in-person physical exam.  This service lets us provide the care you need with a video conversation.  If a prescription is necessary we can send it directly to your pharmacy.  If lab work is needed we can place an order for that and you can then stop by our lab to have the test done at a later time.    Video visits are billed at different rates depending on your insurance coverage.  Please reach out to your insurance provider with any questions.    If during the course of the call the physician/provider feels a video visit is not appropriate, you will not be charged for this service.\"    Patient has given verbal consent for Video visit? Yes    How would you like to obtain your AVS? MyChart    Patient would like the video invitation sent by: Text to cell phone: [phone]    Will anyone else be joining your video visit? No        Video-Visit Details    Type of service:  Video Visit    Video Start Time: 8:57 AM  Video End Time: 915 AM    Originating Location (pt. Location): Home    Distant Location (provider location):  [facility] RHEUMATOLOGY     Platform used for Video Visit: MEGAN Beebe CNP          Rheumatology Visit     Lucio Daly MRN# [id number]   YOB: 1950 Age: 67 year old            Assessment and Plan:   1. Seronegative inflammatory arthritis RA: controlled no active inflammatory arthritis, " "stable.  I dont feel more immunosuppressive therapy is indicated.  Liver, blood counts and kidney tests normal 5-2020   Plan  # Continue methotrexate at 20 mg weekly, folic acid 1 mg day. While on drug  --High risk medication monitoring--labs q 3 mo AST/ALT, Albumin, CBC with plts, CRP   --Limit EtOH intake to 2 drinks weekly; use folate 2 mg daily  --Tylenol 325mg qid prn nausea/HA associated with dosing.    # hydroxychloroquine (plaquenil) 200 mg QD -annual eye exam done April 2019 [facility] due. Taper to reduce risk of retinal toxicity as on since 2010 approx. No vision issues   2. 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 Mccarthy today for follow-up    3. Osteoarthritis knees, ankle and hands/thumbs. Continue isometric quad strengthening exercises twice daily to improve support around the joint. Left ankle osteoarthritis and overall osteoarthritis  is contributing to the overall pain. Continue splinting/hand therapy and acetaminophen 1000 mg tid ATC.         History of Present Illness:   Lucio Daly \"Rich\" presents for f/u seronegative rheumatoid arthritis, sicca complex.   Copy forward June 26, 2017  Dr. Goodrich seen him in Dec 2016   Back pain, seen PCP. MRI of his back to f/u spinal stenosis and herniated disc will be having this tomorrow then maybe seeing neurosurgeon (needed updated MRI) if this is indicated, did PT but stopped as was making this worse. Previous ortho was going to refer him to neurosurgeon but he didn't want surgery at this time. Last months, more increased numbness in feet and sciatica right thigh/calf \"uncomfortable\", more pain in mid and upper back then some in low back if stands too long, then gets this in rib cage. Had problems with \"numbness\" for some time, pain in arms. EMG testing in past peripheral polyneuropathy. Stumbling more, but had trouble with this in the past, and previous foot droop. More leg pain for " "\"quite a few months\".  Lay in bed or sit with feet up more in leg pain, back. Nueropathy is the bottom of his feet.   Tennis elbows, thumbs, hands pains are the same stable this comes and goes.   Continues the methotrexate 17.5 mg PO Q 7 days Tue or WED and the plaqeunil (he felt the stomach issues were due to other medications) this is tolerating well Didn't stop the plaquenil \"didnt want to rock the boat\" . Reports is RA is controlled, no flaring and is controlled with these medications so wishes to continue this plan.   Pulmonary Dr. Mccarthy had referred him to gastro for GERD in Oct. Seen him 5-2017 --ILD \"follicular bronchiolitis\" and pulmonary nodule stable (more SOB sit to stand then walking). His prilosec was increased. Checking neuromuscular \"thing\" when he sees him in Nov. Methotrexate was at 9 tab 22.5 mg a week tapered to 17.5 mg once a week --his RA remains controlled.   On ABX completed this Saturday for bronchitis, early pnuemonia and then infection in both his eyes. Started with laryngitis. ABX helped. No fever, chills, discolored sputum. Fatigue.    June 20, 2018  Seen Dr. Mccarthy 5-2018 pulmonary, f/u Aug. \"Stable\" \"didnt do as well on the walking\" \"more physical with legs\" will repeat testing in august. EKG similar to low pulse and \"similar to arrthymias\" will be talking to Dr. Corley about this and ASCVD.   Seen NATALEE Carrillo PAC-C low back pain and neck pain advised seeing neurosurgery didn't seen the neurosurgeon not seen does not wish to do surgery and in the past advised no surgery \"not a good candidate\" Dr. Mccarthy. Nerve pain into calves into feet, this leg pain due to his back/neck pain. Seen Dr. Villatoro in sports medicine  and then seen Dr. Campuzano for hands. PT for back. Dr Montes recommended pain management, but he didn't want narcotics.  Prior 2014 had injections of low back \"neuromas\" in the spine, injection not lasted and reaction \"neurologic bladder\" and same as when cortisone " "injections. Did PT in the past. Dr. Montes recommended possible CBD oils so will take to PCP. Nerve, tendon pain and joint pain. \"if this for a while right thigh severe pain and cant stand on leg\" \"left drop foot\" told was d/t spine. On lyrica   Taking methotrexate 20 mg PO Q 7 days WED (mild stomach 1-2 day, not every time or every dose), folic acid 1 mg day and the plaqeunil 200 mg BID (6 days week, then WED once day). Tolerating. Dr. Goodrich increased this d/t justus swelling, this is improved. Some swelling on and off with walking. Stiff in back, in the mornings hands stiff \"fingers will curl\" this is unchanged[place] [employer] [place] with this hand. Reports x-rays were not fracture     Seen gastro who increased omprezole 40 mg BID, this has helped. Pain affected by change in seasons. Higher MTX dose is helping since increased in . Feet, ankles, toes and fingers pain lingers. \"stepped out of pain left heel and left ankle\" went to [facility] strain achilled sprain left ankle, this was about 3 weeks ago. Has AFO brace, and feet/ankle brace from Dr. Martinez now wearing, got a gel heel insert insert this is helping still wearing. Has GERD and esophagus so hard to tell so will see PCP for complete CVD workup. Nuc 6-2016 NL. Sees dermatology, no skin issues. Has golf cart to get around. No tick bites      Goes to [facility], no notes in EHR. Last visit in March 2018 did given him the letter. Other [facility] reviewed and updated.     Elina 10, 2020  Healthy. No infections as he did winter 2019 with influenza B and pneumonia. Doing quarantine and social distancing, hand washing. Healthiest he has been in along time. Denies any fever, chills, SOB, JUARES, night sweats, or chest pain, high fever, cough, travel in last 14 days or exposure to covid-19 (coronavirus). Reports healthy. Lost 5-7 lb,. Walks 3/4 mile a day harder to walk longer distance due to peripheral neuropathy and foot " "drop    Rheumatoid arthritis (RA) stable, no flares. Migratory arthralgias as before. Chronic sciatica pain and back \"Stenosis', down right leg and some in pelvic pain, hard to sleep and lay.  Seen spine specialist this year, no surgery not a good candidate due to risks. Left great toe swells at times. Notices DIP osteoarthritis more then rheumatoid arthritis (RA).       Methotrexate  20 mg Q Tues mild GI and diarrhea that day then ok, folic acid  1 mg day, hydroxychloroquine (plaquenil) 200 mg BID except TUES 200 mg, tolerating. Will notice if he misses methotrexate by a day. Does not take NSAIDs as brother in law passed from kidney and liver issues felt contributed by celebrex     PMH   1. Sensory neuropathy of unclear etiology - negative work up, managed on Lyrica.  Has chronic dysesthesia in pads of feet.  2. Parkinsonisim/Tremor disorder; sees neurology   3. Headaches   4. History of basal cell, squamous (most recent +bx 8-2014)  5. History of melanoma   6. GALO on CPAP.  7. HTN.   8. Seronegative RA, diagnosed 2009. SICCA  9. JUARES. Work-up 4-2015. 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 Lumbar stenosis per MRI 2017   11. Bronchitis 5-2017, early pneumonia   12. Chicken pox   13. DEXA  Normal   14. Left ankle DJD, MRI  --seen Dr. Martinez 2-2019   Past Medical History:   Diagnosis Date     Abnormal EMG 4/18/2013     Abnormal involuntary movements(781.0)     Movement Disorder     AK (actinic keratosis) 12/18/2011     Allergic rhinitis, cause unspecified     Allergic rhinitis     Balance problems 11/1/2011     Basal cell carcinoma      Bladder spasms 11/1/2011     Chronic osteoarthritis      Diaphragmatic hernia without mention of obstruction or gangrene      Earache or other ear, nose, or throat complaint      Esophageal reflux      Fatigue 11/1/2011     Fracture      H. pylori infection 5/12/2011     History of MRI of cervical spine 11/18/2013    EXAMINATION: CERVICAL SPINE G/E 5 VIEWS* " "4/19/2013 4:18 PM  CLINICAL HISTORY: Pain in limb,Performing Location?->[facility] ([facility]),  COMPARISON:  FINDINGS: AP and lateral views in flexion and extension, as well as odontoid view of the cervical spine was obtained. There is no comparison available. The vertebral bodies of the cervical spine are normally aligned. There is posterior spurring and d     Incomplete defecation 11/1/2011     Interstitial lung disease (H) 11/29/2016     Laboratory test 8/7/2012     Lung disease June 2015     Malignant basal cell neoplasm of skin 8/6/2008     Melanoma (H) 8/6/2008     Melanoma in situ of lower leg (H)     R calf     Neuropathy 5/16/2011     Other bladder disorder      Other color vision deficiencies      Other nervous system complications      Parkinsonism (H) 11/1/2011     Personal history of colonic polyps      Polyneuropathy in other diseases classified elsewhere (H)      RA (rheumatoid arthritis) (H)      Rheumatoid arthritis of multiple sites with negative rheumatoid factor (H) 3/21/2016     Seronegative arthritis 11/18/2013     Shortness of breath      Shoulder arthritis 2016    acromioclavicular joint      Somatization disorder 5/12/2011     Squamous cell carcinoma      Tremor 11/1/2011     Unspecified essential hypertension      Unspecified hypothyroidism      Urinary tract infection      Urinary urgency 11/1/2011     Wears glasses 11/1/2011     Injuries-ankle sprains, left heel fracture    Family Hx   MGM Psoriasis  Sister -PPM  Family History   Problem Relation Age of Onset     Hypertension Mother      Heart Disease Mother         a fib     Arthritis Mother         \"osteo\"     Osteoporosis Mother      Skin Cancer Mother      Uterine Cancer Mother      Hypertension Brother      Diabetes Brother         \" post pancreatitis\"     Neurologic Disorder Sister         multiple sclerosis     Hypertension Sister      Heart Disease Sister      Multiple Sclerosis Sister      Heart Disease Sister         a fib     " Arthritis Sister      Heart Failure Sister      Kidney Disease Sister      Dementia Sister      Hypertension Father      Cerebrovascular Disease Father         ,     Skin Cancer Father      Colon Polyps Father      Other - See Comments Son         [place]     Other - See Comments Son         [place]     Other - See Comments Son         day gonzalez     Psoriasis Maternal Grandfather      Stomach Cancer Maternal Grandfather      Congenital Anomalies Other         granddaughter with a chromosome defect     Cerebrovascular Disease Paternal Grandmother         ,     Cerebrovascular Disease Paternal Grandfather         ,     Cancer Granddaughter         Langerhans Cell Histiocytosis     Genetic Disease Granddaughter         gene translocation     Melanoma No family hx of      Colon Cancer No family hx of      Personal Hx   Home environment: No secondhand tobacco smoke in home.    History     Social History     Marital Status:      Spouse Name: N/A     Number of Children: N/A     Years of Education: N/A     Social History Main Topics     Smoking status: Former Smoker     Quit date: 09/30/2003     Smokeless tobacco: Never Used     Alcohol Use: No     Drug Use: No     Sexually Active: Not Currently -- Female partner(s)     Other Topics Concern     None     Social History Narrative    2013: Living in [place] a [place] with no stepsHas 3 sons that are doing okay.             Review of Systems:     14 points all negative except what is mentioned in HPI.  Review Of Systems  Skin: negative  Eyes: negative  Ears/Nose/Throat: negative  Respiratory: No shortness of breath, dyspnea on exertion, cough, or hemoptysis  Cardiovascular: negative  Gastrointestinal: negative  Genitourinary: negative  Musculoskeletal: as above  Neurologic: as above  Psychiatric: negative  Hematologic/Lymphatic/Immunologic: negative  Endocrine: negative             Past Surgical History:   Past Surgical History:   Procedure  Laterality Date     BIOPSY OF SKIN LESION       COLONOSCOPY       HEMORRHOID SURGERY       lip biopsy      for sicca complex     MOHS MICROGRAPHIC PROCEDURE       SOFT TISSUE SURGERY      removeal of basel cell carcinoma     Assessment via video:    Constitutional: WD-WN-WG cooperative  Eyes: nl EOM, PERRLA, vision, conjunctiva, sclera  ENT: nl external ears, nose, hearing, lips, teeth, gums, throat  Neck: no mass or thyroid enlargement  RESP: No cough, no audible wheezing, able to talk in full sentences  Skin: no nail pitting, alopecia, rash  Neuro: nl cranial nerves, strength, sensation  Psych: nl judgement, orientation, memory, affect.  PSYCH: Alert and oriented times 3; coherent speech, normal rate and volume, able to articulate logical thoughts, able   to abstract reason, no tangential thoughts, no hallucinations or delusions  His affect is normal and pleasant  MSK: Left ankle midfoot arthritis changes and base of thumbs. Osteoarthritis changes in hands. All other TMJ, neck, shoulder, elbow, wrist, hand, knee, ankle, and foot joints found normal. No deformity or nodule. Full ROM.Normal prayer sign. Negative Lhermitte's sign. No periuncle erythema  Remainder of exam unable to be completed due to video visits    Due to efforts to reduce the spread of COVID-19 in the clinic, state, nation, telephone or video visits are encouraged currently. Patient understands that diagnose and advice is limited by the inability to exam him/her/them face-to-face.  We also agree that the benefits of continued immunosuppression outweigh the risks of COVID-19 infection. We also agree that the benefits of continued immunosuppression outweigh the risks of COVID-19 infection. 6. Discussed corovid-19 prevention, CDC guidelines/resources including hand washing, social distance and what to do for exposure signs or symptoms and where to go, to follow CDC/MDH guidelines, and if any signs or symptoms of infection to stop immunosuppression and  seek immediate medical att. That prednisone can increase change of serious infections so should be avoided. COVID-19 Precautions. Discussed the importance of good hand washing techniques with soap and water for at least 20 seconds, avoid touching eyes, nose, mouth, avoiding crowds, social distancing           Data:     Component      Latest Ref Rng & Units 5/26/2020   WBC      4.0 - 11.0 10e9/L 4.3   RBC Count      4.4 - 5.9 10e12/L 4.55   Hemoglobin      13.3 - 17.7 g/dL 13.5   Hematocrit      40.0 - 53.0 % 43.1   MCV      78 - 100 fl 95   MCH      26.5 - 33.0 pg 29.7   MCHC      31.5 - 36.5 g/dL 31.3 (L)   RDW      10.0 - 15.0 % 15.0   Platelet Count      150 - 450 10e9/L 165   Creatinine      0.66 - 1.25 mg/dL 1.09   GFR Estimate      >60 mL/min/1.73:m2 68   GFR Estimate If Black      >60 mL/min/1.73:m2 78   Albumin      3.4 - 5.0 g/dL 3.7   ALT      0 - 70 U/L 29   AST      0 - 45 U/L 22   CRP Inflammation      0.0 - 8.0 mg/L 7.7           Thank-you for allowing me to participate in your care.     Sin GUZMAN, CNP, MSN  [facility]  Department of Rheumatology & Autoimmune Disorders  [facility]: [phone] Opt 2, then Opt 1 Scheduling   [facility] [place]: [phone]; [phone] Option 7 scheduling

## 2020-06-17 DIAGNOSIS — Z79.899 HIGH RISK MEDICATIONS (NOT ANTICOAGULANTS) LONG-TERM USE: ICD-10-CM

## 2020-06-17 DIAGNOSIS — M06.09 RHEUMATOID ARTHRITIS OF MULTIPLE SITES WITH NEGATIVE RHEUMATOID FACTOR (H): ICD-10-CM

## 2020-06-17 DIAGNOSIS — J84.9 ILD (INTERSTITIAL LUNG DISEASE) (H): ICD-10-CM

## 2020-06-17 PROCEDURE — 86769 SARS-COV-2 COVID-19 ANTIBODY: CPT | Performed by: INTERNAL MEDICINE

## 2020-06-17 PROCEDURE — 36415 COLL VENOUS BLD VENIPUNCTURE: CPT | Performed by: INTERNAL MEDICINE

## 2020-06-17 NOTE — LETTER
June 18, 2020        Lucio Daly  [address]  [place] [address]      COVID-19 Antibody, IgG   Date Value Ref Range Status   06/17/2020 Negative NEG^Negative Final     Comment:     Negative results do not rule out SARS-CoV-2 infection, particularly in those   who have been in contact with the virus.  Follow-up testing with a molecular   diagnostic should be considered to rule out infection in these individuals.  Results from antibody testing should not be used as the sole basis to diagnose   or exclude SARS-CoV-2 infection or to inform infection status.           You have tested NEGATIVE for COVID-19 antibodies. This suggests you have not had or been exposed to COVID-19. But it does not mean that for sure.     The test finds antibodies in most people 10 days after they get sick. For some people, it takes longer than 10 days for antibodies to show up. Others may never show antibodies against COVID-19, especially if they have weak immune systems.    If you have COVID-19 symptoms now, please stay home and away from others.     What is antibody testing?    This is a kind of blood test. We take a small sample of your blood, and then test it for something called  antibodies.      Your body makes antibodies to fight infection. If your blood has antibodies for a certain germ, it means you ve been infected with that germ in the past.     Sometimes, antibodies stay in your body for years after you ve had the infection. They can be there even if the germ didn t make you sick. They are a sign that your body fought off the infection.    Will this test find antibodies in everyone who s had COVID-19?    No. The test finds antibodies in most people 10 days after they get sick. For some people, it takes longer than 10 days for antibodies to show up. Others may never show antibodies against COVID-19, especially if they have weak immune systems.    What does it mean if the test finds COVID-19 antibodies?    If we find these  antibodies, it suggests:     This person has had the virus.     Their body s immune system fought the virus.     We don t know if this will help protect someone from getting COVID-19 again. Scientists are still learning about this.    What are the signs of COVID-19?    Signs of COVID-19 can appear from 2 to 14 days (up to 2 weeks) after you re infected. Some people have no symptoms or only mild symptoms. Others get very sick. The most common symptoms are:          Cough    Shortness of breath or trouble breathing  Or at least 2 of these symptoms:    Fever    Chills    Repeated shaking with chills    Muscle pain    Headache    Sore throat    Losing your sense of taste or smell    You may have other symptoms. Please contact your doctor or clinic for any symptoms that worry you.    Where can I get more information?     To learn the [facility] guidelines for staying home, please visit the [place] Department of Health website at https://www.health.[facility].mn.us/diseases/coronavirus/basics.html    To learn more about COVID-19 and how to care for yourself at home, please visit the CDC website at https://www.cdc.gov/coronavirus/2019-ncov/about/steps-when-sick.html    For more options for care at [facility], please visit our website at [email or web address]    Critical access hospital ([facility]) COVID-19 Hotline:  [phone]

## 2020-06-18 LAB
COVID-19 ANTIBODY IGG: NEGATIVE
LAB TEST METHOD: NORMAL

## 2020-06-30 DIAGNOSIS — N39.498 OTHER URINARY INCONTINENCE: ICD-10-CM

## 2020-06-30 RX ORDER — OXYBUTYNIN CHLORIDE 5 MG/1
TABLET ORAL
Qty: 90 TABLET | Refills: 0 | Status: SHIPPED | OUTPATIENT
Start: 2020-06-30 | End: 2020-09-22

## 2020-06-30 NOTE — TELEPHONE ENCOUNTER
Prescription approved per [facility] Refill Protocol.  Ariadna Arreola RN

## 2020-07-13 ENCOUNTER — TRANSFERRED RECORDS (OUTPATIENT)
Dept: HEALTH INFORMATION MANAGEMENT | Facility: CLINIC | Age: 70
End: 2020-07-13

## 2020-07-18 DIAGNOSIS — G62.9 PERIPHERAL POLYNEUROPATHY: ICD-10-CM

## 2020-07-20 RX ORDER — PREGABALIN 50 MG/1
CAPSULE ORAL
Qty: 360 CAPSULE | Refills: 0 | Status: SHIPPED | OUTPATIENT
Start: 2020-07-20 | End: 2020-09-08

## 2020-07-20 NOTE — TELEPHONE ENCOUNTER
Routing refill request to provider for review/approval because:  --Drug not on the [facility] refill protocol - pregabalin (LYRICA) 50 MG capsule.    --Last order sent:  pregabalin (LYRICA) 50 MG capsule  360 capsule  0  4/16/2020   No    Sig: TAKE 1 TAB BY MOUTH AT 7-7:30 AM, 1 TAB AT 4 PM, AND 1-2 TABS AT 11 PM

## 2020-07-24 ENCOUNTER — TELEPHONE (OUTPATIENT)
Dept: RHEUMATOLOGY | Facility: CLINIC | Age: 70
End: 2020-07-24

## 2020-07-24 DIAGNOSIS — M06.019 RHEUMATOID ARTHRITIS INVOLVING SHOULDER WITH NEGATIVE RHEUMATOID FACTOR, UNSPECIFIED LATERALITY (H): ICD-10-CM

## 2020-07-24 NOTE — TELEPHONE ENCOUNTER
Plaquenil eye exam results received from [facility] which showed no signs of plaquenil toxicity. Records have been scanned to chart.     Refill for plaquenil pended for provider review and signature if appropriate.     Last seen: 6/10/2020  Next Appt:  Last filled:  QTY:

## 2020-07-26 RX ORDER — HYDROXYCHLOROQUINE SULFATE 200 MG/1
TABLET, FILM COATED ORAL
Qty: 180 TABLET | Refills: 3 | Status: SHIPPED | OUTPATIENT
Start: 2020-07-26 | End: 2020-12-16

## 2020-09-03 DIAGNOSIS — M06.09 RHEUMATOID ARTHRITIS OF MULTIPLE SITES WITH NEGATIVE RHEUMATOID FACTOR (H): ICD-10-CM

## 2020-09-03 DIAGNOSIS — Z79.899 HIGH RISK MEDICATIONS (NOT ANTICOAGULANTS) LONG-TERM USE: ICD-10-CM

## 2020-09-03 LAB
ALBUMIN SERPL-MCNC: 3.6 G/DL (ref 3.4–5)
ALT SERPL W P-5'-P-CCNC: 22 U/L (ref 0–70)
AST SERPL W P-5'-P-CCNC: 14 U/L (ref 0–45)
CREAT SERPL-MCNC: 1.16 MG/DL (ref 0.66–1.25)
CRP SERPL-MCNC: 7.9 MG/L (ref 0–8)
ERYTHROCYTE [DISTWIDTH] IN BLOOD BY AUTOMATED COUNT: 14.7 % (ref 10–15)
GFR SERPL CREATININE-BSD FRML MDRD: 63 ML/MIN/{1.73_M2}
HCT VFR BLD AUTO: 42.8 % (ref 40–53)
HGB BLD-MCNC: 13.5 G/DL (ref 13.3–17.7)
MCH RBC QN AUTO: 30.1 PG (ref 26.5–33)
MCHC RBC AUTO-ENTMCNC: 31.5 G/DL (ref 31.5–36.5)
MCV RBC AUTO: 95 FL (ref 78–100)
PLATELET # BLD AUTO: 184 10E9/L (ref 150–450)
RBC # BLD AUTO: 4.49 10E12/L (ref 4.4–5.9)
WBC # BLD AUTO: 4.8 10E9/L (ref 4–11)

## 2020-09-03 NOTE — PROGRESS NOTES
VIDEO VISIT - am well not working. Telephone visit    Date of Visit: September 8, 2020  Name: Lucio Daly  Date of Birth 1950  [place] [address]  [phone] (H)  [phone] (M)  [email or web address]  [phone]     Has mychart  No proxy     Assessment:  (R25.1) Tremor  (primary encounter diagnosis)    Leg swelling   Variable use of leg braces  Tendons are tight in his legs    Left ulnar Neuropathy    Neuropathy    BiPAP  Still using bipap  Seeing [facility] sleep medicine    RA issues.   On plaquenil and methotrexate.  Still taking folate, vitamin d  Had eye examination.  Seeing MONROE Snow and ZACK Goodrich    intersititial lung disease seeing [facility]  Diaphragmatic muscle weakness unclear etiology on NIPPV     PCP = Saroj Tinoco    5-6 times per week - walking.      Medications     7/730am 9am 2pm 4pm 11pm       Acetaminophen tylenol 500mg 2     2 2       Albuterol proair hfa/proventil hfa/ventolin hfa 108 (90 base) mcg/act inhaler As needed         Cholecalciferol vitamin D 1000 1 capsule               Folic acid folvite 1mg   1   1         Hydroxychloroquine plaquenil 200mg   1   1         Methotrexate 2.5mg     8 tabs on Mondays @ 2pm           Metoprolol toprolol XL 50mg 24 hr tablet 1.5               Omeprazole prilosec 40mg 1     1 (if needed          Oxybutynin ditropan 5mg       1         Pregabalin lyrica 50mg 1     1 1-2       Proair hfa 108 (90 base) mcg/act inhaler  As needed                Salmeterol serevent disku 50mcg/dose inhaler Twice daily         Triamcinolone kenalog 0.1% external cream Not using                                                      Plan:    Exertional related visual changes - and associated with weakness and shakiness and has to rest to recover   Consider visit with cardiology or/and neurology or/and PCP to discuss further. He had an ECGO in 2018 and had Lexiscan in 2016 and had no signs of an aortic scan that did not  "reveal an aneurysm.    Consider another stress test and/or carotid study - would allow patient to talk with pcp and if needed can see cardiology or general neurology.     Continue on lyrica  - medications refilled    Return in 1 year    Consider visit with PMR and PT for his tight muscle and tendons.     I have reviewed the note as documented above.  This accurately captures the substance of my conversation with the patient.  Patient contact time  25  minutes. Over 50% of this visit was spent in patient care and care coordination.     Time of visit 940am - 1007am    Thong Montes MD      ------------------------------------------------------------------------------------------------------------------------------------------------------------------------    Video-Visit Details    The patient has been notified of following:     \"After a review of the patient s situation, this visit was changed from an in-person visit to a video visit to reduce the risk of COVID-19 exposure.   The patient is being evaluated via a billable video visit.\"    \"This video visit will be conducted via a call between you and your physician/provider. We have found that certain health care needs can be provided without the need for an in-person physical exam.  This service lets us provide the care you need with a video conversation.  If a prescription is necessary we can send it directly to your pharmacy.  If lab work is needed we can place an order for that and you can then stop by our lab to have the test done at a later time.    If during the course of the call the physician/provider feels a video visit is not appropriate, you will not be charged for this service.\"     Patient has given verbal consent for Video visit? Yes    Patient would like the video invitation sent by:     Type of service:  Video Visit    Video Start Time:     Video End Time (time video stopped):     Duration:  minutes - see above    Originating Location (pt. Location): "     Distant Location (provider location):  [facility] NEUROLOGY     Mode of Communication:  Video Conference via [employer] (and if not possible then doximity)      Thong Montes MD      --------------------------------------------------------------------------------------------------------------    Lucio Daly is a 70 year old male who is being evaluated via a billable video visit.      Charts reviewed  Consult from  Images reviewed        I have reviewed and updated the patient's Past Medical History, Social History, Family History and Medication List.    ALLERGIES  Restasis; Adhesive tape; Allegra; Allergy; Animal dander; Benadryl allergy; Cephalexin; Cephalosporins; Cyclosporine; Diphenhydramine; Doxycycline; Doxycycline hyclate; Fexofenadine; Flonase [fluticasone propionate]; Gabapentin; Hydrocortisone; Iodine; Iodine solution [povidone iodine]; Levaquin; Levofloxacin; Mylanta; Oxcarbazepine; Prednisone; Prednisone; Seafood [seafood]; Shellfish allergy; Simethicone; Sulfamethoxazole w/trimethoprim; Sulfamethoxazole-trimethoprim; Symbicort; Trees; Trileptal; and Cortizone    Lasts visit details if there was a last visit:         Medications                                                                                                                                                                                                              14 Review of systems  are negative except for   Patient Active Problem List   Diagnosis     Diaphragmatic hernia     Esophageal reflux     Hx of melanoma of skin     Malignant basal cell neoplasm of skin     GALO (obstructive sleep apnea)     Chronic maxillary sinusitis     Urinary incontinence     H. pylori infection     Sicca syndrome (H)     Health Care Home     Advanced directives, counseling/discussion     Tremor     Visual changes     Incomplete defecation     Gait disorder     Balance problems     Fatigue     High risk medications (not anticoagulants) long-term use      Personal history of other malignant neoplasm of skin     AK (actinic keratosis)     Pain in joint, lower leg     Abnormal EMG     Seronegative arthritis     History of MRI of cervical spine     Pain in limb     Adenomatous polyp of colon     Peripheral polyneuropathy     Rheumatoid arthritis of multiple sites with negative rheumatoid factor (H)     Morbid obesity (H)     Interstitial lung disease (H)     History of MRI of lumbar spine     Mood disorder (H)     Paralysis agitans (H)     Neuromuscular disease (H)     Bronchiolitis obliterans (H)     Hypertension     Rheumatoid arthritis (H)        Allergies   Allergen Reactions     Restasis      Burning eyes, problems with breathing, tightness in chest     Adhesive Tape      Bandages misc     Allegra      EXCESSIVE URINATION AND WEAKNESS, LIGHT-HEADED     Allergy      Dust     Animal Dander      Benadryl Allergy      EXCESSIVE URINATION AND WEAKNESS, LIGHT-HEADED     Cephalexin      Joint pain or gerd aggravation. Bloating excessive urination      Cephalosporins      Cyclosporine      Diphenhydramine Other (See Comments)     Doxycycline      Doxycycline Hyclate Nausea     SWEATING,MIGRAINES,LOSS OF APPETITE,SWEATING,LIGHT HEADED, EXCESSIVE URINATION     Fexofenadine Other (See Comments)     Flonase [Fluticasone Propionate]      Gabapentin      Neurontin: mosd changes and excess urination     Hydrocortisone      Iodine      Iodine Solution [Povidone Iodine]      SKIN MELTS     Levaquin      From surgeon--? Joint pain ? Gerd aggravation. Insomnia, excess urination     Levofloxacin      Mylanta      EXCESSIVE URINATION AND WEAKNESS, LIGHT-HEADED     Oxcarbazepine      Prednisone      Weakness, elevated bp, headache, eye pain, congestion      Prednisone      Seafood [Seafood]      Shellfish Allergy      Hives       Simethicone      Sulfamethoxazole W/Trimethoprim      Sulfamethoxazole-Trimethoprim      Chest pain, angina     Symbicort GI Disturbance and Nausea      Trees      Trileptal      SEVERE JOINT AND TENDON PAIN, INSOMNIA, RESTLESSNESS, NAUSEA, EXCESS URINATION     Cortizone Rash     EXCESS URINATION,WEAKNESS,NAUSEA, HEADACHE     Past Surgical History:   Procedure Laterality Date     BIOPSY OF SKIN LESION       COLONOSCOPY       HEMORRHOID SURGERY       lip biopsy      for sicca complex     MOHS MICROGRAPHIC PROCEDURE       SOFT TISSUE SURGERY      removeal of basel cell carcinoma     Past Medical History:   Diagnosis Date     Abnormal EMG 4/18/2013     Abnormal involuntary movements(781.0)     Movement Disorder     AK (actinic keratosis) 12/18/2011     Allergic rhinitis, cause unspecified     Allergic rhinitis     Balance problems 11/1/2011     Basal cell carcinoma      Bladder spasms 11/1/2011     Chronic osteoarthritis      Diaphragmatic hernia without mention of obstruction or gangrene      Earache or other ear, nose, or throat complaint      Esophageal reflux      Fatigue 11/1/2011     Fracture      H. pylori infection 5/12/2011     History of MRI of cervical spine 11/18/2013    EXAMINATION: CERVICAL SPINE G/E 5 VIEWS* 4/19/2013 4:18 PM  CLINICAL HISTORY: Pain in limb,Performing Location?->[facility] ([facility]),  COMPARISON:  FINDINGS: AP and lateral views in flexion and extension, as well as odontoid view of the cervical spine was obtained. There is no comparison available. The vertebral bodies of the cervical spine are normally aligned. There is posterior spurring and d     Incomplete defecation 11/1/2011     Interstitial lung disease (H) 11/29/2016     Laboratory test 8/7/2012     Lung disease June 2015     Malignant basal cell neoplasm of skin 8/6/2008     Melanoma (H) 8/6/2008     Melanoma in situ of lower leg (H)     R calf     Neuropathy 5/16/2011     Other bladder disorder      Other color vision deficiencies      Other nervous system complications      Parkinsonism (H) 11/1/2011     Personal history of colonic polyps      Polyneuropathy in other diseases  classified elsewhere (H)      RA (rheumatoid arthritis) (H)      Rheumatoid arthritis of multiple sites with negative rheumatoid factor (H) 3/21/2016     Seronegative arthritis 2013     Shortness of breath      Shoulder arthritis 2016    acromioclavicular joint      Somatization disorder 2011     Squamous cell carcinoma      Tremor 2011     Unspecified essential hypertension      Unspecified hypothyroidism      Urinary tract infection      Urinary urgency 2011     Wears glasses 2011     Social History     Socioeconomic History     Marital status:      Spouse name: Not on file     Number of children: Not on file     Years of education: Not on file     Highest education level: Not on file   Occupational History     Not on file   Social Needs     Financial resource strain: Not on file     Food insecurity     Worry: Not on file     Inability: Not on file     Transportation needs     Medical: Not on file     Non-medical: Not on file   Tobacco Use     Smoking status: Former Smoker     Packs/day: 1.50     Years: 37.00     Pack years: 55.50     Types: Cigarettes     Start date: 6/15/1963     Last attempt to quit: 2000     Years since quittin.9     Smokeless tobacco: Never Used   Substance and Sexual Activity     Alcohol use: No     Alcohol/week: 0.0 standard drinks     Drug use: No     Sexual activity: Never     Partners: Female     Birth control/protection: None   Lifestyle     Physical activity     Days per week: Not on file     Minutes per session: Not on file     Stress: Not on file   Relationships     Social connections     Talks on phone: Not on file     Gets together: Not on file     Attends Congregational service: Not on file     Active member of club or organization: Not on file     Attends meetings of clubs or organizations: Not on file     Relationship status: Not on file     Intimate partner violence     Fear of current or ex partner: Not on file     Emotionally abused: Not  "on file     Physically abused: Not on file     Forced sexual activity: Not on file   Other Topics Concern     Parent/sibling w/ CABG, MI or angioplasty before 65F 55M? No   Social History Narrative    2013: Living in [place] in a townhouse with no steps    Has 3 sons that are doing okay.         Dairy/d 1 servings/d.     Caffeine 0 servings/d    Exercise 0 x week    Sunscreen used - No    Seatbelts used - Yes    Working smoke/CO detectors in the home - Yes    Guns stored in the home - Yes    Self Breast Exams - NA    Self Testicular Exam - Yes    Eye Exam up to date - Yes 2008    Dental Exam up to date - Yes 2006    Pap Smear up to date - NA    Mammogram up to date - NA    PSA up to date - Yes 2008    Dexa Scan up to date - No    Flex Sig / Colonoscopy up to date - Yes less than 5 yrs ago    Immunizations up to date -today    Abuse: Current or Past(Physical, Sexual or Emotional)- No    Do you feel safe in your environment - Yes    2008                 Family History   Problem Relation Age of Onset     Hypertension Mother      Heart Disease Mother         a fib     Arthritis Mother         \"osteo\"     Osteoporosis Mother      Skin Cancer Mother      Uterine Cancer Mother      Hypertension Brother      Diabetes Brother         \" post pancreatitis\"     Neurologic Disorder Sister         multiple sclerosis     Hypertension Sister      Heart Disease Sister      Multiple Sclerosis Sister      Heart Disease Sister         a fib     Arthritis Sister      Heart Failure Sister      Kidney Disease Sister      Dementia Sister      Hypertension Father      Cerebrovascular Disease Father         ,     Skin Cancer Father      Colon Polyps Father      Other - See Comments Son         [place]     Other - See Comments Abdon wagner     Other - See Comments Abdon gonzalez     Psoriasis Maternal Grandfather      Stomach Cancer Maternal Grandfather      Congenital Anomalies Other         granddaughter with a " chromosome defect     Cerebrovascular Disease Paternal Grandmother         ,     Cerebrovascular Disease Paternal Grandfather         ,     Cancer Granddaughter         Langerhans Cell Histiocytosis     Genetic Disease Granddaughter         gene translocation     Melanoma No family hx of      Colon Cancer No family hx of      Current Outpatient Medications   Medication Sig Dispense Refill     acetaminophen (TYLENOL) 500 MG tablet 2 x 500mg by mouth 3 times per day: 7/730am, 4pm and 11pm  = 6/day       albuterol (PROAIR HFA/PROVENTIL HFA/VENTOLIN HFA) 108 (90 Base) MCG/ACT inhaler Inhale 2 puffs into the lungs every 4 hours as needed for shortness of breath / dyspnea or wheezing 1 Inhaler 3     cholecalciferol (HM VITAMIN D3) 25 MCG (1000 UT) TABS 1,000 Int'l Units       folic acid (FOLVITE) 1 MG tablet TAKE 1 TABLET (1MG) BY MOUTH TWICE DAILY AT 9AM AND AT 4PM 180 tablet 3     hydroxychloroquine (PLAQUENIL) 200 MG tablet 200mg tab by mouth twice daily @ 9am and 4pm 180 tablet 3     methotrexate 2.5 MG tablet 8 x 2.5mg tabs by mouth weekly = 20mg       metoprolol succinate ER (TOPROL-XL) 50 MG 24 hr tablet Take 1.5 tablets (75 mg) by mouth daily 135 tablet 11     omeprazole (PRILOSEC) 40 MG DR capsule TAKE 1 CAPSULE BY MOUTH TWICE DAILY AT 7-7:30 AM AND 4  capsule 3     ORDER FOR DME Use your CPAP device as directed by your provider.       oxybutynin (DITROPAN) 5 MG tablet TAKE 1 TABLET BY MOUTH EVERY DAY AT 4PM 90 tablet 0     pregabalin (LYRICA) 50 MG capsule TAKE 1 TAB BY MOUTH AT 7-7:30 AM, 1 TAB AT 4 PM, AND 1-2 TABS AT 11  capsule 0     salmeterol (SEREVENT DISKUS) 50 MCG/DOSE inhaler Inhale 1 puff into the lungs 2 times daily 3 Inhaler 3     salmeterol (SEREVENT) 50 MCG/DOSE inhaler Inhale 1 puff into the lungs 2 times daily (Patient not taking: Reported on 6/10/2020) 1 Inhaler 11

## 2020-09-08 ENCOUNTER — VIRTUAL VISIT (OUTPATIENT)
Dept: NEUROLOGY | Facility: CLINIC | Age: 70
End: 2020-09-08
Payer: MEDICARE

## 2020-09-08 DIAGNOSIS — M62.89 MUSCLE TIGHTNESS: ICD-10-CM

## 2020-09-08 DIAGNOSIS — R25.1 TREMOR: Primary | ICD-10-CM

## 2020-09-08 DIAGNOSIS — H53.9 VISUAL CHANGES: ICD-10-CM

## 2020-09-08 DIAGNOSIS — G62.9 PERIPHERAL POLYNEUROPATHY: ICD-10-CM

## 2020-09-08 RX ORDER — PREGABALIN 50 MG/1
CAPSULE ORAL
Qty: 360 CAPSULE | Refills: 3 | Status: SHIPPED | OUTPATIENT
Start: 2020-09-08 | End: 2020-10-25

## 2020-09-08 NOTE — PROGRESS NOTES
"Lucio Daly is a 70 year old male who is being evaluated via a billable video visit.      The patient has been notified of following:     \"This video visit will be conducted via a call between you and your physician/provider. We have found that certain health care needs can be provided without the need for an in-person physical exam.  This service lets us provide the care you need with a video conversation.  If a prescription is necessary we can send it directly to your pharmacy.  If lab work is needed we can place an order for that and you can then stop by our lab to have the test done at a later time.    Video visits are billed at different rates depending on your insurance coverage.  Please reach out to your insurance provider with any questions.    If during the course of the call the physician/provider feels a video visit is not appropriate, you will not be charged for this service.\"    Patient has given verbal consent for Video visit? YES  How would you like to obtain your AVS? MyChart  Will anyone else be joining your video visit? no        Video-Visit Details    Type of service:  Video Visit    Video Start Time:   Video End Time:     Originating Location (pt. Location): Home    Distant Location (provider location):  [facility] NEUROLOGY     Platform used for Video Visit: Beth Regan, EMT        "

## 2020-09-08 NOTE — PATIENT INSTRUCTIONS
Assessment:  (R25.1) Tremor  (primary encounter diagnosis)    Leg swelling   Variable use of leg braces  Tendons are tight in his legs    Left ulnar Neuropathy    Neuropathy    BiPAP  Still using bipap  Seeing Juan sleep medicine    RA issues.   On plaquenil and methotrexate.  Still taking folate, vitamin d  Had eye examination.  Seeing MONROE Snow and ZACK Goodrich    intersititial lung disease seeing [facility] pulmonary medicine  Diaphragmatic muscle weakness unclear etiology on NIPPV     PCP = Saroj Tinoco    5-6 times per week - walking.      Medications     7/730am 9am 2pm 4pm 11pm       Acetaminophen tylenol 500mg 2     2 2       Albuterol proair hfa/proventil hfa/ventolin hfa 108 (90 base) mcg/act inhaler As needed         Cholecalciferol vitamin D 1000 1 capsule               Folic acid folvite 1mg   1   1         Hydroxychloroquine plaquenil 200mg   1   1         Methotrexate 2.5mg     8 tabs on Mondays @ 2pm           Metoprolol toprolol XL 50mg 24 hr tablet 1.5               Omeprazole prilosec 40mg 1     1 (if needed          Oxybutynin ditropan 5mg       1         Pregabalin lyrica 50mg 1     1 1-2       Proair hfa 108 (90 base) mcg/act inhaler  As needed                Salmeterol serevent disku 50mcg/dose inhaler Twice daily         Triamcinolone kenalog 0.1% external cream Not using                                                      Plan:    Exertional related visual changes - and associated with weakness and shakiness and has to rest to recover   Consider visit with cardiology or/and neurology or/and PCP to discuss further. He had an ECGO in 2018 and had Lexiscan in 2016 and had no signs of an aortic scan that did not reveal an aneurysm.    Consider another stress test and/or carotid study - would allow patient to talk with pcp and if needed can see cardiology or general neurology.     Continue on lyrica  - medications refilled    Return in 1 year    Consider visit with PMR and PT for his tight  muscle and tendons.

## 2020-09-08 NOTE — LETTER
9/8/2020       RE: Lucio Daly  [address]  [place] [address]     Dear Colleague,    Thank you for referring your patient, Lucio Daly, to the [facility] NEUROLOGY at [facility]. Please see a copy of my visit note below.        VIDEO VISIT - am well not working. Telephone visit    Date of Visit: September 8, 2020  Name: Lucio Daly  Date of Birth 1950  [place] [address]  [phone] (H)  [phone] (M)  [email or web address]  [email or web address]  [phone]     Has mychart  No proxy     Assessment:  (R25.1) Tremor  (primary encounter diagnosis)    Leg swelling   Variable use of leg braces  Tendons are tight in his legs    Left ulnar Neuropathy    Neuropathy    BiPAP  Still using bipap  Seeing Juan sleep medicine    RA issues.   On plaquenil and methotrexate.  Still taking folate, vitamin d  Had eye examination.  Seeing MONROE Snow and ZACK Goodrich    intersititial lung disease seeing Fabio pulmonary medicine  Diaphragmatic muscle weakness unclear etiology on NIPPV     PCP = Saroj Tinoco    5-6 times per week - walking.      Medications     7/730am 9am 2pm 4pm 11pm       Acetaminophen tylenol 500mg 2     2 2       Albuterol proair hfa/proventil hfa/ventolin hfa 108 (90 base) mcg/act inhaler As needed         Cholecalciferol vitamin D 1000 1 capsule               Folic acid folvite 1mg   1   1         Hydroxychloroquine plaquenil 200mg   1   1         Methotrexate 2.5mg     8 tabs on Mondays @ 2pm           Metoprolol toprolol XL 50mg 24 hr tablet 1.5               Omeprazole prilosec 40mg 1     1 (if needed          Oxybutynin ditropan 5mg       1         Pregabalin lyrica 50mg 1     1 1-2       Proair hfa 108 (90 base) mcg/act inhaler  As needed                Salmeterol serevent disku 50mcg/dose inhaler Twice daily         Triamcinolone kenalog 0.1% external cream Not using                                                      Plan:    Exertional related  "visual changes - and associated with weakness and shakiness and has to rest to recover   Consider visit with cardiology or/and neurology or/and PCP to discuss further. He had an ECGO in 2018 and had Lexiscan in 2016 and had no signs of an aortic scan that did not reveal an aneurysm.    Consider another stress test and/or carotid study - would allow patient to talk with pcp and if needed can see cardiology or general neurology.     Continue on lyrica  - medications refilled    Return in 1 year    Consider visit with PMR and PT for his tight muscle and tendons.     I have reviewed the note as documented above.  This accurately captures the substance of my conversation with the patient.  Patient contact time  25  minutes. Over 50% of this visit was spent in patient care and care coordination.     Time of visit 940am - 1007am    Thong Montes MD      ------------------------------------------------------------------------------------------------------------------------------------------------------------------------    Video-Visit Details    The patient has been notified of following:     \"After a review of the patient s situation, this visit was changed from an in-person visit to a video visit to reduce the risk of COVID-19 exposure.   The patient is being evaluated via a billable video visit.\"    \"This video visit will be conducted via a call between you and your physician/provider. We have found that certain health care needs can be provided without the need for an in-person physical exam.  This service lets us provide the care you need with a video conversation.  If a prescription is necessary we can send it directly to your pharmacy.  If lab work is needed we can place an order for that and you can then stop by our lab to have the test done at a later time.    If during the course of the call the physician/provider feels a video visit is not appropriate, you will not be charged for this service.\"     Patient has given " verbal consent for Video visit? Yes    Patient would like the video invitation sent by:     Type of service:  Video Visit    Video Start Time:     Video End Time (time video stopped):     Duration:  minutes - see above    Originating Location (pt. Location):     Distant Location (provider location):  [facility] NEUROLOGY     Mode of Communication:  Video Conference via [employer] (and if not possible then doximity)      Thong Montes MD      --------------------------------------------------------------------------------------------------------------    Lucio Daly is a 70 year old male who is being evaluated via a billable video visit.      Charts reviewed  Consult from  Images reviewed        I have reviewed and updated the patient's Past Medical History, Social History, Family History and Medication List.    ALLERGIES  Restasis; Adhesive tape; Allegra; Allergy; Animal dander; Benadryl allergy; Cephalexin; Cephalosporins; Cyclosporine; Diphenhydramine; Doxycycline; Doxycycline hyclate; Fexofenadine; Flonase [fluticasone propionate]; Gabapentin; Hydrocortisone; Iodine; Iodine solution [povidone iodine]; Levaquin; Levofloxacin; Mylanta; Oxcarbazepine; Prednisone; Prednisone; Seafood [seafood]; Shellfish allergy; Simethicone; Sulfamethoxazole w/trimethoprim; Sulfamethoxazole-trimethoprim; Symbicort; Trees; Trileptal; and Cortizone    Lasts visit details if there was a last visit:         Medications                                                                                                                                                                                                              14 Review of systems  are negative except for   Patient Active Problem List   Diagnosis     Diaphragmatic hernia     Esophageal reflux     Hx of melanoma of skin     Malignant basal cell neoplasm of skin     GALO (obstructive sleep apnea)     Chronic maxillary sinusitis     Urinary incontinence     H. pylori infection      Sicca syndrome (H)     Health Care Home     Advanced directives, counseling/discussion     Tremor     Visual changes     Incomplete defecation     Gait disorder     Balance problems     Fatigue     High risk medications (not anticoagulants) long-term use     Personal history of other malignant neoplasm of skin     AK (actinic keratosis)     Pain in joint, lower leg     Abnormal EMG     Seronegative arthritis     History of MRI of cervical spine     Pain in limb     Adenomatous polyp of colon     Peripheral polyneuropathy     Rheumatoid arthritis of multiple sites with negative rheumatoid factor (H)     Morbid obesity (H)     Interstitial lung disease (H)     History of MRI of lumbar spine     Mood disorder (H)     Paralysis agitans (H)     Neuromuscular disease (H)     Bronchiolitis obliterans (H)     Hypertension     Rheumatoid arthritis (H)        Allergies   Allergen Reactions     Restasis      Burning eyes, problems with breathing, tightness in chest     Adhesive Tape      Bandages misc     Allegra      EXCESSIVE URINATION AND WEAKNESS, LIGHT-HEADED     Allergy      Dust     Animal Dander      Benadryl Allergy      EXCESSIVE URINATION AND WEAKNESS, LIGHT-HEADED     Cephalexin      Joint pain or gerd aggravation. Bloating excessive urination      Cephalosporins      Cyclosporine      Diphenhydramine Other (See Comments)     Doxycycline      Doxycycline Hyclate Nausea     SWEATING,MIGRAINES,LOSS OF APPETITE,SWEATING,LIGHT HEADED, EXCESSIVE URINATION     Fexofenadine Other (See Comments)     Flonase [Fluticasone Propionate]      Gabapentin      Neurontin: mosd changes and excess urination     Hydrocortisone      Iodine      Iodine Solution [Povidone Iodine]      SKIN MELTS     Levaquin      From surgeon--? Joint pain ? Gerd aggravation. Insomnia, excess urination     Levofloxacin      Mylanta      EXCESSIVE URINATION AND WEAKNESS, LIGHT-HEADED     Oxcarbazepine      Prednisone      Weakness, elevated bp,  headache, eye pain, congestion      Prednisone      Seafood [Seafood]      Shellfish Allergy      Hives       Simethicone      Sulfamethoxazole W/Trimethoprim      Sulfamethoxazole-Trimethoprim      Chest pain, angina     Symbicort GI Disturbance and Nausea     Trees      Trileptal      SEVERE JOINT AND TENDON PAIN, INSOMNIA, RESTLESSNESS, NAUSEA, EXCESS URINATION     Cortizone Rash     EXCESS URINATION,WEAKNESS,NAUSEA, HEADACHE     Past Surgical History:   Procedure Laterality Date     BIOPSY OF SKIN LESION       COLONOSCOPY       HEMORRHOID SURGERY       lip biopsy      for sicca complex     MOHS MICROGRAPHIC PROCEDURE       SOFT TISSUE SURGERY      removeal of basel cell carcinoma     Past Medical History:   Diagnosis Date     Abnormal EMG 4/18/2013     Abnormal involuntary movements(781.0)     Movement Disorder     AK (actinic keratosis) 12/18/2011     Allergic rhinitis, cause unspecified     Allergic rhinitis     Balance problems 11/1/2011     Basal cell carcinoma      Bladder spasms 11/1/2011     Chronic osteoarthritis      Diaphragmatic hernia without mention of obstruction or gangrene      Earache or other ear, nose, or throat complaint      Esophageal reflux      Fatigue 11/1/2011     Fracture      H. pylori infection 5/12/2011     History of MRI of cervical spine 11/18/2013    EXAMINATION: CERVICAL SPINE G/E 5 VIEWS* 4/19/2013 4:18 PM  CLINICAL HISTORY: Pain in limb,Performing Location?->[facility] ([facility]),  COMPARISON:  FINDINGS: AP and lateral views in flexion and extension, as well as odontoid view of the cervical spine was obtained. There is no comparison available. The vertebral bodies of the cervical spine are normally aligned. There is posterior spurring and d     Incomplete defecation 11/1/2011     Interstitial lung disease (H) 11/29/2016     Laboratory test 8/7/2012     Lung disease June 2015     Malignant basal cell neoplasm of skin 8/6/2008     Melanoma (H) 8/6/2008     Melanoma in situ of  lower leg (H)     R calf     Neuropathy 2011     Other bladder disorder      Other color vision deficiencies      Other nervous system complications      Parkinsonism (H) 2011     Personal history of colonic polyps      Polyneuropathy in other diseases classified elsewhere (H)      RA (rheumatoid arthritis) (H)      Rheumatoid arthritis of multiple sites with negative rheumatoid factor (H) 3/21/2016     Seronegative arthritis 2013     Shortness of breath      Shoulder arthritis 2016    acromioclavicular joint      Somatization disorder 2011     Squamous cell carcinoma      Tremor 2011     Unspecified essential hypertension      Unspecified hypothyroidism      Urinary tract infection      Urinary urgency 2011     Wears glasses 2011     Social History     Socioeconomic History     Marital status:      Spouse name: Not on file     Number of children: Not on file     Years of education: Not on file     Highest education level: Not on file   Occupational History     Not on file   Social Needs     Financial resource strain: Not on file     Food insecurity     Worry: Not on file     Inability: Not on file     Transportation needs     Medical: Not on file     Non-medical: Not on file   Tobacco Use     Smoking status: Former Smoker     Packs/day: 1.50     Years: 37.00     Pack years: 55.50     Types: Cigarettes     Start date: 6/15/1963     Last attempt to quit: 2000     Years since quittin.9     Smokeless tobacco: Never Used   Substance and Sexual Activity     Alcohol use: No     Alcohol/week: 0.0 standard drinks     Drug use: No     Sexual activity: Never     Partners: Female     Birth control/protection: None   Lifestyle     Physical activity     Days per week: Not on file     Minutes per session: Not on file     Stress: Not on file   Relationships     Social connections     Talks on phone: Not on file     Gets together: Not on file     Attends Yazidism service: Not on  "file     Active member of club or organization: Not on file     Attends meetings of clubs or organizations: Not on file     Relationship status: Not on file     Intimate partner violence     Fear of current or ex partner: Not on file     Emotionally abused: Not on file     Physically abused: Not on file     Forced sexual activity: Not on file   Other Topics Concern     Parent/sibling w/ CABG, MI or angioplasty before 65F 55M? No   Social History Narrative    2013: Living in [place] in a townhouse with no steps    Has 3 sons that are doing okay.         Dairy/d 1 servings/d.     Caffeine 0 servings/d    Exercise 0 x week    Sunscreen used - No    Seatbelts used - Yes    Working smoke/CO detectors in the home - Yes    Guns stored in the home - Yes    Self Breast Exams - NA    Self Testicular Exam - Yes    Eye Exam up to date - Yes 2008    Dental Exam up to date - Yes 2006    Pap Smear up to date - NA    Mammogram up to date - NA    PSA up to date - Yes 2008    Dexa Scan up to date - No    Flex Sig / Colonoscopy up to date - Yes less than 5 yrs ago    Immunizations up to date -today    Abuse: Current or Past(Physical, Sexual or Emotional)- No    Do you feel safe in your environment - Yes    2008                 Family History   Problem Relation Age of Onset     Hypertension Mother      Heart Disease Mother         a fib     Arthritis Mother         \"osteo\"     Osteoporosis Mother      Skin Cancer Mother      Uterine Cancer Mother      Hypertension Brother      Diabetes Brother         \" post pancreatitis\"     Neurologic Disorder Sister         multiple sclerosis     Hypertension Sister      Heart Disease Sister      Multiple Sclerosis Sister      Heart Disease Sister         a fib     Arthritis Sister      Heart Failure Sister      Kidney Disease Sister      Dementia Sister      Hypertension Father      Cerebrovascular Disease Father         ,     Skin Cancer Father      Colon Polyps Father      Other - See Comments Son  "        [place]     Other - See Comments Son         [place]     Other - See Comments Son         day gonzalez     Psoriasis Maternal Grandfather      Stomach Cancer Maternal Grandfather      Congenital Anomalies Other         granddaughter with a chromosome defect     Cerebrovascular Disease Paternal Grandmother         ,     Cerebrovascular Disease Paternal Grandfather         ,     Cancer Granddaughter         Langerhans Cell Histiocytosis     Genetic Disease Granddaughter         gene translocation     Melanoma No family hx of      Colon Cancer No family hx of      Current Outpatient Medications   Medication Sig Dispense Refill     acetaminophen (TYLENOL) 500 MG tablet 2 x 500mg by mouth 3 times per day: 7/730am, 4pm and 11pm  = 6/day       albuterol (PROAIR HFA/PROVENTIL HFA/VENTOLIN HFA) 108 (90 Base) MCG/ACT inhaler Inhale 2 puffs into the lungs every 4 hours as needed for shortness of breath / dyspnea or wheezing 1 Inhaler 3     cholecalciferol (HM VITAMIN D3) 25 MCG (1000 UT) TABS 1,000 Int'l Units       folic acid (FOLVITE) 1 MG tablet TAKE 1 TABLET (1MG) BY MOUTH TWICE DAILY AT 9AM AND AT 4PM 180 tablet 3     hydroxychloroquine (PLAQUENIL) 200 MG tablet 200mg tab by mouth twice daily @ 9am and 4pm 180 tablet 3     methotrexate 2.5 MG tablet 8 x 2.5mg tabs by mouth weekly = 20mg       metoprolol succinate ER (TOPROL-XL) 50 MG 24 hr tablet Take 1.5 tablets (75 mg) by mouth daily 135 tablet 11     omeprazole (PRILOSEC) 40 MG DR capsule TAKE 1 CAPSULE BY MOUTH TWICE DAILY AT 7-7:30 AM AND 4  capsule 3     ORDER FOR DME Use your CPAP device as directed by your provider.       oxybutynin (DITROPAN) 5 MG tablet TAKE 1 TABLET BY MOUTH EVERY DAY AT 4PM 90 tablet 0     pregabalin (LYRICA) 50 MG capsule TAKE 1 TAB BY MOUTH AT 7-7:30 AM, 1 TAB AT 4 PM, AND 1-2 TABS AT 11  capsule 0     salmeterol (SEREVENT DISKUS) 50 MCG/DOSE inhaler Inhale 1 puff into the lungs 2 times daily 3 Inhaler 3      "salmeterol (SEREVENT) 50 MCG/DOSE inhaler Inhale 1 puff into the lungs 2 times daily (Patient not taking: Reported on 6/10/2020) 1 Inhaler 11                       Lucio Daly is a 70 year old male who is being evaluated via a billable video visit.      The patient has been notified of following:     \"This video visit will be conducted via a call between you and your physician/provider. We have found that certain health care needs can be provided without the need for an in-person physical exam.  This service lets us provide the care you need with a video conversation.  If a prescription is necessary we can send it directly to your pharmacy.  If lab work is needed we can place an order for that and you can then stop by our lab to have the test done at a later time.    Video visits are billed at different rates depending on your insurance coverage.  Please reach out to your insurance provider with any questions.    If during the course of the call the physician/provider feels a video visit is not appropriate, you will not be charged for this service.\"    Patient has given verbal consent for Video visit? YES  How would you like to obtain your AVS? MyChart  Will anyone else be joining your video visit? no        Video-Visit Details    Type of service:  Video Visit    Video Start Time:   Video End Time:     Originating Location (pt. Location): Home    Distant Location (provider location):  [facility] NEUROLOGY     Platform used for Video Visit: Beth Regan EMT          Again, thank you for allowing me to participate in the care of your patient.      Sincerely,    Thong Montes MD      "

## 2020-09-14 ENCOUNTER — TELEPHONE (OUTPATIENT)
Dept: PHYSICAL MEDICINE AND REHAB | Facility: CLINIC | Age: 70
End: 2020-09-14

## 2020-09-14 NOTE — TELEPHONE ENCOUNTER
Spoke with the patient regarding scheduling an appointment with PMR clinic. He declined scheduling at this time. He was referred to PMR and PT. He would like to wait to schedule. He is currently trying to remain out of the clinics as much as possible. Phone number given. He will return call once ready to schedule.

## 2020-09-22 DIAGNOSIS — N39.498 OTHER URINARY INCONTINENCE: ICD-10-CM

## 2020-09-23 RX ORDER — OXYBUTYNIN CHLORIDE 5 MG/1
TABLET ORAL
Qty: 90 TABLET | Refills: 0 | Status: SHIPPED | OUTPATIENT
Start: 2020-09-23 | End: 2020-11-12

## 2020-09-29 ENCOUNTER — PATIENT OUTREACH (OUTPATIENT)
Dept: PULMONOLOGY | Facility: CLINIC | Age: 70
End: 2020-09-29

## 2020-09-29 ENCOUNTER — VIRTUAL VISIT (OUTPATIENT)
Dept: SLEEP MEDICINE | Facility: CLINIC | Age: 70
End: 2020-09-29
Payer: MEDICARE

## 2020-09-29 VITALS — HEIGHT: 74 IN | WEIGHT: 305 LBS | BODY MASS INDEX: 39.14 KG/M2

## 2020-09-29 DIAGNOSIS — G47.33 OSA TREATED WITH BIPAP: Primary | ICD-10-CM

## 2020-09-29 PROCEDURE — 99442 ZZC PHYSICIAN TELEPHONE EVALUATION 11-20 MIN: CPT | Performed by: INTERNAL MEDICINE

## 2020-09-29 ASSESSMENT — MIFFLIN-ST. JEOR: SCORE: 2203.22

## 2020-09-29 NOTE — PATIENT INSTRUCTIONS
Your BMI is Body mass index is 39.16 kg/m .  Weight management is a personal decision.  If you are interested in exploring weight loss strategies, the following discussion covers the approaches that may be successful. Body mass index (BMI) is one way to tell whether you are at a healthy weight, overweight, or obese. It measures your weight in relation to your height.  A BMI of 18.5 to 24.9 is in the healthy range. A person with a BMI of 25 to 29.9 is considered overweight, and someone with a BMI of 30 or greater is considered obese. More than two-thirds of American adults are considered overweight or obese.  Being overweight or obese increases the risk for further weight gain. Excess weight may lead to heart disease and diabetes.  Creating and following plans for healthy eating and physical activity may help you improve your health.  Weight control is part of healthy lifestyle and includes exercise, emotional health, and healthy eating habits. Careful eating habits lifelong are the mainstay of weight control. Though there are significant health benefits from weight loss, long-term weight loss with diet alone may be very difficult to achieve- studies show long-term success with dietary management in less than 10% of people. Attaining a healthy weight may be especially difficult to achieve in those with severe obesity. In some cases, medications, devices and surgical management might be considered.  What can you do?  If you are overweight or obese and are interested in methods for weight loss, you should discuss this with your provider.     Consider reducing daily calorie intake by 500 calories.     Keep a food journal.     Avoiding skipping meals, consider cutting portions instead.    Diet combined with exercise helps maintain muscle while optimizing fat loss. Strength training is particularly important for building and maintaining muscle mass. Exercise helps reduce stress, increase energy, and improves fitness.  Increasing exercise without diet control, however, may not burn enough calories to loose weight.       Start walking three days a week 10-20 minutes at a time    Work towards walking thirty minutes five days a week     Eventually, increase the speed of your walking for 1-2 minutes at time    In addition, we recommend that you review healthy lifestyles and methods for weight loss available through the National Institutes of Health patient information sites:  http://win.niddk.nih.gov/publications/index.htm    And look into health and wellness programs that may be available through your health insurance provider, employer, local community center, or grace club.    Weight management plan: Patient was referred to their PCP to discuss a diet and exercise plan.

## 2020-09-29 NOTE — TELEPHONE ENCOUNTER
Pt called for pulmonologist insight for flying to visit family.    Discussed with Dr. Mccarthy who highly advises against flying at ths time due to pandemic risk.    Informed patient who was appreciative and accepting of what he assumed would be the advice. Writer encouraged patient to call with further questions.

## 2020-09-29 NOTE — PROGRESS NOTES
"Lucio Daly is a 70 year old male who is being evaluated via a billable telephone visit.      The patient has been notified of following:     \"This telephone visit will be conducted via a call between you and your physician/provider. We have found that certain health care needs can be provided without the need for a physical exam.  This service lets us provide the care you need with a short phone conversation.  If a prescription is necessary we can send it directly to your pharmacy.  If lab work is needed we can place an order for that and you can then stop by our lab to have the test done at a later time.    Telephone visits are billed at different rates depending on your insurance coverage. During this emergency period, for some insurers they may be billed the same as an in-person visit.  Please reach out to your insurance provider with any questions.    If during the course of the call the physician/provider feels a telephone visit is not appropriate, you will not be charged for this service.\"    Patient has given verbal consent for Telephone visit?  Yes    What phone number would you like to be contacted at? [phone]    How would you like to obtain your AVS? MyChart    Phone call duration: 17 minutes(9:21AM-9:38 AM)    Kristen Martinez MD  [facility]   Floor [address], [address]   [address] [place] [address]. [place] [address]   Appointments: [phone]        Sleep Follow-Up Visit:     Date on this visit: 2020     Mr. Lucio Daly is a 70-year-old male who  presents for a telephone visit today for follow-up of GALO and to review BiPAP compliance.    He was last seen at the sleep clinic on 2018.    Previous sleep study:  PS2015 - AHI of 28.3 per hour and REM sleep was not observed during the diagnostic portion.   Optimum titration with bilevel 15/10 cwp.     He is currently being treated with BiPAP device " with pressure settings 15/10 cmH2O.  He reports using the BiPAP device regularly during sleep.   He uses FFM.  Mask works well for him. He  uses the  humidity option.  There are no reports of snoring with the BiPAP device.  He denies awakenings due to gasping for air with the BiPAP use.    He reports  positive benefits with the BiPAP use.   Pressure setting feel comfortable.  He goes to bed between 11:30 PM to 12 midnight and wakes up around 7 AM. Feels rested most mornings.  He reports fatigue but denies EDS  He denies drowsiness while driving.  Takes Lyrica and some other medications before dinner, has dinner at 530 pm. He reports napping between 6:30-8:30pm, does not use BiPAP during naps.    His Koppel sleepiness score is 5 out of 24.  (ESS reported on 3/2017 was 12/24)    He weighed 308 pounds during his last sleep clinic visit and his current weight is 305 pounds.     Downloadable compliance data from 8/10/2020- 9/8/2020 shows 100% of usage of the device with an average use of 7 hours. There was no evidence of significant air leak. Residual AHI was 2.5/hr.        Current meds:  Current Outpatient Medications   Medication Sig Dispense Refill     acetaminophen (TYLENOL) 500 MG tablet 2 x 500mg by mouth 3 times per day: 7/730am, 4pm and 11pm  = 6/day       albuterol (PROAIR HFA/PROVENTIL HFA/VENTOLIN HFA) 108 (90 Base) MCG/ACT inhaler Inhale 2 puffs into the lungs every 4 hours as needed for shortness of breath / dyspnea or wheezing 1 Inhaler 3     cholecalciferol (HM VITAMIN D3) 25 MCG (1000 UT) TABS 1000 unit tab by mouth daily       folic acid (FOLVITE) 1 MG tablet TAKE 1 TABLET (1MG) BY MOUTH TWICE DAILY AT 9AM AND AT 4PM 180 tablet 3     hydroxychloroquine (PLAQUENIL) 200 MG tablet 200mg tab by mouth twice daily @ 9am and 4pm 180 tablet 3     methotrexate 2.5 MG tablet 8 x 2.5mg tabs by mouth weekly = 20mg       metoprolol succinate ER (TOPROL-XL) 50 MG 24 hr tablet Take 1.5 tablets (75 mg) by mouth daily  "135 tablet 11     omeprazole (PRILOSEC) 40 MG DR capsule TAKE 1 CAPSULE BY MOUTH TWICE DAILY AT 7-7:30 AM AND 4  capsule 3     ORDER FOR DME Use your CPAP device as directed by your provider.       oxybutynin (DITROPAN) 5 MG tablet TAKE 1 TABLET BY MOUTH EVERY DAY AT 4PM 90 tablet 0     pregabalin (LYRICA) 50 MG capsule TAKE 1 TAB BY MOUTH AT 7-7:30 AM, 1 TAB AT 4 PM, AND 1-2 TABS AT 11  capsule 3     salmeterol (SEREVENT DISKUS) 50 MCG/DOSE inhaler Inhale 1 puff into the lungs 2 times daily 3 Inhaler 3     Problem list:  Patient Active Problem List   Diagnosis     Diaphragmatic hernia     Esophageal reflux     Hx of melanoma of skin     Malignant basal cell neoplasm of skin     GALO (obstructive sleep apnea)     Chronic maxillary sinusitis     Urinary incontinence     H. pylori infection     Sicca syndrome (H)     Health Care Home     Advanced directives, counseling/discussion     Tremor     Visual changes     Incomplete defecation     Gait disorder     Balance problems     Fatigue     High risk medications (not anticoagulants) long-term use     Personal history of other malignant neoplasm of skin     AK (actinic keratosis)     Pain in joint, lower leg     Abnormal EMG     Seronegative arthritis     History of MRI of cervical spine     Pain in limb     Adenomatous polyp of colon     Peripheral polyneuropathy     Rheumatoid arthritis of multiple sites with negative rheumatoid factor (H)     Morbid obesity (H)     Interstitial lung disease (H)     History of MRI of lumbar spine     Mood disorder (H)     Paralysis agitans (H)     Neuromuscular disease (H)     Bronchiolitis obliterans (H)     Hypertension     Rheumatoid arthritis (H)     Past medical history, Past surgical history, Allergies, Social history, Family history: reviewed, per EMR    Exam:  Ht 1.88 m (6' 2\")   Wt 138.3 kg (305 lb)   BMI 39.16 kg/m    GENERAL APPEARANCE:in no distress  PSYCH: Alert and oriented times 3; coherent speech, normal "   rate and volume, able to articulate logical thoughts, able   to abstract reason, no tangential thoughts, no hallucinations   or delusions  His affect is normal  RESP: No cough, no audible wheezing, able to talk in full sentences  Remainder of exam unable to be completed due to telephone visit    Other tests:  CBC RESULTS:   Recent Labs   Lab Test 09/03/20  1151   WBC 4.8   RBC 4.49   HGB 13.5   HCT 42.8   MCV 95   MCH 30.1   MCHC 31.5   RDW 14.7        Creatinine   Date Value Ref Range Status   09/03/2020 1.16 0.66 - 1.25 mg/dL Final       CRP Inflammation   Date Value Ref Range Status   09/03/2020 7.9 0.0 - 8.0 mg/L Final       Assessment/plan:  Obstructive sleep apnea: Patient reports adequate compliance with  BiPAP device and continues to report positive benefits from the device use.    Based on the compliance measures obstructive sleep apnea appears to be adequately controlled with the BiPAP at the current settings.    Prescription was provided for replacement BiPAP device and renewal of all the BiPAP supplies.    Recommended to continue using the device regularly during sleep including during naps.  He  was instructed to get the supplies for the device regularly replaced.      Obesity: We discussed weight management with diet and exercise.      Seronegative inflammatory arthritis RA: He is currently being treated with methotrexate, Plaquenil and is also on folic acid.  According to the rheumatology follow-up visit in June 2020, he was reported to be stable with no active inflammatory arthritis. Recommended to continue follow-up with Rheumatology.    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 Harsha Mccarthy in June 2020.  He will continue follow-up with pulmonary.    Patient was strongly advised to avoid driving, operating any heavy machinery or other hazardous situations while drowsy or sleepy.  Patient was counseled on the  "importance of driving while alert, to pull over if drowsy, or nap before getting into the vehicle if sleepy.     Plan is to follow-up at sleep clinic in 1 year.  If he obtains the replacement BiPAP he will  follow up in 2 months after obtaining the replacement equipment for reviewing the compliance measures.    The above note was dictated using voice recognition software. Although reviewed after completion, some word and grammatical error may remain . Please contact the author for any clarifications.     \"I spent a total of 17 minutes with Lucio Daly during today's telephone visit, all of which was spent counseling the patient and  coordinating care regarding obstructive sleep apnea, BiPAP treatment and going over PAP download/sleep study and chart review..\"     Kristen Martinez MD  [facility]   Floor 1, [address]   [address] [place], [place] [address] [address]   Appointments: [phone]    "

## 2020-10-24 DIAGNOSIS — G62.9 PERIPHERAL POLYNEUROPATHY: ICD-10-CM

## 2020-10-25 RX ORDER — PREGABALIN 50 MG/1
CAPSULE ORAL
Qty: 360 CAPSULE | Refills: 0 | Status: SHIPPED | OUTPATIENT
Start: 2020-10-25 | End: 2021-02-03

## 2020-11-05 ASSESSMENT — ENCOUNTER SYMPTOMS
DIZZINESS: 0
CONSTIPATION: 1
JOINT SWELLING: 1
MYALGIAS: 1
SORE THROAT: 0
DIARRHEA: 0
NERVOUS/ANXIOUS: 0
HEMATOCHEZIA: 1
COUGH: 1
DYSURIA: 0
PARESTHESIAS: 0
NAUSEA: 0
HEADACHES: 0
WEAKNESS: 1
CHILLS: 0
FEVER: 0
HEARTBURN: 0
PALPITATIONS: 0
EYE PAIN: 1
ARTHRALGIAS: 1
ABDOMINAL PAIN: 0
SHORTNESS OF BREATH: 1
HEMATURIA: 0
FREQUENCY: 0

## 2020-11-05 ASSESSMENT — ACTIVITIES OF DAILY LIVING (ADL): CURRENT_FUNCTION: NO ASSISTANCE NEEDED

## 2020-11-12 ENCOUNTER — OFFICE VISIT (OUTPATIENT)
Dept: FAMILY MEDICINE | Facility: CLINIC | Age: 70
End: 2020-11-12
Payer: MEDICARE

## 2020-11-12 VITALS
TEMPERATURE: 98.6 F | HEART RATE: 70 BPM | WEIGHT: 306.8 LBS | SYSTOLIC BLOOD PRESSURE: 135 MMHG | HEIGHT: 73 IN | RESPIRATION RATE: 16 BRPM | DIASTOLIC BLOOD PRESSURE: 90 MMHG | BODY MASS INDEX: 40.66 KG/M2

## 2020-11-12 DIAGNOSIS — A04.8 H. PYLORI INFECTION: ICD-10-CM

## 2020-11-12 DIAGNOSIS — M06.09 RHEUMATOID ARTHRITIS OF MULTIPLE SITES WITH NEGATIVE RHEUMATOID FACTOR (H): ICD-10-CM

## 2020-11-12 DIAGNOSIS — G62.9 PERIPHERAL POLYNEUROPATHY: ICD-10-CM

## 2020-11-12 DIAGNOSIS — Z12.11 SCREENING FOR COLORECTAL CANCER: ICD-10-CM

## 2020-11-12 DIAGNOSIS — Z12.12 SCREENING FOR COLORECTAL CANCER: ICD-10-CM

## 2020-11-12 DIAGNOSIS — Z00.00 ROUTINE GENERAL MEDICAL EXAMINATION AT A HEALTH CARE FACILITY: Primary | ICD-10-CM

## 2020-11-12 DIAGNOSIS — K21.9 GASTROESOPHAGEAL REFLUX DISEASE WITHOUT ESOPHAGITIS: ICD-10-CM

## 2020-11-12 DIAGNOSIS — N39.498 OTHER URINARY INCONTINENCE: ICD-10-CM

## 2020-11-12 DIAGNOSIS — Z79.899 HIGH RISK MEDICATIONS (NOT ANTICOAGULANTS) LONG-TERM USE: ICD-10-CM

## 2020-11-12 LAB
ALBUMIN SERPL-MCNC: 4.1 G/DL (ref 3.4–5)
ALT SERPL W P-5'-P-CCNC: 24 U/L (ref 0–70)
AST SERPL W P-5'-P-CCNC: 18 U/L (ref 0–45)
CHOLEST SERPL-MCNC: 181 MG/DL
CREAT SERPL-MCNC: 1.19 MG/DL (ref 0.66–1.25)
CRP SERPL-MCNC: 7.1 MG/L (ref 0–8)
ERYTHROCYTE [DISTWIDTH] IN BLOOD BY AUTOMATED COUNT: 14.9 % (ref 10–15)
GFR SERPL CREATININE-BSD FRML MDRD: 61 ML/MIN/{1.73_M2}
GLUCOSE SERPL-MCNC: 109 MG/DL (ref 70–99)
HBA1C MFR BLD: 5.6 % (ref 0–5.6)
HCT VFR BLD AUTO: 44.6 % (ref 40–53)
HDLC SERPL-MCNC: 47 MG/DL
HGB BLD-MCNC: 14.1 G/DL (ref 13.3–17.7)
LDLC SERPL CALC-MCNC: 111 MG/DL
MCH RBC QN AUTO: 29.9 PG (ref 26.5–33)
MCHC RBC AUTO-ENTMCNC: 31.6 G/DL (ref 31.5–36.5)
MCV RBC AUTO: 95 FL (ref 78–100)
NONHDLC SERPL-MCNC: 134 MG/DL
PLATELET # BLD AUTO: 189 10E9/L (ref 150–450)
RBC # BLD AUTO: 4.72 10E12/L (ref 4.4–5.9)
TRIGL SERPL-MCNC: 113 MG/DL
WBC # BLD AUTO: 5 10E9/L (ref 4–11)

## 2020-11-12 PROCEDURE — 82947 ASSAY GLUCOSE BLOOD QUANT: CPT | Performed by: PHYSICIAN ASSISTANT

## 2020-11-12 PROCEDURE — 82565 ASSAY OF CREATININE: CPT | Performed by: INTERNAL MEDICINE

## 2020-11-12 PROCEDURE — 85027 COMPLETE CBC AUTOMATED: CPT | Performed by: INTERNAL MEDICINE

## 2020-11-12 PROCEDURE — 36415 COLL VENOUS BLD VENIPUNCTURE: CPT | Performed by: PHYSICIAN ASSISTANT

## 2020-11-12 PROCEDURE — G0439 PPPS, SUBSEQ VISIT: HCPCS | Performed by: PHYSICIAN ASSISTANT

## 2020-11-12 PROCEDURE — 82040 ASSAY OF SERUM ALBUMIN: CPT | Performed by: INTERNAL MEDICINE

## 2020-11-12 PROCEDURE — 80061 LIPID PANEL: CPT | Performed by: PHYSICIAN ASSISTANT

## 2020-11-12 PROCEDURE — 86140 C-REACTIVE PROTEIN: CPT | Performed by: INTERNAL MEDICINE

## 2020-11-12 PROCEDURE — 83036 HEMOGLOBIN GLYCOSYLATED A1C: CPT | Performed by: PHYSICIAN ASSISTANT

## 2020-11-12 PROCEDURE — 99213 OFFICE O/P EST LOW 20 MIN: CPT | Mod: 25 | Performed by: PHYSICIAN ASSISTANT

## 2020-11-12 PROCEDURE — 84460 ALANINE AMINO (ALT) (SGPT): CPT | Performed by: INTERNAL MEDICINE

## 2020-11-12 PROCEDURE — 84450 TRANSFERASE (AST) (SGOT): CPT | Performed by: INTERNAL MEDICINE

## 2020-11-12 RX ORDER — PREGABALIN 50 MG/1
CAPSULE ORAL
Qty: 360 CAPSULE | Refills: 0 | Status: CANCELLED | OUTPATIENT
Start: 2020-11-12

## 2020-11-12 RX ORDER — OXYBUTYNIN CHLORIDE 5 MG/1
TABLET ORAL
Qty: 90 TABLET | Refills: 3 | Status: SHIPPED | OUTPATIENT
Start: 2020-11-12 | End: 2021-09-30

## 2020-11-12 ASSESSMENT — ENCOUNTER SYMPTOMS
EYE PAIN: 1
HEMATOCHEZIA: 1
FREQUENCY: 0
HEADACHES: 0
DYSURIA: 0
FEVER: 0
PALPITATIONS: 0
SHORTNESS OF BREATH: 1
WEAKNESS: 1
JOINT SWELLING: 1
DIZZINESS: 0
PARESTHESIAS: 0
ABDOMINAL PAIN: 0
NAUSEA: 0
NERVOUS/ANXIOUS: 0
DIARRHEA: 0
ARTHRALGIAS: 1
HEARTBURN: 0
CONSTIPATION: 1
HEMATURIA: 0
SORE THROAT: 0
COUGH: 1
CHILLS: 0
MYALGIAS: 1

## 2020-11-12 ASSESSMENT — MIFFLIN-ST. JEOR: SCORE: 2195.52

## 2020-11-12 ASSESSMENT — ACTIVITIES OF DAILY LIVING (ADL): CURRENT_FUNCTION: NO ASSISTANCE NEEDED

## 2020-11-12 NOTE — PROGRESS NOTES
"SUBJECTIVE:   Lucio Daly is a 70 year old male who presents for Preventive Visit.  Patient has been advised of split billing requirements and indicates understanding: Yes   Are you in the first 12 months of your Medicare coverage?  No    Healthy Habits:     In general, how would you rate your overall health?  Good    Frequency of exercise:  4-5 days/week    Duration of exercise:  15-30 minutes    Do you usually eat at least 4 servings of fruit and vegetables a day, include whole grains    & fiber and avoid regularly eating high fat or \"junk\" foods?  No    Taking medications regularly:  Yes    Medication side effects:  None    Ability to successfully perform activities of daily living:  No assistance needed    Home Safety:  No safety concerns identified    Hearing Impairment:  No hearing concerns    In the past 6 months, have you been bothered by leaking of urine? Yes    In general, how would you rate your overall mental or emotional health?  Good      PHQ-2 Total Score: 0    Additional concerns today:  Yes      70 year old male with a history of interstitial lung disease, RA, melanoma of lower leg, AK, urinary urgency, neuropathy, h pylori infection, GERD, hypertension, and GALO on BiPAP presenting for medicare annual wellness exam.    Things are going OK. Has been going for about a mile walk every day. Will need to figure out what to do this winter for exercise.     He has some left leg pain he thinks is related to varicose veins. History of vein ablations in the past. No redness or swelling.     He is due for shingles vaccine. Reports he had one dose but did poorly. Not going to get second. Also due for colonoscopy. He would like referral for endoscopy as well given his reflux symptoms and history of h pylori infection.       Do you feel safe in your environment? Yes    Have you ever done Advance Care Planning? (For example, a Health Directive, POLST, or a discussion with a medical provider or your loved ones " about your wishes): Yes, advance care planning is on file.    Fall risk  Fallen 2 or more times in the past year?: No  Any fall with injury in the past year?: No        Do you have sleep apnea, excessive snoring or daytime drowsiness?: yes    Reviewed and updated as needed this visit by clinical staff  Tobacco  Allergies  Meds   Med Hx  Surg Hx  Fam Hx  Soc Hx        Reviewed and updated as needed this visit by Provider                Social History     Tobacco Use     Smoking status: Former Smoker     Packs/day: 1.50     Years: 37.00     Pack years: 55.50     Types: Cigarettes     Start date: 6/15/1963     Quit date: 2000     Years since quittin.1     Smokeless tobacco: Never Used   Substance Use Topics     Alcohol use: Not Currently     Alcohol/week: 0.0 standard drinks       Alcohol Use 2020   Prescreen: >3 drinks/day or >7 drinks/week? Not Applicable         Current providers sharing in care for this patient include: Patient Care Team:  Saroj Tinoco PA-C as PCP - General (Physician Assistant)  Jeremy Goodrich MD as MD (Rheumatology)  Benjamin Ordoñez MD as MD (Dermapathology)  Kristen Martinez MD as MD (Family Medicine, Sleep Medicine)  Rhett Shah MD as Surgeon (Surgery)  Sin Snow APRN CNP as Nurse Practitioner (Nurse Practitioner)  Leonel Farnsworth MD as MD (Ophthalmology)  Gómez Velásquez DPM (Podiatry)  Rodney Ríos DPM (Podiatry)  Heladio Gonzalez MD as MD (Neurology)  Robb Zavala MD as MD (Ophthalmology)  Bipin Villatoro MD as MD (Family Practice)  Thong Montes MD as MD (Neurology)  Harsha Mccarthy MD as MD (Pulmonary Disease)  Sin Snow APRN CNP as Referring Physician (Nurse Practitioner)  Jeremy Goodrich MD as MD (Rheumatology)  Ramón Jeronimo MD as MD (Family Medicine - Sports Medicine)  Ramón Camargo MD as MD (Hand Surgery)  Rodney Garcia MD as MD (Clinical  Neurophysiology)  Saroj Tinoco PA-C as Assigned PCP  Thong Montes MD as Assigned Neuroscience Provider  Kristen Martinez MD as Assigned Sleep Provider  Jeremy Goodrich MD as Assigned Rheumatology Provider  Ramón Garnica DPM as Assigned Musculoskeletal Provider    The following health maintenance items are reviewed in Epic and correct as of today:  Health Maintenance   Topic Date Due     COPD ACTION PLAN  1950     ZOSTER IMMUNIZATION (3 of 3) 12/13/2018     FALL RISK ASSESSMENT  11/05/2020     COLORECTAL CANCER SCREENING  05/23/2021     EYE EXAM  07/13/2021     MEDICARE ANNUAL WELLNESS VISIT  11/12/2021     LIPID  11/12/2025     ADVANCE CARE PLANNING  11/12/2025     DTAP/TDAP/TD IMMUNIZATION (4 - Td) 08/21/2028     SPIROMETRY  Completed     HEPATITIS C SCREENING  Completed     PHQ-2  Completed     INFLUENZA VACCINE  Completed     Pneumococcal Vaccine: 65+ Years  Completed     AORTIC ANEURYSM SCREENING (SYSTEM ASSIGNED)  Completed     Pneumococcal Vaccine: Pediatrics (0 to 5 Years) and At-Risk Patients (6 to 64 Years)  Aged Out     IPV IMMUNIZATION  Aged Out     MENINGITIS IMMUNIZATION  Aged Out     HEPATITIS B IMMUNIZATION  Aged Out           Review of Systems   Constitutional: Negative for chills and fever.   HENT: Positive for congestion. Negative for ear pain, hearing loss and sore throat.    Eyes: Positive for pain and visual disturbance.   Respiratory: Positive for cough and shortness of breath.    Cardiovascular: Positive for peripheral edema. Negative for chest pain and palpitations.   Gastrointestinal: Positive for constipation and hematochezia. Negative for abdominal pain, diarrhea, heartburn and nausea.   Genitourinary: Negative for discharge, dysuria, frequency, genital sores, hematuria, impotence and urgency.   Musculoskeletal: Positive for arthralgias, joint swelling and myalgias.   Skin: Negative for rash.   Neurological: Positive for weakness. Negative for  "dizziness, headaches and paresthesias.   Psychiatric/Behavioral: Negative for mood changes. The patient is not nervous/anxious.      OBJECTIVE:   BP (!) 135/90   Pulse 70   Temp 98.6  F (37  C) (Oral)   Resp 16   Ht 1.854 m (6' 1\")   Wt 139.2 kg (306 lb 12.8 oz)   BMI 40.48 kg/m   Estimated body mass index is 40.48 kg/m  as calculated from the following:    Height as of this encounter: 1.854 m (6' 1\").    Weight as of this encounter: 139.2 kg (306 lb 12.8 oz).  Physical Exam  GENERAL: healthy, alert and no distress  EYES: Eyes grossly normal to inspection, PERRL and conjunctivae and sclerae normal  HENT: ear canals and TM's normal, nose and mouth without ulcers or lesions  NECK: no adenopathy, no asymmetry, masses, or scars and thyroid normal to palpation  RESP: lungs clear to auscultation - no rales, rhonchi or wheezes  CV: regular rate and rhythm, normal S1 S2, no S3 or S4, no murmur, click or rub, no peripheral edema and peripheral pulses strong  ABDOMEN: soft, nontender, no hepatosplenomegaly, no masses and bowel sounds normal  MS: no gross musculoskeletal defects noted, no edema  SKIN: no suspicious lesions or rashes  NEURO: Normal strength and tone, mentation intact and speech normal  PSYCH: mentation appears normal, affect normal/bright    Diagnostic Test Results:  Labs reviewed in Epic  Results for orders placed or performed in visit on 11/12/20 (from the past 24 hour(s))   Hemoglobin A1c   Result Value Ref Range    Hemoglobin A1C 5.6 0 - 5.6 %   CBC with platelets   Result Value Ref Range    WBC 5.0 4.0 - 11.0 10e9/L    RBC Count 4.72 4.4 - 5.9 10e12/L    Hemoglobin 14.1 13.3 - 17.7 g/dL    Hematocrit 44.6 40.0 - 53.0 %    MCV 95 78 - 100 fl    MCH 29.9 26.5 - 33.0 pg    MCHC 31.6 31.5 - 36.5 g/dL    RDW 14.9 10.0 - 15.0 %    Platelet Count 189 150 - 450 10e9/L     *Note: Due to a large number of results and/or encounters for the requested time period, some results have not been displayed. A complete " "set of results can be found in Results Review.       ASSESSMENT / PLAN:   Lucio was seen today for medicare visit.    Diagnoses and all orders for this visit:    Routine general medical examination at a health care facility  -     Hemoglobin A1c  -     Lipid panel reflex to direct LDL Fasting  -     Glucose    Peripheral polyneuropathy    Other urinary incontinence  -     oxybutynin (DITROPAN) 5 MG tablet; TAKE 1 TABLET BY MOUTH EVERY DAY AT 4PM    Gastroesophageal reflux disease without esophagitis  -     GASTROENTEROLOGY ADULT REF PROCEDURE ONLY; Future    H. pylori infection  -     GASTROENTEROLOGY ADULT REF PROCEDURE ONLY; Future    Screening for colorectal cancer  -     GASTROENTEROLOGY ADULT REF PROCEDURE ONLY; Future    Rheumatoid arthritis of multiple sites with negative rheumatoid factor (H)  -     CRP inflammation  -     Creatinine  -     CBC with platelets  -     AST  -     ALT  -     Albumin level    High risk medications (not anticoagulants) long-term use  -     CRP inflammation  -     Creatinine  -     CBC with platelets  -     AST  -     ALT  -     Albumin level      Chronic GERD symptoms, history of H pylori. Requesting repeat endoscopy today. Also due for colonoscopy screening. Orders for both placed.     Advised to follow up with vein specialist regarding leg pain s/p vein ablation    Continue regular exercise.     COUNSELING:  Reviewed preventive health counseling, as reflected in patient instructions       Regular exercise       Healthy diet/nutrition       Bladder control       Colon cancer screening    Estimated body mass index is 40.48 kg/m  as calculated from the following:    Height as of this encounter: 1.854 m (6' 1\").    Weight as of this encounter: 139.2 kg (306 lb 12.8 oz).    Weight management plan: Discussed healthy diet and exercise guidelines    He reports that he quit smoking about 20 years ago. His smoking use included cigarettes. He started smoking about 57 years ago. He has a " 55.50 pack-year smoking history. He has never used smokeless tobacco.      Appropriate preventive services were discussed with this patient, including applicable screening as appropriate for cardiovascular disease, diabetes, osteopenia/osteoporosis, and glaucoma.  As appropriate for age/gender, discussed screening for colorectal cancer, prostate cancer, breast cancer, and cervical cancer. Checklist reviewing preventive services available has been given to the patient.    Reviewed patients plan of care and provided an AVS. The Complex Care Plan (for patients with higher acuity and needing more deliberate coordination of services) for Lucio meets the Care Plan requirement. This Care Plan has been established and reviewed with the Patient.    Counseling Resources:  ATP IV Guidelines  Pooled Cohorts Equation Calculator  Breast Cancer Risk Calculator  Breast Cancer: Medication to Reduce Risk  FRAX Risk Assessment  ICSI Preventive Guidelines  Dietary Guidelines for Americans, 2010  USDA's MyPlate  ASA Prophylaxis  Lung CA Screening    Saroj Tinoco PA-C  [facility]    Identified Health Risks:

## 2020-11-13 NOTE — TELEPHONE ENCOUNTER
RECORDS RECEIVED FROM: orthotics recommendations- bilateral foot pain. per Amanda in transplant   DATE RECEIVED: Dec 14, 2020     NOTES STATUS DETAILS   OFFICE NOTE from referring provider Internal    OFFICE NOTE from other specialist N/A    DISCHARGE SUMMARY from hospital N/A    DISCHARGE REPORT from the ER N/A    OPERATIVE REPORT N/A    MEDICATION LIST Internal    IMPLANT RECORD/STICKER N/A    LABS     CBC/DIFF N/A    CULTURES N/A    INJECTIONS DONE IN RADIOLOGY N/A    MRI N/A    CT SCAN N/A    XRAYS (IMAGES & REPORTS) Internal    TUMOR     PATHOLOGY  Slides & report N/A    11/13/20   10:58 AM   Complete  Angie Dumont CMA

## 2020-11-17 ENCOUNTER — MYC MEDICAL ADVICE (OUTPATIENT)
Dept: FAMILY MEDICINE | Facility: CLINIC | Age: 70
End: 2020-11-17

## 2020-12-02 ASSESSMENT — ENCOUNTER SYMPTOMS
COUGH DISTURBING SLEEP: 0
HEARTBURN: 1
SKIN CHANGES: 0
WHEEZING: 1
PALPITATIONS: 0
NUMBNESS: 1
EYE IRRITATION: 0
EYE WATERING: 0
SMELL DISTURBANCE: 0
NAIL CHANGES: 0
SINUS PAIN: 0
PARALYSIS: 0
SLEEP DISTURBANCES DUE TO BREATHING: 0
HYPERTENSION: 0
DYSPNEA ON EXERTION: 1
HYPOTENSION: 0
HEMOPTYSIS: 0
JOINT SWELLING: 1
SYNCOPE: 0
SHORTNESS OF BREATH: 1
CONSTIPATION: 0
SNORES LOUDLY: 0
POOR WOUND HEALING: 0
SINUS CONGESTION: 0
ABDOMINAL PAIN: 0
COUGH: 1
TINGLING: 1
TREMORS: 1
LOSS OF CONSCIOUSNESS: 0
RECTAL PAIN: 0
BOWEL INCONTINENCE: 0
SPUTUM PRODUCTION: 0
NAUSEA: 0
DOUBLE VISION: 0
EYE PAIN: 0
HEADACHES: 0
BLOATING: 1
VOMITING: 0
JAUNDICE: 0
MUSCLE CRAMPS: 1
BLOOD IN STOOL: 1
SPEECH CHANGE: 0
ORTHOPNEA: 1
NECK PAIN: 0
ARTHRALGIAS: 1
EXERCISE INTOLERANCE: 0
DISTURBANCES IN COORDINATION: 1
SORE THROAT: 0
TASTE DISTURBANCE: 0
LIGHT-HEADEDNESS: 0
WEAKNESS: 1
HOARSE VOICE: 0
BACK PAIN: 1
NECK MASS: 0
DIARRHEA: 0
POSTURAL DYSPNEA: 1
SEIZURES: 0
LEG PAIN: 1
MUSCLE WEAKNESS: 1
TROUBLE SWALLOWING: 1
MYALGIAS: 1
DIZZINESS: 1
MEMORY LOSS: 0
STIFFNESS: 1
EYE REDNESS: 0

## 2020-12-04 ENCOUNTER — OFFICE VISIT (OUTPATIENT)
Dept: ORTHOPEDICS | Facility: CLINIC | Age: 70
End: 2020-12-04
Payer: MEDICARE

## 2020-12-04 ENCOUNTER — THERAPY VISIT (OUTPATIENT)
Dept: OCCUPATIONAL THERAPY | Facility: CLINIC | Age: 70
End: 2020-12-04
Payer: MEDICARE

## 2020-12-04 DIAGNOSIS — M79.646 THUMB PAIN: Primary | ICD-10-CM

## 2020-12-04 DIAGNOSIS — M18.0 PRIMARY OSTEOARTHRITIS OF BOTH FIRST CARPOMETACARPAL JOINTS: Primary | ICD-10-CM

## 2020-12-04 DIAGNOSIS — I10 ESSENTIAL HYPERTENSION: ICD-10-CM

## 2020-12-04 DIAGNOSIS — M18.0 PRIMARY OSTEOARTHRITIS OF BOTH FIRST CARPOMETACARPAL JOINTS: ICD-10-CM

## 2020-12-04 PROCEDURE — 99203 OFFICE O/P NEW LOW 30 MIN: CPT | Performed by: ORTHOPAEDIC SURGERY

## 2020-12-04 PROCEDURE — 97165 OT EVAL LOW COMPLEX 30 MIN: CPT | Mod: GO | Performed by: OCCUPATIONAL THERAPIST

## 2020-12-04 PROCEDURE — 97760 ORTHOTIC MGMT&TRAING 1ST ENC: CPT | Mod: GO | Performed by: OCCUPATIONAL THERAPIST

## 2020-12-04 NOTE — NURSING NOTE
Reason For Visit:   Chief Complaint   Patient presents with     Right Hand - Follow Up     Pain, thumb and index finger, wrist and radiates up the arm. Numbness and tinging upon waking in the morning.       Left Hand - Follow Up     Left shoulder and arm pain. Locking of ring and middle finger.        Pain Assessment  Patient Currently in Pain: Yes  Primary Pain Location: Hand(bilateral)        Allergies   Allergen Reactions     Restasis      Burning eyes, problems with breathing, tightness in chest     Adhesive Tape      Bandages misc     Allegra      EXCESSIVE URINATION AND WEAKNESS, LIGHT-HEADED     Allergy      Dust     Animal Dander      Benadryl Allergy      EXCESSIVE URINATION AND WEAKNESS, LIGHT-HEADED     Cephalexin      Joint pain or gerd aggravation. Bloating excessive urination      Cephalosporins      Cyclosporine      Diphenhydramine Other (See Comments)     Doxycycline      Doxycycline Hyclate Nausea     SWEATING,MIGRAINES,LOSS OF APPETITE,SWEATING,LIGHT HEADED, EXCESSIVE URINATION     Fexofenadine Other (See Comments)     Flonase [Fluticasone Propionate]      Gabapentin      Neurontin: mosd changes and excess urination     Hydrocortisone      Iodine      Iodine Solution [Povidone Iodine]      SKIN MELTS     Levaquin      From surgeon--? Joint pain ? Gerd aggravation. Insomnia, excess urination     Levofloxacin      Mylanta      EXCESSIVE URINATION AND WEAKNESS, LIGHT-HEADED     Oxcarbazepine      Prednisone      Weakness, elevated bp, headache, eye pain, congestion      Prednisone      Seafood [Seafood]      Shellfish Allergy      Hives       Simethicone      Sulfamethoxazole W/Trimethoprim      Sulfamethoxazole-Trimethoprim      Chest pain, angina     Symbicort GI Disturbance and Nausea     Trees      Trileptal      SEVERE JOINT AND TENDON PAIN, INSOMNIA, RESTLESSNESS, NAUSEA, EXCESS URINATION     Cortizone Rash     EXCESS URINATION,WEAKNESS,NAUSEA, HEADACHE           Kaitlynn Stratton LPN

## 2020-12-04 NOTE — LETTER
12/4/2020         RE: Lucio Daly  [address]  [place] [address]        Dear Colleague,    Thank you for referring your patient, Lucio Daly, to the [facility]. Please see a copy of my visit note below.    Syed is here for follow-up of both hands.  He complains of left middle finger and ring finger locking and catching.  He notes some right thumb CMC tenderness and pain.  Also has some pain in and around the index finger MP joint and PIP joint.  History of numbness and tingling, diffusely, the whole hand, intermittent.  He wakes up with his hands asleep.  Most of his pain seems to be coming from the right thumb CMC joint though.  He has had adverse reactions to cortisone injections in the past.    The past medical history was reviewed and documented in the chart.  This includes medications, surgeries, social history as well as review of systems.    Physical examination of the right hand demonstrates tenderness and crepitance in and around the thumb CMC joint.  Mild tenderness right index finger MP joint and PIP joint.  No locking catching of this digit.  But there is some tenderness over the left middle finger and ring finger A1 pulleys with palpable locking and catching.  Slightly decreased sensation bilaterally, diffusely, no particular peripheral nerve distribution.  He has brisk capillary refill bilaterally and a palpable radial pulse.  No overlying skin changes.  Mild tenderness over the first dorsal compartment on the right but a negative Finkelstein's.  Negative Tinel's, Phalen's, carpal tunnel compression test, negative elbow flexion test bilaterally.    I reviewed previous wrist x-rays bilaterally, they demonstrate significant right thumb CMC osteoarthritis.  I also reviewed a previous EMG which showed left ulnar neuropathy, mild bilateral median neuropathies.    Impression: Bilateral upper extremity pain, neuropathy, right thumb CMC osteoarthritis, left  middle finger, ring finger trigger digits    Plan: Rich and I had a discussion about treatment options.  His right thumb is most symptomatic at this point.  I have recommended that we start with a thumb spica splint, thermoplastic, he will wear that at night.  He can get a neoprene sleeve for daytime use which will give him some support but allow him to be more functional.  We discussed the possibility of a left middle finger, ring finger trigger digit release but he wants to wait on that for right now.  I will plan on seeing him back in a couple of months to reevaluate.  New films of bilateral hands at that point if he is having ongoing symptoms.  Answers for HPI/ROS submitted by the patient on 12/2/2020   General Symptoms: No  Skin Symptoms: Yes  HENT Symptoms: Yes  EYE SYMPTOMS: Yes  HEART SYMPTOMS: Yes  LUNG SYMPTOMS: Yes  INTESTINAL SYMPTOMS: Yes  URINARY SYMPTOMS: No  REPRODUCTIVE SYMPTOMS: No  SKELETAL SYMPTOMS: Yes  BLOOD SYMPTOMS: No  NERVOUS SYSTEM SYMPTOMS: Yes  MENTAL HEALTH SYMPTOMS: No  Changes in hair: No  Changes in moles/birth marks: No  Itching: Yes  Rashes: Yes  Changes in nails: No  Acne: No  Change in facial hair: No  Warts: No  Non-healing sores: No  Scarring: No  Flaking of skin: No  Color changes of hands/feet in cold : No  Sun sensitivity: No  Skin thickening: No  Ear pain: No  Ear discharge: No  Hearing loss: No  Tinnitus: Yes  Nosebleeds: No  Congestion: No  Sinus pain: No  Trouble swallowing: Yes   Voice hoarseness: No  Mouth sores: No  Sore throat: No  Tooth pain: No  Gum tenderness: No  Bleeding gums: No  Change in taste: No  Change in sense of smell: No  Dry mouth: Yes  Hearing aid used: No  Neck lump: No  Eye pain: No  Vision loss: No  Dry eyes: Yes  Watery eyes: No  Eye bulging: No  Double vision: No  Flashing of lights: No  Spots: No  Floaters: Yes  Redness: No  Crossed eyes: No  Tunnel Vision: No  Yellowing of eyes: No  Eye irritation: No  Cough: Yes  Sputum or phlegm: No  Coughing  up blood: No  Difficulty breating or shortness of breath: Yes  Snoring: No  Wheezing: Yes  Difficulty breathing on exertion: Yes  Nighttime Cough: No  Difficulty breathing when lying flat: Yes  Chest pain or pressure: No  Fast or irregular heartbeat: No  Pain in legs with walking: Yes  Trouble breathing while lying down: Yes  Fingers or toes appear blue: No  High blood pressure: No  Low blood pressure: No  Fainting: No  Murmurs: No  Pacemaker: No  Varicose veins: No  Edema or swelling: No  Wake up at night with shortness of breath: No  Light-headedness: No  Exercise intolerance: No  Heart burn or indigestion: Yes  Nausea: No  Vomiting: No  Abdominal pain: No  Bloating: Yes  Constipation: No  Diarrhea: No  Blood in stool: Yes  Black stools: No  Rectal or Anal pain: No  Fecal incontinence: No  Yellowing of skin or eyes: No  Vomit with blood: No  Change in stools: No  Back pain: Yes  Muscle aches: Yes  Neck pain: No  Swollen joints: Yes  Joint pain: Yes  Bone pain: No  Muscle cramps: Yes  Muscle weakness: Yes  Joint stiffness: Yes  Bone fracture: No  Trouble with coordination: Yes  Dizziness or trouble with balance: Yes  Fainting or black-out spells: No  Memory loss: No  Headache: No  Seizures: No  Speech problems: No  Tingling: Yes  Tremor: Yes  Weakness: Yes  Difficulty walking: Yes  Paralysis: No  Numbness: Yes          Ramón Camargo MD

## 2020-12-04 NOTE — PROGRESS NOTES
Syed is here for follow-up of both hands.  He complains of left middle finger and ring finger locking and catching.  He notes some right thumb CMC tenderness and pain.  Also has some pain in and around the index finger MP joint and PIP joint.  History of numbness and tingling, diffusely, the whole hand, intermittent.  He wakes up with his hands asleep.  Most of his pain seems to be coming from the right thumb CMC joint though.  He has had adverse reactions to cortisone injections in the past.    The past medical history was reviewed and documented in the chart.  This includes medications, surgeries, social history as well as review of systems.    Physical examination of the right hand demonstrates tenderness and crepitance in and around the thumb CMC joint.  Mild tenderness right index finger MP joint and PIP joint.  No locking catching of this digit.  But there is some tenderness over the left middle finger and ring finger A1 pulleys with palpable locking and catching.  Slightly decreased sensation bilaterally, diffusely, no particular peripheral nerve distribution.  He has brisk capillary refill bilaterally and a palpable radial pulse.  No overlying skin changes.  Mild tenderness over the first dorsal compartment on the right but a negative Finkelstein's.  Negative Tinel's, Phalen's, carpal tunnel compression test, negative elbow flexion test bilaterally.    I reviewed previous wrist x-rays bilaterally, they demonstrate significant right thumb CMC osteoarthritis.  I also reviewed a previous EMG which showed left ulnar neuropathy, mild bilateral median neuropathies.    Impression: Bilateral upper extremity pain, neuropathy, right thumb CMC osteoarthritis, left middle finger, ring finger trigger digits    Plan: Syed and I had a discussion about treatment options.  His right thumb is most symptomatic at this point.  I have recommended that we start with a thumb spica splint, thermoplastic, he will wear that at night.   He can get a neoprene sleeve for daytime use which will give him some support but allow him to be more functional.  We discussed the possibility of a left middle finger, ring finger trigger digit release but he wants to wait on that for right now.  I will plan on seeing him back in a couple of months to reevaluate.  New films of bilateral hands at that point if he is having ongoing symptoms.  Answers for HPI/ROS submitted by the patient on 12/2/2020   General Symptoms: No  Skin Symptoms: Yes  HENT Symptoms: Yes  EYE SYMPTOMS: Yes  HEART SYMPTOMS: Yes  LUNG SYMPTOMS: Yes  INTESTINAL SYMPTOMS: Yes  URINARY SYMPTOMS: No  REPRODUCTIVE SYMPTOMS: No  SKELETAL SYMPTOMS: Yes  BLOOD SYMPTOMS: No  NERVOUS SYSTEM SYMPTOMS: Yes  MENTAL HEALTH SYMPTOMS: No  Changes in hair: No  Changes in moles/birth marks: No  Itching: Yes  Rashes: Yes  Changes in nails: No  Acne: No  Change in facial hair: No  Warts: No  Non-healing sores: No  Scarring: No  Flaking of skin: No  Color changes of hands/feet in cold : No  Sun sensitivity: No  Skin thickening: No  Ear pain: No  Ear discharge: No  Hearing loss: No  Tinnitus: Yes  Nosebleeds: No  Congestion: No  Sinus pain: No  Trouble swallowing: Yes   Voice hoarseness: No  Mouth sores: No  Sore throat: No  Tooth pain: No  Gum tenderness: No  Bleeding gums: No  Change in taste: No  Change in sense of smell: No  Dry mouth: Yes  Hearing aid used: No  Neck lump: No  Eye pain: No  Vision loss: No  Dry eyes: Yes  Watery eyes: No  Eye bulging: No  Double vision: No  Flashing of lights: No  Spots: No  Floaters: Yes  Redness: No  Crossed eyes: No  Tunnel Vision: No  Yellowing of eyes: No  Eye irritation: No  Cough: Yes  Sputum or phlegm: No  Coughing up blood: No  Difficulty breating or shortness of breath: Yes  Snoring: No  Wheezing: Yes  Difficulty breathing on exertion: Yes  Nighttime Cough: No  Difficulty breathing when lying flat: Yes  Chest pain or pressure: No  Fast or irregular heartbeat: No  Pain  in legs with walking: Yes  Trouble breathing while lying down: Yes  Fingers or toes appear blue: No  High blood pressure: No  Low blood pressure: No  Fainting: No  Murmurs: No  Pacemaker: No  Varicose veins: No  Edema or swelling: No  Wake up at night with shortness of breath: No  Light-headedness: No  Exercise intolerance: No  Heart burn or indigestion: Yes  Nausea: No  Vomiting: No  Abdominal pain: No  Bloating: Yes  Constipation: No  Diarrhea: No  Blood in stool: Yes  Black stools: No  Rectal or Anal pain: No  Fecal incontinence: No  Yellowing of skin or eyes: No  Vomit with blood: No  Change in stools: No  Back pain: Yes  Muscle aches: Yes  Neck pain: No  Swollen joints: Yes  Joint pain: Yes  Bone pain: No  Muscle cramps: Yes  Muscle weakness: Yes  Joint stiffness: Yes  Bone fracture: No  Trouble with coordination: Yes  Dizziness or trouble with balance: Yes  Fainting or black-out spells: No  Memory loss: No  Headache: No  Seizures: No  Speech problems: No  Tingling: Yes  Tremor: Yes  Weakness: Yes  Difficulty walking: Yes  Paralysis: No  Numbness: Yes

## 2020-12-04 NOTE — LETTER
DEPARTMENT OF HEALTH AND HUMAN SERVICES  [facility]    PLAN/UPDATED PLAN OF PROGRESS FOR OUTPATIENT REHABILITATION          PATIENTS NAME:  Lucio Daly     : 1950    [facility]:    [id number]    [id number]     PROVIDER NAME: [facility]    MEDICAL RECORD NUMBER: [id number]     START OF CARE DATE:  SOC Date: 20   TYPE:  OT    PRIMARY/TREATMENT DIAGNOSIS: (Pertinent Medical Diagnosis)  Primary osteoarthritis of both first carpometacarpal joints  Thumb pain    VISITS FROM START OF CARE:  Rxs Used: 1     Hand Therapy Initial Evaluation  Current Date:  2020    Diagnosis: Primary osteoarthritis of both first carpometacarpal joints  DOI: 20 (Md order date)     Subjective:  Lucio Daly is a 70 year old male.  Patient reports symptoms of the bilateral thumbs (R>L) which occurred due to degenerative changes. Since onset symptoms are Unchanged  General health as reported by patient is fair.                                        Lucio Daly     Pertinent medical history includes:   Past Medical History:   Diagnosis Date     Abnormal EMG 2013     Abnormal involuntary movements(781.0)     Movement Disorder     AK (actinic keratosis) 2011     Allergic rhinitis, cause unspecified     Allergic rhinitis     Balance problems 2011     Basal cell carcinoma      Bladder spasms 2011     Chronic osteoarthritis      COPD (chronic obstructive pulmonary disease) (H)     Interstial lung disease, obliderans bronchiolitis     Diaphragmatic hernia without mention of obstruction or gangrene      Earache or other ear, nose, or throat complaint      Esophageal reflux      Fatigue 2011     Fracture      H. pylori infection 2011     History of MRI of cervical spine 2013    EXAMINATION: CERVICAL SPINE G/E 5 VIEWS* 2013 4:18 PM  CLINICAL HISTORY: Pain in limb,Performing Location?->[facility]),  COMPARISON:   FINDINGS: AP and lateral views in flexion and extension, as well as odontoid view of the cervical spine was obtained. There is no comparison available. The vertebral bodies of the cervical spine are normally aligned. There is posterior spurring and d     Incomplete defecation 11/1/2011     Interstitial lung disease (H) 11/29/2016     Laboratory test 8/7/2012     Lung disease June 2015     Malignant basal cell neoplasm of skin 8/6/2008     Melanoma (H) 8/6/2008     Melanoma in situ of lower leg (H)     R calf     Neuropathy 5/16/2011     Other bladder disorder      Other color vision deficiencies      Other nervous system complications      Parkinsonism (H) 11/1/2011     Personal history of colonic polyps      Polyneuropathy in other diseases classified elsewhere (H)      RA (rheumatoid arthritis) (H)      Rheumatoid arthritis of multiple sites with negative rheumatoid factor (H) 3/21/2016     Seronegative arthritis 11/18/2013     Shortness of breath      Shoulder arthritis 2016    acromioclavicular joint      Somatization disorder 5/12/2011     Squamous cell carcinoma      Tremor 11/1/2011     Unspecified essential hypertension      Unspecified hypothyroidism      Urinary tract infection      Urinary urgency 11/1/2011     Wears glasses 11/1/2011       Lucio Daly     Medical allergies:    Allergies   Allergen Reactions     Restasis      Burning eyes, problems with breathing, tightness in chest     Adhesive Tape      Bandages misc     Allegra      EXCESSIVE URINATION AND WEAKNESS, LIGHT-HEADED     Allergy      Dust     Animal Dander      Benadryl Allergy      EXCESSIVE URINATION AND WEAKNESS, LIGHT-HEADED     Cephalexin      Joint pain or gerd aggravation. Bloating excessive urination      Cephalosporins      Cyclosporine      Diphenhydramine Other (See Comments)     Doxycycline      Doxycycline Hyclate Nausea     SWEATING,MIGRAINES,LOSS OF APPETITE,SWEATING,LIGHT HEADED, EXCESSIVE URINATION     Fexofenadine  Other (See Comments)     Flonase [Fluticasone Propionate]      Gabapentin      Neurontin: mosd changes and excess urination     Hydrocortisone      Iodine      Iodine Solution [Povidone Iodine]      SKIN MELTS     Levaquin      From surgeon--? Joint pain ? Gerd aggravation. Insomnia, excess urination     Levofloxacin      Mylanta      EXCESSIVE URINATION AND WEAKNESS, LIGHT-HEADED     Oxcarbazepine      Prednisone      Weakness, elevated bp, headache, eye pain, congestion      Prednisone      Seafood [Seafood]      Shellfish Allergy      Hives       Simethicone      Sulfamethoxazole W/Trimethoprim      Sulfamethoxazole-Trimethoprim      Chest pain, angina     Symbicort GI Disturbance and Nausea     Trees      Trileptal      SEVERE JOINT AND TENDON PAIN, INSOMNIA, RESTLESSNESS, NAUSEA, EXCESS URINATION     Cortizone Rash     EXCESS URINATION,WEAKNESS,NAUSEA, HEADACHE       Lucio Daly     Surgical history:   Past Surgical History:   Procedure Laterality Date     BIOPSY OF SKIN LESION       COLONOSCOPY       HEMORRHOID SURGERY       lip biopsy      for sicca complex     MOHS MICROGRAPHIC PROCEDURE       SOFT TISSUE SURGERY      removeal of basel cell carcinoma     Medication history:   Current Outpatient Medications   Medication     acetaminophen (TYLENOL) 500 MG tablet     albuterol (PROAIR HFA/PROVENTIL HFA/VENTOLIN HFA) 108 (90 Base) MCG/ACT inhaler     cholecalciferol (HM VITAMIN D3) 25 MCG (1000 UT) TABS     folic acid (FOLVITE) 1 MG tablet     hydroxychloroquine (PLAQUENIL) 200 MG tablet     methotrexate 2.5 MG tablet     metoprolol succinate ER (TOPROL-XL) 50 MG 24 hr tablet     omeprazole (PRILOSEC) 40 MG DR capsule     ORDER FOR DME     oxybutynin (DITROPAN) 5 MG tablet     pregabalin (LYRICA) 50 MG capsule     salmeterol (SEREVENT DISKUS) 50 MCG/DOSE inhaler     No current facility-administered medications for this visit.    Current occupation is retired    Occupational Profile Information:  Right hand  dominant  Prior functional level:  no limitations  Patient reports symptoms of pain  Special tests:  x-ray.    Previous treatment: none  Barriers include:none  Mobility: Ambulates with aid of CANE  Transportation: drives  Leisure activities/hobbies: reading     Lucio Daly     Functional Outcome Measure:   Upper Extremity Functional Index Score:  SCORE:   Column Totals: /80: 54   (A lower score indicates greater disability.)    Objective:  Pain Level (Scale 0-10)   12/4/2020   At Rest 0   With Use 10     Pain Description  Date 12/4/2020   Location BL wrist, R > L    Pain Quality Aching, Dull, Shooting and Stabbing   Frequency intermittent     Pain is worst  daytime   Exacerbated by  gripping    Relieved by rest   Progression Unchanged      ROM  Thumb 12/4/2020 12/4/2020   AROM  (PROM) R L   MP 0/45 0/40   IP 0/60 0/60   RABD 31 47   PABD 38 45   Retropulsion (cm) nt nt   Kapandji Opposition Scale (0-10/10) 5 10     Thumb Observation/Appearance   - none  + mild    ++ moderate    +++ severe     12/4/2020   Shoulder deformity present over CMC R: -  L: -   Edema over the CMC joint R: +  L: -   Noted collapse of MP into hyperextension during pinch R: -  L: -             Lucio Daly     Provocative Tests  Pain Report:  - none    + mild    ++ moderate    +++ severe     12/4/2020   CMC Grind test R: nt  L: nt   Crepitus present R: -  L: -   CMC Adduction Stress Test R: -  L: -   CMC Extension Stress Test R: -  L: -   Finkelstein's R: -  L: -       Strength   (Measured in pounds)  Pain Report: - none  + mild    ++ moderate    +++ severe    12/4/2020 12/4/2020   Trials L R   1  2  3 25 15   Average 25 15     Lat Pinch 12/4/2020 12/4/2020   Trials L R   1  2  3 10 8   Average 10 8     3 Pt Pinch 12/4/2020 12/4/2020   Trials L R   1  2  3 10 8   Average L R     Assessment:  Patient presents with symptoms consistent with diagnosis of bilateral thumb CMC thumb arthritis, with conservative intervention.    Patient's  limitations or Problem List includes:  Pain, Decreased ROM/motion, Increased edema and Weakness of the bilateral thumb which interferes with the patient's ability to perform Self Care Tasks (dressing, eating, bathing, hygiene/toileting), Recreational Activities and Household Chores as compared to previous level of function.    Rehab Potential:  Excellent - Return to full activity, no limitations    Patient will benefit from skilled Occupational Therapy to increase ROM and decrease pain and edema to return to previous activity level and resume normal daily tasks and to reach their rehab potential.    Lucio Daly     Barriers to Learning:  No barrier    Communication Issues:  Patient appears to be able to clearly communicate and understand verbal and written communication and follow directions correctly.    Chart Review: Chart Review and Brief history including review of medical and/or therapy records relating to the presenting problem    Identified Performance Deficits: bathing/showering, toileting, dressing, feeding, functional mobility, personal device care, hygiene and grooming, health management and maintenance, home establishment and management, meal preparation and cleanup and leisure activities    Assessment of Occupational Performance:  5 or more Performance Deficits    Clinical Decision Making (Complexity): Low complexity    Treatment Explanation:  The following has been discussed with the patient:    RX ordered/plan of care  Anticipated outcomes  Possible risks and side effects    Plan:  Frequency:  3 X a month, once daily  Duration:  for 2 months    Treatment Plan:    Modalities:    Paraffin   Therapeutic Exercise:    AROM, AAROM, PROM, Place and Hold, Isotonics, Isometrics and Stabilization  Therapeutic Activities:   Functional activities   Neuromuscular re-ed:   Kinesiotaping and Strain Counter Strain  Manual Techniques:   Joint mobilization, Myofascial release and Manual edema mobilization  Orthotic  "Fabrication:    Static orthosis  Self Care:    Self Care Tasks, Ergonomic Considerations and Community Transportation    Discharge Plan:  Achieve all LTG  [place] in home treatment program.  Reach maximal therapeutic benefit.                  Lucio Daly     Home Program:  Warmth  Self Myofascial Release  Self CMC mobilization   Thumb Stabilization Program   Hand based Thumb Spica Orthosis  Incorporate joint protection into daily functional activities  Adaptive equipment as needed  CMC neoprene orthosis during the day with ADL s per symptoms    Next Visit:  Place and hold pinch  Warmth   C thumb stabilization       Caregiver Signature/Credentials _____________________________ Date ________       Natalie Wyatt OT      I have reviewed and certified the need for these services and plan of treatment while under my care.        PHYSICIAN'S SIGNATURE:   _________________________________________  Date___________   Ramón Camargo MD    Certification period:  Beginning of Cert date period: 12/04/20 to  End of Cert period date: 02/01/21     Functional Level Progress Report: Please see attached \"Goal Flow sheet for Functional level.\"    ____X____ Continue Services or       ________ DC Services                Service dates: From  SOC Date: 12/04/20 date to present                         "

## 2020-12-04 NOTE — PROGRESS NOTES
Hand Therapy Initial Evaluation    Current Date:  12/4/2020    Diagnosis: Primary osteoarthritis of both first carpometacarpal joints  DOI: 12/4/20 (Md order date)     Subjective:  Lucio Daly is a 70 year old male.    Patient reports symptoms of the bilateral thumbs (R>L) which occurred due to degenerative changes. Since onset symptoms are Unchanged  General health as reported by patient is fair.      Pertinent medical history includes:   Past Medical History:   Diagnosis Date     Abnormal EMG 4/18/2013     Abnormal involuntary movements(781.0)     Movement Disorder     AK (actinic keratosis) 12/18/2011     Allergic rhinitis, cause unspecified     Allergic rhinitis     Balance problems 11/1/2011     Basal cell carcinoma      Bladder spasms 11/1/2011     Chronic osteoarthritis      COPD (chronic obstructive pulmonary disease) (H)     Interstial lung disease, obliderans bronchiolitis     Diaphragmatic hernia without mention of obstruction or gangrene      Earache or other ear, nose, or throat complaint      Esophageal reflux      Fatigue 11/1/2011     Fracture      H. pylori infection 5/12/2011     History of MRI of cervical spine 11/18/2013    EXAMINATION: CERVICAL SPINE G/E 5 VIEWS* 4/19/2013 4:18 PM  CLINICAL HISTORY: Pain in limb,Performing Location?->[facility] ([facility]),  COMPARISON:  FINDINGS: AP and lateral views in flexion and extension, as well as odontoid view of the cervical spine was obtained. There is no comparison available. The vertebral bodies of the cervical spine are normally aligned. There is posterior spurring and d     Incomplete defecation 11/1/2011     Interstitial lung disease (H) 11/29/2016     Laboratory test 8/7/2012     Lung disease June 2015     Malignant basal cell neoplasm of skin 8/6/2008     Melanoma (H) 8/6/2008     Melanoma in situ of lower leg (H)     R calf     Neuropathy 5/16/2011     Other bladder disorder      Other color vision deficiencies      Other nervous system  complications      Parkinsonism (H) 11/1/2011     Personal history of colonic polyps      Polyneuropathy in other diseases classified elsewhere (H)      RA (rheumatoid arthritis) (H)      Rheumatoid arthritis of multiple sites with negative rheumatoid factor (H) 3/21/2016     Seronegative arthritis 11/18/2013     Shortness of breath      Shoulder arthritis 2016    acromioclavicular joint      Somatization disorder 5/12/2011     Squamous cell carcinoma      Tremor 11/1/2011     Unspecified essential hypertension      Unspecified hypothyroidism      Urinary tract infection      Urinary urgency 11/1/2011     Wears glasses 11/1/2011       Medical allergies:    Allergies   Allergen Reactions     Restasis      Burning eyes, problems with breathing, tightness in chest     Adhesive Tape      Bandages misc     Allegra      EXCESSIVE URINATION AND WEAKNESS, LIGHT-HEADED     Allergy      Dust     Animal Dander      Benadryl Allergy      EXCESSIVE URINATION AND WEAKNESS, LIGHT-HEADED     Cephalexin      Joint pain or gerd aggravation. Bloating excessive urination      Cephalosporins      Cyclosporine      Diphenhydramine Other (See Comments)     Doxycycline      Doxycycline Hyclate Nausea     SWEATING,MIGRAINES,LOSS OF APPETITE,SWEATING,LIGHT HEADED, EXCESSIVE URINATION     Fexofenadine Other (See Comments)     Flonase [Fluticasone Propionate]      Gabapentin      Neurontin: mosd changes and excess urination     Hydrocortisone      Iodine      Iodine Solution [Povidone Iodine]      SKIN MELTS     Levaquin      From surgeon--? Joint pain ? Gerd aggravation. Insomnia, excess urination     Levofloxacin      Mylanta      EXCESSIVE URINATION AND WEAKNESS, LIGHT-HEADED     Oxcarbazepine      Prednisone      Weakness, elevated bp, headache, eye pain, congestion      Prednisone      Seafood [Seafood]      Shellfish Allergy      Hives       Simethicone      Sulfamethoxazole W/Trimethoprim      Sulfamethoxazole-Trimethoprim      Chest  pain, angina     Symbicort GI Disturbance and Nausea     Trees      Trileptal      SEVERE JOINT AND TENDON PAIN, INSOMNIA, RESTLESSNESS, NAUSEA, EXCESS URINATION     Cortizone Rash     EXCESS URINATION,WEAKNESS,NAUSEA, HEADACHE       Surgical history:   Past Surgical History:   Procedure Laterality Date     BIOPSY OF SKIN LESION       COLONOSCOPY       HEMORRHOID SURGERY       lip biopsy      for sicca complex     MOHS MICROGRAPHIC PROCEDURE       SOFT TISSUE SURGERY      removeal of basel cell carcinoma       Medication history:   Current Outpatient Medications   Medication     acetaminophen (TYLENOL) 500 MG tablet     albuterol (PROAIR HFA/PROVENTIL HFA/VENTOLIN HFA) 108 (90 Base) MCG/ACT inhaler     cholecalciferol (HM VITAMIN D3) 25 MCG (1000 UT) TABS     folic acid (FOLVITE) 1 MG tablet     hydroxychloroquine (PLAQUENIL) 200 MG tablet     methotrexate 2.5 MG tablet     metoprolol succinate ER (TOPROL-XL) 50 MG 24 hr tablet     omeprazole (PRILOSEC) 40 MG DR capsule     ORDER FOR DME     oxybutynin (DITROPAN) 5 MG tablet     pregabalin (LYRICA) 50 MG capsule     salmeterol (SEREVENT DISKUS) 50 MCG/DOSE inhaler     No current facility-administered medications for this visit.      Current occupation is retired    Occupational Profile Information:  Right hand dominant  Prior functional level:  no limitations  Patient reports symptoms of pain  Special tests:  x-ray.    Previous treatment: none  Barriers include:none  Mobility: Ambulates with aid of CANE  Transportation: drives  Leisure activities/hobbies: reading     Functional Outcome Measure:   Upper Extremity Functional Index Score:  SCORE:   Column Totals: /80: 54   (A lower score indicates greater disability.)    Objective:  Pain Level (Scale 0-10)   12/4/2020   At Rest 0   With Use 10     Pain Description  Date 12/4/2020   Location BL wrist, R > L    Pain Quality Aching, Dull, Shooting and Stabbing   Frequency intermittent     Pain is worst  daytime    Exacerbated by  gripping    Relieved by rest   Progression Unchanged      ROM  Thumb 12/4/2020 12/4/2020   AROM  (PROM) R L   MP 0/45 0/40   IP 0/60 0/60   RABD 31 47   PABD 38 45   Retropulsion (cm) nt nt   Kapandji Opposition Scale (0-10/10) 5 10     Thumb Observation/Appearance   - none  + mild    ++ moderate    +++ severe     12/4/2020   Shoulder deformity present over CMC R: -  L: -   Edema over the CMC joint R: +  L: -   Noted collapse of MP into hyperextension during pinch R: -  L: -      Provocative Tests  Pain Report:  - none    + mild    ++ moderate    +++ severe     12/4/2020   CMC Grind test R: nt  L: nt   Crepitus present R: -  L: -   CMC Adduction Stress Test R: -  L: -   CMC Extension Stress Test R: -  L: -   Finkelstein's R: -  L: -       Strength   (Measured in pounds)  Pain Report: - none  + mild    ++ moderate    +++ severe    12/4/2020 12/4/2020   Trials L R   1  2  3 25 15   Average 25 15     Lat Pinch 12/4/2020 12/4/2020   Trials L R   1  2  3 10 8   Average 10 8     3 Pt Pinch 12/4/2020 12/4/2020   Trials L R   1  2  3 10 8   Average L R     Assessment:  Patient presents with symptoms consistent with diagnosis of bilateral thumb CMC thumb arthritis, with conservative intervention.    Patient's limitations or Problem List includes:  Pain, Decreased ROM/motion, Increased edema and Weakness of the bilateral thumb which interferes with the patient's ability to perform Self Care Tasks (dressing, eating, bathing, hygiene/toileting), Recreational Activities and Household Chores as compared to previous level of function.    Rehab Potential:  Excellent - Return to full activity, no limitations    Patient will benefit from skilled Occupational Therapy to increase ROM and decrease pain and edema to return to previous activity level and resume normal daily tasks and to reach their rehab potential.    Barriers to Learning:  No barrier    Communication Issues:  Patient appears to be able to clearly  communicate and understand verbal and written communication and follow directions correctly.    Chart Review: Chart Review and Brief history including review of medical and/or therapy records relating to the presenting problem    Identified Performance Deficits: bathing/showering, toileting, dressing, feeding, functional mobility, personal device care, hygiene and grooming, health management and maintenance, home establishment and management, meal preparation and cleanup and leisure activities    Assessment of Occupational Performance:  5 or more Performance Deficits    Clinical Decision Making (Complexity): Low complexity    Treatment Explanation:  The following has been discussed with the patient:    RX ordered/plan of care  Anticipated outcomes  Possible risks and side effects    Plan:  Frequency:  3 X a month, once daily  Duration:  for 2 months    Treatment Plan:    Modalities:    Paraffin   Therapeutic Exercise:    AROM, AAROM, PROM, Place and Hold, Isotonics, Isometrics and Stabilization  Therapeutic Activities:   Functional activities   Neuromuscular re-ed:   Kinesiotaping and Strain Counter Strain  Manual Techniques:   Joint mobilization, Myofascial release and Manual edema mobilization  Orthotic Fabrication:    Static orthosis  Self Care:    Self Care Tasks, Ergonomic Considerations and Community Transportation    Discharge Plan:  Achieve all LTG  [place] in home treatment program.  Reach maximal therapeutic benefit.    Home Program:  Warmth  Self Myofascial Release  Self CMC mobilization   Thumb Stabilization Program   Hand based Thumb Spica Orthosis  Incorporate joint protection into daily functional activities  Adaptive equipment as needed  CMC neoprene orthosis during the day with ADL s per symptoms    Next Visit:  Place and hold pinch  Warmth   C thumb stabilization

## 2020-12-05 RX ORDER — METOPROLOL SUCCINATE 50 MG/1
TABLET, EXTENDED RELEASE ORAL
Qty: 135 TABLET | Refills: 1 | Status: SHIPPED | OUTPATIENT
Start: 2020-12-05 | End: 2021-06-02

## 2020-12-06 PROBLEM — M79.646 THUMB PAIN: Status: ACTIVE | Noted: 2017-03-17

## 2020-12-06 PROBLEM — M18.0 PRIMARY OSTEOARTHRITIS OF BOTH FIRST CARPOMETACARPAL JOINTS: Status: ACTIVE | Noted: 2020-12-06

## 2020-12-10 ENCOUNTER — CARE COORDINATION (OUTPATIENT)
Dept: SLEEP MEDICINE | Facility: CLINIC | Age: 70
End: 2020-12-10

## 2020-12-11 ENCOUNTER — THERAPY VISIT (OUTPATIENT)
Dept: OCCUPATIONAL THERAPY | Facility: CLINIC | Age: 70
End: 2020-12-11
Payer: MEDICARE

## 2020-12-11 DIAGNOSIS — M79.646 THUMB PAIN: ICD-10-CM

## 2020-12-11 DIAGNOSIS — M18.0 PRIMARY OSTEOARTHRITIS OF BOTH FIRST CARPOMETACARPAL JOINTS: ICD-10-CM

## 2020-12-11 PROCEDURE — 97110 THERAPEUTIC EXERCISES: CPT | Mod: GO | Performed by: OCCUPATIONAL THERAPIST

## 2020-12-11 PROCEDURE — 97140 MANUAL THERAPY 1/> REGIONS: CPT | Mod: GO | Performed by: OCCUPATIONAL THERAPIST

## 2020-12-11 NOTE — PROGRESS NOTES
Faxed signed CMN for Cpap supplies to [facility] [phone].  Copy of CMN sent to HIM to be scanned into epic.

## 2020-12-14 ENCOUNTER — ANCILLARY PROCEDURE (OUTPATIENT)
Dept: GENERAL RADIOLOGY | Facility: CLINIC | Age: 70
End: 2020-12-14
Attending: PODIATRIST
Payer: MEDICARE

## 2020-12-14 ENCOUNTER — OFFICE VISIT (OUTPATIENT)
Dept: ORTHOPEDICS | Facility: CLINIC | Age: 70
End: 2020-12-14
Payer: MEDICARE

## 2020-12-14 ENCOUNTER — PRE VISIT (OUTPATIENT)
Dept: ORTHOPEDICS | Facility: CLINIC | Age: 70
End: 2020-12-14

## 2020-12-14 DIAGNOSIS — M79.89 PAIN AND SWELLING OF TOE OF LEFT FOOT: ICD-10-CM

## 2020-12-14 DIAGNOSIS — M79.675 PAIN OF TOE OF LEFT FOOT: Primary | ICD-10-CM

## 2020-12-14 DIAGNOSIS — Z79.899 HIGH RISK MEDICATIONS (NOT ANTICOAGULANTS) LONG-TERM USE: ICD-10-CM

## 2020-12-14 DIAGNOSIS — M06.09 RHEUMATOID ARTHRITIS OF MULTIPLE SITES WITH NEGATIVE RHEUMATOID FACTOR (H): ICD-10-CM

## 2020-12-14 DIAGNOSIS — M79.675 PAIN AND SWELLING OF TOE OF LEFT FOOT: ICD-10-CM

## 2020-12-14 DIAGNOSIS — M19.072 PRIMARY LOCALIZED OSTEOARTHROSIS OF LEFT ANKLE AND FOOT: ICD-10-CM

## 2020-12-14 DIAGNOSIS — M79.675 PAIN OF TOE OF LEFT FOOT: ICD-10-CM

## 2020-12-14 LAB
ALBUMIN SERPL-MCNC: 3.8 G/DL (ref 3.4–5)
ALT SERPL W P-5'-P-CCNC: 26 U/L (ref 0–70)
AST SERPL W P-5'-P-CCNC: 14 U/L (ref 0–45)
CREAT SERPL-MCNC: 1.19 MG/DL (ref 0.66–1.25)
CRP SERPL-MCNC: 9 MG/L (ref 0–8)
ERYTHROCYTE [DISTWIDTH] IN BLOOD BY AUTOMATED COUNT: 14.6 % (ref 10–15)
GFR SERPL CREATININE-BSD FRML MDRD: 61 ML/MIN/{1.73_M2}
HCT VFR BLD AUTO: 44 % (ref 40–53)
HGB BLD-MCNC: 13.8 G/DL (ref 13.3–17.7)
MCH RBC QN AUTO: 30 PG (ref 26.5–33)
MCHC RBC AUTO-ENTMCNC: 31.4 G/DL (ref 31.5–36.5)
MCV RBC AUTO: 96 FL (ref 78–100)
PLATELET # BLD AUTO: 199 10E9/L (ref 150–450)
RBC # BLD AUTO: 4.6 10E12/L (ref 4.4–5.9)
URATE SERPL-MCNC: 6.2 MG/DL (ref 3.5–7.2)
WBC # BLD AUTO: 5.6 10E9/L (ref 4–11)

## 2020-12-14 PROCEDURE — 86140 C-REACTIVE PROTEIN: CPT | Performed by: PATHOLOGY

## 2020-12-14 PROCEDURE — 84460 ALANINE AMINO (ALT) (SGPT): CPT | Performed by: PATHOLOGY

## 2020-12-14 PROCEDURE — 99213 OFFICE O/P EST LOW 20 MIN: CPT | Performed by: PODIATRIST

## 2020-12-14 PROCEDURE — 82040 ASSAY OF SERUM ALBUMIN: CPT | Performed by: PATHOLOGY

## 2020-12-14 PROCEDURE — 85027 COMPLETE CBC AUTOMATED: CPT | Performed by: PATHOLOGY

## 2020-12-14 PROCEDURE — 84450 TRANSFERASE (AST) (SGOT): CPT | Performed by: PATHOLOGY

## 2020-12-14 PROCEDURE — 82565 ASSAY OF CREATININE: CPT | Performed by: PATHOLOGY

## 2020-12-14 PROCEDURE — 84550 ASSAY OF BLOOD/URIC ACID: CPT | Performed by: PATHOLOGY

## 2020-12-14 PROCEDURE — 73630 X-RAY EXAM OF FOOT: CPT | Mod: LT | Performed by: RADIOLOGY

## 2020-12-14 PROCEDURE — 36415 COLL VENOUS BLD VENIPUNCTURE: CPT | Performed by: PATHOLOGY

## 2020-12-14 NOTE — LETTER
12/14/2020         RE: Lucio Daly  [address]  [place] [address]        Dear Colleague,    Thank you for referring your patient, Lucio Daly, to the [facility] [place]. Please see a copy of my visit note below.    Past Medical History:   Diagnosis Date     Abnormal EMG 4/18/2013     Abnormal involuntary movements(781.0)     Movement Disorder     AK (actinic keratosis) 12/18/2011     Allergic rhinitis, cause unspecified     Allergic rhinitis     Balance problems 11/1/2011     Basal cell carcinoma      Bladder spasms 11/1/2011     Chronic osteoarthritis      COPD (chronic obstructive pulmonary disease) (H)     Interstial lung disease, obliderans bronchiolitis     Diaphragmatic hernia without mention of obstruction or gangrene      Earache or other ear, nose, or throat complaint      Esophageal reflux      Fatigue 11/1/2011     Fracture      H. pylori infection 5/12/2011     History of MRI of cervical spine 11/18/2013    EXAMINATION: CERVICAL SPINE G/E 5 VIEWS* 4/19/2013 4:18 PM  CLINICAL HISTORY: Pain in limb,Performing Location?->[facility] ([facility]),  COMPARISON:  FINDINGS: AP and lateral views in flexion and extension, as well as odontoid view of the cervical spine was obtained. There is no comparison available. The vertebral bodies of the cervical spine are normally aligned. There is posterior spurring and d     Incomplete defecation 11/1/2011     Interstitial lung disease (H) 11/29/2016     Laboratory test 8/7/2012     Lung disease June 2015     Malignant basal cell neoplasm of skin 8/6/2008     Melanoma (H) 8/6/2008     Melanoma in situ of lower leg (H)     R calf     Neuropathy 5/16/2011     Other bladder disorder      Other color vision deficiencies      Other nervous system complications      Parkinsonism (H) 11/1/2011     Personal history of colonic polyps      Polyneuropathy in other diseases classified elsewhere (H)      RA (rheumatoid arthritis) (H)       Rheumatoid arthritis of multiple sites with negative rheumatoid factor (H) 3/21/2016     Seronegative arthritis 11/18/2013     Shortness of breath      Shoulder arthritis 2016    acromioclavicular joint      Somatization disorder 5/12/2011     Squamous cell carcinoma      Tremor 11/1/2011     Unspecified essential hypertension      Unspecified hypothyroidism      Urinary tract infection      Urinary urgency 11/1/2011     Wears glasses 11/1/2011     Patient Active Problem List   Diagnosis     Diaphragmatic hernia     Esophageal reflux     Hx of melanoma of skin     Malignant basal cell neoplasm of skin     GALO (obstructive sleep apnea)     Chronic maxillary sinusitis     Urinary incontinence     H. pylori infection     Sicca syndrome (H)     Health Care Home     Advanced directives, counseling/discussion     Tremor     Visual changes     Incomplete defecation     Gait disorder     Balance problems     Fatigue     High risk medications (not anticoagulants) long-term use     Personal history of other malignant neoplasm of skin     AK (actinic keratosis)     Pain in joint, lower leg     Abnormal EMG     Seronegative arthritis     History of MRI of cervical spine     Pain in limb     Adenomatous polyp of colon     Peripheral polyneuropathy     Rheumatoid arthritis of multiple sites with negative rheumatoid factor (H)     Morbid obesity (H)     Interstitial lung disease (H)     Thumb pain     History of MRI of lumbar spine     Mood disorder (H)     Paralysis agitans (H)     Neuromuscular disease (H)     Bronchiolitis obliterans (H)     Hypertension     Rheumatoid arthritis (H)     Primary osteoarthritis of both first carpometacarpal joints     Past Surgical History:   Procedure Laterality Date     BIOPSY OF SKIN LESION       COLONOSCOPY       HEMORRHOID SURGERY       lip biopsy      for sicca complex     MOHS MICROGRAPHIC PROCEDURE       SOFT TISSUE SURGERY      removeal of basel cell carcinoma     Social History      Socioeconomic History     Marital status:      Spouse name: Not on file     Number of children: Not on file     Years of education: Not on file     Highest education level: Not on file   Occupational History     Not on file   Social Needs     Financial resource strain: Not on file     Food insecurity     Worry: Not on file     Inability: Not on file     Transportation needs     Medical: Not on file     Non-medical: Not on file   Tobacco Use     Smoking status: Former Smoker     Packs/day: 1.50     Years: 37.00     Pack years: 55.50     Types: Cigarettes     Start date: 6/15/1963     Quit date: 2000     Years since quittin.2     Smokeless tobacco: Never Used   Substance and Sexual Activity     Alcohol use: Not Currently     Alcohol/week: 0.0 standard drinks     Drug use: Never     Sexual activity: Not Currently     Partners: Female     Birth control/protection: None   Lifestyle     Physical activity     Days per week: Not on file     Minutes per session: Not on file     Stress: Not on file   Relationships     Social connections     Talks on phone: Not on file     Gets together: Not on file     Attends Jehovah's witness service: Not on file     Active member of club or organization: Not on file     Attends meetings of clubs or organizations: Not on file     Relationship status: Not on file     Intimate partner violence     Fear of current or ex partner: Not on file     Emotionally abused: Not on file     Physically abused: Not on file     Forced sexual activity: Not on file   Other Topics Concern     Parent/sibling w/ CABG, MI or angioplasty before 65F 55M? No   Social History Narrative    2013: Living in [place] in a townhouse with no steps    Has 3 sons that are doing okay.         Dairy/d 1 servings/d.     Caffeine 0 servings/d    Exercise 0 x week    Sunscreen used - No    Seatbelts used - Yes    Working smoke/CO detectors in the home - Yes    Guns stored in the home - Yes    Self Breast Exams - NA     "Self Testicular Exam - Yes    Eye Exam up to date - Yes 2008    Dental Exam up to date - Yes 2006    Pap Smear up to date - NA    Mammogram up to date - NA    PSA up to date - Yes 2008    Dexa Scan up to date - No    Flex Sig / Colonoscopy up to date - Yes less than 5 yrs ago    Immunizations up to date -today    Abuse: Current or Past(Physical, Sexual or Emotional)- No    Do you feel safe in your environment - Yes    2008                 Family History   Problem Relation Age of Onset     Hypertension Mother      Heart Disease Mother         a fib     Arthritis Mother         \"osteo\"     Osteoporosis Mother      Skin Cancer Mother      Uterine Cancer Mother      Other Cancer Mother      Hypertension Brother      Diabetes Brother         \" post pancreatitis\"     Neurologic Disorder Sister         multiple sclerosis     Hypertension Sister      Heart Disease Sister      Multiple Sclerosis Sister      Heart Disease Sister         a fib     Arthritis Sister      Heart Failure Sister      Kidney Disease Sister      Dementia Sister      Hypertension Father      Cerebrovascular Disease Father         ,     Skin Cancer Father      Colon Polyps Father      Other - See Comments Son         [place]     Other - See Comments Abdon wagner     Other - See Comments Son         day gonzalez     Psoriasis Maternal Grandfather      Stomach Cancer Maternal Grandfather      Congenital Anomalies Other         granddaughter with a chromosome defect     Cerebrovascular Disease Paternal Grandmother         ,     Cerebrovascular Disease Paternal Grandfather         ,     Cancer Granddaughter         Langerhans Cell Histiocytosis     Genetic Disease Granddaughter         gene translocation     Melanoma No family hx of      Colon Cancer No family hx of      Lab Results   Component Value Date    WBC 5.0 11/12/2020     Lab Results   Component Value Date    RBC 4.72 11/12/2020     Lab Results   Component Value Date    HGB 14.1 " 11/12/2020     Lab Results   Component Value Date    HCT 44.6 11/12/2020     No components found for: MCT  Lab Results   Component Value Date    MCV 95 11/12/2020     Lab Results   Component Value Date    MCH 29.9 11/12/2020     Lab Results   Component Value Date    MCHC 31.6 11/12/2020     Lab Results   Component Value Date    RDW 14.9 11/12/2020     Lab Results   Component Value Date     11/12/2020     Lab Results   Component Value Date    CRP 7.1 11/12/2020    CRP 7.3 02/08/2010     Lab Results   Component Value Date    A1C 5.6 11/12/2020    A1C 5.4 08/27/2010    A1C 5.6 04/28/2010    A1C 5.4 02/08/2010    A1C 5.2 08/11/2008     SUBJECTIVE FINDINGS:  A 70-year-old male returns to clinic for left big toe swelling and pain.  He relates it has been going on for quite some time.  He did see Rheumatology and at one point in time he thought they aspirated it to check for gout.  Relates it does not hurt, relates no specific relieving or aggravating factors, no specific injuries.  He relates he still gets the numbness across the ball of his foot and hammertoes.  He gets heel pain at times as well, ankle pain on the left.  He relates that he has not been wearing the Js brace or the orthotics.  He has misplaced the orthotics.  He had difficulty using them because they didn't fit into all of his shoes.       OBJECTIVE FINDINGS:  DP and PT are 2/4 bilaterally.  He has dorsally contracted digits 2 through 5 bilaterally.  He has a left hallux lymphedema, some venous congestion, no erythema, no odor, no calor.        X-rays were reviewed with the patient in clinic today.  He has joint space narrowing, subchondral sclerosis and spurring that has worsened from his 07/2019 x-rays.  There is no gross fracture.  He has elevated first metatarsal and plantar calcaneal spurring noted.  He also has what appears to be a punched-out type lesion in the medial first metatarsal head and laterally as well on the x-ray.       ASSESSMENT AND PLAN:  Osteoarthritis, IPJ of left hallux.  Hammertoes bilaterally.  He does have peripheral neuropathy and rheumatoid arthritis as well.  Also on x-ray, he has a laterally deviated distal phalanx and some gouty changes versus contour changes.  Diagnosis and treatment options were discussed with him.  X-rays were reviewed with him in clinic today.  Ace wrap dispensed and use discussed with him.  Metatarsal pads and toe splints dispensed and use discussed with him.  Uric acid ordered and use discussed with him.  Previous notes were reviewed as noted in the EMR.  He will return to clinic in about 6 weeks.     Uric Acid   Date Value Ref Range Status   12/14/2020 6.2 3.5 - 7.2 mg/dL Final     Lab Results   Component Value Date    CRP 9.0 12/14/2020    CRP 7.3 02/08/2010     Lab Results   Component Value Date    WBC 5.6 12/14/2020     Lab Results   Component Value Date    RBC 4.60 12/14/2020     Lab Results   Component Value Date    HGB 13.8 12/14/2020     Lab Results   Component Value Date    HCT 44.0 12/14/2020     No components found for: MCT  Lab Results   Component Value Date    MCV 96 12/14/2020     Lab Results   Component Value Date    MCH 30.0 12/14/2020     Lab Results   Component Value Date    MCHC 31.4 12/14/2020     Lab Results   Component Value Date    RDW 14.6 12/14/2020     Lab Results   Component Value Date     12/14/2020     Last Comprehensive Metabolic Panel:  Sodium   Date Value Ref Range Status   05/15/2018 143 133 - 144 mmol/L Final     Potassium   Date Value Ref Range Status   05/15/2018 4.2 3.4 - 5.3 mmol/L Final     Chloride   Date Value Ref Range Status   05/15/2018 112 (H) 94 - 109 mmol/L Final     Carbon Dioxide   Date Value Ref Range Status   05/15/2018 27 20 - 32 mmol/L Final     Anion Gap   Date Value Ref Range Status   05/15/2018 4 3 - 14 mmol/L Final     Glucose   Date Value Ref Range Status   11/12/2020 109 (H) 70 - 99 mg/dL Final     Comment:     Fasting specimen      Urea Nitrogen   Date Value Ref Range Status   05/15/2018 28 7 - 30 mg/dL Final     Creatinine   Date Value Ref Range Status   12/14/2020 1.19 0.66 - 1.25 mg/dL Final     GFR Estimate   Date Value Ref Range Status   12/14/2020 61 >60 mL/min/[1.73_m2] Final     Comment:     Non  GFR Calc  Starting 12/18/2018, serum creatinine based estimated GFR (eGFR) will be   calculated using the Chronic Kidney Disease Epidemiology Collaboration   (CKD-EPI) equation.       Calcium   Date Value Ref Range Status   12/03/2018 9.3 8.5 - 10.1 mg/dL Final     Lab Results   Component Value Date    AST 14 12/14/2020     Lab Results   Component Value Date    ALT 26 12/14/2020     No results found for: BILICONJ   Lab Results   Component Value Date    BILITOTAL 0.4 05/15/2018     Lab Results   Component Value Date    ALBUMIN 3.8 12/14/2020     Lab Results   Component Value Date    PROTTOTAL 7.5 05/15/2018      Lab Results   Component Value Date    ALKPHOS 68 05/15/2018           Again, thank you for allowing me to participate in the care of your patient.        Sincerely,        Rodney Ríos DPM

## 2020-12-14 NOTE — PROGRESS NOTES
Past Medical History:   Diagnosis Date     Abnormal EMG 4/18/2013     Abnormal involuntary movements(781.0)     Movement Disorder     AK (actinic keratosis) 12/18/2011     Allergic rhinitis, cause unspecified     Allergic rhinitis     Balance problems 11/1/2011     Basal cell carcinoma      Bladder spasms 11/1/2011     Chronic osteoarthritis      COPD (chronic obstructive pulmonary disease) (H)     Interstial lung disease, obliderans bronchiolitis     Diaphragmatic hernia without mention of obstruction or gangrene      Earache or other ear, nose, or throat complaint      Esophageal reflux      Fatigue 11/1/2011     Fracture      H. pylori infection 5/12/2011     History of MRI of cervical spine 11/18/2013    EXAMINATION: CERVICAL SPINE G/E 5 VIEWS* 4/19/2013 4:18 PM  CLINICAL HISTORY: Pain in limb,Performing Location?->[facility] ([facility]),  COMPARISON:  FINDINGS: AP and lateral views in flexion and extension, as well as odontoid view of the cervical spine was obtained. There is no comparison available. The vertebral bodies of the cervical spine are normally aligned. There is posterior spurring and d     Incomplete defecation 11/1/2011     Interstitial lung disease (H) 11/29/2016     Laboratory test 8/7/2012     Lung disease June 2015     Malignant basal cell neoplasm of skin 8/6/2008     Melanoma (H) 8/6/2008     Melanoma in situ of lower leg (H)     R calf     Neuropathy 5/16/2011     Other bladder disorder      Other color vision deficiencies      Other nervous system complications      Parkinsonism (H) 11/1/2011     Personal history of colonic polyps      Polyneuropathy in other diseases classified elsewhere (H)      RA (rheumatoid arthritis) (H)      Rheumatoid arthritis of multiple sites with negative rheumatoid factor (H) 3/21/2016     Seronegative arthritis 11/18/2013     Shortness of breath      Shoulder arthritis 2016    acromioclavicular joint      Somatization disorder 5/12/2011     Squamous cell  carcinoma      Tremor 11/1/2011     Unspecified essential hypertension      Unspecified hypothyroidism      Urinary tract infection      Urinary urgency 11/1/2011     Wears glasses 11/1/2011     Patient Active Problem List   Diagnosis     Diaphragmatic hernia     Esophageal reflux     Hx of melanoma of skin     Malignant basal cell neoplasm of skin     GALO (obstructive sleep apnea)     Chronic maxillary sinusitis     Urinary incontinence     H. pylori infection     Sicca syndrome (H)     Health Care Home     Advanced directives, counseling/discussion     Tremor     Visual changes     Incomplete defecation     Gait disorder     Balance problems     Fatigue     High risk medications (not anticoagulants) long-term use     Personal history of other malignant neoplasm of skin     AK (actinic keratosis)     Pain in joint, lower leg     Abnormal EMG     Seronegative arthritis     History of MRI of cervical spine     Pain in limb     Adenomatous polyp of colon     Peripheral polyneuropathy     Rheumatoid arthritis of multiple sites with negative rheumatoid factor (H)     Morbid obesity (H)     Interstitial lung disease (H)     Thumb pain     History of MRI of lumbar spine     Mood disorder (H)     Paralysis agitans (H)     Neuromuscular disease (H)     Bronchiolitis obliterans (H)     Hypertension     Rheumatoid arthritis (H)     Primary osteoarthritis of both first carpometacarpal joints     Past Surgical History:   Procedure Laterality Date     BIOPSY OF SKIN LESION       COLONOSCOPY       HEMORRHOID SURGERY       lip biopsy      for sicca complex     MOHS MICROGRAPHIC PROCEDURE       SOFT TISSUE SURGERY      removeal of basel cell carcinoma     Social History     Socioeconomic History     Marital status:      Spouse name: Not on file     Number of children: Not on file     Years of education: Not on file     Highest education level: Not on file   Occupational History     Not on file   Social Needs     Financial  resource strain: Not on file     Food insecurity     Worry: Not on file     Inability: Not on file     Transportation needs     Medical: Not on file     Non-medical: Not on file   Tobacco Use     Smoking status: Former Smoker     Packs/day: 1.50     Years: 37.00     Pack years: 55.50     Types: Cigarettes     Start date: 6/15/1963     Quit date: 2000     Years since quittin.2     Smokeless tobacco: Never Used   Substance and Sexual Activity     Alcohol use: Not Currently     Alcohol/week: 0.0 standard drinks     Drug use: Never     Sexual activity: Not Currently     Partners: Female     Birth control/protection: None   Lifestyle     Physical activity     Days per week: Not on file     Minutes per session: Not on file     Stress: Not on file   Relationships     Social connections     Talks on phone: Not on file     Gets together: Not on file     Attends Church service: Not on file     Active member of club or organization: Not on file     Attends meetings of clubs or organizations: Not on file     Relationship status: Not on file     Intimate partner violence     Fear of current or ex partner: Not on file     Emotionally abused: Not on file     Physically abused: Not on file     Forced sexual activity: Not on file   Other Topics Concern     Parent/sibling w/ CABG, MI or angioplasty before 65F 55M? No   Social History Narrative    2013: Living in [place] in a townhouse with no steps    Has 3 sons that are doing okay.         Dairy/d 1 servings/d.     Caffeine 0 servings/d    Exercise 0 x week    Sunscreen used - No    Seatbelts used - Yes    Working smoke/CO detectors in the home - Yes    Guns stored in the home - Yes    Self Breast Exams - NA    Self Testicular Exam - Yes    Eye Exam up to date - Yes     Dental Exam up to date - Yes     Pap Smear up to date - NA    Mammogram up to date - NA    PSA up to date - Yes 2008    Dexa Scan up to date - No    Flex Sig / Colonoscopy up to date - Yes less  "than 5 yrs ago    Immunizations up to date -today    Abuse: Current or Past(Physical, Sexual or Emotional)- No    Do you feel safe in your environment - Yes    2008                 Family History   Problem Relation Age of Onset     Hypertension Mother      Heart Disease Mother         a fib     Arthritis Mother         \"osteo\"     Osteoporosis Mother      Skin Cancer Mother      Uterine Cancer Mother      Other Cancer Mother      Hypertension Brother      Diabetes Brother         \" post pancreatitis\"     Neurologic Disorder Sister         multiple sclerosis     Hypertension Sister      Heart Disease Sister      Multiple Sclerosis Sister      Heart Disease Sister         a fib     Arthritis Sister      Heart Failure Sister      Kidney Disease Sister      Dementia Sister      Hypertension Father      Cerebrovascular Disease Father         ,     Skin Cancer Father      Colon Polyps Father      Other - See Comments Son         [place]     Other - See Comments Abdon wagner     Other - See Comments Abdon gonzalez     Psoriasis Maternal Grandfather      Stomach Cancer Maternal Grandfather      Congenital Anomalies Other         granddaughter with a chromosome defect     Cerebrovascular Disease Paternal Grandmother         ,     Cerebrovascular Disease Paternal Grandfather         ,     Cancer Granddaughter         Langerhans Cell Histiocytosis     Genetic Disease Granddaughter         gene translocation     Melanoma No family hx of      Colon Cancer No family hx of      Lab Results   Component Value Date    WBC 5.0 11/12/2020     Lab Results   Component Value Date    RBC 4.72 11/12/2020     Lab Results   Component Value Date    HGB 14.1 11/12/2020     Lab Results   Component Value Date    HCT 44.6 11/12/2020     No components found for: MCT  Lab Results   Component Value Date    MCV 95 11/12/2020     Lab Results   Component Value Date    MCH 29.9 11/12/2020     Lab Results   Component Value " Date    MCHC 31.6 11/12/2020     Lab Results   Component Value Date    RDW 14.9 11/12/2020     Lab Results   Component Value Date     11/12/2020     Lab Results   Component Value Date    CRP 7.1 11/12/2020    CRP 7.3 02/08/2010     Lab Results   Component Value Date    A1C 5.6 11/12/2020    A1C 5.4 08/27/2010    A1C 5.6 04/28/2010    A1C 5.4 02/08/2010    A1C 5.2 08/11/2008     SUBJECTIVE FINDINGS:  A 70-year-old male returns to clinic for left big toe swelling and pain.  He relates it has been going on for quite some time.  He did see Rheumatology and at one point in time he thought they aspirated it to check for gout.  Relates it does not hurt, relates no specific relieving or aggravating factors, no specific injuries.  He relates he still gets the numbness across the ball of his foot and hammertoes.  He gets heel pain at times as well, ankle pain on the left.  He relates that he has not been wearing the Js brace or the orthotics.  He has misplaced the orthotics.  He had difficulty using them because they didn't fit into all of his shoes.       OBJECTIVE FINDINGS:  DP and PT are 2/4 bilaterally.  He has dorsally contracted digits 2 through 5 bilaterally.  He has a left hallux lymphedema, some venous congestion, no erythema, no odor, no calor.        X-rays were reviewed with the patient in clinic today.  He has joint space narrowing, subchondral sclerosis and spurring that has worsened from his 07/2019 x-rays.  There is no gross fracture.  He has elevated first metatarsal and plantar calcaneal spurring noted.  He also has what appears to be a punched-out type lesion in the medial first metatarsal head and laterally as well on the x-ray.      ASSESSMENT AND PLAN:  Osteoarthritis, IPJ of left hallux.  Hammertoes bilaterally.  He does have peripheral neuropathy and rheumatoid arthritis as well.  Also on x-ray, he has a laterally deviated distal phalanx and some gouty changes versus contour changes.   Diagnosis and treatment options were discussed with him.  X-rays were reviewed with him in clinic today.  Ace wrap dispensed and use discussed with him.  Metatarsal pads and toe splints dispensed and use discussed with him.  Uric acid ordered and use discussed with him.  Previous notes were reviewed as noted in the EMR.  He will return to clinic in about 6 weeks.     Uric Acid   Date Value Ref Range Status   12/14/2020 6.2 3.5 - 7.2 mg/dL Final     Lab Results   Component Value Date    CRP 9.0 12/14/2020    CRP 7.3 02/08/2010     Lab Results   Component Value Date    WBC 5.6 12/14/2020     Lab Results   Component Value Date    RBC 4.60 12/14/2020     Lab Results   Component Value Date    HGB 13.8 12/14/2020     Lab Results   Component Value Date    HCT 44.0 12/14/2020     No components found for: MCT  Lab Results   Component Value Date    MCV 96 12/14/2020     Lab Results   Component Value Date    MCH 30.0 12/14/2020     Lab Results   Component Value Date    MCHC 31.4 12/14/2020     Lab Results   Component Value Date    RDW 14.6 12/14/2020     Lab Results   Component Value Date     12/14/2020     Last Comprehensive Metabolic Panel:  Sodium   Date Value Ref Range Status   05/15/2018 143 133 - 144 mmol/L Final     Potassium   Date Value Ref Range Status   05/15/2018 4.2 3.4 - 5.3 mmol/L Final     Chloride   Date Value Ref Range Status   05/15/2018 112 (H) 94 - 109 mmol/L Final     Carbon Dioxide   Date Value Ref Range Status   05/15/2018 27 20 - 32 mmol/L Final     Anion Gap   Date Value Ref Range Status   05/15/2018 4 3 - 14 mmol/L Final     Glucose   Date Value Ref Range Status   11/12/2020 109 (H) 70 - 99 mg/dL Final     Comment:     Fasting specimen     Urea Nitrogen   Date Value Ref Range Status   05/15/2018 28 7 - 30 mg/dL Final     Creatinine   Date Value Ref Range Status   12/14/2020 1.19 0.66 - 1.25 mg/dL Final     GFR Estimate   Date Value Ref Range Status   12/14/2020 61 >60 mL/min/[1.73_m2] Final      Comment:     Non  GFR Calc  Starting 12/18/2018, serum creatinine based estimated GFR (eGFR) will be   calculated using the Chronic Kidney Disease Epidemiology Collaboration   (CKD-EPI) equation.       Calcium   Date Value Ref Range Status   12/03/2018 9.3 8.5 - 10.1 mg/dL Final     Lab Results   Component Value Date    AST 14 12/14/2020     Lab Results   Component Value Date    ALT 26 12/14/2020     No results found for: BILICONJ   Lab Results   Component Value Date    BILITOTAL 0.4 05/15/2018     Lab Results   Component Value Date    ALBUMIN 3.8 12/14/2020     Lab Results   Component Value Date    PROTTOTAL 7.5 05/15/2018      Lab Results   Component Value Date    ALKPHOS 68 05/15/2018

## 2020-12-14 NOTE — NURSING NOTE
Reason For Visit:   Chief Complaint   Patient presents with     RECHECK     pt wanted to discuss his pain and swelling in his left big toe joint. general feet/ankle issues.        There were no vitals taken for this visit.    Pain Assessment  Patient Currently in Pain: Yes  0-10 Pain Scale: 5  Primary Pain Location: Foot    Antonina Ordoñez ATC

## 2020-12-16 ENCOUNTER — VIRTUAL VISIT (OUTPATIENT)
Dept: RHEUMATOLOGY | Facility: CLINIC | Age: 70
End: 2020-12-16
Attending: NURSE PRACTITIONER
Payer: MEDICARE

## 2020-12-16 DIAGNOSIS — M06.09 RHEUMATOID ARTHRITIS OF MULTIPLE SITES WITH NEGATIVE RHEUMATOID FACTOR (H): Primary | ICD-10-CM

## 2020-12-16 DIAGNOSIS — Z79.899 HIGH RISK MEDICATION USE: ICD-10-CM

## 2020-12-16 DIAGNOSIS — D84.9 IMMUNOSUPPRESSED STATUS (H): ICD-10-CM

## 2020-12-16 DIAGNOSIS — J44.81 OBLITERATIVE BRONCHIOLITIS (H): ICD-10-CM

## 2020-12-16 DIAGNOSIS — M15.0 PRIMARY OSTEOARTHRITIS INVOLVING MULTIPLE JOINTS: ICD-10-CM

## 2020-12-16 PROCEDURE — 99442 PR PHYSICIAN TELEPHONE EVALUATION 11-20 MIN: CPT | Mod: 95 | Performed by: NURSE PRACTITIONER

## 2020-12-16 RX ORDER — HYDROXYCHLOROQUINE SULFATE 200 MG/1
200 TABLET, FILM COATED ORAL DAILY
Qty: 90 TABLET | Refills: 1 | Status: SHIPPED | OUTPATIENT
Start: 2020-12-16 | End: 2021-04-13

## 2020-12-16 RX ORDER — FOLIC ACID 1 MG/1
2 TABLET ORAL DAILY
Qty: 180 TABLET | Refills: 3 | Status: SHIPPED | OUTPATIENT
Start: 2020-12-16 | End: 2021-12-01

## 2020-12-16 ASSESSMENT — PAIN SCALES - GENERAL: PAINLEVEL: MODERATE PAIN (5)

## 2020-12-16 NOTE — PROGRESS NOTES
"       Assessment and Plan:   1. Seronegative inflammatory arthritis RA: controlled no active inflammatory arthritis, stable.  I dont feel more immunosuppressive therapy is indicated.  Liver, blood counts and kidney tests normal  and uric acid    Plan  # Continue methotrexate at 20 mg weekly, folic acid 2 mg day. While on drug  --High risk medication monitoring--labs q 3 mo AST/ALT, Albumin, CBC with plts, CRP   --Limit EtOH intake to 2 drinks weekly; use folate 2 mg daily  --Tylenol 325mg qid prn nausea/HA associated with dosing.    # hydroxychloroquine (plaquenil) 200 mg QD -annual eye exam done July 2020 [facility] due. Taper to reduce risk of retinal toxicity as on since 2010 approx. No vision issues   2. 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 Mccarthy today for follow-up    3. Osteoarthritis knees, ankle, shoulder and neck and hands/thumbs. Continue isometric quad strengthening exercises twice daily to improve support around the joint. Left ankle osteoarthritis and overall osteoarthritis  is contributing to the overall pain. Continue splinting/hand therapy and acetaminophen 1000 mg tid ATC. Follow-up orthopedics. Currently doing Physical Therapy for thumb, follow-up  This provider about DJD shoulder if this could be causing this nerve pain   RTC 4 month Dr. Goodrich, me in 8 month          History of Present Illness:   Lucio Daly \"Rich\" presents for f/u seronegative rheumatoid arthritis, sicca complex. Co-manage with Dr. Goodrich   Copy forward June 26, 2017  Dr. Goodrich seen him in Dec 2016   Back pain, seen PCP. MRI of his back to f/u spinal stenosis and herniated disc will be having this tomorrow then maybe seeing neurosurgeon (needed updated MRI) if this is indicated, did PT but stopped as was making this worse. Previous ortho was going to refer him to neurosurgeon but he didn't want surgery at this time. Last months, more " "increased numbness in feet and sciatica right thigh/calf \"uncomfortable\", more pain in mid and upper back then some in low back if stands too long, then gets this in rib cage. Had problems with \"numbness\" for some time, pain in arms. EMG testing in past peripheral polyneuropathy. Stumbling more, but had trouble with this in the past, and previous foot droop. More leg pain for \"quite a few months\".  Lay in bed or sit with feet up more in leg pain, back. Nueropathy is the bottom of his feet.   Tennis elbows, thumbs, hands pains are the same stable this comes and goes.   Continues the methotrexate 17.5 mg PO Q 7 days Tue or WED and the plaqeunil (he felt the stomach issues were due to other medications) this is tolerating well Didn't stop the plaquenil \"didnt want to rock the boat\" . Reports is RA is controlled, no flaring and is controlled with these medications so wishes to continue this plan.   Pulmonary Dr. Mccarthy had referred him to gastro for GERD in Oct. Seen him 5-2017 --ILD \"follicular bronchiolitis\" and pulmonary nodule stable (more SOB sit to stand then walking). His prilosec was increased. Checking neuromuscular \"thing\" when he sees him in Nov. Methotrexate was at 9 tab 22.5 mg a week tapered to 17.5 mg once a week --his RA remains controlled.   On ABX completed this Saturday for bronchitis, early pnuemonia and then infection in both his eyes. Started with laryngitis. ABX helped. No fever, chills, discolored sputum. Fatigue.    Copy forward  June 20, 2018  Seen Dr. Mccarthy 5-2018 pulmonary, f/u Aug. \"Stable\" \"didnt do as well on the walking\" \"more physical with legs\" will repeat testing in august. EKG similar to low pulse and \"similar to arrthymias\" will be talking to Dr. Corley about this and ASCVD.   Seen NATALEE Carrillo PAC-C low back pain and neck pain advised seeing neurosurgery didn't seen the neurosurgeon not seen does not wish to do surgery and in the past advised no surgery \"not a good candidate\" Dr." "Fabio. Nerve pain into calves into feet, this leg pain due to his back/neck pain. Seen Dr. Villatoro in sports medicine  and then seen Dr. Campuzano for hands. PT for back. Dr Montes recommended pain management, but he didn't want narcotics.  Prior 2014 had injections of low back \"neuromas\" in the spine, injection not lasted and reaction \"neurologic bladder\" and same as when cortisone injections. Did PT in the past. Dr. Montes recommended possible CBD oils so will take to PCP. Nerve, tendon pain and joint pain. \"if this for a while right thigh severe pain and cant stand on leg\" \"left drop foot\" told was d/t spine. On lyrica   Taking methotrexate 20 mg PO Q 7 days WED (mild stomach 1-2 day, not every time or every dose), folic acid 1 mg day and the plaqeunil 200 mg BID (6 days week, then WED once day). Tolerating. Dr. Goodrich increased this d/t justus swelling, this is improved. Some swelling on and off with walking. Stiff in back, in the mornings hands stiff \"fingers will curl\" this is unchanged[place] [employer] [place] with this hand. Reports x-rays were not fracture     Seen gastro who increased omprezole 40 mg BID, this has helped. Pain affected by change in seasons. Higher MTX dose is helping since increased in . Feet, ankles, toes and fingers pain lingers. \"stepped out of pain left heel and left ankle\" went to [facility] strain achilled sprain left ankle, this was about 3 weeks ago. Has AFO brace, and feet/ankle brace from Dr. Martinez now wearing, got a gel heel insert insert this is helping still wearing. Has GERD and esophagus so hard to tell so will see PCP for complete CVD workup. Nuc 6-2016 NL. Sees dermatology, no skin issues. Has golf cart to get around. No tick bites      Goes to [facility], no notes in EHR. Last visit in March 2018 did given him the letter. Other [facility] reviewed and updated.     December 15, 2020  Healthy. No infections as he did winter 2019 " "with influenza B and pneumonia. Doing quarantine and social distancing, hand washing. Healthiest he has been in along time. Denies any fever, chills, SOB, JUARES, night sweats, or chest pain, high fever, cough, travel in last 14 days or exposure to covid-19 (coronavirus). Reports healthy. Walks 3/4 mile a day harder to walk longer distance due to peripheral neuropathy and foot drop.      Right thumb \"jammed\" pushing into wrist (into forearm then shoulder this pain shooting up arm) needs option fusion doing Physical Therapy, seeing 12-4 ortho in 8 weeks Dr. Camargo, now has thumb splica splint x 2 thinks this is nerve pain. \"lightening bolt\" Hard to walk with walker. Helps the hand pain not this arm.  Hard or wore when . If pushing grocery cart with gripping then goes up. Most intense when gripping. Told was neuropathy and tendonitis. Not carpel tunnel. For > 6 month. [place] use the throttle. Neck from cervical stenosis and DDD shoulder pain. Lumbar would need fusion but high risk. Has DDD right shoulder XR 4-2016. Neurology exhausted resources. Allergy reaction to cortisone and prednisone so that is not an option. Tried OTC cream for knee. Not try again. He did not reduce hydroxychloroquine (plaquenil) to once day     Feels in right shoulder, right wrist in this right arm not sure if coming down or going up. Left elbow dominique in left shoulder. pain on top of the hand then wrists pain to elbow then shoulder. Toe pain seeing Dr. Martinez the uric acid was normal. No red or swelling.     ILD seen pulmonary stable. Due for colonscopy    Methotrexate  20 mg Q Tues or Wed mild GI, and diarrhea that day then ok, folic acid 2 mg day, hydroxychloroquine (plaquenil) 200 mg BID except TUES  (he did not reduce) tolerating. Does not take NSAIDs as brother in law passed from kidney and liver issues felt contributed by celebrex     PMH   1. Sensory neuropathy of unclear etiology - negative work up, managed on Lyrica. "  Has chronic dysesthesia in pads of feet.  2. Parkinsonisim/Tremor disorder; sees neurology   3. Headaches   4. History of basal cell, squamous (most recent +bx 8-2014)  5. History of melanoma   6. GALO on CPAP.  7. HTN.   8. Seronegative RA, diagnosed 2009. SICCA  9.  JUARES. Work-up 4-2015. 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 Lumbar stenosis per MRI 2017   11. Bronchitis 5-2017, early pneumonia   12. Chicken pox   13. DEXA  Normal   14. Left ankle DJD, MRI  --seen Dr. Martinez 2-2019   Past Medical History:   Diagnosis Date     Abnormal EMG 4/18/2013     Abnormal involuntary movements(781.0)     Movement Disorder     AK (actinic keratosis) 12/18/2011     Allergic rhinitis, cause unspecified     Allergic rhinitis     Balance problems 11/1/2011     Basal cell carcinoma      Bladder spasms 11/1/2011     Chronic osteoarthritis      COPD (chronic obstructive pulmonary disease) (H)     Interstial lung disease, obliderans bronchiolitis     Diaphragmatic hernia without mention of obstruction or gangrene      Earache or other ear, nose, or throat complaint      Esophageal reflux      Fatigue 11/1/2011     Fracture      H. pylori infection 5/12/2011     History of MRI of cervical spine 11/18/2013    EXAMINATION: CERVICAL SPINE G/E 5 VIEWS* 4/19/2013 4:18 PM  CLINICAL HISTORY: Pain in limb,Performing Location?->[facility]),  COMPARISON:  FINDINGS: AP and lateral views in flexion and extension, as well as odontoid view of the cervical spine was obtained. There is no comparison available. The vertebral bodies of the cervical spine are normally aligned. There is posterior spurring and d     Incomplete defecation 11/1/2011     Interstitial lung disease (H) 11/29/2016     Laboratory test 8/7/2012     Lung disease June 2015     Malignant basal cell neoplasm of skin 8/6/2008     Melanoma (H) 8/6/2008     Melanoma in situ of lower leg (H)     R calf     Neuropathy 5/16/2011     Other bladder  "disorder      Other color vision deficiencies      Other nervous system complications      Parkinsonism (H) 11/1/2011     Personal history of colonic polyps      Polyneuropathy in other diseases classified elsewhere (H)      RA (rheumatoid arthritis) (H)      Rheumatoid arthritis of multiple sites with negative rheumatoid factor (H) 3/21/2016     Seronegative arthritis 11/18/2013     Shortness of breath      Shoulder arthritis 2016    acromioclavicular joint      Somatization disorder 5/12/2011     Squamous cell carcinoma      Tremor 11/1/2011     Unspecified essential hypertension      Unspecified hypothyroidism      Urinary tract infection      Urinary urgency 11/1/2011     Wears glasses 11/1/2011     Injuries-ankle sprains, left heel fracture    Family Hx   MGM Psoriasis  Sister -PPM  Family History   Problem Relation Age of Onset     Hypertension Mother      Heart Disease Mother         a fib     Arthritis Mother         \"osteo\"     Osteoporosis Mother      Skin Cancer Mother      Uterine Cancer Mother      Other Cancer Mother      Cancer Mother      Hypertension Brother      Diabetes Brother         \" post pancreatitis\"     Neurologic Disorder Sister         multiple sclerosis     Hypertension Sister      Heart Disease Sister      Multiple Sclerosis Sister      Heart Disease Sister         a fib     Arthritis Sister      Heart Failure Sister      Kidney Disease Sister      Dementia Sister      Hypertension Father      Cerebrovascular Disease Father         ,     Skin Cancer Father      Colon Polyps Father      Other - See Comments Son         [place]     Other - See Comments Abdon wagner     Other - See Comments Abdon gonzalez     Psoriasis Maternal Grandfather      Stomach Cancer Maternal Grandfather      Cancer Maternal Grandfather      Congenital Anomalies Other         granddaughter with a chromosome defect     Cerebrovascular Disease Paternal Grandmother         ,     " Cerebrovascular Disease Paternal Grandfather         ,     Cancer Granddaughter         Langerhans Cell Histiocytosis     Genetic Disease Granddaughter         gene translocation     Melanoma No family hx of      Colon Cancer No family hx of      Personal Hx   Home environment: No secondhand tobacco smoke in home.    History     Social History     Marital Status:      Spouse Name: N/A     Number of Children: N/A     Years of Education: N/A     Social History Main Topics     Smoking status: Former Smoker     Quit date: 09/30/2003     Smokeless tobacco: Never Used     Alcohol Use: No     Drug Use: No     Sexually Active: Not Currently -- Female partner(s)     Other Topics Concern     None     Social History Narrative    2013[place] stepsHas 3 sons that are doing okay.             Review of Systems:     14 points all negative except what is mentioned in HPI.  Review Of Systems  Skin: negative  Eyes: negative  Ears/Nose/Throat: negative  Respiratory: No shortness of breath, dyspnea on exertion, cough, or hemoptysis  Cardiovascular: negative  Gastrointestinal: negative  Genitourinary: negative  Musculoskeletal: as above  Neurologic: as above  Psychiatric: negative  Hematologic/Lymphatic/Immunologic: negative  Endocrine: negative             Past Surgical History:   Past Surgical History:   Procedure Laterality Date     BIOPSY OF SKIN LESION       COLONOSCOPY       HEMORRHOID SURGERY       lip biopsy      for sicca complex     MOHS MICROGRAPHIC PROCEDURE       SOFT TISSUE SURGERY      removeal of basel cell carcinoma     Telephone Assessment Objective:   PSYCH: Alert and oriented times 3; coherent speech, normal  rate and volume, able to articulate logical thoughts, able   to abstract reason, no tangential thoughts, no hallucinations or delusions  His affect is normal and pleasant  RESP: No cough, no audible wheezing, able to talk in full sentences  Neuro: Cranial nerves  grossly intact.  Mentation and speech appropriate for age.  Remainder of exam unable to be completed due to telephone visits    Due to efforts to reduce the spread of COVID-19 in the clinic, state, nation, telephone visits are encouraged currently. Patient understands that diagnose and advice is limited by the inability to exam him/her/them face-to-face. Discussed corovid-19 prevention, CDC guidelines/resources and to follow CDC/MD guidelines. If any signs or symptoms of infection to stop immunosuppression and seek immediate medical attn. That prednisone can increase change of serious infections so should be avoided. Discussed the importance of good hand washing techniques with soap and water for at least 20 seconds, avoid touching eyes, nose, mouth, avoiding crowds, social distancing. We also agree that the benefits of continued immunosuppression outweigh the risks of COVID-19 infection.            Data:     Component      Latest Ref Rng & Units 12/14/2020   WBC      4.0 - 11.0 10e9/L 5.6   RBC Count      4.4 - 5.9 10e12/L 4.60   Hemoglobin      13.3 - 17.7 g/dL 13.8   Hematocrit      40.0 - 53.0 % 44.0   MCV      78 - 100 fl 96   MCH      26.5 - 33.0 pg 30.0   MCHC      31.5 - 36.5 g/dL 31.4 (L)   RDW      10.0 - 15.0 % 14.6   Platelet Count      150 - 450 10e9/L 199   Creatinine      0.66 - 1.25 mg/dL 1.19   GFR Estimate      >60 mL/min/1.73:m2 61   GFR Estimate If Black      >60 mL/min/1.73:m2 70   CRP Inflammation      0.0 - 8.0 mg/L 9.0 (H)   AST      0 - 45 U/L 14   ALT      0 - 70 U/L 26   Albumin      3.4 - 5.0 g/dL 3.8   Uric Acid      3.5 - 7.2 mg/dL 6.2           Thank-you for allowing me to participate in your care.     Sin GUZMAN, CNP, MSN  [facility]  Department of Rheumatology & Autoimmune Disorders  [facility]ology: [phone] Opt 2, then Opt 1 Scheduling   [facility] [place]: [phone]; [phone] Option 7 scheduling        Per patient, it  "has not been a good 6 mos. A lot of pain, weakness in arm/hand.    Lucio Daly is a 70 year old male who is being evaluated via a billable video TELEPHONE  visit.      The patient has been notified of following:     \"This TELEPHONE visit will be conducted via a call between you and your physician/provider. We have found that certain health care needs can be provided without the need for an in-person physical exam.  This service lets us provide the care you need with a PHONE conversation.  If a prescription is necessary we can send it directly to your pharmacy.  If lab work is needed we can place an order for that and you can then stop by our lab to have the test done at a later time.    PHONE visits are billed at different rates depending on your insurance coverage.  Please reach out to your insurance provider with any questions.    If during the course of the call the physician/provider feels a video visit is not appropriate, you will not be charged for this service.\"    Patient has given verbal consent for PHONE visit? Yes  How would you like to obtain your AVS? MyChart    Will anyone else be joining your PHONE visit? No        Video-Visit Details CHANGE TO TELEPHONE not able to do AMWELL or DOXIMITY     Type of service:  Video Visit    Video Start Time: 945 AM  Video End Time: 1005 AM    Originating Location (pt. Location): Home    Distant Location (provider location):  [facility] [place]     Platform used for Video Visit: Other: TELEPHONE     MEGAN Dillon CNP          "

## 2020-12-16 NOTE — LETTER
"12/16/2020       RE: Lucio Daly  [address]  [place] [address]     Dear Colleague,    Thank you for referring your patient, Lucio Daly, to the [facility] RHEUMATOLOGY CLINIC [place] at [facility]. Please see a copy of my visit note below.           Assessment and Plan:   1. Seronegative inflammatory arthritis RA: controlled no active inflammatory arthritis, stable.  I dont feel more immunosuppressive therapy is indicated.  Liver, blood counts and kidney tests normal  and uric acid    Plan  # Continue methotrexate at 20 mg weekly, folic acid 2 mg day. While on drug  --High risk medication monitoring--labs q 3 mo AST/ALT, Albumin, CBC with plts, CRP   --Limit EtOH intake to 2 drinks weekly; use folate 2 mg daily  --Tylenol 325mg qid prn nausea/HA associated with dosing.    # hydroxychloroquine (plaquenil) 200 mg QD -annual eye exam done July 2020 [facility] due. Taper to reduce risk of retinal toxicity as on since 2010 approx. No vision issues   2. 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 Mccarthy today for follow-up    3. Osteoarthritis knees, ankle, shoulder and neck and hands/thumbs. Continue isometric quad strengthening exercises twice daily to improve support around the joint. Left ankle osteoarthritis and overall osteoarthritis  is contributing to the overall pain. Continue splinting/hand therapy and acetaminophen 1000 mg tid ATC. Follow-up orthopedics. Currently doing Physical Therapy for thumb, follow-up  This provider about DJD shoulder if this could be causing this nerve pain   RTC 4 month Dr. Goodrich, me in 8 month          History of Present Illness:   Lucio Daly \"Rich\" presents for f/u seronegative rheumatoid arthritis, sicca complex. Co-manage with Dr. Goodrich   Copy forward June 26, 2017  Dr. Goodrich seen him in Dec 2016   Back pain, seen PCP. MRI of his back to " "f/u spinal stenosis and herniated disc will be having this tomorrow then maybe seeing neurosurgeon (needed updated MRI) if this is indicated, did PT but stopped as was making this worse. Previous ortho was going to refer him to neurosurgeon but he didn't want surgery at this time. Last months, more increased numbness in feet and sciatica right thigh/calf \"uncomfortable\", more pain in mid and upper back then some in low back if stands too long, then gets this in rib cage. Had problems with \"numbness\" for some time, pain in arms. EMG testing in past peripheral polyneuropathy. Stumbling more, but had trouble with this in the past, and previous foot droop. More leg pain for \"quite a few months\".  Lay in bed or sit with feet up more in leg pain, back. Nueropathy is the bottom of his feet.   Tennis elbows, thumbs, hands pains are the same stable this comes and goes.   Continues the methotrexate 17.5 mg PO Q 7 days Tue or WED and the plaqeunil (he felt the stomach issues were due to other medications) this is tolerating well Didn't stop the plaquenil \"didnt want to rock the boat\" . Reports is RA is controlled, no flaring and is controlled with these medications so wishes to continue this plan.   Pulmonary Dr. Mccarthy had referred him to gastro for GERD in Oct. Seen him 5-2017 --ILD \"follicular bronchiolitis\" and pulmonary nodule stable (more SOB sit to stand then walking). His prilosec was increased. Checking neuromuscular \"thing\" when he sees him in Nov. Methotrexate was at 9 tab 22.5 mg a week tapered to 17.5 mg once a week --his RA remains controlled.   On ABX completed this Saturday for bronchitis, early pnuemonia and then infection in both his eyes. Started with laryngitis. ABX helped. No fever, chills, discolored sputum. Fatigue.    Copy forward  June 20, 2018  Seen Dr. Mccarthy 5-2018 pulmonary, f/u Aug. \"Stable\" \"didnt do as well on the walking\" \"more physical with legs\" will repeat testing in august. EKG similar " "to low pulse and \"similar to arrthymias\" will be talking to Dr. Corley about this and ASCVD.   Seen NATALEE Carrillo PAC-C low back pain and neck pain advised seeing neurosurgery didn't seen the neurosurgeon not seen does not wish to do surgery and in the past advised no surgery \"not a good candidate\" Dr. Mccarthy. Nerve pain into calves into feet, this leg pain due to his back/neck pain. Seen Dr. Villatoro in sports medicine  and then seen Dr. Campuzano for hands. PT for back. Dr Montes recommended pain management, but he didn't want narcotics.  Prior 2014 had injections of low back \"neuromas\" in the spine, injection not lasted and reaction \"neurologic bladder\" and same as when cortisone injections. Did PT in the past. Dr. Montes recommended possible CBD oils so will take to PCP. Nerve, tendon pain and joint pain. \"if this for a while right thigh severe pain and cant stand on leg\" \"left drop foot\" told was d/t spine. On lyrica   Taking methotrexate 20 mg PO Q 7 days WED (mild stomach 1-2 day, not every time or every dose), folic acid 1 mg day and the plaqeunil 200 mg BID (6 days week, then WED once day). Tolerating. Dr. Goodrich increased this d/t justus swelling, this is improved. Some swelling on and off with walking. Stiff in back, in the mornings hands stiff \"fingers will curl\" this is unchanged[place] [employer] [place] with this hand. Reports x-rays were not fracture     Seen gastro who increased omprezole 40 mg BID, this has helped. Pain affected by change in seasons. Higher MTX dose is helping since increased in . Feet, ankles, toes and fingers pain lingers. \"stepped out of pain left heel and left ankle\" went to [facility] strain achilled sprain left ankle, this was about 3 weeks ago. Has AFO brace, and feet/ankle brace from Dr. Martinez now wearing, got a gel heel insert insert this is helping still wearing. Has GERD and esophagus so hard to tell so will see PCP for complete CVD " "workup. Nuc 6-2016 NL. Sees dermatology, no skin issues. Has golf cart to get around. No tick bites      Goes to [facility], no notes in EHR. Last visit in March 2018 did given him the letter. Other [facility] reviewed and updated.     December 15, 2020  Healthy. No infections as he did winter 2019 with influenza B and pneumonia. Doing quarantine and social distancing, hand washing. Healthiest he has been in along time. Denies any fever, chills, SOB, JUARES, night sweats, or chest pain, high fever, cough, travel in last 14 days or exposure to covid-19 (coronavirus). Reports healthy. Walks 3/4 mile a day harder to walk longer distance due to peripheral neuropathy and foot drop.      Right thumb \"jammed\" pushing into wrist (into forearm then shoulder this pain shooting up arm) needs option fusion doing Physical Therapy, seeing 12-4 ortho in 8 weeks Dr. Camargo, now has thumb splica splint x 2 thinks this is nerve pain. \"lightening bolt\" Hard to walk with walker. Helps the hand pain not this arm.  Hard or wore when . If pushing grocery cart with gripping then goes up. Most intense when gripping. Told was neuropathy and tendonitis. Not carpel tunnel. For > 6 month. [place] use [place]. Neck from cervical stenosis and DDD shoulder pain. Lumbar would need fusion but high risk. Has DDD right shoulder XR 4-2016. Neurology exhausted resources. Allergy reaction to cortisone and prednisone so that is not an option. Tried OTC cream for knee. Not try again. He did not reduce hydroxychloroquine (plaquenil) to once day     Feels in right shoulder, right wrist in this right arm not sure if coming down or going up. Left elbow dominique in left shoulder. pain on top of the hand then wrists pain to elbow then shoulder. Toe pain seeing Dr. Martinez the uric acid was normal. No red or swelling.     ILD seen pulmonary stable. Due for colonscopy    Methotrexate  20 mg Q Tues or Wed mild GI, and diarrhea that day then ok, folic " acid 2 mg day, hydroxychloroquine (plaquenil) 200 mg BID except TUES  (he did not reduce) tolerating. Does not take NSAIDs as brother in law passed from kidney and liver issues felt contributed by celebrex     PMH   1. Sensory neuropathy of unclear etiology - negative work up, managed on Lyrica.  Has chronic dysesthesia in pads of feet.  2. Parkinsonisim/Tremor disorder; sees neurology   3. Headaches   4. History of basal cell, squamous (most recent +bx 8-2014)  5. History of melanoma   6. GALO on CPAP.  7. HTN.   8. Seronegative RA, diagnosed 2009. SICCA  9.  JUARES. Work-up 4-2015. 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 Lumbar stenosis per MRI 2017   11. Bronchitis 5-2017, early pneumonia   12. Chicken pox   13. DEXA  Normal   14. Left ankle DJD, MRI  --seen Dr. Martinez 2-2019   Past Medical History:   Diagnosis Date     Abnormal EMG 4/18/2013     Abnormal involuntary movements(781.0)     Movement Disorder     AK (actinic keratosis) 12/18/2011     Allergic rhinitis, cause unspecified     Allergic rhinitis     Balance problems 11/1/2011     Basal cell carcinoma      Bladder spasms 11/1/2011     Chronic osteoarthritis      COPD (chronic obstructive pulmonary disease) (H)     Interstial lung disease, obliderans bronchiolitis     Diaphragmatic hernia without mention of obstruction or gangrene      Earache or other ear, nose, or throat complaint      Esophageal reflux      Fatigue 11/1/2011     Fracture      H. pylori infection 5/12/2011     History of MRI of cervical spine 11/18/2013    EXAMINATION: CERVICAL SPINE G/E 5 VIEWS* 4/19/2013 4:18 PM  CLINICAL HISTORY: Pain in limb,Performing Location?->[facility] ([facility]),  COMPARISON:  FINDINGS: AP and lateral views in flexion and extension, as well as odontoid view of the cervical spine was obtained. There is no comparison available. The vertebral bodies of the cervical spine are normally aligned. There is posterior spurring and d     Incomplete  "defecation 11/1/2011     Interstitial lung disease (H) 11/29/2016     Laboratory test 8/7/2012     Lung disease June 2015     Malignant basal cell neoplasm of skin 8/6/2008     Melanoma (H) 8/6/2008     Melanoma in situ of lower leg (H)     R calf     Neuropathy 5/16/2011     Other bladder disorder      Other color vision deficiencies      Other nervous system complications      Parkinsonism (H) 11/1/2011     Personal history of colonic polyps      Polyneuropathy in other diseases classified elsewhere (H)      RA (rheumatoid arthritis) (H)      Rheumatoid arthritis of multiple sites with negative rheumatoid factor (H) 3/21/2016     Seronegative arthritis 11/18/2013     Shortness of breath      Shoulder arthritis 2016    acromioclavicular joint      Somatization disorder 5/12/2011     Squamous cell carcinoma      Tremor 11/1/2011     Unspecified essential hypertension      Unspecified hypothyroidism      Urinary tract infection      Urinary urgency 11/1/2011     Wears glasses 11/1/2011     Injuries-ankle sprains, left heel fracture    Family Hx   MGM Psoriasis  Sister -PPM  Family History   Problem Relation Age of Onset     Hypertension Mother      Heart Disease Mother         a fib     Arthritis Mother         \"osteo\"     Osteoporosis Mother      Skin Cancer Mother      Uterine Cancer Mother      Other Cancer Mother      Cancer Mother      Hypertension Brother      Diabetes Brother         \" post pancreatitis\"     Neurologic Disorder Sister         multiple sclerosis     Hypertension Sister      Heart Disease Sister      Multiple Sclerosis Sister      Heart Disease Sister         a fib     Arthritis Sister      Heart Failure Sister      Kidney Disease Sister      Dementia Sister      Hypertension Father      Cerebrovascular Disease Father         ,     Skin Cancer Father      Colon Polyps Father      Other - See Comments Son         [place]     Other - See Comments Abdon wagner     Other - See " Comments Son         Balmorhea     Psoriasis Maternal Grandfather      Stomach Cancer Maternal Grandfather      Cancer Maternal Grandfather      Congenital Anomalies Other         granddaughter with a chromosome defect     Cerebrovascular Disease Paternal Grandmother         ,     Cerebrovascular Disease Paternal Grandfather         ,     Cancer Granddaughter         Langerhans Cell Histiocytosis     Genetic Disease Granddaughter         gene translocation     Melanoma No family hx of      Colon Cancer No family hx of      Personal Hx   Home environment: No secondhand tobacco smoke in home.    History     Social History     Marital Status:      Spouse Name: N/A     Number of Children: N/A     Years of Education: N/A     Social History Main Topics     Smoking status: Former Smoker     Quit date: 09/30/2003     Smokeless tobacco: Never Used     Alcohol Use: No     Drug Use: No     Sexually Active: Not Currently -- Female partner(s)     Other Topics Concern     None     Social History Narrative    2013: Living in [place] in a townhouse with no stepsHas 3 sons that are doing okay.             Review of Systems:     14 points all negative except what is mentioned in HPI.  Review Of Systems  Skin: negative  Eyes: negative  Ears/Nose/Throat: negative  Respiratory: No shortness of breath, dyspnea on exertion, cough, or hemoptysis  Cardiovascular: negative  Gastrointestinal: negative  Genitourinary: negative  Musculoskeletal: as above  Neurologic: as above  Psychiatric: negative  Hematologic/Lymphatic/Immunologic: negative  Endocrine: negative             Past Surgical History:   Past Surgical History:   Procedure Laterality Date     BIOPSY OF SKIN LESION       COLONOSCOPY       HEMORRHOID SURGERY       lip biopsy      for sicca complex     MOHS MICROGRAPHIC PROCEDURE       SOFT TISSUE SURGERY      removeal of basel cell carcinoma     Telephone Assessment Objective:   PSYCH: Alert and oriented times 3;  coherent speech, normal  rate and volume, able to articulate logical thoughts, able   to abstract reason, no tangential thoughts, no hallucinations or delusions  His affect is normal and pleasant  RESP: No cough, no audible wheezing, able to talk in full sentences  Neuro: Cranial nerves grossly intact.  Mentation and speech appropriate for age.  Remainder of exam unable to be completed due to telephone visits    Due to efforts to reduce the spread of COVID-19 in the clinic, state, nation, telephone visits are encouraged currently. Patient understands that diagnose and advice is limited by the inability to exam him/her/them face-to-face. Discussed corovid-19 prevention, CDC guidelines/resources and to follow CDC/MD guidelines. If any signs or symptoms of infection to stop immunosuppression and seek immediate medical attn. That prednisone can increase change of serious infections so should be avoided. Discussed the importance of good hand washing techniques with soap and water for at least 20 seconds, avoid touching eyes, nose, mouth, avoiding crowds, social distancing. We also agree that the benefits of continued immunosuppression outweigh the risks of COVID-19 infection.            Data:     Component      Latest Ref Rng & Units 12/14/2020   WBC      4.0 - 11.0 10e9/L 5.6   RBC Count      4.4 - 5.9 10e12/L 4.60   Hemoglobin      13.3 - 17.7 g/dL 13.8   Hematocrit      40.0 - 53.0 % 44.0   MCV      78 - 100 fl 96   MCH      26.5 - 33.0 pg 30.0   MCHC      31.5 - 36.5 g/dL 31.4 (L)   RDW      10.0 - 15.0 % 14.6   Platelet Count      150 - 450 10e9/L 199   Creatinine      0.66 - 1.25 mg/dL 1.19   GFR Estimate      >60 mL/min/1.73:m2 61   GFR Estimate If Black      >60 mL/min/1.73:m2 70   CRP Inflammation      0.0 - 8.0 mg/L 9.0 (H)   AST      0 - 45 U/L 14   ALT      0 - 70 U/L 26   Albumin      3.4 - 5.0 g/dL 3.8   Uric Acid      3.5 - 7.2 mg/dL 6.2           Thank-you for allowing me to participate in your care.  "    Sin GUZMAN, CNP, MSN  [facility]  Department of Rheumatology & Autoimmune Disorders  [facility] Rheumatology: [phone] Opt 2, then Opt 1 Scheduling   [facility]: [phone]; [phone] Option 7 scheduling        Per patient, it has not been a good 6 mos. A lot of pain, weakness in arm/hand.    Lucio Daly is a 70 year old male who is being evaluated via a billable video TELEPHONE  visit.      The patient has been notified of following:     \"This TELEPHONE visit will be conducted via a call between you and your physician/provider. We have found that certain health care needs can be provided without the need for an in-person physical exam.  This service lets us provide the care you need with a PHONE conversation.  If a prescription is necessary we can send it directly to your pharmacy.  If lab work is needed we can place an order for that and you can then stop by our lab to have the test done at a later time.    PHONE visits are billed at different rates depending on your insurance coverage.  Please reach out to your insurance provider with any questions.    If during the course of the call the physician/provider feels a video visit is not appropriate, you will not be charged for this service.\"    Patient has given verbal consent for PHONE visit? Yes  How would you like to obtain your AVS? MyChart    Will anyone else be joining your PHONE visit? No        Video-Visit Details CHANGE TO TELEPHONE not able to do AMWELL or DOXIMITY     Type of service:  Video Visit    Video Start Time: 945 AM  Video End Time: 1005 AM    Originating Location (pt. Location): Home    Distant Location (provider location):  [facility] RHEUMATOLOGY CLINIC [place]     Platform used for Video Visit: Other: TELEPHONE     MEGAN Dillon CNP        "

## 2021-01-25 ENCOUNTER — OFFICE VISIT (OUTPATIENT)
Dept: ORTHOPEDICS | Facility: CLINIC | Age: 71
End: 2021-01-25
Payer: MEDICARE

## 2021-01-25 DIAGNOSIS — M06.09 RHEUMATOID ARTHRITIS OF MULTIPLE SITES WITH NEGATIVE RHEUMATOID FACTOR (H): ICD-10-CM

## 2021-01-25 DIAGNOSIS — M19.072 PRIMARY LOCALIZED OSTEOARTHROSIS OF LEFT ANKLE AND FOOT: ICD-10-CM

## 2021-01-25 DIAGNOSIS — Z79.899 HIGH RISK MEDICATIONS (NOT ANTICOAGULANTS) LONG-TERM USE: ICD-10-CM

## 2021-01-25 DIAGNOSIS — M79.675 PAIN AND SWELLING OF TOE OF LEFT FOOT: ICD-10-CM

## 2021-01-25 DIAGNOSIS — M79.675 PAIN OF TOE OF LEFT FOOT: Primary | ICD-10-CM

## 2021-01-25 DIAGNOSIS — M79.89 PAIN AND SWELLING OF TOE OF LEFT FOOT: ICD-10-CM

## 2021-01-25 LAB
ALBUMIN SERPL-MCNC: 3.6 G/DL (ref 3.4–5)
ALT SERPL W P-5'-P-CCNC: 22 U/L (ref 0–70)
AST SERPL W P-5'-P-CCNC: 14 U/L (ref 0–45)
CREAT SERPL-MCNC: 1.18 MG/DL (ref 0.66–1.25)
CRP SERPL-MCNC: 7.5 MG/L (ref 0–8)
ERYTHROCYTE [DISTWIDTH] IN BLOOD BY AUTOMATED COUNT: 14.6 % (ref 10–15)
GFR SERPL CREATININE-BSD FRML MDRD: 61 ML/MIN/{1.73_M2}
HCT VFR BLD AUTO: 44 % (ref 40–53)
HGB BLD-MCNC: 13.5 G/DL (ref 13.3–17.7)
MCH RBC QN AUTO: 29.1 PG (ref 26.5–33)
MCHC RBC AUTO-ENTMCNC: 30.7 G/DL (ref 31.5–36.5)
MCV RBC AUTO: 95 FL (ref 78–100)
PLATELET # BLD AUTO: 180 10E9/L (ref 150–450)
RBC # BLD AUTO: 4.64 10E12/L (ref 4.4–5.9)
WBC # BLD AUTO: 4.8 10E9/L (ref 4–11)

## 2021-01-25 PROCEDURE — 36415 COLL VENOUS BLD VENIPUNCTURE: CPT | Performed by: PATHOLOGY

## 2021-01-25 PROCEDURE — 86140 C-REACTIVE PROTEIN: CPT | Performed by: PATHOLOGY

## 2021-01-25 PROCEDURE — 99214 OFFICE O/P EST MOD 30 MIN: CPT | Performed by: PODIATRIST

## 2021-01-25 PROCEDURE — 84460 ALANINE AMINO (ALT) (SGPT): CPT | Performed by: PATHOLOGY

## 2021-01-25 PROCEDURE — 84450 TRANSFERASE (AST) (SGOT): CPT | Performed by: PATHOLOGY

## 2021-01-25 PROCEDURE — 82040 ASSAY OF SERUM ALBUMIN: CPT | Performed by: PATHOLOGY

## 2021-01-25 PROCEDURE — 85027 COMPLETE CBC AUTOMATED: CPT | Performed by: PATHOLOGY

## 2021-01-25 PROCEDURE — 82565 ASSAY OF CREATININE: CPT | Performed by: PATHOLOGY

## 2021-01-25 NOTE — PROGRESS NOTES
Past Medical History:   Diagnosis Date     Abnormal EMG 4/18/2013     Abnormal involuntary movements(781.0)     Movement Disorder     AK (actinic keratosis) 12/18/2011     Allergic rhinitis, cause unspecified     Allergic rhinitis     Balance problems 11/1/2011     Basal cell carcinoma      Bladder spasms 11/1/2011     Chronic osteoarthritis      COPD (chronic obstructive pulmonary disease) (H)     Interstial lung disease, obliderans bronchiolitis     Diaphragmatic hernia without mention of obstruction or gangrene      Earache or other ear, nose, or throat complaint      Esophageal reflux      Fatigue 11/1/2011     Fracture      H. pylori infection 5/12/2011     History of MRI of cervical spine 11/18/2013    EXAMINATION: CERVICAL SPINE G/E 5 VIEWS* 4/19/2013 4:18 PM  CLINICAL HISTORY: Pain in limb,Performing Location?->[facility] ([facility]),  COMPARISON:  FINDINGS: AP and lateral views in flexion and extension, as well as odontoid view of the cervical spine was obtained. There is no comparison available. The vertebral bodies of the cervical spine are normally aligned. There is posterior spurring and d     Incomplete defecation 11/1/2011     Interstitial lung disease (H) 11/29/2016     Laboratory test 8/7/2012     Lung disease June 2015     Malignant basal cell neoplasm of skin 8/6/2008     Melanoma (H) 8/6/2008     Melanoma in situ of lower leg (H)     R calf     Neuropathy 5/16/2011     Other bladder disorder      Other color vision deficiencies      Other nervous system complications      Parkinsonism (H) 11/1/2011     Personal history of colonic polyps      Polyneuropathy in other diseases classified elsewhere (H)      RA (rheumatoid arthritis) (H)      Rheumatoid arthritis of multiple sites with negative rheumatoid factor (H) 3/21/2016     Seronegative arthritis 11/18/2013     Shortness of breath      Shoulder arthritis 2016    acromioclavicular joint      Somatization disorder 5/12/2011     Squamous cell  carcinoma      Tremor 11/1/2011     Unspecified essential hypertension      Unspecified hypothyroidism      Urinary tract infection      Urinary urgency 11/1/2011     Wears glasses 11/1/2011     Patient Active Problem List   Diagnosis     Diaphragmatic hernia     Esophageal reflux     Hx of melanoma of skin     Malignant basal cell neoplasm of skin     GALO (obstructive sleep apnea)     Chronic maxillary sinusitis     Urinary incontinence     H. pylori infection     Sicca syndrome (H)     Health Care Home     Advanced directives, counseling/discussion     Tremor     Visual changes     Incomplete defecation     Gait disorder     Balance problems     Fatigue     High risk medications (not anticoagulants) long-term use     Personal history of other malignant neoplasm of skin     AK (actinic keratosis)     Pain in joint, lower leg     Abnormal EMG     Seronegative arthritis     History of MRI of cervical spine     Pain in limb     Adenomatous polyp of colon     Peripheral polyneuropathy     Rheumatoid arthritis of multiple sites with negative rheumatoid factor (H)     Morbid obesity (H)     Interstitial lung disease (H)     Thumb pain     History of MRI of lumbar spine     Mood disorder (H)     Paralysis agitans (H)     Neuromuscular disease (H)     Bronchiolitis obliterans (H)     Hypertension     Rheumatoid arthritis (H)     Primary osteoarthritis of both first carpometacarpal joints     Immunosuppressed status (H)     Past Surgical History:   Procedure Laterality Date     BIOPSY OF SKIN LESION       COLONOSCOPY       HEMORRHOID SURGERY       lip biopsy      for sicca complex     MOHS MICROGRAPHIC PROCEDURE       SOFT TISSUE SURGERY      removeal of basel cell carcinoma     Social History     Socioeconomic History     Marital status:      Spouse name: Not on file     Number of children: Not on file     Years of education: Not on file     Highest education level: Not on file   Occupational History     Not on file    Social Needs     Financial resource strain: Not on file     Food insecurity     Worry: Not on file     Inability: Not on file     Transportation needs     Medical: Not on file     Non-medical: Not on file   Tobacco Use     Smoking status: Former Smoker     Packs/day: 1.50     Years: 37.00     Pack years: 55.50     Types: Cigarettes     Start date: 6/15/1963     Quit date: 2000     Years since quittin.3     Smokeless tobacco: Never Used   Substance and Sexual Activity     Alcohol use: Not Currently     Alcohol/week: 0.0 standard drinks     Drug use: Never     Sexual activity: Not Currently     Partners: Female     Birth control/protection: None   Lifestyle     Physical activity     Days per week: Not on file     Minutes per session: Not on file     Stress: Not on file   Relationships     Social connections     Talks on phone: Not on file     Gets together: Not on file     Attends Latter-day service: Not on file     Active member of club or organization: Not on file     Attends meetings of clubs or organizations: Not on file     Relationship status: Not on file     Intimate partner violence     Fear of current or ex partner: Not on file     Emotionally abused: Not on file     Physically abused: Not on file     Forced sexual activity: Not on file   Other Topics Concern     Parent/sibling w/ CABG, MI or angioplasty before 65F 55M? No   Social History Narrative    2013: Living in [place] in a townhouse with no steps    Has 3 sons that are doing okay.         Dairy/d 1 servings/d.     Caffeine 0 servings/d    Exercise 0 x week    Sunscreen used - No    Seatbelts used - Yes    Working smoke/CO detectors in the home - Yes    Guns stored in the home - Yes    Self Breast Exams - NA    Self Testicular Exam - Yes    Eye Exam up to date - Yes     Dental Exam up to date - Yes 2006    Pap Smear up to date - NA    Mammogram up to date - NA    PSA up to date - Yes 2008    Dexa Scan up to date - No    Flex Sig /  "Colonoscopy up to date - Yes less than 5 yrs ago    Immunizations up to date -today    Abuse: Current or Past(Physical, Sexual or Emotional)- No    Do you feel safe in your environment - Yes    2008                 Family History   Problem Relation Age of Onset     Hypertension Mother      Heart Disease Mother         a fib     Arthritis Mother         \"osteo\"     Osteoporosis Mother      Skin Cancer Mother      Uterine Cancer Mother      Other Cancer Mother      Cancer Mother      Hypertension Brother      Diabetes Brother         \" post pancreatitis\"     Neurologic Disorder Sister         multiple sclerosis     Hypertension Sister      Heart Disease Sister      Multiple Sclerosis Sister      Heart Disease Sister         a fib     Arthritis Sister      Heart Failure Sister      Kidney Disease Sister      Dementia Sister      Hypertension Father      Cerebrovascular Disease Father         ,     Skin Cancer Father      Colon Polyps Father      Other - See Comments Son         [place]     Other - See Comments Abdon wagner     Other - See Comments Son         day gonzalez     Psoriasis Maternal Grandfather      Stomach Cancer Maternal Grandfather      Cancer Maternal Grandfather      Congenital Anomalies Other         granddaughter with a chromosome defect     Cerebrovascular Disease Paternal Grandmother         ,     Cerebrovascular Disease Paternal Grandfather         ,     Cancer Granddaughter         Langerhans Cell Histiocytosis     Genetic Disease Granddaughter         gene translocation     Melanoma No family hx of      Colon Cancer No family hx of      Lab Results   Component Value Date    A1C 5.6 11/12/2020    A1C 5.4 08/27/2010    A1C 5.6 04/28/2010    A1C 5.4 02/08/2010    A1C 5.2 08/11/2008     Uric Acid   Date Value Ref Range Status   12/14/2020 6.2 3.5 - 7.2 mg/dL Final   xrays reviewed.  SUBJECTIVE FINDINGS:  A 70-year-old male returns to clinic for left hallux pain.  He relates it is " doing okay.  He relates it still hurts a little bit.  It is still stiff, but he has been doing the stretching.  He is using the ACE wraps to keep the compression down.  He relates he still gets pain in the anterior ankle, lateral ankle, medial ankle.  He relates he does feel some instability of the lateral ankle at times.  It kind of wants to roll out.  He gets mid-foot and arch pain as well.  He relates he does have rheumatoid arthritis and peripheral neuropathy.      OBJECTIVE FINDINGS:  DP and PT are 2/4 bilaterally.  He still has residual edema in the left hallux and decreased range of motion of the left hallux.  He relates pain along the extensor tendons, peroneal tendons and mid-foot.  There is no erythema, no drainage, no odor, no calor, no gross tendon voids bilaterally.      ASSESSMENT AND PLAN:  Osteoarthritis, IPJ of the left hallux.  He is getting peroneal tendinopathy, left foot, left foot and ankle pain.  He has peripheral neuropathy.  He is getting some ankle instability.  He also has rheumatoid arthritis.  Diagnosis and treatment options discussed with the patient.  Prescription for physical therapy given and use discussed with him.  Ankle brace dispensed and use discussed with him, as well as ACE wraps.  Return to clinic and see me in about 8 weeks.

## 2021-01-25 NOTE — LETTER
1/25/2021         RE: Lucio Daly  [address]  [place] [address]        Dear Colleague,    Thank you for referring your patient, Lucio Daly, to the [facility]. Please see a copy of my visit note below.    Past Medical History:   Diagnosis Date     Abnormal EMG 4/18/2013     Abnormal involuntary movements(781.0)     Movement Disorder     AK (actinic keratosis) 12/18/2011     Allergic rhinitis, cause unspecified     Allergic rhinitis     Balance problems 11/1/2011     Basal cell carcinoma      Bladder spasms 11/1/2011     Chronic osteoarthritis      COPD (chronic obstructive pulmonary disease) (H)     Interstial lung disease, obliderans bronchiolitis     Diaphragmatic hernia without mention of obstruction or gangrene      Earache or other ear, nose, or throat complaint      Esophageal reflux      Fatigue 11/1/2011     Fracture      H. pylori infection 5/12/2011     History of MRI of cervical spine 11/18/2013    EXAMINATION: CERVICAL SPINE G/E 5 VIEWS* 4/19/2013 4:18 PM  CLINICAL HISTORY: Pain in limb,Performing Location?[facility]),  COMPARISON:  FINDINGS: AP and lateral views in flexion and extension, as well as odontoid view of the cervical spine was obtained. There is no comparison available. The vertebral bodies of the cervical spine are normally aligned. There is posterior spurring and d     Incomplete defecation 11/1/2011     Interstitial lung disease (H) 11/29/2016     Laboratory test 8/7/2012     Lung disease June 2015     Malignant basal cell neoplasm of skin 8/6/2008     Melanoma (H) 8/6/2008     Melanoma in situ of lower leg (H)     R calf     Neuropathy 5/16/2011     Other bladder disorder      Other color vision deficiencies      Other nervous system complications      Parkinsonism (H) 11/1/2011     Personal history of colonic polyps      Polyneuropathy in other diseases classified elsewhere (H)      RA (rheumatoid arthritis) (H)       Rheumatoid arthritis of multiple sites with negative rheumatoid factor (H) 3/21/2016     Seronegative arthritis 11/18/2013     Shortness of breath      Shoulder arthritis 2016    acromioclavicular joint      Somatization disorder 5/12/2011     Squamous cell carcinoma      Tremor 11/1/2011     Unspecified essential hypertension      Unspecified hypothyroidism      Urinary tract infection      Urinary urgency 11/1/2011     Wears glasses 11/1/2011     Patient Active Problem List   Diagnosis     Diaphragmatic hernia     Esophageal reflux     Hx of melanoma of skin     Malignant basal cell neoplasm of skin     GALO (obstructive sleep apnea)     Chronic maxillary sinusitis     Urinary incontinence     H. pylori infection     Sicca syndrome (H)     Health Care Home     Advanced directives, counseling/discussion     Tremor     Visual changes     Incomplete defecation     Gait disorder     Balance problems     Fatigue     High risk medications (not anticoagulants) long-term use     Personal history of other malignant neoplasm of skin     AK (actinic keratosis)     Pain in joint, lower leg     Abnormal EMG     Seronegative arthritis     History of MRI of cervical spine     Pain in limb     Adenomatous polyp of colon     Peripheral polyneuropathy     Rheumatoid arthritis of multiple sites with negative rheumatoid factor (H)     Morbid obesity (H)     Interstitial lung disease (H)     Thumb pain     History of MRI of lumbar spine     Mood disorder (H)     Paralysis agitans (H)     Neuromuscular disease (H)     Bronchiolitis obliterans (H)     Hypertension     Rheumatoid arthritis (H)     Primary osteoarthritis of both first carpometacarpal joints     Immunosuppressed status (H)     Past Surgical History:   Procedure Laterality Date     BIOPSY OF SKIN LESION       COLONOSCOPY       HEMORRHOID SURGERY       lip biopsy      for sicca complex     MOHS MICROGRAPHIC PROCEDURE       SOFT TISSUE SURGERY      removeal of basel cell  carcinoma     Social History     Socioeconomic History     Marital status:      Spouse name: Not on file     Number of children: Not on file     Years of education: Not on file     Highest education level: Not on file   Occupational History     Not on file   Social Needs     Financial resource strain: Not on file     Food insecurity     Worry: Not on file     Inability: Not on file     Transportation needs     Medical: Not on file     Non-medical: Not on file   Tobacco Use     Smoking status: Former Smoker     Packs/day: 1.50     Years: 37.00     Pack years: 55.50     Types: Cigarettes     Start date: 6/15/1963     Quit date: 2000     Years since quittin.3     Smokeless tobacco: Never Used   Substance and Sexual Activity     Alcohol use: Not Currently     Alcohol/week: 0.0 standard drinks     Drug use: Never     Sexual activity: Not Currently     Partners: Female     Birth control/protection: None   Lifestyle     Physical activity     Days per week: Not on file     Minutes per session: Not on file     Stress: Not on file   Relationships     Social connections     Talks on phone: Not on file     Gets together: Not on file     Attends Rastafarian service: Not on file     Active member of club or organization: Not on file     Attends meetings of clubs or organizations: Not on file     Relationship status: Not on file     Intimate partner violence     Fear of current or ex partner: Not on file     Emotionally abused: Not on file     Physically abused: Not on file     Forced sexual activity: Not on file   Other Topics Concern     Parent/sibling w/ CABG, MI or angioplasty before 65F 55M? No   Social History Narrative    2013: Living in [place] in a townhouse with no steps    Has 3 sons that are doing okay.         Dairy/d 1 servings/d.     Caffeine 0 servings/d    Exercise 0 x week    Sunscreen used - No    Seatbelts used - Yes    Working smoke/CO detectors in the home - Yes    Guns stored in the home - Yes  "   Self Breast Exams - NA    Self Testicular Exam - Yes    Eye Exam up to date - Yes 2008    Dental Exam up to date - Yes 2006    Pap Smear up to date - NA    Mammogram up to date - NA    PSA up to date - Yes 2008    Dexa Scan up to date - No    Flex Sig / Colonoscopy up to date - Yes less than 5 yrs ago    Immunizations up to date -today    Abuse: Current or Past(Physical, Sexual or Emotional)- No    Do you feel safe in your environment - Yes    2008                 Family History   Problem Relation Age of Onset     Hypertension Mother      Heart Disease Mother         a fib     Arthritis Mother         \"osteo\"     Osteoporosis Mother      Skin Cancer Mother      Uterine Cancer Mother      Other Cancer Mother      Cancer Mother      Hypertension Brother      Diabetes Brother         \" post pancreatitis\"     Neurologic Disorder Sister         multiple sclerosis     Hypertension Sister      Heart Disease Sister      Multiple Sclerosis Sister      Heart Disease Sister         a fib     Arthritis Sister      Heart Failure Sister      Kidney Disease Sister      Dementia Sister      Hypertension Father      Cerebrovascular Disease Father         ,     Skin Cancer Father      Colon Polyps Father      Other - See Comments Son         [place]     Other - See Comments Abdon wagner     Other - See Comments Abdon gonzalez     Psoriasis Maternal Grandfather      Stomach Cancer Maternal Grandfather      Cancer Maternal Grandfather      Congenital Anomalies Other         granddaughter with a chromosome defect     Cerebrovascular Disease Paternal Grandmother         ,     Cerebrovascular Disease Paternal Grandfather         ,     Cancer Granddaughter         Langerhans Cell Histiocytosis     Genetic Disease Granddaughter         gene translocation     Melanoma No family hx of      Colon Cancer No family hx of      Lab Results   Component Value Date    A1C 5.6 11/12/2020    A1C 5.4 08/27/2010    A1C " 5.6 04/28/2010    A1C 5.4 02/08/2010    A1C 5.2 08/11/2008     Uric Acid   Date Value Ref Range Status   12/14/2020 6.2 3.5 - 7.2 mg/dL Final   xrays reviewed.  SUBJECTIVE FINDINGS:  A 70-year-old male returns to clinic for left hallux pain.  He relates it is doing okay.  He relates it still hurts a little bit.  It is still stiff, but he has been doing the stretching.  He is using the ACE wraps to keep the compression down.  He relates he still gets pain in the anterior ankle, lateral ankle, medial ankle.  He relates he does feel some instability of the lateral ankle at times.  It kind of wants to roll out.  He gets mid-foot and arch pain as well.  He relates he does have rheumatoid arthritis and peripheral neuropathy.      OBJECTIVE FINDINGS:  DP and PT are 2/4 bilaterally.  He still has residual edema in the left hallux and decreased range of motion of the left hallux.  He relates pain along the extensor tendons, peroneal tendons and mid-foot.  There is no erythema, no drainage, no odor, no calor, no gross tendon voids bilaterally.      ASSESSMENT AND PLAN:  Osteoarthritis, IPJ of the left hallux.  He is getting peroneal tendinopathy, left foot, left foot and ankle pain.  He has peripheral neuropathy.  He is getting some ankle instability.  He also has rheumatoid arthritis.  Diagnosis and treatment options discussed with the patient.  Prescription for physical therapy given and use discussed with him.  Ankle brace dispensed and use discussed with him, as well as ACE wraps.  Return to clinic and see me in about 8 weeks.       Again, thank you for allowing me to participate in the care of your patient.        Sincerely,        Rodney Ríos DPM

## 2021-01-25 NOTE — NURSING NOTE
Reason For Visit:   Chief Complaint   Patient presents with     RECHECK     6 week follow up.        Pain Assessment  Patient Currently in Pain: Yes  Primary Pain Location: (Right great toe.)        Allergies   Allergen Reactions     Restasis      Burning eyes, problems with breathing, tightness in chest     Adhesive Tape      Bandages misc     Allegra      EXCESSIVE URINATION AND WEAKNESS, LIGHT-HEADED     Allergy      Dust     Animal Dander      Benadryl Allergy      EXCESSIVE URINATION AND WEAKNESS, LIGHT-HEADED     Cephalexin      Joint pain or gerd aggravation. Bloating excessive urination      Cephalosporins      Cyclosporine      Diphenhydramine Other (See Comments)     Doxycycline      Doxycycline Hyclate Nausea     SWEATING,MIGRAINES,LOSS OF APPETITE,SWEATING,LIGHT HEADED, EXCESSIVE URINATION     Fexofenadine Other (See Comments)     Flonase [Fluticasone Propionate]      Gabapentin      Neurontin: mosd changes and excess urination     Hydrocortisone      Iodine      Iodine Solution [Povidone Iodine]      SKIN MELTS     Levaquin      From surgeon--? Joint pain ? Gerd aggravation. Insomnia, excess urination     Levofloxacin      Mylanta      EXCESSIVE URINATION AND WEAKNESS, LIGHT-HEADED     Oxcarbazepine      Prednisone      Weakness, elevated bp, headache, eye pain, congestion      Prednisone      Seafood [Seafood]      Shellfish Allergy      Hives       Simethicone      Sulfamethoxazole W/Trimethoprim      Sulfamethoxazole-Trimethoprim      Chest pain, angina     Symbicort GI Disturbance and Nausea     Trees      Trileptal      SEVERE JOINT AND TENDON PAIN, INSOMNIA, RESTLESSNESS, NAUSEA, EXCESS URINATION     Cortizone Rash     EXCESS URINATION,WEAKNESS,NAUSEA, HEADACHE           Kaitlynn Stratton LPN

## 2021-01-29 DIAGNOSIS — G62.9 PERIPHERAL POLYNEUROPATHY: ICD-10-CM

## 2021-02-02 NOTE — TELEPHONE ENCOUNTER
Routing refill request to provider for review/approval because:  --Drug not on the [facility] refill protocol pregabalin (LYRICA) 50 MG capsule.        Last order -    Disp Refills Start End [facility]   pregabalin (LYRICA) 50 MG capsule 360 capsule 0 10/25/2020  No   Sig: TAKE 1 TAB BY MOUTH AT 7-7:30 AM, 1 TAB AT 4 PM, AND 1-2 TABS AT 11 PM           --Last visit:  11/12/2020

## 2021-02-03 RX ORDER — PREGABALIN 50 MG/1
CAPSULE ORAL
Qty: 360 CAPSULE | Refills: 0 | Status: SHIPPED | OUTPATIENT
Start: 2021-02-03 | End: 2021-04-13

## 2021-03-01 PROBLEM — M79.646 THUMB PAIN: Status: RESOLVED | Noted: 2017-03-17 | Resolved: 2021-03-01

## 2021-03-01 PROBLEM — M18.0 PRIMARY OSTEOARTHRITIS OF BOTH FIRST CARPOMETACARPAL JOINTS: Status: RESOLVED | Noted: 2020-12-06 | Resolved: 2021-03-01

## 2021-03-01 NOTE — PROGRESS NOTES
Discharge Summary - Hand Therapy    Patient did not return to therapy. Telephoned on 3/1/21. VM left. Assume all goals were met to patient's satisfaction. D/C from hand therapy.

## 2021-03-07 DIAGNOSIS — Z79.899 HIGH RISK MEDICATION USE: ICD-10-CM

## 2021-03-07 DIAGNOSIS — D84.9 IMMUNOSUPPRESSED STATUS (H): ICD-10-CM

## 2021-03-07 DIAGNOSIS — K21.9 GASTROESOPHAGEAL REFLUX DISEASE WITHOUT ESOPHAGITIS: ICD-10-CM

## 2021-03-07 DIAGNOSIS — M06.09 RHEUMATOID ARTHRITIS OF MULTIPLE SITES WITH NEGATIVE RHEUMATOID FACTOR (H): ICD-10-CM

## 2021-03-08 RX ORDER — METHOTREXATE 2.5 MG/1
8 TABLET ORAL
Qty: 96 TABLET | Refills: 0 | Status: SHIPPED | OUTPATIENT
Start: 2021-03-08 | End: 2021-04-13

## 2021-03-08 NOTE — TELEPHONE ENCOUNTER
methotrexate 2.5 MG tablet      Last Written Prescription Date:  12/16/2020  Last Fill Quantity: 96 tab,   # refills: 0  Last Office Visit: 12/16/2020  Future Office visit:  4/13/2021    CBC RESULTS:   Recent Labs   Lab Test 01/25/21 0921   WBC 4.8   RBC 4.64   HGB 13.5   HCT 44.0   MCV 95   MCH 29.1   MCHC 30.7*   RDW 14.6          Creatinine   Date Value Ref Range Status   01/25/2021 1.18 0.66 - 1.25 mg/dL Final   ]    Liver Function Studies -   Recent Labs   Lab Test 01/25/21  0921 05/15/18  1512 05/15/18  1512   PROTTOTAL  --   --  7.5   ALBUMIN 3.6   < > 4.0   BILITOTAL  --   --  0.4   ALKPHOS  --   --  68   AST 14   < > 25   ALT 22   < > 32    < > = values in this interval not displayed.       Routing refill request to provider for review/approval because:  DMARD medication.  - labs current

## 2021-03-10 RX ORDER — OMEPRAZOLE 40 MG/1
CAPSULE, DELAYED RELEASE ORAL
Qty: 180 CAPSULE | Refills: 2 | Status: SHIPPED | OUTPATIENT
Start: 2021-03-10 | End: 2021-09-30

## 2021-04-08 ENCOUNTER — TELEPHONE (OUTPATIENT)
Dept: RHEUMATOLOGY | Facility: CLINIC | Age: 71
End: 2021-04-08

## 2021-04-08 NOTE — TELEPHONE ENCOUNTER
Patient's lab orders for Dr Goodrich have . He would like the orders renewed, and then a call back to schedule bloodwork prior to his upcoming follow up with Dr Goodrich.

## 2021-04-12 NOTE — PROGRESS NOTES
Rheumatology Clinic Visit  Jeremy Goodrich M.D.     Lucio Daly MRN# [id number]   YOB: 1950 Age: 70 year old     Date of Visit: 4/13/21    Primary care provider: Saroj Tinoco         Assessment and Plan:   # Seronegative inflammatory arthritis: Patient relates sustained overall improvement in control of diffuse joint pain with combination therapy.  However, he notes continued quality of life degrading diffuse right upper extremity pain and waxing and waning left great toe pain.  Physical exam shows painful shoulder flexion beyond 120 degrees, but intact abduction and external rotation.  Creatinine, transaminases, CRP, and CBC were normal on January 25, 2021.  Seronegative inflammatory arthritis is improved with combination methotrexate and low-dose hydroxychloroquine.  I recommend continuing combination therapy.  Multiple factors may contribute to right upper extremity pain, including rotator cuff tendinitis or subacromial bursitis, neuropathy, and tendinitis at the elbow.  I recommend imaging of the shoulder to rule out osseous lesions.  If the shoulder: Is unrevealing, a trial of physical therapy for the shoulder and arm may be helpful.  Left great toe swelling is not likely to be crystalline in etiology, given the chronic, low-grade noninflammatory features of the discomfort.  I agree with continued symptomatic management through Dr. Martinez in podiatry.  Plan  # Continue methotrexate at 20 mg weekly, folic acid 2 mg day. While on drug  --High risk medication monitoring--labs q 3 mo AST/ALT, Albumin, CBC with plts, CRP   --Limit EtOH intake to 2 drinks weekly; use folate 2 mg daily  --Tylenol 325mg qid prn nausea/HA associated with dosing.    # hydroxychloroquine (plaquenil) 200 mg QD -annual eye exam done July 2020 [facility].   2. 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  Seeing Dr. Mccarthy for follow-up   "  3. Osteoarthritis knees, ankle, shoulder and neck and hands/thumbs. Continue isometric quad strengthening exercises twice daily to improve support around the joint. Left ankle osteoarthritis and overall osteoarthritis  is contributing to the overall pain. Continue splinting/hand therapy and acetaminophen 1000 mg tid ATC. Follow-up orthopedics. Currently doing Physical Therapy for thumb, follow-up  This provider about DJD shoulder if this could be causing this nerve pain     RTC 6 month    Jeremy Goodrich MD  Staff Rheumatologist, [facility]             History of Present Illness:   Lucio MOYA [place] \"Rich\" presents for f/u seronegative rheumatoid arthritis, sicca complex.  Last seen by Sin Rausch in December 2020.  Methotrexate and hydroxychloroquine were continued for seronegative arthritis at that time.  Interval history 04-:  Intermittent l 2nd toe pain, made worse with walking. +visible swelling. Altering shoegear per Dr. Martinez.  R arm remains painful. It is \"nerve pain\" like, affecting his hand, forearm, arm. Discomfort made worse with gripping R hand. A R thumb splint did not give much relief.  R thumb and index finger are painful when walking with a walker and gripping a grocery cart handle.  He continues with methotrexate, hydroxychloroquine 200 mg daily, and lyrica. He thinks that methotrexate is helpful.     December 15, 2020  Healthy. No infections as he did winter 2019 with influenza B and pneumonia. Doing quarantine and social distancing, hand washing. Healthiest he has been in along time. Denies any fever, chills, SOB, JUARES, night sweats, or chest pain, high fever, cough, travel in last 14 days or exposure to covid-19 (coronavirus). Reports healthy. Walks 3/4 mile a day harder to walk longer distance due to peripheral neuropathy and foot drop.      Right thumb \"jammed\" pushing into wrist (into forearm then shoulder this pain shooting up arm) needs option fusion doing Physical Therapy, seeing " "12-4 ortho in 8 weeks Dr. Camargo, now has thumb splica splint x 2 thinks this is nerve pain. \"lightening bolt\" Hard to walk with walker. Helps the hand pain not this arm.  Hard or wore when . If pushing grocery cart with gripping then goes up. Most intense when gripping. Told was neuropathy and tendonitis. Not carpel tunnel. For > 6 month. [place] to use the throttle. Neck from cervical stenosis and DDD shoulder pain. Lumbar would need fusion but high risk. Has DDD right shoulder XR 4-2016. Neurology exhausted resources. Allergy reaction to cortisone and prednisone so that is not an option. Tried OTC cream for knee. Not try again. He did not reduce hydroxychloroquine (plaquenil) to once day     Feels in right shoulder, right wrist in this right arm not sure if coming down or going up. Left elbow dominique in left shoulder. pain on top of the hand then wrists pain to elbow then shoulder. Toe pain seeing Dr. Martinez the uric acid was normal. No red or swelling.     ILD seen pulmonary stable. Due for colonscopy    Methotrexate  20 mg Q Tues or Wed mild GI, and diarrhea that day then ok, folic acid 2 mg day, hydroxychloroquine (plaquenil) 200 mg BID except TUES  (he did not reduce) tolerating. Does not take NSAIDs as brother in law passed from kidney and liver issues felt contributed by celebrex     PMH   1. Sensory neuropathy of unclear etiology - negative work up, managed on Lyrica.  Has chronic dysesthesia in pads of feet.  2. Parkinsonisim/Tremor disorder; sees neurology   3. Headaches   4. History of basal cell, squamous (most recent +bx 8-2014)  5. History of melanoma   6. GALO on CPAP.  7. HTN.   8. Seronegative RA, diagnosed 2009. SICCA  9.  JUARES. Work-up 4-2015. 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 Lumbar stenosis per MRI 2017   11. Bronchitis 5-2017, early pneumonia   12. Chicken pox   13. DEXA  Normal   14. Left ankle DJD, MRI  --seen Dr. Martinez 2-2019   Past Medical " History:   Diagnosis Date     Abnormal EMG 4/18/2013     Abnormal involuntary movements(781.0)     Movement Disorder     AK (actinic keratosis) 12/18/2011     Allergic rhinitis, cause unspecified     Allergic rhinitis     Balance problems 11/1/2011     Basal cell carcinoma      Bladder spasms 11/1/2011     Chronic osteoarthritis      COPD (chronic obstructive pulmonary disease) (H)     Interstial lung disease, obliderans bronchiolitis     Diaphragmatic hernia without mention of obstruction or gangrene      Earache or other ear, nose, or throat complaint      Esophageal reflux      Fatigue 11/1/2011     Fracture      H. pylori infection 5/12/2011     History of MRI of cervical spine 11/18/2013    EXAMINATION: CERVICAL SPINE G/E 5 VIEWS* 4/19/2013 4:18 PM  CLINICAL HISTORY: Pain in limb,Performing Location?->[facility]),  COMPARISON:  FINDINGS: AP and lateral views in flexion and extension, as well as odontoid view of the cervical spine was obtained. There is no comparison available. The vertebral bodies of the cervical spine are normally aligned. There is posterior spurring and d     Incomplete defecation 11/1/2011     Interstitial lung disease (H) 11/29/2016     Laboratory test 8/7/2012     Lung disease June 2015     Malignant basal cell neoplasm of skin 8/6/2008     Melanoma (H) 8/6/2008     Melanoma in situ of lower leg (H)     R calf     Neuropathy 5/16/2011     Other bladder disorder      Other color vision deficiencies      Other nervous system complications      Parkinsonism (H) 11/1/2011     Personal history of colonic polyps      Polyneuropathy in other diseases classified elsewhere (H)      RA (rheumatoid arthritis) (H)      Rheumatoid arthritis of multiple sites with negative rheumatoid factor (H) 3/21/2016     Seronegative arthritis 11/18/2013     Shortness of breath      Shoulder arthritis 2016    acromioclavicular joint      Somatization disorder 5/12/2011     Squamous cell carcinoma       "Tremor 11/1/2011     Unspecified essential hypertension      Unspecified hypothyroidism      Urinary tract infection      Urinary urgency 11/1/2011     Wears glasses 11/1/2011     Injuries-ankle sprains, left heel fracture    Family Hx   MGM Psoriasis  Sister -PPM  Family History   Problem Relation Age of Onset     Hypertension Mother      Heart Disease Mother         a fib     Arthritis Mother         \"osteo\"     Osteoporosis Mother      Skin Cancer Mother      Uterine Cancer Mother      Other Cancer Mother      Cancer Mother      Hypertension Brother      Diabetes Brother         \" post pancreatitis\"     Neurologic Disorder Sister         multiple sclerosis     Hypertension Sister      Heart Disease Sister      Multiple Sclerosis Sister      Heart Disease Sister         a fib     Arthritis Sister      Heart Failure Sister      Kidney Disease Sister      Dementia Sister      Hypertension Father      Cerebrovascular Disease Father         ,     Skin Cancer Father      Colon Polyps Father      Other - See Comments Son         [place]     Other - See Comments Abdon wagner     Other - See Comments Son         day gonzalez     Psoriasis Maternal Grandfather      Stomach Cancer Maternal Grandfather      Cancer Maternal Grandfather      Congenital Anomalies Other         granddaughter with a chromosome defect     Cerebrovascular Disease Paternal Grandmother         ,     Cerebrovascular Disease Paternal Grandfather         ,     Cancer Granddaughter         Langerhans Cell Histiocytosis     Genetic Disease Granddaughter         gene translocation     Melanoma No family hx of      Colon Cancer No family hx of      Personal Hx   Home environment: No secondhand tobacco smoke in home.    History     Social History     Marital Status:      Spouse Name: N/A     Number of Children: N/A     Years of Education: N/A     Social History Main Topics     Smoking status: Former Smoker     Quit date: 09/30/2003 "     Smokeless tobacco: Never Used     Alcohol Use: No     Drug Use: No     Sexually Active: Not Currently -- Female partner(s)     Other Topics Concern     None     Social History Narrative    2013: Living in [place] in a townhouse with no stepsHas 3 sons that are doing okay.             Review of Systems:     14 points all negative except what is mentioned in HPI.  Review Of Systems  Skin: negative  Eyes: negative  Ears/Nose/Throat: negative  Respiratory: No shortness of breath, dyspnea on exertion, cough, or hemoptysis  Cardiovascular: negative  Gastrointestinal: negative  Genitourinary: negative  Musculoskeletal: as above  Neurologic: as above  Psychiatric: negative  Hematologic/Lymphatic/Immunologic: negative  Endocrine: negative             Past Surgical History:   Past Surgical History:   Procedure Laterality Date     BIOPSY OF SKIN LESION       COLONOSCOPY       HEMORRHOID SURGERY       lip biopsy      for sicca complex     MOHS MICROGRAPHIC PROCEDURE       SOFT TISSUE SURGERY      removeal of basel cell carcinoma     Telephone Assessment Objective:   PSYCH: Alert and oriented times 3; coherent speech, normal  rate and volume, able to articulate logical thoughts, able   to abstract reason, no tangential thoughts, no hallucinations or delusions  His affect is normal and pleasant  RESP: No cough, no audible wheezing, able to talk in full sentences  Neuro: Cranial nerves grossly intact.  Mentation and speech appropriate for age.  Remainder of exam unable to be completed due to telephone visits    Due to efforts to reduce the spread of COVID-19 in the clinic, state, nation, telephone visits are encouraged currently. Patient understands that diagnose and advice is limited by the inability to exam him/her/them face-to-face. Discussed corovid-19 prevention, CDC guidelines/resources and to follow CDC/MDH guidelines. If any signs or symptoms of infection to stop immunosuppression and seek immediate medical attn.  That prednisone can increase change of serious infections so should be avoided. Discussed the importance of good hand washing techniques with soap and water for at least 20 seconds, avoid touching eyes, nose, mouth, avoiding crowds, social distancing. We also agree that the benefits of continued immunosuppression outweigh the risks of COVID-19 infection.            Data:     RHEUM RESULTS Latest Ref Rng & Units 11/12/2020 12/14/2020 1/25/2021   DNA-DS 0 - 29 IU/mL - - -   SED RATE 0 - 20 mm/h - - -   CRP, INFLAMMATION 0.0 - 8.0 mg/L 7.1 9.0(H) 7.5   CYCLIC CIT PEPT IGG <5 U/mL - - -   CK TOTAL 30 - 300 U/L - - -   RHEUMATOID FACTOR 0 - 14 IU/mL - - -   MONICA SCREEN BY EIA 0 - 1.0 - - -   AST 0 - 45 U/L 18 14 14   ALT 0 - 70 U/L 24 26 22   ALBUMIN 3.4 - 5.0 g/dL 4.1 3.8 3.6   WBC 4.0 - 11.0 10e9/L 5.0 5.6 4.8   RBC 4.4 - 5.9 10e12/L 4.72 4.60 4.64   HGB 13.3 - 17.7 g/dL 14.1 13.8 13.5   HCT 40.0 - 53.0 % 44.6 44.0 44.0   MCV 78 - 100 fl 95 96 95   MCHC 31.5 - 36.5 g/dL 31.6 31.4(L) 30.7(L)   RDW 10.0 - 15.0 % 14.9 14.6 14.6    - 450 10e9/L 189 199 180   CREATININE 0.66 - 1.25 mg/dL 1.19 1.19 1.18   GFR ESTIMATE, IF BLACK >60 mL/min/[1.73:m2] 70 70 71   GFR ESTIMATE >60 mL/min/[1.73:m2] 61 61 61    - 1,620 mg/dL - - -   IGA 70 - 380 mg/dL - - -   IGM 60 - 265 mg/dL - - -   HEPATITIS C ANTIBODY NEG - - -       Rheumatoid Factor   Date Value Ref Range Status   05/27/2010 <7 0 - 14 IU/mL Final   ,  ,  ,   Cyclic Citrullinated Peptide IgG   Date Value Ref Range Status   05/27/2010 <2 <5 U/mL Final     Comment:     Interpretation:  Negative   ,  ,   SSA (RO) Antibody IgG   Date Value Ref Range Status   05/27/2010 1  Final     Comment:     Reference range: 0 to 40  Unit: AU/mL  (Note)  REFERENCE INTERVALS: SSA (Ro) (ELAN) Ab, IgG   29 AU/mL or Less ............. Negative   30 - 40 AU/mL ................ Equivocal   41 AU/mL or Greater .......... Positive    SSA (Ro) antibody is seen in 70-75% of Sjogren  syndrome  cases, 30-40% of systemic lupus erythematosus (SLE) and  5-10% of progressive systemic sclerosis (PSS).  Performed by [facility],  [address] [facility],[place] [id number] [phone]  [email or web address], Chely Leija MD, Lab. Director     SSB (LA) Antibody IgG   Date Value Ref Range Status   05/27/2010 8  Final     Comment:     Reference range: 0 to 40  Unit: AU/mL  (Note)  REFERENCE INTERVALS: SSB (La) (ELAN) Ab, IgG   29 AU/mL or Less ............. Negative   30 - 40 AU/mL ................ Equivocal   41 AU/mL or Greater .......... Positive    SSB (La) antibody is seen in 50-60% of Sjogren syndrome  cases and is specific if it is the only ELAN antibody  present. 15-25% of patients with systemic lupus  erythematosus (SLE) and 5-10% of patients with progressive  systemic sclerosis (PSS) also have this antibody.  Performed by [facility],  [address] [place] [address] [phone]  [email or web address], Chely Leija MD, Lab. Director   ,  ,   MONICA Screen by EIA   Date Value Ref Range Status   05/27/2010 <1.0 0 - 1.0 Final     Comment:     Interpretation:  Negative   ,   DNA-ds   Date Value Ref Range Status   05/27/2010 <15 0 - 29 IU/mL Final     Comment:     Interpretation:  Negative   ,  ,  ,  ,  ,  ,  ,   Hep B Surface Agn   Date Value Ref Range Status   05/27/2010 Negative NEG Final   ,  ,  ,  ,  ,  ,  ,  ,  ,  ,  ,   Neutrophil Cytoplasmic IgG Antibody   Date Value Ref Range Status   06/26/2015   Final    <1:20  Reference range: <1:20  (Note)  The ANCA IFA is <1:20; therefore, no further testing will  be performed.  INTERPRETIVE INFORMATION: Anti-Neutrophil Cyto Ab, IgG  Neutrophil Cytoplasmic Antibodies (C-ANCA = granular  cytoplasmic staining, P-ANCA = perinuclear staining) are  found in the serum of over 90 percent of patients with  certain necrotizing systemic vasculitides, and usually in  less than 5 percent of patients with collagen vascular  disease or arthritis.  Performed by [facility],  [address] [place] [id number] [phone]  [email or web address], Brad Fall MD, Lab. Director       ,  ,  ,  ,  ,  ,  ,  ,  ,   Albumin Fraction   Date Value Ref Range Status   05/27/2010 4.0 3.7 - 5.1 g/dL Final     Alpha 2 Fraction   Date Value Ref Range Status   05/27/2010 0.6 0.5 - 0.9 g/dL Final     Beta Fraction   Date Value Ref Range Status   05/27/2010 1.0 0.6 - 1.0 g/dL Final     Gamma Fraction   Date Value Ref Range Status   05/27/2010 1.0 0.7 - 1.6 g/dL Final     Monoclonal Peak   Date Value Ref Range Status   05/27/2010 0.0 0.0 g/dL Final     ELP Interpretation:   Date Value Ref Range Status   05/27/2010   Final    Essentially normal electrophoretic pattern.  No monoclonal protein seen.     Comment:      Pathologic significance requires clinical correlation.  ASHLEY Noble M.D.,   Ph.D., Pathologist ()   ,  ,   Immunofixation ELP   Date Value Ref Range Status   06/09/2016   Final    No monoclonal protein seen on immunofixation.  Pathological significance   requires clinical correlation.   ASHLEY Noble M.D., Ph.D   Pathologist ([phone])       IGG   Date Value Ref Range Status   06/09/2016 1,020 695 - 1,620 mg/dL Final     IGA   Date Value Ref Range Status   06/09/2016 374 70 - 380 mg/dL Final     IGM   Date Value Ref Range Status   06/09/2016 81 60 - 265 mg/dL Final   ,  ,  ,  ,  ,  ,  ,

## 2021-04-13 ENCOUNTER — ANCILLARY PROCEDURE (OUTPATIENT)
Dept: GENERAL RADIOLOGY | Facility: CLINIC | Age: 71
End: 2021-04-13
Attending: INTERNAL MEDICINE
Payer: MEDICARE

## 2021-04-13 ENCOUNTER — OFFICE VISIT (OUTPATIENT)
Dept: RHEUMATOLOGY | Facility: CLINIC | Age: 71
End: 2021-04-13
Attending: INTERNAL MEDICINE
Payer: MEDICARE

## 2021-04-13 VITALS
SYSTOLIC BLOOD PRESSURE: 168 MMHG | OXYGEN SATURATION: 95 % | HEART RATE: 72 BPM | BODY MASS INDEX: 41.63 KG/M2 | TEMPERATURE: 97.9 F | WEIGHT: 315 LBS | DIASTOLIC BLOOD PRESSURE: 102 MMHG

## 2021-04-13 DIAGNOSIS — M06.09 RHEUMATOID ARTHRITIS OF MULTIPLE SITES WITH NEGATIVE RHEUMATOID FACTOR (H): ICD-10-CM

## 2021-04-13 DIAGNOSIS — Z79.899 HIGH RISK MEDICATION USE: ICD-10-CM

## 2021-04-13 DIAGNOSIS — D84.9 IMMUNOSUPPRESSED STATUS (H): ICD-10-CM

## 2021-04-13 DIAGNOSIS — G62.9 PERIPHERAL POLYNEUROPATHY: ICD-10-CM

## 2021-04-13 LAB
ALBUMIN SERPL-MCNC: 3.8 G/DL (ref 3.4–5)
ALT SERPL W P-5'-P-CCNC: 24 U/L (ref 0–70)
AST SERPL W P-5'-P-CCNC: 12 U/L (ref 0–45)
CREAT SERPL-MCNC: 1.25 MG/DL (ref 0.66–1.25)
CRP SERPL-MCNC: 6.6 MG/L (ref 0–8)
ERYTHROCYTE [DISTWIDTH] IN BLOOD BY AUTOMATED COUNT: 14.7 % (ref 10–15)
GFR SERPL CREATININE-BSD FRML MDRD: 57 ML/MIN/{1.73_M2}
HCT VFR BLD AUTO: 45 % (ref 40–53)
HGB BLD-MCNC: 14.1 G/DL (ref 13.3–17.7)
MCH RBC QN AUTO: 29.6 PG (ref 26.5–33)
MCHC RBC AUTO-ENTMCNC: 31.3 G/DL (ref 31.5–36.5)
MCV RBC AUTO: 94 FL (ref 78–100)
PLATELET # BLD AUTO: 186 10E9/L (ref 150–450)
RBC # BLD AUTO: 4.77 10E12/L (ref 4.4–5.9)
WBC # BLD AUTO: 6.7 10E9/L (ref 4–11)

## 2021-04-13 PROCEDURE — 73030 X-RAY EXAM OF SHOULDER: CPT | Mod: RT | Performed by: RADIOLOGY

## 2021-04-13 PROCEDURE — G0463 HOSPITAL OUTPT CLINIC VISIT: HCPCS

## 2021-04-13 PROCEDURE — 86140 C-REACTIVE PROTEIN: CPT | Performed by: PATHOLOGY

## 2021-04-13 PROCEDURE — 82565 ASSAY OF CREATININE: CPT | Performed by: PATHOLOGY

## 2021-04-13 PROCEDURE — 84460 ALANINE AMINO (ALT) (SGPT): CPT | Performed by: PATHOLOGY

## 2021-04-13 PROCEDURE — 84450 TRANSFERASE (AST) (SGOT): CPT | Performed by: PATHOLOGY

## 2021-04-13 PROCEDURE — 85027 COMPLETE CBC AUTOMATED: CPT | Performed by: PATHOLOGY

## 2021-04-13 PROCEDURE — 36415 COLL VENOUS BLD VENIPUNCTURE: CPT | Performed by: PATHOLOGY

## 2021-04-13 PROCEDURE — 99214 OFFICE O/P EST MOD 30 MIN: CPT | Performed by: INTERNAL MEDICINE

## 2021-04-13 PROCEDURE — 82040 ASSAY OF SERUM ALBUMIN: CPT | Performed by: PATHOLOGY

## 2021-04-13 RX ORDER — PREGABALIN 50 MG/1
CAPSULE ORAL
Qty: 360 CAPSULE | Refills: 1 | Status: SHIPPED | OUTPATIENT
Start: 2021-04-13 | End: 2021-09-30

## 2021-04-13 RX ORDER — METHOTREXATE 2.5 MG/1
8 TABLET ORAL
Qty: 96 TABLET | Refills: 3 | Status: SHIPPED | OUTPATIENT
Start: 2021-04-13 | End: 2021-10-01

## 2021-04-13 RX ORDER — HYDROXYCHLOROQUINE SULFATE 200 MG/1
200 TABLET, FILM COATED ORAL DAILY
Qty: 90 TABLET | Refills: 1 | Status: SHIPPED | OUTPATIENT
Start: 2021-04-13 | End: 2021-10-01

## 2021-04-13 NOTE — LETTER
4/13/2021       RE: Lucio Daly  [id number] [facility] [place] [address]     Dear Colleague,    Thank you for referring your patient, Lucio Daly, to the [facility] [place] [facility]. Please see a copy of my visit note below.    Rheumatology Clinic Visit  Jeremy Goodrich M.D.     Lucio Daly MRN# [id number]   YOB: 1950 Age: 70 year old     Date of Visit: 4/13/21    Primary care provider: Saroj Tinoco         Assessment and Plan:   # Seronegative inflammatory arthritis: Patient relates sustained overall improvement in control of diffuse joint pain with combination therapy.  However, he notes continued quality of life degrading diffuse right upper extremity pain and waxing and waning left great toe pain.  Physical exam shows painful shoulder flexion beyond 120 degrees, but intact abduction and external rotation.  Creatinine, transaminases, CRP, and CBC were normal on January 25, 2021.  Seronegative inflammatory arthritis is improved with combination methotrexate and low-dose hydroxychloroquine.  I recommend continuing combination therapy.  Multiple factors may contribute to right upper extremity pain, including rotator cuff tendinitis or subacromial bursitis, neuropathy, and tendinitis at the elbow.  I recommend imaging of the shoulder to rule out osseous lesions.  If the shoulder: Is unrevealing, a trial of physical therapy for the shoulder and arm may be helpful.  Left great toe swelling is not likely to be crystalline in etiology, given the chronic, low-grade noninflammatory features of the discomfort.  I agree with continued symptomatic management through Dr. Martinez in podiatry.  Plan  # Continue methotrexate at 20 mg weekly, folic acid 2 mg day. While on drug  --High risk medication monitoring--labs q 3 mo AST/ALT, Albumin, CBC with plts, CRP   --Limit EtOH intake to 2 drinks weekly; use folate 2 mg  "daily  --Tylenol 325mg qid prn nausea/HA associated with dosing.    # hydroxychloroquine (plaquenil) 200 mg QD -annual eye exam done July 2020 [facility] due.   2. 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  Seeing Dr. Mccarthy for follow-up    3. Osteoarthritis knees, ankle, shoulder and neck and hands/thumbs. Continue isometric quad strengthening exercises twice daily to improve support around the joint. Left ankle osteoarthritis and overall osteoarthritis  is contributing to the overall pain. Continue splinting/hand therapy and acetaminophen 1000 mg tid ATC. Follow-up orthopedics. Currently doing Physical Therapy for thumb, follow-up  This provider about DJD shoulder if this could be causing this nerve pain     RTC 6 month    Jeremy Goodrich MD  Staff Rheumatologist, [facility]             History of Present Illness:   Lucio Daly \"Rich\" presents for f/u seronegative rheumatoid arthritis, sicca complex.  Last seen by Sin Rausch in December 2020.  Methotrexate and hydroxychloroquine were continued for seronegative arthritis at that time.  Interval history 04-:  Intermittent l 2nd toe pain, made worse with walking. +visible swelling. Altering shoegear per Dr. Martinez.  R arm remains painful. It is \"nerve pain\" like, affecting his hand, forearm, arm. Discomfort made worse with gripping R hand. A R thumb splint did not give much relief.  R thumb and index finger are painful when walking with a walker and gripping a grocery cart handle.  He continues with methotrexate, hydroxychloroquine 200 mg daily, and lyrica. He thinks that methotrexate is helpful.     December 15, 2020  Healthy. No infections as he did winter 2019 with influenza B and pneumonia. Doing quarantine and social distancing, hand washing. Healthiest he has been in along time. Denies any fever, chills, SOB, JUARES, night sweats, or chest pain, high fever, cough, travel " "in last 14 days or exposure to covid-19 (coronavirus). Reports healthy. Walks 3/4 mile a day harder to walk longer distance due to peripheral neuropathy and foot drop.      Right thumb \"jammed\" pushing into wrist (into forearm then shoulder this pain shooting up arm) needs option fusion doing Physical Therapy, seeing 12-4 ortho in 8 weeks Dr. Camargo, now has thumb splica splint x 2 thinks this is nerve pain. \"lightening bolt\" Hard to walk with walker. Helps the hand pain not this arm.  Hard or wore when . If pushing grocery cart with gripping then goes up. Most intense when gripping. Told was neuropathy and tendonitis. Not carpel tunnel. For > 6 month. [place] to use the throttle. Neck from cervical stenosis and DDD shoulder pain. Lumbar would need fusion but high risk. Has DDD right shoulder XR 4-2016. Neurology exhausted resources. Allergy reaction to cortisone and prednisone so that is not an option. Tried OTC cream for knee. Not try again. He did not reduce hydroxychloroquine (plaquenil) to once day     Feels in right shoulder, right wrist in this right arm not sure if coming down or going up. Left elbow dominique in left shoulder. pain on top of the hand then wrists pain to elbow then shoulder. Toe pain seeing Dr. Martinez the uric acid was normal. No red or swelling.     ILD seen pulmonary stable. Due for colonscopy    Methotrexate  20 mg Q Tues or Wed mild GI, and diarrhea that day then ok, folic acid 2 mg day, hydroxychloroquine (plaquenil) 200 mg BID except TUES  (he did not reduce) tolerating. Does not take NSAIDs as brother in law passed from kidney and liver issues felt contributed by celebrex     PMH   1. Sensory neuropathy of unclear etiology - negative work up, managed on Lyrica.  Has chronic dysesthesia in pads of feet.  2. Parkinsonisim/Tremor disorder; sees neurology   3. Headaches   4. History of basal cell, squamous (most recent +bx 8-2014)  5. History of melanoma   6. GALO on CPAP.  7. " HTN.   8. Seronegative RA, diagnosed 2009. SICCA  9.  JUARES. Work-up 4-2015. 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 Lumbar stenosis per MRI 2017   11. Bronchitis 5-2017, early pneumonia   12. Chicken pox   13. DEXA  Normal   14. Left ankle DJD, MRI  --seen Dr. Martinez 2-2019   Past Medical History:   Diagnosis Date     Abnormal EMG 4/18/2013     Abnormal involuntary movements(781.0)     Movement Disorder     AK (actinic keratosis) 12/18/2011     Allergic rhinitis, cause unspecified     Allergic rhinitis     Balance problems 11/1/2011     Basal cell carcinoma      Bladder spasms 11/1/2011     Chronic osteoarthritis      COPD (chronic obstructive pulmonary disease) (H)     Interstial lung disease, obliderans bronchiolitis     Diaphragmatic hernia without mention of obstruction or gangrene      Earache or other ear, nose, or throat complaint      Esophageal reflux      Fatigue 11/1/2011     Fracture      H. pylori infection 5/12/2011     History of MRI of cervical spine 11/18/2013    EXAMINATION: CERVICAL SPINE G/E 5 VIEWS* 4/19/2013 4:18 PM  CLINICAL HISTORY: Pain in limb,Performing Location?->[facility] ([facility]),  COMPARISON:  FINDINGS: AP and lateral views in flexion and extension, as well as odontoid view of the cervical spine was obtained. There is no comparison available. The vertebral bodies of the cervical spine are normally aligned. There is posterior spurring and d     Incomplete defecation 11/1/2011     Interstitial lung disease (H) 11/29/2016     Laboratory test 8/7/2012     Lung disease June 2015     Malignant basal cell neoplasm of skin 8/6/2008     Melanoma (H) 8/6/2008     Melanoma in situ of lower leg (H)     R calf     Neuropathy 5/16/2011     Other bladder disorder      Other color vision deficiencies      Other nervous system complications      Parkinsonism (H) 11/1/2011     Personal history of colonic polyps      Polyneuropathy in other diseases classified elsewhere (H)   "    RA (rheumatoid arthritis) (H)      Rheumatoid arthritis of multiple sites with negative rheumatoid factor (H) 3/21/2016     Seronegative arthritis 11/18/2013     Shortness of breath      Shoulder arthritis 2016    acromioclavicular joint      Somatization disorder 5/12/2011     Squamous cell carcinoma      Tremor 11/1/2011     Unspecified essential hypertension      Unspecified hypothyroidism      Urinary tract infection      Urinary urgency 11/1/2011     Wears glasses 11/1/2011     Injuries-ankle sprains, left heel fracture    Family Hx   MGM Psoriasis  Sister -PPM  Family History   Problem Relation Age of Onset     Hypertension Mother      Heart Disease Mother         a fib     Arthritis Mother         \"osteo\"     Osteoporosis Mother      Skin Cancer Mother      Uterine Cancer Mother      Other Cancer Mother      Cancer Mother      Hypertension Brother      Diabetes Brother         \" post pancreatitis\"     Neurologic Disorder Sister         multiple sclerosis     Hypertension Sister      Heart Disease Sister      Multiple Sclerosis Sister      Heart Disease Sister         a fib     Arthritis Sister      Heart Failure Sister      Kidney Disease Sister      Dementia Sister      Hypertension Father      Cerebrovascular Disease Father         ,     Skin Cancer Father      Colon Polyps Father      Other - See Comments Son         [place]     Other - See Comments Abdon wagner     Other - See Comments Abdon gonzalez     Psoriasis Maternal Grandfather      Stomach Cancer Maternal Grandfather      Cancer Maternal Grandfather      Congenital Anomalies Other         granddaughter with a chromosome defect     Cerebrovascular Disease Paternal Grandmother         ,     Cerebrovascular Disease Paternal Grandfather         ,     Cancer Granddaughter         Langerhans Cell Histiocytosis     Genetic Disease Granddaughter         gene translocation     Melanoma No family hx of      Colon Cancer No " family hx of      Personal Hx   Home environment: No secondhand tobacco smoke in home.    History     Social History     Marital Status:      Spouse Name: N/A     Number of Children: N/A     Years of Education: N/A     Social History Main Topics     Smoking status: Former Smoker     Quit date: 09/30/2003     Smokeless tobacco: Never Used     Alcohol Use: No     Drug Use: No     Sexually Active: Not Currently -- Female partner(s)     Other Topics Concern     None     Social History Narrative    2013: Living in [place] in a townhouse with no stepsHas 3 sons that are doing okay.             Review of Systems:     14 points all negative except what is mentioned in HPI.  Review Of Systems  Skin: negative  Eyes: negative  Ears/Nose/Throat: negative  Respiratory: No shortness of breath, dyspnea on exertion, cough, or hemoptysis  Cardiovascular: negative  Gastrointestinal: negative  Genitourinary: negative  Musculoskeletal: as above  Neurologic: as above  Psychiatric: negative  Hematologic/Lymphatic/Immunologic: negative  Endocrine: negative             Past Surgical History:   Past Surgical History:   Procedure Laterality Date     BIOPSY OF SKIN LESION       COLONOSCOPY       HEMORRHOID SURGERY       lip biopsy      for sicca complex     MOHS MICROGRAPHIC PROCEDURE       SOFT TISSUE SURGERY      removeal of basel cell carcinoma     Telephone Assessment Objective:   PSYCH: Alert and oriented times 3; coherent speech, normal  rate and volume, able to articulate logical thoughts, able   to abstract reason, no tangential thoughts, no hallucinations or delusions  His affect is normal and pleasant  RESP: No cough, no audible wheezing, able to talk in full sentences  Neuro: Cranial nerves grossly intact.  Mentation and speech appropriate for age.  Remainder of exam unable to be completed due to telephone visits    Due to efforts to reduce the spread of COVID-19 in the clinic, state, nation, telephone visits are  encouraged currently. Patient understands that diagnose and advice is limited by the inability to exam him/her/them face-to-face. Discussed corovid-19 prevention, CDC guidelines/resources and to follow CDC/MD guidelines. If any signs or symptoms of infection to stop immunosuppression and seek immediate medical attn. That prednisone can increase change of serious infections so should be avoided. Discussed the importance of good hand washing techniques with soap and water for at least 20 seconds, avoid touching eyes, nose, mouth, avoiding crowds, social distancing. We also agree that the benefits of continued immunosuppression outweigh the risks of COVID-19 infection.            Data:     RHEUM RESULTS Latest Ref Rng & Units 11/12/2020 12/14/2020 1/25/2021   DNA-DS 0 - 29 IU/mL - - -   SED RATE 0 - 20 mm/h - - -   CRP, INFLAMMATION 0.0 - 8.0 mg/L 7.1 9.0(H) 7.5   CYCLIC CIT PEPT IGG <5 U/mL - - -   CK TOTAL 30 - 300 U/L - - -   RHEUMATOID FACTOR 0 - 14 IU/mL - - -   MONICA SCREEN BY EIA 0 - 1.0 - - -   AST 0 - 45 U/L 18 14 14   ALT 0 - 70 U/L 24 26 22   ALBUMIN 3.4 - 5.0 g/dL 4.1 3.8 3.6   WBC 4.0 - 11.0 10e9/L 5.0 5.6 4.8   RBC 4.4 - 5.9 10e12/L 4.72 4.60 4.64   HGB 13.3 - 17.7 g/dL 14.1 13.8 13.5   HCT 40.0 - 53.0 % 44.6 44.0 44.0   MCV 78 - 100 fl 95 96 95   MCHC 31.5 - 36.5 g/dL 31.6 31.4(L) 30.7(L)   RDW 10.0 - 15.0 % 14.9 14.6 14.6    - 450 10e9/L 189 199 180   CREATININE 0.66 - 1.25 mg/dL 1.19 1.19 1.18   GFR ESTIMATE, IF BLACK >60 mL/min/[1.73:m2] 70 70 71   GFR ESTIMATE >60 mL/min/[1.73:m2] 61 61 61    - 1,620 mg/dL - - -   IGA 70 - 380 mg/dL - - -   IGM 60 - 265 mg/dL - - -   HEPATITIS C ANTIBODY NEG - - -       Rheumatoid Factor   Date Value Ref Range Status   05/27/2010 <7 0 - 14 IU/mL Final   ,  ,  ,   Cyclic Citrullinated Peptide IgG   Date Value Ref Range Status   05/27/2010 <2 <5 U/mL Final     Comment:     Interpretation:  Negative   ,  ,   SSA (RO) Antibody IgG   Date Value Ref Range  Status   05/27/2010 1  Final     Comment:     Reference range: 0 to 40  Unit: AU/mL  (Note)  REFERENCE INTERVALS: SSA (Ro) (ELAN) Ab, IgG   29 AU/mL or Less ............. Negative   30 - 40 AU/mL ................ Equivocal   41 AU/mL or Greater .......... Positive    SSA (Ro) antibody is seen in 70-75% of Sjogren syndrome  cases, 30-40% of systemic lupus erythematosus (SLE) and  5-10% of progressive systemic sclerosis (PSS).  Performed by [facility],  [address] [facility],[place] [id number] [phone]  [email or web address], Chely Leija MD, Lab. Director     SSB (LA) Antibody IgG   Date Value Ref Range Status   05/27/2010 8  Final     Comment:     Reference range: 0 to 40  Unit: AU/mL  (Note)  REFERENCE INTERVALS: SSB (La) (ELAN) Ab, IgG   29 AU/mL or Less ............. Negative   30 - 40 AU/mL ................ Equivocal   41 AU/mL or Greater .......... Positive    SSB (La) antibody is seen in 50-60% of Sjogren syndrome  cases and is specific if it is the only ELAN antibody  present. 15-25% of patients with systemic lupus  erythematosus (SLE) and 5-10% of patients with progressive  systemic sclerosis (PSS) also have this antibody.  Performed by [facility],  [address] [address] [phone]  [email or web address], Chely Leija MD, Lab. Director   ,  ,   MONICA Screen by EIA   Date Value Ref Range Status   05/27/2010 <1.0 0 - 1.0 Final     Comment:     Interpretation:  Negative   ,   DNA-ds   Date Value Ref Range Status   05/27/2010 <15 0 - 29 IU/mL Final     Comment:     Interpretation:  Negative   ,  ,  ,  ,  ,  ,  ,   Hep B Surface Agn   Date Value Ref Range Status   05/27/2010 Negative NEG Final   ,  ,  ,  ,  ,  ,  ,  ,  ,  ,  ,   Neutrophil Cytoplasmic IgG Antibody   Date Value Ref Range Status   06/26/2015   Final    <1:20  Reference range: <1:20  (Note)  The ANCA IFA is <1:20; therefore, no further testing will  be performed.  INTERPRETIVE INFORMATION: Anti-Neutrophil Cyto Ab, IgG  Neutrophil  Cytoplasmic Antibodies (C-ANCA = granular  cytoplasmic staining, P-ANCA = perinuclear staining) are  found in the serum of over 90 percent of patients with  certain necrotizing systemic vasculitides, and usually in  less than 5 percent of patients with collagen vascular  disease or arthritis.  Performed by [facility],  [facility] [place] [address] [phone]  [email or web address], Brad Fall MD, Lab. Director       ,  ,  ,  ,  ,  ,  ,  ,  ,   Albumin Fraction   Date Value Ref Range Status   05/27/2010 4.0 3.7 - 5.1 g/dL Final     Alpha 2 Fraction   Date Value Ref Range Status   05/27/2010 0.6 0.5 - 0.9 g/dL Final     Beta Fraction   Date Value Ref Range Status   05/27/2010 1.0 0.6 - 1.0 g/dL Final     Gamma Fraction   Date Value Ref Range Status   05/27/2010 1.0 0.7 - 1.6 g/dL Final     Monoclonal Peak   Date Value Ref Range Status   05/27/2010 0.0 0.0 g/dL Final     ELP Interpretation:   Date Value Ref Range Status   05/27/2010   Final    Essentially normal electrophoretic pattern.  No monoclonal protein seen.     Comment:      Pathologic significance requires clinical correlation.  ASHLEY Noble M.D.,   Ph.D., Pathologist ()   ,  ,   Immunofixation ELP   Date Value Ref Range Status   06/09/2016   Final    No monoclonal protein seen on immunofixation.  Pathological significance   requires clinical correlation.   ASHLEY Noble M.D., Ph.D   Pathologist ([phone])       IGG   Date Value Ref Range Status   06/09/2016 1,020 695 - 1,620 mg/dL Final     IGA   Date Value Ref Range Status   06/09/2016 374 70 - 380 mg/dL Final     IGM   Date Value Ref Range Status   06/09/2016 81 60 - 265 mg/dL Final       Again, thank you for allowing me to participate in the care of your patient.      Sincerely,    Jeremy Goodrich MD

## 2021-04-13 NOTE — PATIENT INSTRUCTIONS
Diagnosis:  1.  Inflammatory arthritis: Overall good control of inflammatory joint symptoms is happening with combination low-dose hydroxychloroquine and methotrexate.  I recommend no change in the chronic treatment.  2.  Use associated right arm, forearm, and hand pain: There is likely contributions from nerve, muscle, and joint causing the combined pain.  I recommend continuing Lyrica, checking shoulder x-ray, and looking for blood inflammation.    Plan:  1.  Renew laboratory investigations, including C-reactive protein.  2.  Continue methotrexate 8 tablets (20 mg) once weekly.While on methotrexate:   -- Check labs every 3 months (AST/ALT, Albumin, CBC with platelets)   -- Limit alcohol intake to 2 drinks weekly; use folate 1 mg daily.  --Tylenol 500-1000 mg can be used as needed up to three times daily for nausea/headache associated with dosing.    3.  Continue hydroxychloroquine 200 mg once daily.  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  4.  Right shoulder x-ray today.  5.  Continue podiatry recommendations for management of left great toe swelling and pain.

## 2021-04-13 NOTE — NURSING NOTE
Chief Complaint   Patient presents with     RECHECK     RA follow up         BP (!) 168/102 (BP Location: Right arm, Patient Position: Sitting, Cuff Size: Adult Regular)   Pulse 72   Temp 97.9  F (36.6  C)   Wt 143.1 kg (315 lb 8 oz)   SpO2 95%   BMI 41.63 kg/m        Sin Lombardi, EMT

## 2021-05-29 DIAGNOSIS — Z79.899 HIGH RISK MEDICATION USE: ICD-10-CM

## 2021-05-29 DIAGNOSIS — M06.09 RHEUMATOID ARTHRITIS OF MULTIPLE SITES WITH NEGATIVE RHEUMATOID FACTOR (H): ICD-10-CM

## 2021-05-29 DIAGNOSIS — D84.9 IMMUNOSUPPRESSED STATUS (H): ICD-10-CM

## 2021-05-30 DIAGNOSIS — I10 ESSENTIAL HYPERTENSION: ICD-10-CM

## 2021-05-30 DIAGNOSIS — J44.81 OBLITERATIVE BRONCHIOLITIS (H): ICD-10-CM

## 2021-06-01 NOTE — TELEPHONE ENCOUNTER
Routing refill request to provider for review/approval because:  --Last blood pressures not at goal.              --Last visit:  11/12/2020     BP Readings from Last 6 Encounters:   04/13/21 (!) 168/102   11/12/20 (!) 135/90   12/11/19 135/83   12/10/19 (!) 153/92   11/05/19 136/82   07/10/19 138/68

## 2021-06-02 RX ORDER — METOPROLOL SUCCINATE 50 MG/1
TABLET, EXTENDED RELEASE ORAL
Qty: 135 TABLET | Refills: 1 | Status: SHIPPED | OUTPATIENT
Start: 2021-06-02 | End: 2021-09-30

## 2021-06-03 RX ORDER — METHOTREXATE 2.5 MG/1
8 TABLET ORAL
Qty: 96 TABLET | Refills: 3 | OUTPATIENT
Start: 2021-06-03

## 2021-06-24 DIAGNOSIS — J44.81 OBLITERATIVE BRONCHIOLITIS (H): ICD-10-CM

## 2021-07-09 DIAGNOSIS — M06.09 RHEUMATOID ARTHRITIS OF MULTIPLE SITES WITH NEGATIVE RHEUMATOID FACTOR (H): ICD-10-CM

## 2021-07-09 LAB
ALBUMIN SERPL-MCNC: 3.8 G/DL (ref 3.4–5)
ALT SERPL W P-5'-P-CCNC: 25 U/L (ref 0–70)
AST SERPL W P-5'-P-CCNC: 17 U/L (ref 0–45)
CREAT SERPL-MCNC: 1.17 MG/DL (ref 0.66–1.25)
CRP SERPL-MCNC: 9.5 MG/L (ref 0–8)
ERYTHROCYTE [DISTWIDTH] IN BLOOD BY AUTOMATED COUNT: 14.3 % (ref 10–15)
GFR SERPL CREATININE-BSD FRML MDRD: 62 ML/MIN/{1.73_M2}
HCT VFR BLD AUTO: 41.6 % (ref 40–53)
HGB BLD-MCNC: 13.4 G/DL (ref 13.3–17.7)
MCH RBC QN AUTO: 29.8 PG (ref 26.5–33)
MCHC RBC AUTO-ENTMCNC: 32.2 G/DL (ref 31.5–36.5)
MCV RBC AUTO: 92 FL (ref 78–100)
PLATELET # BLD AUTO: 185 10E9/L (ref 150–450)
RBC # BLD AUTO: 4.5 10E12/L (ref 4.4–5.9)
WBC # BLD AUTO: 6.1 10E9/L (ref 4–11)

## 2021-07-09 PROCEDURE — 86140 C-REACTIVE PROTEIN: CPT | Performed by: PATHOLOGY

## 2021-07-09 PROCEDURE — 36415 COLL VENOUS BLD VENIPUNCTURE: CPT | Performed by: PATHOLOGY

## 2021-07-09 PROCEDURE — 84450 TRANSFERASE (AST) (SGOT): CPT | Performed by: PATHOLOGY

## 2021-07-09 PROCEDURE — 84460 ALANINE AMINO (ALT) (SGPT): CPT | Performed by: PATHOLOGY

## 2021-07-09 PROCEDURE — 82565 ASSAY OF CREATININE: CPT | Performed by: PATHOLOGY

## 2021-07-09 PROCEDURE — 82040 ASSAY OF SERUM ALBUMIN: CPT | Performed by: PATHOLOGY

## 2021-07-09 PROCEDURE — 85027 COMPLETE CBC AUTOMATED: CPT | Performed by: PATHOLOGY

## 2021-07-12 ENCOUNTER — TELEPHONE (OUTPATIENT)
Dept: PULMONOLOGY | Facility: CLINIC | Age: 71
End: 2021-07-12

## 2021-07-12 DIAGNOSIS — J84.9 ILD (INTERSTITIAL LUNG DISEASE) (H): Primary | ICD-10-CM

## 2021-07-12 NOTE — TELEPHONE ENCOUNTER
[facility] Call Center    Phone Message    May a detailed message be left on voicemail: yes     Reason for Call: Order(s): Other:   Reason for requested: Pt is wanting a f/u with Dr. Mccarthy, and is wondering if PFTs are needed prior to appt. If they are, please have PFT's ordered and call pt back to schedule.      Action Taken: Message routed to:  [facility] Pulm    Travel Screening: Not Applicable

## 2021-07-13 NOTE — TELEPHONE ENCOUNTER
PFT orders placed for visit with Dr. Fabio Albrecht, RN, BSN  ILD Nurse Care Coordinator  (P) [phone]

## 2021-07-15 ASSESSMENT — ENCOUNTER SYMPTOMS
JOINT SWELLING: 1
SHORTNESS OF BREATH: 1
HEADACHES: 0
SORE THROAT: 0
EYE IRRITATION: 1
SNORES LOUDLY: 0
HOARSE VOICE: 1
HEARTBURN: 1
RECTAL PAIN: 0
ALTERED TEMPERATURE REGULATION: 0
TINGLING: 1
NIGHT SWEATS: 0
CONSTIPATION: 1
HYPERTENSION: 1
SYNCOPE: 0
PALPITATIONS: 1
DYSURIA: 0
DIARRHEA: 0
HALLUCINATIONS: 0
DISTURBANCES IN COORDINATION: 1
WHEEZING: 1
FATIGUE: 1
MEMORY LOSS: 0
LOSS OF CONSCIOUSNESS: 0
EYE WATERING: 0
POSTURAL DYSPNEA: 1
STIFFNESS: 1
BOWEL INCONTINENCE: 0
BLOATING: 1
SPEECH CHANGE: 0
INCREASED ENERGY: 0
PARALYSIS: 0
FEVER: 0
MUSCLE CRAMPS: 1
LEG PAIN: 1
JAUNDICE: 0
BACK PAIN: 1
VOMITING: 0
EYE REDNESS: 0
TREMORS: 1
COUGH: 0
SEIZURES: 0
NECK PAIN: 1
COUGH DISTURBING SLEEP: 0
EYE PAIN: 1
NAUSEA: 0
SINUS CONGESTION: 1
WEIGHT GAIN: 0
NUMBNESS: 1
ABDOMINAL PAIN: 0
EXERCISE INTOLERANCE: 0
ORTHOPNEA: 1
DIZZINESS: 1
MUSCLE WEAKNESS: 1
SPUTUM PRODUCTION: 0
POLYPHAGIA: 0
NECK MASS: 0
SINUS PAIN: 0
CHILLS: 0
TASTE DISTURBANCE: 0
FLANK PAIN: 1
HYPOTENSION: 0
WEAKNESS: 1
SMELL DISTURBANCE: 0
MYALGIAS: 1
SLEEP DISTURBANCES DUE TO BREATHING: 0
HEMOPTYSIS: 0
DOUBLE VISION: 1
HEMATURIA: 0
POLYDIPSIA: 0
WEIGHT LOSS: 0
BLOOD IN STOOL: 1
ARTHRALGIAS: 1
DECREASED APPETITE: 0
TROUBLE SWALLOWING: 1
LIGHT-HEADEDNESS: 0
DIFFICULTY URINATING: 0

## 2021-07-20 ENCOUNTER — TRANSFERRED RECORDS (OUTPATIENT)
Dept: HEALTH INFORMATION MANAGEMENT | Facility: CLINIC | Age: 71
End: 2021-07-20

## 2021-07-21 ENCOUNTER — OFFICE VISIT (OUTPATIENT)
Dept: PULMONOLOGY | Facility: CLINIC | Age: 71
End: 2021-07-21
Attending: INTERNAL MEDICINE
Payer: MEDICARE

## 2021-07-21 VITALS — HEART RATE: 60 BPM | DIASTOLIC BLOOD PRESSURE: 79 MMHG | SYSTOLIC BLOOD PRESSURE: 144 MMHG | OXYGEN SATURATION: 97 %

## 2021-07-21 DIAGNOSIS — E66.01 MORBID OBESITY (H): ICD-10-CM

## 2021-07-21 DIAGNOSIS — J84.9 ILD (INTERSTITIAL LUNG DISEASE) (H): ICD-10-CM

## 2021-07-21 DIAGNOSIS — G20.A1 PARALYSIS AGITANS (H): ICD-10-CM

## 2021-07-21 DIAGNOSIS — J44.81 OBLITERATIVE BRONCHIOLITIS (H): ICD-10-CM

## 2021-07-21 DIAGNOSIS — F39 MOOD DISORDER (H): ICD-10-CM

## 2021-07-21 DIAGNOSIS — G70.9 NEUROMUSCULAR DISEASE (H): ICD-10-CM

## 2021-07-21 LAB
DLCOCOR-%PRED-PRE: 95 %
DLCOCOR-PRE: 26.62 ML/MIN/MMHG
DLCOUNC-%PRED-PRE: 91 %
DLCOUNC-PRE: 25.68 ML/MIN/MMHG
DLCOUNC-PRED: 28.02 ML/MIN/MMHG
ERV-%PRED-PRE: 84 %
ERV-PRE: 0.51 L
ERV-PRED: 0.6 L
EXPTIME-PRE: 8.34 SEC
FEF2575-%PRED-PRE: 70 %
FEF2575-PRE: 1.8 L/SEC
FEF2575-PRED: 2.53 L/SEC
FEFMAX-%PRED-PRE: 97 %
FEFMAX-PRE: 8.58 L/SEC
FEFMAX-PRED: 8.85 L/SEC
FEV1-%PRED-PRE: 70 %
FEV1-PRE: 2.44 L
FEV1FEV6-PRE: 75 %
FEV1FEV6-PRED: 77 %
FEV1FVC-PRE: 75 %
FEV1FVC-PRED: 75 %
FEV1SVC-PRE: 71 %
FEV1SVC-PRED: 66 %
FIFMAX-PRE: 7.01 L/SEC
FRCPLETH-%PRED-PRE: 79 %
FRCPLETH-PRE: 3.09 L
FRCPLETH-PRED: 3.9 L
FVC-%PRED-PRE: 70 %
FVC-PRE: 3.26 L
FVC-PRED: 4.62 L
IC-%PRED-PRE: 63 %
IC-PRE: 2.94 L
IC-PRED: 4.64 L
RVPLETH-%PRED-PRE: 92 %
RVPLETH-PRE: 2.58 L
RVPLETH-PRED: 2.78 L
TLCPLETH-%PRED-PRE: 77 %
TLCPLETH-PRE: 6.02 L
TLCPLETH-PRED: 7.74 L
VA-%PRED-PRE: 76 %
VA-PRE: 5.4 L
VC-%PRED-PRE: 65 %
VC-PRE: 3.45 L
VC-PRED: 5.24 L

## 2021-07-21 PROCEDURE — 94726 PLETHYSMOGRAPHY LUNG VOLUMES: CPT | Performed by: INTERNAL MEDICINE

## 2021-07-21 PROCEDURE — G0463 HOSPITAL OUTPT CLINIC VISIT: HCPCS | Mod: 25

## 2021-07-21 PROCEDURE — 94729 DIFFUSING CAPACITY: CPT | Performed by: INTERNAL MEDICINE

## 2021-07-21 PROCEDURE — 99214 OFFICE O/P EST MOD 30 MIN: CPT | Mod: 25 | Performed by: INTERNAL MEDICINE

## 2021-07-21 PROCEDURE — 94375 RESPIRATORY FLOW VOLUME LOOP: CPT | Performed by: INTERNAL MEDICINE

## 2021-07-21 NOTE — NURSING NOTE
Chief Complaint   Patient presents with     Follow Up     bronchiolitis     Vitals were taken and medications were reconciled.     CONNIE Yuen

## 2021-07-21 NOTE — PROGRESS NOTES
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  HPI    The patient was seen and examined by Harsha Mccarthy MD     Mr. 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  He is currently on methotrexate 20 mg/week and Plaquenil at a decreased dose of 200 mg daily.  He has been on Plaquenil for a long time and cumulative dosing  concern, resulting in decreasing the dose of the Plaquenil by rheumatology.    Overall he denies any new symptoms.  He is walking about half a mile or so on a daily basis.  Denies any worsening shortness of breath.  Denies cough or sputum production or hemoptysis.    Current Outpatient Medications   Medication     acetaminophen (TYLENOL) 500 MG tablet     albuterol (PROAIR HFA/PROVENTIL HFA/VENTOLIN HFA) 108 (90 Base) MCG/ACT inhaler     cholecalciferol (HM VITAMIN D3) 25 MCG (1000 UT) TABS     folic acid (FOLVITE) 1 MG tablet     hydroxychloroquine (PLAQUENIL) 200 MG tablet     methotrexate sodium 2.5 MG TABS     metoprolol succinate ER (TOPROL-XL) 50 MG 24 hr tablet     omeprazole (PRILOSEC) 40 MG DR capsule     ORDER FOR DME     oxybutynin (DITROPAN) 5 MG tablet     pregabalin (LYRICA) 50 MG capsule     SEREVENT DISKUS 50 MCG/DOSE inhaler     No current facility-administered medications for this visit.     Allergies   Allergen Reactions     Restasis      Burning eyes, problems with breathing, tightness in chest     Adhesive Tape      Bandages misc     Allegra      EXCESSIVE URINATION AND WEAKNESS, LIGHT-HEADED     Allergy      Dust     Animal Dander      Benadryl Allergy      EXCESSIVE URINATION AND WEAKNESS, LIGHT-HEADED     Cephalexin      Joint pain or gerd aggravation. Bloating excessive urination      Cephalosporins      Cyclosporine      Diphenhydramine Other (See Comments)     Doxycycline      Doxycycline Hyclate Nausea     SWEATING,MIGRAINES,LOSS OF  APPETITE,SWEATING,LIGHT HEADED, EXCESSIVE URINATION     Fexofenadine Other (See Comments)     Flonase [Fluticasone Propionate]      Gabapentin      Neurontin: mosd changes and excess urination     Hydrocortisone      Iodine      Iodine Solution [Povidone Iodine]      SKIN MELTS     Levaquin      From surgeon--? Joint pain ? Gerd aggravation. Insomnia, excess urination     Levofloxacin      Mylanta      EXCESSIVE URINATION AND WEAKNESS, LIGHT-HEADED     Oxcarbazepine      Prednisone      Weakness, elevated bp, headache, eye pain, congestion      Prednisone      Seafood [Seafood]      Shellfish Allergy      Hives       Simethicone      Sulfamethoxazole W/Trimethoprim      Sulfamethoxazole-Trimethoprim      Chest pain, angina     Symbicort GI Disturbance and Nausea     Trees      Trileptal      SEVERE JOINT AND TENDON PAIN, INSOMNIA, RESTLESSNESS, NAUSEA, EXCESS URINATION     Cortizone Rash     EXCESS URINATION,WEAKNESS,NAUSEA, HEADACHE     Past Medical History:   Diagnosis Date     Abnormal EMG 4/18/2013     Abnormal involuntary movements(781.0)     Movement Disorder     AK (actinic keratosis) 12/18/2011     Allergic rhinitis, cause unspecified     Allergic rhinitis     Balance problems 11/1/2011     Basal cell carcinoma      Bladder spasms 11/1/2011     Chronic osteoarthritis      COPD (chronic obstructive pulmonary disease) (H)     Interstial lung disease, obliderans bronchiolitis     Diaphragmatic hernia without mention of obstruction or gangrene      Earache or other ear, nose, or throat complaint      Esophageal reflux      Fatigue 11/1/2011     Fracture      H. pylori infection 5/12/2011     History of MRI of cervical spine 11/18/2013    EXAMINATION: CERVICAL SPINE G/E 5 VIEWS* 4/19/2013 4:18 PM  CLINICAL HISTORY: Pain in limb,Performing Location?->[facility]),  COMPARISON:  FINDINGS: AP and lateral views in flexion and extension, as well as odontoid view of the cervical spine was obtained. There is no  comparison available. The vertebral bodies of the cervical spine are normally aligned. There is posterior spurring and d     Incomplete defecation 2011     Interstitial lung disease (H) 2016     Laboratory test 2012     Lung disease 2015     Malignant basal cell neoplasm of skin 2008     Melanoma (H) 2008     Melanoma in situ of lower leg (H)     R calf     Neuropathy 2011     Other bladder disorder      Other color vision deficiencies      Other nervous system complications      Parkinsonism (H) 2011     Personal history of colonic polyps      Polyneuropathy in other diseases classified elsewhere (H)      RA (rheumatoid arthritis) (H)      Rheumatoid arthritis of multiple sites with negative rheumatoid factor (H) 3/21/2016     Seronegative arthritis 2013     Shortness of breath      Shoulder arthritis     acromioclavicular joint      Somatization disorder 2011     Squamous cell carcinoma      Tremor 2011     Unspecified essential hypertension      Unspecified hypothyroidism      Urinary tract infection      Urinary urgency 2011     Wears glasses 2011       Past Surgical History:   Procedure Laterality Date     BIOPSY OF SKIN LESION       COLONOSCOPY       HEMORRHOID SURGERY       lip biopsy      for sicca complex     MOHS MICROGRAPHIC PROCEDURE       SOFT TISSUE SURGERY      removeal of basel cell carcinoma       Social History     Socioeconomic History     Marital status:      Spouse name: Not on file     Number of children: Not on file     Years of education: Not on file     Highest education level: Not on file   Occupational History     Not on file   Tobacco Use     Smoking status: Former Smoker     Packs/day: 1.50     Years: 37.00     Pack years: 55.50     Types: Cigarettes     Start date: 6/15/1963     Quit date: 2000     Years since quittin.8     Smokeless tobacco: Never Used   Substance and Sexual Activity     Alcohol use:  Not Currently     Alcohol/week: 0.0 standard drinks     Drug use: Never     Sexual activity: Not Currently     Partners: Female     Birth control/protection: None   Other Topics Concern     Parent/sibling w/ CABG, MI or angioplasty before 65F 55M? No   Social History Narrative    2013: Living in [place] in a townhouse with no steps    Has 3 sons that are doing okay.         Dairy/d 1 servings/d.     Caffeine 0 servings/d    Exercise 0 x week    Sunscreen used - No    Seatbelts used - Yes    Working smoke/CO detectors in the home - Yes    Guns stored in the home - Yes    Self Breast Exams - NA    Self Testicular Exam - Yes    Eye Exam up to date - Yes 2008    Dental Exam up to date - Yes 2006    Pap Smear up to date - NA    Mammogram up to date - NA    PSA up to date - Yes 2008    Dexa Scan up to date - No    Flex Sig / Colonoscopy up to date - Yes less than 5 yrs ago    Immunizations up to date -today    Abuse: Current or Past(Physical, Sexual or Emotional)- No    Do you feel safe in your environment - Yes    2008                 Social Determinants of Health     Financial Resource Strain:      Difficulty of Paying Living Expenses:    Food Insecurity:      Worried About Running Out of Food in the Last Year:      Ran Out of Food in the Last Year:    Transportation Needs:      Lack of Transportation (Medical):      Lack of Transportation (Non-Medical):    Physical Activity:      Days of Exercise per Week:      Minutes of Exercise per Session:    Stress:      Feeling of Stress :    Social Connections:      Frequency of Communication with Friends and Family:      Frequency of Social Gatherings with Friends and Family:      Attends [employer] Services:      Active Member of Clubs or Organizations:      Attends Club or Organization Meetings:      Marital Status:    Intimate Partner Violence:      Fear of Current or Ex-Partner:      Emotionally Abused:      Physically Abused:      Sexually Abused:        ROS Pulmonary  A  complete ROS was otherwise negative except as noted in the HPI.  BP (!) 144/79   Pulse 60   SpO2 97%   Exam:   GENERAL APPEARANCE: Alert, and in no apparent distress.  EYES: PERRL, EOMI  MOUTH: Oral mucosa is moist, without any lesions,, no oropharyngeal exudate.  NECK: supple, no masses, no thyromegaly.  LYMPHATICS: No significant axillary, cervical, or supraclavicular nodes.  RESP: normal percussion, good air flow throughout.  ** crackles. No rhonchi. No wheezes.  CV: Normal S1, S2, regular rhythm, normal rate. No murmur.  No rub. No gallop. No LE edema.   ABDOMEN:  Bowel sounds normal, soft, nontender, no HSM or masses.   MS: extremities normal. No clubbing. No cyanosis.  SKIN: no rash on limited exam  NEURO: Mentation intact, speech normal, normal strength and tone, normal gait and stance  Results:    PFTs done today were reviewed by me with the patient.  FVC is 3.26 L (70%), FEV1 2.44 L (70%), and the ratio is 75.  The rest of volume is 2.58 (92%).  Total lung capacity 6.02 (77%).  DLCO corrected for hemoglobin is 95% of predicted.  My impression is that he has mild restriction with normal diffusion capacity.  In comparison to prior spirometry in December 2019 The FVC has decreased by 90 cc, FEV1 is decreased by 100 cc and total lung capacity is decreased by 220 cc.      Assessment and plan:  Mr. 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  He is currently on methotrexate and hydroxychloroquine with stable symptoms.  1.  ILD: Some decrease in total lung capacity.. This could be related to worsening ILD.  We will plan to repeat PFTs in 4 to 5 months. He is currently on methotrexate and plaquenil which is managed by rheumatology.  2.  History of neuromuscular weakness with question of diaphragmatic weakness.  He is on NIPPV.  Follow-up in 4 to 5 months    This note consists of symbols derived from  keyboarding, dictation and/or voice recognition software. As a result, there may be errors in the script that have gone undetected. Please consider this when interpreting information found in this chart    Answers for HPI/ROS submitted by the patient on 7/15/2021  General Symptoms: Yes  Skin Symptoms: No  HENT Symptoms: Yes  EYE SYMPTOMS: Yes  HEART SYMPTOMS: Yes  LUNG SYMPTOMS: Yes  INTESTINAL SYMPTOMS: Yes  URINARY SYMPTOMS: Yes  REPRODUCTIVE SYMPTOMS: No  SKELETAL SYMPTOMS: Yes  BLOOD SYMPTOMS: No  NERVOUS SYSTEM SYMPTOMS: Yes  MENTAL HEALTH SYMPTOMS: No  Ear pain: No  Ear discharge: No  Hearing loss: No  Tinnitus: Yes  Nosebleeds: No  Congestion: Yes  Sinus pain: No  Trouble swallowing: Yes   Voice hoarseness: Yes  Mouth sores: No  Sore throat: No  Tooth pain: No  Gum tenderness: No  Bleeding gums: No  Change in taste: No  Change in sense of smell: No  Dry mouth: Yes  Hearing aid used: No  Neck lump: No  Fever: No  Loss of appetite: No  Weight loss: No  Weight gain: No  Fatigue: Yes  Night sweats: No  Chills: No  Increased stress: No  Excessive hunger: No  Excessive thirst: No  Feeling hot or cold when others believe the temperature is normal: No  Loss of height: No  Post-operative complications: No  Surgical site pain: No  Hallucinations: No  Change in or Loss of Energy: No  Hyperactivity: No  Confusion: No  Eye pain: Yes  Vision loss: No  Dry eyes: Yes  Watery eyes: No  Eye bulging: No  Double vision: Yes  Flashing of lights: No  Spots: No  Floaters: No  Redness: No  Crossed eyes: No  Tunnel Vision: No  Yellowing of eyes: No  Eye irritation: Yes  Chest pain or pressure: No  Fast or irregular heartbeat: Yes  Pain in legs with walking: Yes  Trouble breathing while lying down: Yes  Fingers or toes appear blue: No  High blood pressure: Yes  Low blood pressure: No  Fainting: No  Murmurs: No  Pacemaker: No  Varicose veins: Yes  Edema or swelling: No  Wake up at night with shortness of breath: No  Light-headedness:  No  Exercise intolerance: No  Cough: No  Sputum or phlegm: No  Coughing up blood: No  Difficulty breating or shortness of breath: Yes  Snoring: No  Wheezing: Yes  Nighttime Cough: No  Difficulty breathing when lying flat: Yes  Heart burn or indigestion: Yes  Nausea: No  Vomiting: No  Abdominal pain: No  Bloating: Yes  Constipation: Yes  Diarrhea: No  Blood in stool: Yes  Black stools: No  Rectal or Anal pain: No  Fecal incontinence: No  Yellowing of skin or eyes: No  Vomit with blood: No  Change in stools: No  Trouble holding urine or incontinence: Yes  Pain or burning: No  Trouble starting or stopping: No  Increased frequency of urination: No  Blood in urine: No  Decreased frequency of urination: No  Frequent nighttime urination: No  Flank pain: Yes  Difficulty emptying bladder: No  Back pain: Yes  Muscle aches: Yes  Neck pain: Yes  Swollen joints: Yes  Joint pain: Yes  Bone pain: No  Muscle cramps: Yes  Muscle weakness: Yes  Joint stiffness: Yes  Bone fracture: No  Trouble with coordination: Yes  Dizziness or trouble with balance: Yes  Fainting or black-out spells: No  Memory loss: No  Headache: No  Seizures: No  Speech problems: No  Tingling: Yes  Tremor: Yes  Weakness: Yes  Difficulty walking: Yes  Paralysis: No  Numbness: Yes

## 2021-07-21 NOTE — LETTER
7/21/2021         RE: Lucio Daly  [address] [facility] [facility] [facility] [address]  [place] [address]        Dear Colleague,    Thank you for referring your patient, Lucio Daly, to the [facility] [place]. Please see a copy of my visit note below.    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  HPI    The patient was seen and examined by Harsha Mccarthy MD     Mr. 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  He is currently on methotrexate 20 mg/week and Plaquenil at a decreased dose of 200 mg daily.  He has been on Plaquenil for a long time and cumulative dosing  concern, resulting in decreasing the dose of the Plaquenil by rheumatology.    Overall he denies any new symptoms.  He is walking about half a mile or so on a daily basis.  Denies any worsening shortness of breath.  Denies cough or sputum production or hemoptysis.    Current Outpatient Medications   Medication     acetaminophen (TYLENOL) 500 MG tablet     albuterol (PROAIR HFA/PROVENTIL HFA/VENTOLIN HFA) 108 (90 Base) MCG/ACT inhaler     cholecalciferol (HM VITAMIN D3) 25 MCG (1000 UT) TABS     folic acid (FOLVITE) 1 MG tablet     hydroxychloroquine (PLAQUENIL) 200 MG tablet     methotrexate sodium 2.5 MG TABS     metoprolol succinate ER (TOPROL-XL) 50 MG 24 hr tablet     omeprazole (PRILOSEC) 40 MG DR capsule     ORDER FOR DME     oxybutynin (DITROPAN) 5 MG tablet     pregabalin (LYRICA) 50 MG capsule     SEREVENT DISKUS 50 MCG/DOSE inhaler     No current facility-administered medications for this visit.     Allergies   Allergen Reactions     Restasis      Burning eyes, problems with breathing, tightness in chest     Adhesive Tape      Bandages misc     Allegra      EXCESSIVE URINATION AND WEAKNESS, LIGHT-HEADED     Allergy      Dust     Animal Dander      Benadryl Allergy       EXCESSIVE URINATION AND WEAKNESS, LIGHT-HEADED     Cephalexin      Joint pain or gerd aggravation. Bloating excessive urination      Cephalosporins      Cyclosporine      Diphenhydramine Other (See Comments)     Doxycycline      Doxycycline Hyclate Nausea     SWEATING,MIGRAINES,LOSS OF APPETITE,SWEATING,LIGHT HEADED, EXCESSIVE URINATION     Fexofenadine Other (See Comments)     Flonase [Fluticasone Propionate]      Gabapentin      Neurontin: mosd changes and excess urination     Hydrocortisone      Iodine      Iodine Solution [Povidone Iodine]      SKIN MELTS     Levaquin      From surgeon--? Joint pain ? Gerd aggravation. Insomnia, excess urination     Levofloxacin      Mylanta      EXCESSIVE URINATION AND WEAKNESS, LIGHT-HEADED     Oxcarbazepine      Prednisone      Weakness, elevated bp, headache, eye pain, congestion      Prednisone      Seafood [Seafood]      Shellfish Allergy      Hives       Simethicone      Sulfamethoxazole W/Trimethoprim      Sulfamethoxazole-Trimethoprim      Chest pain, angina     Symbicort GI Disturbance and Nausea     Trees      Trileptal      SEVERE JOINT AND TENDON PAIN, INSOMNIA, RESTLESSNESS, NAUSEA, EXCESS URINATION     Cortizone Rash     EXCESS URINATION,WEAKNESS,NAUSEA, HEADACHE     Past Medical History:   Diagnosis Date     Abnormal EMG 4/18/2013     Abnormal involuntary movements(781.0)     Movement Disorder     AK (actinic keratosis) 12/18/2011     Allergic rhinitis, cause unspecified     Allergic rhinitis     Balance problems 11/1/2011     Basal cell carcinoma      Bladder spasms 11/1/2011     Chronic osteoarthritis      COPD (chronic obstructive pulmonary disease) (H)     Interstial lung disease, obliderans bronchiolitis     Diaphragmatic hernia without mention of obstruction or gangrene      Earache or other ear, nose, or throat complaint      Esophageal reflux      Fatigue 11/1/2011     Fracture      H. pylori infection 5/12/2011     History of MRI of cervical spine  11/18/2013    EXAMINATION: CERVICAL SPINE G/E 5 VIEWS* 4/19/2013 4:18 PM  CLINICAL HISTORY: Pain in limb,Performing Location?[facility]),  COMPARISON:  FINDINGS: AP and lateral views in flexion and extension, as well as odontoid view of the cervical spine was obtained. There is no comparison available. The vertebral bodies of the cervical spine are normally aligned. There is posterior spurring and d     Incomplete defecation 11/1/2011     Interstitial lung disease (H) 11/29/2016     Laboratory test 8/7/2012     Lung disease June 2015     Malignant basal cell neoplasm of skin 8/6/2008     Melanoma (H) 8/6/2008     Melanoma in situ of lower leg (H)     R calf     Neuropathy 5/16/2011     Other bladder disorder      Other color vision deficiencies      Other nervous system complications      Parkinsonism (H) 11/1/2011     Personal history of colonic polyps      Polyneuropathy in other diseases classified elsewhere (H)      RA (rheumatoid arthritis) (H)      Rheumatoid arthritis of multiple sites with negative rheumatoid factor (H) 3/21/2016     Seronegative arthritis 11/18/2013     Shortness of breath      Shoulder arthritis 2016    acromioclavicular joint      Somatization disorder 5/12/2011     Squamous cell carcinoma      Tremor 11/1/2011     Unspecified essential hypertension      Unspecified hypothyroidism      Urinary tract infection      Urinary urgency 11/1/2011     Wears glasses 11/1/2011       Past Surgical History:   Procedure Laterality Date     BIOPSY OF SKIN LESION       COLONOSCOPY       HEMORRHOID SURGERY       lip biopsy      for sicca complex     MOHS MICROGRAPHIC PROCEDURE       SOFT TISSUE SURGERY      removeal of basel cell carcinoma       Social History     Socioeconomic History     Marital status:      Spouse name: Not on file     Number of children: Not on file     Years of education: Not on file     Highest education level: Not on file   Occupational History     Not on file    Tobacco Use     Smoking status: Former Smoker     Packs/day: 1.50     Years: 37.00     Pack years: 55.50     Types: Cigarettes     Start date: 6/15/1963     Quit date: 2000     Years since quittin.8     Smokeless tobacco: Never Used   Substance and Sexual Activity     Alcohol use: Not Currently     Alcohol/week: 0.0 standard drinks     Drug use: Never     Sexual activity: Not Currently     Partners: Female     Birth control/protection: None   Other Topics Concern     Parent/sibling w/ CABG, MI or angioplasty before 65F 55M? No   Social History Narrative    2013: Living in [place] in a townhouse with no steps    Has 3 sons that are doing okay.         Dairy/d 1 servings/d.     Caffeine 0 servings/d    Exercise 0 x week    Sunscreen used - No    Seatbelts used - Yes    Working smoke/CO detectors in the home - Yes    Guns stored in the home - Yes    Self Breast Exams - NA    Self Testicular Exam - Yes    Eye Exam up to date - Yes     Dental Exam up to date - Yes     Pap Smear up to date - NA    Mammogram up to date - NA    PSA up to date - Yes     Dexa Scan up to date - No    Flex Sig / Colonoscopy up to date - Yes less than 5 yrs ago    Immunizations up to date -today    Abuse: Current or Past(Physical, Sexual or Emotional)- No    Do you feel safe in your environment - Yes    2008                 Social Determinants of Health     Financial Resource Strain:      Difficulty of Paying Living Expenses:    Food Insecurity:      Worried About Running Out of Food in the Last Year:      Ran Out of Food in the Last Year:    Transportation Needs:      Lack of Transportation (Medical):      Lack of Transportation (Non-Medical):    Physical Activity:      Days of Exercise per Week:      Minutes of Exercise per Session:    Stress:      Feeling of Stress :    Social Connections:      Frequency of Communication with Friends and Family:      Frequency of Social Gatherings with Friends and Family:      Attends  Lutheran Services:      Active Member of Clubs or Organizations:      Attends Club or Organization Meetings:      Marital Status:    Intimate Partner Violence:      Fear of Current or Ex-Partner:      Emotionally Abused:      Physically Abused:      Sexually Abused:        ROS Pulmonary  A complete ROS was otherwise negative except as noted in the HPI.  BP (!) 144/79   Pulse 60   SpO2 97%   Exam:   GENERAL APPEARANCE: Alert, and in no apparent distress.  EYES: PERRL, EOMI  MOUTH: Oral mucosa is moist, without any lesions,, no oropharyngeal exudate.  NECK: supple, no masses, no thyromegaly.  LYMPHATICS: No significant axillary, cervical, or supraclavicular nodes.  RESP: normal percussion, good air flow throughout.  ** crackles. No rhonchi. No wheezes.  CV: Normal S1, S2, regular rhythm, normal rate. No murmur.  No rub. No gallop. No LE edema.   ABDOMEN:  Bowel sounds normal, soft, nontender, no HSM or masses.   MS: extremities normal. No clubbing. No cyanosis.  SKIN: no rash on limited exam  NEURO: Mentation intact, speech normal, normal strength and tone, normal gait and stance  Results:    PFTs done today were reviewed by me with the patient.  FVC is 3.26 L (70%), FEV1 2.44 L (70%), and the ratio is 75.  The rest of volume is 2.58 (92%).  Total lung capacity 6.02 (77%).  DLCO corrected for hemoglobin is 95% of predicted.  My impression is that he has mild restriction with normal diffusion capacity.  In comparison to prior spirometry in December 2019 The FVC has decreased by 90 cc, FEV1 is decreased by 100 cc and total lung capacity is decreased by 220 cc.      Assessment and plan:  Mr. 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  He is currently on methotrexate and hydroxychloroquine with stable symptoms.  1.  ILD: Some decrease in total lung capacity.. This could be related to worsening ILD.  We will  plan to repeat PFTs in 4 to 5 months. He is currently on methotrexate and plaquenil which is managed by rheumatology.  2.  History of neuromuscular weakness with question of diaphragmatic weakness.  He is on NIPPV.  Follow-up in 4 to 5 months    This note consists of symbols derived from keyboarding, dictation and/or voice recognition software. As a result, there may be errors in the script that have gone undetected. Please consider this when interpreting information found in this chart    Answers for HPI/ROS submitted by the patient on 7/15/2021  General Symptoms: Yes  Skin Symptoms: No  HENT Symptoms: Yes  EYE SYMPTOMS: Yes  HEART SYMPTOMS: Yes  LUNG SYMPTOMS: Yes  INTESTINAL SYMPTOMS: Yes  URINARY SYMPTOMS: Yes  REPRODUCTIVE SYMPTOMS: No  SKELETAL SYMPTOMS: Yes  BLOOD SYMPTOMS: No  NERVOUS SYSTEM SYMPTOMS: Yes  MENTAL HEALTH SYMPTOMS: No  Ear pain: No  Ear discharge: No  Hearing loss: No  Tinnitus: Yes  Nosebleeds: No  Congestion: Yes  Sinus pain: No  Trouble swallowing: Yes   Voice hoarseness: Yes  Mouth sores: No  Sore throat: No  Tooth pain: No  Gum tenderness: No  Bleeding gums: No  Change in taste: No  Change in sense of smell: No  Dry mouth: Yes  Hearing aid used: No  Neck lump: No  Fever: No  Loss of appetite: No  Weight loss: No  Weight gain: No  Fatigue: Yes  Night sweats: No  Chills: No  Increased stress: No  Excessive hunger: No  Excessive thirst: No  Feeling hot or cold when others believe the temperature is normal: No  Loss of height: No  Post-operative complications: No  Surgical site pain: No  Hallucinations: No  Change in or Loss of Energy: No  Hyperactivity: No  Confusion: No  Eye pain: Yes  Vision loss: No  Dry eyes: Yes  Watery eyes: No  Eye bulging: No  Double vision: Yes  Flashing of lights: No  Spots: No  Floaters: No  Redness: No  Crossed eyes: No  Tunnel Vision: No  Yellowing of eyes: No  Eye irritation: Yes  Chest pain or pressure: No  Fast or irregular heartbeat: Yes  Pain in legs with  walking: Yes  Trouble breathing while lying down: Yes  Fingers or toes appear blue: No  High blood pressure: Yes  Low blood pressure: No  Fainting: No  Murmurs: No  Pacemaker: No  Varicose veins: Yes  Edema or swelling: No  Wake up at night with shortness of breath: No  Light-headedness: No  Exercise intolerance: No  Cough: No  Sputum or phlegm: No  Coughing up blood: No  Difficulty breating or shortness of breath: Yes  Snoring: No  Wheezing: Yes  Nighttime Cough: No  Difficulty breathing when lying flat: Yes  Heart burn or indigestion: Yes  Nausea: No  Vomiting: No  Abdominal pain: No  Bloating: Yes  Constipation: Yes  Diarrhea: No  Blood in stool: Yes  Black stools: No  Rectal or Anal pain: No  Fecal incontinence: No  Yellowing of skin or eyes: No  Vomit with blood: No  Change in stools: No  Trouble holding urine or incontinence: Yes  Pain or burning: No  Trouble starting or stopping: No  Increased frequency of urination: No  Blood in urine: No  Decreased frequency of urination: No  Frequent nighttime urination: No  Flank pain: Yes  Difficulty emptying bladder: No  Back pain: Yes  Muscle aches: Yes  Neck pain: Yes  Swollen joints: Yes  Joint pain: Yes  Bone pain: No  Muscle cramps: Yes  Muscle weakness: Yes  Joint stiffness: Yes  Bone fracture: No  Trouble with coordination: Yes  Dizziness or trouble with balance: Yes  Fainting or black-out spells: No  Memory loss: No  Headache: No  Seizures: No  Speech problems: No  Tingling: Yes  Tremor: Yes  Weakness: Yes  Difficulty walking: Yes  Paralysis: No  Numbness: Yes        Reason for Visit  Lucio Daly is a 70 year old year old male who is being seen for ILD.  HPI    The patient was seen and examined by Harsha Mccarthy MD     Methotrexate 20mg   Plaquenil 200 mg daily.      Walking half milde daily.    Seravent BID.    Wheezing evening and bed time    BIPAP at night.     Sinus drainage yes, Dry cough.  ? Food           Current Outpatient Medications    Medication     acetaminophen (TYLENOL) 500 MG tablet     albuterol (PROAIR HFA/PROVENTIL HFA/VENTOLIN HFA) 108 (90 Base) MCG/ACT inhaler     cholecalciferol (HM VITAMIN D3) 25 MCG (1000 UT) TABS     folic acid (FOLVITE) 1 MG tablet     hydroxychloroquine (PLAQUENIL) 200 MG tablet     methotrexate sodium 2.5 MG TABS     metoprolol succinate ER (TOPROL-XL) 50 MG 24 hr tablet     omeprazole (PRILOSEC) 40 MG DR capsule     ORDER FOR DME     oxybutynin (DITROPAN) 5 MG tablet     pregabalin (LYRICA) 50 MG capsule     SEREVENT DISKUS 50 MCG/DOSE inhaler     No current facility-administered medications for this visit.     Allergies   Allergen Reactions     Restasis      Burning eyes, problems with breathing, tightness in chest     Adhesive Tape      Bandages misc     Allegra      EXCESSIVE URINATION AND WEAKNESS, LIGHT-HEADED     Allergy      Dust     Animal Dander      Benadryl Allergy      EXCESSIVE URINATION AND WEAKNESS, LIGHT-HEADED     Cephalexin      Joint pain or gerd aggravation. Bloating excessive urination      Cephalosporins      Cyclosporine      Diphenhydramine Other (See Comments)     Doxycycline      Doxycycline Hyclate Nausea     SWEATING,MIGRAINES,LOSS OF APPETITE,SWEATING,LIGHT HEADED, EXCESSIVE URINATION     Fexofenadine Other (See Comments)     Flonase [Fluticasone Propionate]      Gabapentin      Neurontin: mosd changes and excess urination     Hydrocortisone      Iodine      Iodine Solution [Povidone Iodine]      SKIN MELTS     Levaquin      From surgeon--? Joint pain ? Gerd aggravation. Insomnia, excess urination     Levofloxacin      Mylanta      EXCESSIVE URINATION AND WEAKNESS, LIGHT-HEADED     Oxcarbazepine      Prednisone      Weakness, elevated bp, headache, eye pain, congestion      Prednisone      Seafood [Seafood]      Shellfish Allergy      Hives       Simethicone      Sulfamethoxazole W/Trimethoprim      Sulfamethoxazole-Trimethoprim      Chest pain, angina     Symbicort GI Disturbance  and Nausea     Trees      Trileptal      SEVERE JOINT AND TENDON PAIN, INSOMNIA, RESTLESSNESS, NAUSEA, EXCESS URINATION     Cortizone Rash     EXCESS URINATION,WEAKNESS,NAUSEA, HEADACHE     Past Medical History:   Diagnosis Date     Abnormal EMG 4/18/2013     Abnormal involuntary movements(781.0)     Movement Disorder     AK (actinic keratosis) 12/18/2011     Allergic rhinitis, cause unspecified     Allergic rhinitis     Balance problems 11/1/2011     Basal cell carcinoma      Bladder spasms 11/1/2011     Chronic osteoarthritis      COPD (chronic obstructive pulmonary disease) (H)     Interstial lung disease, obliderans bronchiolitis     Diaphragmatic hernia without mention of obstruction or gangrene      Earache or other ear, nose, or throat complaint      Esophageal reflux      Fatigue 11/1/2011     Fracture      H. pylori infection 5/12/2011     History of MRI of cervical spine 11/18/2013    EXAMINATION: CERVICAL SPINE G/E 5 VIEWS* 4/19/2013 4:18 PM  CLINICAL HISTORY: Pain in limb,Performing Location?[facility]  COMPARISON:  FINDINGS: AP and lateral views in flexion and extension, as well as odontoid view of the cervical spine was obtained. There is no comparison available. The vertebral bodies of the cervical spine are normally aligned. There is posterior spurring and d     Incomplete defecation 11/1/2011     Interstitial lung disease (H) 11/29/2016     Laboratory test 8/7/2012     Lung disease June 2015     Malignant basal cell neoplasm of skin 8/6/2008     Melanoma (H) 8/6/2008     Melanoma in situ of lower leg (H)     R calf     Neuropathy 5/16/2011     Other bladder disorder      Other color vision deficiencies      Other nervous system complications      Parkinsonism (H) 11/1/2011     Personal history of colonic polyps      Polyneuropathy in other diseases classified elsewhere (H)      RA (rheumatoid arthritis) (H)      Rheumatoid arthritis of multiple sites with negative rheumatoid factor (H)  3/21/2016     Seronegative arthritis 2013     Shortness of breath      Shoulder arthritis 2016    acromioclavicular joint      Somatization disorder 2011     Squamous cell carcinoma      Tremor 2011     Unspecified essential hypertension      Unspecified hypothyroidism      Urinary tract infection      Urinary urgency 2011     Wears glasses 2011       Past Surgical History:   Procedure Laterality Date     BIOPSY OF SKIN LESION       COLONOSCOPY       HEMORRHOID SURGERY       lip biopsy      for sicca complex     MOHS MICROGRAPHIC PROCEDURE       SOFT TISSUE SURGERY      removeal of basel cell carcinoma       Social History     Socioeconomic History     Marital status:      Spouse name: Not on file     Number of children: Not on file     Years of education: Not on file     Highest education level: Not on file   Occupational History     Not on file   Tobacco Use     Smoking status: Former Smoker     Packs/day: 1.50     Years: 37.00     Pack years: 55.50     Types: Cigarettes     Start date: 6/15/1963     Quit date: 2000     Years since quittin.8     Smokeless tobacco: Never Used   Substance and Sexual Activity     Alcohol use: Not Currently     Alcohol/week: 0.0 standard drinks     Drug use: Never     Sexual activity: Not Currently     Partners: Female     Birth control/protection: None   Other Topics Concern     Parent/sibling w/ CABG, MI or angioplasty before 65F 55M? No   Social History Narrative    2013: Living in [place] in a townhouse with no steps    Has 3 sons that are doing okay.         Dairy/d 1 servings/d.     Caffeine 0 servings/d    Exercise 0 x week    Sunscreen used - No    Seatbelts used - Yes    Working smoke/CO detectors in the home - Yes    Guns stored in the home - Yes    Self Breast Exams - NA    Self Testicular Exam - Yes    Eye Exam up to date - Yes     Dental Exam up to date - Yes 2006    Pap Smear up to date - NA    Mammogram up to date - NA    PSA  up to date - Yes 2008    Dexa Scan up to date - No    Flex Sig / Colonoscopy up to date - Yes less than 5 yrs ago    Immunizations up to date -today    Abuse: Current or Past(Physical, Sexual or Emotional)- No    Do you feel safe in your environment - Yes    2008                 Social Determinants of Health     Financial Resource Strain:      Difficulty of Paying Living Expenses:    Food Insecurity:      Worried About Running Out of Food in the Last Year:      Ran Out of Food in the Last Year:    Transportation Needs:      Lack of Transportation (Medical):      Lack of Transportation (Non-Medical):    Physical Activity:      Days of Exercise per Week:      Minutes of Exercise per Session:    Stress:      Feeling of Stress :    Social Connections:      Frequency of Communication with Friends and Family:      Frequency of Social Gatherings with Friends and Family:      Attends Evangelical Services:      Active Member of Clubs or Organizations:      Attends Club or Organization Meetings:      Marital Status:    Intimate Partner Violence:      Fear of Current or Ex-Partner:      Emotionally Abused:      Physically Abused:      Sexually Abused:        ROS Pulmonary  A complete ROS was otherwise negative except as noted in the HPI.  BP (!) 144/79   Pulse 60   SpO2 97%   Exam:   GENERAL APPEARANCE: Alert, and in no apparent distress.  EYES: PERRL, EOMI  MOUTH: Oral mucosa is moist, without any lesions,, no oropharyngeal exudate.  NECK: supple, no masses, no thyromegaly.  LYMPHATICS: No significant axillary, cervical, or supraclavicular nodes.  RESP: normal percussion, good air flow throughout.  ** crackles. No rhonchi. No wheezes.  CV: Normal S1, S2, regular rhythm, normal rate. No murmur.  No rub. No gallop. No LE edema.   ABDOMEN:  Bowel sounds normal, soft, nontender, no HSM or masses.   MS: extremities normal. No clubbing. No cyanosis.  SKIN: no rash on limited exam  NEURO: Mentation intact, speech normal, normal  strength and tone, normal gait and stance    Results:      Assessment and plan:     Fatigure  Follow up with Dr. Harrell.     This note consists of symbols derived from keyboarding, dictation and/or voice recognition software. As a result, there may be errors in the script that have gone undetected. Please consider this when interpreting information found in this chart    Again, thank you for allowing me to participate in the care of your patient.        Sincerely,        Harsha Mccarthy MD

## 2021-07-21 NOTE — PROGRESS NOTES
Reason for Visit  Lucio Daly is a 70 year old year old male who is being seen for ILD.  HPI    The patient was seen and examined by Harsha Mccarthy MD     Methotrexate 20mg   Plaquenil 200 mg daily.      Walking half milde daily.    Seravent BID.    Wheezing evening and bed time    BIPAP at night.     Sinus drainage yes, Dry cough.  ? Food           Current Outpatient Medications   Medication     acetaminophen (TYLENOL) 500 MG tablet     albuterol (PROAIR HFA/PROVENTIL HFA/VENTOLIN HFA) 108 (90 Base) MCG/ACT inhaler     cholecalciferol (HM VITAMIN D3) 25 MCG (1000 UT) TABS     folic acid (FOLVITE) 1 MG tablet     hydroxychloroquine (PLAQUENIL) 200 MG tablet     methotrexate sodium 2.5 MG TABS     metoprolol succinate ER (TOPROL-XL) 50 MG 24 hr tablet     omeprazole (PRILOSEC) 40 MG DR capsule     ORDER FOR DME     oxybutynin (DITROPAN) 5 MG tablet     pregabalin (LYRICA) 50 MG capsule     SEREVENT DISKUS 50 MCG/DOSE inhaler     No current facility-administered medications for this visit.     Allergies   Allergen Reactions     Restasis      Burning eyes, problems with breathing, tightness in chest     Adhesive Tape      Bandages misc     Allegra      EXCESSIVE URINATION AND WEAKNESS, LIGHT-HEADED     Allergy      Dust     Animal Dander      Benadryl Allergy      EXCESSIVE URINATION AND WEAKNESS, LIGHT-HEADED     Cephalexin      Joint pain or gerd aggravation. Bloating excessive urination      Cephalosporins      Cyclosporine      Diphenhydramine Other (See Comments)     Doxycycline      Doxycycline Hyclate Nausea     SWEATING,MIGRAINES,LOSS OF APPETITE,SWEATING,LIGHT HEADED, EXCESSIVE URINATION     Fexofenadine Other (See Comments)     Flonase [Fluticasone Propionate]      Gabapentin      Neurontin: mosd changes and excess urination     Hydrocortisone      Iodine      Iodine Solution [Povidone Iodine]      SKIN MELTS     Levaquin      From surgeon--? Joint pain ? Gerd aggravation. Insomnia, excess urination      Levofloxacin      Mylanta      EXCESSIVE URINATION AND WEAKNESS, LIGHT-HEADED     Oxcarbazepine      Prednisone      Weakness, elevated bp, headache, eye pain, congestion      Prednisone      Seafood [Seafood]      Shellfish Allergy      Hives       Simethicone      Sulfamethoxazole W/Trimethoprim      Sulfamethoxazole-Trimethoprim      Chest pain, angina     Symbicort GI Disturbance and Nausea     Trees      Trileptal      SEVERE JOINT AND TENDON PAIN, INSOMNIA, RESTLESSNESS, NAUSEA, EXCESS URINATION     Cortizone Rash     EXCESS URINATION,WEAKNESS,NAUSEA, HEADACHE     Past Medical History:   Diagnosis Date     Abnormal EMG 4/18/2013     Abnormal involuntary movements(781.0)     Movement Disorder     AK (actinic keratosis) 12/18/2011     Allergic rhinitis, cause unspecified     Allergic rhinitis     Balance problems 11/1/2011     Basal cell carcinoma      Bladder spasms 11/1/2011     Chronic osteoarthritis      COPD (chronic obstructive pulmonary disease) (H)     Interstial lung disease, obliderans bronchiolitis     Diaphragmatic hernia without mention of obstruction or gangrene      Earache or other ear, nose, or throat complaint      Esophageal reflux      Fatigue 11/1/2011     Fracture      H. pylori infection 5/12/2011     History of MRI of cervical spine 11/18/2013    EXAMINATION: CERVICAL SPINE G/E 5 VIEWS* 4/19/2013 4:18 PM  CLINICAL HISTORY: Pain in limb,Performing Location?[facility]),  COMPARISON:  FINDINGS: AP and lateral views in flexion and extension, as well as odontoid view of the cervical spine was obtained. There is no comparison available. The vertebral bodies of the cervical spine are normally aligned. There is posterior spurring and d     Incomplete defecation 11/1/2011     Interstitial lung disease (H) 11/29/2016     Laboratory test 8/7/2012     Lung disease June 2015     Malignant basal cell neoplasm of skin 8/6/2008     Melanoma (H) 8/6/2008     Melanoma in situ of lower leg  (H)     R calf     Neuropathy 2011     Other bladder disorder      Other color vision deficiencies      Other nervous system complications      Parkinsonism (H) 2011     Personal history of colonic polyps      Polyneuropathy in other diseases classified elsewhere (H)      RA (rheumatoid arthritis) (H)      Rheumatoid arthritis of multiple sites with negative rheumatoid factor (H) 3/21/2016     Seronegative arthritis 2013     Shortness of breath      Shoulder arthritis 2016    acromioclavicular joint      Somatization disorder 2011     Squamous cell carcinoma      Tremor 2011     Unspecified essential hypertension      Unspecified hypothyroidism      Urinary tract infection      Urinary urgency 2011     Wears glasses 2011       Past Surgical History:   Procedure Laterality Date     BIOPSY OF SKIN LESION       COLONOSCOPY       HEMORRHOID SURGERY       lip biopsy      for sicca complex     MOHS MICROGRAPHIC PROCEDURE       SOFT TISSUE SURGERY      removeal of basel cell carcinoma       Social History     Socioeconomic History     Marital status:      Spouse name: Not on file     Number of children: Not on file     Years of education: Not on file     Highest education level: Not on file   Occupational History     Not on file   Tobacco Use     Smoking status: Former Smoker     Packs/day: 1.50     Years: 37.00     Pack years: 55.50     Types: Cigarettes     Start date: 6/15/1963     Quit date: 2000     Years since quittin.8     Smokeless tobacco: Never Used   Substance and Sexual Activity     Alcohol use: Not Currently     Alcohol/week: 0.0 standard drinks     Drug use: Never     Sexual activity: Not Currently     Partners: Female     Birth control/protection: None   Other Topics Concern     Parent/sibling w/ CABG, MI or angioplasty before 65F 55M? No   Social History Narrative    2013: Living in [place] in a townhouse with no steps    Has 3 sons that are doing okay.          Dairy/d 1 servings/d.     Caffeine 0 servings/d    Exercise 0 x week    Sunscreen used - No    Seatbelts used - Yes    Working smoke/CO detectors in the home - Yes    Guns stored in the home - Yes    Self Breast Exams - NA    Self Testicular Exam - Yes    Eye Exam up to date - Yes 2008    Dental Exam up to date - Yes 2006    Pap Smear up to date - NA    Mammogram up to date - NA    PSA up to date - Yes 2008    Dexa Scan up to date - No    Flex Sig / Colonoscopy up to date - Yes less than 5 yrs ago    Immunizations up to date -today    Abuse: Current or Past(Physical, Sexual or Emotional)- No    Do you feel safe in your environment - Yes    2008                 Social Determinants of Health     Financial Resource Strain:      Difficulty of Paying Living Expenses:    Food Insecurity:      Worried About Running Out of Food in the Last Year:      Ran Out of Food in the Last Year:    Transportation Needs:      Lack of Transportation (Medical):      Lack of Transportation (Non-Medical):    Physical Activity:      Days of Exercise per Week:      Minutes of Exercise per Session:    Stress:      Feeling of Stress :    Social Connections:      Frequency of Communication with Friends and Family:      Frequency of Social Gatherings with Friends and Family:      Attends [employer] Services:      Active Member of Clubs or Organizations:      Attends Club or Organization Meetings:      Marital Status:    Intimate Partner Violence:      Fear of Current or Ex-Partner:      Emotionally Abused:      Physically Abused:      Sexually Abused:        ROS Pulmonary  A complete ROS was otherwise negative except as noted in the HPI.  BP (!) 144/79   Pulse 60   SpO2 97%   Exam:   GENERAL APPEARANCE: Alert, and in no apparent distress.  EYES: PERRL, EOMI  MOUTH: Oral mucosa is moist, without any lesions,, no oropharyngeal exudate.  NECK: supple, no masses, no thyromegaly.  LYMPHATICS: No significant axillary, cervical, or supraclavicular  nodes.  RESP: normal percussion, good air flow throughout.  ** crackles. No rhonchi. No wheezes.  CV: Normal S1, S2, regular rhythm, normal rate. No murmur.  No rub. No gallop. No LE edema.   ABDOMEN:  Bowel sounds normal, soft, nontender, no HSM or masses.   MS: extremities normal. No clubbing. No cyanosis.  SKIN: no rash on limited exam  NEURO: Mentation intact, speech normal, normal strength and tone, normal gait and stance    Results:        Assessment and plan:     Fatigure  Follow up with Dr. Harrell.     This note consists of symbols derived from keyboarding, dictation and/or voice recognition software. As a result, there may be errors in the script that have gone undetected. Please consider this when interpreting information found in this chart    Answers for HPI/ROS submitted by the patient on 7/15/2021  General Symptoms: Yes  Skin Symptoms: No  HENT Symptoms: Yes  EYE SYMPTOMS: Yes  HEART SYMPTOMS: Yes  LUNG SYMPTOMS: Yes  INTESTINAL SYMPTOMS: Yes  URINARY SYMPTOMS: Yes  REPRODUCTIVE SYMPTOMS: No  SKELETAL SYMPTOMS: Yes  BLOOD SYMPTOMS: No  NERVOUS SYSTEM SYMPTOMS: Yes  MENTAL HEALTH SYMPTOMS: No  Ear pain: No  Ear discharge: No  Hearing loss: No  Tinnitus: Yes  Nosebleeds: No  Congestion: Yes  Sinus pain: No  Trouble swallowing: Yes   Voice hoarseness: Yes  Mouth sores: No  Sore throat: No  Tooth pain: No  Gum tenderness: No  Bleeding gums: No  Change in taste: No  Change in sense of smell: No  Dry mouth: Yes  Hearing aid used: No  Neck lump: No  Fever: No  Loss of appetite: No  Weight loss: No  Weight gain: No  Fatigue: Yes  Night sweats: No  Chills: No  Increased stress: No  Excessive hunger: No  Excessive thirst: No  Feeling hot or cold when others believe the temperature is normal: No  Loss of height: No  Post-operative complications: No  Surgical site pain: No  Hallucinations: No  Change in or Loss of Energy: No  Hyperactivity: No  Confusion: No  Eye pain: Yes  Vision loss: No  Dry eyes: Yes  Watery  eyes: No  Eye bulging: No  Double vision: Yes  Flashing of lights: No  Spots: No  Floaters: No  Redness: No  Crossed eyes: No  Tunnel Vision: No  Yellowing of eyes: No  Eye irritation: Yes  Chest pain or pressure: No  Fast or irregular heartbeat: Yes  Pain in legs with walking: Yes  Trouble breathing while lying down: Yes  Fingers or toes appear blue: No  High blood pressure: Yes  Low blood pressure: No  Fainting: No  Murmurs: No  Pacemaker: No  Varicose veins: Yes  Edema or swelling: No  Wake up at night with shortness of breath: No  Light-headedness: No  Exercise intolerance: No  Cough: No  Sputum or phlegm: No  Coughing up blood: No  Difficulty breating or shortness of breath: Yes  Snoring: No  Wheezing: Yes  Nighttime Cough: No  Difficulty breathing when lying flat: Yes  Heart burn or indigestion: Yes  Nausea: No  Vomiting: No  Abdominal pain: No  Bloating: Yes  Constipation: Yes  Diarrhea: No  Blood in stool: Yes  Black stools: No  Rectal or Anal pain: No  Fecal incontinence: No  Yellowing of skin or eyes: No  Vomit with blood: No  Change in stools: No  Trouble holding urine or incontinence: Yes  Pain or burning: No  Trouble starting or stopping: No  Increased frequency of urination: No  Blood in urine: No  Decreased frequency of urination: No  Frequent nighttime urination: No  Flank pain: Yes  Difficulty emptying bladder: No  Back pain: Yes  Muscle aches: Yes  Neck pain: Yes  Swollen joints: Yes  Joint pain: Yes  Bone pain: No  Muscle cramps: Yes  Muscle weakness: Yes  Joint stiffness: Yes  Bone fracture: No  Trouble with coordination: Yes  Dizziness or trouble with balance: Yes  Fainting or black-out spells: No  Memory loss: No  Headache: No  Seizures: No  Speech problems: No  Tingling: Yes  Tremor: Yes  Weakness: Yes  Difficulty walking: Yes  Paralysis: No  Numbness: Yes

## 2021-07-27 ENCOUNTER — TELEPHONE (OUTPATIENT)
Dept: RHEUMATOLOGY | Facility: CLINIC | Age: 71
End: 2021-07-27

## 2021-07-27 VITALS — BODY MASS INDEX: 41.62 KG/M2 | WEIGHT: 315 LBS

## 2021-07-27 NOTE — TELEPHONE ENCOUNTER
Plaquenil Eye Exam    Date of last eye exam specifically commenting on HCQ toxicity: 7/20/2021  Clinic: [facility] Phone Number: [phone]  Ophthalmologist: Leonel Farnsworth  Toxicity: NO  Records scanned to chart: Yes      Aimee Pillai CMA   7/27/2021 10:36 AM

## 2021-08-12 ENCOUNTER — TELEPHONE (OUTPATIENT)
Dept: SLEEP MEDICINE | Facility: CLINIC | Age: 71
End: 2021-08-12

## 2021-08-12 NOTE — TELEPHONE ENCOUNTER
Reason for call:  Other   Patient called regarding (reason for call): call back  Additional comments: Patient is requesting follow up information on his recalled bipap. He has registered the device with the .     Phone number to reach patient:  Cell number on file:    Telephone Information:   Mobile [phone]       Best Time:  any    Can we leave a detailed message on this number?  YES    Travel screening: Negative

## 2021-08-13 NOTE — TELEPHONE ENCOUNTER
Patient call regarding [employer] recall for their pap device.    Device type:: Bilevel S    Supplemental oxygen: No , if yes moved to advanced device workflow.     Current durable medical equipment provider: Other [employer] [employer]     Age of your current device:  less than 5 years old    History review:     Does the patient have the following?      COPD No     Hypoventilation No    Pulmonary hypertension No    Neuromuscular disease related respiratory problems No    History of past or present cardiac arrhythmia  Yes pt reports past history of AFIB     History of heart failure  No    Recent hospitalization for breathing problems No       Other concerns:    DOT license requiring treatment of obstructive sleep apnea occupation that requires operation of hazardous equipment  No    Extreme sleepiness or drowsy driving prior to using CPAP or BiPAP treatment? No       Discontinuation of PAP therapy would lead to substantial deterioration of functional status or quality of life. Yes    If yes to any of the questions      Advised patient to continue using therapy until device is replaced or repaired.    Advised patient to avoid unapproved cleaning methods, such as ozone (see FDA safety communication on use of ozone ).    Has patient registered device? Yes Advised patient to register for repair or replacement on the [employer] website.  Patient can call [employer] at [phone] for additional support.    Consider use of bacterial filter with reassessment of pressure needs. You may need to get a new tube if your current tube is heated.  Sources for filters:  online sources or your DME. If you feel your symptoms are returning or you feel your pressures are insufficient , please change the filter and if issues continue reach out to your provider. Consider discontinuing using humidity with the filter.     Does the patient feel they still need to have further discussion with provider ?   No      Plan :     Patient wishes  to continue therapy. Recommendations are use of bacterial filter and consider discontinuing using humidity with the filter.

## 2021-08-30 ENCOUNTER — IMMUNIZATION (OUTPATIENT)
Dept: NURSING | Facility: CLINIC | Age: 71
End: 2021-08-30
Payer: MEDICARE

## 2021-08-30 PROCEDURE — 0013A PR COVID VAC MODERNA 100 MCG/0.5 ML IM: CPT

## 2021-08-30 PROCEDURE — 91301 PR COVID VAC MODERNA 100 MCG/0.5 ML IM: CPT

## 2021-09-11 ENCOUNTER — HEALTH MAINTENANCE LETTER (OUTPATIENT)
Age: 71
End: 2021-09-11

## 2021-09-23 ENCOUNTER — OFFICE VISIT (OUTPATIENT)
Dept: URGENT CARE | Facility: URGENT CARE | Age: 71
End: 2021-09-23
Payer: MEDICARE

## 2021-09-23 VITALS — HEART RATE: 58 BPM | DIASTOLIC BLOOD PRESSURE: 89 MMHG | SYSTOLIC BLOOD PRESSURE: 171 MMHG | OXYGEN SATURATION: 99 %

## 2021-09-23 DIAGNOSIS — I10 UNCONTROLLED HYPERTENSION: Primary | ICD-10-CM

## 2021-09-23 PROCEDURE — 99213 OFFICE O/P EST LOW 20 MIN: CPT | Performed by: FAMILY MEDICINE

## 2021-09-23 NOTE — PROGRESS NOTES
SUBJECTIVE:  Chief Complaint   Patient presents with     Urgent Care     /115 at dentist no other sxs      Lucio Daly is a 71 year old male who presents with a chief complaint of elevated BP.    Has HTN, does fluctuate.  Took his blood pressure medication today already.  Was at dentist today, had deep cleaning and teeth pulled, told that needed more work done on other teeth and needs to see Oral Surgeon.  Noted elevated BP upwards of over 200.    Denies any dizziness, vision blurry, chest pain or worsening SOB.  Has baseline IDL but this is not anything different.    Patient states that needs to get this all done as he is planning to leave for [place] for 2 months.    Past Medical History:   Diagnosis Date     Abnormal EMG 4/18/2013     Abnormal involuntary movements(781.0)     Movement Disorder     AK (actinic keratosis) 12/18/2011     Allergic rhinitis, cause unspecified     Allergic rhinitis     Balance problems 11/1/2011     Basal cell carcinoma      Bladder spasms 11/1/2011     Chronic osteoarthritis      COPD (chronic obstructive pulmonary disease) (H)     Interstial lung disease, obliderans bronchiolitis     Diaphragmatic hernia without mention of obstruction or gangrene      Earache or other ear, nose, or throat complaint      Esophageal reflux      Fatigue 11/1/2011     Fracture      H. pylori infection 5/12/2011     History of MRI of cervical spine 11/18/2013    EXAMINATION: CERVICAL SPINE G/E 5 VIEWS* 4/19/2013 4:18 PM  CLINICAL HISTORY: Pain in limb,[facility]),  COMPARISON:  FINDINGS: AP and lateral views in flexion and extension, as well as odontoid view of the cervical spine was obtained. There is no comparison available. The vertebral bodies of the cervical spine are normally aligned. There is posterior spurring and d     Incomplete defecation 11/1/2011     Interstitial lung disease (H) 11/29/2016     Laboratory test 8/7/2012     Lung disease June 2015      Malignant basal cell neoplasm of skin 8/6/2008     Melanoma (H) 8/6/2008     Melanoma in situ of lower leg (H)     R calf     Neuropathy 5/16/2011     Other bladder disorder      Other color vision deficiencies      Other nervous system complications      Parkinsonism (H) 11/1/2011     Personal history of colonic polyps      Polyneuropathy in other diseases classified elsewhere (H)      RA (rheumatoid arthritis) (H)      Rheumatoid arthritis of multiple sites with negative rheumatoid factor (H) 3/21/2016     Seronegative arthritis 11/18/2013     Shortness of breath      Shoulder arthritis 2016    acromioclavicular joint      Somatization disorder 5/12/2011     Squamous cell carcinoma      Tremor 11/1/2011     Unspecified essential hypertension      Unspecified hypothyroidism      Urinary tract infection      Urinary urgency 11/1/2011     Wears glasses 11/1/2011     Current Outpatient Medications   Medication Sig Dispense Refill     acetaminophen (TYLENOL) 500 MG tablet 2 x 500mg by mouth 3 times per day: 7/730am, 4pm and 11pm  = 6/day       albuterol (PROAIR HFA/PROVENTIL HFA/VENTOLIN HFA) 108 (90 Base) MCG/ACT inhaler Inhale 2 puffs into the lungs every 4 hours as needed for shortness of breath / dyspnea or wheezing 1 Inhaler 3     cholecalciferol (HM VITAMIN D3) 25 MCG (1000 UT) TABS 1000 unit tab by mouth daily       folic acid (FOLVITE) 1 MG tablet Take 2 tablets (2 mg) by mouth daily (Patient taking differently: Take 1 mg by mouth daily One per day) 180 tablet 3     hydroxychloroquine (PLAQUENIL) 200 MG tablet Take 1 tablet (200 mg) by mouth daily Daily at 9am. 90 tablet 1     methotrexate sodium 2.5 MG TABS Take 8 tablets (20 mg) by mouth every 7 days *Monitoring labs every 8-12 weeks 96 tablet 3     metoprolol succinate ER (TOPROL-XL) 50 MG 24 hr tablet TAKE 1 AND 1/2 TABLETS BY MOUTH EVERY  tablet 1     omeprazole (PRILOSEC) 40 MG DR capsule TAKE 1 CAPSULE BY MOUTH TWICE DAILY AT 7-7:30 AM AND 4 PM  180 capsule 2     ORDER FOR DME Use your BiPAP device as directed by your provider.       oxybutynin (DITROPAN) 5 MG tablet TAKE 1 TABLET BY MOUTH EVERY DAY AT 4PM 90 tablet 3     pregabalin (LYRICA) 50 MG capsule Take 1 tab 3 times daily 360 capsule 1     SEREVENT DISKUS 50 MCG/DOSE inhaler INHALE 1 PUFF BY MOUTH TWICE A DAY 30 each 3     Social History     Tobacco Use     Smoking status: Former Smoker     Packs/day: 1.50     Years: 37.00     Pack years: 55.50     Types: Cigarettes     Start date: 6/15/1963     Quit date: 2000     Years since quittin.9     Smokeless tobacco: Never Used   Substance Use Topics     Alcohol use: Not Currently     Alcohol/week: 0.0 standard drinks       ROS:  Review of systems negative except as stated above.    EXAM:   BP (!) 171/89   Pulse 58   SpO2 99%   GENERAL APPEARANCE: healthy, alert and no distress  PSYCH: alert, affect bright      ASSESSMENT/PLAN:  (I10) Uncontrolled hypertension  (primary encounter diagnosis)  Comment: improving  Plan: continue with medication      Patient seen at dental clinic with dental work done, noted elevated BP.  Patient with HTN, endorsed fluctuating BP at times and denies any symptoms concerning for HTN urgency/emergency.  Reviewed that pain and anxiety can cause elevation in BP.  BP is slowly improving, recommend to recheck with nurse visit or thru pharmacy.  To ER if develops any chest pain, dizziness, vision changes, headaches, or SOB.      Encourage to obtain repeat BP check with nurse visit or thru pharmacy.    Follow up with primary provider for HTN management.    Stevo Garcia MD,  2021 3:54 PM

## 2021-09-27 ASSESSMENT — ENCOUNTER SYMPTOMS
HEMATOCHEZIA: 0
DIARRHEA: 0
HEADACHES: 0
NERVOUS/ANXIOUS: 0
FEVER: 0
EYE PAIN: 0
DIZZINESS: 0
COUGH: 0
FREQUENCY: 0
CHILLS: 0
ABDOMINAL PAIN: 0
JOINT SWELLING: 0
HEMATURIA: 0
SHORTNESS OF BREATH: 0
PARESTHESIAS: 0
DYSURIA: 0
ARTHRALGIAS: 0
PALPITATIONS: 0
SORE THROAT: 0
NAUSEA: 0
WEAKNESS: 0
HEARTBURN: 0
MYALGIAS: 0

## 2021-09-27 ASSESSMENT — ACTIVITIES OF DAILY LIVING (ADL): CURRENT_FUNCTION: NO ASSISTANCE NEEDED

## 2021-09-30 ENCOUNTER — OFFICE VISIT (OUTPATIENT)
Dept: PEDIATRICS | Facility: CLINIC | Age: 71
End: 2021-09-30
Payer: MEDICARE

## 2021-09-30 VITALS
WEIGHT: 301 LBS | HEIGHT: 73 IN | RESPIRATION RATE: 18 BRPM | OXYGEN SATURATION: 98 % | TEMPERATURE: 97 F | BODY MASS INDEX: 39.89 KG/M2 | SYSTOLIC BLOOD PRESSURE: 152 MMHG | DIASTOLIC BLOOD PRESSURE: 92 MMHG | HEART RATE: 64 BPM

## 2021-09-30 DIAGNOSIS — J44.81 BRONCHIOLITIS OBLITERANS (H): ICD-10-CM

## 2021-09-30 DIAGNOSIS — I10 ESSENTIAL HYPERTENSION: Primary | ICD-10-CM

## 2021-09-30 DIAGNOSIS — G20.A1 PARALYSIS AGITANS (H): ICD-10-CM

## 2021-09-30 DIAGNOSIS — N39.498 OTHER URINARY INCONTINENCE: ICD-10-CM

## 2021-09-30 DIAGNOSIS — R32 URINARY INCONTINENCE, UNSPECIFIED TYPE: ICD-10-CM

## 2021-09-30 DIAGNOSIS — Z12.11 SCREEN FOR COLON CANCER: ICD-10-CM

## 2021-09-30 DIAGNOSIS — K21.9 GASTROESOPHAGEAL REFLUX DISEASE WITHOUT ESOPHAGITIS: ICD-10-CM

## 2021-09-30 DIAGNOSIS — G62.9 PERIPHERAL POLYNEUROPATHY: ICD-10-CM

## 2021-09-30 DIAGNOSIS — J84.9 INTERSTITIAL LUNG DISEASE (H): ICD-10-CM

## 2021-09-30 DIAGNOSIS — G70.9 NEUROMUSCULAR DISEASE (H): ICD-10-CM

## 2021-09-30 PROBLEM — Z92.89 HISTORY OF MRI OF LUMBAR SPINE: Status: RESOLVED | Noted: 2017-06-27 | Resolved: 2021-09-30

## 2021-09-30 PROBLEM — F39 MOOD DISORDER (H): Status: RESOLVED | Noted: 2018-08-21 | Resolved: 2021-09-30

## 2021-09-30 PROCEDURE — 90662 IIV NO PRSV INCREASED AG IM: CPT | Performed by: INTERNAL MEDICINE

## 2021-09-30 PROCEDURE — G0008 ADMIN INFLUENZA VIRUS VAC: HCPCS | Performed by: INTERNAL MEDICINE

## 2021-09-30 PROCEDURE — 99214 OFFICE O/P EST MOD 30 MIN: CPT | Mod: 25 | Performed by: INTERNAL MEDICINE

## 2021-09-30 RX ORDER — OMEPRAZOLE 40 MG/1
40 CAPSULE, DELAYED RELEASE ORAL DAILY
Qty: 90 CAPSULE | Refills: 3 | Status: SHIPPED | OUTPATIENT
Start: 2021-09-30 | End: 2022-06-20

## 2021-09-30 RX ORDER — METOPROLOL SUCCINATE 50 MG/1
TABLET, EXTENDED RELEASE ORAL
Qty: 135 TABLET | Refills: 3 | Status: SHIPPED | OUTPATIENT
Start: 2021-09-30 | End: 2021-12-17

## 2021-09-30 RX ORDER — OXYBUTYNIN CHLORIDE 5 MG/1
5 TABLET ORAL 2 TIMES DAILY
Qty: 180 TABLET | Refills: 3 | Status: SHIPPED | OUTPATIENT
Start: 2021-09-30 | End: 2021-12-01

## 2021-09-30 RX ORDER — PREGABALIN 50 MG/1
CAPSULE ORAL
Qty: 360 CAPSULE | Refills: 3 | Status: SHIPPED | OUTPATIENT
Start: 2021-09-30 | End: 2021-10-01

## 2021-09-30 RX ORDER — LISINOPRIL 10 MG/1
10 TABLET ORAL DAILY
Qty: 90 TABLET | Refills: 0 | Status: SHIPPED | OUTPATIENT
Start: 2021-09-30 | End: 2021-12-17

## 2021-09-30 ASSESSMENT — ENCOUNTER SYMPTOMS
FEVER: 0
ABDOMINAL PAIN: 0
EYE PAIN: 0
DYSURIA: 0
COUGH: 0
DIZZINESS: 0
HEARTBURN: 0
HEMATURIA: 0
PARESTHESIAS: 0
WEAKNESS: 0
ARTHRALGIAS: 0
JOINT SWELLING: 0
DIARRHEA: 0
SORE THROAT: 0
FREQUENCY: 0
PALPITATIONS: 0
HEMATOCHEZIA: 0
SHORTNESS OF BREATH: 0
CHILLS: 0
NAUSEA: 0
MYALGIAS: 0
NERVOUS/ANXIOUS: 0
HEADACHES: 0

## 2021-09-30 ASSESSMENT — MIFFLIN-ST. JEOR: SCORE: 2156.27

## 2021-09-30 ASSESSMENT — ACTIVITIES OF DAILY LIVING (ADL): CURRENT_FUNCTION: NO ASSISTANCE NEEDED

## 2021-09-30 NOTE — PATIENT INSTRUCTIONS
Flu shot today.    Labs tomorrow.      Colonoscopy:  Call for an appointment.    May increase your oxybutynin to twice daily or three times daily as needed.    With respect to your blood pressure, let's add in lisinopril, 10 mg daily.  Follow up in 2-3 months.    Heladio Calixto MD  Internal Medicine and Pediatrics     Patient Education   Personalized Prevention Plan  You are due for the preventive services outlined below.  Your care team is available to assist you in scheduling these services.  If you have already completed any of these items, please share that information with your care team to update in your medical record.  Health Maintenance Due   Topic Date Due     ANNUAL REVIEW OF HM ORDERS  Never done     COPD Action Plan  Never done     Zoster (Shingles) Vaccine (3 of 3) 12/13/2018     Colorectal Cancer Screening  05/23/2021     Eye Exam  07/13/2021     Flu Vaccine (1) 09/01/2021

## 2021-09-30 NOTE — PROGRESS NOTES
"Rheumatology Clinic Visit  Jeremy Goodrich M.D.     Lucio Daly MRN# [id number]   YOB: 1950 Age: 71 year old     Date of Visit: .10/01/2021    Primary care provider: Saroj Tinoco         Assessment and Plan:   # Seronegative inflammatory arthritis: Patient relates sustained overall improvement in control of diffuse joint pain with combination therapy.  He relates residual \"extra\" pain affecting hands, feet, elbows, and left ankle.  Physical exam shows improved shoulder flexion; the left ankle is swollen but not tender.  Knees exhibit crepitus.  Lab work on October 1, 2021 showed creatinine, transaminases, and CBC normal.  CRP was minimally elevated at 10  Seronegative inflammatory arthritis continues improved with combination methotrexate and low-dose hydroxychloroquine.  I recommend continuing combination therapy.  Plan  # Continue methotrexate at 20 mg weekly, folic acid 2 mg day. While on drug  --High risk medication monitoring--labs q 3 mo AST/ALT, Albumin, CBC with plts, CRP   --Limit EtOH intake to 2 drinks weekly; use folate 2 mg daily  --Tylenol 325mg qid prn nausea/HA associated with dosing.    # hydroxychloroquine (plaquenil) 200 mg QD -annual eye exam done July 2021 [facility].  2. ILD Obliterative bronchiolitis, symptomatically stable.  Patient was seen by Dr. Mccarthy in pulmonary in July 2021, when mild decrease in total lung capacity was noted.  Recommendation was to repeat pulmonary function tests in 4 to 5 months.  3. Osteoarthritis knees, ankle, shoulder and neck and hands/thumbs. Continue isometric quad strengthening exercises twice daily to improve support around the joint. Left ankle osteoarthritis and overall osteoarthritis  is contributing to the overall pain. Continue splinting/hand therapy and acetaminophen 1000 mg tid ATC.    RTC 6 month  Jeremy Goodrich MD  Staff Rheumatologist, [facility]         History of Present Illness:   Lucio Daly \"Rich\" presents for f/u " "seronegative rheumatoid arthritis, sicca complex.  Last seen in 4-2021.  Methotrexate and hydroxychloroquine were continued for seronegative arthritis at that time.  Interval history 10-1-2021:    Patient relates overall stability.  He has daily pain in hands, feet, elbows, and shoulders, but ability to perform activities of daily living, and to perform walking 20 to 25 minutes without limitation, are unimpeded.  He has less than 30 minutes of morning stiffness.    Several weeks ago, he fell and twisted his left ankle in his walker.  Since then, weightbearing has been uncomfortable, but there has been gradually decreasing pain.  He has ongoing instability of the left leg which increases propensity to fall associated with foot drop on the left.  He controls instability and pain with use of left ankle sleeve, AFO, and bracing.    He continues on methotrexate, hydroxychloroquine, and Lyrica for inflammatory arthritis and arthropathy.  He reports that methotrexate is effective, because when he discontinued the medication during a recent dental procedure, diffuse joint pain recurred within 1 week.    Interval history 04-:  Intermittent l 2nd toe pain, made worse with walking. +visible swelling. Altering shoegear per Dr. Martinez.  R arm remains painful. It is \"nerve pain\" like, affecting his hand, forearm, arm. Discomfort made worse with gripping R hand. A R thumb splint did not give much relief.  R thumb and index finger are painful when walking with a walker and gripping a grocery cart handle.  He continues with methotrexate, hydroxychloroquine 200 mg daily, and lyrica. He thinks that methotrexate is helpful.    PMH   1. Sensory neuropathy of unclear etiology - negative work up, managed on Lyrica.  Has chronic dysesthesia in pads of feet.  2. Parkinsonisim/Tremor disorder; sees neurology   3. Headaches   4. History of basal cell, squamous (most recent +bx 8-2014)  5. History of melanoma   6. GALO on CPAP.  7. HTN. "   8. Seronegative RA, diagnosed 2009. SICCA  9.  JUARES. Work-up 4-2015. 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 Lumbar stenosis per MRI 2017   11. Bronchitis 5-2017, early pneumonia   12. Chicken pox   13. DEXA  Normal   14. Left ankle DJD, MRI  --seen Dr. Martinez 2-2019   Past Medical History:   Diagnosis Date     Abnormal EMG 4/18/2013     Abnormal involuntary movements(781.0)     Movement Disorder     AK (actinic keratosis) 12/18/2011     Allergic rhinitis, cause unspecified     Allergic rhinitis     Balance problems 11/1/2011     Basal cell carcinoma      Bladder spasms 11/1/2011     Chronic osteoarthritis      COPD (chronic obstructive pulmonary disease) (H)     Interstial lung disease, obliderans bronchiolitis     Diaphragmatic hernia without mention of obstruction or gangrene      Earache or other ear, nose, or throat complaint      Esophageal reflux      Fatigue 11/1/2011     Fracture      H. pylori infection 5/12/2011     History of MRI of cervical spine 11/18/2013    EXAMINATION: CERVICAL SPINE G/E 5 VIEWS* 4/19/2013 4:18 PM  CLINICAL HISTORY: Pain in limb,Performing Location?->[facility] ([facility]),  COMPARISON:  FINDINGS: AP and lateral views in flexion and extension, as well as odontoid view of the cervical spine was obtained. There is no comparison available. The vertebral bodies of the cervical spine are normally aligned. There is posterior spurring and d     Incomplete defecation 11/1/2011     Interstitial lung disease (H) 11/29/2016     Laboratory test 8/7/2012     Lung disease June 2015     Malignant basal cell neoplasm of skin 8/6/2008     Melanoma (H) 8/6/2008     Melanoma in situ of lower leg (H)     R calf     Neuropathy 5/16/2011     Other bladder disorder      Other color vision deficiencies      Other nervous system complications      Parkinsonism (H) 11/1/2011     Personal history of colonic polyps      Polyneuropathy in other diseases classified elsewhere (H)      RA  "(rheumatoid arthritis) (H)      Rheumatoid arthritis of multiple sites with negative rheumatoid factor (H) 3/21/2016     Seronegative arthritis 11/18/2013     Shortness of breath      Shoulder arthritis 2016    acromioclavicular joint      Somatization disorder 5/12/2011     Squamous cell carcinoma      Tremor 11/1/2011     Unspecified essential hypertension      Unspecified hypothyroidism      Urinary tract infection      Urinary urgency 11/1/2011     Wears glasses 11/1/2011     Injuries-ankle sprains, left heel fracture    Family Hx   MGM Psoriasis  Sister -PPM  Family History   Problem Relation Age of Onset     Hypertension Mother      Heart Disease Mother         a fib     Arthritis Mother         \"osteo\"     Osteoporosis Mother      Skin Cancer Mother      Uterine Cancer Mother      Other Cancer Mother      Cancer Mother      Hypertension Brother      Diabetes Brother         \" post pancreatitis\"     Neurologic Disorder Sister         multiple sclerosis     Hypertension Sister      Heart Disease Sister      Multiple Sclerosis Sister      Heart Disease Sister         a fib     Arthritis Sister      Heart Failure Sister      Kidney Disease Sister      Dementia Sister      Hypertension Father      Cerebrovascular Disease Father         ,     Skin Cancer Father      Colon Polyps Father      Other - See Comments Son         [place]     Other - See Comments Abdon wagner     Other - See Comments Abdon gonzalez     Psoriasis Maternal Grandfather      Stomach Cancer Maternal Grandfather      Cancer Maternal Grandfather      Congenital Anomalies Other         granddaughter with a chromosome defect     Cerebrovascular Disease Paternal Grandmother         ,     Cerebrovascular Disease Paternal Grandfather         ,     Cancer Granddaughter         Langerhans Cell Histiocytosis     Genetic Disease Granddaughter         gene translocation     Melanoma No family hx of      Colon Cancer No family " hx of      Personal Hx   Home environment: No secondhand tobacco smoke in home.    History     Social History     Marital Status:      Spouse Name: N/A     Number of Children: N/A     Years of Education: N/A     Social History Main Topics     Smoking status: Former Smoker     Quit date: 09/30/2003     Smokeless tobacco: Never Used     Alcohol Use: No     Drug Use: No     Sexually Active: Not Currently -- Female partner(s)     Other Topics Concern     None     Social History Narrative    2013: Living in [place] in a townhouse with no stepsHas 3 sons that are doing okay.             Review of Systems:     14 points all negative except what is mentioned in HPI.  Review Of Systems  Skin: negative  Eyes: negative  Ears/Nose/Throat: negative  Respiratory: No shortness of breath, dyspnea on exertion, cough, or hemoptysis  Cardiovascular: negative  Gastrointestinal: negative  Genitourinary: negative  Musculoskeletal: as above  Neurologic: as above  Psychiatric: negative  Hematologic/Lymphatic/Immunologic: negative  Endocrine: negative             Past Surgical History:   Past Surgical History:   Procedure Laterality Date     BIOPSY OF SKIN LESION       COLONOSCOPY       HEMORRHOID SURGERY       lip biopsy      for sicca complex     MOHS MICROGRAPHIC PROCEDURE       SOFT TISSUE SURGERY      removeal of basel cell carcinoma     Blood pressure (!) 182/110, pulse 63, weight 136.2 kg (300 lb 4.8 oz), SpO2 97 %.  Wt Readings from Last 4 Encounters:   10/01/21 136.2 kg (300 lb 4.8 oz)   09/30/21 136.5 kg (301 lb)   07/27/21 143.1 kg (315 lb 7.7 oz)   04/13/21 143.1 kg (315 lb 8 oz)       Constitutional: well-developed, appearing stated age; cooperative  Eyes: nl EOM, PERRLA, vision, conjunctiva, sclera  ENT: nl external ears, nose, hearing, lips, teeth, gums, throat  No mucous membrane lesions, normal saliva pool  Neck: no mass or thyroid enlargement  Resp: lungs clear to auscultation, nl to palpation  CV: RRR, no  murmurs, rubs or gallops, no edema  GI: no ABD mass or tenderness, no HSM  : not tested  Lymph: no cervical, supraclavicular, inguinal or epitrochlear nodes  MS: Left ankle slightly swollen; there is no synovitis at hands, wrists, elbows, or knees.  There is painful right shoulder flexion.  No tenderness at MTPs.  Skin: no nail pitting, alopecia, rash, nodules or lesions  Neuro: nl cranial nerves, strength, sensation, DTRs.   Psych: nl judgement, orientation, memory, affect.             Data:     RHEUM RESULTS Latest Ref Rng & Units 8/7/2003 7/22/2004 4/26/2005   ALBUMIN 3.4 - 5.0 g/dL - - -   ALT 0 - 70 U/L - - -   AST 0 - 45 U/L - - -   ANCA - - - -   CK TOTAL 30 - 300 U/L - - -   CREATININE 0.66 - 1.25 mg/dL 1.2 1.40 -   CRP 0.0 - 8.0 mg/L - - -   DNA 0 - 29 IU/mL - - -   KIMBERLY 0 - 1.0 - - <1.0   GFR ESTIMATE, IF BLACK >60 mL/min/[1.73:m2] - 68 -   GFR ESTIMATE >60 mL/min/1.73m2 - 56(L) -   HEMATOCRIT 40.0 - 53.0 % 43.7 - -   HEMOGLOBIN 13.3 - 17.7 g/dL 15.2 - -   HEPBANG NEG - - -   HCVAB NEG - - -   IGA 70 - 380 mg/dL - - 394(H)   IGM 60 - 265 mg/dL - - 98    - 1,620 mg/dL - - 1040   WBC 4.0 - 11.0 10e3/uL 7.0 - -   RBC 4.40 - 5.90 10e6/uL 4.98 - -   RDW 10.0 - 15.0 % 14.7 - -   MCHC 31.5 - 36.5 g/dL 34.8 - -   MCV 78 - 100 fL 88 - -   PLATELET COUNT 150 - 450 10e3/uL 221 - -   RHEUMATOID FACTOR 0 - 14 IU/mL - - -   ESR 0 - 20 mm/h 5 - 7       Rheumatoid Factor   Date Value Ref Range Status   05/27/2010 <7 0 - 14 IU/mL Final   ,  ,  ,   Cyclic Citrullinated Peptide IgG   Date Value Ref Range Status   05/27/2010 <2 <5 U/mL Final     Comment:     Interpretation:  Negative   ,  ,   SSA (RO) Antibody IgG   Date Value Ref Range Status   05/27/2010 1  Final     Comment:     Reference range: 0 to 40  Unit: AU/mL  (Note)  REFERENCE INTERVALS: SSA (Ro) (ELAN) Ab, IgG   29 AU/mL or Less ............. Negative   30 - 40 AU/mL ................ Equivocal   41 AU/mL or Greater .......... Positive    SSA (Ro) antibody  is seen in 70-75% of Sjogren syndrome  cases, 30-40% of systemic lupus erythematosus (SLE) and  5-10% of progressive systemic sclerosis (PSS).  Performed by [facility],  [address] [facility],[place] [id number] [phone]  [email or web address], Chely Leija MD, Lab. Director     SSB (LA) Antibody IgG   Date Value Ref Range Status   05/27/2010 8  Final     Comment:     Reference range: 0 to 40  Unit: AU/mL  (Note)  REFERENCE INTERVALS: SSB (La) (ELAN) Ab, IgG   29 AU/mL or Less ............. Negative   30 - 40 AU/mL ................ Equivocal   41 AU/mL or Greater .......... Positive    SSB (La) antibody is seen in 50-60% of Sjogren syndrome  cases and is specific if it is the only ELAN antibody  present. 15-25% of patients with systemic lupus  erythematosus (SLE) and 5-10% of patients with progressive  systemic sclerosis (PSS) also have this antibody.  Performed by [facility],  [address] [facility] [place] [address] [phone]  [email or web address], Chely Leija MD, Lab. Director   ,  ,   MONICA Screen by EIA   Date Value Ref Range Status   05/27/2010 <1.0 0 - 1.0 Final     Comment:     Interpretation:  Negative   ,   DNA-ds   Date Value Ref Range Status   05/27/2010 <15 0 - 29 IU/mL Final     Comment:     Interpretation:  Negative   ,  ,  ,  ,  ,  ,  ,   Hep B Surface Agn   Date Value Ref Range Status   05/27/2010 Negative NEG Final   ,  ,  ,  ,  ,  ,  ,  ,  ,  ,  ,   Neutrophil Cytoplasmic IgG Antibody   Date Value Ref Range Status   06/26/2015   Final    <1:20  Reference range: <1:20  (Note)  The ANCA IFA is <1:20; therefore, no further testing will  be performed.  INTERPRETIVE INFORMATION: Anti-Neutrophil Cyto Ab, IgG  Neutrophil Cytoplasmic Antibodies (C-ANCA = granular  cytoplasmic staining, P-ANCA = perinuclear staining) are  found in the serum of over 90 percent of patients with  certain necrotizing systemic vasculitides, and usually in  less than 5 percent of patients with collagen vascular  disease or  arthritis.  Performed by [facility],  [address] [place] [id number] [phone]  [email or web address], Brad Fall MD, Lab. Director       ,  ,  ,  ,  ,  ,  ,  ,  ,   Albumin Fraction   Date Value Ref Range Status   05/27/2010 4.0 3.7 - 5.1 g/dL Final     Alpha 2 Fraction   Date Value Ref Range Status   05/27/2010 0.6 0.5 - 0.9 g/dL Final     Beta Fraction   Date Value Ref Range Status   05/27/2010 1.0 0.6 - 1.0 g/dL Final     Gamma Fraction   Date Value Ref Range Status   05/27/2010 1.0 0.7 - 1.6 g/dL Final     Monoclonal Peak   Date Value Ref Range Status   05/27/2010 0.0 0.0 g/dL Final     ELP Interpretation:   Date Value Ref Range Status   05/27/2010   Final    Essentially normal electrophoretic pattern.  No monoclonal protein seen.     Comment:      Pathologic significance requires clinical correlation.  ASHLEY Noble M.D.,   Ph.D., Pathologist ()   ,  ,   Immunofixation ELP   Date Value Ref Range Status   06/09/2016   Final    No monoclonal protein seen on immunofixation.  Pathological significance   requires clinical correlation.   ASHLEY Noble M.D., Ph.D   Pathologist ([phone])       IGG   Date Value Ref Range Status   06/09/2016 1,020 695 - 1,620 mg/dL Final     IGA   Date Value Ref Range Status   06/09/2016 374 70 - 380 mg/dL Final     IGM   Date Value Ref Range Status   06/09/2016 81 60 - 265 mg/dL Final   ,  ,  ,  ,  ,  ,  ,

## 2021-09-30 NOTE — PROGRESS NOTES
"    Assessment & Plan     Screen for colon cancer  Due.  ORdered.   - Adult Gastro Ref - Procedure Only; Future    Interstitial lung disease (H)  Management per pulm.  To see them for his obstructive sleep apnea as well, to consolidate his care.     Urinary incontinence, unspecified type  May increase oxybutynin to twice daily as needed.     Neuromuscular disease (H)  Stable.     Paralysis agitans (H)  Management per neurol     Bronchiolitis obliterans (H)  Stable.  Management per pulm, on serevent and as needed albtuterol.  Continue his methotrexate for his RA, as may be related.     Gastroesophageal reflux disease without esophagitis    - omeprazole (PRILOSEC) 40 MG DR capsule; Take 1 capsule (40 mg) by mouth daily    Essential hypertension  Not at goal.  Add ACE inhibitor and follow up in 2-3 months.  - metoprolol succinate ER (TOPROL-XL) 50 MG 24 hr tablet; 1 and 1/2 tabs daily.  - lisinopril (ZESTRIL) 10 MG tablet; Take 1 tablet (10 mg) by mouth daily    Peripheral polyneuropathy  Refilled.  Stable.   - pregabalin (LYRICA) 50 MG capsule; Take 1 tab 3 times daily    Other urinary incontinence    - oxybutynin (DITROPAN) 5 MG tablet; Take 1 tablet (5 mg) by mouth 2 times daily TAKE 1 TABLET BY MOUTH EVERY DAY AT 4PM             BMI:   Estimated body mass index is 40.26 kg/m  as calculated from the following:    Height as of this encounter: 1.842 m (6' 0.5\").    Weight as of this encounter: 136.5 kg (301 lb).   Weight management plan: Patient was referred to their PCP to discuss a diet and exercise plan.    Patient Instructions     Flu shot today.    Labs tomorrow.      Colonoscopy:  Call for an appointment.    May increase your oxybutynin to twice daily or three times daily as needed.    With respect to your blood pressure, let's add in lisinopril, 10 mg daily.  Follow up in 2-3 months.    Heladio Calixto MD  Internal Medicine and Pediatrics     Patient Education   Personalized Prevention Plan  You are due for the " "preventive services outlined below.  Your care team is available to assist you in scheduling these services.  If you have already completed any of these items, please share that information with your care team to update in your medical record.  Health Maintenance Due   Topic Date Due     ANNUAL REVIEW OF HM ORDERS  Never done     COPD Action Plan  Never done     Zoster (Shingles) Vaccine (3 of 3) 12/13/2018     Colorectal Cancer Screening  05/23/2021     Eye Exam  07/13/2021     Flu Vaccine (1) 09/01/2021            Return in about 3 months (around 12/30/2021) for Annual Wellness Visit, physical, with me, in person.    Heladio Calixto MD  [facility] MADHU Lynch is a 71 year old who presents for the following health issues     Healthy Habits:     In general, how would you rate your overall health?  Good    Frequency of exercise:  4-5 days/week    Duration of exercise:  15-30 minutes    Do you usually eat at least 4 servings of fruit and vegetables a day, include whole grains    & fiber and avoid regularly eating high fat or \"junk\" foods?  No    Taking medications regularly:  Yes    Barriers to taking medications:  Problems remembering to take them    Medication side effects:  Lightheadedness and Other    Ability to successfully perform activities of daily living:  No assistance needed    Home Safety:  No safety concerns identified    Hearing Impairment:  Difficulty following a conversation in a noisy restaurant or crowded room and difficulty understanding soft or whispered speech    In the past 6 months, have you been bothered by leaking of urine? Yes    In general, how would you rate your overall mental or emotional health?  Good      PHQ-2 Total Score: 0    Additional concerns today:  No       Hypertension Follow-up      Do you check your blood pressure regularly outside of the clinic? No     Are you following a low salt diet? Yes    Are your blood pressures ever more than 140 on the top " "number (systolic) OR more   than 90 on the bottom number (diastolic), for example 140/90? Yes        Review of Systems   Constitutional: Negative for chills and fever.   HENT: Negative for congestion, ear pain, hearing loss and sore throat.    Eyes: Negative for pain and visual disturbance.   Respiratory: Negative for cough and shortness of breath.    Cardiovascular: Negative for chest pain, palpitations and peripheral edema.   Gastrointestinal: Negative for abdominal pain, diarrhea, heartburn, hematochezia and nausea.   Genitourinary: Negative for discharge, dysuria, frequency, genital sores, hematuria, impotence and urgency.   Musculoskeletal: Negative for arthralgias, joint swelling and myalgias.   Skin: Negative for rash.   Neurological: Negative for dizziness, weakness, headaches and paresthesias.   Psychiatric/Behavioral: Negative for mood changes. The patient is not nervous/anxious.       CONSTITUTIONAL: NEGATIVE for fever, chills, change in weight  INTEGUMENTARY/SKIN: NEGATIVE for worrisome rashes, moles or lesions  EYES: NEGATIVE for vision changes or irritation  ENT/MOUTH: NEGATIVE for ear, mouth and throat problems  RESP: NEGATIVE for significant cough or SOB  BREAST: NEGATIVE for masses, tenderness or discharge  CV: NEGATIVE for chest pain, palpitations or peripheral edema  GI: NEGATIVE for nausea, abdominal pain, heartburn, or change in bowel habits  : NEGATIVE for frequency, dysuria, or hematuria  MUSCULOSKELETAL: NEGATIVE for significant arthralgias or myalgia  NEURO: NEGATIVE for weakness, dizziness or paresthesias  ENDOCRINE: NEGATIVE for temperature intolerance, skin/hair changes  HEME: NEGATIVE for bleeding problems  PSYCHIATRIC: NEGATIVE for changes in mood or affect      Objective    BP (!) 152/92 (BP Location: Right arm, Patient Position: Sitting, Cuff Size: Adult Regular)   Pulse 64   Temp 97  F (36.1  C) (Tympanic)   Resp 18   Ht 1.842 m (6' 0.5\")   Wt 136.5 kg (301 lb)   SpO2 98%   " BMI 40.26 kg/m    Body mass index is 40.26 kg/m .  Physical Exam   GENERAL: healthy, alert and no distress  EYES: Eyes grossly normal to inspection, PERRL and conjunctivae and sclerae normal  HENT: ear canals and TM's normal, nose and mouth without ulcers or lesions  NECK: no adenopathy, no asymmetry, masses, or scars and thyroid normal to palpation  RESP: lungs clear to auscultation - no rales, rhonchi or wheezes  CV: regular rate and rhythm, normal S1 S2, no S3 or S4, no murmur, click or rub, no peripheral edema and peripheral pulses strong  ABDOMEN: soft, nontender, no hepatosplenomegaly, no masses and bowel sounds normal  MS: no gross musculoskeletal defects noted, no edema  SKIN: no suspicious lesions or rashes  NEURO: Normal strength and tone, mentation intact and speech normal  PSYCH: mentation appears normal, affect normal/bright

## 2021-09-30 NOTE — PROGRESS NOTES
"SUBJECTIVE:   Lucio Dayl is a 71 year old male who presents for Preventive Visit.    {Split Bill scripting  The purpose of this visit is to discuss your medical history and prevent health problems before you are sick. You may be responsible for a co-pay, coinsurance, or deductible if your visit today includes services such as checking on a sore throat, having an x-ray or lab test, or treating and evaluating a new or existing condition :679578}  Patient has been advised of split billing requirements and indicates understanding: {Yes and No:661136}   Are you in the first 12 months of your Medicare coverage?  { :582173::\"No\"}    Healthy Habits:     In general, how would you rate your overall health?  Good    Frequency of exercise:  4-5 days/week    Duration of exercise:  15-30 minutes    Do you usually eat at least 4 servings of fruit and vegetables a day, include whole grains    & fiber and avoid regularly eating high fat or \"junk\" foods?  No    Taking medications regularly:  Yes    Barriers to taking medications:  Problems remembering to take them    Medication side effects:  Lightheadedness and Other    Ability to successfully perform activities of daily living:  No assistance needed    Home Safety:  No safety concerns identified    Hearing Impairment:  Difficulty following a conversation in a noisy restaurant or crowded room and difficulty understanding soft or whispered speech    In the past 6 months, have you been bothered by leaking of urine? Yes    In general, how would you rate your overall mental or emotional health?  Good      PHQ-2 Total Score: 0    Additional concerns today:  No    Do you feel safe in your environment? { :520261}    Have you ever done Advance Care Planning? (For example, a Health Directive, POLST, or a discussion with a medical provider or your loved ones about your wishes): { :105952}    {Hearing Test Done (Optional):858750}   Fall risk  { :925937}  {If any of the above assessments " "are answered yes, consider ordering appropriate referrals (Optional):474127::\"click delete button to remove this line now\"}  Cognitive Screening { :290241}    {Do you have sleep apnea, excessive snoring or daytime drowsiness? (Optional):563802}    Reviewed and updated as needed this visit by clinical staff                 Reviewed and updated as needed this visit by Provider                Social History     Tobacco Use     Smoking status: Former Smoker     Packs/day: 1.50     Years: 37.00     Pack years: 55.50     Types: Cigarettes     Start date: 6/15/1963     Quit date: 2000     Years since quittin.0     Smokeless tobacco: Never Used   Substance Use Topics     Alcohol use: Not Currently     Alcohol/week: 0.0 standard drinks     {Rooming Staff- Complete this question if Prescreen response is not shown below for today's visit. If you drink alcohol do you typically have >3 drinks per day or >7 drinks per week? (Optional):857743}    Alcohol Use 2021   Prescreen: >3 drinks/day or >7 drinks/week? Not Applicable   {add AUDIT responses (Optional) (A score of 7 for adult men is an indication of hazardous drinking; a score of 8 or more is an indication of an alcohol use disorder.  A score of 7 or more for adult women is an indication of hazardous drinking or an alchohol use disorder):880823}    {Outside tests to abstract? :579282}    {additional problems to add (Optional):176877}    Current providers sharing in care for this patient include: {Rooming staff:  Please update Care Team in Rooming Activity, refresh this note and then delete this statement}  Patient Care Team:  Saroj Tinoco PAMAR as PCP - General (Physician Assistant)  Jeremy Goodrich MD as MD (Rheumatology)  Benjamin Ordoñez MD as MD (Dermapathology)  Kristen Martinez MD as MD (Family Medicine, Sleep Medicine)  Rhett Shah MD as Surgeon (Surgery)  Sin Snow APRN CNP as Nurse Practitioner (Nurse " Practitioner)  Leonel Farnsworth MD as MD (Ophthalmology)  Gómez Velásquez DPM (Podiatry)  Rodney Ríos DPM (Podiatry)  Heladio Gonzalez MD as MD (Neurology)  Robb Zavala MD as MD (Ophthalmology)  Bipin Villatoro MD as MD (Family Practice)  Thong Montes MD as MD (Neurology)  Harsha Mccarthy MD as MD (Pulmonary Disease)  Sin Snow APRN CNP as Referring Physician (Nurse Practitioner)  Jermey Goodrich MD as MD (Rheumatology)  Ramón Jeronimo MD as MD (Family Medicine - Sports Medicine)  Ramón Camargo MD as MD (Hand Surgery)  Rodney Garcia MD as MD (Clinical Neurophysiology)  Saroj Tinoco PA-C as Assigned PCP  Jyoti, Thong Stevens MD as Assigned Neuroscience Provider  Kristen Martinez MD as Assigned Sleep Provider  Ramón Camargo MD as Assigned Surgical Provider  Rodney Ríos DPM as Assigned Musculoskeletal Provider  Sin Snow APRN CNP as Assigned Rheumatology Provider    The following health maintenance items are reviewed in Epic and correct as of today:  Health Maintenance Due   Topic Date Due     ANNUAL REVIEW OF HM ORDERS  Never done     COPD ACTION PLAN  Never done     ZOSTER IMMUNIZATION (3 of 3) 12/13/2018     COLORECTAL CANCER SCREENING  05/23/2021     EYE EXAM  07/13/2021     INFLUENZA VACCINE (1) 09/01/2021     {Chronicprobdata (optional):218378}  {Decision Support (Optional):557102}        Review of Systems   Constitutional: Negative for chills and fever.   HENT: Negative for congestion, ear pain, hearing loss and sore throat.    Eyes: Negative for pain and visual disturbance.   Respiratory: Negative for cough and shortness of breath.    Cardiovascular: Negative for chest pain, palpitations and peripheral edema.   Gastrointestinal: Negative for abdominal pain, diarrhea, heartburn, hematochezia and nausea.   Genitourinary: Negative for discharge, dysuria, frequency, genital sores, hematuria, impotence  "and urgency.   Musculoskeletal: Negative for arthralgias, joint swelling and myalgias.   Skin: Negative for rash.   Neurological: Negative for dizziness, weakness, headaches and paresthesias.   Psychiatric/Behavioral: Negative for mood changes. The patient is not nervous/anxious.      {ROS COMP (Optional):958819}    OBJECTIVE:   There were no vitals taken for this visit. Estimated body mass index is 41.62 kg/m  as calculated from the following:    Height as of 20: 1.854 m (6' 1\").    Weight as of 21: 143.1 kg (315 lb 7.7 oz).  Physical Exam  {Exam (Optional) :713506}    {Diagnostic Test Results (Optional):367837::\"Diagnostic Test Results:\",\"Labs reviewed in Epic\"}    ASSESSMENT / PLAN:   {Diag Picklist:135693}    Patient has been advised of split billing requirements and indicates understanding: {YES / NO:060644::\"Yes\"}  COUNSELING:  {Medicare Counselin}    Estimated body mass index is 41.62 kg/m  as calculated from the following:    Height as of 20: 1.854 m (6' 1\").    Weight as of 21: 143.1 kg (315 lb 7.7 oz).    {Weight Management Plan (ACO) Complete if BMI is abnormal-  Ages 18-64  BMI >24.9.  Age 65+ with BMI <23 or >30 (Optional):801559}    He reports that he quit smoking about 21 years ago. His smoking use included cigarettes. He started smoking about 58 years ago. He has a 55.50 pack-year smoking history. He has never used smokeless tobacco.      Appropriate preventive services were discussed with this patient, including applicable screening as appropriate for cardiovascular disease, diabetes, osteopenia/osteoporosis, and glaucoma.  As appropriate for age/gender, discussed screening for colorectal cancer, prostate cancer, breast cancer, and cervical cancer. Checklist reviewing preventive services available has been given to the patient.    Reviewed patients plan of care and provided an AVS. The {CarePlan:620366} for Lucio meets the Care Plan requirement. This Care Plan has " been established and reviewed with the {PATIENT, FAMILY MEMBER, CAREGIVER:011104}.    Counseling Resources:  ATP IV Guidelines  Pooled Cohorts Equation Calculator  Breast Cancer Risk Calculator  Breast Cancer: Medication to Reduce Risk  FRAX Risk Assessment  ICSI Preventive Guidelines  Dietary Guidelines for Americans, 2010  USDA's MyPlate  ASA Prophylaxis  Lung CA Screening    Heladio Calixto MD  [facility]    Identified Health Risks:

## 2021-10-01 ENCOUNTER — OFFICE VISIT (OUTPATIENT)
Dept: RHEUMATOLOGY | Facility: CLINIC | Age: 71
End: 2021-10-01
Attending: INTERNAL MEDICINE
Payer: MEDICARE

## 2021-10-01 ENCOUNTER — LAB (OUTPATIENT)
Dept: LAB | Facility: CLINIC | Age: 71
End: 2021-10-01
Attending: INTERNAL MEDICINE
Payer: MEDICARE

## 2021-10-01 VITALS
OXYGEN SATURATION: 97 % | SYSTOLIC BLOOD PRESSURE: 182 MMHG | BODY MASS INDEX: 40.17 KG/M2 | HEART RATE: 63 BPM | WEIGHT: 300.3 LBS | DIASTOLIC BLOOD PRESSURE: 110 MMHG

## 2021-10-01 DIAGNOSIS — Z79.899 HIGH RISK MEDICATION USE: ICD-10-CM

## 2021-10-01 DIAGNOSIS — G62.9 PERIPHERAL POLYNEUROPATHY: ICD-10-CM

## 2021-10-01 DIAGNOSIS — D84.9 IMMUNOSUPPRESSED STATUS (H): ICD-10-CM

## 2021-10-01 DIAGNOSIS — M06.09 RHEUMATOID ARTHRITIS OF MULTIPLE SITES WITH NEGATIVE RHEUMATOID FACTOR (H): ICD-10-CM

## 2021-10-01 LAB
ALBUMIN SERPL-MCNC: 3.7 G/DL (ref 3.4–5)
ALT SERPL W P-5'-P-CCNC: 28 U/L (ref 0–70)
AST SERPL W P-5'-P-CCNC: 18 U/L (ref 0–45)
CREAT SERPL-MCNC: 1.2 MG/DL (ref 0.66–1.25)
CRP SERPL-MCNC: 10 MG/L (ref 0–8)
ERYTHROCYTE [DISTWIDTH] IN BLOOD BY AUTOMATED COUNT: 14.4 % (ref 10–15)
GFR SERPL CREATININE-BSD FRML MDRD: 60 ML/MIN/1.73M2
HCT VFR BLD AUTO: 42.9 % (ref 40–53)
HGB BLD-MCNC: 13.4 G/DL (ref 13.3–17.7)
MCH RBC QN AUTO: 28.8 PG (ref 26.5–33)
MCHC RBC AUTO-ENTMCNC: 31.2 G/DL (ref 31.5–36.5)
MCV RBC AUTO: 92 FL (ref 78–100)
PLATELET # BLD AUTO: 190 10E3/UL (ref 150–450)
RBC # BLD AUTO: 4.66 10E6/UL (ref 4.4–5.9)
WBC # BLD AUTO: 4.6 10E3/UL (ref 4–11)

## 2021-10-01 PROCEDURE — G0463 HOSPITAL OUTPT CLINIC VISIT: HCPCS

## 2021-10-01 PROCEDURE — 86140 C-REACTIVE PROTEIN: CPT | Performed by: PATHOLOGY

## 2021-10-01 PROCEDURE — 84460 ALANINE AMINO (ALT) (SGPT): CPT | Performed by: PATHOLOGY

## 2021-10-01 PROCEDURE — 84450 TRANSFERASE (AST) (SGOT): CPT | Performed by: PATHOLOGY

## 2021-10-01 PROCEDURE — 99214 OFFICE O/P EST MOD 30 MIN: CPT | Performed by: INTERNAL MEDICINE

## 2021-10-01 PROCEDURE — 82565 ASSAY OF CREATININE: CPT | Performed by: PATHOLOGY

## 2021-10-01 PROCEDURE — 82040 ASSAY OF SERUM ALBUMIN: CPT | Performed by: PATHOLOGY

## 2021-10-01 PROCEDURE — 85027 COMPLETE CBC AUTOMATED: CPT | Performed by: PATHOLOGY

## 2021-10-01 PROCEDURE — 36415 COLL VENOUS BLD VENIPUNCTURE: CPT | Performed by: PATHOLOGY

## 2021-10-01 RX ORDER — METHOTREXATE 2.5 MG/1
8 TABLET ORAL
Qty: 96 TABLET | Refills: 3 | Status: SHIPPED | OUTPATIENT
Start: 2021-10-01 | End: 2022-04-08

## 2021-10-01 RX ORDER — PREGABALIN 50 MG/1
CAPSULE ORAL
Qty: 360 CAPSULE | Refills: 3 | Status: SHIPPED | OUTPATIENT
Start: 2021-10-01 | End: 2021-12-01

## 2021-10-01 RX ORDER — HYDROXYCHLOROQUINE SULFATE 200 MG/1
200 TABLET, FILM COATED ORAL DAILY
Qty: 90 TABLET | Refills: 5 | Status: SHIPPED | OUTPATIENT
Start: 2021-10-01 | End: 2022-04-08

## 2021-10-01 NOTE — PATIENT INSTRUCTIONS
Diagnosis:  1.  Inflammatory arthritis: Overall good control of inflammatory joint symptoms with combination low-dose hydroxychloroquine and methotrexate.  I recommend no change in the chronic treatment.  2. Neuropathy: I recommend continuing Lyrica  3. L ankle pain: likely due to sprain/strain.   Plan:  1.  Bloodwork every 4 months  2.  Continue methotrexate 8 tablets (20 mg) once weekly.While on methotrexate:   -- Check labs every 3 months (AST/ALT, Albumin, CBC with platelets)   -- Limit alcohol intake to 2 drinks weekly; use folate 1 mg daily.  --Tylenol 500-1000 mg can be used as needed up to three times daily for nausea/headache associated with dosing.    3.  Continue hydroxychloroquine 200 mg once daily.  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  4. Continue bracing/sleeve/AFO use for L ankle instability/drop foot.  5. Acetaminophen 1000 mg 3 times daily for residual pain.

## 2021-10-01 NOTE — NURSING NOTE
Chief Complaint   Patient presents with     RECHECK     6 mos follow up for RA     Patient is aware of BP and saw primary yesterday and got another BP medication added to take. They are monitoring this.      BP (!) 182/110 (BP Location: Right arm, Patient Position: Sitting, Cuff Size: Adult Regular)   Pulse 63   Wt 136.2 kg (300 lb 4.8 oz)   SpO2 97%   BMI 40.17 kg/m          Sin Lombardi, EMT

## 2021-10-01 NOTE — LETTER
"10/1/2021       RE: Lucio Daly  [address]  [place] [address]     Dear Colleague,    Thank you for referring your patient, Lucio Daly, to the [facility] RHEUMATOLOGY CLINIC [place] at [facility]. Please see a copy of my visit note below.    Rheumatology Clinic Visit  Jeremy Goodrich M.D.     Lucio Daly MRN# [id number]   YOB: 1950 Age: 71 year old     Date of Visit: .10/01/2021    Primary care provider: Saroj Tinoco         Assessment and Plan:   # Seronegative inflammatory arthritis: Patient relates sustained overall improvement in control of diffuse joint pain with combination therapy.  He relates residual \"extra\" pain affecting hands, feet, elbows, and left ankle.  Physical exam shows improved shoulder flexion; the left ankle is swollen but not tender.  Knees exhibit crepitus.  Lab work on October 1, 2021 showed creatinine, transaminases, and CBC normal.  CRP was minimally elevated at 10  Seronegative inflammatory arthritis continues improved with combination methotrexate and low-dose hydroxychloroquine.  I recommend continuing combination therapy.  Plan  # Continue methotrexate at 20 mg weekly, folic acid 2 mg day. While on drug  --High risk medication monitoring--labs q 3 mo AST/ALT, Albumin, CBC with plts, CRP   --Limit EtOH intake to 2 drinks weekly; use folate 2 mg daily  --Tylenol 325mg qid prn nausea/HA associated with dosing.    # hydroxychloroquine (plaquenil) 200 mg QD -annual eye exam done July 2021 [facility].  2. ILD Obliterative bronchiolitis, symptomatically stable.  Patient was seen by Dr. Mccarthy in pulmonary in July 2021, when mild decrease in total lung capacity was noted.  Recommendation was to repeat pulmonary function tests in 4 to 5 months.  3. Osteoarthritis knees, ankle, shoulder and neck and hands/thumbs. Continue isometric quad strengthening exercises twice daily to improve support around the " "joint. Left ankle osteoarthritis and overall osteoarthritis  is contributing to the overall pain. Continue splinting/hand therapy and acetaminophen 1000 mg tid ATC.    RTC 6 month  Jeremy Goodrich MD  Staff Rheumatologist, [facility]         History of Present Illness:   Lucio Daly \"Rich\" presents for f/u seronegative rheumatoid arthritis, sicca complex.  Last seen in 4-2021.  Methotrexate and hydroxychloroquine were continued for seronegative arthritis at that time.  Interval history 10-1-2021:    Patient relates overall stability.  He has daily pain in hands, feet, elbows, and shoulders, but ability to perform activities of daily living, and to perform walking 20 to 25 minutes without limitation, are unimpeded.  He has less than 30 minutes of morning stiffness.    Several weeks ago, he fell and twisted his left ankle in his walker.  Since then, weightbearing has been uncomfortable, but there has been gradually decreasing pain.  He has ongoing instability of the left leg which increases propensity to fall associated with foot drop on the left.  He controls instability and pain with use of left ankle sleeve, AFO, and bracing.    He continues on methotrexate, hydroxychloroquine, and Lyrica for inflammatory arthritis and arthropathy.  He reports that methotrexate is effective, because when he discontinued the medication during a recent dental procedure, diffuse joint pain recurred within 1 week.    Interval history 04-:  Intermittent l 2nd toe pain, made worse with walking. +visible swelling. Altering shoegear per Dr. Martinez.  R arm remains painful. It is \"nerve pain\" like, affecting his hand, forearm, arm. Discomfort made worse with gripping R hand. A R thumb splint did not give much relief.  R thumb and index finger are painful when walking with a walker and gripping a grocery cart handle.  He continues with methotrexate, hydroxychloroquine 200 mg daily, and lyrica. He thinks that methotrexate is " helpful.    PMH   1. Sensory neuropathy of unclear etiology - negative work up, managed on Lyrica.  Has chronic dysesthesia in pads of feet.  2. Parkinsonisim/Tremor disorder; sees neurology   3. Headaches   4. History of basal cell, squamous (most recent +bx 8-2014)  5. History of melanoma   6. GALO on CPAP.  7. HTN.   8. Seronegative RA, diagnosed 2009. SICCA  9.  JUARES. Work-up 4-2015. 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 Lumbar stenosis per MRI 2017   11. Bronchitis 5-2017, early pneumonia   12. Chicken pox   13. DEXA  Normal   14. Left ankle DJD, MRI  --seen Dr. Martinez 2-2019   Past Medical History:   Diagnosis Date     Abnormal EMG 4/18/2013     Abnormal involuntary movements(781.0)     Movement Disorder     AK (actinic keratosis) 12/18/2011     Allergic rhinitis, cause unspecified     Allergic rhinitis     Balance problems 11/1/2011     Basal cell carcinoma      Bladder spasms 11/1/2011     Chronic osteoarthritis      COPD (chronic obstructive pulmonary disease) (H)     Interstial lung disease, obliderans bronchiolitis     Diaphragmatic hernia without mention of obstruction or gangrene      Earache or other ear, nose, or throat complaint      Esophageal reflux      Fatigue 11/1/2011     Fracture      H. pylori infection 5/12/2011     History of MRI of cervical spine 11/18/2013    EXAMINATION: CERVICAL SPINE G/E 5 VIEWS* 4/19/2013 4:18 PM  CLINICAL HISTORY: Pain in limb,Performing Location?->[facility] ([facility]),  COMPARISON:  FINDINGS: AP and lateral views in flexion and extension, as well as odontoid view of the cervical spine was obtained. There is no comparison available. The vertebral bodies of the cervical spine are normally aligned. There is posterior spurring and d     Incomplete defecation 11/1/2011     Interstitial lung disease (H) 11/29/2016     Laboratory test 8/7/2012     Lung disease June 2015     Malignant basal cell neoplasm of skin 8/6/2008     Melanoma (H) 8/6/2008  "    Melanoma in situ of lower leg (H)     R calf     Neuropathy 5/16/2011     Other bladder disorder      Other color vision deficiencies      Other nervous system complications      Parkinsonism (H) 11/1/2011     Personal history of colonic polyps      Polyneuropathy in other diseases classified elsewhere (H)      RA (rheumatoid arthritis) (H)      Rheumatoid arthritis of multiple sites with negative rheumatoid factor (H) 3/21/2016     Seronegative arthritis 11/18/2013     Shortness of breath      Shoulder arthritis 2016    acromioclavicular joint      Somatization disorder 5/12/2011     Squamous cell carcinoma      Tremor 11/1/2011     Unspecified essential hypertension      Unspecified hypothyroidism      Urinary tract infection      Urinary urgency 11/1/2011     Wears glasses 11/1/2011     Injuries-ankle sprains, left heel fracture    Family Hx   MGM Psoriasis  Sister -PPM  Family History   Problem Relation Age of Onset     Hypertension Mother      Heart Disease Mother         a fib     Arthritis Mother         \"osteo\"     Osteoporosis Mother      Skin Cancer Mother      Uterine Cancer Mother      Other Cancer Mother      Cancer Mother      Hypertension Brother      Diabetes Brother         \" post pancreatitis\"     Neurologic Disorder Sister         multiple sclerosis     Hypertension Sister      Heart Disease Sister      Multiple Sclerosis Sister      Heart Disease Sister         a fib     Arthritis Sister      Heart Failure Sister      Kidney Disease Sister      Dementia Sister      Hypertension Father      Cerebrovascular Disease Father         ,     Skin Cancer Father      Colon Polyps Father      Other - See Comments Son         [place]     Other - See Comments Abdon wagner     Other - See Comments Abdon gonzalez     Psoriasis Maternal Grandfather      Stomach Cancer Maternal Grandfather      Cancer Maternal Grandfather      Congenital Anomalies Other         granddaughter with " a chromosome defect     Cerebrovascular Disease Paternal Grandmother         ,     Cerebrovascular Disease Paternal Grandfather         ,     Cancer Granddaughter         Langerhans Cell Histiocytosis     Genetic Disease Granddaughter         gene translocation     Melanoma No family hx of      Colon Cancer No family hx of      Personal Hx   Home environment: No secondhand tobacco smoke in home.    History     Social History     Marital Status:      Spouse Name: N/A     Number of Children: N/A     Years of Education: N/A     Social History Main Topics     Smoking status: Former Smoker     Quit date: 09/30/2003     Smokeless tobacco: Never Used     Alcohol Use: No     Drug Use: No     Sexually Active: Not Currently -- Female partner(s)     Other Topics Concern     None     Social History Narrative    2013: Living in [place] a townhouse with no stepsHas 3 sons that are doing okay.             Review of Systems:     14 points all negative except what is mentioned in HPI.  Review Of Systems  Skin: negative  Eyes: negative  Ears/Nose/Throat: negative  Respiratory: No shortness of breath, dyspnea on exertion, cough, or hemoptysis  Cardiovascular: negative  Gastrointestinal: negative  Genitourinary: negative  Musculoskeletal: as above  Neurologic: as above  Psychiatric: negative  Hematologic/Lymphatic/Immunologic: negative  Endocrine: negative             Past Surgical History:   Past Surgical History:   Procedure Laterality Date     BIOPSY OF SKIN LESION       COLONOSCOPY       HEMORRHOID SURGERY       lip biopsy      for sicca complex     MOHS MICROGRAPHIC PROCEDURE       SOFT TISSUE SURGERY      removeal of basel cell carcinoma     Blood pressure (!) 182/110, pulse 63, weight 136.2 kg (300 lb 4.8 oz), SpO2 97 %.  Wt Readings from Last 4 Encounters:   10/01/21 136.2 kg (300 lb 4.8 oz)   09/30/21 136.5 kg (301 lb)   07/27/21 143.1 kg (315 lb 7.7 oz)   04/13/21 143.1 kg (315 lb 8 oz)       Constitutional:  well-developed, appearing stated age; cooperative  Eyes: nl EOM, PERRLA, vision, conjunctiva, sclera  ENT: nl external ears, nose, hearing, lips, teeth, gums, throat  No mucous membrane lesions, normal saliva pool  Neck: no mass or thyroid enlargement  Resp: lungs clear to auscultation, nl to palpation  CV: RRR, no murmurs, rubs or gallops, no edema  GI: no ABD mass or tenderness, no HSM  : not tested  Lymph: no cervical, supraclavicular, inguinal or epitrochlear nodes  MS: Left ankle slightly swollen; there is no synovitis at hands, wrists, elbows, or knees.  There is painful right shoulder flexion.  No tenderness at MTPs.  Skin: no nail pitting, alopecia, rash, nodules or lesions  Neuro: nl cranial nerves, strength, sensation, DTRs.   Psych: nl judgement, orientation, memory, affect.             Data:     RHEUM RESULTS Latest Ref Rng & Units 8/7/2003 7/22/2004 4/26/2005   ALBUMIN 3.4 - 5.0 g/dL - - -   ALT 0 - 70 U/L - - -   AST 0 - 45 U/L - - -   ANCA - - - -   CK TOTAL 30 - 300 U/L - - -   CREATININE 0.66 - 1.25 mg/dL 1.2 1.40 -   CRP 0.0 - 8.0 mg/L - - -   DNA 0 - 29 IU/mL - - -   KIMBERLY 0 - 1.0 - - <1.0   GFR ESTIMATE, IF BLACK >60 mL/min/[1.73:m2] - 68 -   GFR ESTIMATE >60 mL/min/1.73m2 - 56(L) -   HEMATOCRIT 40.0 - 53.0 % 43.7 - -   HEMOGLOBIN 13.3 - 17.7 g/dL 15.2 - -   HEPBANG NEG - - -   HCVAB NEG - - -   IGA 70 - 380 mg/dL - - 394(H)   IGM 60 - 265 mg/dL - - 98    - 1,620 mg/dL - - 1040   WBC 4.0 - 11.0 10e3/uL 7.0 - -   RBC 4.40 - 5.90 10e6/uL 4.98 - -   RDW 10.0 - 15.0 % 14.7 - -   MCHC 31.5 - 36.5 g/dL 34.8 - -   MCV 78 - 100 fL 88 - -   PLATELET COUNT 150 - 450 10e3/uL 221 - -   RHEUMATOID FACTOR 0 - 14 IU/mL - - -   ESR 0 - 20 mm/h 5 - 7       Rheumatoid Factor   Date Value Ref Range Status   05/27/2010 <7 0 - 14 IU/mL Final   ,  ,  ,   Cyclic Citrullinated Peptide IgG   Date Value Ref Range Status   05/27/2010 <2 <5 U/mL Final     Comment:     Interpretation:  Negative   ,  ,   SSA (RO)  Antibody IgG   Date Value Ref Range Status   05/27/2010 1  Final     Comment:     Reference range: 0 to 40  Unit: AU/mL  (Note)  REFERENCE INTERVALS: SSA (Ro) (ELAN) Ab, IgG   29 AU/mL or Less ............. Negative   30 - 40 AU/mL ................ Equivocal   41 AU/mL or Greater .......... Positive    SSA (Ro) antibody is seen in 70-75% of Sjogren syndrome  cases, 30-40% of systemic lupus erythematosus (SLE) and  5-10% of progressive systemic sclerosis (PSS).  Performed by [facility],  [address] [facility],[place] [id number] [phone]  www[facility], Chely Leija MD, Lab. Director     SSB (LA) Antibody IgG   Date Value Ref Range Status   05/27/2010 8  Final     Comment:     Reference range: 0 to 40  Unit: AU/mL  (Note)  REFERENCE INTERVALS: SSB (La) (ELAN) Ab, IgG   29 AU/mL or Less ............. Negative   30 - 40 AU/mL ................ Equivocal   41 AU/mL or Greater .......... Positive    SSB (La) antibody is seen in 50-60% of Sjogren syndrome  cases and is specific if it is the only ELAN antibody  present. 15-25% of patients with systemic lupus  erythematosus (SLE) and 5-10% of patients with progressive  systemic sclerosis (PSS) also have this antibody.  Performed by [facility],  [address] [facility] [address] [phone]  www[facility], Chely Leija MD, Lab. Director   ,  ,   MONICA Screen by EIA   Date Value Ref Range Status   05/27/2010 <1.0 0 - 1.0 Final     Comment:     Interpretation:  Negative   ,   DNA-ds   Date Value Ref Range Status   05/27/2010 <15 0 - 29 IU/mL Final     Comment:     Interpretation:  Negative   ,  ,  ,  ,  ,  ,  ,   Hep B Surface Agn   Date Value Ref Range Status   05/27/2010 Negative NEG Final   ,  ,  ,  ,  ,  ,  ,  ,  ,  ,  ,   Neutrophil Cytoplasmic IgG Antibody   Date Value Ref Range Status   06/26/2015   Final    <1:20  Reference range: <1:20  (Note)  The ANCA IFA is <1:20; therefore, no further testing will  be performed.  INTERPRETIVE INFORMATION:  Anti-Neutrophil Cyto Ab, IgG  Neutrophil Cytoplasmic Antibodies (C-ANCA = granular  cytoplasmic staining, P-ANCA = perinuclear staining) are  found in the serum of over 90 percent of patients with  certain necrotizing systemic vasculitides, and usually in  less than 5 percent of patients with collagen vascular  disease or arthritis.  Performed by [facility],  [address] [place] [id number] [phone]  www.[email or web address], Brad Fall MD, Lab. Director       ,  ,  ,  ,  ,  ,  ,  ,  ,   Albumin Fraction   Date Value Ref Range Status   05/27/2010 4.0 3.7 - 5.1 g/dL Final     Alpha 2 Fraction   Date Value Ref Range Status   05/27/2010 0.6 0.5 - 0.9 g/dL Final     Beta Fraction   Date Value Ref Range Status   05/27/2010 1.0 0.6 - 1.0 g/dL Final     Gamma Fraction   Date Value Ref Range Status   05/27/2010 1.0 0.7 - 1.6 g/dL Final     Monoclonal Peak   Date Value Ref Range Status   05/27/2010 0.0 0.0 g/dL Final     ELP Interpretation:   Date Value Ref Range Status   05/27/2010   Final    Essentially normal electrophoretic pattern.  No monoclonal protein seen.     Comment:      Pathologic significance requires clinical correlation.  ASHLEY Noble M.D.,   Ph.D., Pathologist ()   ,  ,   Immunofixation ELP   Date Value Ref Range Status   06/09/2016   Final    No monoclonal protein seen on immunofixation.  Pathological significance   requires clinical correlation.   ASHLEY Noble M.D., Ph.D   Pathologist ([phone])       IGG   Date Value Ref Range Status   06/09/2016 1,020 695 - 1,620 mg/dL Final     IGA   Date Value Ref Range Status   06/09/2016 374 70 - 380 mg/dL Final     IGM   Date Value Ref Range Status   06/09/2016 81 60 - 265 mg/dL Final   ,  ,   ,  ,  ,  ,  ,         Again, thank you for allowing me to participate in the care of your patient.      Sincerely,    Jeremy Goodrich MD

## 2021-10-04 ENCOUNTER — TRANSFERRED RECORDS (OUTPATIENT)
Dept: HEALTH INFORMATION MANAGEMENT | Facility: CLINIC | Age: 71
End: 2021-10-04

## 2021-10-24 DIAGNOSIS — J44.81 OBLITERATIVE BRONCHIOLITIS (H): ICD-10-CM

## 2021-11-07 DIAGNOSIS — G62.9 PERIPHERAL POLYNEUROPATHY: ICD-10-CM

## 2021-11-08 RX ORDER — PREGABALIN 50 MG/1
CAPSULE ORAL
Qty: 360 CAPSULE | Refills: 1 | OUTPATIENT
Start: 2021-11-08

## 2021-11-22 ENCOUNTER — TRANSFERRED RECORDS (OUTPATIENT)
Dept: HEALTH INFORMATION MANAGEMENT | Facility: CLINIC | Age: 71
End: 2021-11-22
Payer: MEDICARE

## 2021-12-01 RX ORDER — OXYBUTYNIN CHLORIDE 5 MG/1
TABLET ORAL
Qty: 180 TABLET | Refills: 3 | COMMUNITY
Start: 2021-12-01 | End: 2021-12-16

## 2021-12-01 RX ORDER — FLUOROURACIL 50 MG/G
CREAM TOPICAL
COMMUNITY
Start: 2021-10-04 | End: 2022-12-20

## 2021-12-01 RX ORDER — KETOCONAZOLE 20 MG/ML
SHAMPOO TOPICAL
COMMUNITY
Start: 2021-10-04 | End: 2022-12-20

## 2021-12-01 RX ORDER — FOLIC ACID 1 MG/1
1 TABLET ORAL DAILY
COMMUNITY
Start: 2021-12-01 | End: 2022-04-08

## 2021-12-01 RX ORDER — PREGABALIN 50 MG/1
CAPSULE ORAL
Qty: 360 CAPSULE | Refills: 3 | COMMUNITY
Start: 2021-12-01 | End: 2021-12-16

## 2021-12-01 NOTE — PROGRESS NOTES
Diagnosis/Summary/Recommendations:    PATIENT: Lucio Daly  71 year old male     : 1950    EVELIA: 2021    MRN: [id number]    [place] [id number]  [phone] (H)  [phone] (M)  [email or web address]  Lo Daly  [phone]    Exertional related visual changes - and associated with weakness and shakiness and has to rest to recover   Consider visit with cardiology or/and neurology or/and PCP to discuss further. He had an ECGO in 2018 and had Lexiscan in 2016 and had no signs of an aortic scan that did not reveal an aneurysm.     Consider another stress test and/or carotid study - would allow patient to talk with pcp and if needed can see cardiology or general neurology.       Assessment:    (R25.1) Tremor  (primary encounter diagnosis)  He never has had clear Parkinson's disease and his gait issue disorder may be multifactorial    Left hand tremor and has some left leg symptoms.     Has PLMS    Review of diagnosis    Neuropathy  tremor    Avoidance of dopamine blockers   Not taking    Motor complication review   n/a    Review of Impulse control disorders   n/a    Review of surgical or medication options   Review    Gait/Balance/Falls   Walks slowly   Using a cane  Has had falls  Has problems with his foot - especially when tired.     Exercise/Therapy performed/offered   Has not seen PT for a long time    Cognitive/Driving   No significant cognitive issues     Mood   Denies depression/anxiety  [place]  9th grandchild    Hallucinations/delusions   Denies seeing people    Sleep   Sleep apnea  PLMS    BiPAP  Still using bipap  [facility]  Had lab visit today for pulmonary function tests    Bladder  Oxybutynin ditropan 5mg once daily     GI/Constipation/GERD  Omeprazole prilosec 40mg daily  Overweight   Had some constipation which recurred and has bowel movements every 3rd day  And had some diarrhea - cut out yoghurt  Trying to drink lots of water   He may have  IBS  Has seen GI doctor in the past and there may be GI issues from his RA    ENDO   denies    Cardio/heart  Lisinopril zestril 10mg  Metoprolol toprolol XL 50mg 24 hr tablet  Ongoing challenges with blood pressure  Seeing his pcp tomorrow    Vision   Wears glasses    Heme   Not taking aspirin    Other:    Acetaminophen tylenol 500mg  Pregabalin lyrica 50mg    Leg swelling   Variable use of leg braces  Tendons are tight in his legs    RA issues.   plaquenil and methotrexate.  Still taking folate, vitamin d  Had eye examination.  Seeing MONROE Snow and ZACK Goodrich  Folic acid folvite 1mg  Hydroxychloroquine plaquenil 200mg    Ketoconazole nizoral 2% shampoo    intersititial lung disease seeing [facility] pulmonary medicine  Diaphragmatic muscle weakness unclear etiology on NIPPV    Albuterol proair hfa/proventil hfa/ventolin hfa 108 (90 base) mcg/act inhaler prn   Proair hfa 108 (90 base) mcg/act inhaler prn (above)  Salmeterol serevent disku 50mcg/dose inhaler ---using      PCP = Heladio Calixto       Has had hand cramping and has to straighten h is fingers out and may need surgery         Medications     7/730am 9a 2pm 4pm 11pm   Acetaminophen tylenol 500mg 2     2 2   Albuterol proair hfa/proventil hfa/ventolin hfa 108 (90 base) mcg/act inhaler prn            Cholecalciferol vitamin D 1000 1 capsule           Folic acid folvite 1mg   1        Hydroxychloroquine plaquenil 200mg   1        Ketoconazole nizoral 2% shampoo        Lisinopril zestril 10mg 1       Methotrexate 2.5mg      8 tues       Metoprolol toprolol XL 50mg 24 hr tablet 1.5           Omeprazole prilosec 40mg 1          Oxybutynin ditropan 5mg     1      Pregabalin lyrica 50mg 1     1 1   Proair hfa 108 (90 base) mcg/act inhaler  prn            Salmeterol serevent disku 50mcg/dose inhaler Twice daily           Triamcinolone kenalog 0.1% external cream Not using                                     Some rigidity in both arms with some restriction in left arm range  of motion  Some rigidity in his neck  Right leg rigidity of 2; left normal tone  Slight slowness 1, left slow but not fatiguing  No fatiguing on the left hand opening  Right hand it may slow a bit  Generally slowed movements but the issue is there fatiguing noted  He does not look or sound like typical parkinson  Some decrement on right on forefoot tapping  He is a bit slow on heel tapping       Plan:    Continue on pregabalin (lyrica) 50mg 1-1-1 ; typically taking 3 per day   Refilled     Ongoing blood pressure issues    Ongoing lung issues with PFT today and followup pulmonary medication    Ongoing rheumatological issues with followup    Has sleep apnea and PLMS    Recommend re-evaluation with physical therapy with his gait disorder and fall risk.   States he has a cell phone he can use in the event of a fall. May consider a life line or other means to help with calling for help. He may consider contacting senior linkage line.     Will be leaving for [place] January 4, 2022 and return early April 2022  [facility]     consultation    Senior linkage line  [phone]    Return back in one year     Consider sinemet or parkinsonian medication trial if he has more features - especially unilateral and his overall function         Coding statement:   Medical Decision Making:  #  Chronic progressive medical conditions addressed  - see above --   Review and/or interpretation of unique test or documentation from a provider outside of neurology fatigue, etc. - no labs reviewed   Independent historian provided additional details  no I  Prescription drug management and review of potential side effects and/or monitoring for side effects  -- see above ---  Health impacted by social determinants of health  no    I have reviewed the note as documented above.  This accurately captures the substance of my conversation with the patient and total time spent preparing for visit, executing visit and  completing visit on the day of the visit:  30 minutes.  The portion of this total time included face to face time  12-1230pm    Thong Montes MD     ______________________________________    Last visit date and details:     Answers for HPI/ROS submitted by the patient on 12/13/2021  General Symptoms: Yes  Skin Symptoms: No  HENT Symptoms: Yes  EYE SYMPTOMS: Yes  HEART SYMPTOMS: Yes  LUNG SYMPTOMS: Yes  INTESTINAL SYMPTOMS: Yes  URINARY SYMPTOMS: Yes  REPRODUCTIVE SYMPTOMS: No  SKELETAL SYMPTOMS: Yes  BLOOD SYMPTOMS: No  NERVOUS SYSTEM SYMPTOMS: Yes  MENTAL HEALTH SYMPTOMS: No  Ear pain: No  Ear discharge: No  Hearing loss: No  Tinnitus: Yes  Nosebleeds: No  Congestion: Yes  Sinus pain: No  Trouble swallowing: Yes   Voice hoarseness: Yes  Mouth sores: No  Sore throat: No  Tooth pain: No  Gum tenderness: No  Bleeding gums: No  Change in taste: No  Change in sense of smell: No  Dry mouth: Yes  Hearing aid used: No  Neck lump: No  Fever: No  Loss of appetite: No  Weight loss: Yes  Weight gain: No  Fatigue: Yes  Night sweats: No  Chills: No  Increased stress: No  Excessive hunger: No  Excessive thirst: No  Feeling hot or cold when others believe the temperature is normal: No  Loss of height: No  Post-operative complications: No  Surgical site pain: No  Hallucinations: No  Change in or Loss of Energy: No  Hyperactivity: No  Confusion: No  Eye pain: No  Vision loss: No  Dry eyes: Yes  Watery eyes: No  Eye bulging: No  Double vision: Yes  Flashing of lights: No  Spots: No  Floaters: No  Redness: No  Crossed eyes: No  Tunnel Vision: No  Yellowing of eyes: No  Eye irritation: No  Chest pain or pressure: Yes  Fast or irregular heartbeat: Yes  Pain in legs with walking: Yes  Trouble breathing while lying down: Yes  Fingers or toes appear blue: No  High blood pressure: No  Low blood pressure: No  Fainting: No  Murmurs: No  Pacemaker: No  Varicose veins: No  Edema or swelling: Yes  Wake up at night with shortness of breath:  No  Light-headedness: No  Exercise intolerance: No  Cough: Yes  Sputum or phlegm: Yes  Coughing up blood: No  Difficulty breating or shortness of breath: Yes  Snoring: No  Wheezing: No  Difficulty breathing on exertion: Yes  Nighttime Cough: No  Difficulty breathing when lying flat: Yes  Heart burn or indigestion: Yes  Nausea: Yes  Vomiting: No  Abdominal pain: No  Bloating: Yes  Constipation: Yes  Diarrhea: Yes  Blood in stool: Yes  Black stools: No  Rectal or Anal pain: No  Fecal incontinence: No  Yellowing of skin or eyes: No  Vomit with blood: No  Change in stools: No  Trouble holding urine or incontinence: No  Pain or burning: No  Trouble starting or stopping: No  Increased frequency of urination: No  Blood in urine: No  Decreased frequency of urination: No  Frequent nighttime urination: No  Flank pain: Yes  Difficulty emptying bladder: No  Back pain: Yes  Muscle aches: Yes  Neck pain: Yes  Swollen joints: Yes  Joint pain: Yes  Bone pain: No  Muscle cramps: Yes  Muscle weakness: Yes  Joint stiffness: Yes  Bone fracture: No  Trouble with coordination: No  Dizziness or trouble with balance: Yes  Fainting or black-out spells: No  Memory loss: No  Headache: No  Seizures: No  Speech problems: No  Tingling: Yes  Tremor: Yes  Weakness: Yes  Difficulty walking: No  Paralysis: No  Numbness: Yes            ______________________________________      Patient was asked about 14 Review of systems including changes in vision (dry eyes, double vision), hearing, heart, lungs, musculoskeletal, depression, anxiety, snoring, RBD, insomnia, urinary frequency, urinary urgency, constipation, swallowing problems, hematological, ID, allergies, skin problems: seborrhea, endocrinological: thyroid, diabetes, cholesterol; balance, weight changes, and other neurological problems and these were not significant at this time except for   Patient Active Problem List   Diagnosis     Diaphragmatic hernia     Esophageal reflux     Hx of melanoma  of skin     Malignant basal cell neoplasm of skin     GALO (obstructive sleep apnea)     Chronic maxillary sinusitis     Urinary incontinence     Sicca syndrome (H)     Tremor     Incomplete defecation     Balance problems     AK (actinic keratosis)     Abnormal EMG     Seronegative arthritis     Adenomatous polyp of colon     Peripheral polyneuropathy     Morbid obesity (H)     Interstitial lung disease (H)     Paralysis agitans (H)     Neuromuscular disease (H)     Bronchiolitis obliterans (H)     Hypertension     Rheumatoid arthritis (H)     Immunosuppressed status (H)          Allergies   Allergen Reactions     Restasis      Burning eyes, problems with breathing, tightness in chest     Adhesive Tape      Bandages misc     Allegra      EXCESSIVE URINATION AND WEAKNESS, LIGHT-HEADED     Allergy      Dust     Animal Dander      Benadryl Allergy      EXCESSIVE URINATION AND WEAKNESS, LIGHT-HEADED     Cephalexin      Joint pain or gerd aggravation. Bloating excessive urination      Cephalosporins      Cyclosporine      Diphenhydramine Other (See Comments)     Doxycycline      Doxycycline Hyclate Nausea     SWEATING,MIGRAINES,LOSS OF APPETITE,SWEATING,LIGHT HEADED, EXCESSIVE URINATION     Fexofenadine Other (See Comments)     Flonase [Fluticasone Propionate]      Gabapentin      Neurontin: mosd changes and excess urination     Hydrocortisone      Iodine      Iodine Solution [Povidone Iodine]      SKIN MELTS     Levaquin      From surgeon--? Joint pain ? Gerd aggravation. Insomnia, excess urination     Levofloxacin      Mylanta      EXCESSIVE URINATION AND WEAKNESS, LIGHT-HEADED     Oxcarbazepine      Prednisone      Weakness, elevated bp, headache, eye pain, congestion      Prednisone      Seafood [Seafood]      Shellfish Allergy      Hives       Simethicone      Sulfamethoxazole W/Trimethoprim      Sulfamethoxazole-Trimethoprim      Chest pain, angina     Symbicort GI Disturbance and Nausea     Trees      Trileptal       SEVERE JOINT AND TENDON PAIN, INSOMNIA, RESTLESSNESS, NAUSEA, EXCESS URINATION     Cortizone Rash     EXCESS URINATION,WEAKNESS,NAUSEA, HEADACHE     Past Surgical History:   Procedure Laterality Date     BIOPSY OF SKIN LESION       COLONOSCOPY       HEMORRHOID SURGERY       lip biopsy      for sicca complex     MOHS MICROGRAPHIC PROCEDURE       SOFT TISSUE SURGERY      removeal of basel cell carcinoma     Past Medical History:   Diagnosis Date     Abnormal EMG 4/18/2013     Abnormal involuntary movements(781.0)     Movement Disorder     AK (actinic keratosis) 12/18/2011     Allergic rhinitis, cause unspecified     Allergic rhinitis     Balance problems 11/1/2011     Basal cell carcinoma      Bladder spasms 11/1/2011     Chronic osteoarthritis      COPD (chronic obstructive pulmonary disease) (H)     Interstial lung disease, obliderans bronchiolitis     Diaphragmatic hernia without mention of obstruction or gangrene      Earache or other ear, nose, or throat complaint      Esophageal reflux      Fatigue 11/1/2011     Fracture      H. pylori infection 5/12/2011     History of MRI of cervical spine 11/18/2013    EXAMINATION: CERVICAL SPINE G/E 5 VIEWS* 4/19/2013 4:18 PM  CLINICAL HISTORY: Pain in limb,Performing Location?->[facility]),  COMPARISON:  FINDINGS: AP and lateral views in flexion and extension, as well as odontoid view of the cervical spine was obtained. There is no comparison available. The vertebral bodies of the cervical spine are normally aligned. There is posterior spurring and d     Incomplete defecation 11/1/2011     Interstitial lung disease (H) 11/29/2016     Laboratory test 8/7/2012     Lung disease June 2015     Malignant basal cell neoplasm of skin 8/6/2008     Melanoma (H) 8/6/2008     Melanoma in situ of lower leg (H)     R calf     Neuropathy 5/16/2011     Other bladder disorder      Other color vision deficiencies      Other nervous system complications      Parkinsonism (H)  2011     Personal history of colonic polyps      Polyneuropathy in other diseases classified elsewhere (H)      RA (rheumatoid arthritis) (H)      Rheumatoid arthritis of multiple sites with negative rheumatoid factor (H) 3/21/2016     Seronegative arthritis 2013     Shortness of breath      Shoulder arthritis 2016    acromioclavicular joint      Somatization disorder 2011     Squamous cell carcinoma      Tremor 2011     Unspecified essential hypertension      Unspecified hypothyroidism      Urinary tract infection      Urinary urgency 2011     Wears glasses 2011     Social History     Socioeconomic History     Marital status:      Spouse name: Not on file     Number of children: Not on file     Years of education: Not on file     Highest education level: Not on file   Occupational History     Not on file   Tobacco Use     Smoking status: Former Smoker     Packs/day: 1.50     Years: 37.00     Pack years: 55.50     Types: Cigarettes     Start date: 6/15/1963     Quit date: 2000     Years since quittin.1     Smokeless tobacco: Never Used   Substance and Sexual Activity     Alcohol use: Not Currently     Alcohol/week: 0.0 standard drinks     Drug use: Never     Sexual activity: Not Currently     Partners: Female     Birth control/protection: None   Other Topics Concern     Parent/sibling w/ CABG, MI or angioplasty before 65F 55M? No   Social History Narrative    2013: Living in [place] in a townhouse with no steps    Has 3 sons that are doing okay.         Dairy/d 1 servings/d.     Caffeine 0 servings/d    Exercise 0 x week    Sunscreen used - No    Seatbelts used - Yes    Working smoke/CO detectors in the home - Yes    Guns stored in the home - Yes    Self Breast Exams - NA    Self Testicular Exam - Yes    Eye Exam up to date - Yes     Dental Exam up to date - Yes 2006    Pap Smear up to date - NA    Mammogram up to date - NA    PSA up to date - Yes 2008    Dexa Scan  up to date - No    Flex Sig / Colonoscopy up to date - Yes less than 5 yrs ago    Immunizations up to date -today    Abuse: Current or Past(Physical, Sexual or Emotional)- No    Do you feel safe in your environment - Yes    2008                 Social Determinants of Health     Financial Resource Strain: Not on file   Food Insecurity: Not on file   Transportation Needs: Not on file   Physical Activity: Not on file   Stress: Not on file   Social Connections: Not on file   Intimate Partner Violence: Not on file   Housing Stability: Not on file       Drug and lactation database from the [place] [employer] of [employer]:  http://toxnet.nlm.nih.gov/cgi-bin/sis/htmlgen?LACT      B/P: Data Unavailable, T: Data Unavailable, P: Data Unavailable, R: Data Unavailable 0 lbs 0 oz  There were no vitals taken for this visit., There is no height or weight on file to calculate BMI.  Medications and Vitals not listed above were documented in the cart and reviewed by me.     Current Outpatient Medications   Medication Sig Dispense Refill     acetaminophen (TYLENOL) 500 MG tablet 2 x 500mg by mouth 3 times per day: 7/730am, 4pm and 11pm  = 6/day       albuterol (PROAIR HFA/PROVENTIL HFA/VENTOLIN HFA) 108 (90 Base) MCG/ACT inhaler Inhale 2 puffs into the lungs every 4 hours as needed for shortness of breath / dyspnea or wheezing 1 Inhaler 3     cholecalciferol (HM VITAMIN D3) 25 MCG (1000 UT) TABS 1000 unit tab by mouth daily       fluorouracil (EFUDEX) 5 % external cream        folic acid (FOLVITE) 1 MG tablet Take 2 tablets (2 mg) by mouth daily (Patient taking differently: Take 1 mg by mouth daily One per day) 180 tablet 3     hydroxychloroquine (PLAQUENIL) 200 MG tablet Take 1 tablet (200 mg) by mouth daily Daily at 9am. 90 tablet 5     ketoconazole (NIZORAL) 2 % external shampoo        lisinopril (ZESTRIL) 10 MG tablet Take 1 tablet (10 mg) by mouth daily 90 tablet 0     methotrexate sodium 2.5 MG TABS Take 8 tablets (20  mg) by mouth every 7 days *Monitoring labs every 8-12 weeks 96 tablet 3     metoprolol succinate ER (TOPROL-XL) 50 MG 24 hr tablet 1 and 1/2 tabs daily. 135 tablet 3     omeprazole (PRILOSEC) 40 MG DR capsule Take 1 capsule (40 mg) by mouth daily 90 capsule 3     ORDER FOR DME Use your BiPAP device as directed by your provider.       oxybutynin (DITROPAN) 5 MG tablet Take 1 tablet (5 mg) by mouth 2 times daily TAKE 1 TABLET BY MOUTH EVERY DAY AT 4PM 180 tablet 3     pregabalin (LYRICA) 50 MG capsule Take 1 tab 3 times daily 360 capsule 3     SEREVENT DISKUS 50 MCG/DOSE inhaler TAKE 1 PUFF BY MOUTH TWICE A DAY 60 each 3         Medications                                                                                                                                                  Thong Montes MD

## 2021-12-13 SDOH — ECONOMIC STABILITY: INCOME INSECURITY: IN THE LAST 12 MONTHS, WAS THERE A TIME WHEN YOU WERE NOT ABLE TO PAY THE MORTGAGE OR RENT ON TIME?: NO

## 2021-12-13 SDOH — ECONOMIC STABILITY: TRANSPORTATION INSECURITY
IN THE PAST 12 MONTHS, HAS LACK OF TRANSPORTATION KEPT YOU FROM MEETINGS, WORK, OR FROM GETTING THINGS NEEDED FOR DAILY LIVING?: NO

## 2021-12-13 SDOH — ECONOMIC STABILITY: INCOME INSECURITY: HOW HARD IS IT FOR YOU TO PAY FOR THE VERY BASICS LIKE FOOD, HOUSING, MEDICAL CARE, AND HEATING?: NOT HARD AT ALL

## 2021-12-13 SDOH — HEALTH STABILITY: PHYSICAL HEALTH: ON AVERAGE, HOW MANY DAYS PER WEEK DO YOU ENGAGE IN MODERATE TO STRENUOUS EXERCISE (LIKE A BRISK WALK)?: 4 DAYS

## 2021-12-13 SDOH — ECONOMIC STABILITY: TRANSPORTATION INSECURITY
IN THE PAST 12 MONTHS, HAS THE LACK OF TRANSPORTATION KEPT YOU FROM MEDICAL APPOINTMENTS OR FROM GETTING MEDICATIONS?: NO

## 2021-12-13 SDOH — ECONOMIC STABILITY: FOOD INSECURITY: WITHIN THE PAST 12 MONTHS, YOU WORRIED THAT YOUR FOOD WOULD RUN OUT BEFORE YOU GOT MONEY TO BUY MORE.: NEVER TRUE

## 2021-12-13 SDOH — ECONOMIC STABILITY: FOOD INSECURITY: WITHIN THE PAST 12 MONTHS, THE FOOD YOU BOUGHT JUST DIDN'T LAST AND YOU DIDN'T HAVE MONEY TO GET MORE.: NEVER TRUE

## 2021-12-13 ASSESSMENT — ENCOUNTER SYMPTOMS
BLOOD IN STOOL: 1
MYALGIAS: 1
STIFFNESS: 1
DIARRHEA: 0
NECK PAIN: 1
FEVER: 0
MUSCLE CRAMPS: 1
PALPITATIONS: 0
SHORTNESS OF BREATH: 1
DECREASED APPETITE: 0
TINGLING: 1
NECK MASS: 0
RECTAL PAIN: 0
COUGH: 1
SNORES LOUDLY: 0
HEARTBURN: 1
SPEECH CHANGE: 0
ARTHRALGIAS: 1
POLYDIPSIA: 0
WHEEZING: 0
HYPOTENSION: 0
WEIGHT GAIN: 0
DISTURBANCES IN COORDINATION: 0
DIZZINESS: 0
DIARRHEA: 1
MEMORY LOSS: 0
PARALYSIS: 0
HEMATURIA: 0
LEG PAIN: 1
EXERCISE INTOLERANCE: 0
HEMATOCHEZIA: 0
SMELL DISTURBANCE: 0
DYSURIA: 0
CONSTIPATION: 1
DIZZINESS: 1
SYNCOPE: 0
CHILLS: 0
JAUNDICE: 0
LOSS OF CONSCIOUSNESS: 0
EYE PAIN: 0
MYALGIAS: 0
PARESTHESIAS: 0
TREMORS: 1
ALTERED TEMPERATURE REGULATION: 0
HYPERTENSION: 0
DYSPNEA ON EXERTION: 1
ABDOMINAL PAIN: 0
EYE WATERING: 0
ABDOMINAL PAIN: 0
POLYPHAGIA: 0
SINUS CONGESTION: 1
BOWEL INCONTINENCE: 0
ORTHOPNEA: 1
ARTHRALGIAS: 1
HEARTBURN: 0
SEIZURES: 0
WEAKNESS: 0
FEVER: 0
COUGH DISTURBING SLEEP: 0
WEAKNESS: 1
DIFFICULTY URINATING: 0
SPUTUM PRODUCTION: 1
BACK PAIN: 1
NERVOUS/ANXIOUS: 0
JOINT SWELLING: 1
MUSCLE WEAKNESS: 1
SORE THROAT: 0
HEADACHES: 0
TASTE DISTURBANCE: 0
TROUBLE SWALLOWING: 1
CHILLS: 0
PALPITATIONS: 1
BLOATING: 1
EYE IRRITATION: 0
SHORTNESS OF BREATH: 0
NUMBNESS: 1
WEIGHT LOSS: 1
LIGHT-HEADEDNESS: 0
EYE PAIN: 0
SLEEP DISTURBANCES DUE TO BREATHING: 0
FLANK PAIN: 1
NIGHT SWEATS: 0
DOUBLE VISION: 1
FREQUENCY: 0
POSTURAL DYSPNEA: 1
VOMITING: 0
NAUSEA: 0
SINUS PAIN: 0
COUGH: 0
INCREASED ENERGY: 0
HALLUCINATIONS: 0
NAUSEA: 1
JOINT SWELLING: 1
FATIGUE: 1
SORE THROAT: 0
HEMATURIA: 0
DYSURIA: 0
EYE REDNESS: 0
HEMOPTYSIS: 0
HEADACHES: 0
HOARSE VOICE: 1
CONSTIPATION: 0

## 2021-12-13 ASSESSMENT — LIFESTYLE VARIABLES
HOW OFTEN DO YOU HAVE A DRINK CONTAINING ALCOHOL: NEVER
HOW OFTEN DO YOU HAVE SIX OR MORE DRINKS ON ONE OCCASION: NEVER

## 2021-12-13 ASSESSMENT — SOCIAL DETERMINANTS OF HEALTH (SDOH)
HOW OFTEN DO YOU ATTEND CHURCH OR RELIGIOUS SERVICES?: MORE THAN 4 TIMES PER YEAR
HOW OFTEN DO YOU GET TOGETHER WITH FRIENDS OR RELATIVES?: ONCE A WEEK
DO YOU BELONG TO ANY CLUBS OR ORGANIZATIONS SUCH AS CHURCH GROUPS UNIONS, FRATERNAL OR ATHLETIC GROUPS, OR SCHOOL GROUPS?: NO
IN A TYPICAL WEEK, HOW MANY TIMES DO YOU TALK ON THE PHONE WITH FAMILY, FRIENDS, OR NEIGHBORS?: THREE TIMES A WEEK

## 2021-12-13 ASSESSMENT — ACTIVITIES OF DAILY LIVING (ADL): CURRENT_FUNCTION: NO ASSISTANCE NEEDED

## 2021-12-16 ENCOUNTER — LAB (OUTPATIENT)
Dept: LAB | Facility: CLINIC | Age: 71
End: 2021-12-16
Payer: MEDICARE

## 2021-12-16 ENCOUNTER — MYC MEDICAL ADVICE (OUTPATIENT)
Dept: NEUROLOGY | Facility: CLINIC | Age: 71
End: 2021-12-16

## 2021-12-16 ENCOUNTER — OFFICE VISIT (OUTPATIENT)
Dept: NEUROLOGY | Facility: CLINIC | Age: 71
End: 2021-12-16
Payer: MEDICARE

## 2021-12-16 VITALS
WEIGHT: 289 LBS | HEART RATE: 64 BPM | OXYGEN SATURATION: 98 % | BODY MASS INDEX: 38.66 KG/M2 | DIASTOLIC BLOOD PRESSURE: 93 MMHG | SYSTOLIC BLOOD PRESSURE: 180 MMHG

## 2021-12-16 DIAGNOSIS — G47.61 PLMD (PERIODIC LIMB MOVEMENT DISORDER): ICD-10-CM

## 2021-12-16 DIAGNOSIS — M06.09 RHEUMATOID ARTHRITIS OF MULTIPLE SITES WITH NEGATIVE RHEUMATOID FACTOR (H): ICD-10-CM

## 2021-12-16 DIAGNOSIS — J84.9 ILD (INTERSTITIAL LUNG DISEASE) (H): ICD-10-CM

## 2021-12-16 DIAGNOSIS — R63.4 WEIGHT LOSS: ICD-10-CM

## 2021-12-16 DIAGNOSIS — N39.498 OTHER URINARY INCONTINENCE: ICD-10-CM

## 2021-12-16 DIAGNOSIS — R25.1 TREMOR: Primary | ICD-10-CM

## 2021-12-16 DIAGNOSIS — D84.9 IMMUNOSUPPRESSED STATUS (H): ICD-10-CM

## 2021-12-16 DIAGNOSIS — Z79.899 HIGH RISK MEDICATION USE: ICD-10-CM

## 2021-12-16 DIAGNOSIS — Z12.5 SPECIAL SCREENING FOR MALIGNANT NEOPLASM OF PROSTATE: ICD-10-CM

## 2021-12-16 DIAGNOSIS — R26.9 GAIT DISORDER: ICD-10-CM

## 2021-12-16 DIAGNOSIS — R97.20 ELEVATED PROSTATE SPECIFIC ANTIGEN (PSA): ICD-10-CM

## 2021-12-16 DIAGNOSIS — G62.9 PERIPHERAL POLYNEUROPATHY: ICD-10-CM

## 2021-12-16 PROBLEM — G20.A1 PARALYSIS AGITANS (H): Status: RESOLVED | Noted: 2018-08-21 | Resolved: 2021-12-16

## 2021-12-16 LAB
6 MIN WALK (FT): 770 FT
6 MIN WALK (M): 235 M
ALBUMIN SERPL-MCNC: 4 G/DL (ref 3.4–5)
ALT SERPL W P-5'-P-CCNC: 27 U/L (ref 0–70)
AST SERPL W P-5'-P-CCNC: 16 U/L (ref 0–45)
CREAT SERPL-MCNC: 1.11 MG/DL (ref 0.66–1.25)
CRP SERPL-MCNC: 8.9 MG/L (ref 0–8)
ERYTHROCYTE [DISTWIDTH] IN BLOOD BY AUTOMATED COUNT: 15.5 % (ref 10–15)
GFR SERPL CREATININE-BSD FRML MDRD: 66 ML/MIN/1.73M2
HCT VFR BLD AUTO: 43.7 % (ref 40–53)
HGB BLD-MCNC: 13.6 G/DL (ref 13.3–17.7)
MCH RBC QN AUTO: 29.6 PG (ref 26.5–33)
MCHC RBC AUTO-ENTMCNC: 31.1 G/DL (ref 31.5–36.5)
MCV RBC AUTO: 95 FL (ref 78–100)
PLATELET # BLD AUTO: 176 10E3/UL (ref 150–450)
RBC # BLD AUTO: 4.59 10E6/UL (ref 4.4–5.9)
WBC # BLD AUTO: 4.7 10E3/UL (ref 4–11)

## 2021-12-16 PROCEDURE — G0103 PSA SCREENING: HCPCS | Performed by: PATHOLOGY

## 2021-12-16 PROCEDURE — 84460 ALANINE AMINO (ALT) (SGPT): CPT | Performed by: PATHOLOGY

## 2021-12-16 PROCEDURE — 94729 DIFFUSING CAPACITY: CPT | Performed by: INTERNAL MEDICINE

## 2021-12-16 PROCEDURE — 36415 COLL VENOUS BLD VENIPUNCTURE: CPT | Performed by: PATHOLOGY

## 2021-12-16 PROCEDURE — 82565 ASSAY OF CREATININE: CPT | Performed by: PATHOLOGY

## 2021-12-16 PROCEDURE — 84450 TRANSFERASE (AST) (SGOT): CPT | Performed by: PATHOLOGY

## 2021-12-16 PROCEDURE — 94375 RESPIRATORY FLOW VOLUME LOOP: CPT | Performed by: INTERNAL MEDICINE

## 2021-12-16 PROCEDURE — 82040 ASSAY OF SERUM ALBUMIN: CPT | Performed by: PATHOLOGY

## 2021-12-16 PROCEDURE — 94726 PLETHYSMOGRAPHY LUNG VOLUMES: CPT | Performed by: INTERNAL MEDICINE

## 2021-12-16 PROCEDURE — 86140 C-REACTIVE PROTEIN: CPT | Performed by: PATHOLOGY

## 2021-12-16 PROCEDURE — 99214 OFFICE O/P EST MOD 30 MIN: CPT | Performed by: PSYCHIATRY & NEUROLOGY

## 2021-12-16 PROCEDURE — 85027 COMPLETE CBC AUTOMATED: CPT | Performed by: PATHOLOGY

## 2021-12-16 PROCEDURE — 94618 PULMONARY STRESS TESTING: CPT | Performed by: INTERNAL MEDICINE

## 2021-12-16 PROCEDURE — 84443 ASSAY THYROID STIM HORMONE: CPT | Performed by: PATHOLOGY

## 2021-12-16 RX ORDER — PREGABALIN 50 MG/1
CAPSULE ORAL
Qty: 360 CAPSULE | Refills: 3 | Status: SHIPPED | OUTPATIENT
Start: 2021-12-16 | End: 2022-09-23

## 2021-12-16 RX ORDER — OXYBUTYNIN CHLORIDE 5 MG/1
TABLET ORAL
Qty: 180 TABLET | Refills: 3 | Status: SHIPPED | OUTPATIENT
Start: 2021-12-16 | End: 2021-12-21

## 2021-12-16 NOTE — PATIENT INSTRUCTIONS
(R25.1) Tremor  (primary encounter diagnosis)  He never has had clear Parkinson's disease and his gait issue disorder may be multifactorial    Left hand tremor and has some left leg symptoms.     Has PLMS    Review of diagnosis    Neuropathy  tremor    Avoidance of dopamine blockers   Not taking    Motor complication review   n/a    Review of Impulse control disorders   n/a    Review of surgical or medication options   Review    Gait/Balance/Falls   Walks slowly   Using a cane  Has had falls  Has problems with his foot - especially when tired.     Exercise/Therapy performed/offered   Has not seen PT for a long time    Cognitive/Driving   No significant cognitive issues     Mood   Denies depression/anxiety  [place]  9th grandchild    Hallucinations/delusions   Denies seeing people    Sleep   Sleep apnea  PLMS    BiPAP  Still using bipap  Seeing [facility] sleep medicine  Had lab visit today for pulmonary function tests    Bladder  Oxybutynin ditropan 5mg once daily     GI/Constipation/GERD  Omeprazole prilosec 40mg daily  Overweight   Had some constipation which recurred and has bowel movements every 3rd day  And had some diarrhea - cut out yoghurt  Trying to drink lots of water   He may have IBS  Has seen GI doctor in the past and there may be GI issues from his RA    ENDO   denies    Cardio/heart  Lisinopril zestril 10mg  Metoprolol toprolol XL 50mg 24 hr tablet  Ongoing challenges with blood pressure  Seeing his pcp tomorrow    Vision   Wears glasses    Heme   Not taking aspirin    Other:    Acetaminophen tylenol 500mg  Pregabalin lyrica 50mg    Leg swelling   Variable use of leg braces  Tendons are tight in his legs    RA issues.   plaquenil and methotrexate.  Still taking folate, vitamin d  Had eye examination.  Seeing MONROE Goodrich  Folic acid folvite 1mg  Hydroxychloroquine plaquenil 200mg    Ketoconazole nizoral 2% shampoo    intersititial lung disease seeing [facility]  med[facility]  Diaphragmatic muscle weakness unclear etiology on NIPPV    Albuterol proair hfa/proventil hfa/ventolin hfa 108 (90 base) mcg/act inhaler prn   Proair hfa 108 (90 base) mcg/act inhaler prn (above)  Salmeterol serevent disku 50mcg/dose inhaler ---using      PCP = Heladio Calixto       Has had hand cramping and has to straighten h is fingers out and may need surgery         Medications     7/730am 9a 2pm 4pm 11pm   Acetaminophen tylenol 500mg 2     2 2   Albuterol proair hfa/proventil hfa/ventolin hfa 108 (90 base) mcg/act inhaler prn            Cholecalciferol vitamin D 1000 1 capsule           Folic acid folvite 1mg   1        Hydroxychloroquine plaquenil 200mg   1        Ketoconazole nizoral 2% shampoo        Lisinopril zestril 10mg 1       Methotrexate 2.5mg      8 tues       Metoprolol toprolol XL 50mg 24 hr tablet 1.5           Omeprazole prilosec 40mg 1          Oxybutynin ditropan 5mg     1      Pregabalin lyrica 50mg 1     1 1   Proair hfa 108 (90 base) mcg/act inhaler  prn            Salmeterol serevent disku 50mcg/dose inhaler Twice daily           Triamcinolone kenalog 0.1% external cream Not using                                       Plan:    Continue on pregabalin (lyrica) 50mg 1-1-1 ; typically taking 3 per day   Refilled     Ongoing blood pressure issues    Ongoing lung issues with PFT today and followup pulmonary medication    Ongoing rheumatological issues with followup    Has sleep apnea and PLMS    Recommend re-evaluation with physical therapy with his gait disorder and fall risk.   States he has a cell phone he can use in the event of a fall. May consider a life line or other means to help with calling for help. He may consider contacting senior linkage line.     Will be leaving for [place] January 4, 2022 and return early April 2022  [facility]     consultation    Senior linkage line  [phone]    Return back in one year

## 2021-12-16 NOTE — LETTER
2021       RE: Lucio Daly  [address]  [place] [address]     Dear Colleague,    Thank you for referring your patient, Lucio Daly, to the [facility]. Please see a copy of my visit note below.      Diagnosis/Summary/Recommendations:    PATIENT: Lucio Daly  71 year old male     : 1950    EVELIA: 2021    MRN: [id number]    [place] [address]  [phone] (H)  [phone] ()  [email or web address]  Lo Daly  [phone]    Exertional related visual changes - and associated with weakness and shakiness and has to rest to recover   Consider visit with cardiology or/and neurology or/and PCP to discuss further. He had an ECGO in 2018 and had Lexiscan in 2016 and had no signs of an aortic scan that did not reveal an aneurysm.     Consider another stress test and/or carotid study - would allow patient to talk with pcp and if needed can see cardiology or general neurology.       Assessment:    (R25.1) Tremor  (primary encounter diagnosis)  He never has had clear Parkinson's disease and his gait issue disorder may be multifactorial    Left hand tremor and has some left leg symptoms.     Has PLMS    Review of diagnosis    Neuropathy  tremor    Avoidance of dopamine blockers   Not taking    Motor complication review   n/a    Review of Impulse control disorders   n/a    Review of surgical or medication options   Review    Gait/Balance/Falls   Walks slowly   Using a cane  Has had falls  Has problems with his foot - especially when tired.     Exercise/Therapy performed/offered   Has not seen PT for a long time    Cognitive/Driving   No significant cognitive issues     Mood   Denies depression/anxiety  [place]  9th grandchild    Hallucinations/delusions   Denies seeing people    Sleep   Sleep apnea  PLMS    BiPAP  Still using bipap  [facility]  Had lab visit today  for pulmonary function tests    Bladder  Oxybutynin ditropan 5mg once daily     GI/Constipation/GERD  Omeprazole prilosec 40mg daily  Overweight   Had some constipation which recurred and has bowel movements every 3rd day  And had some diarrhea - cut out yoghurt  Trying to drink lots of water   He may have IBS  Has seen GI doctor in the past and there may be GI issues from his RA    ENDO   denies    Cardio/heart  Lisinopril zestril 10mg  Metoprolol toprolol XL 50mg 24 hr tablet  Ongoing challenges with blood pressure  Seeing his pcp tomorrow    Vision   Wears glasses    Heme   Not taking aspirin    Other:    Acetaminophen tylenol 500mg  Pregabalin lyrica 50mg    Leg swelling   Variable use of leg braces  Tendons are tight in his legs    RA issues.   plaquenil and methotrexate.  Still taking folate, vitamin d  Had eye examination.  Seeing MONROE Snow and ZACK Goodrich  Folic acid folvite 1mg  Hydroxychloroquine plaquenil 200mg    Ketoconazole nizoral 2% shampoo    intersititial lung disease seeing [facility] pulmonary medicine  Diaphragmatic muscle weakness unclear etiology on NIPPV    Albuterol proair hfa/proventil hfa/ventolin hfa 108 (90 base) mcg/act inhaler prn   Proair hfa 108 (90 base) mcg/act inhaler prn (above)  Salmeterol serevent disku 50mcg/dose inhaler ---using      PCP = Heladio Calixto       Has had hand cramping and has to straighten h is fingers out and may need surgery         Medications     7/730am 9a 2pm 4pm 11pm   Acetaminophen tylenol 500mg 2     2 2   Albuterol proair hfa/proventil hfa/ventolin hfa 108 (90 base) mcg/act inhaler prn            Cholecalciferol vitamin D 1000 1 capsule           Folic acid folvite 1mg   1        Hydroxychloroquine plaquenil 200mg   1        Ketoconazole nizoral 2% shampoo        Lisinopril zestril 10mg 1       Methotrexate 2.5mg      8 tues       Metoprolol toprolol XL 50mg 24 hr tablet 1.5           Omeprazole prilosec 40mg 1          Oxybutynin ditropan 5mg     1       Pregabalin lyrica 50mg 1     1 1   Proair hfa 108 (90 base) mcg/act inhaler  prn            Salmeterol serevent disku 50mcg/dose inhaler Twice daily           Triamcinolone kenalog 0.1% external cream Not using                                     Some rigidity in both arms with some restriction in left arm range of motion  Some rigidity in his neck  Right leg rigidity of 2; left normal tone  Slight slowness 1, left slow but not fatiguing  No fatiguing on the left hand opening  Right hand it may slow a bit  Generally slowed movements but the issue is there fatiguing noted  He does not look or sound like typical parkinson  Some decrement on right on forefoot tapping  He is a bit slow on heel tapping       Plan:    Continue on pregabalin (lyrica) 50mg 1-1-1 ; typically taking 3 per day   Refilled     Ongoing blood pressure issues    Ongoing lung issues with PFT today and followup pulmonary medication    Ongoing rheumatological issues with followup    Has sleep apnea and PLMS    Recommend re-evaluation with physical therapy with his gait disorder and fall risk.   States he has a cell phone he can use in the event of a fall. May consider a life line or other means to help with calling for help. He may consider contacting senior linkage line.     Will be leaving for [place] January 4, 2022 and return early April 2022  [facility]     consultation    Senior linkage line  [phone]    Return back in one year     Consider sinemet or parkinsonian medication trial if he has more features - especially unilateral and his overall function         Coding statement:   Medical Decision Making:  #  Chronic progressive medical conditions addressed  - see above --   Review and/or interpretation of unique test or documentation from a provider outside of neurology fatigue, etc. - no labs reviewed   Independent historian provided additional details  no I  Prescription drug management and review of  potential side effects and/or monitoring for side effects  -- see above ---  Health impacted by social determinants of health  no    I have reviewed the note as documented above.  This accurately captures the substance of my conversation with the patient and total time spent preparing for visit, executing visit and completing visit on the day of the visit:  30 minutes.  The portion of this total time included face to face time  12-1230pm    Thong Montes MD     ______________________________________    Last visit date and details:     Answers for HPI/ROS submitted by the patient on 12/13/2021  General Symptoms: Yes  Skin Symptoms: No  HENT Symptoms: Yes  EYE SYMPTOMS: Yes  HEART SYMPTOMS: Yes  LUNG SYMPTOMS: Yes  INTESTINAL SYMPTOMS: Yes  URINARY SYMPTOMS: Yes  REPRODUCTIVE SYMPTOMS: No  SKELETAL SYMPTOMS: Yes  BLOOD SYMPTOMS: No  NERVOUS SYSTEM SYMPTOMS: Yes  MENTAL HEALTH SYMPTOMS: No  Ear pain: No  Ear discharge: No  Hearing loss: No  Tinnitus: Yes  Nosebleeds: No  Congestion: Yes  Sinus pain: No  Trouble swallowing: Yes   Voice hoarseness: Yes  Mouth sores: No  Sore throat: No  Tooth pain: No  Gum tenderness: No  Bleeding gums: No  Change in taste: No  Change in sense of smell: No  Dry mouth: Yes  Hearing aid used: No  Neck lump: No  Fever: No  Loss of appetite: No  Weight loss: Yes  Weight gain: No  Fatigue: Yes  Night sweats: No  Chills: No  Increased stress: No  Excessive hunger: No  Excessive thirst: No  Feeling hot or cold when others believe the temperature is normal: No  Loss of height: No  Post-operative complications: No  Surgical site pain: No  Hallucinations: No  Change in or Loss of Energy: No  Hyperactivity: No  Confusion: No  Eye pain: No  Vision loss: No  Dry eyes: Yes  Watery eyes: No  Eye bulging: No  Double vision: Yes  Flashing of lights: No  Spots: No  Floaters: No  Redness: No  Crossed eyes: No  Tunnel Vision: No  Yellowing of eyes: No  Eye irritation: No  Chest pain or pressure: Yes  Fast or  irregular heartbeat: Yes  Pain in legs with walking: Yes  Trouble breathing while lying down: Yes  Fingers or toes appear blue: No  High blood pressure: No  Low blood pressure: No  Fainting: No  Murmurs: No  Pacemaker: No  Varicose veins: No  Edema or swelling: Yes  Wake up at night with shortness of breath: No  Light-headedness: No  Exercise intolerance: No  Cough: Yes  Sputum or phlegm: Yes  Coughing up blood: No  Difficulty breating or shortness of breath: Yes  Snoring: No  Wheezing: No  Difficulty breathing on exertion: Yes  Nighttime Cough: No  Difficulty breathing when lying flat: Yes  Heart burn or indigestion: Yes  Nausea: Yes  Vomiting: No  Abdominal pain: No  Bloating: Yes  Constipation: Yes  Diarrhea: Yes  Blood in stool: Yes  Black stools: No  Rectal or Anal pain: No  Fecal incontinence: No  Yellowing of skin or eyes: No  Vomit with blood: No  Change in stools: No  Trouble holding urine or incontinence: No  Pain or burning: No  Trouble starting or stopping: No  Increased frequency of urination: No  Blood in urine: No  Decreased frequency of urination: No  Frequent nighttime urination: No  Flank pain: Yes  Difficulty emptying bladder: No  Back pain: Yes  Muscle aches: Yes  Neck pain: Yes  Swollen joints: Yes  Joint pain: Yes  Bone pain: No  Muscle cramps: Yes  Muscle weakness: Yes  Joint stiffness: Yes  Bone fracture: No  Trouble with coordination: No  Dizziness or trouble with balance: Yes  Fainting or black-out spells: No  Memory loss: No  Headache: No  Seizures: No  Speech problems: No  Tingling: Yes  Tremor: Yes  Weakness: Yes  Difficulty walking: No  Paralysis: No  Numbness: Yes            ______________________________________      Patient was asked about 14 Review of systems including changes in vision (dry eyes, double vision), hearing, heart, lungs, musculoskeletal, depression, anxiety, snoring, RBD, insomnia, urinary frequency, urinary urgency, constipation, swallowing problems, hematological,  ID, allergies, skin problems: seborrhea, endocrinological: thyroid, diabetes, cholesterol; balance, weight changes, and other neurological problems and these were not significant at this time except for   Patient Active Problem List   Diagnosis     Diaphragmatic hernia     Esophageal reflux     Hx of melanoma of skin     Malignant basal cell neoplasm of skin     GALO (obstructive sleep apnea)     Chronic maxillary sinusitis     Urinary incontinence     Sicca syndrome (H)     Tremor     Incomplete defecation     Balance problems     AK (actinic keratosis)     Abnormal EMG     Seronegative arthritis     Adenomatous polyp of colon     Peripheral polyneuropathy     Morbid obesity (H)     Interstitial lung disease (H)     Paralysis agitans (H)     Neuromuscular disease (H)     Bronchiolitis obliterans (H)     Hypertension     Rheumatoid arthritis (H)     Immunosuppressed status (H)          Allergies   Allergen Reactions     Restasis      Burning eyes, problems with breathing, tightness in chest     Adhesive Tape      Bandages misc     Allegra      EXCESSIVE URINATION AND WEAKNESS, LIGHT-HEADED     Allergy      Dust     Animal Dander      Benadryl Allergy      EXCESSIVE URINATION AND WEAKNESS, LIGHT-HEADED     Cephalexin      Joint pain or gerd aggravation. Bloating excessive urination      Cephalosporins      Cyclosporine      Diphenhydramine Other (See Comments)     Doxycycline      Doxycycline Hyclate Nausea     SWEATING,MIGRAINES,LOSS OF APPETITE,SWEATING,LIGHT HEADED, EXCESSIVE URINATION     Fexofenadine Other (See Comments)     Flonase [Fluticasone Propionate]      Gabapentin      Neurontin: mosd changes and excess urination     Hydrocortisone      Iodine      Iodine Solution [Povidone Iodine]      SKIN MELTS     Levaquin      From surgeon--? Joint pain ? Gerd aggravation. Insomnia, excess urination     Levofloxacin      Mylanta      EXCESSIVE URINATION AND WEAKNESS, LIGHT-HEADED     Oxcarbazepine      Prednisone       Weakness, elevated bp, headache, eye pain, congestion      Prednisone      Seafood [Seafood]      Shellfish Allergy      Hives       Simethicone      Sulfamethoxazole W/Trimethoprim      Sulfamethoxazole-Trimethoprim      Chest pain, angina     Symbicort GI Disturbance and Nausea     Trees      Trileptal      SEVERE JOINT AND TENDON PAIN, INSOMNIA, RESTLESSNESS, NAUSEA, EXCESS URINATION     Cortizone Rash     EXCESS URINATION,WEAKNESS,NAUSEA, HEADACHE     Past Surgical History:   Procedure Laterality Date     BIOPSY OF SKIN LESION       COLONOSCOPY       HEMORRHOID SURGERY       lip biopsy      for sicca complex     MOHS MICROGRAPHIC PROCEDURE       SOFT TISSUE SURGERY      removeal of basel cell carcinoma     Past Medical History:   Diagnosis Date     Abnormal EMG 4/18/2013     Abnormal involuntary movements(781.0)     Movement Disorder     AK (actinic keratosis) 12/18/2011     Allergic rhinitis, cause unspecified     Allergic rhinitis     Balance problems 11/1/2011     Basal cell carcinoma      Bladder spasms 11/1/2011     Chronic osteoarthritis      COPD (chronic obstructive pulmonary disease) (H)     Interstial lung disease, obliderans bronchiolitis     Diaphragmatic hernia without mention of obstruction or gangrene      Earache or other ear, nose, or throat complaint      Esophageal reflux      Fatigue 11/1/2011     Fracture      H. pylori infection 5/12/2011     History of MRI of cervical spine 11/18/2013    EXAMINATION: CERVICAL SPINE G/E 5 VIEWS* 4/19/2013 4:18 PM  CLINICAL HISTORY: Pain in limb,Performing Location?->[facility]  COMPARISON:  FINDINGS: AP and lateral views in flexion and extension, as well as odontoid view of the cervical spine was obtained. There is no comparison available. The vertebral bodies of the cervical spine are normally aligned. There is posterior spurring and d     Incomplete defecation 11/1/2011     Interstitial lung disease (H) 11/29/2016     Laboratory test  2012     Lung disease 2015     Malignant basal cell neoplasm of skin 2008     Melanoma (H) 2008     Melanoma in situ of lower leg (H)     R calf     Neuropathy 2011     Other bladder disorder      Other color vision deficiencies      Other nervous system complications      Parkinsonism (H) 2011     Personal history of colonic polyps      Polyneuropathy in other diseases classified elsewhere (H)      RA (rheumatoid arthritis) (H)      Rheumatoid arthritis of multiple sites with negative rheumatoid factor (H) 3/21/2016     Seronegative arthritis 2013     Shortness of breath      Shoulder arthritis     acromioclavicular joint      Somatization disorder 2011     Squamous cell carcinoma      Tremor 2011     Unspecified essential hypertension      Unspecified hypothyroidism      Urinary tract infection      Urinary urgency 2011     Wears glasses 2011     Social History     Socioeconomic History     Marital status:      Spouse name: Not on file     Number of children: Not on file     Years of education: Not on file     Highest education level: Not on file   Occupational History     Not on file   Tobacco Use     Smoking status: Former Smoker     Packs/day: 1.50     Years: 37.00     Pack years: 55.50     Types: Cigarettes     Start date: 6/15/1963     Quit date: 2000     Years since quittin.1     Smokeless tobacco: Never Used   Substance and Sexual Activity     Alcohol use: Not Currently     Alcohol/week: 0.0 standard drinks     Drug use: Never     Sexual activity: Not Currently     Partners: Female     Birth control/protection: None   Other Topics Concern     Parent/sibling w/ CABG, MI or angioplasty before 65F 55M? No   Social History Narrative    2013: Living in [place] in a townhouse with no steps    Has 3 sons that are doing okay.         Dairy/d 1 servings/d.     Caffeine 0 servings/d    Exercise 0 x week    Sunscreen used - No    Seatbelts used -  Yes    Working smoke/CO detectors in the home - Yes    Guns stored in the home - Yes    Self Breast Exams - NA    Self Testicular Exam - Yes    Eye Exam up to date - Yes 2008    Dental Exam up to date - Yes 2006    Pap Smear up to date - NA    Mammogram up to date - NA    PSA up to date - Yes 2008    Dexa Scan up to date - No    Flex Sig / Colonoscopy up to date - Yes less than 5 yrs ago    Immunizations up to date -today    Abuse: Current or Past(Physical, Sexual or Emotional)- No    Do you feel safe in your environment - Yes    2008                 Social Determinants of Health     Financial Resource Strain: Not on file   Food Insecurity: Not on file   Transportation Needs: Not on file   Physical Activity: Not on file   Stress: Not on file   Social Connections: Not on file   Intimate Partner Violence: Not on file   Housing Stability: Not on file       Drug and lactation database from the [place] [employer] of [employer]:  http://toxnet.nlm.nih.gov/cgi-bin/sis/htmlgen?LACT      B/P: Data Unavailable, T: Data Unavailable, P: Data Unavailable, R: Data Unavailable 0 lbs 0 oz  There were no vitals taken for this visit., There is no height or weight on file to calculate BMI.  Medications and Vitals not listed above were documented in the cart and reviewed by me.     Current Outpatient Medications   Medication Sig Dispense Refill     acetaminophen (TYLENOL) 500 MG tablet 2 x 500mg by mouth 3 times per day: 7/730am, 4pm and 11pm  = 6/day       albuterol (PROAIR HFA/PROVENTIL HFA/VENTOLIN HFA) 108 (90 Base) MCG/ACT inhaler Inhale 2 puffs into the lungs every 4 hours as needed for shortness of breath / dyspnea or wheezing 1 Inhaler 3     cholecalciferol (HM VITAMIN D3) 25 MCG (1000 UT) TABS 1000 unit tab by mouth daily       fluorouracil (EFUDEX) 5 % external cream        folic acid (FOLVITE) 1 MG tablet Take 2 tablets (2 mg) by mouth daily (Patient taking differently: Take 1 mg by mouth daily One per day) 180  tablet 3     hydroxychloroquine (PLAQUENIL) 200 MG tablet Take 1 tablet (200 mg) by mouth daily Daily at 9am. 90 tablet 5     ketoconazole (NIZORAL) 2 % external shampoo        lisinopril (ZESTRIL) 10 MG tablet Take 1 tablet (10 mg) by mouth daily 90 tablet 0     methotrexate sodium 2.5 MG TABS Take 8 tablets (20 mg) by mouth every 7 days *Monitoring labs every 8-12 weeks 96 tablet 3     metoprolol succinate ER (TOPROL-XL) 50 MG 24 hr tablet 1 and 1/2 tabs daily. 135 tablet 3     omeprazole (PRILOSEC) 40 MG DR capsule Take 1 capsule (40 mg) by mouth daily 90 capsule 3     ORDER FOR DME Use your BiPAP device as directed by your provider.       oxybutynin (DITROPAN) 5 MG tablet Take 1 tablet (5 mg) by mouth 2 times daily TAKE 1 TABLET BY MOUTH EVERY DAY AT 4PM 180 tablet 3     pregabalin (LYRICA) 50 MG capsule Take 1 tab 3 times daily 360 capsule 3     SEREVENT DISKUS 50 MCG/DOSE inhaler TAKE 1 PUFF BY MOUTH TWICE A DAY 60 each 3         Medications                                                                                                                                                  Thong Montes MD        Again, thank you for allowing me to participate in the care of your patient.      Sincerely,    Thong Montes MD

## 2021-12-17 ENCOUNTER — OFFICE VISIT (OUTPATIENT)
Dept: PEDIATRICS | Facility: CLINIC | Age: 71
End: 2021-12-17
Payer: MEDICARE

## 2021-12-17 VITALS
OXYGEN SATURATION: 98 % | SYSTOLIC BLOOD PRESSURE: 134 MMHG | WEIGHT: 290 LBS | TEMPERATURE: 97.2 F | HEIGHT: 73 IN | BODY MASS INDEX: 38.43 KG/M2 | HEART RATE: 60 BPM | DIASTOLIC BLOOD PRESSURE: 88 MMHG

## 2021-12-17 DIAGNOSIS — I10 ESSENTIAL HYPERTENSION: ICD-10-CM

## 2021-12-17 DIAGNOSIS — R63.4 WEIGHT LOSS: ICD-10-CM

## 2021-12-17 DIAGNOSIS — K21.00 GASTROESOPHAGEAL REFLUX DISEASE WITH ESOPHAGITIS WITHOUT HEMORRHAGE: ICD-10-CM

## 2021-12-17 DIAGNOSIS — Z12.11 SCREEN FOR COLON CANCER: Primary | ICD-10-CM

## 2021-12-17 DIAGNOSIS — Z12.5 SPECIAL SCREENING FOR MALIGNANT NEOPLASM OF PROSTATE: ICD-10-CM

## 2021-12-17 LAB
PSA SERPL-MCNC: 5.68 UG/L (ref 0–4)
TSH SERPL DL<=0.005 MIU/L-ACNC: 1.18 MU/L (ref 0.4–4)

## 2021-12-17 PROCEDURE — 99214 OFFICE O/P EST MOD 30 MIN: CPT | Mod: 25 | Performed by: INTERNAL MEDICINE

## 2021-12-17 PROCEDURE — G0439 PPPS, SUBSEQ VISIT: HCPCS | Performed by: INTERNAL MEDICINE

## 2021-12-17 RX ORDER — METOPROLOL SUCCINATE 50 MG/1
TABLET, EXTENDED RELEASE ORAL
Qty: 135 TABLET | Refills: 3 | Status: SHIPPED | OUTPATIENT
Start: 2021-12-17 | End: 2022-12-16

## 2021-12-17 RX ORDER — LISINOPRIL 10 MG/1
10 TABLET ORAL DAILY
Qty: 90 TABLET | Refills: 3 | Status: SHIPPED | OUTPATIENT
Start: 2021-12-17 | End: 2022-04-25

## 2021-12-17 ASSESSMENT — ENCOUNTER SYMPTOMS
HEMATURIA: 0
MYALGIAS: 0
EYE PAIN: 0
ARTHRALGIAS: 1
HEMATOCHEZIA: 0
NERVOUS/ANXIOUS: 0
JOINT SWELLING: 1
FEVER: 0
DIARRHEA: 0
SORE THROAT: 0
DIZZINESS: 0
WEAKNESS: 0
DYSURIA: 0
FREQUENCY: 0
CONSTIPATION: 0
SHORTNESS OF BREATH: 0
PALPITATIONS: 0
COUGH: 0
ABDOMINAL PAIN: 0
NAUSEA: 0
HEADACHES: 0
PARESTHESIAS: 0
HEARTBURN: 0
CHILLS: 0

## 2021-12-17 ASSESSMENT — MIFFLIN-ST. JEOR: SCORE: 2106.37

## 2021-12-17 ASSESSMENT — ACTIVITIES OF DAILY LIVING (ADL): CURRENT_FUNCTION: NO ASSISTANCE NEEDED

## 2021-12-17 NOTE — PROGRESS NOTES
"SUBJECTIVE:   Lucio Daly is a 71 year old male who presents for Preventive Visit.    Patient has been advised of split billing requirements and indicates understanding: Yes   Are you in the first 12 months of your Medicare coverage?  No    Healthy Habits:     In general, how would you rate your overall health?  Good    Frequency of exercise:  2-3 days/week    Duration of exercise:  Less than 15 minutes    Do you usually eat at least 4 servings of fruit and vegetables a day, include whole grains    & fiber and avoid regularly eating high fat or \"junk\" foods?  No    Taking medications regularly:  No    Barriers to taking medications:  Problems remembering to take them    Medication side effects:  None    Ability to successfully perform activities of daily living:  No assistance needed    Home Safety:  No safety concerns identified    Hearing Impairment:  No hearing concerns    In the past 6 months, have you been bothered by leaking of urine?  No    In general, how would you rate your overall mental or emotional health?  Good      PHQ-2 Total Score: 0    Additional concerns today:  Yes      Do you feel safe in your environment? Yes    Have you ever done Advance Care Planning? (For example, a Health Directive, POLST, or a discussion with a medical provider or your loved ones about your wishes): Yes, advance care planning is on file.     Fall risk  Fallen 2 or more times in the past year?: Yes  Any fall with injury in the past year?: Yes    Cognitive Screening   1) Repeat 3 items (Leader, Season, Table)    2) Clock draw: ABNORMAL 10:50 is where hand were put by Pt.  3) 3 item recall: Recalls 3 objects  Results: 3 items recalled: COGNITIVE IMPAIRMENT LESS LIKELY    Mini-CogTM Copyright IDALIA Cárdenas. Licensed by the author for use in [facility]; reprinted with permission (raul@.[facility]). All rights reserved.      Do you have sleep apnea, excessive snoring or daytime drowsiness?: no    Reviewed and updated as " needed this visit by clinical staff  Tobacco  Allergies  Meds     Fam Hx         Reviewed and updated as needed this visit by Provider    Has RA and management per rheum.  Has some osteoarthritis of knees as well.  Big toe and ankle bother him at times.  SOme falls at times.      Follows with pulmonary.  Lung symptoms stable.     Has lost 26 pounds.  Unintentionally.  Appetite is intact, possible not as strong as in past.  Constipation at times.  Seen by neuro yesterday and prescribed senna.                   Social History     Tobacco Use     Smoking status: Former Smoker     Packs/day: 1.50     Years: 37.00     Pack years: 55.50     Types: Cigarettes     Start date: 6/15/1963     Quit date: 2000     Years since quittin.2     Smokeless tobacco: Never Used   Substance Use Topics     Alcohol use: Not Currently     Alcohol/week: 0.0 standard drinks       Alcohol Use 2021   Prescreen: >3 drinks/day or >7 drinks/week? Not Applicable           Current providers sharing in care for this patient include:   Patient Care Team:  Heladio Calixto MD as PCP - General (Internal Medicine - Pediatrics)  Jeremy Goodrich MD as MD (Rheumatology)  Benjamin Ordoñez MD as MD (Dermapathology)  Kristen Martinez MD as MD (Family Medicine, Sleep Medicine)  Rhett Shah MD as Surgeon (Surgery)  Sin Snow APRN CNP as Nurse Practitioner (Nurse Practitioner)  Leonel Farnsworth MD as MD (Ophthalmology)  Gómez Velásquez DPM (Podiatry)  Rodney Ríos DPM (Podiatry)  Heladio Gonzalez MD as MD (Neurology)  Robb Zavala MD as MD (Ophthalmology)  Bipin Villatoro MD as MD (Family Practice)  Thong Montes MD as MD (Neurology)  Harsha Mccarthy MD as MD (Pulmonary Disease)  Sin Snow APRN CNP as Referring Physician (Nurse Practitioner)  Jeremy Goodrich MD as MD (Rheumatology)  Ramón Jeronimo MD as MD (Family Medicine - Sports  Medicine)  Ramón Camargo MD as MD (Hand Surgery)  Rodney Garcia MD as MD (Clinical Neurophysiology)  Thong Montes MD as Assigned Neuroscience Provider  Kristen Martinez MD as Assigned Sleep Provider  Ramón Camargo MD as Assigned Surgical Provider  Rodney Ríos DPM as Assigned Musculoskeletal Provider  Jeremy Goodrich MD as Assigned Rheumatology Provider  Heladio Calixto MD as Assigned PCP    The following health maintenance items are reviewed in Epic and correct as of today:  Health Maintenance Due   Topic Date Due     COPD ACTION PLAN  Never done     ZOSTER IMMUNIZATION (3 of 3) 12/13/2018     COLORECTAL CANCER SCREENING  05/23/2021     EYE EXAM  07/13/2021     MEDICARE ANNUAL WELLNESS VISIT  11/12/2021     FALL RISK ASSESSMENT  11/12/2021     Lab work is in process  Labs reviewed in EPIC  BP Readings from Last 3 Encounters:   12/17/21 134/88   12/16/21 (!) 180/93   10/01/21 (!) 182/110    Wt Readings from Last 3 Encounters:   12/17/21 131.5 kg (290 lb)   12/16/21 131.1 kg (289 lb)   10/01/21 136.2 kg (300 lb 4.8 oz)                  Patient Active Problem List   Diagnosis     Diaphragmatic hernia     Esophageal reflux     Hx of melanoma of skin     Malignant basal cell neoplasm of skin     GALO (obstructive sleep apnea)     Chronic maxillary sinusitis     Urinary incontinence     Sicca syndrome (H)     Tremor     Incomplete defecation     Balance problems     AK (actinic keratosis)     Abnormal EMG     Seronegative arthritis     Adenomatous polyp of colon     Peripheral polyneuropathy     Morbid obesity (H)     Interstitial lung disease (H)     Neuromuscular disease (H)     Bronchiolitis obliterans (H)     Hypertension     Rheumatoid arthritis (H)     Immunosuppressed status (H)     PLMD (periodic limb movement disorder)     Past Surgical History:   Procedure Laterality Date     BIOPSY OF SKIN LESION       COLONOSCOPY       HEMORRHOID SURGERY       lip  "biopsy      for sicca complex     MOHS MICROGRAPHIC PROCEDURE       SOFT TISSUE SURGERY      removeal of basel cell carcinoma       Social History     Tobacco Use     Smoking status: Former Smoker     Packs/day: 1.50     Years: 37.00     Pack years: 55.50     Types: Cigarettes     Start date: 6/15/1963     Quit date: 2000     Years since quittin.2     Smokeless tobacco: Never Used   Substance Use Topics     Alcohol use: Not Currently     Alcohol/week: 0.0 standard drinks     Family History   Problem Relation Age of Onset     Hypertension Mother      Heart Disease Mother         a fib     Arthritis Mother         \"osteo\"     Osteoporosis Mother      Skin Cancer Mother      Uterine Cancer Mother      Other Cancer Mother      Cancer Mother      Hypertension Brother      Diabetes Brother         \" post pancreatitis\"     Neurologic Disorder Sister         multiple sclerosis     Hypertension Sister      Heart Disease Sister      Multiple Sclerosis Sister      Heart Disease Sister         a fib     Arthritis Sister      Heart Failure Sister      Kidney Disease Sister      Dementia Sister      Hypertension Father      Cerebrovascular Disease Father         ,     Skin Cancer Father      Colon Polyps Father      Other - See Comments Son         [place]     Other - See Comments Abdon wagner     Other - See Comments Abdon gonzalez     Psoriasis Maternal Grandfather      Stomach Cancer Maternal Grandfather      Cancer Maternal Grandfather      Congenital Anomalies Other         granddaughter with a chromosome defect     Other Cancer Other         Grand daughter     Cerebrovascular Disease Paternal Grandmother         ,     Cerebrovascular Disease Paternal Grandfather         ,     Cancer Granddaughter         Langerhans Cell Histiocytosis     Genetic Disease Granddaughter         gene translocation     Melanoma No family hx of      Colon Cancer No family hx of          Current Outpatient " Medications   Medication Sig Dispense Refill     acetaminophen (TYLENOL) 500 MG tablet 2 x 500mg by mouth 3 times per day: 7/730am, 4pm and 11pm  = 6/day       albuterol (PROAIR HFA/PROVENTIL HFA/VENTOLIN HFA) 108 (90 Base) MCG/ACT inhaler Inhale 2 puffs into the lungs every 4 hours as needed for shortness of breath / dyspnea or wheezing 1 Inhaler 3     cholecalciferol (HM VITAMIN D3) 25 MCG (1000 UT) TABS 1000 unit tab by mouth daily       folic acid (FOLVITE) 1 MG tablet Take 1 tablet (1 mg) by mouth daily       hydroxychloroquine (PLAQUENIL) 200 MG tablet Take 1 tablet (200 mg) by mouth daily Daily at 9am. 90 tablet 5     lisinopril (ZESTRIL) 10 MG tablet Take 1 tablet (10 mg) by mouth daily 90 tablet 0     methotrexate sodium 2.5 MG TABS Take 8 tablets (20 mg) by mouth every 7 days *Monitoring labs every 8-12 weeks 96 tablet 3     metoprolol succinate ER (TOPROL-XL) 50 MG 24 hr tablet 1 and 1/2 tabs daily. 135 tablet 3     omeprazole (PRILOSEC) 40 MG DR capsule Take 1 capsule (40 mg) by mouth daily 90 capsule 3     ORDER FOR DME Use your BiPAP device as directed by your provider.       oxybutynin (DITROPAN) 5 MG tablet 5mg tab by mouth daily in afternoon 180 tablet 3     pregabalin (LYRICA) 50 MG capsule 50mg capsule by mouth at 7am and 4pm and 1-2 x 50mg capsules by mouth nightly at 11pm (4/day) 360 capsule 3     SEREVENT DISKUS 50 MCG/DOSE inhaler TAKE 1 PUFF BY MOUTH TWICE A DAY 60 each 3     fluorouracil (EFUDEX) 5 % external cream        ketoconazole (NIZORAL) 2 % external shampoo        Allergies   Allergen Reactions     Restasis      Burning eyes, problems with breathing, tightness in chest     Adhesive Tape      Bandages misc     Allegra      EXCESSIVE URINATION AND WEAKNESS, LIGHT-HEADED     Allergy      Dust     Animal Dander      Benadryl Allergy      EXCESSIVE URINATION AND WEAKNESS, LIGHT-HEADED     Cephalexin      Joint pain or gerd aggravation. Bloating excessive urination      Cephalosporins       Cyclosporine      Diphenhydramine Other (See Comments)     Doxycycline      Doxycycline Hyclate Nausea     SWEATING,MIGRAINES,LOSS OF APPETITE,SWEATING,LIGHT HEADED, EXCESSIVE URINATION     Fexofenadine Other (See Comments)     Flonase [Fluticasone Propionate]      Gabapentin      Neurontin: mosd changes and excess urination     Hydrocortisone      Iodine      Iodine Solution [Povidone Iodine]      SKIN MELTS     Levaquin      From surgeon--? Joint pain ? Gerd aggravation. Insomnia, excess urination     Levofloxacin      Mylanta      EXCESSIVE URINATION AND WEAKNESS, LIGHT-HEADED     Oxcarbazepine      Prednisone      Weakness, elevated bp, headache, eye pain, congestion      Prednisone      Seafood [Seafood]      Shellfish Allergy      Hives       Simethicone      Sulfamethoxazole W/Trimethoprim      Sulfamethoxazole-Trimethoprim      Chest pain, angina     Symbicort GI Disturbance and Nausea     Trees      Trileptal      SEVERE JOINT AND TENDON PAIN, INSOMNIA, RESTLESSNESS, NAUSEA, EXCESS URINATION     Cortizone Rash     EXCESS URINATION,WEAKNESS,NAUSEA, HEADACHE             Review of Systems   Constitutional: Negative for chills and fever.   HENT: Negative for congestion, ear pain, hearing loss and sore throat.    Eyes: Negative for pain and visual disturbance.   Respiratory: Negative for cough and shortness of breath.    Cardiovascular: Negative for chest pain, palpitations and peripheral edema.   Gastrointestinal: Negative for abdominal pain, constipation, diarrhea, heartburn, hematochezia and nausea.   Genitourinary: Negative for dysuria, frequency, genital sores, hematuria, impotence, penile discharge and urgency.   Musculoskeletal: Positive for arthralgias and joint swelling. Negative for myalgias.   Skin: Negative for rash.   Neurological: Negative for dizziness, weakness, headaches and paresthesias.   Psychiatric/Behavioral: Negative for mood changes. The patient is not nervous/anxious.   "    CONSTITUTIONAL: NEGATIVE for fever, chills, change in weight  INTEGUMENTARY/SKIN: NEGATIVE for worrisome rashes, moles or lesions  EYES: NEGATIVE for vision changes or irritation  ENT/MOUTH: NEGATIVE for ear, mouth and throat problems  RESP: NEGATIVE for significant cough or SOB  BREAST: NEGATIVE for masses, tenderness or discharge  CV: NEGATIVE for chest pain, palpitations or peripheral edema  GI: NEGATIVE for nausea, abdominal pain, heartburn, or change in bowel habits  : NEGATIVE for frequency, dysuria, or hematuria  MUSCULOSKELETAL: NEGATIVE for significant arthralgias or myalgia  NEURO: NEGATIVE for weakness, dizziness or paresthesias  ENDOCRINE: NEGATIVE for temperature intolerance, skin/hair changes  HEME: NEGATIVE for bleeding problems  PSYCHIATRIC: NEGATIVE for changes in mood or affect    OBJECTIVE:   Pulse 60   Temp 97.2  F (36.2  C) (Tympanic)   Ht 1.842 m (6' 0.5\")   Wt 131.5 kg (290 lb)   SpO2 98%   BMI 38.79 kg/m   Estimated body mass index is 38.79 kg/m  as calculated from the following:    Height as of this encounter: 1.842 m (6' 0.5\").    Weight as of this encounter: 131.5 kg (290 lb).  Physical Exam  GENERAL: healthy, alert and no distress  EYES: Eyes grossly normal to inspection, PERRL and conjunctivae and sclerae normal  HENT: ear canals and TM's normal, nose and mouth without ulcers or lesions  NECK: no adenopathy, no asymmetry, masses, or scars and thyroid normal to palpation  RESP: lungs clear to auscultation - no rales, rhonchi or wheezes  CV: regular rate and rhythm, normal S1 S2, no S3 or S4, no murmur, click or rub, no peripheral edema and peripheral pulses strong  ABDOMEN: soft, nontender, no hepatosplenomegaly, no masses and bowel sounds normal  MS: no gross musculoskeletal defects noted, no edema  SKIN: no suspicious lesions or rashes  NEURO: Normal strength and tone, mentation intact and speech normal  PSYCH: mentation appears normal, affect normal/bright    Diagnostic Test " "Results:  Labs reviewed in Epic    ASSESSMENT / PLAN:   (Z12.11) Screen for colon cancer  (primary encounter diagnosis)  Comment:   Plan: Adult Gastro Ref - Procedure Only            (K21.00) Gastroesophageal reflux disease with esophagitis without hemorrhage  Comment:  Plan: Adult Gastro Ref - Procedure Only        Continue with proton pump inhibitor.  Will perform upper scope given weight loss, and dad's history of stomach cancer.     (Z12.5) Special screening for malignant neoplasm of prostate  Comment:   Plan: PSA, screen        Screening due to weight loss.     (R63.4) Weight loss  Comment:   Plan: TSH with free T4 reflex            (I10) Essential hypertension  Comment:   Plan: lisinopril (ZESTRIL) 10 MG tablet, metoprolol         succinate ER (TOPROL-XL) 50 MG 24 hr tablet        blood pressure controlled.       Patient has been advised of split billing requirements and indicates understanding: Yes  COUNSELING:  Reviewed preventive health counseling, as reflected in patient instructions       Consider AAA screening for ages 65-75 and smoking history       Regular exercise       Healthy diet/nutrition       Vision screening       Hearing screening       Dental care       Colon cancer screening       Prostate cancer screening    Estimated body mass index is 38.79 kg/m  as calculated from the following:    Height as of this encounter: 1.842 m (6' 0.5\").    Weight as of this encounter: 131.5 kg (290 lb).    Weight management plan: Patient was referred to their PCP to discuss a diet and exercise plan.    He reports that he quit smoking about 21 years ago. His smoking use included cigarettes. He started smoking about 58 years ago. He has a 55.50 pack-year smoking history. He has never used smokeless tobacco.      Appropriate preventive services were discussed with this patient, including applicable screening as appropriate for cardiovascular disease, diabetes, osteopenia/osteoporosis, and glaucoma.  As appropriate " for age/gender, discussed screening for colorectal cancer, prostate cancer, breast cancer, and cervical cancer. Checklist reviewing preventive services available has been given to the patient.    Reviewed patients plan of care and provided an AVS. The Basic Care Plan (routine screening as documented in Health Maintenance) for Lucio meets the Care Plan requirement. This Care Plan has been established and reviewed with the Patient.    Counseling Resources:  ATP IV Guidelines  Pooled Cohorts Equation Calculator  Breast Cancer Risk Calculator  Breast Cancer: Medication to Reduce Risk  FRAX Risk Assessment  ICSI Preventive Guidelines  Dietary Guidelines for Americans, 2010  USDA's MyPlate  ASA Prophylaxis  Lung CA Screening    Heladio Calixto MD  [facility]    Identified Health Risks:

## 2021-12-17 NOTE — PATIENT INSTRUCTIONS
They will call you to set up an upper and lower scope.    We are checking thyroid stimulating hormone (TSH) and prostate specific antigen (PSA) today, with respect to your weight loss.    With respect to your constipation, if your senna is not working consider daily miralax at 1/4 or 1/2 cap ful daily.     Yearly flu shot.    Let us know if you are still losing weight and we can consider another CT scan.    Heladio Calixto MD  Internal Medicine and Pediatrics

## 2021-12-18 ENCOUNTER — MYC MEDICAL ADVICE (OUTPATIENT)
Dept: PEDIATRICS | Facility: CLINIC | Age: 71
End: 2021-12-18
Payer: MEDICARE

## 2021-12-18 DIAGNOSIS — Z12.5 ENCOUNTER FOR SCREENING FOR MALIGNANT NEOPLASM OF PROSTATE: ICD-10-CM

## 2021-12-18 DIAGNOSIS — M06.9 RHEUMATOID ARTHRITIS, INVOLVING UNSPECIFIED SITE, UNSPECIFIED WHETHER RHEUMATOID FACTOR PRESENT (H): Primary | ICD-10-CM

## 2021-12-18 DIAGNOSIS — R97.20 ELEVATED PROSTATE SPECIFIC ANTIGEN (PSA): ICD-10-CM

## 2021-12-20 ENCOUNTER — PATIENT OUTREACH (OUTPATIENT)
Dept: CARE COORDINATION | Facility: CLINIC | Age: 71
End: 2021-12-20
Payer: MEDICARE

## 2021-12-20 ENCOUNTER — TELEPHONE (OUTPATIENT)
Dept: PULMONOLOGY | Facility: CLINIC | Age: 71
End: 2021-12-20
Payer: MEDICARE

## 2021-12-20 PROBLEM — R97.20 ELEVATED PROSTATE SPECIFIC ANTIGEN (PSA): Status: ACTIVE | Noted: 2021-12-20

## 2021-12-20 LAB
DLCOUNC-%PRED-PRE: 97 %
DLCOUNC-PRE: 27.08 ML/MIN/MMHG
DLCOUNC-PRED: 27.85 ML/MIN/MMHG
ERV-%PRED-PRE: 120 %
ERV-PRE: 0.85 L
ERV-PRED: 0.71 L
EXPTIME-PRE: 8.02 SEC
FEF2575-%PRED-PRE: 93 %
FEF2575-PRE: 2.33 L/SEC
FEF2575-PRED: 2.48 L/SEC
FEFMAX-%PRED-PRE: 112 %
FEFMAX-PRE: 9.87 L/SEC
FEFMAX-PRED: 8.74 L/SEC
FEV1-%PRED-PRE: 87 %
FEV1-PRE: 2.99 L
FEV1FEV6-PRE: 77 %
FEV1FEV6-PRED: 77 %
FEV1FVC-PRE: 76 %
FEV1FVC-PRED: 75 %
FEV1SVC-PRE: 74 %
FEV1SVC-PRED: 65 %
FIFMAX-PRE: 7.69 L/SEC
FRCPLETH-%PRED-PRE: 89 %
FRCPLETH-PRE: 3.5 L
FRCPLETH-PRED: 3.91 L
FVC-%PRED-PRE: 85 %
FVC-PRE: 3.92 L
FVC-PRED: 4.58 L
IC-%PRED-PRE: 70 %
IC-PRE: 3.16 L
IC-PRED: 4.51 L
RVPLETH-%PRED-PRE: 94 %
RVPLETH-PRE: 2.64 L
RVPLETH-PRED: 2.81 L
TLCPLETH-%PRED-PRE: 86 %
TLCPLETH-PRE: 6.66 L
TLCPLETH-PRED: 7.74 L
VA-%PRED-PRE: 82 %
VA-PRE: 5.86 L
VC-%PRED-PRE: 77 %
VC-PRE: 4.02 L
VC-PRED: 5.22 L

## 2021-12-20 NOTE — TELEPHONE ENCOUNTER
Left detailed message for patient about recent PFTs and 6MWT results. PFTs improved from July and 6MWT without hypoxia. Next visit with Dr. Mccarthy scheduled for April 2022. Offered direct ILD RNCC phone number for follow up questions that may arise.     Alondra Albrecht, RN, BSN  ILD Nurse Care Coordinator  (P) [phone]

## 2021-12-20 NOTE — PROGRESS NOTES
Social Work Intervention  [facility]    Data/Intervention:    Patient Name:  Lucio Daly  /Age:  1950 (71 year old)    Visit Type: telephone  Referral Source: Dr Thong Montes  Reason for Referral:  [employer]/community resources    Collaborated With:    -Rich    Psychosocial Information/Concerns:  Pt reports that he knows about Lifeline as his Mother had it. He does have his cell phone with him at all times. He and his wife live in [place] for 4 months in the winter. They are often together so need for Lifeline is less.   He uses a cane or a walker. He hasn't had PT for a few years and there is a referral but can't get it done before they depart for [place]. Discussed getting it in [place] and he knows someone who can refer him to a PT. Encouraged him to get set up with a PCP who can order the PT there. He also would like to get some labs done while he is in [place].   He has  Medigap plan so good coverage for being out of state.     Intervention/Education/Resources Provided:  Reviewed Lifeline and encouraged him to consider an Apple Watch which has similar features.   Also advised he find a PCP in the area of their home in [place] and request referral to PT there.    Assessment/Plan:  No further f/u planned.     Provided patient/family with contact information and availability.    NIRU Spencer, [facility]    [facility]  [phone]

## 2021-12-20 NOTE — TELEPHONE ENCOUNTER
Called patient and informed him the orders are available for him to  and that I will place them at the  on the first floor.

## 2021-12-20 NOTE — TELEPHONE ENCOUNTER
Please call.  He'll need to come in for the orders to take with him to [place].    I''ll put my fax # on it.    Heladio Calixto MD  Internal Medicine and Pediatrics

## 2021-12-21 DIAGNOSIS — N39.498 OTHER URINARY INCONTINENCE: ICD-10-CM

## 2021-12-21 NOTE — TELEPHONE ENCOUNTER
Rx Authorization:  Requested Medication/ Dose OXYBUTYNIN 5 MG TABLET  Date last refill ordered: 12/16/21  Quantity ordered: 180 tabs  # refills: 3  Date of last clinic visit with ordering provider: 12/16/21  Date of next clinic visit with ordering provider: 12/16/22  All pertinent protocol data (lab date/result):   Include pertinent information from patients message:

## 2021-12-22 RX ORDER — OXYBUTYNIN CHLORIDE 5 MG/1
TABLET ORAL
Qty: 90 TABLET | Refills: 3 | Status: SHIPPED | OUTPATIENT
Start: 2021-12-22 | End: 2022-05-10

## 2021-12-23 DIAGNOSIS — J45.909 ASTHMA: ICD-10-CM

## 2021-12-23 RX ORDER — ALBUTEROL SULFATE 90 UG/1
2 AEROSOL, METERED RESPIRATORY (INHALATION) EVERY 4 HOURS PRN
Qty: 6.7 G | Refills: 1 | Status: SHIPPED | OUTPATIENT
Start: 2021-12-23 | End: 2022-12-20

## 2022-01-05 NOTE — TELEPHONE ENCOUNTER
----- Message from Kristen Martinez MD sent at 1/2/2022  3:26 PM CST -----  Regarding: lease call patient to schedule a follow-up video visit for GALO  Hi,    Please call patient to schedule a follow-up video visit for GALO. He is on BIPAP therapy. He was last seen at sleep clinic in September 2020    Thanks,  Kristen Martinez MD    [facility]  [address] , [place] [phone]      [facility]  [address], [address], [place] [address]

## 2022-01-05 NOTE — TELEPHONE ENCOUNTER
Message left for patient to contact sleep clinic to schedule follow up appointment.   Maryse Curtis MA

## 2022-02-17 ENCOUNTER — TRANSFERRED RECORDS (OUTPATIENT)
Dept: HEALTH INFORMATION MANAGEMENT | Facility: CLINIC | Age: 72
End: 2022-02-17
Payer: MEDICARE

## 2022-02-17 LAB
ALT SERPL-CCNC: 30 U/L (ref 12–78)
AST SERPL-CCNC: 20 U/L (ref 15–37)
CREATININE (EXTERNAL): 1.3 MG/DL (ref 0.6–1.3)
GFR ESTIMATED (EXTERNAL): 54 ML/MIN/1.73M2
GFR ESTIMATED (IF AFRICAN AMERICAN) (EXTERNAL): 63 ML/MIN/1.73M2
GLUCOSE (EXTERNAL): 109 MG/DL (ref 74–106)
POTASSIUM (EXTERNAL): 4.2 MMOL/L (ref 3.5–5.1)

## 2022-02-18 ENCOUNTER — MYC MEDICAL ADVICE (OUTPATIENT)
Dept: PULMONOLOGY | Facility: CLINIC | Age: 72
End: 2022-02-18
Payer: MEDICARE

## 2022-02-18 DIAGNOSIS — J44.81 OBLITERATIVE BRONCHIOLITIS (H): ICD-10-CM

## 2022-04-05 NOTE — PROGRESS NOTES
"Rheumatology Clinic Visit  Jeremy Goodrich M.D.     Lucio Daly MRN# [id number]   YOB: 1950 Age: 71 year old     Date of Visit: .04/08/2022    Primary care provider: Saroj Tinoco         Assessment and Plan:   # Seronegative inflammatory arthritis: Patient relates sustained overall improvement in control of diffuse joint pain with combination therapy.  He relates residual \"extra\" pain affecting hands, feet, elbows, and left ankle.  Physical exam shows improved shoulder flexion; the left ankle is swollen but not tender.  Knees exhibit crepitus.  Laboratory evaluation Feb 17, 2022 showed creatinine of 1.32, increased from December 2021.  AST and ALT were normal;  Seronegative inflammatory arthritis continues improved with combination methotrexate and low-dose hydroxychloroquine.  I recommend continuing combination therapy.  Plan  # Continue methotrexate at 20 mg weekly, folic acid 2 mg day. While on drug  --High risk medication monitoring--labs q 3 mo AST/ALT, Albumin, CBC with plts, CRP   --Limit EtOH intake to 2 drinks weekly; use folate 2 mg daily  --Tylenol 325mg qid prn nausea/HA associated with dosing.    # hydroxychloroquine (plaquenil) 200 mg QD -annual eye exam done July 2021 [facility].  2. ILD Obliterative bronchiolitis, symptomatically stable.  Patient was seen by Dr. Mccarthy in pulmonary in July 2021, when mild decrease in total lung capacity was noted.  Recommendation was to repeat pulmonary function tests in 4 to 5 months.  3. Osteoarthritis knees, ankle, shoulder and neck and hands/thumbs. Continue isometric quad strengthening exercises twice daily to improve support around the joint. Left ankle osteoarthritis and overall osteoarthritis  is contributing to the overall pain. Continue splinting/hand therapy and acetaminophen 1000 mg tid ATC.  4. Trigger finger, L 4th: gradually increasing. Discussed future Hand surgery referral.    Plan  1.  Bloodwork every 4 months  2.  Continue " "methotrexate 8 tablets (20 mg) once weekly.While on methotrexate:   -- Check labs every 3 months (AST/ALT, Albumin, CBC with platelets)   -- Limit alcohol intake to 2 drinks weekly; use folate 1 mg daily.  --Tylenol 500-1000 mg can be used as needed up to three times daily for nausea/headache associated with dosing.    3.  Continue hydroxychloroquine 200 mg once daily.  While taking hydroxychloroquine, undergo annual ophthalmology evaluation to monitor for rare retinal Plaquenil toxicity (next visit due in summer 2022).  4. Continue bracing/sleeve/AFO use for L ankle instability/drop foot.  5. Acetaminophen 1000 mg 3 times daily for residual pain.    RTC 6 month  Jeremy Goodrich MD  Staff Rheumatologist, [facility]         History of Present Illness:   Lucio Daly \"Rich\" presents for f/u seronegative rheumatoid arthritis, sicca complex.  Last seen in .  Methotrexate and hydroxychloroquine were continued for seronegative arthritis.    Interval history April 8, 2022    He is doing about the same. He has daily pain in hands, feet, elbows, and shoulders, but ability to perform activities of daily living, and to perform walking 20 to 25 minutes without limitation, are unimpeded.  He has less than 30 minutes of morning stiffness.  His L great toe is dark and swollen; his whole L ankle and foot feel like they are \"in a vise\". The R leg goes completely numb with prolonged driving.  He notes pain in the R LBP when lying on his back. He has pain in the R lateral thigh with mattress contact or with prolonged immobility.  He has been offered surgery for spinal stenosis, but has been advised that he is \"high risk\" for surgery.  He continues methotrexate 20 mg weekly; there is mild stomach upset associated with dose. He uses hydroxychloroquine 200 mg daily.  He notes increasing triggering in his L 4th finger.  He has been adjusting HTN medications with primary care.    Interval history 10-1-2021:    Patient relates " overall stability.  He has daily pain in hands, feet, elbows, and shoulders, but ability to perform activities of daily living, and to perform walking 20 to 25 minutes without limitation, are unimpeded.  He has less than 30 minutes of morning stiffness.    Several weeks ago, he fell and twisted his left ankle in his walker.  Since then, weightbearing has been uncomfortable, but there has been gradually decreasing pain.  He has ongoing instability of the left leg which increases propensity to fall associated with foot drop on the left.  He controls instability and pain with use of left ankle sleeve, AFO, and bracing.    He continues on methotrexate, hydroxychloroquine, and Lyrica for inflammatory arthritis and arthropathy.  He reports that methotrexate is effective, because when he discontinued the medication during a recent dental procedure, diffuse joint pain recurred within 1 week.    PMH   1. Sensory neuropathy of unclear etiology - negative work up, managed on Lyrica.  Has chronic dysesthesia in pads of feet.  2. Parkinsonisim/Tremor disorder; sees neurology   3. Headaches   4. History of basal cell, squamous (most recent +bx 8-2014)  5. History of melanoma   6. GALO on CPAP.  7. HTN.   8. Seronegative RA, diagnosed 2009. SICCA  9.  JUARES. Work-up 4-2015. 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 Lumbar stenosis per MRI 2017   11. Bronchitis 5-2017, early pneumonia   12. Chicken pox   13. DEXA  Normal   14. Left ankle DJD, MRI  --seen Dr. Martinez 2-2019   Past Medical History:   Diagnosis Date     Abnormal EMG 4/18/2013     Abnormal involuntary movements(781.0)     Movement Disorder     AK (actinic keratosis) 12/18/2011     Allergic rhinitis, cause unspecified     Allergic rhinitis     Balance problems 11/1/2011     Basal cell carcinoma      Bladder spasms 11/1/2011     Chronic osteoarthritis      COPD (chronic obstructive pulmonary disease) (H)     Interstial lung disease, obliderans  bronchiolitis     Diaphragmatic hernia without mention of obstruction or gangrene      Earache or other ear, nose, or throat complaint      Esophageal reflux      Fatigue 11/1/2011     Fracture      H. pylori infection 5/12/2011     History of MRI of cervical spine 11/18/2013    EXAMINATION: CERVICAL SPINE G/E 5 VIEWS* 4/19/2013 4:18 PM  CLINICAL HISTORY: Pain in limb,Performing Location?->[facility] ([facility]),  COMPARISON:  FINDINGS: AP and lateral views in flexion and extension, as well as odontoid view of the cervical spine was obtained. There is no comparison available. The vertebral bodies of the cervical spine are normally aligned. There is posterior spurring and d     Incomplete defecation 11/1/2011     Interstitial lung disease (H) 11/29/2016     Laboratory test 8/7/2012     Lung disease June 2015     Malignant basal cell neoplasm of skin 8/6/2008     Melanoma (H) 8/6/2008     Melanoma in situ of lower leg (H)     R calf     Neuropathy 5/16/2011     Other bladder disorder      Other color vision deficiencies      Other nervous system complications      Parkinsonism (H) 11/1/2011     Personal history of colonic polyps      PLMD (periodic limb movement disorder) 12/16/2021     Polyneuropathy in other diseases classified elsewhere (H)      RA (rheumatoid arthritis) (H)      Rheumatoid arthritis of multiple sites with negative rheumatoid factor (H) 3/21/2016     Seronegative arthritis 11/18/2013     Shortness of breath      Shoulder arthritis 2016    acromioclavicular joint      Somatization disorder 5/12/2011     Squamous cell carcinoma      Tremor 11/1/2011     Unspecified essential hypertension      Unspecified hypothyroidism      Urinary tract infection      Urinary urgency 11/1/2011     Wears glasses 11/1/2011     Injuries-ankle sprains, left heel fracture    Family Hx   MGM Psoriasis  Sister -PPM  Family History   Problem Relation Age of Onset     Hypertension Mother      Heart Disease Mother         a  "fib     Arthritis Mother         \"osteo\"     Osteoporosis Mother      Skin Cancer Mother      Uterine Cancer Mother      Other Cancer Mother      Cancer Mother      Hypertension Brother      Diabetes Brother         \" post pancreatitis\"     Neurologic Disorder Sister         multiple sclerosis     Hypertension Sister      Heart Disease Sister      Multiple Sclerosis Sister      Heart Disease Sister         a fib     Arthritis Sister      Heart Failure Sister      Kidney Disease Sister      Dementia Sister      Hypertension Father      Cerebrovascular Disease Father         ,     Skin Cancer Father      Colon Polyps Father      Other - See Comments Son         [place]     Other - See Comments Abdon wagner     Other - See Comments Abdon gonzalez     Psoriasis Maternal Grandfather      Stomach Cancer Maternal Grandfather      Cancer Maternal Grandfather      Congenital Anomalies Other         granddaughter with a chromosome defect     Other Cancer Other         Grand daughter     Cerebrovascular Disease Paternal Grandmother         ,     Cerebrovascular Disease Paternal Grandfather         ,     Cancer Granddaughter         Langerhans Cell Histiocytosis     Genetic Disease Granddaughter         gene translocation     Melanoma No family hx of      Colon Cancer No family hx of      Current Outpatient Medications   Medication Sig     acetaminophen (TYLENOL) 500 MG tablet 2 x 500mg by mouth 3 times per day: 7/730am, 4pm and 11pm  = 6/day     albuterol (PROAIR HFA/PROVENTIL HFA/VENTOLIN HFA) 108 (90 Base) MCG/ACT inhaler Inhale 2 puffs into the lungs every 4 hours as needed for shortness of breath / dyspnea or wheezing     cholecalciferol (HM VITAMIN D3) 25 MCG (1000 UT) TABS 1000 unit tab by mouth daily     fluorouracil (EFUDEX) 5 % external cream      folic acid (FOLVITE) 1 MG tablet Take 1 tablet (1 mg) by mouth daily     hydroxychloroquine (PLAQUENIL) 200 MG tablet Take 1 tablet (200 mg) by " mouth daily Daily at 9am.     ketoconazole (NIZORAL) 2 % external shampoo      lisinopril (ZESTRIL) 10 MG tablet Take 1 tablet (10 mg) by mouth daily     methotrexate sodium 2.5 MG TABS Take 8 tablets (20 mg) by mouth every 7 days *Monitoring labs every 8-12 weeks     metoprolol succinate ER (TOPROL-XL) 50 MG 24 hr tablet 1 and 1/2 tabs daily.     omeprazole (PRILOSEC) 40 MG DR capsule Take 1 capsule (40 mg) by mouth daily     ORDER FOR DME Use your BiPAP device as directed by your provider.     oxybutynin (DITROPAN) 5 MG tablet TAKE 1 TABLET BY MOUTH EVERY DAY AT 4PM     pregabalin (LYRICA) 50 MG capsule 50mg capsule by mouth at 7am and 4pm and 1-2 x 50mg capsules by mouth nightly at 11pm (4/day)     salmeterol (SEREVENT DISKUS) 50 MCG/DOSE inhaler Inhale 1 puff into the lungs 2 times daily     No current facility-administered medications for this visit.       Personal Hx   Home environment: No secondhand tobacco smoke in home.    History     Social History     Marital Status:      Spouse Name: N/A     Number of Children: N/A     Years of Education: N/A     Social History Main Topics     Smoking status: Former Smoker     Quit date: 09/30/2003     Smokeless tobacco: Never Used     Alcohol Use: No     Drug Use: No     Sexually Active: Not Currently -- Female partner(s)     Other Topics Concern     None     Social History Narrative    2013: Living in [place] in a townhouse with no stepsHas 3 sons that are doing okay.             Review of Systems:     14 points all negative except what is mentioned in HPI.  Review Of Systems  Skin: negative  Eyes: negative  Ears/Nose/Throat: negative  Respiratory: No shortness of breath, dyspnea on exertion, cough, or hemoptysis  Cardiovascular: negative  Gastrointestinal: negative  Genitourinary: negative  Musculoskeletal: as above  Neurologic: as above  Psychiatric: negative  Hematologic/Lymphatic/Immunologic: negative  Endocrine: negative             Past Surgical  History:   Past Surgical History:   Procedure Laterality Date     BIOPSY OF SKIN LESION       COLONOSCOPY       HEMORRHOID SURGERY       lip biopsy      for sicca complex     MOHS MICROGRAPHIC PROCEDURE       SOFT TISSUE SURGERY      removeal of basel cell carcinoma     Blood pressure (!) 167/98, pulse 66, weight 129.7 kg (286 lb), SpO2 98 %.  Wt Readings from Last 4 Encounters:   04/08/22 129.7 kg (286 lb)   12/17/21 131.5 kg (290 lb)   12/16/21 131.1 kg (289 lb)   10/01/21 136.2 kg (300 lb 4.8 oz)       Constitutional: well-developed, appearing stated age; cooperative  Eyes: nl EOM, PERRLA, vision, conjunctiva, sclera  ENT: nl external ears, nose, hearing, lips, teeth, gums, throat  No mucous membrane lesions, normal saliva pool  Neck: no mass or thyroid enlargement  Resp: lungs clear to auscultation, nl to palpation  CV: RRR, no murmurs, rubs or gallops, no edema  GI: no ABD mass or tenderness, no HSM  : not tested  Lymph: no cervical, supraclavicular, inguinal or epitrochlear nodes  MS: Left ankle slightly swollen; there is no synovitis at hands, wrists, elbows, or knees.  L great toe DIP swollen without tenderness. There is painful right shoulder flexion.  No tenderness at MTPs.  Skin: no nail pitting, alopecia, rash, nodules or lesions  Neuro: nl cranial nerves, strength, sensation, DTRs.   Psych: nl judgement, orientation, memory, affect.             Data:     RHEUM RESULTS Latest Ref Rng & Units 8/7/2003 7/22/2004 4/26/2005   ALBUMIN 3.4 - 5.0 g/dL - - -   ALT 0 - 70 U/L - - -   AST 0 - 45 U/L - - -   ANCA - - - -   CK TOTAL 30 - 300 U/L - - -   CREATININE 0.66 - 1.25 mg/dL 1.2 1.40 -   CREATININE (EXTERNAL) 0.60 - 1.30 mg/dL - - -   CRP 0.0 - 8.0 mg/L - - -   DNA 0 - 29 IU/mL - - -   KIMBERLY 0 - 1.0 - - <1.0   GFR ESTIMATE, IF BLACK >60 mL/min/[1.73:m2] - 68 -   GFR ESTIMATE >60 mL/min/1.73m2 - 56(L) -   HEMATOCRIT 40.0 - 53.0 % 43.7 - -   HEMOGLOBIN 13.3 - 17.7 g/dL 15.2 - -   HEPBANG NEG - - -   HCVAB NEG  - - -   IGA 70 - 380 mg/dL - - 394(H)   IGM 60 - 265 mg/dL - - 98    - 1,620 mg/dL - - 1040   WBC 4.0 - 11.0 10e3/uL 7.0 - -   RBC 4.40 - 5.90 10e6/uL 4.98 - -   RDW 10.0 - 15.0 % 14.7 - -   MCHC 31.5 - 36.5 g/dL 34.8 - -   MCV 78 - 100 fL 88 - -   PLATELET COUNT 150 - 450 10e3/uL 221 - -   RHEUMATOID FACTOR 0 - 14 IU/mL - - -   ESR 0 - 20 mm/h 5 - 7       Rheumatoid Factor   Date Value Ref Range Status   05/27/2010 <7 0 - 14 IU/mL Final   ,  ,  ,   Cyclic Citrullinated Peptide IgG   Date Value Ref Range Status   05/27/2010 <2 <5 U/mL Final     Comment:     Interpretation:  Negative   ,  ,   SSA (RO) Antibody IgG   Date Value Ref Range Status   05/27/2010 1  Final     Comment:     Reference range: 0 to 40  Unit: AU/mL  (Note)  REFERENCE INTERVALS: SSA (Ro) (ELAN) Ab, IgG   29 AU/mL or Less ............. Negative   30 - 40 AU/mL ................ Equivocal   41 AU/mL or Greater .......... Positive    SSA (Ro) antibody is seen in 70-75% of Sjogren syndrome  cases, 30-40% of systemic lupus erythematosus (SLE) and  5-10% of progressive systemic sclerosis (PSS).  Performed by [facility],  [address] [place] [id number] [phone]  [email or web address], Chely Leija MD, Lab. Director     SSB (LA) Antibody IgG   Date Value Ref Range Status   05/27/2010 8  Final     Comment:     Reference range: 0 to 40  Unit: AU/mL  (Note)  REFERENCE INTERVALS: SSB (La) (ELAN) Ab, IgG   29 AU/mL or Less ............. Negative   30 - 40 AU/mL ................ Equivocal   41 AU/mL or Greater .......... Positive    SSB (La) antibody is seen in 50-60% of Sjogren syndrome  cases and is specific if it is the only ELAN antibody  present. 15-25% of patients with systemic lupus  erythematosus (SLE) and 5-10% of patients with progressive  systemic sclerosis (PSS) also have this antibody.  Performed by [facility],  [address] [place] [id number] [phone]  [email or web address], Chely Leija MD, Lab. Director   ,  ,   MONICA  Screen by EIA   Date Value Ref Range Status   05/27/2010 <1.0 0 - 1.0 Final     Comment:     Interpretation:  Negative   ,   DNA-ds   Date Value Ref Range Status   05/27/2010 <15 0 - 29 IU/mL Final     Comment:     Interpretation:  Negative   ,  ,  ,  ,  ,  ,  ,   Hep B Surface Agn   Date Value Ref Range Status   05/27/2010 Negative NEG Final   ,  ,  ,  ,  ,  ,  ,  ,  ,  ,  ,   Neutrophil Cytoplasmic IgG Antibody   Date Value Ref Range Status   06/26/2015   Final    <1:20  Reference range: <1:20  (Note)  The ANCA IFA is <1:20; therefore, no further testing will  be performed.  INTERPRETIVE INFORMATION: Anti-Neutrophil Cyto Ab, IgG  Neutrophil Cytoplasmic Antibodies (C-ANCA = granular  cytoplasmic staining, P-ANCA = perinuclear staining) are  found in the serum of over 90 percent of patients with  certain necrotizing systemic vasculitides, and usually in  less than 5 percent of patients with collagen vascular  disease or arthritis.  Performed by [facility],  [address] [place] [id number] [phone]  [email or web address], Brad Fall MD, Lab. Director       ,  ,  ,  ,  ,  ,  ,  ,  ,   Albumin Fraction   Date Value Ref Range Status   05/27/2010 4.0 3.7 - 5.1 g/dL Final     Alpha 2 Fraction   Date Value Ref Range Status   05/27/2010 0.6 0.5 - 0.9 g/dL Final     Beta Fraction   Date Value Ref Range Status   05/27/2010 1.0 0.6 - 1.0 g/dL Final     Gamma Fraction   Date Value Ref Range Status   05/27/2010 1.0 0.7 - 1.6 g/dL Final     Monoclonal Peak   Date Value Ref Range Status   05/27/2010 0.0 0.0 g/dL Final     ELP Interpretation:   Date Value Ref Range Status   05/27/2010   Final    Essentially normal electrophoretic pattern.  No monoclonal protein seen.     Comment:      Pathologic significance requires clinical correlation.  ASHLEY Noble M.D.,   Ph.D., Pathologist ()   ,  ,   Immunofixation ELP   Date Value Ref Range Status   06/09/2016   Final    No monoclonal protein seen on immunofixation.   Pathological significance   requires clinical correlation.   ASHLEY Noble M.D., Ph.D   Pathologist ([phone])       IGG   Date Value Ref Range Status   06/09/2016 1,020 695 - 1,620 mg/dL Final     IGA   Date Value Ref Range Status   06/09/2016 374 70 - 380 mg/dL Final     IGM   Date Value Ref Range Status   06/09/2016 81 60 - 265 mg/dL Final   ,  ,  ,  ,  ,  ,  ,

## 2022-04-08 ENCOUNTER — OFFICE VISIT (OUTPATIENT)
Dept: RHEUMATOLOGY | Facility: CLINIC | Age: 72
End: 2022-04-08
Attending: INTERNAL MEDICINE
Payer: MEDICARE

## 2022-04-08 VITALS
WEIGHT: 286 LBS | BODY MASS INDEX: 38.26 KG/M2 | SYSTOLIC BLOOD PRESSURE: 167 MMHG | DIASTOLIC BLOOD PRESSURE: 98 MMHG | OXYGEN SATURATION: 98 % | HEART RATE: 66 BPM

## 2022-04-08 DIAGNOSIS — Z79.899 HIGH RISK MEDICATION USE: ICD-10-CM

## 2022-04-08 DIAGNOSIS — M06.09 RHEUMATOID ARTHRITIS OF MULTIPLE SITES WITH NEGATIVE RHEUMATOID FACTOR (H): ICD-10-CM

## 2022-04-08 DIAGNOSIS — Z79.631 LONG TERM METHOTREXATE USER: Primary | ICD-10-CM

## 2022-04-08 DIAGNOSIS — G62.9 PERIPHERAL POLYNEUROPATHY: ICD-10-CM

## 2022-04-08 DIAGNOSIS — D84.9 IMMUNOSUPPRESSED STATUS (H): ICD-10-CM

## 2022-04-08 PROCEDURE — 99214 OFFICE O/P EST MOD 30 MIN: CPT | Performed by: INTERNAL MEDICINE

## 2022-04-08 RX ORDER — METHOTREXATE 2.5 MG/1
8 TABLET ORAL
Qty: 96 TABLET | Refills: 3 | Status: SHIPPED | OUTPATIENT
Start: 2022-04-08 | End: 2022-09-30

## 2022-04-08 RX ORDER — HYDROXYCHLOROQUINE SULFATE 200 MG/1
200 TABLET, FILM COATED ORAL DAILY
Qty: 90 TABLET | Refills: 5 | Status: SHIPPED | OUTPATIENT
Start: 2022-04-08 | End: 2022-09-30

## 2022-04-08 RX ORDER — FOLIC ACID 1 MG/1
1 TABLET ORAL DAILY
Qty: 90 TABLET | Refills: 1 | Status: SHIPPED | OUTPATIENT
Start: 2022-04-08 | End: 2022-09-30

## 2022-04-08 NOTE — LETTER
"4/8/2022       RE: Lucio Daly  [address]  [place] [facility] [address]     Dear Colleague,    Thank you for referring your patient, Lucio Daly, to the [facility] RHEUMATOLOGY [facility]INIC [place] at [facility]. Please see a copy of my visit note below.    Rheumatology Clinic Visit  Jeremy Goodrich M.D.     Lucio Daly MRN# [id number]   YOB: 1950 Age: 71 year old     Date of Visit: .04/08/2022    Primary care provider: Saroj Tinoco         Assessment and Plan:   # Seronegative inflammatory arthritis: Patient relates sustained overall improvement in control of diffuse joint pain with combination therapy.  He relates residual \"extra\" pain affecting hands, feet, elbows, and left ankle.  Physical exam shows improved shoulder flexion; the left ankle is swollen but not tender.  Knees exhibit crepitus.  Laboratory evaluation Feb 17, 2022 showed creatinine of 1.32, increased from December 2021.  AST and ALT were normal;  Seronegative inflammatory arthritis continues improved with combination methotrexate and low-dose hydroxychloroquine.  I recommend continuing combination therapy.  Plan  # Continue methotrexate at 20 mg weekly, folic acid 2 mg day. While on drug  --High risk medication monitoring--labs q 3 mo AST/ALT, Albumin, CBC with plts, CRP   --Limit EtOH intake to 2 drinks weekly; use folate 2 mg daily  --Tylenol 325mg qid prn nausea/HA associated with dosing.    # hydroxychloroquine (plaquenil) 200 mg QD -annual eye exam done July 2021 [facility].  2. ILD Obliterative bronchiolitis, symptomatically stable.  Patient was seen by Dr. Mccarthy in pulmonary in July 2021, when mild decrease in total lung capacity was noted.  Recommendation was to repeat pulmonary function tests in 4 to 5 months.  3. Osteoarthritis knees, ankle, shoulder and neck and hands/thumbs. Continue isometric quad strengthening exercises twice daily to improve support around " "the joint. Left ankle osteoarthritis and overall osteoarthritis  is contributing to the overall pain. Continue splinting/hand therapy and acetaminophen 1000 mg tid ATC.  4. Trigger finger, L 4th: gradually increasing. Discussed future Hand surgery referral.    Plan  1.  Bloodwork every 4 months  2.  Continue methotrexate 8 tablets (20 mg) once weekly.While on methotrexate:   -- Check labs every 3 months (AST/ALT, Albumin, CBC with platelets)   -- Limit alcohol intake to 2 drinks weekly; use folate 1 mg daily.  --Tylenol 500-1000 mg can be used as needed up to three times daily for nausea/headache associated with dosing.    3.  Continue hydroxychloroquine 200 mg once daily.  While taking hydroxychloroquine, undergo annual ophthalmology evaluation to monitor for rare retinal Plaquenil toxicity (next visit due in summer 2022).  4. Continue bracing/sleeve/AFO use for L ankle instability/drop foot.  5. Acetaminophen 1000 mg 3 times daily for residual pain.    RTC 6 month  Jeremy Goodrich MD  Staff Rheumatologist, [facility]         History of Present Illness:   Lucio Daly \"Rich\" presents for f/u seronegative rheumatoid arthritis, sicca complex.  Last seen in .  Methotrexate and hydroxychloroquine were continued for seronegative arthritis.    Interval history April 8, 2022    He is doing about the same. He has daily pain in hands, feet, elbows, and shoulders, but ability to perform activities of daily living, and to perform walking 20 to 25 minutes without limitation, are unimpeded.  He has less than 30 minutes of morning stiffness.  His L great toe is dark and swollen; his whole L ankle and foot feel like they are \"in a vise\". The R leg goes completely numb with prolonged driving.  He notes pain in the R LBP when lying on his back. He has pain in the R lateral thigh with mattress contact or with prolonged immobility.  He has been offered surgery for spinal stenosis, but has been advised that he is \"high risk\" " for surgery.  He continues methotrexate 20 mg weekly; there is mild stomach upset associated with dose. He uses hydroxychloroquine 200 mg daily.  He notes increasing triggering in his L 4th finger.  He has been adjusting HTN medications with primary care.    Interval history 10-1-2021:    Patient relates overall stability.  He has daily pain in hands, feet, elbows, and shoulders, but ability to perform activities of daily living, and to perform walking 20 to 25 minutes without limitation, are unimpeded.  He has less than 30 minutes of morning stiffness.    Several weeks ago, he fell and twisted his left ankle in his walker.  Since then, weightbearing has been uncomfortable, but there has been gradually decreasing pain.  He has ongoing instability of the left leg which increases propensity to fall associated with foot drop on the left.  He controls instability and pain with use of left ankle sleeve, AFO, and bracing.    He continues on methotrexate, hydroxychloroquine, and Lyrica for inflammatory arthritis and arthropathy.  He reports that methotrexate is effective, because when he discontinued the medication during a recent dental procedure, diffuse joint pain recurred within 1 week.    PMH   1. Sensory neuropathy of unclear etiology - negative work up, managed on Lyrica.  Has chronic dysesthesia in pads of feet.  2. Parkinsonisim/Tremor disorder; sees neurology   3. Headaches   4. History of basal cell, squamous (most recent +bx 8-2014)  5. History of melanoma   6. GALO on CPAP.  7. HTN.   8. Seronegative RA, diagnosed 2009. SICCA  9.  JUARES. Work-up 4-2015. 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 Lumbar stenosis per MRI 2017   11. Bronchitis 5-2017, early pneumonia   12. Chicken pox   13. DEXA  Normal   14. Left ankle DJD, MRI  --seen Dr. Martinez 2-2019   Past Medical History:   Diagnosis Date     Abnormal EMG 4/18/2013     Abnormal involuntary movements(781.0)     Movement Disorder     AK  (actinic keratosis) 12/18/2011     Allergic rhinitis, cause unspecified     Allergic rhinitis     Balance problems 11/1/2011     Basal cell carcinoma      Bladder spasms 11/1/2011     Chronic osteoarthritis      COPD (chronic obstructive pulmonary disease) (H)     Interstial lung disease, obliderans bronchiolitis     Diaphragmatic hernia without mention of obstruction or gangrene      Earache or other ear, nose, or throat complaint      Esophageal reflux      Fatigue 11/1/2011     Fracture      H. pylori infection 5/12/2011     History of MRI of cervical spine 11/18/2013    EXAMINATION: CERVICAL SPINE G/E 5 VIEWS* 4/19/2013 4:18 PM  CLINICAL HISTORY: Pain in limb,Performing Location?->[facility]  COMPARISON:  FINDINGS: AP and lateral views in flexion and extension, as well as odontoid view of the cervical spine was obtained. There is no comparison available. The vertebral bodies of the cervical spine are normally aligned. There is posterior spurring and d     Incomplete defecation 11/1/2011     Interstitial lung disease (H) 11/29/2016     Laboratory test 8/7/2012     Lung disease June 2015     Malignant basal cell neoplasm of skin 8/6/2008     Melanoma (H) 8/6/2008     Melanoma in situ of lower leg (H)     R calf     Neuropathy 5/16/2011     Other bladder disorder      Other color vision deficiencies      Other nervous system complications      Parkinsonism (H) 11/1/2011     Personal history of colonic polyps      PLMD (periodic limb movement disorder) 12/16/2021     Polyneuropathy in other diseases classified elsewhere (H)      RA (rheumatoid arthritis) (H)      Rheumatoid arthritis of multiple sites with negative rheumatoid factor (H) 3/21/2016     Seronegative arthritis 11/18/2013     Shortness of breath      Shoulder arthritis 2016    acromioclavicular joint      Somatization disorder 5/12/2011     Squamous cell carcinoma      Tremor 11/1/2011     Unspecified essential hypertension      Unspecified  "hypothyroidism      Urinary tract infection      Urinary urgency 11/1/2011     Wears glasses 11/1/2011     Injuries-ankle sprains, left heel fracture    Family Hx   MGM Psoriasis  Sister -PPM  Family History   Problem Relation Age of Onset     Hypertension Mother      Heart Disease Mother         a fib     Arthritis Mother         \"osteo\"     Osteoporosis Mother      Skin Cancer Mother      Uterine Cancer Mother      Other Cancer Mother      Cancer Mother      Hypertension Brother      Diabetes Brother         \" post pancreatitis\"     Neurologic Disorder Sister         multiple sclerosis     Hypertension Sister      Heart Disease Sister      Multiple Sclerosis Sister      Heart Disease Sister         a fib     Arthritis Sister      Heart Failure Sister      Kidney Disease Sister      Dementia Sister      Hypertension Father      Cerebrovascular Disease Father         ,     Skin Cancer Father      Colon Polyps Father      Other - See Comments Son         [place]     Other - See Comments Son         [place]     Other - See Comments Son         day gonzalez     Psoriasis Maternal Grandfather      Stomach Cancer Maternal Grandfather      Cancer Maternal Grandfather      Congenital Anomalies Other         granddaughter with a chromosome defect     Other Cancer Other         Grand daughter     Cerebrovascular Disease Paternal Grandmother         ,     Cerebrovascular Disease Paternal Grandfather         ,     Cancer Granddaughter         Langerhans Cell Histiocytosis     Genetic Disease Granddaughter         gene translocation     Melanoma No family hx of      Colon Cancer No family hx of      Current Outpatient Medications   Medication Sig     acetaminophen (TYLENOL) 500 MG tablet 2 x 500mg by mouth 3 times per day: 7/730am, 4pm and 11pm  = 6/day     albuterol (PROAIR HFA/PROVENTIL HFA/VENTOLIN HFA) 108 (90 Base) MCG/ACT inhaler Inhale 2 puffs into the lungs every 4 hours as needed for shortness of breath / " dyspnea or wheezing     cholecalciferol (HM VITAMIN D3) 25 MCG (1000 UT) TABS 1000 unit tab by mouth daily     fluorouracil (EFUDEX) 5 % external cream      folic acid (FOLVITE) 1 MG tablet Take 1 tablet (1 mg) by mouth daily     hydroxychloroquine (PLAQUENIL) 200 MG tablet Take 1 tablet (200 mg) by mouth daily Daily at 9am.     ketoconazole (NIZORAL) 2 % external shampoo      lisinopril (ZESTRIL) 10 MG tablet Take 1 tablet (10 mg) by mouth daily     methotrexate sodium 2.5 MG TABS Take 8 tablets (20 mg) by mouth every 7 days *Monitoring labs every 8-12 weeks     metoprolol succinate ER (TOPROL-XL) 50 MG 24 hr tablet 1 and 1/2 tabs daily.     omeprazole (PRILOSEC) 40 MG DR capsule Take 1 capsule (40 mg) by mouth daily     ORDER FOR DME Use your BiPAP device as directed by your provider.     oxybutynin (DITROPAN) 5 MG tablet TAKE 1 TABLET BY MOUTH EVERY DAY AT 4PM     pregabalin (LYRICA) 50 MG capsule 50mg capsule by mouth at 7am and 4pm and 1-2 x 50mg capsules by mouth nightly at 11pm (4/day)     salmeterol (SEREVENT DISKUS) 50 MCG/DOSE inhaler Inhale 1 puff into the lungs 2 times daily     No current facility-administered medications for this visit.       Personal Hx   Home environment: No secondhand tobacco smoke in home.    History     Social History     Marital Status:      Spouse Name: N/A     Number of Children: N/A     Years of Education: N/A     Social History Main Topics     Smoking status: Former Smoker     Quit date: 09/30/2003     Smokeless tobacco: Never Used     Alcohol Use: No     Drug Use: No     Sexually Active: Not Currently -- Female partner(s)     Other Topics Concern     None     Social History Narrative    2013: Living in [place] in a townhouse with no stepsHas 3 sons that are doing okay.             Review of Systems:     14 points all negative except what is mentioned in HPI.  Review Of Systems  Skin: negative  Eyes: negative  Ears/Nose/Throat: negative  Respiratory: No shortness  of breath, dyspnea on exertion, cough, or hemoptysis  Cardiovascular: negative  Gastrointestinal: negative  Genitourinary: negative  Musculoskeletal: as above  Neurologic: as above  Psychiatric: negative  Hematologic/Lymphatic/Immunologic: negative  Endocrine: negative             Past Surgical History:   Past Surgical History:   Procedure Laterality Date     BIOPSY OF SKIN LESION       COLONOSCOPY       HEMORRHOID SURGERY       lip biopsy      for sicca complex     MOHS MICROGRAPHIC PROCEDURE       SOFT TISSUE SURGERY      removeal of basel cell carcinoma     Blood pressure (!) 167/98, pulse 66, weight 129.7 kg (286 lb), SpO2 98 %.  Wt Readings from Last 4 Encounters:   04/08/22 129.7 kg (286 lb)   12/17/21 131.5 kg (290 lb)   12/16/21 131.1 kg (289 lb)   10/01/21 136.2 kg (300 lb 4.8 oz)       Constitutional: well-developed, appearing stated age; cooperative  Eyes: nl EOM, PERRLA, vision, conjunctiva, sclera  ENT: nl external ears, nose, hearing, lips, teeth, gums, throat  No mucous membrane lesions, normal saliva pool  Neck: no mass or thyroid enlargement  Resp: lungs clear to auscultation, nl to palpation  CV: RRR, no murmurs, rubs or gallops, no edema  GI: no ABD mass or tenderness, no HSM  : not tested  Lymph: no cervical, supraclavicular, inguinal or epitrochlear nodes  MS: Left ankle slightly swollen; there is no synovitis at hands, wrists, elbows, or knees.  L great toe DIP swollen without tenderness. There is painful right shoulder flexion.  No tenderness at MTPs.  Skin: no nail pitting, alopecia, rash, nodules or lesions  Neuro: nl cranial nerves, strength, sensation, DTRs.   Psych: nl judgement, orientation, memory, affect.             Data:     RHEUM RESULTS Latest Ref Rng & Units 8/7/2003 7/22/2004 4/26/2005   ALBUMIN 3.4 - 5.0 g/dL - - -   ALT 0 - 70 U/L - - -   AST 0 - 45 U/L - - -   ANCA - - - -   CK TOTAL 30 - 300 U/L - - -   CREATININE 0.66 - 1.25 mg/dL 1.2 1.40 -   CREATININE (EXTERNAL) 0.60 -  1.30 mg/dL - - -   CRP 0.0 - 8.0 mg/L - - -   DNA 0 - 29 IU/mL - - -   KIMBERLY 0 - 1.0 - - <1.0   GFR ESTIMATE, IF BLACK >60 mL/min/[1.73:m2] - 68 -   GFR ESTIMATE >60 mL/min/1.73m2 - 56(L) -   HEMATOCRIT 40.0 - 53.0 % 43.7 - -   HEMOGLOBIN 13.3 - 17.7 g/dL 15.2 - -   HEPBANG NEG - - -   HCVAB NEG - - -   IGA 70 - 380 mg/dL - - 394(H)   IGM 60 - 265 mg/dL - - 98    - 1,620 mg/dL - - 1040   WBC 4.0 - 11.0 10e3/uL 7.0 - -   RBC 4.40 - 5.90 10e6/uL 4.98 - -   RDW 10.0 - 15.0 % 14.7 - -   MCHC 31.5 - 36.5 g/dL 34.8 - -   MCV 78 - 100 fL 88 - -   PLATELET COUNT 150 - 450 10e3/uL 221 - -   RHEUMATOID FACTOR 0 - 14 IU/mL - - -   ESR 0 - 20 mm/h 5 - 7       Rheumatoid Factor   Date Value Ref Range Status   05/27/2010 <7 0 - 14 IU/mL Final   ,  ,  ,   Cyclic Citrullinated Peptide IgG   Date Value Ref Range Status   05/27/2010 <2 <5 U/mL Final     Comment:     Interpretation:  Negative   ,  ,   SSA (RO) Antibody IgG   Date Value Ref Range Status   05/27/2010 1  Final     Comment:     Reference range: 0 to 40  Unit: AU/mL  (Note)  REFERENCE INTERVALS: SSA (Ro) (ELAN) Ab, IgG   29 AU/mL or Less ............. Negative   30 - 40 AU/mL ................ Equivocal   41 AU/mL or Greater .......... Positive    SSA (Ro) antibody is seen in 70-75% of Sjogren syndrome  cases, 30-40% of systemic lupus erythematosus (SLE) and  5-10% of progressive systemic sclerosis (PSS).  Performed by [facility],  [facility] [place] [address] [phone]  www.[facility], Chely Leija MD, Lab. Director     SSB (LA) Antibody IgG   Date Value Ref Range Status   05/27/2010 8  Final     Comment:     Reference range: 0 to 40  Unit: AU/mL  (Note)  REFERENCE INTERVALS: SSB (La) (ELAN) Ab, IgG   29 AU/mL or Less ............. Negative   30 - 40 AU/mL ................ Equivocal   41 AU/mL or Greater .......... Positive    SSB (La) antibody is seen in 50-60% of Sjogren syndrome  cases and is specific if it is the only ELAN antibody  present.  15-25% of patients with systemic lupus  erythematosus (SLE) and 5-10% of patients with progressive  systemic sclerosis (PSS) also have this antibody.  Performed by [facility],  [address] [facility] [place] [address] [phone]  www.[facility], Chely Leija MD, Lab. Director   ,  ,   MONICA Screen by E[facility]   Date Value Ref Range Status   05/27/2010 <1.0 0 - 1.0 Final     Comment:     Interpretation:  Negative   ,   DNA-ds   Date Value Ref Range Status   05/27/2010 <15 0 - 29 IU/mL Final     Comment:     Interpretation:  Negative   ,  ,  ,  ,  ,  ,  ,   Hep B Surface Agn   Date Value Ref Range Status   05/27/2010 Negative NEG Final   ,  ,  ,  ,  ,  ,  ,  ,  ,  ,  ,   Neutrophil Cytoplasmic IgG Antibody   Date Value Ref Range Status   06/26/2015   Final    <1:20  Reference range: <1:20  (Note)  The ANCA IFA is <1:20; therefore, no further testing will  be performed.  INTERPRETIVE INFORMATION: Anti-Neutrophil Cyto Ab, IgG  Neutrophil Cytoplasmic Antibodies (C-ANCA = granular  cytoplasmic staining, P-ANCA = perinuclear staining) are  found in the serum of over 90 percent of patients with  certain necrotizing systemic vasculitides, and usually in  less than 5 percent of patients with collagen vascular  disease or arthritis.  Performed by [facility],  [address] [facility] [address] [phone]  www.[facility], Brad Fall MD, Lab. Director       ,  ,  ,  ,  ,  ,  ,  ,  ,   Albumin Fraction   Date Value Ref Range Status   05/27/2010 4.0 3.7 - 5.1 g/dL Final     Alpha 2 Fraction   Date Value Ref Range Status   05/27/2010 0.6 0.5 - 0.9 g/dL Final     Beta Fraction   Date Value Ref Range Status   05/27/2010 1.0 0.6 - 1.0 g/dL Final     Gamma Fraction   Date Value Ref Range Status   05/27/2010 1.0 0.7 - 1.6 g/dL Final     Monoclonal Peak   Date Value Ref Range Status   05/27/2010 0.0 0.0 g/dL Final     ELP Interpretation:   Date Value Ref Range Status   05/27/2010   Final    Essentially normal electrophoretic  pattern.  No monoclonal protein seen.     Comment:      Pathologic significance requires clinical correlation.  ASHLEY Noble M.D.,   Ph.D., Pathologist ()   ,  ,   Immunofixation ELP   Date Value Ref Range Status   06/09/2016   Final    No monoclonal protein seen on immunofixation.  Pathological significance   requires clinical correlation.   ASHLEY Noble M.D., Ph.D   Pathologist ([phone])       IGG   Date Value Ref Range Status   06/09/2016 1,020 695 - 1,620 mg/dL Final     IGA   Date Value Ref Range Status   06/09/2016 374 70 - 380 mg/dL Final     IGM   Date Value Ref Range Status   06/09/2016 81 60 - 265 mg/dL Final     Again, thank you for allowing me to participate in the care of your patient.      Sincerely,    Jeremy Goodrich MD

## 2022-04-08 NOTE — PATIENT INSTRUCTIONS
Diagnosis:  1.  Inflammatory arthritis: Overall good control of inflammatory joint symptoms with combination low-dose hydroxychloroquine and methotrexate.  I recommend no change in the chronic treatment.  2. Neuropathy: I recommend continuing Lyrica  3. Trigger finger, L 4th digit: Hand referral in order if it progresses  4. L ankle/foot pain: pain likely due to tendonitis and/or neuropathy, not to active inflammatory arthritis.  5. Spinal stenosis  6. Obesity: significant weight loss since 10-21.      Plan:  1.  Bloodwork every 4 months  2.  Continue methotrexate 8 tablets (20 mg) once weekly.While on methotrexate:   -- Check labs every 3 months (AST/ALT, Albumin, CBC with platelets)   -- Limit alcohol intake to 2 drinks weekly; use folate 1 mg daily.  --Tylenol 500-1000 mg can be used as needed up to three times daily for nausea/headache associated with dosing.    3.  Continue hydroxychloroquine 200 mg once daily.  While taking hydroxychloroquine, undergo annual ophthalmology evaluation to monitor for rare retinal Plaquenil toxicity (next visit due in summer 2022).  4. Continue bracing/sleeve/AFO use for L ankle instability/drop foot.  5. Acetaminophen 1000 mg 3 times daily for residual pain.

## 2022-04-10 ASSESSMENT — ENCOUNTER SYMPTOMS
LEG PAIN: 1
ORTHOPNEA: 1
SPUTUM PRODUCTION: 0
SHORTNESS OF BREATH: 0
WEAKNESS: 1
WHEEZING: 0
HYPERTENSION: 1
COUGH DISTURBING SLEEP: 0
NUMBNESS: 1
LIGHT-HEADEDNESS: 1
MYALGIAS: 1
HEARTBURN: 1
BLOOD IN STOOL: 1
DIZZINESS: 1
CONSTIPATION: 1
MUSCLE CRAMPS: 1
DYSPNEA ON EXERTION: 0
HEMOPTYSIS: 0
BACK PAIN: 1
NECK PAIN: 1
SNORES LOUDLY: 0
POSTURAL DYSPNEA: 1
COUGH: 0

## 2022-04-13 ENCOUNTER — OFFICE VISIT (OUTPATIENT)
Dept: PULMONOLOGY | Facility: CLINIC | Age: 72
End: 2022-04-13
Attending: INTERNAL MEDICINE
Payer: MEDICARE

## 2022-04-13 VITALS
WEIGHT: 286 LBS | SYSTOLIC BLOOD PRESSURE: 128 MMHG | DIASTOLIC BLOOD PRESSURE: 79 MMHG | OXYGEN SATURATION: 97 % | HEIGHT: 73 IN | HEART RATE: 50 BPM | BODY MASS INDEX: 37.91 KG/M2

## 2022-04-13 DIAGNOSIS — J84.9 INTERSTITIAL LUNG DISEASE (H): Primary | ICD-10-CM

## 2022-04-13 DIAGNOSIS — R00.1 BRADYCARDIA: Primary | ICD-10-CM

## 2022-04-13 LAB
DLCOUNC-%PRED-PRE: 103 %
DLCOUNC-PRE: 28.88 ML/MIN/MMHG
DLCOUNC-PRED: 27.85 ML/MIN/MMHG
ERV-%PRED-PRE: 121 %
ERV-PRE: 0.88 L
ERV-PRED: 0.73 L
EXPTIME-PRE: 8.68 SEC
FEF2575-%PRED-PRE: 87 %
FEF2575-PRE: 2.17 L/SEC
FEF2575-PRED: 2.48 L/SEC
FEFMAX-%PRED-PRE: 102 %
FEFMAX-PRE: 8.98 L/SEC
FEFMAX-PRED: 8.74 L/SEC
FEV1-%PRED-PRE: 88 %
FEV1-PRE: 3.02 L
FEV1FEV6-PRE: 75 %
FEV1FEV6-PRED: 77 %
FEV1FVC-PRE: 74 %
FEV1FVC-PRED: 75 %
FEV1SVC-PRE: 71 %
FEV1SVC-PRED: 65 %
FIFMAX-PRE: 7.68 L/SEC
FRCPLETH-%PRED-PRE: 107 %
FRCPLETH-PRE: 4.2 L
FRCPLETH-PRED: 3.91 L
FVC-%PRED-PRE: 88 %
FVC-PRE: 4.07 L
FVC-PRED: 4.58 L
IC-%PRED-PRE: 74 %
IC-PRE: 3.37 L
IC-PRED: 4.49 L
RVPLETH-%PRED-PRE: 118 %
RVPLETH-PRE: 3.32 L
RVPLETH-PRED: 2.81 L
TLCPLETH-%PRED-PRE: 97 %
TLCPLETH-PRE: 7.57 L
TLCPLETH-PRED: 7.74 L
VA-%PRED-PRE: 84 %
VA-PRE: 5.98 L
VC-%PRED-PRE: 81 %
VC-PRE: 4.25 L
VC-PRED: 5.22 L

## 2022-04-13 PROCEDURE — 99214 OFFICE O/P EST MOD 30 MIN: CPT | Mod: 25 | Performed by: INTERNAL MEDICINE

## 2022-04-13 PROCEDURE — 94729 DIFFUSING CAPACITY: CPT | Performed by: INTERNAL MEDICINE

## 2022-04-13 PROCEDURE — G0463 HOSPITAL OUTPT CLINIC VISIT: HCPCS | Mod: 25

## 2022-04-13 PROCEDURE — 93005 ELECTROCARDIOGRAM TRACING: CPT | Mod: RTG

## 2022-04-13 PROCEDURE — 94375 RESPIRATORY FLOW VOLUME LOOP: CPT | Performed by: INTERNAL MEDICINE

## 2022-04-13 PROCEDURE — 94726 PLETHYSMOGRAPHY LUNG VOLUMES: CPT | Performed by: INTERNAL MEDICINE

## 2022-04-13 ASSESSMENT — PAIN SCALES - GENERAL: PAINLEVEL: NO PAIN (0)

## 2022-04-13 NOTE — NURSING NOTE
Chief Complaint   Patient presents with     Interstitial Lung Disease (ILD)     ILD F/U      Vitals were taken and medications were reconciled.     Yolette Villatoro RMA  7:49 AM

## 2022-04-13 NOTE — PROGRESS NOTES
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  HPI    The patient was seen and examined by Harsha Mccarthy MD     Mr. 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  He is on methotrexate 20 mg per week and hydroxychloroquine 200 mg per day.  Since his last clinic visit, he has been evaluated by his primary care physician.  He was found to be hypertensive, and his dose of metoprolol has been increased, and he was also placed on lisinopril with a question of increasing the dose.  He was also seen by Dr. Goodrich, and no changes in the medications are being planned.    Today Lucio tells me that he has fatigue.  He has discussed this with his primary care physician.  The plan is to proceed with a colonoscopy, which is due.  His heart rate has been running on the lower side.  Today it is 50.  He reports good blood pressure control.  From an endurance standpoint, he reports no significant changes.      Current Outpatient Medications   Medication     acetaminophen (TYLENOL) 500 MG tablet     albuterol (PROAIR HFA/PROVENTIL HFA/VENTOLIN HFA) 108 (90 Base) MCG/ACT inhaler     cholecalciferol (HM VITAMIN D3) 25 MCG (1000 UT) TABS     fluorouracil (EFUDEX) 5 % external cream     folic acid (FOLVITE) 1 MG tablet     hydroxychloroquine (PLAQUENIL) 200 MG tablet     ketoconazole (NIZORAL) 2 % external shampoo     lisinopril (ZESTRIL) 10 MG tablet     methotrexate sodium 2.5 MG TABS     metoprolol succinate ER (TOPROL-XL) 50 MG 24 hr tablet     omeprazole (PRILOSEC) 40 MG DR capsule     ORDER FOR DME     oxybutynin (DITROPAN) 5 MG tablet     pregabalin (LYRICA) 50 MG capsule     salmeterol (SEREVENT DISKUS) 50 MCG/DOSE inhaler     No current facility-administered medications for this visit.     Allergies   Allergen Reactions     Restasis      Burning eyes, problems with  breathing, tightness in chest     Adhesive Tape      Bandages misc     Allegra      EXCESSIVE URINATION AND WEAKNESS, LIGHT-HEADED     Allergy      Dust     Animal Dander      Benadryl Allergy      EXCESSIVE URINATION AND WEAKNESS, LIGHT-HEADED     Cephalexin      Joint pain or gerd aggravation. Bloating excessive urination      Cephalosporins      Cyclosporine      Diphenhydramine Other (See Comments)     Doxycycline      Doxycycline Hyclate Nausea     SWEATING,MIGRAINES,LOSS OF APPETITE,SWEATING,LIGHT HEADED, EXCESSIVE URINATION     Fexofenadine Other (See Comments)     Flonase [Fluticasone Propionate]      Gabapentin      Neurontin: mosd changes and excess urination     Hydrocortisone      Iodine      Iodine Solution [Povidone Iodine]      SKIN MELTS     Levaquin      From surgeon--? Joint pain ? Gerd aggravation. Insomnia, excess urination     Levofloxacin      Mylanta      EXCESSIVE URINATION AND WEAKNESS, LIGHT-HEADED     Oxcarbazepine      Prednisone      Weakness, elevated bp, headache, eye pain, congestion      Prednisone      Seafood [Seafood]      Shellfish Allergy      Hives       Simethicone      Sulfamethoxazole W/Trimethoprim      Sulfamethoxazole-Trimethoprim      Chest pain, angina     Symbicort GI Disturbance and Nausea     Trees      Trileptal      SEVERE JOINT AND TENDON PAIN, INSOMNIA, RESTLESSNESS, NAUSEA, EXCESS URINATION     Cortizone Rash     EXCESS URINATION,WEAKNESS,NAUSEA, HEADACHE     Past Medical History:   Diagnosis Date     Abnormal EMG 4/18/2013     Abnormal involuntary movements(781.0)     Movement Disorder     AK (actinic keratosis) 12/18/2011     Allergic rhinitis, cause unspecified     Allergic rhinitis     Balance problems 11/1/2011     Basal cell carcinoma      Bladder spasms 11/1/2011     Chronic osteoarthritis      COPD (chronic obstructive pulmonary disease) (H)     Interstial lung disease, obliderans bronchiolitis     Diaphragmatic hernia without mention of obstruction or  gangrene      Earache or other ear, nose, or throat complaint      Esophageal reflux      Fatigue 11/1/2011     Fracture      H. pylori infection 5/12/2011     History of MRI of cervical spine 11/18/2013    EXAMINATION: CERVICAL SPINE G/E 5 VIEWS* 4/19/2013 4:18 PM  CLINICAL HISTORY: Pain in limb,Performing Location?->[facility] ([facility]),  COMPARISON:  FINDINGS: AP and lateral views in flexion and extension, as well as odontoid view of the cervical spine was obtained. There is no comparison available. The vertebral bodies of the cervical spine are normally aligned. There is posterior spurring and d     Incomplete defecation 11/1/2011     Interstitial lung disease (H) 11/29/2016     Laboratory test 8/7/2012     Lung disease June 2015     Malignant basal cell neoplasm of skin 8/6/2008     Melanoma (H) 8/6/2008     Melanoma in situ of lower leg (H)     R calf     Neuropathy 5/16/2011     Other bladder disorder      Other color vision deficiencies      Other nervous system complications      Parkinsonism (H) 11/1/2011     Personal history of colonic polyps      PLMD (periodic limb movement disorder) 12/16/2021     Polyneuropathy in other diseases classified elsewhere (H)      RA (rheumatoid arthritis) (H)      Rheumatoid arthritis of multiple sites with negative rheumatoid factor (H) 3/21/2016     Seronegative arthritis 11/18/2013     Shortness of breath      Shoulder arthritis 2016    acromioclavicular joint      Somatization disorder 5/12/2011     Squamous cell carcinoma      Tremor 11/1/2011     Unspecified essential hypertension      Unspecified hypothyroidism      Urinary tract infection      Urinary urgency 11/1/2011     Wears glasses 11/1/2011       Past Surgical History:   Procedure Laterality Date     BIOPSY OF SKIN LESION       COLONOSCOPY       HEMORRHOID SURGERY       lip biopsy      for sicca complex     MOHS MICROGRAPHIC PROCEDURE       SOFT TISSUE SURGERY      removeal of basel cell carcinoma        Social History     Socioeconomic History     Marital status:      Spouse name: Not on file     Number of children: Not on file     Years of education: Not on file     Highest education level: Not on file   Occupational History     Not on file   Tobacco Use     Smoking status: Former Smoker     Packs/day: 1.50     Years: 37.00     Pack years: 55.50     Types: Cigarettes     Start date: 6/15/1963     Quit date: 2000     Years since quittin.5     Smokeless tobacco: Never Used   Substance and Sexual Activity     Alcohol use: Not Currently     Alcohol/week: 0.0 standard drinks     Drug use: Never     Sexual activity: Not Currently     Partners: Female     Birth control/protection: None   Other Topics Concern     Parent/sibling w/ CABG, MI or angioplasty before 65F 55M? No   Social History Narrative    2013: Living in [place] in a townhouse with no steps    Has 3 sons that are doing okay.         Dairy/d 1 servings/d.     Caffeine 0 servings/d    Exercise 0 x week    Sunscreen used - No    Seatbelts used - Yes    Working smoke/CO detectors in the home - Yes    Guns stored in the home - Yes    Self Breast Exams - NA    Self Testicular Exam - Yes    Eye Exam up to date - Yes 2008    Dental Exam up to date - Yes 2006    Pap Smear up to date - NA    Mammogram up to date - NA    PSA up to date - Yes 2008    Dexa Scan up to date - No    Flex Sig / Colonoscopy up to date - Yes less than 5 yrs ago    Immunizations up to date -today    Abuse: Current or Past(Physical, Sexual or Emotional)- No    Do you feel safe in your environment - Yes    2008                 Social Determinants of Health     Financial Resource Strain: Low Risk      Difficulty of Paying Living Expenses: Not hard at all   Food Insecurity: No Food Insecurity     Worried About Running Out of Food in the Last Year: Never true     Ran Out of Food in the Last Year: Never true   Transportation Needs: No Transportation Needs     Lack of  "Transportation (Medical): No     Lack of Transportation (Non-Medical): No   Physical Activity: Unknown     Days of Exercise per Week: 4 days     Minutes of Exercise per Session: Not on file   Stress: No Stress Concern Present     Feeling of Stress : Not at all   Social Connections: Moderately Integrated     Frequency of Communication with Friends and Family: Three times a week     Frequency of Social Gatherings with Friends and Family: Once a week     Attends Gnosticist Services: More than 4 times per year     Active Member of Clubs or Organizations: No     Attends Club or Organization Meetings: Not on file     Marital Status:    Intimate Partner Violence: Not on file   Housing Stability: Low Risk      Unable to Pay for Housing in the Last Year: No     Number of Places Lived in the Last Year: 2     Unstable Housing in the Last Year: No       ROS Pulmonary  A complete ROS was otherwise negative except as noted in the HPI.  /79 (BP Location: Right arm, Cuff Size: Adult Large)   Pulse 50   Ht 1.854 m (6' 1\")   Wt 129.7 kg (286 lb)   SpO2 97%   BMI 37.73 kg/m    Exam:   GENERAL APPEARANCE: Alert, and in no apparent distress.  EYES: PERRL, EOMI  MOUTH: Oral mucosa is moist, without any lesions,, no oropharyngeal exudate.  NECK: supple, no masses, no thyromegaly.  LYMPHATICS: No significant axillary, cervical, or supraclavicular nodes.  RESP: normal percussion, good air flow throughout.  ** crackles. No rhonchi. No wheezes.  CV: Normal S1, S2, regular rhythm, normal rate. No murmur.  No rub. No gallop. No LE edema.   ABDOMEN:  Bowel sounds normal, soft, nontender, no HSM or masses.   MS: extremities normal. No clubbing. No cyanosis.  SKIN: no rash on limited exam  NEURO: Mentation intact, speech normal, normal strength and tone, normal gait and stance  Results    PFTs done today were reviewed by me with the patient.  FVC 4.07 liters (88%), FEV1 3.02 liters (88%) and the ratio is 74.  Total lung capacity " is 7.57 (97%) and the DLCO not corrected for hemoglobin is 103% of predicted.  My interpretation is that the spirometry, lung volumes and diffusion capacity are all in the normal range.  Mid flow rates are in the normal range.  However,  RV is 118% of predicted, indicating air trapping, which is expected in small airway disease.    Assessment and plan: Mr. 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  He is currently on methotrexate and hydroxychloroquine with stable symptoms.    1.  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  His PFTs are stable.  We will continue to monitor.  Follow up  in 6 months.  2.  Bradycardia: Unclear if this could be contributing to his fatigue.  There were changes that were made to his hypertension regimen.  EKG done today shows sinus bradycardia with sinus arrhythmia.  I suspect this is related to his current dose of metoprolol.  I am asking him to review this with his primary care physician.  3.  Sleep-disordered breathing/neuromuscular weakness: On NIPPV.    I will see him back in 6 months.    This note consists of symbols derived from keyboarding, dictation and/or voice recognition software. As a result, there may be errors in the script that have gone undetected. Please consider this when interpreting information found in this chart    Answers for HPI/ROS submitted by the patient on 4/10/2022  General Symptoms: No  Skin Symptoms: No  HENT Symptoms: No  EYE SYMPTOMS: No  HEART SYMPTOMS: Yes  LUNG SYMPTOMS: Yes  INTESTINAL SYMPTOMS: Yes      URINARY SYMPTOMS: No  REPRODUCTIVE SYMPTOMS: No  SKELETAL SYMPTOMS: Yes  BLOOD SYMPTOMS: No  NERVOUS SYSTEM SYMPTOMS: Yes  MENTAL HEALTH SYMPTOMS: No  Pain in legs with walking: Yes  Trouble breathing while lying down: Yes  High blood pressure: Yes  Light-headedness: Yes  Cough: No  Sputum or phlegm: No  Coughing up blood:  No  Difficulty breating or shortness of breath: No  Snoring: No  Wheezing: No  Difficulty breathing on exertion: No  Nighttime Cough: No  Difficulty breathing when lying flat: Yes  Heart burn or indigestion: Yes  Constipation: Yes  Blood in stool: Yes  Back pain: Yes  Muscle aches: Yes  Neck pain: Yes  Muscle cramps: Yes  Dizziness or trouble with balance: Yes  Weakness: Yes  Numbness: Yes

## 2022-04-14 LAB
ATRIAL RATE - MUSE: 56 BPM
DIASTOLIC BLOOD PRESSURE - MUSE: NORMAL MMHG
INTERPRETATION ECG - MUSE: NORMAL
P AXIS - MUSE: 45 DEGREES
PR INTERVAL - MUSE: 178 MS
QRS DURATION - MUSE: 76 MS
QT - MUSE: 434 MS
QTC - MUSE: 418 MS
R AXIS - MUSE: 17 DEGREES
SYSTOLIC BLOOD PRESSURE - MUSE: NORMAL MMHG
T AXIS - MUSE: 19 DEGREES
VENTRICULAR RATE- MUSE: 56 BPM

## 2022-04-20 ENCOUNTER — TELEPHONE (OUTPATIENT)
Dept: SLEEP MEDICINE | Facility: CLINIC | Age: 72
End: 2022-04-20

## 2022-04-20 NOTE — TELEPHONE ENCOUNTER
Reason for Call:  Other call back    Detailed comments: Cpap    Patient is wondering if he should bring his Cpap in to get read before the appointment in May.     Phone Number Patient can be reached at: Cell number on file:    Telephone Information:   Mobile [phone]       Best Time: Anytime    Can we leave a detailed message on this number? YES    Call taken on 4/20/2022 at 8:36 AM by Suha Coronado

## 2022-04-25 ENCOUNTER — OFFICE VISIT (OUTPATIENT)
Dept: PEDIATRICS | Facility: CLINIC | Age: 72
End: 2022-04-25
Payer: MEDICARE

## 2022-04-25 VITALS
RESPIRATION RATE: 20 BRPM | OXYGEN SATURATION: 100 % | TEMPERATURE: 96.7 F | HEART RATE: 58 BPM | SYSTOLIC BLOOD PRESSURE: 178 MMHG | DIASTOLIC BLOOD PRESSURE: 85 MMHG | WEIGHT: 288 LBS | HEIGHT: 73 IN | BODY MASS INDEX: 38.17 KG/M2

## 2022-04-25 DIAGNOSIS — J44.81 BRONCHIOLITIS OBLITERANS (H): ICD-10-CM

## 2022-04-25 DIAGNOSIS — Z01.818 PREOP GENERAL PHYSICAL EXAM: Primary | ICD-10-CM

## 2022-04-25 DIAGNOSIS — I10 PRIMARY HYPERTENSION: ICD-10-CM

## 2022-04-25 DIAGNOSIS — K21.00 GASTROESOPHAGEAL REFLUX DISEASE WITH ESOPHAGITIS, UNSPECIFIED WHETHER HEMORRHAGE: ICD-10-CM

## 2022-04-25 DIAGNOSIS — R97.20 ELEVATED PROSTATE SPECIFIC ANTIGEN (PSA): ICD-10-CM

## 2022-04-25 DIAGNOSIS — Z12.11 SCREEN FOR COLON CANCER: ICD-10-CM

## 2022-04-25 DIAGNOSIS — G70.9 NEUROMUSCULAR DISEASE (H): ICD-10-CM

## 2022-04-25 DIAGNOSIS — E66.01 MORBID OBESITY (H): ICD-10-CM

## 2022-04-25 PROCEDURE — 99214 OFFICE O/P EST MOD 30 MIN: CPT | Performed by: INTERNAL MEDICINE

## 2022-04-25 PROCEDURE — 91305 COVID-19,PF,PFIZER (12+ YRS): CPT | Performed by: INTERNAL MEDICINE

## 2022-04-25 PROCEDURE — 0054A COVID-19,PF,PFIZER (12+ YRS): CPT | Performed by: INTERNAL MEDICINE

## 2022-04-25 RX ORDER — LISINOPRIL AND HYDROCHLOROTHIAZIDE 20; 25 MG/1; MG/1
1 TABLET ORAL DAILY
Qty: 90 TABLET | Refills: 3 | Status: SHIPPED | OUTPATIENT
Start: 2022-04-25 | End: 2022-12-20

## 2022-04-25 ASSESSMENT — PAIN SCALES - GENERAL: PAINLEVEL: NO PAIN (0)

## 2022-04-25 NOTE — PROGRESS NOTES
"[facility]  [address]  [address]  [place] [address]  Phone: [phone]  Fax: [phone]  Primary Provider: Heladio Calixto        PREOPERATIVE EVALUATION:  Today's date: 4/25/2022    Lucio Daly is a 71 year old male who presents for a preoperative evaluation.    Surgical Information:  Surgery/Procedure: oral, colonoscopy, endoscopy    Surgery Location:   Surgeon:   Surgery Date: unknown   Time of Surgery:   Where patient plans to recover: At home with family  Fax number for surgical facility: pending    May have upcoming colonoscopy and oral surgery (impacted wisdom tooth.)    Recently noted to have some bradycardia; on beta blocker.  Some recent fatigue.  pulmologist suggested follow up with primary care provider before procedures.     Type of Anesthesia Anticipated: to be determined    Assessment & Plan     The proposed surgical procedure is considered LOW risk.    Screen for colon cancer  Surgical and post-op care per primary surgical service.    - Adult Gastro Ref - Procedure Only; Future    Primary hypertension  Not at goal. Will increase ACE inhibitor, add hydrochlorothiazide and follow up in 2 weeks at nurse only blood pressure recheck.  Titrate upwards.   - lisinopril-hydrochlorothiazide (ZESTORETIC) 20-25 MG tablet; Take 1 tablet by mouth daily    Neuromuscular disease (H)  Stable.     Bronchiolitis obliterans (H)  Management per pulm with long acting beta agonist and albuterol.  Recently seen     Morbid obesity (H)  Discussed weight as contributing factor to his fatigue.     Gastroesophageal reflux disease with esophagitis, unspecified whether hemorrhage  Avoid alcohol, caffeine, tobacco, spicy foods, citrus foods, aspirin and anti-inflammatories like ibuprofen (\"Advil\" and \"Motrin\") or naproxen (\"Aleve\").   - Adult Gastro Ref - Procedure Only; Future    Preop general physical exam  Advised to stop all aspirin products and nonsteroidals (like ibuprofen " or naproxen) for 10 days prior to procedure.  Advised follow surgical center's instructions re: arrival time and when to stop eating.     Elevated prostate specific antigen (PSA)  Pending urology evaluation in June.   - Adult Urology Referral; Future    Impacted wisdom tooth: Surgical and post-op care per primary surgical service.                 RECOMMENDATION:  APPROVAL GIVEN to proceed with proposed procedure, without further diagnostic evaluation.                      Subjective     HPI related to upcoming procedure: impacted wisdom tooth, need for colonoscopy     Preop Questions 4/19/2022   1. Have you ever had a heart attack or stroke? No   2. Have you ever had surgery on your heart or blood vessels, such as a stent placement, a coronary artery bypass, or surgery on an artery in your head, neck, heart, or legs? No   3. Do you have chest pain with activity? No   4. Do you have a history of  heart failure? No   5. Do you currently have a cold, bronchitis or symptoms of other infection? No   6. Do you have a cough, shortness of breath, or wheezing? No   7. Do you or anyone in your family have previous history of blood clots? No   8. Do you or does anyone in your family have a serious bleeding problem such as prolonged bleeding following surgeries or cuts? No   9. Have you ever had problems with anemia or been told to take iron pills? No   10. Have you had any abnormal blood loss such as black, tarry or bloody stools? YES - none recently.    11. Have you ever had a blood transfusion? No   12. Are you willing to have a blood transfusion if it is medically needed before, during, or after your surgery? Yes   13. Have you or any of your relatives ever had problems with anesthesia? No   14. Do you have sleep apnea, excessive snoring or daytime drowsiness? YES -    14a. Do you have a CPAP machine? Yes   15. Do you have any artifical heart valves or other implanted medical devices like a pacemaker, defibrillator, or  continuous glucose monitor? No   16. Do you have artificial joints? No   17. Are you allergic to latex? No       Health Care Directive:  Patient has a Health Care Directive on file      Preoperative Review of :   reviewed - no record of controlled substances prescribed.      Status of Chronic Conditions:  See problem list for active medical problems.  Problems all longstanding and stable, except as noted/documented.  See ROS for pertinent symptoms related to these conditions.      Review of Systems  CONSTITUTIONAL: NEGATIVE for fever, chills, change in weight  INTEGUMENTARY/SKIN: NEGATIVE for worrisome rashes, moles or lesions  EYES: NEGATIVE for vision changes or irritation  ENT/MOUTH: NEGATIVE for ear, mouth and throat problems  RESP: NEGATIVE for significant cough or SOB  CV: NEGATIVE for chest pain, palpitations or peripheral edema  GI: NEGATIVE for nausea, abdominal pain, heartburn, or change in bowel habits  : NEGATIVE for frequency, dysuria, or hematuria  MUSCULOSKELETAL: NEGATIVE for significant arthralgias or myalgia  NEURO: NEGATIVE for weakness, dizziness or paresthesias  ENDOCRINE: NEGATIVE for temperature intolerance, skin/hair changes  HEME: NEGATIVE for bleeding problems  PSYCHIATRIC: NEGATIVE for changes in mood or affect    Patient Active Problem List    Diagnosis Date Noted     Elevated prostate specific antigen (PSA) 12/20/2021     Priority: Medium     PLMD (periodic limb movement disorder) 12/16/2021     Priority: Medium     Immunosuppressed status (H) 12/16/2020     Priority: Medium     Bronchiolitis obliterans (H) 05/14/2020     Priority: Medium     Follows with Pulm.        Hypertension 05/14/2020     Priority: Medium     Rheumatoid arthritis (H) 05/14/2020     Priority: Medium     Neuromuscular disease (H) 11/05/2019     Priority: Medium     Follows with Neuro       Interstitial lung disease (H) 11/29/2016     Priority: Medium     Obliterative bronchiolitis.        Morbid obesity (H)  03/22/2016     Priority: Medium     Peripheral polyneuropathy 03/15/2016     Priority: Medium     Adenomatous polyp of colon 02/26/2015     Priority: Medium     2 small adenomas 2015 [facility]. Next colonoscopy 2020.       Seronegative arthritis 11/18/2013     Priority: Medium     Abnormal EMG 04/18/2013     Priority: Medium     EMG 2/2012 study by Dr. Gonzalez   Interpretation:   1. Attenuation of left sural sensory nerve action potential.  2. No evidence of myopathy  3.        AK (actinic keratosis) 12/18/2011     Priority: Medium     Tremor 11/01/2011     Priority: Medium     Incomplete defecation 11/01/2011     Priority: Medium     Balance problems 11/01/2011     Priority: Medium     Sicca syndrome (H) 05/16/2011     Priority: Medium     Lip biopsy was done and was negative?  Has dry mouth.  Seronegative sicca complex       Urinary incontinence 03/28/2011     Priority: Medium     Overactive bladder       Chronic maxillary sinusitis 09/14/2009     Priority: Medium     GALO (obstructive sleep apnea) 07/06/2009     Priority: Medium     Hx of melanoma of skin 08/06/2008     Priority: Medium     Rt leg Dr Ez NGUYEN-COMPLETE EXCISION       Malignant basal cell neoplasm of skin 08/06/2008     Priority: Medium     ARMS EXCISED DR EZ NGUYEN  Do you wish to do the replacement in the background? yes         Diaphragmatic hernia 06/30/2004     Priority: Medium     Problem list name updated by automated process. Provider to review       Esophageal reflux 06/30/2004     Priority: Medium      Past Medical History:   Diagnosis Date     Abnormal EMG 4/18/2013     Abnormal involuntary movements(781.0)     Movement Disorder     AK (actinic keratosis) 12/18/2011     Allergic rhinitis, cause unspecified     Allergic rhinitis     Balance problems 11/1/2011     Basal cell carcinoma      Bladder spasms 11/1/2011     Chronic osteoarthritis      COPD (chronic obstructive pulmonary disease) (H)     Interstial lung disease, obliderans bronchiolitis      Diaphragmatic hernia without mention of obstruction or gangrene      Earache or other ear, nose, or throat complaint      Esophageal reflux      Fatigue 11/1/2011     Fracture      H. pylori infection 5/12/2011     History of MRI of cervical spine 11/18/2013    EXAMINATION: CERVICAL SPINE G/E 5 VIEWS* 4/19/2013 4:18 PM  CLINICAL HISTORY: Pain in limb,Performing Location?->[facility] ([facility]),  COMPARISON:  FINDINGS: AP and lateral views in flexion and extension, as well as odontoid view of the cervical spine was obtained. There is no comparison available. The vertebral bodies of the cervical spine are normally aligned. There is posterior spurring and d     Incomplete defecation 11/1/2011     Interstitial lung disease (H) 11/29/2016     Laboratory test 8/7/2012     Lung disease June 2015     Malignant basal cell neoplasm of skin 8/6/2008     Melanoma (H) 8/6/2008     Melanoma in situ of lower leg (H)     R calf     Neuropathy 5/16/2011     Other bladder disorder      Other color vision deficiencies      Other nervous system complications      Parkinsonism (H) 11/1/2011     Personal history of colonic polyps      PLMD (periodic limb movement disorder) 12/16/2021     Polyneuropathy in other diseases classified elsewhere (H)      RA (rheumatoid arthritis) (H)      Rheumatoid arthritis of multiple sites with negative rheumatoid factor (H) 3/21/2016     Seronegative arthritis 11/18/2013     Shortness of breath      Shoulder arthritis 2016    acromioclavicular joint      Somatization disorder 5/12/2011     Squamous cell carcinoma      Tremor 11/1/2011     Unspecified essential hypertension      Unspecified hypothyroidism      Urinary tract infection      Urinary urgency 11/1/2011     Wears glasses 11/1/2011     Past Surgical History:   Procedure Laterality Date     BIOPSY OF SKIN LESION       COLONOSCOPY       HEMORRHOID SURGERY       lip biopsy      for sicca complex     MOHS MICROGRAPHIC PROCEDURE       SOFT TISSUE  SURGERY      removeal of basel cell carcinoma     Current Outpatient Medications   Medication Sig Dispense Refill     acetaminophen (TYLENOL) 500 MG tablet 2 x 500mg by mouth 3 times per day: 7/730am, 4pm and 11pm  = 6/day       albuterol (PROAIR HFA/PROVENTIL HFA/VENTOLIN HFA) 108 (90 Base) MCG/ACT inhaler Inhale 2 puffs into the lungs every 4 hours as needed for shortness of breath / dyspnea or wheezing 6.7 g 1     cholecalciferol (HM VITAMIN D3) 25 MCG (1000 UT) TABS 1000 unit tab by mouth daily       fluorouracil (EFUDEX) 5 % external cream        folic acid (FOLVITE) 1 MG tablet Take 1 tablet (1 mg) by mouth daily 90 tablet 1     hydroxychloroquine (PLAQUENIL) 200 MG tablet Take 1 tablet (200 mg) by mouth daily Daily at 9am. 90 tablet 5     ketoconazole (NIZORAL) 2 % external shampoo        lisinopril (ZESTRIL) 10 MG tablet Take 1 tablet (10 mg) by mouth daily 90 tablet 3     methotrexate sodium 2.5 MG TABS Take 8 tablets (20 mg) by mouth every 7 days *Monitoring labs every 8-12 weeks 96 tablet 3     metoprolol succinate ER (TOPROL-XL) 50 MG 24 hr tablet 1 and 1/2 tabs daily. 135 tablet 3     omeprazole (PRILOSEC) 40 MG DR capsule Take 1 capsule (40 mg) by mouth daily 90 capsule 3     ORDER FOR DME Use your BiPAP device as directed by your provider.       oxybutynin (DITROPAN) 5 MG tablet TAKE 1 TABLET BY MOUTH EVERY DAY AT 4PM 90 tablet 3     pregabalin (LYRICA) 50 MG capsule 50mg capsule by mouth at 7am and 4pm and 1-2 x 50mg capsules by mouth nightly at 11pm (4/day) 360 capsule 3     salmeterol (SEREVENT DISKUS) 50 MCG/DOSE inhaler Inhale 1 puff into the lungs 2 times daily 60 each 3       Allergies   Allergen Reactions     Restasis      Burning eyes, problems with breathing, tightness in chest     Adhesive Tape      Bandages misc     Allegra      EXCESSIVE URINATION AND WEAKNESS, LIGHT-HEADED     Allergy      Dust     Animal Dander      Benadryl Allergy      EXCESSIVE URINATION AND WEAKNESS, LIGHT-HEADED  "    Cephalexin      Joint pain or gerd aggravation. Bloating excessive urination      Cephalosporins      Cyclosporine      Diphenhydramine Other (See Comments)     Doxycycline      Doxycycline Hyclate Nausea     SWEATING,MIGRAINES,LOSS OF APPETITE,SWEATING,LIGHT HEADED, EXCESSIVE URINATION     Fexofenadine Other (See Comments)     Flonase [Fluticasone Propionate]      Gabapentin      Neurontin: mosd changes and excess urination     Hydrocortisone      Iodine      Iodine Solution [Povidone Iodine]      SKIN MELTS     Levaquin      From surgeon--? Joint pain ? Gerd aggravation. Insomnia, excess urination     Levofloxacin      Mylanta      EXCESSIVE URINATION AND WEAKNESS, LIGHT-HEADED     Oxcarbazepine      Prednisone      Weakness, elevated bp, headache, eye pain, congestion      Prednisone      Seafood [Seafood]      Shellfish Allergy      Hives       Simethicone      Sulfamethoxazole W/Trimethoprim      Sulfamethoxazole-Trimethoprim      Chest pain, angina     Symbicort GI Disturbance and Nausea     Trees      Trileptal      SEVERE JOINT AND TENDON PAIN, INSOMNIA, RESTLESSNESS, NAUSEA, EXCESS URINATION     Cortizone Rash     EXCESS URINATION,WEAKNESS,NAUSEA, HEADACHE        Social History     Tobacco Use     Smoking status: Former Smoker     Packs/day: 1.50     Years: 37.00     Pack years: 55.50     Types: Cigarettes     Start date: 6/15/1963     Quit date: 2000     Years since quittin.5     Smokeless tobacco: Never Used   Substance Use Topics     Alcohol use: Not Currently     Alcohol/week: 0.0 standard drinks     Family History   Problem Relation Age of Onset     Hypertension Mother      Heart Disease Mother         a fib     Arthritis Mother         \"osteo\"     Osteoporosis Mother      Skin Cancer Mother      Uterine Cancer Mother      Other Cancer Mother      Cancer Mother      Hypertension Brother      Diabetes Brother         \" post pancreatitis\"     Neurologic Disorder Sister         multiple " "sclerosis     Hypertension Sister      Heart Disease Sister      Multiple Sclerosis Sister      Heart Disease Sister         a fib     Arthritis Sister      Heart Failure Sister      Kidney Disease Sister      Dementia Sister      Hypertension Father      Cerebrovascular Disease Father         ,     Skin Cancer Father      Colon Polyps Father      Other - See Comments Son         [place]     Other - See Comments Abdon wagner     Other - See Comments Son         day gonzalez     Psoriasis Maternal Grandfather      Stomach Cancer Maternal Grandfather      Cancer Maternal Grandfather      Congenital Anomalies Other         granddaughter with a chromosome defect     Other Cancer Other         Grand daughter     Cerebrovascular Disease Paternal Grandmother         ,     Cerebrovascular Disease Paternal Grandfather         ,     Cancer Granddaughter         Langerhans Cell Histiocytosis     Genetic Disease Granddaughter         gene translocation     Melanoma No family hx of      Colon Cancer No family hx of      History   Drug Use Unknown         Objective     BP (!) 178/85   Pulse 58   Temp (!) 96.7  F (35.9  C) (Tympanic)   Resp 20   Ht 1.854 m (6' 1\")   Wt 130.6 kg (288 lb)   SpO2 100%   BMI 38.00 kg/m      Physical Exam    GENERAL APPEARANCE: healthy, alert and no distress     EYES: EOMI,  PERRL     HENT: ear canals and TM's normal and nose and mouth without ulcers or lesions     NECK: no adenopathy, no asymmetry, masses, or scars and thyroid normal to palpation     RESP: lungs clear to auscultation - no rales, rhonchi or wheezes     CV: regular rates and rhythm, normal S1 S2, no S3 or S4 and no murmur, click or rub     ABDOMEN:  soft, nontender, no HSM or masses and bowel sounds normal     MS: extremities normal- no gross deformities noted, no evidence of inflammation in joints, FROM in all extremities.     SKIN: no suspicious lesions or rashes     NEURO: Normal strength and tone, sensory " exam grossly normal, mentation intact and speech normal     PSYCH: mentation appears normal. and affect normal/bright     LYMPHATICS: No cervical adenopathy    Recent Labs   Lab Test 12/16/21  1123 12/16/21  1122 10/01/21  0831 11/12/20  1024 11/12/20  1018   HGB 13.6  --  13.4   < >  --      --  190   < >  --    CR  --  1.11 1.20   < >  --    A1C  --   --   --   --  5.6    < > = values in this interval not displayed.        Diagnostics:  Labs pending at this time.  Results will be reviewed when available.   No EKG required for low risk surgery (cataract, skin procedure, breast biopsy, etc).    Revised Cardiac Risk Index (RCRI):  The patient has the following serious cardiovascular risks for perioperative complications:   - No serious cardiac risks = 0 points     RCRI Interpretation: 0 points: Class I (very low risk - 0.4% complication rate)           Signed Electronically by: Heladio Calixto MD  Copy of this evaluation report is provided to requesting physician.

## 2022-04-25 NOTE — PATIENT INSTRUCTIONS
COVID booster today.    Let's stop the lisinopril 10 and begin lisinopril / hydrochlorothiazide 20/25 daily.    Make appointment for colonoscopy and oral surgery within 1 month.    Keep appointment with urology.    Heladio Calixto MD  Internal Medicine and Pediatrics     Preparing for Your Surgery  Getting started  A nurse will call you to review your health history and instructions. They will give you an arrival time based on your scheduled surgery time. Please be ready to share:  Your doctor's clinic name and phone number  Your medical, surgical and anesthesia history  A list of allergies and sensitivities  A list of medicines, including herbal treatments and over-the-counter drugs  Whether the patient has a legal guardian (ask how to send us the papers in advance)  Please tell us if you're pregnant--or if there's any chance you might be pregnant. Some surgeries may injure a fetus (unborn baby), so they require a pregnancy test. Surgeries that are safe for a fetus don't always need a test, and you can choose whether to have one.   If you have a child who's having surgery, please ask for a copy of Preparing for Your Child's Surgery.    Preparing for surgery  Within 30 days of surgery: Have a pre-op exam (sometimes called an H&P, or History and Physical). This can be done at a clinic or pre-operative center.  If you're having a , you may not need this exam. Talk to your care team.  At your pre-op exam, talk to your care team about all medicines you take. If you need to stop any medicines before surgery, ask when to start taking them again.  We do this for your safety. Many medicines can make you bleed too much during surgery. Some change how well surgery (anesthesia) drugs work.  Call your insurance company to let them know you're having surgery. (If you don't have insurance, call [phone].)  Call your clinic if there's any change in your health. This includes signs of a cold or flu (sore throat, runny  nose, cough, rash, fever). It also includes a scrape or scratch near the surgery site.  If you have questions on the day of surgery, call your hospital or surgery center.  COVID testing  You may need to be tested for COVID-19 before having surgery. If so, your surgical team will give you instructions for scheduling this test, separate from your preoperative history and physical.  Eating and drinking guidelines  For your safety: Unless your surgeon tells you otherwise, follow the guidelines below.  Eat and drink as usual until 8 hours before surgery. After that, no food or milk.  Drink clear liquids until 2 hours before surgery. These are liquids you can see through, like water, Gatorade and Propel Water. You may also have black coffee and tea (no cream or milk).  Nothing by mouth within 2 hours of surgery. This includes gum, candy and breath mints.  If you drink alcohol: Stop drinking it the night before surgery.  If your care team tells you to take medicine on the morning of surgery, it's okay to take it with a sip of water.  Preventing infection  Shower or bathe the night before and morning of your surgery. Follow the instructions your clinic gave you. (If no instructions, use regular soap.)  Don't shave or clip hair near your surgery site. We'll remove the hair if needed.  Don't smoke or vape the morning of surgery. You may chew nicotine gum up to 2 hours before surgery. A nicotine patch is okay.  Note: Some surgeries require you to completely quit smoking and nicotine. Check with your surgeon.  Your care team will make every effort to keep you safe from infection. We will:  Clean our hands often with soap and water (or an alcohol-based hand rub).  Clean the skin at your surgery site with a special soap that kills germs.  Give you a special gown to keep you warm. (Cold raises the risk of infection.)  Wear special hair covers, masks, gowns and gloves during surgery.  Give antibiotic medicine, if prescribed. Not  all surgeries need antibiotics.  What to bring on the day of surgery  Photo ID and insurance card  Copy of your health care directive, if you have one  Glasses and hearing aides (bring cases)  You can't wear contacts during surgery  Inhaler and eye drops, if you use them (tell us about these when you arrive)  CPAP machine or breathing device, if you use them  A few personal items, if spending the night  If you have . . .  A pacemaker, ICD (cardiac defibrillator) or other implant: Bring the ID card.  An implanted stimulator: Bring the remote control.  A legal guardian: Bring a copy of the certified (court-stamped) guardianship papers.  Please remove any jewelry, including body piercings. Leave jewelry and other valuables at home.  If you're going home the day of surgery  You must have a responsible adult drive you home. They should stay with you overnight as well.  If you don't have someone to stay with you, and you aren't safe to go home alone, we may keep you overnight. Insurance often won't pay for this.  After surgery  If it's hard to control your pain or you need more pain medicine, please call your surgeon's office.       What to do with your medicines before the surgery:    1)  Inquire about COVID swab before either procedure.    2)  Stop the lisinopril 10 and begin lisinopril / hydrochlorothiazide 20/25.  Follow-up in 2 weeks for a blood pressure recheck.    3)  COVID booster #2 today.    4)  All other  meds may continue.    5)

## 2022-04-26 ENCOUNTER — HOSPITAL ENCOUNTER (OUTPATIENT)
Facility: CLINIC | Age: 72
End: 2022-04-26
Attending: INTERNAL MEDICINE | Admitting: INTERNAL MEDICINE
Payer: MEDICARE

## 2022-04-26 DIAGNOSIS — Z12.11 SPECIAL SCREENING FOR MALIGNANT NEOPLASMS, COLON: Primary | ICD-10-CM

## 2022-05-01 ENCOUNTER — MYC MEDICAL ADVICE (OUTPATIENT)
Dept: PEDIATRICS | Facility: CLINIC | Age: 72
End: 2022-05-01
Payer: MEDICARE

## 2022-05-02 NOTE — TELEPHONE ENCOUNTER
"Dr Calixto: Do you seen any specific concerns with the [place] Pt wondering about travel with nerve and joint pain, fatigue (due to weight???), and blood clots.      - Perez \"Derek\" Mauricio (he/him/his), RN - Patient Advocate [facility]ason (PAL)  [facility]      "

## 2022-05-09 ENCOUNTER — MYC MEDICAL ADVICE (OUTPATIENT)
Dept: PEDIATRICS | Facility: CLINIC | Age: 72
End: 2022-05-09

## 2022-05-09 ENCOUNTER — ALLIED HEALTH/NURSE VISIT (OUTPATIENT)
Dept: PEDIATRICS | Facility: CLINIC | Age: 72
End: 2022-05-09
Payer: MEDICARE

## 2022-05-09 ENCOUNTER — LAB (OUTPATIENT)
Dept: LAB | Facility: CLINIC | Age: 72
End: 2022-05-09
Attending: INTERNAL MEDICINE

## 2022-05-09 VITALS — HEART RATE: 58 BPM | DIASTOLIC BLOOD PRESSURE: 64 MMHG | SYSTOLIC BLOOD PRESSURE: 108 MMHG

## 2022-05-09 DIAGNOSIS — Z79.631 LONG TERM METHOTREXATE USER: ICD-10-CM

## 2022-05-09 DIAGNOSIS — N39.498 OTHER URINARY INCONTINENCE: ICD-10-CM

## 2022-05-09 DIAGNOSIS — I10 ESSENTIAL HYPERTENSION, BENIGN: Primary | ICD-10-CM

## 2022-05-09 LAB
CRP SERPL-MCNC: 5.5 MG/L (ref 0–8)
ERYTHROCYTE [DISTWIDTH] IN BLOOD BY AUTOMATED COUNT: 14.7 % (ref 10–15)
HCT VFR BLD AUTO: 40.5 % (ref 40–53)
HGB BLD-MCNC: 12.9 G/DL (ref 13.3–17.7)
MCH RBC QN AUTO: 30.9 PG (ref 26.5–33)
MCHC RBC AUTO-ENTMCNC: 31.9 G/DL (ref 31.5–36.5)
MCV RBC AUTO: 97 FL (ref 78–100)
PLATELET # BLD AUTO: 193 10E3/UL (ref 150–450)
RBC # BLD AUTO: 4.17 10E6/UL (ref 4.4–5.9)
WBC # BLD AUTO: 4.9 10E3/UL (ref 4–11)

## 2022-05-09 PROCEDURE — 82565 ASSAY OF CREATININE: CPT

## 2022-05-09 PROCEDURE — 85027 COMPLETE CBC AUTOMATED: CPT

## 2022-05-09 PROCEDURE — 36415 COLL VENOUS BLD VENIPUNCTURE: CPT

## 2022-05-09 PROCEDURE — 84460 ALANINE AMINO (ALT) (SGPT): CPT

## 2022-05-09 PROCEDURE — 99207 PR NO CHARGE NURSE ONLY: CPT

## 2022-05-09 PROCEDURE — 84450 TRANSFERASE (AST) (SGOT): CPT

## 2022-05-09 PROCEDURE — 86140 C-REACTIVE PROTEIN: CPT

## 2022-05-09 PROCEDURE — 82040 ASSAY OF SERUM ALBUMIN: CPT

## 2022-05-09 NOTE — PROGRESS NOTES
Lucio Daly is a 71 year old patient who comes in today for a Blood Pressure check.  Initial BP:  /64   Pulse 58      58  Disposition: results routed to provider

## 2022-05-10 ENCOUNTER — MYC MEDICAL ADVICE (OUTPATIENT)
Dept: PEDIATRICS | Facility: CLINIC | Age: 72
End: 2022-05-10
Payer: MEDICARE

## 2022-05-10 LAB
ALBUMIN SERPL-MCNC: 4.1 G/DL (ref 3.4–5)
ALT SERPL W P-5'-P-CCNC: 24 U/L (ref 0–70)
AST SERPL W P-5'-P-CCNC: 18 U/L (ref 0–45)
CREAT SERPL-MCNC: 1.3 MG/DL (ref 0.66–1.25)
GFR SERPL CREATININE-BSD FRML MDRD: 58 ML/MIN/1.73M2

## 2022-05-10 RX ORDER — OXYBUTYNIN CHLORIDE 5 MG/1
5 TABLET ORAL 3 TIMES DAILY
Qty: 270 TABLET | Refills: 3 | Status: SHIPPED | OUTPATIENT
Start: 2022-05-10 | End: 2022-12-20

## 2022-05-10 NOTE — TELEPHONE ENCOUNTER
Patient is wondering if it is ok for him to be taking a diuretic with current kidney issues.   Creatinine on 5/9/22 is 1.3 mg/dL.  Previous Creatinine on 12/16/21 was 1.11 mg/dL.    5/9/22 GFR is 58.  12/16/21 GFR was 66.     Routing to Dr. Calixto.   - is current diuretic regimen ok considering current kidney function.     Rajendra Rebollar RN on 5/10/2022 at 12:20 PM

## 2022-05-10 NOTE — TELEPHONE ENCOUNTER
Patient reports increased incontinence since starting the lisinopril-hydrochlorothiazide on 4/25/22.  Patient has had episodes of incontinence x1 on Sunday and Monday, and x3 on Saturday.     Routing to Dr. Calixto:  Dose adjustment needed for lisinopril-hydrochlorothiazide or oxybutynin?    Rajendra Rebollar RN on 5/10/2022 at 9:49 AM

## 2022-05-10 NOTE — TELEPHONE ENCOUNTER
This topic is addressed in another encounter.     Please see mychart encounter from 5/10/22.    Rajendra Rebollar RN on 5/10/2022 at 12:46 PM

## 2022-05-17 ASSESSMENT — SLEEP AND FATIGUE QUESTIONNAIRES
HOW LIKELY ARE YOU TO NOD OFF OR FALL ASLEEP WHEN YOU ARE A PASSENGER IN A CAR FOR AN HOUR WITHOUT A BREAK: SLIGHT CHANCE OF DOZING
HOW LIKELY ARE YOU TO NOD OFF OR FALL ASLEEP WHILE LYING DOWN TO REST IN THE AFTERNOON WHEN CIRCUMSTANCES PERMIT: SLIGHT CHANCE OF DOZING
HOW LIKELY ARE YOU TO NOD OFF OR FALL ASLEEP WHILE SITTING AND TALKING TO SOMEONE: SLIGHT CHANCE OF DOZING
HOW LIKELY ARE YOU TO NOD OFF OR FALL ASLEEP WHILE SITTING INACTIVE IN A PUBLIC PLACE: SLIGHT CHANCE OF DOZING
HOW LIKELY ARE YOU TO NOD OFF OR FALL ASLEEP WHILE WATCHING TV: HIGH CHANCE OF DOZING
HOW LIKELY ARE YOU TO NOD OFF OR FALL ASLEEP WHILE SITTING QUIETLY AFTER LUNCH WITHOUT ALCOHOL: SLIGHT CHANCE OF DOZING
HOW LIKELY ARE YOU TO NOD OFF OR FALL ASLEEP WHILE SITTING AND READING: HIGH CHANCE OF DOZING
HOW LIKELY ARE YOU TO NOD OFF OR FALL ASLEEP IN A CAR, WHILE STOPPED FOR A FEW MINUTES IN TRAFFIC: WOULD NEVER DOZE

## 2022-05-18 DIAGNOSIS — Z11.59 ENCOUNTER FOR SCREENING FOR OTHER VIRAL DISEASES: Primary | ICD-10-CM

## 2022-05-23 ENCOUNTER — VIRTUAL VISIT (OUTPATIENT)
Dept: SLEEP MEDICINE | Facility: CLINIC | Age: 72
End: 2022-05-23
Payer: MEDICARE

## 2022-05-23 VITALS — WEIGHT: 284 LBS | HEIGHT: 73 IN | BODY MASS INDEX: 37.64 KG/M2

## 2022-05-23 DIAGNOSIS — G47.33 OSA TREATED WITH BIPAP: ICD-10-CM

## 2022-05-23 DIAGNOSIS — E66.9 OBESITY WITH SLEEP APNEA: ICD-10-CM

## 2022-05-23 DIAGNOSIS — G47.30 OBESITY WITH SLEEP APNEA: ICD-10-CM

## 2022-05-23 DIAGNOSIS — F51.04 PSYCHOPHYSIOLOGICAL INSOMNIA: Primary | ICD-10-CM

## 2022-05-23 PROCEDURE — 99214 OFFICE O/P EST MOD 30 MIN: CPT | Mod: 95 | Performed by: INTERNAL MEDICINE

## 2022-05-23 NOTE — PROGRESS NOTES
Syed is a 71 year old who is being evaluated via a billable telephone visit.      How would you like to obtain your AVS? Gerda     Telephone number [phone]    Telephone visit details:  Telephone visit start time: 12:42 PM  Telephone visit end time: 1:14 PM      [facility]     Sleep clinic follow up visit note    Date on this visit: 5/23/2022    Primary Physician: Heladio Calixto     Chief complaint: Follow-up of GALO    Lucio Daly is a pleasant 71 year old male who presents to sleep clinic today for follow-up of previously diagnosed obstructive sleep apnea, that is being managed with bilevel PAP device. (He was initially scheduled for a video visit. But it was switched to telephone visit  due to technical issues and  patient was agreeable with the telephone visit).    He obtained a replacement device through [employer] in fall 2021.  Pressure settings on the bilevel PAP: IPAP equal to 15, EPAP equal to 10 cmH2O  He reports using the device regularly during sleep.   He uses FFM.    There are no reports of snoring with the BiPAP device.  He denies awakenings due to gasping for air with the BiPAP use.    He reports that the current device is very quiet compared to his  previous device. Occasionally he checks to make sure there is air pressure coming from the device.  He lost 30 lbs (via dietary changes) over the last 6 months, but reports that the pressure settings feel comfortable.  He reports  positive benefits with the BiPAP use.     He goes to bed between 11:30 PM to 12 midnight and wakes up around 7 AM.   He reports waking up in  the last few months between 4-5 AM for no apparent reason  It may take sometimes <30 minutes/sometimes longer to fall back asleep after the awakening. He lays in the bed, changes position and eventually fall asleep.  Active mind affects his sleep.    He reports waking up feeling rested most mornings.  However his main concern is increase in the  daytime sleepiness and  fatigue for the past few months  He denies drowsiness while driving.  His [place] sleepiness score is 11out of 24.    Previous sleep study:  PS2015 - AHI of 28.3 per hour and REM sleep was not observed during the diagnostic portion.   Optimum titration with bilevel 15/10 cwp.     DOWNLOADABLE COMPLIANCE DATA:   From 2022 through May 11, 2022 reveals that he used the device for 30 out of 30 days with an averag use of 7.25 hours on the days used. No  air leak. Residual AHI was 2.2 events per hour.     Allergies:    Allergies   Allergen Reactions     Restasis      Burning eyes, problems with breathing, tightness in chest     Adhesive Tape      Bandages misc     Allegra      EXCESSIVE URINATION AND WEAKNESS, LIGHT-HEADED     Allergy      Dust     Animal Dander      Benadryl Allergy      EXCESSIVE URINATION AND WEAKNESS, LIGHT-HEADED     Cephalexin      Joint pain or gerd aggravation. Bloating excessive urination      Cephalosporins      Cyclosporine      Diphenhydramine Other (See Comments)     Doxycycline      Doxycycline Hyclate Nausea     SWEATING,MIGRAINES,LOSS OF APPETITE,SWEATING,LIGHT HEADED, EXCESSIVE URINATION     Fexofenadine Other (See Comments)     Flonase [Fluticasone Propionate]      Gabapentin      Neurontin: mosd changes and excess urination     Hydrocortisone      Iodine      Iodine Solution [Povidone Iodine]      SKIN MELTS     Levaquin      From surgeon--? Joint pain ? Gerd aggravation. Insomnia, excess urination     Levofloxacin      Mylanta      EXCESSIVE URINATION AND WEAKNESS, LIGHT-HEADED     Oxcarbazepine      Prednisone      Weakness, elevated bp, headache, eye pain, congestion      Prednisone      Seafood [Seafood]      Shellfish Allergy      Hives       Simethicone      Sulfamethoxazole W/Trimethoprim      Sulfamethoxazole-Trimethoprim      Chest pain, angina     Symbicort GI Disturbance and Nausea     Trees      Trileptal      SEVERE JOINT AND TENDON PAIN, INSOMNIA,  RESTLESSNESS, NAUSEA, EXCESS URINATION     Cortizone Rash     EXCESS URINATION,WEAKNESS,NAUSEA, HEADACHE       Medications:    Current Outpatient Medications   Medication Sig Dispense Refill     acetaminophen (TYLENOL) 500 MG tablet 2 x 500mg by mouth 3 times per day: 7/730am, 4pm and 11pm  = 6/day       albuterol (PROAIR HFA/PROVENTIL HFA/VENTOLIN HFA) 108 (90 Base) MCG/ACT inhaler Inhale 2 puffs into the lungs every 4 hours as needed for shortness of breath / dyspnea or wheezing 6.7 g 1     amLODIPine (NORVASC) 5 MG tablet Take 1 tablet (5 mg) by mouth daily 30 tablet 1     cholecalciferol (HM VITAMIN D3) 25 MCG (1000 UT) TABS 1000 unit tab by mouth daily       fluorouracil (EFUDEX) 5 % external cream        folic acid (FOLVITE) 1 MG tablet Take 1 tablet (1 mg) by mouth daily 90 tablet 1     hydroxychloroquine (PLAQUENIL) 200 MG tablet Take 1 tablet (200 mg) by mouth daily Daily at 9am. 90 tablet 5     ketoconazole (NIZORAL) 2 % external shampoo        lisinopril-hydrochlorothiazide (ZESTORETIC) 20-25 MG tablet Take 1 tablet by mouth daily 90 tablet 3     methotrexate sodium 2.5 MG TABS Take 8 tablets (20 mg) by mouth every 7 days *Monitoring labs every 8-12 weeks 96 tablet 3     metoprolol succinate ER (TOPROL-XL) 50 MG 24 hr tablet 1 and 1/2 tabs daily. 135 tablet 3     omeprazole (PRILOSEC) 40 MG DR capsule Take 1 capsule (40 mg) by mouth daily 90 capsule 3     ORDER FOR DME Use your BiPAP device as directed by your provider.       oxybutynin (DITROPAN) 5 MG tablet Take 1 tablet (5 mg) by mouth 3 times daily TAKE 1 TABLET BY MOUTH EVERY DAY AT 4PM 270 tablet 3     pregabalin (LYRICA) 50 MG capsule 50mg capsule by mouth at 7am and 4pm and 1-2 x 50mg capsules by mouth nightly at 11pm (4/day) 360 capsule 3     salmeterol (SEREVENT DISKUS) 50 MCG/DOSE inhaler Inhale 1 puff into the lungs 2 times daily 60 each 3       Problem List:  Patient Active Problem List    Diagnosis Date Noted     Elevated prostate specific  antigen (PSA) 12/20/2021     Priority: Medium     PLMD (periodic limb movement disorder) 12/16/2021     Priority: Medium     Immunosuppressed status (H) 12/16/2020     Priority: Medium     Bronchiolitis obliterans (H) 05/14/2020     Priority: Medium     Follows with Pulm.        Hypertension 05/14/2020     Priority: Medium     Rheumatoid arthritis (H) 05/14/2020     Priority: Medium     Neuromuscular disease (H) 11/05/2019     Priority: Medium     Follows with Neuro       Morbid obesity (H) 03/22/2016     Priority: Medium     Peripheral polyneuropathy 03/15/2016     Priority: Medium     Adenomatous polyp of colon 02/26/2015     Priority: Medium     2 small adenomas 2015 [facility]. Next colonoscopy 2020.       Seronegative arthritis 11/18/2013     Priority: Medium     Abnormal EMG 04/18/2013     Priority: Medium     EMG 2/2012 study by Dr. Gonzalez   Interpretation:   1. Attenuation of left sural sensory nerve action potential.  2. No evidence of myopathy  3.        AK (actinic keratosis) 12/18/2011     Priority: Medium     Tremor 11/01/2011     Priority: Medium     Incomplete defecation 11/01/2011     Priority: Medium     Balance problems 11/01/2011     Priority: Medium     Sicca syndrome (H) 05/16/2011     Priority: Medium     Lip biopsy was done and was negative?  Has dry mouth.  Seronegative sicca complex       Urinary incontinence 03/28/2011     Priority: Medium     Overactive bladder       Chronic maxillary sinusitis 09/14/2009     Priority: Medium     GALO (obstructive sleep apnea) 07/06/2009     Priority: Medium     Hx of melanoma of skin 08/06/2008     Priority: Medium     Rt leg Dr Ez NGUYEN-COMPLETE EXCISION       Malignant basal cell neoplasm of skin 08/06/2008     Priority: Medium     ARMS EXCISED DR EZ NGUYEN  Do you wish to do the replacement in the background? yes         Diaphragmatic hernia 06/30/2004     Priority: Medium     Problem list name updated by automated process. Provider to review       Esophageal  reflux 06/30/2004     Priority: Medium        Past Medical/Surgical History:  Past Medical History:   Diagnosis Date     Abnormal EMG 4/18/2013     Abnormal involuntary movements(781.0)     Movement Disorder     AK (actinic keratosis) 12/18/2011     Allergic rhinitis, cause unspecified     Allergic rhinitis     Balance problems 11/1/2011     Basal cell carcinoma      Bladder spasms 11/1/2011     Chronic osteoarthritis      COPD (chronic obstructive pulmonary disease) (H)     Interstial lung disease, obliderans bronchiolitis     Diaphragmatic hernia without mention of obstruction or gangrene      Earache or other ear, nose, or throat complaint      Esophageal reflux      Fatigue 11/1/2011     Fracture      H. pylori infection 5/12/2011     History of MRI of cervical spine 11/18/2013    EXAMINATION: CERVICAL SPINE G/E 5 VIEWS* 4/19/2013 4:18 PM  CLINICAL HISTORY: Pain in limb,Performing Location?->[facility]),  COMPARISON:  FINDINGS: AP and lateral views in flexion and extension, as well as odontoid view of the cervical spine was obtained. There is no comparison available. The vertebral bodies of the cervical spine are normally aligned. There is posterior spurring and d     Incomplete defecation 11/1/2011     Interstitial lung disease (H) 11/29/2016     Laboratory test 8/7/2012     Lung disease June 2015     Malignant basal cell neoplasm of skin 8/6/2008     Melanoma (H) 8/6/2008     Melanoma in situ of lower leg (H)     R calf     Neuropathy 5/16/2011     Other bladder disorder      Other color vision deficiencies      Other nervous system complications      Parkinsonism (H) 11/1/2011     Personal history of colonic polyps      PLMD (periodic limb movement disorder) 12/16/2021     Polyneuropathy in other diseases classified elsewhere (H)      RA (rheumatoid arthritis) (H)      Rheumatoid arthritis of multiple sites with negative rheumatoid factor (H) 3/21/2016     Seronegative arthritis 11/18/2013      Shortness of breath      Shoulder arthritis 2016    acromioclavicular joint      Somatization disorder 2011     Squamous cell carcinoma      Tremor 2011     Unspecified essential hypertension      Unspecified hypothyroidism      Urinary tract infection      Urinary urgency 2011     Wears glasses 2011     Past Surgical History:   Procedure Laterality Date     BIOPSY OF SKIN LESION       COLONOSCOPY       HEMORRHOID SURGERY       lip biopsy      for sicca complex     MOHS MICROGRAPHIC PROCEDURE       SOFT TISSUE SURGERY      removeal of basel cell carcinoma       Social History:  Social History     Socioeconomic History     Marital status:      Spouse name: Not on file     Number of children: Not on file     Years of education: Not on file     Highest education level: Not on file   Occupational History     Not on file   Tobacco Use     Smoking status: Former Smoker     Packs/day: 1.50     Years: 37.00     Pack years: 55.50     Types: Cigarettes     Start date: 6/15/1963     Quit date: 2000     Years since quittin.6     Smokeless tobacco: Never Used   Substance and Sexual Activity     Alcohol use: Not Currently     Alcohol/week: 0.0 standard drinks     Drug use: Never     Sexual activity: Not Currently     Partners: Female     Birth control/protection: None   Other Topics Concern     Parent/sibling w/ CABG, MI or angioplasty before 65F 55M? No   Social History Narrative    2013: Living in [place] in a townhouse with no steps    Has 3 sons that are doing okay.         Dairy/d 1 servings/d.     Caffeine 0 servings/d    Exercise 0 x week    Sunscreen used - No    Seatbelts used - Yes    Working smoke/CO detectors in the home - Yes    Guns stored in the home - Yes    Self Breast Exams - NA    Self Testicular Exam - Yes    Eye Exam up to date - Yes     Dental Exam up to date - Yes     Pap Smear up to date - NA    Mammogram up to date - NA    PSA up to date - Yes 2008    Dexa Scan  "up to date - No    Flex Sig / Colonoscopy up to date - Yes less than 5 yrs ago    Immunizations up to date -today    Abuse: Current or Past(Physical, Sexual or Emotional)- No    Do you feel safe in your environment - Yes    2008                 Social Determinants of Health     Financial Resource Strain: Low Risk      Difficulty of Paying Living Expenses: Not hard at all   Food Insecurity: No Food Insecurity     Worried About Running Out of Food in the Last Year: Never true     Ran Out of Food in the Last Year: Never true   Transportation Needs: No Transportation Needs     Lack of Transportation (Medical): No     Lack of Transportation (Non-Medical): No   Physical Activity: Unknown     Days of Exercise per Week: 4 days     Minutes of Exercise per Session: Not on file   Stress: No Stress Concern Present     Feeling of Stress : Not at all   Social Connections: Moderately Integrated     Frequency of Communication with Friends and Family: Three times a week     Frequency of Social Gatherings with Friends and Family: Once a week     Attends Shinto Services: More than 4 times per year     Active Member of Clubs or Organizations: No     Attends Club or Organization Meetings: Not on file     Marital Status:    Intimate Partner Violence: Not on file   Housing Stability: Low Risk      Unable to Pay for Housing in the Last Year: No     Number of Places Lived in the Last Year: 2     Unstable Housing in the Last Year: No       Family History:  Family History   Problem Relation Age of Onset     Hypertension Mother      Heart Disease Mother         a fib     Arthritis Mother         \"osteo\"     Osteoporosis Mother      Skin Cancer Mother      Uterine Cancer Mother      Other Cancer Mother      Cancer Mother      Hypertension Brother      Diabetes Brother         \" post pancreatitis\"     Neurologic Disorder Sister         multiple sclerosis     Hypertension Sister      Heart Disease Sister      Multiple Sclerosis Sister  " "    Heart Disease Sister         a fib     Arthritis Sister      Heart Failure Sister      Kidney Disease Sister      Dementia Sister      Hypertension Father      Cerebrovascular Disease Father         ,     Skin Cancer Father      Colon Polyps Father      Other - See Comments Son         [place]     Other - See Comments Son         [place]     Other - See Comments Abdon gonzalez     Psoriasis Maternal Grandfather      Stomach Cancer Maternal Grandfather      Cancer Maternal Grandfather      Congenital Anomalies Other         granddaughter with a chromosome defect     Other Cancer Other         Grand daughter     Cerebrovascular Disease Paternal Grandmother         ,     Cerebrovascular Disease Paternal Grandfather         ,     Cancer Granddaughter         Langerhans Cell Histiocytosis     Genetic Disease Granddaughter         gene translocation     Melanoma No family hx of      Colon Cancer No family hx of          Physical Examination:  Vitals: Ht 1.854 m (6' 1\")   Wt 128.8 kg (284 lb)   BMI 37.47 kg/m    BMI= Body mass index is 37.47 kg/m .    [place] Total Score 5/17/2022   Total score - [place] 11     General: No apparent distress    Chest: No cough, no audible wheezing, able to talk in full sentences  Psych: coherent speech, normal rate and volume, able to articulate logical thoughts, able   to abstract reason, no tangential thoughts, no hallucinations   or delusions  His  affect is normal  Neuro:  Mental status: Alert and  Oriented X 3  Speech: normal       Impression/Plan:  Obstructive sleep apnea: Pt reports adequate compliance with bi-level PAP therapy and reports positive benefits with PAP use.   GALO is adequately controlled with Bi-level PAP at the current settings per compliance DL.   Prescription provided for renewal of PAP supplies.   Recommended follow-up with DME provider to obtain a diagnostic check on his current bilevel PAP device. Patient wants to switch DME vendor " "from [facility] to [facility].  Recommended him to continue using the PAP regularly during sleep and instructed to get  the supplies for the PAP replaced regularly.   After obtaining the diagnostic check in his device, recommend obtaining overnight oximetry with the bilevel PAP at the current setting to check for any evidence of hypoxemia  as a contributing factor for the symptoms of fatigue and excessive daytime sleepiness.  The excessive daytime sleepiness could also be due to some of his medications.  Orders for future overnight oximetry generated in epic  Plan to communicate the test results via [employer]    Insomnia-multifactorial. Psychophysiological. We discussed stimulus control measures. We discussed cognitive behavioral therapy for insomnia.  Referral to sleep psychologist Dr. Ramón Espinoza has been provided to obtain CBT-I for insomnia    Obesity: We discussed weight management with healthy diet, and exercise.    Patient was strongly advised to avoid driving, operating any heavy machinery or other hazardous situations while drowsy or sleepy.  Patient was counseled on the importance of driving while alert, to pull over if drowsy, or nap before getting into the vehicle if sleepy.         CC: No ref. provider found    The above note was dictated using voice recognition software. Although reviewed after completion, some word and grammatical error may remain . Please contact the author for any clarifications.    \"I spent a total of 37  minutes  with Lucio Daly during today's telephone visit. Most  of this time was spent counseling the patient and  coordinating care regarding  GALO, bilevel PAP treatment, overnight oximetry to check for hypoxemia, excessive daytime sleepiness, insomnia, stimulus control measures , cognitive behavioral therapy ,weight management , going over PAP download, chart review  including documentation and further activities as noted above.\"      Kristen FRIEDMAN " MD Juan  [facility]  [address] [place] [id number] [phone]  Dept: [phone]

## 2022-05-25 ENCOUNTER — LAB (OUTPATIENT)
Dept: LAB | Facility: CLINIC | Age: 72
End: 2022-05-25
Payer: MEDICARE

## 2022-05-25 DIAGNOSIS — N18.9 CHRONIC KIDNEY DISEASE, UNSPECIFIED CKD STAGE: ICD-10-CM

## 2022-05-25 PROCEDURE — 36415 COLL VENOUS BLD VENIPUNCTURE: CPT

## 2022-05-25 PROCEDURE — 80048 BASIC METABOLIC PNL TOTAL CA: CPT

## 2022-05-26 LAB
ANION GAP SERPL CALCULATED.3IONS-SCNC: 3 MMOL/L (ref 3–14)
BUN SERPL-MCNC: 29 MG/DL (ref 7–30)
CALCIUM SERPL-MCNC: 9.4 MG/DL (ref 8.5–10.1)
CHLORIDE BLD-SCNC: 107 MMOL/L (ref 94–109)
CO2 SERPL-SCNC: 30 MMOL/L (ref 20–32)
CREAT SERPL-MCNC: 1.2 MG/DL (ref 0.66–1.25)
GFR SERPL CREATININE-BSD FRML MDRD: 64 ML/MIN/1.73M2
GLUCOSE BLD-MCNC: 106 MG/DL (ref 70–99)
POTASSIUM BLD-SCNC: 4.7 MMOL/L (ref 3.4–5.3)
SODIUM SERPL-SCNC: 140 MMOL/L (ref 133–144)

## 2022-05-27 ENCOUNTER — LAB (OUTPATIENT)
Dept: LAB | Facility: CLINIC | Age: 72
End: 2022-05-27
Attending: INTERNAL MEDICINE
Payer: MEDICARE

## 2022-05-27 DIAGNOSIS — Z11.59 ENCOUNTER FOR SCREENING FOR OTHER VIRAL DISEASES: ICD-10-CM

## 2022-05-27 LAB — SARS-COV-2 RNA RESP QL NAA+PROBE: NEGATIVE

## 2022-05-27 PROCEDURE — U0003 INFECTIOUS AGENT DETECTION BY NUCLEIC ACID (DNA OR RNA); SEVERE ACUTE RESPIRATORY SYNDROME CORONAVIRUS 2 (SARS-COV-2) (CORONAVIRUS DISEASE [COVID-19]), AMPLIFIED PROBE TECHNIQUE, MAKING USE OF HIGH THROUGHPUT TECHNOLOGIES AS DESCRIBED BY CMS-2020-01-R: HCPCS

## 2022-05-27 PROCEDURE — U0005 INFEC AGEN DETEC AMPLI PROBE: HCPCS

## 2022-05-28 ENCOUNTER — NURSE TRIAGE (OUTPATIENT)
Dept: NURSING | Facility: CLINIC | Age: 72
End: 2022-05-28
Payer: MEDICARE

## 2022-05-28 NOTE — TELEPHONE ENCOUNTER
"Scheduled for endoscopy and colonoscopy on Tuesday am 5/31/2022. COVID test yesterday was negative. Now he has a cold: nasal stuffiness and chest tightness. No fever or chills. Procedure to be done @ [facility] endoscopy center. He does not have a home test.   \"I have RA and am immunocompromised. Should I still do the prep, or should this be rescheduled ?\"  Dr Gómez YAÑEZ Dr: LM to call patient back on clinic  to call patient back.   Contacted patient. He will see how he feels tomorrow and call back for nelson RAINES.     Maryse Santiago RN Triage Nurse Advisor 6:32 PM 5/28/2022  "

## 2022-06-02 ENCOUNTER — OFFICE VISIT (OUTPATIENT)
Dept: UROLOGY | Facility: CLINIC | Age: 72
End: 2022-06-02
Attending: INTERNAL MEDICINE
Payer: MEDICARE

## 2022-06-02 VITALS
BODY MASS INDEX: 38.17 KG/M2 | WEIGHT: 288 LBS | HEIGHT: 73 IN | SYSTOLIC BLOOD PRESSURE: 108 MMHG | DIASTOLIC BLOOD PRESSURE: 70 MMHG

## 2022-06-02 DIAGNOSIS — N40.1 BENIGN PROSTATIC HYPERPLASIA WITH URINARY FREQUENCY: ICD-10-CM

## 2022-06-02 DIAGNOSIS — N40.2 PROSTATE NODULE: ICD-10-CM

## 2022-06-02 DIAGNOSIS — R97.20 ELEVATED PROSTATE SPECIFIC ANTIGEN (PSA): ICD-10-CM

## 2022-06-02 DIAGNOSIS — R35.0 BENIGN PROSTATIC HYPERPLASIA WITH URINARY FREQUENCY: ICD-10-CM

## 2022-06-02 DIAGNOSIS — R31.9 HEMATURIA, UNSPECIFIED TYPE: Primary | ICD-10-CM

## 2022-06-02 LAB
ALBUMIN UR-MCNC: NEGATIVE MG/DL
APPEARANCE UR: CLEAR
BILIRUB UR QL STRIP: NEGATIVE
COLOR UR AUTO: YELLOW
GLUCOSE UR STRIP-MCNC: NEGATIVE MG/DL
HGB UR QL STRIP: ABNORMAL
HYALINE CASTS: 3 /LPF
KETONES UR STRIP-MCNC: NEGATIVE MG/DL
LEUKOCYTE ESTERASE UR QL STRIP: NEGATIVE
MUCOUS THREADS #/AREA URNS LPF: PRESENT /LPF
NITRATE UR QL: NEGATIVE
PH UR STRIP: 5.5 [PH] (ref 5–7)
RBC URINE: <1 /HPF
RESIDUAL VOLUME (RV) (EXTERNAL): 48
SP GR UR STRIP: 1.02 (ref 1–1.03)
SQUAMOUS EPITHELIAL: <1 /HPF
UROBILINOGEN UR STRIP-ACNC: 0.2 E.U./DL
WBC URINE: 1 /HPF

## 2022-06-02 PROCEDURE — 51798 US URINE CAPACITY MEASURE: CPT | Performed by: STUDENT IN AN ORGANIZED HEALTH CARE EDUCATION/TRAINING PROGRAM

## 2022-06-02 PROCEDURE — 99204 OFFICE O/P NEW MOD 45 MIN: CPT | Performed by: STUDENT IN AN ORGANIZED HEALTH CARE EDUCATION/TRAINING PROGRAM

## 2022-06-02 PROCEDURE — 81001 URINALYSIS AUTO W/SCOPE: CPT | Performed by: STUDENT IN AN ORGANIZED HEALTH CARE EDUCATION/TRAINING PROGRAM

## 2022-06-02 ASSESSMENT — PAIN SCALES - GENERAL: PAINLEVEL: NO PAIN (0)

## 2022-06-02 NOTE — PATIENT INSTRUCTIONS
Ultrasound Guided Prostate Biopsy    What is a prostate biopsy? A prostate biopsy is a relatively painless procedure performed in the physician's office, outpatient facility or hospital.  A long, thin needle is inserted into the prostate to collect a sample of tissue from the prostate.  These samples are then examined by a pathologist for abnormal cells.    Why do I need a prostate biopsy? During a man's lifetime the prostate gradually increases in size.  The patient may or may not experience symptoms.  Symptoms of an enlarged prostate include:    Increased frequency of urination    Decreased force of urinary stream    Trouble with urination    Awakening at night to urinate    When the prostate is examined, the physician may feel a nodule (hard or firm growth) which would require a biopsy.    Another reason for having a prostate biopsy is an increase in the prostate specific androgen (PSA) in your blood.  A prostate biopsy may be necessary to determine the cause of the increased PSA.    Your physician will also perform an ultrasound (an image created by sound waves).  The ultrasound is performed by placing a small probe in the rectum to image the prostate gland.    Preparation for the Prostate Biopsy    Blood thinning medications must be stopped prior to your biopsy.  Please stop the following medication the appropriate number of days before:  10 days-acetylsalicylic acid, vitamin E, fish oil, aspirin, baby aspirin  7 days- Plavix, Brilinta, ibuprofen (Advil, Motrin, Aleve)  5 days-Pradaxa  4 days-Coumadin/warfarin  3 days-Eliquis, Xarelto    2.  If you have any bleeding problems (thin blood), please tell your doctor.    3.  If you have been told by another doctor to take antibiotics before dental work or surgery, please tell the doctor.  This is common for patients that have had a joint replacement.    YOU HAVE BEEN PRESCRIBED AN ANTIBIOTIC.  You will start this medication the day before your biopsy. It is one  pill, twice a day.  You will continue taking the medication until it is gone.    The day of the biopsy you will be asked to use an enema one hour before you leave for your appointment.    PLEASE EAT YOUR REGULAR MEALS ON THE DAY OF YOUR BIOPSY.    Unless you have been prescribed a sedative (Valium or a similar drug) you will be able to drive to and from your appointment.  If you have taken a sedative you must have a ride.    AFTER THE BIOPSY    DANGER SIGNS    Small amount of blood in the urine for 10-14 days Excessive blood in the urine, stool or ejaculate  Small amount of blood in the stool for 48 hours Fever over 100 or chills  Small amount of blood in the ejaculate for up to Frequent urination or burning when you urinate  4 weeks          CALL THE DOCTOR'S OFFICE -[phone] IF YOU HAVE ANY OF THESE SYMPTOMS.  IF YOU CANNOT CONTACT THE OFFICE, GO TO THE EMERGENCY ROOM.          Your biopsy is scheduled on Wednesday, 7/13/22, at our [place] office located at [address] [place] [address], [address].  Please be at the office at 10:00AM.    A follow up visit has been scheduled for you on Tuesday, 7/19/22, at 11:45AM.  This is a virtual visit.  Your doctor will discuss your results with you at this visit.

## 2022-06-02 NOTE — PROGRESS NOTES
[facility]  Main Office: [address]  [address]  [place] [address]       CHIEF COMPLAINT:  LUTS including nocturia, urinary frequency, elevated PSA to 5.68    HISTORY:   72 yo M with LUTS including nocturia, urinary frequency, elevated PSA to 5.68    He saw a urologist in the past. Upon chart review he was seen in the past for possible neurogenic bladder but apparently he did not undergo urodynamics. Saw Dr. Bamu 2011 and prior to that possibly saw Peña and Dr. Booth. When he saw Bautista in  2011 given that he was voiding well with low PVR he was thought to have overactive bladder not neurogenic bladder. He was started on oxybutinin. He has been taking this longterm and is now taking it once a day 5 mg.    He recently started diuretic which has markedly improved his nocturia. He does have urge urinary incontinence has to wear pads. He notices some pilar/brownish stains in his pad sometimes. When he urinates sometimes he has pain in suprapubic area and in his low back.    He had his PSA checked late last year and it was elevated to 5.68 ng/ml. He has not had any PSA in a long time before that.    No FH of prostate cancer. He has never had a prostate biopsy    PAST MEDICAL HISTORY:   Past Medical History:   Diagnosis Date     Abnormal EMG 04/18/2013     Abnormal involuntary movements(781.0)     Movement Disorder     AK (actinic keratosis) 12/18/2011     Allergic rhinitis, cause unspecified     Allergic rhinitis     Balance problems 11/01/2011     Basal cell carcinoma      Bladder spasms 11/01/2011     Chronic osteoarthritis      COPD (chronic obstructive pulmonary disease) (H)     Interstial lung disease, obliderans bronchiolitis     Diaphragmatic hernia without mention of obstruction or gangrene      Earache or other ear, nose, or throat complaint      Esophageal reflux      Fatigue 11/01/2011     Fracture      H. pylori infection 05/12/2011     History of MRI of cervical spine 11/18/2013     EXAMINATION: CERVICAL SPINE G/E 5 VIEWS* 4/19/2013 4:18 PM  CLINICAL HISTORY: Pain in limb,Performing Location?->[facility] ([facility]),  COMPARISON:  FINDINGS: AP and lateral views in flexion and extension, as well as odontoid view of the cervical spine was obtained. There is no comparison available. The vertebral bodies of the cervical spine are normally aligned. There is posterior spurring and d     Incomplete defecation 11/01/2011     Interstitial lung disease (H) 11/29/2016     Laboratory test 08/07/2012     Lung disease 06/2015     Malignant basal cell neoplasm of skin 08/06/2008     Melanoma (H) 08/06/2008     Melanoma in situ of lower leg (H)     R calf     Neuropathy 05/16/2011     Other bladder disorder      Other color vision deficiencies      Other nervous system complications      Parkinsonism (H) 11/01/2011     Parkinsons disease (H)     Parkinsonism/ balance, dropped foot, tremor     Personal history of colonic polyps      PLMD (periodic limb movement disorder) 12/16/2021     Polyneuropathy in other diseases classified elsewhere (H)      RA (rheumatoid arthritis) (H)      Rheumatoid arthritis of multiple sites with negative rheumatoid factor (H) 03/21/2016     Seronegative arthritis 11/18/2013     Shortness of breath      Shoulder arthritis 2016    acromioclavicular joint      Somatization disorder 05/12/2011     Spider veins     Leg Vericise vein surgery     Squamous cell carcinoma      Tremor 11/01/2011     Unspecified essential hypertension      Unspecified hypothyroidism      Urinary tract infection      Urinary urgency 11/01/2011     Wears glasses 11/01/2011       PAST SURGICAL HISTORY:   Past Surgical History:   Procedure Laterality Date     BIOPSY OF SKIN LESION       COLONOSCOPY       CYSTOSCOPY  Many years ago    Blood in urine/ bladder cystoscopy     HEMORRHOID SURGERY       lip biopsy      for sicca complex     MOHS MICROGRAPHIC PROCEDURE       SOFT TISSUE SURGERY      removeal of basel cell  "carcinoma       FAMILY HISTORY:   Family History   Problem Relation Age of Onset     Hypertension Mother      Heart Disease Mother         a fib     Arthritis Mother         \"osteo\"     Osteoporosis Mother      Skin Cancer Mother      Uterine Cancer Mother      Other Cancer Mother      Cancer Mother      Hypertension Brother      Diabetes Brother         \" post pancreatitis\"     Neurologic Disorder Sister         multiple sclerosis     Hypertension Sister      Heart Disease Sister      Multiple Sclerosis Sister      Heart Disease Sister         Chf     Arthritis Sister      Heart Failure Sister      Kidney Disease Sister      Dementia Sister      Hypertension Father      Cerebrovascular Disease Father         ,     Skin Cancer Father      Colon Polyps Father      Heart Disease Father      Other - See Comments Son         [place]     Other - See Comments Abdon wagner     Other - See Comments Abdon gonzalez     Psoriasis Maternal Grandfather      Stomach Cancer Maternal Grandfather      Cancer Maternal Grandfather      Congenital Anomalies Other         granddaughter with a chromosome defect     Other Cancer Other         Grand daughter     Cancer Other         Grand daughter     Cerebrovascular Disease Paternal Grandmother         ,     Cerebrovascular Disease Paternal Grandfather         ,     Cancer Granddaughter         Langerhans Cell Histiocytosis     Genetic Disease Granddaughter         gene translocation     Learning Disorder Other         Gene translocation     Melanoma No family hx of      Colon Cancer No family hx of        SOCIAL HISTORY:   Social History     Tobacco Use     Smoking status: Former Smoker     Packs/day: 1.50     Years: 37.00     Pack years: 55.50     Types: Cigarettes, Cigarettes     Start date: 6/15/1963     Quit date: 2000     Years since quittin.6     Smokeless tobacco: Never Used   Substance Use Topics     Alcohol use: Not Currently        "   Allergies   Allergen Reactions     Restasis      Burning eyes, problems with breathing, tightness in chest     Adhesive Tape      Bandages misc     Allegra      EXCESSIVE URINATION AND WEAKNESS, LIGHT-HEADED     Allergy      Dust     Animal Dander      Benadryl Allergy      EXCESSIVE URINATION AND WEAKNESS, LIGHT-HEADED     Cephalexin      Joint pain or gerd aggravation. Bloating excessive urination      Cephalosporins      Cyclosporine      Diphenhydramine Other (See Comments)     Doxycycline      Doxycycline Hyclate Nausea     SWEATING,MIGRAINES,LOSS OF APPETITE,SWEATING,LIGHT HEADED, EXCESSIVE URINATION     Fexofenadine Other (See Comments)     Flonase [Fluticasone Propionate]      Gabapentin      Neurontin: mosd changes and excess urination     Hydrocortisone      Iodine      Iodine Solution [Povidone Iodine]      SKIN MELTS     Levaquin      From surgeon--? Joint pain ? Gerd aggravation. Insomnia, excess urination     Levofloxacin      Mylanta      EXCESSIVE URINATION AND WEAKNESS, LIGHT-HEADED     Oxcarbazepine      Prednisone      Weakness, elevated bp, headache, eye pain, congestion      Prednisone      Seafood [Seafood]      Shellfish Allergy      Hives       Simethicone      Sulfamethoxazole W/Trimethoprim      Sulfamethoxazole-Trimethoprim      Chest pain, angina     Symbicort GI Disturbance and Nausea     Trees      Trileptal      SEVERE JOINT AND TENDON PAIN, INSOMNIA, RESTLESSNESS, NAUSEA, EXCESS URINATION     Cortizone Rash     EXCESS URINATION,WEAKNESS,NAUSEA, HEADACHE         Current Outpatient Medications:      acetaminophen (TYLENOL) 500 MG tablet, 2 x 500mg by mouth 3 times per day: 7/730am, 4pm and 11pm  = 6/day, Disp: , Rfl:      albuterol (PROAIR HFA/PROVENTIL HFA/VENTOLIN HFA) 108 (90 Base) MCG/ACT inhaler, Inhale 2 puffs into the lungs every 4 hours as needed for shortness of breath / dyspnea or wheezing, Disp: 6.7 g, Rfl: 1     amLODIPine (NORVASC) 5 MG tablet, Take 1 tablet (5 mg) by  mouth daily, Disp: 30 tablet, Rfl: 1     cholecalciferol (HM VITAMIN D3) 25 MCG (1000 UT) TABS, 1000 unit tab by mouth daily, Disp: , Rfl:      folic acid (FOLVITE) 1 MG tablet, Take 1 tablet (1 mg) by mouth daily, Disp: 90 tablet, Rfl: 1     hydroxychloroquine (PLAQUENIL) 200 MG tablet, Take 1 tablet (200 mg) by mouth daily Daily at 9am., Disp: 90 tablet, Rfl: 5     lisinopril-hydrochlorothiazide (ZESTORETIC) 20-25 MG tablet, Take 1 tablet by mouth daily, Disp: 90 tablet, Rfl: 3     methotrexate sodium 2.5 MG TABS, Take 8 tablets (20 mg) by mouth every 7 days *Monitoring labs every 8-12 weeks, Disp: 96 tablet, Rfl: 3     metoprolol succinate ER (TOPROL-XL) 50 MG 24 hr tablet, 1 and 1/2 tabs daily., Disp: 135 tablet, Rfl: 3     omeprazole (PRILOSEC) 40 MG DR capsule, Take 1 capsule (40 mg) by mouth daily, Disp: 90 capsule, Rfl: 3     ORDER FOR DME, Use your BiPAP device as directed by your provider., Disp: , Rfl:      oxybutynin (DITROPAN) 5 MG tablet, Take 1 tablet (5 mg) by mouth 3 times daily TAKE 1 TABLET BY MOUTH EVERY DAY AT 4PM, Disp: 270 tablet, Rfl: 3     pregabalin (LYRICA) 50 MG capsule, 50mg capsule by mouth at 7am and 4pm and 1-2 x 50mg capsules by mouth nightly at 11pm (4/day), Disp: 360 capsule, Rfl: 3     salmeterol (SEREVENT DISKUS) 50 MCG/DOSE inhaler, Inhale 1 puff into the lungs 2 times daily, Disp: 60 each, Rfl: 3     fluorouracil (EFUDEX) 5 % external cream, , Disp: , Rfl:      ketoconazole (NIZORAL) 2 % external shampoo, , Disp: , Rfl:     Review Of Systems:  Skin: No rash, pruritis, or skin pigmentation  Eyes: No changes in vision  Ears/Nose/Throat: No changes in hearing, no nosebleeds  Respiratory: No shortness of breath, dyspnea on exertion, cough, or hemoptysis  Cardiovascular: No chest pain or palpitations  Gastrointestinal: No diarrhea or constipation. No abdominal pain. No hematochezia  Genitourinary: see HPI  Musculoskeletal: No pain or swelling of joints, normal range of  "motion  Neurologic: No weakness or tremors  Psychiatric: No recent changes in memory or mood  Hematologic/Lymphatic/Immunologic: No easy bruising or enlarged lymph nodes  Endocrine: No weight gain or loss      PHYSICAL EXAM:    /70   Ht 1.854 m (6' 1\")   Wt 130.6 kg (288 lb)   BMI 38.00 kg/m    General appearance: In NAD, conversant  HEENT: Normocephalic and atraumatic, anicteric sclera  Cardiovascular: Not examined  Respiratory: normal, non-labored breathing  Gastrointestinal: negative, Abdomen soft, non-tender, and non-distended.   Musculoskeletal: Not Examined  Peripheral Vascular/extremity: No peripheral edema  Skin: Normal temperature, turgor, and texture. No rash  Psychiatric: Appropriate affect, alert and oriented to person, place, and time    Penis: circ phallus  Scrotal Skin: normal scrotum  Testicles: Normal testicles  Epididymis: prominent bilaterally (no h/o vasectomy)  Digital Rectal Exam: 50 gram firm prostate with right apex nodularity    Cystoscopy: Not done      PSA:   Component      Latest Ref Rng & Units 12/16/2021   PSA      0.00 - 4.00 ug/L 5.68 (H)       UA RESULTS:  Recent Labs   Lab Test 06/02/22  0906 11/05/19  1053   COLOR Yellow Yellow   APPEARANCE Clear Clear   URINEGLC Negative Negative   URINEBILI Negative Negative   URINEKETONE Negative Negative   SG 1.025 >1.030   UBLD Trace* Trace*   URINEPH 5.5 5.5   PROTEIN Negative Negative   UROBILINOGEN 0.2 0.2   NITRITE Negative Negative   LEUKEST Negative Negative   RBCU  --  O - 2   WBCU  --  0 - 5     Component      Latest Ref Rng & Units 6/2/2022   Mucus Urine      None Seen /LPF Present (A)   RBC Urine      <=2 /HPF <1   WBC Urine      <=5 /HPF 1   Squamous Epithelial /HPF Urine      <=1 /HPF <1   Hyaline Casts      <=2 /LPF 3 (H)       Bladder Scan: PVR 48    Other Labs:      Imaging Studies: None      CLINICAL IMPRESSION:   70 yo M with LUTS including nocturia, urinary frequency, elevated PSA to 5.68    LUTS: moderate symptoms. " He recently started diuretic which has markedly improved his nocturia. He has longstanding symptoms which have been controlled with oxybutinin. he is emptying well with low post void residual. For now patient agrees to simply continue the anticholinergic.    Elevated PSA to 5.68 and nodularity of the right apex of the prostate    Based on the patient's demographics, the Prostate Biopsy Collaborative Group nomogram indicates 54 percent risk of high-grade prostate cancer and 15 percent risk of low-grade prostate cancer (reference: Ally Ocampo, Kayla Ronquillo, Kath Dalal, Kathleen Conti, Stacey Kwon, Chuy Craig, Ramón Perikns, et al.  A Contemporary Prostate Biopsy Risk Calculator Based on Multiple Heterogeneous Cohorts.   Urology 74, no. 2 (2018): 197-203. [id number]:[email or web address].)    Patient is immunocompromised due to methotrexate for rheumatoid arthritis, will have to consider biopsy carefully    Discussed risks of prostate biopsy including bleeding (hematuria, hematochezia, hematospermia), infection (urinary tract infection, 5% risk of sepsis), pain.    Trace blood in urine today: send for microscopic. He notes that he has had pilar colored staining in his underwear, concern for possible gross hematuria. Will wait for UA with micro result before considering full repeat workup (he has had hematuria workup in the past)    PLAN:   Prostate MRI  If PIRADS 3-4-5 -> uronav prostate biopsy  If PIRADS 1-2-> recheck PSA and follow up after before considering random biopsy (not patient's methotrexate use)    Consider microhematuria workup with CT urogram and cysto pending UA results *addendum* urinalysis with microscopic <1 rbc/hpf    Silviano Vargas MD   [facility]  [phone] clinic phone      Over 45 minutes spent on this encounter including review of prior records, discussion with patient, documentation

## 2022-06-02 NOTE — NURSING NOTE
Chief Complaint   Patient presents with     Elevated PSA     Pt has discomfort in lower abdomen after urination. Pt states it takes extra time to empty bladder    PVR: 48 mL    Aurora Monge, CMA

## 2022-06-02 NOTE — LETTER
6/2/2022       RE: Lucio Daly  [address]  [place] [address]     Dear Colleague,    Thank you for referring your patient, Lucio Daly, to the [facility] [place] [facility]. Please see a copy of my visit note below.    [facility]  Main Office: [address]  [address]  [place] [address]       CHIEF COMPLAINT:  LUTS including nocturia, urinary frequency, elevated PSA to 5.68    HISTORY:   70 yo M with LUTS including nocturia, urinary frequency, elevated PSA to 5.68    He saw a urologist in the past. Upon chart review he was seen in the past for possible neurogenic bladder but apparently he did not undergo urodynamics. Saw Dr. Baum 2011 and prior to that possibly saw Peña and Dr. Booth. When he saw Bautista in  2011 given that he was voiding well with low PVR he was thought to have overactive bladder not neurogenic bladder. He was started on oxybutinin. He has been taking this longterm and is now taking it once a day 5 mg.    He recently started diuretic which has markedly improved his nocturia. He does have urge urinary incontinence has to wear pads. He notices some pilar/brownish stains in his pad sometimes. When he urinates sometimes he has pain in suprapubic area and in his low back.    He had his PSA checked late last year and it was elevated to 5.68 ng/ml. He has not had any PSA in a long time before that.    No FH of prostate cancer. He has never had a prostate biopsy    PAST MEDICAL HISTORY:   Past Medical History:   Diagnosis Date     Abnormal EMG 04/18/2013     Abnormal involuntary movements(781.0)     Movement Disorder     AK (actinic keratosis) 12/18/2011     Allergic rhinitis, cause unspecified     Allergic rhinitis     Balance problems 11/01/2011     Basal cell carcinoma      Bladder spasms 11/01/2011     Chronic osteoarthritis      COPD (chronic obstructive pulmonary disease) (H)     Interstial lung  disease, obliderans bronchiolitis     Diaphragmatic hernia without mention of obstruction or gangrene      Earache or other ear, nose, or throat complaint      Esophageal reflux      Fatigue 11/01/2011     Fracture      H. pylori infection 05/12/2011     History of MRI of cervical spine 11/18/2013    EXAMINATION: CERVICAL SPINE G/E 5 VIEWS* 4/19/2013 4:18 PM  CLINICAL HISTORY: Pain in limb,Performing Location?->[facility]),  COMPARISON:  FINDINGS: AP and lateral views in flexion and extension, as well as odontoid view of the cervical spine was obtained. There is no comparison available. The vertebral bodies of the cervical spine are normally aligned. There is posterior spurring and d     Incomplete defecation 11/01/2011     Interstitial lung disease (H) 11/29/2016     Laboratory test 08/07/2012     Lung disease 06/2015     Malignant basal cell neoplasm of skin 08/06/2008     Melanoma (H) 08/06/2008     Melanoma in situ of lower leg (H)     R calf     Neuropathy 05/16/2011     Other bladder disorder      Other color vision deficiencies      Other nervous system complications      Parkinsonism (H) 11/01/2011     Parkinsons disease (H)     Parkinsonism/ balance, dropped foot, tremor     Personal history of colonic polyps      PLMD (periodic limb movement disorder) 12/16/2021     Polyneuropathy in other diseases classified elsewhere (H)      RA (rheumatoid arthritis) (H)      Rheumatoid arthritis of multiple sites with negative rheumatoid factor (H) 03/21/2016     Seronegative arthritis 11/18/2013     Shortness of breath      Shoulder arthritis 2016    acromioclavicular joint      Somatization disorder 05/12/2011     Spider veins     Leg Vericise vein surgery     Squamous cell carcinoma      Tremor 11/01/2011     Unspecified essential hypertension      Unspecified hypothyroidism      Urinary tract infection      Urinary urgency 11/01/2011     Wears glasses 11/01/2011       PAST SURGICAL HISTORY:   Past  "Surgical History:   Procedure Laterality Date     BIOPSY OF SKIN LESION       COLONOSCOPY       CYSTOSCOPY  Many years ago    Blood in urine/ bladder cystoscopy     HEMORRHOID SURGERY       lip biopsy      for sicca complex     MOHS MICROGRAPHIC PROCEDURE       SOFT TISSUE SURGERY      removeal of basel cell carcinoma       FAMILY HISTORY:   Family History   Problem Relation Age of Onset     Hypertension Mother      Heart Disease Mother         a fib     Arthritis Mother         \"osteo\"     Osteoporosis Mother      Skin Cancer Mother      Uterine Cancer Mother      Other Cancer Mother      Cancer Mother      Hypertension Brother      Diabetes Brother         \" post pancreatitis\"     Neurologic Disorder Sister         multiple sclerosis     Hypertension Sister      Heart Disease Sister      Multiple Sclerosis Sister      Heart Disease Sister         Chf     Arthritis Sister      Heart Failure Sister      Kidney Disease Sister      Dementia Sister      Hypertension Father      Cerebrovascular Disease Father         ,     Skin Cancer Father      Colon Polyps Father      Heart Disease Father      Other - See Comments Son         [place]     Other - See Comments Son         [place]     Other - See Comments Son         day gonzalez     Psoriasis Maternal Grandfather      Stomach Cancer Maternal Grandfather      Cancer Maternal Grandfather      Congenital Anomalies Other         granddaughter with a chromosome defect     Other Cancer Other         Grand daughter     Cancer Other         Grand daughter     Cerebrovascular Disease Paternal Grandmother         ,     Cerebrovascular Disease Paternal Grandfather         ,     Cancer Granddaughter         Langerhans Cell Histiocytosis     Genetic Disease Granddaughter         gene translocation     Learning Disorder Other         Gene translocation     Melanoma No family hx of      Colon Cancer No family hx of        SOCIAL HISTORY:   Social History     Tobacco Use "     Smoking status: Former Smoker     Packs/day: 1.50     Years: 37.00     Pack years: 55.50     Types: Cigarettes, Cigarettes     Start date: 6/15/1963     Quit date: 2000     Years since quittin.6     Smokeless tobacco: Never Used   Substance Use Topics     Alcohol use: Not Currently          Allergies   Allergen Reactions     Restasis      Burning eyes, problems with breathing, tightness in chest     Adhesive Tape      Bandages misc     Allegra      EXCESSIVE URINATION AND WEAKNESS, LIGHT-HEADED     Allergy      Dust     Animal Dander      Benadryl Allergy      EXCESSIVE URINATION AND WEAKNESS, LIGHT-HEADED     Cephalexin      Joint pain or gerd aggravation. Bloating excessive urination      Cephalosporins      Cyclosporine      Diphenhydramine Other (See Comments)     Doxycycline      Doxycycline Hyclate Nausea     SWEATING,MIGRAINES,LOSS OF APPETITE,SWEATING,LIGHT HEADED, EXCESSIVE URINATION     Fexofenadine Other (See Comments)     Flonase [Fluticasone Propionate]      Gabapentin      Neurontin: mosd changes and excess urination     Hydrocortisone      Iodine      Iodine Solution [Povidone Iodine]      SKIN MELTS     Levaquin      From surgeon--? Joint pain ? Gerd aggravation. Insomnia, excess urination     Levofloxacin      Mylanta      EXCESSIVE URINATION AND WEAKNESS, LIGHT-HEADED     Oxcarbazepine      Prednisone      Weakness, elevated bp, headache, eye pain, congestion      Prednisone      Seafood [Seafood]      Shellfish Allergy      Hives       Simethicone      Sulfamethoxazole W/Trimethoprim      Sulfamethoxazole-Trimethoprim      Chest pain, angina     Symbicort GI Disturbance and Nausea     Trees      Trileptal      SEVERE JOINT AND TENDON PAIN, INSOMNIA, RESTLESSNESS, NAUSEA, EXCESS URINATION     Cortizone Rash     EXCESS URINATION,WEAKNESS,NAUSEA, HEADACHE         Current Outpatient Medications:      acetaminophen (TYLENOL) 500 MG tablet, 2 x 500mg by mouth 3 times per day: 7/730am, 4pm  and 11pm  = 6/day, Disp: , Rfl:      albuterol (PROAIR HFA/PROVENTIL HFA/VENTOLIN HFA) 108 (90 Base) MCG/ACT inhaler, Inhale 2 puffs into the lungs every 4 hours as needed for shortness of breath / dyspnea or wheezing, Disp: 6.7 g, Rfl: 1     amLODIPine (NORVASC) 5 MG tablet, Take 1 tablet (5 mg) by mouth daily, Disp: 30 tablet, Rfl: 1     cholecalciferol (HM VITAMIN D3) 25 MCG (1000 UT) TABS, 1000 unit tab by mouth daily, Disp: , Rfl:      folic acid (FOLVITE) 1 MG tablet, Take 1 tablet (1 mg) by mouth daily, Disp: 90 tablet, Rfl: 1     hydroxychloroquine (PLAQUENIL) 200 MG tablet, Take 1 tablet (200 mg) by mouth daily Daily at 9am., Disp: 90 tablet, Rfl: 5     lisinopril-hydrochlorothiazide (ZESTORETIC) 20-25 MG tablet, Take 1 tablet by mouth daily, Disp: 90 tablet, Rfl: 3     methotrexate sodium 2.5 MG TABS, Take 8 tablets (20 mg) by mouth every 7 days *Monitoring labs every 8-12 weeks, Disp: 96 tablet, Rfl: 3     metoprolol succinate ER (TOPROL-XL) 50 MG 24 hr tablet, 1 and 1/2 tabs daily., Disp: 135 tablet, Rfl: 3     omeprazole (PRILOSEC) 40 MG DR capsule, Take 1 capsule (40 mg) by mouth daily, Disp: 90 capsule, Rfl: 3     ORDER FOR DME, Use your BiPAP device as directed by your provider., Disp: , Rfl:      oxybutynin (DITROPAN) 5 MG tablet, Take 1 tablet (5 mg) by mouth 3 times daily TAKE 1 TABLET BY MOUTH EVERY DAY AT 4PM, Disp: 270 tablet, Rfl: 3     pregabalin (LYRICA) 50 MG capsule, 50mg capsule by mouth at 7am and 4pm and 1-2 x 50mg capsules by mouth nightly at 11pm (4/day), Disp: 360 capsule, Rfl: 3     salmeterol (SEREVENT DISKUS) 50 MCG/DOSE inhaler, Inhale 1 puff into the lungs 2 times daily, Disp: 60 each, Rfl: 3     fluorouracil (EFUDEX) 5 % external cream, , Disp: , Rfl:      ketoconazole (NIZORAL) 2 % external shampoo, , Disp: , Rfl:     Review Of Systems:  Skin: No rash, pruritis, or skin pigmentation  Eyes: No changes in vision  Ears/Nose/Throat: No changes in hearing, no  "nosebleeds  Respiratory: No shortness of breath, dyspnea on exertion, cough, or hemoptysis  Cardiovascular: No chest pain or palpitations  Gastrointestinal: No diarrhea or constipation. No abdominal pain. No hematochezia  Genitourinary: see HPI  Musculoskeletal: No pain or swelling of joints, normal range of motion  Neurologic: No weakness or tremors  Psychiatric: No recent changes in memory or mood  Hematologic/Lymphatic/Immunologic: No easy bruising or enlarged lymph nodes  Endocrine: No weight gain or loss      PHYSICAL EXAM:    /70   Ht 1.854 m (6' 1\")   Wt 130.6 kg (288 lb)   BMI 38.00 kg/m    General appearance: In NAD, conversant  HEENT: Normocephalic and atraumatic, anicteric sclera  Cardiovascular: Not examined  Respiratory: normal, non-labored breathing  Gastrointestinal: negative, Abdomen soft, non-tender, and non-distended.   Musculoskeletal: Not Examined  Peripheral Vascular/extremity: No peripheral edema  Skin: Normal temperature, turgor, and texture. No rash  Psychiatric: Appropriate affect, alert and oriented to person, place, and time    Penis: circ phallus  Scrotal Skin: normal scrotum  Testicles: Normal testicles  Epididymis: prominent bilaterally (no h/o vasectomy)  Digital Rectal Exam: 50 gram firm prostate with right apex nodularity    Cystoscopy: Not done      PSA:   Component      Latest Ref Rng & Units 12/16/2021   PSA      0.00 - 4.00 ug/L 5.68 (H)       UA RESULTS:  Recent Labs   Lab Test 06/02/22  0906 11/05/19  1053   COLOR Yellow Yellow   APPEARANCE Clear Clear   URINEGLC Negative Negative   URINEBILI Negative Negative   URINEKETONE Negative Negative   SG 1.025 >1.030   UBLD Trace* Trace*   URINEPH 5.5 5.5   PROTEIN Negative Negative   UROBILINOGEN 0.2 0.2   NITRITE Negative Negative   LEUKEST Negative Negative   RBCU  --  O - 2   WBCU  --  0 - 5     Component      Latest Ref Rng & Units 6/2/2022   Mucus Urine      None Seen /LPF Present (A)   RBC Urine      <=2 /HPF <1   WBC " Urine      <=5 /HPF 1   Squamous Epithelial /HPF Urine      <=1 /HPF <1   Hyaline Casts      <=2 /LPF 3 (H)       Bladder Scan: PVR 48    Other Labs:      Imaging Studies: None      CLINICAL IMPRESSION:   70 yo M with LUTS including nocturia, urinary frequency, elevated PSA to 5.68    LUTS: moderate symptoms. He recently started diuretic which has markedly improved his nocturia. He has longstanding symptoms which have been controlled with oxybutinin. he is emptying well with low post void residual. For now patient agrees to simply continue the anticholinergic.    Elevated PSA to 5.68 and nodularity of the right apex of the prostate    Based on the patient's demographics, the Prostate Biopsy Collaborative Group nomogram indicates 54 percent risk of high-grade prostate cancer and 15 percent risk of low-grade prostate cancer (reference: Ally Ocampo, Kayla Ronquillo, Kath Dalal, Kathleen Conti, Stacey Kwon, Chuy Craig, Ramón Perkins, et al.  A Contemporary Prostate Biopsy Risk Calculator Based on Multiple Heterogeneous Cohorts.   Urology 74, no. 2 (2018): 197-203. Doi:[email or web address].)    Patient is immunocompromised due to methotrexate for rheumatoid arthritis, will have to consider biopsy carefully    Discussed risks of prostate biopsy including bleeding (hematuria, hematochezia, hematospermia), infection (urinary tract infection, 5% risk of sepsis), pain.    Trace blood in urine today: send for microscopic. He notes that he has had pilar colored staining in his underwear, concern for possible gross hematuria. Will wait for UA with micro result before considering full repeat workup (he has had hematuria workup in the past)    PLAN:   Prostate MRI  If PIRADS 3-4-5 -> uronav prostate biopsy  If PIRADS 1-2-> recheck PSA and follow up after before considering random biopsy (not patient's methotrexate use)    Consider microhematuria workup with CT urogram and cysto pending  UA results *addendum* urinalysis with microscopic <1 rbc/hpf    Silviano Vargas MD   [facility]  [phone] clinic phone      Over 45 minutes spent on this encounter including review of prior records, discussion with patient, documentation

## 2022-06-10 ENCOUNTER — TELEPHONE (OUTPATIENT)
Dept: PEDIATRICS | Facility: CLINIC | Age: 72
End: 2022-06-10
Payer: MEDICARE

## 2022-06-10 NOTE — TELEPHONE ENCOUNTER
Called patient to discuss need to transfer care to [facility] from [facility]. Patient to call to start the process. Once complete / after obtaining the diagnostic check in his device, recommend obtaining overnight oximetry with the bilevel PAP at the current setting to check for any evidence of hypoxemia  as a contributing factor for the symptoms of fatigue and excessive daytime sleepiness    Reason for referral to Dr. Espinoza as follows   Insomnia-multifactorial. Psychophysiological. We discussed stimulus control measures. We discussed cognitive behavioral therapy for insomnia.  Referral to sleep psychologist Dr. Ramón Espinoza has been provided to obtain CBT-I for insomnia    Cleo Coon RN on 6/10/2022 at 1:39 PM

## 2022-06-10 NOTE — TELEPHONE ENCOUNTER
Reason for call:  Other   Patient called regarding (reason for call): call back  Additional comments: Pt is wondering if he still needs to schedule the overnight oximetry study per Dr Harrell. Pt also would like clarification on why he was referred to Dr Espinoza sleep psychology for a consult with that provider.     Phone number to reach patient:  Cell number on file:    Telephone Information:   Mobile [phone]       Best Time:  Anytime    Can we leave a detailed message on this number?  YES    Travel screening: Not Applicable

## 2022-06-12 DIAGNOSIS — I10 PRIMARY HYPERTENSION: ICD-10-CM

## 2022-06-13 RX ORDER — AMLODIPINE BESYLATE 5 MG/1
TABLET ORAL
Qty: 30 TABLET | Refills: 1 | OUTPATIENT
Start: 2022-06-13

## 2022-06-13 NOTE — TELEPHONE ENCOUNTER
Requested too soon. Should have refill on file. Patient has upcoming appointment 6/20/22.     Nando GUZMAN RN

## 2022-06-15 ENCOUNTER — TELEPHONE (OUTPATIENT)
Dept: SLEEP MEDICINE | Facility: CLINIC | Age: 72
End: 2022-06-15
Payer: MEDICARE

## 2022-06-15 NOTE — TELEPHONE ENCOUNTER
Reason for Call:  Other other    Detailed comments: [facility] called today and would like Oxemetry Testing order from Julio Cesar Thorne at [facility] to be faxed to [phone].  They can do this test there.    If questions, please contact her back.  Thank you.    Phone Number Patient can be reached at: Other phone number:  [phone] [place] *    Best Time: any    Can we leave a detailed message on this number? YES    Call taken on 6/15/2022 at 9:29 AM by Sandi Marks

## 2022-06-16 ENCOUNTER — ANCILLARY PROCEDURE (OUTPATIENT)
Dept: MRI IMAGING | Facility: CLINIC | Age: 72
End: 2022-06-16
Attending: STUDENT IN AN ORGANIZED HEALTH CARE EDUCATION/TRAINING PROGRAM
Payer: MEDICARE

## 2022-06-16 DIAGNOSIS — N40.2 PROSTATE NODULE: ICD-10-CM

## 2022-06-16 DIAGNOSIS — R97.20 ELEVATED PROSTATE SPECIFIC ANTIGEN (PSA): ICD-10-CM

## 2022-06-16 PROCEDURE — A9585 GADOBUTROL INJECTION: HCPCS | Performed by: STUDENT IN AN ORGANIZED HEALTH CARE EDUCATION/TRAINING PROGRAM

## 2022-06-16 PROCEDURE — G1004 CDSM NDSC: HCPCS | Mod: GC | Performed by: STUDENT IN AN ORGANIZED HEALTH CARE EDUCATION/TRAINING PROGRAM

## 2022-06-16 PROCEDURE — 72197 MRI PELVIS W/O & W/DYE: CPT | Mod: MG | Performed by: STUDENT IN AN ORGANIZED HEALTH CARE EDUCATION/TRAINING PROGRAM

## 2022-06-16 RX ORDER — GADOBUTROL 604.72 MG/ML
15 INJECTION INTRAVENOUS ONCE
Status: COMPLETED | OUTPATIENT
Start: 2022-06-16 | End: 2022-06-16

## 2022-06-16 RX ADMIN — GADOBUTROL 13 ML: 604.72 INJECTION INTRAVENOUS at 18:54

## 2022-06-16 NOTE — DISCHARGE INSTRUCTIONS
MRI Contrast Discharge Instructions    The IV contrast you received today will pass out of your body in your  urine. This will happen in the next 24 hours. You will not feel this process.  Your urine will not change color.    Drink at least 4 extra glasses of water or juice today (unless your doctor  has restricted your fluids). This reduces the stress on your kidneys.  You may take your regular medicines.    If you are on dialysis: It is best to have dialysis today.    If you have a reaction: Most reactions happen right away. If you have  any new symptoms after leaving the hospital (such as hives or swelling),  call your hospital at the correct number below. Or call your family doctor.  If you have breathing distress or wheezing, call 911.    Special instructions: ***    I have read and understand the above information.    Signature:______________________________________ Date:___________    Staff:__________________________________________ Date:___________     Time:__________    [facility] Departments:    ___[place]: [phone]  ___[facility]: [phone]  ___[place]: [phone] ___[facility]: [phone]  ___[facility] [place]: [phone]  ___[facility]: [phone]  ___[phone]  ___[facility]: [phone]  ___[phone]

## 2022-06-20 ENCOUNTER — OFFICE VISIT (OUTPATIENT)
Dept: PEDIATRICS | Facility: CLINIC | Age: 72
End: 2022-06-20
Payer: MEDICARE

## 2022-06-20 VITALS
HEART RATE: 48 BPM | HEIGHT: 73 IN | RESPIRATION RATE: 20 BRPM | TEMPERATURE: 97.1 F | SYSTOLIC BLOOD PRESSURE: 118 MMHG | WEIGHT: 286 LBS | BODY MASS INDEX: 37.91 KG/M2 | OXYGEN SATURATION: 100 % | DIASTOLIC BLOOD PRESSURE: 71 MMHG

## 2022-06-20 DIAGNOSIS — C61 PROSTATE CANCER (H): ICD-10-CM

## 2022-06-20 DIAGNOSIS — R53.83 OTHER FATIGUE: Primary | ICD-10-CM

## 2022-06-20 DIAGNOSIS — J44.81 BRONCHIOLITIS OBLITERANS (H): ICD-10-CM

## 2022-06-20 DIAGNOSIS — I10 PRIMARY HYPERTENSION: ICD-10-CM

## 2022-06-20 DIAGNOSIS — G70.9 NEUROMUSCULAR DISEASE (H): ICD-10-CM

## 2022-06-20 DIAGNOSIS — K21.9 GASTROESOPHAGEAL REFLUX DISEASE WITHOUT ESOPHAGITIS: ICD-10-CM

## 2022-06-20 DIAGNOSIS — Z12.11 SCREEN FOR COLON CANCER: ICD-10-CM

## 2022-06-20 PROCEDURE — 99214 OFFICE O/P EST MOD 30 MIN: CPT | Performed by: INTERNAL MEDICINE

## 2022-06-20 RX ORDER — OMEPRAZOLE 40 MG/1
40 CAPSULE, DELAYED RELEASE ORAL DAILY
Qty: 90 CAPSULE | Refills: 3 | Status: SHIPPED | OUTPATIENT
Start: 2022-06-20 | End: 2023-05-17

## 2022-06-20 RX ORDER — AMLODIPINE BESYLATE 5 MG/1
5 TABLET ORAL DAILY
Qty: 90 TABLET | Refills: 3 | Status: SHIPPED | OUTPATIENT
Start: 2022-06-20 | End: 2023-04-12

## 2022-06-20 ASSESSMENT — PAIN SCALES - GENERAL: PAINLEVEL: NO PAIN (0)

## 2022-06-20 NOTE — PROGRESS NOTES
"  Assessment & Plan     Screen for colon cancer  Due; getting this next month.     Neuromuscular disease (H)  Likely contributing to his overall fatige.  Agrees to physical therapy.   - Physical Therapy Referral; Future    Primary hypertension  Now at goal.  Continue calcium channel blocker.  No side effects.   - amLODIPine (NORVASC) 5 MG tablet; Take 1 tablet (5 mg) by mouth daily    Gastroesophageal reflux disease without esophagitis  Refilled.  - omeprazole (PRILOSEC) 40 MG DR capsule; Take 1 capsule (40 mg) by mouth daily    Other fatigue  Has many reasons for fatigue, including obstructive sleep apnea, BOOP, RA, Parkinsonism, and now prostate cancer (likely).    Bronchiolitis obliterans (H)  Management per pulm.    Prostate cancer (H)  Management per urology               BMI:   Estimated body mass index is 37.73 kg/m  as calculated from the following:    Height as of this encounter: 1.854 m (6' 1\").    Weight as of this encounter: 129.7 kg (286 lb).   Weight management plan: Patient was referred to their PCP to discuss a diet and exercise plan.    See Patient Instructions    Return in about 6 months (around 12/20/2022) for physical, with me, in person.    Heladio Calixto MD  [facility] MADHU Lynch is a 71 year old, presenting for the following health issues:  Follow Up      BPs now within normal limits; no side effects on amldopine.  No lower extremity edema.    Some fatigue, which is common for him.  DIscussed with sleep specialist; had his BIPAP rechecked;     Recent MRI showed 2 lesions in prostate: biopsy scheduled.     No significant shortness of breath or wheezing, but does have chronic fatigue; feels \"winded a lot.\"  Has many reasons for fatigue, including obstructive sleep apnea, BOOP, RA, Parkinsonism, and now prostate cancer (likely).        History of Present Illness       Hypertension: He presents for follow up of hypertension.  He does check blood pressure  regularly " "outside of the clinic. Outpatient blood pressures have not been over 140/90. He does not follow a low salt diet.               Review of Systems   CONSTITUTIONAL: NEGATIVE for fever, chills, change in weight  INTEGUMENTARY/SKIN: NEGATIVE for worrisome rashes, moles or lesions  EYES: NEGATIVE for vision changes or irritation  ENT/MOUTH: NEGATIVE for ear, mouth and throat problems  RESP: NEGATIVE for significant cough or SOB  BREAST: NEGATIVE for masses, tenderness or discharge  CV: NEGATIVE for chest pain, palpitations or peripheral edema  GI: NEGATIVE for nausea, abdominal pain, heartburn, or change in bowel habits  : NEGATIVE for frequency, dysuria, or hematuria  MUSCULOSKELETAL: NEGATIVE for significant arthralgias or myalgia  NEURO: NEGATIVE for weakness, dizziness or paresthesias  ENDOCRINE: NEGATIVE for temperature intolerance, skin/hair changes  HEME: NEGATIVE for bleeding problems  PSYCHIATRIC: NEGATIVE for changes in mood or affect      Objective    /71   Pulse (!) 48   Temp 97.1  F (36.2  C) (Tympanic)   Resp 20   Ht 1.854 m (6' 1\")   Wt 129.7 kg (286 lb)   SpO2 100%   BMI 37.73 kg/m    Body mass index is 37.73 kg/m .  Physical Exam   GENERAL: healthy, alert and no distress  EYES: Eyes grossly normal to inspection, PERRL and conjunctivae and sclerae normal  HENT: ear canals and TM's normal, nose and mouth without ulcers or lesions  NECK: no adenopathy, no asymmetry, masses, or scars and thyroid normal to palpation  RESP: lungs clear to auscultation - no rales, rhonchi or wheezes  CV: regular rate and rhythm, normal S1 S2, no S3 or S4, no murmur, click or rub, no peripheral edema and peripheral pulses strong  ABDOMEN: soft, nontender, no hepatosplenomegaly, no masses and bowel sounds normal  MS: no gross musculoskeletal defects noted, no edema  SKIN: no suspicious lesions or rashes  NEURO: Normal strength and tone, mentation intact and speech normal  PSYCH: mentation appears normal, affect " normal/bright                    .  ..

## 2022-06-20 NOTE — PATIENT INSTRUCTIONS
Your blood pressure looks within normal limits.      Let's stay on the amlodipine for now.     With respect to your fatigue, physical therapy may be the best way to improve your strength and stamina    SCopes next month.    Biopsy per urology, and we can discuss treatment after that.    Speak with your rheumatologist about temporarilty stopping the methotrexate before the biopsy.     Heladio Calixto MD  Internal Medicine and Pediatrics

## 2022-06-24 NOTE — NURSING NOTE
Called and spoke with patient.  He is not interested in scheduling with Dr. Espinoza in Sleep Psychology right now. He is aware that if he changes his mind the referral is good for a year and he can call our main number and get scheduled. He states he recently completed his DANIELITO. Brought his machine to [facility] and they checked it over and said it seems to be working properly.  Aware he can call them for supply refills. Jaki Elias, CMA

## 2022-07-07 ENCOUNTER — TELEPHONE (OUTPATIENT)
Dept: SLEEP MEDICINE | Facility: CLINIC | Age: 72
End: 2022-07-07

## 2022-07-07 DIAGNOSIS — G47.33 OSA TREATED WITH BIPAP: ICD-10-CM

## 2022-07-07 NOTE — TELEPHONE ENCOUNTER
DANIELITO results rev'd and have been scanned into chart.  Message will be sent to provider for review.      CONNIE Pascual

## 2022-07-12 DIAGNOSIS — J44.81 OBLITERATIVE BRONCHIOLITIS (H): ICD-10-CM

## 2022-07-13 ENCOUNTER — OFFICE VISIT (OUTPATIENT)
Dept: UROLOGY | Facility: CLINIC | Age: 72
End: 2022-07-13
Payer: MEDICARE

## 2022-07-13 ENCOUNTER — ALLIED HEALTH/NURSE VISIT (OUTPATIENT)
Dept: UROLOGY | Facility: CLINIC | Age: 72
End: 2022-07-13

## 2022-07-13 VITALS
SYSTOLIC BLOOD PRESSURE: 132 MMHG | HEIGHT: 73 IN | HEART RATE: 62 BPM | DIASTOLIC BLOOD PRESSURE: 82 MMHG | BODY MASS INDEX: 37.64 KG/M2 | WEIGHT: 284 LBS

## 2022-07-13 DIAGNOSIS — R97.20 ELEVATED PROSTATE SPECIFIC ANTIGEN (PSA): Primary | ICD-10-CM

## 2022-07-13 DIAGNOSIS — N40.2 PROSTATE NODULE: ICD-10-CM

## 2022-07-13 PROCEDURE — 96372 THER/PROPH/DIAG INJ SC/IM: CPT | Mod: 59 | Performed by: STUDENT IN AN ORGANIZED HEALTH CARE EDUCATION/TRAINING PROGRAM

## 2022-07-13 PROCEDURE — 55700 PR BIOPSY OF PROSTATE,NEEDLE/PUNCH: CPT | Performed by: STUDENT IN AN ORGANIZED HEALTH CARE EDUCATION/TRAINING PROGRAM

## 2022-07-13 PROCEDURE — 88344 IMHCHEM/IMCYTCHM EA MLT ANTB: CPT | Performed by: PATHOLOGY

## 2022-07-13 PROCEDURE — 76942 ECHO GUIDE FOR BIOPSY: CPT | Performed by: STUDENT IN AN ORGANIZED HEALTH CARE EDUCATION/TRAINING PROGRAM

## 2022-07-13 PROCEDURE — 88341 IMHCHEM/IMCYTCHM EA ADD ANTB: CPT | Performed by: PATHOLOGY

## 2022-07-13 PROCEDURE — G0416 PROSTATE BIOPSY, ANY MTHD: HCPCS | Performed by: PATHOLOGY

## 2022-07-13 RX ORDER — LIDOCAINE HYDROCHLORIDE 10 MG/ML
20 INJECTION, SOLUTION EPIDURAL; INFILTRATION; INTRACAUDAL; PERINEURAL ONCE
Status: DISCONTINUED | OUTPATIENT
Start: 2022-07-13 | End: 2022-07-13 | Stop reason: CLARIF

## 2022-07-13 RX ORDER — GENTAMICIN 40 MG/ML
240 INJECTION, SOLUTION INTRAMUSCULAR; INTRAVENOUS ONCE
Status: COMPLETED | OUTPATIENT
Start: 2022-07-13 | End: 2022-07-13

## 2022-07-13 RX ORDER — LIDOCAINE HYDROCHLORIDE 10 MG/ML
20 INJECTION, SOLUTION EPIDURAL; INFILTRATION; INTRACAUDAL; PERINEURAL ONCE
Status: COMPLETED | OUTPATIENT
Start: 2022-07-13 | End: 2022-07-13

## 2022-07-13 RX ADMIN — LIDOCAINE HYDROCHLORIDE 16 ML: 10 INJECTION, SOLUTION EPIDURAL; INFILTRATION; INTRACAUDAL; PERINEURAL at 10:15

## 2022-07-13 RX ADMIN — GENTAMICIN 240 MG: 40 INJECTION, SOLUTION INTRAMUSCULAR; INTRAVENOUS at 10:10

## 2022-07-13 ASSESSMENT — PAIN SCALES - GENERAL: PAINLEVEL: NO PAIN (0)

## 2022-07-13 NOTE — LETTER
7/13/2022     RE: Lucio Daly  [address] [place] [address]     Dear Colleague,    Thank you for referring your patient, Lucio Daly, to the [facility] [place] at [facility]. Please see a copy of my visit note below.    PREPROCEDURE DIAGNOSIS: Elevated PSA.     POSTPROCEDURE DIAGNOSIS: Elevated PSA.     PROCEDURE: Transrectal ultrasound sizing and MRI - transrectal ultrasound fusion (Uronav) guided prostatic needle biopsy    SURGEON: Silviano Vargas MD    ANESTHESIA: 7.5 mL of 1% lidocaine periprostatic block bilaterally     DESCRIPTION OF PROCEDURE: The procedure, the outcome, the anesthesia, and the risks were discussed with the patient. Informed consent was obtained and signed and a timeout was completed prior to the procedure. Digital rectal examination was performed with the below findings noted. Anesthesia was administered as noted above and the transrectal ultrasound probe was inserted, sizing was performed, MRI-TRUS fusion registration was performed and the below findings were noted. 17 core biopsies were taken as described below. The probe was then withdrawn. Patient tolerated the procedure well.     FINDINGS: Digital rectal exam reveals enlarged prostate, nodular feeling especially in right apex. Total volume is 30 mL. Then 17 core biopsies were taken with 3 of MRI Target #1 left mid gland, 2 of MRI target #2 right apex, one at each mid and lateral base, mid and apical regions of the prostate bilaterally.    PLAN: We will release the results on [facility] as soon as they are available. The patient understands that the pathology results may inform him that he has prostate cancer. He will return in about a week for a scheduled discussion of the results     Silviano Vargas MD   [facility] Urology  [phone] clinic phone

## 2022-07-13 NOTE — PROGRESS NOTES
PREPROCEDURE DIAGNOSIS: Elevated PSA.     POSTPROCEDURE DIAGNOSIS: Elevated PSA.     PROCEDURE: Transrectal ultrasound sizing and MRI - transrectal ultrasound fusion (Uronav) guided prostatic needle biopsy    SURGEON: Silviano Vargas MD    ANESTHESIA: 7.5 mL of 1% lidocaine periprostatic block bilaterally     DESCRIPTION OF PROCEDURE: The procedure, the outcome, the anesthesia, and the risks were discussed with the patient. Informed consent was obtained and signed and a timeout was completed prior to the procedure. Digital rectal examination was performed with the below findings noted. Anesthesia was administered as noted above and the transrectal ultrasound probe was inserted, sizing was performed, MRI-TRUS fusion registration was performed and the below findings were noted. 17 core biopsies were taken as described below. The probe was then withdrawn. Patient tolerated the procedure well.     FINDINGS: Digital rectal exam reveals enlarged prostate, nodular feeling especially in right apex. Total volume is 30 mL. Then 17 core biopsies were taken with 3 of MRI Target #1 left mid gland, 2 of MRI target #2 right apex, one at each mid and lateral base, mid and apical regions of the prostate bilaterally.    PLAN: We will release the results on [facility] as soon as they are available. The patient understands that the pathology results may inform him that he has prostate cancer. He will return in about a week for a scheduled discussion of the results     Silviano Vargas MD   [facility] Urology  [phone] clinic phone

## 2022-07-13 NOTE — PATIENT INSTRUCTIONS
[facility]  Transrectal Ultrasound  Post Operative Information    The physician who performed your Transrectal Ultrasound is Dr. Vargas (telephone number [phone]).  Please contact this doctor if you have any problems or questions.  If unable to reach your doctor, please return to the Emergency Department.    Take one antibiotic the evening of the procedure and then as directed on your prescription.  Drink at least 6-8 glasses of fluids for the first 48 hours.  Avoid heavy lifting and strenuous activity for 48 hours.  Avoid sexual intercourse for the first 24 hours.  No aspirin or ibuprofen products (Motrin, Advil, Nuprin, ect.) for one week.  You may take acetaminophen (Tylenol) for pain.  You may notice a small amount of blood on the tissue after a bowel movement.  You may pass blood with clots in your urine following the procedure.  The amount will decrease with time but may be visible for up to two weeks.   You make have blood in your semen for 4 weeks after the procedure.  You may experience mild perineal (groin area) discomfort after the procedure.  Please call you doctor if you have any of the follow symptoms:  Fever  Increase in the amount of blood passed  Severe discomfort or pain

## 2022-07-13 NOTE — NURSING NOTE
"Chief Complaint   Patient presents with     Elevated PSA     Patient is here for Transrectal Ultrasound/MRI Guided Prostate Biopsies        Blood pressure 132/82, pulse 62, height 1.854 m (6' 1\"), weight 128.8 kg (284 lb). Body mass index is 37.47 kg/m .    Patient Active Problem List   Diagnosis     Diaphragmatic hernia     Esophageal reflux     Hx of melanoma of skin     Malignant basal cell neoplasm of skin     GALO (obstructive sleep apnea)     Chronic maxillary sinusitis     Urinary incontinence     Sicca syndrome (H)     Tremor     Incomplete defecation     AK (actinic keratosis)     Abnormal EMG     Seronegative arthritis     Adenomatous polyp of colon     Peripheral polyneuropathy     Morbid obesity (H)     Neuromuscular disease (H)     Bronchiolitis obliterans (H)     Hypertension     Rheumatoid arthritis (H)     Immunosuppressed status (H)     PLMD (periodic limb movement disorder)     Elevated prostate specific antigen (PSA)     Prostate cancer (H)       Allergies   Allergen Reactions     Restasis      Burning eyes, problems with breathing, tightness in chest     Adhesive Tape      Bandages misc     Allegra      EXCESSIVE URINATION AND WEAKNESS, LIGHT-HEADED     Allergy      Dust     Animal Dander      Benadryl Allergy      EXCESSIVE URINATION AND WEAKNESS, LIGHT-HEADED     Cephalexin      Joint pain or gerd aggravation. Bloating excessive urination      Cephalosporins      Cyclosporine      Diphenhydramine Other (See Comments)     Doxycycline      Doxycycline Hyclate Nausea     SWEATING,MIGRAINES,LOSS OF APPETITE,SWEATING,LIGHT HEADED, EXCESSIVE URINATION     Fexofenadine Other (See Comments)     Flonase [Fluticasone Propionate]      Gabapentin      Neurontin: mosd changes and excess urination     Hydrocortisone      Iodine      Iodine Solution [Povidone Iodine]      SKIN MELTS     Levaquin      From surgeon--? Joint pain ? Gerd aggravation. Insomnia, excess urination     Levofloxacin      Mylanta      " EXCESSIVE URINATION AND WEAKNESS, LIGHT-HEADED     Oxcarbazepine      Prednisone      Weakness, elevated bp, headache, eye pain, congestion      Prednisone      Seafood [Seafood]      Shellfish Allergy      Hives       Simethicone      Sulfamethoxazole W/Trimethoprim      Sulfamethoxazole-Trimethoprim      Chest pain, angina     Symbicort GI Disturbance and Nausea     Trees      Trileptal      SEVERE JOINT AND TENDON PAIN, INSOMNIA, RESTLESSNESS, NAUSEA, EXCESS URINATION     Cortizone Rash     EXCESS URINATION,WEAKNESS,NAUSEA, HEADACHE       Current Outpatient Medications   Medication Sig Dispense Refill     acetaminophen (TYLENOL) 500 MG tablet 2 x 500mg by mouth 3 times per day: 7/730am, 4pm and 11pm  = 6/day       albuterol (PROAIR HFA/PROVENTIL HFA/VENTOLIN HFA) 108 (90 Base) MCG/ACT inhaler Inhale 2 puffs into the lungs every 4 hours as needed for shortness of breath / dyspnea or wheezing 6.7 g 1     amLODIPine (NORVASC) 5 MG tablet Take 1 tablet (5 mg) by mouth daily 90 tablet 3     cholecalciferol (HM VITAMIN D3) 25 MCG (1000 UT) TABS 1000 unit tab by mouth daily       fluorouracil (EFUDEX) 5 % external cream  (Patient not taking: Reported on 6/2/2022)       folic acid (FOLVITE) 1 MG tablet Take 1 tablet (1 mg) by mouth daily 90 tablet 1     hydroxychloroquine (PLAQUENIL) 200 MG tablet Take 1 tablet (200 mg) by mouth daily Daily at 9am. 90 tablet 5     ketoconazole (NIZORAL) 2 % external shampoo  (Patient not taking: Reported on 6/2/2022)       lisinopril-hydrochlorothiazide (ZESTORETIC) 20-25 MG tablet Take 1 tablet by mouth daily 90 tablet 3     methotrexate sodium 2.5 MG TABS Take 8 tablets (20 mg) by mouth every 7 days *Monitoring labs every 8-12 weeks 96 tablet 3     metoprolol succinate ER (TOPROL-XL) 50 MG 24 hr tablet 1 and 1/2 tabs daily. 135 tablet 3     omeprazole (PRILOSEC) 40 MG DR capsule Take 1 capsule (40 mg) by mouth daily 90 capsule 3     ORDER FOR DME Use your BiPAP device as directed by  your provider.       oxybutynin (DITROPAN) 5 MG tablet Take 1 tablet (5 mg) by mouth 3 times daily TAKE 1 TABLET BY MOUTH EVERY DAY AT 4PM 270 tablet 3     polyethylene glycol (GOLYTELY) 236 g suspension Take 4,000 mLs by mouth See Admin Instructions 4000 mL 0     pregabalin (LYRICA) 50 MG capsule 50mg capsule by mouth at 7am and 4pm and 1-2 x 50mg capsules by mouth nightly at 11pm (4/day) 360 capsule 3     SEREVENT DISKUS 50 MCG/DOSE inhaler TAKE 1 PUFF BY MOUTH TWICE A DAY 1 each 3       Social History     Tobacco Use     Smoking status: Former Smoker     Packs/day: 1.50     Years: 37.00     Pack years: 55.50     Types: Cigarettes, Cigarettes     Start date: 6/15/1963     Quit date: 2000     Years since quittin.7     Smokeless tobacco: Never Used   Substance Use Topics     Alcohol use: Not Currently     Drug use: Never         Procedure was explained to patient prior to performing said procedure. The patient signed the consent form and all questions were answered prior to the procedure. Any pre-procedural antibiotics were given according to the performing physicians recommendation. Pt's information was confirmed on samples and samples were sent for analysis. Paient reviewed information on labels sent with patient and confirmed the accuracy of all the labels.    Consent read and signed: Yes     Allergies   Allergen Reactions     Restasis      Burning eyes, problems with breathing, tightness in chest     Adhesive Tape      Bandages misc     Allegra      EXCESSIVE URINATION AND WEAKNESS, LIGHT-HEADED     Allergy      Dust     Animal Dander      Benadryl Allergy      EXCESSIVE URINATION AND WEAKNESS, LIGHT-HEADED     Cephalexin      Joint pain or gerd aggravation. Bloating excessive urination      Cephalosporins      Cyclosporine      Diphenhydramine Other (See Comments)     Doxycycline      Doxycycline Hyclate Nausea     SWEATING,MIGRAINES,LOSS OF APPETITE,SWEATING,LIGHT HEADED, EXCESSIVE URINATION      Fexofenadine Other (See Comments)     Flonase [Fluticasone Propionate]      Gabapentin      Neurontin: mosd changes and excess urination     Hydrocortisone      Iodine      Iodine Solution [Povidone Iodine]      SKIN MELTS     Levaquin      From surgeon--? Joint pain ? Gerd aggravation. Insomnia, excess urination     Levofloxacin      Mylanta      EXCESSIVE URINATION AND WEAKNESS, LIGHT-HEADED     Oxcarbazepine      Prednisone      Weakness, elevated bp, headache, eye pain, congestion      Prednisone      Seafood [Seafood]      Shellfish Allergy      Hives       Simethicone      Sulfamethoxazole W/Trimethoprim      Sulfamethoxazole-Trimethoprim      Chest pain, angina     Symbicort GI Disturbance and Nausea     Trees      Trileptal      SEVERE JOINT AND TENDON PAIN, INSOMNIA, RESTLESSNESS, NAUSEA, EXCESS URINATION     Cortizone Rash     EXCESS URINATION,WEAKNESS,NAUSEA, HEADACHE     Performing Physician: Dr. Vargas  Antibiotic taken?  Given By Mckenzie Gilmore in clinic today      17 samples were taken from the right and left, medial and lateral base, mid, and apex and MRI Target 1,2 of the prostate respectively.Vitals were repeated prior to patient leaving and instructions for post Transrectal Ultrasound/MRI Guided Prostate Biopsies care were explained to the patient.       The following medication was given:     MEDICATION:  Lidocaine 1% Soln  ROUTE: RECTAL/INFILTRATION  SITE: PROSTATE/ INFILTRATION  DOSE: 16ML  LOT #: [id number]  : [employer]  EXPIRATION DATE: 09/01/2023  NDC#: 1437-1633-63  Was there drug waste? Yes  Amount of drug waste (mL): 4ML.  Reason for waste:  Single use vial  Multi-dose vial: CONNIE Wilkinson  7/13/2022  10:56 AM

## 2022-07-13 NOTE — NURSING NOTE
Chief Complaint   Patient presents with     Elevated PSA     Patient is here for Transrectal Ultrasound/MRI Guided Prostate Biopsies      Procedure was explained to patient prior to performing said procedure.  The patient signed the [place] consent form and all questions were answered prior to the procedure.  Any pre-procedural antibiotics were given according to the performing physician's recommendation.  Patient reviewed information on the labels attached to samples and confirmed the accuracy of all of the labels.     Performing Physician:  Dr. Vargas  Antibiotic taken?  yes  Aspirin or other blood thinning medications discontinued 7-10 days:    Time of enema:    Patient given Gentamicin intramuscular injection 30 minutes prior to procedure  Yes    Mckenzie Gilmore LPN

## 2022-07-13 NOTE — PATIENT INSTRUCTIONS
[facility]  Transrectal Ultrasound  Post Operative Information    The physician who performed your Transrectal Ultrasound is Dr. Vargas (telephone number [phone]).  Please contact this doctor if you have any problems or questions.  If unable to reach your doctor, please return to the Emergency Department.    Take one antibiotic the evening of the procedure and then as directed on your prescription.  Drink at least 6-8 glasses of fluids for the first 48 hours.  Avoid heavy lifting and strenuous activity for 48 hours.  Avoid sexual intercourse for the first 24 hours.  No aspirin or ibuprofen products (Motrin, Advil, Nuprin, ect.) for one week.  You may take acetaminophen (Tylenol) for pain.  You may notice a small amount of blood on the tissue after a bowel movement.  You may pass blood with clots in your urine following the procedure.  The amount will decrease with time but may be visible for up to two weeks.   You make have blood in your semen for 4 weeks after the procedure.  You may experience mild perineal (groin area) discomfort after the procedure.  Please call you doctor if you have any of the follow symptoms:  Fever  Increase in the amount of blood passed  Severe discomfort or pain

## 2022-07-13 NOTE — NURSING NOTE
Chief Complaint   Patient presents with     Elevated PSA     Patient is here for Transrectal Ultrasound/MRI Guided Prostate Biopsies      Procedure was explained to patient prior to performing said procedure.  The patient signed the [place] consent form and all questions were answered prior to the procedure.  Any pre-procedural antibiotics were given according to the performing physician's recommendation.  Patient reviewed information on the labels attached to samples and confirmed the accuracy of all of the labels.     Performing Physician:     Antibiotic taken?  yes  Aspirin or other blood thinning medications discontinued 7-10 days: yes  Time of enema:  8:20am   Patient given Gentamicin intramuscular injection 30 minutes prior to procedure  Yes    The following medication was given:     MEDICATION: Gentamicin   ROUTE: IM  SITE: LUQ - Gluteus  DOSE:240mg/6ml  LOT #: [id number]  : [employer]  EXPIRATION DATE: 02/23  NDC#:  99126-173-49  Was there drug waste? No  Multi-dose vial: Yes    Using a 1 1/2 inch 21 guage needle medication drawn up as ordered with sterile syringe. Using sterile technique, a new 1 1/2 needle 21 gauge placed on syringe and patient cleansed with alcohol pad. Site was mapped out using palm of hand on the greater trochanter and forefinger on iliac crest. Using V technique between middle finger and index finger. Skin was tracted and Injection was given using a 90 degree angle dart method and after aspiration of needle and no blood, medication was slowly given via IM injection. After 10 seconds needle was removed.  Patient tolerated injection well, and bandage placed on site following insertion removal.     Mckenzie Gilmore LPN

## 2022-07-15 LAB
PATH REPORT.COMMENTS IMP SPEC: ABNORMAL
PATH REPORT.COMMENTS IMP SPEC: ABNORMAL
PATH REPORT.COMMENTS IMP SPEC: YES
PATH REPORT.FINAL DX SPEC: ABNORMAL
PATH REPORT.GROSS SPEC: ABNORMAL
PATH REPORT.MICROSCOPIC SPEC OTHER STN: ABNORMAL
PATH REPORT.RELEVANT HX SPEC: ABNORMAL
PHOTO IMAGE: ABNORMAL

## 2022-07-18 ENCOUNTER — TELEPHONE (OUTPATIENT)
Dept: PULMONOLOGY | Facility: CLINIC | Age: 72
End: 2022-07-18

## 2022-07-18 NOTE — TELEPHONE ENCOUNTER
Prior Authorization Retail Medication Request    Medication/Dose: SEREVENT DISKUS 50 MCG/DOSE inhaler  ICD code (if different than what is on RX):    Previously Tried and Failed:    Rationale:      Insurance Name:    Insurance ID:        Pharmacy Information (if different than what is on RX)  Name:    Phone:

## 2022-07-19 ENCOUNTER — VIRTUAL VISIT (OUTPATIENT)
Dept: UROLOGY | Facility: CLINIC | Age: 72
End: 2022-07-19
Payer: MEDICARE

## 2022-07-19 VITALS — BODY MASS INDEX: 37.64 KG/M2 | WEIGHT: 284 LBS | HEIGHT: 73 IN

## 2022-07-19 DIAGNOSIS — R97.20 ELEVATED PROSTATE SPECIFIC ANTIGEN (PSA): Primary | ICD-10-CM

## 2022-07-19 DIAGNOSIS — C61 PROSTATE CANCER (H): ICD-10-CM

## 2022-07-19 PROCEDURE — 99215 OFFICE O/P EST HI 40 MIN: CPT | Mod: 95 | Performed by: STUDENT IN AN ORGANIZED HEALTH CARE EDUCATION/TRAINING PROGRAM

## 2022-07-19 ASSESSMENT — PAIN SCALES - GENERAL: PAINLEVEL: NO PAIN (0)

## 2022-07-19 NOTE — TELEPHONE ENCOUNTER
Prior Authorization Not Needed per Insurance        Medication: SEREVENT DISKUS 50 MCG/DOSE inhaler-PA NOT NEEDED   Insurance Company: [employer] - Phone [phone] Fax [phone]  Expected CoPay: $411    Pharmacy Filling the Rx: [id number] - [place], [place] - [address] AT [facility]  Pharmacy Notified: Yes  Patient Notified: No    Called insurance and Rep Cinthia S stated that PA is Not Needed and medication is covered. Cinthia stated that medication is covered with NDC: [address]-73 and that the NDC: [address]-82 pharmacy is using is not FDA Approved listed per Rep Cinthia. Call Reference #[id number]. Called pharmacy and pharmacy stated that they have a paid claim on preferred NDC quantity 1 inhaler for 30 day supply; however, patients co-pay is high. Requested pharmacy to contact patient on cost prior to picking up medication. Cash price quantity 1 inhaler is $615.

## 2022-07-19 NOTE — Clinical Note
Refer to Alli   If patient chooses not to undergo radiation he should follow up in 2 months with PSA prior  If he does go through with radiation, he will contact the office to get androgen deprivation, get radiation and then follow up 3 months after radiation

## 2022-07-19 NOTE — PATIENT INSTRUCTIONS
If patient chooses not to undergo radiation he should follow up in 2 months with PSA prior    If he does go through with radiation, he will contact the office to get androgen deprivation, get radiation and then follow up 3 months after radiation

## 2022-07-19 NOTE — PROGRESS NOTES
"Text link to CELL phone  [phone]    Seyd is a 71 year old who is being evaluated via a billable video visit.      How would you like to obtain your AVS? MyChart  If the video visit is dropped, the invitation should be resent by: Text to cell phone: [phone]    Will anyone else be joining your video visit? No      CHIEF COMPLAINT   Lucio Daly who is a 71 year old male returns today for follow-up of elevated PSA, new diagnosis of prostate cancer     HPI   Lucio Daly is a 71 year old male returns today for follow-up of elevated PSA, new diagnosis of prostate cancer    He has interstitial lung disease (bronchiolitis obliterans) and obstructive sleep apnea, also rheumatoid arthritis on methotrexate    He goes to [place] in the fall, flying out in November and coming back in December, then leaving again in January and returning April    PHYSICAL EXAM  Patient is a 71 year old  male   Vitals: Height 1.854 m (6' 1\"), weight 128.8 kg (284 lb).  Body mass index is 37.47 kg/m .  General Appearance Adult:   Alert, no acute distress, oriented  HENT: throat/mouth:normal, good dentition  Lungs: no respiratory distress, or pursed lip breathing  Heart: No obvious jugular venous distension present  Abdomen: soft, nontender, no organomegaly or masses  Musculoskeltal: extremities normal, no peripheral edema  Skin: no suspicious lesions or rashes  Neuro: Alert, oriented, speech and mentation normal  Psych: affect and mood normal  Gait: Normal  : not examined    MRI prostate 6/16/2022  IMPRESSION:  1. Based on the most suspicious abnormality, this exam is  characterized as PIRADS 4 - Clinically significant cancer is likely to  be present.  The most suspicious abnormality is located at the left  mid gland peripheral and transitional zone at 3:00 position and there  is short segment capsular abutment with low likelihood of minimal  extraprostatic extension.  2. Additional PIRADS 4 - Clinically significant cancer is " likely to be  present.  abnormality is located at the right apex peripheral zone  7:00 position and there is no evidence of extraprostatic extension.  3. No suspicious adenopathy or evidence of pelvic metastases.  4. Several bladder diverticula.  5.Curvilinear enhancement along the right infralevator perianal  location, may represent inflammatory sinus/fistula or scarring, may  consider direct clinical evaluation       Surgical pathology 7/13/2022  A.  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  Prostate, MRI target #2: Biopsy:  - Benign prostatic tissue      C.  Prostate, right lateral base: Biopsy:  - Benign prostatic tissue      D.  Prostate, right lateral mid: Biopsy:  - Benign prostatic tissue      E.  Prostate, right lateral apex: Biopsy:  - Prostatic acinar adenocarcinoma, Sonny score 3 + 4 = 7, grade group 2, 20% pattern 4, involving 1 of 1 core(s), measuring less than 1 mm in greatest linear dimension, representing less than 5% of total core volume      F.  Prostate, right base: Biopsy:  - Benign prostatic tissue      G.  Prostate, right mid: Biopsy:  - Benign prostatic tissue      H.  Prostate, right apex: Biopsy:  - Benign prostatic tissue       I.  Prostate, left lateral base: Biopsy:  - Benign prostatic tissue  - Benign seminal vesicle tissue      J.  Prostate, left lateral mid: Biopsy:  - Benign prostatic tissue  - Benign seminal vesicle tissue       K.  Prostate, left lateral apex: Biopsy:  - Benign prostatic tissue       L.  Prostate, left base: Biopsy:  - Benign prostatic tissue       M.  Prostate, left mid: Biopsy:  - Benign prostatic tissue      N.  Prostate, left apex: Biopsy:  - Benign prostatic tissue  - Benign seminal vesicle tissue          ASSESSMENT and PLAN  71 year old male returns today for follow-up of  elevated PSA, new diagnosis of prostate cancer (iPSA 5.68, [place] 3+4=7, palpable right apex nodule)    We had an extensive discussion about the significance of favorable intermediate risk clinically localized prostate cancer. We discussed the options for treatment of a localized prostate cancer including active surveillance, brachytherapy, external beam radiation therapy, and surgical removal. We discussed that based on his Sonny score he would not be an ideal candidate for active surveillance.       We discussed the relative merits of robotic assisted radical prostatectomy. We also discussed the advantages and disadvantages and roles of open surgery vs. laparoscopic (and Da Zeb assisted) surgery. The anticipated post-operative course was explained, including an anticipated 1-2 day hospital stay. Catheter will remain in place 10-14 days.      We discussed that there was no clear evidence of advantage between surgery and radiation with regard to risk of recurrence. Regarding radiation, we discussed risks including but not limited to cancer recurrence, exacerbation of voiding symptoms or hematuria, urinary retention, incontinence, stricture of the urinary tract, induction of second malignancy, and risks of rectal symptoms or bleeding.  We discussed fact that rectal symptoms may be more common with radiation than with other treatment modalities. With radiation therapy, we also discussed the difficulties in diagnosing recurrent disease at an early stage due to variability in PSA levels and the fact that most patients are not candidates for local salvage therapy when biochemical recurrence is declared.  We also discussed the significant morbidity of post-radiation local salvage therapy in terms of perioperative complications, erectile dysfunction and urinary incontinence. With surgery, we also discussed the potential advantage over radiation therapy in that biochemical recurrence can be detected at a relatively  earlier stage and that salvage radiotherapy is successful in controlling recurrent disease in a substantial proportion of patients.  We also discussed that salvage radiotherapy was associated with a considerably more favorable morbidity profile compared to local salvage therapies for recurrent disease post-radiation.      We discussed option for further discussion with radiation oncology, and he agrees to have a discussion      The risks, benefits, alternatives, and personnel involved with robotic prostatectomy were discussed in detail. Specifically, we discussed that risks include but are not limited to anesthetic complications including stroke, MI, DVT/PE, as well as risk of bleeding requiring transfusion, bowel injury, infection, lymphocele, ureteral injury, nerve injury, urine leak and other potentially unforseen complications. Also discussed the risk of bladder neck contracture and other urinary symptoms after procedure. In addition, we discussed risk long-term of urinary incontinence and erectile dysfunction following the procedure. Finally, we discussed risk for adverse pathologic features, including positive surgical margins and potential for post-surgical radiation, hormone ablation and long-term need for PSA monitoring.  All questions were answered in detail.     He has interstitial lung disease (bronchiolitis obliterans) and obstructive sleep apnea, also rheumatoid arthritis on methotrexate, making him a poor surgical candidate thus I recommend radiation therapy +/- ADT.    If patient chooses not to undergo radiation he should follow up in 2 months with PSA prior    If he does go through with radiation, he will contact the office to get androgen deprivation, get radiation and then follow up 3 months after radiation    *addendum* discussed with Dr. Carson 8/3/2022. Patient agrees to undergo radiation therapy. Given his favorable intermediate risk disease and his other comorbidities, reasonable to omit ADT  at this time.      Silviano Vargas MD   [facility]  [facility] Phone: [phone]    Video-Visit Details    Video Start Time: 11:52 am    Type of service:  Video Visit    Video End Time:12:25 PM    Originating Location (pt. Location): Home    Distant Location (provider location):  [facility]     Platform used for Video Visit: [facility]     Over 45 minutes spent on this encounter including discussion with patient and his wife, documentation, coordination of care

## 2022-07-20 ENCOUNTER — HOSPITAL ENCOUNTER (OUTPATIENT)
Facility: CLINIC | Age: 72
Discharge: HOME | End: 2022-07-20
Attending: INTERNAL MEDICINE | Admitting: INTERNAL MEDICINE
Payer: MEDICARE

## 2022-07-20 VITALS
RESPIRATION RATE: 17 BRPM | TEMPERATURE: 98.2 F | OXYGEN SATURATION: 95 % | SYSTOLIC BLOOD PRESSURE: 123 MMHG | HEART RATE: 63 BPM | DIASTOLIC BLOOD PRESSURE: 70 MMHG

## 2022-07-20 DIAGNOSIS — Z12.11 SPECIAL SCREENING FOR MALIGNANT NEOPLASMS, COLON: Primary | ICD-10-CM

## 2022-07-20 LAB
COLONOSCOPY: NORMAL
UPPER GI ENDOSCOPY: NORMAL

## 2022-07-20 PROCEDURE — 45380 COLONOSCOPY AND BIOPSY: CPT | Performed by: INTERNAL MEDICINE

## 2022-07-20 PROCEDURE — 99153 MOD SED SAME PHYS/QHP EA: CPT | Performed by: INTERNAL MEDICINE

## 2022-07-20 PROCEDURE — 43239 EGD BIOPSY SINGLE/MULTIPLE: CPT | Performed by: INTERNAL MEDICINE

## 2022-07-20 PROCEDURE — 250N000011 HC RX IP 250 OP 636: Performed by: INTERNAL MEDICINE

## 2022-07-20 PROCEDURE — 88305 TISSUE EXAM BY PATHOLOGIST: CPT | Mod: TC | Performed by: INTERNAL MEDICINE

## 2022-07-20 PROCEDURE — G0500 MOD SEDAT ENDO SERVICE >5YRS: HCPCS | Performed by: INTERNAL MEDICINE

## 2022-07-20 PROCEDURE — 250N000009 HC RX 250: Performed by: INTERNAL MEDICINE

## 2022-07-20 RX ORDER — NALOXONE HYDROCHLORIDE 0.4 MG/ML
0.4 INJECTION, SOLUTION INTRAMUSCULAR; INTRAVENOUS; SUBCUTANEOUS
Status: DISCONTINUED | OUTPATIENT
Start: 2022-07-20 | End: 2022-07-20 | Stop reason: HOSPADM

## 2022-07-20 RX ORDER — PROCHLORPERAZINE MALEATE 5 MG
5 TABLET ORAL EVERY 6 HOURS PRN
Status: DISCONTINUED | OUTPATIENT
Start: 2022-07-20 | End: 2022-07-20 | Stop reason: HOSPADM

## 2022-07-20 RX ORDER — NALOXONE HYDROCHLORIDE 0.4 MG/ML
0.2 INJECTION, SOLUTION INTRAMUSCULAR; INTRAVENOUS; SUBCUTANEOUS
Status: DISCONTINUED | OUTPATIENT
Start: 2022-07-20 | End: 2022-07-20 | Stop reason: HOSPADM

## 2022-07-20 RX ORDER — EPINEPHRINE 1 MG/ML
0.1 INJECTION, SOLUTION INTRAMUSCULAR; SUBCUTANEOUS
Status: DISCONTINUED | OUTPATIENT
Start: 2022-07-20 | End: 2022-07-20 | Stop reason: HOSPADM

## 2022-07-20 RX ORDER — LIDOCAINE 40 MG/G
CREAM TOPICAL
Status: DISCONTINUED | OUTPATIENT
Start: 2022-07-20 | End: 2022-07-20 | Stop reason: HOSPADM

## 2022-07-20 RX ORDER — FLUMAZENIL 0.1 MG/ML
0.2 INJECTION, SOLUTION INTRAVENOUS
Status: DISCONTINUED | OUTPATIENT
Start: 2022-07-20 | End: 2022-07-20 | Stop reason: HOSPADM

## 2022-07-20 RX ORDER — ONDANSETRON 2 MG/ML
4 INJECTION INTRAMUSCULAR; INTRAVENOUS
Status: DISCONTINUED | OUTPATIENT
Start: 2022-07-20 | End: 2022-07-20 | Stop reason: HOSPADM

## 2022-07-20 RX ORDER — ONDANSETRON 2 MG/ML
4 INJECTION INTRAMUSCULAR; INTRAVENOUS EVERY 6 HOURS PRN
Status: DISCONTINUED | OUTPATIENT
Start: 2022-07-20 | End: 2022-07-20 | Stop reason: HOSPADM

## 2022-07-20 RX ORDER — FENTANYL CITRATE 0.05 MG/ML
50-100 INJECTION, SOLUTION INTRAMUSCULAR; INTRAVENOUS EVERY 5 MIN PRN
Status: DISCONTINUED | OUTPATIENT
Start: 2022-07-20 | End: 2022-07-20 | Stop reason: HOSPADM

## 2022-07-20 RX ORDER — ONDANSETRON 4 MG/1
4 TABLET, ORALLY DISINTEGRATING ORAL EVERY 6 HOURS PRN
Status: DISCONTINUED | OUTPATIENT
Start: 2022-07-20 | End: 2022-07-20 | Stop reason: HOSPADM

## 2022-07-20 RX ORDER — ATROPINE SULFATE 0.1 MG/ML
1 INJECTION INTRAVENOUS
Status: DISCONTINUED | OUTPATIENT
Start: 2022-07-20 | End: 2022-07-20 | Stop reason: HOSPADM

## 2022-07-20 RX ADMIN — MIDAZOLAM HYDROCHLORIDE 1 MG: 1 INJECTION, SOLUTION INTRAMUSCULAR; INTRAVENOUS at 12:28

## 2022-07-20 RX ADMIN — FENTANYL CITRATE 50 MCG: 50 INJECTION INTRAMUSCULAR; INTRAVENOUS at 12:28

## 2022-07-20 RX ADMIN — MIDAZOLAM HYDROCHLORIDE 1 MG: 1 INJECTION, SOLUTION INTRAMUSCULAR; INTRAVENOUS at 12:45

## 2022-07-20 RX ADMIN — FENTANYL CITRATE 50 MCG: 50 INJECTION INTRAMUSCULAR; INTRAVENOUS at 12:45

## 2022-07-20 RX ADMIN — TOPICAL ANESTHETIC 0.5 ML: 200 SPRAY DENTAL; PERIODONTAL at 12:28

## 2022-07-20 NOTE — H&P
Pre-Endoscopy History and Physical     Lucio Daly MRN# [id number]   YOB: 1950 Age: 71 year old     Date of Procedure: 7/20/2022  Primary care provider: Heladio Calixto  Type of Endoscopy: Colonoscopy with possible biopsy, possible polypectomy and Gastroscopy with possible biopsy, possible dilation  Reason for Procedure: polyp,reflux  Type of Anesthesia Anticipated: Conscious Sedation    HPI:    Lucio is a 71 year old male who will be undergoing the above procedure.      A history and physical has been performed. The patient's medications and allergies have been reviewed. The risks and benefits of the procedure and the sedation options and risks were discussed with the patient.  All questions were answered and informed consent was obtained.      He denies a personal or family history of anesthesia complications or bleeding disorders.     Patient Active Problem List   Diagnosis     Diaphragmatic hernia     Esophageal reflux     Hx of melanoma of skin     Malignant basal cell neoplasm of skin     GALO (obstructive sleep apnea)     Chronic maxillary sinusitis     Urinary incontinence     Sicca syndrome (H)     Tremor     Incomplete defecation     AK (actinic keratosis)     Abnormal EMG     Seronegative arthritis     Adenomatous polyp of colon     Peripheral polyneuropathy     Morbid obesity (H)     Neuromuscular disease (H)     Bronchiolitis obliterans (H)     Hypertension     Rheumatoid arthritis (H)     Immunosuppressed status (H)     PLMD (periodic limb movement disorder)     Elevated prostate specific antigen (PSA)     Prostate cancer (H)        Past Medical History:   Diagnosis Date     Abnormal EMG 04/18/2013     Abnormal involuntary movements(781.0)     Movement Disorder     AK (actinic keratosis) 12/18/2011     Allergic rhinitis, cause unspecified     Allergic rhinitis     Balance problems 11/01/2011     Basal cell carcinoma      Bladder spasms 11/01/2011     Chronic osteoarthritis      COPD  (chronic obstructive pulmonary disease) (H)     Interstial lung disease, obliderans bronchiolitis     Diaphragmatic hernia without mention of obstruction or gangrene      Earache or other ear, nose, or throat complaint      Esophageal reflux      Fatigue 11/01/2011     Fracture      H. pylori infection 05/12/2011     History of MRI of cervical spine 11/18/2013    EXAMINATION: CERVICAL SPINE G/E 5 VIEWS* 4/19/2013 4:18 PM  CLINICAL HISTORY: Pain in limb,Performing Location?->[facility]),  COMPARISON:  FINDINGS: AP and lateral views in flexion and extension, as well as odontoid view of the cervical spine was obtained. There is no comparison available. The vertebral bodies of the cervical spine are normally aligned. There is posterior spurring and d     Incomplete defecation 11/01/2011     Interstitial lung disease (H) 11/29/2016     Laboratory test 08/07/2012     Lung disease 06/2015     Malignant basal cell neoplasm of skin 08/06/2008     Melanoma (H) 08/06/2008     Melanoma in situ of lower leg (H)     R calf     Neuropathy 05/16/2011     Other bladder disorder      Other color vision deficiencies      Other nervous system complications      Parkinsonism (H) 11/01/2011     Parkinsons disease (H)     Parkinsonism/ balance, dropped foot, tremor     Personal history of colonic polyps      PLMD (periodic limb movement disorder) 12/16/2021     Polyneuropathy in other diseases classified elsewhere (H)      RA (rheumatoid arthritis) (H)      Rheumatoid arthritis of multiple sites with negative rheumatoid factor (H) 03/21/2016     Seronegative arthritis 11/18/2013     Shortness of breath      Shoulder arthritis 2016    acromioclavicular joint      Somatization disorder 05/12/2011     Spider veins     Leg Vericise vein surgery     Squamous cell carcinoma      Tremor 11/01/2011     Unspecified essential hypertension      Unspecified hypothyroidism      Urinary tract infection      Urinary urgency 11/01/2011      "Wears glasses 2011        Past Surgical History:   Procedure Laterality Date     BIOPSY OF SKIN LESION       COLONOSCOPY       CYSTOSCOPY  Many years ago    Blood in urine/ bladder cystoscopy     HEMORRHOID SURGERY       lip biopsy      for sicca complex     MOHS MICROGRAPHIC PROCEDURE       SOFT TISSUE SURGERY      removeal of basel cell carcinoma       Social History     Tobacco Use     Smoking status: Former Smoker     Packs/day: 1.50     Years: 37.00     Pack years: 55.50     Types: Cigarettes, Cigarettes     Start date: 6/15/1963     Quit date: 2000     Years since quittin.8     Smokeless tobacco: Never Used   Substance Use Topics     Alcohol use: Not Currently       Family History   Problem Relation Age of Onset     Hypertension Mother      Heart Disease Mother         a fib     Arthritis Mother         \"osteo\"     Osteoporosis Mother      Skin Cancer Mother      Uterine Cancer Mother      Other Cancer Mother      Cancer Mother      Hypertension Brother      Diabetes Brother         \" post pancreatitis\"     Neurologic Disorder Sister         multiple sclerosis     Hypertension Sister      Heart Disease Sister      Multiple Sclerosis Sister      Heart Disease Sister         Chf     Arthritis Sister      Heart Failure Sister      Kidney Disease Sister      Dementia Sister      Hypertension Father      Cerebrovascular Disease Father         ,     Skin Cancer Father      Colon Polyps Father      Heart Disease Father      Other - See Comments Son         [place]     Other - See Comments Abdon wagner     Other - See Comments Abdon gonzalez     Psoriasis Maternal Grandfather      Stomach Cancer Maternal Grandfather      Cancer Maternal Grandfather      Congenital Anomalies Other         granddaughter with a chromosome defect     Other Cancer Other         Grand daughter     Cancer Other         Grand daughter     Cerebrovascular Disease Paternal Grandmother         ,     " Cerebrovascular Disease Paternal Grandfather         ,     Cancer Granddaughter         Langerhans Cell Histiocytosis     Genetic Disease Granddaughter         gene translocation     Learning Disorder Other         Gene translocation     Melanoma No family hx of      Colon Cancer No family hx of        Prior to Admission medications    Medication Sig Start Date End Date Taking? Authorizing Provider   acetaminophen (TYLENOL) 500 MG tablet 2 x 500mg by mouth 3 times per day: 7/730am, 4pm and 11pm  = 6/day 5/22/18  Yes Thong Montes MD   albuterol (PROAIR HFA/PROVENTIL HFA/VENTOLIN HFA) 108 (90 Base) MCG/ACT inhaler Inhale 2 puffs into the lungs every 4 hours as needed for shortness of breath / dyspnea or wheezing 12/23/21  Yes Harsha Mccarthy MD   amLODIPine (NORVASC) 5 MG tablet Take 1 tablet (5 mg) by mouth daily 6/20/22  Yes Heladio Calixto MD   cholecalciferol (HM VITAMIN D3) 25 MCG (1000 UT) TABS 1000 unit tab by mouth daily 2/15/20  Yes Reported, Patient   fluorouracil (EFUDEX) 5 % external cream  10/4/21  Yes Reported, Patient   folic acid (FOLVITE) 1 MG tablet Take 1 tablet (1 mg) by mouth daily 4/8/22  Yes Jeremy Goodrich MD   hydroxychloroquine (PLAQUENIL) 200 MG tablet Take 1 tablet (200 mg) by mouth daily Daily at 9am. 4/8/22  Yes Jeremy Goodrich MD   ketoconazole (NIZORAL) 2 % external shampoo  10/4/21  Yes Reported, Patient   lisinopril-hydrochlorothiazide (ZESTORETIC) 20-25 MG tablet Take 1 tablet by mouth daily 4/25/22  Yes Heladio aClixto MD   methotrexate sodium 2.5 MG TABS Take 8 tablets (20 mg) by mouth every 7 days *Monitoring labs every 8-12 weeks 4/8/22  Yes Jeremy Goodrich MD   metoprolol succinate ER (TOPROL-XL) 50 MG 24 hr tablet 1 and 1/2 tabs daily. 12/17/21  Yes Heladio Calixto MD   Olodaterol HCl 2.5 MCG/ACT AERS Inhale 5 mcg into the lungs daily 2 inhalations once daily. 7/18/22  Yes Harsha Mccarthy MD   omeprazole (PRILOSEC) 40 MG DR capsule Take 1 capsule (40 mg)  by mouth daily 6/20/22  Yes Heladio Calixto MD   oxybutynin (DITROPAN) 5 MG tablet Take 1 tablet (5 mg) by mouth 3 times daily TAKE 1 TABLET BY MOUTH EVERY DAY AT 4PM 5/10/22  Yes Heladio Calixto MD   polyethylene glycol (GOLYTELY) 236 g suspension Take 4,000 mLs by mouth See Admin Instructions 7/7/22  Yes Erich Gonzalez MD   pregabalin (LYRICA) 50 MG capsule 50mg capsule by mouth at 7am and 4pm and 1-2 x 50mg capsules by mouth nightly at 11pm (4/day) 12/16/21  Yes Thong Montes MD   SEREVENT DISKUS 50 MCG/DOSE inhaler TAKE 1 PUFF BY MOUTH TWICE A DAY 7/12/22  Yes Harsha Mccarthy MD   ORDER FOR DME Use your BiPAP device as directed by your provider.    Reported, Patient       Allergies   Allergen Reactions     Restasis      Burning eyes, problems with breathing, tightness in chest     Adhesive Tape      Bandages misc     Allegra      EXCESSIVE URINATION AND WEAKNESS, LIGHT-HEADED     Allergy      Dust     Animal Dander      Benadryl Allergy      EXCESSIVE URINATION AND WEAKNESS, LIGHT-HEADED     Cephalexin      Joint pain or gerd aggravation. Bloating excessive urination      Cephalosporins      Cyclosporine      Diphenhydramine Other (See Comments)     Doxycycline      Doxycycline Hyclate Nausea     SWEATING,MIGRAINES,LOSS OF APPETITE,SWEATING,LIGHT HEADED, EXCESSIVE URINATION     Fexofenadine Other (See Comments)     Flonase [Fluticasone Propionate]      Gabapentin      Neurontin: mosd changes and excess urination     Hydrocortisone      Iodine      Iodine Solution [Povidone Iodine]      SKIN MELTS     Levaquin      From surgeon--? Joint pain ? Gerd aggravation. Insomnia, excess urination     Levofloxacin      Mylanta      EXCESSIVE URINATION AND WEAKNESS, LIGHT-HEADED     Oxcarbazepine      Prednisone      Weakness, elevated bp, headache, eye pain, congestion      Prednisone      Seafood [Seafood]      Shellfish Allergy      Hives       Simethicone      Sulfamethoxazole W/Trimethoprim       "Sulfamethoxazole-Trimethoprim      Chest pain, angina     Symbicort GI Disturbance and Nausea     Trees      Trileptal      SEVERE JOINT AND TENDON PAIN, INSOMNIA, RESTLESSNESS, NAUSEA, EXCESS URINATION     Cortizone Rash     EXCESS URINATION,WEAKNESS,NAUSEA, HEADACHE        REVIEW OF SYSTEMS:   5 point ROS negative except as noted above in HPI, including Gen., Resp., CV, GI &  system review.    PHYSICAL EXAM:   There were no vitals taken for this visit. Estimated body mass index is 37.47 kg/m  as calculated from the following:    Height as of 7/19/22: 1.854 m (6' 1\").    Weight as of 7/19/22: 128.8 kg (284 lb).   GENERAL APPEARANCE: alert, and oriented  MENTAL STATUS: alert  AIRWAY EXAM: Mallampatti Class I (visualization of the soft palate, fauces, uvula, anterior and posterior pillars)  RESP: lungs clear to auscultation - no rales, rhonchi or wheezes  CV: regular rates and rhythm  DIAGNOSTICS:    Not indicated    IMPRESSION   ASA Class 2 - Mild systemic disease    PLAN:   Plan for Colonoscopy with possible biopsy, possible polypectomy and Gastroscopy with possible biopsy, possible dilation. We discussed the risks, benefits and alternatives and the patient wished to proceed.    The above has been forwarded to the consulting provider.      Signed Electronically by: Gómez Mckinney MD  July 20, 2022          "

## 2022-07-20 NOTE — LETTER
July 7, 2022      Lucio Daly  [address]  [place] [address]         Thank you for choosing [facility]. You are scheduled for the following service(s).   Please be aware that coverage of these services is subject to the terms and limitations of your health insurance plan.  Call member services at your health plan with any benefit or coverage questions.  Date:  Wednesday July 20, 2022       Procedure: COLONOSCOPY & EGD  Doctor:   Dr Mckinney         Arrival Time:  1115   *Enter and check in at the Main Hospital Entrance  Procedure Time:  1200    Location:   [facility]        Endoscopy Department, First Floor *         [address] [place], [place] [address]      [phone] or [phone] () to reschedule        NuLYTELY  DIVIDED PREP  Colonoscopy is the most accurate test to detect colon polyps and colon cancer; and the only test where polyps can be removed. During this procedure, a doctor examines the lining of your large intestine and rectum through a flexible tube.         Transportation  You must arrange for a ride for the day of your procedure with a responsible adult. A taxi , Uber, etc, is not an option unless you are accompanied by a responsible adult. If you fail to arrange transportation with a responsible adult, your procedure will be cancelled and rescheduled.    We have sent your prescription to the pharmacy we have on file. Please call our office at [phone] if you have questions.    COLONOSCOPY & EGD  PRE-PROCEDURE CHECKLIST  If you have diabetes, ask your regular doctor for diet and medication restrictions.  If you take an anticoagulant or anti-platelet medication (such as Coumadin , Lovenox , Pradaxa , Xarelto , Eliquis , etc.), please call your primary doctor for advice on holding this medication.  If you take aspirin you may continue to do so.  If you are or may be pregnant, please discuss the risks and benefits of this  procedure with your doctor.    PREPARATION FOR COLONOSCOPY    7 days before:    Discontinue fiber supplements and medications containing iron. This includes Metamucil  and Fibercon ; and multivitamins with iron.  3 days before:    Begin a low-fiber diet. A low-fiber diet helps making the cleanout more effective. For additional details on low-fiber diet, please refer to the table on the last page.  2 days before:    Continue the low-fiber diet.     Drink at least 8 glasses of water throughout the day.     Stop eating solid foods at 11:45 pm.  1 day before:    In the morning: begin a clear liquid diet (liquids you can see through).     Examples of a clear liquid diet include: water, clear broth or bouillon, Gatorade, Pedialyte or Powerade, carbonated and non-carbonated soft drinks (Sprite , 7-Up , ginger ale), strained fruit juices without pulp (apple, white grape, white cranberry), Jell-O  and popsicles.     The following are not allowed on a clear liquid diet: red liquids, alcoholic beverages,  dairy products (milk, creamer, and yogurt), protein shakes,  juice with pulp and chewing tobacco.    At 4pm: drink 1 (one) 8 oz glass of NuLYTELY  solution every 15 minutes (approximately 8 glasses of 8 oz or half the bottle). Keep the solution refrigerated. Do not drink any other liquids while you are drinking the NuLYTELY  solution.    Over the course of the evening, drink an additional   liter of clear liquids and continue clear liquid diet.    Day of procedure:    6 hours before the procedure: drink 1 (one) 8 oz glass of NuLYTELY  solution every 15 minutes until the last half of the bottle (approximately 8 glasses of 8 oz) is gone. Keep the solution refrigerated. Do not drink any other liquids while you are drinking the NuLYTELY  solution. After that, you may continue the clear liquid diet until 4 hours before the procedure.      You may take all of your morning medications including blood pressure medications, methadone,  and anti-seizure medications with sips of water 4 hours prior to your procedure or earlier. Do not take insulin, blood thinners (unless specifically told to do so by your primary care provider), or vitamins prior to your procedure.       4 hours prior:   o STOP consuming all liquids   o Do not take anything by mouth during this time.   o Allow extra time to travel to your procedure as you may need to stop and use a restroom along the way.  You are ready for the procedure, if you followed all instructions and your stool is no longer formed, but clear or yellow liquid. If you are unsure whether your colon is clean, please call our office at [phone] before you leave for your appointment.    COLON CLEANSING TIPS: drink adequate amounts of fluids before and after your colon cleansing to prevent dehydration. Stay near a toilet because you will have diarrhea. Even if you are sitting on the toilet, continue to drink the cleansing solution every 15 minutes. If you feel nauseous or vomit, rinse your mouth with water, take a 15 to 30-minute-break and then continue drinking the solution. You will be uncomfortable until the stool has flushed from your colon (in about 2 to 4 hours). You may feel chilled.    Bring the following to your procedure:  - Insurance Card/Photo ID.   - List of current medications including over-the-counter medications and supplements.   - Your rescue inhaler if you currently use one to control asthma.    Canceling or rescheduling your appointment:   If you must cancel or reschedule your appointment, please call [phone] as soon as possible. If you are canceling with 24 hours please call [phone]      What happens during a colonoscopy?    Plan to spend up to two hours, starting at registration time, at the endoscopy center the day of your procedure. The colonoscopy takes an average of 15 to 30 minutes. Recovery time is about 30 minutes.    Before the exam:    You will change into a  kiana.    Your medical history and medication list will be reviewed with you, unless that has been done over the phone prior to the procedure.     A nurse will insert an intravenous (IV) line into your hand or arm.    The doctor will meet with you and will give you a consent form to sign.    During the exam:     Medicine will be given through the IV line to help you relax.     Your heart rate and oxygen levels will be monitored. If your blood pressure is low, you may be given fluids through the IV line.     The doctor will insert a flexible hollow tube, called a colonoscope, into your rectum. The scope will be advanced slowly through the large intestine (colon).    You may have a feeling of fullness or pressure.     If an abnormal tissue or a polyp is found, the doctor may remove it through the endoscope for closer examination, or biopsy. Tissue removal is painless.    After the exam:           Any tissue samples removed during the exam will be sent to a lab for evaluation. It may take 5-7 working days for you to be notified of the results.     A nurse will provide you with complete discharge instructions before you leave the endoscopy center. Be sure to ask the nurse for specific instructions if you take blood thinners such as Aspirin, Coumadin or Plavix.     The doctor will prepare a full report for you and for the physician who referred you for the procedure.     Your doctor will talk with you about the initial results of your exam.      Medication given during the exam will prohibit you from driving for the rest of the day.     Following the exam, you may resume your normal diet. Your first meal should be light, no greasy foods. Avoid alcohol until the next day.     You may resume your regular activities the day after the procedure.     LOW-FIBER DIET  Foods RECOMMENDED Foods to AVOID   Breads, Cereal, Rice and Pasta:   White bread, rolls, biscuits, croissant and madonna toast.   Waffles, Irish toast and  pancakes.   White rice, noodles, pasta, macaroni and peeled cooked potatoes.   Plain crackers and saltines.   Cooked cereals: farina, cream of rice.   Cold cereals: Puffed Rice , Rice Krispies , Corn Flakes  and Special K    Breads, Cereal, Rice and Pasta:   Breads or rolls with nuts, seeds or fruit.   Whole wheat, pumpernickel, rye breads and cornbread.   Potatoes with skin, brown or wild rice, and kasha (buckwheat).     Vegetables:   Tender cooked and canned vegetables without seeds: carrots, asparagus tips, green or wax beans, pumpkin, spinach, lima beans. Vegetables:   Raw or steamed vegetables.   Vegetables with seeds.   Sauerkraut.   Winter squash, peas, broccoli, Brussel sprouts, cabbage, onions, cauliflower, baked beans, peas and corn.   Fruits:   Strained fruit juice.   Canned fruit, except pineapple.   Ripe bananas and melon. Fruits:   Prunes and prune juice.   Raw fruits.   Dried fruits: figs, dates and raisins.   Milk/Dairy:   Milk: plain or flavored.   Yogurt, custard and ice cream.   Cheese and cottage cheese Milk/Dairy:     Meat and other proteins:   ground, well-cooked tender beef, lamb, ham, veal, pork, fish, poultry and organ meats.   Eggs.   Peanut butter without nuts. Meat and other proteins:   Tough, fibrous meats with gristle.   Dry beans, peas and lentils.   Peanut butter with nuts.   Tofu.   Fats, Snack, Sweets, Condiments and Beverages:   Margarine, butter, oils, mayonnaise, sour cream and salad dressing, plain gravy.   Sugar, hard candy, clear jelly, honey and syrup.   Spices, cooked herbs, bouillon, broth and soups made with allowed vegetable, ketchup and mustard.   Coffee, tea and carbonated drinks.   Plain cakes, cookies and pretzels.   Gelatin, plain puddings, custard, ice cream, sherbet and popsicles. Fats, Snack, Sweets, Condiments and Beverages:   Nuts, seeds and coconut.   Jam, marmalade and preserves.   Pickles, olives, relish and horseradish.   All desserts containing nuts, seeds,  dried fruit and coconut; or made from whole grains or bran.   Candy made with nuts or seeds.   Popcorn.     DIRECTIONS TO THE ENDOSCOPY DEPARTMENT    From the north ([place], [place], [place])  Take [address] South, exit on [address] [address]. Get into the left hand elana, turn left (east), go one-half mile to [address] and turn left. Take [address] to the Main Entrance  From the south ([facility])  Take 35N to the 35E split and exit on [address] [address]. On [address] , turn left (west) to [address]. Turn right (north) on [address]. Go north to the second  stoplight, take a right and follow [address] to the Main Entrance.  From the east via 35E ([facility])  Take 35E south to [address] [address] exit. Turn right on [address] [address]. Go west to [address]. Turn right (north) on [address]. Go to the first stoplight, take a right on [address]  to the Main Entrance   From the east via [address] ([facility])ollow on [address]  to the Main Entrance  Take [address] to [address]. Turn left (south) on [address] to [facility]. Turn left (east) on on [address]  to the Main Entrance  From the west via [address] 13 ([place], [place])  Take [address] east to [address]. Turn right (south) on [address] to [facility]. Turn left (east) on [address]  to the Main Entrance

## 2022-07-20 NOTE — DISCHARGE INSTRUCTIONS
Understanding Colon and Rectal Polyps     The colon has a smooth lining composed of millions of cells.     The colon (also called the large intestine) is a muscular tube that forms the last part of the digestive tract. It absorbs water and stores food waste. The colon is about 4 to 6 feet long. The rectum is the last 6 inches of the colon. The colon and rectum have a smooth lining composed of millions of cells. Changes in these cells can lead to growths in the colon that can become cancerous and should be removed.     When the Colon Lining Changes  Changes that occur in the cells that line the colon or rectum can lead to growths called polyps. Over a period of years, polyps can turn cancerous. Removing polyps early may prevent cancer from ever forming.      Polyps  Polyps are fleshy clumps of tissue that form on the lining of the colon or rectum. Small polyps are usually benign (not cancerous). However, over time, cells in a polyp can change and become cancerous. The larger a polyp grows, the more likely this is to happen. Also, certain types of polyps known as adenomatous polyps are considered premalignant. This means that they will almost always become cancerous if they re not removed.          Cancer  Almost all colorectal cancers start when polyp cells begin growing abnormally. As a cancerous tumor grows, it may involve more and more of the colon or rectum. In time, cancer can also grow beyond the colon or rectum and spread to nearby organs or to glands called lymph nodes. The cells can also travel to other parts of the body. This is known as metastasis. The earlier a cancerous tumor is removed, the better the chance of preventing its spread.        2944-5698 [facility], [address] [place] [address], [place] [address]. All rights reserved. This information is not intended as a substitute for professional medical care. Always follow your healthcare professional's instructions.

## 2022-07-21 LAB
PATH REPORT.COMMENTS IMP SPEC: NORMAL
PATH REPORT.COMMENTS IMP SPEC: NORMAL
PATH REPORT.FINAL DX SPEC: NORMAL
PATH REPORT.GROSS SPEC: NORMAL
PATH REPORT.MICROSCOPIC SPEC OTHER STN: NORMAL
PATH REPORT.RELEVANT HX SPEC: NORMAL
PHOTO IMAGE: NORMAL

## 2022-07-21 PROCEDURE — 88305 TISSUE EXAM BY PATHOLOGIST: CPT | Mod: 26 | Performed by: PATHOLOGY

## 2022-08-03 ENCOUNTER — TRANSFERRED RECORDS (OUTPATIENT)
Dept: HEALTH INFORMATION MANAGEMENT | Facility: CLINIC | Age: 72
End: 2022-08-03

## 2022-08-09 ENCOUNTER — TRANSFERRED RECORDS (OUTPATIENT)
Dept: HEALTH INFORMATION MANAGEMENT | Facility: CLINIC | Age: 72
End: 2022-08-09

## 2022-08-18 ENCOUNTER — TELEPHONE (OUTPATIENT)
Dept: RHEUMATOLOGY | Facility: CLINIC | Age: 72
End: 2022-08-18

## 2022-08-18 ENCOUNTER — LAB (OUTPATIENT)
Dept: LAB | Facility: CLINIC | Age: 72
End: 2022-08-18
Payer: MEDICARE

## 2022-08-18 VITALS — WEIGHT: 282.19 LBS | BODY MASS INDEX: 37.23 KG/M2

## 2022-08-18 DIAGNOSIS — Z79.631 LONG TERM METHOTREXATE USER: ICD-10-CM

## 2022-08-18 LAB
CRP SERPL-MCNC: 5.63 MG/L
ERYTHROCYTE [DISTWIDTH] IN BLOOD BY AUTOMATED COUNT: 14 % (ref 10–15)
HCT VFR BLD AUTO: 41.1 % (ref 40–53)
HGB BLD-MCNC: 13 G/DL (ref 13.3–17.7)
MCH RBC QN AUTO: 30.9 PG (ref 26.5–33)
MCHC RBC AUTO-ENTMCNC: 31.6 G/DL (ref 31.5–36.5)
MCV RBC AUTO: 98 FL (ref 78–100)
PLATELET # BLD AUTO: 172 10E3/UL (ref 150–450)
RBC # BLD AUTO: 4.21 10E6/UL (ref 4.4–5.9)
WBC # BLD AUTO: 4.8 10E3/UL (ref 4–11)

## 2022-08-18 PROCEDURE — 82040 ASSAY OF SERUM ALBUMIN: CPT

## 2022-08-18 PROCEDURE — 84460 ALANINE AMINO (ALT) (SGPT): CPT

## 2022-08-18 PROCEDURE — 36415 COLL VENOUS BLD VENIPUNCTURE: CPT

## 2022-08-18 PROCEDURE — 84450 TRANSFERASE (AST) (SGOT): CPT

## 2022-08-18 PROCEDURE — 85027 COMPLETE CBC AUTOMATED: CPT

## 2022-08-18 PROCEDURE — 82565 ASSAY OF CREATININE: CPT

## 2022-08-18 PROCEDURE — 86140 C-REACTIVE PROTEIN: CPT

## 2022-08-18 NOTE — LETTER
"7/19/2022       RE: Lucio Daly  [address]  [place] [address]     Dear Colleague,    Thank you for referring your patient, Lucio Daly, to the [facility] [place] [facility]. Please see a copy of my visit note below.    Text link to CELL phone  [phone]    Rich is a 71 year old who is being evaluated via a billable video visit.      How would you like to obtain your AVS? MyChart  If the video visit is dropped, the invitation should be resent by: Text to cell phone: [phone]    Will anyone else be joining your video visit? No      CHIEF COMPLAINT   Lucio Daly who is a 71 year old male returns today for follow-up of elevated PSA, new diagnosis of prostate cancer     HPI   Lucio Daly is a 71 year old male returns today for follow-up of elevated PSA, new diagnosis of prostate cancer    He has interstitial lung disease (bronchiolitis obliterans) and obstructive sleep apnea, also rheumatoid arthritis on methotrexate    He goes to [place] in the fall, flying out in November and coming back in December, then leaving again in January and returning April    PHYSICAL EXAM  Patient is a 71 year old  male   Vitals: Height 1.854 m (6' 1\"), weight 128.8 kg (284 lb).  Body mass index is 37.47 kg/m .  General Appearance Adult:   Alert, no acute distress, oriented  HENT: throat/mouth:normal, good dentition  Lungs: no respiratory distress, or pursed lip breathing  Heart: No obvious jugular venous distension present  Abdomen: soft, nontender, no organomegaly or masses  Musculoskeltal: extremities normal, no peripheral edema  Skin: no suspicious lesions or rashes  Neuro: Alert, oriented, speech and mentation normal  Psych: affect and mood normal  Gait: Normal  : not examined    MRI prostate 6/16/2022  IMPRESSION:  1. Based on the most suspicious abnormality, this exam is  characterized as PIRADS 4 - Clinically significant cancer is " 11-Sep-2022 likely to  be present.  The most suspicious abnormality is located at the left  mid gland peripheral and transitional zone at 3:00 position and there  is short segment capsular abutment with low likelihood of minimal  extraprostatic extension.  2. Additional PIRADS 4 - Clinically significant cancer is likely to be  present.  abnormality is located at the right apex peripheral zone  7:00 position and there is no evidence of extraprostatic extension.  3. No suspicious adenopathy or evidence of pelvic metastases.  4. Several bladder diverticula.  5.Curvilinear enhancement along the right infralevator perianal  location, may represent inflammatory sinus/fistula or scarring, may  consider direct clinical evaluation       Surgical pathology 7/13/2022  A.  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  Prostate, MRI target #2: Biopsy:  - Benign prostatic tissue      C.  Prostate, right lateral base: Biopsy:  - Benign prostatic tissue      D.  Prostate, right lateral mid: Biopsy:  - Benign prostatic tissue      E.  Prostate, right lateral apex: Biopsy:  - Prostatic acinar adenocarcinoma, [place] score 3 + 4 = 7, grade group 2, 20% pattern 4, involving 1 of 1 core(s), measuring less than 1 mm in greatest linear dimension, representing less than 5% of total core volume      F.  Prostate, right base: Biopsy:  - Benign prostatic tissue      G.  Prostate, right mid: Biopsy:  - Benign prostatic tissue      H.  Prostate, right apex: Biopsy:  - Benign prostatic tissue       I.  Prostate, left lateral base: Biopsy:  - Benign prostatic tissue  - Benign seminal vesicle tissue      J.  Prostate, left lateral mid: Biopsy:  - Benign prostatic tissue  - Benign seminal vesicle tissue       K.  Prostate, left lateral apex: Biopsy:  - Benign prostatic tissue        L.  Prostate, left base: Biopsy:  - Benign prostatic tissue       M.  Prostate, left mid: Biopsy:  - Benign prostatic tissue      N.  Prostate, left apex: Biopsy:  - Benign prostatic tissue  - Benign seminal vesicle tissue          ASSESSMENT and PLAN  71 year old male returns today for follow-up of elevated PSA, new diagnosis of prostate cancer (iPSA 5.68, [place] 3+4=7, palpable right apex nodule)    We had an extensive discussion about the significance of favorable intermediate risk clinically localized prostate cancer. We discussed the options for treatment of a localized prostate cancer including active surveillance, brachytherapy, external beam radiation therapy, and surgical removal. We discussed that based on his [place] score he would not be an ideal candidate for active surveillance.       We discussed the relative merits of robotic assisted radical prostatectomy. We also discussed the advantages and disadvantages and roles of open surgery vs. laparoscopic (and Da Zeb assisted) surgery. The anticipated post-operative course was explained, including an anticipated 1-2 day hospital stay. Catheter will remain in place 10-14 days.      We discussed that there was no clear evidence of advantage between surgery and radiation with regard to risk of recurrence. Regarding radiation, we discussed risks including but not limited to cancer recurrence, exacerbation of voiding symptoms or hematuria, urinary retention, incontinence, stricture of the urinary tract, induction of second malignancy, and risks of rectal symptoms or bleeding.  We discussed fact that rectal symptoms may be more common with radiation than with other treatment modalities. With radiation therapy, we also discussed the difficulties in diagnosing recurrent disease at an early stage due to variability in PSA levels and the fact that most patients are not candidates for local salvage therapy when biochemical recurrence is declared.  We also  discussed the significant morbidity of post-radiation local salvage therapy in terms of perioperative complications, erectile dysfunction and urinary incontinence. With surgery, we also discussed the potential advantage over radiation therapy in that biochemical recurrence can be detected at a relatively earlier stage and that salvage radiotherapy is successful in controlling recurrent disease in a substantial proportion of patients.  We also discussed that salvage radiotherapy was associated with a considerably more favorable morbidity profile compared to local salvage therapies for recurrent disease post-radiation.      We discussed option for further discussion with radiation oncology, and he agrees to have a discussion      The risks, benefits, alternatives, and personnel involved with robotic prostatectomy were discussed in detail. Specifically, we discussed that risks include but are not limited to anesthetic complications including stroke, MI, DVT/PE, as well as risk of bleeding requiring transfusion, bowel injury, infection, lymphocele, ureteral injury, nerve injury, urine leak and other potentially unforseen complications. Also discussed the risk of bladder neck contracture and other urinary symptoms after procedure. In addition, we discussed risk long-term of urinary incontinence and erectile dysfunction following the procedure. Finally, we discussed risk for adverse pathologic features, including positive surgical margins and potential for post-surgical radiation, hormone ablation and long-term need for PSA monitoring.  All questions were answered in detail.     He has interstitial lung disease (bronchiolitis obliterans) and obstructive sleep apnea, also rheumatoid arthritis on methotrexate, making him a poor surgical candidate thus I recommend radiation therapy +/- ADT.    If patient chooses not to undergo radiation he should follow up in 2 months with PSA prior    If he does go through with radiation,  he will contact the office to get androgen deprivation, get radiation and then follow up 3 months after radiation    *addendum* discussed with Dr. Carson 8/3/2022. Patient agrees to undergo radiation therapy. Given his favorable intermediate risk disease and his other comorbidities, reasonable to omit ADT at this time.      Silviano Vargas MD   [facility] Urology  [facility] Phone: [phone]    Video-Visit Details    Video Start Time: 11:52 am  Type of service:  Video Visit  Video End Time:12:25 PM  Originating Location (pt. Location): Home  Distant Location (provider location):  [facility] UROLOGY [facility]   Platform used for Video Visit: [employer]     Over 45 minutes spent on this encounter including discussion with patient and his wife, documentation, coordination of care

## 2022-08-18 NOTE — TELEPHONE ENCOUNTER
Plaquenil Eye Exam    Date of last eye exam specifically commenting on HCQ toxicity: 8/9/2022  Clinic: [facility] Phone Number: [phone]  Ophthalmologist: Leonel Farnsworth  Toxicity: NO  Records scanned to chart: Yes      Aimee Pillai CMA   8/18/2022 11:43 AM

## 2022-08-19 LAB
ALBUMIN SERPL-MCNC: 3.9 G/DL (ref 3.4–5)
ALT SERPL W P-5'-P-CCNC: 22 U/L (ref 0–70)
AST SERPL W P-5'-P-CCNC: 18 U/L (ref 0–45)
CREAT SERPL-MCNC: 1.35 MG/DL (ref 0.66–1.25)
GFR SERPL CREATININE-BSD FRML MDRD: 55 ML/MIN/1.73M2

## 2022-08-25 DIAGNOSIS — C61 PROSTATE CANCER (H): Primary | ICD-10-CM

## 2022-08-27 ENCOUNTER — MYC MEDICAL ADVICE (OUTPATIENT)
Dept: NEUROLOGY | Facility: CLINIC | Age: 72
End: 2022-08-27

## 2022-08-27 ENCOUNTER — MYC MEDICAL ADVICE (OUTPATIENT)
Dept: PODIATRY | Facility: CLINIC | Age: 72
End: 2022-08-27

## 2022-08-27 DIAGNOSIS — G62.9 PERIPHERAL POLYNEUROPATHY: ICD-10-CM

## 2022-08-27 DIAGNOSIS — R26.9 GAIT DISORDER: ICD-10-CM

## 2022-08-27 DIAGNOSIS — G70.9 NEUROMUSCULAR DISEASE (H): Primary | ICD-10-CM

## 2022-09-06 NOTE — TELEPHONE ENCOUNTER
9/6 Called and spoke to patient. He is currently schedule for an appointment with Dr. Ríos.     Avelina canela Procedure   Orthopedics, Podiatry, Sports Medicine, ENT/Eye Specialties  [facility]   [phone]

## 2022-09-09 ENCOUNTER — OFFICE VISIT (OUTPATIENT)
Dept: ORTHOPEDICS | Facility: CLINIC | Age: 72
End: 2022-09-09
Payer: MEDICARE

## 2022-09-09 VITALS
HEART RATE: 56 BPM | DIASTOLIC BLOOD PRESSURE: 84 MMHG | SYSTOLIC BLOOD PRESSURE: 174 MMHG | WEIGHT: 285 LBS | OXYGEN SATURATION: 97 % | HEIGHT: 73 IN | BODY MASS INDEX: 37.77 KG/M2

## 2022-09-09 DIAGNOSIS — M43.16 SPONDYLOLISTHESIS OF LUMBAR REGION: ICD-10-CM

## 2022-09-09 DIAGNOSIS — M47.817 FACET ARTHROPATHY, LUMBOSACRAL: ICD-10-CM

## 2022-09-09 DIAGNOSIS — M54.17 LUMBOSACRAL RADICULOPATHY: Primary | ICD-10-CM

## 2022-09-09 PROCEDURE — 99204 OFFICE O/P NEW MOD 45 MIN: CPT | Performed by: PHYSICAL MEDICINE & REHABILITATION

## 2022-09-09 ASSESSMENT — PAIN SCALES - GENERAL: PAINLEVEL: MILD PAIN (3)

## 2022-09-09 NOTE — LETTER
"    9/9/2022         RE: Lucio Daly  [address]  [place] [address]        Dear Colleague,    Thank you for referring your patient, Lucio Daly, to the [facility]. Please see a copy of my visit note below.    PHYSICAL MEDICINE & REHABILITATION / MEDICAL SPINE        Date:  Sep 9, 2022    Name:  Lucio Daly  YOB: 1950  MRN:  [id number]          CHIEF COMPLAINT:  Low back pain.        HISTORY OF PRESENT ILLNESS:  Mr. Lucio Daly is a 72-year-old male.  He has 9 grandchildren.  He walks 20 minutes daily for exercise.    Mr. Lucio Daly began noting low back pain in the fall 2021.  Low back pain has been worsening since it began.  Low back pain develops with standing.  Low back pain is worse when driving longer than 20 minutes.  Low back pain is intermittent and described as \"achy.\"  Low back pain is currently rated as 3/10.  At its worst, low back pain is rated as 8/10.  There is intermittent radiation into the right lower extremity extending down into the dorsal foot.  Low back pain can keep Mr. Lucio Daly awake and can wake Mr. Lucio Daly.    Mr. Lucio Daly takes pregabalin 50 mg 3-4 times per day.  He takes a total of 3000 mg of acetaminophen per day.    Mr. Lucio Daly notes some weakness, but this is not new.  He notes give way episodes of his lower extremities.  He notes tripping and stumbling \"off and on.\"  Last year at a lawn party, Mr. Lucio Daly fell over backwards and had to be helped back up.  Mr. Lucio Daly uses a 4 wheeled walker for community ambulation.  He denies any saddle anesthesia or bowel incontinence.  He does have some bladder incontinence and is currently undergoing prostate treatments.    Mr. Lucio Daly states that his bilateral lower extremities become weak and tired after walking for 20 minutes.  He notes improvement in symptoms with leaning forward on a shopping cart.    Mr. Lucio MOYA" Malika has rheumatoid arthritis, parkinsonism, and irritable bowel syndrome.  He states that he also has neuropathy.    Mr. Lucio Daly states that he has bilateral knee osteoarthritis, bilateral ankle osteoarthritis, bilateral foot osteoarthritis.        REVIEW OF SYSTEMS:  Review of Systems     Constitutional:  Positive for weight loss. Negative for fever and weight gain.   HENT:  Positive for tinnitus and hoarse voice. Negative for trouble swallowing.    Eyes:  Positive for pain and eye pain.   Respiratory:   Positive for cough and shortness of breath. Negative for wheezing.    Cardiovascular:  Negative for chest pain, palpitations and leg swelling.   Gastrointestinal:  Positive for heartburn, constipation and blood in stool. Negative for nausea, vomiting, abdominal pain, diarrhea and bowel incontinence.   Genitourinary:  Positive for bladder incontinence. Negative for dysuria and hematuria.   Neurological:  Negative for headaches.   Endo/Heme:  Negative for swollen glands.         ALLERGIES:  Allergies   Allergen Reactions     Restasis      Burning eyes, problems with breathing, tightness in chest     Adhesive Tape      Bandages misc     Allegra      EXCESSIVE URINATION AND WEAKNESS, LIGHT-HEADED     Allergy      Dust     Animal Dander      Benadryl Allergy      EXCESSIVE URINATION AND WEAKNESS, LIGHT-HEADED     Cephalexin      Joint pain or gerd aggravation. Bloating excessive urination      Cephalosporins      Cyclosporine      Diphenhydramine Other (See Comments)     Doxycycline      Doxycycline Hyclate Nausea     SWEATING,MIGRAINES,LOSS OF APPETITE,SWEATING,LIGHT HEADED, EXCESSIVE URINATION     Fexofenadine Other (See Comments)     Flonase [Fluticasone Propionate]      Gabapentin      Neurontin: mosd changes and excess urination     Hydrocortisone      Iodine      Iodine Solution [Povidone Iodine]      SKIN MELTS     Levaquin      From surgeon--? Joint pain ? Gerd aggravation. Insomnia, excess urination      Levofloxacin      Mylanta      EXCESSIVE URINATION AND WEAKNESS, LIGHT-HEADED     Oxcarbazepine      Prednisone      Weakness, elevated bp, headache, eye pain, congestion      Prednisone      Seafood [Seafood]      Shellfish Allergy      Hives       Simethicone      Sulfamethoxazole W/Trimethoprim      Sulfamethoxazole-Trimethoprim      Chest pain, angina     Symbicort GI Disturbance and Nausea     Trees      Trileptal      SEVERE JOINT AND TENDON PAIN, INSOMNIA, RESTLESSNESS, NAUSEA, EXCESS URINATION     Cortizone Rash     EXCESS URINATION,WEAKNESS,NAUSEA, HEADACHE         MEDICATIONS:  Current Outpatient Medications   Medication Sig Dispense Refill     acetaminophen (TYLENOL) 500 MG tablet 2 x 500mg by mouth 3 times per day: 7/730am, 4pm and 11pm  = 6/day       albuterol (PROAIR HFA/PROVENTIL HFA/VENTOLIN HFA) 108 (90 Base) MCG/ACT inhaler Inhale 2 puffs into the lungs every 4 hours as needed for shortness of breath / dyspnea or wheezing 6.7 g 1     amLODIPine (NORVASC) 5 MG tablet Take 1 tablet (5 mg) by mouth daily 90 tablet 3     cholecalciferol (HM VITAMIN D3) 25 MCG (1000 UT) TABS 1000 unit tab by mouth daily       fluorouracil (EFUDEX) 5 % external cream        folic acid (FOLVITE) 1 MG tablet Take 1 tablet (1 mg) by mouth daily 90 tablet 1     hydroxychloroquine (PLAQUENIL) 200 MG tablet Take 1 tablet (200 mg) by mouth daily Daily at 9am. 90 tablet 5     ketoconazole (NIZORAL) 2 % external shampoo        lisinopril-hydrochlorothiazide (ZESTORETIC) 20-25 MG tablet Take 1 tablet by mouth daily 90 tablet 3     methotrexate sodium 2.5 MG TABS Take 8 tablets (20 mg) by mouth every 7 days *Monitoring labs every 8-12 weeks 96 tablet 3     metoprolol succinate ER (TOPROL-XL) 50 MG 24 hr tablet 1 and 1/2 tabs daily. 135 tablet 3     omeprazole (PRILOSEC) 40 MG DR capsule Take 1 capsule (40 mg) by mouth daily 90 capsule 3     ORDER FOR DME Use your BiPAP device as directed by your provider.       oxybutynin  (DITROPAN) 5 MG tablet Take 1 tablet (5 mg) by mouth 3 times daily TAKE 1 TABLET BY MOUTH EVERY DAY AT 4PM 270 tablet 3     pregabalin (LYRICA) 50 MG capsule 50mg capsule by mouth at 7am and 4pm and 1-2 x 50mg capsules by mouth nightly at 11pm (4/day) 360 capsule 3     SEREVENT DISKUS 50 MCG/DOSE inhaler TAKE 1 PUFF BY MOUTH TWICE A DAY 1 each 3         PAST MEDICAL HISTORY:  Past Medical History:   Diagnosis Date     Abnormal EMG 04/18/2013     Abnormal involuntary movements(781.0)     Movement Disorder     AK (actinic keratosis) 12/18/2011     Allergic rhinitis, cause unspecified     Allergic rhinitis     Balance problems 11/01/2011     Basal cell carcinoma      Bladder spasms 11/01/2011     Chronic osteoarthritis      COPD (chronic obstructive pulmonary disease) (H)     Interstial lung disease, obliderans bronchiolitis     Diaphragmatic hernia without mention of obstruction or gangrene      Earache or other ear, nose, or throat complaint      Esophageal reflux      Fatigue 11/01/2011     Fracture      H. pylori infection 05/12/2011     History of MRI of cervical spine 11/18/2013    EXAMINATION: CERVICAL SPINE G/E 5 VIEWS* 4/19/2013 4:18 PM  CLINICAL HISTORY: Pain in limb,Performing Location?->[facility]),  COMPARISON:  FINDINGS: AP and lateral views in flexion and extension, as well as odontoid view of the cervical spine was obtained. There is no comparison available. The vertebral bodies of the cervical spine are normally aligned. There is posterior spurring and d     Incomplete defecation 11/01/2011     Interstitial lung disease (H) 11/29/2016     Laboratory test 08/07/2012     Lung disease 06/2015     Malignant basal cell neoplasm of skin 08/06/2008     Melanoma (H) 08/06/2008     Melanoma in situ of lower leg (H)     R calf     Neuropathy 05/16/2011     Other bladder disorder      Other color vision deficiencies      Other nervous system complications      Parkinsonism (H) 11/01/2011     Parkinsons  "disease (H)     Parkinsonism/ balance, dropped foot, tremor     Personal history of colonic polyps      PLMD (periodic limb movement disorder) 12/16/2021     Polyneuropathy in other diseases classified elsewhere (H)      RA (rheumatoid arthritis) (H)      Rheumatoid arthritis of multiple sites with negative rheumatoid factor (H) 03/21/2016     Seronegative arthritis 11/18/2013     Shortness of breath      Shoulder arthritis 2016    acromioclavicular joint      Somatization disorder 05/12/2011     Spider veins     Leg Vericise vein surgery     Squamous cell carcinoma      Tremor 11/01/2011     Unspecified essential hypertension      Unspecified hypothyroidism      Urinary tract infection      Urinary urgency 11/01/2011     Wears glasses 11/01/2011         PAST SURGICAL HISTORY:  Past Surgical History:   Procedure Laterality Date     BIOPSY OF SKIN LESION       COLONOSCOPY       COLONOSCOPY N/A 7/20/2022    Procedure: COLONOSCOPY (fv) polypectomy using jumbo bx forcep;  Surgeon: Gómez Mckinney MD;  Location:  GI     CYSTOSCOPY  Many years ago    Blood in urine/ bladder cystoscopy     ESOPHAGOSCOPY, GASTROSCOPY, DUODENOSCOPY (EGD), COMBINED N/A 7/20/2022    Procedure: ESOPHAGOGASTRODUODENOSCOPY (EGD) (fv) biopsies using cold bx forcep;  Surgeon: Gómez Mckinney MD;  Location:  GI     HEMORRHOID SURGERY       lip biopsy      for sicca complex     MOHS MICROGRAPHIC PROCEDURE       SOFT TISSUE SURGERY      removeal of basel cell carcinoma         FAMILY HISTORY:  Family History   Problem Relation Age of Onset     Hypertension Mother      Heart Disease Mother         a fib     Arthritis Mother         \"osteo\"     Osteoporosis Mother      Skin Cancer Mother      Uterine Cancer Mother      Other Cancer Mother      Cancer Mother      Hypertension Brother      Diabetes Brother         \" post pancreatitis\"     Neurologic Disorder Sister         multiple sclerosis     Hypertension Sister      Heart Disease Sister      " Multiple Sclerosis Sister      Heart Disease Sister         Chf     Arthritis Sister      Heart Failure Sister      Kidney Disease Sister      Dementia Sister      Hypertension Father      Cerebrovascular Disease Father         ,     Skin Cancer Father      Colon Polyps Father      Heart Disease Father      Other - See Comments Son         [place]     Other - See Comments Abdon wagner     Other - See Comments Abdon gonzalez     Psoriasis Maternal Grandfather      Stomach Cancer Maternal Grandfather      Cancer Maternal Grandfather      Congenital Anomalies Other         granddaughter with a chromosome defect     Other Cancer Other         Grand daughter     Cancer Other         Grand daughter     Cerebrovascular Disease Paternal Grandmother         ,     Cerebrovascular Disease Paternal Grandfather         ,     Cancer Granddaughter         Langerhans Cell Histiocytosis     Genetic Disease Granddaughter         gene translocation     Learning Disorder Other         Gene translocation     Melanoma No family hx of      Colon Cancer No family hx of          SOCIAL HISTORY:  Social History     Socioeconomic History     Marital status:      Spouse name: Not on file     Number of children: Not on file     Years of education: Not on file     Highest education level: Not on file   Occupational History     Not on file   Tobacco Use     Smoking status: Former Smoker     Packs/day: 1.50     Years: 37.00     Pack years: 55.50     Types: Cigarettes, Cigarettes     Start date: 6/15/1963     Quit date: 2000     Years since quittin.9     Smokeless tobacco: Never Used   Substance and Sexual Activity     Alcohol use: Not Currently     Drug use: Never     Sexual activity: Not Currently     Partners: Female     Birth control/protection: None   Other Topics Concern     Parent/sibling w/ CABG, MI or angioplasty before 65F 55M? No   Social History Narrative    2013: Living in [place] in a  [place] with no steps    Has 3 sons that are doing okay.         Dairy/d 1 servings/d.     Caffeine 0 servings/d    Exercise 0 x week    Sunscreen used - No    Seatbelts used - Yes    Working smoke/CO detectors in the home - Yes    Guns stored in the home - Yes    Self Breast Exams - NA    Self Testicular Exam - Yes    Eye Exam up to date - Yes 2008    Dental Exam up to date - Yes 2006    Pap Smear up to date - NA    Mammogram up to date - NA    PSA up to date - Yes 2008    Dexa Scan up to date - No    Flex Sig / Colonoscopy up to date - Yes less than 5 yrs ago    Immunizations up to date -today    Abuse: Current or Past(Physical, Sexual or Emotional)- No    Do you feel safe in your environment - Yes    2008                 Social Determinants of Health     Financial Resource Strain: Low Risk      Difficulty of Paying Living Expenses: Not hard at all   Food Insecurity: No Food Insecurity     Worried About Running Out of Food in the Last Year: Never true     Ran Out of Food in the Last Year: Never true   Transportation Needs: No Transportation Needs     Lack of Transportation (Medical): No     Lack of Transportation (Non-Medical): No   Physical Activity: Unknown     Days of Exercise per Week: 4 days     Minutes of Exercise per Session: Not on file   Stress: No Stress Concern Present     Feeling of Stress : Not at all   Social Connections: Moderately Integrated     Frequency of Communication with Friends and Family: Three times a week     Frequency of Social Gatherings with Friends and Family: Once a week     Attends [employer] Services: More than 4 times per year     Active Member of Clubs or Organizations: No     Attends Club or Organization Meetings: Not on file     Marital Status:    Intimate Partner Violence: Not on file   Housing Stability: Low Risk      Unable to Pay for Housing in the Last Year: No     Number of Places Lived in the Last Year: 2     Unstable Housing in the Last Year: No         PHYSICAL  "EXAMINATION:  Vitals:    09/09/22 1116   BP: (!) 174/84   Pulse: 56   SpO2: 97%   Weight: 129.3 kg (285 lb)   Height: 1.854 m (6' 1\")       GENERAL:  No acute distress.  Pleasant and cooperative.   PSYCH:  Normal mood and affect.  HEAD:  Normocephalic.  SPEECH:  No dysarthria.  EYES:  No scleral icterus.  Wearing glasses.  EARS:  Hearing is intact to spoken voice.  NOSE/MOUTH:  Wearing a face mask.  LUNGS:  No respiratory distress.  No increased work of breathing.  VASCULAR/PULSES:  Posterior tibial:  Right 2+.  Left 2+.  LOWER EXTREMITIES: No clubbing, cyanosis, or edema bilaterally.    BALANCE AND GAIT: The patient has a reciprocal gait pattern without antalgia.  He is able to heel walk and toe walk.  He is unable to tandem walk.  Double leg squat is performed with quadriceps dominant pattern.  The patient stands and ambulates with mildly flexed hips and mildly flexed knees.    INSPECTION:  There is no erythema, ecchymosis, deformity, asymmetry, or abnormality of the low back.    LUMBOPELVIC PALPATION:  Lumbar Spinous Processes: Mild to moderate tenderness L4-L5.  Lumbar Paraspinals:  Right mild to moderate tenderness L4-L5.  Left mild tenderness L4-L5.  Posterior Superior Iliac Spine:  Right mild to moderate tenderness.  Left mild tenderness.  Iliac Crest:  Right not tender.  Left not tender.  Sacroiliac Joint:  Right mild tenderness.  Left mild tenderness.  Hip External Rotators:  Right mild tenderness.  Left mild tenderness.  Ischial Tuberosity:  Right mild tenderness.  Left not tender.  Greater Trochanter:  Right mild tenderness.  Left mild tenderness.  Coccyx:  not tender.    THORACOLUMBAR RANGE OF MOTION:  Forward flexion (85 ): Moderately reduced range of motion, does not cause pain, does not cause radiating pain.  Extension (30 ): Moderately reduced range of motion, increases right low back pain, does not cause radiating pain.  Lateral bending right (30 ):  Moderately reduced range of motion, does not " cause pain, does not cause radiating pain.  Lateral bending left (30 ):  Moderately reduced range of motion, does not cause pain, does not cause radiating pain.  Twisting right with extension:  Moderately reduced range of motion, increases right low back pain, does not cause radiating pain.  Twisting left with extension:  Moderately reduced range of motion, increases right low back pain, does not cause radiating pain.  Sacroiliac joint dysfunction:  Right -.  Left -.    HIP RANGE OF MOTION:  Flexion (110 ):  Right 105 .  Left 105 .  Extension (30 ):  Right 20 .  Left 20 .  External rotation (45 ):  Right 40 .  Left 40 .  Internal rotation (35 ):  Right 30 .  Left 30 .    KNEE RANGE OF MOTION:  Extension (0 ):  Right 0 .  Left 0 .  Flexion (135 ):  Right 130 .  Left 120 .    STRENGTH:  Hip flexion:  Right 5/5.  Left 5/5.  Hip abduction:  Right 5/5.  Left 5/5.  Hip external rotation:  Right 5/5.  Left 5/5.  Knee extension:  Right 5/5.  Left 5/5.  Knee flexion:  Right 5/5.  Left 5/5.  Ankle dorsiflexion:  Right 5/5.  Left 5/5.  Great toe dorsiflexion:  Right 5/5.  Left 5/5.  Ankle plantar flexion:  Right 5/5.  Left 5/5.    SENSATION:  Intact to light touch along right L2, L3, L4, L5, S1, S2.  Intact to light touch along left L2, L3, L4, L5, S1, S2.    REFLEXES:  Patellar (L2-L4):  Right 2+.  Left 2+.  Medial hamstrings (L5-S1):  Right 2+.  Left 2+.  Achilles (S1-S2):  Right 1+.  Left 1+.  Babinski:  Right downgoing.  Left downgoing.  Forced ankle dorsiflexion (clonus):  Right 0 beats.  Left 0 beats.    LUMBOPELVIC SPECIAL TESTS:  Log roll:  Right -.  Left -.  Straight leg raise:  Right -.  Left -.  Crossover straight leg raise:  Right -.  Left -.  Resisted straight leg raise:  Right -.  Left -.  PRAVEEN:  Right -.  Left -.  FADIR:  Right -.  Left -.  Hip scour:  Right -.  Left -.  Lateral sacral compression:  Right -.  Left -.  Clamshell:  Right -.  Left -.  Ely s:  Right +.  Left +.  Yeoman s:  Right -.  Left  -.  Distraction:  Right -.  Left -.  Sacral thrust:  Right -.  Left -.  Noble compression:  Right -.  Left -.        IMAGING:  LUMBAR SPINE FOUR OR MORE VIEWS    9/9/2022 12:21 PM      HISTORY: Lumbosacral radiculopathy. Facet arthropathy, lumbosacral.  Spondylolisthesis of lumbar region.     COMPARISON: X-rays 11/20/2018    IMPRESSION: Slight height loss of multiple vertebral bodies, presumably chronic, not appreciably changed. Severe lower lumbar spine facet arthropathy with grade 1 anterolisthesis of L4 on L5 (similar to possibly slightly worse in flexion compared to extension). Other levels of more subtle grade 1 spondylolisthesis. Moderate degenerative disc disease at L4-L5. Background of mild degenerative disc disease. Degenerative findings have overall slightly worsened compared to the prior exam. Levoconvex scoliosis. Vascular calcifications.         ASSESSMENT/PLAN:  Mr. Lucio Daly is a 72-year-old male.  History is concerning for right lumbosacral radiculopathy.  Mr. Lucio Daly has symptomatic lumbosacral facet arthropathy.  He also has anterolisthesis of L4 on L5.  There are some symptoms concerning for claudication.  Discussed diagnoses, pathophysiologies, treatment options, and further work-up with Mr. Daly.  Discussed the options of doing nothing/living with it, physical therapy, chiropractic care, oral medications such as gabapentin and pregabalin, x-rays of the lumbar spine, MRI of the lumbar spine, lumbosacral epidural corticosteroid injection, lumbosacral facet joint medial branch blocks following radiofrequency ablations, referral to neurosurgery.  Mr. Lucio Daly will be sent for x-rays of his lumbar spine today.  He will be sent for an MRI of his lumbar spine.  Mr. Lucio Daly is to follow-up in this clinic after the MRI of his lumbar spine.        Total Time on encounter:  53 minutes were spent on one more or more of the following:  discussion with patient, history,  exam, coordinating care, treatment goals, record review, documenting clinical information, and/or data review.      Ramón Bennett MD

## 2022-09-09 NOTE — PATIENT INSTRUCTIONS
Please schedule an MRI.  The [facility] Scheduling team will call you to schedule your imaging exam in the next 0-3 days.  You can also call them directly at [facility] [phone] ([address], [address], [place], [place]) and [facility] [phone] ([address], [place], [place]) to schedule your appointment.    Please schedule a follow-up appointment after your MRI.  You can schedule this at the , or you can call [phone].

## 2022-09-09 NOTE — PROGRESS NOTES
"PHYSICAL MEDICINE & REHABILITATION / MEDICAL SPINE        Date:  Sep 9, 2022    Name:  Lucio Daly  YOB: 1950  MRN:  [id number]          CHIEF COMPLAINT:  Low back pain.        HISTORY OF PRESENT ILLNESS:  Mr. Lucio Daly is a 72-year-old male.  He has 9 grandchildren.  He walks 20 minutes daily for exercise.    Mr. Lucio Daly began noting low back pain in the fall 2021.  Low back pain has been worsening since it began.  Low back pain develops with standing.  Low back pain is worse when driving longer than 20 minutes.  Low back pain is intermittent and described as \"achy.\"  Low back pain is currently rated as 3/10.  At its worst, low back pain is rated as 8/10.  There is intermittent radiation into the right lower extremity extending down into the dorsal foot.  Low back pain can keep Mr. Lucio Daly awake and can wake Mr. Lucio Daly.    Mr. Lucio Daly takes pregabalin 50 mg 3-4 times per day.  He takes a total of 3000 mg of acetaminophen per day.    Mr. Lucio Daly notes some weakness, but this is not new.  He notes give way episodes of his lower extremities.  He notes tripping and stumbling \"off and on.\"  Last year at a [employer] party, Mr. Lucio Daly fell over backwards and had to be helped back up.  Mr. Lucio Daly uses a 4 wheeled walker for community ambulation.  He denies any saddle anesthesia or bowel incontinence.  He does have some bladder incontinence and is currently undergoing prostate treatments.    Mr. Lucio Daly states that his bilateral lower extremities become weak and tired after walking for 20 minutes.  He notes improvement in symptoms with leaning forward on a shopping cart.    Mr. Lucio Daly has rheumatoid arthritis, parkinsonism, and irritable bowel syndrome.  He states that he also has neuropathy.    Mr. Lucio Daly states that he has bilateral knee osteoarthritis, bilateral ankle osteoarthritis, bilateral foot " osteoarthritis.        REVIEW OF SYSTEMS:  Review of Systems     Constitutional:  Positive for weight loss. Negative for fever and weight gain.   HENT:  Positive for tinnitus and hoarse voice. Negative for trouble swallowing.    Eyes:  Positive for pain and eye pain.   Respiratory:   Positive for cough and shortness of breath. Negative for wheezing.    Cardiovascular:  Negative for chest pain, palpitations and leg swelling.   Gastrointestinal:  Positive for heartburn, constipation and blood in stool. Negative for nausea, vomiting, abdominal pain, diarrhea and bowel incontinence.   Genitourinary:  Positive for bladder incontinence. Negative for dysuria and hematuria.   Neurological:  Negative for headaches.   Endo/Heme:  Negative for swollen glands.         ALLERGIES:  Allergies   Allergen Reactions     Restasis      Burning eyes, problems with breathing, tightness in chest     Adhesive Tape      Bandages misc     Allegra      EXCESSIVE URINATION AND WEAKNESS, LIGHT-HEADED     Allergy      Dust     Animal Dander      Benadryl Allergy      EXCESSIVE URINATION AND WEAKNESS, LIGHT-HEADED     Cephalexin      Joint pain or gerd aggravation. Bloating excessive urination      Cephalosporins      Cyclosporine      Diphenhydramine Other (See Comments)     Doxycycline      Doxycycline Hyclate Nausea     SWEATING,MIGRAINES,LOSS OF APPETITE,SWEATING,LIGHT HEADED, EXCESSIVE URINATION     Fexofenadine Other (See Comments)     Flonase [Fluticasone Propionate]      Gabapentin      Neurontin: mosd changes and excess urination     Hydrocortisone      Iodine      Iodine Solution [Povidone Iodine]      SKIN MELTS     Levaquin      From surgeon--? Joint pain ? Gerd aggravation. Insomnia, excess urination     Levofloxacin      Mylanta      EXCESSIVE URINATION AND WEAKNESS, LIGHT-HEADED     Oxcarbazepine      Prednisone      Weakness, elevated bp, headache, eye pain, congestion      Prednisone      Seafood [Seafood]      Shellfish Allergy       Hives       Simethicone      Sulfamethoxazole W/Trimethoprim      Sulfamethoxazole-Trimethoprim      Chest pain, angina     Symbicort GI Disturbance and Nausea     Trees      Trileptal      SEVERE JOINT AND TENDON PAIN, INSOMNIA, RESTLESSNESS, NAUSEA, EXCESS URINATION     Cortizone Rash     EXCESS URINATION,WEAKNESS,NAUSEA, HEADACHE         MEDICATIONS:  Current Outpatient Medications   Medication Sig Dispense Refill     acetaminophen (TYLENOL) 500 MG tablet 2 x 500mg by mouth 3 times per day: 7/730am, 4pm and 11pm  = 6/day       albuterol (PROAIR HFA/PROVENTIL HFA/VENTOLIN HFA) 108 (90 Base) MCG/ACT inhaler Inhale 2 puffs into the lungs every 4 hours as needed for shortness of breath / dyspnea or wheezing 6.7 g 1     amLODIPine (NORVASC) 5 MG tablet Take 1 tablet (5 mg) by mouth daily 90 tablet 3     cholecalciferol (HM VITAMIN D3) 25 MCG (1000 UT) TABS 1000 unit tab by mouth daily       fluorouracil (EFUDEX) 5 % external cream        folic acid (FOLVITE) 1 MG tablet Take 1 tablet (1 mg) by mouth daily 90 tablet 1     hydroxychloroquine (PLAQUENIL) 200 MG tablet Take 1 tablet (200 mg) by mouth daily Daily at 9am. 90 tablet 5     ketoconazole (NIZORAL) 2 % external shampoo        lisinopril-hydrochlorothiazide (ZESTORETIC) 20-25 MG tablet Take 1 tablet by mouth daily 90 tablet 3     methotrexate sodium 2.5 MG TABS Take 8 tablets (20 mg) by mouth every 7 days *Monitoring labs every 8-12 weeks 96 tablet 3     metoprolol succinate ER (TOPROL-XL) 50 MG 24 hr tablet 1 and 1/2 tabs daily. 135 tablet 3     omeprazole (PRILOSEC) 40 MG DR capsule Take 1 capsule (40 mg) by mouth daily 90 capsule 3     ORDER FOR DME Use your BiPAP device as directed by your provider.       oxybutynin (DITROPAN) 5 MG tablet Take 1 tablet (5 mg) by mouth 3 times daily TAKE 1 TABLET BY MOUTH EVERY DAY AT 4PM 270 tablet 3     pregabalin (LYRICA) 50 MG capsule 50mg capsule by mouth at 7am and 4pm and 1-2 x 50mg capsules by mouth nightly at 11pm  (4/day) 360 capsule 3     SEREVENT DISKUS 50 MCG/DOSE inhaler TAKE 1 PUFF BY MOUTH TWICE A DAY 1 each 3         PAST MEDICAL HISTORY:  Past Medical History:   Diagnosis Date     Abnormal EMG 04/18/2013     Abnormal involuntary movements(781.0)     Movement Disorder     AK (actinic keratosis) 12/18/2011     Allergic rhinitis, cause unspecified     Allergic rhinitis     Balance problems 11/01/2011     Basal cell carcinoma      Bladder spasms 11/01/2011     Chronic osteoarthritis      COPD (chronic obstructive pulmonary disease) (H)     Interstial lung disease, obliderans bronchiolitis     Diaphragmatic hernia without mention of obstruction or gangrene      Earache or other ear, nose, or throat complaint      Esophageal reflux      Fatigue 11/01/2011     Fracture      H. pylori infection 05/12/2011     History of MRI of cervical spine 11/18/2013    EXAMINATION: CERVICAL SPINE G/E 5 VIEWS* 4/19/2013 4:18 PM  CLINICAL HISTORY: Pain in limb,Performing Location?->[facility]  COMPARISON:  FINDINGS: AP and lateral views in flexion and extension, as well as odontoid view of the cervical spine was obtained. There is no comparison available. The vertebral bodies of the cervical spine are normally aligned. There is posterior spurring and d     Incomplete defecation 11/01/2011     Interstitial lung disease (H) 11/29/2016     Laboratory test 08/07/2012     Lung disease 06/2015     Malignant basal cell neoplasm of skin 08/06/2008     Melanoma (H) 08/06/2008     Melanoma in situ of lower leg (H)     R calf     Neuropathy 05/16/2011     Other bladder disorder      Other color vision deficiencies      Other nervous system complications      Parkinsonism (H) 11/01/2011     Parkinsons disease (H)     Parkinsonism/ balance, dropped foot, tremor     Personal history of colonic polyps      PLMD (periodic limb movement disorder) 12/16/2021     Polyneuropathy in other diseases classified elsewhere (H)      RA (rheumatoid  "arthritis) (H)      Rheumatoid arthritis of multiple sites with negative rheumatoid factor (H) 03/21/2016     Seronegative arthritis 11/18/2013     Shortness of breath      Shoulder arthritis 2016    acromioclavicular joint      Somatization disorder 05/12/2011     Spider veins     Leg Vericise vein surgery     Squamous cell carcinoma      Tremor 11/01/2011     Unspecified essential hypertension      Unspecified hypothyroidism      Urinary tract infection      Urinary urgency 11/01/2011     Wears glasses 11/01/2011         PAST SURGICAL HISTORY:  Past Surgical History:   Procedure Laterality Date     BIOPSY OF SKIN LESION       COLONOSCOPY       COLONOSCOPY N/A 7/20/2022    Procedure: COLONOSCOPY (fv) polypectomy using jumbo bx forcep;  Surgeon: Gómez Mckinney MD;  Location:  GI     CYSTOSCOPY  Many years ago    Blood in urine/ bladder cystoscopy     ESOPHAGOSCOPY, GASTROSCOPY, DUODENOSCOPY (EGD), COMBINED N/A 7/20/2022    Procedure: ESOPHAGOGASTRODUODENOSCOPY (EGD) (fv) biopsies using cold bx forcep;  Surgeon: Gómez Mckinney MD;  Location:  GI     HEMORRHOID SURGERY       lip biopsy      for sicca complex     MOHS MICROGRAPHIC PROCEDURE       SOFT TISSUE SURGERY      removeal of basel cell carcinoma         FAMILY HISTORY:  Family History   Problem Relation Age of Onset     Hypertension Mother      Heart Disease Mother         a fib     Arthritis Mother         \"osteo\"     Osteoporosis Mother      Skin Cancer Mother      Uterine Cancer Mother      Other Cancer Mother      Cancer Mother      Hypertension Brother      Diabetes Brother         \" post pancreatitis\"     Neurologic Disorder Sister         multiple sclerosis     Hypertension Sister      Heart Disease Sister      Multiple Sclerosis Sister      Heart Disease Sister         Chf     Arthritis Sister      Heart Failure Sister      Kidney Disease Sister      Dementia Sister      Hypertension Father      Cerebrovascular Disease Father         ,     " Skin Cancer Father      Colon Polyps Father      Heart Disease Father      Other - See Comments Son         [place]     Other - See Comments Abdon wagner     Other - See Comments Abdon gonzalez     Psoriasis Maternal Grandfather      Stomach Cancer Maternal Grandfather      Cancer Maternal Grandfather      Congenital Anomalies Other         granddaughter with a chromosome defect     Other Cancer Other         Grand daughter     Cancer Other         Grand daughter     Cerebrovascular Disease Paternal Grandmother         ,     Cerebrovascular Disease Paternal Grandfather         ,     Cancer Granddaughter         Langerhans Cell Histiocytosis     Genetic Disease Granddaughter         gene translocation     Learning Disorder Other         Gene translocation     Melanoma No family hx of      Colon Cancer No family hx of          SOCIAL HISTORY:  Social History     Socioeconomic History     Marital status:      Spouse name: Not on file     Number of children: Not on file     Years of education: Not on file     Highest education level: Not on file   Occupational History     Not on file   Tobacco Use     Smoking status: Former Smoker     Packs/day: 1.50     Years: 37.00     Pack years: 55.50     Types: Cigarettes, Cigarettes     Start date: 6/15/1963     Quit date: 2000     Years since quittin.9     Smokeless tobacco: Never Used   Substance and Sexual Activity     Alcohol use: Not Currently     Drug use: Never     Sexual activity: Not Currently     Partners: Female     Birth control/protection: None   Other Topics Concern     Parent/sibling w/ CABG, MI or angioplasty before 65F 55M? No   Social History Narrative    2013: Living in [place] in a townhouse with no steps    Has 3 sons that are doing okay.         Dairy/d 1 servings/d.     Caffeine 0 servings/d    Exercise 0 x week    Sunscreen used - No    Seatbelts used - Yes    Working smoke/CO detectors in the home - Yes    Guns  "stored in the home - Yes    Self Breast Exams - NA    Self Testicular Exam - Yes    Eye Exam up to date - Yes 2008    Dental Exam up to date - Yes 2006    Pap Smear up to date - NA    Mammogram up to date - NA    PSA up to date - Yes 2008    Dexa Scan up to date - No    Flex Sig / Colonoscopy up to date - Yes less than 5 yrs ago    Immunizations up to date -today    Abuse: Current or Past(Physical, Sexual or Emotional)- No    Do you feel safe in your environment - Yes    2008                 Social Determinants of Health     Financial Resource Strain: Low Risk      Difficulty of Paying Living Expenses: Not hard at all   Food Insecurity: No Food Insecurity     Worried About Running Out of Food in the Last Year: Never true     Ran Out of Food in the Last Year: Never true   Transportation Needs: No Transportation Needs     Lack of Transportation (Medical): No     Lack of Transportation (Non-Medical): No   Physical Activity: Unknown     Days of Exercise per Week: 4 days     Minutes of Exercise per Session: Not on file   Stress: No Stress Concern Present     Feeling of Stress : Not at all   Social Connections: Moderately Integrated     Frequency of Communication with Friends and Family: Three times a week     Frequency of Social Gatherings with Friends and Family: Once a week     Attends Ten[employer]ism Services: More than 4 times per year     Active Member of Clubs or Organizations: No     Attends Club or Organization Meetings: Not on file     Marital Status:    Intimate Partner Violence: Not on file   Housing Stability: Low Risk      Unable to Pay for Housing in the Last Year: No     Number of Places Lived in the Last Year: 2     Unstable Housing in the Last Year: No         PHYSICAL EXAMINATION:  Vitals:    09/09/22 1116   BP: (!) 174/84   Pulse: 56   SpO2: 97%   Weight: 129.3 kg (285 lb)   Height: 1.854 m (6' 1\")       GENERAL:  No acute distress.  Pleasant and cooperative.   PSYCH:  Normal mood and affect.  HEAD:  " Normocephalic.  SPEECH:  No dysarthria.  EYES:  No scleral icterus.  Wearing glasses.  EARS:  Hearing is intact to spoken voice.  NOSE/MOUTH:  Wearing a face mask.  LUNGS:  No respiratory distress.  No increased work of breathing.  VASCULAR/PULSES:  Posterior tibial:  Right 2+.  Left 2+.  LOWER EXTREMITIES: No clubbing, cyanosis, or edema bilaterally.    BALANCE AND GAIT: The patient has a reciprocal gait pattern without antalgia.  He is able to heel walk and toe walk.  He is unable to tandem walk.  Double leg squat is performed with quadriceps dominant pattern.  The patient stands and ambulates with mildly flexed hips and mildly flexed knees.    INSPECTION:  There is no erythema, ecchymosis, deformity, asymmetry, or abnormality of the low back.    LUMBOPELVIC PALPATION:  Lumbar Spinous Processes: Mild to moderate tenderness L4-L5.  Lumbar Paraspinals:  Right mild to moderate tenderness L4-L5.  Left mild tenderness L4-L5.  Posterior Superior Iliac Spine:  Right mild to moderate tenderness.  Left mild tenderness.  Iliac Crest:  Right not tender.  Left not tender.  Sacroiliac Joint:  Right mild tenderness.  Left mild tenderness.  Hip External Rotators:  Right mild tenderness.  Left mild tenderness.  Ischial Tuberosity:  Right mild tenderness.  Left not tender.  Greater Trochanter:  Right mild tenderness.  Left mild tenderness.  Coccyx:  not tender.    THORACOLUMBAR RANGE OF MOTION:  Forward flexion (85 ): Moderately reduced range of motion, does not cause pain, does not cause radiating pain.  Extension (30 ): Moderately reduced range of motion, increases right low back pain, does not cause radiating pain.  Lateral bending right (30 ):  Moderately reduced range of motion, does not cause pain, does not cause radiating pain.  Lateral bending left (30 ):  Moderately reduced range of motion, does not cause pain, does not cause radiating pain.  Twisting right with extension:  Moderately reduced range of motion, increases  right low back pain, does not cause radiating pain.  Twisting left with extension:  Moderately reduced range of motion, increases right low back pain, does not cause radiating pain.  Sacroiliac joint dysfunction:  Right -.  Left -.    HIP RANGE OF MOTION:  Flexion (110 ):  Right 105 .  Left 105 .  Extension (30 ):  Right 20 .  Left 20 .  External rotation (45 ):  Right 40 .  Left 40 .  Internal rotation (35 ):  Right 30 .  Left 30 .    KNEE RANGE OF MOTION:  Extension (0 ):  Right 0 .  Left 0 .  Flexion (135 ):  Right 130 .  Left 120 .    STRENGTH:  Hip flexion:  Right 5/5.  Left 5/5.  Hip abduction:  Right 5/5.  Left 5/5.  Hip external rotation:  Right 5/5.  Left 5/5.  Knee extension:  Right 5/5.  Left 5/5.  Knee flexion:  Right 5/5.  Left 5/5.  Ankle dorsiflexion:  Right 5/5.  Left 5/5.  Great toe dorsiflexion:  Right 5/5.  Left 5/5.  Ankle plantar flexion:  Right 5/5.  Left 5/5.    SENSATION:  Intact to light touch along right L2, L3, L4, L5, S1, S2.  Intact to light touch along left L2, L3, L4, L5, S1, S2.    REFLEXES:  Patellar (L2-L4):  Right 2+.  Left 2+.  Medial hamstrings (L5-S1):  Right 2+.  Left 2+.  Achilles (S1-S2):  Right 1+.  Left 1+.  Babinski:  Right downgoing.  Left downgoing.  Forced ankle dorsiflexion (clonus):  Right 0 beats.  Left 0 beats.    LUMBOPELVIC SPECIAL TESTS:  Log roll:  Right -.  Left -.  Straight leg raise:  Right -.  Left -.  Crossover straight leg raise:  Right -.  Left -.  Resisted straight leg raise:  Right -.  Left -.  PRAVEEN:  Right -.  Left -.  FADIR:  Right -.  Left -.  Hip scour:  Right -.  Left -.  Lateral sacral compression:  Right -.  Left -.  Clamshell:  Right -.  Left -.  Ely s:  Right +.  Left +.  Yeoman s:  Right -.  Left -.  Distraction:  Right -.  Left -.  Sacral thrust:  Right -.  Left -.  Noble compression:  Right -.  Left -.        IMAGING:  LUMBAR SPINE FOUR OR MORE VIEWS    9/9/2022 12:21 PM      HISTORY: Lumbosacral radiculopathy. Facet arthropathy,  lumbosacral.  Spondylolisthesis of lumbar region.     COMPARISON: X-rays 11/20/2018    IMPRESSION: Slight height loss of multiple vertebral bodies, presumably chronic, not appreciably changed. Severe lower lumbar spine facet arthropathy with grade 1 anterolisthesis of L4 on L5 (similar to possibly slightly worse in flexion compared to extension). Other levels of more subtle grade 1 spondylolisthesis. Moderate degenerative disc disease at L4-L5. Background of mild degenerative disc disease. Degenerative findings have overall slightly worsened compared to the prior exam. Levoconvex scoliosis. Vascular calcifications.         ASSESSMENT/PLAN:  Mr. Lucio Daly is a 72-year-old male.  History is concerning for right lumbosacral radiculopathy.  Mr. Lucio Daly has symptomatic lumbosacral facet arthropathy.  He also has anterolisthesis of L4 on L5.  There are some symptoms concerning for claudication.  Discussed diagnoses, pathophysiologies, treatment options, and further work-up with Mr. Daly.  Discussed the options of doing nothing/living with it, physical therapy, chiropractic care, oral medications such as gabapentin and pregabalin, x-rays of the lumbar spine, MRI of the lumbar spine, lumbosacral epidural corticosteroid injection, lumbosacral facet joint medial branch blocks following radiofrequency ablations, referral to neurosurgery.  Mr. Lucio Daly will be sent for x-rays of his lumbar spine today.  He will be sent for an MRI of his lumbar spine.  Mr. Lucio Daly is to follow-up in this clinic after the MRI of his lumbar spine.        Total Time on encounter:  53 minutes were spent on one more or more of the following:  discussion with patient, history, exam, coordinating care, treatment goals, record review, documenting clinical information, and/or data review.      Ramón Bennett MD

## 2022-09-14 ENCOUNTER — HOSPITAL ENCOUNTER (OUTPATIENT)
Dept: MRI IMAGING | Facility: CLINIC | Age: 72
Discharge: HOME | End: 2022-09-14
Attending: PHYSICAL MEDICINE & REHABILITATION | Admitting: PHYSICAL MEDICINE & REHABILITATION
Payer: MEDICARE

## 2022-09-14 DIAGNOSIS — M43.16 SPONDYLOLISTHESIS OF LUMBAR REGION: ICD-10-CM

## 2022-09-14 DIAGNOSIS — M47.817 FACET ARTHROPATHY, LUMBOSACRAL: ICD-10-CM

## 2022-09-14 DIAGNOSIS — M54.17 LUMBOSACRAL RADICULOPATHY: ICD-10-CM

## 2022-09-14 PROCEDURE — G1010 CDSM STANSON: HCPCS

## 2022-09-18 ASSESSMENT — ENCOUNTER SYMPTOMS
DIARRHEA: 0
DYSURIA: 0
HEARTBURN: 1
WEIGHT GAIN: 0
COUGH: 1
NAUSEA: 0
VOMITING: 0
BLOOD IN STOOL: 1
LEG SWELLING: 0
BOWEL INCONTINENCE: 0
SWOLLEN GLANDS: 0
HEMATURIA: 0
SHORTNESS OF BREATH: 1
HEADACHES: 0
ABDOMINAL PAIN: 0
WEIGHT LOSS: 1
HOARSE VOICE: 1
TROUBLE SWALLOWING: 0
CONSTIPATION: 1
FEVER: 0
WHEEZING: 0
EYE PAIN: 1
PALPITATIONS: 0

## 2022-09-23 DIAGNOSIS — G62.9 PERIPHERAL POLYNEUROPATHY: ICD-10-CM

## 2022-09-23 RX ORDER — PREGABALIN 50 MG/1
CAPSULE ORAL
Qty: 360 CAPSULE | Refills: 3 | Status: SHIPPED | OUTPATIENT
Start: 2022-09-23 | End: 2023-04-18

## 2022-09-23 NOTE — CONFIDENTIAL NOTE
Rx Authorization:    Requested Medication/ Dose: Pregabalin 50 mg    Date last refill ordered: 12/16/21    Quantity ordered: 360caps    # refills: 3    Date of last clinic visit with ordering provider: 12/16/21    Date of next clinic visit with ordering provider: F/U 1 year    All pertinent protocol data (lab date/result):     Include pertinent information from patients message:

## 2022-09-29 ENCOUNTER — TELEPHONE (OUTPATIENT)
Dept: NEUROLOGY | Facility: CLINIC | Age: 72
End: 2022-09-29

## 2022-09-29 NOTE — TELEPHONE ENCOUNTER
Writer faxed signed order by Dr Montes to [facility] on 8/29/22 at 14:01 PM signed by Dr Montes.   Paperwork sent to [facility] for scan.  ERIC Vargas., ABIMAEL ([facility])

## 2022-09-30 ENCOUNTER — TRANSFERRED RECORDS (OUTPATIENT)
Dept: HEALTH INFORMATION MANAGEMENT | Facility: CLINIC | Age: 72
End: 2022-09-30

## 2022-09-30 ENCOUNTER — OFFICE VISIT (OUTPATIENT)
Dept: RHEUMATOLOGY | Facility: CLINIC | Age: 72
End: 2022-09-30
Attending: INTERNAL MEDICINE
Payer: MEDICARE

## 2022-09-30 VITALS
WEIGHT: 287 LBS | OXYGEN SATURATION: 97 % | BODY MASS INDEX: 37.87 KG/M2 | HEART RATE: 56 BPM | SYSTOLIC BLOOD PRESSURE: 136 MMHG | DIASTOLIC BLOOD PRESSURE: 79 MMHG

## 2022-09-30 DIAGNOSIS — M79.672 FOOT PAIN, LEFT: Primary | ICD-10-CM

## 2022-09-30 DIAGNOSIS — Z79.899 HIGH RISK MEDICATION USE: ICD-10-CM

## 2022-09-30 DIAGNOSIS — M06.09 RHEUMATOID ARTHRITIS OF MULTIPLE SITES WITH NEGATIVE RHEUMATOID FACTOR (H): ICD-10-CM

## 2022-09-30 DIAGNOSIS — G62.9 PERIPHERAL POLYNEUROPATHY: ICD-10-CM

## 2022-09-30 DIAGNOSIS — D84.9 IMMUNOSUPPRESSED STATUS (H): ICD-10-CM

## 2022-09-30 PROCEDURE — 99214 OFFICE O/P EST MOD 30 MIN: CPT | Performed by: INTERNAL MEDICINE

## 2022-09-30 PROCEDURE — G0463 HOSPITAL OUTPT CLINIC VISIT: HCPCS

## 2022-09-30 RX ORDER — HYDROXYCHLOROQUINE SULFATE 200 MG/1
200 TABLET, FILM COATED ORAL DAILY
Qty: 90 TABLET | Refills: 5 | Status: SHIPPED | OUTPATIENT
Start: 2022-09-30 | End: 2023-04-21

## 2022-09-30 RX ORDER — FOLIC ACID 1 MG/1
1 TABLET ORAL DAILY
Qty: 90 TABLET | Refills: 1 | Status: SHIPPED | OUTPATIENT
Start: 2022-09-30 | End: 2022-12-20

## 2022-09-30 RX ORDER — METHOTREXATE 2.5 MG/1
8 TABLET ORAL
Qty: 96 TABLET | Refills: 3 | Status: SHIPPED | OUTPATIENT
Start: 2022-09-30 | End: 2022-12-28

## 2022-09-30 RX ORDER — METHOTREXATE 2.5 MG/1
8 TABLET ORAL
Qty: 96 TABLET | Refills: 3 | Status: SHIPPED | OUTPATIENT
Start: 2022-09-30 | End: 2022-09-30

## 2022-09-30 NOTE — PROGRESS NOTES
Rheumatology Clinic Visit  Jeremy Goodrich M.D.     Lucio Daly MRN# [id number]   YOB: 1950 Age: 72 year old     Date of Visit: .09/30/2022    Primary care provider: Saroj Tinoco         Assessment and Plan:   # Seronegative inflammatory arthritis: Patient relates worse pain in wrists, ankles, and knees since discontinuing methotrexate in August 2022 on the occasion of starting XRT for prostate cancer.  Exam shows tenderness at the right wrist and several PIPs and the left ankle.  Laboratory evaluation Feb 17, 2022 showed creatinine of 1.32, increased from December 2021.  AST and ALT were normal;  Seronegative inflammatory arthritis has worsened modestly in association with a hiatus in methotrexate treatment.  I encouraged patient to resume treatment with methotrexate as soon as treatment protocols for prostate cancer will allow.  I recommend continuing combination therapy with hydroxychloroquine as well.  # hydroxychloroquine (plaquenil) 200 mg every day: Patient was seen in ophthalmology in August 2022.  OCT and retinal exam found no evidence of Plaquenil toxicity.  2. ILD Obliterative bronchiolitis, symptomatically stable.  Patient was seen by Dr. Mccarthy in pulmonary in July 2021, when mild decrease in total lung capacity was noted.  Recommendation was to repeat pulmonary function tests in 4 to 5 months.  3. Osteoarthritis knees, ankle, shoulder and neck and hands/thumbs. Continue isometric quad strengthening exercises twice daily to improve support around the joint. Left ankle osteoarthritis and overall osteoarthritis  is contributing to the overall pain. Continue splinting/hand therapy and acetaminophen 1000 mg tid ATC.  Plan plain film of the right foot to evaluate status of firm nontender mass at the lateral aspect of the right first PIP.  4. Trigger finger, L 4th: gradually increasing.  Did not discuss today.  5. Prostate CA: Completing XRT in September 2022.  Plan:  1.  Bloodwork  "every 4 months  2.  Resume methotrexate 8 tablets (20 mg) once weekly.While on methotrexate:   -- Check labs every 3 months (AST/ALT, Albumin, CBC with platelets)   -- Limit alcohol intake to 2 drinks weekly; use folate 1 mg daily.  --Tylenol 500-1000 mg can be used as needed up to three times daily for nausea/headache associated with dosing.    3.  Continue hydroxychloroquine 200 mg once daily.  While taking hydroxychloroquine, undergo annual ophthalmology evaluation to monitor for rare retinal Plaquenil toxicity (next visit due in summer 2023).  4 Acetaminophen 1000 mg 3 times daily for residual pain.  RTC 6 month  Jeremy Goodrich MD  Staff Rheumatologist, [facility]         History of Present Illness:   Lucio Daly \"Rich\" presents for f/u seronegative rheumatoid arthritis, sicca complex.  Last seen in 4-2022  Methotrexate and hydroxychloroquine were continued for seronegative arthritis.  Interval history September 29, 2022  Completnig 6 weeks of XRT for prostate Ca. Off methotrexate durign that time, noting increased back, thumb, knees achiness and pain. R knee especially; collarbone areas.  Early morning stiffness is 45 minutes.  Taking acetaminophen 1000 tid; continuing hydroxychloroquine 200 mg daily.  No adverse effects.  Interval history April 8, 2022    He is doing about the same. He has daily pain in hands, feet, elbows, and shoulders, but ability to perform activities of daily living, and to perform walking 20 to 25 minutes without limitation, are unimpeded.  He has less than 30 minutes of morning stiffness.  His L great toe is dark and swollen; his whole L ankle and foot feel like they are \"in a vise\". The R leg goes completely numb with prolonged driving.  He notes pain in the R LBP when lying on his back. He has pain in the R lateral thigh with mattress contact or with prolonged immobility.  He has been offered surgery for spinal stenosis, but has been advised that he is \"high risk\" for " surgery.  He continues methotrexate 20 mg weekly; there is mild stomach upset associated with dose. He uses hydroxychloroquine 200 mg daily.  He notes increasing triggering in his L 4th finger.  He has been adjusting HTN medications with primary care.  PMH   1. Sensory neuropathy of unclear etiology - negative work up, managed on Lyrica.  Has chronic dysesthesia in pads of feet.  2. Parkinsonisim/Tremor disorder; sees neurology   3. Headaches   4. History of basal cell, squamous (most recent +bx 8-2014)  5. History of melanoma   6. GALO on CPAP.  7. HTN.   8. Seronegative RA, diagnosed 2009. SICCA  9.  JUARES. Work-up 4-2015. 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 Lumbar stenosis per MRI 2017   11. Bronchitis 5-2017, early pneumonia   12. Chicken pox   13. DEXA  Normal   14. Left ankle DJD, MRI  --seen Dr. Martinez 2-2019   Past Medical History:   Diagnosis Date     Abnormal EMG 04/18/2013     Abnormal involuntary movements(781.0)     Movement Disorder     AK (actinic keratosis) 12/18/2011     Allergic rhinitis, cause unspecified     Allergic rhinitis     Balance problems 11/01/2011     Basal cell carcinoma      Bladder spasms 11/01/2011     Chronic osteoarthritis      COPD (chronic obstructive pulmonary disease) (H)     Interstial lung disease, obliderans bronchiolitis     Diaphragmatic hernia without mention of obstruction or gangrene      Earache or other ear, nose, or throat complaint      Esophageal reflux      Fatigue 11/01/2011     Fracture      H. pylori infection 05/12/2011     History of MRI of cervical spine 11/18/2013    EXAMINATION: CERVICAL SPINE G/E 5 VIEWS* 4/19/2013 4:18 PM  CLINICAL HISTORY: Pain in limb,Performing Location?->[facility]),  COMPARISON:  FINDINGS: AP and lateral views in flexion and extension, as well as odontoid view of the cervical spine was obtained. There is no comparison available. The vertebral bodies of the cervical spine are normally aligned.  "There is posterior spurring and d     Incomplete defecation 11/01/2011     Interstitial lung disease (H) 11/29/2016     Laboratory test 08/07/2012     Lung disease 06/2015     Malignant basal cell neoplasm of skin 08/06/2008     Melanoma (H) 08/06/2008     Melanoma in situ of lower leg (H)     R calf     Neuropathy 05/16/2011     Other bladder disorder      Other color vision deficiencies      Other nervous system complications      Parkinsonism (H) 11/01/2011     Parkinsons disease (H)     Parkinsonism/ balance, dropped foot, tremor     Personal history of colonic polyps      PLMD (periodic limb movement disorder) 12/16/2021     Polyneuropathy in other diseases classified elsewhere (H)      RA (rheumatoid arthritis) (H)      Rheumatoid arthritis of multiple sites with negative rheumatoid factor (H) 03/21/2016     Seronegative arthritis 11/18/2013     Shortness of breath      Shoulder arthritis 2016    acromioclavicular joint      Somatization disorder 05/12/2011     Spider veins     Leg Vericise vein surgery     Squamous cell carcinoma      Tremor 11/01/2011     Unspecified essential hypertension      Unspecified hypothyroidism      Urinary tract infection      Urinary urgency 11/01/2011     Wears glasses 11/01/2011     Injuries-ankle sprains, left heel fracture    Family Hx   MGM Psoriasis  Sister -PPM  Family History   Problem Relation Age of Onset     Hypertension Mother      Heart Disease Mother         a fib     Arthritis Mother         \"osteo\"     Osteoporosis Mother      Skin Cancer Mother      Uterine Cancer Mother      Other Cancer Mother      Cancer Mother      Hypertension Brother      Diabetes Brother         \" post pancreatitis\"     Neurologic Disorder Sister         multiple sclerosis     Hypertension Sister      Heart Disease Sister      Multiple Sclerosis Sister      Heart Disease Sister         Chf     Arthritis Sister      Heart Failure Sister      Kidney Disease Sister      Dementia Sister      " Hypertension Father      Cerebrovascular Disease Father         ,     Skin Cancer Father      Colon Polyps Father      Heart Disease Father      Other - See Comments Son         [place]     Other - See Comments Son         [place]     Other - See Comments Son         day gonzalez     Psoriasis Maternal Grandfather      Stomach Cancer Maternal Grandfather      Cancer Maternal Grandfather      Congenital Anomalies Other         granddaughter with a chromosome defect     Other Cancer Other         Grand daughter     Cancer Other         Grand daughter     Cerebrovascular Disease Paternal Grandmother         ,     Cerebrovascular Disease Paternal Grandfather         ,     Cancer Granddaughter         Langerhans Cell Histiocytosis     Genetic Disease Granddaughter         gene translocation     Learning Disorder Other         Gene translocation     Melanoma No family hx of      Colon Cancer No family hx of      Current Outpatient Medications   Medication Sig     acetaminophen (TYLENOL) 500 MG tablet 2 x 500mg by mouth 3 times per day: 7/730am, 4pm and 11pm  = 6/day     albuterol (PROAIR HFA/PROVENTIL HFA/VENTOLIN HFA) 108 (90 Base) MCG/ACT inhaler Inhale 2 puffs into the lungs every 4 hours as needed for shortness of breath / dyspnea or wheezing     amLODIPine (NORVASC) 5 MG tablet Take 1 tablet (5 mg) by mouth daily     cholecalciferol (HM VITAMIN D3) 25 MCG (1000 UT) TABS 1000 unit tab by mouth daily     fluorouracil (EFUDEX) 5 % external cream      folic acid (FOLVITE) 1 MG tablet Take 1 tablet (1 mg) by mouth daily     hydroxychloroquine (PLAQUENIL) 200 MG tablet Take 1 tablet (200 mg) by mouth daily Daily at 9am.     ketoconazole (NIZORAL) 2 % external shampoo      lisinopril-hydrochlorothiazide (ZESTORETIC) 20-25 MG tablet Take 1 tablet by mouth daily     methotrexate sodium 2.5 MG TABS Take 8 tablets (20 mg) by mouth every 7 days *Monitoring labs every 8-12 weeks     metoprolol succinate ER  (TOPROL-XL) 50 MG 24 hr tablet 1 and 1/2 tabs daily.     omeprazole (PRILOSEC) 40 MG DR capsule Take 1 capsule (40 mg) by mouth daily     ORDER FOR DME Use your BiPAP device as directed by your provider.     oxybutynin (DITROPAN) 5 MG tablet Take 1 tablet (5 mg) by mouth 3 times daily TAKE 1 TABLET BY MOUTH EVERY DAY AT 4PM     pregabalin (LYRICA) 50 MG capsule 50mg capsule by mouth at 7am and 4pm and 1-2 x 50mg capsules by mouth nightly at 11pm (4/day)     SEREVENT DISKUS 50 MCG/DOSE inhaler TAKE 1 PUFF BY MOUTH TWICE A DAY     No current facility-administered medications for this visit.       Personal Hx   Home environment: No secondhand tobacco smoke in home.    History     Social History     Marital Status:      Spouse Name: N/A     Number of Children: N/A     Years of Education: N/A     Social History Main Topics     Smoking status: Former Smoker     Quit date: 09/30/2003     Smokeless tobacco: Never Used     Alcohol Use: No     Drug Use: No     Sexually Active: Not Currently -- Female partner(s)     Other Topics Concern     None     Social History Narrative    2013: Living in [place] in a townhouse with no stepsHas 3 sons that are doing okay.             Review of Systems:     14 points all negative except what is mentioned in HPI.  Review Of Systems  Skin: negative  Eyes: negative  Ears/Nose/Throat: negative  Respiratory: No shortness of breath, dyspnea on exertion, cough, or hemoptysis  Cardiovascular: negative  Gastrointestinal: negative  Genitourinary: negative  Musculoskeletal: as above  Neurologic: as above  Psychiatric: negative  Hematologic/Lymphatic/Immunologic: negative  Endocrine: negative             Past Surgical History:   Past Surgical History:   Procedure Laterality Date     BIOPSY OF SKIN LESION       COLONOSCOPY       COLONOSCOPY N/A 7/20/2022    Procedure: COLONOSCOPY (fv) polypectomy using jumbo bx forcep;  Surgeon: Gómez Mckinney MD;  Location:  GI     CYSTOSCOPY  Many  years ago    Blood in urine/ bladder cystoscopy     ESOPHAGOSCOPY, GASTROSCOPY, DUODENOSCOPY (EGD), COMBINED N/A 7/20/2022    Procedure: ESOPHAGOGASTRODUODENOSCOPY (EGD) (fv) biopsies using cold bx forcep;  Surgeon: Gómez Mckinney MD;  Location:  GI     HEMORRHOID SURGERY       lip biopsy      for sicca complex     MOHS MICROGRAPHIC PROCEDURE       SOFT TISSUE SURGERY      removeal of basel cell carcinoma     Blood pressure 136/79, pulse 56, weight 130.2 kg (287 lb), SpO2 97 %.  Wt Readings from Last 4 Encounters:   09/30/22 130.2 kg (287 lb)   09/09/22 129.3 kg (285 lb)   08/18/22 128 kg (282 lb 3 oz)   07/19/22 128.8 kg (284 lb)       Constitutional: well-developed, appearing stated age; cooperative  Eyes: nl EOM, PERRLA, vision, conjunctiva, sclera  ENT: nl external ears, nose, hearing, lips, teeth, gums, throat  No mucous membrane lesions, normal saliva pool  Neck: no mass or thyroid enlargement  Resp: lungs clear to auscultation, nl to palpation  CV: RRR, no murmurs, rubs or gallops, no edema  GI: no ABD mass or tenderness, no HSM  : not tested  Lymph: no cervical, supraclavicular, inguinal or epitrochlear nodes  MS: Left ankle slightly swollen; there is no synovitis at hands, wrists, elbows, or knees.  L great toe DIP swollen without tenderness. There is painful right shoulder flexion.  No tenderness at MTPs.  Skin: no nail pitting, alopecia, rash, nodules or lesions  Neuro: nl cranial nerves, strength, sensation, DTRs.   Psych: nl judgement, orientation, memory, affect.             Data:     RHEUM RESULTS Latest Ref Rng & Units 8/7/2003 7/22/2004 4/26/2005   ALBUMIN 3.4 - 5.0 g/dL - - -   ALT 0 - 70 U/L - - -   AST 0 - 45 U/L - - -   ANCA - - - -   CK TOTAL 30 - 300 U/L - - -   CREATININE 0.66 - 1.25 mg/dL 1.2 1.40 -   CREATININE (EXTERNAL) 0.60 - 1.30 mg/dL - - -   CRP <5.00 mg/L - - -   DNA 0 - 29 IU/mL - - -   KIMBERLY 0 - 1.0 - - <1.0   GFR ESTIMATE, IF BLACK >60 mL/min/[1.73:m2] - 68 -   GFR ESTIMATE  >60 mL/min/1.73m2 - 56(L) -   HEMATOCRIT 40.0 - 53.0 % 43.7 - -   HEMOGLOBIN 13.3 - 17.7 g/dL 15.2 - -   HEPBANG NEG - - -   HCVAB NEG - - -   IGA 70 - 380 mg/dL - - 394(H)   IGM 60 - 265 mg/dL - - 98    - 1,620 mg/dL - - 1040   WBC 4.0 - 11.0 10e3/uL 7.0 - -   RBC 4.40 - 5.90 10e6/uL 4.98 - -   RDW 10.0 - 15.0 % 14.7 - -   MCHC 31.5 - 36.5 g/dL 34.8 - -   MCV 78 - 100 fL 88 - -   PLATELET COUNT 150 - 450 10e3/uL 221 - -   RHEUMATOID FACTOR 0 - 14 IU/mL - - -   ESR 0 - 20 mm/h 5 - 7       Rheumatoid Factor   Date Value Ref Range Status   05/27/2010 <7 0 - 14 IU/mL Final   ,  ,  ,   Cyclic Citrullinated Peptide IgG   Date Value Ref Range Status   05/27/2010 <2 <5 U/mL Final     Comment:     Interpretation:  Negative   ,  ,   SSA (RO) Antibody IgG   Date Value Ref Range Status   05/27/2010 1  Final     Comment:     Reference range: 0 to 40  Unit: AU/mL  (Note)  REFERENCE INTERVALS: SSA (Ro) (ELAN) Ab, IgG   29 AU/mL or Less ............. Negative   30 - 40 AU/mL ................ Equivocal   41 AU/mL or Greater .......... Positive    SSA (Ro) antibody is seen in 70-75% of Sjogren syndrome  cases, 30-40% of systemic lupus erythematosus (SLE) and  5-10% of progressive systemic sclerosis (PSS).  Performed by [facility],  [address] [place] [address],[place] [address] [phone]  [email or web address], Chely Leija MD, Lab. Director     SSB (LA) Antibody IgG   Date Value Ref Range Status   05/27/2010 8  Final     Comment:     Reference range: 0 to 40  Unit: AU/mL  (Note)  REFERENCE INTERVALS: SSB (La) (ELAN) Ab, IgG   29 AU/mL or Less ............. Negative   30 - 40 AU/mL ................ Equivocal   41 AU/mL or Greater .......... Positive    SSB (La) antibody is seen in 50-60% of Sjogren syndrome  cases and is specific if it is the only ELAN antibody  present. 15-25% of patients with systemic lupus  erythematosus (SLE) and 5-10% of patients with progressive  systemic sclerosis (PSS) also have this antibody.  Performed by  [facility],  [address] [place] [id number] [phone]  [email or web address], Chely Leija MD, Lab. Director   ,  ,   MONICA Screen by EIA   Date Value Ref Range Status   05/27/2010 <1.0 0 - 1.0 Final     Comment:     Interpretation:  Negative   ,   DNA-ds   Date Value Ref Range Status   05/27/2010 <15 0 - 29 IU/mL Final     Comment:     Interpretation:  Negative   ,  ,  ,  ,  ,  ,  ,   Hep B Surface Agn   Date Value Ref Range Status   05/27/2010 Negative NEG Final   ,  ,  ,  ,  ,  ,  ,  ,  ,  ,  ,   Neutrophil Cytoplasmic IgG Antibody   Date Value Ref Range Status   06/26/2015   Final    <1:20  Reference range: <1:20  (Note)  The ANCA IFA is <1:20; therefore, no further testing will  be performed.  INTERPRETIVE INFORMATION: Anti-Neutrophil Cyto Ab, IgG  Neutrophil Cytoplasmic Antibodies (C-ANCA = granular  cytoplasmic staining, P-ANCA = perinuclear staining) are  found in the serum of over 90 percent of patients with  certain necrotizing systemic vasculitides, and usually in  less than 5 percent of patients with collagen vascular  disease or arthritis.  Performed by [facility],  [address] [place] [id number] [phone]  [email or web address], Brad Fall MD, Lab. Director       ,  ,  ,  ,  ,  ,  ,  ,  ,   Albumin Fraction   Date Value Ref Range Status   05/27/2010 4.0 3.7 - 5.1 g/dL Final     Alpha 2 Fraction   Date Value Ref Range Status   05/27/2010 0.6 0.5 - 0.9 g/dL Final     Beta Fraction   Date Value Ref Range Status   05/27/2010 1.0 0.6 - 1.0 g/dL Final     Gamma Fraction   Date Value Ref Range Status   05/27/2010 1.0 0.7 - 1.6 g/dL Final     Monoclonal Peak   Date Value Ref Range Status   05/27/2010 0.0 0.0 g/dL Final     ELP Interpretation:   Date Value Ref Range Status   05/27/2010   Final    Essentially normal electrophoretic pattern.  No monoclonal protein seen.     Comment:      Pathologic significance requires clinical correlation.  AHSLEY Noble M.D.,   Ph.D., Pathologist [phone])   ,  ,   Immunofixation ELP   Date Value Ref Range Status   06/09/2016   Final    No monoclonal protein seen on immunofixation.  Pathological significance   requires clinical correlation.   ASHLEY Noble M.D., Ph.D   Pathologist ([phone])       IGG   Date Value Ref Range Status   06/09/2016 1,020 695 - 1,620 mg/dL Final     IGA   Date Value Ref Range Status   06/09/2016 374 70 - 380 mg/dL Final     IGM   Date Value Ref Range Status   06/09/2016 81 60 - 265 mg/dL Final   ,  ,  ,  ,  ,  ,  ,

## 2022-09-30 NOTE — LETTER
9/30/2022       RE: Lucio Daly  [address]  [place] [address]     Dear Colleague,    Thank you for referring your patient, Lucio Daly, to the [facility] [place] at [facility]. Please see a copy of my visit note below.    Rheumatology Clinic Visit  Jeremy Goodrich M.D.     Lucio Daly MRN# [id number]   YOB: 1950 Age: 72 year old     Date of Visit: .09/30/2022    Primary care provider: Saroj Tinoco         Assessment and Plan:   # Seronegative inflammatory arthritis: Patient relates worse pain in wrists, ankles, and knees since discontinuing methotrexate in August 2022 on the occasion of starting XRT for prostate cancer.  Exam shows tenderness at the right wrist and several PIPs and the left ankle.  Laboratory evaluation Feb 17, 2022 showed creatinine of 1.32, increased from December 2021.  AST and ALT were normal;  Seronegative inflammatory arthritis has worsened modestly in association with a hiatus in methotrexate treatment.  I encouraged patient to resume treatment with methotrexate as soon as treatment protocols for prostate cancer will allow.  I recommend continuing combination therapy with hydroxychloroquine as well.  # hydroxychloroquine (plaquenil) 200 mg every day: Patient was seen in ophthalmology in August 2022.  OCT and retinal exam found no evidence of Plaquenil toxicity.  2. ILD Obliterative bronchiolitis, symptomatically stable.  Patient was seen by Dr. Mccarthy in pulmonary in July 2021, when mild decrease in total lung capacity was noted.  Recommendation was to repeat pulmonary function tests in 4 to 5 months.  3. Osteoarthritis knees, ankle, shoulder and neck and hands/thumbs. Continue isometric quad strengthening exercises twice daily to improve support around the joint. Left ankle osteoarthritis and overall osteoarthritis  is contributing to the overall pain. Continue splinting/hand  "therapy and acetaminophen 1000 mg tid ATC.  Plan plain film of the right foot to evaluate status of firm nontender mass at the lateral aspect of the right first PIP.  4. Trigger finger, L 4th: gradually increasing.  Did not discuss today.  5. Prostate CA: Completing XRT in September 2022.  Plan:  1.  Bloodwork every 4 months  2.  Resume methotrexate 8 tablets (20 mg) once weekly.While on methotrexate:   -- Check labs every 3 months (AST/ALT, Albumin, CBC with platelets)   -- Limit alcohol intake to 2 drinks weekly; use folate 1 mg daily.  --Tylenol 500-1000 mg can be used as needed up to three times daily for nausea/headache associated with dosing.    3.  Continue hydroxychloroquine 200 mg once daily.  While taking hydroxychloroquine, undergo annual ophthalmology evaluation to monitor for rare retinal Plaquenil toxicity (next visit due in summer 2023).  4 Acetaminophen 1000 mg 3 times daily for residual pain.  RTC 6 month  Jeremy Goodrich MD  Staff Rheumatologist, [facility]         History of Present Illness:   Lucio Daly \"Rich\" presents for f/u seronegative rheumatoid arthritis, sicca complex.  Last seen in 4-2022  Methotrexate and hydroxychloroquine were continued for seronegative arthritis.  Interval history September 29, 2022  Completnig 6 weeks of XRT for prostate Ca. Off methotrexate durign that time, noting increased back, thumb, knees achiness and pain. R knee especially; collarbone areas.  Early morning stiffness is 45 minutes.  Taking acetaminophen 1000 tid; continuing hydroxychloroquine 200 mg daily.  No adverse effects.  Interval history April 8, 2022    He is doing about the same. He has daily pain in hands, feet, elbows, and shoulders, but ability to perform activities of daily living, and to perform walking 20 to 25 minutes without limitation, are unimpeded.  He has less than 30 minutes of morning stiffness.  His L great toe is dark and swollen; his whole L ankle and foot feel like they are \"in " "a vise\". The R leg goes completely numb with prolonged driving.  He notes pain in the R LBP when lying on his back. He has pain in the R lateral thigh with mattress contact or with prolonged immobility.  He has been offered surgery for spinal stenosis, but has been advised that he is \"high risk\" for surgery.  He continues methotrexate 20 mg weekly; there is mild stomach upset associated with dose. He uses hydroxychloroquine 200 mg daily.  He notes increasing triggering in his L 4th finger.  He has been adjusting HTN medications with primary care.  PMH   1. Sensory neuropathy of unclear etiology - negative work up, managed on Lyrica.  Has chronic dysesthesia in pads of feet.  2. Parkinsonisim/Tremor disorder; sees neurology   3. Headaches   4. History of basal cell, squamous (most recent +bx 8-2014)  5. History of melanoma   6. GALO on CPAP.  7. HTN.   8. Seronegative RA, diagnosed 2009. SICCA  9.  JUARES. Work-up 4-2015. 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 Lumbar stenosis per MRI 2017   11. Bronchitis 5-2017, early pneumonia   12. Chicken pox   13. DEXA  Normal   14. Left ankle DJD, MRI  --seen Dr. Martinez 2-2019   Past Medical History:   Diagnosis Date     Abnormal EMG 04/18/2013     Abnormal involuntary movements(781.0)     Movement Disorder     AK (actinic keratosis) 12/18/2011     Allergic rhinitis, cause unspecified     Allergic rhinitis     Balance problems 11/01/2011     Basal cell carcinoma      Bladder spasms 11/01/2011     Chronic osteoarthritis      COPD (chronic obstructive pulmonary disease) (H)     Interstial lung disease, obliderans bronchiolitis     Diaphragmatic hernia without mention of obstruction or gangrene      Earache or other ear, nose, or throat complaint      Esophageal reflux      Fatigue 11/01/2011     Fracture      H. pylori infection 05/12/2011     History of MRI of cervical spine 11/18/2013    EXAMINATION: CERVICAL SPINE G/E 5 VIEWS* 4/19/2013 4:18 PM  CLINICAL " "HISTORY: Pain in limb,Performing Location?->[facility]  COMPARISON:  FINDINGS: AP and lateral views in flexion and extension, as well as odontoid view of the cervical spine was obtained. There is no comparison available. The vertebral bodies of the cervical spine are normally aligned. There is posterior spurring and d     Incomplete defecation 11/01/2011     Interstitial lung disease (H) 11/29/2016     Laboratory test 08/07/2012     Lung disease 06/2015     Malignant basal cell neoplasm of skin 08/06/2008     Melanoma (H) 08/06/2008     Melanoma in situ of lower leg (H)     R calf     Neuropathy 05/16/2011     Other bladder disorder      Other color vision deficiencies      Other nervous system complications      Parkinsonism (H) 11/01/2011     Parkinsons disease (H)     Parkinsonism/ balance, dropped foot, tremor     Personal history of colonic polyps      PLMD (periodic limb movement disorder) 12/16/2021     Polyneuropathy in other diseases classified elsewhere (H)      RA (rheumatoid arthritis) (H)      Rheumatoid arthritis of multiple sites with negative rheumatoid factor (H) 03/21/2016     Seronegative arthritis 11/18/2013     Shortness of breath      Shoulder arthritis 2016    acromioclavicular joint      Somatization disorder 05/12/2011     Spider veins     Leg Vericise vein surgery     Squamous cell carcinoma      Tremor 11/01/2011     Unspecified essential hypertension      Unspecified hypothyroidism      Urinary tract infection      Urinary urgency 11/01/2011     Wears glasses 11/01/2011     Injuries-ankle sprains, left heel fracture    Family Hx   MGM Psoriasis  Sister -PPM  Family History   Problem Relation Age of Onset     Hypertension Mother      Heart Disease Mother         a fib     Arthritis Mother         \"osteo\"     Osteoporosis Mother      Skin Cancer Mother      Uterine Cancer Mother      Other Cancer Mother      Cancer Mother      Hypertension Brother      Diabetes Brother         \" " "post pancreatitis\"     Neurologic Disorder Sister         multiple sclerosis     Hypertension Sister      Heart Disease Sister      Multiple Sclerosis Sister      Heart Disease Sister         Chf     Arthritis Sister      Heart Failure Sister      Kidney Disease Sister      Dementia Sister      Hypertension Father      Cerebrovascular Disease Father         ,     Skin Cancer Father      Colon Polyps Father      Heart Disease Father      Other - See Comments Son         [place]     Other - See Comments Abdon wagner     Other - See Comments Abdon gonzalez     Psoriasis Maternal Grandfather      Stomach Cancer Maternal Grandfather      Cancer Maternal Grandfather      Congenital Anomalies Other         granddaughter with a chromosome defect     Other Cancer Other         Grand daughter     Cancer Other         Grand daughter     Cerebrovascular Disease Paternal Grandmother         ,     Cerebrovascular Disease Paternal Grandfather         ,     Cancer Granddaughter         Langerhans Cell Histiocytosis     Genetic Disease Granddaughter         gene translocation     Learning Disorder Other         Gene translocation     Melanoma No family hx of      Colon Cancer No family hx of      Current Outpatient Medications   Medication Sig     acetaminophen (TYLENOL) 500 MG tablet 2 x 500mg by mouth 3 times per day: 7/730am, 4pm and 11pm  = 6/day     albuterol (PROAIR HFA/PROVENTIL HFA/VENTOLIN HFA) 108 (90 Base) MCG/ACT inhaler Inhale 2 puffs into the lungs every 4 hours as needed for shortness of breath / dyspnea or wheezing     amLODIPine (NORVASC) 5 MG tablet Take 1 tablet (5 mg) by mouth daily     cholecalciferol (HM VITAMIN D3) 25 MCG (1000 UT) TABS 1000 unit tab by mouth daily     fluorouracil (EFUDEX) 5 % external cream      folic acid (FOLVITE) 1 MG tablet Take 1 tablet (1 mg) by mouth daily     hydroxychloroquine (PLAQUENIL) 200 MG tablet Take 1 tablet (200 mg) by mouth daily Daily at 9am. "     ketoconazole (NIZORAL) 2 % external shampoo      lisinopril-hydrochlorothiazide (ZESTORETIC) 20-25 MG tablet Take 1 tablet by mouth daily     methotrexate sodium 2.5 MG TABS Take 8 tablets (20 mg) by mouth every 7 days *Monitoring labs every 8-12 weeks     metoprolol succinate ER (TOPROL-XL) 50 MG 24 hr tablet 1 and 1/2 tabs daily.     omeprazole (PRILOSEC) 40 MG DR capsule Take 1 capsule (40 mg) by mouth daily     ORDER FOR DME Use your BiPAP device as directed by your provider.     oxybutynin (DITROPAN) 5 MG tablet Take 1 tablet (5 mg) by mouth 3 times daily TAKE 1 TABLET BY MOUTH EVERY DAY AT 4PM     pregabalin (LYRICA) 50 MG capsule 50mg capsule by mouth at 7am and 4pm and 1-2 x 50mg capsules by mouth nightly at 11pm (4/day)     SEREVENT DISKUS 50 MCG/DOSE inhaler TAKE 1 PUFF BY MOUTH TWICE A DAY     No current facility-administered medications for this visit.       Personal Hx   Home environment: No secondhand tobacco smoke in home.    History     Social History     Marital Status:      Spouse Name: N/A     Number of Children: N/A     Years of Education: N/A     Social History Main Topics     Smoking status: Former Smoker     Quit date: 09/30/2003     Smokeless tobacco: Never Used     Alcohol Use: No     Drug Use: No     Sexually Active: Not Currently -- Female partner(s)     Other Topics Concern     None     Social History Narrative    2013: Living in [place] in a townhouse with no stepsHas 3 sons that are doing okay.             Review of Systems:     14 points all negative except what is mentioned in HPI.  Review Of Systems  Skin: negative  Eyes: negative  Ears/Nose/Throat: negative  Respiratory: No shortness of breath, dyspnea on exertion, cough, or hemoptysis  Cardiovascular: negative  Gastrointestinal: negative  Genitourinary: negative  Musculoskeletal: as above  Neurologic: as above  Psychiatric: negative  Hematologic/Lymphatic/Immunologic: negative  Endocrine: negative             Past  Surgical History:   Past Surgical History:   Procedure Laterality Date     BIOPSY OF SKIN LESION       COLONOSCOPY       COLONOSCOPY N/A 7/20/2022    Procedure: COLONOSCOPY (fv) polypectomy using jumbo bx forcep;  Surgeon: Gómez Mckinney MD;  Location:  GI     CYSTOSCOPY  Many years ago    Blood in urine/ bladder cystoscopy     ESOPHAGOSCOPY, GASTROSCOPY, DUODENOSCOPY (EGD), COMBINED N/A 7/20/2022    Procedure: ESOPHAGOGASTRODUODENOSCOPY (EGD) (fv) biopsies using cold bx forcep;  Surgeon: Gómez Mckinney MD;  Location:  GI     HEMORRHOID SURGERY       lip biopsy      for sicca complex     MOHS MICROGRAPHIC PROCEDURE       SOFT TISSUE SURGERY      removeal of basel cell carcinoma     Blood pressure 136/79, pulse 56, weight 130.2 kg (287 lb), SpO2 97 %.  Wt Readings from Last 4 Encounters:   09/30/22 130.2 kg (287 lb)   09/09/22 129.3 kg (285 lb)   08/18/22 128 kg (282 lb 3 oz)   07/19/22 128.8 kg (284 lb)       Constitutional: well-developed, appearing stated age; cooperative  Eyes: nl EOM, PERRLA, vision, conjunctiva, sclera  ENT: nl external ears, nose, hearing, lips, teeth, gums, throat  No mucous membrane lesions, normal saliva pool  Neck: no mass or thyroid enlargement  Resp: lungs clear to auscultation, nl to palpation  CV: RRR, no murmurs, rubs or gallops, no edema  GI: no ABD mass or tenderness, no HSM  : not tested  Lymph: no cervical, supraclavicular, inguinal or epitrochlear nodes  MS: Left ankle slightly swollen; there is no synovitis at hands, wrists, elbows, or knees.  L great toe DIP swollen without tenderness. There is painful right shoulder flexion.  No tenderness at MTPs.  Skin: no nail pitting, alopecia, rash, nodules or lesions  Neuro: nl cranial nerves, strength, sensation, DTRs.   Psych: nl judgement, orientation, memory, affect.             Data:     RHEUM RESULTS Latest Ref Rng & Units 8/7/2003 7/22/2004 4/26/2005   ALBUMIN 3.4 - 5.0 g/dL - - -   ALT 0 - 70 U/L - - -   AST 0 - 45  U/L - - -   ANCA - - - -   CK TOTAL 30 - 300 U/L - - -   CREATININE 0.66 - 1.25 mg/dL 1.2 1.40 -   CREATININE (EXTERNAL) 0.60 - 1.30 mg/dL - - -   CRP <5.00 mg/L - - -   DNA 0 - 29 IU/mL - - -   KIMBERLY 0 - 1.0 - - <1.0   GFR ESTIMATE, IF BLACK >60 mL/min/[1.73:m2] - 68 -   GFR ESTIMATE >60 mL/min/1.73m2 - 56(L) -   HEMATOCRIT 40.0 - 53.0 % 43.7 - -   HEMOGLOBIN 13.3 - 17.7 g/dL 15.2 - -   HEPBANG NEG - - -   HCVAB NEG - - -   IGA 70 - 380 mg/dL - - 394(H)   IGM 60 - 265 mg/dL - - 98    - 1,620 mg/dL - - 1040   WBC 4.0 - 11.0 10e3/uL 7.0 - -   RBC 4.40 - 5.90 10e6/uL 4.98 - -   RDW 10.0 - 15.0 % 14.7 - -   MCHC 31.5 - 36.5 g/dL 34.8 - -   MCV 78 - 100 fL 88 - -   PLATELET COUNT 150 - 450 10e3/uL 221 - -   RHEUMATOID FACTOR 0 - 14 IU/mL - - -   ESR 0 - 20 mm/h 5 - 7       Rheumatoid Factor   Date Value Ref Range Status   05/27/2010 <7 0 - 14 IU/mL Final   ,  ,  ,   Cyclic Citrullinated Peptide IgG   Date Value Ref Range Status   05/27/2010 <2 <5 U/mL Final     Comment:     Interpretation:  Negative   ,  ,   SSA (RO) Antibody IgG   Date Value Ref Range Status   05/27/2010 1  Final     Comment:     Reference range: 0 to 40  Unit: AU/mL  (Note)  REFERENCE INTERVALS: SSA (Ro) (ELAN) Ab, IgG   29 AU/mL or Less ............. Negative   30 - 40 AU/mL ................ Equivocal   41 AU/mL or Greater .......... Positive    SSA (Ro) antibody is seen in 70-75% of Sjogren syndrome  cases, 30-40% of systemic lupus erythematosus (SLE) and  5-10% of progressive systemic sclerosis (PSS).  Performed by [facility],  [address] [place] [address] [phone]  [email or web address], Chely Leija MD, Lab. Director     SSB (LA) Antibody IgG   Date Value Ref Range Status   05/27/2010 8  Final     Comment:     Reference range: 0 to 40  Unit: AU/mL  (Note)  REFERENCE INTERVALS: SSB (La) (ELAN) Ab, IgG   29 AU/mL or Less ............. Negative   30 - 40 AU/mL ................ Equivocal   41 AU/mL or Greater ..........  Positive    SSB (La) antibody is seen in 50-60% of Sjogren syndrome  cases and is specific if it is the only ELAN antibody  present. 15-25% of patients with systemic lupus  erythematosus (SLE) and 5-10% of patients with progressive  systemic sclerosis (PSS) also have this antibody.  Performed by [facility],  [address] [place] [address],[place] [id number] [phone]  www.[email or web address], Chely Leija MD, Lab. Director   ,  ,   MONICA Screen by EIA   Date Value Ref Range Status   05/27/2010 <1.0 0 - 1.0 Final     Comment:     Interpretation:  Negative   ,   DNA-ds   Date Value Ref Range Status   05/27/2010 <15 0 - 29 IU/mL Final     Comment:     Interpretation:  Negative   ,  ,  ,  ,  ,  ,  ,   Hep B Surface Agn   Date Value Ref Range Status   05/27/2010 Negative NEG Final   ,  ,  ,  ,  ,  ,  ,  ,  ,  ,  ,   Neutrophil Cytoplasmic IgG Antibody   Date Value Ref Range Status   06/26/2015   Final    <1:20  Reference range: <1:20  (Note)  The ANCA IFA is <1:20; therefore, no further testing will  be performed.  INTERPRETIVE INFORMATION: Anti-Neutrophil Cyto Ab, IgG  Neutrophil Cytoplasmic Antibodies (C-ANCA = granular  cytoplasmic staining, P-ANCA = perinuclear staining) are  found in the serum of over 90 percent of patients with  certain necrotizing systemic vasculitides, and usually in  less than 5 percent of patients with collagen vascular  disease or arthritis.  Performed by [facility],  [address] [facility],[place] [id number] [phone]  www.[email or web address], Brad Fall MD, Lab. Director       ,  ,  ,  ,  ,  ,  ,  ,  ,   Albumin Fraction   Date Value Ref Range Status   05/27/2010 4.0 3.7 - 5.1 g/dL Final     Alpha 2 Fraction   Date Value Ref Range Status   05/27/2010 0.6 0.5 - 0.9 g/dL Final     Beta Fraction   Date Value Ref Range Status   05/27/2010 1.0 0.6 - 1.0 g/dL Final     Gamma Fraction   Date Value Ref Range Status   05/27/2010 1.0 0.7 - 1.6 g/dL Final     Monoclonal Peak   Date Value Ref Range Status   05/27/2010  0.0 0.0 g/dL Final     ELP Interpretation:   Date Value Ref Range Status   05/27/2010   Final    Essentially normal electrophoretic pattern.  No monoclonal protein seen.     Comment:      Pathologic significance requires clinical correlation.  ASHLEY Noble M.D.,   Ph.D., Pathologist ()   ,  ,   Immunofixation ELP   Date Value Ref Range Status   06/09/2016   Final    No monoclonal protein seen on immunofixation.  Pathological significance   requires clinical correlation.   ASHLEY Noble M.D., Ph.D   Pathologist ([phone])       IGG   Date Value Ref Range Status   06/09/2016 1,020 695 - 1,620 mg/dL Final     IGA   Date Value Ref Range Status   06/09/2016 374 70 - 380 mg/dL Final     IGM   Date Value Ref Range Status   06/09/2016 81 60 - 265 mg/dL Final       Again, thank you for allowing me to participate in the care of your patient.      Sincerely,    Jeremy Goodrich MD

## 2022-09-30 NOTE — PATIENT INSTRUCTIONS
Diagnosis:  1.  Inflammatory arthritis: Worse control of inflammatory joint symptoms with hiatus in hydroxychloroquine and methotrexate.  I recommend resumption of methotrexate when OK from prostate CA Rx standpoint.  2. Neuropathy: I recommend continuing Lyrica  3. Trigger finger, L 4th digit: Hand referral in order if it progresses  4. L ankle/foot pain: pain likely due to tendonitis and/or neuropathy, not to active inflammatory arthritis.  5. Spinal stenosis  6. Prostate CA      Plan:  1.  Bloodwork every 4 months  2.  Resume methotrexate 8 tablets (20 mg) once weekly.While on methotrexate:   -- Check labs every 3 months (AST/ALT, Albumin, CBC with platelets)   -- Limit alcohol intake to 2 drinks weekly; use folate 1 mg daily.  --Tylenol 500-1000 mg can be used as needed up to three times daily for nausea/headache associated with dosing.    3.  Continue hydroxychloroquine 200 mg once daily.  While taking hydroxychloroquine, undergo annual ophthalmology evaluation to monitor for rare retinal Plaquenil toxicity (next visit due in summer 2023).  4 Acetaminophen 1000 mg 3 times daily for residual pain.

## 2022-09-30 NOTE — NURSING NOTE
Chief Complaint   Patient presents with     RECHECK     RA     Blood pressure 136/79, pulse 56, weight 130.2 kg (287 lb), SpO2 97 %.    Francois Crawford on 9/30/2022 at 9:26 AM

## 2022-10-03 ENCOUNTER — OFFICE VISIT (OUTPATIENT)
Dept: ORTHOPEDICS | Facility: CLINIC | Age: 72
End: 2022-10-03
Payer: MEDICARE

## 2022-10-03 ENCOUNTER — ANCILLARY PROCEDURE (OUTPATIENT)
Dept: GENERAL RADIOLOGY | Facility: CLINIC | Age: 72
End: 2022-10-03
Attending: INTERNAL MEDICINE
Payer: MEDICARE

## 2022-10-03 VITALS
BODY MASS INDEX: 38.04 KG/M2 | HEIGHT: 73 IN | SYSTOLIC BLOOD PRESSURE: 150 MMHG | HEART RATE: 53 BPM | DIASTOLIC BLOOD PRESSURE: 85 MMHG | OXYGEN SATURATION: 98 % | WEIGHT: 287 LBS

## 2022-10-03 DIAGNOSIS — M47.817 FACET ARTHROPATHY, LUMBOSACRAL: Primary | ICD-10-CM

## 2022-10-03 DIAGNOSIS — M06.09 RHEUMATOID ARTHRITIS OF MULTIPLE SITES WITH NEGATIVE RHEUMATOID FACTOR (H): ICD-10-CM

## 2022-10-03 DIAGNOSIS — M43.16 SPONDYLOLISTHESIS OF LUMBAR REGION: ICD-10-CM

## 2022-10-03 DIAGNOSIS — M48.07 LUMBOSACRAL SPINAL STENOSIS: ICD-10-CM

## 2022-10-03 DIAGNOSIS — M54.17 LUMBOSACRAL RADICULOPATHY: ICD-10-CM

## 2022-10-03 PROCEDURE — 73630 X-RAY EXAM OF FOOT: CPT | Mod: RT | Performed by: RADIOLOGY

## 2022-10-03 PROCEDURE — 99214 OFFICE O/P EST MOD 30 MIN: CPT | Performed by: PHYSICAL MEDICINE & REHABILITATION

## 2022-10-03 ASSESSMENT — PAIN SCALES - GENERAL: PAINLEVEL: MODERATE PAIN (4)

## 2022-10-03 NOTE — PATIENT INSTRUCTIONS
Please schedule a follow-up appointment in April 2023.  You can schedule this at the , or you can call [phone].

## 2022-10-03 NOTE — LETTER
10/3/2022         RE: Lucio Daly  [address]  [place] [address]        Dear Colleague,    Thank you for referring your patient, Lucio Daly, to the [facility]. Please see a copy of my visit note below.    PHYSICAL MEDICINE & REHABILITATION / MEDICAL SPINE        Date:  Oct 3, 2022    Name:  Lucio Daly  YOB: 1950  MRN:  [id number]          CHIEF COMPLAINT:  Low back pain.        HISTORY OF PRESENT ILLNESS:  Mr. Lucio Daly is a 72-year-old male.  He has 9 grandchildren.  He walks 20 minutes daily for exercise.     Mr. Lucio Daly began noting low back pain in Fall 2021.     Mr. Lucio Daly takes pregabalin 50 mg 3-4 times per day.    Mr. Lucio Daly has rheumatoid arthritis, parkinsonism, and irritable bowel syndrome.  He stated that he also has neuropathy.     Mr. Lucio Daly stated that he has bilateral knee osteoarthritis, bilateral ankle osteoarthritis, and bilateral foot osteoarthritis.    Mr. Lucio Daly was last seen in clinic on 09/09/2022.  He returns to clinic today, 10/03/2022.    Mr. Lucio Daly continues to have constant bilateral low back pain.  Low back pain is worsened with standing and walking.  Mr. Lucio Daly notes that his bilateral lower extremities become weak and tired with walking.  He notes pain radiating into his bilateral lower extremities.  This pain is intermittent.  Mr. Lucio Daly notes with driving, at times, he will note paresthesias in his right lower extremity.  This has improved since he was last seen in this clinic.  Pain is currently rated as 4/10.        ALLERGIES:  Allergies   Allergen Reactions     Restasis      Burning eyes, problems with breathing, tightness in chest     Adhesive Tape      Bandages misc     Allegra      EXCESSIVE URINATION AND WEAKNESS, LIGHT-HEADED     Allergy      Dust     Animal Dander      Benadryl Allergy      EXCESSIVE URINATION AND WEAKNESS,  LIGHT-HEADED     Cephalexin      Joint pain or gerd aggravation. Bloating excessive urination      Cephalosporins      Cyclosporine      Diphenhydramine Other (See Comments)     Doxycycline      Doxycycline Hyclate Nausea     SWEATING,MIGRAINES,LOSS OF APPETITE,SWEATING,LIGHT HEADED, EXCESSIVE URINATION     Fexofenadine Other (See Comments)     Flonase [Fluticasone Propionate]      Gabapentin      Neurontin: mosd changes and excess urination     Hydrocortisone      Iodine      Iodine Solution [Povidone Iodine]      SKIN MELTS     Levaquin      From surgeon--? Joint pain ? Gerd aggravation. Insomnia, excess urination     Levofloxacin      Mylanta      EXCESSIVE URINATION AND WEAKNESS, LIGHT-HEADED     Oxcarbazepine      Prednisone      Weakness, elevated bp, headache, eye pain, congestion      Prednisone      Seafood [Seafood]      Shellfish Allergy      Hives       Simethicone      Sulfamethoxazole W/Trimethoprim      Sulfamethoxazole-Trimethoprim      Chest pain, angina     Symbicort GI Disturbance and Nausea     Trees      Trileptal      SEVERE JOINT AND TENDON PAIN, INSOMNIA, RESTLESSNESS, NAUSEA, EXCESS URINATION     Cortizone Rash     EXCESS URINATION,WEAKNESS,NAUSEA, HEADACHE         MEDICATIONS:  Current Outpatient Medications   Medication Sig Dispense Refill     acetaminophen (TYLENOL) 500 MG tablet 2 x 500mg by mouth 3 times per day: 7/730am, 4pm and 11pm  = 6/day       albuterol (PROAIR HFA/PROVENTIL HFA/VENTOLIN HFA) 108 (90 Base) MCG/ACT inhaler Inhale 2 puffs into the lungs every 4 hours as needed for shortness of breath / dyspnea or wheezing 6.7 g 1     amLODIPine (NORVASC) 5 MG tablet Take 1 tablet (5 mg) by mouth daily 90 tablet 3     cholecalciferol ( VITAMIN D3) 25 MCG (1000 UT) TABS 1000 unit tab by mouth daily       fluorouracil (EFUDEX) 5 % external cream        folic acid (FOLVITE) 1 MG tablet Take 1 tablet (1 mg) by mouth daily 90 tablet 1     hydroxychloroquine (PLAQUENIL) 200 MG tablet  Take 1 tablet (200 mg) by mouth daily Daily at 9am. 90 tablet 5     ketoconazole (NIZORAL) 2 % external shampoo        lisinopril-hydrochlorothiazide (ZESTORETIC) 20-25 MG tablet Take 1 tablet by mouth daily 90 tablet 3     methotrexate sodium 2.5 MG TABS Take 8 tablets (20 mg) by mouth every 7 days *Monitoring labs every 8-12 weeks 96 tablet 3     metoprolol succinate ER (TOPROL-XL) 50 MG 24 hr tablet 1 and 1/2 tabs daily. 135 tablet 3     omeprazole (PRILOSEC) 40 MG DR capsule Take 1 capsule (40 mg) by mouth daily 90 capsule 3     ORDER FOR DME Use your BiPAP device as directed by your provider.       oxybutynin (DITROPAN) 5 MG tablet Take 1 tablet (5 mg) by mouth 3 times daily TAKE 1 TABLET BY MOUTH EVERY DAY AT 4PM 270 tablet 3     pregabalin (LYRICA) 50 MG capsule 50mg capsule by mouth at 7am and 4pm and 1-2 x 50mg capsules by mouth nightly at 11pm (4/day) 360 capsule 3     SEREVENT DISKUS 50 MCG/DOSE inhaler TAKE 1 PUFF BY MOUTH TWICE A DAY 1 each 3         PAST MEDICAL HISTORY:  Past Medical History:   Diagnosis Date     Abnormal EMG 04/18/2013     Abnormal involuntary movements(781.0)     Movement Disorder     AK (actinic keratosis) 12/18/2011     Allergic rhinitis, cause unspecified     Allergic rhinitis     Balance problems 11/01/2011     Basal cell carcinoma      Bladder spasms 11/01/2011     Chronic osteoarthritis      COPD (chronic obstructive pulmonary disease) (H)     Interstial lung disease, obliderans bronchiolitis     Diaphragmatic hernia without mention of obstruction or gangrene      Earache or other ear, nose, or throat complaint      Esophageal reflux      Fatigue 11/01/2011     Fracture      H. pylori infection 05/12/2011     History of MRI of cervical spine 11/18/2013    EXAMINATION: CERVICAL SPINE G/E 5 VIEWS* 4/19/2013 4:18 PM  CLINICAL HISTORY: Pain in limb,Performing Location?->[facility]),  COMPARISON:  FINDINGS: AP and lateral views in flexion and extension, as well as  odontoid view of the cervical spine was obtained. There is no comparison available. The vertebral bodies of the cervical spine are normally aligned. There is posterior spurring and d     Incomplete defecation 11/01/2011     Interstitial lung disease (H) 11/29/2016     Laboratory test 08/07/2012     Lung disease 06/2015     Malignant basal cell neoplasm of skin 08/06/2008     Melanoma (H) 08/06/2008     Melanoma in situ of lower leg (H)     R calf     Neuropathy 05/16/2011     Other bladder disorder      Other color vision deficiencies      Other nervous system complications      Parkinsonism (H) 11/01/2011     Parkinsons disease (H)     Parkinsonism/ balance, dropped foot, tremor     Personal history of colonic polyps      PLMD (periodic limb movement disorder) 12/16/2021     Polyneuropathy in other diseases classified elsewhere (H)      RA (rheumatoid arthritis) (H)      Rheumatoid arthritis of multiple sites with negative rheumatoid factor (H) 03/21/2016     Seronegative arthritis 11/18/2013     Shortness of breath      Shoulder arthritis 2016    acromioclavicular joint      Somatization disorder 05/12/2011     Spider veins     Leg Vericise vein surgery     Squamous cell carcinoma      Tremor 11/01/2011     Unspecified essential hypertension      Unspecified hypothyroidism      Urinary tract infection      Urinary urgency 11/01/2011     Wears glasses 11/01/2011         PAST SURGICAL HISTORY:  Past Surgical History:   Procedure Laterality Date     BIOPSY OF SKIN LESION       COLONOSCOPY       COLONOSCOPY N/A 7/20/2022    Procedure: COLONOSCOPY (fv) polypectomy using jumbo bx forcep;  Surgeon: Gómez Mckinney MD;  Location:  GI     CYSTOSCOPY  Many years ago    Blood in urine/ bladder cystoscopy     ESOPHAGOSCOPY, GASTROSCOPY, DUODENOSCOPY (EGD), COMBINED N/A 7/20/2022    Procedure: ESOPHAGOGASTRODUODENOSCOPY (EGD) (fv) biopsies using cold bx forcep;  Surgeon: Gómez Mckinney MD;  Location:  GI      "HEMORRHOID SURGERY       lip biopsy      for sicca complex     MOHS MICROGRAPHIC PROCEDURE       SOFT TISSUE SURGERY      removeal of basel cell carcinoma         FAMILY HISTORY:  Family History   Problem Relation Age of Onset     Hypertension Mother      Heart Disease Mother         a fib     Arthritis Mother         \"osteo\"     Osteoporosis Mother      Skin Cancer Mother      Uterine Cancer Mother      Other Cancer Mother      Cancer Mother      Hypertension Brother      Diabetes Brother         \" post pancreatitis\"     Neurologic Disorder Sister         multiple sclerosis     Hypertension Sister      Heart Disease Sister      Multiple Sclerosis Sister      Heart Disease Sister         Chf     Arthritis Sister      Heart Failure Sister      Kidney Disease Sister      Dementia Sister      Hypertension Father      Cerebrovascular Disease Father         ,     Skin Cancer Father      Colon Polyps Father      Heart Disease Father      Other - See Comments Son         [place]     Other - See Comments Abdon wagner     Other - See Comments Abdon gonzalez     Psoriasis Maternal Grandfather      Stomach Cancer Maternal Grandfather      Cancer Maternal Grandfather      Congenital Anomalies Other         granddaughter with a chromosome defect     Other Cancer Other         Grand daughter     Cancer Other         Grand daughter     Cerebrovascular Disease Paternal Grandmother         ,     Cerebrovascular Disease Paternal Grandfather         ,     Cancer Granddaughter         Langerhans Cell Histiocytosis     Genetic Disease Granddaughter         gene translocation     Learning Disorder Other         Gene translocation     Melanoma No family hx of      Colon Cancer No family hx of          SOCIAL HISTORY:  Social History     Socioeconomic History     Marital status:      Spouse name: Not on file     Number of children: Not on file     Years of education: Not on file     Highest education " level: Not on file   Occupational History     Not on file   Tobacco Use     Smoking status: Former Smoker     Packs/day: 1.50     Years: 37.00     Pack years: 55.50     Types: Cigarettes, Cigarettes     Start date: 6/15/1963     Quit date: 2000     Years since quittin.0     Smokeless tobacco: Never Used   Substance and Sexual Activity     Alcohol use: Not Currently     Drug use: Never     Sexual activity: Not Currently     Partners: Female     Birth control/protection: None   Other Topics Concern     Parent/sibling w/ CABG, MI or angioplasty before 65F 55M? No   Social History Narrative    2013: Living in [place] in a townhouse with no steps    Has 3 sons that are doing okay.         Dairy/d 1 servings/d.     Caffeine 0 servings/d    Exercise 0 x week    Sunscreen used - No    Seatbelts used - Yes    Working smoke/CO detectors in the home - Yes    Guns stored in the home - Yes    Self Breast Exams - NA    Self Testicular Exam - Yes    Eye Exam up to date - Yes     Dental Exam up to date - Yes     Pap Smear up to date - NA    Mammogram up to date - NA    PSA up to date - Yes     Dexa Scan up to date - No    Flex Sig / Colonoscopy up to date - Yes less than 5 yrs ago    Immunizations up to date -today    Abuse: Current or Past(Physical, Sexual or Emotional)- No    Do you feel safe in your environment - Yes    2008                 Social Determinants of Health     Financial Resource Strain: Low Risk      Difficulty of Paying Living Expenses: Not hard at all   Food Insecurity: No Food Insecurity     Worried About Running Out of Food in the Last Year: Never true     Ran Out of Food in the Last Year: Never true   Transportation Needs: No Transportation Needs     Lack of Transportation (Medical): No     Lack of Transportation (Non-Medical): No   Physical Activity: Unknown     Days of Exercise per Week: 4 days     Minutes of Exercise per Session: Not on file   Stress: No Stress Concern Present      "Feeling of Stress : Not at all   Social Connections: Moderately Integrated     Frequency of Communication with Friends and Family: Three times a week     Frequency of Social Gatherings with Friends and Family: Once a week     Attends Sabianist Services: More than 4 times per year     Active Member of Clubs or Organizations: No     Attends Club or Organization Meetings: Not on file     Marital Status:    Intimate Partner Violence: Not on file   Housing Stability: Low Risk      Unable to Pay for Housing in the Last Year: No     Number of Places Lived in the Last Year: 2     Unstable Housing in the Last Year: No         PHYSICAL EXAMINATION:  Vitals:    10/03/22 0809   BP: (!) 150/85   Pulse: 53   SpO2: 98%   Weight: 130.2 kg (287 lb)   Height: 1.854 m (6' 1\")       GENERAL:  No acute distress.  Pleasant and cooperative.   PSYCH:  Normal mood and affect.  HEAD:  Normocephalic.  SPEECH:  No dysarthria.  EYES:  No scleral icterus.  Wearing glasses.  EARS:  Hearing is intact to spoken voice.  NOSE/MOUTH:  Wearing a face mask.  LUNGS:  No respiratory distress.  No increased work of breathing.        IMAGING:  LUMBAR SPINE FOUR OR MORE VIEWS    9/9/2022 12:21 PM      HISTORY: Lumbosacral radiculopathy. Facet arthropathy, lumbosacral. Spondylolisthesis of lumbar region.     COMPARISON: X-rays 11/20/2018    IMPRESSION: Slight height loss of multiple vertebral bodies, presumably chronic, not appreciably changed. Severe lower lumbar spine facet arthropathy with grade 1 anterolisthesis of L4 on L5 (similar to possibly slightly worse in flexion compared to extension). Other levels of more subtle grade 1 spondylolisthesis. Moderate degenerative disc disease at L4-L5. Background of mild degenerative disc disease. Degenerative findings have overall slightly worsened compared to the prior exam. Levoconvex scoliosis. Vascular calcifications.      MRI OF THE LUMBAR SPINE WITHOUT CONTRAST September 14, 2022 9:50 AM      HISTORY: " Lumbosacral radiculopathy. Facet arthropathy, lumbosacral. Spondylolisthesis of lumbar region.     TECHNIQUE: Multiplanar, multisequence MRI images of the lumbar spine were acquired without IV contrast.     COMPARISON: Lumbar spine x-ray series 9/9/2022.     FINDINGS: There are five lumbar-type vertebrae for the purposes of this dictation.      Degenerative grade 1 anterolisthesis of L4 upon L5 again noted. Alignment otherwise normal. Vertebral body heights are normal. There is bone marrow edema in the bilateral pedicles of L5 that could represent stress reaction or Modic I marrow signal change related to severe facet arthropathy of the adjacent facet joints. Marrow signal otherwise normal. No evidence for fracture or pathologic bony lesion.     There is loss of disc height, disc desiccation and posterior disc bulging/herniation to varying degrees at all levels of the lumbar spine.      The tip of the conus medullaris is at the L1-L2 level which is within normal limits. There is no evidence for intrathecal abnormality.      Level by level:      T12-L1: Loss of disc height, disc desiccation and mild circumferential disc bulging. No herniation. Normal facets. No spinal canal stenosis. No foraminal stenosis on either side.     L1-L2: Loss of disc height, disc desiccation and mild circumferential disc bulging. No herniation. Normal facets. No spinal canal stenosis. No foraminal stenosis on either side.     L2-L3: Loss of disc height, disc desiccation and mild circumferential disc bulging. No herniation. Mild facet arthropathy bilaterally. No spinal canal stenosis. No foraminal stenosis on either side.     L3-L4: Loss of disc height, disc desiccation and mild circumferential disc bulging. No herniation. Mild facet arthropathy bilaterally. No spinal canal stenosis. No foraminal stenosis on either side.     L4-L5: Loss of disc height, disc desiccation and mild circumferential disc bulging. No herniation. Circumferential  endplate spurring. Severe facet arthropathy bilaterally. Ligamentum flavum thickening bilaterally. Moderate spinal canal stenosis. Mild bilateral neural foraminal narrowing.     L5-S1: Loss of disc height, disc desiccation and mild circumferential disc bulging. No herniation. Moderate facet arthropathy bilaterally. No spinal canal stenosis. Moderate left foraminal stenosis. Moderate right foraminal stenosis.    IMPRESSION:  1. Diffuse degenerative change of the lumbar spine as detailed above.  2. Moderate spinal canal stenosis at L4-L5. No other significant spinal canal narrowing of the lumbar spine.  3. Moderate neural foraminal stenosis bilaterally at L5-S1. No other significant neural foraminal narrowing of the lumbar spine.         ASSESSMENT/PLAN:  Mr. Lucio Daly is a 72-year-old male.  He has symptomatic lumbosacral facet arthropathy.  He has anterolisthesis of L4 on L5.  He has lumbosacral spinal stenosis.  He does have some mild lumbosacral degenerative disc disease.  Mr. Lucio Daly has right greater than left lumbosacral radiculopathies.  Discussed diagnoses, pathophysiologies, and treatment options with Mr. Lucio Daly.  Discussed the options of doing nothing/living with it, physical therapy, chiropractic care, pregabalin, lumbosacral epidural corticosteroid injection, lumbosacral facet joint medial branch blocks with following radiofrequency ablations, referral to neurosurgery.  Mr. Lucio Daly is unsure what he would like to do as he is planning to leave for [place] in November.  If we were to pursue medial branch blocks, would perform medial branch/dorsal ramus blocks of the bilateral L4-L5 and L5-S1 facet joints.  Mr. Lucio Daly will follow-up in this clinic when he returns from [place] in April 2023.        Total Time on encounter:  32 minutes were spent on one more or more of the following:  discussion with patient, history, exam, coordinating care, treatment goals, record  review, documenting clinical information, and/or data review.      Ramón Bennett MD

## 2022-10-03 NOTE — PROGRESS NOTES
PHYSICAL MEDICINE & REHABILITATION / MEDICAL SPINE        Date:  Oct 3, 2022    Name:  Lucio Daly  YOB: 1950  MRN:  [id number]          CHIEF COMPLAINT:  Low back pain.        HISTORY OF PRESENT ILLNESS:  Mr. Lucio Daly is a 72-year-old male.  He has 9 grandchildren.  He walks 20 minutes daily for exercise.     Mr. Lucio aDly began noting low back pain in Fall 2021.     Mr. Lucio Daly takes pregabalin 50 mg 3-4 times per day.    Mr. Lucio Daly has rheumatoid arthritis, parkinsonism, and irritable bowel syndrome.  He stated that he also has neuropathy.     Mr. Lucio Daly stated that he has bilateral knee osteoarthritis, bilateral ankle osteoarthritis, and bilateral foot osteoarthritis.    Mr. Lucio Daly was last seen in clinic on 09/09/2022.  He returns to clinic today, 10/03/2022.    Mr. Lucio Daly continues to have constant bilateral low back pain.  Low back pain is worsened with standing and walking.  Mr. Lucio Daly notes that his bilateral lower extremities become weak and tired with walking.  He notes pain radiating into his bilateral lower extremities.  This pain is intermittent.  Mr. Lucio Daly notes with driving, at times, he will note paresthesias in his right lower extremity.  This has improved since he was last seen in this clinic.  Pain is currently rated as 4/10.        ALLERGIES:  Allergies   Allergen Reactions     Restasis      Burning eyes, problems with breathing, tightness in chest     Adhesive Tape      Bandages misc     Allegra      EXCESSIVE URINATION AND WEAKNESS, LIGHT-HEADED     Allergy      Dust     Animal Dander      Benadryl Allergy      EXCESSIVE URINATION AND WEAKNESS, LIGHT-HEADED     Cephalexin      Joint pain or gerd aggravation. Bloating excessive urination      Cephalosporins      Cyclosporine      Diphenhydramine Other (See Comments)     Doxycycline      Doxycycline Hyclate Nausea     SWEATING,MIGRAINES,LOSS OF  APPETITE,SWEATING,LIGHT HEADED, EXCESSIVE URINATION     Fexofenadine Other (See Comments)     Flonase [Fluticasone Propionate]      Gabapentin      Neurontin: mosd changes and excess urination     Hydrocortisone      Iodine      Iodine Solution [Povidone Iodine]      SKIN MELTS     Levaquin      From surgeon--? Joint pain ? Gerd aggravation. Insomnia, excess urination     Levofloxacin      Mylanta      EXCESSIVE URINATION AND WEAKNESS, LIGHT-HEADED     Oxcarbazepine      Prednisone      Weakness, elevated bp, headache, eye pain, congestion      Prednisone      Seafood [Seafood]      Shellfish Allergy      Hives       Simethicone      Sulfamethoxazole W/Trimethoprim      Sulfamethoxazole-Trimethoprim      Chest pain, angina     Symbicort GI Disturbance and Nausea     Trees      Trileptal      SEVERE JOINT AND TENDON PAIN, INSOMNIA, RESTLESSNESS, NAUSEA, EXCESS URINATION     Cortizone Rash     EXCESS URINATION,WEAKNESS,NAUSEA, HEADACHE         MEDICATIONS:  Current Outpatient Medications   Medication Sig Dispense Refill     acetaminophen (TYLENOL) 500 MG tablet 2 x 500mg by mouth 3 times per day: 7/730am, 4pm and 11pm  = 6/day       albuterol (PROAIR HFA/PROVENTIL HFA/VENTOLIN HFA) 108 (90 Base) MCG/ACT inhaler Inhale 2 puffs into the lungs every 4 hours as needed for shortness of breath / dyspnea or wheezing 6.7 g 1     amLODIPine (NORVASC) 5 MG tablet Take 1 tablet (5 mg) by mouth daily 90 tablet 3     cholecalciferol (HM VITAMIN D3) 25 MCG (1000 UT) TABS 1000 unit tab by mouth daily       fluorouracil (EFUDEX) 5 % external cream        folic acid (FOLVITE) 1 MG tablet Take 1 tablet (1 mg) by mouth daily 90 tablet 1     hydroxychloroquine (PLAQUENIL) 200 MG tablet Take 1 tablet (200 mg) by mouth daily Daily at 9am. 90 tablet 5     ketoconazole (NIZORAL) 2 % external shampoo        lisinopril-hydrochlorothiazide (ZESTORETIC) 20-25 MG tablet Take 1 tablet by mouth daily 90 tablet 3     methotrexate sodium 2.5 MG TABS  Take 8 tablets (20 mg) by mouth every 7 days *Monitoring labs every 8-12 weeks 96 tablet 3     metoprolol succinate ER (TOPROL-XL) 50 MG 24 hr tablet 1 and 1/2 tabs daily. 135 tablet 3     omeprazole (PRILOSEC) 40 MG DR capsule Take 1 capsule (40 mg) by mouth daily 90 capsule 3     ORDER FOR DME Use your BiPAP device as directed by your provider.       oxybutynin (DITROPAN) 5 MG tablet Take 1 tablet (5 mg) by mouth 3 times daily TAKE 1 TABLET BY MOUTH EVERY DAY AT 4PM 270 tablet 3     pregabalin (LYRICA) 50 MG capsule 50mg capsule by mouth at 7am and 4pm and 1-2 x 50mg capsules by mouth nightly at 11pm (4/day) 360 capsule 3     SEREVENT DISKUS 50 MCG/DOSE inhaler TAKE 1 PUFF BY MOUTH TWICE A DAY 1 each 3         PAST MEDICAL HISTORY:  Past Medical History:   Diagnosis Date     Abnormal EMG 04/18/2013     Abnormal involuntary movements(781.0)     Movement Disorder     AK (actinic keratosis) 12/18/2011     Allergic rhinitis, cause unspecified     Allergic rhinitis     Balance problems 11/01/2011     Basal cell carcinoma      Bladder spasms 11/01/2011     Chronic osteoarthritis      COPD (chronic obstructive pulmonary disease) (H)     Interstial lung disease, obliderans bronchiolitis     Diaphragmatic hernia without mention of obstruction or gangrene      Earache or other ear, nose, or throat complaint      Esophageal reflux      Fatigue 11/01/2011     Fracture      H. pylori infection 05/12/2011     History of MRI of cervical spine 11/18/2013    EXAMINATION: CERVICAL SPINE G/E 5 VIEWS* 4/19/2013 4:18 PM  CLINICAL HISTORY: Pain in limb,Performing Location?->[facility]),  COMPARISON:  FINDINGS: AP and lateral views in flexion and extension, as well as odontoid view of the cervical spine was obtained. There is no comparison available. The vertebral bodies of the cervical spine are normally aligned. There is posterior spurring and d     Incomplete defecation 11/01/2011     Interstitial lung disease (H)  11/29/2016     Laboratory test 08/07/2012     Lung disease 06/2015     Malignant basal cell neoplasm of skin 08/06/2008     Melanoma (H) 08/06/2008     Melanoma in situ of lower leg (H)     R calf     Neuropathy 05/16/2011     Other bladder disorder      Other color vision deficiencies      Other nervous system complications      Parkinsonism (H) 11/01/2011     Parkinsons disease (H)     Parkinsonism/ balance, dropped foot, tremor     Personal history of colonic polyps      PLMD (periodic limb movement disorder) 12/16/2021     Polyneuropathy in other diseases classified elsewhere (H)      RA (rheumatoid arthritis) (H)      Rheumatoid arthritis of multiple sites with negative rheumatoid factor (H) 03/21/2016     Seronegative arthritis 11/18/2013     Shortness of breath      Shoulder arthritis 2016    acromioclavicular joint      Somatization disorder 05/12/2011     Spider veins     Leg Vericise vein surgery     Squamous cell carcinoma      Tremor 11/01/2011     Unspecified essential hypertension      Unspecified hypothyroidism      Urinary tract infection      Urinary urgency 11/01/2011     Wears glasses 11/01/2011         PAST SURGICAL HISTORY:  Past Surgical History:   Procedure Laterality Date     BIOPSY OF SKIN LESION       COLONOSCOPY       COLONOSCOPY N/A 7/20/2022    Procedure: COLONOSCOPY (fv) polypectomy using jumbo bx forcep;  Surgeon: Gómez Mckinney MD;  Location:  GI     CYSTOSCOPY  Many years ago    Blood in urine/ bladder cystoscopy     ESOPHAGOSCOPY, GASTROSCOPY, DUODENOSCOPY (EGD), COMBINED N/A 7/20/2022    Procedure: ESOPHAGOGASTRODUODENOSCOPY (EGD) (fv) biopsies using cold bx forcep;  Surgeon: Gómez Mckinney MD;  Location:  GI     HEMORRHOID SURGERY       lip biopsy      for sicca complex     MOHS MICROGRAPHIC PROCEDURE       SOFT TISSUE SURGERY      removeal of basel cell carcinoma         FAMILY HISTORY:  Family History   Problem Relation Age of Onset     Hypertension Mother      Heart  "Disease Mother         a fib     Arthritis Mother         \"osteo\"     Osteoporosis Mother      Skin Cancer Mother      Uterine Cancer Mother      Other Cancer Mother      Cancer Mother      Hypertension Brother      Diabetes Brother         \" post pancreatitis\"     Neurologic Disorder Sister         multiple sclerosis     Hypertension Sister      Heart Disease Sister      Multiple Sclerosis Sister      Heart Disease Sister         Chf     Arthritis Sister      Heart Failure Sister      Kidney Disease Sister      Dementia Sister      Hypertension Father      Cerebrovascular Disease Father         ,     Skin Cancer Father      Colon Polyps Father      Heart Disease Father      Other - See Comments Son         [place]     Other - See Comments Abdon wagner     Other - See Comments Abdon gonzalez     Psoriasis Maternal Grandfather      Stomach Cancer Maternal Grandfather      Cancer Maternal Grandfather      Congenital Anomalies Other         granddaughter with a chromosome defect     Other Cancer Other         Grand daughter     Cancer Other         Grand daughter     Cerebrovascular Disease Paternal Grandmother         ,     Cerebrovascular Disease Paternal Grandfather         ,     Cancer Granddaughter         Langerhans Cell Histiocytosis     Genetic Disease Granddaughter         gene translocation     Learning Disorder Other         Gene translocation     Melanoma No family hx of      Colon Cancer No family hx of          SOCIAL HISTORY:  Social History     Socioeconomic History     Marital status:      Spouse name: Not on file     Number of children: Not on file     Years of education: Not on file     Highest education level: Not on file   Occupational History     Not on file   Tobacco Use     Smoking status: Former Smoker     Packs/day: 1.50     Years: 37.00     Pack years: 55.50     Types: Cigarettes, Cigarettes     Start date: 6/15/1963     Quit date: 9/30/2000     Years " since quittin.0     Smokeless tobacco: Never Used   Substance and Sexual Activity     Alcohol use: Not Currently     Drug use: Never     Sexual activity: Not Currently     Partners: Female     Birth control/protection: None   Other Topics Concern     Parent/sibling w/ CABG, MI or angioplasty before 65F 55M? No   Social History Narrative    2013: Living in [place] in a townhouse with no steps    Has 3 sons that are doing okay.         Dairy/d 1 servings/d.     Caffeine 0 servings/d    Exercise 0 x week    Sunscreen used - No    Seatbelts used - Yes    Working smoke/CO detectors in the home - Yes    Guns stored in the home - Yes    Self Breast Exams - NA    Self Testicular Exam - Yes    Eye Exam up to date - Yes     Dental Exam up to date - Yes     Pap Smear up to date - NA    Mammogram up to date - NA    PSA up to date - Yes     Dexa Scan up to date - No    Flex Sig / Colonoscopy up to date - Yes less than 5 yrs ago    Immunizations up to date -today    Abuse: Current or Past(Physical, Sexual or Emotional)- No    Do you feel safe in your environment - Yes    2008                 Social Determinants of Health     Financial Resource Strain: Low Risk      Difficulty of Paying Living Expenses: Not hard at all   Food Insecurity: No Food Insecurity     Worried About Running Out of Food in the Last Year: Never true     Ran Out of Food in the Last Year: Never true   Transportation Needs: No Transportation Needs     Lack of Transportation (Medical): No     Lack of Transportation (Non-Medical): No   Physical Activity: Unknown     Days of Exercise per Week: 4 days     Minutes of Exercise per Session: Not on file   Stress: No Stress Concern Present     Feeling of Stress : Not at all   Social Connections: Moderately Integrated     Frequency of Communication with Friends and Family: Three times a week     Frequency of Social Gatherings with Friends and Family: Once a week     Attends Holiness Services: More than 4  "times per year     Active Member of Clubs or Organizations: No     Attends Club or Organization Meetings: Not on file     Marital Status:    Intimate Partner Violence: Not on file   Housing Stability: Low Risk      Unable to Pay for Housing in the Last Year: No     Number of Places Lived in the Last Year: 2     Unstable Housing in the Last Year: No         PHYSICAL EXAMINATION:  Vitals:    10/03/22 0809   BP: (!) 150/85   Pulse: 53   SpO2: 98%   Weight: 130.2 kg (287 lb)   Height: 1.854 m (6' 1\")       GENERAL:  No acute distress.  Pleasant and cooperative.   PSYCH:  Normal mood and affect.  HEAD:  Normocephalic.  SPEECH:  No dysarthria.  EYES:  No scleral icterus.  Wearing glasses.  EARS:  Hearing is intact to spoken voice.  NOSE/MOUTH:  Wearing a face mask.  LUNGS:  No respiratory distress.  No increased work of breathing.        IMAGING:  LUMBAR SPINE FOUR OR MORE VIEWS    9/9/2022 12:21 PM      HISTORY: Lumbosacral radiculopathy. Facet arthropathy, lumbosacral. Spondylolisthesis of lumbar region.     COMPARISON: X-rays 11/20/2018    IMPRESSION: Slight height loss of multiple vertebral bodies, presumably chronic, not appreciably changed. Severe lower lumbar spine facet arthropathy with grade 1 anterolisthesis of L4 on L5 (similar to possibly slightly worse in flexion compared to extension). Other levels of more subtle grade 1 spondylolisthesis. Moderate degenerative disc disease at L4-L5. Background of mild degenerative disc disease. Degenerative findings have overall slightly worsened compared to the prior exam. Levoconvex scoliosis. Vascular calcifications.      MRI OF THE LUMBAR SPINE WITHOUT CONTRAST September 14, 2022 9:50 AM      HISTORY: Lumbosacral radiculopathy. Facet arthropathy, lumbosacral. Spondylolisthesis of lumbar region.     TECHNIQUE: Multiplanar, multisequence MRI images of the lumbar spine were acquired without IV contrast.     COMPARISON: Lumbar spine x-ray series " 9/9/2022.     FINDINGS: There are five lumbar-type vertebrae for the purposes of this dictation.      Degenerative grade 1 anterolisthesis of L4 upon L5 again noted. Alignment otherwise normal. Vertebral body heights are normal. There is bone marrow edema in the bilateral pedicles of L5 that could represent stress reaction or Modic I marrow signal change related to severe facet arthropathy of the adjacent facet joints. Marrow signal otherwise normal. No evidence for fracture or pathologic bony lesion.     There is loss of disc height, disc desiccation and posterior disc bulging/herniation to varying degrees at all levels of the lumbar spine.      The tip of the conus medullaris is at the L1-L2 level which is within normal limits. There is no evidence for intrathecal abnormality.      Level by level:      T12-L1: Loss of disc height, disc desiccation and mild circumferential disc bulging. No herniation. Normal facets. No spinal canal stenosis. No foraminal stenosis on either side.     L1-L2: Loss of disc height, disc desiccation and mild circumferential disc bulging. No herniation. Normal facets. No spinal canal stenosis. No foraminal stenosis on either side.     L2-L3: Loss of disc height, disc desiccation and mild circumferential disc bulging. No herniation. Mild facet arthropathy bilaterally. No spinal canal stenosis. No foraminal stenosis on either side.     L3-L4: Loss of disc height, disc desiccation and mild circumferential disc bulging. No herniation. Mild facet arthropathy bilaterally. No spinal canal stenosis. No foraminal stenosis on either side.     L4-L5: Loss of disc height, disc desiccation and mild circumferential disc bulging. No herniation. Circumferential endplate spurring. Severe facet arthropathy bilaterally. Ligamentum flavum thickening bilaterally. Moderate spinal canal stenosis. Mild bilateral neural foraminal narrowing.     L5-S1: Loss of disc height, disc desiccation and mild circumferential  disc bulging. No herniation. Moderate facet arthropathy bilaterally. No spinal canal stenosis. Moderate left foraminal stenosis. Moderate right foraminal stenosis.    IMPRESSION:  1. Diffuse degenerative change of the lumbar spine as detailed above.  2. Moderate spinal canal stenosis at L4-L5. No other significant spinal canal narrowing of the lumbar spine.  3. Moderate neural foraminal stenosis bilaterally at L5-S1. No other significant neural foraminal narrowing of the lumbar spine.         ASSESSMENT/PLAN:  Mr. Lucio Daly is a 72-year-old male.  He has symptomatic lumbosacral facet arthropathy.  He has anterolisthesis of L4 on L5.  He has lumbosacral spinal stenosis.  He does have some mild lumbosacral degenerative disc disease.  Mr. Lucio Daly has right greater than left lumbosacral radiculopathies.  Discussed diagnoses, pathophysiologies, and treatment options with Mr. Lucio Daly.  Discussed the options of doing nothing/living with it, physical therapy, chiropractic care, pregabalin, lumbosacral epidural corticosteroid injection, lumbosacral facet joint medial branch blocks with following radiofrequency ablations, referral to neurosurgery.  Mr. Lucio Daly is unsure what he would like to do as he is planning to leave for [place] in November.  If we were to pursue medial branch blocks, would perform medial branch/dorsal ramus blocks of the bilateral L4-L5 and L5-S1 facet joints.  Mr. Lucio Daly will follow-up in this clinic when he returns from [place] in April 2023.        Total Time on encounter:  32 minutes were spent on one more or more of the following:  discussion with patient, history, exam, coordinating care, treatment goals, record review, documenting clinical information, and/or data review.      Ramón Bennett MD

## 2022-10-10 ENCOUNTER — OFFICE VISIT (OUTPATIENT)
Dept: ORTHOPEDICS | Facility: CLINIC | Age: 72
End: 2022-10-10
Payer: MEDICARE

## 2022-10-10 DIAGNOSIS — M21.372 BILATERAL FOOT-DROP: Primary | ICD-10-CM

## 2022-10-10 DIAGNOSIS — M21.371 BILATERAL FOOT-DROP: Primary | ICD-10-CM

## 2022-10-10 DIAGNOSIS — G62.9 PERIPHERAL POLYNEUROPATHY: ICD-10-CM

## 2022-10-10 DIAGNOSIS — G60.9 IDIOPATHIC PERIPHERAL NEUROPATHY: ICD-10-CM

## 2022-10-10 PROCEDURE — 99213 OFFICE O/P EST LOW 20 MIN: CPT | Performed by: PODIATRIST

## 2022-10-10 NOTE — LETTER
10/10/2022         RE: Lucio Daly  [address]  [place] [address]        Dear Colleague,    Thank you for referring your patient, Lucio Daly, to the [facility] [place]. Please see a copy of my visit note below.    Past Medical History:   Diagnosis Date     Abnormal EMG 04/18/2013     Abnormal involuntary movements(781.0)     Movement Disorder     AK (actinic keratosis) 12/18/2011     Allergic rhinitis, cause unspecified     Allergic rhinitis     Balance problems 11/01/2011     Basal cell carcinoma      Bladder spasms 11/01/2011     Chronic osteoarthritis      COPD (chronic obstructive pulmonary disease) (H)     Interstial lung disease, obliderans bronchiolitis     Diaphragmatic hernia without mention of obstruction or gangrene      Earache or other ear, nose, or throat complaint      Esophageal reflux      Fatigue 11/01/2011     Fracture      H. pylori infection 05/12/2011     History of MRI of cervical spine 11/18/2013    EXAMINATION: CERVICAL SPINE G/E 5 VIEWS* 4/19/2013 4:18 PM  CLINICAL HISTORY: Pain in limb,Performing Location?->[facility] ([facility]),  COMPARISON:  FINDINGS: AP and lateral views in flexion and extension, as well as odontoid view of the cervical spine was obtained. There is no comparison available. The vertebral bodies of the cervical spine are normally aligned. There is posterior spurring and d     Incomplete defecation 11/01/2011     Interstitial lung disease (H) 11/29/2016     Laboratory test 08/07/2012     Lung disease 06/2015     Malignant basal cell neoplasm of skin 08/06/2008     Melanoma (H) 08/06/2008     Melanoma in situ of lower leg (H)     R calf     Neuropathy 05/16/2011     Other bladder disorder      Other color vision deficiencies      Other nervous system complications      Parkinsonism (H) 11/01/2011     Parkinsons disease (H)     Parkinsonism/ balance, dropped foot, tremor     Personal history of colonic polyps      PLMD (periodic  limb movement disorder) 12/16/2021     Polyneuropathy in other diseases classified elsewhere (H)      RA (rheumatoid arthritis) (H)      Rheumatoid arthritis of multiple sites with negative rheumatoid factor (H) 03/21/2016     Seronegative arthritis 11/18/2013     Shortness of breath      Shoulder arthritis 2016    acromioclavicular joint      Somatization disorder 05/12/2011     Spider veins     Leg Vericise vein surgery     Squamous cell carcinoma      Tremor 11/01/2011     Unspecified essential hypertension      Unspecified hypothyroidism      Urinary tract infection      Urinary urgency 11/01/2011     Wears glasses 11/01/2011     Patient Active Problem List   Diagnosis     Diaphragmatic hernia     Esophageal reflux     Hx of melanoma of skin     Malignant basal cell neoplasm of skin     GALO (obstructive sleep apnea)     Chronic maxillary sinusitis     Urinary incontinence     Sicca syndrome (H)     Tremor     Incomplete defecation     AK (actinic keratosis)     Abnormal EMG     Seronegative arthritis     Adenomatous polyp of colon     Peripheral polyneuropathy     Morbid obesity (H)     Neuromuscular disease (H)     Bronchiolitis obliterans (H)     Hypertension     Rheumatoid arthritis (H)     Immunosuppressed status (H)     PLMD (periodic limb movement disorder)     Elevated prostate specific antigen (PSA)     Prostate cancer (H)     Lumbosacral radiculopathy     Facet arthropathy, lumbosacral     Spondylolisthesis of lumbar region     Lumbosacral spinal stenosis     Past Surgical History:   Procedure Laterality Date     BIOPSY OF SKIN LESION       COLONOSCOPY       COLONOSCOPY N/A 7/20/2022    Procedure: COLONOSCOPY (fv) polypectomy using jumbo bx forcep;  Surgeon: Gómez Mckinney MD;  Location:  GI     CYSTOSCOPY  Many years ago    Blood in urine/ bladder cystoscopy     ESOPHAGOSCOPY, GASTROSCOPY, DUODENOSCOPY (EGD), COMBINED N/A 7/20/2022    Procedure: ESOPHAGOGASTRODUODENOSCOPY (EGD) (fv) biopsies  using cold bx forcep;  Surgeon: Gómez Mckinney MD;  Location:  GI     HEMORRHOID SURGERY       lip biopsy      for sicca complex     MOHS MICROGRAPHIC PROCEDURE       SOFT TISSUE SURGERY      removeal of basel cell carcinoma     Social History     Socioeconomic History     Marital status:      Spouse name: Not on file     Number of children: Not on file     Years of education: Not on file     Highest education level: Not on file   Occupational History     Not on file   Tobacco Use     Smoking status: Former     Packs/day: 1.50     Years: 37.00     Pack years: 55.50     Types: Cigarettes     Start date: 6/15/1963     Quit date: 2000     Years since quittin.0     Smokeless tobacco: Never   Substance and Sexual Activity     Alcohol use: Not Currently     Drug use: Never     Sexual activity: Not Currently     Partners: Female     Birth control/protection: None   Other Topics Concern     Parent/sibling w/ CABG, MI or angioplasty before 65F 55M? No   Social History Narrative    2013: Living in [place] in a townhouse with no steps    Has 3 sons that are doing okay.         Dairy/d 1 servings/d.     Caffeine 0 servings/d    Exercise 0 x week    Sunscreen used - No    Seatbelts used - Yes    Working smoke/CO detectors in the home - Yes    Guns stored in the home - Yes    Self Breast Exams - NA    Self Testicular Exam - Yes    Eye Exam up to date - Yes     Dental Exam up to date - Yes     Pap Smear up to date - NA    Mammogram up to date - NA    PSA up to date - Yes 2008    Dexa Scan up to date - No    Flex Sig / Colonoscopy up to date - Yes less than 5 yrs ago    Immunizations up to date -today    Abuse: Current or Past(Physical, Sexual or Emotional)- No    Do you feel safe in your environment - Yes    2008                 Social Determinants of Health     Financial Resource Strain: Low Risk      Difficulty of Paying Living Expenses: Not hard at all   Food Insecurity: No Food Insecurity      "Worried About Running Out of Food in the Last Year: Never true     Ran Out of Food in the Last Year: Never true   Transportation Needs: No Transportation Needs     Lack of Transportation (Medical): No     Lack of Transportation (Non-Medical): No   Physical Activity: Unknown     Days of Exercise per Week: 4 days     Minutes of Exercise per Session: Not on file   Stress: No Stress Concern Present     Feeling of Stress : Not at all   Social Connections: Moderately Integrated     Frequency of Communication with Friends and Family: Three times a week     Frequency of Social Gatherings with Friends and Family: Once a week     Attends [employer] Services: More than 4 times per year     Active Member of Clubs or Organizations: No     Attends Club or Organization Meetings: Not on file     Marital Status:    Intimate Partner Violence: Not on file   Housing Stability: Low Risk      Unable to Pay for Housing in the Last Year: No     Number of Places Lived in the Last Year: 2     Unstable Housing in the Last Year: No     Family History   Problem Relation Age of Onset     Hypertension Mother      Heart Disease Mother         a fib     Arthritis Mother         \"osteo\"     Osteoporosis Mother      Skin Cancer Mother      Uterine Cancer Mother      Other Cancer Mother      Cancer Mother      Hypertension Brother      Diabetes Brother         \" post pancreatitis\"     Neurologic Disorder Sister         multiple sclerosis     Hypertension Sister      Heart Disease Sister      Multiple Sclerosis Sister      Heart Disease Sister         Chf     Arthritis Sister      Heart Failure Sister      Kidney Disease Sister      Dementia Sister      Hypertension Father      Cerebrovascular Disease Father         ,     Skin Cancer Father      Colon Polyps Father      Heart Disease Father      Other - See Comments [place]don         [place]     Other - See Comments [place]         [place]     Other - See Comments Abdon gonzalez     " Psoriasis Maternal Grandfather      Stomach Cancer Maternal Grandfather      Cancer Maternal Grandfather      Congenital Anomalies Other         granddaughter with a chromosome defect     Other Cancer Other         Grand daughter     Cancer Other         Grand daughter     Cerebrovascular Disease Paternal Grandmother         ,     Cerebrovascular Disease Paternal Grandfather         ,     Cancer Granddaughter         Langerhans Cell Histiocytosis     Genetic Disease Granddaughter         gene translocation     Learning Disorder Other         Gene translocation     Melanoma No family hx of      Colon Cancer No family hx of      Lab Results   Component Value Date    WBC 4.8 08/18/2022    WBC 6.1 07/09/2021     Lab Results   Component Value Date    RBC 4.21 08/18/2022    RBC 4.50 07/09/2021     Lab Results   Component Value Date    HGB 13.0 08/18/2022    HGB 13.4 07/09/2021     Lab Results   Component Value Date    HCT 41.1 08/18/2022    HCT 41.6 07/09/2021     No components found for: MCT  Lab Results   Component Value Date    MCV 98 08/18/2022    MCV 92 07/09/2021     Lab Results   Component Value Date    MCH 30.9 08/18/2022    MCH 29.8 07/09/2021     Lab Results   Component Value Date    MCHC 31.6 08/18/2022    MCHC 32.2 07/09/2021     Lab Results   Component Value Date    RDW 14.0 08/18/2022    RDW 14.3 07/09/2021     Lab Results   Component Value Date     08/18/2022     07/09/2021     Lab Results   Component Value Date    CRP 5.63 08/18/2022    CRP 5.5 05/09/2022    CRP 9.5 07/09/2021    CRP 7.3 02/08/2010     Lab Results   Component Value Date    AST 18 08/18/2022    AST 17 07/09/2021     Lab Results   Component Value Date    ALT 22 08/18/2022    ALT 25 07/09/2021     No results found for: BILICONJ   Lab Results   Component Value Date    BILITOTAL 0.4 05/15/2018     Lab Results   Component Value Date    ALBUMIN 3.9 08/18/2022    ALBUMIN 3.8 07/09/2021     Lab Results   Component Value Date     PROTTOTAL 7.5 05/15/2018      Lab Results   Component Value Date    ALKPHOS 68 05/15/2018         SUBJECTIVE FINDINGS:  72-year-old with rheumatoid arthritis and peripheral neuropathy returns to clinic.  His AFOs are about 10 or so years old and he went in to see if he could get them fixed because they were not fitting right and they told him he needed to come in and get an order for a new pair if he needs a new pair.  He has a drop foot and he catches his toes at times.  He has fallen from this.  He has seen his rheumatologist who did x-rays of his right foot because he had a nodule on the right lateral hallux.  I reviewed 10/03/2022 x-rays.  Joint and cortical margins are intact.  There are no fractures.  I did review Dr. Goodrich's previous notes.    OBJECTIVE FINDINGS:  DP and PT are 2/4 bilaterally.  He has dorsally contracted digits 1 through 5 bilaterally.  He has decreased muscle strength in dorsiflexion and eversion of the foot bilaterally.  There are no tendon voids bilaterally.  No erythema, no drainage, no odor, no calor, some mild venous stasis bilaterally.    ASSESSMENT AND PLAN:  Peripheral neuropathy with drop foot bilaterally.  Diagnosis and treatment options discussed with him.  Prescription for new AFOs given and use discussed with him.  He is given the phone number and address [facility] to get those.  Return to clinic and see me as needed.      Again, thank you for allowing me to participate in the care of your patient.        Sincerely,        Rodney Ríos DPM

## 2022-10-10 NOTE — NURSING NOTE
Reason For Visit:   Chief Complaint   Patient presents with     Follow Up     Pain, bilateral foot. Inserts and AFO.   Osteoarthritis, left foot.   Pain, right foot.   Patient relates that he has been tripping a lot and having issues with walking on different textures(tile to carpet).   Patient relates that he does have some issues with his back that are connected to his right lower extremity issues.        Pain Assessment  Patient Currently in Pain: Yes  Primary Pain Location: Foot (Bilateral)        Allergies   Allergen Reactions     Restasis      Burning eyes, problems with breathing, tightness in chest     Adhesive Tape      Bandages misc     Allegra      EXCESSIVE URINATION AND WEAKNESS, LIGHT-HEADED     Allergy      Dust     Animal Dander      Benadryl Allergy      EXCESSIVE URINATION AND WEAKNESS, LIGHT-HEADED     Cephalexin      Joint pain or gerd aggravation. Bloating excessive urination      Cephalosporins      Cyclosporine      Diphenhydramine Other (See Comments)     Doxycycline      Doxycycline Hyclate Nausea     SWEATING,MIGRAINES,LOSS OF APPETITE,SWEATING,LIGHT HEADED, EXCESSIVE URINATION     Fexofenadine Other (See Comments)     Flonase [Fluticasone Propionate]      Gabapentin      Neurontin: mosd changes and excess urination     Hydrocortisone      Iodine      Iodine Solution [Povidone Iodine]      SKIN MELTS     Levaquin      From surgeon--? Joint pain ? Gerd aggravation. Insomnia, excess urination     Levofloxacin      Mylanta      EXCESSIVE URINATION AND WEAKNESS, LIGHT-HEADED     Oxcarbazepine      Prednisone      Weakness, elevated bp, headache, eye pain, congestion      Prednisone      Seafood [Seafood]      Shellfish Allergy      Hives       Simethicone      Sulfamethoxazole W/Trimethoprim      Sulfamethoxazole-Trimethoprim      Chest pain, angina     Symbicort GI Disturbance and Nausea     Trees      Trileptal      SEVERE JOINT AND TENDON PAIN, INSOMNIA, RESTLESSNESS, NAUSEA, EXCESS  URINATION     Cortizone Rash     EXCESS URINATION,WEAKNESS,NAUSEA, HEADACHE           Kaitlynn KAMALJIT [place]N

## 2022-10-19 ENCOUNTER — OFFICE VISIT (OUTPATIENT)
Dept: PULMONOLOGY | Facility: CLINIC | Age: 72
End: 2022-10-19
Attending: INTERNAL MEDICINE
Payer: MEDICARE

## 2022-10-19 VITALS
WEIGHT: 286 LBS | HEART RATE: 67 BPM | SYSTOLIC BLOOD PRESSURE: 115 MMHG | HEIGHT: 73 IN | DIASTOLIC BLOOD PRESSURE: 76 MMHG | OXYGEN SATURATION: 96 % | BODY MASS INDEX: 37.91 KG/M2

## 2022-10-19 DIAGNOSIS — J44.81 OBLITERATIVE BRONCHIOLITIS (H): ICD-10-CM

## 2022-10-19 DIAGNOSIS — R00.1 BRADYCARDIA: ICD-10-CM

## 2022-10-19 LAB
6 MIN WALK (FT): 740 FT
6 MIN WALK (M): 226 M

## 2022-10-19 PROCEDURE — 94726 PLETHYSMOGRAPHY LUNG VOLUMES: CPT | Performed by: INTERNAL MEDICINE

## 2022-10-19 PROCEDURE — 94618 PULMONARY STRESS TESTING: CPT | Performed by: INTERNAL MEDICINE

## 2022-10-19 PROCEDURE — 99214 OFFICE O/P EST MOD 30 MIN: CPT | Mod: 25 | Performed by: INTERNAL MEDICINE

## 2022-10-19 PROCEDURE — 94375 RESPIRATORY FLOW VOLUME LOOP: CPT | Performed by: INTERNAL MEDICINE

## 2022-10-19 PROCEDURE — 94729 DIFFUSING CAPACITY: CPT | Performed by: INTERNAL MEDICINE

## 2022-10-19 PROCEDURE — G0463 HOSPITAL OUTPT CLINIC VISIT: HCPCS | Mod: 25

## 2022-10-19 ASSESSMENT — PAIN SCALES - GENERAL: PAINLEVEL: NO PAIN (0)

## 2022-10-19 NOTE — LETTER
10/19/2022         RE: Lucio Daly  [address]  [place] [address]        Dear Colleague,    Thank you for referring your patient, Lucio Daly, to the [facility] [place]. Please see a copy of my visit note below.    Reason for Visit  Lucio Daly is a 72 year old year old male who is being seen for Interstitial Lung Disease (ILD) (ILD Follow up )    HPI    The patient was seen and examined by Harsha Mccarthy MD     Mr. 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  He was last seen in the Pulmonary Clinic in 04/2022.  At that time, he was on Plaquenil and methotrexate for the connective tissue disease.  Since then, he needed radiation therapy for prostate cancer and he finished this 2 weeks or so back.  He had a followup visit with Rheumatology and no changes in his medications were needed.    Overall, he tells me that his functional status is stable.  He is walking most days of the week.  There is some decrease in endurance.  Denies any other pulmonary symptoms.    He had evaluation done for his sleep-disordered breathing in 07/2022.  He is on bilevel ventilation at night.      Current Outpatient Medications   Medication     acetaminophen (TYLENOL) 500 MG tablet     albuterol (PROAIR HFA/PROVENTIL HFA/VENTOLIN HFA) 108 (90 Base) MCG/ACT inhaler     amLODIPine (NORVASC) 5 MG tablet     cholecalciferol (HM VITAMIN D3) 25 MCG (1000 UT) TABS     fluorouracil (EFUDEX) 5 % external cream     folic acid (FOLVITE) 1 MG tablet     hydroxychloroquine (PLAQUENIL) 200 MG tablet     ketoconazole (NIZORAL) 2 % external shampoo     lisinopril-hydrochlorothiazide (ZESTORETIC) 20-25 MG tablet     methotrexate sodium 2.5 MG TABS     metoprolol succinate ER (TOPROL-XL) 50 MG 24 hr tablet     omeprazole (PRILOSEC) 40 MG DR capsule     ORDER FOR DME     oxybutynin (DITROPAN) 5 MG  tablet     pregabalin (LYRICA) 50 MG capsule     SEREVENT DISKUS 50 MCG/DOSE inhaler     No current facility-administered medications for this visit.     Allergies   Allergen Reactions     Restasis      Burning eyes, problems with breathing, tightness in chest     Adhesive Tape      Bandages misc     Allegra      EXCESSIVE URINATION AND WEAKNESS, LIGHT-HEADED     Allergy      Dust     Animal Dander      Benadryl Allergy      EXCESSIVE URINATION AND WEAKNESS, LIGHT-HEADED     Cephalexin      Joint pain or gerd aggravation. Bloating excessive urination      Cephalosporins      Cyclosporine      Diphenhydramine Other (See Comments)     Doxycycline      Doxycycline Hyclate Nausea     SWEATING,MIGRAINES,LOSS OF APPETITE,SWEATING,LIGHT HEADED, EXCESSIVE URINATION     Fexofenadine Other (See Comments)     Flonase [Fluticasone Propionate]      Gabapentin      Neurontin: mosd changes and excess urination     Hydrocortisone      Iodine      Iodine Solution [Povidone Iodine]      SKIN MELTS     Levaquin      From surgeon--? Joint pain ? Gerd aggravation. Insomnia, excess urination     Levofloxacin      Mylanta      EXCESSIVE URINATION AND WEAKNESS, LIGHT-HEADED     Oxcarbazepine      Prednisone      Weakness, elevated bp, headache, eye pain, congestion      Prednisone      Seafood [Seafood]      Shellfish Allergy      Hives       Simethicone      Sulfamethoxazole W/Trimethoprim      Sulfamethoxazole-Trimethoprim      Chest pain, angina     Symbicort GI Disturbance and Nausea     Trees      Trileptal      SEVERE JOINT AND TENDON PAIN, INSOMNIA, RESTLESSNESS, NAUSEA, EXCESS URINATION     Cortizone Rash     EXCESS URINATION,WEAKNESS,NAUSEA, HEADACHE     Past Medical History:   Diagnosis Date     Abnormal EMG 04/18/2013     Abnormal involuntary movements(781.0)     Movement Disorder     AK (actinic keratosis) 12/18/2011     Allergic rhinitis, cause unspecified     Allergic rhinitis     Balance problems 11/01/2011     Basal cell  carcinoma      Bladder spasms 11/01/2011     Chronic osteoarthritis      COPD (chronic obstructive pulmonary disease) (H)     Interstial lung disease, obliderans bronchiolitis     Diaphragmatic hernia without mention of obstruction or gangrene      Earache or other ear, nose, or throat complaint      Esophageal reflux      Fatigue 11/01/2011     Fracture      H. pylori infection 05/12/2011     History of MRI of cervical spine 11/18/2013    EXAMINATION: CERVICAL SPINE G/E 5 VIEWS* 4/19/2013 4:18 PM  CLINICAL HISTORY: Pain in limb,Performing Location?->[facility] ([facility]),  COMPARISON:  FINDINGS: AP and lateral views in flexion and extension, as well as odontoid view of the cervical spine was obtained. There is no comparison available. The vertebral bodies of the cervical spine are normally aligned. There is posterior spurring and d     Incomplete defecation 11/01/2011     Interstitial lung disease (H) 11/29/2016     Laboratory test 08/07/2012     Lung disease 06/2015     Malignant basal cell neoplasm of skin 08/06/2008     Melanoma (H) 08/06/2008     Melanoma in situ of lower leg (H)     R calf     Neuropathy 05/16/2011     Other bladder disorder      Other color vision deficiencies      Other nervous system complications      Parkinsonism (H) 11/01/2011     Parkinsons disease (H)     Parkinsonism/ balance, dropped foot, tremor     Personal history of colonic polyps      PLMD (periodic limb movement disorder) 12/16/2021     Polyneuropathy in other diseases classified elsewhere (H)      RA (rheumatoid arthritis) (H)      Rheumatoid arthritis of multiple sites with negative rheumatoid factor (H) 03/21/2016     Seronegative arthritis 11/18/2013     Shortness of breath      Shoulder arthritis 2016    acromioclavicular joint      Somatization disorder 05/12/2011     Spider veins     Leg Vericise vein surgery     Squamous cell carcinoma      Tremor 11/01/2011     Unspecified essential hypertension      Unspecified  hypothyroidism      Urinary tract infection      Urinary urgency 2011     Wears glasses 2011       Past Surgical History:   Procedure Laterality Date     BIOPSY OF SKIN LESION       COLONOSCOPY       COLONOSCOPY N/A 2022    Procedure: COLONOSCOPY (fv) polypectomy using jumbo bx forcep;  Surgeon: Gómez Mckinney MD;  Location:  GI     CYSTOSCOPY  Many years ago    Blood in urine/ bladder cystoscopy     ESOPHAGOSCOPY, GASTROSCOPY, DUODENOSCOPY (EGD), COMBINED N/A 2022    Procedure: ESOPHAGOGASTRODUODENOSCOPY (EGD) (fv) biopsies using cold bx forcep;  Surgeon: Gómez Mckinney MD;  Location:  GI     HEMORRHOID SURGERY       lip biopsy      for sicca complex     MOHS MICROGRAPHIC PROCEDURE       SOFT TISSUE SURGERY      removeal of basel cell carcinoma       Social History     Socioeconomic History     Marital status:      Spouse name: Not on file     Number of children: Not on file     Years of education: Not on file     Highest education level: Not on file   Occupational History     Not on file   Tobacco Use     Smoking status: Former     Packs/day: 1.50     Years: 37.00     Pack years: 55.50     Types: Cigarettes     Start date: 6/15/1963     Quit date: 2000     Years since quittin.0     Smokeless tobacco: Never   Substance and Sexual Activity     Alcohol use: Not Currently     Drug use: Never     Sexual activity: Not Currently     Partners: Female     Birth control/protection: None   Other Topics Concern     Parent/sibling w/ CABG, MI or angioplasty before 65F 55M? No   Social History Narrative    2013: Living in [place] in a townhouse with no steps    Has 3 sons that are doing okay.         Dairy/d 1 servings/d.     Caffeine 0 servings/d    Exercise 0 x week    Sunscreen used - No    Seatbelts used - Yes    Working smoke/CO detectors in the home - Yes    Guns stored in the home - Yes    Self Breast Exams - NA    Self Testicular Exam - Yes    Eye Exam up to date - Yes  "2008    Dental Exam up to date - Yes 2006    Pap Smear up to date - NA    Mammogram up to date - NA    PSA up to date - Yes 2008    Dexa Scan up to date - No    Flex Sig / Colonoscopy up to date - Yes less than 5 yrs ago    Immunizations up to date -today    Abuse: Current or Past(Physical, Sexual or Emotional)- No    Do you feel safe in your environment - Yes    2008                 Social Determinants of Health     Financial Resource Strain: Low Risk      Difficulty of Paying Living Expenses: Not hard at all   Food Insecurity: No Food Insecurity     Worried About Running Out of Food in the Last Year: Never true     Ran Out of Food in the Last Year: Never true   Transportation Needs: No Transportation Needs     Lack of Transportation (Medical): No     Lack of Transportation (Non-Medical): No   Physical Activity: Unknown     Days of Exercise per Week: 4 days     Minutes of Exercise per Session: Not on file   Stress: No Stress Concern Present     Feeling of Stress : Not at all   Social Connections: Moderately Integrated     Frequency of Communication with Friends and Family: Three times a week     Frequency of Social Gatherings with Friends and Family: Once a week     Attends Religion Services: More than 4 times per year     Active Member of Clubs or Organizations: No     Attends Club or Organization Meetings: Not on file     Marital Status:    Intimate Partner Violence: Not on file   Housing Stability: Low Risk      Unable to Pay for Housing in the Last Year: No     Number of Places Lived in the Last Year: 2     Unstable Housing in the Last Year: No       ROS Pulmonary  A complete ROS was otherwise negative except as noted in the HPI.  /76   Pulse 67   Ht 1.854 m (6' 1\")   Wt 129.7 kg (286 lb)   SpO2 96%   BMI 37.73 kg/m    Exam:   GENERAL APPEARANCE: Alert, and in no apparent distress.  LYMPHATICS: No significant axillary, cervical, or supraclavicular nodes.  RESP: normal percussion, good air " flow throughout.  ** crackles. No rhonchi. No wheezes.  CV: Normal S1, S2, regular rhythm, normal rate. No murmur.  No rub. No gallop. No LE edema.   ABDOMEN:  Bowel sounds normal, soft, nontender, no HSM or masses.   MS: extremities normal. No clubbing. No cyanosis.  SKIN: no rash on limited exam  NEURO: Mentation intact, speech normal, normal strength and tone, normal gait and stance    Results:     PFTs done today were reviewed by me with the patient.  He also had a walk test done where the 6-minute walk distance was below the lower limit of normal.  No desaturation was noted.  FVC 3.94 (86%), FEV1 2.94 (87%) and the ratio is 75.  Total lung capacity 7.74 (100%).  The DLCO is 26.55 (95%).  My interpretation is that the spirometry, lung volumes and diffusion capacity are all within normal range.  Six-minute walk distance is decreased without any hypoxia.  In comparison to prior spirometry, no significant change, though FVC is decreased by 130 mL.  Total lung capacity is improved by 170 mL.        Assessment and plan: Assessment and plan: Mr. 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  He is currently on methotrexate and hydroxychloroquine with stable symptoms.  1.  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  We will continue to monitor.  Stable symptoms.  He is on Solu-Medrol b.i.d.  This prescription will be renewed.  2.  Sleep-disordered breathing, on bilevel ventilation at night.  3.  Decreased exercise capacity:  He walks with a cane.  This could be contributing.  I have encouraged him to participate in daily walks, which he is actually already doing.    I will see him back in 6 months.    This note consists of symbols derived from keyboarding, dictation and/or voice recognition software. As a result, there may be errors in the script that have gone undetected. Please consider this when  interpreting information found in this chart    Again, thank you for allowing me to participate in the care of your patient.        Sincerely,        Harsha Mccarthy MD

## 2022-10-19 NOTE — PROGRESS NOTES
Reason for Visit  Lucio Daly is a 72 year old year old male who is being seen for Interstitial Lung Disease (ILD) (ILD Follow up )    HPI    The patient was seen and examined by Harsha Mccarthy MD     Mr. 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  He was last seen in the Pulmonary Clinic in 04/2022.  At that time, he was on Plaquenil and methotrexate for the connective tissue disease.  Since then, he needed radiation therapy for prostate cancer and he finished this 2 weeks or so back.  He had a followup visit with Rheumatology and no changes in his medications were needed.    Overall, he tells me that his functional status is stable.  He is walking most days of the week.  There is some decrease in endurance.  Denies any other pulmonary symptoms.    He had evaluation done for his sleep-disordered breathing in 07/2022.  He is on bilevel ventilation at night.      Current Outpatient Medications   Medication     acetaminophen (TYLENOL) 500 MG tablet     albuterol (PROAIR HFA/PROVENTIL HFA/VENTOLIN HFA) 108 (90 Base) MCG/ACT inhaler     amLODIPine (NORVASC) 5 MG tablet     cholecalciferol (HM VITAMIN D3) 25 MCG (1000 UT) TABS     fluorouracil (EFUDEX) 5 % external cream     folic acid (FOLVITE) 1 MG tablet     hydroxychloroquine (PLAQUENIL) 200 MG tablet     ketoconazole (NIZORAL) 2 % external shampoo     lisinopril-hydrochlorothiazide (ZESTORETIC) 20-25 MG tablet     methotrexate sodium 2.5 MG TABS     metoprolol succinate ER (TOPROL-XL) 50 MG 24 hr tablet     omeprazole (PRILOSEC) 40 MG DR capsule     ORDER FOR DME     oxybutynin (DITROPAN) 5 MG tablet     pregabalin (LYRICA) 50 MG capsule     SEREVENT DISKUS 50 MCG/DOSE inhaler     No current facility-administered medications for this visit.     Allergies   Allergen Reactions     Restasis      Burning eyes, problems with breathing, tightness in chest     Adhesive Tape       Bandages misc     Allegra      EXCESSIVE URINATION AND WEAKNESS, LIGHT-HEADED     Allergy      Dust     Animal Dander      Benadryl Allergy      EXCESSIVE URINATION AND WEAKNESS, LIGHT-HEADED     Cephalexin      Joint pain or gerd aggravation. Bloating excessive urination      Cephalosporins      Cyclosporine      Diphenhydramine Other (See Comments)     Doxycycline      Doxycycline Hyclate Nausea     SWEATING,MIGRAINES,LOSS OF APPETITE,SWEATING,LIGHT HEADED, EXCESSIVE URINATION     Fexofenadine Other (See Comments)     Flonase [Fluticasone Propionate]      Gabapentin      Neurontin: mosd changes and excess urination     Hydrocortisone      Iodine      Iodine Solution [Povidone Iodine]      SKIN MELTS     Levaquin      From surgeon--? Joint pain ? Gerd aggravation. Insomnia, excess urination     Levofloxacin      Mylanta      EXCESSIVE URINATION AND WEAKNESS, LIGHT-HEADED     Oxcarbazepine      Prednisone      Weakness, elevated bp, headache, eye pain, congestion      Prednisone      Seafood [Seafood]      Shellfish Allergy      Hives       Simethicone      Sulfamethoxazole W/Trimethoprim      Sulfamethoxazole-Trimethoprim      Chest pain, angina     Symbicort GI Disturbance and Nausea     Trees      Trileptal      SEVERE JOINT AND TENDON PAIN, INSOMNIA, RESTLESSNESS, NAUSEA, EXCESS URINATION     Cortizone Rash     EXCESS URINATION,WEAKNESS,NAUSEA, HEADACHE     Past Medical History:   Diagnosis Date     Abnormal EMG 04/18/2013     Abnormal involuntary movements(781.0)     Movement Disorder     AK (actinic keratosis) 12/18/2011     Allergic rhinitis, cause unspecified     Allergic rhinitis     Balance problems 11/01/2011     Basal cell carcinoma      Bladder spasms 11/01/2011     Chronic osteoarthritis      COPD (chronic obstructive pulmonary disease) (H)     Interstial lung disease, obliderans bronchiolitis     Diaphragmatic hernia without mention of obstruction or gangrene      Earache or other ear, nose, or throat  complaint      Esophageal reflux      Fatigue 11/01/2011     Fracture      H. pylori infection 05/12/2011     History of MRI of cervical spine 11/18/2013    EXAMINATION: CERVICAL SPINE G/E 5 VIEWS* 4/19/2013 4:18 PM  CLINICAL HISTORY: Pain in limb,Performing Location?->[facility]),  COMPARISON:  FINDINGS: AP and lateral views in flexion and extension, as well as odontoid view of the cervical spine was obtained. There is no comparison available. The vertebral bodies of the cervical spine are normally aligned. There is posterior spurring and d     Incomplete defecation 11/01/2011     Interstitial lung disease (H) 11/29/2016     Laboratory test 08/07/2012     Lung disease 06/2015     Malignant basal cell neoplasm of skin 08/06/2008     Melanoma (H) 08/06/2008     Melanoma in situ of lower leg (H)     R calf     Neuropathy 05/16/2011     Other bladder disorder      Other color vision deficiencies      Other nervous system complications      Parkinsonism (H) 11/01/2011     Parkinsons disease (H)     Parkinsonism/ balance, dropped foot, tremor     Personal history of colonic polyps      PLMD (periodic limb movement disorder) 12/16/2021     Polyneuropathy in other diseases classified elsewhere (H)      RA (rheumatoid arthritis) (H)      Rheumatoid arthritis of multiple sites with negative rheumatoid factor (H) 03/21/2016     Seronegative arthritis 11/18/2013     Shortness of breath      Shoulder arthritis 2016    acromioclavicular joint      Somatization disorder 05/12/2011     Spider veins     Leg Vericise vein surgery     Squamous cell carcinoma      Tremor 11/01/2011     Unspecified essential hypertension      Unspecified hypothyroidism      Urinary tract infection      Urinary urgency 11/01/2011     Wears glasses 11/01/2011       Past Surgical History:   Procedure Laterality Date     BIOPSY OF SKIN LESION       COLONOSCOPY       COLONOSCOPY N/A 7/20/2022    Procedure: COLONOSCOPY (fv) polypectomy using jumbo  bx forcep;  Surgeon: Gómez Mckinney MD;  Location:  GI     CYSTOSCOPY  Many years ago    Blood in urine/ bladder cystoscopy     ESOPHAGOSCOPY, GASTROSCOPY, DUODENOSCOPY (EGD), COMBINED N/A 2022    Procedure: ESOPHAGOGASTRODUODENOSCOPY (EGD) (fv) biopsies using cold bx forcep;  Surgeon: Gómez Mckinney MD;  Location:  GI     HEMORRHOID SURGERY       lip biopsy      for sicca complex     MOHS MICROGRAPHIC PROCEDURE       SOFT TISSUE SURGERY      removeal of basel cell carcinoma       Social History     Socioeconomic History     Marital status:      Spouse name: Not on file     Number of children: Not on file     Years of education: Not on file     Highest education level: Not on file   Occupational History     Not on file   Tobacco Use     Smoking status: Former     Packs/day: 1.50     Years: 37.00     Pack years: 55.50     Types: Cigarettes     Start date: 6/15/1963     Quit date: 2000     Years since quittin.0     Smokeless tobacco: Never   Substance and Sexual Activity     Alcohol use: Not Currently     Drug use: Never     Sexual activity: Not Currently     Partners: Female     Birth control/protection: None   Other Topics Concern     Parent/sibling w/ CABG, MI or angioplasty before 65F 55M? No   Social History Narrative    2013: Living in [place] in a townhouse with no steps    Has 3 sons that are doing okay.         Dairy/d 1 servings/d.     Caffeine 0 servings/d    Exercise 0 x week    Sunscreen used - No    Seatbelts used - Yes    Working smoke/CO detectors in the home - Yes    Guns stored in the home - Yes    Self Breast Exams - NA    Self Testicular Exam - Yes    Eye Exam up to date - Yes     Dental Exam up to date - Yes 2006    Pap Smear up to date - NA    Mammogram up to date - NA    PSA up to date - Yes 2008    Dexa Scan up to date - No    Flex Sig / Colonoscopy up to date - Yes less than 5 yrs ago    Immunizations up to date -today    Abuse: Current or Past(Physical,  "Sexual or Emotional)- No    Do you feel safe in your environment - Yes    2008                 Social Determinants of Health     Financial Resource Strain: Low Risk      Difficulty of Paying Living Expenses: Not hard at all   Food Insecurity: No Food Insecurity     Worried About Running Out of Food in the Last Year: Never true     Ran Out of Food in the Last Year: Never true   Transportation Needs: No Transportation Needs     Lack of Transportation (Medical): No     Lack of Transportation (Non-Medical): No   Physical Activity: Unknown     Days of Exercise per Week: 4 days     Minutes of Exercise per Session: Not on file   Stress: No Stress Concern Present     Feeling of Stress : Not at all   Social Connections: Moderately Integrated     Frequency of Communication with Friends and Family: Three times a week     Frequency of Social Gatherings with Friends and Family: Once a week     Attends [employer] Services: More than 4 times per year     Active Member of Clubs or Organizations: No     Attends Club or Organization Meetings: Not on file     Marital Status:    Intimate Partner Violence: Not on file   Housing Stability: Low Risk      Unable to Pay for Housing in the Last Year: No     Number of Places Lived in the Last Year: 2     Unstable Housing in the Last Year: No       ROS Pulmonary  A complete ROS was otherwise negative except as noted in the HPI.  /76   Pulse 67   Ht 1.854 m (6' 1\")   Wt 129.7 kg (286 lb)   SpO2 96%   BMI 37.73 kg/m    Exam:   GENERAL APPEARANCE: Alert, and in no apparent distress.  LYMPHATICS: No significant axillary, cervical, or supraclavicular nodes.  RESP: normal percussion, good air flow throughout.  ** crackles. No rhonchi. No wheezes.  CV: Normal S1, S2, regular rhythm, normal rate. No murmur.  No rub. No gallop. No LE edema.   ABDOMEN:  Bowel sounds normal, soft, nontender, no HSM or masses.   MS: extremities normal. No clubbing. No cyanosis.  SKIN: no rash on limited " exam  NEURO: Mentation intact, speech normal, normal strength and tone, normal gait and stance    Results:     PFTs done today were reviewed by me with the patient.  He also had a walk test done where the 6-minute walk distance was below the lower limit of normal.  No desaturation was noted.  FVC 3.94 (86%), FEV1 2.94 (87%) and the ratio is 75.  Total lung capacity 7.74 (100%).  The DLCO is 26.55 (95%).  My interpretation is that the spirometry, lung volumes and diffusion capacity are all within normal range.  Six-minute walk distance is decreased without any hypoxia.  In comparison to prior spirometry, no significant change, though FVC is decreased by 130 mL.  Total lung capacity is improved by 170 mL.        Assessment and plan: Assessment and plan: Mr. 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  He is currently on methotrexate and hydroxychloroquine with stable symptoms.  1.  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  We will continue to monitor.  Stable symptoms.  He is on Solu-Medrol b.i.d.  This prescription will be renewed.  2.  Sleep-disordered breathing, on bilevel ventilation at night.  3.  Decreased exercise capacity:  He walks with a cane.  This could be contributing.  I have encouraged him to participate in daily walks, which he is actually already doing.    I will see him back in 6 months.        This note consists of symbols derived from keyboarding, dictation and/or voice recognition software. As a result, there may be errors in the script that have gone undetected. Please consider this when interpreting information found in this chart

## 2022-10-20 LAB
DLCOUNC-%PRED-PRE: 95 %
DLCOUNC-PRE: 26.55 ML/MIN/MMHG
DLCOUNC-PRED: 27.68 ML/MIN/MMHG
ERV-%PRED-PRE: 48 %
ERV-PRE: 0.34 L
ERV-PRED: 0.71 L
EXPTIME-PRE: 8.36 SEC
FEF2575-%PRED-PRE: 89 %
FEF2575-PRE: 2.19 L/SEC
FEF2575-PRED: 2.44 L/SEC
FEFMAX-%PRED-PRE: 102 %
FEFMAX-PRE: 8.82 L/SEC
FEFMAX-PRED: 8.63 L/SEC
FEV1-%PRED-PRE: 87 %
FEV1-PRE: 2.94 L
FEV1FEV6-PRE: 76 %
FEV1FEV6-PRED: 77 %
FEV1FVC-PRE: 75 %
FEV1FVC-PRED: 75 %
FEV1SVC-PRE: 70 %
FEV1SVC-PRED: 65 %
FIFMAX-PRE: 7.49 L/SEC
FRCPLETH-%PRED-PRE: 98 %
FRCPLETH-PRE: 3.85 L
FRCPLETH-PRED: 3.91 L
FVC-%PRED-PRE: 86 %
FVC-PRE: 3.94 L
FVC-PRED: 4.54 L
IC-%PRED-PRE: 86 %
IC-PRE: 3.88 L
IC-PRED: 4.48 L
RVPLETH-%PRED-PRE: 124 %
RVPLETH-PRE: 3.51 L
RVPLETH-PRED: 2.83 L
TLCPLETH-%PRED-PRE: 100 %
TLCPLETH-PRE: 7.74 L
TLCPLETH-PRED: 7.74 L
VA-%PRED-PRE: 88 %
VA-PRE: 6.24 L
VC-%PRED-PRE: 81 %
VC-PRE: 4.23 L
VC-PRED: 5.19 L

## 2022-10-29 ENCOUNTER — HEALTH MAINTENANCE LETTER (OUTPATIENT)
Age: 72
End: 2022-10-29

## 2022-11-04 ENCOUNTER — LAB (OUTPATIENT)
Dept: LAB | Facility: CLINIC | Age: 72
End: 2022-11-04
Payer: MEDICARE

## 2022-11-04 DIAGNOSIS — Z79.631 LONG TERM METHOTREXATE USER: ICD-10-CM

## 2022-11-04 LAB
ALBUMIN SERPL BCG-MCNC: 4.2 G/DL (ref 3.5–5.2)
ALT SERPL W P-5'-P-CCNC: 18 U/L (ref 10–50)
AST SERPL W P-5'-P-CCNC: 21 U/L (ref 10–50)
CREAT SERPL-MCNC: 1.5 MG/DL (ref 0.67–1.17)
CRP SERPL-MCNC: 5.51 MG/L
ERYTHROCYTE [DISTWIDTH] IN BLOOD BY AUTOMATED COUNT: 14.4 % (ref 10–15)
GFR SERPL CREATININE-BSD FRML MDRD: 49 ML/MIN/1.73M2
HCT VFR BLD AUTO: 39.1 % (ref 40–53)
HGB BLD-MCNC: 12.7 G/DL (ref 13.3–17.7)
MCH RBC QN AUTO: 30.4 PG (ref 26.5–33)
MCHC RBC AUTO-ENTMCNC: 32.5 G/DL (ref 31.5–36.5)
MCV RBC AUTO: 94 FL (ref 78–100)
PLATELET # BLD AUTO: 154 10E3/UL (ref 150–450)
RBC # BLD AUTO: 4.18 10E6/UL (ref 4.4–5.9)
WBC # BLD AUTO: 3.2 10E3/UL (ref 4–11)

## 2022-11-04 PROCEDURE — 82565 ASSAY OF CREATININE: CPT | Performed by: PATHOLOGY

## 2022-11-04 PROCEDURE — 84450 TRANSFERASE (AST) (SGOT): CPT | Performed by: PATHOLOGY

## 2022-11-04 PROCEDURE — 82040 ASSAY OF SERUM ALBUMIN: CPT | Performed by: PATHOLOGY

## 2022-11-04 PROCEDURE — 36415 COLL VENOUS BLD VENIPUNCTURE: CPT | Performed by: PATHOLOGY

## 2022-11-04 PROCEDURE — 86140 C-REACTIVE PROTEIN: CPT | Performed by: PATHOLOGY

## 2022-11-04 PROCEDURE — 85027 COMPLETE CBC AUTOMATED: CPT | Performed by: PATHOLOGY

## 2022-11-04 PROCEDURE — 84460 ALANINE AMINO (ALT) (SGPT): CPT | Performed by: PATHOLOGY

## 2022-11-06 ENCOUNTER — MYC MEDICAL ADVICE (OUTPATIENT)
Dept: RHEUMATOLOGY | Facility: CLINIC | Age: 72
End: 2022-11-06

## 2022-11-07 NOTE — TELEPHONE ENCOUNTER
"Follow up call to patient to discuss his symptoms and recommendations from Dr. Goodrich.    Patient stated he read the message from Dr. Goodrich. He will decrease the methotrexate as directed and complete lab work in late December when they return to [place].  Patient shared that they are flying to [place] tomorrow.  This RN discussed his symptoms and if he is safe to fly.  Patient stated that the symptoms he discussed in his MyChart message, he does not have all the time, \"just off and on\". Patient stated that he had all they bladder symptoms and discomfort prior to radiation. Patient just recently completed his radiation, he also had a colonoscopy during the time he was on radiation.   Patient stated that he is scheduled to see his Urologist the end of Dec when they return to [place].  When this RN asked further about the fatigue, dizziness, chills and poor appetite, patient again confirmed that they are just off and on symptoms, he had some of them prior to his radiation treatment. Patient feels he is safe to fly tomorrow to [place].  Patient confirmed that he has a PCP in [place] and will reach out to them if he feels worse in [place] or \"I will go to the local ER\".   This RN asked many questions about the \"rectal bleeding\"\" and is it surface irritation/hemorrhoids or actual blood in his stool. Patient feels that it is just irritation following radiation.  This RN stressed to monitor it closely and to be seen immediately if he sees blood in his stool and how that could also present as fatigue, dizziness, poor appetite.     Patient verbalized understanding and confirmed that he will see a physician in [place] if his symptoms do not improve.    Mayelin Miller, ANTOINEN, RN  RN Care Coordinator Rheumatology      "

## 2022-12-02 DIAGNOSIS — J44.81 OBLITERATIVE BRONCHIOLITIS (H): Primary | ICD-10-CM

## 2022-12-05 ENCOUNTER — TELEPHONE (OUTPATIENT)
Dept: NEUROLOGY | Facility: CLINIC | Age: 72
End: 2022-12-05

## 2022-12-05 DIAGNOSIS — I10 ESSENTIAL HYPERTENSION: ICD-10-CM

## 2022-12-05 RX ORDER — METOPROLOL SUCCINATE 50 MG/1
TABLET, EXTENDED RELEASE ORAL
Qty: 135 TABLET | Refills: 3 | OUTPATIENT
Start: 2022-12-05

## 2022-12-05 NOTE — TELEPHONE ENCOUNTER
Prior Authorization Retail Medication Request    Medication/Dose: Pregabalin 50MG Caps  ICD code:  R25.1  Previously Tried and Failed:    Rationale:      Insurance Name: MEDICARE  Insurance ID: [id number]        Pharmacy Information   Name: [id number] - [place], [place] - [address]  AT [address]  Phone: [phone]

## 2022-12-05 NOTE — TELEPHONE ENCOUNTER
[facility] Call Center    Phone Message    May a detailed message be left on voicemail: yes     Reason for Call: Medication Refill Request    Has the patient contacted the pharmacy for the refill? Yes   Name of medication being requested: oxybutynin (DITROPAN) 5 MG tablet & pregabalin (LYRICA) 50 MG capsule  Provider who prescribed the medication: Thong Montes  Pharmacy: [facility] #[id number] - [place], [place] - [address]  AT [address]  Date medication is needed: asap     Patient is requesting a prior authorization for the two medications listed above for 2023 before he goes to [place] till April. Please call patient back to advise at # [phone]      Action Taken: Message routed to:  [facility] neurology     Travel Screening: Not Applicable

## 2022-12-05 NOTE — TELEPHONE ENCOUNTER
Patient needs to obtain authorization from his PCP as this is prescribed for HTN.     Sonia Lee RN

## 2022-12-05 NOTE — TELEPHONE ENCOUNTER
Charis can you submit a prior authorization for pregabalin? Oxybutynin is prescribed by Dr. Heladio Calixto. Please let patient know Dr. Hassan team will need to subit a PA for that medication.

## 2022-12-06 NOTE — TELEPHONE ENCOUNTER
Central Prior Authorization Team   Phone: [phone]    PA Initiation    Medication: Pregabalin 50MG Caps  Insurance Company: [employer] Part D - Phone [phone] Fax [phone]  Pharmacy Filling the Rx: [facility][id number] - [place], [place] - [address]  AT [address]  Filling Pharmacy Phone: [phone]  Filling Pharmacy Fax:    Start Date: 12/6/2022

## 2022-12-06 NOTE — TELEPHONE ENCOUNTER
Prior Authorization Not Needed per Insurance    Medication: Pregabalin 50MG Caps  Insurance Company: [employer] Part D - Phone [phone] Fax [phone]  Expected CoPay:      Pharmacy Filling the Rx: [facility][id number] - [place], [place] - [address]  AT [address]  Pharmacy Notified: Yes  Patient Notified: Yes    Medication and quantity are covered under plan.  Per call to insurance rep gets paid test claim.   Called [facility], they were not requesting a PA be done.  Patient filled medication last on 9/26, due to fill again at the end of the month.  Closing encounter.

## 2022-12-07 ENCOUNTER — TELEPHONE (OUTPATIENT)
Dept: PULMONOLOGY | Facility: CLINIC | Age: 72
End: 2022-12-07

## 2022-12-07 ENCOUNTER — MYC MEDICAL ADVICE (OUTPATIENT)
Dept: PEDIATRICS | Facility: CLINIC | Age: 72
End: 2022-12-07

## 2022-12-07 DIAGNOSIS — N39.498 OTHER URINARY INCONTINENCE: ICD-10-CM

## 2022-12-07 NOTE — TELEPHONE ENCOUNTER
Prior Authorization Retail Medication Request    Medication/Dose: Serevent Diskus 50 MCG  ICD code (if different than what is on RX):    Previously Tried and Failed:    Rationale:      Insurance Name:    Insurance ID:        Pharmacy Information (if different than what is on RX)  Name:    Phone:

## 2022-12-07 NOTE — TELEPHONE ENCOUNTER
Prior Authorization Not Needed per Insurance        Medication: Serevent Diskus 50 MCG-PA NOT NEEDED   Insurance Company: [employer] Part D - Phone [phone] Fax [phone]  Expected CoPay:      Pharmacy Filling the Rx: [facility] #[id number] - [place], [place] - [address]  AT [address]  Pharmacy Notified: Yes  Patient Notified: No    Called pharmacy and pharmacy stated that PA is Not Needed and medication is covered. Pharmacy stated that they have a paid claim on medication and place an order for medication which will be available later on today (12/7/20/2022). Pharmacy will notify the patient when medication is ready for . Insurance also stated that PA is Not Needed and medication is covered.

## 2022-12-08 NOTE — TELEPHONE ENCOUNTER
In cases like this, please clarify (1) what dose he is actually taking, and (2) whether it is working for him at this dose.   Then you could selina-up the prescription with the appropriate sig

## 2022-12-11 ENCOUNTER — MYC MEDICAL ADVICE (OUTPATIENT)
Dept: RHEUMATOLOGY | Facility: CLINIC | Age: 72
End: 2022-12-11

## 2022-12-11 DIAGNOSIS — G62.9 PERIPHERAL POLYNEUROPATHY: ICD-10-CM

## 2022-12-12 RX ORDER — HYDROXYCHLOROQUINE SULFATE 200 MG/1
200 TABLET, FILM COATED ORAL DAILY
Qty: 90 TABLET | Refills: 5 | Status: CANCELLED | OUTPATIENT
Start: 2022-12-12

## 2022-12-12 RX ORDER — OXYBUTYNIN CHLORIDE 5 MG/1
5 TABLET ORAL 3 TIMES DAILY
Qty: 270 TABLET | Refills: 3 | OUTPATIENT
Start: 2022-12-12

## 2022-12-12 NOTE — TELEPHONE ENCOUNTER
Dr. Calixto: We are needing clarification on the sig NOT dose: Sig: Take 1 tablet (5 mg) by mouth 3 times daily TAKE 1 TABLET BY MOUTH EVERY DAY AT 4PM    Per patient: was having OAB issues so pcp instructed to increase up to TID, but as of late he's only taking 1 tab daily. And then will increase PRN. Usually only 1-2 days he'd need to increase to control OAB.   --------------    However- pt has enough because patient's neurologist filled short fill to last untl upcoming appointment with Dr. Calixto.

## 2022-12-13 ENCOUNTER — TRANSFERRED RECORDS (OUTPATIENT)
Dept: HEALTH INFORMATION MANAGEMENT | Facility: CLINIC | Age: 72
End: 2022-12-13

## 2022-12-15 DIAGNOSIS — I10 ESSENTIAL HYPERTENSION: ICD-10-CM

## 2022-12-16 RX ORDER — METOPROLOL SUCCINATE 50 MG/1
TABLET, EXTENDED RELEASE ORAL
Qty: 135 TABLET | Refills: 0 | Status: SHIPPED | OUTPATIENT
Start: 2022-12-16 | End: 2022-12-20

## 2022-12-16 SDOH — HEALTH STABILITY: PHYSICAL HEALTH: ON AVERAGE, HOW MANY MINUTES DO YOU ENGAGE IN EXERCISE AT THIS LEVEL?: 20 MIN

## 2022-12-16 SDOH — ECONOMIC STABILITY: INCOME INSECURITY: IN THE LAST 12 MONTHS, WAS THERE A TIME WHEN YOU WERE NOT ABLE TO PAY THE MORTGAGE OR RENT ON TIME?: NO

## 2022-12-16 SDOH — ECONOMIC STABILITY: FOOD INSECURITY: WITHIN THE PAST 12 MONTHS, THE FOOD YOU BOUGHT JUST DIDN'T LAST AND YOU DIDN'T HAVE MONEY TO GET MORE.: NEVER TRUE

## 2022-12-16 SDOH — HEALTH STABILITY: PHYSICAL HEALTH: ON AVERAGE, HOW MANY DAYS PER WEEK DO YOU ENGAGE IN MODERATE TO STRENUOUS EXERCISE (LIKE A BRISK WALK)?: 3 DAYS

## 2022-12-16 SDOH — ECONOMIC STABILITY: INCOME INSECURITY: HOW HARD IS IT FOR YOU TO PAY FOR THE VERY BASICS LIKE FOOD, HOUSING, MEDICAL CARE, AND HEATING?: NOT HARD AT ALL

## 2022-12-16 SDOH — ECONOMIC STABILITY: FOOD INSECURITY: WITHIN THE PAST 12 MONTHS, YOU WORRIED THAT YOUR FOOD WOULD RUN OUT BEFORE YOU GOT MONEY TO BUY MORE.: NEVER TRUE

## 2022-12-16 ASSESSMENT — ENCOUNTER SYMPTOMS
HEMATOCHEZIA: 0
FREQUENCY: 0
EYE PAIN: 0
DIZZINESS: 0
FEVER: 0
SHORTNESS OF BREATH: 0
NERVOUS/ANXIOUS: 0
HEADACHES: 0
PALPITATIONS: 0
HEARTBURN: 0
ARTHRALGIAS: 0
ABDOMINAL PAIN: 0
NAUSEA: 0
CONSTIPATION: 0
PARESTHESIAS: 0
COUGH: 0
DIARRHEA: 0
SORE THROAT: 0
JOINT SWELLING: 0
CHILLS: 0
WEAKNESS: 0
MYALGIAS: 0
HEMATURIA: 0
DYSURIA: 0

## 2022-12-16 ASSESSMENT — SOCIAL DETERMINANTS OF HEALTH (SDOH)
DO YOU BELONG TO ANY CLUBS OR ORGANIZATIONS SUCH AS CHURCH GROUPS UNIONS, FRATERNAL OR ATHLETIC GROUPS, OR SCHOOL GROUPS?: YES
HOW OFTEN DO YOU ATTEND CHURCH OR RELIGIOUS SERVICES?: MORE THAN 4 TIMES PER YEAR
HOW OFTEN DO YOU GET TOGETHER WITH FRIENDS OR RELATIVES?: ONCE A WEEK
IN A TYPICAL WEEK, HOW MANY TIMES DO YOU TALK ON THE PHONE WITH FAMILY, FRIENDS, OR NEIGHBORS?: TWICE A WEEK

## 2022-12-16 ASSESSMENT — LIFESTYLE VARIABLES
HOW OFTEN DO YOU HAVE A DRINK CONTAINING ALCOHOL: NEVER
HOW OFTEN DO YOU HAVE SIX OR MORE DRINKS ON ONE OCCASION: NEVER
HOW MANY STANDARD DRINKS CONTAINING ALCOHOL DO YOU HAVE ON A TYPICAL DAY: PATIENT DOES NOT DRINK
SKIP TO QUESTIONS 9-10: 1
AUDIT-C TOTAL SCORE: 0

## 2022-12-16 ASSESSMENT — ACTIVITIES OF DAILY LIVING (ADL): CURRENT_FUNCTION: NO ASSISTANCE NEEDED

## 2022-12-19 ENCOUNTER — LAB (OUTPATIENT)
Dept: LAB | Facility: CLINIC | Age: 72
End: 2022-12-19
Payer: MEDICARE

## 2022-12-19 DIAGNOSIS — C61 PROSTATE CANCER (H): ICD-10-CM

## 2022-12-19 DIAGNOSIS — Z79.631 LONG TERM METHOTREXATE USER: ICD-10-CM

## 2022-12-19 LAB
ALBUMIN SERPL BCG-MCNC: 4.3 G/DL (ref 3.5–5.2)
ALT SERPL W P-5'-P-CCNC: 17 U/L (ref 10–50)
AST SERPL W P-5'-P-CCNC: 19 U/L (ref 10–50)
CREAT SERPL-MCNC: 1.22 MG/DL (ref 0.67–1.17)
CRP SERPL-MCNC: 8.79 MG/L
ERYTHROCYTE [DISTWIDTH] IN BLOOD BY AUTOMATED COUNT: 14.5 % (ref 10–15)
GFR SERPL CREATININE-BSD FRML MDRD: 62 ML/MIN/1.73M2
HCT VFR BLD AUTO: 38.6 % (ref 40–53)
HGB BLD-MCNC: 12.5 G/DL (ref 13.3–17.7)
MCH RBC QN AUTO: 30.8 PG (ref 26.5–33)
MCHC RBC AUTO-ENTMCNC: 32.4 G/DL (ref 31.5–36.5)
MCV RBC AUTO: 95 FL (ref 78–100)
PLATELET # BLD AUTO: 182 10E3/UL (ref 150–450)
PSA SERPL-MCNC: 2.01 NG/ML (ref 0–6.5)
RBC # BLD AUTO: 4.06 10E6/UL (ref 4.4–5.9)
WBC # BLD AUTO: 4 10E3/UL (ref 4–11)

## 2022-12-19 PROCEDURE — 86140 C-REACTIVE PROTEIN: CPT | Performed by: PATHOLOGY

## 2022-12-19 PROCEDURE — 84460 ALANINE AMINO (ALT) (SGPT): CPT | Performed by: PATHOLOGY

## 2022-12-19 PROCEDURE — 85027 COMPLETE CBC AUTOMATED: CPT | Performed by: PATHOLOGY

## 2022-12-19 PROCEDURE — 36415 COLL VENOUS BLD VENIPUNCTURE: CPT | Performed by: PATHOLOGY

## 2022-12-19 PROCEDURE — 82565 ASSAY OF CREATININE: CPT | Performed by: PATHOLOGY

## 2022-12-19 PROCEDURE — 84450 TRANSFERASE (AST) (SGOT): CPT | Performed by: PATHOLOGY

## 2022-12-19 PROCEDURE — 84153 ASSAY OF PSA TOTAL: CPT | Performed by: PATHOLOGY

## 2022-12-19 PROCEDURE — 82040 ASSAY OF SERUM ALBUMIN: CPT | Performed by: PATHOLOGY

## 2022-12-19 NOTE — TELEPHONE ENCOUNTER
Patient is requesting a call.  Wants to discuss the findings of his appointment this morning with spinal surgeon at [facility], Dr Cuong Khan.  Please call.  OK to leave message on voicemail.   25 yo  with LMP mid-August, estimated 8-9w GA by reported +fetal heart beat on sono in  who presents to ED for vaginal bleeding. TVUS US was done on  which showed A single confluent intrauterine fluid collection, likely representing a gestational sac with mean sac diameter greater than 2.5 cm. No fetal pole or yolk sac is identified.   pt is Rh positive on 2018  will obtain labs and HCG and TVUS

## 2022-12-20 ENCOUNTER — OFFICE VISIT (OUTPATIENT)
Dept: PEDIATRICS | Facility: CLINIC | Age: 72
End: 2022-12-20
Payer: MEDICARE

## 2022-12-20 VITALS
WEIGHT: 284.4 LBS | HEART RATE: 70 BPM | BODY MASS INDEX: 37.69 KG/M2 | RESPIRATION RATE: 18 BRPM | OXYGEN SATURATION: 99 % | DIASTOLIC BLOOD PRESSURE: 74 MMHG | SYSTOLIC BLOOD PRESSURE: 118 MMHG | TEMPERATURE: 97.3 F | HEIGHT: 73 IN

## 2022-12-20 DIAGNOSIS — Z79.899 HIGH RISK MEDICATION USE: ICD-10-CM

## 2022-12-20 DIAGNOSIS — M06.09 RHEUMATOID ARTHRITIS OF MULTIPLE SITES WITH NEGATIVE RHEUMATOID FACTOR (H): ICD-10-CM

## 2022-12-20 DIAGNOSIS — N39.498 OTHER URINARY INCONTINENCE: ICD-10-CM

## 2022-12-20 DIAGNOSIS — I10 ESSENTIAL HYPERTENSION: ICD-10-CM

## 2022-12-20 DIAGNOSIS — J45.909 UNCOMPLICATED ASTHMA, UNSPECIFIED ASTHMA SEVERITY, UNSPECIFIED WHETHER PERSISTENT: ICD-10-CM

## 2022-12-20 DIAGNOSIS — I10 PRIMARY HYPERTENSION: ICD-10-CM

## 2022-12-20 DIAGNOSIS — D84.9 IMMUNOSUPPRESSED STATUS (H): ICD-10-CM

## 2022-12-20 DIAGNOSIS — Z00.00 ENCOUNTER FOR ANNUAL WELLNESS EXAM IN MEDICARE PATIENT: Primary | ICD-10-CM

## 2022-12-20 PROBLEM — R97.20 ELEVATED PROSTATE SPECIFIC ANTIGEN (PSA): Status: RESOLVED | Noted: 2021-12-20 | Resolved: 2022-12-20

## 2022-12-20 PROCEDURE — 99397 PER PM REEVAL EST PAT 65+ YR: CPT | Performed by: INTERNAL MEDICINE

## 2022-12-20 RX ORDER — METOPROLOL SUCCINATE 50 MG/1
TABLET, EXTENDED RELEASE ORAL
Qty: 135 TABLET | Refills: 3 | Status: SHIPPED | OUTPATIENT
Start: 2022-12-20 | End: 2023-12-22

## 2022-12-20 RX ORDER — FOLIC ACID 1 MG/1
1 TABLET ORAL DAILY
Qty: 90 TABLET | Refills: 3 | Status: SHIPPED | OUTPATIENT
Start: 2022-12-20 | End: 2023-04-21

## 2022-12-20 RX ORDER — LISINOPRIL AND HYDROCHLOROTHIAZIDE 20; 25 MG/1; MG/1
1 TABLET ORAL DAILY
Qty: 90 TABLET | Refills: 3 | Status: SHIPPED | OUTPATIENT
Start: 2022-12-20 | End: 2023-12-26

## 2022-12-20 RX ORDER — ALBUTEROL SULFATE 90 UG/1
2 AEROSOL, METERED RESPIRATORY (INHALATION) EVERY 4 HOURS PRN
Qty: 6.7 G | Refills: 1 | Status: SHIPPED | OUTPATIENT
Start: 2022-12-20 | End: 2023-10-20

## 2022-12-20 RX ORDER — OXYBUTYNIN CHLORIDE 5 MG/1
5 TABLET ORAL 2 TIMES DAILY
Qty: 180 TABLET | Refills: 3 | Status: SHIPPED | OUTPATIENT
Start: 2022-12-20 | End: 2023-04-10

## 2022-12-20 ASSESSMENT — ENCOUNTER SYMPTOMS
PALPITATIONS: 0
HEMATOCHEZIA: 0
EYE PAIN: 0
WEAKNESS: 0
DIARRHEA: 0
PARESTHESIAS: 0
JOINT SWELLING: 0
FREQUENCY: 0
DIZZINESS: 0
HEARTBURN: 0
CONSTIPATION: 0
ARTHRALGIAS: 0
SORE THROAT: 0
HEADACHES: 0
DYSURIA: 0
MYALGIAS: 0
NAUSEA: 0
FEVER: 0
CHILLS: 0
NERVOUS/ANXIOUS: 0
COUGH: 0
HEMATURIA: 0
ABDOMINAL PAIN: 0
SHORTNESS OF BREATH: 0

## 2022-12-20 ASSESSMENT — ACTIVITIES OF DAILY LIVING (ADL): CURRENT_FUNCTION: NO ASSISTANCE NEEDED

## 2022-12-20 ASSESSMENT — PAIN SCALES - GENERAL: PAINLEVEL: NO PAIN (0)

## 2022-12-20 NOTE — PROGRESS NOTES
"SUBJECTIVE:   Syed is a 72 year old who presents for Preventive Visit.    Patient has been advised of split billing requirements and indicates understanding: Yes  Are you in the first 12 months of your Medicare coverage?  No    Healthy Habits:     In general, how would you rate your overall health?  Good    Frequency of exercise:  2-3 days/week    Duration of exercise:  15-30 minutes    Do you usually eat at least 4 servings of fruit and vegetables a day, include whole grains    & fiber and avoid regularly eating high fat or \"junk\" foods?  No    Taking medications regularly:  Yes    Medication side effects:  None    Ability to successfully perform activities of daily living:  No assistance needed    Home Safety:  No safety concerns identified    Hearing Impairment:  Difficulty following a conversation in a noisy restaurant or crowded room    In the past 6 months, have you been bothered by leaking of urine? Yes    In general, how would you rate your overall mental or emotional health?  Good      PHQ-2 Total Score: 0    Additional concerns today:  No  Leaving for [place] soon and will not be back until April States oxybutinin was taken over by Dr Calixto - questions about dosing  Has question about possible dental surgery  Wants order for bloodwork while in [place]    Have you ever done Advance Care Planning? (For example, a Health Directive, POLST, or a discussion with a medical provider or your loved ones about your wishes): Yes, advance care planning is on file.    Fall risk  Fallen 2 or more times in the past year?: Yes  Any fall with injury in the past year?: No  click delete button to remove this line now    Cognitive Screening   1) Repeat 3 items (Leader, Season, Table)    2) Clock draw: ABNORMAL .  3) 3 item recall: Recalls 3 objects  Results: ABNORMAL clock, 1-2 items recalled: PROBABLE COGNITIVE IMPAIRMENT, **INFORM PROVIDER**    Urology:  Finished xrt; no other chemo or hormonal treatment to be given.  "     Still sees pulmonology; every 3-6 months.  For BOOP and asthma.      Still sees rheum for RA     Still sees neurology for PD.     Recently main issue is spine pain and discomfort.  Considering nerve block or ?rhizotomy?  Main symptoms are numbness in legs.To discuss treatment options again in April.     To get wisdom teeth extracted.  May need preoperative evaluation. ON no meds that need changing or holding, and no anticoagulants.     [employer] Copyright IDALIA Cárdenas. Licensed by the author for use in [facility]; reprinted with permission (raul@[facility]). All rights reserved.      Do you have sleep apnea, excessive snoring or daytime drowsiness?: yes    Reviewed and updated as needed this visit by clinical staff   Tobacco  Allergies  Meds              Reviewed and updated as needed this visit by Provider                 Social History     Tobacco Use     Smoking status: Former     Packs/day: 1.50     Years: 37.00     Pack years: 55.50     Types: Cigarettes     Start date: 6/15/1963     Quit date: 2000     Years since quittin.2     Smokeless tobacco: Never   Substance Use Topics     Alcohol use: Not Currently     Alcohol Use 2022   Prescreen: >3 drinks/day or >7 drinks/week? Not Applicable         Current providers sharing in care for this patient include:   Patient Care Team:  Heladio Calixto MD as PCP - General (Internal Medicine - Pediatrics)  Jeremy Goodrich MD as MD (Rheumatology)  Benjamin Ordoñez MD as MD (Dermapathology)  Kristen Martinez MD as MD (Family Medicine, Sleep Medicine)  Rhett Shah MD as Surgeon (Surgery)  Sin Snow APRN CNP as Nurse Practitioner (Nurse Practitioner)  Leonel Farnsworth MD as MD (Ophthalmology)  Gómez Velásquez DPM (Podiatry)  Rodney Ríos DPM (Podiatry)  Heladio Gonzalez MD as MD (Neurology)  Robb Zavala MD as MD (Ophthalmology)  Bipin Villatoro MD as MD (Family Practice)  Jyoti  Thong Stevens MD as MD (Neurology)  Harsha Mccarthy MD as MD (Pulmonary Disease)  Sin Snow APRN CNP as Referring Physician (Nurse Practitioner)  Jeremy Goodrich MD as MD (Rheumatology)  Ramón Jeronimo MD as MD (Family Medicine - Sports Medicine)  Ramón Camargo MD as MD (Hand Surgery)  Rodney Garcia MD as MD (Clinical Neurophysiology)  Jeremy Goodrich MD as Assigned Rheumatology Provider  Heladio Calixto MD as Assigned PCP  Heladio Calixto MD as Referring Physician (Internal Medicine - Pediatrics)  Silviano Vargas MD as MD (Urology)  Kristen Martinez MD as Assigned Sleep Provider  Silviano Vargas MD as Assigned Surgical Provider  Ramón Bennett MD as Assigned Neuroscience Provider  Rodney Ríos DPM as Assigned Musculoskeletal Provider    The following health maintenance items are reviewed in Epic and correct as of today:  Health Maintenance   Topic Date Due     COPD ACTION PLAN  Never done     ZOSTER IMMUNIZATION (2 of 2) 12/13/2018     EYE EXAM  07/13/2021     ANNUAL REVIEW OF HM ORDERS  09/30/2022     MEDICARE ANNUAL WELLNESS VISIT  12/17/2022     FALL RISK ASSESSMENT  12/20/2023     LIPID  11/12/2025     ADVANCE CARE PLANNING  12/20/2027     DTAP/TDAP/TD IMMUNIZATION (4 - Td or Tdap) 08/21/2028     SPIROMETRY  Completed     HEPATITIS C SCREENING  Completed     PHQ-2 (once per calendar year)  Completed     INFLUENZA VACCINE  Completed     Pneumococcal Vaccine: 65+ Years  Completed     AORTIC ANEURYSM SCREENING (SYSTEM ASSIGNED)  Completed     COVID-19 Vaccine  Completed     IPV IMMUNIZATION  Aged Out     MENINGITIS IMMUNIZATION  Aged Out     COLORECTAL CANCER SCREENING  Discontinued     Lab work is in process  Labs reviewed in EPIC  BP Readings from Last 3 Encounters:   12/20/22 118/74   10/19/22 115/76   10/03/22 (!) 150/85    Wt Readings from Last 3 Encounters:   12/20/22 129 kg (284 lb 6.4 oz)   10/19/22 129.7 kg (286 lb)   10/03/22 130.2  kg (287 lb)                  Patient Active Problem List   Diagnosis     Diaphragmatic hernia     Esophageal reflux     Hx of melanoma of skin     Malignant basal cell neoplasm of skin     GALO (obstructive sleep apnea)     Chronic maxillary sinusitis     Urinary incontinence     Sicca syndrome (H)     Tremor     Incomplete defecation     AK (actinic keratosis)     Abnormal EMG     Seronegative arthritis     Adenomatous polyp of colon     Peripheral polyneuropathy     Morbid obesity (H)     Neuromuscular disease (H)     Bronchiolitis obliterans (H)     Hypertension     Rheumatoid arthritis (H)     Immunosuppressed status (H)     PLMD (periodic limb movement disorder)     Prostate cancer (H)     Lumbosacral radiculopathy     Facet arthropathy, lumbosacral     Spondylolisthesis of lumbar region     Lumbosacral spinal stenosis     Past Surgical History:   Procedure Laterality Date     BIOPSY OF SKIN LESION       COLONOSCOPY       COLONOSCOPY N/A 2022    Procedure: COLONOSCOPY (fv) polypectomy using jumbo bx forcep;  Surgeon: Gómez Mckinney MD;  Location:  GI     CYSTOSCOPY  Many years ago    Blood in urine/ bladder cystoscopy     ESOPHAGOSCOPY, GASTROSCOPY, DUODENOSCOPY (EGD), COMBINED N/A 2022    Procedure: ESOPHAGOGASTRODUODENOSCOPY (EGD) (fv) biopsies using cold bx forcep;  Surgeon: Gómez Mckinney MD;  Location:  GI     HEMORRHOID SURGERY       lip biopsy      for sicca complex     MOHS MICROGRAPHIC PROCEDURE       SOFT TISSUE SURGERY      removeal of basel cell carcinoma       Social History     Tobacco Use     Smoking status: Former     Packs/day: 1.50     Years: 37.00     Pack years: 55.50     Types: Cigarettes     Start date: 6/15/1963     Quit date: 2000     Years since quittin.2     Smokeless tobacco: Never   Substance Use Topics     Alcohol use: Not Currently     Family History   Problem Relation Age of Onset     Hypertension Mother      Heart Disease Mother         a fib      "Arthritis Mother         \"osteo\"     Osteoporosis Mother      Skin Cancer Mother      Uterine Cancer Mother      Other Cancer Mother      Cancer Mother      Hypertension Brother      Diabetes Brother         \" post pancreatitis\"     Neurologic Disorder Sister         multiple sclerosis     Hypertension Sister      Heart Disease Sister      Multiple Sclerosis Sister      Heart Disease Sister         Chf     Arthritis Sister      Heart Failure Sister      Kidney Disease Sister      Dementia Sister      Hypertension Father      Cerebrovascular Disease Father         ,     Skin Cancer Father      Colon Polyps Father      Heart Disease Father      Other - See Comments Son         [place]     Other - See Comments Abdon wagner     Other - See Comments Abdon gonzalez     Psoriasis Maternal Grandfather      Stomach Cancer Maternal Grandfather      Cancer Maternal Grandfather      Congenital Anomalies Other         granddaughter with a chromosome defect     Other Cancer Other         Grand daughter     Cancer Other         Grand daughter     Cerebrovascular Disease Paternal Grandmother         ,     Cerebrovascular Disease Paternal Grandfather         ,     Cancer Granddaughter         Langerhans Cell Histiocytosis     Genetic Disease Granddaughter         gene translocation     Learning Disorder Other         Gene translocation     Melanoma No family hx of      Colon Cancer No family hx of          Current Outpatient Medications   Medication Sig Dispense Refill     acetaminophen (TYLENOL) 500 MG tablet 2 x 500mg by mouth 3 times per day: 7/730am, 4pm and 11pm  = 6/day       albuterol (PROAIR HFA/PROVENTIL HFA/VENTOLIN HFA) 108 (90 Base) MCG/ACT inhaler Inhale 2 puffs into the lungs every 4 hours as needed for shortness of breath or wheezing 6.7 g 1     amLODIPine (NORVASC) 5 MG tablet Take 1 tablet (5 mg) by mouth daily 90 tablet 3     cholecalciferol ( VITAMIN D3) 25 MCG (1000 UT) TABS 1000 " unit tab by mouth daily       folic acid (FOLVITE) 1 MG tablet Take 1 tablet (1 mg) by mouth daily 90 tablet 3     hydroxychloroquine (PLAQUENIL) 200 MG tablet Take 1 tablet (200 mg) by mouth daily Daily at 9am. 90 tablet 5     lisinopril-hydrochlorothiazide (ZESTORETIC) 20-25 MG tablet Take 1 tablet by mouth daily 90 tablet 3     methotrexate sodium 2.5 MG TABS Take 8 tablets (20 mg) by mouth every 7 days *Monitoring labs every 8-12 weeks 96 tablet 3     metoprolol succinate ER (TOPROL XL) 50 MG 24 hr tablet 1 and 1/2 tabs daily. 135 tablet 3     omeprazole (PRILOSEC) 40 MG DR capsule Take 1 capsule (40 mg) by mouth daily 90 capsule 3     ORDER FOR DME Use your BiPAP device as directed by your provider.       oxybutynin (DITROPAN) 5 MG tablet Take 1 tablet (5 mg) by mouth 2 times daily 180 tablet 3     pregabalin (LYRICA) 50 MG capsule 50mg capsule by mouth at 7am and 4pm and 1-2 x 50mg capsules by mouth nightly at 11pm (4/day) 360 capsule 3     salmeterol (SEREVENT DISKUS) 50 MCG/ACT inhaler Inhale 1 puff into the lungs 2 times daily 60 each 11     Allergies   Allergen Reactions     Restasis      Burning eyes, problems with breathing, tightness in chest     Adhesive Tape      Bandages misc     Allegra      EXCESSIVE URINATION AND WEAKNESS, LIGHT-HEADED     Allergy      Dust     Animal Dander      Benadryl Allergy      EXCESSIVE URINATION AND WEAKNESS, LIGHT-HEADED     Cephalexin      Joint pain or gerd aggravation. Bloating excessive urination      Cephalosporins      Cyclosporine      Diphenhydramine Other (See Comments)     Doxycycline      Doxycycline Hyclate Nausea     SWEATING,MIGRAINES,LOSS OF APPETITE,SWEATING,LIGHT HEADED, EXCESSIVE URINATION     Fexofenadine Other (See Comments)     Flonase [Fluticasone Propionate]      Gabapentin      Neurontin: mosd changes and excess urination     Hydrocortisone      Iodine      Iodine Solution [Povidone Iodine]      SKIN MELTS     Levaquin      From surgeon--? Joint  pain ? Gerd aggravation. Insomnia, excess urination     Levofloxacin      Mylanta      EXCESSIVE URINATION AND WEAKNESS, LIGHT-HEADED     Oxcarbazepine      Prednisone      Weakness, elevated bp, headache, eye pain, congestion      Prednisone      Seafood [Seafood]      Shellfish Allergy      Hives       Simethicone      Sulfamethoxazole W/Trimethoprim      Sulfamethoxazole-Trimethoprim      Chest pain, angina     Symbicort GI Disturbance and Nausea     Trees      Trileptal      SEVERE JOINT AND TENDON PAIN, INSOMNIA, RESTLESSNESS, NAUSEA, EXCESS URINATION     Cortizone Rash     EXCESS URINATION,WEAKNESS,NAUSEA, HEADACHE             Review of Systems   Constitutional: Negative for chills and fever.   HENT: Negative for congestion, ear pain, hearing loss and sore throat.    Eyes: Negative for pain and visual disturbance.   Respiratory: Negative for cough and shortness of breath.    Cardiovascular: Negative for chest pain, palpitations and peripheral edema.   Gastrointestinal: Negative for abdominal pain, constipation, diarrhea, heartburn, hematochezia and nausea.   Genitourinary: Negative for dysuria, frequency, genital sores, hematuria, impotence, penile discharge and urgency.   Musculoskeletal: Negative for arthralgias, joint swelling and myalgias.   Skin: Negative for rash.   Neurological: Negative for dizziness, weakness, headaches and paresthesias.   Psychiatric/Behavioral: The patient is not nervous/anxious.      CONSTITUTIONAL: NEGATIVE for fever, chills, change in weight  INTEGUMENTARY/SKIN: NEGATIVE for worrisome rashes, moles or lesions  EYES: NEGATIVE for vision changes or irritation  ENT/MOUTH: NEGATIVE for ear, mouth and throat problems  RESP: NEGATIVE for significant cough or SOB  BREAST: NEGATIVE for masses, tenderness or discharge  CV: NEGATIVE for chest pain, palpitations or peripheral edema  GI: NEGATIVE for nausea, abdominal pain, heartburn, or change in bowel habits  : NEGATIVE for frequency,  "dysuria, or hematuria  MUSCULOSKELETAL: NEGATIVE for significant arthralgias or myalgia  NEURO: NEGATIVE for weakness, dizziness or paresthesias  ENDOCRINE: NEGATIVE for temperature intolerance, skin/hair changes  HEME: NEGATIVE for bleeding problems  PSYCHIATRIC: NEGATIVE for changes in mood or affect    OBJECTIVE:   /74 (BP Location: Right arm, Cuff Size: Adult Large)   Pulse 70   Temp 97.3  F (36.3  C) (Tympanic)   Resp 18   Ht 1.857 m (6' 1.1\")   Wt 129 kg (284 lb 6.4 oz)   SpO2 99%   BMI 37.42 kg/m   Estimated body mass index is 37.42 kg/m  as calculated from the following:    Height as of this encounter: 1.857 m (6' 1.1\").    Weight as of this encounter: 129 kg (284 lb 6.4 oz).  Physical Exam  GENERAL: healthy, alert and no distress  EYES: Eyes grossly normal to inspection, PERRL and conjunctivae and sclerae normal  HENT: ear canals and TM's normal, nose and mouth without ulcers or lesions  NECK: no adenopathy, no asymmetry, masses, or scars and thyroid normal to palpation  RESP: lungs clear to auscultation - no rales, rhonchi or wheezes  CV: regular rate and rhythm, normal S1 S2, no S3 or S4, no murmur, click or rub, no peripheral edema and peripheral pulses strong  ABDOMEN: soft, nontender, no hepatosplenomegaly, no masses and bowel sounds normal  MS: no gross musculoskeletal defects noted, no edema  SKIN: no suspicious lesions or rashes  NEURO: Normal strength and tone, mentation intact and speech normal  PSYCH: mentation appears normal, affect normal/bright    Diagnostic Test Results:  Labs reviewed in Epic    ASSESSMENT / PLAN:   (Z00.00) Encounter for annual wellness exam in Medicare patient  (primary encounter diagnosis)  Comment:   Plan: Discussed diet, exercise, testicular self exam, blood pressure, cholesterol, and need for cancer surveillance at appropriate ages.     (J45.909) Uncomplicated asthma, unspecified asthma severity, unspecified whether persistent  Comment:   Plan: albuterol " "(PROAIR HFA/PROVENTIL HFA/VENTOLIN         HFA) 108 (90 Base) MCG/ACT inhaler            (N39.498) Other urinary incontinence  Comment:   Plan: oxybutynin (DITROPAN) 5 MG tablet        Doing well with daily ditropan; may sometimes take twice daily.     (D84.9) Immunosuppressed status (H)  Comment:   Plan: folic acid (FOLVITE) 1 MG tablet            (M06.09) Rheumatoid arthritis of multiple sites with negative rheumatoid factor (H)  Comment:   Plan: folic acid (FOLVITE) 1 MG tablet, Comprehensive        metabolic panel (BMP + Alb, Alk Phos, ALT, AST,        Total. Bili, TP), CRP, inflammation, CBC with         platelets        Management per rheum     (Z79.899) High risk medication use  Comment:   Plan: folic acid (FOLVITE) 1 MG tablet            (I10) Primary hypertension  Comment:   Plan: lisinopril-hydrochlorothiazide (ZESTORETIC)         20-25 MG tablet        At blood pressure goa.     (I10) Essential hypertension  Comment:   Plan: metoprolol succinate ER (TOPROL XL) 50 MG 24 hr        tablet          SPinal pain:  To meet with orthopedics and discuss either \"cauterization of nerve\" (?rhizotomy?) versus nerve block.     Patient has been advised of split billing requirements and indicates understanding: Yes      COUNSELING:  Reviewed preventive health counseling, as reflected in patient instructions       Regular exercise       Healthy diet/nutrition       Vision screening       Hearing screening       Dental care       Bladder control       Colon cancer screening       Prostate cancer screening      BMI:   Estimated body mass index is 37.42 kg/m  as calculated from the following:    Height as of this encounter: 1.857 m (6' 1.1\").    Weight as of this encounter: 129 kg (284 lb 6.4 oz).   Weight management plan: Patient was referred to their PCP to discuss a diet and exercise plan.      He reports that he quit smoking about 22 years ago. His smoking use included cigarettes. He started smoking about 59 years ago. " He has a 55.50 pack-year smoking history. He has never used smokeless tobacco.      Appropriate preventive services were discussed with this patient, including applicable screening as appropriate for cardiovascular disease, diabetes, osteopenia/osteoporosis, and glaucoma.  As appropriate for age/gender, discussed screening for colorectal cancer, prostate cancer, breast cancer, and cervical cancer. Checklist reviewing preventive services available has been given to the patient.    Reviewed patients plan of care and provided an AVS. The Basic Care Plan (routine screening as documented in Health Maintenance) for Lucio meets the Care Plan requirement. This Care Plan has been established and reviewed with the Patient.          Heladio Calixto MD  [facility]    Identified Health Risks:

## 2022-12-20 NOTE — PATIENT INSTRUCTIONS
No labs due today.    No immunizations.    Up to date with respect to cancer screenings.    Refills given today.    May get repeat labs in 2 months in [place].  Orders printed.    Heladio Calixto MD  Internal Medicine and Pediatrics

## 2022-12-21 ENCOUNTER — TRANSFERRED RECORDS (OUTPATIENT)
Dept: HEALTH INFORMATION MANAGEMENT | Facility: CLINIC | Age: 72
End: 2022-12-21

## 2022-12-24 DIAGNOSIS — D84.9 IMMUNOSUPPRESSED STATUS (H): ICD-10-CM

## 2022-12-24 DIAGNOSIS — M06.09 RHEUMATOID ARTHRITIS OF MULTIPLE SITES WITH NEGATIVE RHEUMATOID FACTOR (H): ICD-10-CM

## 2022-12-24 DIAGNOSIS — Z79.899 HIGH RISK MEDICATION USE: ICD-10-CM

## 2022-12-27 ENCOUNTER — TRANSFERRED RECORDS (OUTPATIENT)
Dept: HEALTH INFORMATION MANAGEMENT | Facility: CLINIC | Age: 72
End: 2022-12-27

## 2022-12-27 ENCOUNTER — VIRTUAL VISIT (OUTPATIENT)
Dept: UROLOGY | Facility: CLINIC | Age: 72
End: 2022-12-27
Payer: MEDICARE

## 2022-12-27 VITALS — WEIGHT: 280 LBS | BODY MASS INDEX: 37.11 KG/M2 | HEIGHT: 73 IN

## 2022-12-27 DIAGNOSIS — C61 PROSTATE CANCER (H): Primary | ICD-10-CM

## 2022-12-27 DIAGNOSIS — R39.15 URINARY URGENCY: ICD-10-CM

## 2022-12-27 PROCEDURE — 99213 OFFICE O/P EST LOW 20 MIN: CPT | Mod: 95 | Performed by: STUDENT IN AN ORGANIZED HEALTH CARE EDUCATION/TRAINING PROGRAM

## 2022-12-27 ASSESSMENT — PAIN SCALES - GENERAL: PAINLEVEL: NO PAIN (0)

## 2022-12-27 NOTE — PROGRESS NOTES
"*TEXT VIDEO INVITE TO WIFE'S CELL, [phone]*      Syed is a 72 year old who is being evaluated via a billable video visit.      How would you like to obtain your AVS? MyChart     If the video visit is dropped, the invitation should be resent by: Text to cell phone: [phone]    Will anyone else be joining your video visit? No            CHIEF COMPLAINT   Lucio Daly who is a 72 year old male returns today for follow-up of prostate cancer (iPSA 5.68, [place] 3+4=7, dx 7/13/2022) now s/p EBRT completed 9/30/2022     HPI   Lucio Daly is a 72 year old male returns today for follow-up of prostate cancer (iPSA 5.68, [place] 3+4=7, dx 7/13/2022) now s/p EBRT completed 9/30/2022     During radiation had issues with bladder control, also with blood per rectum. He had a colonoscopy just before starting radiation with a polypectomy    He is having some bladder urgency as well as rectal bleeding still. About once a week seems to have worsening urinary symptoms    He is taking oxybutinin prn for spasm    Is slightly anemic on recent labs, Hgb 12.5 g/dl.    PHYSICAL EXAM  Patient is a 72 year old  male   Vitals: Height 1.854 m (6' 1\"), weight 127 kg (280 lb).  Body mass index is 36.94 kg/m .  General Appearance Adult:   Alert, no acute distress, oriented  HENT: throat/mouth:normal, good dentition  Lungs: no respiratory distress, or pursed lip breathing  Skin: no suspicious lesions or rashes  Neuro: Alert, oriented, speech and mentation normal  Psych: affect and mood normal      Component      Latest Ref Rng & Units 12/19/2022   PSA Tumor Marker      0.00 - 6.50 ng/mL 2.01       ASSESSMENT and PLAN  72 year old male returns today for follow-up of prostate cancer (iPSA 5.68, [place] 3+4=7, dx 7/13/2022) now s/p EBRT completed 9/30/2022     Continue to observe urinary and GI symptoms after radiation. He should continue oxybutinin prn for bladder spasm.    - follow up 4 months with PSA prior    Silviano Vargas MD "   [facility] Urology  [facility] Phone: [phone]        Video-Visit Details    Type of service:  Video Visit   Video Start Time: 11:16am  Video End Time:11:27 AM    Originating Location (pt. Location): Home    Distant Location (provider location):  On-site  Platform used for Video Visit: Beth

## 2022-12-27 NOTE — LETTER
"12/27/2022       RE: Lucio Daly  [address]  [place] [address]     Dear Colleague,    Thank you for referring your patient, Lucio Daly, to the [facility] [place] [facility]. Please see a copy of my visit note below.    *TEXT VIDEO INVITE TO WIFE'S CELL, [phone]*      Rich is a 72 year old who is being evaluated via a billable video visit.      How would you like to obtain your AVS? MyChart     If the video visit is dropped, the invitation should be resent by: Text to cell phone: [phone]    Will anyone else be joining your video visit? No            CHIEF COMPLAINT   Lucio Daly who is a 72 year old male returns today for follow-up of prostate cancer (iPSA 5.68, [place] 3+4=7, dx 7/13/2022) now s/p EBRT completed 9/30/2022     HPI   Lucio Daly is a 72 year old male returns today for follow-up of prostate cancer (iPSA 5.68, [place] 3+4=7, dx 7/13/2022) now s/p EBRT completed 9/30/2022     During radiation had issues with bladder control, also with blood per rectum. He had a colonoscopy just before starting radiation with a polypectomy    He is having some bladder urgency as well as rectal bleeding still. About once a week seems to have worsening urinary symptoms    He is taking oxybutinin prn for spasm    Is slightly anemic on recent labs, Hgb 12.5 g/dl.    PHYSICAL EXAM  Patient is a 72 year old  male   Vitals: Height 1.854 m (6' 1\"), weight 127 kg (280 lb).  Body mass index is 36.94 kg/m .  General Appearance Adult:   Alert, no acute distress, oriented  HENT: throat/mouth:normal, good dentition  Lungs: no respiratory distress, or pursed lip breathing  Skin: no suspicious lesions or rashes  Neuro: Alert, oriented, speech and mentation normal  Psych: affect and mood normal      Component      Latest Ref Rng & Units 12/19/2022   PSA Tumor Marker      0.00 - 6.50 ng/mL 2.01       ASSESSMENT and PLAN  72 year old " male returns today for follow-up of prostate cancer (iPSA 5.68, [place] 3+4=7, dx 7/13/2022) now s/p EBRT completed 9/30/2022     Continue to observe urinary and GI symptoms after radiation. He should continue oxybutinin prn for bladder spasm.    - follow up 4 months with PSA prior    Silviano Vargas MD   [facility] Urology  [facility] Phone: [phone]        Video-Visit Details    Type of service:  Video Visit   Video Start Time: 11:16am  Video End Time:11:27 AM    Originating Location (pt. Location): Home    Distant Location (provider location):  On-site  Platform used for Video Visit: Beth

## 2022-12-28 NOTE — TELEPHONE ENCOUNTER
METHOTREXATE 2.5MG TABS  Last Written Prescription Date:   9/30/2022  Last Fill Quantity: 96,   # refills: 3  Last Office Visit:  9/3./2022  Future Office visit:   4/21/2023    CBC RESULTS: Recent Labs   Lab Test 12/19/22  1126   WBC 4.0   RBC 4.06*   HGB 12.5*   HCT 38.6*   MCV 95   MCH 30.8   MCHC 32.4   RDW 14.5          Creatinine   Date Value Ref Range Status   12/19/2022 1.22 (H) 0.67 - 1.17 mg/dL Final   07/09/2021 1.17 0.66 - 1.25 mg/dL Final   ]    Liver Function Studies -   Recent Labs   Lab Test 12/19/22  1126 06/20/18  0854 05/15/18  1512   PROTTOTAL  --   --  7.5   ALBUMIN 4.3   < > 4.0   BILITOTAL  --   --  0.4   ALKPHOS  --   --  68   AST 19   < > 25   ALT 17   < > 32    < > = values in this interval not displayed.       Routing refill request to provider for review/approval because:  Drug not on the [facility], [facility] or [facility] refill protocol or controlled substance      Sima Mensah RN  Central Triage Red Flags/Med Refills

## 2023-01-01 ENCOUNTER — MYC MEDICAL ADVICE (OUTPATIENT)
Dept: SLEEP MEDICINE | Facility: CLINIC | Age: 73
End: 2023-01-01

## 2023-01-01 NOTE — MR AVS SNAPSHOT
After Visit Summary   3/17/2017    Lucio Daly    MRN: [id number]           Patient Information     Date Of Birth          1950        Visit Information        Provider Department      3/17/2017 10:00 AM Myrna Campos OT [facility]        Today's Diagnoses     Pain of right thumb    -  1       Follow-ups after your visit        Your next 10 appointments already scheduled     Mar 22, 2017 11:00 AM CDT   ESTEFANY Hand with Myrna Campos OT   [facility] ([facility])    [address] [place] [id number]   [phone]            May 05, 2017  9:30 AM CDT   (Arrive by 9:15 AM)   RETURN HAND with Ramón Camargo MD   [facility] ([facility])    [address] [place] [id number] [phone]            May 18, 2017  9:00 AM CDT   SIX MINUTE WALK with UC PFL C, UC PFL 6 MINUTE WALK 2   [facility] ([facility])    [address] [place] [id number]   [phone]            May 18, 2017 10:30 AM CDT   (Arrive by 10:15 AM)   Return Interstitial Lung with Harsha Mccarthy MD   [facility] ([facility])    [address] [place] [id number] [phone]            Jun 26, 2017  9:30 AM CDT   (Arrive by 9:15 AM)   Return Visit with MEGAN Ayers [facility] ([facility])    [address] [place] [address]   [phone]            Aug 29, 2017  8:10 AM CDT   (Arrive by 7:55 AM)   Return Movement Disorder with Thong Montes MD   [facility] ([facility])    [address] [place] [phone]   [phone]            Mar 06, 2018  9:00 AM CST   Return Sleep Patient with  Kristen Martinez MD   [facility] [place] ([facility])    [address] [place] [address] [phone]              Who to contact     If you have questions or need follow up information about today's clinic visit or your schedule please contact  [facility] directly at [phone].  Normal or non-critical lab and imaging results will be communicated to you by Cignifihart, letter or phone within 4 business days after the clinic has received the results. If you do not hear from us within 7 days, please contact the clinic through 3CLogict or phone. If you have a critical or abnormal lab result, we will notify you by phone as soon as possible.  Submit refill requests through CounterTack or call your pharmacy and they will forward the refill request to us. Please allow 3 business days for your refill to be completed.          Additional Information About Your Visit        CounterTack Information     CounterTack gives you secure access to your electronic health record. If you see a primary care provider, you can also send messages to your care team and make appointments. If you have questions, please call your primary care clinic.  If you do not have a primary care provider, please call [phone] and they will assist you.        Care EveryWhere ID     This is your Care EveryWhere ID. This could be used by other organizations to access your [place] medical records  [id number]         Blood Pressure from Last 3 Encounters:   03/06/17 136/78   01/10/17 126/78   12/28/16 (!) 157/98    Weight from Last 3 Encounters:   03/06/17 (!) 137 kg (302 lb)   01/10/17 135.6 kg (299 lb)   12/28/16 135.6 kg (299 lb)              We Performed the Following     APPLY FINGER SPLINT,STATIC     INITIAL EVAL REPORT     MANUAL THER TECH     OT Eval, Low Complexity ([id number])        Primary Care Provider Office Phone # Fax #    Jah Corley [phone]  [phone]       [place] [id number]  [facility] [address]        Thank you!     Thank you for choosing [facility]ER[facility]  for your care. Our goal is always to provide you with excellent care. Hearing back from our patients is one way we can continue to improve our services. Please take a few minutes to complete the written survey that you may receive in the mail after your visit with us. Thank you!             Your Updated Medication List - Protect others around you: Learn how to safely use, store and throw away your medicines at www.disposemymeds.org.          This list is accurate as of: 3/17/17 11:49 AM.  Always use your most recent med list.                   Brand Name Dispense Instructions for use    folic acid 1 MG tablet    FOLVITE    180 tablet    Take 2 tablets (2 mg) by mouth daily       hydroxychloroquine 200 MG tablet    PLAQUENIL    180 tablet    Take 1 tablet (200 mg) by mouth 2 times daily Send copy of recent eye exam as need for refills. Please have a copy of your eye exam faxed to [phone].       methotrexate 2.5 MG tablet CHEMO     28 tablet    Take 7 tablets (17.5 mg) by mouth once a week Labs due every 8-12 weeks - last done 5-5-16       metoprolol 50 MG tablet    LOPRESSOR    90 tablet    Take 1 tablet (50 mg) by mouth daily Take 1 daily       omeprazole 20 MG CR capsule    priLOSEC    180 capsule    TAKE ONE CAPSULE BY MOUTH TWICE A DAY       order for DME      Use your CPAP device as directed by your provider.       * order for DME     2 each    Please measure and distribute 1 pair of 20mmHg - 30mmHg knee high open or closed toe compression stockings. Jobst ultrasheer or equivalent.       * order for DME     1 each    Please measure and distribute 1 pair of 20mmHg - 30mmHg thigh high open or closed toe compression stockings. Jobst ultrasheer or equivalent.       oxybutynin 5 MG tablet    DITROPAN    90 tablet    Take 1 tablet (5 mg) by mouth daily       pregabalin 50  MG capsule    LYRICA    270 capsule    Take 1 capsule (50 mg) by mouth 3 times daily       selegiline 5 MG Tabs     180 tablet    1 tablet In the morning       TYLENOL 500 MG tablet   Generic drug:  acetaminophen      Take 1 tablet by mouth as needed. For pain       vitamin D 1000 UNITS capsule      Take 2 capsules by mouth daily.       * Notice:  This list has 2 medication(s) that are the same as other medications prescribed for you. Read the directions carefully, and ask your doctor or other care provider to review them with you.       (1) filling, red/small blisters/bruises, mild/mod discomfort

## 2023-01-03 DIAGNOSIS — G47.33 OSA TREATED WITH BIPAP: Primary | ICD-10-CM

## 2023-01-03 NOTE — TELEPHONE ENCOUNTER
Bobby WOMACK, can you please provide me with the recent compliance download soon as possible.  Thank you

## 2023-01-04 NOTE — TELEPHONE ENCOUNTER
I called the patient this afternoon 1/3/23 (1:13pm-1:23pm).  We discussed that the increase in the residual AHI due to central apneas correlates well with the timing of the dental procedure that the patient underwent, suggesting pain as a potential contributing factor(arousals related to pain causing central apneas). Residual AHI was  <5 /hr on 12/27 and 12/28/22 and was >5 per hr starting 12/29/22 since the dental procedure. There has been some improvement since 12/30/22 in the residual AHI from 28.8/hr to 9.2/hr and 12.9/hr on 1/1/23 and 1/2/23 respectively. Patient denies taking any opioid medications.  He denies difficulty tolerating the pressure settings. He reports that the pressure settings feel comfortable. There are no reports of gasping or choking with the CPAP use. He reports feeling rested in the morning upon awakening.     Patient is leaving for [place] this Thursday and plans to return to [place] in April 2023. Recommended obtaining an overnight oximetry with the bilevel PAP at the current pressure settings to evaluate for hypoxemia with the central apneas if it is feasible before he leaves on his trip to [place].   If the DANIELITO cannot be completed before his trip, plan is to continue monitoring the compliance download over the next 2-3 weeks and follow the trend in the overall residual AHI. If central apneas persist, he was instructed to get the DANIELITO through local sleep provider in [place]. I have communicated with our virtual care coordinator Ms. Arce about the monitoring of DL and requested to provide me with an updated DL in 2-3 weeks.    Patient was appreciative of the call.    Kristen Martinez MD

## 2023-01-23 ENCOUNTER — DOCUMENTATION ONLY (OUTPATIENT)
Dept: SLEEP MEDICINE | Facility: CLINIC | Age: 73
End: 2023-01-23
Payer: MEDICARE

## 2023-01-23 NOTE — PROGRESS NOTES
STM Recheck. Download from CPAP reviewed and shows AHI within normal limits. Download scanned into EPIC

## 2023-01-23 NOTE — Clinical Note
Hello. You had requested follow up STM with this patient. He appears to be no longer having central apnea events on his download. I uploaded his 7 day download to Epic. His JOCELYN was 4-10 from 1/8 - 1/15 and dropped below 5 on 1/16 and has stayed low since.  Do you need me to call him to check in or any further monitoring?

## 2023-02-27 ENCOUNTER — DOCUMENTATION ONLY (OUTPATIENT)
Dept: SLEEP MEDICINE | Facility: CLINIC | Age: 73
End: 2023-02-27
Payer: MEDICARE

## 2023-02-27 NOTE — Clinical Note
Your request for 1 month review on this patient is completed. DL uploaded to Epic for your review. His central events have been normal in the past month. Average JOCELYN 1.8. Thank you

## 2023-02-27 NOTE — PROGRESS NOTES
Patient undergoing monitoring for elevated JOCELYN surrounding dental procedure in December. Patients Bi-Pap download reviewed and scanned into media. Patient has compliant use and effective therapy with a JOCELYN of 1.8 for the previous 30 days.

## 2023-02-28 ENCOUNTER — TRANSFERRED RECORDS (OUTPATIENT)
Dept: HEALTH INFORMATION MANAGEMENT | Facility: CLINIC | Age: 73
End: 2023-02-28
Payer: MEDICARE

## 2023-02-28 LAB
ALT SERPL-CCNC: 12 U/L (ref 9–46)
AST SERPL-CCNC: 12 U/L (ref 10–35)
CREATININE (EXTERNAL): 1.61 MG/DL (ref 0.7–1.28)
GFR ESTIMATED (EXTERNAL): 45 ML/MIN/1.73M2
GLUCOSE (EXTERNAL): 113 MG/DL (ref 65–99)
POTASSIUM (EXTERNAL): 5 MMOL/L (ref 3.5–5.3)

## 2023-04-08 DIAGNOSIS — N39.498 OTHER URINARY INCONTINENCE: ICD-10-CM

## 2023-04-08 DIAGNOSIS — I10 ESSENTIAL HYPERTENSION: ICD-10-CM

## 2023-04-10 RX ORDER — OXYBUTYNIN CHLORIDE 5 MG/1
TABLET ORAL
Qty: 180 TABLET | Refills: 3 | Status: SHIPPED | OUTPATIENT
Start: 2023-04-10 | End: 2024-07-10

## 2023-04-10 NOTE — TELEPHONE ENCOUNTER
Oxybutynin is not prescribed by Dr. Montes. Will send to Dr. Heladio Calixto who last signed the prescription.

## 2023-04-10 NOTE — TELEPHONE ENCOUNTER
Rx Authorization:  Requested Medication/ Dose OXYBUTYNIN 5MG TABLETS  Date last refill ordered: 12/20/22  Quantity ordered: 180 tabs  # refills: 3  Date of last clinic visit with ordering provider: 12/16/21  Date of next clinic visit with ordering provider:   All pertinent protocol data (lab date/result):   Include pertinent information from patients message:

## 2023-04-11 RX ORDER — METOPROLOL SUCCINATE 50 MG/1
TABLET, EXTENDED RELEASE ORAL
Qty: 135 TABLET | Refills: 3 | OUTPATIENT
Start: 2023-04-11

## 2023-04-12 ASSESSMENT — ENCOUNTER SYMPTOMS
WEAKNESS: 1
NECK PAIN: 1
MUSCLE WEAKNESS: 1
NUMBNESS: 1
BACK PAIN: 1
TINGLING: 1
TREMORS: 1
STIFFNESS: 1
ARTHRALGIAS: 1
DIZZINESS: 1

## 2023-04-15 DIAGNOSIS — I10 PRIMARY HYPERTENSION: ICD-10-CM

## 2023-04-15 RX ORDER — TAMSULOSIN HYDROCHLORIDE 0.4 MG/1
CAPSULE ORAL
COMMUNITY
Start: 2022-10-06 | End: 2023-04-16

## 2023-04-15 RX ORDER — HYDROCODONE BITARTRATE AND ACETAMINOPHEN 5; 325 MG/1; MG/1
1 TABLET ORAL EVERY 8 HOURS PRN
COMMUNITY
Start: 2022-12-29 | End: 2023-04-16

## 2023-04-15 RX ORDER — IBUPROFEN 800 MG/1
TABLET, FILM COATED ORAL
COMMUNITY
Start: 2022-12-29 | End: 2023-04-16

## 2023-04-16 NOTE — PROGRESS NOTES
Diagnosis/Summary/Recommendations:    PATIENT: Lucio Daly  72 year old male     : 1950    EVELIA: 2023    MRN: [id number]    [place] [id number]  [phone] (H)  [phone] (M)  [email or web address]  Lo Daly  [phone]     Exertional related visual changes - and associated with weakness and shakiness and has to rest to recover   Consider visit with cardiology or/and neurology or/and PCP to discuss further. He had an ECGO in 2018 and had Lexiscan in 2016 and had no signs of an aortic scan that did not reveal an aneurysm.     Consider another stress test and/or carotid study - would allow patient to talk with pcp and if needed can see cardiology or general neurology.     Assessment:  (R25.1) Tremor  (primary encounter diagnosis)  He never has had clear Parkinson's disease and his gait issue disorder may be multifactorial     Left hand tremor and has some left leg symptoms.      Has PLMS    Review of diagnosis    tremor    Avoidance of dopamine blockers   Not taking    Motor complication review   n/a    Review of Impulse control disorders   n/a    Review of surgical or medication options   reviewed    Gait/Balance/Falls   Walks slowly   Using a cane  Has had falls  Has problems with his foot - especially when tired.     Exercise/Therapy performed/offered   Tries to walk 15-20 minutes  He may fall back on the bed but has not fallen onto the floor    Cognitive/Driving     Mood   Denies depression/anxiety  [place]  9th grandchild    Hallucinations/delusions       Sleep   Sleep apnea  PLMS    Bladder/Renal/Prostate/Gyn/Other  Oxybutynin ditropan 5mg once daily     GI/Constipation/GERD   Omeprazole prilosec 40mg daily  Overweight   Had some constipation which recurred and has bowel movements every 3rd day  And had some diarrhea - cut out yoghurt  Trying to drink lots of water   He may have IBS  Has seen GI doctor in the past and there may be GI issues from his RA    ENDO/Lipid/DM/Bone  density/Thyroid      Cardio/heart/Hyper or Hypotensive   Lisinopril zestril 10mg  Metoprolol toprolol XL 50mg 24 hr tablet  Ongoing challenges with blood pressure    Vision/Dry Eyes/Cataracts/Glaucoma/Macular   Wears glasses    Heme/Anticoagulation/Antiplatelet/Anemia/Other  Not taking aspirin    ENT/Resp  BiPAP  Seeing [facility] sleep medicine    intersititial lung disease seeing [facility] pulmonary medicine  Diaphragmatic muscle weakness unclear etiology on NIPPV     Albuterol proair hfa/proventil hfa/ventolin hfa 108 (90 base) mcg/act inhaler prn   Proair hfa 108 (90 base) mcg/act inhaler prn (above)  Salmeterol serevent disku 50mcg/dose inhaler ---using     Drooling at day time    Skin/Cancer/Seborrhea/other      Musculoskeletal/Pain/Headache  Has had hand cramping and has to straighten h is fingers out and may need surgery    Acetaminophen tylenol 500mg  Pregabalin lyrica 50mg     Leg swelling   Variable use of leg braces  Tendons are tight in his legs        Other:  RA issues.   plaquenil and methotrexate.  Still taking folate, vitamin d  Had eye examination.  Seeing MONROE Goodrich  Folic acid folvite 1mg  Hydroxychloroquine plaquenil 200mg        Medications     7/730am 9a 2pm 4pm 11pm   Acetaminophen tylenol 500mg 2     2 2   Albuterol proair proventil ventolin inhaler prn            Amlodipine norvasc 5mg 1       Cholecalciferol vitamin D 1000 1 capsule           Folic acid folvite 1mg   1         Hydroxychloroquine plaquenil 200mg   1         Ketoconazole nizoral 2% shampoo             Lisinopril hydrochlorothiazide zestoretic 20-25 1           Methotrexate 2.5mg      8 tues       Metoprolol toprolol XL 50mg 24 hr tablet 1.5           Omeprazole prilosec 40mg 1           Oxybutynin ditropan 5mg      1       Pregabalin lyrica 50mg 1     1 1   Proair hfa 108 (90 base) mcg/act inhaler  above           Salmeterol serevent disku 50mcg/dose inhaler Twice daily                                             Plan:    Drooling daytime - referral to PMR and Neurology for botox for drooling at the [facility] vs [place]  Jossy Echols and colleagues in PMR  Easton Bhat and colleagues in Neurology.   Discussed atropine drops and robinul.     Refilled pregabalin lyrica     Was active and busy in [place] but tiring  Discussed use of pool and movement exercises.  Golf community     More stooped, numbness, tingling, movement issues, balance problems    He uses his walker outside and cane inside; today using cane   There are some strain issues with the walker - hands and arms.   He may benefit from seeing physical therapy again.   He has a new walker in 2022    Has problems driving due to numbness of the right leg after 40 minutes.   He is avoiding spinal surgery and burning of the nerves  Last lumbar spine MRI was 9/14/2022  1. Diffuse degenerative change of the lumbar spine as detailed above.  2. Moderate spinal canal stenosis at L4-L5. No other significant spinal canal narrowing of the lumbar spine.  3. Moderate neural foraminal stenosis bilaterally at L5-S1. No other significant neural foraminal narrowing of the lumbar spine.    Return in one year's time.     Coding statement:   Medical Decision Making:  #  Chronic progressive medical conditions addressed  - see above --   Review and/or interpretation of unique test or documentation from a provider outside of neurology no   Independent historian provided additional details  no  Prescription drug management and review of potential side effects and/or monitoring for side effects  -- see above ---  Health impacted by social determinants of health  no    I have reviewed the note as documented above.  This accurately captures the substance of my conversation with the patient and total time spent preparing for visit, executing visit and completing visit on the day of the visit:  20 minutes.  The portion of this total time included face to face time 16  gurpreet Montes MD     ______________________________________    Last visit date and details:             ______________________________________      Patient was asked about 14 Review of systems including changes in vision (dry eyes, double vision), hearing, heart, lungs, musculoskeletal, depression, anxiety, snoring, RBD, insomnia, urinary frequency, urinary urgency, constipation, swallowing problems, hematological, ID, allergies, skin problems: seborrhea, endocrinological: thyroid, diabetes, cholesterol; balance, weight changes, and other neurological problems and these were not significant at this time except for   Patient Active Problem List   Diagnosis     Diaphragmatic hernia     Esophageal reflux     Hx of melanoma of skin     Malignant basal cell neoplasm of skin     GALO (obstructive sleep apnea)     Chronic maxillary sinusitis     Urinary incontinence     Sicca syndrome (H)     Tremor     Incomplete defecation     AK (actinic keratosis)     Abnormal EMG     Seronegative arthritis     Adenomatous polyp of colon     Peripheral polyneuropathy     Morbid obesity (H)     Neuromuscular disease (H)     Bronchiolitis obliterans (H)     Hypertension     Rheumatoid arthritis (H)     Immunosuppressed status (H)     PLMD (periodic limb movement disorder)     Prostate cancer (H)     Lumbosacral radiculopathy     Facet arthropathy, lumbosacral     Spondylolisthesis of lumbar region     Lumbosacral spinal stenosis     Gastroesophageal reflux disease     Internal hemorrhoids     Dysphagia          Allergies   Allergen Reactions     Restasis      Burning eyes, problems with breathing, tightness in chest     Adhesive Tape      Bandages misc     Allegra      EXCESSIVE URINATION AND WEAKNESS, LIGHT-HEADED     Allergy      Dust     Animal Dander      Benadryl Allergy      EXCESSIVE URINATION AND WEAKNESS, LIGHT-HEADED     Cephalexin      Joint pain or gerd aggravation. Bloating excessive urination      Cephalosporins       Cyclosporine      Diphenhydramine Other (See Comments)     Doxycycline      Doxycycline Hyclate Nausea     SWEATING,MIGRAINES,LOSS OF APPETITE,SWEATING,LIGHT HEADED, EXCESSIVE URINATION     Fexofenadine Other (See Comments)     Flonase [Fluticasone Propionate]      Gabapentin      Neurontin: mosd changes and excess urination     Hydrocortisone      Iodine      Iodine Solution [Povidone Iodine]      SKIN MELTS     Levaquin      From surgeon--? Joint pain ? Gerd aggravation. Insomnia, excess urination     Levofloxacin      Mylanta      EXCESSIVE URINATION AND WEAKNESS, LIGHT-HEADED     Oxcarbazepine      Prednisone      Weakness, elevated bp, headache, eye pain, congestion      Prednisone      Seafood [Seafood]      Shellfish Allergy      Hives       Simethicone      Sulfamethoxazole W/Trimethoprim      Sulfamethoxazole-Trimethoprim      Chest pain, angina     Symbicort GI Disturbance and Nausea     Trees      Trileptal      SEVERE JOINT AND TENDON PAIN, INSOMNIA, RESTLESSNESS, NAUSEA, EXCESS URINATION     Cortizone Rash     EXCESS URINATION,WEAKNESS,NAUSEA, HEADACHE     Past Surgical History:   Procedure Laterality Date     BIOPSY OF SKIN LESION       COLONOSCOPY       COLONOSCOPY N/A 7/20/2022    Procedure: COLONOSCOPY (fv) polypectomy using jumbo bx forcep;  Surgeon: Gómez Mckinney MD;  Location:  GI     CYSTOSCOPY  Many years ago    Blood in urine/ bladder cystoscopy     ESOPHAGOSCOPY, GASTROSCOPY, DUODENOSCOPY (EGD), COMBINED N/A 7/20/2022    Procedure: ESOPHAGOGASTRODUODENOSCOPY (EGD) (fv) biopsies using cold bx forcep;  Surgeon: Gómez Mckinney MD;  Location:  GI     HEMORRHOID SURGERY       lip biopsy      for sicca complex     MOHS MICROGRAPHIC PROCEDURE       SOFT TISSUE SURGERY      removeal of basel cell carcinoma     Past Medical History:   Diagnosis Date     Abnormal EMG 04/18/2013     Abnormal involuntary movements(781.0)     Movement Disorder     AK (actinic keratosis) 12/18/2011      Allergic rhinitis, cause unspecified     Allergic rhinitis     Balance problems 11/01/2011     Basal cell carcinoma      Bladder spasms 11/01/2011     Chronic osteoarthritis      COPD (chronic obstructive pulmonary disease) (H)     Interstial lung disease, obliderans bronchiolitis     Diaphragmatic hernia without mention of obstruction or gangrene      Earache or other ear, nose, or throat complaint      Esophageal reflux      Fatigue 11/01/2011     Fracture      H. pylori infection 05/12/2011     History of MRI of cervical spine 11/18/2013    EXAMINATION: CERVICAL SPINE G/E 5 VIEWS* 4/19/2013 4:18 PM  CLINICAL HISTORY: Pain in limb,Performing Location?->[facility]  COMPARISON:  FINDINGS: AP and lateral views in flexion and extension, as well as odontoid view of the cervical spine was obtained. There is no comparison available. The vertebral bodies of the cervical spine are normally aligned. There is posterior spurring and d     Incomplete defecation 11/01/2011     Interstitial lung disease (H) 11/29/2016     Laboratory test 08/07/2012     Lung disease 06/2015     Malignant basal cell neoplasm of skin 08/06/2008     Melanoma (H) 08/06/2008     Melanoma in situ of lower leg (H)     R calf     Neuropathy 05/16/2011     Other bladder disorder      Other color vision deficiencies      Other nervous system complications      Parkinsonism (H) 11/01/2011     Parkinsons disease (H)     Parkinsonism/ balance, dropped foot, tremor     Personal history of colonic polyps      PLMD (periodic limb movement disorder) 12/16/2021     Polyneuropathy in other diseases classified elsewhere (H)      RA (rheumatoid arthritis) (H)      Rheumatoid arthritis of multiple sites with negative rheumatoid factor (H) 03/21/2016     Seronegative arthritis 11/18/2013     Shortness of breath      Shoulder arthritis 2016    acromioclavicular joint      Somatization disorder 05/12/2011     Spider veins     Leg Vericise vein surgery      Squamous cell carcinoma      Tremor 2011     Unspecified essential hypertension      Unspecified hypothyroidism      Urinary tract infection      Urinary urgency 2011     Wears glasses 2011     Social History     Socioeconomic History     Marital status:      Spouse name: Not on file     Number of children: Not on file     Years of education: Not on file     Highest education level: Not on file   Occupational History     Not on file   Tobacco Use     Smoking status: Former     Packs/day: 1.50     Years: 37.00     Pack years: 55.50     Types: Cigarettes     Start date: 6/15/1963     Quit date: 2000     Years since quittin.5     Smokeless tobacco: Never   Vaping Use     Vaping status: Never Used   Substance and Sexual Activity     Alcohol use: Not Currently     Drug use: Never     Sexual activity: Not Currently     Partners: Female     Birth control/protection: None   Other Topics Concern     Parent/sibling w/ CABG, MI or angioplasty before 65F 55M? No   Social History Narrative    2013: Living in [place] in a townhouse with no steps    Has 3 sons that are doing okay.         Dairy/d 1 servings/d.     Caffeine 0 servings/d    Exercise 0 x week    Sunscreen used - No    Seatbelts used - Yes    Working smoke/CO detectors in the home - Yes    Guns stored in the home - Yes    Self Breast Exams - NA    Self Testicular Exam - Yes    Eye Exam up to date - Yes     Dental Exam up to date - Yes     Pap Smear up to date - NA    Mammogram up to date - NA    PSA up to date - Yes 2008    Dexa Scan up to date - No    Flex Sig / Colonoscopy up to date - Yes less than 5 yrs ago    Immunizations up to date -today    Abuse: Current or Past(Physical, Sexual or Emotional)- No    Do you feel safe in your environment - Yes    2008                 Social Determinants of Health     Financial Resource Strain: Low Risk  (2022)    Overall Financial Resource Strain (CARDIA)      Difficulty of Paying  Living Expenses: Not hard at all   Food Insecurity: No Food Insecurity (12/16/2022)    Hunger Vital Sign      Worried About Running Out of Food in the Last Year: Never true      Ran Out of Food in the Last Year: Never true   Transportation Needs: No Transportation Needs (12/16/2022)    PRAPARE - Transportation      Lack of Transportation (Medical): No      Lack of Transportation (Non-Medical): No   Physical Activity: Insufficiently Active (12/16/2022)    Exercise Vital Sign      Days of Exercise per Week: 3 days      Minutes of Exercise per Session: 20 min   Stress: No Stress Concern Present (12/16/2022)    [employer] [place] [employer] - Occupational Stress Questionnaire      Feeling of Stress : Not at all   Social Connections: Socially Integrated (12/16/2022)    Social Connection and Isolation Panel [NHANES]      Frequency of Communication with Friends and Family: Twice a week      Frequency of Social Gatherings with Friends and Family: Once a week      Attends Sikh Services: More than 4 times per year      Active Member of Clubs or Organizations: Yes      Attends Club or Organization Meetings: Not on file      Marital Status:    Intimate Partner Violence: Not on file   Housing Stability: Unknown (12/16/2022)    Housing Stability Vital Sign      Unable to Pay for Housing in the Last Year: No      Number of Places Lived in the Last Year: Not on file      Unstable Housing in the Last Year: No       Drug and lactation database from the [place] [employer] Library of Medicine:  http://toxnet.nlm.nih.gov/cgi-bin/sis/htmlgen?LACT      B/P: Data Unavailable, T: Data Unavailable, P: Data Unavailable, R: Data Unavailable 0 lbs 0 oz  There were no vitals taken for this visit., There is no height or weight on file to calculate BMI.  Medications and Vitals not listed above were documented in the cart and reviewed by me.     Current Outpatient Medications   Medication Sig Dispense Refill      acetaminophen (TYLENOL) 500 MG tablet 2 x 500mg by mouth 3 times per day: 7/730am, 4pm and 11pm  = 6/day       albuterol (PROAIR HFA/PROVENTIL HFA/VENTOLIN HFA) 108 (90 Base) MCG/ACT inhaler Inhale 2 puffs into the lungs every 4 hours as needed for shortness of breath or wheezing 6.7 g 1     amLODIPine (NORVASC) 5 MG tablet Take 1 tablet (5 mg) by mouth daily 90 tablet 0     cholecalciferol (HM VITAMIN D3) 25 MCG (1000 UT) TABS 1000 unit tab by mouth daily       folic acid (FOLVITE) 1 MG tablet Take 1 tablet (1 mg) by mouth daily 90 tablet 3     HYDROcodone-acetaminophen (NORCO) 5-325 MG tablet Take 1 tablet by mouth every 8 hours as needed       hydroxychloroquine (PLAQUENIL) 200 MG tablet Take 1 tablet (200 mg) by mouth daily Daily at 9am. 90 tablet 5     ibuprofen (ADVIL/MOTRIN) 800 MG tablet TAKE 1 TABLET BY MOUTH EVERY 8 HOURS AS NEEDED FOR PAIN. DISCONTINUE IF STOMACH DISCOMFORT       lisinopril-hydrochlorothiazide (ZESTORETIC) 20-25 MG tablet Take 1 tablet by mouth daily 90 tablet 3     methotrexate 2.5 MG tablet TAKE 7 TABLETS BY MOUTH ONCE PER WEEK. LABS DUE EVERY 8 TO 12 WEEKS (LAST DONE 5-5-16) 28 tablet 4     metoprolol succinate ER (TOPROL XL) 50 MG 24 hr tablet 1 and 1/2 tabs daily. 135 tablet 3     omeprazole (PRILOSEC) 40 MG DR capsule Take 1 capsule (40 mg) by mouth daily 90 capsule 3     ORDER FOR DME Use your BiPAP device as directed by your provider.       oxybutynin (DITROPAN) 5 MG tablet TAKE 1 TABLET BY MOUTH DAILY AT 4PM 180 tablet 3     pregabalin (LYRICA) 50 MG capsule 50mg capsule by mouth at 7am and 4pm and 1-2 x 50mg capsules by mouth nightly at 11pm (4/day) 360 capsule 3     salmeterol (SEREVENT DISKUS) 50 MCG/ACT inhaler Inhale 1 puff into the lungs 2 times daily 60 each 11     tamsulosin (FLOMAX) 0.4 MG capsule            Thong Montes MD

## 2023-04-17 RX ORDER — AMLODIPINE BESYLATE 5 MG/1
TABLET ORAL
Qty: 90 TABLET | Refills: 0 | OUTPATIENT
Start: 2023-04-17

## 2023-04-17 NOTE — TELEPHONE ENCOUNTER
Refused duplicate refill request.  Sent 4/12/23.    Julisa Mckeon RN, BSN  [facility]

## 2023-04-18 ENCOUNTER — OFFICE VISIT (OUTPATIENT)
Dept: NEUROLOGY | Facility: CLINIC | Age: 73
End: 2023-04-18
Payer: MEDICARE

## 2023-04-18 VITALS — DIASTOLIC BLOOD PRESSURE: 90 MMHG | SYSTOLIC BLOOD PRESSURE: 130 MMHG | HEART RATE: 57 BPM | OXYGEN SATURATION: 98 %

## 2023-04-18 DIAGNOSIS — K11.7 SIALORRHEA: ICD-10-CM

## 2023-04-18 DIAGNOSIS — M43.16 SPONDYLOLISTHESIS OF LUMBAR REGION: ICD-10-CM

## 2023-04-18 DIAGNOSIS — R25.1 TREMOR: Primary | ICD-10-CM

## 2023-04-18 DIAGNOSIS — R26.9 GAIT DISORDER: ICD-10-CM

## 2023-04-18 DIAGNOSIS — G62.9 PERIPHERAL POLYNEUROPATHY: ICD-10-CM

## 2023-04-18 PROCEDURE — 99213 OFFICE O/P EST LOW 20 MIN: CPT | Performed by: PSYCHIATRY & NEUROLOGY

## 2023-04-18 RX ORDER — PREGABALIN 50 MG/1
CAPSULE ORAL
Qty: 360 CAPSULE | Refills: 3 | Status: SHIPPED | OUTPATIENT
Start: 2023-04-18 | End: 2024-01-22

## 2023-04-18 ASSESSMENT — PAIN SCALES - GENERAL: PAINLEVEL: MODERATE PAIN (4)

## 2023-04-18 NOTE — PATIENT INSTRUCTIONS
Medications     7/730am 9a 2pm 4pm 11pm   Acetaminophen tylenol 500mg 2     2 2   Albuterol proair proventil ventolin inhaler prn            Amlodipine norvasc 5mg 1       Cholecalciferol vitamin D 1000 1 capsule           Folic acid folvite 1mg   1         Hydroxychloroquine plaquenil 200mg   1         Ketoconazole nizoral 2% shampoo             Lisinopril hydrochlorothiazide zestoretic 20-25 1           Methotrexate 2.5mg      8 tues       Metoprolol toprolol XL 50mg 24 hr tablet 1.5           Omeprazole prilosec 40mg 1           Oxybutynin ditropan 5mg      1       Pregabalin lyrica 50mg 1     1 1   Proair hfa 108 (90 base) mcg/act inhaler  above           Salmeterol serevent disku 50mcg/dose inhaler Twice daily                                            Plan:    Drooling daytime - referral to PMR and Neurology for botox for drooling at the [facility] vs [place]  Jossy Echols and colleagues in PMR  Easton Bhat and colleagues in Neurology.   Discussed atropine drops and robinul.     Refilled pregabalin lyrica     Was active and busy in [place] but tiring  Discussed use of pool and movement exercises.  Golf community     More stooped, numbness, tingling, movement issues, balance problems    He uses his walker outside and cane inside; today using cane   There are some strain issues with the walker - hands and arms.   He may benefit from seeing physical therapy again.   He has a new walker in 2022    Has problems driving due to numbness of the right leg after 40 minutes.   He is avoiding spinal surgery and burning of the nerves  Last lumbar spine MRI was 9/14/2022  1. Diffuse degenerative change of the lumbar spine as detailed above.  2. Moderate spinal canal stenosis at L4-L5. No other significant spinal canal narrowing of the lumbar spine.  3. Moderate neural foraminal stenosis bilaterally at L5-S1. No other significant neural foraminal narrowing of the lumbar spine.    Return in one  year's time.

## 2023-04-19 ENCOUNTER — OFFICE VISIT (OUTPATIENT)
Dept: PULMONOLOGY | Facility: CLINIC | Age: 73
End: 2023-04-19
Attending: INTERNAL MEDICINE
Payer: MEDICARE

## 2023-04-19 VITALS
BODY MASS INDEX: 37.77 KG/M2 | WEIGHT: 285 LBS | HEART RATE: 54 BPM | HEIGHT: 73 IN | DIASTOLIC BLOOD PRESSURE: 67 MMHG | OXYGEN SATURATION: 97 % | SYSTOLIC BLOOD PRESSURE: 110 MMHG | RESPIRATION RATE: 17 BRPM

## 2023-04-19 DIAGNOSIS — J84.9 ILD (INTERSTITIAL LUNG DISEASE) (H): Primary | ICD-10-CM

## 2023-04-19 DIAGNOSIS — J44.81 OBLITERATIVE BRONCHIOLITIS (H): ICD-10-CM

## 2023-04-19 LAB
6 MIN WALK (FT): 600 FT
6 MIN WALK (M): 183 M
DLCOUNC-%PRED-PRE: 92 %
DLCOUNC-PRE: 25.58 ML/MIN/MMHG
DLCOUNC-PRED: 27.68 ML/MIN/MMHG
ERV-%PRED-PRE: 113 %
ERV-PRE: 0.81 L
ERV-PRED: 0.71 L
EXPTIME-PRE: 7.67 SEC
FEF2575-%PRED-PRE: 112 %
FEF2575-PRE: 2.59 L/SEC
FEF2575-PRED: 2.31 L/SEC
FEFMAX-%PRED-PRE: 107 %
FEFMAX-PRE: 9.27 L/SEC
FEFMAX-PRED: 8.63 L/SEC
FEV1-%PRED-PRE: 97 %
FEV1-PRE: 3.07 L
FEV1FEV6-PRE: 78 %
FEV1FEV6-PRED: 77 %
FEV1FVC-PRE: 78 %
FEV1FVC-PRED: 76 %
FEV1SVC-PRE: 71 %
FEV1SVC-PRED: 61 %
FIFMAX-PRE: 7.64 L/SEC
FRCPLETH-%PRED-PRE: 95 %
FRCPLETH-PRE: 3.72 L
FRCPLETH-PRED: 3.91 L
FVC-%PRED-PRE: 94 %
FVC-PRE: 3.94 L
FVC-PRED: 4.18 L
IC-%PRED-PRE: 77 %
IC-PRE: 3.49 L
IC-PRED: 4.48 L
RVPLETH-%PRED-PRE: 103 %
RVPLETH-PRE: 2.91 L
RVPLETH-PRED: 2.83 L
TLCPLETH-%PRED-PRE: 93 %
TLCPLETH-PRE: 7.21 L
TLCPLETH-PRED: 7.74 L
VA-%PRED-PRE: 79 %
VA-PRE: 5.6 L
VC-%PRED-PRE: 82 %
VC-PRE: 4.3 L
VC-PRED: 5.19 L

## 2023-04-19 PROCEDURE — 94618 PULMONARY STRESS TESTING: CPT | Performed by: INTERNAL MEDICINE

## 2023-04-19 PROCEDURE — G0463 HOSPITAL OUTPT CLINIC VISIT: HCPCS | Performed by: INTERNAL MEDICINE

## 2023-04-19 PROCEDURE — 94375 RESPIRATORY FLOW VOLUME LOOP: CPT | Performed by: INTERNAL MEDICINE

## 2023-04-19 PROCEDURE — 94726 PLETHYSMOGRAPHY LUNG VOLUMES: CPT | Performed by: INTERNAL MEDICINE

## 2023-04-19 PROCEDURE — 99214 OFFICE O/P EST MOD 30 MIN: CPT | Mod: 25 | Performed by: INTERNAL MEDICINE

## 2023-04-19 PROCEDURE — 94729 DIFFUSING CAPACITY: CPT | Performed by: INTERNAL MEDICINE

## 2023-04-19 ASSESSMENT — PAIN SCALES - GENERAL: PAINLEVEL: NO PAIN (0)

## 2023-04-19 NOTE — NURSING NOTE
Chief Complaint   Patient presents with     RECHECK     Return Sarcoids    Medications reviewed and vital signs taken.   Jessa Fuchs, CMA

## 2023-04-19 NOTE — PROGRESS NOTES
Reason for Visit  Lucio Daly is a 72 year old year old male who is being seen for ILD  HPI    The patient was seen and examined by Harsha Mccarthy MD       Mr. 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  He was last seen in the Pulmonary Clinic    In 10/2022 at which time he was on methotrexate 20 mg per week and Plaquenil 200 mg b.i.d for inflammatory arthritis. He was found to have leukopenia.  The dose of methotrexate was decreased to 17.5 mg per week.  Repeat white count was in the normal range and he is back to 20 mg per week.    Overall, he denies any significant changes in the pulmonary symptoms.  He was in [place] over the winter and had daily walks using a walker because of gait instability.  He reports that his endurance from a pulmonary standpoint was unchanged.  However, he has noticed worsening gait stability issues.  He has been followed with the [facility] for his Parkinson.  He had that clinic visit yesterday.    He denies any other pulmonary symptoms.  A dry cough persists but is unchanged.      Current Outpatient Medications   Medication     acetaminophen (TYLENOL) 500 MG tablet     albuterol (PROAIR HFA/PROVENTIL HFA/VENTOLIN HFA) 108 (90 Base) MCG/ACT inhaler     amLODIPine (NORVASC) 5 MG tablet     cholecalciferol (HM VITAMIN D3) 25 MCG (1000 UT) TABS     folic acid (FOLVITE) 1 MG tablet     hydroxychloroquine (PLAQUENIL) 200 MG tablet     lisinopril-hydrochlorothiazide (ZESTORETIC) 20-25 MG tablet     methotrexate 2.5 MG tablet     metoprolol succinate ER (TOPROL XL) 50 MG 24 hr tablet     omeprazole (PRILOSEC) 40 MG DR capsule     ORDER FOR DME     oxybutynin (DITROPAN) 5 MG tablet     pregabalin (LYRICA) 50 MG capsule     salmeterol (SEREVENT DISKUS) 50 MCG/ACT inhaler     No current facility-administered medications for this visit.     Allergies   Allergen Reactions     Adhesive Tape Hives     Bandages  misc  Bandages misc     Aluminum Hydroxide Hives     Calcium Carbonate Hives     Cortisone Hives     Cyclosporine Hives     Burning eyes, problems with breathing, tightness in chest     Diphenhydramine Other (See Comments) and Shortness Of Breath     Fexofenadine Other (See Comments) and Shortness Of Breath     EXCESSIVE URINATION AND WEAKNESS, LIGHT-HEADED       Gabapentin Hives     Neurontin: mosd changes and excess urination  Neurontin: mosd changes and excess urination     Levofloxacin Hives     From surgeon--? Joint pain ? Gerd aggravation. Insomnia, excess urination     Maalox Maximum Strength Hives     EXCESSIVE URINATION AND WEAKNESS, LIGHT-HEADED     Magnesium Carbonate Hives     Magnesium Hydroxide Hives     Prednisone Hives     Weakness, elevated bp, headache, eye pain, congestion      Restasis      Burning eyes, problems with breathing, tightness in chest     Simethicone Hives     Sulfamethoxazole-Trimethoprim Hives     Chest pain, angina  Chest pain, angina     Tree Extract Hives     Allegra      EXCESSIVE URINATION AND WEAKNESS, LIGHT-HEADED     Allergy      Dust     Animal Dander      Benadryl Allergy      EXCESSIVE URINATION AND WEAKNESS, LIGHT-HEADED     Cephalexin      Joint pain or gerd aggravation. Bloating excessive urination   Other reaction(s): GI Distress  Joint pain or gerd aggravation. Bloating excessive urination      Cephalosporins      Other reaction(s): GI Distress     Doxycycline Nausea and Vomiting     SWEATING,MIGRAINES,LOSS OF APPETITE,SWEATING,LIGHT HEADED, EXCESSIVE URINATION     Doxycycline Hyclate Nausea     SWEATING,MIGRAINES,LOSS OF APPETITE,SWEATING,LIGHT HEADED, EXCESSIVE URINATION     Flonase [Fluticasone Propionate]      Fluticasone      Other reaction(s): GI Distress     Hydrocortisone Nausea and Vomiting     Iodine      Other reaction(s): GI Distress     Iodine Solution [Povidone Iodine]      SKIN MELTS     Levaquin      From surgeon--? Joint pain ? Gerd aggravation.  Insomnia, excess urination     Mylanta      EXCESSIVE URINATION AND WEAKNESS, LIGHT-HEADED     Oxcarbazepine      Other reaction(s): GI Distress  SEVERE JOINT AND TENDON PAIN, INSOMNIA, RESTLESSNESS, NAUSEA, EXCESS URINATION     Povidone Iodine      Other reaction(s): GI Distress  SKIN MELTS     Prednisone      Weakness, elevated bp, headache, eye pain, congestion      Seafood [Seafood]      Shellfish Allergy      Hives    Other reaction(s): GI Distress  Hives     Sulfamethoxazole W/Trimethoprim      Symbicort GI Disturbance and Nausea     Other reaction(s): GI Distress     Trees      Trileptal      SEVERE JOINT AND TENDON PAIN, INSOMNIA, RESTLESSNESS, NAUSEA, EXCESS URINATION     Cortizone Rash     EXCESS URINATION,WEAKNESS,NAUSEA, HEADACHE     Ibuprofen Fatigue, Other (See Comments) and Palpitations     Past Medical History:   Diagnosis Date     Abnormal EMG 04/18/2013     Abnormal involuntary movements(781.0)     Movement Disorder     AK (actinic keratosis) 12/18/2011     Allergic rhinitis, cause unspecified     Allergic rhinitis     Balance problems 11/01/2011     Basal cell carcinoma      Bladder spasms 11/01/2011     Chronic osteoarthritis      COPD (chronic obstructive pulmonary disease) (H)     Interstial lung disease, obliderans bronchiolitis     Diaphragmatic hernia without mention of obstruction or gangrene      Earache or other ear, nose, or throat complaint      Esophageal reflux      Fatigue 11/01/2011     Fracture      H. pylori infection 05/12/2011     History of MRI of cervical spine 11/18/2013    EXAMINATION: CERVICAL SPINE G/E 5 VIEWS* 4/19/2013 4:18 PM  CLINICAL HISTORY: Pain in limb,Performing Location?->[facility] ([facility]),  COMPARISON:  FINDINGS: AP and lateral views in flexion and extension, as well as odontoid view of the cervical spine was obtained. There is no comparison available. The vertebral bodies of the cervical spine are normally aligned. There is posterior spurring and d      Incomplete defecation 11/01/2011     Interstitial lung disease (H) 11/29/2016     Laboratory test 08/07/2012     Lung disease 06/2015     Malignant basal cell neoplasm of skin 08/06/2008     Melanoma (H) 08/06/2008     Melanoma in situ of lower leg (H)     R calf     Neuropathy 05/16/2011     GALO (obstructive sleep apnea)      Other bladder disorder      Other color vision deficiencies      Other nervous system complications      Parkinsonism (H) 11/01/2011     Parkinsons disease (H)     Parkinsonism/ balance, dropped foot, tremor     Personal history of colonic polyps      PLMD (periodic limb movement disorder) 12/16/2021     Polyneuropathy in other diseases classified elsewhere (H)      RA (rheumatoid arthritis) (H)      Rheumatoid arthritis of multiple sites with negative rheumatoid factor (H) 03/21/2016     Seronegative arthritis 11/18/2013     Shortness of breath      Shoulder arthritis 2016    acromioclavicular joint      Sialorrhea 4/18/2023     Somatization disorder 05/12/2011     Spider veins     Leg Vericise vein surgery     Squamous cell carcinoma      Tremor 11/01/2011     Unspecified essential hypertension      Unspecified hypothyroidism      Urinary tract infection      Urinary urgency 11/01/2011     Wears glasses 11/01/2011       Past Surgical History:   Procedure Laterality Date     BIOPSY OF SKIN LESION       COLONOSCOPY       COLONOSCOPY N/A 7/20/2022    Procedure: COLONOSCOPY (fv) polypectomy using jumbo bx forcep;  Surgeon: Gómez Mckinney MD;  Location:  GI     CYSTOSCOPY  Many years ago    Blood in urine/ bladder cystoscopy     ESOPHAGOSCOPY, GASTROSCOPY, DUODENOSCOPY (EGD), COMBINED N/A 7/20/2022    Procedure: ESOPHAGOGASTRODUODENOSCOPY (EGD) (fv) biopsies using cold bx forcep;  Surgeon: Gómez Mckinney MD;  Location:  GI     HEMORRHOID SURGERY       lip biopsy      for sicca complex     MOHS MICROGRAPHIC PROCEDURE       SOFT TISSUE SURGERY      removeal of basel cell carcinoma        Social History     Socioeconomic History     Marital status:      Spouse name: Not on file     Number of children: Not on file     Years of education: Not on file     Highest education level: Not on file   Occupational History     Not on file   Tobacco Use     Smoking status: Former     Packs/day: 1.50     Years: 37.00     Pack years: 55.50     Types: Cigarettes     Start date: 6/15/1963     Quit date: 2000     Years since quittin.5     Smokeless tobacco: Never   Vaping Use     Vaping status: Never Used   Substance and Sexual Activity     Alcohol use: Not Currently     Drug use: Never     Sexual activity: Not Currently     Partners: Female     Birth control/protection: None   Other Topics Concern     Parent/sibling w/ CABG, MI or angioplasty before 65F 55M? No   Social History Narrative    2013: Living in [place] in a townhouse with no steps    Has 3 sons that are doing okay.         Dairy/d 1 servings/d.     Caffeine 0 servings/d    Exercise 0 x week    Sunscreen used - No    Seatbelts used - Yes    Working smoke/CO detectors in the home - Yes    Guns stored in the home - Yes    Self Breast Exams - NA    Self Testicular Exam - Yes    Eye Exam up to date - Yes 2008    Dental Exam up to date - Yes 2006    Pap Smear up to date - NA    Mammogram up to date - NA    PSA up to date - Yes 2008    Dexa Scan up to date - No    Flex Sig / Colonoscopy up to date - Yes less than 5 yrs ago    Immunizations up to date -today    Abuse: Current or Past(Physical, Sexual or Emotional)- No    Do you feel safe in your environment - Yes    2008                 Social Determinants of Health     Financial Resource Strain: Low Risk  (2022)    Overall Financial Resource Strain (CARDIA)      Difficulty of Paying Living Expenses: Not hard at all   Food Insecurity: No Food Insecurity (2022)    Hunger Vital Sign      Worried About Running Out of Food in the Last Year: Never true      Ran Out of Food in the Last  "Year: Never true   Transportation Needs: No Transportation Needs (12/16/2022)    PRAPARE - Transportation      Lack of Transportation (Medical): No      Lack of Transportation (Non-Medical): No   Physical Activity: Insufficiently Active (12/16/2022)    Exercise Vital Sign      Days of Exercise per Week: 3 days      Minutes of Exercise per Session: 20 min   Stress: No Stress Concern Present (12/16/2022)    Citizen of [place] [employer] [employer] - Occupational Stress Questionnaire      Feeling of Stress : Not at all   Social Connections: Socially Integrated (12/16/2022)    Social Connection and Isolation Panel [NHANES]      Frequency of Communication with Friends and Family: Twice a week      Frequency of Social Gatherings with Friends and Family: Once a week      Attends L[employer] Services: More than 4 times per year      Active Member of Clubs or Organizations: Yes      Attends Club or Organization Meetings: Not on file      Marital Status:    Intimate Partner Violence: Not on file   Housing Stability: Unknown (12/16/2022)    Housing Stability Vital Sign      Unable to Pay for Housing in the Last Year: No      Number of Places Lived in the Last Year: Not on file      Unstable Housing in the Last Year: No       ROS Pulmonary  A complete ROS was otherwise negative except as noted in the HPI.  /67   Pulse 54   Resp 17   Ht 1.854 m (6' 1\")   Wt 129.3 kg (285 lb)   SpO2 97%   BMI 37.60 kg/m    Exam:   GENERAL APPEARANCE: Alert, and in no apparent distress.  EYES: PERRL, EOMI  MOUTH: Oral mucosa is moist, without any lesions,, no oropharyngeal exudate.  NECK: supple, no masses, no thyromegaly.  LYMPHATICS: No significant axillary, cervical, or supraclavicular nodes.  RESP: normal percussion, good air flow throughout.  ** crackles. No rhonchi. No wheezes.  CV: Normal S1, S2, regular rhythm, normal rate. No murmur.  No rub. No gallop. No LE edema.   ABDOMEN:  Bowel sounds normal, soft, nontender, no HSM " or masses.   MS: extremities normal. No clubbing. No cyanosis.  SKIN: no rash on limited exam  NEURO: Mentation intact, speech normal, normal strength and tone, normal gait and stance  ResultsPFTs done today were reviewed by me with the patient.  He has had a 6-minute walk test.  The 6-minute distance was decreased.  He walked 600 feet with the lower limit of normal being 1307 feet.  However, no desaturation was noted.  FVC 3.94 (94), Z-score 0.36.  FEV1 3.07 (97%), Z-score -0.14.  The ratio is 78.  Total lung capacity 7.21 (93%), Z-score -0.75.  Residual volume 2.91 (103%), Z-score 0.21.  DLCO not corrected.  The hemoglobin is 25.58 (92%), Z-score -0.43.  My interpretation is that the spirometry, lung volumes, diffusion capacity are all within the normal range.  They are unchanged from prior spirometry, but the total lung capacity is decreased by 530 mL.  Clinical relevance of this is not clear, and he has an unchanged FVC.    Assessment and plan: Mr. 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  He is currently on methotrexate and hydroxychloroquine with stable symptoms  1.  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  Normal and stable spirometry with residual volume in the normal range.  Total lung capacity decreased.  Continue to monitor.  He is currently on methotrexate and Plaquenil, which he will continue.  He has an appointment with Dr. Goodrich coming up on Friday.  2.  Sleep-disordered breathing.  We will continue on NIPPV as he is.  3.  Deconditioning with gait instability.  He will continue to pursue daily walks.  He has participated in rehab previously, which has not really helped him.  He follows in the [facility] with Dr. Montes.    I will see him back in 6 months.    This note consists of symbols derived from keyboarding, dictation and/or voice recognition software. As a  result, there may be errors in the script that have gone undetected. Please consider this when interpreting information found in this chart    Answers for HPI/ROS submitted by the patient on 4/12/2023  General Symptoms: No  Skin Symptoms: No  HENT Symptoms: Yes  EYE SYMPTOMS: No  HEART SYMPTOMS: No  LUNG SYMPTOMS: No  INTESTINAL SYMPTOMS: No  URINARY SYMPTOMS: No  REPRODUCTIVE SYMPTOMS: No  SKELETAL SYMPTOMS: Yes  BLOOD SYMPTOMS: No  NERVOUS SYSTEM SYMPTOMS: Yes  MENTAL HEALTH SYMPTOMS: No  Tinnitus: Yes  Back pain: Yes  Neck pain: Yes  Joint pain: Yes  Muscle weakness: Yes  Joint stiffness: Yes  Dizziness or trouble with balance: Yes  Tingling: Yes  Tremor: Yes  Weakness: Yes  Difficulty walking: Yes  Numbness: Yes

## 2023-04-19 NOTE — LETTER
4/19/2023         RE: Lucio Daly  [address]  [place] [address]        Dear Colleague,    Thank you for referring your patient, Lucio Daly, to the [facility] FOR LUNG SCIENCE AND [facility] [place]. Please see a copy of my visit note below.    Reason for Visit  Lucio Daly is a 72 year old year old male who is being seen for ILD  HPI    The patient was seen and examined by Harsha Mccarthy MD       Mr. 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  He was last seen in the Pulmonary Clinic    In 10/2022 at which time he was on methotrexate 20 mg per week and Plaquenil 200 mg b.i.d for inflammatory arthritis. He was found to have leukopenia.  The dose of methotrexate was decreased to 17.5 mg per week.  Repeat white count was in the normal range and he is back to 20 mg per week.    Overall, he denies any significant changes in the pulmonary symptoms.  He was in [place] over the winter and had daily walks using a walker because of gait instability.  He reports that his endurance from a pulmonary standpoint was unchanged.  However, he has noticed worsening gait stability issues.  He has been followed with the [facility] for his Parkinson.  He had that clinic visit yesterday.    He denies any other pulmonary symptoms.  A dry cough persists but is unchanged.      Current Outpatient Medications   Medication    acetaminophen (TYLENOL) 500 MG tablet    albuterol (PROAIR HFA/PROVENTIL HFA/VENTOLIN HFA) 108 (90 Base) MCG/ACT inhaler    amLODIPine (NORVASC) 5 MG tablet    cholecalciferol (HM VITAMIN D3) 25 MCG (1000 UT) TABS    folic acid (FOLVITE) 1 MG tablet    hydroxychloroquine (PLAQUENIL) 200 MG tablet    lisinopril-hydrochlorothiazide (ZESTORETIC) 20-25 MG tablet    methotrexate 2.5 MG tablet    metoprolol succinate ER (TOPROL XL) 50 MG 24 hr tablet    omeprazole (PRILOSEC) 40 MG DR capsule    ORDER  FOR DME    oxybutynin (DITROPAN) 5 MG tablet    pregabalin (LYRICA) 50 MG capsule    salmeterol (SEREVENT DISKUS) 50 MCG/ACT inhaler     No current facility-administered medications for this visit.     Allergies   Allergen Reactions    Adhesive Tape Hives     Bandages misc  Bandages misc    Aluminum Hydroxide Hives    Calcium Carbonate Hives    Cortisone Hives    Cyclosporine Hives     Burning eyes, problems with breathing, tightness in chest    Diphenhydramine Other (See Comments) and Shortness Of Breath    Fexofenadine Other (See Comments) and Shortness Of Breath     EXCESSIVE URINATION AND WEAKNESS, LIGHT-HEADED      Gabapentin Hives     Neurontin: mosd changes and excess urination  Neurontin: mosd changes and excess urination    Levofloxacin Hives     From surgeon--? Joint pain ? Gerd aggravation. Insomnia, excess urination    Maalox Maximum Strength Hives     EXCESSIVE URINATION AND WEAKNESS, LIGHT-HEADED    Magnesium Carbonate Hives    Magnesium Hydroxide Hives    Prednisone Hives     Weakness, elevated bp, headache, eye pain, congestion     Restasis      Burning eyes, problems with breathing, tightness in chest    Simethicone Hives    Sulfamethoxazole-Trimethoprim Hives     Chest pain, angina  Chest pain, angina    Tree Extract Hives    Allegra      EXCESSIVE URINATION AND WEAKNESS, LIGHT-HEADED    Allergy      Dust    Animal Dander     Benadryl Allergy      EXCESSIVE URINATION AND WEAKNESS, LIGHT-HEADED    Cephalexin      Joint pain or gerd aggravation. Bloating excessive urination   Other reaction(s): GI Distress  Joint pain or gerd aggravation. Bloating excessive urination     Cephalosporins      Other reaction(s): GI Distress    Doxycycline Nausea and Vomiting     SWEATING,MIGRAINES,LOSS OF APPETITE,SWEATING,LIGHT HEADED, EXCESSIVE URINATION    Doxycycline Hyclate Nausea     SWEATING,MIGRAINES,LOSS OF APPETITE,SWEATING,LIGHT HEADED, EXCESSIVE URINATION    Flonase [Fluticasone Propionate]     Fluticasone       Other reaction(s): GI Distress    Hydrocortisone Nausea and Vomiting    Iodine      Other reaction(s): GI Distress    Iodine Solution [Povidone Iodine]      SKIN MELTS    Levaquin      From surgeon--? Joint pain ? Gerd aggravation. Insomnia, excess urination    Mylanta      EXCESSIVE URINATION AND WEAKNESS, LIGHT-HEADED    Oxcarbazepine      Other reaction(s): GI Distress  SEVERE JOINT AND TENDON PAIN, INSOMNIA, RESTLESSNESS, NAUSEA, EXCESS URINATION    Povidone Iodine      Other reaction(s): GI Distress  SKIN MELTS    Prednisone      Weakness, elevated bp, headache, eye pain, congestion     Seafood [Seafood]     Shellfish Allergy      Hives    Other reaction(s): GI Distress  Hives    Sulfamethoxazole W/Trimethoprim     Symbicort GI Disturbance and Nausea     Other reaction(s): GI Distress    Trees     Trileptal      SEVERE JOINT AND TENDON PAIN, INSOMNIA, RESTLESSNESS, NAUSEA, EXCESS URINATION    Cortizone Rash     EXCESS URINATION,WEAKNESS,NAUSEA, HEADACHE    Ibuprofen Fatigue, Other (See Comments) and Palpitations     Past Medical History:   Diagnosis Date    Abnormal EMG 04/18/2013    Abnormal involuntary movements(781.0)     Movement Disorder    AK (actinic keratosis) 12/18/2011    Allergic rhinitis, cause unspecified     Allergic rhinitis    Balance problems 11/01/2011    Basal cell carcinoma     Bladder spasms 11/01/2011    Chronic osteoarthritis     COPD (chronic obstructive pulmonary disease) (H)     Interstial lung disease, obliderans bronchiolitis    Diaphragmatic hernia without mention of obstruction or gangrene     Earache or other ear, nose, or throat complaint     Esophageal reflux     Fatigue 11/01/2011    Fracture     H. pylori infection 05/12/2011    History of MRI of cervical spine 11/18/2013    EXAMINATION: CERVICAL SPINE G/E 5 VIEWS* 4/19/2013 4:18 PM  CLINICAL HISTORY: Pain in limb,Performing Location?->[facility]),  COMPARISON:  FINDINGS: AP and lateral views in flexion and  extension, as well as odontoid view of the cervical spine was obtained. There is no comparison available. The vertebral bodies of the cervical spine are normally aligned. There is posterior spurring and d    Incomplete defecation 11/01/2011    Interstitial lung disease (H) 11/29/2016    Laboratory test 08/07/2012    Lung disease 06/2015    Malignant basal cell neoplasm of skin 08/06/2008    Melanoma (H) 08/06/2008    Melanoma in situ of lower leg (H)     R calf    Neuropathy 05/16/2011    GALO (obstructive sleep apnea)     Other bladder disorder     Other color vision deficiencies     Other nervous system complications     Parkinsonism (H) 11/01/2011    Parkinsons disease (H)     Parkinsonism/ balance, dropped foot, tremor    Personal history of colonic polyps     PLMD (periodic limb movement disorder) 12/16/2021    Polyneuropathy in other diseases classified elsewhere (H)     RA (rheumatoid arthritis) (H)     Rheumatoid arthritis of multiple sites with negative rheumatoid factor (H) 03/21/2016    Seronegative arthritis 11/18/2013    Shortness of breath     Shoulder arthritis 2016    acromioclavicular joint     Sialorrhea 4/18/2023    Somatization disorder 05/12/2011    Spider veins     Leg Vericise vein surgery    Squamous cell carcinoma     Tremor 11/01/2011    Unspecified essential hypertension     Unspecified hypothyroidism     Urinary tract infection     Urinary urgency 11/01/2011    Wears glasses 11/01/2011       Past Surgical History:   Procedure Laterality Date    BIOPSY OF SKIN LESION      COLONOSCOPY      COLONOSCOPY N/A 7/20/2022    Procedure: COLONOSCOPY (fv) polypectomy using jumbo bx forcep;  Surgeon: Gómez Mckinney MD;  Location:  GI    CYSTOSCOPY  Many years ago    Blood in urine/ bladder cystoscopy    ESOPHAGOSCOPY, GASTROSCOPY, DUODENOSCOPY (EGD), COMBINED N/A 7/20/2022    Procedure: ESOPHAGOGASTRODUODENOSCOPY (EGD) (fv) biopsies using cold bx forcep;  Surgeon: Gómez Mckinney MD;   Location:  GI    HEMORRHOID SURGERY      lip biopsy      for sicca complex    MOHS MICROGRAPHIC PROCEDURE      SOFT TISSUE SURGERY      removeal of basel cell carcinoma       Social History     Socioeconomic History    Marital status:      Spouse name: Not on file    Number of children: Not on file    Years of education: Not on file    Highest education level: Not on file   Occupational History    Not on file   Tobacco Use    Smoking status: Former     Packs/day: 1.50     Years: 37.00     Pack years: 55.50     Types: Cigarettes     Start date: 6/15/1963     Quit date: 2000     Years since quittin.5    Smokeless tobacco: Never   Vaping Use    Vaping status: Never Used   Substance and Sexual Activity    Alcohol use: Not Currently    Drug use: Never    Sexual activity: Not Currently     Partners: Female     Birth control/protection: None   Other Topics Concern    Parent/sibling w/ CABG, MI or angioplasty before 65F 55M? No   Social History Narrative    2013: Living in [place] in a townhouse with no steps    Has 3 sons that are doing okay.         Dairy/d 1 servings/d.     Caffeine 0 servings/d    Exercise 0 x week    Sunscreen used - No    Seatbelts used - Yes    Working smoke/CO detectors in the home - Yes    Guns stored in the home - Yes    Self Breast Exams - NA    Self Testicular Exam - Yes    Eye Exam up to date - Yes 2008    Dental Exam up to date - Yes 2006    Pap Smear up to date - NA    Mammogram up to date - NA    PSA up to date - Yes 2008    Dexa Scan up to date - No    Flex Sig / Colonoscopy up to date - Yes less than 5 yrs ago    Immunizations up to date -today    Abuse: Current or Past(Physical, Sexual or Emotional)- No    Do you feel safe in your environment - Yes    2008                 Social Determinants of Health     Financial Resource Strain: Low Risk  (2022)    Overall Financial Resource Strain (CARDIA)     Difficulty of Paying Living Expenses: Not hard at all   Food  "Insecurity: No Food Insecurity (12/16/2022)    Hunger Vital Sign     Worried About Running Out of Food in the Last Year: Never true     Ran Out of Food in the Last Year: Never true   Transportation Needs: No Transportation Needs (12/16/2022)    PRAPARE - Transportation     Lack of Transportation (Medical): No     Lack of Transportation (Non-Medical): No   Physical Activity: Insufficiently Active (12/16/2022)    Exercise Vital Sign     Days of Exercise per Week: 3 days     Minutes of Exercise per Session: 20 min   Stress: No Stress Concern Present (12/16/2022)    [place] [employer] - Occupational Stress Questionnaire     Feeling of Stress : Not at all   Social Connections: Socially Integrated (12/16/2022)    Social Connection and Isolation Panel [NHANES]     Frequency of Communication with Friends and Family: Twice a week     Frequency of Social Gatherings with Friends and Family: Once a week     Attends [employer] Services: More than 4 times per year     Active Member of Clubs or Organizations: Yes     Attends Club or Organization Meetings: Not on file     Marital Status:    Intimate Partner Violence: Not on file   Housing Stability: Unknown (12/16/2022)    Housing Stability Vital Sign     Unable to Pay for Housing in the Last Year: No     Number of Places Lived in the Last Year: Not on file     Unstable Housing in the Last Year: No       ROS Pulmonary  A complete ROS was otherwise negative except as noted in the HPI.  /67   Pulse 54   Resp 17   Ht 1.854 m (6' 1\")   Wt 129.3 kg (285 lb)   SpO2 97%   BMI 37.60 kg/m    Exam:   GENERAL APPEARANCE: Alert, and in no apparent distress.  EYES: PERRL, EOMI  MOUTH: Oral mucosa is moist, without any lesions,, no oropharyngeal exudate.  NECK: supple, no masses, no thyromegaly.  LYMPHATICS: No significant axillary, cervical, or supraclavicular nodes.  RESP: normal percussion, good air flow throughout.  ** crackles. No rhonchi. No " wheezes.  CV: Normal S1, S2, regular rhythm, normal rate. No murmur.  No rub. No gallop. No LE edema.   ABDOMEN:  Bowel sounds normal, soft, nontender, no HSM or masses.   MS: extremities normal. No clubbing. No cyanosis.  SKIN: no rash on limited exam  NEURO: Mentation intact, speech normal, normal strength and tone, normal gait and stance  ResultsPFTs done today were reviewed by me with the patient.  He has had a 6-minute walk test.  The 6-minute distance was decreased.  He walked 600 feet with the lower limit of normal being 1307 feet.  However, no desaturation was noted.  FVC 3.94 (94), Z-score 0.36.  FEV1 3.07 (97%), Z-score -0.14.  The ratio is 78.  Total lung capacity 7.21 (93%), Z-score -0.75.  Residual volume 2.91 (103%), Z-score 0.21.  DLCO not corrected.  The hemoglobin is 25.58 (92%), Z-score -0.43.  My interpretation is that the spirometry, lung volumes, diffusion capacity are all within the normal range.  They are unchanged from prior spirometry, but the total lung capacity is decreased by 530 mL.  Clinical relevance of this is not clear, and he has an unchanged FVC.    Assessment and plan: Mr. 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  He is currently on methotrexate and hydroxychloroquine with stable symptoms  1.  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  Normal and stable spirometry with residual volume in the normal range.  Total lung capacity decreased.  Continue to monitor.  He is currently on methotrexate and Plaquenil, which he will continue.  He has an appointment with Dr. Goodrich coming up on Friday.  2.  Sleep-disordered breathing.  We will continue on NIPPV as he is.  3.  Deconditioning with gait instability.  He will continue to pursue daily walks.  He has participated in rehab previously, which has not really helped him.  He follows in the [facility] Clinic with   Jyoti.    I will see him back in 6 months.    This note consists of symbols derived from keyboarding, dictation and/or voice recognition software. As a result, there may be errors in the script that have gone undetected. Please consider this when interpreting information found in this chart      Again, thank you for allowing me to participate in the care of your patient.        Sincerely,        Harsha Mccarthy MD

## 2023-04-21 ENCOUNTER — OFFICE VISIT (OUTPATIENT)
Dept: RHEUMATOLOGY | Facility: CLINIC | Age: 73
End: 2023-04-21
Attending: INTERNAL MEDICINE
Payer: MEDICARE

## 2023-04-21 ENCOUNTER — MYC MEDICAL ADVICE (OUTPATIENT)
Dept: PEDIATRICS | Facility: CLINIC | Age: 73
End: 2023-04-21

## 2023-04-21 VITALS
TEMPERATURE: 97.5 F | BODY MASS INDEX: 37.73 KG/M2 | DIASTOLIC BLOOD PRESSURE: 69 MMHG | WEIGHT: 286 LBS | SYSTOLIC BLOOD PRESSURE: 115 MMHG | HEART RATE: 57 BPM | OXYGEN SATURATION: 98 %

## 2023-04-21 DIAGNOSIS — N18.30 STAGE 3 CHRONIC KIDNEY DISEASE, UNSPECIFIED WHETHER STAGE 3A OR 3B CKD (H): ICD-10-CM

## 2023-04-21 DIAGNOSIS — Z79.899 HIGH RISK MEDICATION USE: ICD-10-CM

## 2023-04-21 DIAGNOSIS — I10 PRIMARY HYPERTENSION: Primary | ICD-10-CM

## 2023-04-21 DIAGNOSIS — D84.9 IMMUNOSUPPRESSED STATUS (H): ICD-10-CM

## 2023-04-21 DIAGNOSIS — M06.09 RHEUMATOID ARTHRITIS OF MULTIPLE SITES WITH NEGATIVE RHEUMATOID FACTOR (H): ICD-10-CM

## 2023-04-21 DIAGNOSIS — G62.9 PERIPHERAL POLYNEUROPATHY: ICD-10-CM

## 2023-04-21 PROCEDURE — 99214 OFFICE O/P EST MOD 30 MIN: CPT | Performed by: INTERNAL MEDICINE

## 2023-04-21 PROCEDURE — G0463 HOSPITAL OUTPT CLINIC VISIT: HCPCS | Performed by: INTERNAL MEDICINE

## 2023-04-21 RX ORDER — HYDROXYCHLOROQUINE SULFATE 200 MG/1
200 TABLET, FILM COATED ORAL DAILY
Qty: 90 TABLET | Refills: 5 | Status: SHIPPED | OUTPATIENT
Start: 2023-04-21 | End: 2023-10-20

## 2023-04-21 RX ORDER — FOLIC ACID 1 MG/1
1 TABLET ORAL DAILY
Qty: 90 TABLET | Refills: 3 | Status: SHIPPED | OUTPATIENT
Start: 2023-04-21 | End: 2023-10-20

## 2023-04-21 ASSESSMENT — PAIN SCALES - GENERAL: PAINLEVEL: MILD PAIN (3)

## 2023-04-21 NOTE — PATIENT INSTRUCTIONS
Diagnosis:  1.  Inflammatory arthritis: stable control of inflammatory joint symptoms. I recommend continued methotrexate and hydroxychloroquine.   2. Neuropathy: I recommend continuing Lyrica  3. Spinal stenosis  4. Prostate CA    Plan:  1.  Bloodwork every 4 months  2.  Continue methotrexate 8 tablets (20 mg) once weekly.While on methotrexate:   -- Check labs every 3 months (AST/ALT, Albumin, CBC with platelets)   -- Limit alcohol intake to 2 drinks weekly; use folate 1 mg daily.  --Tylenol 500-1000 mg can be used as needed up to three times daily for nausea/headache associated with dosing.    3.  Continue hydroxychloroquine 200 mg once daily.  While taking hydroxychloroquine, undergo annual ophthalmology evaluation to monitor for rare retinal Plaquenil toxicity (next visit due in summer 2023).    4 Acetaminophen 1000 mg 3 times daily for residual pain. Last ophthalmology in August 2022.  OCT and retinal exam found no evidence of Plaquenil toxicity.

## 2023-04-21 NOTE — TELEPHONE ENCOUNTER
IT looks as though there are outside labs from 2/28 scanned into his chart.  I am not sure if Dr. Calixto reviewed these or not.  Please notify the patient that results are in his chart, and Dr. Calixto can review them among his return early next week.  If patient has concerns he needs reviewed, he can make an office visit to discuss as well.     Thank you,     Fabiola Blum PA-C   Covering for Dr. Calixto while he is away.

## 2023-04-21 NOTE — LETTER
4/21/2023       RE: Lucio Daly  [address]  [place] [address]     Dear Colleague,    Thank you for referring your patient, Lucio Daly, to the [facility]HE[facility]OLOG[facility] [place] [facility]. Please see a copy of my visit note below.    Rheumatology Clinic Visit  Jeremy Goodrich M.D.     Lucio Daly MRN# [id number]   YOB: 1950 Age: 72 year old     Date of Visit: .04/21/2023    Primary care provider: Saroj Tinoco         Assessment and Plan:   # Seronegative inflammatory arthritis: Patient reports improved pain in wrists ankles and knees since restarting methotrexate and hydroxychloroquine combination in late 2022.  Exam shows no inflammatory joint changes.  Data: Lab work on December 19, 2022 showed creatinine 1.22, stable, transaminases, albumin, and CBC were normal except for mild anemia hemoglobin 12.5. 2-2023 labs showed Cr 1.6, LFts normal, CBC stable.  February 2023 lab work showed creatinine at 1.6; LFTs and CBC were stable.  Discussion: Seronegative inflammatory arthritis is improved/stable on combination treatment. Patient Noted worse control of diffuse arthralgia with methotrexate at 15 mg weekly in 2022.  Accordingly, I recommend continuing methotrexate 20 mg weekly, and hydroxychloroquine 200 mg daily.  While taking hydroxychloroquine, patient will require annual retinal toxicity examinations.  2. ILD Obliterative bronchiolitis, symptomatically stable.  Followed by Dr. Mccarthy in pulmonary.  3. Osteoarthritis knees, ankle, shoulder and neck and hands/thumbs. Continue isometric quad strengthening exercises twice daily to improve support around the joint. Left ankle osteoarthritis and overall osteoarthritis  is contributing to the overall pain. Continue splinting/hand therapy and acetaminophen 1000 mg tid ATC.  Plan plain film of the right foot to evaluate status of firm nontender mass at the lateral aspect of  "the right first PIP.  4. Trigger finger, L 4th: gradually increasing.  Did not discuss today.  5. Prostate CA: Completing XRT in September 2022.    Plan:  1.  Bloodwork every 4 months  2.  Continue methotrexate 8 tablets (20 mg) once weekly.While on methotrexate:   -- Check labs every 3 months (AST/ALT, Albumin, CBC with platelets)   -- Limit alcohol intake to 2 drinks weekly; use folate 1 mg daily.  --Tylenol 500-1000 mg can be used as needed up to three times daily for nausea/headache associated with dosing.    3.  Continue hydroxychloroquine 200 mg once daily.  While taking hydroxychloroquine, undergo annual ophthalmology evaluation to monitor for rare retinal Plaquenil toxicity (next visit due in summer 2023). Last ophthalmology in August 2022.  OCT and retinal exam found no evidence of Plaquenil toxicity.    4 Acetaminophen 1000 mg 3 times daily for residual pain.     RTC 6-7 month  Jeremy Goodrich MD  Staff Rheumatologist, [facility]         History of Present Illness:   Lucio Daly \"Syed\" presents for f/u seronegative rheumatoid arthritis, sicca complex.  Last seen in 9-2022  Methotrexate and hydroxychloroquine were continued for seronegative arthritis.    Interval history April 21, 2023  + difficulty rising from chair or bed. Slightly progressive pain in hips, knees, hands.  No persistent swelling, redness, warmth in the affected joints.  He was walking everyday with walker this winter for 15-20 minutes.  Early morning stiffness is ~ 30 minutes.  He continues methotrexate 20 mg weekly; he had reduced to 15 mg weekly, but joint pain worsened in 2-2023.  He thinks that joint pain is overall better controlled at the higher dose of methotrexate 20 mg weekly.  There is mild stomach upset associated with dose. He uses hydroxychloroquine 200 mg daily.  He has discussed RF ablation for lumbar pain.    Interval history September 29, 2022  Completnig 6 weeks of XRT for prostate Ca. Off methotrexate durign that " "time, noting increased back, thumb, knees achiness and pain. R knee especially; collarbone areas.  Early morning stiffness is 45 minutes.  Taking acetaminophen 1000 tid; continuing hydroxychloroquine 200 mg daily.  No adverse effects.  Interval history April 8, 2022    He is doing about the same. He has daily pain in hands, feet, elbows, and shoulders, but ability to perform activities of daily living, and to perform walking 20 to 25 minutes without limitation, are unimpeded.  He has less than 30 minutes of morning stiffness.  His L great toe is dark and swollen; his whole L ankle and foot feel like they are \"in a vise\". The R leg goes completely numb with prolonged driving.  He notes pain in the R LBP when lying on his back. He has pain in the R lateral thigh with mattress contact or with prolonged immobility.  He has been offered surgery for spinal stenosis, but has been advised that he is \"high risk\" for surgery.  He continues methotrexate 20 mg weekly; there is mild stomach upset associated with dose. He uses hydroxychloroquine 200 mg daily.  He notes increasing triggering in his L 4th finger.  He has been adjusting HTN medications with primary care.  PMH   1. Sensory neuropathy of unclear etiology - negative work up, managed on Lyrica.  Has chronic dysesthesia in pads of feet.  2. Parkinsonisim/Tremor disorder; sees neurology   3. Headaches   4. History of basal cell, squamous (most recent +bx 8-2014)  5. History of melanoma   6. GALO on CPAP.  7. HTN.   8. Seronegative RA, diagnosed 2009. SICCA  9.  JUARES. Work-up 4-2015. 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 Lumbar stenosis per MRI 2017   11. Bronchitis 5-2017, early pneumonia   12. Chicken pox   13. DEXA  Normal   14. Left ankle DJD, MRI  --seen Dr. Martinez 2-2019   Past Medical History:   Diagnosis Date    Abnormal EMG 04/18/2013    Abnormal involuntary movements(781.0)     Movement Disorder    AK (actinic keratosis) 12/18/2011    " Allergic rhinitis, cause unspecified     Allergic rhinitis    Balance problems 11/01/2011    Basal cell carcinoma     Bladder spasms 11/01/2011    Chronic osteoarthritis     COPD (chronic obstructive pulmonary disease) (H)     Interstial lung disease, obliderans bronchiolitis    Diaphragmatic hernia without mention of obstruction or gangrene     Earache or other ear, nose, or throat complaint     Esophageal reflux     Fatigue 11/01/2011    Fracture     H. pylori infection 05/12/2011    History of MRI of cervical spine 11/18/2013    EXAMINATION: CERVICAL SPINE G/E 5 VIEWS* 4/19/2013 4:18 PM  CLINICAL HISTORY: Pain in limb,Performing Location?->[facility] ([facility]),  COMPARISON:  FINDINGS: AP and lateral views in flexion and extension, as well as odontoid view of the cervical spine was obtained. There is no comparison available. The vertebral bodies of the cervical spine are normally aligned. There is posterior spurring and d    Incomplete defecation 11/01/2011    Interstitial lung disease (H) 11/29/2016    Laboratory test 08/07/2012    Lung disease 06/2015    Malignant basal cell neoplasm of skin 08/06/2008    Melanoma (H) 08/06/2008    Melanoma in situ of lower leg (H)     R calf    Neuropathy 05/16/2011    GALO (obstructive sleep apnea)     Other bladder disorder     Other color vision deficiencies     Other nervous system complications     Parkinsonism (H) 11/01/2011    Parkinsons disease (H)     Parkinsonism/ balance, dropped foot, tremor    Personal history of colonic polyps     PLMD (periodic limb movement disorder) 12/16/2021    Polyneuropathy in other diseases classified elsewhere (H)     RA (rheumatoid arthritis) (H)     Rheumatoid arthritis of multiple sites with negative rheumatoid factor (H) 03/21/2016    Seronegative arthritis 11/18/2013    Shortness of breath     Shoulder arthritis 2016    acromioclavicular joint     Sialorrhea 4/18/2023    Somatization disorder 05/12/2011    Spider veins     Leg  "Vericise vein surgery    Squamous cell carcinoma     Tremor 11/01/2011    Unspecified essential hypertension     Unspecified hypothyroidism     Urinary tract infection     Urinary urgency 11/01/2011    Wears glasses 11/01/2011     Injuries-ankle sprains, left heel fracture    Family Hx   MGM Psoriasis  Sister -PPM  Family History   Problem Relation Age of Onset    Hypertension Mother     Heart Disease Mother         a fib    Arthritis Mother         \"osteo\"    Osteoporosis Mother     Skin Cancer Mother     Uterine Cancer Mother     Other Cancer Mother     Cancer Mother     Hypertension Brother     Diabetes Brother         \" post pancreatitis\"    Neurologic Disorder Sister         multiple sclerosis    Hypertension Sister     Heart Disease Sister     Multiple Sclerosis Sister     Heart Disease Sister         Chf    Arthritis Sister     Heart Failure Sister     Kidney Disease Sister     Dementia Sister     Hypertension Father     Cerebrovascular Disease Father         ,    Skin Cancer Father     Colon Polyps Father     Heart Disease Father     Other - See Comments Son         [place]    Other - See Comments Abdon wagner    Other - See Comments Abdon gonzalez    Psoriasis Maternal Grandfather     Stomach Cancer Maternal Grandfather     Cancer Maternal Grandfather     Congenital Anomalies Other         granddaughter with a chromosome defect    Other Cancer Other         Grand daughter    Cancer Other         Grand daughter    Cerebrovascular Disease Paternal Grandmother         ,    Cerebrovascular Disease Paternal Grandfather         ,    Cancer Granddaughter         Langerhans Cell Histiocytosis    Genetic Disease Granddaughter         gene translocation    Learning Disorder Other         Gene translocation    Melanoma No family hx of     Colon Cancer No family hx of      Current Outpatient Medications   Medication Sig    acetaminophen (TYLENOL) 500 MG tablet 2 x 500mg by mouth 3 times " per day: 7/730am, 4pm and 11pm  = 6/day    albuterol (PROAIR HFA/PROVENTIL HFA/VENTOLIN HFA) 108 (90 Base) MCG/ACT inhaler Inhale 2 puffs into the lungs every 4 hours as needed for shortness of breath or wheezing    amLODIPine (NORVASC) 5 MG tablet Take 1 tablet (5 mg) by mouth daily    cholecalciferol (HM VITAMIN D3) 25 MCG (1000 UT) TABS 1000 unit tab by mouth daily    folic acid (FOLVITE) 1 MG tablet Take 1 tablet (1 mg) by mouth daily    hydroxychloroquine (PLAQUENIL) 200 MG tablet Take 1 tablet (200 mg) by mouth daily Daily at 9am.    lisinopril-hydrochlorothiazide (ZESTORETIC) 20-25 MG tablet Take 1 tablet by mouth daily    methotrexate 2.5 MG tablet TAKE 7 TABLETS BY MOUTH ONCE PER WEEK. LABS DUE EVERY 8 TO 12 WEEKS (LAST DONE 5-5-16)    metoprolol succinate ER (TOPROL XL) 50 MG 24 hr tablet 1 and 1/2 tabs daily.    omeprazole (PRILOSEC) 40 MG DR capsule Take 1 capsule (40 mg) by mouth daily    ORDER FOR DME Use your BiPAP device as directed by your provider.    oxybutynin (DITROPAN) 5 MG tablet TAKE 1 TABLET BY MOUTH DAILY AT 4PM    pregabalin (LYRICA) 50 MG capsule 50mg capsule by mouth at 7am and 4pm and 1-2 x 50mg capsules by mouth nightly at 11pm (4/day)    salmeterol (SEREVENT DISKUS) 50 MCG/ACT inhaler Inhale 1 puff into the lungs 2 times daily     No current facility-administered medications for this visit.       Personal Hx   Home environment: No secondhand tobacco smoke in home.    History     Social History    Marital Status:      Spouse Name: N/A     Number of Children: N/A    Years of Education: N/A     Social History Main Topics    Smoking status: Former Smoker     Quit date: 09/30/2003    Smokeless tobacco: Never Used    Alcohol Use: No    Drug Use: No    Sexually Active: Not Currently -- Female partner(s)     Other Topics Concern    None     Social History Narrative    2013: Living in [place] in a townhouse with no stepsHas 3 sons that are doing okay.             Review of Systems:      14 points all negative except what is mentioned in HPI.  Review Of Systems  Skin: negative  Eyes: negative  Ears/Nose/Throat: negative  Respiratory: No shortness of breath, dyspnea on exertion, cough, or hemoptysis  Cardiovascular: negative  Gastrointestinal: negative  Genitourinary: negative  Musculoskeletal: as above  Neurologic: as above  Psychiatric: negative  Hematologic/Lymphatic/Immunologic: negative  Endocrine: negative             Past Surgical History:     Past Surgical History:   Procedure Laterality Date    BIOPSY OF SKIN LESION      COLONOSCOPY      COLONOSCOPY N/A 7/20/2022    Procedure: COLONOSCOPY (fv) polypectomy using jumbo bx forcep;  Surgeon: Gómez Mckinney MD;  Location:  GI    CYSTOSCOPY  Many years ago    Blood in urine/ bladder cystoscopy    ESOPHAGOSCOPY, GASTROSCOPY, DUODENOSCOPY (EGD), COMBINED N/A 7/20/2022    Procedure: ESOPHAGOGASTRODUODENOSCOPY (EGD) (fv) biopsies using cold bx forcep;  Surgeon: Gómez Mckinney MD;  Location:  GI    HEMORRHOID SURGERY      lip biopsy      for sicca complex    MOHS MICROGRAPHIC PROCEDURE      SOFT TISSUE SURGERY      removeal of basel cell carcinoma     Blood pressure 115/69, pulse 57, temperature 97.5  F (36.4  C), temperature source Oral, weight 129.7 kg (286 lb), SpO2 98 %.  Wt Readings from Last 4 Encounters:   04/21/23 129.7 kg (286 lb)   04/19/23 129.3 kg (285 lb)   12/27/22 127 kg (280 lb)   12/20/22 129 kg (284 lb 6.4 oz)       Constitutional: well-developed, appearing stated age; cooperative  Eyes: nl EOM, PERRLA, vision, conjunctiva, sclera  ENT: nl external ears, nose, hearing, lips, teeth, gums, throat  No mucous membrane lesions, normal saliva pool  Neck: no mass or thyroid enlargement  Resp: Breathing is unlabored  Lymph: no cervical, supraclavicular, inguinal or epitrochlear nodes  MS: No synovitis, or tenderness at MCPs or PIPs.  Right elbow is tender.  There is painful right shoulder flexion.  No tenderness at  MTPs.  Skin: no nail pitting, alopecia, rash, nodules or lesions  Neuro: nl cranial nerves, strength, sensation, DTRs.   Psych: nl judgement, orientation, memory, affect.             Data:         Latest Ref Rng & Units 8/18/2022    12:42 PM 11/4/2022     8:39 AM 12/19/2022    11:26 AM   RHEUM RESULTS   Albumin 3.4 - 5.0 g/dL 3.9       Albumin 3.5 - 5.2 g/dL  4.2   4.3     ALT 10 - 50 U/L 22   18   17     AST 10 - 50 U/L 18   21   19     Creatinine 0.67 - 1.17 mg/dL 1.35   1.50   1.22     CRP Inflammation <5.00 mg/L 5.63   5.51   8.79     GFR Estimate >60 mL/min/1.73m2 55   49   62     Hematocrit 40.0 - 53.0 % 41.1   39.1   38.6     Hemoglobin 13.3 - 17.7 g/dL 13.0   12.7   12.5     WBC 4.0 - 11.0 10e3/uL 4.8   3.2   4.0     RBC Count 4.40 - 5.90 10e6/uL 4.21   4.18   4.06     RDW 10.0 - 15.0 % 14.0   14.4   14.5     MCHC 31.5 - 36.5 g/dL 31.6   32.5   32.4     MCV 78 - 100 fL 98   94   95     Platelet Count 150 - 450 10e3/uL 172   154   182         Rheumatoid Factor   Date Value Ref Range Status   05/27/2010 <7 0 - 14 IU/mL Final   ,  ,  ,   Cyclic Citrullinated Peptide IgG   Date Value Ref Range Status   05/27/2010 <2 <5 U/mL Final     Comment:     Interpretation:  Negative   ,  ,   SSA (RO) Antibody IgG   Date Value Ref Range Status   05/27/2010 1  Final     Comment:     Reference range: 0 to 40  Unit: AU/mL  (Note)  REFERENCE INTERVALS: SSA (Ro) (ELAN) Ab, IgG   29 AU/mL or Less ............. Negative   30 - 40 AU/mL ................ Equivocal   41 AU/mL or Greater .......... Positive    SSA (Ro) antibody is seen in 70-75% of Sjogren syndrome  cases, 30-40% of systemic lupus erythematosus (SLE) and  5-10% of progressive systemic sclerosis (PSS).  Performed by [facility],  [address] [place] [address],[place] [address] [phone]  [email or web address], Chely Leija MD, Lab. Director     SSB (LA) Antibody IgG   Date Value Ref Range Status   05/27/2010 8  Final     Comment:     Reference range: 0 to 40  Unit:  AU/mL  (Note)  REFERENCE INTERVALS: SSB (La) (ELAN) Ab, IgG   29 AU/mL or Less ............. Negative   30 - 40 AU/mL ................ Equivocal   41 AU/mL or Greater .......... Positive    SSB (La) antibody is seen in 50-60% of Sjogren syndrome  cases and is specific if it is the only ELAN antibody  present. 15-25% of patients with systemic lupus  erythematosus (SLE) and 5-10% of patients with progressive  systemic sclerosis (PSS) also have this antibody.  Performed by [email or web address],  [address] [facility],[place] [id number] [phone]  [email or web address], Chely Leija MD, Lab. Director   ,  ,   MONICA Screen by EIA   Date Value Ref Range Status   05/27/2010 <1.0 0 - 1.0 Final     Comment:     Interpretation:  Negative   ,   DNA-ds   Date Value Ref Range Status   05/27/2010 <15 0 - 29 IU/mL Final     Comment:     Interpretation:  Negative   ,  ,  ,  ,  ,  ,  ,   Hep B Surface Agn   Date Value Ref Range Status   05/27/2010 Negative NEG Final   ,  ,  ,  ,  ,  ,  ,  ,  ,  ,  ,   Neutrophil Cytoplasmic IgG Antibody   Date Value Ref Range Status   06/26/2015   Final    <1:20  Reference range: <1:20  (Note)  The ANCA IFA is <1:20; therefore, no further testing will  be performed.  INTERPRETIVE INFORMATION: Anti-Neutrophil Cyto Ab, IgG  Neutrophil Cytoplasmic Antibodies (C-ANCA = granular  cytoplasmic staining, P-ANCA = perinuclear staining) are  found in the serum of over 90 percent of patients with  certain necrotizing systemic vasculitides, and usually in  less than 5 percent of patients with collagen vascular  disease or arthritis.  Performed by [email or web address],  [address] [facility] [address] [phone]  [email or web address], Brad Fall MD, Lab. Director       ,  ,  ,  ,  ,  ,  ,  ,  ,   Albumin Fraction   Date Value Ref Range Status   05/27/2010 4.0 3.7 - 5.1 g/dL Final     Alpha 2 Fraction   Date Value Ref Range Status   05/27/2010 0.6 0.5 - 0.9 g/dL Final     Beta Fraction   Date Value Ref Range Status   05/27/2010  1.0 0.6 - 1.0 g/dL Final     Gamma Fraction   Date Value Ref Range Status   05/27/2010 1.0 0.7 - 1.6 g/dL Final     Monoclonal Peak   Date Value Ref Range Status   05/27/2010 0.0 0.0 g/dL Final     ELP Interpretation:   Date Value Ref Range Status   05/27/2010   Final    Essentially normal electrophoretic pattern.  No monoclonal protein seen.     Comment:      Pathologic significance requires clinical correlation.  ASHLEY Noble M.D.,   Ph.D., Pathologist ()   ,  ,   Immunofixation ELP   Date Value Ref Range Status   06/09/2016   Final    No monoclonal protein seen on immunofixation.  Pathological significance   requires clinical correlation.   ASHLEY Noble M.D., Ph.D   Pathologist ([phone])       IGG   Date Value Ref Range Status   06/09/2016 1,020 695 - 1,620 mg/dL Final     IGA   Date Value Ref Range Status   06/09/2016 374 70 - 380 mg/dL Final     IGM   Date Value Ref Range Status   06/09/2016 81 60 - 265 mg/dL Final   ,  ,  ,  ,  ,  ,  ,              Again, thank you for allowing me to participate in the care of your patient.      Sincerely,    Jeremy Goodrich MD

## 2023-04-21 NOTE — NURSING NOTE
Chief Complaint   Patient presents with     RECHECK     /69 (BP Location: Right arm, Patient Position: Sitting, Cuff Size: Adult Large)   Pulse 57   Temp 97.5  F (36.4  C) (Oral)   Wt 129.7 kg (286 lb)   SpO2 98%   BMI 37.73 kg/m

## 2023-04-21 NOTE — PROGRESS NOTES
Rheumatology Clinic Visit  Jeremy Goodrich M.D.     Lucio Daly MRN# [id number]   YOB: 1950 Age: 72 year old     Date of Visit: .04/21/2023    Primary care provider: Saroj Tinoco         Assessment and Plan:   # Seronegative inflammatory arthritis: Patient reports improved pain in wrists ankles and knees since restarting methotrexate and hydroxychloroquine combination in late 2022.  Exam shows no inflammatory joint changes.  Data: Lab work on December 19, 2022 showed creatinine 1.22, stable, transaminases, albumin, and CBC were normal except for mild anemia hemoglobin 12.5. 2-2023 labs showed Cr 1.6, LFts normal, CBC stable.  February 2023 lab work showed creatinine at 1.6; LFTs and CBC were stable.  Discussion: Seronegative inflammatory arthritis is improved/stable on combination treatment. Patient Noted worse control of diffuse arthralgia with methotrexate at 15 mg weekly in 2022.  Accordingly, I recommend continuing methotrexate 20 mg weekly, and hydroxychloroquine 200 mg daily.  While taking hydroxychloroquine, patient will require annual retinal toxicity examinations.  2. ILD Obliterative bronchiolitis, symptomatically stable.  Followed by Dr. Mccarthy in pulmonary.  3. Osteoarthritis knees, ankle, shoulder and neck and hands/thumbs. Continue isometric quad strengthening exercises twice daily to improve support around the joint. Left ankle osteoarthritis and overall osteoarthritis  is contributing to the overall pain. Continue splinting/hand therapy and acetaminophen 1000 mg tid ATC.  Plan plain film of the right foot to evaluate status of firm nontender mass at the lateral aspect of the right first PIP.  4. Trigger finger, L 4th: gradually increasing.  Did not discuss today.  5. Prostate CA: Completing XRT in September 2022.    Plan:  1.  Bloodwork every 4 months  2.  Continue methotrexate 8 tablets (20 mg) once weekly.While on methotrexate:   -- Check labs every 3 months (AST/ALT,  "Albumin, CBC with platelets)   -- Limit alcohol intake to 2 drinks weekly; use folate 1 mg daily.  --Tylenol 500-1000 mg can be used as needed up to three times daily for nausea/headache associated with dosing.    3.  Continue hydroxychloroquine 200 mg once daily.  While taking hydroxychloroquine, undergo annual ophthalmology evaluation to monitor for rare retinal Plaquenil toxicity (next visit due in summer 2023). Last ophthalmology in August 2022.  OCT and retinal exam found no evidence of Plaquenil toxicity.    4 Acetaminophen 1000 mg 3 times daily for residual pain.     RTC 6-7 month  Jeremy Goodrich MD  Staff Rheumatologist, [facility] [place]         History of Present Illness:   Lucio Daly \"Syed\" presents for f/u seronegative rheumatoid arthritis, sicca complex.  Last seen in 9-2022  Methotrexate and hydroxychloroquine were continued for seronegative arthritis.    Interval history April 21, 2023  + difficulty rising from chair or bed. Slightly progressive pain in hips, knees, hands.  No persistent swelling, redness, warmth in the affected joints.  He was walking everyday with walker this winter for 15-20 minutes.  Early morning stiffness is ~ 30 minutes.  He continues methotrexate 20 mg weekly; he had reduced to 15 mg weekly, but joint pain worsened in 2-2023.  He thinks that joint pain is overall better controlled at the higher dose of methotrexate 20 mg weekly.  There is mild stomach upset associated with dose. He uses hydroxychloroquine 200 mg daily.  He has discussed RF ablation for lumbar pain.    Interval history September 29, 2022  Completnig 6 weeks of XRT for prostate Ca. Off methotrexate durign that time, noting increased back, thumb, knees achiness and pain. R knee especially; collarbone areas.  Early morning stiffness is 45 minutes.  Taking acetaminophen 1000 tid; continuing hydroxychloroquine 200 mg daily.  No adverse effects.  Interval history April 8, 2022    He is doing about the same. He has " "daily pain in hands, feet, elbows, and shoulders, but ability to perform activities of daily living, and to perform walking 20 to 25 minutes without limitation, are unimpeded.  He has less than 30 minutes of morning stiffness.  His L great toe is dark and swollen; his whole L ankle and foot feel like they are \"in a vise\". The R leg goes completely numb with prolonged driving.  He notes pain in the R LBP when lying on his back. He has pain in the R lateral thigh with mattress contact or with prolonged immobility.  He has been offered surgery for spinal stenosis, but has been advised that he is \"high risk\" for surgery.  He continues methotrexate 20 mg weekly; there is mild stomach upset associated with dose. He uses hydroxychloroquine 200 mg daily.  He notes increasing triggering in his L 4th finger.  He has been adjusting HTN medications with primary care.  PMH   1. Sensory neuropathy of unclear etiology - negative work up, managed on Lyrica.  Has chronic dysesthesia in pads of feet.  2. Parkinsonisim/Tremor disorder; sees neurology   3. Headaches   4. History of basal cell, squamous (most recent +bx 8-2014)  5. History of melanoma   6. GALO on CPAP.  7. HTN.   8. Seronegative RA, diagnosed 2009. SICCA  9.  JUARES. Work-up 4-2015. 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 Lumbar stenosis per MRI 2017   11. Bronchitis 5-2017, early pneumonia   12. Chicken pox   13. DEXA  Normal   14. Left ankle DJD, MRI  --seen Dr. Martinez 2-2019   Past Medical History:   Diagnosis Date     Abnormal EMG 04/18/2013     Abnormal involuntary movements(781.0)     Movement Disorder     AK (actinic keratosis) 12/18/2011     Allergic rhinitis, cause unspecified     Allergic rhinitis     Balance problems 11/01/2011     Basal cell carcinoma      Bladder spasms 11/01/2011     Chronic osteoarthritis      COPD (chronic obstructive pulmonary disease) (H)     Interstial lung disease, obliderans bronchiolitis     Diaphragmatic " hernia without mention of obstruction or gangrene      Earache or other ear, nose, or throat complaint      Esophageal reflux      Fatigue 11/01/2011     Fracture      H. pylori infection 05/12/2011     History of MRI of cervical spine 11/18/2013    EXAMINATION: CERVICAL SPINE G/E 5 VIEWS* 4/19/2013 4:18 PM  CLINICAL HISTORY: Pain in limb,Performing Location?->[facility] ([facility]),  COMPARISON:  FINDINGS: AP and lateral views in flexion and extension, as well as odontoid view of the cervical spine was obtained. There is no comparison available. The vertebral bodies of the cervical spine are normally aligned. There is posterior spurring and d     Incomplete defecation 11/01/2011     Interstitial lung disease (H) 11/29/2016     Laboratory test 08/07/2012     Lung disease 06/2015     Malignant basal cell neoplasm of skin 08/06/2008     Melanoma (H) 08/06/2008     Melanoma in situ of lower leg (H)     R calf     Neuropathy 05/16/2011     GALO (obstructive sleep apnea)      Other bladder disorder      Other color vision deficiencies      Other nervous system complications      Parkinsonism (H) 11/01/2011     Parkinsons disease (H)     Parkinsonism/ balance, dropped foot, tremor     Personal history of colonic polyps      PLMD (periodic limb movement disorder) 12/16/2021     Polyneuropathy in other diseases classified elsewhere (H)      RA (rheumatoid arthritis) (H)      Rheumatoid arthritis of multiple sites with negative rheumatoid factor (H) 03/21/2016     Seronegative arthritis 11/18/2013     Shortness of breath      Shoulder arthritis 2016    acromioclavicular joint      Sialorrhea 4/18/2023     Somatization disorder 05/12/2011     Spider veins     Leg Vericise vein surgery     Squamous cell carcinoma      Tremor 11/01/2011     Unspecified essential hypertension      Unspecified hypothyroidism      Urinary tract infection      Urinary urgency 11/01/2011     Wears glasses 11/01/2011     Injuries-ankle sprains, left  "heel fracture    Family Hx   MGM Psoriasis  Sister -PPM  Family History   Problem Relation Age of Onset     Hypertension Mother      Heart Disease Mother         a fib     Arthritis Mother         \"osteo\"     Osteoporosis Mother      Skin Cancer Mother      Uterine Cancer Mother      Other Cancer Mother      Cancer Mother      Hypertension Brother      Diabetes Brother         \" post pancreatitis\"     Neurologic Disorder Sister         multiple sclerosis     Hypertension Sister      Heart Disease Sister      Multiple Sclerosis Sister      Heart Disease Sister         Chf     Arthritis Sister      Heart Failure Sister      Kidney Disease Sister      Dementia Sister      Hypertension Father      Cerebrovascular Disease Father         ,     Skin Cancer Father      Colon Polyps Father      Heart Disease Father      Other - See Comments Son         [place]     Other - See Comments Abdon wagner     Other - See Comments Abdon gonzalez     Psoriasis Maternal Grandfather      Stomach Cancer Maternal Grandfather      Cancer Maternal Grandfather      Congenital Anomalies Other         granddaughter with a chromosome defect     Other Cancer Other         Grand daughter     Cancer Other         Grand daughter     Cerebrovascular Disease Paternal Grandmother         ,     Cerebrovascular Disease Paternal Grandfather         ,     Cancer Granddaughter         Langerhans Cell Histiocytosis     Genetic Disease Granddaughter         gene translocation     Learning Disorder Other         Gene translocation     Melanoma No family hx of      Colon Cancer No family hx of      Current Outpatient Medications   Medication Sig     acetaminophen (TYLENOL) 500 MG tablet 2 x 500mg by mouth 3 times per day: 7/730am, 4pm and 11pm  = 6/day     albuterol (PROAIR HFA/PROVENTIL HFA/VENTOLIN HFA) 108 (90 Base) MCG/ACT inhaler Inhale 2 puffs into the lungs every 4 hours as needed for shortness of breath or wheezing     " amLODIPine (NORVASC) 5 MG tablet Take 1 tablet (5 mg) by mouth daily     cholecalciferol (HM VITAMIN D3) 25 MCG (1000 UT) TABS 1000 unit tab by mouth daily     folic acid (FOLVITE) 1 MG tablet Take 1 tablet (1 mg) by mouth daily     hydroxychloroquine (PLAQUENIL) 200 MG tablet Take 1 tablet (200 mg) by mouth daily Daily at 9am.     lisinopril-hydrochlorothiazide (ZESTORETIC) 20-25 MG tablet Take 1 tablet by mouth daily     methotrexate 2.5 MG tablet TAKE 7 TABLETS BY MOUTH ONCE PER WEEK. LABS DUE EVERY 8 TO 12 WEEKS (LAST DONE 5-5-16)     metoprolol succinate ER (TOPROL XL) 50 MG 24 hr tablet 1 and 1/2 tabs daily.     omeprazole (PRILOSEC) 40 MG DR capsule Take 1 capsule (40 mg) by mouth daily     ORDER FOR DME Use your BiPAP device as directed by your provider.     oxybutynin (DITROPAN) 5 MG tablet TAKE 1 TABLET BY MOUTH DAILY AT 4PM     pregabalin (LYRICA) 50 MG capsule 50mg capsule by mouth at 7am and 4pm and 1-2 x 50mg capsules by mouth nightly at 11pm (4/day)     salmeterol (SEREVENT DISKUS) 50 MCG/ACT inhaler Inhale 1 puff into the lungs 2 times daily     No current facility-administered medications for this visit.       Personal Hx   Home environment: No secondhand tobacco smoke in home.    History     Social History     Marital Status:      Spouse Name: N/A     Number of Children: N/A     Years of Education: N/A     Social History Main Topics     Smoking status: Former Smoker     Quit date: 09/30/2003     Smokeless tobacco: Never Used     Alcohol Use: No     Drug Use: No     Sexually Active: Not Currently -- Female partner(s)     Other Topics Concern     None     Social History Narrative    2013: Living in [place] in a townhouse with no stepsHas 3 sons that are doing okay.             Review of Systems:     14 points all negative except what is mentioned in HPI.  Review Of Systems  Skin: negative  Eyes: negative  Ears/Nose/Throat: negative  Respiratory: No shortness of breath, dyspnea on  exertion, cough, or hemoptysis  Cardiovascular: negative  Gastrointestinal: negative  Genitourinary: negative  Musculoskeletal: as above  Neurologic: as above  Psychiatric: negative  Hematologic/Lymphatic/Immunologic: negative  Endocrine: negative             Past Surgical History:   Past Surgical History:   Procedure Laterality Date     BIOPSY OF SKIN LESION       COLONOSCOPY       COLONOSCOPY N/A 7/20/2022    Procedure: COLONOSCOPY (fv) polypectomy using jumbo bx forcep;  Surgeon: Gómez Mckinney MD;  Location:  GI     CYSTOSCOPY  Many years ago    Blood in urine/ bladder cystoscopy     ESOPHAGOSCOPY, GASTROSCOPY, DUODENOSCOPY (EGD), COMBINED N/A 7/20/2022    Procedure: ESOPHAGOGASTRODUODENOSCOPY (EGD) (fv) biopsies using cold bx forcep;  Surgeon: Gómez Mckinney MD;  Location:  GI     HEMORRHOID SURGERY       lip biopsy      for sicca complex     MOHS MICROGRAPHIC PROCEDURE       SOFT TISSUE SURGERY      removeal of basel cell carcinoma     Blood pressure 115/69, pulse 57, temperature 97.5  F (36.4  C), temperature source Oral, weight 129.7 kg (286 lb), SpO2 98 %.  Wt Readings from Last 4 Encounters:   04/21/23 129.7 kg (286 lb)   04/19/23 129.3 kg (285 lb)   12/27/22 127 kg (280 lb)   12/20/22 129 kg (284 lb 6.4 oz)       Constitutional: well-developed, appearing stated age; cooperative  Eyes: nl EOM, PERRLA, vision, conjunctiva, sclera  ENT: nl external ears, nose, hearing, lips, teeth, gums, throat  No mucous membrane lesions, normal saliva pool  Neck: no mass or thyroid enlargement  Resp: Breathing is unlabored  Lymph: no cervical, supraclavicular, inguinal or epitrochlear nodes  MS: No synovitis, or tenderness at MCPs or PIPs.  Right elbow is tender.  There is painful right shoulder flexion.  No tenderness at MTPs.  Skin: no nail pitting, alopecia, rash, nodules or lesions  Neuro: nl cranial nerves, strength, sensation, DTRs.   Psych: nl judgement, orientation, memory, affect.             Data:          Latest Ref Rng & Units 8/18/2022    12:42 PM 11/4/2022     8:39 AM 12/19/2022    11:26 AM   RHEUM RESULTS   Albumin 3.4 - 5.0 g/dL 3.9       Albumin 3.5 - 5.2 g/dL  4.2   4.3     ALT 10 - 50 U/L 22   18   17     AST 10 - 50 U/L 18   21   19     Creatinine 0.67 - 1.17 mg/dL 1.35   1.50   1.22     CRP Inflammation <5.00 mg/L 5.63   5.51   8.79     GFR Estimate >60 mL/min/1.73m2 55   49   62     Hematocrit 40.0 - 53.0 % 41.1   39.1   38.6     Hemoglobin 13.3 - 17.7 g/dL 13.0   12.7   12.5     WBC 4.0 - 11.0 10e3/uL 4.8   3.2   4.0     RBC Count 4.40 - 5.90 10e6/uL 4.21   4.18   4.06     RDW 10.0 - 15.0 % 14.0   14.4   14.5     MCHC 31.5 - 36.5 g/dL 31.6   32.5   32.4     MCV 78 - 100 fL 98   94   95     Platelet Count 150 - 450 10e3/uL 172   154   182         Rheumatoid Factor   Date Value Ref Range Status   05/27/2010 <7 0 - 14 IU/mL Final   ,  ,  ,   Cyclic Citrullinated Peptide IgG   Date Value Ref Range Status   05/27/2010 <2 <5 U/mL Final     Comment:     Interpretation:  Negative   ,  ,   SSA (RO) Antibody IgG   Date Value Ref Range Status   05/27/2010 1  Final     Comment:     Reference range: 0 to 40  Unit: AU/mL  (Note)  REFERENCE INTERVALS: SSA (Ro) (ELAN) Ab, IgG   29 AU/mL or Less ............. Negative   30 - 40 AU/mL ................ Equivocal   41 AU/mL or Greater .......... Positive    SSA (Ro) antibody is seen in 70-75% of Sjogren syndrome  cases, 30-40% of systemic lupus erythematosus (SLE) and  5-10% of progressive systemic sclerosis (PSS).  Performed by [facility],  [address] [place] [address],[place] [address] [phone]  [email or web address], Chely Leija MD, Lab. Director     SSB (LA) Antibody IgG   Date Value Ref Range Status   05/27/2010 8  Final     Comment:     Reference range: 0 to 40  Unit: AU/mL  (Note)  REFERENCE INTERVALS: SSB (La) (ELAN) Ab, IgG   29 AU/mL or Less ............. Negative   30 - 40 AU/mL ................ Equivocal   41 AU/mL or Greater .......... Positive    SSB (La)  antibody is seen in 50-60% of Sjogren syndrome  cases and is specific if it is the only ELAN antibody  present. 15-25% of patients with systemic lupus  erythematosus (SLE) and 5-10% of patients with progressive  systemic sclerosis (PSS) also have this antibody.  Performed by [facility],  [address] [place] [id number] [phone]  [email or web address], Chely Leija MD, Lab. Director   ,  ,   MONICA Screen by EIA   Date Value Ref Range Status   05/27/2010 <1.0 0 - 1.0 Final     Comment:     Interpretation:  Negative   ,   DNA-ds   Date Value Ref Range Status   05/27/2010 <15 0 - 29 IU/mL Final     Comment:     Interpretation:  Negative   ,  ,  ,  ,  ,  ,  ,   Hep B Surface Agn   Date Value Ref Range Status   05/27/2010 Negative NEG Final   ,  ,  ,  ,  ,  ,  ,  ,  ,  ,  ,   Neutrophil Cytoplasmic IgG Antibody   Date Value Ref Range Status   06/26/2015   Final    <1:20  Reference range: <1:20  (Note)  The ANCA IFA is <1:20; therefore, no further testing will  be performed.  INTERPRETIVE INFORMATION: Anti-Neutrophil Cyto Ab, IgG  Neutrophil Cytoplasmic Antibodies (C-ANCA = granular  cytoplasmic staining, P-ANCA = perinuclear staining) are  found in the serum of over 90 percent of patients with  certain necrotizing systemic vasculitides, and usually in  less than 5 percent of patients with collagen vascular  disease or arthritis.  Performed by [facility],  [address] [facility],[place] [id number] [phone]  [email or web address], Brad Fall MD, Lab. Director       ,  ,  ,  ,  ,  ,  ,  ,  ,   Albumin Fraction   Date Value Ref Range Status   05/27/2010 4.0 3.7 - 5.1 g/dL Final     Alpha 2 Fraction   Date Value Ref Range Status   05/27/2010 0.6 0.5 - 0.9 g/dL Final     Beta Fraction   Date Value Ref Range Status   05/27/2010 1.0 0.6 - 1.0 g/dL Final     Gamma Fraction   Date Value Ref Range Status   05/27/2010 1.0 0.7 - 1.6 g/dL Final     Monoclonal Peak   Date Value Ref Range Status   05/27/2010 0.0 0.0 g/dL Final      ELP Interpretation:   Date Value Ref Range Status   05/27/2010   Final    Essentially normal electrophoretic pattern.  No monoclonal protein seen.     Comment:      Pathologic significance requires clinical correlation.  ASHLEY Noble M.D.,   Ph.D., Pathologist ()   ,  ,   Immunofixation ELP   Date Value Ref Range Status   06/09/2016   Final    No monoclonal protein seen on immunofixation.  Pathological significance   requires clinical correlation.   ASHLEY Noble M.D., Ph.D   Pathologist ([phone])       IGG   Date Value Ref Range Status   06/09/2016 1,020 695 - 1,620 mg/dL Final     IGA   Date Value Ref Range Status   06/09/2016 374 70 - 380 mg/dL Final     IGM   Date Value Ref Range Status   06/09/2016 81 60 - 265 mg/dL Final   ,  ,  ,  ,  ,  ,  ,

## 2023-04-26 ENCOUNTER — MYC MEDICAL ADVICE (OUTPATIENT)
Dept: SLEEP MEDICINE | Facility: CLINIC | Age: 73
End: 2023-04-26
Payer: MEDICARE

## 2023-04-26 DIAGNOSIS — G47.33 OSA TREATED WITH BIPAP: Primary | ICD-10-CM

## 2023-04-27 ENCOUNTER — LAB (OUTPATIENT)
Dept: LAB | Facility: CLINIC | Age: 73
End: 2023-04-27
Payer: MEDICARE

## 2023-04-27 DIAGNOSIS — C61 PROSTATE CANCER (H): ICD-10-CM

## 2023-04-27 LAB — PSA SERPL DL<=0.01 NG/ML-MCNC: 1.17 NG/ML (ref 0–6.5)

## 2023-04-27 PROCEDURE — 36415 COLL VENOUS BLD VENIPUNCTURE: CPT

## 2023-04-27 PROCEDURE — 84153 ASSAY OF PSA TOTAL: CPT

## 2023-04-28 ENCOUNTER — HOSPITAL ENCOUNTER (OUTPATIENT)
Dept: PHYSICAL THERAPY | Facility: CLINIC | Age: 73
Discharge: HOME | End: 2023-04-28
Attending: PSYCHIATRY & NEUROLOGY
Payer: MEDICARE

## 2023-04-28 DIAGNOSIS — R53.1 DECREASED STRENGTH: ICD-10-CM

## 2023-04-28 DIAGNOSIS — R53.81 PHYSICAL DECONDITIONING: ICD-10-CM

## 2023-04-28 DIAGNOSIS — R25.1 TREMOR: ICD-10-CM

## 2023-04-28 DIAGNOSIS — R26.89 BALANCE PROBLEMS: Primary | ICD-10-CM

## 2023-04-28 PROCEDURE — 97110 THERAPEUTIC EXERCISES: CPT | Mod: GP | Performed by: PHYSICAL THERAPIST

## 2023-04-28 PROCEDURE — 97162 PT EVAL MOD COMPLEX 30 MIN: CPT | Mod: GP | Performed by: PHYSICAL THERAPIST

## 2023-04-28 NOTE — PROGRESS NOTES
04/28/23 1100   General Information   Start of Care Date 04/28/23   Referring Physician Heladio Calixto MD   Orders Evaluate and Treat as Indicated   Order Date 04/18/23   Medical Diagnosis Tremor (R25.1)  - Primary, Gait disorder (R26.9)   Onset of illness/injury or Date of Surgery 04/18/23  (date of PT kim)   Surgical/Medical history reviewed Yes   Pertinent history of current problem (include personal factors and/or comorbidities that impact the POC) Presents to evaluations with mobility concerns due to extensive PMH (see MD notes in chart for full details)Difficult getting up out chair and bed, wife has to help him a lot. No official diagnosis of Parkinson's, but has peripheral neuropathy and Parkinsonism as well. Hx of spine issues/nerve issues. Drop foot bilaterally when he fatigues, worse on the L. Hx of L side more affected by his mobility issues.   Pertinent Visual History  wears glasses   Prior level of functional mobility Transfers;Ambulation   Transfers overall is SBA, needing MIN A at times   Current Community Support Family/friend caregiver   Patient role/Employment history Retired   Living environment House/townhome;Apartment/condo  (condo in [place] for the winter)   Home/Community Accessibility Comments Lives with wife in [facility], one level. No stairs to enter.   Assistive Devices Comments uses cane and walker, uses AFO braces inconsistently when walking; however got new AFO's last fall and they weren't fitting well so he isn't wearing them now. Also has a grab bar in the shower   General Information Comments Walking most days either outside or at the mall, 20 minutes. Getting more difficult in arms/hands as he is putting more pressure through his arms.   Fall Risk Screen   Fall screen completed by PT   Have you fallen 2 or more times in the past year? No   Have you fallen and had an injury in the past year? No   Timed Up and Go score (seconds) 21.38   Is patient a fall risk? Yes   Fall screen  "comments is consistently hitting the walls/furniture in his house he reports, had some falls last year when stepping up a curb.   Abuse Screen (yes response referral indicated)   Feels Unsafe at Home or Work/School no   Feels Threatened by Someone no   Does Anyone Try to Keep You From Having Contact with Others or Doing Things Outside Your Home? no   Physical Signs of Abuse Present no   Pain   Pain comments Having pain in arms more consistently; more in shoulder on R and R hand pain. Having nerve pain as well in back and down legs.   Cognitive Status Examination   Cognitive Comment No concern   Integumentary   Integumentary Comments hx of malignent melanoma.   Posture   Posture Forward head position;Protracted shoulders   Range of Motion (ROM)   ROM Comment WFL, but does report rigidity and worsening overall ROM as he ages   Strength   Strength Comments DIfficulty getting out of chairs, reports overall weakness. unable to get out of chairs without arm rests. Noting difficulty with walking as he fatigues and more foot dragging/ \"legs getting heavy\"   Transfer Skills   Transfer Comments see above   Gait   Gait Comments walks with SEC in L hand, shuffling gait with decreased WB'ing on L LE as well, poor speed and stride length noted.   Gait Special Tests   Gait Special Tests 25 FOOT TIMED WALK   Gait Special Tests 25 Foot Timed Walk   Comments 15.45 second 10 m walk at self selected speed, 14.38 at a fast pace for a gait speed . Gait speed of .70 m/s with SPC   Balance   Balance Comments Overall is SBA throughout session   Balance Special Tests   Balance Special Tests Sit to stand reps;Timed up and go   Balance Special Tests Timed Up and Go   Seconds 21.38 Seconds   Comments using SPC   Balance Special Tests Sit to Stand Reps in 30 Seconds   Comments 5 time STS in 53.25 seconds with significant and definite use of UE's needed   Sensory Examination   Sensory Perception Comments hx of peripheral neuropathy, reports " numbness as well as pain in both LE's.   Coordination   Coordination no deficits were identified   Muscle Tone   Muscle Tone no deficits were identified   Planned Therapy Interventions   Planned Therapy Interventions balance training;gait training;neuromuscular re-education;strengthening;transfer training   Clinical Impression   Criteria for Skilled Therapeutic Interventions Met yes, treatment indicated   PT Diagnosis impaired functional mobility   Influenced by the following impairments decreased strength, impaired balance, decreased activity tolerance, elevated pain levels, decreased sensation   Functional limitations due to impairments elevated falls risk, difficulty navigating home and community safely and independently   Clinical Presentation Evolving/Changing   Clinical Presentation Rationale clinician decision making, significant PMH   Clinical Decision Making (Complexity) Moderate complexity   Therapy Frequency 1 time/week   Predicted Duration of Therapy Intervention (days/wks) 12 weeks   Risk & Benefits of therapy have been explained Yes   Patient, Family & other staff in agreement with plan of care Yes   Clinical Impression Comments Pt would benefit from skilled 1:1 therapy to address the above listed impairments and maximize his independent and safe mobility.   Education Assessment   Preferred Learning Style Listening;Demonstration   GOALS   PT Eval Goals 1;2;3;4;5   Goal 1   Goal Identifier TUG   Goal Description Pt will complete TUG in 16 seconds or less to demonstrate significant improvement in strength and balance.   Target Date 07/21/23   Goal 2   Goal Identifier Gait speed   Goal Description Pt will ambulate at a gait speed of .85 m/s or faster to demonstrate improved strength and access to his community more effectively and safely   Target Date 07/21/23   Goal 3   Goal Identifier STS   Goal Description Pt will demonstrate a clinically significant improvement in 5 time STS to demosntrate improved LE  strength to lead to easier ADL and home navigation   Target Date 07/21/23   Goal 4   Goal Identifier HEP   Goal Description Pt will be independent with a medically appropriate home exercise program to maximize improvement in strength and mobility and activity tolerance both during and after formal plan of care and physical   Total Evaluation Time   PT Divya Moderate Complexity Minutes ([id number]) 35       ADDENDUM: Pt not seen again for treatment after initial evaluation, cannot comment on goal progress due to lack of follow up. Will formally discharge at this time.

## 2023-04-28 NOTE — PLAN OF CARE
[facility]                                                                                   OUTPATIENT PHYSICAL THERAPY FUNCTIONAL EVALUATION  PLAN OF TREATMENT FOR OUTPATIENT REHABILITATION  (COMPLETE FOR INITIAL CLAIMS ONLY)  Patient's Last Name, First Name, M.I.  YOB: 1950  Lucio Daly     Provider's Name   [facility]   Medical Record No.  [id number]     Start of Care Date:  04/28/23   Onset Date:  04/18/23 (date of PT orrder)   Type:     _X__PT   ____OT  ____SLP Medical Diagnosis:        PT Diagnosis:  impaired functional mobility Visits from SOC:  1                              __________________________________________________________________________________  Plan of Treatment/Functional Goals:  balance training, gait training, neuromuscular re-education, strengthening, transfer training           GOALS  TUG  Pt will complete TUG in 16 seconds or less to demonstrate significant improvement in strength and balance.  07/21/23    Gait speed  Pt will ambulate at a gait speed of .85 m/s or faster to demonstrate improved strength and access to his community more effectively and safely  07/21/23    STS  Pt will demonstrate a clinically significant improvement in 5 time STS to demosntrate improved LE strength to lead to easier ADL and home navigation  07/21/23    HEP  Pt will be independent with a medically appropriate home exercise program to maximize improvement in strength and mobility and activity tolerance both during and after formal plan of care and physical                Therapy Frequency:  1 time/week   Predicted Duration of Therapy Intervention:  12 weeks    Garcia Goodrich, PT                                    I CERTIFY THE NEED FOR THESE SERVICES FURNISHED UNDER        THIS PLAN OF TREATMENT AND WHILE UNDER MY CARE     (Physician co-signature  of this document indicates review and certification of the therapy plan).                Certification Date From:    4/28/2023  Certification Date To:   7/21/2023    Referring Provider:  Heladio Calixto MD    Initial Assessment  See Epic Evaluation- Start of Care Date: 04/28/23

## 2023-05-16 ENCOUNTER — MYC MEDICAL ADVICE (OUTPATIENT)
Dept: PEDIATRICS | Facility: CLINIC | Age: 73
End: 2023-05-16
Payer: MEDICARE

## 2023-05-16 DIAGNOSIS — K21.9 GASTROESOPHAGEAL REFLUX DISEASE WITHOUT ESOPHAGITIS: ICD-10-CM

## 2023-05-17 RX ORDER — OMEPRAZOLE 40 MG/1
40 CAPSULE, DELAYED RELEASE ORAL DAILY
Qty: 90 CAPSULE | Refills: 1 | Status: SHIPPED | OUTPATIENT
Start: 2023-05-17 | End: 2023-11-13

## 2023-05-17 NOTE — TELEPHONE ENCOUNTER
LOV was on 12/20/22. Refilled as per protocol.    DEMARIO Hoang  Patient Advocate Liason (PAL)  [facility]  Ph. [phone] / Fax. [phone]

## 2023-05-18 ENCOUNTER — TELEPHONE (OUTPATIENT)
Dept: UROLOGY | Facility: CLINIC | Age: 73
End: 2023-05-18

## 2023-05-18 ENCOUNTER — OFFICE VISIT (OUTPATIENT)
Dept: UROLOGY | Facility: CLINIC | Age: 73
End: 2023-05-18
Payer: MEDICARE

## 2023-05-18 VITALS
DIASTOLIC BLOOD PRESSURE: 80 MMHG | HEIGHT: 73 IN | WEIGHT: 285 LBS | BODY MASS INDEX: 37.77 KG/M2 | SYSTOLIC BLOOD PRESSURE: 132 MMHG

## 2023-05-18 DIAGNOSIS — C61 PROSTATE CANCER (H): ICD-10-CM

## 2023-05-18 DIAGNOSIS — R39.15 URINARY URGENCY: Primary | ICD-10-CM

## 2023-05-18 DIAGNOSIS — C61 PROSTATE CANCER (H): Primary | ICD-10-CM

## 2023-05-18 LAB
ALBUMIN UR-MCNC: NEGATIVE MG/DL
APPEARANCE UR: CLEAR
BILIRUB UR QL STRIP: NEGATIVE
COLOR UR AUTO: YELLOW
GLUCOSE UR STRIP-MCNC: NEGATIVE MG/DL
HGB UR QL STRIP: NEGATIVE
KETONES UR STRIP-MCNC: NEGATIVE MG/DL
LEUKOCYTE ESTERASE UR QL STRIP: NEGATIVE
NITRATE UR QL: NEGATIVE
PH UR STRIP: 6.5 [PH] (ref 5–7)
RESIDUAL VOLUME (RV) (EXTERNAL): 39
SP GR UR STRIP: 1.02 (ref 1–1.03)
UROBILINOGEN UR STRIP-ACNC: 0.2 E.U./DL

## 2023-05-18 PROCEDURE — 99213 OFFICE O/P EST LOW 20 MIN: CPT | Mod: 25 | Performed by: STUDENT IN AN ORGANIZED HEALTH CARE EDUCATION/TRAINING PROGRAM

## 2023-05-18 PROCEDURE — 51798 US URINE CAPACITY MEASURE: CPT | Performed by: STUDENT IN AN ORGANIZED HEALTH CARE EDUCATION/TRAINING PROGRAM

## 2023-05-18 PROCEDURE — 81003 URINALYSIS AUTO W/O SCOPE: CPT | Mod: QW | Performed by: STUDENT IN AN ORGANIZED HEALTH CARE EDUCATION/TRAINING PROGRAM

## 2023-05-18 ASSESSMENT — PAIN SCALES - GENERAL: PAINLEVEL: MILD PAIN (3)

## 2023-05-18 NOTE — PROGRESS NOTES
"CHIEF COMPLAINT   Lucio Daly who is a 72 year old male returns today for follow-up of prostate cancer (iPSA 5.68, [place] 3+4=7, dx 7/13/2022) now s/p EBRT completed 9/30/2022     HPI   Lucio Daly is a  72 year old male returns today for follow-up of prostate cancer (iPSA 5.68, [place] 3+4=7, dx 7/13/2022) now s/p EBRT completed 9/30/2022     Urinary symptoms are stable but complaining of low left flank/back pain either shortly before he urinates or shortly after. He also complains of cramping abdominal pain after urination in his suprapubic area.    Notably he has known spine radiculopathy    PHYSICAL EXAM  Patient is a 72 year old  male   Vitals: Blood pressure 132/80, height 1.854 m (6' 1\"), weight 129.3 kg (285 lb).  Body mass index is 37.6 kg/m .  General Appearance Adult:   Alert, no acute distress, oriented  HENT: throat/mouth:normal, good dentition  Lungs: no respiratory distress, or pursed lip breathing  Heart: No obvious jugular venous distension present  Abdomen: nondistended  Musculoskeltal: extremities normal, no peripheral edema  Skin: no suspicious lesions or rashes  Neuro: Alert, oriented, speech and mentation normal  Psych: affect and mood normal  Gait: Normal    PVR 39 ml     PSA PSA Tumor Marker   Latest Ref Rng 0 - 4 ug/L 0.00 - 6.50 ng/mL   8/7/2003  2:20 PM 1.1     7/26/2005  3:26 PM 0.98     5/10/2007  11:36 AM 1.10     8/6/2008  10:40 AM 1.50     12/19/2022  11:26 AM  2.01    4/27/2023  10:03 AM  1.17          ASSESSMENT and PLAN  72 year old male returns today for follow-up of prostate cancer (iPSA 5.68, [place] 3+4=7, dx 7/13/2022) now s/p EBRT completed 9/30/2022 (no ADT)    PSA remains low after radiation, down to 1.17 ng/ml    Emptying bladder well. Urinary symptoms overall stable. Unclear what his low back/left flank pain is from but he does have h/o spinal radiculopathy. Probably not a urologic cause    - follow up in about 6 months from now with PSA prior    Silviano RUELAS" MD Alicia   [facility]rology  [facility] Phone: [phone]

## 2023-05-18 NOTE — LETTER
"5/18/2023       RE: Lucio Daly  [address]  [place] [address]     Dear Colleague,    Thank you for referring your patient, Lucio Daly, to the [facility] [place] at [facility]. Please see a copy of my visit note below.    CHIEF COMPLAINT   Lucio Daly who is a 72 year old male returns today for follow-up of prostate cancer (iPSA 5.68, [place] 3+4=7, dx 7/13/2022) now s/p EBRT completed 9/30/2022     HPI   Lucio Daly is a  72 year old male returns today for follow-up of prostate cancer (iPSA 5.68, [place] 3+4=7, dx 7/13/2022) now s/p EBRT completed 9/30/2022     Urinary symptoms are stable but complaining of low left flank/back pain either shortly before he urinates or shortly after. He also complains of cramping abdominal pain after urination in his suprapubic area.    Notably he has known spine radiculopathy    PHYSICAL EXAM  Patient is a 72 year old  male   Vitals: Blood pressure 132/80, height 1.854 m (6' 1\"), weight 129.3 kg (285 lb).  Body mass index is 37.6 kg/m .  General Appearance Adult:   Alert, no acute distress, oriented  HENT: throat/mouth:normal, good dentition  Lungs: no respiratory distress, or pursed lip breathing  Heart: No obvious jugular venous distension present  Abdomen: nondistended  Musculoskeltal: extremities normal, no peripheral edema  Skin: no suspicious lesions or rashes  Neuro: Alert, oriented, speech and mentation normal  Psych: affect and mood normal  Gait: Normal    PVR 39 ml     PSA PSA Tumor Marker   Latest Ref Rng 0 - 4 ug/L 0.00 - 6.50 ng/mL   8/7/2003  2:20 PM 1.1     7/26/2005  3:26 PM 0.98     5/10/2007  11:36 AM 1.10     8/6/2008  10:40 AM 1.50     12/19/2022  11:26 AM  2.01    4/27/2023  10:03 AM  1.17          ASSESSMENT and PLAN  72 year old male returns today for follow-up of prostate cancer (iPSA 5.68, [place] 3+4=7, dx 7/13/2022) now s/p EBRT completed 9/30/2022 (no ADT)    PSA " remains low after radiation, down to 1.17 ng/ml    Emptying bladder well. Urinary symptoms overall stable. Unclear what his low back/left flank pain is from but he does have h/o spinal radiculopathy. Probably not a urologic cause    - follow up in about 6 months from now with PSA prior    Silviano Vargas MD   [facility]  [facility] Phone: [phone]

## 2023-05-18 NOTE — NURSING NOTE
Chief Complaint   Patient presents with     Prostate Cancer     Review PSA     Pt reports no change in urinary symptoms, occasional urgency and leakage.  Occasional pain in abdomen and flank after urination    PVR: 39 mL by bladder scan    Mariella Bautista, EMT

## 2023-05-18 NOTE — TELEPHONE ENCOUNTER
[facility] Call Center    Phone Message    May a detailed message be left on voicemail: yes     Reason for Call: Order(s): PSA Orders  Reason for requested: Patient has a lab appointment on 11/13  Date needed: November  Provider name: Silviano Vargas    Please place PSA lab orders for patient. Thank you      Action Taken: Other: Urology    Travel Screening: Not Applicable

## 2023-05-25 ENCOUNTER — LAB (OUTPATIENT)
Dept: LAB | Facility: CLINIC | Age: 73
End: 2023-05-25
Payer: MEDICARE

## 2023-05-25 DIAGNOSIS — N18.30 STAGE 3 CHRONIC KIDNEY DISEASE, UNSPECIFIED WHETHER STAGE 3A OR 3B CKD (H): ICD-10-CM

## 2023-05-25 DIAGNOSIS — C61 PROSTATE CANCER (H): ICD-10-CM

## 2023-05-25 DIAGNOSIS — I10 PRIMARY HYPERTENSION: ICD-10-CM

## 2023-05-25 DIAGNOSIS — M06.09 RHEUMATOID ARTHRITIS OF MULTIPLE SITES WITH NEGATIVE RHEUMATOID FACTOR (H): ICD-10-CM

## 2023-05-25 LAB
ALBUMIN SERPL BCG-MCNC: 4.3 G/DL (ref 3.5–5.2)
ALP SERPL-CCNC: 54 U/L (ref 40–129)
ALT SERPL W P-5'-P-CCNC: 16 U/L (ref 10–50)
ANION GAP SERPL CALCULATED.3IONS-SCNC: 9 MMOL/L (ref 7–15)
AST SERPL W P-5'-P-CCNC: 22 U/L (ref 10–50)
BASOPHILS # BLD AUTO: 0 10E3/UL (ref 0–0.2)
BASOPHILS NFR BLD AUTO: 1 %
BILIRUB SERPL-MCNC: 0.4 MG/DL
BUN SERPL-MCNC: 33.3 MG/DL (ref 8–23)
CALCIUM SERPL-MCNC: 8.9 MG/DL (ref 8.8–10.2)
CHLORIDE SERPL-SCNC: 106 MMOL/L (ref 98–107)
CREAT SERPL-MCNC: 1.36 MG/DL (ref 0.67–1.17)
DEPRECATED HCO3 PLAS-SCNC: 25 MMOL/L (ref 22–29)
EOSINOPHIL # BLD AUTO: 0.1 10E3/UL (ref 0–0.7)
EOSINOPHIL NFR BLD AUTO: 3 %
ERYTHROCYTE [DISTWIDTH] IN BLOOD BY AUTOMATED COUNT: 14.1 % (ref 10–15)
GFR SERPL CREATININE-BSD FRML MDRD: 55 ML/MIN/1.73M2
GLUCOSE SERPL-MCNC: 106 MG/DL (ref 70–99)
HCT VFR BLD AUTO: 39.4 % (ref 40–53)
HGB BLD-MCNC: 12.2 G/DL (ref 13.3–17.7)
IMM GRANULOCYTES # BLD: 0 10E3/UL
IMM GRANULOCYTES NFR BLD: 0 %
LYMPHOCYTES # BLD AUTO: 0.6 10E3/UL (ref 0.8–5.3)
LYMPHOCYTES NFR BLD AUTO: 19 %
MCH RBC QN AUTO: 30.2 PG (ref 26.5–33)
MCHC RBC AUTO-ENTMCNC: 31 G/DL (ref 31.5–36.5)
MCV RBC AUTO: 98 FL (ref 78–100)
MONOCYTES # BLD AUTO: 0.2 10E3/UL (ref 0–1.3)
MONOCYTES NFR BLD AUTO: 7 %
NEUTROPHILS # BLD AUTO: 2.3 10E3/UL (ref 1.6–8.3)
NEUTROPHILS NFR BLD AUTO: 70 %
PLATELET # BLD AUTO: 159 10E3/UL (ref 150–450)
POTASSIUM SERPL-SCNC: 4.6 MMOL/L (ref 3.4–5.3)
PROT SERPL-MCNC: 6.5 G/DL (ref 6.4–8.3)
PSA SERPL DL<=0.01 NG/ML-MCNC: 1.02 NG/ML (ref 0–6.5)
RBC # BLD AUTO: 4.04 10E6/UL (ref 4.4–5.9)
SODIUM SERPL-SCNC: 140 MMOL/L (ref 136–145)
WBC # BLD AUTO: 3.3 10E3/UL (ref 4–11)

## 2023-05-25 PROCEDURE — 84153 ASSAY OF PSA TOTAL: CPT

## 2023-05-25 PROCEDURE — 36415 COLL VENOUS BLD VENIPUNCTURE: CPT

## 2023-05-25 PROCEDURE — 85025 COMPLETE CBC W/AUTO DIFF WBC: CPT

## 2023-05-25 PROCEDURE — 80053 COMPREHEN METABOLIC PANEL: CPT

## 2023-06-17 DIAGNOSIS — G62.9 PERIPHERAL POLYNEUROPATHY: ICD-10-CM

## 2023-06-22 RX ORDER — HYDROXYCHLOROQUINE SULFATE 200 MG/1
200 TABLET, FILM COATED ORAL DAILY
Qty: 90 TABLET | Refills: 5 | OUTPATIENT
Start: 2023-06-22

## 2023-07-27 ENCOUNTER — LAB (OUTPATIENT)
Dept: LAB | Facility: CLINIC | Age: 73
End: 2023-07-27
Payer: MEDICARE

## 2023-07-27 DIAGNOSIS — M06.09 RHEUMATOID ARTHRITIS OF MULTIPLE SITES WITH NEGATIVE RHEUMATOID FACTOR (H): ICD-10-CM

## 2023-07-27 DIAGNOSIS — I10 PRIMARY HYPERTENSION: ICD-10-CM

## 2023-07-27 DIAGNOSIS — N18.30 STAGE 3 CHRONIC KIDNEY DISEASE, UNSPECIFIED WHETHER STAGE 3A OR 3B CKD (H): ICD-10-CM

## 2023-07-27 LAB
ALBUMIN SERPL BCG-MCNC: 4.5 G/DL (ref 3.5–5.2)
ALP SERPL-CCNC: 58 U/L (ref 40–129)
ALT SERPL W P-5'-P-CCNC: 18 U/L (ref 0–70)
ANION GAP SERPL CALCULATED.3IONS-SCNC: 9 MMOL/L (ref 7–15)
AST SERPL W P-5'-P-CCNC: 22 U/L (ref 0–45)
BASOPHILS # BLD AUTO: 0 10E3/UL (ref 0–0.2)
BASOPHILS NFR BLD AUTO: 1 %
BILIRUB SERPL-MCNC: 0.5 MG/DL
BUN SERPL-MCNC: 28.7 MG/DL (ref 8–23)
CALCIUM SERPL-MCNC: 9.4 MG/DL (ref 8.8–10.2)
CHLORIDE SERPL-SCNC: 104 MMOL/L (ref 98–107)
CREAT SERPL-MCNC: 1.48 MG/DL (ref 0.67–1.17)
DEPRECATED HCO3 PLAS-SCNC: 26 MMOL/L (ref 22–29)
EOSINOPHIL # BLD AUTO: 0.1 10E3/UL (ref 0–0.7)
EOSINOPHIL NFR BLD AUTO: 2 %
ERYTHROCYTE [DISTWIDTH] IN BLOOD BY AUTOMATED COUNT: 14.5 % (ref 10–15)
GFR SERPL CREATININE-BSD FRML MDRD: 49 ML/MIN/1.73M2
GLUCOSE SERPL-MCNC: 106 MG/DL (ref 70–99)
HCT VFR BLD AUTO: 39.2 % (ref 40–53)
HGB BLD-MCNC: 12.5 G/DL (ref 13.3–17.7)
IMM GRANULOCYTES # BLD: 0 10E3/UL
IMM GRANULOCYTES NFR BLD: 0 %
LYMPHOCYTES # BLD AUTO: 0.7 10E3/UL (ref 0.8–5.3)
LYMPHOCYTES NFR BLD AUTO: 22 %
MCH RBC QN AUTO: 30.9 PG (ref 26.5–33)
MCHC RBC AUTO-ENTMCNC: 31.9 G/DL (ref 31.5–36.5)
MCV RBC AUTO: 97 FL (ref 78–100)
MONOCYTES # BLD AUTO: 0.2 10E3/UL (ref 0–1.3)
MONOCYTES NFR BLD AUTO: 6 %
NEUTROPHILS # BLD AUTO: 2.1 10E3/UL (ref 1.6–8.3)
NEUTROPHILS NFR BLD AUTO: 69 %
PLATELET # BLD AUTO: 161 10E3/UL (ref 150–450)
POTASSIUM SERPL-SCNC: 5 MMOL/L (ref 3.4–5.3)
PROT SERPL-MCNC: 7.1 G/DL (ref 6.4–8.3)
RBC # BLD AUTO: 4.05 10E6/UL (ref 4.4–5.9)
SODIUM SERPL-SCNC: 139 MMOL/L (ref 136–145)
WBC # BLD AUTO: 3.1 10E3/UL (ref 4–11)

## 2023-07-27 PROCEDURE — 80053 COMPREHEN METABOLIC PANEL: CPT

## 2023-07-27 PROCEDURE — 36415 COLL VENOUS BLD VENIPUNCTURE: CPT

## 2023-07-27 PROCEDURE — 85025 COMPLETE CBC W/AUTO DIFF WBC: CPT

## 2023-07-28 NOTE — ADDENDUM NOTE
Encounter addended by: Garcia Goodrich, PT on: 7/28/2023 2:11 PM   Actions taken: Clinical Note Signed, Episode resolved

## 2023-08-17 DIAGNOSIS — K21.9 GASTROESOPHAGEAL REFLUX DISEASE WITHOUT ESOPHAGITIS: ICD-10-CM

## 2023-08-17 RX ORDER — OMEPRAZOLE 40 MG/1
40 CAPSULE, DELAYED RELEASE ORAL DAILY
Qty: 90 CAPSULE | Refills: 1 | OUTPATIENT
Start: 2023-08-17

## 2023-08-17 NOTE — TELEPHONE ENCOUNTER
Already approved omeprazole (PRILOSEC) 40 MG DR capsule (90 capsules with 1 refill on 5/17/2023). Should already have refills on file.     Tiffanie DEGROOT RN   [facility]

## 2023-08-22 ENCOUNTER — TRANSFERRED RECORDS (OUTPATIENT)
Dept: HEALTH INFORMATION MANAGEMENT | Facility: CLINIC | Age: 73
End: 2023-08-22
Payer: MEDICARE

## 2023-10-18 ENCOUNTER — OFFICE VISIT (OUTPATIENT)
Dept: PULMONOLOGY | Facility: CLINIC | Age: 73
End: 2023-10-18
Attending: INTERNAL MEDICINE
Payer: MEDICARE

## 2023-10-18 VITALS
SYSTOLIC BLOOD PRESSURE: 119 MMHG | OXYGEN SATURATION: 99 % | WEIGHT: 294 LBS | HEIGHT: 73 IN | BODY MASS INDEX: 38.97 KG/M2 | DIASTOLIC BLOOD PRESSURE: 75 MMHG | RESPIRATION RATE: 17 BRPM | HEART RATE: 65 BPM

## 2023-10-18 DIAGNOSIS — J84.9 ILD (INTERSTITIAL LUNG DISEASE) (H): ICD-10-CM

## 2023-10-18 DIAGNOSIS — J84.9 ILD (INTERSTITIAL LUNG DISEASE) (H): Primary | ICD-10-CM

## 2023-10-18 LAB
6 MIN WALK (FT): 610 FT
6 MIN WALK (M): 186 M

## 2023-10-18 PROCEDURE — 94618 PULMONARY STRESS TESTING: CPT | Performed by: INTERNAL MEDICINE

## 2023-10-18 PROCEDURE — 94726 PLETHYSMOGRAPHY LUNG VOLUMES: CPT | Performed by: INTERNAL MEDICINE

## 2023-10-18 PROCEDURE — 94729 DIFFUSING CAPACITY: CPT | Performed by: INTERNAL MEDICINE

## 2023-10-18 PROCEDURE — 99213 OFFICE O/P EST LOW 20 MIN: CPT | Mod: 25 | Performed by: INTERNAL MEDICINE

## 2023-10-18 PROCEDURE — 94375 RESPIRATORY FLOW VOLUME LOOP: CPT | Performed by: INTERNAL MEDICINE

## 2023-10-18 PROCEDURE — G0463 HOSPITAL OUTPT CLINIC VISIT: HCPCS | Performed by: INTERNAL MEDICINE

## 2023-10-18 ASSESSMENT — PAIN SCALES - GENERAL: PAINLEVEL: NO PAIN (0)

## 2023-10-18 NOTE — PROGRESS NOTES
Reason for Visit  Lucio Daly is a 73 year old year old male who is being seen for RECHECK (Return Sarcoids )    HPI    The patient was seen and examined by Harsha Mccarthy MD     Mr. 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  Formal respiratory standpoint she reports no new symptoms.  However from a functional standpoint he reports worsening.  He has bilateral foot drop from peripheral neuropathy and has back problems.  Also has rheumatoid arthritis as mentioned above.  For his back problems, he has seen spine surgery but he is not a candidate for any surgical interventions.      Current Outpatient Medications   Medication    acetaminophen (TYLENOL) 500 MG tablet    albuterol (PROAIR HFA/PROVENTIL HFA/VENTOLIN HFA) 108 (90 Base) MCG/ACT inhaler    amLODIPine (NORVASC) 5 MG tablet    cholecalciferol (HM VITAMIN D3) 25 MCG (1000 UT) TABS    folic acid (FOLVITE) 1 MG tablet    hydroxychloroquine (PLAQUENIL) 200 MG tablet    lisinopril-hydrochlorothiazide (ZESTORETIC) 20-25 MG tablet    methotrexate 2.5 MG tablet    metoprolol succinate ER (TOPROL XL) 50 MG 24 hr tablet    omeprazole (PRILOSEC) 40 MG DR capsule    ORDER FOR DME    oxybutynin (DITROPAN) 5 MG tablet    pregabalin (LYRICA) 50 MG capsule    salmeterol (SEREVENT DISKUS) 50 MCG/ACT inhaler     No current facility-administered medications for this visit.     Allergies   Allergen Reactions    Adhesive Tape Hives     Bandages misc  Bandages misc    Alum & Mag Hydroxide-Simeth Hives     EXCESSIVE URINATION AND WEAKNESS, LIGHT-HEADED    Aluminum Hydroxide Hives    Calcium Carbonate Hives    Cortisone Hives    Cyclosporine      Burning eyes, problems with breathing, tightness in chest    Cyclosporine Hives     Burning eyes, problems with breathing, tightness in chest    Diphenhydramine Other (See Comments) and  Shortness Of Breath    Fexofenadine Other (See Comments) and Shortness Of Breath     EXCESSIVE URINATION AND WEAKNESS, LIGHT-HEADED      Gabapentin Hives     Neurontin: mosd changes and excess urination  Neurontin: mosd changes and excess urination    Levofloxacin Hives     From surgeon--? Joint pain ? Gerd aggravation. Insomnia, excess urination    Magnesium Carbonate Hives    Magnesium Hydroxide Hives    Prednisone Hives     Weakness, elevated bp, headache, eye pain, congestion     Simethicone Hives    Sulfamethoxazole-Trimethoprim Hives     Chest pain, angina  Chest pain, angina    Tree Extract Hives    Allegra      EXCESSIVE URINATION AND WEAKNESS, LIGHT-HEADED    Animal Dander     Benadryl Allergy      EXCESSIVE URINATION AND WEAKNESS, LIGHT-HEADED    Budesonide-Formoterol Fumarate GI Disturbance and Nausea     Other reaction(s): GI Distress    Cephalexin      Joint pain or gerd aggravation. Bloating excessive urination   Other reaction(s): GI Distress  Joint pain or gerd aggravation. Bloating excessive urination     Cephalosporins      Other reaction(s): GI Distress    Chlorpheniramine Maleate      Dust    Doxycycline Nausea and Vomiting     SWEATING,MIGRAINES,LOSS OF APPETITE,SWEATING,LIGHT HEADED, EXCESSIVE URINATION    Doxycycline Hyclate Nausea     SWEATING,MIGRAINES,LOSS OF APPETITE,SWEATING,LIGHT HEADED, EXCESSIVE URINATION    Flonase [Fluticasone Propionate]     Fluticasone      Other reaction(s): GI Distress    Hydrocortisone Nausea and Vomiting    Iodine      Other reaction(s): GI Distress    Iodine Solution [Povidone Iodine]      SKIN MELTS    Levaquin      From surgeon--? Joint pain ? Gerd aggravation. Insomnia, excess urination    Mylanta      EXCESSIVE URINATION AND WEAKNESS, LIGHT-HEADED    Oxcarbazepine      SEVERE JOINT AND TENDON PAIN, INSOMNIA, RESTLESSNESS, NAUSEA, EXCESS URINATION    Oxcarbazepine      Other reaction(s): GI Distress  SEVERE JOINT AND TENDON PAIN, INSOMNIA, RESTLESSNESS,  NAUSEA, EXCESS URINATION    Povidone Iodine      Other reaction(s): GI Distress  SKIN MELTS    Prednisone      Weakness, elevated bp, headache, eye pain, congestion     Seafood [Seafood]     Shellfish Allergy      Hives    Other reaction(s): GI Distress  Hives    Sulfamethoxazole-Trimethoprim     Trees     Cortizone Rash     EXCESS URINATION,WEAKNESS,NAUSEA, HEADACHE    Ibuprofen Fatigue, Other (See Comments) and Palpitations     Past Medical History:   Diagnosis Date    Abnormal EMG 04/18/2013    Abnormal involuntary movements(781.0)     Movement Disorder    AK (actinic keratosis) 12/18/2011    Allergic rhinitis, cause unspecified     Allergic rhinitis    Balance problems 11/01/2011    Basal cell carcinoma     Bladder spasms 11/01/2011    Chronic osteoarthritis     COPD (chronic obstructive pulmonary disease) (H)     Interstial lung disease, obliderans bronchiolitis    Diaphragmatic hernia without mention of obstruction or gangrene     Earache or other ear, nose, or throat complaint     Esophageal reflux     Fatigue 11/01/2011    Fracture     H. pylori infection 05/12/2011    History of MRI of cervical spine 11/18/2013    EXAMINATION: CERVICAL SPINE G/E 5 VIEWS* 4/19/2013 4:18 PM  CLINICAL HISTORY: Pain in limb,Performing Location?->[facility]),  COMPARISON:  FINDINGS: AP and lateral views in flexion and extension, as well as odontoid view of the cervical spine was obtained. There is no comparison available. The vertebral bodies of the cervical spine are normally aligned. There is posterior spurring and d    Incomplete defecation 11/01/2011    Interstitial lung disease (H) 11/29/2016    Laboratory test 08/07/2012    Lung disease 06/2015    Malignant basal cell neoplasm of skin 08/06/2008    Melanoma (H) 08/06/2008    Melanoma in situ of lower leg (H)     R calf    Neuropathy 05/16/2011    GALO (obstructive sleep apnea)     Other bladder disorder     Other color vision deficiencies     Other nervous system  complications     Parkinsonism 11/01/2011    Parkinsons disease     Parkinsonism/ balance, dropped foot, tremor    Personal history of colonic polyps     PLMD (periodic limb movement disorder) 12/16/2021    Polyneuropathy in other diseases classified elsewhere (H24)     RA (rheumatoid arthritis) (H)     Rheumatoid arthritis of multiple sites with negative rheumatoid factor (H) 03/21/2016    Seronegative arthritis 11/18/2013    Shortness of breath     Shoulder arthritis 2016    acromioclavicular joint     Sialorrhea 4/18/2023    Somatization disorder 05/12/2011    Spider veins     Leg Vericise vein surgery    Squamous cell carcinoma     Tremor 11/01/2011    Unspecified essential hypertension     Unspecified hypothyroidism     Urinary tract infection     Urinary urgency 11/01/2011    Wears glasses 11/01/2011       Past Surgical History:   Procedure Laterality Date    BIOPSY OF SKIN LESION      COLONOSCOPY      COLONOSCOPY N/A 7/20/2022    Procedure: COLONOSCOPY (fv) polypectomy using jumbo bx forcep;  Surgeon: Gómez Mckinney MD;  Location:  GI    CYSTOSCOPY  Many years ago    Blood in urine/ bladder cystoscopy    ESOPHAGOSCOPY, GASTROSCOPY, DUODENOSCOPY (EGD), COMBINED N/A 7/20/2022    Procedure: ESOPHAGOGASTRODUODENOSCOPY (EGD) (fv) biopsies using cold bx forcep;  Surgeon: Gómez Mckinney MD;  Location:  GI    HEMORRHOID SURGERY      lip biopsy      for sicca complex    MOHS MICROGRAPHIC PROCEDURE      SOFT TISSUE SURGERY      removeal of basel cell carcinoma       Social History     Socioeconomic History    Marital status:      Spouse name: Not on file    Number of children: Not on file    Years of education: Not on file    Highest education level: Not on file   Occupational History    Not on file   Tobacco Use    Smoking status: Former     Packs/day: 1.50     Years: 37.00     Additional pack years: 0.00     Total pack years: 55.50     Types: Cigarettes     Start date: 6/15/1963     Quit date:  2000     Years since quittin.0    Smokeless tobacco: Never   Vaping Use    Vaping Use: Never used   Substance and Sexual Activity    Alcohol use: Not Currently    Drug use: Never    Sexual activity: Not Currently     Partners: Female     Birth control/protection: None   Other Topics Concern    Parent/sibling w/ CABG, MI or angioplasty before 65F 55M? No   Social History Narrative    2013: Living in [place] in a townhouse with no steps    Has 3 sons that are doing okay.         Dairy/d 1 servings/d.     Caffeine 0 servings/d    Exercise 0 x week    Sunscreen used - No    Seatbelts used - Yes    Working smoke/CO detectors in the home - Yes    Guns stored in the home - Yes    Self Breast Exams - NA    Self Testicular Exam - Yes    Eye Exam up to date - Yes     Dental Exam up to date - Yes     Pap Smear up to date - NA    Mammogram up to date - NA    PSA up to date - Yes     Dexa Scan up to date - No    Flex Sig / Colonoscopy up to date - Yes less than 5 yrs ago    Immunizations up to date -today    Abuse: Current or Past(Physical, Sexual or Emotional)- No    Do you feel safe in your environment - Yes    2008                 Social Determinants of Health     Financial Resource Strain: Low Risk  (2022)    Overall Financial Resource Strain (CARDIA)     Difficulty of Paying Living Expenses: Not hard at all   Food Insecurity: No Food Insecurity (2022)    Hunger Vital Sign     Worried About Running Out of Food in the Last Year: Never true     Ran Out of Food in the Last Year: Never true   Transportation Needs: No Transportation Needs (2022)    PRAPARE - Transportation     Lack of Transportation (Medical): No     Lack of Transportation (Non-Medical): No   Physical Activity: Insufficiently Active (2022)    Exercise Vital Sign     Days of Exercise per Week: 3 days     Minutes of Exercise per Session: 20 min   Stress: No Stress Concern Present (2022)    [employer] [place] of  "Occupational Health - Occupational Stress Questionnaire     Feeling of Stress : Not at all   Social Connections: Socially Integrated (12/16/2022)    Social Connection and Isolation Panel [NHANES]     Frequency of Communication with Friends and Family: Twice a week     Frequency of Social Gatherings with Friends and Family: Once a week     Attends Hinduism Services: More than 4 times per year     Active Member of Clubs or Organizations: Yes     Attends Club or Organization Meetings: Not on file     Marital Status:    Interpersonal Safety: Not on file   Housing Stability: Unknown (12/16/2022)    Housing Stability Vital Sign     Unable to Pay for Housing in the Last Year: No     Number of Places Lived in the Last Year: Not on file     Unstable Housing in the Last Year: No       ROS Pulmonary  A complete ROS was otherwise negative except as noted in the HPI.  /75   Pulse 65   Resp 17   Ht 1.854 m (6' 1\")   Wt 133.4 kg (294 lb)   SpO2 99%   BMI 38.79 kg/m    Exam:   GENERAL APPEARANCE: Alert, and in no apparent distress.  EYES: PERRL, EOMI  MOUTH: Oral mucosa is moist, without any lesions,, no oropharyngeal exudate.  NECK: supple, no masses, no thyromegaly.  LYMPHATICS: No significant axillary, cervical, or supraclavicular nodes.  RESP: normal percussion, good air flow throughout.  ** crackles. No rhonchi. No wheezes.  CV: Normal S1, S2, regular rhythm, normal rate. No murmur.  No rub. No gallop. No LE edema.   ABDOMEN:  Bowel sounds normal, soft, nontender, no HSM or masses.     Results:  PFTs done today were reviewed by me with the patient.  He also had a 6-minute walk distance which was 610 feet.  The lower limit of normal is 119 9 feet.  FVC 3.79 L (96%), Z score -0.23.  FEV1 2.99 (100%), Z score +0.05.  FEV1 FVC ratio is 79.  Total lung capacity is 6.70 L (90%), Z score 1.04.  DLCO not corrected for hemoglobin is 26.17 (90%), Z score -0.56.  My depression is that the spirometry lung volumes and " diffusion capacity are all within normal range.  In comparison to prior spirometry 150 cc decrease in FVC and 80 cc decrease in FEV1.    Assessment and plan: Mr. 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  He is currently on methotrexate and hydroxychloroquine with stable pulmonary sympomts  1.  Pulmonary: Some decline in FVC and total lung capacity.  However no change in symptoms.  We will continue to monitor.  He will continue systemic anti-inflammatory therapy based on CTD symptoms.  2.  Sleep disordered breathing: On NIPPV we will continue.  3.  Gait instability: Multifactorial.  He has a tremor for which she follows with Dr. Gold.  There is also bilateral foot drop and peripheral neuropathy.  There is concern for weakness of the left side.  I am deferring this to primary care physician and other providers were involved in his care.  Overall he is demonstrated stability in pulm symptoms for a long time.  Seeing multiple providers.  We are available for as needed follow-up but otherwise I will see him back in 1 year.        This note consists of symbols derived from keyboarding, dictation and/or voice recognition software. As a result, there may be errors in the script that have gone undetected. Please consider this when interpreting information found in this chart

## 2023-10-18 NOTE — LETTER
10/18/2023         RE: Lucio Daly  [address]  [place] [address]        Dear Colleague,    Thank you for referring your patient, Lucio Daly, to the [facility]. Please see a copy of my visit note below.    Reason for Visit  Lucio Daly is a 73 year old year old male who is being seen for RECHECK (Return Sarcoids )    HPI    The patient was seen and examined by Harsha Mccarthy MD     Mr. 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  Formal respiratory standpoint she reports no new symptoms.  However from a functional standpoint he reports worsening.  He has bilateral foot drop from peripheral neuropathy and has back problems.  Also has rheumatoid arthritis as mentioned above.  For his back problems, he has seen spine surgery but he is not a candidate for any surgical interventions.      Current Outpatient Medications   Medication    acetaminophen (TYLENOL) 500 MG tablet    albuterol (PROAIR HFA/PROVENTIL HFA/VENTOLIN HFA) 108 (90 Base) MCG/ACT inhaler    amLODIPine (NORVASC) 5 MG tablet    cholecalciferol (HM VITAMIN D3) 25 MCG (1000 UT) TABS    folic acid (FOLVITE) 1 MG tablet    hydroxychloroquine (PLAQUENIL) 200 MG tablet    lisinopril-hydrochlorothiazide (ZESTORETIC) 20-25 MG tablet    methotrexate 2.5 MG tablet    metoprolol succinate ER (TOPROL XL) 50 MG 24 hr tablet    omeprazole (PRILOSEC) 40 MG DR capsule    ORDER FOR DME    oxybutynin (DITROPAN) 5 MG tablet    pregabalin (LYRICA) 50 MG capsule    salmeterol (SEREVENT DISKUS) 50 MCG/ACT inhaler     No current facility-administered medications for this visit.     Allergies   Allergen Reactions    Adhesive Tape Hives     Bandages misc  Bandages misc    Alum & Mag Hydroxide-Simeth Hives     EXCESSIVE URINATION AND WEAKNESS, LIGHT-HEADED     Aluminum Hydroxide Hives    Calcium Carbonate Hives    Cortisone Hives    Cyclosporine      Burning eyes, problems with breathing, tightness in chest    Cyclosporine Hives     Burning eyes, problems with breathing, tightness in chest    Diphenhydramine Other (See Comments) and Shortness Of Breath    Fexofenadine Other (See Comments) and Shortness Of Breath     EXCESSIVE URINATION AND WEAKNESS, LIGHT-HEADED      Gabapentin Hives     Neurontin: mosd changes and excess urination  Neurontin: mosd changes and excess urination    Levofloxacin Hives     From surgeon--? Joint pain ? Gerd aggravation. Insomnia, excess urination    Magnesium Carbonate Hives    Magnesium Hydroxide Hives    Prednisone Hives     Weakness, elevated bp, headache, eye pain, congestion     Simethicone Hives    Sulfamethoxazole-Trimethoprim Hives     Chest pain, angina  Chest pain, angina    Tree Extract Hives    Allegra      EXCESSIVE URINATION AND WEAKNESS, LIGHT-HEADED    Animal Dander     Benadryl Allergy      EXCESSIVE URINATION AND WEAKNESS, LIGHT-HEADED    Budesonide-Formoterol Fumarate GI Disturbance and Nausea     Other reaction(s): GI Distress    Cephalexin      Joint pain or gerd aggravation. Bloating excessive urination   Other reaction(s): GI Distress  Joint pain or gerd aggravation. Bloating excessive urination     Cephalosporins      Other reaction(s): GI Distress    Chlorpheniramine Maleate      Dust    Doxycycline Nausea and Vomiting     SWEATING,MIGRAINES,LOSS OF APPETITE,SWEATING,LIGHT HEADED, EXCESSIVE URINATION    Doxycycline Hyclate Nausea     SWEATING,MIGRAINES,LOSS OF APPETITE,SWEATING,LIGHT HEADED, EXCESSIVE URINATION    Flonase [Fluticasone Propionate]     Fluticasone      Other reaction(s): GI Distress    Hydrocortisone Nausea and Vomiting    Iodine      Other reaction(s): GI Distress    Iodine Solution [Povidone Iodine]      SKIN MELTS    Levaquin      From surgeon--? Joint pain ? Gerd aggravation. Insomnia, excess  urination    Mylanta      EXCESSIVE URINATION AND WEAKNESS, LIGHT-HEADED    Oxcarbazepine      SEVERE JOINT AND TENDON PAIN, INSOMNIA, RESTLESSNESS, NAUSEA, EXCESS URINATION    Oxcarbazepine      Other reaction(s): GI Distress  SEVERE JOINT AND TENDON PAIN, INSOMNIA, RESTLESSNESS, NAUSEA, EXCESS URINATION    Povidone Iodine      Other reaction(s): GI Distress  SKIN MELTS    Prednisone      Weakness, elevated bp, headache, eye pain, congestion     Seafood [Seafood]     Shellfish Allergy      Hives    Other reaction(s): GI Distress  Hives    Sulfamethoxazole-Trimethoprim     Trees     Cortizone Rash     EXCESS URINATION,WEAKNESS,NAUSEA, HEADACHE    Ibuprofen Fatigue, Other (See Comments) and Palpitations     Past Medical History:   Diagnosis Date    Abnormal EMG 04/18/2013    Abnormal involuntary movements(781.0)     Movement Disorder    AK (actinic keratosis) 12/18/2011    Allergic rhinitis, cause unspecified     Allergic rhinitis    Balance problems 11/01/2011    Basal cell carcinoma     Bladder spasms 11/01/2011    Chronic osteoarthritis     COPD (chronic obstructive pulmonary disease) (H)     Interstial lung disease, obliderans bronchiolitis    Diaphragmatic hernia without mention of obstruction or gangrene     Earache or other ear, nose, or throat complaint     Esophageal reflux     Fatigue 11/01/2011    Fracture     H. pylori infection 05/12/2011    History of MRI of cervical spine 11/18/2013    EXAMINATION: CERVICAL SPINE G/E 5 VIEWS* 4/19/2013 4:18 PM  CLINICAL HISTORY: Pain in limb,Performing Location?->[facility]),  COMPARISON:  FINDINGS: AP and lateral views in flexion and extension, as well as odontoid view of the cervical spine was obtained. There is no comparison available. The vertebral bodies of the cervical spine are normally aligned. There is posterior spurring and d    Incomplete defecation 11/01/2011    Interstitial lung disease (H) 11/29/2016    Laboratory test 08/07/2012    Lung disease  06/2015    Malignant basal cell neoplasm of skin 08/06/2008    Melanoma (H) 08/06/2008    Melanoma in situ of lower leg (H)     R calf    Neuropathy 05/16/2011    GALO (obstructive sleep apnea)     Other bladder disorder     Other color vision deficiencies     Other nervous system complications     Parkinsonism 11/01/2011    Parkinsons disease     Parkinsonism/ balance, dropped foot, tremor    Personal history of colonic polyps     PLMD (periodic limb movement disorder) 12/16/2021    Polyneuropathy in other diseases classified elsewhere (H24)     RA (rheumatoid arthritis) (H)     Rheumatoid arthritis of multiple sites with negative rheumatoid factor (H) 03/21/2016    Seronegative arthritis 11/18/2013    Shortness of breath     Shoulder arthritis 2016    acromioclavicular joint     Sialorrhea 4/18/2023    Somatization disorder 05/12/2011    Spider veins     Leg Vericise vein surgery    Squamous cell carcinoma     Tremor 11/01/2011    Unspecified essential hypertension     Unspecified hypothyroidism     Urinary tract infection     Urinary urgency 11/01/2011    Wears glasses 11/01/2011       Past Surgical History:   Procedure Laterality Date    BIOPSY OF SKIN LESION      COLONOSCOPY      COLONOSCOPY N/A 7/20/2022    Procedure: COLONOSCOPY (fv) polypectomy using jumbo bx forcep;  Surgeon: Gómez Mckinney MD;  Location:  GI    CYSTOSCOPY  Many years ago    Blood in urine/ bladder cystoscopy    ESOPHAGOSCOPY, GASTROSCOPY, DUODENOSCOPY (EGD), COMBINED N/A 7/20/2022    Procedure: ESOPHAGOGASTRODUODENOSCOPY (EGD) (fv) biopsies using cold bx forcep;  Surgeon: Gómez Mckinney MD;  Location:  GI    HEMORRHOID SURGERY      lip biopsy      for sicca complex    MOHS MICROGRAPHIC PROCEDURE      SOFT TISSUE SURGERY      removeal of basel cell carcinoma       Social History     Socioeconomic History    Marital status:      Spouse name: Not on file    Number of children: Not on file    Years of education: Not on file     Highest education level: Not on file   Occupational History    Not on file   Tobacco Use    Smoking status: Former     Packs/day: 1.50     Years: 37.00     Additional pack years: 0.00     Total pack years: 55.50     Types: Cigarettes     Start date: 6/15/1963     Quit date: 2000     Years since quittin.0    Smokeless tobacco: Never   Vaping Use    Vaping Use: Never used   Substance and Sexual Activity    Alcohol use: Not Currently    Drug use: Never    Sexual activity: Not Currently     Partners: Female     Birth control/protection: None   Other Topics Concern    Parent/sibling w/ CABG, MI or angioplasty before 65F 55M? No   Social History Narrative    2013: Living in [place] in a townhouse with no steps    Has 3 sons that are doing okay.         Dairy/d 1 servings/d.     Caffeine 0 servings/d    Exercise 0 x week    Sunscreen used - No    Seatbelts used - Yes    Working smoke/CO detectors in the home - Yes    Guns stored in the home - Yes    Self Breast Exams - NA    Self Testicular Exam - Yes    Eye Exam up to date - Yes     Dental Exam up to date - Yes     Pap Smear up to date - NA    Mammogram up to date - NA    PSA up to date - Yes     Dexa Scan up to date - No    Flex Sig / Colonoscopy up to date - Yes less than 5 yrs ago    Immunizations up to date -today    Abuse: Current or Past(Physical, Sexual or Emotional)- No    Do you feel safe in your environment - Yes    2008                 Social Determinants of Health     Financial Resource Strain: Low Risk  (2022)    Overall Financial Resource Strain (CARDIA)     Difficulty of Paying Living Expenses: Not hard at all   Food Insecurity: No Food Insecurity (2022)    Hunger Vital Sign     Worried About Running Out of Food in the Last Year: Never true     Ran Out of Food in the Last Year: Never true   Transportation Needs: No Transportation Needs (2022)    PRAPARE - Transportation     Lack of Transportation (Medical): No      "Lack of Transportation (Non-Medical): No   Physical Activity: Insufficiently Active (12/16/2022)    Exercise Vital Sign     Days of Exercise per Week: 3 days     Minutes of Exercise per Session: 20 min   Stress: No Stress Concern Present (12/16/2022)    [employer] - Occupational Stress Questionnaire     Feeling of Stress : Not at all   Social Connections: Socially Integrated (12/16/2022)    Social Connection and Isolation Panel [NHANES]     Frequency of Communication with Friends and Family: Twice a week     Frequency of Social Gatherings with Friends and Family: Once a week     Attends [employer] Services: More than 4 times per year     Active Member of Clubs or Organizations: Yes     Attends Club or Organization Meetings: Not on file     Marital Status:    Interpersonal Safety: Not on file   Housing Stability: Unknown (12/16/2022)    Housing Stability Vital Sign     Unable to Pay for Housing in the Last Year: No     Number of Places Lived in the Last Year: Not on file     Unstable Housing in the Last Year: No       ROS Pulmonary  A complete ROS was otherwise negative except as noted in the HPI.  /75   Pulse 65   Resp 17   Ht 1.854 m (6' 1\")   Wt 133.4 kg (294 lb)   SpO2 99%   BMI 38.79 kg/m    Exam:   GENERAL APPEARANCE: Alert, and in no apparent distress.  EYES: PERRL, EOMI  MOUTH: Oral mucosa is moist, without any lesions,, no oropharyngeal exudate.  NECK: supple, no masses, no thyromegaly.  LYMPHATICS: No significant axillary, cervical, or supraclavicular nodes.  RESP: normal percussion, good air flow throughout.  ** crackles. No rhonchi. No wheezes.  CV: Normal S1, S2, regular rhythm, normal rate. No murmur.  No rub. No gallop. No LE edema.   ABDOMEN:  Bowel sounds normal, soft, nontender, no HSM or masses.     Results:  PFTs done today were reviewed by me with the patient.  He also had a 6-minute walk distance which was 610 feet.  The lower limit of normal is " 119 9 feet.  FVC 3.79 L (96%), Z score -0.23.  FEV1 2.99 (100%), Z score +0.05.  FEV1 FVC ratio is 79.  Total lung capacity is 6.70 L (90%), Z score 1.04.  DLCO not corrected for hemoglobin is 26.17 (90%), Z score -0.56.  My depression is that the spirometry lung volumes and diffusion capacity are all within normal range.  In comparison to prior spirometry 150 cc decrease in FVC and 80 cc decrease in FEV1.    Assessment and plan: Mr. 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  He is currently on methotrexate and hydroxychloroquine with stable pulmonary sympomts  1.  Pulmonary: Some decline in FVC and total lung capacity.  However no change in symptoms.  We will continue to monitor.  He will continue systemic anti-inflammatory therapy based on CTD symptoms.  2.  Sleep disordered breathing: On NIPPV we will continue.  3.  Gait instability: Multifactorial.  He has a tremor for which she follows with Dr. Gold.  There is also bilateral foot drop and peripheral neuropathy.  There is concern for weakness of the left side.  I am deferring this to primary care physician and other providers were involved in his care.  Overall he is demonstrated stability in pulm symptoms for a long time.  Seeing multiple providers.  We are available for as needed follow-up but otherwise I will see him back in 1 year.  This note consists of symbols derived from keyboarding, dictation and/or voice recognition software. As a result, there may be errors in the script that have gone undetected. Please consider this when interpreting information found in this chart      Again, thank you for allowing me to participate in the care of your patient.        Sincerely,        Harsha Mccarthy MD

## 2023-10-19 ENCOUNTER — LAB (OUTPATIENT)
Dept: LAB | Facility: CLINIC | Age: 73
End: 2023-10-19
Payer: MEDICARE

## 2023-10-19 DIAGNOSIS — I10 PRIMARY HYPERTENSION: ICD-10-CM

## 2023-10-19 DIAGNOSIS — M13.80 SERONEGATIVE ARTHRITIS: Primary | ICD-10-CM

## 2023-10-19 DIAGNOSIS — M06.09 RHEUMATOID ARTHRITIS OF MULTIPLE SITES WITH NEGATIVE RHEUMATOID FACTOR (H): ICD-10-CM

## 2023-10-19 DIAGNOSIS — C61 PROSTATE CANCER (H): Primary | ICD-10-CM

## 2023-10-19 DIAGNOSIS — N18.30 STAGE 3 CHRONIC KIDNEY DISEASE, UNSPECIFIED WHETHER STAGE 3A OR 3B CKD (H): ICD-10-CM

## 2023-10-19 LAB
ALBUMIN SERPL BCG-MCNC: 4.3 G/DL (ref 3.5–5.2)
ALP SERPL-CCNC: 58 U/L (ref 40–129)
ALT SERPL W P-5'-P-CCNC: 25 U/L (ref 0–70)
ANION GAP SERPL CALCULATED.3IONS-SCNC: 10 MMOL/L (ref 7–15)
AST SERPL W P-5'-P-CCNC: 30 U/L (ref 0–45)
BILIRUB SERPL-MCNC: 0.4 MG/DL
BUN SERPL-MCNC: 34.8 MG/DL (ref 8–23)
CALCIUM SERPL-MCNC: 9.5 MG/DL (ref 8.8–10.2)
CHLORIDE SERPL-SCNC: 105 MMOL/L (ref 98–107)
CREAT SERPL-MCNC: 1.59 MG/DL (ref 0.67–1.17)
CRP SERPL-MCNC: 6.23 MG/L
DEPRECATED HCO3 PLAS-SCNC: 25 MMOL/L (ref 22–29)
DLCOUNC-%PRED-PRE: 90 %
DLCOUNC-PRE: 23.59 ML/MIN/MMHG
DLCOUNC-PRED: 26.17 ML/MIN/MMHG
EGFRCR SERPLBLD CKD-EPI 2021: 45 ML/MIN/1.73M2
ERV-%PRED-PRE: 67 %
ERV-PRE: 1 L
ERV-PRED: 1.48 L
ERYTHROCYTE [DISTWIDTH] IN BLOOD BY AUTOMATED COUNT: 14.9 % (ref 10–15)
EXPTIME-PRE: 5.68 SEC
FEF2575-%PRED-PRE: 123 %
FEF2575-PRE: 2.7 L/SEC
FEF2575-PRED: 2.18 L/SEC
FEFMAX-%PRED-PRE: 109 %
FEFMAX-PRE: 8.92 L/SEC
FEFMAX-PRED: 8.15 L/SEC
FEV1-%PRED-PRE: 100 %
FEV1-PRE: 2.99 L
FEV1FEV6-PRE: 79 %
FEV1FEV6-PRED: 77 %
FEV1FVC-PRE: 79 %
FEV1FVC-PRED: 76 %
FEV1SVC-PRE: 77 %
FEV1SVC-PRED: 70 %
FIFMAX-PRE: 7.9 L/SEC
FRCPLETH-%PRED-PRE: 98 %
FRCPLETH-PRE: 3.8 L
FRCPLETH-PRED: 3.84 L
FVC-%PRED-PRE: 96 %
FVC-PRE: 3.79 L
FVC-PRED: 3.93 L
GLUCOSE SERPL-MCNC: 119 MG/DL (ref 70–99)
HCT VFR BLD AUTO: 38.3 % (ref 40–53)
HGB BLD-MCNC: 12.2 G/DL (ref 13.3–17.7)
IC-%PRED-PRE: 98 %
IC-PRE: 2.9 L
IC-PRED: 2.96 L
MCH RBC QN AUTO: 31 PG (ref 26.5–33)
MCHC RBC AUTO-ENTMCNC: 31.9 G/DL (ref 31.5–36.5)
MCV RBC AUTO: 97 FL (ref 78–100)
PLATELET # BLD AUTO: 169 10E3/UL (ref 150–450)
POTASSIUM SERPL-SCNC: 4.6 MMOL/L (ref 3.4–5.3)
PROT SERPL-MCNC: 6.8 G/DL (ref 6.4–8.3)
RBC # BLD AUTO: 3.94 10E6/UL (ref 4.4–5.9)
RVPLETH-%PRED-PRE: 99 %
RVPLETH-PRE: 2.8 L
RVPLETH-PRED: 2.8 L
SODIUM SERPL-SCNC: 140 MMOL/L (ref 135–145)
TLCPLETH-%PRED-PRE: 90 %
TLCPLETH-PRE: 6.7 L
TLCPLETH-PRED: 7.43 L
VA-%PRED-PRE: 74 %
VA-PRE: 5 L
VC-%PRED-PRE: 92 %
VC-PRE: 3.9 L
VC-PRED: 4.22 L
WBC # BLD AUTO: 3.1 10E3/UL (ref 4–11)

## 2023-10-19 PROCEDURE — 86140 C-REACTIVE PROTEIN: CPT

## 2023-10-19 PROCEDURE — 80053 COMPREHEN METABOLIC PANEL: CPT

## 2023-10-19 PROCEDURE — 36415 COLL VENOUS BLD VENIPUNCTURE: CPT

## 2023-10-19 PROCEDURE — 85027 COMPLETE CBC AUTOMATED: CPT

## 2023-10-20 ENCOUNTER — OFFICE VISIT (OUTPATIENT)
Dept: RHEUMATOLOGY | Facility: CLINIC | Age: 73
End: 2023-10-20
Attending: INTERNAL MEDICINE
Payer: MEDICARE

## 2023-10-20 ENCOUNTER — MYC MEDICAL ADVICE (OUTPATIENT)
Dept: PULMONOLOGY | Facility: CLINIC | Age: 73
End: 2023-10-20

## 2023-10-20 VITALS
BODY MASS INDEX: 38.79 KG/M2 | OXYGEN SATURATION: 96 % | SYSTOLIC BLOOD PRESSURE: 128 MMHG | DIASTOLIC BLOOD PRESSURE: 69 MMHG | WEIGHT: 294 LBS | HEART RATE: 63 BPM

## 2023-10-20 DIAGNOSIS — G62.9 PERIPHERAL POLYNEUROPATHY: ICD-10-CM

## 2023-10-20 DIAGNOSIS — M06.09 RHEUMATOID ARTHRITIS OF MULTIPLE SITES WITH NEGATIVE RHEUMATOID FACTOR (H): ICD-10-CM

## 2023-10-20 DIAGNOSIS — Z79.899 HIGH RISK MEDICATION USE: ICD-10-CM

## 2023-10-20 DIAGNOSIS — R94.4 DECREASED GFR: Primary | ICD-10-CM

## 2023-10-20 DIAGNOSIS — J45.909 UNCOMPLICATED ASTHMA, UNSPECIFIED ASTHMA SEVERITY, UNSPECIFIED WHETHER PERSISTENT: ICD-10-CM

## 2023-10-20 DIAGNOSIS — J44.81 OBLITERATIVE BRONCHIOLITIS (H): ICD-10-CM

## 2023-10-20 DIAGNOSIS — D84.9 IMMUNOSUPPRESSED STATUS (H): ICD-10-CM

## 2023-10-20 PROCEDURE — G0463 HOSPITAL OUTPT CLINIC VISIT: HCPCS | Performed by: INTERNAL MEDICINE

## 2023-10-20 PROCEDURE — 99214 OFFICE O/P EST MOD 30 MIN: CPT | Performed by: INTERNAL MEDICINE

## 2023-10-20 RX ORDER — HYDROXYCHLOROQUINE SULFATE 200 MG/1
200 TABLET, FILM COATED ORAL DAILY
Qty: 90 TABLET | Refills: 3 | Status: SHIPPED | OUTPATIENT
Start: 2023-10-20 | End: 2024-05-23

## 2023-10-20 RX ORDER — FOLIC ACID 1 MG/1
1 TABLET ORAL DAILY
Qty: 90 TABLET | Refills: 3 | Status: SHIPPED | OUTPATIENT
Start: 2023-10-20 | End: 2024-05-23

## 2023-10-20 RX ORDER — ALBUTEROL SULFATE 90 UG/1
2 AEROSOL, METERED RESPIRATORY (INHALATION) EVERY 4 HOURS PRN
Qty: 6.7 G | Refills: 1 | Status: SHIPPED | OUTPATIENT
Start: 2023-10-20 | End: 2023-11-29

## 2023-10-20 NOTE — NURSING NOTE
Chief Complaint   Patient presents with    RECHECK     /69 (BP Location: Right arm, Patient Position: Sitting, Cuff Size: Adult Large)   Pulse 63   Wt 133.4 kg (294 lb)   SpO2 96%   BMI 38.79 kg/m

## 2023-10-20 NOTE — PROGRESS NOTES
Rheumatology Clinic Visit  Jeremy Goodrich M.D.     Lucio Daly MRN# [id number]   YOB: 1950 Age: 73 year old     Date of Visit: 10/20/2023    Primary care provider: Saroj Tinoco         Assessment and Plan:   # Seronegative inflammatory arthritis: Patient reports stable daily pain in wrists ankles and knees since restarting methotrexate and hydroxychloroquine combination in late 2022.  Exam shows no inflammatory joint changes.  Data: Blood work October 19, 2023 showed creatinine 1.56; white count of 3.1, otherwise CBC and comprehensive metabolic panel normal.  Discussion: Seronegative inflammatory arthritis remains improved/stable on combination treatment. Patient reported worse control of diffuse arthralgia with methotrexate while testing dose reduction to 15 mg weekly in 2022.  Accordingly, I recommend continuing methotrexate 20 mg weekly, and hydroxychloroquine 200 mg daily.  While taking hydroxychloroquine, patient will require annual retinal toxicity examinations (last 2023).  2. ILD Obliterative bronchiolitis, symptomatically stable.  Followed by Dr. Mccarthy in pulmonary.  3. Osteoarthritis knees, ankle, shoulder and neck and hands/thumbs. Continue isometric quad strengthening exercises twice daily to improve support around the joint. Left ankle osteoarthritis and overall osteoarthritis  is contributing to the overall pain. Continue splinting/hand therapy and acetaminophen 1000 mg tid ATC.   4. Trigger finger, L 4th: gradually increasing.  Did not discuss today.  5. Prostate CA: Completed XRT in September 2022.  6. CKD3: followup with Nephrology, continue to avoid NSAIDs.  7. Spinal stenosis, DDD: I discussed that gradually worsening mobility issues and strength deficits in the lower extremities are not likely due to intrinsic muscle, nerve, bone, or joint problems in the legs thighs or feet.  Rather, concern is raised about neurologic compromise in the lumbar spine.  I urged patient to  "return to orthopedics spine service for additional consultation.  Plan:  1.  Bloodwork every 4 months  2.  Continue methotrexate 8 tablets (20 mg) once weekly.While on methotrexate:   -- Check labs every 3 months (AST/ALT, Albumin, CBC with platelets)   -- Limit alcohol intake to 2 drinks weekly; use folate 1 mg daily.  --Tylenol 1000 mg can be used scheduled three times daily for joint pain and for nausea/headache associated with methotrexate dosing.  3.  Continue hydroxychloroquine 200 mg once daily.  While taking hydroxychloroquine, undergo annual ophthalmology evaluation to monitor for rare retinal Plaquenil toxicity (last visit summer 2023)  4  Acetaminophen 1000 mg 3 times daily for residual pain. Last ophthalmology in August 2022.  OCT and retinal exam found no evidence of Plaquenil toxicity.  5. Renal consultation about elevated creatinine; repeat Creatinine in ; continue to avoid NSAIDs.  RTC 6-7 month  Jeremy Goodrich MD  Staff Rheumatologist, [facility]    On the day of the encounter, a total of 31 minutes was spent in chart review, and in counseling and coordination of care, regarding the patient's complex medical problem of seronegative inflammatory arthritis, spinal stenosis, and renal insufficiency.           History of Present Illness:   Lucio Daly \"Rich\" presents for f/u seronegative rheumatoid arthritis, sicca complex.  Last seen in 4-2023. Methotrexate and hydroxychloroquine were continued for seronegative arthritis.  Interval history October 20, 2023  He struggles with mobility. Rising from chair, getting out of bed; feels \"weakness\" in ankles, knees, thighs. He has to lift his foot with his hand to bring it into the car.  He has been diagnosed with spinal stenosis and bulging disks; when he lies in bed, feels like I am lying on a rock.  There is muscle cramping in both legs.  He had evaluation from Ortho Spine \"about a year ago\"; surgery and cauterization were discussed for lumbar " "spine. PTx has stopped.  He is concerned about \"allergic reactions\" with corticosteroid  He continues methotrexate 20 mg weekly. He thinks that joint pain is overall better controlled at current higher dose of methotrexate 20 mg weekly.  There is mild stomach upset associated with dose. He uses hydroxychloroquine 200 mg daily.    Interval history April 21, 2023  + difficulty rising from chair or bed. Slightly progressive pain in hips, knees, hands.  No persistent swelling, redness, warmth in the affected joints.  He was walking everyday with walker this winter for 15-20 minutes.  Early morning stiffness is ~ 30 minutes.  He continues methotrexate 20 mg weekly; he had reduced to 15 mg weekly, but joint pain worsened in 2-2023.  He thinks that joint pain is overall better controlled at the higher dose of methotrexate 20 mg weekly.  There is mild stomach upset associated with dose. He uses hydroxychloroquine 200 mg daily.  He has discussed RF ablation for lumbar pain.    Interval history September 29, 2022  Completnig 6 weeks of XRT for prostate Ca. Off methotrexate durign that time, noting increased back, thumb, knees achiness and pain. R knee especially; collarbone areas.  Early morning stiffness is 45 minutes.  Taking acetaminophen 1000 tid; continuing hydroxychloroquine 200 mg daily.  No adverse effects.  Interval history April 8, 2022    He is doing about the same. He has daily pain in hands, feet, elbows, and shoulders, but ability to perform activities of daily living, and to perform walking 20 to 25 minutes without limitation, are unimpeded.  He has less than 30 minutes of morning stiffness.  His L great toe is dark and swollen; his whole L ankle and foot feel like they are \"in a vise\". The R leg goes completely numb with prolonged driving.  He notes pain in the R LBP when lying on his back. He has pain in the R lateral thigh with mattress contact or with prolonged immobility.  He has been offered surgery for " "spinal stenosis, but has been advised that he is \"high risk\" for surgery.  He continues methotrexate 20 mg weekly; there is mild stomach upset associated with dose. He uses hydroxychloroquine 200 mg daily.  He notes increasing triggering in his L 4th finger.  He has been adjusting HTN medications with primary care.  PMH   1. Sensory neuropathy of unclear etiology - negative work up, managed on Lyrica.  Has chronic dysesthesia in pads of feet.  2. Parkinsonisim/Tremor disorder; sees neurology   3. Headaches   4. History of basal cell, squamous (most recent +bx 8-2014)  5. History of melanoma   6. GALO on CPAP.  7. HTN.   8. Seronegative RA, diagnosed 2009. SICCA  9.  JUARES. Work-up 4-2015. 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 Lumbar stenosis per MRI 2017   11. Bronchitis 5-2017, early pneumonia   12. Chicken pox   13. DEXA  Normal   14. Left ankle DJD, MRI  --seen Dr. Martinez 2-2019   Past Medical History:   Diagnosis Date    Abnormal EMG 04/18/2013    Abnormal involuntary movements(781.0)     Movement Disorder    AK (actinic keratosis) 12/18/2011    Allergic rhinitis, cause unspecified     Allergic rhinitis    Balance problems 11/01/2011    Basal cell carcinoma     Bladder spasms 11/01/2011    Chronic osteoarthritis     COPD (chronic obstructive pulmonary disease) (H)     Interstial lung disease, obliderans bronchiolitis    Diaphragmatic hernia without mention of obstruction or gangrene     Earache or other ear, nose, or throat complaint     Esophageal reflux     Fatigue 11/01/2011    Fracture     H. pylori infection 05/12/2011    History of MRI of cervical spine 11/18/2013    EXAMINATION: CERVICAL SPINE G/E 5 VIEWS* 4/19/2013 4:18 PM  CLINICAL HISTORY: Pain in limb,Performing Location?->[facility] ([facility]),  COMPARISON:  FINDINGS: AP and lateral views in flexion and extension, as well as odontoid view of the cervical spine was obtained. There is no comparison available. The vertebral " "bodies of the cervical spine are normally aligned. There is posterior spurring and d    Incomplete defecation 11/01/2011    Interstitial lung disease (H) 11/29/2016    Laboratory test 08/07/2012    Lung disease 06/2015    Malignant basal cell neoplasm of skin 08/06/2008    Melanoma (H) 08/06/2008    Melanoma in situ of lower leg (H)     R calf    Neuropathy 05/16/2011    GALO (obstructive sleep apnea)     Other bladder disorder     Other color vision deficiencies     Other nervous system complications     Parkinsonism 11/01/2011    Parkinsons disease     Parkinsonism/ balance, dropped foot, tremor    Personal history of colonic polyps     PLMD (periodic limb movement disorder) 12/16/2021    Polyneuropathy in other diseases classified elsewhere (H24)     RA (rheumatoid arthritis) (H)     Rheumatoid arthritis of multiple sites with negative rheumatoid factor (H) 03/21/2016    Seronegative arthritis 11/18/2013    Shortness of breath     Shoulder arthritis 2016    acromioclavicular joint     Sialorrhea 4/18/2023    Somatization disorder 05/12/2011    Spider veins     Leg Vericise vein surgery    Squamous cell carcinoma     Tremor 11/01/2011    Unspecified essential hypertension     Unspecified hypothyroidism     Urinary tract infection     Urinary urgency 11/01/2011    Wears glasses 11/01/2011     Injuries-ankle sprains, left heel fracture    Family Hx   MGM Psoriasis  Sister -PPM  Family History   Problem Relation Age of Onset    Hypertension Mother     Heart Disease Mother         a fib    Arthritis Mother         \"osteo\"    Osteoporosis Mother     Skin Cancer Mother     Uterine Cancer Mother     Other Cancer Mother     Cancer Mother     Hypertension Brother     Diabetes Brother         \" post pancreatitis\"    Neurologic Disorder Sister         multiple sclerosis    Hypertension Sister     Heart Disease Sister     Multiple Sclerosis Sister     Heart Disease Sister         Chf    Arthritis Sister     Heart Failure " Sister     Kidney Disease Sister     Dementia Sister     Hypertension Father     Cerebrovascular Disease Father         ,    Skin Cancer Father     Colon Polyps Father     Heart Disease Father     Other - See Comments Son         [place]    Other - See Comments Son         [place]    Other - See Comments Son         day gonzalez    Psoriasis Maternal Grandfather     Stomach Cancer Maternal Grandfather     Cancer Maternal Grandfather     Congenital Anomalies Other         granddaughter with a chromosome defect    Other Cancer Other         Grand daughter    Cancer Other         Grand daughter    Cerebrovascular Disease Paternal Grandmother         ,    Cerebrovascular Disease Paternal Grandfather         ,    Cancer Granddaughter         Langerhans Cell Histiocytosis    Genetic Disease Granddaughter         gene translocation    Learning Disorder Other         Gene translocation    Melanoma No family hx of     Colon Cancer No family hx of      Current Outpatient Medications   Medication Sig    acetaminophen (TYLENOL) 500 MG tablet 2 x 500mg by mouth 3 times per day: 7/730am, 4pm and 11pm  = 6/day    albuterol (PROAIR HFA/PROVENTIL HFA/VENTOLIN HFA) 108 (90 Base) MCG/ACT inhaler Inhale 2 puffs into the lungs every 4 hours as needed for shortness of breath or wheezing    amLODIPine (NORVASC) 5 MG tablet TAKE 1 TABLET(5 MG) BY MOUTH DAILY    cholecalciferol (HM VITAMIN D3) 25 MCG (1000 UT) TABS 1000 unit tab by mouth daily    folic acid (FOLVITE) 1 MG tablet Take 1 tablet (1 mg) by mouth daily    hydroxychloroquine (PLAQUENIL) 200 MG tablet Take 1 tablet (200 mg) by mouth daily Daily at 9am.    lisinopril-hydrochlorothiazide (ZESTORETIC) 20-25 MG tablet Take 1 tablet by mouth daily    methotrexate 2.5 MG tablet TAKE 8 TABLETS BY MOUTH ONCE PER WEEK.    metoprolol succinate ER (TOPROL XL) 50 MG 24 hr tablet 1 and 1/2 tabs daily.    omeprazole (PRILOSEC) 40 MG DR capsule Take 1 capsule (40 mg) by mouth daily     ORDER FOR DME Use your BiPAP device as directed by your provider.    oxybutynin (DITROPAN) 5 MG tablet TAKE 1 TABLET BY MOUTH DAILY AT 4PM    pregabalin (LYRICA) 50 MG capsule 50mg capsule by mouth at 7am and 4pm and 1-2 x 50mg capsules by mouth nightly at 11pm (4/day)    salmeterol (SEREVENT DISKUS) 50 MCG/ACT inhaler Inhale 1 puff into the lungs 2 times daily     No current facility-administered medications for this visit.       Personal Hx   Home environment: No secondhand tobacco smoke in home.    History     Social History    Marital Status:      Spouse Name: N/A     Number of Children: N/A    Years of Education: N/A     Social History Main Topics    Smoking status: Former Smoker     Quit date: 09/30/2003    Smokeless tobacco: Never Used    Alcohol Use: No    Drug Use: No    Sexually Active: Not Currently -- Female partner(s)     Other Topics Concern    None     Social History Narrative    2013: [place] with no stepsHas 3 sons that are doing okay.             Review of Systems:     14 points all negative except what is mentioned in HPI.  Review Of Systems  Skin: negative  Eyes: negative  Ears/Nose/Throat: negative  Respiratory: No shortness of breath, dyspnea on exertion, cough, or hemoptysis  Cardiovascular: negative  Gastrointestinal: negative  Genitourinary: negative  Musculoskeletal: as above  Neurologic: as above  Psychiatric: negative  Hematologic/Lymphatic/Immunologic: negative  Endocrine: negative             Past Surgical History:     Past Surgical History:   Procedure Laterality Date    BIOPSY OF SKIN LESION      COLONOSCOPY      COLONOSCOPY N/A 7/20/2022    Procedure: COLONOSCOPY (fv) polypectomy using jumbo bx forcep;  Surgeon: Gómze Mckinney MD;  Location:  GI    CYSTOSCOPY  Many years ago    Blood in urine/ bladder cystoscopy    ESOPHAGOSCOPY, GASTROSCOPY, DUODENOSCOPY (EGD), COMBINED N/A 7/20/2022    Procedure: ESOPHAGOGASTRODUODENOSCOPY (EGD) (fv)  biopsies using cold bx forcep;  Surgeon: Gómez Mckinney MD;  Location:  GI    HEMORRHOID SURGERY      lip biopsy      for sicca complex    MOHS MICROGRAPHIC PROCEDURE      SOFT TISSUE SURGERY      removeal of basel cell carcinoma     Blood pressure 128/69, pulse 63, weight 133.4 kg (294 lb), SpO2 96%.  Wt Readings from Last 4 Encounters:   10/20/23 133.4 kg (294 lb)   10/18/23 133.4 kg (294 lb)   05/18/23 129.3 kg (285 lb)   04/21/23 129.7 kg (286 lb)       Constitutional: well-developed, appearing stated age; cooperative  Eyes: nl EOM, PERRLA, vision, conjunctiva, sclera  ENT: nl external ears, nose, hearing, lips, teeth, gums, throat  No mucous membrane lesions, normal saliva pool  Neck: no mass or thyroid enlargement  Resp: Breathing is unlabored  Lymph: no cervical, supraclavicular, inguinal or epitrochlear nodes  MS: No synovitis, or tenderness at MCPs or PIPs. OA changes in hands. There is painful right shoulder flexion.  No tenderness at MTPs.  Skin: no nail pitting, alopecia, rash, nodules or lesions  Neuro: nl cranial nerves, strength, sensation, DTRs.   Psych: nl judgement, orientation, memory, affect.             Data:         Latest Ref Rng & Units 5/25/2023    11:09 AM 7/27/2023     9:36 AM 10/19/2023     9:05 AM   RHEUM RESULTS   Albumin 3.5 - 5.2 g/dL 4.3  4.5  4.3    ALT 0 - 70 U/L 16  18  25    AST 0 - 45 U/L 22  22  30    Creatinine 0.67 - 1.17 mg/dL 1.36  1.48  1.59    CRP Inflammation <5.00 mg/L   6.23    GFR Estimate >60 mL/min/1.73m2 55  49  45    Hematocrit 40.0 - 53.0 % 39.4  39.2  38.3    Hemoglobin 13.3 - 17.7 g/dL 12.2  12.5  12.2    WBC 4.0 - 11.0 10e3/uL 3.3  3.1  3.1    RBC Count 4.40 - 5.90 10e6/uL 4.04  4.05  3.94    RDW 10.0 - 15.0 % 14.1  14.5  14.9    MCHC 31.5 - 36.5 g/dL 31.0  31.9  31.9    MCV 78 - 100 fL 98  97  97    Platelet Count 150 - 450 10e3/uL 159  161  169        Rheumatoid Factor   Date Value Ref Range Status   05/27/2010 <7 0 - 14 IU/mL Final   ,  ,  ,   Cyclic  Citrullinated Peptide IgG   Date Value Ref Range Status   05/27/2010 <2 <5 U/mL Final     Comment:     Interpretation:  Negative   ,  ,   SSA (RO) Antibody IgG   Date Value Ref Range Status   05/27/2010 1  Final     Comment:     Reference range: 0 to 40  Unit: AU/mL  (Note)  REFERENCE INTERVALS: SSA (Ro) (ELAN) Ab, IgG   29 AU/mL or Less ............. Negative   30 - 40 AU/mL ................ Equivocal   41 AU/mL or Greater .......... Positive    SSA (Ro) antibody is seen in 70-75% of Sjogren syndrome  cases, 30-40% of systemic lupus erythematosus (SLE) and  5-10% of progressive systemic sclerosis (PSS).  Performed by [email or web address],  [address] [facility] [place] [id number] [phone]  [email or web address], Chely Leija MD, Lab. Director     SSB (LA) Antibody IgG   Date Value Ref Range Status   05/27/2010 8  Final     Comment:     Reference range: 0 to 40  Unit: AU/mL  (Note)  REFERENCE INTERVALS: SSB (La) (ELAN) Ab, IgG   29 AU/mL or Less ............. Negative   30 - 40 AU/mL ................ Equivocal   41 AU/mL or Greater .......... Positive    SSB (La) antibody is seen in 50-60% of Sjogren syndrome  cases and is specific if it is the only ELAN antibody  present. 15-25% of patients with systemic lupus  erythematosus (SLE) and 5-10% of patients with progressive  systemic sclerosis (PSS) also have this antibody.  Performed by [email or web address],  [address] [facility] [place] [id number] [phone]  www.[employer], Chely Leija MD, Lab. Director   ,  ,   MONICA Screen by EIA   Date Value Ref Range Status   05/27/2010 <1.0 0 - 1.0 Final     Comment:     Interpretation:  Negative   ,   DNA-ds   Date Value Ref Range Status   05/27/2010 <15 0 - 29 IU/mL Final     Comment:     Interpretation:  Negative   ,  ,  ,  ,  ,  ,  ,   Hep B Surface Agn   Date Value Ref Range Status   05/27/2010 Negative NEG Final   ,  ,  ,  ,  ,  ,  ,  ,  ,  ,  ,   Neutrophil Cytoplasmic IgG Antibody   Date Value Ref Range Status   06/26/2015   Final     <1:20  Reference range: <1:20  (Note)  The ANCA IFA is <1:20; therefore, no further testing will  be performed.  INTERPRETIVE INFORMATION: Anti-Neutrophil Cyto Ab, IgG  Neutrophil Cytoplasmic Antibodies (C-ANCA = granular  cytoplasmic staining, P-ANCA = perinuclear staining) are  found in the serum of over 90 percent of patients with  certain necrotizing systemic vasculitides, and usually in  less than 5 percent of patients with collagen vascular  disease or arthritis.  Performed by [facility],  [address] [place] [id number] [phone]  [email or web address], Brad Fall MD, Lab. Director       ,  ,  ,  ,  ,  ,  ,  ,  ,   Albumin Fraction   Date Value Ref Range Status   05/27/2010 4.0 3.7 - 5.1 g/dL Final     Alpha 2 Fraction   Date Value Ref Range Status   05/27/2010 0.6 0.5 - 0.9 g/dL Final     Beta Fraction   Date Value Ref Range Status   05/27/2010 1.0 0.6 - 1.0 g/dL Final     Gamma Fraction   Date Value Ref Range Status   05/27/2010 1.0 0.7 - 1.6 g/dL Final     Monoclonal Peak   Date Value Ref Range Status   05/27/2010 0.0 0.0 g/dL Final     ELP Interpretation:   Date Value Ref Range Status   05/27/2010   Final    Essentially normal electrophoretic pattern.  No monoclonal protein seen.     Comment:      Pathologic significance requires clinical correlation.  ASHLEY Noble M.D.,   Ph.D., Pathologist ()   ,  ,   Immunofixation ELP   Date Value Ref Range Status   06/09/2016   Final    No monoclonal protein seen on immunofixation.  Pathological significance   requires clinical correlation.   ASHLEY Noble M.D., Ph.D   Pathologist ([phone])       IGG   Date Value Ref Range Status   06/09/2016 1,020 695 - 1,620 mg/dL Final     IGA   Date Value Ref Range Status   06/09/2016 374 70 - 380 mg/dL Final     IGM   Date Value Ref Range Status   06/09/2016 81 60 - 265 mg/dL Final   ,  ,  ,  ,  ,  ,  ,        La 12.2, and platelet count 169, HematocritCBC showed white count of 3.1  stable.Transaminases normalWith a GFR having fallen from 55-45.Creatinine 1.59, up from 1.36 in May 2023b work on October 19, 2023 showed

## 2023-10-20 NOTE — LETTER
10/20/2023       RE: Lucio Daly  [address]  [place] [address]     Dear Colleague,    Thank you for referring your patient, Lucio Daly, to the [facility] RHEUMATOLOGY CLINIC [place] at [facility]. Please see a copy of my visit note below.    Rheumatology Clinic Visit  Jeremy Goodrich M.D.     Lucio Daly MRN# [id number]   YOB: 1950 Age: 73 year old     Date of Visit: 10/20/2023    Primary care provider: Saroj Tinoco         Assessment and Plan:   # Seronegative inflammatory arthritis: Patient reports stable daily pain in wrists ankles and knees since restarting methotrexate and hydroxychloroquine combination in late 2022.  Exam shows no inflammatory joint changes.  Data: Blood work October 19, 2023 showed creatinine 1.56; white count of 3.1, otherwise CBC and comprehensive metabolic panel normal.  Discussion: Seronegative inflammatory arthritis remains improved/stable on combination treatment. Patient reported worse control of diffuse arthralgia with methotrexate while testing dose reduction to 15 mg weekly in 2022.  Accordingly, I recommend continuing methotrexate 20 mg weekly, and hydroxychloroquine 200 mg daily.  While taking hydroxychloroquine, patient will require annual retinal toxicity examinations (last 2023).  2. ILD Obliterative bronchiolitis, symptomatically stable.  Followed by Dr. Mccarthy in pulmonary.  3. Osteoarthritis knees, ankle, shoulder and neck and hands/thumbs. Continue isometric quad strengthening exercises twice daily to improve support around the joint. Left ankle osteoarthritis and overall osteoarthritis  is contributing to the overall pain. Continue splinting/hand therapy and acetaminophen 1000 mg tid ATC.   4. Trigger finger, L 4th: gradually increasing.  Did not discuss today.  5. Prostate CA: Completed XRT in September 2022.  6. CKD3: followup with Nephrology, continue to avoid NSAIDs.  7. Spinal  "stenosis, DDD: I discussed that gradually worsening mobility issues and strength deficits in the lower extremities are not likely due to intrinsic muscle, nerve, bone, or joint problems in the legs thighs or feet.  Rather, concern is raised about neurologic compromise in the lumbar spine.  I urged patient to return to orthopedics spine service for additional consultation.  Plan:  1.  Bloodwork every 4 months  2.  Continue methotrexate 8 tablets (20 mg) once weekly.While on methotrexate:   -- Check labs every 3 months (AST/ALT, Albumin, CBC with platelets)   -- Limit alcohol intake to 2 drinks weekly; use folate 1 mg daily.  --Tylenol 1000 mg can be used scheduled three times daily for joint pain and for nausea/headache associated with methotrexate dosing.  3.  Continue hydroxychloroquine 200 mg once daily.  While taking hydroxychloroquine, undergo annual ophthalmology evaluation to monitor for rare retinal Plaquenil toxicity (last visit summer 2023)  4  Acetaminophen 1000 mg 3 times daily for residual pain. Last ophthalmology in August 2022.  OCT and retinal exam found no evidence of Plaquenil toxicity.  5. Renal consultation about elevated creatinine; repeat Creatinine in ; continue to avoid NSAIDs.  RTC 6-7 month  Jeremy Goodrich MD  Staff Rheumatologist, [facility]    On the day of the encounter, a total of 31 minutes was spent in chart review, and in counseling and coordination of care, regarding the patient's complex medical problem of seronegative inflammatory arthritis, spinal stenosis, and renal insufficiency.           History of Present Illness:   Lucio Daly \"Rich\" presents for f/u seronegative rheumatoid arthritis, sicca complex.  Last seen in 4-2023. Methotrexate and hydroxychloroquine were continued for seronegative arthritis.  Interval history October 20, 2023  He struggles with mobility. Rising from chair, getting out of bed; feels \"weakness\" in ankles, knees, thighs. He has to lift his " "foot with his hand to bring it into the car.  He has been diagnosed with spinal stenosis and bulging disks; when he lies in bed, feels like I am lying on a rock.  There is muscle cramping in both legs.  He had evaluation from Ortho Spine \"about a year ago\"; surgery and cauterization were discussed for lumbar spine. PTx has stopped.  He is concerned about \"allergic reactions\" with corticosteroid  He continues methotrexate 20 mg weekly. He thinks that joint pain is overall better controlled at current higher dose of methotrexate 20 mg weekly.  There is mild stomach upset associated with dose. He uses hydroxychloroquine 200 mg daily.    Interval history April 21, 2023  + difficulty rising from chair or bed. Slightly progressive pain in hips, knees, hands.  No persistent swelling, redness, warmth in the affected joints.  He was walking everyday with walker this winter for 15-20 minutes.  Early morning stiffness is ~ 30 minutes.  He continues methotrexate 20 mg weekly; he had reduced to 15 mg weekly, but joint pain worsened in 2-2023.  He thinks that joint pain is overall better controlled at the higher dose of methotrexate 20 mg weekly.  There is mild stomach upset associated with dose. He uses hydroxychloroquine 200 mg daily.  He has discussed RF ablation for lumbar pain.    Interval history September 29, 2022  Completnig 6 weeks of XRT for prostate Ca. Off methotrexate durign that time, noting increased back, thumb, knees achiness and pain. R knee especially; collarbone areas.  Early morning stiffness is 45 minutes.  Taking acetaminophen 1000 tid; continuing hydroxychloroquine 200 mg daily.  No adverse effects.  Interval history April 8, 2022    He is doing about the same. He has daily pain in hands, feet, elbows, and shoulders, but ability to perform activities of daily living, and to perform walking 20 to 25 minutes without limitation, are unimpeded.  He has less than 30 minutes of morning stiffness.  His L great " "toe is dark and swollen; his whole L ankle and foot feel like they are \"in a vise\". The R leg goes completely numb with prolonged driving.  He notes pain in the R LBP when lying on his back. He has pain in the R lateral thigh with mattress contact or with prolonged immobility.  He has been offered surgery for spinal stenosis, but has been advised that he is \"high risk\" for surgery.  He continues methotrexate 20 mg weekly; there is mild stomach upset associated with dose. He uses hydroxychloroquine 200 mg daily.  He notes increasing triggering in his L 4th finger.  He has been adjusting HTN medications with primary care.  PMH   1. Sensory neuropathy of unclear etiology - negative work up, managed on Lyrica.  Has chronic dysesthesia in pads of feet.  2. Parkinsonisim/Tremor disorder; sees neurology   3. Headaches   4. History of basal cell, squamous (most recent +bx 8-2014)  5. History of melanoma   6. GALO on CPAP.  7. HTN.   8. Seronegative RA, diagnosed 2009. SICCA  9.  JUARES. Work-up 4-2015. 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 Lumbar stenosis per MRI 2017   11. Bronchitis 5-2017, early pneumonia   12. Chicken pox   13. DEXA  Normal   14. Left ankle DJD, MRI  --seen Dr. Martinez 2-2019   Past Medical History:   Diagnosis Date    Abnormal EMG 04/18/2013    Abnormal involuntary movements(781.0)     Movement Disorder    AK (actinic keratosis) 12/18/2011    Allergic rhinitis, cause unspecified     Allergic rhinitis    Balance problems 11/01/2011    Basal cell carcinoma     Bladder spasms 11/01/2011    Chronic osteoarthritis     COPD (chronic obstructive pulmonary disease) (H)     Interstial lung disease, obliderans bronchiolitis    Diaphragmatic hernia without mention of obstruction or gangrene     Earache or other ear, nose, or throat complaint     Esophageal reflux     Fatigue 11/01/2011    Fracture     H. pylori infection 05/12/2011    History of MRI of cervical spine 11/18/2013    " "EXAMINATION: CERVICAL SPINE G/E 5 VIEWS* 4/19/2013 4:18 PM  CLINICAL HISTORY: Pain in limb,Performing Location?->[facility] ([facility]),  COMPARISON:  FINDINGS: AP and lateral views in flexion and extension, as well as odontoid view of the cervical spine was obtained. There is no comparison available. The vertebral bodies of the cervical spine are normally aligned. There is posterior spurring and d    Incomplete defecation 11/01/2011    Interstitial lung disease (H) 11/29/2016    Laboratory test 08/07/2012    Lung disease 06/2015    Malignant basal cell neoplasm of skin 08/06/2008    Melanoma (H) 08/06/2008    Melanoma in situ of lower leg (H)     R calf    Neuropathy 05/16/2011    GALO (obstructive sleep apnea)     Other bladder disorder     Other color vision deficiencies     Other nervous system complications     Parkinsonism 11/01/2011    Parkinsons disease     Parkinsonism/ balance, dropped foot, tremor    Personal history of colonic polyps     PLMD (periodic limb movement disorder) 12/16/2021    Polyneuropathy in other diseases classified elsewhere (H24)     RA (rheumatoid arthritis) (H)     Rheumatoid arthritis of multiple sites with negative rheumatoid factor (H) 03/21/2016    Seronegative arthritis 11/18/2013    Shortness of breath     Shoulder arthritis 2016    acromioclavicular joint     Sialorrhea 4/18/2023    Somatization disorder 05/12/2011    Spider veins     Leg Vericise vein surgery    Squamous cell carcinoma     Tremor 11/01/2011    Unspecified essential hypertension     Unspecified hypothyroidism     Urinary tract infection     Urinary urgency 11/01/2011    Wears glasses 11/01/2011     Injuries-ankle sprains, left heel fracture    Family Hx   MGM Psoriasis  Sister -PPM  Family History   Problem Relation Age of Onset    Hypertension Mother     Heart Disease Mother         a fib    Arthritis Mother         \"osteo\"    Osteoporosis Mother     Skin Cancer Mother     Uterine Cancer Mother     Other Cancer " "Mother     Cancer Mother     Hypertension Brother     Diabetes Brother         \" post pancreatitis\"    Neurologic Disorder Sister         multiple sclerosis    Hypertension Sister     Heart Disease Sister     Multiple Sclerosis Sister     Heart Disease Sister         Chf    Arthritis Sister     Heart Failure Sister     Kidney Disease Sister     Dementia Sister     Hypertension Father     Cerebrovascular Disease Father         ,    Skin Cancer Father     Colon Polyps Father     Heart Disease Father     Other - See Comments Son         [place]    Other - See Comments Abdon wagner    Other - See Comments Abdon gonzalez    Psoriasis Maternal Grandfather     Stomach Cancer Maternal Grandfather     Cancer Maternal Grandfather     Congenital Anomalies Other         granddaughter with a chromosome defect    Other Cancer Other         Grand daughter    Cancer Other         Grand daughter    Cerebrovascular Disease Paternal Grandmother         ,    Cerebrovascular Disease Paternal Grandfather         ,    Cancer Granddaughter         Langerhans Cell Histiocytosis    Genetic Disease Granddaughter         gene translocation    Learning Disorder Other         Gene translocation    Melanoma No family hx of     Colon Cancer No family hx of      Current Outpatient Medications   Medication Sig    acetaminophen (TYLENOL) 500 MG tablet 2 x 500mg by mouth 3 times per day: 7/730am, 4pm and 11pm  = 6/day    albuterol (PROAIR HFA/PROVENTIL HFA/VENTOLIN HFA) 108 (90 Base) MCG/ACT inhaler Inhale 2 puffs into the lungs every 4 hours as needed for shortness of breath or wheezing    amLODIPine (NORVASC) 5 MG tablet TAKE 1 TABLET(5 MG) BY MOUTH DAILY    cholecalciferol (HM VITAMIN D3) 25 MCG (1000 UT) TABS 1000 unit tab by mouth daily    folic acid (FOLVITE) 1 MG tablet Take 1 tablet (1 mg) by mouth daily    hydroxychloroquine (PLAQUENIL) 200 MG tablet Take 1 tablet (200 mg) by mouth daily Daily at 9am.    " lisinopril-hydrochlorothiazide (ZESTORETIC) 20-25 MG tablet Take 1 tablet by mouth daily    methotrexate 2.5 MG tablet TAKE 8 TABLETS BY MOUTH ONCE PER WEEK.    metoprolol succinate ER (TOPROL XL) 50 MG 24 hr tablet 1 and 1/2 tabs daily.    omeprazole (PRILOSEC) 40 MG DR capsule Take 1 capsule (40 mg) by mouth daily    ORDER FOR DME Use your BiPAP device as directed by your provider.    oxybutynin (DITROPAN) 5 MG tablet TAKE 1 TABLET BY MOUTH DAILY AT 4PM    pregabalin (LYRICA) 50 MG capsule 50mg capsule by mouth at 7am and 4pm and 1-2 x 50mg capsules by mouth nightly at 11pm (4/day)    salmeterol (SEREVENT DISKUS) 50 MCG/ACT inhaler Inhale 1 puff into the lungs 2 times daily     No current facility-administered medications for this visit.       Personal Hx   Home environment: No secondhand tobacco smoke in home.    History     Social History    Marital Status:      Spouse Name: N/A     Number of Children: N/A    Years of Education: N/A     Social History Main Topics    Smoking status: Former Smoker     Quit date: 09/30/2003    Smokeless tobacco: Never Used    Alcohol Use: No    Drug Use: No    Sexually Active: Not Currently -- Female partner(s)     Other Topics Concern    None     Social History Narrative    2013: Living in [place] in a townhouse with no stepsHas 3 sons that are doing okay.             Review of Systems:     14 points all negative except what is mentioned in HPI.  Review Of Systems  Skin: negative  Eyes: negative  Ears/Nose/Throat: negative  Respiratory: No shortness of breath, dyspnea on exertion, cough, or hemoptysis  Cardiovascular: negative  Gastrointestinal: negative  Genitourinary: negative  Musculoskeletal: as above  Neurologic: as above  Psychiatric: negative  Hematologic/Lymphatic/Immunologic: negative  Endocrine: negative             Past Surgical History:     Past Surgical History:   Procedure Laterality Date    BIOPSY OF SKIN LESION      COLONOSCOPY      COLONOSCOPY N/A  7/20/2022    Procedure: COLONOSCOPY (fv) polypectomy using jumbo bx forcep;  Surgeon: Gómez Mckinney MD;  Location:  GI    CYSTOSCOPY  Many years ago    Blood in urine/ bladder cystoscopy    ESOPHAGOSCOPY, GASTROSCOPY, DUODENOSCOPY (EGD), COMBINED N/A 7/20/2022    Procedure: ESOPHAGOGASTRODUODENOSCOPY (EGD) (fv) biopsies using cold bx forcep;  Surgeon: Gómez Mckinney MD;  Location:  GI    HEMORRHOID SURGERY      lip biopsy      for sicca complex    MOHS MICROGRAPHIC PROCEDURE      SOFT TISSUE SURGERY      removeal of basel cell carcinoma     Blood pressure 128/69, pulse 63, weight 133.4 kg (294 lb), SpO2 96%.  Wt Readings from Last 4 Encounters:   10/20/23 133.4 kg (294 lb)   10/18/23 133.4 kg (294 lb)   05/18/23 129.3 kg (285 lb)   04/21/23 129.7 kg (286 lb)       Constitutional: well-developed, appearing stated age; cooperative  Eyes: nl EOM, PERRLA, vision, conjunctiva, sclera  ENT: nl external ears, nose, hearing, lips, teeth, gums, throat  No mucous membrane lesions, normal saliva pool  Neck: no mass or thyroid enlargement  Resp: Breathing is unlabored  Lymph: no cervical, supraclavicular, inguinal or epitrochlear nodes  MS: No synovitis, or tenderness at MCPs or PIPs. OA changes in hands. There is painful right shoulder flexion.  No tenderness at MTPs.  Skin: no nail pitting, alopecia, rash, nodules or lesions  Neuro: nl cranial nerves, strength, sensation, DTRs.   Psych: nl judgement, orientation, memory, affect.             Data:         Latest Ref Rng & Units 5/25/2023    11:09 AM 7/27/2023     9:36 AM 10/19/2023     9:05 AM   RHEUM RESULTS   Albumin 3.5 - 5.2 g/dL 4.3  4.5  4.3    ALT 0 - 70 U/L 16  18  25    AST 0 - 45 U/L 22  22  30    Creatinine 0.67 - 1.17 mg/dL 1.36  1.48  1.59    CRP Inflammation <5.00 mg/L   6.23    GFR Estimate >60 mL/min/1.73m2 55  49  45    Hematocrit 40.0 - 53.0 % 39.4  39.2  38.3    Hemoglobin 13.3 - 17.7 g/dL 12.2  12.5  12.2    WBC 4.0 - 11.0 10e3/uL 3.3  3.1  3.1     RBC Count 4.40 - 5.90 10e6/uL 4.04  4.05  3.94    RDW 10.0 - 15.0 % 14.1  14.5  14.9    MCHC 31.5 - 36.5 g/dL 31.0  31.9  31.9    MCV 78 - 100 fL 98  97  97    Platelet Count 150 - 450 10e3/uL 159  161  169        Rheumatoid Factor   Date Value Ref Range Status   05/27/2010 <7 0 - 14 IU/mL Final   ,  ,  ,   Cyclic Citrullinated Peptide IgG   Date Value Ref Range Status   05/27/2010 <2 <5 U/mL Final     Comment:     Interpretation:  Negative   ,  ,   SSA (RO) Antibody IgG   Date Value Ref Range Status   05/27/2010 1  Final     Comment:     Reference range: 0 to 40  Unit: AU/mL  (Note)  REFERENCE INTERVALS: SSA (Ro) (ELAN) Ab, IgG   29 AU/mL or Less ............. Negative   30 - 40 AU/mL ................ Equivocal   41 AU/mL or Greater .......... Positive    SSA (Ro) antibody is seen in 70-75% of Sjogren syndrome  cases, 30-40% of systemic lupus erythematosus (SLE) and  5-10% of progressive systemic sclerosis (PSS).  Performed by [facility],  [address] [place] [address] [phone]  [email or web address] [employer] [facility], Chely Leija MD, Lab. Director     SSB (LA) Antibody IgG   Date Value Ref Range Status   05/27/2010 8  Final     Comment:     Reference range: 0 to 40  Unit: AU/mL  (Note)  REFERENCE INTERVALS: SSB (La) (ELAN) Ab, IgG   29 AU/mL or Less ............. Negative   30 - 40 AU/mL ................ Equivocal   41 AU/mL or Greater .......... Positive    SSB (La) antibody is seen in 50-60% of Sjogren syndrome  cases and is specific if it is the only ELAN antibody  present. 15-25% of patients with systemic lupus  erythematosus (SLE) and 5-10% of patients with progressive  systemic sclerosis (PSS) also have this antibody.  Performed by [facility],  [address] [place] [address] [phone]  [email or web address], Chely Leija MD, Lab. Director   ,  ,   MONICA Screen by EIA   Date Value Ref Range Status   05/27/2010 <1.0 0 - 1.0 Final     Comment:     Interpretation:  Negative   ,   DNA-ds   Date Value Ref Range  Status   05/27/2010 <15 0 - 29 IU/mL Final     Comment:     Interpretation:  Negative   ,  ,  ,  ,  ,  ,  ,   Hep B Surface Agn   Date Value Ref Range Status   05/27/2010 Negative NEG Final   ,  ,  ,  ,  ,  ,  ,  ,  ,  ,  ,   Neutrophil Cytoplasmic IgG Antibody   Date Value Ref Range Status   06/26/2015   Final    <1:20  Reference range: <1:20  (Note)  The ANCA IFA is <1:20; therefore, no further testing will  be performed.  INTERPRETIVE INFORMATION: Anti-Neutrophil Cyto Ab, IgG  Neutrophil Cytoplasmic Antibodies (C-ANCA = granular  cytoplasmic staining, P-ANCA = perinuclear staining) are  found in the serum of over 90 percent of patients with  certain necrotizing systemic vasculitides, and usually in  less than 5 percent of patients with collagen vascular  disease or arthritis.  Performed by [facility],  [address] [place] [id number] [phone]  [email or web address], Brad Fall MD, Lab. Director       ,  ,  ,  ,  ,  ,  ,  ,  ,   Albumin Fraction   Date Value Ref Range Status   05/27/2010 4.0 3.7 - 5.1 g/dL Final     Alpha 2 Fraction   Date Value Ref Range Status   05/27/2010 0.6 0.5 - 0.9 g/dL Final     Beta Fraction   Date Value Ref Range Status   05/27/2010 1.0 0.6 - 1.0 g/dL Final     Gamma Fraction   Date Value Ref Range Status   05/27/2010 1.0 0.7 - 1.6 g/dL Final     Monoclonal Peak   Date Value Ref Range Status   05/27/2010 0.0 0.0 g/dL Final     ELP Interpretation:   Date Value Ref Range Status   05/27/2010   Final    Essentially normal electrophoretic pattern.  No monoclonal protein seen.     Comment:      Pathologic significance requires clinical correlation.  ASHLEY Noble M.D.,   Ph.D., Pathologist ()   ,  ,   Immunofixation ELP   Date Value Ref Range Status   06/09/2016   Final    No monoclonal protein seen on immunofixation.  Pathological significance   requires clinical correlation.   ASHLEY Noble M.D., Ph.D   Pathologist ([phone])       IGG   Date Value Ref Range  Status   06/09/2016 1,020 695 - 1,620 mg/dL Final     IGA   Date Value Ref Range Status   06/09/2016 374 70 - 380 mg/dL Final     IGM   Date Value Ref Range Status   06/09/2016 81 60 - 265 mg/dL Final   ,  ,  ,  ,  ,  ,  ,        La 12.2, and platelet count 169, HematocritCBC showed white count of 3.1 stable.Transaminases normalWith a GFR having fallen from 55-45.Creatinine 1.59, up from 1.36 in May 2023b work on October 19, 2023 showed      Again, thank you for allowing me to participate in the care of your patient.      Sincerely,    Jeremy Goodrich MD

## 2023-10-20 NOTE — PATIENT INSTRUCTIONS
Diagnosis:  1.  Inflammatory arthritis: stable control of inflammatory joint symptoms. I recommend continued methotrexate and hydroxychloroquine.  2. Neuropathy: I recommend continuing Lyrica  3. Spinal stenosis with progress leg/foot weaknes.  4. Prostate CA  5. Peripheral neuropathy    Plan:  1.  Bloodwork every 4 months  2.  Continue methotrexate 8 tablets (20 mg) once weekly.While on methotrexate:   -- Check labs every 3 months (AST/ALT, Albumin, CBC with platelets)   -- Limit alcohol intake to 2 drinks weekly; use folate 1 mg daily.  --Tylenol 1000 mg can be used scheduled three times daily for joint pain and for nausea/headache associated with methotrexate dosing.    3.  Continue hydroxychloroquine 200 mg once daily.  While taking hydroxychloroquine, undergo annual ophthalmology evaluation to monitor for rare retinal Plaquenil toxicity (last visit summer 2023).    4  Acetaminophen 1000 mg 3 times daily for residual pain. Last ophthalmology in August 2022.  OCT and retinal exam found no evidence of Plaquenil toxicity.  5. Renal consultation about elevated creatinine; repeat Creatinine in ; continue to avoid NSAIDs.

## 2023-10-23 NOTE — CONFIDENTIAL NOTE
DIAGNOSIS:   Decreased GFR    DATE RECEIVED: 12.20.2023    NOTES STATUS DETAILS   OFFICE NOTE from referring provider Internal 10.20.2023 Jeremy Goodrich MD    OFFICE NOTE from other specialist      *Only VASCULITIS or LUPUS gather office notes for the following     *PULMONARY       *ENT     *DERMATOLOGY     *RHEUMATOLOGY     DISCHARGE SUMMARY from hospital     DISCHARGE REPORT from the ER     MEDICATION LIST Internal    IMAGING  (NEED IMAGES AND REPORTS)     KIDNEY CT SCAN     KIDNEY ULTRASOUND     MR ABDOMEN     NUCLEAR MEDICINE RENAL     LABS     CBC Internal 10.19.2023   CMP Internal 10.19.2023   BMP Internal 05.25.2022   UA Internal 05.18.2023   URINE PROTEIN Internal 05.18.2023   RENAL PANEL     BIOPSY     KIDNEY BIOPSY

## 2023-10-24 ENCOUNTER — MYC MEDICAL ADVICE (OUTPATIENT)
Dept: RHEUMATOLOGY | Facility: CLINIC | Age: 73
End: 2023-10-24
Payer: MEDICARE

## 2023-10-24 DIAGNOSIS — M06.09 RHEUMATOID ARTHRITIS OF MULTIPLE SITES WITH NEGATIVE RHEUMATOID FACTOR (H): Primary | ICD-10-CM

## 2023-10-24 NOTE — CONFIDENTIAL NOTE
Follow up call to patient. Patient did not like the previous spine surgeon he had seen, would like a referral to see Dr. Griffiths.    Patient has had previous MRI's, no alerts noted with screening.    Will route orders to Dr. Goodrich and follow back up with patient.    All questions answered.    ANTOINE ReyN, RN  RN Care Coordinator Rheumatology

## 2023-10-24 NOTE — TELEPHONE ENCOUNTER
Yes I will authorize MRI lumbar spine: query spinal stenosis.  I thought patient already had Ortho Sp[facility] connection, and we were simply helping him to reconnect with the referral.  Thanks ADDY

## 2023-10-25 NOTE — CONFIDENTIAL NOTE
Returned call to patient to discuss that Dr. Goodrich has signed the referral for spine and the MRI.  Provided the patient with scheduling number for MRI. Encouraged patient to call and schedule.    All questions answered.    ANTOINE ReyN, RN  RN Care Coordinator Rheumatology

## 2023-11-13 DIAGNOSIS — K21.9 GASTROESOPHAGEAL REFLUX DISEASE WITHOUT ESOPHAGITIS: ICD-10-CM

## 2023-11-13 RX ORDER — OMEPRAZOLE 40 MG/1
40 CAPSULE, DELAYED RELEASE ORAL DAILY
Qty: 90 CAPSULE | Refills: 0 | Status: SHIPPED | OUTPATIENT
Start: 2023-11-13 | End: 2024-02-09

## 2023-11-28 ENCOUNTER — LAB (OUTPATIENT)
Dept: LAB | Facility: CLINIC | Age: 73
End: 2023-11-28
Payer: MEDICARE

## 2023-11-28 DIAGNOSIS — I10 PRIMARY HYPERTENSION: ICD-10-CM

## 2023-11-28 DIAGNOSIS — N18.30 STAGE 3 CHRONIC KIDNEY DISEASE, UNSPECIFIED WHETHER STAGE 3A OR 3B CKD (H): ICD-10-CM

## 2023-11-28 DIAGNOSIS — C61 PROSTATE CANCER (H): ICD-10-CM

## 2023-11-28 DIAGNOSIS — M13.80 SERONEGATIVE ARTHRITIS: ICD-10-CM

## 2023-11-28 DIAGNOSIS — M06.09 RHEUMATOID ARTHRITIS OF MULTIPLE SITES WITH NEGATIVE RHEUMATOID FACTOR (H): ICD-10-CM

## 2023-11-28 LAB
ALBUMIN SERPL BCG-MCNC: 4.3 G/DL (ref 3.5–5.2)
ALBUMIN UR-MCNC: NEGATIVE MG/DL
ALP SERPL-CCNC: 58 U/L (ref 40–150)
ALT SERPL W P-5'-P-CCNC: 18 U/L (ref 0–70)
ANION GAP SERPL CALCULATED.3IONS-SCNC: 9 MMOL/L (ref 7–15)
APPEARANCE UR: CLEAR
AST SERPL W P-5'-P-CCNC: 25 U/L (ref 0–45)
BASOPHILS # BLD AUTO: 0 10E3/UL (ref 0–0.2)
BASOPHILS NFR BLD AUTO: 1 %
BILIRUB SERPL-MCNC: 0.5 MG/DL
BILIRUB UR QL STRIP: NEGATIVE
BUN SERPL-MCNC: 34.7 MG/DL (ref 8–23)
CALCIUM SERPL-MCNC: 9.6 MG/DL (ref 8.8–10.2)
CHLORIDE SERPL-SCNC: 106 MMOL/L (ref 98–107)
COLOR UR AUTO: YELLOW
CREAT SERPL-MCNC: 1.43 MG/DL (ref 0.67–1.17)
DEPRECATED HCO3 PLAS-SCNC: 27 MMOL/L (ref 22–29)
EGFRCR SERPLBLD CKD-EPI 2021: 51 ML/MIN/1.73M2
EOSINOPHIL # BLD AUTO: 0.1 10E3/UL (ref 0–0.7)
EOSINOPHIL NFR BLD AUTO: 3 %
ERYTHROCYTE [DISTWIDTH] IN BLOOD BY AUTOMATED COUNT: 14.8 % (ref 10–15)
GLUCOSE SERPL-MCNC: 117 MG/DL (ref 70–99)
GLUCOSE UR STRIP-MCNC: NEGATIVE MG/DL
HCT VFR BLD AUTO: 39.8 % (ref 40–53)
HGB BLD-MCNC: 12.5 G/DL (ref 13.3–17.7)
HGB UR QL STRIP: ABNORMAL
IMM GRANULOCYTES # BLD: 0 10E3/UL
IMM GRANULOCYTES NFR BLD: 0 %
KETONES UR STRIP-MCNC: NEGATIVE MG/DL
LEUKOCYTE ESTERASE UR QL STRIP: NEGATIVE
LYMPHOCYTES # BLD AUTO: 0.7 10E3/UL (ref 0.8–5.3)
LYMPHOCYTES NFR BLD AUTO: 23 %
MCH RBC QN AUTO: 30.9 PG (ref 26.5–33)
MCHC RBC AUTO-ENTMCNC: 31.4 G/DL (ref 31.5–36.5)
MCV RBC AUTO: 99 FL (ref 78–100)
MONOCYTES # BLD AUTO: 0.2 10E3/UL (ref 0–1.3)
MONOCYTES NFR BLD AUTO: 7 %
NEUTROPHILS # BLD AUTO: 2 10E3/UL (ref 1.6–8.3)
NEUTROPHILS NFR BLD AUTO: 66 %
NITRATE UR QL: NEGATIVE
PH UR STRIP: 6 [PH] (ref 5–7)
PLATELET # BLD AUTO: 154 10E3/UL (ref 150–450)
POTASSIUM SERPL-SCNC: 4.8 MMOL/L (ref 3.4–5.3)
PROT SERPL-MCNC: 7.2 G/DL (ref 6.4–8.3)
PSA SERPL DL<=0.01 NG/ML-MCNC: 0.64 NG/ML (ref 0–6.5)
RBC # BLD AUTO: 4.04 10E6/UL (ref 4.4–5.9)
RBC #/AREA URNS AUTO: ABNORMAL /HPF
SODIUM SERPL-SCNC: 142 MMOL/L (ref 135–145)
SP GR UR STRIP: 1.02 (ref 1–1.03)
SQUAMOUS #/AREA URNS AUTO: ABNORMAL /LPF
UROBILINOGEN UR STRIP-ACNC: 0.2 E.U./DL
WBC # BLD AUTO: 3.1 10E3/UL (ref 4–11)
WBC #/AREA URNS AUTO: ABNORMAL /HPF

## 2023-11-28 PROCEDURE — 81001 URINALYSIS AUTO W/SCOPE: CPT

## 2023-11-28 PROCEDURE — 85025 COMPLETE CBC W/AUTO DIFF WBC: CPT

## 2023-11-28 PROCEDURE — 36415 COLL VENOUS BLD VENIPUNCTURE: CPT

## 2023-11-28 PROCEDURE — 84153 ASSAY OF PSA TOTAL: CPT

## 2023-11-28 PROCEDURE — 80053 COMPREHEN METABOLIC PANEL: CPT

## 2023-11-29 DIAGNOSIS — J45.909 UNCOMPLICATED ASTHMA, UNSPECIFIED ASTHMA SEVERITY, UNSPECIFIED WHETHER PERSISTENT: ICD-10-CM

## 2023-11-29 RX ORDER — ALBUTEROL SULFATE 90 UG/1
2 AEROSOL, METERED RESPIRATORY (INHALATION) EVERY 4 HOURS PRN
Qty: 18 G | Refills: 3 | Status: SHIPPED | OUTPATIENT
Start: 2023-11-29

## 2023-11-30 ENCOUNTER — OFFICE VISIT (OUTPATIENT)
Dept: UROLOGY | Facility: CLINIC | Age: 73
End: 2023-11-30
Payer: MEDICARE

## 2023-11-30 VITALS
BODY MASS INDEX: 38.83 KG/M2 | HEIGHT: 73 IN | DIASTOLIC BLOOD PRESSURE: 68 MMHG | SYSTOLIC BLOOD PRESSURE: 152 MMHG | WEIGHT: 293 LBS

## 2023-11-30 DIAGNOSIS — R39.15 URINARY URGENCY: ICD-10-CM

## 2023-11-30 DIAGNOSIS — C61 PROSTATE CANCER (H): Primary | ICD-10-CM

## 2023-11-30 PROCEDURE — 99213 OFFICE O/P EST LOW 20 MIN: CPT | Performed by: STUDENT IN AN ORGANIZED HEALTH CARE EDUCATION/TRAINING PROGRAM

## 2023-11-30 ASSESSMENT — PAIN SCALES - GENERAL: PAINLEVEL: SEVERE PAIN (6)

## 2023-11-30 NOTE — PROGRESS NOTES
"CHIEF COMPLAINT   Lucio Daly who is a 73 year old male returns today for follow-up of prostate cancer (iPSA 5.68, Sonny 3+4=7, dx 7/13/2022) now s/p EBRT completed 9/30/2022 (no ADT)    HPI   Lucio Daly is a 73 year old male w/ ILD, RA, GALO returns today for follow-up of prostate cancer (iPSA 5.68, [place] 3+4=7, dx 7/13/2022) now s/p EBRT completed 9/30/2022 (no ADT)    Urinary symptoms are stable    He is getting a spine mri due to worsening radiculopathy    PHYSICAL EXAM  Patient is a 73 year old  male   Vitals: Blood pressure (!) 152/68, height 1.854 m (6' 1\"), weight 132.9 kg (293 lb).  Body mass index is 38.66 kg/m .  General Appearance Adult:   Alert, no acute distress, oriented  HENT: throat/mouth:normal, good dentition  Lungs: no respiratory distress, or pursed lip breathing  Heart: No obvious jugular venous distension present  Abdomen: nondistended  Musculoskeltal: extremities normal, no peripheral edema  Skin: no suspicious lesions or rashes  Neuro: Alert, oriented, speech and mentation normal  Psych: affect and mood normal  Gait: using a walker       PSA Tumor Marker   Latest Ref Rng 0.00 - 6.50 ng/mL   12/19/2022  11:26 AM 2.01    4/27/2023  10:03 AM 1.17    5/25/2023  11:09 AM 1.02    11/28/2023  10:11 AM 0.64          ASSESSMENT and PLAN  73 year old male w/ ILD, RA, GALO returns today for follow-up of prostate cancer (iPSA 5.68, [place] 3+4=7, dx 7/13/2022) now s/p EBRT completed 9/30/2022 (no ADT)    PSA remains low and continues to decrease, new yvon 0.64  Stable LUTS, not on any alpha blocker or 5ARI    - return 6 months with UA, PVR, AUA symptom score, PSA prior      Silviano Vargas MD   [facility] Urology  [facility] Phone: [phone]    "

## 2023-11-30 NOTE — LETTER
"11/30/2023       RE: Lucio Daly  [address]  [place] [address]     Dear Colleague,    Thank you for referring your patient, Lucio Daly, to the [facility] [place] at [facility]. Please see a copy of my visit note below.    CHIEF COMPLAINT   Lucio Daly who is a 73 year old male returns today for follow-up of prostate cancer (iPSA 5.68, [place] 3+4=7, dx 7/13/2022) now s/p EBRT completed 9/30/2022 (no ADT)    HPI   Lucio Daly is a 73 year old male w/ ILD, RA, GALO returns today for follow-up of prostate cancer (iPSA 5.68, Sonny 3+4=7, dx 7/13/2022) now s/p EBRT completed 9/30/2022 (no ADT)    Urinary symptoms are stable    He is getting a spine mri due to worsening radiculopathy    PHYSICAL EXAM  Patient is a 73 year old  male   Vitals: Blood pressure (!) 152/68, height 1.854 m (6' 1\"), weight 132.9 kg (293 lb).  Body mass index is 38.66 kg/m .  General Appearance Adult:   Alert, no acute distress, oriented  HENT: throat/mouth:normal, good dentition  Lungs: no respiratory distress, or pursed lip breathing  Heart: No obvious jugular venous distension present  Abdomen: nondistended  Musculoskeltal: extremities normal, no peripheral edema  Skin: no suspicious lesions or rashes  Neuro: Alert, oriented, speech and mentation normal  Psych: affect and mood normal  Gait: using a walker       PSA Tumor Marker   Latest Ref Rng 0.00 - 6.50 ng/mL   12/19/2022  11:26 AM 2.01    4/27/2023  10:03 AM 1.17    5/25/2023  11:09 AM 1.02    11/28/2023  10:11 AM 0.64          ASSESSMENT and PLAN  73 year old male w/ ILD, RA, GALO returns today for follow-up of prostate cancer (iPSA 5.68, [place] 3+4=7, dx 7/13/2022) now s/p EBRT completed 9/30/2022 (no ADT)    PSA remains low and continues to decrease, new yvon 0.64  Stable LUTS, not on any alpha blocker or 5ARI    - return 6 months with UA, PVR, AUA symptom score, PSA prior      Silviano RUELAS" MD Alicia   [facility] Urology  [facility] Phone: [phone]

## 2023-11-30 NOTE — NURSING NOTE
Chief Complaint   Patient presents with    Elevated PSA     Pt states there are no changes to his urination since last visit.    Mariella Bautista, EMT

## 2023-12-01 ENCOUNTER — ANCILLARY PROCEDURE (OUTPATIENT)
Dept: MRI IMAGING | Facility: CLINIC | Age: 73
End: 2023-12-01
Attending: INTERNAL MEDICINE
Payer: MEDICARE

## 2023-12-01 DIAGNOSIS — M06.09 RHEUMATOID ARTHRITIS OF MULTIPLE SITES WITH NEGATIVE RHEUMATOID FACTOR (H): ICD-10-CM

## 2023-12-01 PROCEDURE — G1010 CDSM STANSON: HCPCS | Mod: GC | Performed by: STUDENT IN AN ORGANIZED HEALTH CARE EDUCATION/TRAINING PROGRAM

## 2023-12-01 PROCEDURE — A9585 GADOBUTROL INJECTION: HCPCS | Mod: JZ | Performed by: STUDENT IN AN ORGANIZED HEALTH CARE EDUCATION/TRAINING PROGRAM

## 2023-12-01 PROCEDURE — 72158 MRI LUMBAR SPINE W/O & W/DYE: CPT | Mod: ME | Performed by: STUDENT IN AN ORGANIZED HEALTH CARE EDUCATION/TRAINING PROGRAM

## 2023-12-01 RX ORDER — GADOBUTROL 604.72 MG/ML
15 INJECTION INTRAVENOUS ONCE
Status: COMPLETED | OUTPATIENT
Start: 2023-12-01 | End: 2023-12-01

## 2023-12-01 RX ADMIN — GADOBUTROL 13 ML: 604.72 INJECTION INTRAVENOUS at 16:51

## 2023-12-01 NOTE — TELEPHONE ENCOUNTER
DIAGNOSIS: Progressive Leg Weakness, Hx of Spinal Stenosis.  Rheumatoid arthritis of multiple sites with negative rheumatoid factor (H) [M06.09]  - Primary   APPOINTMENT DATE: 12/05/2023   NOTES STATUS DETAILS   OFFICE NOTE from referring provider Internal 10/20/2023 - Jeremy Goodrich MD - [facility] Rheumatology   OFFICE NOTE from other specialist Internal 10/31/2022 - [facility] Radiation Therapy (Media Tab)    10/10/2022 - Rodney Ríos DPM - [facility] Podiatry    10/03/2022 - Ramón Bennett MD - [facility] Ortho    More..   MEDICATION LIST Internal    LABS     MRI PACS Internal   XRAYS (IMAGES & REPORTS) PACS Internal

## 2023-12-04 DIAGNOSIS — M54.50 LOW BACK PAIN: Primary | ICD-10-CM

## 2023-12-05 ENCOUNTER — TELEPHONE (OUTPATIENT)
Dept: MEDSURG UNIT | Facility: CLINIC | Age: 73
End: 2023-12-05

## 2023-12-05 ENCOUNTER — HOSPITAL ENCOUNTER (OUTPATIENT)
Facility: CLINIC | Age: 73
End: 2023-12-05
Payer: MEDICARE

## 2023-12-05 ENCOUNTER — OFFICE VISIT (OUTPATIENT)
Dept: ORTHOPEDICS | Facility: CLINIC | Age: 73
End: 2023-12-05
Payer: MEDICARE

## 2023-12-05 ENCOUNTER — PRE VISIT (OUTPATIENT)
Dept: ORTHOPEDICS | Facility: CLINIC | Age: 73
End: 2023-12-05

## 2023-12-05 ENCOUNTER — ANCILLARY PROCEDURE (OUTPATIENT)
Dept: GENERAL RADIOLOGY | Facility: CLINIC | Age: 73
End: 2023-12-05
Attending: ORTHOPAEDIC SURGERY
Payer: MEDICARE

## 2023-12-05 VITALS — HEIGHT: 72 IN | BODY MASS INDEX: 40.09 KG/M2 | WEIGHT: 296 LBS

## 2023-12-05 DIAGNOSIS — M54.50 LOW BACK PAIN: ICD-10-CM

## 2023-12-05 DIAGNOSIS — M54.17 LUMBOSACRAL RADICULOPATHY: ICD-10-CM

## 2023-12-05 DIAGNOSIS — M48.07 LUMBOSACRAL SPINAL STENOSIS: ICD-10-CM

## 2023-12-05 DIAGNOSIS — G89.29 CHRONIC MIDLINE LOW BACK PAIN WITHOUT SCIATICA: Primary | ICD-10-CM

## 2023-12-05 DIAGNOSIS — M54.50 CHRONIC MIDLINE LOW BACK PAIN WITHOUT SCIATICA: Primary | ICD-10-CM

## 2023-12-05 DIAGNOSIS — M06.09 RHEUMATOID ARTHRITIS OF MULTIPLE SITES WITH NEGATIVE RHEUMATOID FACTOR (H): ICD-10-CM

## 2023-12-05 PROCEDURE — 99204 OFFICE O/P NEW MOD 45 MIN: CPT | Mod: GC | Performed by: ORTHOPAEDIC SURGERY

## 2023-12-05 PROCEDURE — 72110 X-RAY EXAM L-2 SPINE 4/>VWS: CPT | Performed by: STUDENT IN AN ORGANIZED HEALTH CARE EDUCATION/TRAINING PROGRAM

## 2023-12-05 NOTE — LETTER
12/5/2023         RE: Lucio Daly  [address]  [place] [address]        Dear Colleague,    Thank you for referring your patient, Lucio Daly, to the [facility] [place]. Please see a copy of my visit note below.    Spine Surgery Consultation    REFERRING PHYSICIAN: Jeremy Goodrich   PRIMARY CARE PHYSICIAN: Heladio Calixto           Chief Complaint:   Consult (New patient consult for lumbar spine with progressive leg weakness, L worse than R.  Also endorses numbness in pain in bilateral LE.)      History of Present Illness:  Symptom Profile Including: location of symptoms, onset, severity, exacerbating/alleviating factors, previous treatments:        Lucio Daly is a 73 year old male with PMH of seronegative inflammatory arthritis on methotrexate, hydroxychloroquine, and prostate cancer s/p radiation who presents with 1 year of progressively worsening low back pain and bilateral radicular symptoms. States that he has had ongoing back pain for several years secondary to his inflammatory arthritis. He has pain in the bilateral low back. Endorses intermittent radiating pain and numbness down the anterior thigh and leg to the medial and dorsal foot. States that these symptoms are present primarily with driving. Endorses intermittent bladder incontinence and urgency. He notes improvement of his pain with using a walker instead of a cane. He has difficulty transitioning from sitting to standing.  He takes Tylenol 500 mg 6 times a day as well as pregabalin.  He states that he has had spine injections in the past over 15 years ago and they have always caused him to have neurogenic bladder and frequent urination afterwards.  He has not done any recent physical therapy.  No recent injuries.         Past Medical History:     Past Medical History:   Diagnosis Date    Abnormal EMG 04/18/2013    Abnormal involuntary movements(781.0)     Movement Disorder    AK (actinic keratosis)  12/18/2011    Allergic rhinitis, cause unspecified     Allergic rhinitis    Balance problems 11/01/2011    Basal cell carcinoma     Bladder spasms 11/01/2011    Chronic osteoarthritis     COPD (chronic obstructive pulmonary disease) (H)     Interstial lung disease, obliderans bronchiolitis    Diaphragmatic hernia without mention of obstruction or gangrene     Earache or other ear, nose, or throat complaint     Esophageal reflux     Fatigue 11/01/2011    Fracture     H. pylori infection 05/12/2011    History of MRI of cervical spine 11/18/2013    EXAMINATION: CERVICAL SPINE G/E 5 VIEWS* 4/19/2013 4:18 PM  CLINICAL HISTORY: Pain in limb,Performing Location?->[facility]),  COMPARISON:  FINDINGS: AP and lateral views in flexion and extension, as well as odontoid view of the cervical spine was obtained. There is no comparison available. The vertebral bodies of the cervical spine are normally aligned. There is posterior spurring and d    Incomplete defecation 11/01/2011    Interstitial lung disease (H) 11/29/2016    Laboratory test 08/07/2012    Lung disease 06/2015    Malignant basal cell neoplasm of skin 08/06/2008    Melanoma (H) 08/06/2008    Melanoma in situ of lower leg (H)     R calf    Neuropathy 05/16/2011    GALO (obstructive sleep apnea)     Other bladder disorder     Other color vision deficiencies     Other nervous system complications     Parkinsonism 11/01/2011    Parkinsons disease     Parkinsonism/ balance, dropped foot, tremor    Personal history of colonic polyps     PLMD (periodic limb movement disorder) 12/16/2021    Polyneuropathy in other diseases classified elsewhere (H24)     RA (rheumatoid arthritis) (H)     Rheumatoid arthritis of multiple sites with negative rheumatoid factor (H) 03/21/2016    Seronegative arthritis 11/18/2013    Shortness of breath     Shoulder arthritis 2016    acromioclavicular joint     Sialorrhea 4/18/2023    Somatization disorder 05/12/2011    Spider veins      "Leg Vericise vein surgery    Squamous cell carcinoma     Tremor 2011    Unspecified essential hypertension     Unspecified hypothyroidism     Urinary tract infection     Urinary urgency 2011    Wears glasses 2011            Past Surgical History:     Past Surgical History:   Procedure Laterality Date    BIOPSY OF SKIN LESION      COLONOSCOPY      COLONOSCOPY N/A 2022    Procedure: COLONOSCOPY (fv) polypectomy using jumbo bx forcep;  Surgeon: Gómez Mckinney MD;  Location:  GI    CYSTOSCOPY  Many years ago    Blood in urine/ bladder cystoscopy    ESOPHAGOSCOPY, GASTROSCOPY, DUODENOSCOPY (EGD), COMBINED N/A 2022    Procedure: ESOPHAGOGASTRODUODENOSCOPY (EGD) (fv) biopsies using cold bx forcep;  Surgeon: Gómez Mckinney MD;  Location:  GI    HEMORRHOID SURGERY      lip biopsy      for sicca complex    MOHS MICROGRAPHIC PROCEDURE      SOFT TISSUE SURGERY      removeal of basel cell carcinoma            Social History:     Social History     Tobacco Use    Smoking status: Former     Packs/day: 1.50     Years: 37.00     Additional pack years: 0.00     Total pack years: 55.50     Types: Cigarettes     Start date: 6/15/1963     Quit date: 2000     Years since quittin.1    Smokeless tobacco: Never   Substance Use Topics    Alcohol use: Not Currently            Family History:     Family History   Problem Relation Age of Onset    Hypertension Mother     Heart Disease Mother         a fib    Arthritis Mother         \"osteo\"    Osteoporosis Mother     Skin Cancer Mother     Uterine Cancer Mother     Other Cancer Mother     Cancer Mother     Hypertension Brother     Diabetes Brother         \" post pancreatitis\"    Neurologic Disorder Sister         multiple sclerosis    Hypertension Sister     Heart Disease Sister     Multiple Sclerosis Sister     Heart Disease Sister         Chf    Arthritis Sister     Heart Failure Sister     Kidney Disease Sister     Dementia Sister     " Hypertension Father     Cerebrovascular Disease Father         ,    Skin Cancer Father     Colon Polyps Father     Heart Disease Father     Other - See Comments Son         [place]    Other - See Comments Son         [place]    Other - See Comments Son         day gonzalez    Psoriasis Maternal Grandfather     Stomach Cancer Maternal Grandfather     Cancer Maternal Grandfather     Congenital Anomalies Other         granddaughter with a chromosome defect    Other Cancer Other         Grand daughter    Cancer Other         Grand daughter    Cerebrovascular Disease Paternal Grandmother         ,    Cerebrovascular Disease Paternal Grandfather         ,    Cancer Granddaughter         Langerhans Cell Histiocytosis    Genetic Disease Granddaughter         gene translocation    Learning Disorder Other         Gene translocation    Melanoma No family hx of     Colon Cancer No family hx of             Allergies:     Allergies   Allergen Reactions    Adhesive Tape Hives     Bandages misc  Bandages misc    Alum & Mag Hydroxide-Simeth Hives     EXCESSIVE URINATION AND WEAKNESS, LIGHT-HEADED    Aluminum Hydroxide Hives    Calcium Carbonate Hives    Cortisone Hives    Cyclosporine      Burning eyes, problems with breathing, tightness in chest    Cyclosporine Hives     Burning eyes, problems with breathing, tightness in chest    Diphenhydramine Other (See Comments) and Shortness Of Breath    Fexofenadine Other (See Comments) and Shortness Of Breath     EXCESSIVE URINATION AND WEAKNESS, LIGHT-HEADED      Gabapentin Hives     Neurontin: mosd changes and excess urination  Neurontin: mosd changes and excess urination    Levofloxacin Hives     From surgeon--? Joint pain ? Gerd aggravation. Insomnia, excess urination    Magnesium Carbonate Hives    Magnesium Hydroxide Hives    Prednisone Hives     Weakness, elevated bp, headache, eye pain, congestion     Simethicone Hives    Sulfamethoxazole-Trimethoprim Hives     Chest  pain, angina  Chest pain, angina    Tree Extract Hives    Allegra      EXCESSIVE URINATION AND WEAKNESS, LIGHT-HEADED    Animal Dander     Benadryl Allergy      EXCESSIVE URINATION AND WEAKNESS, LIGHT-HEADED    Budesonide-Formoterol Fumarate GI Disturbance and Nausea     Other reaction(s): GI Distress    Cephalexin      Joint pain or gerd aggravation. Bloating excessive urination   Other reaction(s): GI Distress  Joint pain or gerd aggravation. Bloating excessive urination     Cephalosporins      Other reaction(s): GI Distress    Chlorpheniramine Maleate      Dust    Doxycycline Nausea and Vomiting     SWEATING,MIGRAINES,LOSS OF APPETITE,SWEATING,LIGHT HEADED, EXCESSIVE URINATION    Doxycycline Hyclate Nausea     SWEATING,MIGRAINES,LOSS OF APPETITE,SWEATING,LIGHT HEADED, EXCESSIVE URINATION    Flonase [Fluticasone Propionate]     Fluticasone      Other reaction(s): GI Distress    Hydrocortisone Nausea and Vomiting    Iodine      Other reaction(s): GI Distress    Iodine Solution [Povidone Iodine]      SKIN MELTS    Levaquin      From surgeon--? Joint pain ? Gerd aggravation. Insomnia, excess urination    Mylanta      EXCESSIVE URINATION AND WEAKNESS, LIGHT-HEADED    Oxcarbazepine      SEVERE JOINT AND TENDON PAIN, INSOMNIA, RESTLESSNESS, NAUSEA, EXCESS URINATION    Oxcarbazepine      Other reaction(s): GI Distress  SEVERE JOINT AND TENDON PAIN, INSOMNIA, RESTLESSNESS, NAUSEA, EXCESS URINATION    Povidone Iodine      Other reaction(s): GI Distress  SKIN MELTS    Prednisone      Weakness, elevated bp, headache, eye pain, congestion     Seafood [Seafood]     Shellfish Allergy      Hives    Other reaction(s): GI Distress  Hives    Sulfamethoxazole-Trimethoprim     Trees     Cortizone Rash     EXCESS URINATION,WEAKNESS,NAUSEA, HEADACHE    Ibuprofen Fatigue, Other (See Comments) and Palpitations            Medications:     Current Outpatient Medications   Medication    acetaminophen (TYLENOL) 500 MG tablet    albuterol  "(PROAIR HFA/PROVENTIL HFA/VENTOLIN HFA) 108 (90 Base) MCG/ACT inhaler    amLODIPine (NORVASC) 5 MG tablet    cholecalciferol (HM VITAMIN D3) 25 MCG (1000 UT) TABS    folic acid (FOLVITE) 1 MG tablet    hydroxychloroquine (PLAQUENIL) 200 MG tablet    lisinopril-hydrochlorothiazide (ZESTORETIC) 20-25 MG tablet    methotrexate 2.5 MG tablet    metoprolol succinate ER (TOPROL XL) 50 MG 24 hr tablet    omeprazole (PRILOSEC) 40 MG DR capsule    ORDER FOR DME    oxybutynin (DITROPAN) 5 MG tablet    pregabalin (LYRICA) 50 MG capsule    salmeterol (SEREVENT DISKUS) 50 MCG/ACT inhaler     No current facility-administered medications for this visit.             Review of Systems:     A 10 point ROS was performed and reviewed. Specific responses to these questions are noted at the end of the document.         Physical Exam:   Vitals: Ht 1.83 m (6' 0.05\")   Wt 134.3 kg (296 lb)   BMI 40.09 kg/m    Constitutional: awake, alert, cooperative, no apparent distress, appears stated age.    Eyes: The sclera are white.  Ears, Nose, Throat: The trachea is midline.  Psychiatric: The patient has a normal affect.  Respiratory: breathing non-labored  Cardiovascular: The extremities are warm and perfused.  Skin: no obvious rashes or lesions.  Musculoskeletal, Neurologic, and Spine:      Lumbar Spine:    Appearance - No gross stepoffs or deformities    Motor -     L2-3: Hip flexion R 4/5  And L 4/5 strength          L3/4:  Knee extension R 5/5 and L 4+/5 strength         L4/5:  Foot dorsiflexion R 4/5 L 4/5 and       EHL dorsiflexion R 4/5 L 4/5 strength         S1:  Plantarflexion/Peroneal Muscles  R 4/5 and L 4/5 strength    Sensation: intact to light touch L3-S1 distribution BLE        Straight leg raise negative    Alignment:  Patient stands with a neutral standing sagittal balance.           Imaging:   We ordered and independently reviewed new radiographs at this clinic visit. The results were discussed with the patient.  Findings " include:    L4-5 degenerative spondylosis with loss of the L5-S1 disc space.  Significant posterior degenerative changes.    MRI of the lumbar spine shows significant severe L4-5 degenerative spondylolisthesis with central canal stenosis.         Assessment and Plan:   Assessment:  73 year old male with L4-5 degenerative spondylolisthesis.    We discussed the patient's etiology of his symptoms as well as reviewed the imaging in the office today.  He has significant L4-5 degenerative spondylolisthesis which is consistent with many of the symptoms he is having.  Patient is taking pregabalin and Tylenol, but has not exhausted other conservative measures for treatment.  We discussed other options for treatment including physical therapy and injections.  Patient expressed that he has frequent urination after prior injections, but none in the last 15 years and has not done any recent physical therapy.  Recommend start physical therapy for glutes strengthening, hamstring and core strengthening.  Also recommended performing an ILESI at L4-5 to see if this provides any symptomatic relief.  Recommended trial of this for at least 6 to 8 weeks in order to try to avoid any sort needed surgical intervention.  Discussed that given the rest of his medical history that he would certainly be concerning for wound healing after major surgery and thus we will try to exhaust nonoperative management.  Patient and his wife expressed understanding.  They are traveling to [place] for the spring at the beginning of January and a reach out to their primary physician there to get another physical therapy order to continue physical therapy while there out of the state.  Recommend he follow-up either in 6 to 8 weeks or after they return home from [place].     Plan:  PT for glutes, hamstring, core strengthening  ILESI of L4-5  Follow-up in 6 to 8 weeks or after returning from vacation      Ulysses Baxter MD  Orthopedic Surgery  PGY1  [phone]    Attending MD (Dr. Mike Griffiths) :  I met with the patient, reviewed and verified the history and physical exam of the patient and discussed the patient's management with the other clinical providers involved in this patient's care including any involved residents or physicians assistants. I reviewed the above note and agree with the documented findings and plan of care, which were communicated to the patient.      Mike Griffiths MD      Respectfully,  Mike Griffiths MD  Spine Surgery  [facility]

## 2023-12-05 NOTE — NURSING NOTE
"Reason For Visit:   Chief Complaint   Patient presents with    Consult     New patient consult for lumbar spine with progressive leg weakness, L worse than R.  Also endorses numbness in pain in bilateral LE.       Primary MD: Heladio Calixto  Ref. MD: Jeremy Guy MD    ?  No  Occupation Retired.  Currently working? No.  Work status?  Retired.  Date of injury: NA  Type of injury: Chronic.  Date of surgery: NA  Type of surgery: NA.  Smoker: No  Request smoking cessation information: No    Ht 1.83 m (6' 0.05\")   Wt 134.3 kg (296 lb)   BMI 40.09 kg/m      Pain Assessment  Patient Currently in Pain: Yes  0-10 Pain Scale: 6  Primary Pain Location: Back  Other Pain Locations: bilateral legs  Pain Descriptors: Constant, Tingling, Numbness, Radiating    Oswestry (TIMBO) Questionnaire        11/28/2023    11:11 AM   OSWESTRY DISABILITY INDEX   Count 9   Sum 21   Oswestry Score (%) 46.67 %            Neck Disability Index (NDI) Questionnaire         No data to display                       Visual Analog Pain Scale  Back Pain Scale 0-10: 6  Right leg pain: 6  Left leg pain: 6  Neck Pain Scale 0-10: 0  Right arm pain: 0  Left arm pain: 0    Promis 10 Assessment        11/28/2023    11:14 AM   PROMIS 10   In general, would you say your health is: Fair   In general, would you say your quality of life is: Fair   In general, how would you rate your physical health? Good   In general, how would you rate your mental health, including your mood and your ability to think? Good   In general, how would you rate your satisfaction with your social activities and relationships? Good   In general, please rate how well you carry out your usual social activities and roles Good   To what extent are you able to carry out your everyday physical activities such as walking, climbing stairs, carrying groceries, or moving a chair? Moderately   In the past 7 days, how often have you been bothered by emotional problems such as feeling " anxious, depressed, or irritable? Rarely   In the past 7 days, how would you rate your fatigue on average? Moderate   In the past 7 days, how would you rate your pain on average, where 0 means no pain, and 10 means worst imaginable pain? 5   In general, would you say your health is: 2   In general, would you say your quality of life is: 2   In general, how would you rate your physical health? 3   In general, how would you rate your mental health, including your mood and your ability to think? 3   In general, how would you rate your satisfaction with your social activities and relationships? 3   In general, please rate how well you carry out your usual social activities and roles. (This includes activities at home, at work and in your community, and responsibilities as a parent, child, spouse, employee, friend, etc.) 3   To what extent are you able to carry out your everyday physical activities such as walking, climbing stairs, carrying groceries, or moving a chair? 3   In the past 7 days, how often have you been bothered by emotional problems such as feeling anxious, depressed, or irritable? 2   In the past 7 days, how would you rate your fatigue on average? 3   In the past 7 days, how would you rate your pain on average, where 0 means no pain, and 10 means worst imaginable pain? 5   Global Mental Health Score 12   Global Physical Health Score 12   PROMIS TOTAL - SUBSCORES 24                Ramón Florentino ATC

## 2023-12-05 NOTE — PROGRESS NOTES
Spine Surgery Consultation    REFERRING PHYSICIAN: Jeremy Goodrich   PRIMARY CARE PHYSICIAN: Heladio Calixto           Chief Complaint:   Consult (New patient consult for lumbar spine with progressive leg weakness, L worse than R.  Also endorses numbness in pain in bilateral LE.)      History of Present Illness:  Symptom Profile Including: location of symptoms, onset, severity, exacerbating/alleviating factors, previous treatments:        Lucio Daly is a 73 year old male with PMH of seronegative inflammatory arthritis on methotrexate, hydroxychloroquine, and prostate cancer s/p radiation who presents with 1 year of progressively worsening low back pain and bilateral radicular symptoms. States that he has had ongoing back pain for several years secondary to his inflammatory arthritis. He has pain in the bilateral low back. Endorses intermittent radiating pain and numbness down the anterior thigh and leg to the medial and dorsal foot. States that these symptoms are present primarily with driving. Endorses intermittent bladder incontinence and urgency. He notes improvement of his pain with using a walker instead of a cane. He has difficulty transitioning from sitting to standing.  He takes Tylenol 500 mg 6 times a day as well as pregabalin.  He states that he has had spine injections in the past over 15 years ago and they have always caused him to have neurogenic bladder and frequent urination afterwards.  He has not done any recent physical therapy.  No recent injuries.         Past Medical History:     Past Medical History:   Diagnosis Date    Abnormal EMG 04/18/2013    Abnormal involuntary movements(781.0)     Movement Disorder    AK (actinic keratosis) 12/18/2011    Allergic rhinitis, cause unspecified     Allergic rhinitis    Balance problems 11/01/2011    Basal cell carcinoma     Bladder spasms 11/01/2011    Chronic osteoarthritis     COPD (chronic obstructive pulmonary disease) (H)     Interstial lung  disease, obliderans bronchiolitis    Diaphragmatic hernia without mention of obstruction or gangrene     Earache or other ear, nose, or throat complaint     Esophageal reflux     Fatigue 11/01/2011    Fracture     H. pylori infection 05/12/2011    History of MRI of cervical spine 11/18/2013    EXAMINATION: CERVICAL SPINE G/E 5 VIEWS* 4/19/2013 4:18 PM  CLINICAL HISTORY: Pain in limb,Performing Location?->[facility]),  COMPARISON:  FINDINGS: AP and lateral views in flexion and extension, as well as odontoid view of the cervical spine was obtained. There is no comparison available. The vertebral bodies of the cervical spine are normally aligned. There is posterior spurring and d    Incomplete defecation 11/01/2011    Interstitial lung disease (H) 11/29/2016    Laboratory test 08/07/2012    Lung disease 06/2015    Malignant basal cell neoplasm of skin 08/06/2008    Melanoma (H) 08/06/2008    Melanoma in situ of lower leg (H)     R calf    Neuropathy 05/16/2011    GALO (obstructive sleep apnea)     Other bladder disorder     Other color vision deficiencies     Other nervous system complications     Parkinsonism 11/01/2011    Parkinsons disease     Parkinsonism/ balance, dropped foot, tremor    Personal history of colonic polyps     PLMD (periodic limb movement disorder) 12/16/2021    Polyneuropathy in other diseases classified elsewhere (H24)     RA (rheumatoid arthritis) (H)     Rheumatoid arthritis of multiple sites with negative rheumatoid factor (H) 03/21/2016    Seronegative arthritis 11/18/2013    Shortness of breath     Shoulder arthritis 2016    acromioclavicular joint     Sialorrhea 4/18/2023    Somatization disorder 05/12/2011    Spider veins     Leg Vericise vein surgery    Squamous cell carcinoma     Tremor 11/01/2011    Unspecified essential hypertension     Unspecified hypothyroidism     Urinary tract infection     Urinary urgency 11/01/2011    Wears glasses 11/01/2011            Past Surgical  "History:     Past Surgical History:   Procedure Laterality Date    BIOPSY OF SKIN LESION      COLONOSCOPY      COLONOSCOPY N/A 2022    Procedure: COLONOSCOPY (fv) polypectomy using jumbo bx forcep;  Surgeon: Gómez Mckinney MD;  Location:  GI    CYSTOSCOPY  Many years ago    Blood in urine/ bladder cystoscopy    ESOPHAGOSCOPY, GASTROSCOPY, DUODENOSCOPY (EGD), COMBINED N/A 2022    Procedure: ESOPHAGOGASTRODUODENOSCOPY (EGD) (fv) biopsies using cold bx forcep;  Surgeon: Gómez Mckinney MD;  Location:  GI    HEMORRHOID SURGERY      lip biopsy      for sicca complex    MOHS MICROGRAPHIC PROCEDURE      SOFT TISSUE SURGERY      removeal of basel cell carcinoma            Social History:     Social History     Tobacco Use    Smoking status: Former     Packs/day: 1.50     Years: 37.00     Additional pack years: 0.00     Total pack years: 55.50     Types: Cigarettes     Start date: 6/15/1963     Quit date: 2000     Years since quittin.1    Smokeless tobacco: Never   Substance Use Topics    Alcohol use: Not Currently            Family History:     Family History   Problem Relation Age of Onset    Hypertension Mother     Heart Disease Mother         a fib    Arthritis Mother         \"osteo\"    Osteoporosis Mother     Skin Cancer Mother     Uterine Cancer Mother     Other Cancer Mother     Cancer Mother     Hypertension Brother     Diabetes Brother         \" post pancreatitis\"    Neurologic Disorder Sister         multiple sclerosis    Hypertension Sister     Heart Disease Sister     Multiple Sclerosis Sister     Heart Disease Sister         Chf    Arthritis Sister     Heart Failure Sister     Kidney Disease Sister     Dementia Sister     Hypertension Father     Cerebrovascular Disease Father         ,    Skin Cancer Father     Colon Polyps Father     Heart Disease Father     Other - See Comments Son         [place]    Other - See Comments Son         [place]    Other - See Comments Son  "        [place]    Psoriasis Maternal Grandfather     Stomach Cancer Maternal Grandfather     Cancer Maternal Grandfather     Congenital Anomalies Other         granddaughter with a chromosome defect    Other Cancer Other         Grand daughter    Cancer Other         Grand daughter    Cerebrovascular Disease Paternal Grandmother         ,    Cerebrovascular Disease Paternal Grandfather         ,    Cancer Granddaughter         Langerhans Cell Histiocytosis    Genetic Disease Granddaughter         gene translocation    Learning Disorder Other         Gene translocation    Melanoma No family hx of     Colon Cancer No family hx of             Allergies:     Allergies   Allergen Reactions    Adhesive Tape Hives     Bandages misc  Bandages misc    Alum & Mag Hydroxide-Simeth Hives     EXCESSIVE URINATION AND WEAKNESS, LIGHT-HEADED    Aluminum Hydroxide Hives    Calcium Carbonate Hives    Cortisone Hives    Cyclosporine      Burning eyes, problems with breathing, tightness in chest    Cyclosporine Hives     Burning eyes, problems with breathing, tightness in chest    Diphenhydramine Other (See Comments) and Shortness Of Breath    Fexofenadine Other (See Comments) and Shortness Of Breath     EXCESSIVE URINATION AND WEAKNESS, LIGHT-HEADED      Gabapentin Hives     Neurontin: mosd changes and excess urination  Neurontin: mosd changes and excess urination    Levofloxacin Hives     From surgeon--? Joint pain ? Gerd aggravation. Insomnia, excess urination    Magnesium Carbonate Hives    Magnesium Hydroxide Hives    Prednisone Hives     Weakness, elevated bp, headache, eye pain, congestion     Simethicone Hives    Sulfamethoxazole-Trimethoprim Hives     Chest pain, angina  Chest pain, angina    Tree Extract Hives    Allegra      EXCESSIVE URINATION AND WEAKNESS, LIGHT-HEADED    Animal Dander     Benadryl Allergy      EXCESSIVE URINATION AND WEAKNESS, LIGHT-HEADED    Budesonide-Formoterol Fumarate GI Disturbance and  Nausea     Other reaction(s): GI Distress    Cephalexin      Joint pain or gerd aggravation. Bloating excessive urination   Other reaction(s): GI Distress  Joint pain or gerd aggravation. Bloating excessive urination     Cephalosporins      Other reaction(s): GI Distress    Chlorpheniramine Maleate      Dust    Doxycycline Nausea and Vomiting     SWEATING,MIGRAINES,LOSS OF APPETITE,SWEATING,LIGHT HEADED, EXCESSIVE URINATION    Doxycycline Hyclate Nausea     SWEATING,MIGRAINES,LOSS OF APPETITE,SWEATING,LIGHT HEADED, EXCESSIVE URINATION    Flonase [Fluticasone Propionate]     Fluticasone      Other reaction(s): GI Distress    Hydrocortisone Nausea and Vomiting    Iodine      Other reaction(s): GI Distress    Iodine Solution [Povidone Iodine]      SKIN MELTS    Levaquin      From surgeon--? Joint pain ? Gerd aggravation. Insomnia, excess urination    Mylanta      EXCESSIVE URINATION AND WEAKNESS, LIGHT-HEADED    Oxcarbazepine      SEVERE JOINT AND TENDON PAIN, INSOMNIA, RESTLESSNESS, NAUSEA, EXCESS URINATION    Oxcarbazepine      Other reaction(s): GI Distress  SEVERE JOINT AND TENDON PAIN, INSOMNIA, RESTLESSNESS, NAUSEA, EXCESS URINATION    Povidone Iodine      Other reaction(s): GI Distress  SKIN MELTS    Prednisone      Weakness, elevated bp, headache, eye pain, congestion     Seafood [Seafood]     Shellfish Allergy      Hives    Other reaction(s): GI Distress  Hives    Sulfamethoxazole-Trimethoprim     Trees     Cortizone Rash     EXCESS URINATION,WEAKNESS,NAUSEA, HEADACHE    Ibuprofen Fatigue, Other (See Comments) and Palpitations            Medications:     Current Outpatient Medications   Medication    acetaminophen (TYLENOL) 500 MG tablet    albuterol (PROAIR HFA/PROVENTIL HFA/VENTOLIN HFA) 108 (90 Base) MCG/ACT inhaler    amLODIPine (NORVASC) 5 MG tablet    cholecalciferol ( VITAMIN D3) 25 MCG (1000 UT) TABS    folic acid (FOLVITE) 1 MG tablet    hydroxychloroquine (PLAQUENIL) 200 MG tablet     "lisinopril-hydrochlorothiazide (ZESTORETIC) 20-25 MG tablet    methotrexate 2.5 MG tablet    metoprolol succinate ER (TOPROL XL) 50 MG 24 hr tablet    omeprazole (PRILOSEC) 40 MG DR capsule    ORDER FOR DME    oxybutynin (DITROPAN) 5 MG tablet    pregabalin (LYRICA) 50 MG capsule    salmeterol (SEREVENT DISKUS) 50 MCG/ACT inhaler     No current facility-administered medications for this visit.             Review of Systems:     A 10 point ROS was performed and reviewed. Specific responses to these questions are noted at the end of the document.         Physical Exam:   Vitals: Ht 1.83 m (6' 0.05\")   Wt 134.3 kg (296 lb)   BMI 40.09 kg/m    Constitutional: awake, alert, cooperative, no apparent distress, appears stated age.    Eyes: The sclera are white.  Ears, Nose, Throat: The trachea is midline.  Psychiatric: The patient has a normal affect.  Respiratory: breathing non-labored  Cardiovascular: The extremities are warm and perfused.  Skin: no obvious rashes or lesions.  Musculoskeletal, Neurologic, and Spine:      Lumbar Spine:    Appearance - No gross stepoffs or deformities    Motor -     L2-3: Hip flexion R 4/5  And L 4/5 strength          L3/4:  Knee extension R 5/5 and L 4+/5 strength         L4/5:  Foot dorsiflexion R 4/5 L 4/5 and       EHL dorsiflexion R 4/5 L 4/5 strength         S1:  Plantarflexion/Peroneal Muscles  R 4/5 and L 4/5 strength    Sensation: intact to light touch L3-S1 distribution BLE        Straight leg raise negative    Alignment:  Patient stands with a neutral standing sagittal balance.           Imaging:   We ordered and independently reviewed new radiographs at this clinic visit. The results were discussed with the patient.  Findings include:    L4-5 degenerative spondylosis with loss of the L5-S1 disc space.  Significant posterior degenerative changes.    MRI of the lumbar spine shows significant severe L4-5 degenerative spondylolisthesis with central canal stenosis.         " Assessment and Plan:   Assessment:  73 year old male with L4-5 degenerative spondylolisthesis.    We discussed the patient's etiology of his symptoms as well as reviewed the imaging in the office today.  He has significant L4-5 degenerative spondylolisthesis which is consistent with many of the symptoms he is having.  Patient is taking pregabalin and Tylenol, but has not exhausted other conservative measures for treatment.  We discussed other options for treatment including physical therapy and injections.  Patient expressed that he has frequent urination after prior injections, but none in the last 15 years and has not done any recent physical therapy.  Recommend start physical therapy for glutes strengthening, hamstring and core strengthening.  Also recommended performing an ILESI at L4-5 to see if this provides any symptomatic relief.  Recommended trial of this for at least 6 to 8 weeks in order to try to avoid any sort needed surgical intervention.  Discussed that given the rest of his medical history that he would certainly be concerning for wound healing after major surgery and thus we will try to exhaust nonoperative management.  Patient and his wife expressed understanding.  They are traveling to [place] for the spring at the beginning of January and a reach out to their primary physician there to get another physical therapy order to continue physical therapy while there out of the state.  Recommend he follow-up either in 6 to 8 weeks or after they return home from [place].     Plan:  PT for glutes, hamstring, core strengthening  ILESI of L4-5  Follow-up in 6 to 8 weeks or after returning from vacation      Ulysses Baxter MD  Orthopedic Surgery PGY1  [phone]    Attending MD (Dr. Mike Griffiths) :  I met with the patient, reviewed and verified the history and physical exam of the patient and discussed the patient's management with the other clinical providers involved in this patient's care including  any involved residents or physicians assistants. I reviewed the above note and agree with the documented findings and plan of care, which were communicated to the patient.      Mike Griffiths MD      Respectfully,  Mike Griffiths MD  Spine Surgery  [facility]

## 2023-12-06 ENCOUNTER — THERAPY VISIT (OUTPATIENT)
Dept: PHYSICAL THERAPY | Facility: CLINIC | Age: 73
End: 2023-12-06
Payer: MEDICARE

## 2023-12-06 DIAGNOSIS — M48.07 LUMBOSACRAL SPINAL STENOSIS: ICD-10-CM

## 2023-12-06 DIAGNOSIS — M54.17 LUMBOSACRAL RADICULOPATHY: ICD-10-CM

## 2023-12-06 PROCEDURE — 97162 PT EVAL MOD COMPLEX 30 MIN: CPT | Mod: GP

## 2023-12-06 PROCEDURE — 97110 THERAPEUTIC EXERCISES: CPT | Mod: GP

## 2023-12-06 NOTE — PROGRESS NOTES
"PHYSICAL THERAPY EVALUATION  Type of Visit: Evaluation    See electronic medical record for Abuse and Falls Screening details.    Subjective   Pt notes he has been struggling with back pain for years, but it has been getting worse over the past year as well. He was also struggling with prostate cancer from a year ago and felt his symptoms were related to treatments for that. He used to walk with a SPC, but ended up declining to using a walker as it was helpful to retention of symptoms. He notes significant difficulties with completion of transfers related to pain and weakness and will often fall back into the seat he is sitting in. He notes he will have increase in nerve pain in his R leg more initially, and has been having more in his L LE as well. He notes because of the difficulties with this he has been very limited with social activities. He notes he has been told from other surgeons that he would need surgery, but because of his medical history is not a good candidate. He describes his back pain as constant, pressure, aching, and radiating symptoms. He rates symptoms at best 5/10, but will get as significant as 8-9/10 pain. He states symptoms in his legs are more intense with burning sensation with \"pins and needles\" in his feet.       Presenting condition or subjective complaint: Low back pain with numbness and pain into legs and feet  Date of onset: 12/06/23    Relevant medical history: Anemia; Arthritis; Bladder or bowel problems; Cancer; COPD; Foot drop; High blood pressure; Incontinence; Kidney disease; Osteoarthritis; Overweight; Pain at night or rest; Progressive neurological deficits; Radiation treatment; Rheumatoid arthritis; Significant weakness; Sleep disorder like apnea; Smoking   Dates & types of surgery:      Prior diagnostic imaging/testing results: MRI; X-ray     MRI per report Impression:   No significant change in multilevel lumbar spondylosis greatest at  L4-5 where there is severe spinal " canal stenosis and at L5-S1 where  there is bilateral moderate neural foraminal stenosis . Mild  inflammatory facet joint arthropathy at L5-S1 and degenerative  endplate changes at L5-S1.  Prior therapy history for the same diagnosis, illness or injury: Yes Many trs ago and pt was stopped because making things worse    Living Environment  Social support: With a significant other or spouse   Type of home: [facility]; 1 level   Stairs to enter the home: No       Ramp: No   Stairs inside the home: No       Help at home: None  Equipment owned: Straight Cane; Walker; Grab bars; Raised toilet seat     Employment: No    Hobbies/Interests: [place] winter home, [place], social events    Patient goals for therapy: Get out of bed, chair, car, and off toilet w/o struggling and falling backwards and rliminate some of the pain.       Objective   LUMBAR SPINE EVALUATION  POSTURE: Sitting Posture: Rounded shoulders, Forward head, Thoracic kyphosis increased, Lateral shifting to the L.  GAIT:    ROM: CGA to [place] to maintain balance for lumbar motions listed.  (Degrees) Left AROM Left PROM  Right AROM Right PROM   Hip Flexion  70  100   Hip Internal Rotation  20  20   Hip External Rotation  30  30   Lumbar Side glide Pain with L radicular symptoms > Pain with R radicular symptoms   Lumbar Flexion Noted pain with B radicular symptoms.   Lumbar Extension Noted localized pain in back -   Pain:   End feel:   STRENGTH:  Noted weakness with pain to resisted B hip flexion, B knee extension, L knee flexion  NEURAL TENSION:  + B SLR with noted reduced symptoms with cervical extension and again with relaxation of Respective ankle DF.  FLEXIBILITY:  Noted limitations to muscle length testing of B iliopsoas, B TFL, B hamstring  FUNCTIONAL TESTS:  Pt unsteady with static standing with 0HHA  PALPATION:  Tenderness to palpation to lumbar paraspinal musculature, B iliopsoas, B TFL, B hamstring  SPINAL SEGMENTAL CONCLUSIONS:  Noted global  limitations to lumbar segmental mobility and B hip mobility    Assessment & Plan   CLINICAL IMPRESSIONS  Medical Diagnosis:      Treatment Diagnosis:     Impression/Assessment: Patient is a 73 year old male with Low back pain with radicular symptoms complaints.  The following significant findings have been identified: Pain, Decreased ROM/flexibility, Decreased joint mobility, Decreased strength, Impaired balance, Decreased proprioception, Impaired muscle performance, Decreased activity tolerance, and Impaired posture. These impairments interfere with their ability to perform self care tasks, work tasks, recreational activities, household chores, and community mobility as compared to previous level of function.     Clinical Decision Making (Complexity):  Clinical Presentation: Evolving/Changing  Clinical Presentation Rationale: based on medical and personal factors listed in PT evaluation  Clinical Decision Making (Complexity): Moderate complexity    PLAN OF CARE  Treatment Interventions:  Interventions: Gait Training, Manual Therapy, Neuromuscular Re-education, Therapeutic Activity, Therapeutic Exercise, Self-Care/Home Management    Long Term Goals     PT Goal 1  Goal Identifier: Transfers  Goal Description: Pt will demonstrate ability to complete transfers from variable surfaces and heights to demonstrate ability to complete bed, car, toilet, and general toilet transfers with minimal to no increase in knee symptoms.  Rationale: to maximize safety and independence with performance of ADLs and functional tasks  Target Date: 03/05/24      Frequency of Treatment: 1x/week  Duration of Treatment: 12 weeks    Education Assessment:   Learner/Method: Patient;Demonstration;Pictures/Video;No Barriers to Learning    Risks and benefits of evaluation/treatment have been explained.   Patient/Family/caregiver agrees with Plan of Care.     Evaluation Time:           Signing Clinician: ALICIA PATTERSON [facility]                                                                                   OUTPATIENT PHYSICAL THERAPY      PLAN OF TREATMENT FOR OUTPATIENT REHABILITATION   Patient's Last Name, First Name, Lucio Servin YOB: 1950   Provider's Name   [facility]   Medical Record No.  [id number]     Onset Date: 12/06/23  Start of Care Date: 12/06/23     Medical Diagnosis:         PT Treatment Diagnosis:    Plan of Treatment  Frequency/Duration: 1x/week/ 12 weeks    Certification date from 12/06/23 to 03/05/24         See note for plan of treatment details and functional goals     ALICIA SINHA                         I CERTIFY THE NEED FOR THESE SERVICES FURNISHED UNDER        THIS PLAN OF TREATMENT AND WHILE UNDER MY CARE     (Physician attestation of this document indicates review and certification of the therapy plan).              Referring Provider:  Mike Griffiths    Initial Assessment  See Epic Evaluation- Start of Care Date: 12/06/23

## 2023-12-10 ENCOUNTER — NURSE TRIAGE (OUTPATIENT)
Dept: NURSING | Facility: CLINIC | Age: 73
End: 2023-12-10
Payer: MEDICARE

## 2023-12-10 NOTE — TELEPHONE ENCOUNTER
COVID Positive/Requesting COVID treatment    Patient is positive for COVID and requesting treatment options.    Date of positive COVID test (PCR or at home)? 12/10/23  Current COVID symptoms: fever or chills, sore throat, and congestion or runny nose  Date COVID symptoms began: 12/09/23    Message should be routed to clinic RN pool. Best phone number to use for call back: [phone]   Reason for Disposition   [1] HIGH RISK patient (e.g., weak immune system, age > 64 years, obesity with BMI 30 or higher, pregnant, chronic lung disease or other chronic medical condition) AND [2] COVID symptoms (e.g., cough, fever)  (Exceptions: Already seen by PCP and no new or worsening symptoms.)    Additional Information   Negative: SEVERE difficulty breathing (e.g., struggling for each breath, speaks in single words)   Negative: Difficult to awaken or acting confused (e.g., disoriented, slurred speech)   Negative: Bluish (or gray) lips or face now   Negative: Shock suspected (e.g., cold/pale/clammy skin, too weak to stand, low BP, rapid pulse)   Negative: Sounds like a life-threatening emergency to the triager   Negative: [1] Diagnosed or suspected COVID-19 AND [2] symptoms lasting 3 or more weeks   Negative: [1] COVID-19 exposure AND [2] no symptoms   Negative: COVID-19 vaccine reaction suspected (e.g., fever, headache, muscle aches) occurring 1 to 3 days after getting vaccine   Negative: COVID-19 vaccine, questions about   Negative: [1] Lives with someone known to have influenza (flu test positive) AND [2] flu-like symptoms (e.g., cough, runny nose, sore throat, SOB; with or without fever)   Negative: [1] Possible COVID-19 symptoms AND [2] triager concerned about severity of symptoms or other causes   Negative: COVID-19 and breastfeeding, questions about   Negative: SEVERE or constant chest pain or pressure  (Exception: Mild central chest pain, present only when coughing.)   Negative: MODERATE difficulty breathing (e.g.,  speaks in phrases, SOB even at rest, pulse 100-120)   Negative: [1] Headache AND [2] stiff neck (can't touch chin to chest)   Negative: Oxygen level (e.g., pulse oximetry) 90 percent or lower   Negative: Chest pain or pressure  (Exception: MILD central chest pain, present only when coughing.)   Negative: [1] Drinking very little AND [2] dehydration suspected (e.g., no urine > 12 hours, very dry mouth, very lightheaded)   Negative: Patient sounds very sick or weak to the triager   Negative: MILD difficulty breathing (e.g., minimal/no SOB at rest, SOB with walking, pulse <100)   Negative: Fever > 103 F (39.4 C)   Negative: [1] Fever > 101 F (38.3 C) AND [2] age > 60 years   Negative: [1] Fever > 100.0 F (37.8 C) AND [2] bedridden (e.g., CVA, chronic illness, recovering from surgery)   Negative: Oxygen level (e.g., pulse oximetry) 91 to 94 percent    Protocols used: Coronavirus (COVID-19) Diagnosed or [id number]

## 2023-12-11 ENCOUNTER — MYC MEDICAL ADVICE (OUTPATIENT)
Dept: FAMILY MEDICINE | Facility: CLINIC | Age: 73
End: 2023-12-11

## 2023-12-11 ENCOUNTER — VIRTUAL VISIT (OUTPATIENT)
Dept: FAMILY MEDICINE | Facility: CLINIC | Age: 73
End: 2023-12-11
Payer: MEDICARE

## 2023-12-11 DIAGNOSIS — I10 HYPERTENSION, UNSPECIFIED TYPE: Primary | ICD-10-CM

## 2023-12-11 DIAGNOSIS — U07.1 INFECTION DUE TO 2019 NOVEL CORONAVIRUS: Primary | ICD-10-CM

## 2023-12-11 DIAGNOSIS — J44.81 OBLITERATIVE BRONCHIOLITIS (H): ICD-10-CM

## 2023-12-11 DIAGNOSIS — I10 PRIMARY HYPERTENSION: ICD-10-CM

## 2023-12-11 PROCEDURE — 99214 OFFICE O/P EST MOD 30 MIN: CPT | Mod: VID | Performed by: NURSE PRACTITIONER

## 2023-12-11 RX ORDER — ACETAMINOPHEN 500 MG
500-1000 TABLET ORAL EVERY 4 HOURS PRN
Qty: 30 TABLET | Refills: 0 | Status: SHIPPED | OUTPATIENT
Start: 2023-12-11

## 2023-12-11 ASSESSMENT — ENCOUNTER SYMPTOMS
DIAPHORESIS: 0
FATIGUE: 0
SORE THROAT: 1
SINUS PRESSURE: 0
CHILLS: 0
HEADACHES: 1
ARTHRALGIAS: 1
WHEEZING: 1
NAUSEA: 0
VOMITING: 0
RHINORRHEA: 0
EYE DISCHARGE: 0
CHEST TIGHTNESS: 1
FEVER: 0
DIARRHEA: 0
MYALGIAS: 1
SHORTNESS OF BREATH: 0
COUGH: 1

## 2023-12-11 ASSESSMENT — ASTHMA QUESTIONNAIRES: ACT_TOTALSCORE: 22

## 2023-12-11 NOTE — PROGRESS NOTES
Syed is a 73 year old who is being evaluated via a billable video visit.      How would you like to obtain your AVS? Gerda  If the video visit is dropped, the invitation should be resent by: Text to cell phone: [phone]  Will anyone else be joining your video visit? No          Infection due to 2019 novel coronavirus  Patient is a 73 who presents to clinic due to symptoms of COVID-19 starting on 12/3 with subsequent positive test results.  Patient is able to speak in full sentences-no signs of respiratory distress. Per CDC criteria, patient qualifies for prescribed treatment of COVID19. Discussed treatment options for COVID-19 as well as risks and benefits.  Patient would like to discuss treatment with Paxlovid further with family members and other involved healthcare providers. Discussed home medications and possible interactions.  Also discussed OTC options for managing symptoms of COVID-19.  Return and urgent/emergent follow-up precautions provided.   - nirmatrelvir and ritonavir (PAXLOVID) 150 mg/100 mg therapy pack; Take 2 tablets by mouth 2 times daily for 5 days (Take one tablet of Nirmatelvir and 1 tablet of Ritonavir twice daily for 5 days)  - acetaminophen (TYLENOL) 500 MG tablet; Take 1-2 tablets (500-1,000 mg) by mouth every 4 hours as needed for mild pain    Obliterative bronchiolitis  Hold Serevent while taking Paxlovid.  Continue to hold for 3 days after Paxlovid prescription is completed.  May use albuterol as needed.  Has available at home.    Primary hypertension  Decrease dose of amlodipine in half-2.5 mg daily while taking Paxlovid.  Continue decreased dose for 3 days after Paxlovid is completed and then may increase back to usual dosing.    Subjective   Syed is a 73 year old, presenting for the following health issues:  Video Visit (COVID Tx)        12/11/2023    10:00 AM   Additional Questions   Roomed by Gerda- self check in   Accompanied by Self         12/11/2023    10:00 AM    Patient Reported Additional Medications   Patient reports taking the following new medications NA       History of Present Illness       Reason for visit:  Covid symptoms.  Positive test 12/10  Symptom onset:  1-3 days ago  Symptoms include:  Nasal congestion. Chest tightness , some cough and shortness of breath.. Dry throat. Feel like hit by a truck.  Symptom intensity:  Mild  Symptom progression:  Staying the same  Had these symptoms before:  Yes    He eats 2-3 servings of fruits and vegetables daily.He consumes 0 sweetened beverage(s) daily.He exercises with enough effort to increase his heart rate 20 to 29 minutes per day.  He exercises with enough effort to increase his heart rate 6 days per week. He is missing 1 dose(s) of medications per week.  He is not taking prescribed medications regularly due to remembering to take.         COVID-19 Symptom Review  How many days ago did these symptoms start? 2 days    Are any of the following symptoms significant for you?  New or worsening difficulty breathing? Yes  Please describe what kind of difficulty you are having breathing:Moderate dyspnea (short of breath with ADLs, shortness of breath that limits the ability to walk up one flight of stairs without needing to rest)  Worsening cough? Yes, it's a dry cough.   Fever or chills? No  Headache: YES  Sore throat: YES  Chest pain: YES  Diarrhea: No  Body aches? YES    What treatments has patient tried? None   Does patient live in a nursing home, group home, or shelter? No  Does patient have a way to get food/medications during quarantined? Yes, I have a friend or family member who can help me.            Virtual visit completed due to being COVID-19 positive.  Tested positive test on 12/10. 12/9-started to feel like got a cold. Went to [place] on Tues. 12/5 found out a few days later that a woman sitting behind him home was coughing tested positive for COVID-19.  Did have COVID booster in September or October  2023.  Has had COVID-19 in the past-last February when was in [place].  Currently has headache, dry cough, occasional wheezing, sore throat and chest feels tight.  No difficulty breathing.      Review of Systems   Constitutional:  Negative for chills, diaphoresis, fatigue and fever.   HENT:  Positive for sore throat. Negative for congestion, ear pain, rhinorrhea and sinus pressure.    Eyes:  Negative for discharge.   Respiratory:  Positive for cough, chest tightness and wheezing. Negative for shortness of breath.    Cardiovascular:  Negative for chest pain.   Gastrointestinal:  Negative for diarrhea, nausea and vomiting.   Musculoskeletal:  Positive for arthralgias and myalgias.   Neurological:  Positive for headaches.          Objective           Vitals:  No vitals were obtained today due to virtual visit.    Physical Exam  Constitutional:       General: He is not in acute distress.     Appearance: Normal appearance. He is not toxic-appearing.      Comments: Sounds congested   HENT:      Nose: No congestion.   Eyes:      General: Lids are normal.   Pulmonary:      Effort: Pulmonary effort is normal. No tachypnea, bradypnea or respiratory distress.      Comments: Speaking in full sentences, no audible wheezing.  Skin:     Coloration: Skin is not ashen, cyanotic, jaundiced or pale.   Neurological:      Mental Status: He is alert.   Psychiatric:         Mood and Affect: Mood normal.         Speech: Speech normal.         Behavior: Behavior normal. Behavior is cooperative.                  Video-Visit Details    Type of service:  Video Visit   Video Start Time: 12:03 PM  Video End Time:12:22 PM    Originating Location (pt. Location): Home    Distant Location (provider location):  On-site  Platform used for Video Visit: [employer]

## 2023-12-11 NOTE — PATIENT INSTRUCTIONS
Bobby Lynch     Based on your health history you do qualify for treatment of COVID-19.  For treatment you have been prescribed Paxlovid.  Paxlovid is a combined antiviral medication that reduces risk of hospitalization or severe COVID by 90%.      You may continue to use Tylenol for fevers, headache, body ache.  You can also use over-the-counter decongestants and cough suppressants to help manage symptoms.    Syed, if you decide to get the Paxlovid filled recommend that you decrease your dose of amlodipine/Norvasc by half.  You can cut your current tablets in half.  Want you to continue this dose for 3 days after your last dose of Paxlovid.  Additionally, do not take your Serevent if you decide to take the Paxlovid.  Would just recommend that you hold this medication and use the albuterol as needed.  Want you to continue to hold it for 3 days after your last dose of Paxlovid.  The dose of Paxlovid that was sent to the pharmacy for you has been adjusted due to your current renal function.  You should take 2 tablets by mouth twice per day for 5 days.  Make sure you are drinking plenty of fluids.  You can take over-the-counter Tylenol as needed for discomfort.  Please try and take 8-10 big deep breaths every hour while you are awake to help prevent pneumonia.  If you develop any severe symptoms of medication reaction or COVID-19 including chest pain, coughing up blood, shortness of breath, swelling, rashes, or any other severe symptoms, please call 911/go to the emergency department.  Please reach out with any questions or concerns.  Take Care,  Cristela Rudolph NP-[facility]   For informational purposes only. Not to replace the advice of your health care provider. Copyright   2022 [employer] [facility]. All rights reserved. Clinically reviewed by Stacey Phelps, PharmD, BCACP. [employer] 382093 - REV 06/23.  COVID-19 Outpatient Treatments  Your care team can help you find the best treatments for COVID-19. Talk to a health care  provider or refer to the FDA medicine fact sheets below.  Paxlovid (nirmatrelvir and ritonavir): https://www.paxlovid.com/resources  Molnupiravir (Lagevrio): https://www.fda.gov/media/070563/download  Important: We can only prescribe Paxlovid or Molnupiravir when it can be started within 5 days of first having symptoms.  Paxlovid (nimatrelvir and ritonavir)  How it works  Two medicines (nirmatrelvir and ritonavir) are taken together. They stop the virus from growing. Less amount of virus is easier for your body to fight.  Benefits  Lowers risk of a hospital stay or death from COVID-19.  How to take  Medicine comes in a daily container with both medicine tablets. Take by mouth twice daily (once in the morning, once at night) for 5 days.  The number of tablets to take varies by patient.  Don't chew or break capsules. Swallow whole.  When to take  Take it as soon as possible and within 5 days of your first symptoms.  Who can take it  Patients must be 12 years or older weigh at least 88 pounds. Paxlovid is the preferred treatment for pregnant patients.  Possible side effects  Can cause altered sense of taste, diarrhea (loose, watery stools), high blood pressure, muscle aches.  Medicine conflicts  Some medicines may conflict with Paxlovid and may cause serious side effects.  Tell your care team about all the medicines you take, including prescription and over-the-counter medicines, vitamins, and herbal supplements.  Your care team will review your medicines to make sure that you can safely take Paxlovid.  Cautions  Paxlovid is not advised for patients with severe kidney or liver disease. If you have kidney or liver problems, the dose may need to be changed.  If you're pregnant or breastfeeding, talk to your care team about your options.  If you take hormonal birth control (such as the Pill), then you or your partner should also use a non-hormonal form of birth control (such as a condom). Keep doing this for 1 menstrual  cycle after your last dose of Paxlovid.  Molnupiravir (lagevrio)  How it works  Stops the virus from growing. Less amount of virus is easier for your body to fight.  Benefits  Lowers risk of a hospital stay or death from COVID-19.  How to take  Take 4 capsules by mouth every 12 hours (4 in the morning and 4 at night) for 5 days. Don't chew or break capsules. Swallow whole.  When to take  Take as soon as possible and within 5 days of your first symptoms.  Who can take it  Patients must be 18 years or older.   Possible side effects  Diarrhea (loose, watery stools), nausea (feeling sick to your stomach), dizziness, headaches.  Medicine conflicts  Right now, there are no known conflicts with other drugs. But tell your care team about all medicines you take.  Cautions  This medicine is not advised for patients who are pregnant.  If you are someone who could become pregnant, use trusted birth control until 4 days after your last dose of molnupiravir.  If your partner could become pregnant, you should use trusted birth control until 3 months after your last dose of molnupiravir.

## 2023-12-11 NOTE — TELEPHONE ENCOUNTER
RN COVID TREATMENT VISIT  12/11/23      The patient has been triaged and does not require a higher level of care.    Lucio Daly  73 year old  Current weight? 296 lbs    Has the patient been seen by a primary care provider at an [facility] or [facility] within the past two years? Yes.   Have you been in close proximity to/do you have a known exposure to a person with a confirmed case of influenza? No.     General treatment eligibility:  Date of positive COVID test (PCR or at home)?  12/10/23    Are you or have you been hospitalized for this COVID-19 infection? No.   Have you received monoclonal antibodies or antiviral treatment for COVID-19 since this positive test? No.   Do you have any of the following conditions that place you at risk of being very sick from COVID-19?   - Age 50 years or older  Yes, patient has at least one high risk condition as noted above.     Current COVID symptoms:   - cough  - fatigue  - headache  - sore throat  - congestion or runny nose  Yes. Patient has at least one symptom as selected.     How many days since symptoms started? 5 days or less. Established patient, 12 years or older weighing at least 88.2 lbs, who has symptoms that started in the past 5 days, has not been hospitalized nor received treatment already, and is at risk for being very sick from COVID-19.     Treatment eligibility by RN:  Are you currently pregnant or nursing? No  Do you have a clinically significant hypersensitivity to nirmatrelvir or ritonavir, or toxic epidermal necrolysis (TEN) or Cee-Mando Syndrome? No  Do you have a history of hepatitis, any hepatic impairment on the Problem List (such as Child-Huber Class C, cirrhosis, fatty liver disease, alcoholic liver disease), or was the last liver lab (hepatic panel, ALT, AST, ALK Phos, bilirubin) elevated in the past 6 months? No  Do you have any history of severe renal impairment (eGFR < 30mL/min)? No    Is patient eligible  to continue? Yes, patient meets all eligibility requirements for the RN COVID treatment (as denoted by all no responses above).     Current Outpatient Medications   Medication Sig Dispense Refill    acetaminophen (TYLENOL) 500 MG tablet 2 x 500mg by mouth 3 times per day: 7/730am, 4pm and 11pm  = 6/day      albuterol (PROAIR HFA/PROVENTIL HFA/VENTOLIN HFA) 108 (90 Base) MCG/ACT inhaler Inhale 2 puffs into the lungs every 4 hours as needed for shortness of breath or wheezing 18 g 3    amLODIPine (NORVASC) 5 MG tablet TAKE 1 TABLET(5 MG) BY MOUTH DAILY 90 tablet 3    cholecalciferol (HM VITAMIN D3) 25 MCG (1000 UT) TABS 1000 unit tab by mouth daily      folic acid (FOLVITE) 1 MG tablet Take 1 tablet (1 mg) by mouth daily 90 tablet 3    hydroxychloroquine (PLAQUENIL) 200 MG tablet Take 1 tablet (200 mg) by mouth daily Daily at 9am. 90 tablet 3    lisinopril-hydrochlorothiazide (ZESTORETIC) 20-25 MG tablet Take 1 tablet by mouth daily 90 tablet 3    methotrexate 2.5 MG tablet TAKE 8 TABLETS BY MOUTH ONCE PER WEEK. 96 tablet 2    metoprolol succinate ER (TOPROL XL) 50 MG 24 hr tablet 1 and 1/2 tabs daily. 135 tablet 3    omeprazole (PRILOSEC) 40 MG DR capsule TAKE 1 CAPSULE(40 MG) BY MOUTH DAILY 90 capsule 0    ORDER FOR DME Use your BiPAP device as directed by your provider.      oxybutynin (DITROPAN) 5 MG tablet TAKE 1 TABLET BY MOUTH DAILY AT 4PM 180 tablet 3    pregabalin (LYRICA) 50 MG capsule 50mg capsule by mouth at 7am and 4pm and 1-2 x 50mg capsules by mouth nightly at 11pm (4/day) 360 capsule 3    salmeterol (SEREVENT DISKUS) 50 MCG/ACT inhaler Inhale 1 puff into the lungs 2 times daily 60 each 11       Medications from List 1 of the standing order (on medications that exclude the use of Paxlovid) that patient is taking: NONE. Is patient taking [place]'s Wort? No  Is patient taking Oaks's Wort or any meds from List 1? No.   Medications from List 2 of the standing order (on meds that provider needs to  adjust) that patient is taking: amlodipine (Norvasc), explained a provider visit is necessary to discuss medication adjustments while taking Paxlovid.  salmeterol and salmeterol-containing medications (Advair), explained a provider visit is necessary to discuss medication adjustments while taking Paxlovid. Is patient on any of the meds from List 2? Yes. Patient will be scheduled or transferred to a  at the end of this call.   Ramón Chandler RN

## 2023-12-15 ASSESSMENT — ENCOUNTER SYMPTOMS
NERVOUS/ANXIOUS: 0
MYALGIAS: 1
COUGH: 0
EYE PAIN: 0
ABDOMINAL PAIN: 0
CONSTIPATION: 0
NAUSEA: 0
PALPITATIONS: 0
JOINT SWELLING: 0
WEAKNESS: 1
SHORTNESS OF BREATH: 0
HEMATURIA: 0
DIZZINESS: 0
HEMATOCHEZIA: 0
FEVER: 0
FREQUENCY: 0
HEADACHES: 0
DIARRHEA: 0
PARESTHESIAS: 0
CHILLS: 0
HEARTBURN: 0
SORE THROAT: 0
ARTHRALGIAS: 1
DYSURIA: 0

## 2023-12-15 ASSESSMENT — ACTIVITIES OF DAILY LIVING (ADL): CURRENT_FUNCTION: NO ASSISTANCE NEEDED

## 2023-12-20 ENCOUNTER — PRE VISIT (OUTPATIENT)
Dept: NEPHROLOGY | Facility: CLINIC | Age: 73
End: 2023-12-20
Payer: MEDICARE

## 2023-12-22 ENCOUNTER — OFFICE VISIT (OUTPATIENT)
Dept: PEDIATRICS | Facility: CLINIC | Age: 73
End: 2023-12-22
Payer: MEDICARE

## 2023-12-22 VITALS
HEART RATE: 62 BPM | BODY MASS INDEX: 41.54 KG/M2 | OXYGEN SATURATION: 98 % | TEMPERATURE: 97.8 F | RESPIRATION RATE: 20 BRPM | HEIGHT: 71 IN | DIASTOLIC BLOOD PRESSURE: 70 MMHG | SYSTOLIC BLOOD PRESSURE: 112 MMHG | WEIGHT: 296.7 LBS

## 2023-12-22 DIAGNOSIS — E66.01 MORBID OBESITY (H): ICD-10-CM

## 2023-12-22 DIAGNOSIS — M48.07 LUMBOSACRAL SPINAL STENOSIS: ICD-10-CM

## 2023-12-22 DIAGNOSIS — Z29.11 NEED FOR VACCINATION AGAINST RESPIRATORY SYNCYTIAL VIRUS: ICD-10-CM

## 2023-12-22 DIAGNOSIS — Z00.00 ENCOUNTER FOR MEDICARE ANNUAL WELLNESS EXAM: Primary | ICD-10-CM

## 2023-12-22 DIAGNOSIS — E74.819 DISORDERS OF GLUCOSE TRANSPORT, UNSPECIFIED (H): ICD-10-CM

## 2023-12-22 DIAGNOSIS — G70.9 NEUROMUSCULAR DISEASE (H): ICD-10-CM

## 2023-12-22 DIAGNOSIS — I10 PRIMARY HYPERTENSION: ICD-10-CM

## 2023-12-22 DIAGNOSIS — J42 CHRONIC BRONCHITIS, UNSPECIFIED CHRONIC BRONCHITIS TYPE (H): ICD-10-CM

## 2023-12-22 DIAGNOSIS — Z23 NEED FOR SHINGLES VACCINE: ICD-10-CM

## 2023-12-22 DIAGNOSIS — C61 PROSTATE CANCER (H): ICD-10-CM

## 2023-12-22 DIAGNOSIS — I10 ESSENTIAL HYPERTENSION: ICD-10-CM

## 2023-12-22 DIAGNOSIS — J44.81 BRONCHIOLITIS OBLITERANS (H): ICD-10-CM

## 2023-12-22 DIAGNOSIS — M06.9 RHEUMATOID ARTHRITIS, INVOLVING UNSPECIFIED SITE, UNSPECIFIED WHETHER RHEUMATOID FACTOR PRESENT (H): ICD-10-CM

## 2023-12-22 DIAGNOSIS — R32 URINARY INCONTINENCE, UNSPECIFIED TYPE: ICD-10-CM

## 2023-12-22 DIAGNOSIS — D84.9 IMMUNOSUPPRESSED STATUS (H): ICD-10-CM

## 2023-12-22 DIAGNOSIS — R19.7 DIARRHEA, UNSPECIFIED TYPE: ICD-10-CM

## 2023-12-22 LAB
CHOLEST SERPL-MCNC: 174 MG/DL
FASTING STATUS PATIENT QL REPORTED: YES
HBA1C MFR BLD: 5.7 % (ref 0–5.6)
HDLC SERPL-MCNC: 43 MG/DL
LDLC SERPL CALC-MCNC: 109 MG/DL
NONHDLC SERPL-MCNC: 131 MG/DL
TRIGL SERPL-MCNC: 108 MG/DL

## 2023-12-22 PROCEDURE — G0439 PPPS, SUBSEQ VISIT: HCPCS | Performed by: INTERNAL MEDICINE

## 2023-12-22 PROCEDURE — 83036 HEMOGLOBIN GLYCOSYLATED A1C: CPT | Mod: GZ | Performed by: INTERNAL MEDICINE

## 2023-12-22 PROCEDURE — 99214 OFFICE O/P EST MOD 30 MIN: CPT | Mod: 25 | Performed by: INTERNAL MEDICINE

## 2023-12-22 PROCEDURE — 80061 LIPID PANEL: CPT | Performed by: INTERNAL MEDICINE

## 2023-12-22 PROCEDURE — 86364 TISS TRNSGLTMNASE EA IG CLAS: CPT | Performed by: INTERNAL MEDICINE

## 2023-12-22 PROCEDURE — 36415 COLL VENOUS BLD VENIPUNCTURE: CPT | Performed by: INTERNAL MEDICINE

## 2023-12-22 RX ORDER — METOPROLOL SUCCINATE 50 MG/1
TABLET, EXTENDED RELEASE ORAL
Qty: 135 TABLET | Refills: 3 | Status: SHIPPED | OUTPATIENT
Start: 2023-12-22

## 2023-12-22 RX ORDER — AZITHROMYCIN 250 MG/1
TABLET, FILM COATED ORAL
COMMUNITY
Start: 2023-08-10 | End: 2023-12-22

## 2023-12-22 RX ORDER — RESPIRATORY SYNCYTIAL VIRUS VACCINE 120MCG/0.5
0.5 KIT INTRAMUSCULAR ONCE
Qty: 1 EACH | Refills: 0 | Status: CANCELLED | OUTPATIENT
Start: 2023-12-22 | End: 2023-12-22

## 2023-12-22 ASSESSMENT — ENCOUNTER SYMPTOMS
SORE THROAT: 0
DIZZINESS: 0
HEMATURIA: 0
JOINT SWELLING: 0
ABDOMINAL PAIN: 0
CONSTIPATION: 0
PALPITATIONS: 0
EYE PAIN: 0
FREQUENCY: 0
SHORTNESS OF BREATH: 0
DYSURIA: 0
HEMATOCHEZIA: 0
MYALGIAS: 1
NAUSEA: 0
FEVER: 0
HEARTBURN: 0
NERVOUS/ANXIOUS: 0
CHILLS: 0
WEAKNESS: 1
ARTHRALGIAS: 1
COUGH: 0
PARESTHESIAS: 0
HEADACHES: 0
DIARRHEA: 0

## 2023-12-22 ASSESSMENT — PAIN SCALES - GENERAL: PAINLEVEL: NO PAIN (0)

## 2023-12-22 ASSESSMENT — ACTIVITIES OF DAILY LIVING (ADL): CURRENT_FUNCTION: NO ASSISTANCE NEEDED

## 2023-12-22 NOTE — PATIENT INSTRUCTIONS
Get RSV shot at a pharmacy.    If you test positive, stay masked until you test negative.        Patient Education   Personalized Prevention Plan  You are due for the preventive services outlined below.  Your care team is available to assist you in scheduling these services.  If you have already completed any of these items, please share that information with your care team to update in your medical record.  Health Maintenance Due   Topic Date Due    RSV VACCINE (Pregnancy & 60+) (1 - 1-dose 60+ series) Never done    Zoster (Shingles) Vaccine (2 of 2) 12/13/2018    Asthma Action Plan - yearly  11/05/2020    ANNUAL REVIEW OF HM ORDERS  09/30/2022    COVID-19 Vaccine (6 - 2023-24 season) 09/01/2023    Annual Wellness Visit  12/20/2023

## 2023-12-22 NOTE — PROGRESS NOTES
"SUBJECTIVE:   Syed is a 73 year old, presenting for the following:  Wellness Visit        12/22/2023     8:45 AM   Additional Questions   Roomed by ELVA Stern   Accompanied by n/a         12/22/2023     8:45 AM   Patient Reported Additional Medications   Patient reports taking the following new medications n/a       Are you in the first 12 months of your Medicare coverage?  No      Still with significant back pain on standing. Discomfort numbness when sits.  Feels like legs sometimes \"give out.\"  Using walker, since cane makes back pain worse.  Seen 2 weeks ago by Dr. Griffiths from orthopedics, and had epidural steroid injection scheduled, but got COVID.  Also physical therapy, and has begun this.  Past steroid injections caused neurogenic bladder to flare up. Has follow up in April.    COVID:  tested positive 1 week ago, but decided not to treat himself with paxlovid, since didn't want to stop his amlodipine and serevent.     Going to [place] and needs labs ordered for his methotrexate in March.    Weight stable.  No leg swelling.    Would like celiac testing.     Healthy Habits:     In general, how would you rate your overall health?  Good    Frequency of exercise:  6-7 days/week    Duration of exercise:  15-30 minutes    Do you usually eat at least 4 servings of fruit and vegetables a day, include whole grains    & fiber and avoid regularly eating high fat or \"junk\" foods?  No    Taking medications regularly:  Yes    Ability to successfully perform activities of daily living:  No assistance needed    Home Safety:  No safety concerns identified    Hearing Impairment:  No hearing concerns    In the past 6 months, have you been bothered by leaking of urine? Yes    In general, how would you rate your overall mental or emotional health?  Good    Additional concerns today:  Yes      Today's PHQ-2 Score:       12/21/2023     9:21 AM   PHQ-2 ( 1999 Pfizer)   Q1: Little interest or pleasure in doing things 0   Q2: Feeling " down, depressed or hopeless 0   PHQ-2 Score 0   Q1: Little interest or pleasure in doing things Not at all   Q2: Feeling down, depressed or hopeless Not at all   PHQ-2 Score 0       CONCERNS: Tested positive for Covid on the 10th and is still positive and how long until testing negative and if it is ok to be around family. Referral letter for blood work for [place] in February. Cysts on left wrist. Blood check on celiac. Medication went up that you prescribed for him. Would like refill renewals for . Mobility has decrease and is in a lot of pain, had MRI done this month and was told that his pinched nerves/numbness.       Have you ever done Advance Care Planning? (For example, a Health Directive, POLST, or a discussion with a medical provider or your loved ones about your wishes): Yes, advance care planning is on file.       Fall risk  Fallen 2 or more times in the past year?: Yes  Any fall with injury in the past year?: No  click delete button to remove this line now  Cognitive Screening   1) Repeat 3 items (Leader, Season, Table)    2) Clock draw: ABNORMAL    3) 3 item recall: Recalls 3 objects  Results: 3 items recalled: COGNITIVE IMPAIRMENT LESS LIKELY    Mini-CogTM Copyright S Avi. Licensed by the author for use in [facility]; reprinted with permission (raul@[facility]). All rights reserved.      Do you have sleep apnea, excessive snoring or daytime drowsiness? : Uses CPAP    Reviewed and updated as needed this visit by clinical staff   Tobacco  Allergies  Meds              Reviewed and updated as needed this visit by Provider                 Social History     Tobacco Use    Smoking status: Former     Packs/day: 1.50     Years: 37.00     Additional pack years: 0.00     Total pack years: 55.50     Types: Cigarettes     Start date: 6/15/1963     Quit date: 2000     Years since quittin.2    Smokeless tobacco: Never   Substance Use Topics    Alcohol use: Not Currently              12/15/2023    10:01 AM   Alcohol Use   Prescreen: >3 drinks/day or >7 drinks/week? Not Applicable     Do you have a current opioid prescription? No  Do you use any other controlled substances or medications that are not prescribed by a provider? None              Current providers sharing in care for this patient include:   Patient Care Team:  Heladio Calixto MD as PCP - General (Internal Medicine - Pediatrics)  Jeremy Goodrich MD as MD (Rheumatology)  Benjamin Ordoñez MD as MD (Dermapathology)  Kristen Martinez MD as MD (Family Medicine, Sleep Medicine)  Rhett Shah MD as Surgeon (Surgery)  Sin Snow APRN CNP as Nurse Practitioner (Nurse Practitioner)  Leonel Farnsworth MD as MD (Ophthalmology)  Gómez Velásquez DPM (Podiatry)  Rodney Ríos DPM (Podiatry)  Heladio Gonzalez MD as MD (Neurology)  Robb Zavala MD as MD (Ophthalmology)  Bipin Villatoro MD as MD (Family Practice)  Thong Montes MD as MD (Neurology)  Harsha Mccarthy MD as MD (Pulmonary Disease)  Sin Snow APRN CNP as Referring Physician (Nurse Practitioner)  Jeremy Goodrich MD as MD (Rheumatology)  Ramón Jeronimo MD as MD (Family Medicine - Sports Medicine)  Ramón Camargo MD as MD (Hand Surgery)  Rodney Garcia MD as MD (Clinical Neurophysiology)  Jeremy Goodrich MD as Assigned Rheumatology Provider  Heladio Calixto MD as Assigned PCP  Heladio Calixto MD as Referring Physician (Internal Medicine - Pediatrics)  Silviano Vargas MD as MD (Urology)  Silviano Vargas MD as Assigned Surgical Provider  Rodney Ríos DPM as Assigned Musculoskeletal Provider  Cristina Bhat DO as MD (Neurology)  Ramón Bennett MD as Assigned Neuroscience Provider  Denise Landeros MD as Resident (Student in organized health care education/training program)    The following health maintenance items are reviewed in Epic and correct as of today:  Health  Maintenance   Topic Date Due    RSV VACCINE (Pregnancy & 60+) (1 - 1-dose 60+ series) Never done    ZOSTER IMMUNIZATION (2 of 2) 12/13/2018    ASTHMA ACTION PLAN  11/05/2020    ANNUAL REVIEW OF HM ORDERS  09/30/2022    COVID-19 Vaccine (6 - 2023-24 season) 09/01/2023    MEDICARE ANNUAL WELLNESS VISIT  12/20/2023    ASTHMA CONTROL TEST  06/11/2024    EYE EXAM  08/22/2024    FALL RISK ASSESSMENT  12/22/2024    LIPID  11/12/2025    ADVANCE CARE PLANNING  12/20/2027    DTAP/TDAP/TD IMMUNIZATION (3 - Td or Tdap) 08/21/2028    HEPATITIS C SCREENING  Completed    PHQ-2 (once per calendar year)  Completed    INFLUENZA VACCINE  Completed    Pneumococcal Vaccine: 65+ Years  Completed    AORTIC ANEURYSM SCREENING (SYSTEM ASSIGNED)  Completed    IPV IMMUNIZATION  Aged Out    HPV IMMUNIZATION  Aged Out    MENINGITIS IMMUNIZATION  Aged Out    RSV MONOCLONAL ANTIBODY  Aged Out    COLORECTAL CANCER SCREENING  Discontinued     Lab work is in process  Labs reviewed in EPIC          Review of Systems   Constitutional:  Negative for chills and fever.   HENT:  Negative for congestion, ear pain, hearing loss and sore throat.    Eyes:  Negative for pain and visual disturbance.   Respiratory:  Negative for cough and shortness of breath.    Cardiovascular:  Negative for chest pain, palpitations and peripheral edema.   Gastrointestinal:  Negative for abdominal pain, constipation, diarrhea, heartburn, hematochezia and nausea.   Genitourinary:  Negative for dysuria, frequency, genital sores, hematuria, impotence, penile discharge and urgency.   Musculoskeletal:  Positive for arthralgias and myalgias. Negative for joint swelling.   Skin:  Negative for rash.   Neurological:  Positive for weakness. Negative for dizziness, headaches and paresthesias.   Psychiatric/Behavioral:  Positive for mood changes. The patient is not nervous/anxious.      CONSTITUTIONAL: NEGATIVE for fever, chills, change in weight  INTEGUMENTARY/SKIN: NEGATIVE for worrisome  "rashes, moles or lesions  EYES: NEGATIVE for vision changes or irritation  ENT/MOUTH: NEGATIVE for ear, mouth and throat problems  RESP: NEGATIVE for significant cough or SOB  BREAST: NEGATIVE for masses, tenderness or discharge  CV: NEGATIVE for chest pain, palpitations or peripheral edema  GI: NEGATIVE for nausea, abdominal pain, heartburn, or change in bowel habits  : NEGATIVE for frequency, dysuria, or hematuria  MUSCULOSKELETAL: NEGATIVE for significant arthralgias or myalgia  NEURO: NEGATIVE for weakness, dizziness or paresthesias  ENDOCRINE: NEGATIVE for temperature intolerance, skin/hair changes  HEME: NEGATIVE for bleeding problems  PSYCHIATRIC: NEGATIVE for changes in mood or affect    OBJECTIVE:   /70 (BP Location: Right arm, Patient Position: Sitting, Cuff Size: Adult Large)   Pulse 62   Temp 97.8  F (36.6  C) (Oral)   Resp 20   Ht 1.803 m (5' 11\")   Wt 134.6 kg (296 lb 11.2 oz)   SpO2 98%   BMI 41.38 kg/m   Estimated body mass index is 41.38 kg/m  as calculated from the following:    Height as of this encounter: 1.803 m (5' 11\").    Weight as of this encounter: 134.6 kg (296 lb 11.2 oz).  Physical Exam  GENERAL: healthy, alert and no distress  EYES: Eyes grossly normal to inspection, PERRL and conjunctivae and sclerae normal  HENT: ear canals and TM's normal, nose and mouth without ulcers or lesions  NECK: no adenopathy, no asymmetry, masses, or scars and thyroid normal to palpation  RESP: lungs clear to auscultation - no rales, rhonchi or wheezes  CV: regular rate and rhythm, normal S1 S2, no S3 or S4, no murmur, click or rub, no peripheral edema and peripheral pulses strong  ABDOMEN: soft, nontender, no hepatosplenomegaly, no masses and bowel sounds normal   (male): normal male genitalia without lesions or urethral discharge, no hernia  MS: no gross musculoskeletal defects noted, no edema  SKIN: no suspicious lesions or rashes  NEURO: Normal strength and tone, mentation intact and " speech normal  PSYCH: mentation appears normal, affect normal/bright    Diagnostic Test Results:  Labs reviewed in Epic    ASSESSMENT / PLAN:   (Z00.00) Encounter for Medicare annual wellness exam  (primary encounter diagnosis)  Comment:   Plan: Hemoglobin A1c, Lipid panel reflex to direct         LDL Fasting        Discussed diet, exercise, testicular self exam, blood pressure, cholesterol, and need for cancer surveillance at appropriate ages.     (Z23) Need for shingles vaccine  Comment:   Plan:     (Z29.11) Need for vaccination against respiratory syncytial virus  Comment:   Plan:     (J42) Chronic bronchitis, unspecified chronic bronchitis type (H)  Comment:   Plan: OFFICE/OUTPT VISIT,EST,LEVL IV        Management per pulm.  Doing well, despite recent COVID>     (G70.9) Neuromuscular disease (H)  Comment:   Plan: OFFICE/OUTPT VISIT,EST,LEVL IV            (E66.01) Morbid obesity (H)  Comment:   Plan: OFFICE/OUTPT VISIT,EST,LEVL IV        Ongoing discussion about weight and carbs.     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    (I10) Primary hypertension  Comment:   Plan: Hemoglobin A1c, Lipid panel reflex to direct         LDL Fasting, OFFICE/OUTPT VISIT,EST,LEVL IV        At goal.     (D84.9) Immunosuppressed status (H24)  Comment:   Plan: OFFICE/OUTPT VISIT,EST,LEVL IV, CANCELED:         Comprehensive metabolic panel (BMP + Alb, Alk         Phos, ALT, AST, Total. Bili, TP), CANCELED: CBC        with platelets and differential        Labs in 3 months, in [place]; ordered here.     (C61) Prostate cancer (H)  Comment:   Plan: prostate specific antigen (PSA) stable and low.     (M06.9) Rheumatoid arthritis, involving unspecified site, unspecified whether rheumatoid factor present (H)  Comment:   Plan: CANCELED: Comprehensive metabolic panel (BMP +         Alb, Alk Phos, ALT, AST, Total. Bili, TP),         CANCELED: CBC with platelets and  "differential        Follows with Rheum.     (M48.07) Lumbosacral spinal stenosis  Comment:   Plan: ongoing treatment with orthopedics.      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     (R19.7) Diarrhea, unspecified type  Comment:   Plan: Tissue transglutaminase rodney IgA and IgG,         OFFICE/OUTPT VISIT,EST,LEVL IV        Patient requesting celiac testing.     Patient has been advised of split billing requirements and indicates understanding: Yes      COUNSELING:  Reviewed preventive health counseling, as reflected in patient instructions       Regular exercise       Healthy diet/nutrition       Vision screening       Hearing screening       Colon cancer screening       Prostate cancer screening      BMI:   Estimated body mass index is 41.38 kg/m  as calculated from the following:    Height as of this encounter: 1.803 m (5' 11\").    Weight as of this encounter: 134.6 kg (296 lb 11.2 oz).   Weight management plan: Patient was referred to their PCP to discuss a diet and exercise plan.      He reports that he quit smoking about 23 years ago. His smoking use included cigarettes. He started smoking about 60 years ago. He has a 55.5 pack-year smoking history. He has never used smokeless tobacco.      Appropriate preventive services were discussed with this patient, including applicable screening as appropriate for fall prevention, nutrition, physical activity, Tobacco-use cessation, weight loss and cognition.  Checklist reviewing preventive services available has been given to the patient.    Reviewed patients plan of care and provided an AVS. The Basic Care Plan (routine screening as " documented in Health Maintenance) for Lucio meets the Care Plan requirement. This Care Plan has been established and reviewed with the Patient.          Heladio Calixto MD  [facility]    Identified Health Risks:  I have reviewed Opioid Use Disorder and Substance Use Disorder risk factors and made any needed referrals.

## 2023-12-24 DIAGNOSIS — I10 PRIMARY HYPERTENSION: ICD-10-CM

## 2023-12-26 ENCOUNTER — MYC MEDICAL ADVICE (OUTPATIENT)
Dept: PEDIATRICS | Facility: CLINIC | Age: 73
End: 2023-12-26
Payer: MEDICARE

## 2023-12-26 RX ORDER — LISINOPRIL AND HYDROCHLOROTHIAZIDE 20; 25 MG/1; MG/1
1 TABLET ORAL DAILY
Qty: 90 TABLET | Refills: 3 | Status: SHIPPED | OUTPATIENT
Start: 2023-12-26

## 2023-12-26 NOTE — TELEPHONE ENCOUNTER
Celiac still in process at this time. Patient sending MyChart in response to provider result note.     Nando GUZMAN RN 12/26/2023 at 3:46 PM

## 2023-12-27 ENCOUNTER — MYC MEDICAL ADVICE (OUTPATIENT)
Dept: ORTHOPEDICS | Facility: CLINIC | Age: 73
End: 2023-12-27
Payer: MEDICARE

## 2023-12-27 LAB
TTG IGA SER-ACNC: 0.7 U/ML
TTG IGG SER-ACNC: <0.6 U/ML

## 2024-01-20 ENCOUNTER — MYC MEDICAL ADVICE (OUTPATIENT)
Dept: NEUROLOGY | Facility: CLINIC | Age: 74
End: 2024-01-20
Payer: MEDICARE

## 2024-01-20 DIAGNOSIS — G62.9 PERIPHERAL POLYNEUROPATHY: ICD-10-CM

## 2024-01-22 RX ORDER — PREGABALIN 50 MG/1
CAPSULE ORAL
Qty: 360 CAPSULE | Refills: 1 | Status: SHIPPED | OUTPATIENT
Start: 2024-01-22 | End: 2024-04-19

## 2024-01-26 ENCOUNTER — MYC MEDICAL ADVICE (OUTPATIENT)
Dept: ORTHOPEDICS | Facility: CLINIC | Age: 74
End: 2024-01-26
Payer: MEDICARE

## 2024-02-05 ENCOUNTER — PATIENT OUTREACH (OUTPATIENT)
Dept: GASTROENTEROLOGY | Facility: CLINIC | Age: 74
End: 2024-02-05
Payer: MEDICARE

## 2024-02-09 ENCOUNTER — MYC MEDICAL ADVICE (OUTPATIENT)
Dept: PEDIATRICS | Facility: CLINIC | Age: 74
End: 2024-02-09
Payer: MEDICARE

## 2024-02-09 DIAGNOSIS — K21.9 GASTROESOPHAGEAL REFLUX DISEASE WITHOUT ESOPHAGITIS: ICD-10-CM

## 2024-02-09 RX ORDER — OMEPRAZOLE 40 MG/1
40 CAPSULE, DELAYED RELEASE ORAL DAILY
Qty: 90 CAPSULE | Refills: 2 | Status: SHIPPED | OUTPATIENT
Start: 2024-02-09 | End: 2024-08-08

## 2024-02-15 ENCOUNTER — TRANSFERRED RECORDS (OUTPATIENT)
Dept: HEALTH INFORMATION MANAGEMENT | Facility: CLINIC | Age: 74
End: 2024-02-15
Payer: MEDICARE

## 2024-02-15 LAB
ALT SERPL-CCNC: 15 U/L (ref 9–46)
AST SERPL-CCNC: 60 U/L (ref 35–144)
CREATININE (EXTERNAL): 1.37 MG/DL (ref 0.7–1.28)
GFR ESTIMATED (EXTERNAL): 54 ML/MIN/1.73M2
GLUCOSE (EXTERNAL): 110 MG/DL (ref 65–99)
POTASSIUM (EXTERNAL): 4.7 MMOL/L (ref 3.5–5.3)

## 2024-02-20 NOTE — PROGRESS NOTES
02/20/24 10:52 AM    Patient contacted post ED visit, phone outreaches were unsuccessful; patient does not have MyChart, a MyChart letter has not been sent.     Thank you.  Delisa Guthrie  [facility]       REFERRING PHYSICIAN:  Dr. Jeremy Goodrich from Rheumatology.      CHIEF COMPLAINT:  Right wrist pain.      HISTORY OF PRESENT ILLNESS:  Patient is a very pleasant 66-year-old gentleman who is left-hand dominant, who comes in today for primarily evaluation of his right wrist pain.  He has a notable history of rheumatoid arthritis, Sjogren's syndrome, peripheral neuropathy and interstitial lung disease.      He initially made the appointment to have his left elbow evaluated.  He has a diagnosis of tennis elbow which started late last summer.  The patient states that he has undergone physical therapy and over time it seems to have gotten better.  He states that at its worst over the summer it was about near 10/10 pain, but now he is in the low 2-3/10 pain occasionally.      The problem he wishes to discuss today is his right thumb; he states he has ongoing pain and aching sensation in his right thumb that is worse with activities.  He also notes that he occasionally has swelling over the base of the thumb.  He denies any injuries.  He reports that it was recommended to him by physical therapist that he try a brace so he had gotten an over-the-counter thumb spica brace from [employer] and has been wearing that occasionally.  He states that when he wears this, this does help with the pain symptoms at the base of the thumb.  He has not tried any other interventions.      PAST MEDICAL HISTORY:  Rheumatoid arthritis, interstitial lung disease, peripheral artery disease, peripheral polyneuropathy, gait disturbances with possible Parkinsonian's characteristics.   Urinary incontinence with neurogenic bladder, GALO and GERD.      PAST SURGICAL HISTORY:  No significant past surgeries.      ALLERGIES:  Restasis and adhesive.      FAMILY HISTORY:  Noncontributory.      SOCIAL HISTORY:  The patient denies tobacco use, denies alcohol use.  He lives at home with his family.      REVIEW OF SYSTEMS:  See the addendum below.      PHYSICAL  EXAMINATION:  In general, he is well-developed, well-nourished, in no acute distress.  Breathing comfortably on room air.  His pulse is regular rate and rhythm.  Focused exam of the right hand/wrist reveals that he has no significant swelling, erythema or skin breakdown.  He has some focal tenderness to palpation at the level of the CMC joint.  He has positive grind test.  He has no hyperextension deformity of the metacarpophalangeal joint.  Sensation is intact and his thumb is warm and well perfused.       IMAGING:  We have bilateral hand films available for review today which demonstrates early stage osteoarthritic changes seen in the CMC joint.  There is CMP hyperextension deformity seen on plain films.  No other significant pathology seen.      ASSESSMENT:  66-year-old gentleman, left-hand dominant, with rheumatoid arthritis and other multiple medical conditions who has a primary complaint of right thumb CMC joint osteoarthrosis.      PLAN:  We had a long conversation with the patient regarding the above-mentioned diagnosis.  We discussed treatment options, which would include splinting, occupational therapy, steroid injections, and of course surgery.      After reviewing the above, patient has elected to undergo a trial splinting alone.  He is a little concerned about steroid injections as he had a bad reaction to a steroid injection to a knee in the past.  He states that this affected his neurogenic bladder.      We have a CMC splint made for him today that he try using as much as possible during the day, especially when he is doing heavier activities and then he may consider sleeping in it.  We will see how this works for him.  There is always the option of is trying a steroid injection if he wishes.      We will see the patient back in about 2-3 months for followup exam.  No x-rays needed at that visit.  We discussed how the splinting is going.      The patient was seen with Dr. Camargo who agrees with the  above.       Answers for HPI/ROS submitted by the patient on 2/17/2017   General Symptoms: Yes  Skin Symptoms: Yes  HENT Symptoms: Yes  EYE SYMPTOMS: Yes  HEART SYMPTOMS: Yes  LUNG SYMPTOMS: Yes  INTESTINAL SYMPTOMS: Yes  URINARY SYMPTOMS: Yes  REPRODUCTIVE SYMPTOMS: No  SKELETAL SYMPTOMS: Yes  BLOOD SYMPTOMS: No  NERVOUS SYSTEM SYMPTOMS: Yes  MENTAL HEALTH SYMPTOMS: No  Fever: No  Loss of appetite: No  Weight loss: No  Weight gain: No  Fatigue: Yes  Night sweats: No  Chills: No  Increased stress: No  Excessive hunger: No  Excessive thirst: No  Feeling hot or cold when others believe the temperature is normal: Yes  Loss of height: No  Post-operative complications: No  Surgical site pain: No  Hallucinations: No  Change in or Loss of Energy: No  Hyperactivity: No  Confusion: No  Changes in hair: No  Changes in moles/birth marks: No  Itching: No  Rashes: No  Changes in nails: Yes  Acne: No  Change in facial hair: No  Warts: No  Non-healing sores: Yes  Scarring: Yes  Flaking of skin: No  Color changes of hands/feet in cold : No  Sun sensitivity: Yes  Skin thickening: No  Ear pain: No  Ear discharge: No  Hearing loss: No  Tinnitus: Yes  Nosebleeds: No  Congestion: Yes  Sinus pain: Yes  Trouble swallowing: Yes   Voice hoarseness: Yes  Mouth sores: No  Sore throat: No  Tooth pain: No  Gum tenderness: No  Bleeding gums: No  Change in taste: No  Change in sense of smell: No  Dry mouth: Yes  Hearing aid used: No  Neck lump: No  Eye pain: Yes  Vision loss: No  Dry eyes: Yes  Watery eyes: No  Eye bulging: No  Double vision: Yes  Flashing of lights: Yes  Spots: No  Floaters: Yes  Redness: Yes  Crossed eyes: No  Tunnel Vision: No  Yellowing of eyes: No  Eye irritation: No  Cough: Yes  Sputum or phlegm: No  Coughing up blood: No  Difficulty breating or shortness of breath: Yes  Snoring: No  Wheezing: No  Difficulty breathing on exertion: Yes  Respiratory pain: No  Nighttime Cough: No  Difficulty breathing when lying flat:  No  Chest pain or pressure: No  Fast or irregular heartbeat: Yes  Pain in legs with walking: Yes  Swelling in feet or ankles: Yes  Trouble breathing while lying down: No  Fingers or Toes appear blue: No  High blood pressure: Yes  Low blood pressure: No  Fainting: No  Murmurs: No  Chest pain on exertion: No  Chest pain at rest: No  Cramping pain in leg during exercise: Yes  Pacemaker: No  Varicose veins: No  Edema or swelling: No  Fast heart beat: No  Wake up at night with shortness of breath: No  Heart flutters: No  Light-headedness: Yes  Exercise intolerance: Yes  Heart burn or indigestion: Yes  Nausea: No  Vomiting: No  Abdominal pain: Yes  Bloating: Yes  Constipation: Yes  Diarrhea: Yes  Blood in stool: Yes  Black stools: No  Rectal or Anal pain: No  Fecal incontinence: No  Rectal bleeding: Yes  Yellowing of skin or eyes: No  Vomit with blood: No  Change in stools: No  Hemorrhoids: Yes  Trouble holding urine or incontinence: Yes  Pain or burning: No  Trouble starting or stopping: No  Increased frequency of urination: No  Blood in urine: No  Decreased frequency of urination: No  Frequent nighttime urination: Yes  Flank pain: Yes  Difficulty emptying bladder: No  Back pain: Yes  Muscle aches: Yes  Neck pain: Yes  Swollen joints: Yes  Joint pain: Yes  Bone pain: Yes  Muscle cramps: No  Muscle weakness: Yes  Joint stiffness: Yes  Bone fracture: No  Trouble with coordination: Yes  Dizziness or trouble with balance: Yes  Fainting or black-out spells: No  Memory loss: No  Headache: No  Seizures: No  Speech problems: No  Tingling: Yes  Tremor: Yes  Weakness: Yes  Difficulty walking: Yes  Paralysis: No  Numbness: Yes      Patient was seen and examined with the resident.  I agree with the assessment and plan of care.

## 2024-03-20 PROBLEM — K21.00 GASTRO-ESOPHAGEAL REFLUX DISEASE WITH ESOPHAGITIS: Status: ACTIVE | Noted: 2022-02-17

## 2024-03-20 PROBLEM — J84.9 INTERSTITIAL LUNG DISEASE (H): Status: ACTIVE | Noted: 2022-02-17

## 2024-03-20 PROBLEM — I10 ESSENTIAL HYPERTENSION: Status: ACTIVE | Noted: 2022-02-17

## 2024-03-20 PROBLEM — G47.30 SLEEP APNEA: Status: ACTIVE | Noted: 2022-02-17

## 2024-03-20 PROBLEM — E66.9 OBESITY: Status: ACTIVE | Noted: 2022-02-17

## 2024-03-20 PROBLEM — J21.9 BRONCHIOLITIS: Status: ACTIVE | Noted: 2023-02-26

## 2024-03-20 RX ORDER — VITAMIN B COMPLEX
TABLET ORAL
COMMUNITY

## 2024-03-20 RX ORDER — SALMETEROL XINAFOATE 50 MCG
1 BLISTER, WITH INHALATION DEVICE INHALATION 2 TIMES DAILY
COMMUNITY
End: 2024-04-19

## 2024-03-20 NOTE — PROGRESS NOTES
Diagnosis/Summary/Recommendations:    PATIENT: Lucio Daly  73 year old male     : 1950    EVELIA: 2024    MRN: [id number]  [place] [address]  [phone] (H)  [phone] (M)  [email or web address]  Lo Daly  [phone]          Assessment:  (R25.1) Tremor  (primary encounter diagnosis)  He never has had clear Parkinson's disease and his gait issue disorder may be multifactorial     Left hand tremor and has some left leg symptoms.      Has PLMS     Review of diagnosis    tremor     Avoidance of dopamine blockers   Not taking     Motor complication review   n/a     Review of Impulse control disorders   n/a     Review of surgical or medication options   reviewed     Gait/Balance/Falls   Walks slowly   Using a cane  Has had falls  Has problems with his foot - especially when tired.      Exercise/Therapy performed/offered   Tries to walk 15-20 minutes  He may fall back on the bed but has not fallen onto the floor     Cognitive/Driving      Mood   Denies depression/anxiety  [place]  9th grandchild     Hallucinations/delusions         Sleep   Sleep apnea  PLMS     Bladder/Renal/Prostate/Gyn/Other  Oxybutynin ditropan 5mg once daily      GI/Constipation/GERD   Omeprazole prilosec 40mg daily  Overweight   Had some constipation which recurred and has bowel movements every 3rd day  And had some diarrhea - cut out yoghurt  Trying to drink lots of water   He may have IBS  Has seen GI doctor in the past and there may be GI issues from his RA     ENDO/Lipid/DM/Bone density/Thyroid        Cardio/heart/Hyper or Hypotensive   Lisinopril zestril 10mg  Metoprolol toprolol XL 50mg 24 hr tablet  Ongoing challenges with blood pressure     Vision/Dry Eyes/Cataracts/Glaucoma/Macular   Wears glasses     Heme/Anticoagulation/Antiplatelet/Anemia/Other  Not taking aspirin     ENT/Resp  BiPAP  Seeing Juan sleep medicine     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   plaquenil and methotrexate.  Still taking folate, vitamin d  Had eye examination.  Seeing MONROE Snow and ZACK Goodrich  Folic acid folvite 1mg  Hydroxychloroquine plaquenil 200mg      Drooling daytime - referral to PMR and Neurology for botox for drooling at the [facility] vs [place]  Jossy Echols and colleagues in PMR  Easton Bhat and colleagues in Neurology.   Discussed atropine drops and robinul.      Refilled pregabalin lyrica      Was active and busy in [place] but tiring  Discussed use of pool and movement exercises.  Golf community      More stooped, numbness, tingling, movement issues, balance problems     He uses his walker outside and cane inside; today using cane   There are some strain issues with the walker - hands and arms.   He may benefit from seeing physical therapy again.   He has a new walker in 2022     Has problems driving due to numbness of the right leg after 40 minutes.   He is avoiding spinal surgery and burning of the nerves  Last lumbar spine MRI was 9/14/2022  1. Diffuse degenerative change of the lumbar spine as detailed above.  2. Moderate spinal canal stenosis at L4-L5. No other significant spinal canal narrowing of the lumbar spine.  3. Moderate neural foraminal stenosis bilaterally at L5-S1. No other significant neural foraminal narrowing of the lumbar spine.    Exertional  related visual changes - and associated with weakness and shakiness and has to rest to recover   Consider visit with cardiology or/and neurology or/and PCP to discuss further. He had an ECHO in 2018 and had Lexiscan in 2016 and had no signs of an aortic scan that did not reveal an aneurysm.     Consider another stress test and/or carotid study - would allow patient to talk with pcp and if needed can see cardiology or general neurology.      Return in one year's time.     Medications     7/730am 9a 2pm 4pm 11pm   Acetaminophen tylenol 500mg 2     2 2   Albuterol proair proventil  inhaler prn            Amlodipine norvasc 5mg 1           Cholecalciferol vitamin D 1000 1           Folic acid folvite 1mg   1         Hydroxychloroquine plaquenil 200mg   1         Ketoconazole nizoral 2% shampoo             Lisinopril hydrochlorothiazide zestoretic 20-25 1           Methotrexate 2.5mg      8 tues       Metoprolol toprolol XL 50mg 24 hr tablet 1.5           Omeprazole prilosec 40mg 1           Oxybutynin ditropan 5mg      1       Pregabalin lyrica 50mg 1     1 1   Proair hfa 108 (90 base) mcg/act inhaler  above           Salmeterol serevent disku 50mcg/dose inhaler 2/day           Vit D3 cholecalciferol 25mcg 1000 units above                                          Plan:    Treated for Moraxella Catarrhalis - testing = nasal and sputum 4/1/2024  Treated with antibiotics for bronchitis.   Seeing Fabio for his lungs.     He has not proceeded with botox for drooling  Now it is manageable - saliva caught in his beard.     RA issues.   plaquenil and methotrexate.  Still taking folate, vitamin d  Had eye examination.  Seeing MONROE Snow and ZACK Goodrich  Folic acid folvite 1mg  Hydroxychloroquine plaquenil 200mg    He has numbness at night that may wake him up.   He lays on his back - has bi pap  He wakes up and then moves his legs he gets spasms  He has not tried magnesium   PLMS from sleep study - with myoclonic movements    I  renewed his pregabalin/lyrica.     HIs blood pressure was fine today  He has not had carotid or heart stress studies.   He continues on the metoprolol, lisinopril and amlodipine    His legs have been an issue.   Had another MRI in December 2023 with narrowing of spinal canal  But unchanged. Consideration for spinal stenosis  This was before he went to [place] and set up physical therapy and he had more numbness/pain and increasing problems walking. He has ongoing weakness but walking daily but not walking as far. He has weakness with standing. He has problems getting up out of a chair, etc. As he falls backwards. He has a modified toilet and is hanging onto the trim getting up. He has a feeling of cold water running down his legs. Numbness and tingling. His balance is off. There is also a component of neuropathy in addition to his spinal stenosis. He has not had back surgery before. He has a May appointment with spine doctor. He wonders if there is a combination of back and neuropathy issues. May be looking into nerve blocks or nerve ablation.     MR LUMBAR SPINE W/O & W CONTRAST 12/1/2023 5:17 PM     Provided History: progressive leg weakness; h/o spinal stenosis; Rheumatoid arthritis of multiple sites with negative rheumatoid factor  (H)     ICD-10: Rheumatoid arthritis of multiple sites with negative rheumatoid factor (H)     Comparison: Lumbar spine MRI 9/14/2022     Technique: Multiplanar, multisequence images of the lumbar spine without and with intravenous contrast.     Findings: There are 5 lumbar-type vertebrae, counting from the lowest rib bearing vertebrae. The tip of the conus medullaris is at L1.   There is trace retrolisthesis of L1 on L2 and grade 1 anterolisthesis of L4 on L5.  There is multilevel mild to moderate disc height  narrowing.  Normal marrow signal. There is opposing degenerative endplate edema at L5-S1 and mild facet edema at L5-S1 with associated  enhancement. No other abnormal  enhancement.     On a level by level basis:     T12-L1: No spinal canal or neuroforaminal stenosis.     L1-2: Mild disc bulging and minimal facet arthropathy. No spinal canal or neuroforaminal stenosis     L2-3: Mild facet arthropathy. No spinal canal or neuroforaminal stenosis.     L3-4: Mild disc bulging and facet arthropathy. No significant spinal canal or neural foraminal stenosis.     L4-5: Grade 1 anterolisthesis with uncovering of the disc. There is bilateral facet arthropathy and ligamentum flavum hypertrophy. There  is moderate to severe spinal canal stenosis. Mild-to-moderate bilateral neuroforaminal stenosis. Right lateral recess narrowing.     L5-S1: Disc bulge and facet arthropathy. There is moderate bilateral neural foraminal stenosis. Questionable impingement of both exiting L5  nerves, left more than right. No spinal canal stenosis.     Above-mentioned degenerative changes are grossly unchanged since 9/2022. Paraspinous tissues are within normal limits.                                                                   Impression:   No significant change in multilevel lumbar spondylosis greatest at L4-5 where there is severe spinal canal stenosis and at L5-S1 where there is bilateral moderate neural foraminal stenosis . Mild inflammatory facet joint arthropathy at L5-S1 and degenerative endplate changes at L5-S1.              Coding statement:   Medical Decision Making:  #  Chronic progressive medical conditions addressed  - see above --   Review and/or interpretation of unique test or documentation from a provider outside of neurology yes   Independent historian provided additional details  no I  Prescription drug management and review of potential side effects and/or monitoring for side effects  -- see above ---  Health impacted by social determinants of health  no    I have reviewed the note as documented above.  This accurately captures the substance of my conversation with the patient and total  time spent preparing for visit, executing visit and completing visit on the day of the visit:  20 minutes.  The portion of this total time included face to face time 17 minutes.     The longitudinal plan of care for Lucio Daly was addressed during this visit. Due to the added complexity in care, I will continue to support Lucio Daly in the subsequent management of this condition(s) and with the ongoing continuity of care of this condition(s).      Thong Montes MD     ______________________________________    Last visit date and details:             ______________________________________      Patient was asked about 14 Review of systems including changes in vision (dry eyes, double vision), hearing, heart, lungs, musculoskeletal, depression, anxiety, snoring, RBD, insomnia, urinary frequency, urinary urgency, constipation, swallowing problems, hematological, ID, allergies, skin problems: seborrhea, endocrinological: thyroid, diabetes, cholesterol; balance, weight changes, and other neurological problems and these were not significant at this time except for   Patient Active Problem List   Diagnosis    Diaphragmatic hernia    Esophageal reflux    Hx of melanoma of skin    Malignant basal cell neoplasm of skin    GALO (obstructive sleep apnea)    Chronic maxillary sinusitis    Urinary incontinence    Sicca syndrome (H24)    Tremor    Incomplete defecation    AK (actinic keratosis)    Abnormal EMG    Seronegative arthritis    Adenomatous polyp of colon    Peripheral polyneuropathy    Morbid obesity (H)    Neuromuscular disease (H)    Bronchiolitis obliterans    Hypertension    Rheumatoid arthritis (H)    Immunosuppressed status (H24)    PLMD (periodic limb movement disorder)    Prostate cancer (H)    Lumbosacral radiculopathy    Facet arthropathy, lumbosacral    Spondylolisthesis of lumbar region    Lumbosacral spinal stenosis    Gastroesophageal reflux disease    Internal hemorrhoids    Dysphagia    Sialorrhea     Chronic bronchitis, unspecified chronic bronchitis type (H)    Gastro-esophageal reflux disease with esophagitis    Bronchiolitis    Essential hypertension    Interstitial lung disease (H)    Obesity    Sleep apnea          Allergies   Allergen Reactions    Adhesive Tape Hives     Bandages misc  Bandages misc    Alum & Mag Hydroxide-Simeth Hives     EXCESSIVE URINATION AND WEAKNESS, LIGHT-HEADED    Aluminum Hydroxide Hives    Calcium Carbonate Hives    Cortisone Hives, Headache, Nausea, Other (See Comments) and Rash    Cyclosporine      Burning eyes, problems with breathing, tightness in chest    Cyclosporine Hives     Burning eyes, problems with breathing, tightness in chest    Diphenhydramine Other (See Comments) and Shortness Of Breath     Other reaction(s): lightheadedness    Fexofenadine Other (See Comments) and Shortness Of Breath     EXCESSIVE URINATION AND WEAKNESS, LIGHT-HEADED      Gabapentin Hives and Other (See Comments)     Neurontin: mosd changes and excess urination  Neurontin: mosd changes and excess urination    Levofloxacin Hives, Other (See Comments) and Nausea     From surgeon--? Joint pain ? Gerd aggravation. Insomnia, excess urination    Magnesium Carbonate Hives    Magnesium Hydroxide Hives    Prednisone Hives, Headache and Other (See Comments)     Weakness, elevated bp, headache, eye pain, congestion     Simethicone Hives    Sulfamethoxazole-Trimethoprim Hives     Chest pain, angina  Chest pain, angina    Tree Extract Hives    Cephalexin Nausea and Other (See Comments)     Joint pain or gerd aggravation. Bloating excessive urination   Other reaction(s): GI Distress  Joint pain or gerd aggravation. Bloating excessive urination   Other reaction(s): chest pain    Doxycycline Nausea and Vomiting, Headache and Other (See Comments)     SWEATING,MIGRAINES,LOSS OF APPETITE,SWEATING,LIGHT HEADED, EXCESSIVE URINATION  Other reaction(s): lightheadedness    Iodine Hives     Other reaction(s): GI  Distress    Oxcarbazepine Dizziness, Itching, Other (See Comments) and Rash     Other reaction(s): GI Distress  SEVERE JOINT AND TENDON PAIN, INSOMNIA, RESTLESSNESS, NAUSEA, EXCESS URINATION    Sulfamethoxazole-Trimethoprim Other (See Comments)     Other reaction(s): chest pain, flushing, tachycardia    Allegra      EXCESSIVE URINATION AND WEAKNESS, LIGHT-HEADED    Animal Dander     Benadryl Allergy      EXCESSIVE URINATION AND WEAKNESS, LIGHT-HEADED    Budesonide-Formoterol Fumarate GI Disturbance and Nausea     Other reaction(s): GI Distress    Cephalosporins      Other reaction(s): GI Distress    Chlorpheniramine Maleate      Dust    Doxycycline Hyclate Nausea     SWEATING,MIGRAINES,LOSS OF APPETITE,SWEATING,LIGHT HEADED, EXCESSIVE URINATION    Flonase [Fluticasone Propionate]     Fluticasone      Other reaction(s): GI Distress    Hydrocortisone Nausea and Vomiting    Iodine Solution [Povidone Iodine]      SKIN MELTS    Levaquin      From surgeon--? Joint pain ? Gerd aggravation. Insomnia, excess urination    Mylanta      EXCESSIVE URINATION AND WEAKNESS, LIGHT-HEADED    Oxcarbazepine      SEVERE JOINT AND TENDON PAIN, INSOMNIA, RESTLESSNESS, NAUSEA, EXCESS URINATION    Povidone Iodine      Other reaction(s): GI Distress  SKIN MELTS    Prednisone      Weakness, elevated bp, headache, eye pain, congestion     Seafood [Seafood]     Shellfish Allergy      Hives    Other reaction(s): GI Distress  Hives    Trees     Cortizone Rash     EXCESS URINATION,WEAKNESS,NAUSEA, HEADACHE    Ibuprofen Fatigue, Other (See Comments) and Palpitations     Past Surgical History:   Procedure Laterality Date    BIOPSY OF SKIN LESION      COLONOSCOPY      COLONOSCOPY N/A 7/20/2022    Procedure: COLONOSCOPY (fv) polypectomy using jumbo bx forcep;  Surgeon: Gómez Mckinney MD;  Location:  GI    CYSTOSCOPY  Many years ago    Blood in urine/ bladder cystoscopy    ESOPHAGOSCOPY, GASTROSCOPY, DUODENOSCOPY (EGD), COMBINED N/A 7/20/2022     Procedure: ESOPHAGOGASTRODUODENOSCOPY (EGD) (fv) biopsies using cold bx forcep;  Surgeon: Gómez Mckinney MD;  Location:  GI    HEMORRHOID SURGERY      lip biopsy      for sicca complex    MOHS MICROGRAPHIC PROCEDURE      SOFT TISSUE SURGERY      removeal of basel cell carcinoma     Past Medical History:   Diagnosis Date    Abnormal EMG 04/18/2013    Abnormal involuntary movements(781.0)     Movement Disorder    AK (actinic keratosis) 12/18/2011    Allergic rhinitis, cause unspecified     Allergic rhinitis    Balance problems 11/01/2011    Basal cell carcinoma     Bladder spasms 11/01/2011    Chronic osteoarthritis     COPD (chronic obstructive pulmonary disease) (H)     Interstial lung disease, obliderans bronchiolitis    Diaphragmatic hernia without mention of obstruction or gangrene     Earache or other ear, nose, or throat complaint     Esophageal reflux     Fatigue 11/01/2011    Fracture     H. pylori infection 05/12/2011    History of MRI of cervical spine 11/18/2013    EXAMINATION: CERVICAL SPINE G/E 5 VIEWS* 4/19/2013 4:18 PM  CLINICAL HISTORY: Pain in limb,Performing Location?->[facility]  COMPARISON:  FINDINGS: AP and lateral views in flexion and extension, as well as odontoid view of the cervical spine was obtained. There is no comparison available. The vertebral bodies of the cervical spine are normally aligned. There is posterior spurring and d    Incomplete defecation 11/01/2011    Interstitial lung disease (H) 11/29/2016    Laboratory test 08/07/2012    Lung disease 06/2015    Malignant basal cell neoplasm of skin 08/06/2008    Melanoma (H) 08/06/2008    Melanoma in situ of lower leg (H)     R calf    Neuropathy 05/16/2011    GALO (obstructive sleep apnea)     Other bladder disorder     Other color vision deficiencies     Other nervous system complications     Parkinsonism 11/01/2011    Parkinsons disease     Parkinsonism/ balance, dropped foot, tremor    Personal history of colonic  polyps     PLMD (periodic limb movement disorder) 2021    Polyneuropathy in other diseases classified elsewhere (H24)     RA (rheumatoid arthritis) (H)     Rheumatoid arthritis of multiple sites with negative rheumatoid factor (H) 2016    Seronegative arthritis 2013    Shortness of breath     Shoulder arthritis 2016    acromioclavicular joint     Sialorrhea 2023    Somatization disorder 2011    Spider veins     Leg Vericise vein surgery    Squamous cell carcinoma     Tremor 2011    Unspecified essential hypertension     Unspecified hypothyroidism     Urinary tract infection     Urinary urgency 2011    Wears glasses 2011     Social History     Socioeconomic History    Marital status:      Spouse name: Not on file    Number of children: Not on file    Years of education: Not on file    Highest education level: Not on file   Occupational History    Not on file   Tobacco Use    Smoking status: Former     Packs/day: 1.50     Years: 37.00     Additional pack years: 0.00     Total pack years: 55.50     Types: Cigarettes     Start date: 6/15/1963     Quit date: 2000     Years since quittin.4    Smokeless tobacco: Never   Vaping Use    Vaping Use: Never used   Substance and Sexual Activity    Alcohol use: Not Currently    Drug use: Never    Sexual activity: Not Currently     Partners: Female     Birth control/protection: None   Other Topics Concern    Parent/sibling w/ CABG, MI or angioplasty before 65F 55M? No   Social History Narrative    2013: Living in [place] in a townhouse with no steps    Has 3 sons that are doing okay.         Dairy/d 1 servings/d.     Caffeine 0 servings/d    Exercise 0 x week    Sunscreen used - No    Seatbelts used - Yes    Working smoke/CO detectors in the home - Yes    Guns stored in the home - Yes    Self Breast Exams - NA    Self Testicular Exam - Yes    Eye Exam up to date - Yes 2008    Dental Exam up to date - Yes 2006    Pap  Smear up to date - NA    Mammogram up to date - NA    PSA up to date - Yes 2008    Dexa Scan up to date - No    Flex Sig / Colonoscopy up to date - Yes less than 5 yrs ago    Immunizations up to date -today    Abuse: Current or Past(Physical, Sexual or Emotional)- No    Do you feel safe in your environment - Yes    2008                 Social Determinants of Health     Financial Resource Strain: Low Risk  (12/21/2023)    Financial Resource Strain     Within the past 12 months, have you or your family members you live with been unable to get utilities (heat, electricity) when it was really needed?: No   Food Insecurity: Low Risk  (12/21/2023)    Food Insecurity     Within the past 12 months, did you worry that your food would run out before you got money to buy more?: No     Within the past 12 months, did the food you bought just not last and you didn t have money to get more?: No   Transportation Needs: Low Risk  (12/21/2023)    Transportation Needs     Within the past 12 months, has lack of transportation kept you from medical appointments, getting your medicines, non-medical meetings or appointments, work, or from getting things that you need?: No   Physical Activity: Insufficiently Active (12/16/2022)    Exercise Vital Sign     Days of Exercise per Week: 3 days     Minutes of Exercise per Session: 20 min   Stress: No Stress Concern Present (12/16/2022)    [place] [employer] - Occupational Stress Questionnaire     Feeling of Stress : Not at all   Social Connections: Socially Integrated (12/16/2022)    Social Connection and Isolation Panel [NHANES]     Frequency of Communication with Friends and Family: Twice a week     Frequency of Social Gatherings with Friends and Family: Once a week     Attends Christianity Services: More than 4 times per year     Active Member of Clubs or Organizations: Yes     Attends Club or Organization Meetings: Not on file     Marital Status:    Interpersonal  Safety: Low Risk  (12/22/2023)    Interpersonal Safety     Do you feel physically and emotionally safe where you currently live?: Yes     Within the past 12 months, have you been hit, slapped, kicked or otherwise physically hurt by someone?: No     Within the past 12 months, have you been humiliated or emotionally abused in other ways by your partner or ex-partner?: No   Housing Stability: Low Risk  (12/21/2023)    Housing Stability     Do you have housing? : Yes     Are you worried about losing your housing?: No       Drug and lactation database from the [place] [employer] Library of Medicine:  http://toxnet.nlm.nih.gov/cgi-bin/sis/htmlgen?LACT      B/P: Data Unavailable, T: Data Unavailable, P: Data Unavailable, R: Data Unavailable 0 lbs 0 oz  There were no vitals taken for this visit., There is no height or weight on file to calculate BMI.  Medications and Vitals not listed above were documented in the cart and reviewed by me.     Current Outpatient Medications   Medication Sig Dispense Refill    acetaminophen (TYLENOL) 500 MG tablet Take 1-2 tablets (500-1,000 mg) by mouth every 4 hours as needed for mild pain 30 tablet 0    acetaminophen (TYLENOL) 500 MG tablet 2 x 500mg by mouth 3 times per day: 7/730am, 4pm and 11pm  = 6/day      albuterol (PROAIR HFA/PROVENTIL HFA/VENTOLIN HFA) 108 (90 Base) MCG/ACT inhaler Inhale 2 puffs into the lungs every 4 hours as needed for shortness of breath or wheezing 18 g 3    amLODIPine (NORVASC) 5 MG tablet TAKE 1 TABLET(5 MG) BY MOUTH DAILY 90 tablet 3    cholecalciferol (HM VITAMIN D3) 25 MCG (1000 UT) TABS 1000 unit tab by mouth daily      folic acid (FOLVITE) 1 MG tablet Take 1 tablet (1 mg) by mouth daily 90 tablet 3    hydroxychloroquine (PLAQUENIL) 200 MG tablet Take 1 tablet (200 mg) by mouth daily Daily at 9am. 90 tablet 3    lisinopril-hydrochlorothiazide (ZESTORETIC) 20-25 MG tablet TAKE 1 TABLET BY MOUTH DAILY 90 tablet 3    methotrexate 2.5 MG tablet TAKE 8  TABLETS BY MOUTH ONCE PER WEEK. 96 tablet 2    metoprolol succinate ER (TOPROL XL) 50 MG 24 hr tablet 1 and 1/2 tabs daily. 135 tablet 3    omeprazole (PRILOSEC) 40 MG DR capsule Take 1 capsule (40 mg) by mouth daily 90 capsule 2    ORDER FOR DME Use your BiPAP device as directed by your provider.      oxybutynin (DITROPAN) 5 MG tablet TAKE 1 TABLET BY MOUTH DAILY AT 4PM 180 tablet 3    pregabalin (LYRICA) 50 MG capsule 50mg capsule by mouth at 7am and 4pm and 1-2 x 50mg capsules by mouth nightly at 11pm (4/day) 360 capsule 1    salmeterol (SEREVENT DISKUS) 50 MCG/ACT inhaler Inhale 1 puff into the lungs 2 times daily      salmeterol (SEREVENT DISKUS) 50 MCG/ACT inhaler Inhale 1 puff into the lungs 2 times daily 60 each 11    Vitamin D3 (CHOLECALCIFEROL) 25 mcg (1000 units) tablet 1 unit every day by oral route.           Thong Montes MD

## 2024-03-26 ENCOUNTER — TELEPHONE (OUTPATIENT)
Dept: RHEUMATOLOGY | Facility: CLINIC | Age: 74
End: 2024-03-26
Payer: MEDICARE

## 2024-03-26 NOTE — TELEPHONE ENCOUNTER
CD received in clinic mail from [facility].  Will bring  CD  to bin 4.41 in Ortho for CD  images to be uploaded into PACS.    ANTOINE ReyN, RN  RN Care Coordinator Rheumatology

## 2024-04-19 ENCOUNTER — OFFICE VISIT (OUTPATIENT)
Dept: NEUROLOGY | Facility: CLINIC | Age: 74
End: 2024-04-19
Payer: MEDICARE

## 2024-04-19 VITALS
HEART RATE: 57 BPM | OXYGEN SATURATION: 99 % | SYSTOLIC BLOOD PRESSURE: 130 MMHG | DIASTOLIC BLOOD PRESSURE: 77 MMHG | WEIGHT: 302 LBS | BODY MASS INDEX: 42.12 KG/M2

## 2024-04-19 DIAGNOSIS — J40 MORAXELLA CATARRHALIS BRONCHITIS: ICD-10-CM

## 2024-04-19 DIAGNOSIS — B96.89 MORAXELLA CATARRHALIS BRONCHITIS: ICD-10-CM

## 2024-04-19 DIAGNOSIS — R25.1 TREMOR: Primary | ICD-10-CM

## 2024-04-19 DIAGNOSIS — G62.9 PERIPHERAL POLYNEUROPATHY: ICD-10-CM

## 2024-04-19 PROCEDURE — 99213 OFFICE O/P EST LOW 20 MIN: CPT | Performed by: PSYCHIATRY & NEUROLOGY

## 2024-04-19 RX ORDER — AZITHROMYCIN 250 MG/1
TABLET, FILM COATED ORAL
COMMUNITY
End: 2024-04-19

## 2024-04-19 RX ORDER — BENZONATATE 200 MG/1
100 CAPSULE ORAL 3 TIMES DAILY PRN
COMMUNITY
Start: 2024-04-01 | End: 2024-04-19

## 2024-04-19 RX ORDER — PREGABALIN 50 MG/1
CAPSULE ORAL
Qty: 360 CAPSULE | Refills: 1 | Status: SHIPPED | OUTPATIENT
Start: 2024-04-19 | End: 2024-05-01

## 2024-04-19 NOTE — LETTER
2024       RE: Lucio Daly  [address]  [place] [address]     Dear Colleague,    Thank you for referring your patient, Lucio Daly, to the [facility]UROLOG[facility]. Please see a copy of my visit note below.        Diagnosis/Summary/Recommendations:    PATIENT: Lucio Daly  73 year old male     : 1950    EVELIA: 2024    MRN: [id number]  [place] [address]  [phone] (H)  [phone] ()  [email or web address]  Lo Daly  [phone]          Assessment:  (R25.1) Tremor  (primary encounter diagnosis)  He never has had clear Parkinson's disease and his gait issue disorder may be multifactorial     Left hand tremor and has some left leg symptoms.      Has PLMS     Review of diagnosis    tremor     Avoidance of dopamine blockers   Not taking     Motor complication review   n/a     Review of Impulse control disorders   n/a     Review of surgical or medication options   reviewed     Gait/Balance/Falls   Walks slowly   Using a cane  Has had falls  Has problems with his foot - especially when tired.      Exercise/Therapy performed/offered   Tries to walk 15-20 minutes  He may fall back on the bed but has not fallen onto the floor     Cognitive/Driving      Mood   Denies depression/anxiety  [place]  9th grandchild     Hallucinations/delusions         Sleep   Sleep apnea  PLMS     Bladder/Renal/Prostate/Gyn/Other  Oxybutynin ditropan 5mg once daily      GI/Constipation/GERD   Omeprazole prilosec 40mg daily  Overweight   Had some constipation which recurred and has bowel movements every 3rd day  And had some diarrhea - cut out yoghurt  Trying to drink lots of water   He may have IBS  Has seen GI doctor in the past and there may be GI issues from his RA     ENDO/Lipid/DM/Bone density/Thyroid        Cardio/heart/Hyper or Hypotensive   Lisinopril zestril 10mg  Metoprolol toprolol XL 50mg 24 hr  tablet  Ongoing challenges with blood pressure     Vision/Dry Eyes/Cataracts/Glaucoma/Macular   Wears glasses     Heme/Anticoagulation/Antiplatelet/Anemia/Other  Not taking aspirin     ENT/Resp  BiPAP  Seeing [facility]     intersititial lung disease seeing [facility] pulmonary medicine  Diaphragmatic muscle weakness unclear etiology on NIPPV     Albuterol proair hfa/proventil hfa/ventolin hfa 108 (90 base) mcg/act inhaler prn   Proair hfa 108 (90 base) mcg/act inhaler prn (above)  Salmeterol serevent disku 50mcg/dose inhaler ---using      Drooling at day time     Skin/Cancer/Seborrhea/other        Musculoskeletal/Pain/Headache  Has had hand cramping and has to straighten h is fingers out and may need surgery     Acetaminophen tylenol 500mg  Pregabalin lyrica 50mg     Leg swelling   Variable use of leg braces  Tendons are tight in his legs        Other:  RA issues.   plaquenil and methotrexate.  Still taking folate, vitamin d  Had eye examination.  Seeing MONROE Snow and ZACK Goodrich  Folic acid folvite 1mg  Hydroxychloroquine plaquenil 200mg      Drooling daytime - referral to PMR and Neurology for botox for drooling at the [facility]  Jossy Echols and colleagues in PMR  Easton Bhat and colleagues in Neurology.   Discussed atropine drops and robinul.      Refilled pregabalin lyrica      Was active and busy in [place] but tiring  Discussed use of pool and movement exercises.  Golf community      More stooped, numbness, tingling, movement issues, balance problems     He uses his walker outside and cane inside; today using cane   There are some strain issues with the walker - hands and arms.   He may benefit from seeing physical therapy again.   He has a new walker in 2022     Has problems driving due to numbness of the right leg after 40 minutes.   He is avoiding spinal surgery and burning of the nerves  Last lumbar spine MRI was 9/14/2022  1. Diffuse degenerative  change of the lumbar spine as detailed above.  2. Moderate spinal canal stenosis at L4-L5. No other significant spinal canal narrowing of the lumbar spine.  3. Moderate neural foraminal stenosis bilaterally at L5-S1. No other significant neural foraminal narrowing of the lumbar spine.    Exertional related visual changes - and associated with weakness and shakiness and has to rest to recover   Consider visit with cardiology or/and neurology or/and PCP to discuss further. He had an ECHO in 2018 and had Lexiscan in 2016 and had no signs of an aortic scan that did not reveal an aneurysm.     Consider another stress test and/or carotid study - would allow patient to talk with pcp and if needed can see cardiology or general neurology.      Return in one year's time.     Medications     7/730am 9a 2pm 4pm 11pm   Acetaminophen tylenol 500mg 2     2 2   Albuterol proair proventil  inhaler prn            Amlodipine norvasc 5mg 1           Cholecalciferol vitamin D 1000 1           Folic acid folvite 1mg   1         Hydroxychloroquine plaquenil 200mg   1         Ketoconazole nizoral 2% shampoo             Lisinopril hydrochlorothiazide zestoretic 20-25 1           Methotrexate 2.5mg      8 tues       Metoprolol toprolol XL 50mg 24 hr tablet 1.5           Omeprazole prilosec 40mg 1           Oxybutynin ditropan 5mg      1       Pregabalin lyrica 50mg 1     1 1   Proair hfa 108 (90 base) mcg/act inhaler  above           Salmeterol serevent disku 50mcg/dose inhaler 2/day           Vit D3 cholecalciferol 25mcg 1000 units above                                          Plan:    Treated for Moraxella Catarrhalis - testing = nasal and sputum 4/1/2024  Treated with antibiotics for bronchitis.   Seeing Fabio for his lungs.     He has not proceeded with botox for drooling  Now it is manageable - saliva caught in his beard.     RA issues.   plaquenil and methotrexate.  Still taking folate, vitamin d  Had eye examination.  Seeing MONROE  Dhara Goodrich  Folic acid folvite 1mg  Hydroxychloroquine plaquenil 200mg    He has numbness at night that may wake him up.   He lays on his back - has bi pap  He wakes up and then moves his legs he gets spasms  He has not tried magnesium   PLMS from sleep study - with myoclonic movements    I renewed his pregabalin/lyrica.     HIs blood pressure was fine today  He has not had carotid or heart stress studies.   He continues on the metoprolol, lisinopril and amlodipine    His legs have been an issue.   Had another MRI in December 2023 with narrowing of spinal canal  But unchanged. Consideration for spinal stenosis  This was before he went to [place] and set up physical therapy and he had more numbness/pain and increasing problems walking. He has ongoing weakness but walking daily but not walking as far. He has weakness with standing. He has problems getting up out of a chair, etc. As he falls backwards. He has a modified toilet and is hanging onto the trim getting up. He has a feeling of cold water running down his legs. Numbness and tingling. His balance is off. There is also a component of neuropathy in addition to his spinal stenosis. He has not had back surgery before. He has a May appointment with spine doctor. He wonders if there is a combination of back and neuropathy issues. May be looking into nerve blocks or nerve ablation.     MR LUMBAR SPINE W/O & W CONTRAST 12/1/2023 5:17 PM     Provided History: progressive leg weakness; h/o spinal stenosis; Rheumatoid arthritis of multiple sites with negative rheumatoid factor  (H)     ICD-10: Rheumatoid arthritis of multiple sites with negative rheumatoid factor (H)     Comparison: Lumbar spine MRI 9/14/2022     Technique: Multiplanar, multisequence images of the lumbar spine without and with intravenous contrast.     Findings: There are 5 lumbar-type vertebrae, counting from the lowest rib bearing vertebrae. The tip of the conus medullaris is at L1.   There  is trace retrolisthesis of L1 on L2 and grade 1 anterolisthesis of L4 on L5.  There is multilevel mild to moderate disc height  narrowing.  Normal marrow signal. There is opposing degenerative endplate edema at L5-S1 and mild facet edema at L5-S1 with associated  enhancement. No other abnormal enhancement.     On a level by level basis:     T12-L1: No spinal canal or neuroforaminal stenosis.     L1-2: Mild disc bulging and minimal facet arthropathy. No spinal canal or neuroforaminal stenosis     L2-3: Mild facet arthropathy. No spinal canal or neuroforaminal stenosis.     L3-4: Mild disc bulging and facet arthropathy. No significant spinal canal or neural foraminal stenosis.     L4-5: Grade 1 anterolisthesis with uncovering of the disc. There is bilateral facet arthropathy and ligamentum flavum hypertrophy. There  is moderate to severe spinal canal stenosis. Mild-to-moderate bilateral neuroforaminal stenosis. Right lateral recess narrowing.     L5-S1: Disc bulge and facet arthropathy. There is moderate bilateral neural foraminal stenosis. Questionable impingement of both exiting L5  nerves, left more than right. No spinal canal stenosis.     Above-mentioned degenerative changes are grossly unchanged since 9/2022. Paraspinous tissues are within normal limits.                                                                   Impression:   No significant change in multilevel lumbar spondylosis greatest at L4-5 where there is severe spinal canal stenosis and at L5-S1 where there is bilateral moderate neural foraminal stenosis . Mild inflammatory facet joint arthropathy at L5-S1 and degenerative endplate changes at L5-S1.              Coding statement:   Medical Decision Making:  #  Chronic progressive medical conditions addressed  - see above --   Review and/or interpretation of unique test or documentation from a provider outside of neurology yes   Independent historian provided additional details  no I  Prescription  drug management and review of potential side effects and/or monitoring for side effects  -- see above ---  Health impacted by social determinants of health  no    I have reviewed the note as documented above.  This accurately captures the substance of my conversation with the patient and total time spent preparing for visit, executing visit and completing visit on the day of the visit:  20 minutes.  The portion of this total time included face to face time 17 minutes.     The longitudinal plan of care for Lucio Daly was addressed during this visit. Due to the added complexity in care, I will continue to support Lucio Daly in the subsequent management of this condition(s) and with the ongoing continuity of care of this condition(s).      Thong Montes MD     ______________________________________    Last visit date and details:             ______________________________________      Patient was asked about 14 Review of systems including changes in vision (dry eyes, double vision), hearing, heart, lungs, musculoskeletal, depression, anxiety, snoring, RBD, insomnia, urinary frequency, urinary urgency, constipation, swallowing problems, hematological, ID, allergies, skin problems: seborrhea, endocrinological: thyroid, diabetes, cholesterol; balance, weight changes, and other neurological problems and these were not significant at this time except for   Patient Active Problem List   Diagnosis     Diaphragmatic hernia     Esophageal reflux     Hx of melanoma of skin     Malignant basal cell neoplasm of skin     GALO (obstructive sleep apnea)     Chronic maxillary sinusitis     Urinary incontinence     Sicca syndrome (H24)     Tremor     Incomplete defecation     AK (actinic keratosis)     Abnormal EMG     Seronegative arthritis     Adenomatous polyp of colon     Peripheral polyneuropathy     Morbid obesity (H)     Neuromuscular disease (H)     Bronchiolitis obliterans     Hypertension     Rheumatoid arthritis (H)      Immunosuppressed status (H24)     PLMD (periodic limb movement disorder)     Prostate cancer (H)     Lumbosacral radiculopathy     Facet arthropathy, lumbosacral     Spondylolisthesis of lumbar region     Lumbosacral spinal stenosis     Gastroesophageal reflux disease     Internal hemorrhoids     Dysphagia     Sialorrhea     Chronic bronchitis, unspecified chronic bronchitis type (H)     Gastro-esophageal reflux disease with esophagitis     Bronchiolitis     Essential hypertension     Interstitial lung disease (H)     Obesity     Sleep apnea          Allergies   Allergen Reactions     Adhesive Tape Hives     Bandages misc  Bandages misc     Alum & Mag Hydroxide-Simeth Hives     EXCESSIVE URINATION AND WEAKNESS, LIGHT-HEADED     Aluminum Hydroxide Hives     Calcium Carbonate Hives     Cortisone Hives, Headache, Nausea, Other (See Comments) and Rash     Cyclosporine      Burning eyes, problems with breathing, tightness in chest     Cyclosporine Hives     Burning eyes, problems with breathing, tightness in chest     Diphenhydramine Other (See Comments) and Shortness Of Breath     Other reaction(s): lightheadedness     Fexofenadine Other (See Comments) and Shortness Of Breath     EXCESSIVE URINATION AND WEAKNESS, LIGHT-HEADED       Gabapentin Hives and Other (See Comments)     Neurontin: mosd changes and excess urination  Neurontin: mosd changes and excess urination     Levofloxacin Hives, Other (See Comments) and Nausea     From surgeon--? Joint pain ? Gerd aggravation. Insomnia, excess urination     Magnesium Carbonate Hives     Magnesium Hydroxide Hives     Prednisone Hives, Headache and Other (See Comments)     Weakness, elevated bp, headache, eye pain, congestion      Simethicone Hives     Sulfamethoxazole-Trimethoprim Hives     Chest pain, angina  Chest pain, angina     Tree Extract Hives     Cephalexin Nausea and Other (See Comments)     Joint pain or gerd aggravation. Bloating excessive urination   Other  reaction(s): GI Distress  Joint pain or gerd aggravation. Bloating excessive urination   Other reaction(s): chest pain     Doxycycline Nausea and Vomiting, Headache and Other (See Comments)     SWEATING,MIGRAINES,LOSS OF APPETITE,SWEATING,LIGHT HEADED, EXCESSIVE URINATION  Other reaction(s): lightheadedness     Iodine Hives     Other reaction(s): GI Distress     Oxcarbazepine Dizziness, Itching, Other (See Comments) and Rash     Other reaction(s): GI Distress  SEVERE JOINT AND TENDON PAIN, INSOMNIA, RESTLESSNESS, NAUSEA, EXCESS URINATION     Sulfamethoxazole-Trimethoprim Other (See Comments)     Other reaction(s): chest pain, flushing, tachycardia     Allegra      EXCESSIVE URINATION AND WEAKNESS, LIGHT-HEADED     Animal Dander      Benadryl Allergy      EXCESSIVE URINATION AND WEAKNESS, LIGHT-HEADED     Budesonide-Formoterol Fumarate GI Disturbance and Nausea     Other reaction(s): GI Distress     Cephalosporins      Other reaction(s): GI Distress     Chlorpheniramine Maleate      Dust     Doxycycline Hyclate Nausea     SWEATING,MIGRAINES,LOSS OF APPETITE,SWEATING,LIGHT HEADED, EXCESSIVE URINATION     Flonase [Fluticasone Propionate]      Fluticasone      Other reaction(s): GI Distress     Hydrocortisone Nausea and Vomiting     Iodine Solution [Povidone Iodine]      SKIN MELTS     Levaquin      From surgeon--? Joint pain ? Gerd aggravation. Insomnia, excess urination     Mylanta      EXCESSIVE URINATION AND WEAKNESS, LIGHT-HEADED     Oxcarbazepine      SEVERE JOINT AND TENDON PAIN, INSOMNIA, RESTLESSNESS, NAUSEA, EXCESS URINATION     Povidone Iodine      Other reaction(s): GI Distress  SKIN MELTS     Prednisone      Weakness, elevated bp, headache, eye pain, congestion      Seafood [Seafood]      Shellfish Allergy      Hives    Other reaction(s): GI Distress  Hives     Trees      Cortizone Rash     EXCESS URINATION,WEAKNESS,NAUSEA, HEADACHE     Ibuprofen Fatigue, Other (See Comments) and Palpitations     Past  Surgical History:   Procedure Laterality Date     BIOPSY OF SKIN LESION       COLONOSCOPY       COLONOSCOPY N/A 7/20/2022    Procedure: COLONOSCOPY (fv) polypectomy using jumbo bx forcep;  Surgeon: Gómez Mckinney MD;  Location:  GI     CYSTOSCOPY  Many years ago    Blood in urine/ bladder cystoscopy     ESOPHAGOSCOPY, GASTROSCOPY, DUODENOSCOPY (EGD), COMBINED N/A 7/20/2022    Procedure: ESOPHAGOGASTRODUODENOSCOPY (EGD) (fv) biopsies using cold bx forcep;  Surgeon: Gómez Mckinney MD;  Location:  GI     HEMORRHOID SURGERY       lip biopsy      for sicca complex     MOHS MICROGRAPHIC PROCEDURE       SOFT TISSUE SURGERY      removeal of basel cell carcinoma     Past Medical History:   Diagnosis Date     Abnormal EMG 04/18/2013     Abnormal involuntary movements(781.0)     Movement Disorder     AK (actinic keratosis) 12/18/2011     Allergic rhinitis, cause unspecified     Allergic rhinitis     Balance problems 11/01/2011     Basal cell carcinoma      Bladder spasms 11/01/2011     Chronic osteoarthritis      COPD (chronic obstructive pulmonary disease) (H)     Interstial lung disease, obliderans bronchiolitis     Diaphragmatic hernia without mention of obstruction or gangrene      Earache or other ear, nose, or throat complaint      Esophageal reflux      Fatigue 11/01/2011     Fracture      H. pylori infection 05/12/2011     History of MRI of cervical spine 11/18/2013    EXAMINATION: CERVICAL SPINE G/E 5 VIEWS* 4/19/2013 4:18 PM  CLINICAL HISTORY: Pain in limb,Performing Location?->[facility]),  COMPARISON:  FINDINGS: AP and lateral views in flexion and extension, as well as odontoid view of the cervical spine was obtained. There is no comparison available. The vertebral bodies of the cervical spine are normally aligned. There is posterior spurring and d     Incomplete defecation 11/01/2011     Interstitial lung disease (H) 11/29/2016     Laboratory test 08/07/2012     Lung disease 06/2015      Malignant basal cell neoplasm of skin 2008     Melanoma (H) 2008     Melanoma in situ of lower leg (H)     R calf     Neuropathy 2011     GALO (obstructive sleep apnea)      Other bladder disorder      Other color vision deficiencies      Other nervous system complications      Parkinsonism 2011     Parkinsons disease     Parkinsonism/ balance, dropped foot, tremor     Personal history of colonic polyps      PLMD (periodic limb movement disorder) 2021     Polyneuropathy in other diseases classified elsewhere (H24)      RA (rheumatoid arthritis) (H)      Rheumatoid arthritis of multiple sites with negative rheumatoid factor (H) 2016     Seronegative arthritis 2013     Shortness of breath      Shoulder arthritis     acromioclavicular joint      Sialorrhea 2023     Somatization disorder 2011     Spider veins     Leg Vericise vein surgery     Squamous cell carcinoma      Tremor 2011     Unspecified essential hypertension      Unspecified hypothyroidism      Urinary tract infection      Urinary urgency 2011     Wears glasses 2011     Social History     Socioeconomic History     Marital status:      Spouse name: Not on file     Number of children: Not on file     Years of education: Not on file     Highest education level: Not on file   Occupational History     Not on file   Tobacco Use     Smoking status: Former     Packs/day: 1.50     Years: 37.00     Additional pack years: 0.00     Total pack years: 55.50     Types: Cigarettes     Start date: 6/15/1963     Quit date: 2000     Years since quittin.4     Smokeless tobacco: Never   Vaping Use     Vaping Use: Never used   Substance and Sexual Activity     Alcohol use: Not Currently     Drug use: Never     Sexual activity: Not Currently     Partners: Female     Birth control/protection: None   Other Topics Concern     Parent/sibling w/ CABG, MI or angioplasty before 65F 55M? No    Social History Narrative    2013: Living in [place] in a townhouse with no steps    Has 3 sons that are doing okay.         Dairy/d 1 servings/d.     Caffeine 0 servings/d    Exercise 0 x week    Sunscreen used - No    Seatbelts used - Yes    Working smoke/CO detectors in the home - Yes    Guns stored in the home - Yes    Self Breast Exams - NA    Self Testicular Exam - Yes    Eye Exam up to date - Yes 2008    Dental Exam up to date - Yes 2006    Pap Smear up to date - NA    Mammogram up to date - NA    PSA up to date - Yes 2008    Dexa Scan up to date - No    Flex Sig / Colonoscopy up to date - Yes less than 5 yrs ago    Immunizations up to date -today    Abuse: Current or Past(Physical, Sexual or Emotional)- No    Do you feel safe in your environment - Yes    2008                 Social Determinants of Health     Financial Resource Strain: Low Risk  (12/21/2023)    Financial Resource Strain      Within the past 12 months, have you or your family members you live with been unable to get utilities (heat, electricity) when it was really needed?: No   Food Insecurity: Low Risk  (12/21/2023)    Food Insecurity      Within the past 12 months, did you worry that your food would run out before you got money to buy more?: No      Within the past 12 months, did the food you bought just not last and you didn t have money to get more?: No   Transportation Needs: Low Risk  (12/21/2023)    Transportation Needs      Within the past 12 months, has lack of transportation kept you from medical appointments, getting your medicines, non-medical meetings or appointments, work, or from getting things that you need?: No   Physical Activity: Insufficiently Active (12/16/2022)    Exercise Vital Sign      Days of Exercise per Week: 3 days      Minutes of Exercise per Session: 20 min   Stress: No Stress Concern Present (12/16/2022)    [employer] - Occupational Stress Questionnaire      Feeling of Stress : Not  at all   Social Connections: Socially Integrated (12/16/2022)    Social Connection and Isolation Panel [NHANES]      Frequency of Communication with Friends and Family: Twice a week      Frequency of Social Gatherings with Friends and Family: Once a week      Attends Congregation Services: More than 4 times per year      Active Member of Clubs or Organizations: Yes      Attends Club or Organization Meetings: Not on file      Marital Status:    Interpersonal Safety: Low Risk  (12/22/2023)    Interpersonal Safety      Do you feel physically and emotionally safe where you currently live?: Yes      Within the past 12 months, have you been hit, slapped, kicked or otherwise physically hurt by someone?: No      Within the past 12 months, have you been humiliated or emotionally abused in other ways by your partner or ex-partner?: No   Housing Stability: Low Risk  (12/21/2023)    Housing Stability      Do you have housing? : Yes      Are you worried about losing your housing?: No       Drug and lactation database from the [place] [employer] of Medicine:  http://toxnet.nlm.nih.gov/cgi-bin/sis/htmlgen?LACT      B/P: Data Unavailable, T: Data Unavailable, P: Data Unavailable, R: Data Unavailable 0 lbs 0 oz  There were no vitals taken for this visit., There is no height or weight on file to calculate BMI.  Medications and Vitals not listed above were documented in the cart and reviewed by me.     Current Outpatient Medications   Medication Sig Dispense Refill     acetaminophen (TYLENOL) 500 MG tablet Take 1-2 tablets (500-1,000 mg) by mouth every 4 hours as needed for mild pain 30 tablet 0     acetaminophen (TYLENOL) 500 MG tablet 2 x 500mg by mouth 3 times per day: 7/730am, 4pm and 11pm  = 6/day       albuterol (PROAIR HFA/PROVENTIL HFA/VENTOLIN HFA) 108 (90 Base) MCG/ACT inhaler Inhale 2 puffs into the lungs every 4 hours as needed for shortness of breath or wheezing 18 g 3     amLODIPine (NORVASC) 5 MG tablet  TAKE 1 TABLET(5 MG) BY MOUTH DAILY 90 tablet 3     cholecalciferol (HM VITAMIN D3) 25 MCG (1000 UT) TABS 1000 unit tab by mouth daily       folic acid (FOLVITE) 1 MG tablet Take 1 tablet (1 mg) by mouth daily 90 tablet 3     hydroxychloroquine (PLAQUENIL) 200 MG tablet Take 1 tablet (200 mg) by mouth daily Daily at 9am. 90 tablet 3     lisinopril-hydrochlorothiazide (ZESTORETIC) 20-25 MG tablet TAKE 1 TABLET BY MOUTH DAILY 90 tablet 3     methotrexate 2.5 MG tablet TAKE 8 TABLETS BY MOUTH ONCE PER WEEK. 96 tablet 2     metoprolol succinate ER (TOPROL XL) 50 MG 24 hr tablet 1 and 1/2 tabs daily. 135 tablet 3     omeprazole (PRILOSEC) 40 MG DR capsule Take 1 capsule (40 mg) by mouth daily 90 capsule 2     ORDER FOR DME Use your BiPAP device as directed by your provider.       oxybutynin (DITROPAN) 5 MG tablet TAKE 1 TABLET BY MOUTH DAILY AT 4PM 180 tablet 3     pregabalin (LYRICA) 50 MG capsule 50mg capsule by mouth at 7am and 4pm and 1-2 x 50mg capsules by mouth nightly at 11pm (4/day) 360 capsule 1     salmeterol (SEREVENT DISKUS) 50 MCG/ACT inhaler Inhale 1 puff into the lungs 2 times daily       salmeterol (SEREVENT DISKUS) 50 MCG/ACT inhaler Inhale 1 puff into the lungs 2 times daily 60 each 11     Vitamin D3 (CHOLECALCIFEROL) 25 mcg (1000 units) tablet 1 unit every day by oral route.           Thong Montes MD

## 2024-04-19 NOTE — PATIENT INSTRUCTIONS
Medications     7/730am 9a 2pm 4pm 11pm   Acetaminophen tylenol 500mg 2     2 2   Albuterol proair proventil  inhaler prn            Amlodipine norvasc 5mg 1           Cholecalciferol vitamin D 1000 1           Folic acid folvite 1mg   1         Hydroxychloroquine plaquenil 200mg   1         Ketoconazole nizoral 2% shampoo             Lisinopril hydrochlorothiazide zestoretic 20-25 1           Methotrexate 2.5mg      8 tues       Metoprolol toprolol XL 50mg 24 hr tablet 1.5           Omeprazole prilosec 40mg 1           Oxybutynin ditropan 5mg      1       Pregabalin lyrica 50mg 1     1 1   Proair hfa 108 (90 base) mcg/act inhaler  above           Salmeterol serevent disku 50mcg/dose inhaler 2/day           Vit D3 cholecalciferol 25mcg 1000 units above                                          Plan:    Treated for Moraxella Catarrhalis - testing = nasal and sputum 4/1/2024  Treated with antibiotics for bronchitis.   Seeing Fabio for his lungs.     He has not proceeded with botox for drooling  Now it is manageable - saliva caught in his beard.     RA issues.   plaquenil and methotrexate.  Still taking folate, vitamin d  Had eye examination.  Seeing MONROE Snow and ZACK Goodrich  Folic acid folvite 1mg  Hydroxychloroquine plaquenil 200mg    He has numbness at night that may wake him up.   He lays on his back - has bi pap  He wakes up and then moves his legs he gets spasms  He has not tried magnesium   PLMS from sleep study - with myoclonic movements    I renewed his pregabalin/lyrica.     HIs blood pressure was fine today  He has not had carotid or heart stress studies.   He continues on the metoprolol, lisinopril and amlodipine    His legs have been an issue.   Had another MRI in December 2023 with narrowing of spinal canal  But unchanged. Consideration for spinal stenosis  This was before he went to [place] and set up physical therapy and he had more numbness/pain and increasing problems walking. He has ongoing  weakness but walking daily but not walking as far. He has weakness with standing. He has problems getting up out of a chair, etc. As he falls backwards. He has a modified toilet and is hanging onto the trim getting up. He has a feeling of cold water running down his legs. Numbness and tingling. His balance is off. There is also a component of neuropathy in addition to his spinal stenosis. He has not had back surgery before. He has a May appointment with spine doctor. He wonders if there is a combination of back and neuropathy issues. May be looking into nerve blocks or nerve ablation.     MR LUMBAR SPINE W/O & W CONTRAST 12/1/2023 5:17 PM     Provided History: progressive leg weakness; h/o spinal stenosis; Rheumatoid arthritis of multiple sites with negative rheumatoid factor  (H)     ICD-10: Rheumatoid arthritis of multiple sites with negative rheumatoid factor (H)     Comparison: Lumbar spine MRI 9/14/2022     Technique: Multiplanar, multisequence images of the lumbar spine without and with intravenous contrast.     Findings: There are 5 lumbar-type vertebrae, counting from the lowest rib bearing vertebrae. The tip of the conus medullaris is at L1.   There is trace retrolisthesis of L1 on L2 and grade 1 anterolisthesis of L4 on L5.  There is multilevel mild to moderate disc height  narrowing.  Normal marrow signal. There is opposing degenerative endplate edema at L5-S1 and mild facet edema at L5-S1 with associated  enhancement. No other abnormal enhancement.     On a level by level basis:     T12-L1: No spinal canal or neuroforaminal stenosis.     L1-2: Mild disc bulging and minimal facet arthropathy. No spinal canal or neuroforaminal stenosis     L2-3: Mild facet arthropathy. No spinal canal or neuroforaminal stenosis.     L3-4: Mild disc bulging and facet arthropathy. No significant spinal canal or neural foraminal stenosis.     L4-5: Grade 1 anterolisthesis with uncovering of the disc. There is bilateral facet  arthropathy and ligamentum flavum hypertrophy. There  is moderate to severe spinal canal stenosis. Mild-to-moderate bilateral neuroforaminal stenosis. Right lateral recess narrowing.     L5-S1: Disc bulge and facet arthropathy. There is moderate bilateral neural foraminal stenosis. Questionable impingement of both exiting L5  nerves, left more than right. No spinal canal stenosis.     Above-mentioned degenerative changes are grossly unchanged since 9/2022. Paraspinous tissues are within normal limits.                                                                   Impression:   No significant change in multilevel lumbar spondylosis greatest at L4-5 where there is severe spinal canal stenosis and at L5-S1 where there is bilateral moderate neural foraminal stenosis . Mild inflammatory facet joint arthropathy at L5-S1 and degenerative endplate changes at L5-S1.

## 2024-04-25 ENCOUNTER — MYC MEDICAL ADVICE (OUTPATIENT)
Dept: PEDIATRICS | Facility: CLINIC | Age: 74
End: 2024-04-25
Payer: MEDICARE

## 2024-04-25 DIAGNOSIS — M62.838 MUSCLE SPASM: Primary | ICD-10-CM

## 2024-04-26 NOTE — TELEPHONE ENCOUNTER
"Dr. Calixto,    Pt reports bilateral lower extremity weakness and cramping. Pt was seen by neuro 4/19/24, per chart review:  \"Assessment:  (R25.1) Tremor  (primary encounter diagnosis)  He never has had clear Parkinson's disease and his gait issue disorder may be multifactorial    His legs have been an issue.   Had another MRI in December 2023 with narrowing of spinal canal  But unchanged. Consideration for spinal stenosis    He has ongoing weakness but walking daily but not walking as far. He has weakness with standing. He has problems getting up out of a chair, etc. \"    Pt inquires if his omeprazole script may be lowering his magnesium and causing these spasms. Pt inquires if he should have his levels checked. Please review and advise, thank you!  Ugo Butts RN on 4/26/2024 at 8:05 AM    "

## 2024-04-29 ENCOUNTER — MYC MEDICAL ADVICE (OUTPATIENT)
Dept: PEDIATRICS | Facility: CLINIC | Age: 74
End: 2024-04-29
Payer: MEDICARE

## 2024-04-29 DIAGNOSIS — C61 PROSTATE CANCER (H): Primary | ICD-10-CM

## 2024-04-29 DIAGNOSIS — Z00.00 ENCOUNTER FOR ROUTINE ADULT HEALTH EXAMINATION WITHOUT ABNORMAL FINDINGS: ICD-10-CM

## 2024-04-30 ENCOUNTER — MYC MEDICAL ADVICE (OUTPATIENT)
Dept: NEUROLOGY | Facility: CLINIC | Age: 74
End: 2024-04-30
Payer: MEDICARE

## 2024-04-30 DIAGNOSIS — G62.9 PERIPHERAL POLYNEUROPATHY: ICD-10-CM

## 2024-05-01 DIAGNOSIS — G89.29 CHRONIC MIDLINE LOW BACK PAIN WITHOUT SCIATICA: Primary | ICD-10-CM

## 2024-05-01 DIAGNOSIS — M54.50 CHRONIC MIDLINE LOW BACK PAIN WITHOUT SCIATICA: Primary | ICD-10-CM

## 2024-05-01 RX ORDER — PREGABALIN 50 MG/1
CAPSULE ORAL
Qty: 360 CAPSULE | Refills: 1 | Status: SHIPPED | OUTPATIENT
Start: 2024-05-01

## 2024-05-01 NOTE — TELEPHONE ENCOUNTER
Change in pharmacy requested                                           RX Authorization    Medication: pregabalin (LYRICA) 50 MG capsule     Date last refill ordered: 4/19/24    Quantity ordered: 360    # refills: 1

## 2024-05-02 ENCOUNTER — TELEPHONE (OUTPATIENT)
Dept: RHEUMATOLOGY | Facility: CLINIC | Age: 74
End: 2024-05-02

## 2024-05-02 ENCOUNTER — LAB (OUTPATIENT)
Dept: LAB | Facility: CLINIC | Age: 74
End: 2024-05-02
Payer: MEDICARE

## 2024-05-02 ENCOUNTER — DOCUMENTATION ONLY (OUTPATIENT)
Dept: LAB | Facility: CLINIC | Age: 74
End: 2024-05-02

## 2024-05-02 DIAGNOSIS — C61 PROSTATE CANCER (H): ICD-10-CM

## 2024-05-02 DIAGNOSIS — M06.09 RHEUMATOID ARTHRITIS OF MULTIPLE SITES WITH NEGATIVE RHEUMATOID FACTOR (H): Primary | ICD-10-CM

## 2024-05-02 DIAGNOSIS — M62.838 MUSCLE SPASM: ICD-10-CM

## 2024-05-02 DIAGNOSIS — Z00.00 ENCOUNTER FOR ROUTINE ADULT HEALTH EXAMINATION WITHOUT ABNORMAL FINDINGS: ICD-10-CM

## 2024-05-02 LAB
BASOPHILS # BLD AUTO: 0 10E3/UL (ref 0–0.2)
BASOPHILS NFR BLD AUTO: 1 %
EOSINOPHIL # BLD AUTO: 0.1 10E3/UL (ref 0–0.7)
EOSINOPHIL NFR BLD AUTO: 2 %
ERYTHROCYTE [DISTWIDTH] IN BLOOD BY AUTOMATED COUNT: 15.7 % (ref 10–15)
HBA1C MFR BLD: 5.7 % (ref 0–5.6)
HCT VFR BLD AUTO: 39.5 % (ref 40–53)
HGB BLD-MCNC: 12.3 G/DL (ref 13.3–17.7)
IMM GRANULOCYTES # BLD: 0 10E3/UL
IMM GRANULOCYTES NFR BLD: 0 %
LYMPHOCYTES # BLD AUTO: 0.6 10E3/UL (ref 0.8–5.3)
LYMPHOCYTES NFR BLD AUTO: 16 %
MCH RBC QN AUTO: 30.3 PG (ref 26.5–33)
MCHC RBC AUTO-ENTMCNC: 31.1 G/DL (ref 31.5–36.5)
MCV RBC AUTO: 97 FL (ref 78–100)
MONOCYTES # BLD AUTO: 0.3 10E3/UL (ref 0–1.3)
MONOCYTES NFR BLD AUTO: 7 %
NEUTROPHILS # BLD AUTO: 2.7 10E3/UL (ref 1.6–8.3)
NEUTROPHILS NFR BLD AUTO: 73 %
PLATELET # BLD AUTO: 164 10E3/UL (ref 150–450)
RBC # BLD AUTO: 4.06 10E6/UL (ref 4.4–5.9)
WBC # BLD AUTO: 3.7 10E3/UL (ref 4–11)

## 2024-05-02 PROCEDURE — 36415 COLL VENOUS BLD VENIPUNCTURE: CPT

## 2024-05-02 PROCEDURE — 80053 COMPREHEN METABOLIC PANEL: CPT

## 2024-05-02 PROCEDURE — 85025 COMPLETE CBC W/AUTO DIFF WBC: CPT | Mod: GZ

## 2024-05-02 PROCEDURE — 83036 HEMOGLOBIN GLYCOSYLATED A1C: CPT | Mod: GZ

## 2024-05-02 PROCEDURE — 84153 ASSAY OF PSA TOTAL: CPT

## 2024-05-02 PROCEDURE — 83735 ASSAY OF MAGNESIUM: CPT

## 2024-05-02 NOTE — TELEPHONE ENCOUNTER
[facility] Call Center    Phone Message    May a detailed message be left on voicemail: yes     Reason for Call: Other: please assist Syed is at the lab and there are no orders from Dr. Goodrich for them to draw      Action Taken: Other: Rheum    Travel Screening: Not Applicable

## 2024-05-03 LAB
ALBUMIN SERPL BCG-MCNC: 4.5 G/DL (ref 3.5–5.2)
ALP SERPL-CCNC: 60 U/L (ref 40–150)
ALT SERPL W P-5'-P-CCNC: 18 U/L (ref 0–70)
ANION GAP SERPL CALCULATED.3IONS-SCNC: 9 MMOL/L (ref 7–15)
AST SERPL W P-5'-P-CCNC: 21 U/L (ref 0–45)
BILIRUB SERPL-MCNC: 0.5 MG/DL
BUN SERPL-MCNC: 36.3 MG/DL (ref 8–23)
CALCIUM SERPL-MCNC: 9.5 MG/DL (ref 8.8–10.2)
CHLORIDE SERPL-SCNC: 105 MMOL/L (ref 98–107)
CREAT SERPL-MCNC: 1.45 MG/DL (ref 0.67–1.17)
DEPRECATED HCO3 PLAS-SCNC: 26 MMOL/L (ref 22–29)
EGFRCR SERPLBLD CKD-EPI 2021: 50 ML/MIN/1.73M2
GLUCOSE SERPL-MCNC: 104 MG/DL (ref 70–99)
MAGNESIUM SERPL-MCNC: 2.3 MG/DL (ref 1.7–2.3)
POTASSIUM SERPL-SCNC: 5 MMOL/L (ref 3.4–5.3)
PROT SERPL-MCNC: 7.1 G/DL (ref 6.4–8.3)
PSA SERPL DL<=0.01 NG/ML-MCNC: 0.42 NG/ML (ref 0–6.5)
SODIUM SERPL-SCNC: 140 MMOL/L (ref 135–145)

## 2024-05-07 ENCOUNTER — OFFICE VISIT (OUTPATIENT)
Dept: ORTHOPEDICS | Facility: CLINIC | Age: 74
End: 2024-05-07
Payer: MEDICARE

## 2024-05-07 ENCOUNTER — ANCILLARY PROCEDURE (OUTPATIENT)
Dept: GENERAL RADIOLOGY | Facility: CLINIC | Age: 74
End: 2024-05-07
Attending: ORTHOPAEDIC SURGERY
Payer: MEDICARE

## 2024-05-07 DIAGNOSIS — M54.50 CHRONIC MIDLINE LOW BACK PAIN WITHOUT SCIATICA: ICD-10-CM

## 2024-05-07 DIAGNOSIS — G89.29 CHRONIC MIDLINE LOW BACK PAIN WITHOUT SCIATICA: Primary | ICD-10-CM

## 2024-05-07 DIAGNOSIS — M54.50 CHRONIC MIDLINE LOW BACK PAIN WITHOUT SCIATICA: Primary | ICD-10-CM

## 2024-05-07 DIAGNOSIS — G89.29 CHRONIC MIDLINE LOW BACK PAIN WITHOUT SCIATICA: ICD-10-CM

## 2024-05-07 DIAGNOSIS — M21.372 BILATERAL FOOT-DROP: ICD-10-CM

## 2024-05-07 DIAGNOSIS — M21.371 BILATERAL FOOT-DROP: ICD-10-CM

## 2024-05-07 PROCEDURE — 72110 X-RAY EXAM L-2 SPINE 4/>VWS: CPT | Performed by: STUDENT IN AN ORGANIZED HEALTH CARE EDUCATION/TRAINING PROGRAM

## 2024-05-07 PROCEDURE — 99214 OFFICE O/P EST MOD 30 MIN: CPT | Performed by: ORTHOPAEDIC SURGERY

## 2024-05-07 NOTE — LETTER
5/7/2024         RE: Lucio Daly  [address]  [place] [address]        Dear Colleague,    Thank you for referring your patient, Lucio Daly, to the [facility] [place]. Please see a copy of my visit note below.    Spine Surgery Return Clinic Visit      Chief Complaint:   RECHECK (Patient returns for recheck of lumbar spine.  C/O progressive LE weakness.)      Interval HPI:  Symptom Profile Including: location of symptoms, onset, severity, exacerbating/alleviating factors, previous treatments:        Lucio Daly is a 73 year old male who presents in follow-up, right radicular leg pain.  Patient has had years of pain and undergone previous CSI that he states alleviated symptoms for a few hours but did not last very long.  He also states that he has neurogenic Bladder issues after each injection he has been having frequency of every 15 minutes.  Of note patient shares that he has somewhat improved in the symptoms with some improvement in his numbness and duration of ability to drive before the numbness starts.  He continues to have back pain and radicular leg pain in an L5 type distribution that extends down the anterior lateral thigh and down toward the medial foot.  States the leg feels weak he but has not significantly worsened.  He has been improving with the gabapentin and try PT but this made symptoms worse.    Significant medical history of seronegative autoimmune arthropathy with chronic treatment on methotrexate and Plaquenil, additionally shares that he has chronic pulmonary issues for which she undergoes PFTs regularly.         Past Medical History:     Past Medical History:   Diagnosis Date    Abnormal EMG 04/18/2013    Abnormal involuntary movements(781.0)     Movement Disorder    AK (actinic keratosis) 12/18/2011    Allergic rhinitis, cause unspecified     Allergic rhinitis    Balance problems 11/01/2011    Basal cell carcinoma     Bladder spasms 11/01/2011     Chronic osteoarthritis     COPD (chronic obstructive pulmonary disease) (H)     Interstial lung disease, obliderans bronchiolitis    Diaphragmatic hernia without mention of obstruction or gangrene     Earache or other ear, nose, or throat complaint     Esophageal reflux     Fatigue 11/01/2011    Fracture     H. pylori infection 05/12/2011    History of MRI of cervical spine 11/18/2013    EXAMINATION: CERVICAL SPINE G/E 5 VIEWS* 4/19/2013 4:18 PM  CLINICAL HISTORY: Pain in limb,Performing Location?->[facility] ([facility]),  COMPARISON:  FINDINGS: AP and lateral views in flexion and extension, as well as odontoid view of the cervical spine was obtained. There is no comparison available. The vertebral bodies of the cervical spine are normally aligned. There is posterior spurring and d    Incomplete defecation 11/01/2011    Interstitial lung disease (H) 11/29/2016    Laboratory test 08/07/2012    Lung disease 06/2015    Malignant basal cell neoplasm of skin 08/06/2008    Melanoma (H) 08/06/2008    Melanoma in situ of lower leg (H)     R calf    Moraxella catarrhalis bronchitis 04/19/2024    Neuropathy 05/16/2011    GALO (obstructive sleep apnea)     Other bladder disorder     Other color vision deficiencies     Other nervous system complications     Parkinsonism (H) 11/01/2011    Parkinsons disease (H)     Parkinsonism/ balance, dropped foot, tremor    Personal history of colonic polyps     PLMD (periodic limb movement disorder) 12/16/2021    Polyneuropathy in other diseases classified elsewhere (H24)     RA (rheumatoid arthritis) (H)     Rheumatoid arthritis of multiple sites with negative rheumatoid factor (H) 03/21/2016    Seronegative arthritis 11/18/2013    Shortness of breath     Shoulder arthritis 2016    acromioclavicular joint     Sialorrhea 04/18/2023    Somatization disorder 05/12/2011    Spider veins     Leg Vericise vein surgery    Squamous cell carcinoma     Tremor 11/01/2011    Unspecified essential  "hypertension     Unspecified hypothyroidism     Urinary tract infection     Urinary urgency 2011    Wears glasses 2011            Past Surgical History:     Past Surgical History:   Procedure Laterality Date    BIOPSY OF SKIN LESION      COLONOSCOPY      COLONOSCOPY N/A 2022    Procedure: COLONOSCOPY (fv) polypectomy using jumbo bx forcep;  Surgeon: Gómez Mckinney MD;  Location:  GI    CYSTOSCOPY  Many years ago    Blood in urine/ bladder cystoscopy    ESOPHAGOSCOPY, GASTROSCOPY, DUODENOSCOPY (EGD), COMBINED N/A 2022    Procedure: ESOPHAGOGASTRODUODENOSCOPY (EGD) (fv) biopsies using cold bx forcep;  Surgeon: Gómez Mckinney MD;  Location:  GI    HEMORRHOID SURGERY      lip biopsy      for sicca complex    MOHS MICROGRAPHIC PROCEDURE      SOFT TISSUE SURGERY      removeal of basel cell carcinoma            Social History:     Social History     Tobacco Use    Smoking status: Former     Current packs/day: 0.00     Average packs/day: 1.5 packs/day for 37.3 years (55.9 ttl pk-yrs)     Types: Cigarettes     Start date: 6/15/1963     Quit date: 2000     Years since quittin.6    Smokeless tobacco: Never   Substance Use Topics    Alcohol use: Not Currently            Family History:     Family History   Problem Relation Age of Onset    Hypertension Mother     Heart Disease Mother         a fib    Arthritis Mother         \"osteo\"    Osteoporosis Mother     Skin Cancer Mother     Uterine Cancer Mother     Other Cancer Mother     Cancer Mother     Hypertension Brother     Diabetes Brother         \" post pancreatitis\"    Neurologic Disorder Sister         multiple sclerosis    Hypertension Sister     Heart Disease Sister     Multiple Sclerosis Sister     Heart Disease Sister         Chf    Arthritis Sister     Heart Failure Sister     Kidney Disease Sister     Dementia Sister     Hypertension Father     Cerebrovascular Disease Father         ,    Skin Cancer Father     Colon Polyps " Father     Heart Disease Father     Other - See Comments Son         [place]    Other - See Comments Son         [place]    Other - See Comments Son         day gonzalez    Psoriasis Maternal Grandfather     Stomach Cancer Maternal Grandfather     Cancer Maternal Grandfather     Congenital Anomalies Other         granddaughter with a chromosome defect    Other Cancer Other         Grandaughter    Cancer Other         Grand daughter    Cerebrovascular Disease Paternal Grandmother         ,    Cerebrovascular Disease Paternal Grandfather         ,    Cancer Granddaughter         Langerhans Cell Histiocytosis    Genetic Disease Granddaughter         gene translocation    Learning Disorder Other         Gene translocation    Melanoma No family hx of     Colon Cancer No family hx of             Allergies:     Allergies   Allergen Reactions    Adhesive Tape Hives     Bandages misc  Bandages misc    Alum & Mag Hydroxide-Simeth Hives     EXCESSIVE URINATION AND WEAKNESS, LIGHT-HEADED    Aluminum Hydroxide Hives    Calcium Carbonate Hives    Cortisone Hives, Headache, Nausea, Other (See Comments) and Rash    Cyclosporine      Burning eyes, problems with breathing, tightness in chest    Cyclosporine Hives     Burning eyes, problems with breathing, tightness in chest    Diphenhydramine Other (See Comments) and Shortness Of Breath     Other reaction(s): lightheadedness    Fexofenadine Other (See Comments) and Shortness Of Breath     EXCESSIVE URINATION AND WEAKNESS, LIGHT-HEADED      Levofloxacin Hives, Other (See Comments) and Nausea     From surgeon--? Joint pain ? Gerd aggravation. Insomnia, excess urination    Magnesium Carbonate Hives    Magnesium Hydroxide Hives    Prednisone Hives, Headache and Other (See Comments)     Weakness, elevated bp, headache, eye pain, congestion     Simethicone Hives    Sulfamethoxazole-Trimethoprim Hives     Chest pain, angina  Chest pain, angina    Tree Extract Hives     Cephalexin Nausea and Other (See Comments)     Joint pain or gerd aggravation. Bloating excessive urination   Other reaction(s): GI Distress  Joint pain or gerd aggravation. Bloating excessive urination   Other reaction(s): chest pain    Doxycycline Nausea and Vomiting, Headache and Other (See Comments)     SWEATING,MIGRAINES,LOSS OF APPETITE,SWEATING,LIGHT HEADED, EXCESSIVE URINATION  Other reaction(s): lightheadedness    Iodine Hives     Other reaction(s): GI Distress    Oxcarbazepine Dizziness, Itching, Other (See Comments) and Rash     Other reaction(s): GI Distress  SEVERE JOINT AND TENDON PAIN, INSOMNIA, RESTLESSNESS, NAUSEA, EXCESS URINATION    Sulfamethoxazole-Trimethoprim Other (See Comments)     Other reaction(s): chest pain, flushing, tachycardia    Allegra      EXCESSIVE URINATION AND WEAKNESS, LIGHT-HEADED    Animal Dander     Benadryl Allergy      EXCESSIVE URINATION AND WEAKNESS, LIGHT-HEADED    Budesonide-Formoterol Fumarate GI Disturbance and Nausea     Other reaction(s): GI Distress    Cephalosporins      Other reaction(s): GI Distress    Chlorpheniramine Maleate      Dust    Doxycycline Hyclate Nausea     SWEATING,MIGRAINES,LOSS OF APPETITE,SWEATING,LIGHT HEADED, EXCESSIVE URINATION    Flonase [Fluticasone Propionate]     Fluticasone      Other reaction(s): GI Distress    Hydrocortisone Nausea and Vomiting    Iodine Solution [Povidone Iodine]      SKIN MELTS    Levaquin      From surgeon--? Joint pain ? Gerd aggravation. Insomnia, excess urination    Mylanta      EXCESSIVE URINATION AND WEAKNESS, LIGHT-HEADED    Oxcarbazepine      SEVERE JOINT AND TENDON PAIN, INSOMNIA, RESTLESSNESS, NAUSEA, EXCESS URINATION    Povidone Iodine      Other reaction(s): GI Distress  SKIN MELTS    Prednisone      Weakness, elevated bp, headache, eye pain, congestion     Seafood [Seafood]     Shellfish Allergy      Hives    Other reaction(s): GI Distress  Hives    Trees     Cortizone Rash     EXCESS  URINATION,WEAKNESS,NAUSEA, HEADACHE    Ibuprofen Fatigue, Other (See Comments) and Palpitations            Medications:     Current Outpatient Medications   Medication Sig Dispense Refill    acetaminophen (TYLENOL) 500 MG tablet Take 1-2 tablets (500-1,000 mg) by mouth every 4 hours as needed for mild pain 30 tablet 0    acetaminophen (TYLENOL) 500 MG tablet 2 x 500mg by mouth 3 times per day: 7/730am, 4pm and 11pm  = 6/day      albuterol (PROAIR HFA/PROVENTIL HFA/VENTOLIN HFA) 108 (90 Base) MCG/ACT inhaler Inhale 2 puffs into the lungs every 4 hours as needed for shortness of breath or wheezing 18 g 3    Albuterol Sulfate, sensor, 108 (90 Base) MCG/ACT AEPB 2 puffs as needed by inhalation route.      amLODIPine (NORVASC) 5 MG tablet TAKE 1 TABLET(5 MG) BY MOUTH DAILY 90 tablet 3    cholecalciferol (HM VITAMIN D3) 25 MCG (1000 UT) TABS 1000 unit tab by mouth daily      folic acid (FOLVITE) 1 MG tablet Take 1 tablet (1 mg) by mouth daily 90 tablet 3    hydroxychloroquine (PLAQUENIL) 200 MG tablet Take 1 tablet (200 mg) by mouth daily Daily at 9am. 90 tablet 3    lisinopril-hydrochlorothiazide (ZESTORETIC) 20-25 MG tablet TAKE 1 TABLET BY MOUTH DAILY 90 tablet 3    methotrexate 2.5 MG tablet TAKE 8 TABLETS BY MOUTH ONCE PER WEEK. 96 tablet 2    metoprolol succinate ER (TOPROL XL) 50 MG 24 hr tablet 1 and 1/2 tabs daily. 135 tablet 3    omeprazole (PRILOSEC) 40 MG DR capsule Take 1 capsule (40 mg) by mouth daily 90 capsule 2    ORDER FOR DME Use your BiPAP device as directed by your provider.      oxybutynin (DITROPAN) 5 MG tablet TAKE 1 TABLET BY MOUTH DAILY AT 4PM 180 tablet 3    pregabalin (LYRICA) 50 MG capsule 50mg capsule by mouth at 7am and 4pm and 1-2 x 50mg capsules by mouth nightly at 11pm (4/day) 360 capsule 1    salmeterol (SEREVENT DISKUS) 50 MCG/ACT inhaler Inhale 1 puff into the lungs 2 times daily 60 each 11    Vitamin D3 (CHOLECALCIFEROL) 25 mcg (1000 units) tablet 1 unit every day by oral route.        No current facility-administered medications for this visit.             Review of Systems:   A focused musculoskeletal and neurologic ROS was performed with pertinent positives and negatives noted in the HPI.  Additional systems were also reviewed and are documented at the bottom of the note.         Physical Exam:   Vitals: There were no vitals taken for this visit.  Musculoskeletal, Neurologic, and Spine:     Motor -        LOWER EXTREMITY Left Right   Hip flexion 5/5 4/5   Knee flexion 5/5 4/5   Knee extension 5/5 4/5   Ankle dorsiflexion 5/5 4/5   Ankle plantarflexion 5/5 5/5   Great toe extension 5/5 4/5              Imaging:   We ordered and independently reviewed new radiographs at this clinic visit. The results were discussed with the patient. Findings include:    X-ray including flexion-extension films reviewed today.  Demonstrate no obvious instability though there is a grade 2 L4 on 5 spondylolisthesis.  MRI reviewed demonstrating stenosis at the L4-5 level at the corresponding spondylolisthesis level.       Assessment and Plan:     73 year old male with L4-5 degenerative spondylolisthesis with central stenosis    Continue with pregabalin and conservative measures    Patient would like to discuss with his wife continue conservative management repeat injection versus surgical intervention.  He is to reach out via [employer] to let us know what his next steps will be or if he has any additional questions.      We will order the injection L4-5 transforaminal injection if he would like to pursue that treatment, if he decides he would like to consider surgical intervention we referral to our PAC team to evaluate risk of surgery given his chronic comorbidities and baseline lung disease.  Patient ultimately would be a candidate for a more minimally invasive surgery L4-5 lateral lumbar interbody fusion with percutaneous fixation posteriorly.  Ultimately discussed this would likely improve his symptoms however  there is a small chance that he could have persistent radicular symptoms and if that were the case may need another posterior procedure for decompression at that time.  We will discuss surgical intervention and neck steps pending his decision.      Respectfully,  Mike Griffiths MD  Spine Surgery  [facility]

## 2024-05-07 NOTE — NURSING NOTE
Reason For Visit:   Chief Complaint   Patient presents with    RECHECK     Patient returns for recheck of lumbar spine.  C/O progressive LE weakness.       Primary MD: Heladio Calixto  Ref. MD: est    ?  No  Occupation Retired.  Currently working? No.  Work status?  Retired.  Date of injury: NA  Type of injury: Chronic.  Date of surgery: NA  Type of surgery: NA.  Smoker: No  Request smoking cessation information: No    There were no vitals taken for this visit.    Pain Assessment  Patient Currently in Pain: Yes  0-10 Pain Scale: 5  Primary Pain Location: Back    Oswestry (TIMBO) Questionnaire        4/30/2024    10:21 AM   OSWESTRY DISABILITY INDEX   Count 9   Sum 19   Oswestry Score (%) 42.22 %            Neck Disability Index (NDI) Questionnaire         No data to display                       Visual Analog Pain Scale  Back Pain Scale 0-10: 5  Right leg pain: 6  Left leg pain: 6  Neck Pain Scale 0-10: 0  Right arm pain: 0  Left arm pain: 0    Promis 10 Assessment        11/28/2023    11:14 AM   PROMIS 10   In general, would you say your health is: Fair   In general, would you say your quality of life is: Fair   In general, how would you rate your physical health? Good   In general, how would you rate your mental health, including your mood and your ability to think? Good   In general, how would you rate your satisfaction with your social activities and relationships? Good   In general, please rate how well you carry out your usual social activities and roles Good   To what extent are you able to carry out your everyday physical activities such as walking, climbing stairs, carrying groceries, or moving a chair? Moderately   In the past 7 days, how often have you been bothered by emotional problems such as feeling anxious, depressed, or irritable? Rarely   In the past 7 days, how would you rate your fatigue on average? Moderate   In the past 7 days, how would you rate your pain on average, where 0 means no  pain, and 10 means worst imaginable pain? 5   In general, would you say your health is: 2   In general, would you say your quality of life is: 2   In general, how would you rate your physical health? 3   In general, how would you rate your mental health, including your mood and your ability to think? 3   In general, how would you rate your satisfaction with your social activities and relationships? 3   In general, please rate how well you carry out your usual social activities and roles. (This includes activities at home, at work and in your community, and responsibilities as a parent, child, spouse, employee, friend, etc.) 3   To what extent are you able to carry out your everyday physical activities such as walking, climbing stairs, carrying groceries, or moving a chair? 3   In the past 7 days, how often have you been bothered by emotional problems such as feeling anxious, depressed, or irritable? 2   In the past 7 days, how would you rate your fatigue on average? 3   In the past 7 days, how would you rate your pain on average, where 0 means no pain, and 10 means worst imaginable pain? 5   Global Mental Health Score 12   Global Physical Health Score 12   PROMIS TOTAL - SUBSCORES 24                Ramón Florentino ATC

## 2024-05-07 NOTE — PROGRESS NOTES
Spine Surgery Return Clinic Visit      Chief Complaint:   RECHECK (Patient returns for recheck of lumbar spine.  C/O progressive LE weakness.)          Ramón Fsoter, DO  Spine Fellow

## 2024-05-08 ENCOUNTER — MYC MEDICAL ADVICE (OUTPATIENT)
Dept: PODIATRY | Facility: CLINIC | Age: 74
End: 2024-05-08
Payer: MEDICARE

## 2024-05-08 NOTE — PROGRESS NOTES
Spine Surgery Return Clinic Visit      Chief Complaint:   RECHECK (Patient returns for recheck of lumbar spine.  C/O progressive LE weakness.)      Interval HPI:  Symptom Profile Including: location of symptoms, onset, severity, exacerbating/alleviating factors, previous treatments:        Lucio Daly is a 73 year old male who presents in follow-up, right radicular leg pain.  Patient has had years of pain and undergone previous CSI that he states alleviated symptoms for a few hours but did not last very long.  He also states that he has neurogenic Bladder issues after each injection he has been having frequency of every 15 minutes.  Of note patient shares that he has somewhat improved in the symptoms with some improvement in his numbness and duration of ability to drive before the numbness starts.  He continues to have back pain and radicular leg pain in an L5 type distribution that extends down the anterior lateral thigh and down toward the medial foot.  States the leg feels weak he but has not significantly worsened.  He has been improving with the gabapentin and try PT but this made symptoms worse.    Significant medical history of seronegative autoimmune arthropathy with chronic treatment on methotrexate and Plaquenil, additionally shares that he has chronic pulmonary issues for which she undergoes PFTs regularly.         Past Medical History:     Past Medical History:   Diagnosis Date    Abnormal EMG 04/18/2013    Abnormal involuntary movements(781.0)     Movement Disorder    AK (actinic keratosis) 12/18/2011    Allergic rhinitis, cause unspecified     Allergic rhinitis    Balance problems 11/01/2011    Basal cell carcinoma     Bladder spasms 11/01/2011    Chronic osteoarthritis     COPD (chronic obstructive pulmonary disease) (H)     Interstial lung disease, obliderans bronchiolitis    Diaphragmatic hernia without mention of obstruction or gangrene     Earache or other ear, nose, or throat complaint      Esophageal reflux     Fatigue 11/01/2011    Fracture     H. pylori infection 05/12/2011    History of MRI of cervical spine 11/18/2013    EXAMINATION: CERVICAL SPINE G/E 5 VIEWS* 4/19/2013 4:18 PM  CLINICAL HISTORY: Pain in limb,Performing Location?->[facility]),  COMPARISON:  FINDINGS: AP and lateral views in flexion and extension, as well as odontoid view of the cervical spine was obtained. There is no comparison available. The vertebral bodies of the cervical spine are normally aligned. There is posterior spurring and d    Incomplete defecation 11/01/2011    Interstitial lung disease (H) 11/29/2016    Laboratory test 08/07/2012    Lung disease 06/2015    Malignant basal cell neoplasm of skin 08/06/2008    Melanoma (H) 08/06/2008    Melanoma in situ of lower leg (H)     R calf    Moraxella catarrhalis bronchitis 04/19/2024    Neuropathy 05/16/2011    GALO (obstructive sleep apnea)     Other bladder disorder     Other color vision deficiencies     Other nervous system complications     Parkinsonism (H) 11/01/2011    Parkinsons disease (H)     Parkinsonism/ balance, dropped foot, tremor    Personal history of colonic polyps     PLMD (periodic limb movement disorder) 12/16/2021    Polyneuropathy in other diseases classified elsewhere (H24)     RA (rheumatoid arthritis) (H)     Rheumatoid arthritis of multiple sites with negative rheumatoid factor (H) 03/21/2016    Seronegative arthritis 11/18/2013    Shortness of breath     Shoulder arthritis 2016    acromioclavicular joint     Sialorrhea 04/18/2023    Somatization disorder 05/12/2011    Spider veins     Leg Vericise vein surgery    Squamous cell carcinoma     Tremor 11/01/2011    Unspecified essential hypertension     Unspecified hypothyroidism     Urinary tract infection     Urinary urgency 11/01/2011    Wears glasses 11/01/2011            Past Surgical History:     Past Surgical History:   Procedure Laterality Date    BIOPSY OF SKIN LESION       "COLONOSCOPY      COLONOSCOPY N/A 2022    Procedure: COLONOSCOPY (fv) polypectomy using jumbo bx forcep;  Surgeon: Gómez Mckinney MD;  Location:  GI    CYSTOSCOPY  Many years ago    Blood in urine/ bladder cystoscopy    ESOPHAGOSCOPY, GASTROSCOPY, DUODENOSCOPY (EGD), COMBINED N/A 2022    Procedure: ESOPHAGOGASTRODUODENOSCOPY (EGD) (fv) biopsies using cold bx forcep;  Surgeon: Gómez Mckinney MD;  Location:  GI    HEMORRHOID SURGERY      lip biopsy      for sicca complex    MOHS MICROGRAPHIC PROCEDURE      SOFT TISSUE SURGERY      removeal of basel cell carcinoma            Social History:     Social History     Tobacco Use    Smoking status: Former     Current packs/day: 0.00     Average packs/day: 1.5 packs/day for 37.3 years (55.9 ttl pk-yrs)     Types: Cigarettes     Start date: 6/15/1963     Quit date: 2000     Years since quittin.6    Smokeless tobacco: Never   Substance Use Topics    Alcohol use: Not Currently            Family History:     Family History   Problem Relation Age of Onset    Hypertension Mother     Heart Disease Mother         a fib    Arthritis Mother         \"osteo\"    Osteoporosis Mother     Skin Cancer Mother     Uterine Cancer Mother     Other Cancer Mother     Cancer Mother     Hypertension Brother     Diabetes Brother         \" post pancreatitis\"    Neurologic Disorder Sister         multiple sclerosis    Hypertension Sister     Heart Disease Sister     Multiple Sclerosis Sister     Heart Disease Sister         Chf    Arthritis Sister     Heart Failure Sister     Kidney Disease Sister     Dementia Sister     Hypertension Father     Cerebrovascular Disease Father         ,    Skin Cancer Father     Colon Polyps Father     Heart Disease Father     Other - See Comments Son         [place]    Other - See Comments Abdon wagner    Other - See Comments Abdon gonzalez    Psoriasis Maternal Grandfather     Stomach Cancer Maternal " Grandfather     Cancer Maternal Grandfather     Congenital Anomalies Other         granddaughter with a chromosome defect    Other Cancer Other         Grandaughter    Cancer Other         Grand daughter    Cerebrovascular Disease Paternal Grandmother         ,    Cerebrovascular Disease Paternal Grandfather         ,    Cancer Granddaughter         Langerhans Cell Histiocytosis    Genetic Disease Granddaughter         gene translocation    Learning Disorder Other         Gene translocation    Melanoma No family hx of     Colon Cancer No family hx of             Allergies:     Allergies   Allergen Reactions    Adhesive Tape Hives     Bandages misc  Bandages misc    Alum & Mag Hydroxide-Simeth Hives     EXCESSIVE URINATION AND WEAKNESS, LIGHT-HEADED    Aluminum Hydroxide Hives    Calcium Carbonate Hives    Cortisone Hives, Headache, Nausea, Other (See Comments) and Rash    Cyclosporine      Burning eyes, problems with breathing, tightness in chest    Cyclosporine Hives     Burning eyes, problems with breathing, tightness in chest    Diphenhydramine Other (See Comments) and Shortness Of Breath     Other reaction(s): lightheadedness    Fexofenadine Other (See Comments) and Shortness Of Breath     EXCESSIVE URINATION AND WEAKNESS, LIGHT-HEADED      Levofloxacin Hives, Other (See Comments) and Nausea     From surgeon--? Joint pain ? Gerd aggravation. Insomnia, excess urination    Magnesium Carbonate Hives    Magnesium Hydroxide Hives    Prednisone Hives, Headache and Other (See Comments)     Weakness, elevated bp, headache, eye pain, congestion     Simethicone Hives    Sulfamethoxazole-Trimethoprim Hives     Chest pain, angina  Chest pain, angina    Tree Extract Hives    Cephalexin Nausea and Other (See Comments)     Joint pain or gerd aggravation. Bloating excessive urination   Other reaction(s): GI Distress  Joint pain or gerd aggravation. Bloating excessive urination   Other reaction(s): chest pain    Doxycycline  Nausea and Vomiting, Headache and Other (See Comments)     SWEATING,MIGRAINES,LOSS OF APPETITE,SWEATING,LIGHT HEADED, EXCESSIVE URINATION  Other reaction(s): lightheadedness    Iodine Hives     Other reaction(s): GI Distress    Oxcarbazepine Dizziness, Itching, Other (See Comments) and Rash     Other reaction(s): GI Distress  SEVERE JOINT AND TENDON PAIN, INSOMNIA, RESTLESSNESS, NAUSEA, EXCESS URINATION    Sulfamethoxazole-Trimethoprim Other (See Comments)     Other reaction(s): chest pain, flushing, tachycardia    Allegra      EXCESSIVE URINATION AND WEAKNESS, LIGHT-HEADED    Animal Dander     Benadryl Allergy      EXCESSIVE URINATION AND WEAKNESS, LIGHT-HEADED    Budesonide-Formoterol Fumarate GI Disturbance and Nausea     Other reaction(s): GI Distress    Cephalosporins      Other reaction(s): GI Distress    Chlorpheniramine Maleate      Dust    Doxycycline Hyclate Nausea     SWEATING,MIGRAINES,LOSS OF APPETITE,SWEATING,LIGHT HEADED, EXCESSIVE URINATION    Flonase [Fluticasone Propionate]     Fluticasone      Other reaction(s): GI Distress    Hydrocortisone Nausea and Vomiting    Iodine Solution [Povidone Iodine]      SKIN MELTS    Levaquin      From surgeon--? Joint pain ? Gerd aggravation. Insomnia, excess urination    Mylanta      EXCESSIVE URINATION AND WEAKNESS, LIGHT-HEADED    Oxcarbazepine      SEVERE JOINT AND TENDON PAIN, INSOMNIA, RESTLESSNESS, NAUSEA, EXCESS URINATION    Povidone Iodine      Other reaction(s): GI Distress  SKIN MELTS    Prednisone      Weakness, elevated bp, headache, eye pain, congestion     Seafood [Seafood]     Shellfish Allergy      Hives    Other reaction(s): GI Distress  Hives    Trees     Cortizone Rash     EXCESS URINATION,WEAKNESS,NAUSEA, HEADACHE    Ibuprofen Fatigue, Other (See Comments) and Palpitations            Medications:     Current Outpatient Medications   Medication Sig Dispense Refill    acetaminophen (TYLENOL) 500 MG tablet Take 1-2 tablets (500-1,000 mg) by mouth  every 4 hours as needed for mild pain 30 tablet 0    acetaminophen (TYLENOL) 500 MG tablet 2 x 500mg by mouth 3 times per day: 7/730am, 4pm and 11pm  = 6/day      albuterol (PROAIR HFA/PROVENTIL HFA/VENTOLIN HFA) 108 (90 Base) MCG/ACT inhaler Inhale 2 puffs into the lungs every 4 hours as needed for shortness of breath or wheezing 18 g 3    Albuterol Sulfate, sensor, 108 (90 Base) MCG/ACT AEPB 2 puffs as needed by inhalation route.      amLODIPine (NORVASC) 5 MG tablet TAKE 1 TABLET(5 MG) BY MOUTH DAILY 90 tablet 3    cholecalciferol (HM VITAMIN D3) 25 MCG (1000 UT) TABS 1000 unit tab by mouth daily      folic acid (FOLVITE) 1 MG tablet Take 1 tablet (1 mg) by mouth daily 90 tablet 3    hydroxychloroquine (PLAQUENIL) 200 MG tablet Take 1 tablet (200 mg) by mouth daily Daily at 9am. 90 tablet 3    lisinopril-hydrochlorothiazide (ZESTORETIC) 20-25 MG tablet TAKE 1 TABLET BY MOUTH DAILY 90 tablet 3    methotrexate 2.5 MG tablet TAKE 8 TABLETS BY MOUTH ONCE PER WEEK. 96 tablet 2    metoprolol succinate ER (TOPROL XL) 50 MG 24 hr tablet 1 and 1/2 tabs daily. 135 tablet 3    omeprazole (PRILOSEC) 40 MG DR capsule Take 1 capsule (40 mg) by mouth daily 90 capsule 2    ORDER FOR DME Use your BiPAP device as directed by your provider.      oxybutynin (DITROPAN) 5 MG tablet TAKE 1 TABLET BY MOUTH DAILY AT 4PM 180 tablet 3    pregabalin (LYRICA) 50 MG capsule 50mg capsule by mouth at 7am and 4pm and 1-2 x 50mg capsules by mouth nightly at 11pm (4/day) 360 capsule 1    salmeterol (SEREVENT DISKUS) 50 MCG/ACT inhaler Inhale 1 puff into the lungs 2 times daily 60 each 11    Vitamin D3 (CHOLECALCIFEROL) 25 mcg (1000 units) tablet 1 unit every day by oral route.       No current facility-administered medications for this visit.             Review of Systems:   A focused musculoskeletal and neurologic ROS was performed with pertinent positives and negatives noted in the HPI.  Additional systems were also reviewed and are documented  at the bottom of the note.         Physical Exam:   Vitals: There were no vitals taken for this visit.  Musculoskeletal, Neurologic, and Spine:     Motor -        LOWER EXTREMITY Left Right   Hip flexion 5/5 4/5   Knee flexion 5/5 4/5   Knee extension 5/5 4/5   Ankle dorsiflexion 5/5 4/5   Ankle plantarflexion 5/5 5/5   Great toe extension 5/5 4/5              Imaging:   We ordered and independently reviewed new radiographs at this clinic visit. The results were discussed with the patient. Findings include:    X-ray including flexion-extension films reviewed today.  Demonstrate no obvious instability though there is a grade 2 L4 on 5 spondylolisthesis.  MRI reviewed demonstrating stenosis at the L4-5 level at the corresponding spondylolisthesis level.       Assessment and Plan:     73 year old male with L4-5 degenerative spondylolisthesis with central stenosis    Continue with pregabalin and conservative measures    Patient would like to discuss with his wife continue conservative management repeat injection versus surgical intervention.  He is to reach out via [employer] to let us know what his next steps will be or if he has any additional questions.      We will order the injection L4-5 transforaminal injection if he would like to pursue that treatment, if he decides he would like to consider surgical intervention we referral to our PAC team to evaluate risk of surgery given his chronic comorbidities and baseline lung disease.  Patient ultimately would be a candidate for a more minimally invasive surgery L4-5 lateral lumbar interbody fusion with percutaneous fixation posteriorly.  Ultimately discussed this would likely improve his symptoms however there is a small chance that he could have persistent radicular symptoms and if that were the case may need another posterior procedure for decompression at that time.  We will discuss surgical intervention and neck steps pending his  decision.      Respectfully,  Mike Griffiths MD  Spine Surgery  [facility]

## 2024-05-14 DIAGNOSIS — M06.09 RHEUMATOID ARTHRITIS OF MULTIPLE SITES WITH NEGATIVE RHEUMATOID FACTOR (H): ICD-10-CM

## 2024-05-14 DIAGNOSIS — Z79.899 HIGH RISK MEDICATION USE: ICD-10-CM

## 2024-05-14 DIAGNOSIS — K21.9 GASTROESOPHAGEAL REFLUX DISEASE WITHOUT ESOPHAGITIS: ICD-10-CM

## 2024-05-14 DIAGNOSIS — D84.9 IMMUNOSUPPRESSED STATUS (H): ICD-10-CM

## 2024-05-14 RX ORDER — FOLIC ACID 1 MG/1
1 TABLET ORAL DAILY
Qty: 90 TABLET | Refills: 3 | OUTPATIENT
Start: 2024-05-14

## 2024-05-15 ENCOUNTER — LAB (OUTPATIENT)
Dept: LAB | Facility: CLINIC | Age: 74
End: 2024-05-15
Payer: MEDICARE

## 2024-05-15 DIAGNOSIS — M06.09 RHEUMATOID ARTHRITIS OF MULTIPLE SITES WITH NEGATIVE RHEUMATOID FACTOR (H): ICD-10-CM

## 2024-05-15 LAB
ALBUMIN SERPL BCG-MCNC: 4.3 G/DL (ref 3.5–5.2)
ALT SERPL W P-5'-P-CCNC: 16 U/L (ref 0–70)
AST SERPL W P-5'-P-CCNC: 18 U/L (ref 0–45)
BASOPHILS # BLD AUTO: 0 10E3/UL (ref 0–0.2)
BASOPHILS NFR BLD AUTO: 1 %
CREAT SERPL-MCNC: 1.59 MG/DL (ref 0.67–1.17)
EGFRCR SERPLBLD CKD-EPI 2021: 45 ML/MIN/1.73M2
EOSINOPHIL # BLD AUTO: 0.1 10E3/UL (ref 0–0.7)
EOSINOPHIL NFR BLD AUTO: 3 %
ERYTHROCYTE [DISTWIDTH] IN BLOOD BY AUTOMATED COUNT: 16.1 % (ref 10–15)
HCT VFR BLD AUTO: 37 % (ref 40–53)
HGB BLD-MCNC: 11.7 G/DL (ref 13.3–17.7)
IMM GRANULOCYTES # BLD: 0 10E3/UL
IMM GRANULOCYTES NFR BLD: 0 %
LYMPHOCYTES # BLD AUTO: 0.6 10E3/UL (ref 0.8–5.3)
LYMPHOCYTES NFR BLD AUTO: 12 %
MCH RBC QN AUTO: 30.5 PG (ref 26.5–33)
MCHC RBC AUTO-ENTMCNC: 31.6 G/DL (ref 31.5–36.5)
MCV RBC AUTO: 96 FL (ref 78–100)
MONOCYTES # BLD AUTO: 0.3 10E3/UL (ref 0–1.3)
MONOCYTES NFR BLD AUTO: 7 %
NEUTROPHILS # BLD AUTO: 3.6 10E3/UL (ref 1.6–8.3)
NEUTROPHILS NFR BLD AUTO: 77 %
PLATELET # BLD AUTO: 160 10E3/UL (ref 150–450)
RBC # BLD AUTO: 3.84 10E6/UL (ref 4.4–5.9)
WBC # BLD AUTO: 4.6 10E3/UL (ref 4–11)

## 2024-05-15 PROCEDURE — 85025 COMPLETE CBC W/AUTO DIFF WBC: CPT

## 2024-05-15 PROCEDURE — 84450 TRANSFERASE (AST) (SGOT): CPT

## 2024-05-15 PROCEDURE — 84460 ALANINE AMINO (ALT) (SGPT): CPT

## 2024-05-15 PROCEDURE — 82040 ASSAY OF SERUM ALBUMIN: CPT

## 2024-05-15 PROCEDURE — 82565 ASSAY OF CREATININE: CPT

## 2024-05-15 PROCEDURE — 36415 COLL VENOUS BLD VENIPUNCTURE: CPT

## 2024-05-15 RX ORDER — OMEPRAZOLE 40 MG/1
40 CAPSULE, DELAYED RELEASE ORAL DAILY
Qty: 90 CAPSULE | Refills: 2 | OUTPATIENT
Start: 2024-05-15

## 2024-05-16 ENCOUNTER — MYC MEDICAL ADVICE (OUTPATIENT)
Dept: RHEUMATOLOGY | Facility: CLINIC | Age: 74
End: 2024-05-16
Payer: MEDICARE

## 2024-05-17 NOTE — TELEPHONE ENCOUNTER
Patient called regarding [employer] message sent.  He states he has had left foot and ankle pain and swelling for a while.  Difficulty walking at times.  Back pain is always present that causes weakness in his legs.  He states he does not want any medications for the pain and will discuss with Dr Goodrich at his upcoming visit on 5/23/24.  He is aware to call back with any additional concerns.     Sonia Lee RN

## 2024-05-23 ENCOUNTER — OFFICE VISIT (OUTPATIENT)
Dept: RHEUMATOLOGY | Facility: CLINIC | Age: 74
End: 2024-05-23
Attending: INTERNAL MEDICINE
Payer: MEDICARE

## 2024-05-23 VITALS
HEART RATE: 75 BPM | BODY MASS INDEX: 42.26 KG/M2 | SYSTOLIC BLOOD PRESSURE: 122 MMHG | DIASTOLIC BLOOD PRESSURE: 73 MMHG | WEIGHT: 303 LBS

## 2024-05-23 DIAGNOSIS — D84.9 IMMUNOSUPPRESSED STATUS (H): ICD-10-CM

## 2024-05-23 DIAGNOSIS — M06.09 RHEUMATOID ARTHRITIS OF MULTIPLE SITES WITH NEGATIVE RHEUMATOID FACTOR (H): ICD-10-CM

## 2024-05-23 DIAGNOSIS — G62.9 PERIPHERAL POLYNEUROPATHY: ICD-10-CM

## 2024-05-23 DIAGNOSIS — M18.12 PRIMARY OSTEOARTHRITIS OF FIRST CARPOMETACARPAL JOINT OF LEFT HAND: Primary | ICD-10-CM

## 2024-05-23 DIAGNOSIS — Z79.899 HIGH RISK MEDICATION USE: ICD-10-CM

## 2024-05-23 PROCEDURE — G2211 COMPLEX E/M VISIT ADD ON: HCPCS | Performed by: INTERNAL MEDICINE

## 2024-05-23 PROCEDURE — 99214 OFFICE O/P EST MOD 30 MIN: CPT | Performed by: INTERNAL MEDICINE

## 2024-05-23 RX ORDER — HYDROXYCHLOROQUINE SULFATE 200 MG/1
200 TABLET, FILM COATED ORAL DAILY
Qty: 90 TABLET | Refills: 3 | Status: SHIPPED | OUTPATIENT
Start: 2024-05-23

## 2024-05-23 RX ORDER — FLUOROURACIL 50 MG/G
CREAM TOPICAL
COMMUNITY
Start: 2024-05-08

## 2024-05-23 RX ORDER — METHOTREXATE 2.5 MG/1
20 TABLET ORAL
Qty: 96 TABLET | Refills: 2 | Status: SHIPPED | OUTPATIENT
Start: 2024-05-23

## 2024-05-23 RX ORDER — FOLIC ACID 1 MG/1
1 TABLET ORAL DAILY
Qty: 90 TABLET | Refills: 3 | Status: SHIPPED | OUTPATIENT
Start: 2024-05-23

## 2024-05-23 ASSESSMENT — PAIN SCALES - GENERAL: PAINLEVEL: SEVERE PAIN (7)

## 2024-05-23 NOTE — PATIENT INSTRUCTIONS
Diagnosis:  1.  Inflammatory arthritis: Inflammatory joint send's are stable.  I recommend continued methotrexate and hydroxychloroquine.  2. Neuropathy: I recommend continuing Lyrica,  3. Spinal stenosis with progressive right greater than left leg/foot weakness.  4. Prostate CA  5. Chronic elevated creatinine: Stable, moderate reduction in kidney function.  6.  Right base of thumb and wrist pain: Likely due to osteoarthritis at the base of thumb joint.    Plan:    Continue methotrexate 8 tablets (20 mg) once weekly.While on methotrexate:   -- Check labs every 4 months (AST/ALT, Albumin, CBC with platelets)   -- Limit alcohol intake to 2 drinks weekly; use folate 1 mg daily.  --Tylenol 1000 mg can be used scheduled three times daily for joint pain and for nausea/headache associated with methotrexate dosing.    3.  Continue hydroxychloroquine 200 mg once daily.  While taking hydroxychloroquine, undergo annual ophthalmology evaluation to monitor for rare retinal Plaquenil toxicity (last visit summer 2023, OCT and retinal exam found no evidence of Plaquenil toxicity.).    4  Acetaminophen 1000 mg 3 times daily for residual pain.    5. Pursue renal consultation about stable elevated creatinine; continue to avoid NSAIDs.  6.  Occupational Therapy consultation regarding splinting and home exercise program for right base of thumb pain

## 2024-05-23 NOTE — NURSING NOTE
Chief Complaint   Patient presents with    RECHECK     Decreased GFR +4       Vitals:    05/23/24 1046   BP: 122/73   BP Location: Left arm   Patient Position: Sitting   Cuff Size: Adult Large   Pulse: 75   Weight: 137.4 kg (303 lb)       Body mass index is 42.26 kg/m .     Avis Echeverria, [facility] Center[facility]ARISSA

## 2024-05-23 NOTE — PROGRESS NOTES
Rheumatology Clinic Visit  Jeremy Goodrich M.D.     Lucio Daly MRN# [id number]   YOB: 1950 Age: 73 year old     Date of Visit: 05/23/2024    Primary care provider: Saroj Tinoco         Assessment and Plan:   # Seronegative inflammatory arthritis: Patient reports stable daily pain in wrists, ankles and knees since restarting methotrexate and hydroxychloroquine combination in late 2022.  Exam shows no inflammatory joint changes.  Data: Blood work 5-2024 showed creatinine 1.56; hemoglobin 11.7; otherwise CBC and LFTs, albumin normal  Discussion: Seronegative inflammatory arthritis remains improved/stable on combination treatment. Patient reported worse control of diffuse arthralgia with methotrexate while testing dose reduction to 15 mg weekly in 2022.  Accordingly, I recommend continuing methotrexate 20 mg weekly, and hydroxychloroquine 200 mg daily.  While taking hydroxychloroquine, patient will require annual retinal toxicity examinations (last 2023).  2. ILD Obliterative bronchiolitis, symptomatically stable.  Followed by Dr. Mccarthy in pulmonary.  3. Osteoarthritis knees, ankle, shoulder and neck and hands/thumbs. Continue isometric quad strengthening exercises twice daily to improve support around the joint. Left ankle osteoarthritis and overall osteoarthritis  is contributing to the overall pain. Continue splinting/hand therapy and acetaminophen 1000 mg tid ATC.  4. Spinal stenosis, DDD: Gradually worsening mobility issues and strength deficits in the lower extremities are not likely due to intrinsic muscle, nerve, bone, or joint problems in the legs thighs or feet.  Rather, concern is raised about neurologic compromise in the lumbar spine.  I urged patient to return to orthopedics spine service for additional consultation.  5.  Right base of thumb and lateral wrist pain: Osteoarthritis at right first CMC is the cause.  -Prostate CA: Completed XRT in September 2022.  -CKD3: Consultation  with nephrology planned; continue to avoid nonsteroidals  We discussed:  Diagnosis:  1.  Inflammatory arthritis: Inflammatory joint send's are stable.  I recommend continued methotrexate and hydroxychloroquine.  2. Neuropathy: I recommend continuing Lyrica,  3. Spinal stenosis with progressive right greater than left leg/foot weakness.  4. Prostate CA  5. Chronic elevated creatinine: Stable, moderate reduction in kidney function.    Plan:    Continue methotrexate 8 tablets (20 mg) once weekly.While on methotrexate:   -- Check labs every 4 months (AST/ALT, Albumin, CBC with platelets)   -- Limit alcohol intake to 2 drinks weekly; use folate 1 mg daily.  --Tylenol 1000 mg can be used scheduled three times daily for joint pain and for nausea/headache associated with methotrexate dosing.    3.  Continue hydroxychloroquine 200 mg once daily.  While taking hydroxychloroquine, undergo annual ophthalmology evaluation to monitor for rare retinal Plaquenil toxicity (last visit summer 2023, OCT and retinal exam found no evidence of Plaquenil toxicity.).    4  Acetaminophen 1000 mg 3 times daily for residual pain.    5. Pursue renal consultation about stable elevated creatinine; continue to avoid NSAIDs.  6.  Occupational Therapy consultation regarding splinting and home exercise program for right base of thumb pain  RTC 8 months    Jeremy Goodrich MD  Staff Rheumatologist, [facility]    On the day of the encounter, a total of 31 minutes was spent in chart review, and in counseling and coordination of care, regarding the patient's complex medical problem of seronegative inflammatory arthritis, spinal stenosis, and renal insufficiency.  The longitudinal plan of care for the diagnosis(es)/condition(s) as documented were addressed during this visit. Due to the added complexity in care, I will continue to support Rich in the subsequent management and with ongoing continuity of care.    Orders Placed This Encounter   Procedures     "CBC with platelets    AST    ALT    Albumin level    Creatinine    Occupational Therapy  Referral                History of Present Illness:   Lucio Daly \"Rich\" presents for f/u seronegative rheumatoid arthritis, sicca complex.  Last seen in . Methotrexate and hydroxychloroquine were continued for seronegative arthritis.  Interval history May 23, 2024  He had COVID in --he had fatigue but not severe respiratory sx. Had another bacterial infection end of 3-2024; improved after abx for moraxella.  He noted increased pain and numbness in legs as he tried PTx in early 2024.   His R knee \"went out\" so painful that he could hardly walk on in it in 2-2024.  Former swelling in R > L foot and ankles is improved in recent weeks.  R wrist/base of thumb is painful for last most;  also outside of R wrist, worse when he is lifting/carrying or when he changes wrist position. He was told that he had ulnar neuropathy.  Low back pain and mobility issues continue. He is working with Spine surgery. Taking aceta and pregabalin.  He continues methotrexate 20 mg weekly. There is mild stomach upset associated with dose. He uses hydroxychloroquine 200 mg daily. He thinks that joint pain is overall better controlled at current higher dose of methotrexate 20 mg weekly.  He has annual Ophtho exam for hydroxychloroquine toxicity.        Interval history October 20, 2023  He struggles with mobility. Rising from chair, getting out of bed; feels \"weakness\" in ankles, knees, thighs. He has to lift his foot with his hand to bring it into the car.  He has been diagnosed with spinal stenosis and bulging disks; when he lies in bed, feels like I am lying on a rock.  There is muscle cramping in both legs.  He had evaluation from Ortho Spine \"about a year ago\"; surgery and cauterization were discussed for lumbar spine. PTx has stopped.  He is concerned about \"allergic reactions\" with corticosteroid  He continues methotrexate " "20 mg weekly. He thinks that joint pain is overall better controlled at current higher dose of methotrexate 20 mg weekly.  There is mild stomach upset associated with dose. He uses hydroxychloroquine 200 mg daily.    Interval history April 21, 2023  + difficulty rising from chair or bed. Slightly progressive pain in hips, knees, hands.  No persistent swelling, redness, warmth in the affected joints.  He was walking everyday with walker this winter for 15-20 minutes.  Early morning stiffness is ~ 30 minutes.  He continues methotrexate 20 mg weekly; he had reduced to 15 mg weekly, but joint pain worsened in 2-2023.  He thinks that joint pain is overall better controlled at the higher dose of methotrexate 20 mg weekly.  There is mild stomach upset associated with dose. He uses hydroxychloroquine 200 mg daily.  He has discussed RF ablation for lumbar pain.    Interval history September 29, 2022  Completnig 6 weeks of XRT for prostate Ca. Off methotrexate durign that time, noting increased back, thumb, knees achiness and pain. R knee especially; collarbone areas.  Early morning stiffness is 45 minutes.  Taking acetaminophen 1000 tid; continuing hydroxychloroquine 200 mg daily.  No adverse effects.  Interval history April 8, 2022    He is doing about the same. He has daily pain in hands, feet, elbows, and shoulders, but ability to perform activities of daily living, and to perform walking 20 to 25 minutes without limitation, are unimpeded.  He has less than 30 minutes of morning stiffness.  His L great toe is dark and swollen; his whole L ankle and foot feel like they are \"in a vise\". The R leg goes completely numb with prolonged driving.  He notes pain in the R LBP when lying on his back. He has pain in the R lateral thigh with mattress contact or with prolonged immobility.  He has been offered surgery for spinal stenosis, but has been advised that he is \"high risk\" for surgery.  He continues methotrexate 20 mg weekly; " there is mild stomach upset associated with dose. He uses hydroxychloroquine 200 mg daily.  He notes increasing triggering in his L 4th finger.  He has been adjusting HTN medications with primary care.  PMH   1. Sensory neuropathy of unclear etiology - negative work up, managed on Lyrica.  Has chronic dysesthesia in pads of feet.  2. Parkinsonisim/Tremor disorder; sees neurology   3. Headaches   4. History of basal cell, squamous (most recent +bx 8-2014)  5. History of melanoma   6. GALO on CPAP.  7. HTN.   8. Seronegative RA, diagnosed 2009. SICCA  9.  JUARES. Work-up 4-2015. 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 Lumbar stenosis per MRI 2017   11. Bronchitis 5-2017, early pneumonia   12. Chicken pox   13. DEXA  Normal   14. Left ankle DJD, MRI  --seen Dr. Martinez 2-2019   Past Medical History:   Diagnosis Date    Abnormal EMG 04/18/2013    Abnormal involuntary movements(781.0)     Movement Disorder    AK (actinic keratosis) 12/18/2011    Allergic rhinitis, cause unspecified     Allergic rhinitis    Balance problems 11/01/2011    Basal cell carcinoma     Bladder spasms 11/01/2011    Chronic osteoarthritis     COPD (chronic obstructive pulmonary disease) (H)     Interstial lung disease, obliderans bronchiolitis    Diaphragmatic hernia without mention of obstruction or gangrene     Earache or other ear, nose, or throat complaint     Esophageal reflux     Fatigue 11/01/2011    Fracture     H. pylori infection 05/12/2011    History of MRI of cervical spine 11/18/2013    EXAMINATION: CERVICAL SPINE G/E 5 VIEWS* 4/19/2013 4:18 PM  CLINICAL HISTORY: Pain in limb,Performing Location?->[facility]),  COMPARISON:  FINDINGS: AP and lateral views in flexion and extension, as well as odontoid view of the cervical spine was obtained. There is no comparison available. The vertebral bodies of the cervical spine are normally aligned. There is posterior spurring and d    Incomplete defecation  "11/01/2011    Interstitial lung disease (H) 11/29/2016    Laboratory test 08/07/2012    Lung disease 06/2015    Malignant basal cell neoplasm of skin 08/06/2008    Melanoma (H) 08/06/2008    Melanoma in situ of lower leg (H)     R calf    Moraxella catarrhalis bronchitis 04/19/2024    Neuropathy 05/16/2011    GALO (obstructive sleep apnea)     Other bladder disorder     Other color vision deficiencies     Other nervous system complications     Parkinsonism (H) 11/01/2011    Parkinsons disease (H)     Parkinsonism/ balance, dropped foot, tremor    Personal history of colonic polyps     PLMD (periodic limb movement disorder) 12/16/2021    Polyneuropathy in other diseases classified elsewhere (H24)     RA (rheumatoid arthritis) (H)     Rheumatoid arthritis of multiple sites with negative rheumatoid factor (H) 03/21/2016    Seronegative arthritis 11/18/2013    Shortness of breath     Shoulder arthritis 2016    acromioclavicular joint     Sialorrhea 04/18/2023    Somatization disorder 05/12/2011    Spider veins     Leg Vericise vein surgery    Squamous cell carcinoma     Tremor 11/01/2011    Unspecified essential hypertension     Unspecified hypothyroidism     Urinary tract infection     Urinary urgency 11/01/2011    Wears glasses 11/01/2011     Injuries-ankle sprains, left heel fracture    Family Hx   MGM Psoriasis  Sister -PPM  Family History   Problem Relation Age of Onset    Hypertension Mother     Heart Disease Mother         a fib    Arthritis Mother         \"osteo\"    Osteoporosis Mother     Skin Cancer Mother     Uterine Cancer Mother     Other Cancer Mother     Cancer Mother     Hypertension Brother     Diabetes Brother         \" post pancreatitis\"    Neurologic Disorder Sister         multiple sclerosis    Hypertension Sister     Heart Disease Sister     Multiple Sclerosis Sister     Heart Disease Sister         Chf    Arthritis Sister     Heart Failure Sister     Kidney Disease Sister     Dementia Sister     " Hypertension Father     Cerebrovascular Disease Father         ,    Skin Cancer Father     Colon Polyps Father     Heart Disease Father     Other - See Comments Son         [place]    Other - See Comments Son         [place]    Other - See Comments Son         day gonzalez    Psoriasis Maternal Grandfather     Stomach Cancer Maternal Grandfather     Cancer Maternal Grandfather     Congenital Anomalies Other         granddaughter with a chromosome defect    Other Cancer Other         Grandaughter    Cancer Other         Grand daughter    Cerebrovascular Disease Paternal Grandmother         ,    Cerebrovascular Disease Paternal Grandfather         ,    Cancer Granddaughter         Langerhans Cell Histiocytosis    Genetic Disease Granddaughter         gene translocation    Learning Disorder Other         Gene translocation    Melanoma No family hx of     Colon Cancer No family hx of      Current Outpatient Medications   Medication Sig Dispense Refill    acetaminophen (TYLENOL) 500 MG tablet 2 x 500mg by mouth 3 times per day: 7/730am, 4pm and 11pm  = 6/day      amLODIPine (NORVASC) 5 MG tablet TAKE 1 TABLET(5 MG) BY MOUTH DAILY 90 tablet 3    cholecalciferol (HM VITAMIN D3) 25 MCG (1000 UT) TABS 1000 unit tab by mouth daily      fluorouracil (EFUDEX) 5 % external cream       folic acid (FOLVITE) 1 MG tablet Take 1 tablet (1 mg) by mouth daily 90 tablet 3    hydroxychloroquine (PLAQUENIL) 200 MG tablet Take 1 tablet (200 mg) by mouth daily Daily at 9am. 90 tablet 3    lisinopril-hydrochlorothiazide (ZESTORETIC) 20-25 MG tablet TAKE 1 TABLET BY MOUTH DAILY 90 tablet 3    methotrexate 2.5 MG tablet TAKE 8 TABLETS BY MOUTH ONCE PER WEEK. 96 tablet 2    metoprolol succinate ER (TOPROL XL) 50 MG 24 hr tablet 1 and 1/2 tabs daily. 135 tablet 3    omeprazole (PRILOSEC) 40 MG DR capsule Take 1 capsule (40 mg) by mouth daily 90 capsule 2    oxybutynin (DITROPAN) 5 MG tablet TAKE 1 TABLET BY MOUTH DAILY AT 4PM 180  tablet 3    pregabalin (LYRICA) 50 MG capsule 50mg capsule by mouth at 7am and 4pm and 1-2 x 50mg capsules by mouth nightly at 11pm (4/day) 360 capsule 1    salmeterol (SEREVENT DISKUS) 50 MCG/ACT inhaler Inhale 1 puff into the lungs 2 times daily 60 each 11    acetaminophen (TYLENOL) 500 MG tablet Take 1-2 tablets (500-1,000 mg) by mouth every 4 hours as needed for mild pain 30 tablet 0    albuterol (PROAIR HFA/PROVENTIL HFA/VENTOLIN HFA) 108 (90 Base) MCG/ACT inhaler Inhale 2 puffs into the lungs every 4 hours as needed for shortness of breath or wheezing 18 g 3    Albuterol Sulfate, sensor, 108 (90 Base) MCG/ACT AEPB 2 puffs as needed by inhalation route.      ORDER FOR DME Use your BiPAP device as directed by your provider.      Vitamin D3 (CHOLECALCIFEROL) 25 mcg (1000 units) tablet 1 unit every day by oral route.       No current facility-administered medications for this visit.       Personal Hx   Home environment: No secondhand tobacco smoke in home.    History     Social History    Marital Status:      Spouse Name: N/A     Number of Children: N/A    Years of Education: N/A     Social History Main Topics    Smoking status: Former Smoker     Quit date: 09/30/2003    Smokeless tobacco: Never Used    Alcohol Use: No    Drug Use: No    Sexually Active: Not Currently -- Female partner(s)     Other Topics Concern    None     Social History Narrative    2013: Living in [place] in a townhouse with no stepsHas 3 sons that are doing okay.             Review of Systems:     14 points all negative except what is mentioned in HPI.  Review Of Systems  Skin: negative  Eyes: negative  Ears/Nose/Throat: negative  Respiratory: No shortness of breath, dyspnea on exertion, cough, or hemoptysis  Cardiovascular: negative  Gastrointestinal: negative  Genitourinary: negative  Musculoskeletal: as above  Neurologic: as above  Psychiatric: negative  Hematologic/Lymphatic/Immunologic: negative  Endocrine: negative              Past Surgical History:     Past Surgical History:   Procedure Laterality Date    BIOPSY OF SKIN LESION      COLONOSCOPY      COLONOSCOPY N/A 7/20/2022    Procedure: COLONOSCOPY (fv) polypectomy using jumbo bx forcep;  Surgeon: Gómez Mckinney MD;  Location:  GI    CYSTOSCOPY  Many years ago    Blood in urine/ bladder cystoscopy    ESOPHAGOSCOPY, GASTROSCOPY, DUODENOSCOPY (EGD), COMBINED N/A 7/20/2022    Procedure: ESOPHAGOGASTRODUODENOSCOPY (EGD) (fv) biopsies using cold bx forcep;  Surgeon: Gómez Mckinney MD;  Location:  GI    HEMORRHOID SURGERY      lip biopsy      for sicca complex    MOHS MICROGRAPHIC PROCEDURE      SOFT TISSUE SURGERY      removeal of basel cell carcinoma     Blood pressure 122/73, pulse 75, weight 137.4 kg (303 lb).  Wt Readings from Last 4 Encounters:   05/23/24 137.4 kg (303 lb)   04/19/24 137 kg (302 lb)   12/22/23 134.6 kg (296 lb 11.2 oz)   12/05/23 134.3 kg (296 lb)       Constitutional: well-developed, appearing stated age; cooperative  Eyes: nl EOM, PERRLA, vision, conjunctiva, sclera  ENT: nl external ears, nose, hearing, lips, teeth, gums, throat  No mucous membrane lesions, normal saliva pool  Neck: no mass or thyroid enlargement  Resp: Breathing is unlabored  MS: No synovitis, or tenderness at MCPs or PIPs. OA changes in hands. There is painful right shoulder flexion.  No tenderness at MTPs.  Squaring change at right base of thumb with positive grind test and tenderness over the first CMC.  Skin: no nail pitting, alopecia, rash, nodules or lesions  Neuro: nl cranial nerves, strength, sensation, DTRs.   Psych: nl judgement, orientation, memory, affect.             Data:         Latest Ref Rng & Units 2/15/2024     9:35 AM 5/2/2024    12:18 PM 5/15/2024     9:01 AM   RHEUM RESULTS   Albumin 3.5 - 5.2 g/dL  4.5  4.3    ALT 0 - 70 U/L  18  16    AST 0 - 45 U/L  21  18    Creatinine 0.67 - 1.17 mg/dL  1.45  1.59    Creatinine (External) 0.70 - 1.28 mg/dL 1.37         GFR  Estimate >60 mL/min/1.73m2  50  45    Hematocrit 40.0 - 53.0 %  39.5  37.0    Hemoglobin 13.3 - 17.7 g/dL  12.3  11.7    WBC 4.0 - 11.0 10e3/uL  3.7  4.6    RBC Count 4.40 - 5.90 10e6/uL  4.06  3.84    RDW 10.0 - 15.0 %  15.7  16.1    MCHC 31.5 - 36.5 g/dL  31.1  31.6    MCV 78 - 100 fL  97  96    Platelet Count 150 - 450 10e3/uL  164  160        This result is from an external source.       Rheumatoid Factor   Date Value Ref Range Status   05/27/2010 <7 0 - 14 IU/mL Final   ,  ,  ,   Cyclic Citrullinated Peptide IgG   Date Value Ref Range Status   05/27/2010 <2 <5 U/mL Final     Comment:     Interpretation:  Negative   ,  ,   SSA (RO) Antibody IgG   Date Value Ref Range Status   05/27/2010 1  Final     Comment:     Reference range: 0 to 40  Unit: AU/mL  (Note)  REFERENCE INTERVALS: SSA (Ro) (ELAN) Ab, IgG   29 AU/mL or Less ............. Negative   30 - 40 AU/mL ................ Equivocal   41 AU/mL or Greater .......... Positive    SSA (Ro) antibody is seen in 70-75% of Sjogren syndrome  cases, 30-40% of systemic lupus erythematosus (SLE) and  5-10% of progressive systemic sclerosis (PSS).  Performed by [facility],  [address] [place] [id number] [phone]  [email or web address], Chely Leija MD, Lab. Director     SSB (LA) Antibody IgG   Date Value Ref Range Status   05/27/2010 8  Final     Comment:     Reference range: 0 to 40  Unit: AU/mL  (Note)  REFERENCE INTERVALS: SSB (La) (ELAN) Ab, IgG   29 AU/mL or Less ............. Negative   30 - 40 AU/mL ................ Equivocal   41 AU/mL or Greater .......... Positive    SSB (La) antibody is seen in 50-60% of Sjogren syndrome  cases and is specific if it is the only ELAN antibody  present. 15-25% of patients with systemic lupus  erythematosus (SLE) and 5-10% of patients with progressive  systemic sclerosis (PSS) also have this antibody.  Performed by [facility],  [address] [place] [id number] [phone]  [email or web address], Chely Leija MD,  Lab. Director   ,  ,   MONICA Screen by EIA   Date Value Ref Range Status   05/27/2010 <1.0 0 - 1.0 Final     Comment:     Interpretation:  Negative   ,   DNA-ds   Date Value Ref Range Status   05/27/2010 <15 0 - 29 IU/mL Final     Comment:     Interpretation:  Negative   ,  ,  ,  ,  ,  ,  ,   Hep B Surface Agn   Date Value Ref Range Status   05/27/2010 Negative NEG Final   ,  ,  ,  ,  ,  ,  ,  ,  ,  ,  ,   Neutrophil Cytoplasmic IgG Antibody   Date Value Ref Range Status   06/26/2015   Final    <1:20  Reference range: <1:20  (Note)  The ANCA IFA is <1:20; therefore, no further testing will  be performed.  INTERPRETIVE INFORMATION: Anti-Neutrophil Cyto Ab, IgG  Neutrophil Cytoplasmic Antibodies (C-ANCA = granular  cytoplasmic staining, P-ANCA = perinuclear staining) are  found in the serum of over 90 percent of patients with  certain necrotizing systemic vasculitides, and usually in  less than 5 percent of patients with collagen vascular  disease or arthritis.  Performed by [facility],  [address] [place] [id number] [phone]  [email or web address], Brad Fall MD, Lab. Director       ,  ,  ,  ,  ,  ,  ,  ,  ,   Albumin Fraction   Date Value Ref Range Status   05/27/2010 4.0 3.7 - 5.1 g/dL Final     Alpha 2 Fraction   Date Value Ref Range Status   05/27/2010 0.6 0.5 - 0.9 g/dL Final     Beta Fraction   Date Value Ref Range Status   05/27/2010 1.0 0.6 - 1.0 g/dL Final     Gamma Fraction   Date Value Ref Range Status   05/27/2010 1.0 0.7 - 1.6 g/dL Final     Monoclonal Peak   Date Value Ref Range Status   05/27/2010 0.0 0.0 g/dL Final     ELP Interpretation:   Date Value Ref Range Status   05/27/2010   Final    Essentially normal electrophoretic pattern.  No monoclonal protein seen.     Comment:      Pathologic significance requires clinical correlation.  ASHLEY Noble M.D.,   Ph.D., Pathologist ()   ,  ,   Immunofixation ELP   Date Value Ref Range Status   06/09/2016   Final    No monoclonal  protein seen on immunofixation.  Pathological significance   requires clinical correlation.   ASHLEY Noble M.D., Ph.D   Pathologist ([phone])       IGG   Date Value Ref Range Status   06/09/2016 1,020 695 - 1,620 mg/dL Final     IGA   Date Value Ref Range Status   06/09/2016 374 70 - 380 mg/dL Final     IGM   Date Value Ref Range Status   06/09/2016 81 60 - 265 mg/dL Final   ,  ,  ,  ,  ,  ,  ,        La 12.2, and platelet count 169, HematocritCBC showed white count of 3.1 stable.Transaminases normalWith a GFR having fallen from 55-45.Creatinine 1.59, up from 1.36 in May 2023b work on October 19, 2023 showed

## 2024-06-04 ENCOUNTER — OFFICE VISIT (OUTPATIENT)
Dept: UROLOGY | Facility: CLINIC | Age: 74
End: 2024-06-04
Payer: MEDICARE

## 2024-06-04 VITALS
DIASTOLIC BLOOD PRESSURE: 89 MMHG | BODY MASS INDEX: 41.04 KG/M2 | WEIGHT: 303 LBS | SYSTOLIC BLOOD PRESSURE: 150 MMHG | HEART RATE: 67 BPM | HEIGHT: 72 IN

## 2024-06-04 DIAGNOSIS — C61 PROSTATE CANCER (H): Primary | ICD-10-CM

## 2024-06-04 DIAGNOSIS — R39.15 URINARY URGENCY: ICD-10-CM

## 2024-06-04 LAB
ALBUMIN UR-MCNC: NEGATIVE MG/DL
APPEARANCE UR: CLEAR
BILIRUB UR QL STRIP: NEGATIVE
COLOR UR AUTO: YELLOW
GLUCOSE UR STRIP-MCNC: NEGATIVE MG/DL
HGB UR QL STRIP: NEGATIVE
KETONES UR STRIP-MCNC: NEGATIVE MG/DL
LEUKOCYTE ESTERASE UR QL STRIP: NEGATIVE
NITRATE UR QL: NEGATIVE
PH UR STRIP: 5.5 [PH] (ref 5–7)
RESIDUAL VOLUME (RV) (EXTERNAL): 31
SP GR UR STRIP: >=1.03 (ref 1–1.03)
UROBILINOGEN UR STRIP-ACNC: 0.2 E.U./DL

## 2024-06-04 PROCEDURE — 81003 URINALYSIS AUTO W/O SCOPE: CPT | Mod: QW | Performed by: STUDENT IN AN ORGANIZED HEALTH CARE EDUCATION/TRAINING PROGRAM

## 2024-06-04 PROCEDURE — 51798 US URINE CAPACITY MEASURE: CPT | Performed by: STUDENT IN AN ORGANIZED HEALTH CARE EDUCATION/TRAINING PROGRAM

## 2024-06-04 PROCEDURE — 99213 OFFICE O/P EST LOW 20 MIN: CPT | Mod: 25 | Performed by: STUDENT IN AN ORGANIZED HEALTH CARE EDUCATION/TRAINING PROGRAM

## 2024-06-04 ASSESSMENT — PAIN SCALES - GENERAL: PAINLEVEL: SEVERE PAIN (6)

## 2024-06-04 NOTE — PATIENT INSTRUCTIONS
You should continue to have your PSA checked every year by your primary care physician and return to urology if your PSA rises or if you have worsening urinary symptoms

## 2024-06-04 NOTE — LETTER
6/4/2024       RE: Lucio Daly  [address]  [place] [address]     Dear Colleague,    Thank you for referring your patient, Lucio Daly, to the [facility] [place] at [facility]. Please see a copy of my visit note below.    CHIEF COMPLAINT   Lucio Daly who is a 73 year old male w/ ILD, RA, GALO returns today for follow-up of prostate cancer (iPSA 5.68, Sonny 3+4=7, dx 7/13/2022) now s/p EBRT completed 9/30/2022 (no ADT)     HPI   Lucio Daly is a 73 year old male w/ ILD, RA, GALO returns today for follow-up of prostate cancer (iPSA 5.68, Sonny 3+4=7, dx 7/13/2022) now s/p EBRT completed 9/30/2022 (no ADT)     Urination doing well. AUA symptom score 7-4-2-1-1-1-2 = 7 qol mixed    PHYSICAL EXAM  Patient is a 73 year old  male   Vitals: Blood pressure (!) 150/89, pulse 67, height 1.829 m (6'), weight 137.4 kg (303 lb).  Body mass index is 41.09 kg/m .  General Appearance Adult:   Alert, no acute distress, oriented  HENT: throat/mouth:normal, good dentition  Lungs: no respiratory distress, or pursed lip breathing  Heart: No obvious jugular venous distension present  Abdomen: nondistended  Musculoskeltal: extremities normal, no peripheral edema  Skin: no suspicious lesions or rashes  Neuro: Alert, oriented, speech and mentation normal  Psych: affect and mood normal  Gait: Normal  : deferred    All pertinent imaging reviewed:    PVR 31 ml     PSA Tumor Marker   Latest Ref Rng 0.00 - 6.50 ng/mL   12/19/2022  11:26 AM 2.01    4/27/2023  10:03 AM 1.17    5/25/2023  11:09 AM 1.02    11/28/2023  10:11 AM 0.64    5/2/2024  12:18 PM 0.42        Component      Latest Ref Rng 6/4/2024  9:02 AM   Color Urine      Colorless, Straw, Light Yellow, Yellow  Yellow    Appearance Urine      Clear  Clear    Glucose Urine      Negative mg/dL Negative    Bilirubin Urine      Negative  Negative    Ketones Urine      Negative mg/dL Negative     Specific Gravity Urine      1.003 - 1.035  >=1.030    Blood Urine      Negative  Negative    pH Urine      5.0 - 7.0  5.5    Protein Albumin Urine      Negative mg/dL Negative    Urobilinogen Urine      0.2, 1.0 E.U./dL 0.2    Nitrite Urine      Negative  Negative    Leukocyte Esterase Urine      Negative  Negative          ASSESSMENT and PLAN  73 year old male w/ ILD, RA, GALO returns today for follow-up of prostate cancer (iPSA 5.68, Sonny 3+4=7, dx 7/13/2022) now s/p EBRT completed 9/30/2022 (no ADT)     Re: prostate cancer, PSA is at new yvon of 0.42 ng/ml. Patient is just under 2 years out from completion of EBRT. Urinating well without significant issues; he continues on oxybutinin for urgency. Emptying well with low PVR    Recommend continued annual surveillance PSA w/ primary. Return to urology if PSA is rising (if comes back up over 1, and definitely if comes up >2), or if worsening urinary symptoms      Silviano Vargas MD   [facility] Urology  [facility] Phone: [phone]

## 2024-06-04 NOTE — PROGRESS NOTES
CHIEF COMPLAINT   Lucio Daly who is a 73 year old male w/ ILD, RA, GALO returns today for follow-up of prostate cancer (iPSA 5.68, Sonny 3+4=7, dx 7/13/2022) now s/p EBRT completed 9/30/2022 (no ADT)     HPI   Lucio Daly is a 73 year old male w/ ILD, RA, GALO returns today for follow-up of prostate cancer (iPSA 5.68, Sonny 3+4=7, dx 7/13/2022) now s/p EBRT completed 9/30/2022 (no ADT)     Urination doing well. AUA symptom score 5-4-5-1-1-1-2 = 7 qol mixed    PHYSICAL EXAM  Patient is a 73 year old  male   Vitals: Blood pressure (!) 150/89, pulse 67, height 1.829 m (6'), weight 137.4 kg (303 lb).  Body mass index is 41.09 kg/m .  General Appearance Adult:   Alert, no acute distress, oriented  HENT: throat/mouth:normal, good dentition  Lungs: no respiratory distress, or pursed lip breathing  Heart: No obvious jugular venous distension present  Abdomen: nondistended  Musculoskeltal: extremities normal, no peripheral edema  Skin: no suspicious lesions or rashes  Neuro: Alert, oriented, speech and mentation normal  Psych: affect and mood normal  Gait: Normal  : deferred    All pertinent imaging reviewed:    PVR 31 ml     PSA Tumor Marker   Latest Ref Rng 0.00 - 6.50 ng/mL   12/19/2022  11:26 AM 2.01    4/27/2023  10:03 AM 1.17    5/25/2023  11:09 AM 1.02    11/28/2023  10:11 AM 0.64    5/2/2024  12:18 PM 0.42        Component      Latest Ref Rng 6/4/2024  9:02 AM   Color Urine      Colorless, Straw, Light Yellow, Yellow  Yellow    Appearance Urine      Clear  Clear    Glucose Urine      Negative mg/dL Negative    Bilirubin Urine      Negative  Negative    Ketones Urine      Negative mg/dL Negative    Specific Gravity Urine      1.003 - 1.035  >=1.030    Blood Urine      Negative  Negative    pH Urine      5.0 - 7.0  5.5    Protein Albumin Urine      Negative mg/dL Negative    Urobilinogen Urine      0.2, 1.0 E.U./dL 0.2    Nitrite Urine      Negative  Negative    Leukocyte Esterase Urine      Negative   Negative          ASSESSMENT and PLAN  73 year old male w/ ILD, RA, GALO returns today for follow-up of prostate cancer (iPSA 5.68, Hudson 3+4=7, dx 7/13/2022) now s/p EBRT completed 9/30/2022 (no ADT)     Re: prostate cancer, PSA is at new yvon of 0.42 ng/ml. Patient is just under 2 years out from completion of EBRT. Urinating well without significant issues; he continues on oxybutinin for urgency. Emptying well with low PVR    Recommend continued annual surveillance PSA w/ primary. Return to urology if PSA is rising (if comes back up over 1, and definitely if comes up >2), or if worsening urinary symptoms      Silviano Vargas MD   [facility] Urology  [facility] Phone: [phone]

## 2024-06-04 NOTE — NURSING NOTE
Chief Complaint   Patient presents with    Prostate Cancer     Pt has no new urinary concerns today, no change in urinary complaints since last visit.    PVR: 31 mL by bladder scan    Mariella Bautista, EMT

## 2024-06-19 ENCOUNTER — THERAPY VISIT (OUTPATIENT)
Dept: OCCUPATIONAL THERAPY | Facility: CLINIC | Age: 74
End: 2024-06-19
Payer: MEDICARE

## 2024-06-19 DIAGNOSIS — M18.12 PRIMARY OSTEOARTHRITIS OF FIRST CARPOMETACARPAL JOINT OF LEFT HAND: Primary | ICD-10-CM

## 2024-06-19 PROCEDURE — 97110 THERAPEUTIC EXERCISES: CPT | Mod: GO | Performed by: OCCUPATIONAL THERAPIST

## 2024-06-19 PROCEDURE — 97165 OT EVAL LOW COMPLEX 30 MIN: CPT | Mod: GO | Performed by: OCCUPATIONAL THERAPIST

## 2024-06-19 PROCEDURE — 97760 ORTHOTIC MGMT&TRAING 1ST ENC: CPT | Mod: GO | Performed by: OCCUPATIONAL THERAPIST

## 2024-06-19 NOTE — PROGRESS NOTES
OCCUPATIONAL THERAPY EVALUATION  Type of Visit: Evaluation       Fall Risk Screen:  Fall screen completed by: OT  Have you fallen 2 or more times in the past year?: No  Have you fallen and had an injury in the past year?: No  Is patient a fall risk?: No    Subjective      Presenting condition or subjective complaint: Thumb and wrist pain arthritis, ulnar nerve  Date of onset: 05/23/24 (MD order date)    Relevant medical history: Cancer; COPD; Foot drop; High blood pressure; Kidney disease; Overweight; Pain at night or rest; Rheumatoid arthritis; Significant weakness; Sleep disorder like apnea   Dates & types of surgery:    Past Surgical History:   Procedure Laterality Date    BIOPSY OF SKIN LESION      COLONOSCOPY      COLONOSCOPY N/A 7/20/2022    Procedure: COLONOSCOPY (fv) polypectomy using jumbo bx forcep;  Surgeon: Gómez Mckinney MD;  Location:  GI    CYSTOSCOPY  Many years ago    Blood in urine/ bladder cystoscopy    ESOPHAGOSCOPY, GASTROSCOPY, DUODENOSCOPY (EGD), COMBINED N/A 7/20/2022    Procedure: ESOPHAGOGASTRODUODENOSCOPY (EGD) (fv) biopsies using cold bx forcep;  Surgeon: Gómez Mckinney MD;  Location:  GI    HEMORRHOID SURGERY      lip biopsy      for sicca complex    MOHS MICROGRAPHIC PROCEDURE      SOFT TISSUE SURGERY      removeal of basel cell carcinoma     Prior diagnostic imaging/testing results: X-ray     Prior therapy history for the same diagnosis, illness or injury: Yes      Prior Level of Function  Transfers: Independent  Ambulation: Assistive equipment  ADL: Independent  IADL: Driving    Living Environment  Social support: With a significant other or spouse   Type of home: [facility]; 1 level   Stairs to enter the home: No       Ramp: No   Stairs inside the home: No       Help at home:    Equipment owned: Straight Cane; Walker with wheels; Grab bars; Lift chair     Employment: No    Hobbies/Interests: Walking, reading, tv, kids,,grandkids, friends    Patient goals for therapy: Use  and turn w/o pain       Objective   ADDITIONAL HISTORY:  Left hand dominant  Patient reports symptoms of pain, stiffness/loss of motion, weakness/loss of strength, numbness, and tingling   Transportation: drives  Currently not working  -Peripheral neuropathy in B UE  -Going on for years    Functional Outcome Measure:   Upper Extremity Functional Index Score:  SCORE:   Column Totals: /80: 58   (A lower score indicates greater disability.)    Pain Level (Scale 0-10)   6/19/2024   At Rest 3/10   With Use 8/10     Pain Description  Date 6/19/2024   Location wrist, hand, and thumb   Pain Quality Aching, Dull, Numb, Sharp, Shooting, Tender, and Tingling   Frequency constant in ulnar side of wrist   Pain is worst  daytime   Exacerbated by  Gripping, twisting, forearm rotation   Relieved by rest and Tylenol   Progression Unchanged     ROM  Thumb 6/19/2024 6/19/2024   AROM  (PROM) L R   MP /41 /37   IP /51 /64   RABD 47 41   PABD 55 48   Retropulsion (cm) NT NT   Kapandji Opposition Scale (0-10/10) NT NT     ROM  Pain Report: - none  + mild    ++ moderate    +++ severe   Wrist 6/19/2024 6/19/2024   AROM (PROM) L R   Extension 58 56   Flexion 62 68 +   RD 21 16   UD 34 27 +   Supination 65 58   Pronation 75 74     Thumb Observation/Appearance   - none  + mild    ++ moderate    +++ severe     6/19/2024   Shoulder deformity present over CMC R: +  L: ++   Edema over the CMC joint R: ++  L: -   Noted collapse of MP into hyperextension during pinch R: ++  L: +      Provocative Tests  Pain Report:  - none    + mild    ++ moderate    +++ severe     6/19/2024   Crepitus present R: -  L: -   CMC Adduction Stress Test R: -/+  L: +   CMC Extension Stress Test R: -/+  L: +   WHAT Test R: -  L: -   Tinels to cubital tunnel -   Tinels to guyons canal -   Articular disc shear test -   Elbow flexion test -     Ulnar Dorsal Zone: (+ for hypermobile or - for hypomobile) Pain 0-10/10   6/19/2024   SIDE R   Radiocarpal P/S (TFCC--stab f/a,  rotate with some compression) NT   Ballottement II P/S (TFCC--stab hand/wrist, pt p/s) NT   TFCC load Test (UD, axially load wrist c volar/dorsal mvmt) +   UMTDG test (TFCC--approximate the pisotriquetral and ulna) NT   Lunotriquetral Sheer Test -   Piano Key Sign (DRUJ) NT   Piano Key Test (DRUJ--distal ulna moved in pro/sup) NT   Ballottement I: E/F (DRUJ--stabilize hand/wrist, pt e/f of elbow) NT   Volar RU Ligament Shift (DRUJ--stab ulna and wrist, push radius volarly) NT   Dorsal RU Ligament Shift (DRUJ--stab ulna and wrist, push radius dorsally) NT     Strength   (Measured in pounds)  Pain Report: - none  + mild    ++ moderate    +++ severe    6/19/2024 6/19/2024   Trials L R   1   38 13   Average 38 13     Lat Pinch 6/19/2024 6/19/2024   Trials L R   1   7 5   Average 7 5     3 Pt Pinch 6/19/2024 6/19/2024   Trials L R   1   8 7   Average 8 7     Tenderness: Pain Report:  - none    + mild    ++ moderate    +++ severe   WRIST PALPATION:  Date 6/19/2024   Side R   Ulna Styloid -   TFCC -   ECU insertion +   CMC joint +     Palpation   Pain Report:  - none    + mild    ++ moderate    +++ severe    6/19/2024   CMC Joint Line R: +  L: +   Thenar Eminence R: -  L: -   Web Space R: -  L: -   1st DC R: +  L: -   Radial Styloid R: -  L: -   FCR R: -  L: -     Assessment & Plan   CLINICAL IMPRESSIONS  Medical Diagnosis: Primary osteoarthritis of first carpometacarpal joint of left hand    Treatment Diagnosis: Wrist pain, thumb pain    Impression/Assessment: Pt is a 73 year old male presenting to Occupational Therapy due to gradual onset.  The following significant findings have been identified: Impaired coordination, Impaired ROM, Impaired sensation, Impaired strength, and Pain.  These identified deficits interfere with their ability to perform self care tasks, recreational activities, household chores, driving , and meal planning and preparation as compared to previous level of function.     Clinical Decision  Making (Complexity):  Assessment of Occupational Performance: 5 or more Performance Deficits  Occupational Performance Limitations: bathing/showering, toileting, dressing, feeding, functional mobility, hygiene and grooming, driving and community mobility, health management and maintenance, home establishment and management, meal preparation and cleanup, shopping, sleep, and leisure activities  Clinical Decision Making (Complexity): Low complexity    PLAN OF CARE  Treatment Interventions:  Modalities:  US and Paraffin  Therapeutic Exercise:  AROM, AAROM, PROM, Tendon Gliding, Blocking, Reverse Blocking, Place and Hold, Contract Relax, Extensor Tracking, Isotonics, Isometrics and Stabilization  Neuromuscular re-education:  Nerve Gliding, Coordination/Dexterity, Sensory re-education, Desensitization, Kinesthetic Training, Proprioceptive Training, Kinesiotaping, Strain Counter Strain, Isometrics, Stabilization   Manual Techniques:  Coordination/Dexterity, Joint mobilization, Scar mobilization, Friction massage, Myofascial release, and Manual edema mobilization  Orthotic Fabrication:  Static, Hand based, and Forearm based  Self Care:  Self Care Tasks and Ergonomic Considerations    Long Term Goals   OT Goal 1  Goal Identifier: Household IADLs - gripping  Goal Description: Open a tight or new jar with mild to no difficulty  Rationale: In order to maximize safety and independence with performance of self-care activities  Goal Progress: Initial  Target Date: 08/14/24      Frequency of Treatment: 1x/week  Duration of Treatment: 4 weeks, tapering to biweekly for 4 weeks     Recommended Referrals to Other Professionals:   Education Assessment: Learner/Method: Patient;Demonstration  Education Comments: PTRX on phone     Risks and benefits of evaluation/treatment have been explained.   Patient/Family/caregiver agrees with Plan of Care.     Evaluation Time:    OT Eval, Low Complexity Minutes (26756): (P) 23       Signing  Clinician: Alondra Way OT        [facility]                                                                                   OUTPATIENT OCCUPATIONAL THERAPY      PLAN OF TREATMENT FOR OUTPATIENT REHABILITATION   Patient's Last Name, First Name, Lucio Servin YOB: 1950   Provider's Name   [facility]   Medical Record No.  [id number]     Onset Date: 05/23/24 (MD order date) Start of Care Date: 06/19/24     Medical Diagnosis:  Primary osteoarthritis of first carpometacarpal joint of left hand      OT Treatment Diagnosis:  Wrist pain, thumb pain Plan of Treatment  Frequency/Duration:1x/week/4 weeks, tapering to biweekly for 4 weeks    Certification date from 06/19/24   To 08/14/24        See note for plan of treatment details and functional goals     Alondra Way OT                         I CERTIFY THE NEED FOR THESE SERVICES FURNISHED UNDER        THIS PLAN OF TREATMENT AND WHILE UNDER MY CARE     (Physician attestation of this document indicates review and certification of the therapy plan).              Referring Provider:  Jeremy Goodrich    Initial Assessment  See Epic Evaluation- 06/19/24      Attested:  Jeremy Goodrich MD  Staff Rheumatologist, [facility]

## 2024-06-22 DIAGNOSIS — I10 PRIMARY HYPERTENSION: ICD-10-CM

## 2024-06-23 DIAGNOSIS — I10 PRIMARY HYPERTENSION: ICD-10-CM

## 2024-06-24 ENCOUNTER — MYC MEDICAL ADVICE (OUTPATIENT)
Dept: PEDIATRICS | Facility: CLINIC | Age: 74
End: 2024-06-24
Payer: MEDICARE

## 2024-06-24 DIAGNOSIS — I10 PRIMARY HYPERTENSION: ICD-10-CM

## 2024-06-24 RX ORDER — AMLODIPINE BESYLATE 5 MG/1
TABLET ORAL
Qty: 90 TABLET | Refills: 3 | OUTPATIENT
Start: 2024-06-24

## 2024-06-24 RX ORDER — AMLODIPINE BESYLATE 5 MG/1
TABLET ORAL
Qty: 90 TABLET | Refills: 3 | Status: SHIPPED | OUTPATIENT
Start: 2024-06-24 | End: 2024-06-26

## 2024-06-24 NOTE — TELEPHONE ENCOUNTER
See patient's MyChart message   - Patient requesting a refill of his amLODIPine (NORVASC) 5 MG tablet medication to be sent to the [id number] - [place], [place] - [address]  AT [facility] [address] [facility]     amLODIPine (NORVASC) 5 MG tablet 90 tablet 3 6/29/2023 -- No   Sig: TAKE 1 TABLET(5 MG) BY MOUTH DAILY     See the 6/22/2024 Refill Encounter.     Sent a [employer] message to the patient   - Informed the patient that the medication refill team has been notified of his refill request and will review and refill as appropriate     Closing this encounter as it is being addressed in the 6/22/2024 Refill Encounter.     Tiffanie DEGROOT RN   [facility]

## 2024-06-26 RX ORDER — AMLODIPINE BESYLATE 5 MG/1
5 TABLET ORAL DAILY
Qty: 90 TABLET | Refills: 3 | Status: SHIPPED | OUTPATIENT
Start: 2024-06-26

## 2024-06-26 NOTE — TELEPHONE ENCOUNTER
Patient had requested that Amlodipine was sent to [facility] in [place]. Originally sent to [facility] in [place], [place]. Request was denied by refill team. Sent prescription to [facility] in [place] per patient request.  Stacey MILAN RN, BSN

## 2024-06-27 ENCOUNTER — THERAPY VISIT (OUTPATIENT)
Dept: OCCUPATIONAL THERAPY | Facility: CLINIC | Age: 74
End: 2024-06-27
Payer: MEDICARE

## 2024-06-27 DIAGNOSIS — M18.12 PRIMARY OSTEOARTHRITIS OF FIRST CARPOMETACARPAL JOINT OF LEFT HAND: Primary | ICD-10-CM

## 2024-06-27 PROCEDURE — 97535 SELF CARE MNGMENT TRAINING: CPT | Mod: GO | Performed by: OCCUPATIONAL THERAPIST

## 2024-06-27 PROCEDURE — 97110 THERAPEUTIC EXERCISES: CPT | Mod: GO | Performed by: OCCUPATIONAL THERAPIST

## 2024-07-08 ENCOUNTER — OFFICE VISIT (OUTPATIENT)
Dept: ORTHOPEDICS | Facility: CLINIC | Age: 74
End: 2024-07-08
Payer: MEDICARE

## 2024-07-08 DIAGNOSIS — M21.372 BILATERAL FOOT-DROP: Primary | ICD-10-CM

## 2024-07-08 DIAGNOSIS — M06.09 RHEUMATOID ARTHRITIS OF MULTIPLE SITES WITH NEGATIVE RHEUMATOID FACTOR (H): ICD-10-CM

## 2024-07-08 DIAGNOSIS — L85.3 DRY SKIN: ICD-10-CM

## 2024-07-08 DIAGNOSIS — G60.9 IDIOPATHIC PERIPHERAL NEUROPATHY: ICD-10-CM

## 2024-07-08 DIAGNOSIS — M21.371 BILATERAL FOOT-DROP: Primary | ICD-10-CM

## 2024-07-08 PROCEDURE — 99213 OFFICE O/P EST LOW 20 MIN: CPT | Performed by: PODIATRIST

## 2024-07-08 RX ORDER — MINERAL OIL/HYDROPHIL PETROLAT
OINTMENT (GRAM) TOPICAL PRN
Qty: 396 G | Refills: 3 | Status: SHIPPED | OUTPATIENT
Start: 2024-07-08

## 2024-07-08 NOTE — PROGRESS NOTES
Past Medical History:   Diagnosis Date    Abnormal EMG 04/18/2013    Abnormal involuntary movements(781.0)     Movement Disorder    AK (actinic keratosis) 12/18/2011    Allergic rhinitis, cause unspecified     Allergic rhinitis    Balance problems 11/01/2011    Basal cell carcinoma     Bladder spasms 11/01/2011    Chronic osteoarthritis     COPD (chronic obstructive pulmonary disease) (H)     Interstial lung disease, obliderans bronchiolitis    Diaphragmatic hernia without mention of obstruction or gangrene     Earache or other ear, nose, or throat complaint     Esophageal reflux     Fatigue 11/01/2011    Fracture     H. pylori infection 05/12/2011    History of MRI of cervical spine 11/18/2013    EXAMINATION: CERVICAL SPINE G/E 5 VIEWS* 4/19/2013 4:18 PM  CLINICAL HISTORY: Pain in limb,Performing Location?[facility]),  COMPARISON:  FINDINGS: AP and lateral views in flexion and extension, as well as odontoid view of the cervical spine was obtained. There is no comparison available. The vertebral bodies of the cervical spine are normally aligned. There is posterior spurring and d    Incomplete defecation 11/01/2011    Interstitial lung disease (H) 11/29/2016    Laboratory test 08/07/2012    Lung disease 06/2015    Malignant basal cell neoplasm of skin 08/06/2008    Melanoma (H) 08/06/2008    Melanoma in situ of lower leg (H)     R calf    Moraxella catarrhalis bronchitis 04/19/2024    Neuropathy 05/16/2011    GALO (obstructive sleep apnea)     Other bladder disorder     Other color vision deficiencies     Other nervous system complications     Parkinsonism (H) 11/01/2011    Parkinsons disease (H)     Parkinsonism/ balance, dropped foot, tremor    Personal history of colonic polyps     PLMD (periodic limb movement disorder) 12/16/2021    Polyneuropathy in other diseases classified elsewhere (H24)     RA (rheumatoid arthritis) (H)     Rheumatoid arthritis of multiple sites with negative rheumatoid factor (H)  03/21/2016    Seronegative arthritis 11/18/2013    Shortness of breath     Shoulder arthritis 2016    acromioclavicular joint     Sialorrhea 04/18/2023    Somatization disorder 05/12/2011    Spider veins     Leg Vericise vein surgery    Squamous cell carcinoma     Tremor 11/01/2011    Unspecified essential hypertension     Unspecified hypothyroidism     Urinary tract infection     Urinary urgency 11/01/2011    Wears glasses 11/01/2011     Patient Active Problem List   Diagnosis    Diaphragmatic hernia    Esophageal reflux    Hx of melanoma of skin    Malignant basal cell neoplasm of skin    GALO (obstructive sleep apnea)    Chronic maxillary sinusitis    Urinary incontinence    Sicca syndrome (H24)    Tremor    Incomplete defecation    AK (actinic keratosis)    Abnormal EMG    Seronegative arthritis    Adenomatous polyp of colon    Peripheral polyneuropathy    Morbid obesity (H)    Neuromuscular disease (H)    Bronchiolitis obliterans (H)    Hypertension    Rheumatoid arthritis (H)    Immunosuppressed status (H24)    PLMD (periodic limb movement disorder)    Prostate cancer (H)    Lumbosacral radiculopathy    Facet arthropathy, lumbosacral    Spondylolisthesis of lumbar region    Lumbosacral spinal stenosis    Gastroesophageal reflux disease    Internal hemorrhoids    Dysphagia    Sialorrhea    Chronic bronchitis, unspecified chronic bronchitis type (H)    Gastro-esophageal reflux disease with esophagitis    Bronchiolitis    Essential hypertension    Interstitial lung disease (H)    Obesity    Sleep apnea    Moraxella catarrhalis bronchitis    Primary osteoarthritis of first carpometacarpal joint of left hand     Past Surgical History:   Procedure Laterality Date    BIOPSY OF SKIN LESION      COLONOSCOPY      COLONOSCOPY N/A 7/20/2022    Procedure: COLONOSCOPY (fv) polypectomy using jumbo bx forcep;  Surgeon: Gómez Mckinney MD;  Location:  GI    CYSTOSCOPY  Many years ago    Blood in urine/ bladder cystoscopy     ESOPHAGOSCOPY, GASTROSCOPY, DUODENOSCOPY (EGD), COMBINED N/A 2022    Procedure: ESOPHAGOGASTRODUODENOSCOPY (EGD) (fv) biopsies using cold bx forcep;  Surgeon: Gómez Mckinney MD;  Location: [facility]    HEMORRHOID SURGERY      lip biopsy      for sicca complex    MOHS MICROGRAPHIC PROCEDURE      SOFT TISSUE SURGERY      removeal of basel cell carcinoma     Social History     Socioeconomic History    Marital status:      Spouse name: Not on file    Number of children: Not on file    Years of education: Not on file    Highest education level: Not on file   Occupational History    Not on file   Tobacco Use    Smoking status: Former     Current packs/day: 0.00     Average packs/day: 1.5 packs/day for 37.3 years (55.9 ttl pk-yrs)     Types: Cigarettes     Start date: 6/15/1963     Quit date: 2000     Years since quittin.7    Smokeless tobacco: Never   Vaping Use    Vaping status: Never Used   Substance and Sexual Activity    Alcohol use: Not Currently    Drug use: Never    Sexual activity: Not Currently     Partners: Female     Birth control/protection: None   Other Topics Concern    Parent/sibling w/ CABG, MI or angioplasty before 65F 55M? No   Social History Narrative    2013: Living in [place] in a townhouse with no steps    Has 3 sons that are doing okay.         Dairy/d 1 servings/d.     Caffeine 0 servings/d    Exercise 0 x week    Sunscreen used - No    Seatbelts used - Yes    Working smoke/CO detectors in the home - Yes    Guns stored in the home - Yes    Self Breast Exams - NA    Self Testicular Exam - Yes    Eye Exam up to date - Yes     Dental Exam up to date - Yes     Pap Smear up to date - NA    Mammogram up to date - NA    PSA up to date - Yes     Dexa Scan up to date - No    Flex Sig / Colonoscopy up to date - Yes less than 5 yrs ago    Immunizations up to date -today    Abuse: Current or Past(Physical, Sexual or Emotional)- No    Do you feel safe in your environment - Yes     2008                 Social Determinants of Health     Financial Resource Strain: Low Risk  (12/21/2023)    Financial Resource Strain     Within the past 12 months, have you or your family members you live with been unable to get utilities (heat, electricity) when it was really needed?: No   Food Insecurity: Low Risk  (12/21/2023)    Food Insecurity     Within the past 12 months, did you worry that your food would run out before you got money to buy more?: No     Within the past 12 months, did the food you bought just not last and you didn t have money to get more?: No   Transportation Needs: Low Risk  (12/21/2023)    Transportation Needs     Within the past 12 months, has lack of transportation kept you from medical appointments, getting your medicines, non-medical meetings or appointments, work, or from getting things that you need?: No   Physical Activity: Insufficiently Active (12/16/2022)    Exercise Vital Sign     Days of Exercise per Week: 3 days     Minutes of Exercise per Session: 20 min   Stress: No Stress Concern Present (12/16/2022)    Citizen of [place] of Occupational Health - Occupational Stress Questionnaire     Feeling of Stress : Not at all   Social Connections: Socially Integrated (12/16/2022)    Social Connection and Isolation Panel [NHANES]     Frequency of Communication with Friends and Family: Twice a week     Frequency of Social Gatherings with Friends and Family: Once a week     Attends Orthodox Services: More than 4 times per year     Active Member of Clubs or Organizations: Yes     Attends Club or Organization Meetings: Not on file     Marital Status:    Interpersonal Safety: Low Risk  (12/22/2023)    Interpersonal Safety     Do you feel physically and emotionally safe where you currently live?: Yes     Within the past 12 months, have you been hit, slapped, kicked or otherwise physically hurt by someone?: No     Within the past 12 months, have you been humiliated or emotionally  "abused in other ways by your partner or ex-partner?: No   Housing Stability: Low Risk  (12/21/2023)    Housing Stability     Do you have housing? : Yes     Are you worried about losing your housing?: No     Family History   Problem Relation Age of Onset    Hypertension Mother     Heart Disease Mother         a fib    Arthritis Mother         \"osteo\"    Osteoporosis Mother     Skin Cancer Mother     Uterine Cancer Mother     Other Cancer Mother     Cancer Mother     Hypertension Brother     Diabetes Brother         \" post pancreatitis\"    Neurologic Disorder Sister         multiple sclerosis    Hypertension Sister     Heart Disease Sister     Multiple Sclerosis Sister     Heart Disease Sister         Chf    Arthritis Sister     Heart Failure Sister     Kidney Disease Sister     Dementia Sister     Hypertension Father     Cerebrovascular Disease Father         ,    Skin Cancer Father     Colon Polyps Father     Heart Disease Father     Other - See Comments Son         [place]    Other - See Comments Son         [place]    Other - See Comments Son         day gonzalez    Psoriasis Maternal Grandfather     Stomach Cancer Maternal Grandfather     Cancer Maternal Grandfather     Congenital Anomalies Other         granddaughter with a chromosome defect    Other Cancer Other         Grandaughter    Cancer Other         Grand daughter    Cerebrovascular Disease Paternal Grandmother         ,    Cerebrovascular Disease Paternal Grandfather         ,    Cancer Granddaughter         Langerhans Cell Histiocytosis    Genetic Disease Granddaughter         gene translocation    Learning Disorder Other         Gene translocation    Melanoma No family hx of     Colon Cancer No family hx of      Lab Results   Component Value Date    A1C 5.7 05/02/2024    A1C 5.7 12/22/2023    A1C 5.6 11/12/2020    A1C 5.4 08/27/2010    A1C 5.6 04/28/2010    A1C 5.4 02/08/2010    A1C 5.2 08/11/2008         Last Comprehensive Metabolic " Panel:  Lab Results   Component Value Date     05/02/2024    POTASSIUM 5.0 05/02/2024    CHLORIDE 105 05/02/2024    CO2 26 05/02/2024    ANIONGAP 9 05/02/2024     (H) 05/02/2024    BUN 36.3 (H) 05/02/2024    CR 1.59 (H) 05/15/2024    GFRESTIMATED 45 (L) 05/15/2024    BARBARA 9.5 05/02/2024     Lab Results   Component Value Date    ALBUMIN 4.3 05/15/2024    ALBUMIN 3.9 08/18/2022    ALBUMIN 3.8 07/09/2021         Subjective findings- 73-year-old returns clinic for peripheral Neuropathy with foot drop bilaterally.  Relates he feels like the Velcro straps and the padding on the Js braces are wearing out and is wondering if he can get those redone, relates he got his AFOs on the left one rubbed his heel and got a sore on it so he has not wore that was it looks like we prescribed those in 2022, relates he went back to the orthotics and prosthetics and they said they could not do anything for them.  Relates he has tried different moisturizing lotions for his dry skin but they do not work very well.    Objective findings- Vascular status intact bilaterally.  Has a Venous stasis bilaterally.  There is no erythema, no drainage, no odor, no calor, no tendon voids, decreased muscle strength in dorsiflexion and eversion of the foot bilaterally.  He had picture of his left heel that appear to have abrasion sore on it that he related was from the AFO rough spot rubbing.  Has mild dry skin bilaterally.    Assessment and plan- Peripheral Neuropathy with dropfoot bilaterally.  Diagnosis and treatment options discussed with patient.  Prescription for resurfacing of the Js braces and adjustment of the left AFO given and use discussed with the patient and is given the phone number address orthotics and prosthetics lab.  Prescription for Aquaphor given and use discussed with the patient.  Previous notes reviewed.  Return to clinic and see me as needed.                              Low level of medical decision  making.

## 2024-07-08 NOTE — LETTER
7/8/2024      Lucio Daly  [address]  [place] [address]      Dear Colleague,    Thank you for referring your patient, Lucio Daly, to the [facility] [place]. Please see a copy of my visit note below.    Past Medical History:   Diagnosis Date    Abnormal EMG 04/18/2013    Abnormal involuntary movements(781.0)     Movement Disorder    AK (actinic keratosis) 12/18/2011    Allergic rhinitis, cause unspecified     Allergic rhinitis    Balance problems 11/01/2011    Basal cell carcinoma     Bladder spasms 11/01/2011    Chronic osteoarthritis     COPD (chronic obstructive pulmonary disease) (H)     Interstial lung disease, obliderans bronchiolitis    Diaphragmatic hernia without mention of obstruction or gangrene     Earache or other ear, nose, or throat complaint     Esophageal reflux     Fatigue 11/01/2011    Fracture     H. pylori infection 05/12/2011    History of MRI of cervical spine 11/18/2013    EXAMINATION: CERVICAL SPINE G/E 5 VIEWS* 4/19/2013 4:18 PM  CLINICAL HISTORY: Pain in limb,Performing Location?->[facility]),  COMPARISON:  FINDINGS: AP and lateral views in flexion and extension, as well as odontoid view of the cervical spine was obtained. There is no comparison available. The vertebral bodies of the cervical spine are normally aligned. There is posterior spurring and d    Incomplete defecation 11/01/2011    Interstitial lung disease (H) 11/29/2016    Laboratory test 08/07/2012    Lung disease 06/2015    Malignant basal cell neoplasm of skin 08/06/2008    Melanoma (H) 08/06/2008    Melanoma in situ of lower leg (H)     R calf    Moraxella catarrhalis bronchitis 04/19/2024    Neuropathy 05/16/2011    GALO (obstructive sleep apnea)     Other bladder disorder     Other color vision deficiencies     Other nervous system complications     Parkinsonism (H) 11/01/2011    Parkinsons disease (H)     Parkinsonism/ balance, dropped foot, tremor    Personal history of  colonic polyps     PLMD (periodic limb movement disorder) 12/16/2021    Polyneuropathy in other diseases classified elsewhere (H24)     RA (rheumatoid arthritis) (H)     Rheumatoid arthritis of multiple sites with negative rheumatoid factor (H) 03/21/2016    Seronegative arthritis 11/18/2013    Shortness of breath     Shoulder arthritis 2016    acromioclavicular joint     Sialorrhea 04/18/2023    Somatization disorder 05/12/2011    Spider veins     Leg Vericise vein surgery    Squamous cell carcinoma     Tremor 11/01/2011    Unspecified essential hypertension     Unspecified hypothyroidism     Urinary tract infection     Urinary urgency 11/01/2011    Wears glasses 11/01/2011     Patient Active Problem List   Diagnosis    Diaphragmatic hernia    Esophageal reflux    Hx of melanoma of skin    Malignant basal cell neoplasm of skin    GALO (obstructive sleep apnea)    Chronic maxillary sinusitis    Urinary incontinence    Sicca syndrome (H24)    Tremor    Incomplete defecation    AK (actinic keratosis)    Abnormal EMG    Seronegative arthritis    Adenomatous polyp of colon    Peripheral polyneuropathy    Morbid obesity (H)    Neuromuscular disease (H)    Bronchiolitis obliterans (H)    Hypertension    Rheumatoid arthritis (H)    Immunosuppressed status (H24)    PLMD (periodic limb movement disorder)    Prostate cancer (H)    Lumbosacral radiculopathy    Facet arthropathy, lumbosacral    Spondylolisthesis of lumbar region    Lumbosacral spinal stenosis    Gastroesophageal reflux disease    Internal hemorrhoids    Dysphagia    Sialorrhea    Chronic bronchitis, unspecified chronic bronchitis type (H)    Gastro-esophageal reflux disease with esophagitis    Bronchiolitis    Essential hypertension    Interstitial lung disease (H)    Obesity    Sleep apnea    Moraxella catarrhalis bronchitis    Primary osteoarthritis of first carpometacarpal joint of left hand     Past Surgical History:   Procedure Laterality Date    BIOPSY OF  SKIN LESION      COLONOSCOPY      COLONOSCOPY N/A 2022    Procedure: COLONOSCOPY (fv) polypectomy using jumbo bx forcep;  Surgeon: Gómez Mckinney MD;  Location:  GI    CYSTOSCOPY  Many years ago    Blood in urine/ bladder cystoscopy    ESOPHAGOSCOPY, GASTROSCOPY, DUODENOSCOPY (EGD), COMBINED N/A 2022    Procedure: ESOPHAGOGASTRODUODENOSCOPY (EGD) (fv) biopsies using cold bx forcep;  Surgeon: Gómez Mckinney MD;  Location:  GI    HEMORRHOID SURGERY      lip biopsy      for sicca complex    MOHS MICROGRAPHIC PROCEDURE      SOFT TISSUE SURGERY      removeal of basel cell carcinoma     Social History     Socioeconomic History    Marital status:      Spouse name: Not on file    Number of children: Not on file    Years of education: Not on file    Highest education level: Not on file   Occupational History    Not on file   Tobacco Use    Smoking status: Former     Current packs/day: 0.00     Average packs/day: 1.5 packs/day for 37.3 years (55.9 ttl pk-yrs)     Types: Cigarettes     Start date: 6/15/1963     Quit date: 2000     Years since quittin.7    Smokeless tobacco: Never   Vaping Use    Vaping status: Never Used   Substance and Sexual Activity    Alcohol use: Not Currently    Drug use: Never    Sexual activity: Not Currently     Partners: Female     Birth control/protection: None   Other Topics Concern    Parent/sibling w/ CABG, MI or angioplasty before 65F 55M? No   Social History Narrative    2013: Living in [place] in a townhouse with no steps    Has 3 sons that are doing okay.         Dairy/d 1 servings/d.     Caffeine 0 servings/d    Exercise 0 x week    Sunscreen used - No    Seatbelts used - Yes    Working smoke/CO detectors in the home - Yes    Guns stored in the home - Yes    Self Breast Exams - NA    Self Testicular Exam - Yes    Eye Exam up to date - Yes     Dental Exam up to date - Yes 2006    Pap Smear up to date - NA    Mammogram up to date - NA    PSA up to date  - Yes 2008    Dexa Scan up to date - No    Flex Sig / Colonoscopy up to date - Yes less than 5 yrs ago    Immunizations up to date -today    Abuse: Current or Past(Physical, Sexual or Emotional)- No    Do you feel safe in your environment - Yes    2008                 Social Determinants of Health     Financial Resource Strain: Low Risk  (12/21/2023)    Financial Resource Strain     Within the past 12 months, have you or your family members you live with been unable to get utilities (heat, electricity) when it was really needed?: No   Food Insecurity: Low Risk  (12/21/2023)    Food Insecurity     Within the past 12 months, did you worry that your food would run out before you got money to buy more?: No     Within the past 12 months, did the food you bought just not last and you didn t have money to get more?: No   Transportation Needs: Low Risk  (12/21/2023)    Transportation Needs     Within the past 12 months, has lack of transportation kept you from medical appointments, getting your medicines, non-medical meetings or appointments, work, or from getting things that you need?: No   Physical Activity: Insufficiently Active (12/16/2022)    Exercise Vital Sign     Days of Exercise per Week: 3 days     Minutes of Exercise per Session: 20 min   Stress: No Stress Concern Present (12/16/2022)    [place] of Occupational Health - Occupational Stress Questionnaire     Feeling of Stress : Not at all   Social Connections: Socially Integrated (12/16/2022)    Social Connection and Isolation Panel [NHANES]     Frequency of Communication with Friends and Family: Twice a week     Frequency of Social Gatherings with Friends and Family: Once a week     Attends Anabaptism Services: More than 4 times per year     Active Member of Clubs or Organizations: Yes     Attends Club or Organization Meetings: Not on file     Marital Status:    Interpersonal Safety: Low Risk  (12/22/2023)    Interpersonal Safety     Do you feel  "physically and emotionally safe where you currently live?: Yes     Within the past 12 months, have you been hit, slapped, kicked or otherwise physically hurt by someone?: No     Within the past 12 months, have you been humiliated or emotionally abused in other ways by your partner or ex-partner?: No   Housing Stability: Low Risk  (12/21/2023)    Housing Stability     Do you have housing? : Yes     Are you worried about losing your housing?: No     Family History   Problem Relation Age of Onset    Hypertension Mother     Heart Disease Mother         a fib    Arthritis Mother         \"osteo\"    Osteoporosis Mother     Skin Cancer Mother     Uterine Cancer Mother     Other Cancer Mother     Cancer Mother     Hypertension Brother     Diabetes Brother         \" post pancreatitis\"    Neurologic Disorder Sister         multiple sclerosis    Hypertension Sister     Heart Disease Sister     Multiple Sclerosis Sister     Heart Disease Sister         Chf    Arthritis Sister     Heart Failure Sister     Kidney Disease Sister     Dementia Sister     Hypertension Father     Cerebrovascular Disease Father         ,    Skin Cancer Father     Colon Polyps Father     Heart Disease Father     Other - See Comments Son         [place]    Other - See Comments Abdon wagner    Other - See Comments Abdon gonzalez    Psoriasis Maternal Grandfather     Stomach Cancer Maternal Grandfather     Cancer Maternal Grandfather     Congenital Anomalies Other         granddaughter with a chromosome defect    Other Cancer Other         Grandaughter    Cancer Other         Grand daughter    Cerebrovascular Disease Paternal Grandmother         ,    Cerebrovascular Disease Paternal Grandfather         ,    Cancer Granddaughter         Langerhans Cell Histiocytosis    Genetic Disease Granddaughter         gene translocation    Learning Disorder Other         Gene translocation    Melanoma No family hx of     Colon Cancer No " family hx of      Lab Results   Component Value Date    A1C 5.7 05/02/2024    A1C 5.7 12/22/2023    A1C 5.6 11/12/2020    A1C 5.4 08/27/2010    A1C 5.6 04/28/2010    A1C 5.4 02/08/2010    A1C 5.2 08/11/2008         Last Comprehensive Metabolic Panel:  Lab Results   Component Value Date     05/02/2024    POTASSIUM 5.0 05/02/2024    CHLORIDE 105 05/02/2024    CO2 26 05/02/2024    ANIONGAP 9 05/02/2024     (H) 05/02/2024    BUN 36.3 (H) 05/02/2024    CR 1.59 (H) 05/15/2024    GFRESTIMATED 45 (L) 05/15/2024    BARBARA 9.5 05/02/2024     Lab Results   Component Value Date    ALBUMIN 4.3 05/15/2024    ALBUMIN 3.9 08/18/2022    ALBUMIN 3.8 07/09/2021     Subjective findings- 73-year-old returns clinic for peripheral Neuropathy with foot drop bilaterally.  Relates he feels like the Velcro straps and the padding on the Js braces are wearing out and is wondering if he can get those redone, relates he got his AFOs on the left one rubbed his heel and got a sore on it so he has not wore that was it looks like we prescribed those in 2022, relates he went back to the orthotics and prosthetics and they said they could not do anything for them.  Relates he has tried different moisturizing lotions for his dry skin but they do not work very well.    Objective findings- Vascular status intact bilaterally.  Has a Venous stasis bilaterally.  There is no erythema, no drainage, no odor, no calor, no tendon voids, decreased muscle strength in dorsiflexion and eversion of the foot bilaterally.  He had picture of his left heel that appear to have abrasion sore on it that he related was from the AFO rough spot rubbing.  Has mild dry skin bilaterally.    Assessment and plan- Peripheral Neuropathy with dropfoot bilaterally.  Diagnosis and treatment options discussed with patient.  Prescription for resurfacing of the Js braces and adjustment of the left AFO given and use discussed with the patient and is given the phone number  address orthotics and prosthetics lab.  Prescription for Aquaphor given and use discussed with the patient.  Previous notes reviewed.  Return to clinic and see me as needed.    Low level of medical decision making.      Again, thank you for allowing me to participate in the care of your patient.        Sincerely,        Rodney Ríos DPM

## 2024-07-10 ENCOUNTER — TELEPHONE (OUTPATIENT)
Dept: PEDIATRICS | Facility: CLINIC | Age: 74
End: 2024-07-10

## 2024-07-10 ENCOUNTER — MYC MEDICAL ADVICE (OUTPATIENT)
Dept: PEDIATRICS | Facility: CLINIC | Age: 74
End: 2024-07-10

## 2024-07-10 DIAGNOSIS — N39.498 OTHER URINARY INCONTINENCE: ICD-10-CM

## 2024-07-10 DIAGNOSIS — D84.9 IMMUNOSUPPRESSED STATUS (H): ICD-10-CM

## 2024-07-10 DIAGNOSIS — I10 ESSENTIAL HYPERTENSION: Primary | ICD-10-CM

## 2024-07-10 PROBLEM — J21.9 BRONCHIOLITIS: Status: RESOLVED | Noted: 2023-02-26 | Resolved: 2024-07-10

## 2024-07-10 PROBLEM — J40 MORAXELLA CATARRHALIS BRONCHITIS: Status: RESOLVED | Noted: 2024-04-19 | Resolved: 2024-07-10

## 2024-07-10 PROBLEM — J42 CHRONIC BRONCHITIS, UNSPECIFIED CHRONIC BRONCHITIS TYPE (H): Status: RESOLVED | Noted: 2023-12-22 | Resolved: 2024-07-10

## 2024-07-10 PROBLEM — M43.16 SPONDYLOLISTHESIS OF LUMBAR REGION: Status: RESOLVED | Noted: 2022-09-09 | Resolved: 2024-07-10

## 2024-07-10 PROBLEM — B96.89 MORAXELLA CATARRHALIS BRONCHITIS: Status: RESOLVED | Noted: 2024-04-19 | Resolved: 2024-07-10

## 2024-07-10 PROBLEM — J84.9 INTERSTITIAL LUNG DISEASE (H): Status: RESOLVED | Noted: 2022-02-17 | Resolved: 2024-07-10

## 2024-07-10 RX ORDER — OXYBUTYNIN CHLORIDE 5 MG/1
5 TABLET ORAL DAILY
Qty: 90 TABLET | Refills: 1 | Status: SHIPPED | OUTPATIENT
Start: 2024-07-10

## 2024-07-10 NOTE — TELEPHONE ENCOUNTER
Pt sent clarification. Refilled med for 6 months.    Tom Mckeon RN  [facility]

## 2024-07-10 NOTE — TELEPHONE ENCOUNTER
Pt here for lab appointment today.  There are no orders from Dr Calixto.  Did you want pt to have labs done?  Please place orders

## 2024-07-10 NOTE — TELEPHONE ENCOUNTER
See the MC message from pt. There is a break in the med, so called the pharmacy to get the last refill day. I was told that it was picked up on 3/19/24 for 90 days supply. LOV was on 12/22/23.     Sent MC message back to pt requesting clarification. Will await for his response.     oxybutynin (DITROPAN) 5 MG tablet 180 tablet 3 4/10/2023 -- No   Sig: TAKE 1 TABLET BY MOUTH DAILY AT 4PM     Tom Mckeon RN  [facility]

## 2024-07-31 ENCOUNTER — LAB (OUTPATIENT)
Dept: LAB | Facility: CLINIC | Age: 74
End: 2024-07-31
Payer: MEDICARE

## 2024-07-31 DIAGNOSIS — D84.9 IMMUNOSUPPRESSED STATUS (H): ICD-10-CM

## 2024-07-31 DIAGNOSIS — I10 ESSENTIAL HYPERTENSION: ICD-10-CM

## 2024-07-31 LAB
ALBUMIN SERPL BCG-MCNC: 4.2 G/DL (ref 3.5–5.2)
ALP SERPL-CCNC: 61 U/L (ref 40–150)
ALT SERPL W P-5'-P-CCNC: 16 U/L (ref 0–70)
ANION GAP SERPL CALCULATED.3IONS-SCNC: 10 MMOL/L (ref 7–15)
AST SERPL W P-5'-P-CCNC: 21 U/L (ref 0–45)
BASOPHILS # BLD AUTO: 0 10E3/UL (ref 0–0.2)
BASOPHILS NFR BLD AUTO: 1 %
BILIRUB SERPL-MCNC: 0.4 MG/DL
BUN SERPL-MCNC: 29.2 MG/DL (ref 8–23)
CALCIUM SERPL-MCNC: 9.5 MG/DL (ref 8.8–10.4)
CHLORIDE SERPL-SCNC: 104 MMOL/L (ref 98–107)
CREAT SERPL-MCNC: 1.59 MG/DL (ref 0.67–1.17)
EGFRCR SERPLBLD CKD-EPI 2021: 45 ML/MIN/1.73M2
EOSINOPHIL # BLD AUTO: 0.1 10E3/UL (ref 0–0.7)
EOSINOPHIL NFR BLD AUTO: 3 %
ERYTHROCYTE [DISTWIDTH] IN BLOOD BY AUTOMATED COUNT: 15.7 % (ref 10–15)
GLUCOSE SERPL-MCNC: 119 MG/DL (ref 70–99)
HCO3 SERPL-SCNC: 25 MMOL/L (ref 22–29)
HCT VFR BLD AUTO: 39.8 % (ref 40–53)
HGB BLD-MCNC: 12.7 G/DL (ref 13.3–17.7)
IMM GRANULOCYTES # BLD: 0 10E3/UL
IMM GRANULOCYTES NFR BLD: 0 %
LYMPHOCYTES # BLD AUTO: 0.7 10E3/UL (ref 0.8–5.3)
LYMPHOCYTES NFR BLD AUTO: 16 %
MCH RBC QN AUTO: 30.8 PG (ref 26.5–33)
MCHC RBC AUTO-ENTMCNC: 31.9 G/DL (ref 31.5–36.5)
MCV RBC AUTO: 96 FL (ref 78–100)
MONOCYTES # BLD AUTO: 0.3 10E3/UL (ref 0–1.3)
MONOCYTES NFR BLD AUTO: 6 %
NEUTROPHILS # BLD AUTO: 3.5 10E3/UL (ref 1.6–8.3)
NEUTROPHILS NFR BLD AUTO: 75 %
PLATELET # BLD AUTO: 181 10E3/UL (ref 150–450)
POTASSIUM SERPL-SCNC: 4.7 MMOL/L (ref 3.4–5.3)
PROT SERPL-MCNC: 7 G/DL (ref 6.4–8.3)
RBC # BLD AUTO: 4.13 10E6/UL (ref 4.4–5.9)
SODIUM SERPL-SCNC: 139 MMOL/L (ref 135–145)
WBC # BLD AUTO: 4.6 10E3/UL (ref 4–11)

## 2024-07-31 PROCEDURE — 85025 COMPLETE CBC W/AUTO DIFF WBC: CPT

## 2024-07-31 PROCEDURE — 80053 COMPREHEN METABOLIC PANEL: CPT

## 2024-07-31 PROCEDURE — 36415 COLL VENOUS BLD VENIPUNCTURE: CPT

## 2024-08-01 NOTE — RESULT ENCOUNTER NOTE
Marilynn Lynch ,    The results from your recent lab work are within normal limits.    Overall, the labs appear stable.        Thank you for choosing [facility] for your health care needs,      Fabiola Blum PA-C

## 2024-08-05 DIAGNOSIS — M54.17 LUMBOSACRAL RADICULOPATHY: Primary | ICD-10-CM

## 2024-08-05 DIAGNOSIS — M54.50 LOW BACK PAIN: ICD-10-CM

## 2024-08-08 ENCOUNTER — MYC MEDICAL ADVICE (OUTPATIENT)
Dept: PEDIATRICS | Facility: CLINIC | Age: 74
End: 2024-08-08
Payer: MEDICARE

## 2024-08-08 DIAGNOSIS — K21.9 GASTROESOPHAGEAL REFLUX DISEASE WITHOUT ESOPHAGITIS: ICD-10-CM

## 2024-08-08 RX ORDER — OMEPRAZOLE 40 MG/1
40 CAPSULE, DELAYED RELEASE ORAL DAILY
Qty: 90 CAPSULE | Refills: 2 | OUTPATIENT
Start: 2024-08-08

## 2024-08-08 RX ORDER — OMEPRAZOLE 40 MG/1
40 CAPSULE, DELAYED RELEASE ORAL DAILY
Qty: 90 CAPSULE | Refills: 0 | Status: SHIPPED | OUTPATIENT
Start: 2024-08-08 | End: 2024-08-09

## 2024-08-09 ENCOUNTER — TELEPHONE (OUTPATIENT)
Dept: PALLIATIVE MEDICINE | Facility: CLINIC | Age: 74
End: 2024-08-09
Payer: MEDICARE

## 2024-08-09 DIAGNOSIS — M54.16 LUMBAR RADICULOPATHY: Primary | ICD-10-CM

## 2024-08-09 RX ORDER — OMEPRAZOLE 40 MG/1
40 CAPSULE, DELAYED RELEASE ORAL DAILY
Qty: 90 CAPSULE | Refills: 3 | Status: SHIPPED | OUTPATIENT
Start: 2024-08-09

## 2024-08-09 NOTE — TELEPHONE ENCOUNTER
"Screening Questions for Radiology Injections:    Injection to be done at which interventional clinic site? [facility]    If choosing [facility] for location, please inform patient:  \"[facility] is a Hospital based clinic. Before your visit, you should check with your insurance about how it covers the charges for facility services in a hospital-based clinic.     Procedure ordered by Kp    Procedure ordered? R. L4-5 TFESI   Transforaminal Cervical ROLA - Send to [facility] - No [facility] providers perform this procedure    What insurance would patient like us to bill for this procedure? Medicare/BC  IF SCHEDULING IN [place] PAIN OR SPINE PLEASE SCHEDULE AT LEAST 7-10 BUSINESS DAYS OUT SO A PA CAN BE OBTAINED  Worker's comp or MVA (motor vehicle accident) -Any injection DO NOT SCHEDULE and route to Gregory Zelaya.    [employer] insurance - For SI joint injections, DO NOT SCHEDULE and route to Mary Aldridge.     ALL BCBS, [employer] and [employer] CIGNA - DO NOT SCHEDULE and route to Mary Aldridge  MEDICA- ALL INJECTIONS- route to Mary Aldridge    Is patient scheduled at [facility]? no   If YES, route every encounter to [facility] CARE NAVIGATION POOL [[phone]]    Is an  needed? No     Patient has a  home? (Review Grid) YES: ok    Any chance of pregnancy? NO   If YES, do NOT schedule and route to RN pool  - Dr. Pozo route to Cinthia Mckeon and PM&R Nurse  [[id number]]      Is patient actively being treated for cancer or immunocompromised? No  If YES, do NOT schedule and route to RN pool/ Dr. Pozo's Team    Does the patient have a bleeding or clotting disorder? No   If YES, okay to schedule AND route to RN  / Dr. Pozo's Team   (For any patients with platelet count <100, RN must forward to provider)    Is patient taking any Blood Thinners OR Antiplatelet medication?  No   If hold needed, do NOT schedule, route to DEMARIO andraed/ Dr. Pozo's " Team  Examples:   Blood Thinners: (Coumadin, Warfarin, Jantoven, Pradaxa, Xarelto, Eliquis, Edoxaban, Enoxaparin, Lovenox, Heparin, Arixtra, Fondaparinux or Fragmin)  Antiplatelet Medications: (Plavix, Brilinta or Effient)     Is patient taking any aspirin products (includes Excedrin and Fiorinal)? No   If yes route to RN pool/ Dr. Pozo's Team - Do not schedule    Is patient taking any GLP-1 Antagonist (hold needed for sedation patients only) No   (semaglutide (Ozempic, Wegovy), dulaglutide (Trulicity), exenatide ER (Bydureon), tirzepatide (Mounjaro), Liraglutide (Saxenda, Victoza), semaglutide (Rybelsus), Terzepatide (Zepbound)  If YES, okay to schedule AND route to RN nurse / Dr. Pozo's Team      Any allergies to contrast dye, iodine, shellfish, or numbing and steroid medications? YES Iodine   If YES, schedule and add allergy information to appointment notes AND route to the RN pool/ Dr. Pozo's Team  If ROLA and Contrast Dye / Iodine Allergy? DO NOT SCHEDULE, route to RN pool/ Dr. Pozo's Team  Allergies: Adhesive tape, Alum & mag hydroxide-simeth, Aluminum hydroxide, Calcium carbonate, Cortisone, Cyclosporine, Cyclosporine, Diphenhydramine, Fexofenadine, Levofloxacin, Magnesium carbonate, Magnesium hydroxide, Prednisone, Simethicone, Sulfamethoxazole-trimethoprim, Tree extract, Cephalexin, Doxycycline, Iodine, Oxcarbazepine, Sulfamethoxazole-trimethoprim, Allegra, Animal dander, Benadryl allergy, Budesonide-formoterol fumarate, Cephalosporins, Chlorpheniramine maleate, Doxycycline hyclate, Flonase [fluticasone propionate], Fluticasone, Hydrocortisone, Iodine solution [povidone iodine], Levaquin, Mylanta, Oxcarbazepine, Povidone iodine, Prednisone, Seafood [seafood], Shellfish allergy, Trees, Cortizone, and Ibuprofen     Does patient have an active infection or treated for one within the past week? No  Is patient currently taking any antibiotics or steroid medications?  No   For patients on chronic,  preventative, or prophylactic antibiotics, procedures may be scheduled.   For patients on antibiotics for active or recent infection, schedule 4 days after completed.  For patients on steroid medications, schedule 4 days after completed.     Has the patient had a flu shot or any other vaccinations within the past 7 days? No  If yes, explain that for the vaccine to work best they need to:     wait 1 week before and 1 week after getting any Vaccine  wait 1 week before and 2 weeks after getting any Covid Vaccine   If patient has concerns about the timing, send to RN pool/ Dr. Pozo's Team    Does patient have an MRI/CT?  YES: MRI Include Date and Check Procedure Scheduling Grid to see if required.  Was the MRI/CT done within the last 3 years?  Yes   If no route to RN Pool/ Dr. Pozo's Team  If yes, where was the MRI/CT done? [facility]   Refer to PACS Transmissions list for approved external locations and route to RN Pool High Priority/ Dr. Michaels Team  If MRI was not done at approved external location do NOT schedule and route to RN pool/ Dr. Michaels Team    If patient has an imaging disc, the injection MAY be scheduled but patient must bring disc to appt or appt will be cancelled.    Is patient able to transfer to a procedure table with minimal or no assistance? Yes   If no, do NOT schedule and route to RN Pool/ Dr. Pozo's Team    Procedure Specific Instructions:  If celiac plexus block, informed patient NPO for 6 hours and that it is okay to take medications with sips of water, especially blood pressure medications Not Applicable       If this is for a cervical procedure, informed patient that aspirin needs to be held for 6 days.   Not Applicable    Sedation, If Sedation is ordered for any procedure, patient must be NPO for 6 hours prior to procedure Not Applicable    If IV needed:  Do not schedule procedures requiring IV placement in the first appointment of the day or first appointment after lunch. Do NOT  schedule at 0745, 0815 or 1245. ok  Instructed patient to arrive 30 minutes early for IV start if required. (Check Procedure Scheduling Grid)  Not Applicable    Reminders:  If you are started on any steroids or antibiotics between now and your appointment, you must contact us because the procedure may need to be cancelled.  Yes    As a reminder, receiving steroids can decrease your body's ability to fight infection.   Would you still like to move forward with scheduling the injection?  Yes    IV Sedation is not provided for procedures. If oral anti-anxiety medication is needed, the patient should request this from their referring provider.    Instruct patient to arrive as directed prior to the scheduled appointment time:  If IV needed 30 minutes before appointment time     For patients 85 or older we recommend having an adult stay w/ them for the remainder of the day.     If the patient is Diabetic, remind them to bring their glucometer.      Does the patient have any questions?  NO  Maria Victoria Zelaya  [facility]

## 2024-08-12 NOTE — TELEPHONE ENCOUNTER
Patient requesting to be scheduled for:Lumbar epidural steroid injection Transforaminal      The following red flags noted on patient screening:   Steroid / Contrast allergy review: Iodine allergy    Iodine Solution [Povidone Iodine]   Not Specified  2/4/2011 Past Updates   SKIN MELTS     Insurance approval needed prior to scheduling    Injection imaging orders Pended    Scheduled:  Not yet scheduled    Routing:   Iodine allergy to provider to advise if patient may just receive alternate for cleaning solution.     Livia Weller RN

## 2024-08-12 NOTE — TELEPHONE ENCOUNTER
I dont use iodine for anyone so should not be a problem.     Order signed for procedure.       Flakita Clemons MD

## 2024-08-13 NOTE — TELEPHONE ENCOUNTER
No PA required, okay to schedule    Per Availity:  This product does not require prior authorization for any service.      Mary MOSER    [facility]

## 2024-08-13 NOTE — TELEPHONE ENCOUNTER
Please contact for scheduling injection with Dr Clemons.       STEFAN L4-5 Transforaminal epidural steroid injection       Neelam SCHULTZN, RN Care Coordinator  [facility]  Pain [facility]

## 2024-08-14 NOTE — TELEPHONE ENCOUNTER
Phoned pt to schedule  R. L4-5 TFESI , he has a bunch of questions for nursing, please call to advise.          Gregory Zelaya  Complex   [facility]  Pain Management

## 2024-08-15 NOTE — TELEPHONE ENCOUNTER
[facility] Call Center    Phone Message    May a detailed message be left on voicemail: yes     Reason for Call: Other: Patient is returning Lviia's call. He can be reached after 3:30 today or tomorrow morning. Please review and call patient back to discuss.      Action Taken: Message routed to:  Other: [facility]    Travel Screening: Not Applicable     Date of Service:

## 2024-08-15 NOTE — TELEPHONE ENCOUNTER
Note patient is not currently scheduled.     LVM for patient requesting return call.     Livia Weller RN

## 2024-08-20 NOTE — TELEPHONE ENCOUNTER
Called pt.. Reports several concerns with allergies. Allergies to steroids and past reactions with ROLA injection- reports Hives with all steroids, also see below note. States that previous ROLA was not helpful either. Unable to location injection in Epic, pt reports was 10+ yrs ago. Also states that has multiple appointments for dermatology/MOHS and topical chemotherapy use.     Discussed steroid and his concerns with healing impacts that steroid could have. Discussed risk vs reward with any procedure. Pt would like to have his allergies reviewed by an injection provider for their opinion on proceeding in the future but ultimately states he will hold off on injection until fall when MOHs/skin healed.     Per OV 12/05/23 OV-Tunde    He states that he has had spine injections in the past over 15 years ago and they have always caused him to have neurogenic bladder and frequent urination afterwards.         Neelam WHITE, RN Care Coordinator  [facility]  Pain Management

## 2024-08-20 NOTE — TELEPHONE ENCOUNTER
He should review all these concerns with the ordering provider.  This is all risk vs benefit and I have not seen the patient so I cannot comment on this.     If he thinks he is allergic to all steroids he should have allergy testing done prior to any injection. Prednisone is listed as an allergy so any injection would be contraindicated until he has allergy testing done.      Flakita Clemons MD

## 2024-08-20 NOTE — TELEPHONE ENCOUNTER
Called pt  and LM to call back to discuss    Neelam WHITE, RN Care Coordinator  [facility]  Pain [facility]

## 2024-08-21 NOTE — TELEPHONE ENCOUNTER
Pt retuning call to nursing.          Gregory Zelaya  Complex   [facility]  Pain Management

## 2024-08-22 NOTE — TELEPHONE ENCOUNTER
Called magdalene TRINH that would also send MyC as we have been playing phone tag. Below sent to debbie Lynch,     My apologies we have been playing a bit of phone tag. I know you had been thinking of proceeding with other health needs prior to potentially moving forward with the injection. A provider who does injections as able to review the details of our conversation. She advised that ultimately, you would want to review your concerns with the provider who ordered the injection. As we discussed, things really are a risk versus potential reward and your allergies are a concern. Is he advised that if you are allergic to all steroids then you should have allergy testing done prior to any injection to verify safety.  This would be ordered by either your PCP or the provider ordering your injection. She noted that since Prednisone is listed as an allergy, any injection would be contraindicated until you have allergy testing done first.     Hoping that helps with the steps you would need to take if you decided to move forward with an injection. Wishing you well and take care!    Neelam WHITE, RN Care Coordinator  [facility]  Pain Management

## 2024-08-27 ENCOUNTER — TRANSFERRED RECORDS (OUTPATIENT)
Dept: HEALTH INFORMATION MANAGEMENT | Facility: CLINIC | Age: 74
End: 2024-08-27
Payer: MEDICARE

## 2024-08-28 ENCOUNTER — TELEPHONE (OUTPATIENT)
Dept: RHEUMATOLOGY | Facility: CLINIC | Age: 74
End: 2024-08-28
Payer: MEDICARE

## 2024-08-28 NOTE — TELEPHONE ENCOUNTER
Plaquenil Eye Exam    Date of last eye exam specifically commenting on HCQ toxicity: 8/27/2024  Clinic: [facility]   Ophthalmologist: Leonel Farnsworth  Toxicity: NO  Records scanned to chart: Yes  Follow up: none      Aimee Pillai CMA   8/28/2024 3:06 PM

## 2024-09-05 ENCOUNTER — OFFICE VISIT (OUTPATIENT)
Dept: SLEEP MEDICINE | Facility: CLINIC | Age: 74
End: 2024-09-05
Payer: MEDICARE

## 2024-09-05 VITALS
SYSTOLIC BLOOD PRESSURE: 110 MMHG | DIASTOLIC BLOOD PRESSURE: 68 MMHG | OXYGEN SATURATION: 99 % | WEIGHT: 302 LBS | BODY MASS INDEX: 38.76 KG/M2 | HEIGHT: 74 IN | HEART RATE: 65 BPM

## 2024-09-05 DIAGNOSIS — G47.33 OSA TREATED WITH BIPAP: Primary | ICD-10-CM

## 2024-09-05 PROCEDURE — 99214 OFFICE O/P EST MOD 30 MIN: CPT | Performed by: PHYSICIAN ASSISTANT

## 2024-09-05 NOTE — PROGRESS NOTES
[facility]   Outpatient Sleep Medicine  Sep 5, 2024       Name: Lucio Daly MRN# [id number]   Age: 74 year old YOB: 1950            Assessment and Plan:   1. GALO treated with BiPAP  Patient's sleep apnea is adequately managed and well treated with current PAP settings 15/52waM7H with low residual AHI of 3.3 events per hour. Compliance is excellent. Continues to tolerate PAP well and benefits from  use.  No indication to adjust pressure settings today.  He will continue nightly use.  Prescription renewed for new mask and supplies.  Patient will follow-up every 1-2 years for routine PAP follow-up visit or sooner as needed.  - Comprehensive DME       Chief Complaint      Chief Complaint   Patient presents with    CPAP Follow Up          History of Present Illness:     Lucio Daly is a 74 year old male who presents to the clinic for follow-up of their moderate obstructive sleep apnea treated with BPAP.    Original diagnostic PSG 3/2007 revealed AHI 23, RDI 24, O2 yvon 81%, PLM index 0.     Most recent testing was split-night PSG 11/16/2015 (305#, BMI 38.3) revealed moderate GALO without significant hypoventilation or hypoxemia-AHI 28.3, RDI 28.3, supine AHI 28.3, oxygen yvon 87.7%, TCM max change 3 mmHg. No REM in diagnostic. CPAP titrated to bilevel 15/10cm.  There were elevated periodic limb movements of sleep without arousal-diagnostic PLM index 69.6 with arousal 3.1; treatment PLM index 35.2 with arousal 0.5.    Overnight oximetry on bilevel therapy 6/21/2022 showed a yvon 83%, 0.9 minutes spent less than 88%.    Patient typically followed by my colleague Dr. Martinez last visit 5/2022.    Presents my clinic today for routine PAP follow-up. Overall, the patient rates their experience with PAP as 7 (0 poor, 10 great). Patient is using a full face mask. The mask is comfortable but wishes that insurance would cover headgear more frequently, gets stretched out.  Mask  "can leak out sides at times but typically good fit. They are not snoring with the mask on. They are not having gasp arousals.  They are not having significant oral/nasal dryness or epistaxis.  They are not having pain/skin breakdown. The pressure settings are comfortable.     Bedtime is typically 11:00PM. Usually it takes about 20 minutes to fall asleep with the mask on. Wake time is typically 7:00AM.  Patient is using PAP therapy 7.5 hours per night. The patient is usually getting 7-7.5 hours of sleep per night.    Respironics Dreamstation BPAP 15/45nqZ0F (8/6/24-9/4/24):  30 total days of use. 0 nonuse days.  Average use 7 hours 27 minutes per day.  30 days with >4 hours use.  Large leak 26 sec per day average.  AHI 3.3.   *Replacement machine from [employer] in fall 2021    SCALES:   INSOMNIA: Insomnia Severity Score: 8   SLEEPINESS: [place] Sleepiness Score: 12 - patient attributes to med side effects and pain not sleep specifically     Past medical/surgical history, family history, social history, medications and allergies were reviewed.           Physical Examination:   /68   Pulse 65   Ht 1.88 m (6' 2\")   Wt 137 kg (302 lb)   SpO2 99%   BMI 38.77 kg/m    General appearance: Awake, alert, cooperative. Well groomed. Sitting comfortably in chair. In no apparent distress.  HEENT: Head: Normocephalic, atraumatic. Eyes:Conjunctiva clear. Sclera normal. Nose: External appearance without deformity.   Pulmonary:  Able to speak easily in full sentences. No cough or wheeze.   Neurologic: Alert, oriented x3.   Psychiatric: Mood euthymic. Affect congruent with full range and intensity.      CC:  Heladio Calixto PA-C  Sep 5, 2024     [facility]  [address] , [place] [address]     [facility]  [address] [place] [id number]    Chart documentation was completed, in part, with [employer] voice-recognition software. Even " though reviewed, some grammatical, spelling, and word errors may remain.    37 minutes spent on day of encounter doing chart review, history and exam, documentation, and further activities as noted above

## 2024-09-05 NOTE — NURSING NOTE
"Chief Complaint   Patient presents with    CPAP Follow Up       Initial /68   Pulse 65   Ht 1.88 m (6' 2\")   Wt 137 kg (302 lb)   SpO2 99%   BMI 38.77 kg/m   Estimated body mass index is 38.77 kg/m  as calculated from the following:    Height as of this encounter: 1.88 m (6' 2\").    Weight as of this encounter: 137 kg (302 lb).    Medication Reconciliation: complete  ESS 12  Melissa Crowell MA   "

## 2024-09-25 DIAGNOSIS — I10 PRIMARY HYPERTENSION: ICD-10-CM

## 2024-09-25 RX ORDER — LISINOPRIL AND HYDROCHLOROTHIAZIDE 20; 25 MG/1; MG/1
1 TABLET ORAL DAILY
Qty: 90 TABLET | Refills: 3 | OUTPATIENT
Start: 2024-09-25

## 2024-09-26 ENCOUNTER — TELEPHONE (OUTPATIENT)
Dept: PULMONOLOGY | Facility: CLINIC | Age: 74
End: 2024-09-26
Payer: MEDICARE

## 2024-09-26 DIAGNOSIS — I10 HYPERTENSION, UNSPECIFIED TYPE: ICD-10-CM

## 2024-09-26 DIAGNOSIS — E55.9 VITAMIN D DEFICIENCY: ICD-10-CM

## 2024-09-26 DIAGNOSIS — N18.30 STAGE 3 CHRONIC KIDNEY DISEASE, UNSPECIFIED WHETHER STAGE 3A OR 3B CKD (H): Primary | ICD-10-CM

## 2024-09-26 NOTE — TELEPHONE ENCOUNTER
Left Voicemail (1st Attempt) and Sent Mychart (1st Attempt) for the patient to call back and schedule the following:    Appointment type: Reschedule Return ILD  Provider: Court  Return date: 10/9/2024  Specialty phone number: [phone]  Additional appointment(s) needed: loops and dlco  Additonal Notes: provider unavailable

## 2024-10-08 ENCOUNTER — LAB (OUTPATIENT)
Dept: LAB | Facility: CLINIC | Age: 74
End: 2024-10-08
Payer: MEDICARE

## 2024-10-08 ENCOUNTER — MYC MEDICAL ADVICE (OUTPATIENT)
Dept: PULMONOLOGY | Facility: CLINIC | Age: 74
End: 2024-10-08

## 2024-10-08 DIAGNOSIS — J44.81 OBLITERATIVE BRONCHIOLITIS (H): ICD-10-CM

## 2024-10-08 DIAGNOSIS — E55.9 VITAMIN D DEFICIENCY: ICD-10-CM

## 2024-10-08 DIAGNOSIS — I10 HYPERTENSION, UNSPECIFIED TYPE: ICD-10-CM

## 2024-10-08 LAB
ALBUMIN MFR UR ELPH: 9.5 MG/DL
ALBUMIN SERPL BCG-MCNC: 4.3 G/DL (ref 3.5–5.2)
ALBUMIN UR-MCNC: NEGATIVE MG/DL
ANION GAP SERPL CALCULATED.3IONS-SCNC: 12 MMOL/L (ref 7–15)
APPEARANCE UR: CLEAR
BACTERIA #/AREA URNS HPF: ABNORMAL /HPF
BILIRUB UR QL STRIP: NEGATIVE
BUN SERPL-MCNC: 27.3 MG/DL (ref 8–23)
CALCIUM SERPL-MCNC: 9.5 MG/DL (ref 8.8–10.4)
CHLORIDE SERPL-SCNC: 105 MMOL/L (ref 98–107)
COLOR UR AUTO: YELLOW
CREAT SERPL-MCNC: 1.42 MG/DL (ref 0.67–1.17)
CREAT UR-MCNC: 139 MG/DL
CREAT UR-MCNC: 139 MG/DL
EGFRCR SERPLBLD CKD-EPI 2021: 51 ML/MIN/1.73M2
ERYTHROCYTE [DISTWIDTH] IN BLOOD BY AUTOMATED COUNT: 15.5 % (ref 10–15)
GLUCOSE SERPL-MCNC: 113 MG/DL (ref 70–99)
GLUCOSE UR STRIP-MCNC: NEGATIVE MG/DL
HCO3 SERPL-SCNC: 24 MMOL/L (ref 22–29)
HCT VFR BLD AUTO: 39.1 % (ref 40–53)
HGB BLD-MCNC: 12.3 G/DL (ref 13.3–17.7)
HGB UR QL STRIP: ABNORMAL
KETONES UR STRIP-MCNC: NEGATIVE MG/DL
LEUKOCYTE ESTERASE UR QL STRIP: NEGATIVE
MCH RBC QN AUTO: 30.8 PG (ref 26.5–33)
MCHC RBC AUTO-ENTMCNC: 31.5 G/DL (ref 31.5–36.5)
MCV RBC AUTO: 98 FL (ref 78–100)
MICROALBUMIN UR-MCNC: <12 MG/L
MICROALBUMIN/CREAT UR: NORMAL MG/G{CREAT}
NITRATE UR QL: NEGATIVE
PH UR STRIP: 6 [PH] (ref 5–7)
PHOSPHATE SERPL-MCNC: 2.7 MG/DL (ref 2.5–4.5)
PLATELET # BLD AUTO: 191 10E3/UL (ref 150–450)
POTASSIUM SERPL-SCNC: 4.9 MMOL/L (ref 3.4–5.3)
PROT/CREAT 24H UR: 0.07 MG/MG CR (ref 0–0.2)
PTH-INTACT SERPL-MCNC: 44 PG/ML (ref 15–65)
RBC # BLD AUTO: 4 10E6/UL (ref 4.4–5.9)
RBC #/AREA URNS AUTO: ABNORMAL /HPF
SODIUM SERPL-SCNC: 141 MMOL/L (ref 135–145)
SP GR UR STRIP: 1.02 (ref 1–1.03)
SQUAMOUS #/AREA URNS AUTO: ABNORMAL /LPF
UROBILINOGEN UR STRIP-ACNC: 0.2 E.U./DL
VIT D+METAB SERPL-MCNC: 47 NG/ML (ref 20–50)
WBC # BLD AUTO: 3.8 10E3/UL (ref 4–11)
WBC #/AREA URNS AUTO: ABNORMAL /HPF

## 2024-10-08 PROCEDURE — 36415 COLL VENOUS BLD VENIPUNCTURE: CPT

## 2024-10-08 PROCEDURE — 80069 RENAL FUNCTION PANEL: CPT

## 2024-10-08 PROCEDURE — 85027 COMPLETE CBC AUTOMATED: CPT

## 2024-10-08 PROCEDURE — 83970 ASSAY OF PARATHORMONE: CPT

## 2024-10-08 PROCEDURE — 82570 ASSAY OF URINE CREATININE: CPT

## 2024-10-08 PROCEDURE — 82306 VITAMIN D 25 HYDROXY: CPT

## 2024-10-08 PROCEDURE — 84156 ASSAY OF PROTEIN URINE: CPT

## 2024-10-08 PROCEDURE — 82043 UR ALBUMIN QUANTITATIVE: CPT

## 2024-10-08 PROCEDURE — 81001 URINALYSIS AUTO W/SCOPE: CPT

## 2024-10-12 DIAGNOSIS — N39.498 OTHER URINARY INCONTINENCE: ICD-10-CM

## 2024-10-14 RX ORDER — OXYBUTYNIN CHLORIDE 5 MG/1
5 TABLET ORAL DAILY
Qty: 90 TABLET | Refills: 1 | OUTPATIENT
Start: 2024-10-14

## 2024-10-15 ENCOUNTER — OFFICE VISIT (OUTPATIENT)
Dept: NEPHROLOGY | Facility: CLINIC | Age: 74
End: 2024-10-15
Attending: INTERNAL MEDICINE
Payer: MEDICARE

## 2024-10-15 VITALS
RESPIRATION RATE: 20 BRPM | HEART RATE: 63 BPM | SYSTOLIC BLOOD PRESSURE: 136 MMHG | WEIGHT: 305.5 LBS | DIASTOLIC BLOOD PRESSURE: 76 MMHG | OXYGEN SATURATION: 98 % | BODY MASS INDEX: 39.22 KG/M2

## 2024-10-15 DIAGNOSIS — Z11.59 ENCOUNTER FOR SCREENING FOR OTHER VIRAL DISEASES: ICD-10-CM

## 2024-10-15 DIAGNOSIS — R94.4 DECREASED GFR: ICD-10-CM

## 2024-10-15 DIAGNOSIS — N18.32 CKD STAGE 3B, GFR 30-44 ML/MIN (H): Primary | ICD-10-CM

## 2024-10-15 PROCEDURE — 99204 OFFICE O/P NEW MOD 45 MIN: CPT | Performed by: INTERNAL MEDICINE

## 2024-10-15 RX ORDER — FAMOTIDINE 20 MG/1
20 TABLET, FILM COATED ORAL 2 TIMES DAILY
Qty: 30 TABLET | Refills: 5 | Status: SHIPPED | OUTPATIENT
Start: 2024-10-15

## 2024-10-15 NOTE — NURSING NOTE
Chief Complaint   Patient presents with    New Patient     Decreased GFR       Vitals:    10/15/24 0913 10/15/24 0916   BP: (!) 153/82 136/76   BP Location: Left arm    Patient Position: Sitting    Cuff Size: Adult Large    Pulse: 63    Resp: 20    SpO2: 98%    Weight: 138.6 kg (305 lb 8 oz)        Body mass index is 39.22 kg/m .

## 2024-10-15 NOTE — PATIENT INSTRUCTIONS
It was a pleasure taking care of you today.  I've included a brief summary of our discussion and care plan from today's visit below.      My recommendations are summarized as follows:  - Stop omeprazole. Instead start famotidine.  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 I will le you know when I get the results back.  - Return to Nephrology Clinic in 6 months to review your progress.     Who do I call with any questions after my visit?  Please be in touch if there are any further questions that arise following today's visit.  There are multiple ways to contact your nephrology care team.      During business hours, you may reach your Nephrology RN Care Coordinator, Maria Elena Arriola at [phone]. For urgent/emergent questions after business hours, you may reach the on-call Nephrology Fellow by contacting the [facility]  at [phone]. You can always send a secure message through G-cluster.  G-cluster messages are answered by your nurse or doctor typically within 24 hours.  Please allow extra time on weekends and holidays.      How do I schedule appointments?  To schedule or reschedule an appointment, please call [phone]. To schedule imaging please call [phone]. To schedule your lab appointment at [address] lab call [phone].    How will I get the results of any tests ordered?    You will receive all of your results.  If you have signed up for G-cluster, any tests ordered at your visit will be available to you after your physician reviews them.  Typically this takes 1-2 weeks.  If there are urgent results that require a change in your care plan, your physician or nurse will call you to discuss the next steps.      What is G-cluster?  G-cluster is a secure way for you to access all of your healthcare records from the [facility].  It is a web based computer program, so you can sign on to it from any location.  It  also allows you to send secure messages to your care team.  I recommend signing up for [place] CK1 access if you have not already done so and are comfortable with using a computer.      Sincerely,    Marcus Cespedes MD  [facility]  Division of Nephrology and Hypertension

## 2024-10-15 NOTE — PROGRESS NOTES
Nephrology Outpatient Consult Note (10/15/2024)     Requesting Provider  Jeremy Goodrich MD  [address] [place] [id number]    Chief Complaint/Reason for Visit  Decreased GFR    History of Present Illness  This is a 74 year old male who was referred to our clinic for further workup and management of decreased GFR.  His past medical history is notable for interstitial lung disease, rheumatoid arthritis, and obstructive sleep apnea .  In addition, he has a history of prostate cancer.  This was diagnosed in 2022.  He completed external beam radiation therapy as of September 30, 2022, without androgen deprivation therapy     With regards to his kidney function, we have laboratory data going back to 2003.  Back then, serum creatinine ranged between 1.2 to 1.4 mg/dL.  Over the course of the last 1 to 2 years his serum creatinine measurements have run higher between 1.3 to 1.6 mg/dL.    His most recent laboratory evaluation was earlier this month.  His serum creatinine was 1.4 mg/dL with an estimated GFR 51 L/min.  His serum sodium, potassium, chloride, bicarbonate, calcium, phosphorus were normal.  Serum albumin is normal.  Vitamin D level is normal.  Hemoglobin is 12.3.  Urinalysis is negative for proteinuria.  No recent abdominal imaging is available to review.  He had a PVR of 31 mL by bladder scan back in June of this year.    Overall, the patient feels well today.  He continues to live independently with his wife.  He walks with the assistance of a walker.  He does have a history of spinal stenosis contributing to his back issues.  He does have a chronic cough related to his interstitial lung disease.  He is on methotrexate for rheumatoid arthritis.  He indicates stability in both rheumatoid arthritis and his interstitial lung disease.  He denies abdominal pain, dysuria, gross hematuria.  He does have mild lower extremity edema.    Past Medical History:  Prostate Cancer (2022), treated with  radiation therapy, no surgery  Severe spinal stenosis with pinched nerves  Interstitial lung disease   Rheumatoid Arthritis, treated with Methotrexate  Hypertension, managed with Amlodipine metoprolol and Lisinopril/Hydrochlorothiazide  Gastroesophageal Reflux Disease, treated with Omeprazole  Family history: Sister passed from kidney failure; brother with diabetes, kidney issues, and leg amputation due to complications      The following portions of the patient's history were reviewed and updated as appropriate: allergies, current medications, past family history, past medical history, past social history, past surgical history, and problem list.    Subjective   Review of Systems  A comprehensive review of systems was performed. Pertinent positives and negatives are described above.    Past Medical/Surgical History  Patient  has a past medical history of Abnormal EMG (04/18/2013), Abnormal involuntary movements(781.0), AK (actinic keratosis) (12/18/2011), Allergic rhinitis, cause unspecified, Balance problems (11/01/2011), Basal cell carcinoma, Bladder spasms (11/01/2011), Chronic osteoarthritis, COPD (chronic obstructive pulmonary disease) (H), Diaphragmatic hernia without mention of obstruction or gangrene, Earache or other ear, nose, or throat complaint, Esophageal reflux, Fatigue (11/01/2011), Fracture, H. pylori infection (05/12/2011), History of MRI of cervical spine (11/18/2013), Incomplete defecation (11/01/2011), Interstitial lung disease (H) (11/29/2016), Laboratory test (08/07/2012), Lung disease (06/2015), Malignant basal cell neoplasm of skin (08/06/2008), Melanoma (H) (08/06/2008), Melanoma in situ of lower leg (H), Moraxella catarrhalis bronchitis (04/19/2024), Neuropathy (05/16/2011), GALO (obstructive sleep apnea), Other bladder disorder, Other color vision deficiencies, Other nervous system complications, Parkinsonism (H) (11/01/2011), Parkinsons disease (H), Personal history of colonic polyps,  PLMD (periodic limb movement disorder) (12/16/2021), Polyneuropathy in other diseases classified elsewhere (H), RA (rheumatoid arthritis) (H), Rheumatoid arthritis of multiple sites with negative rheumatoid factor (H) (03/21/2016), Seronegative arthritis (11/18/2013), Shortness of breath, Shoulder arthritis (2016), Sialorrhea (04/18/2023), Somatization disorder (05/12/2011), Spider veins, Squamous cell carcinoma, Tremor (11/01/2011), Unspecified essential hypertension, Unspecified hypothyroidism, Urinary tract infection, Urinary urgency (11/01/2011), and Wears glasses (11/01/2011).    He has no past medical history of Anemia, Anxiety, Asymptomatic human immunodeficiency virus (HIV) infection status (H), Bleeding disorder (H), Blood clotting disorder (H), Blood in semen, Cataract, Cerebral infarction (H), Cerebral palsy (H), Chemical dependency (H), Congenital renal agenesis and dysgenesis, Congestive heart failure (H), Dementia (H), Depressive disorder, Diabetes (H), Diverticulosis, gastric, Emphysema of lung (H), Endocrine problem, Epididymitis, bilateral, Epididymitis, left, Epididymitis, right, Gallbladder problem, Genital problems, Glaucoma, Goiter, Gout, Head injury, Hearing problem, Heart attack (H), Heart disease, Heart murmur, Heart rate problem, Hepatitis, History of blood transfusion, History of spinal cord injury, History of stroke with residual effects, History of stroke without residual deficits, History of thrombophlebitis, Horseshoe kidney, Hydrocephalus (H), Infection, Kidney failure, Kidney stone, Liver disease, Malignant hyperthermia, Migraines, Mumps, Musculoskeletal problem, Orchitis, epididymitis, and epididymo-orchitis, with abscess, Osteoporosis, Palpitations, Penile discharge, Prostate infection, Seizures (H), Spina bifida (H), STD (sexually transmitted disease), Swelling of testicle, Tethered cord (H), Tuberculosis, Ulcer, or Uncomplicated asthma.  Patient  has a past surgical history that  includes lip biopsy; Soft tissue surgery; Hemorrhoid surgery; biopsy of skin lesion; Mohs micrographic procedure; colonoscopy; Cystoscopy (Many years ago); Esophagoscopy, gastroscopy, duodenoscopy (EGD), combined (N/A, 7/20/2022); and Colonoscopy (N/A, 7/20/2022).    Social History  Patient  reports that he quit smoking about 24 years ago. His smoking use included cigarettes. He started smoking about 61 years ago. He has a 55.9 pack-year smoking history. He has never used smokeless tobacco. He reports that he does not currently use alcohol. He reports that he does not use drugs.    Family History  family history includes Arthritis in his mother and sister; Cancer in his granddaughter, maternal grandfather, mother, and another family member; Cerebrovascular Disease in his father, paternal grandfather, and paternal grandmother; Colon Polyps in his father; Congenital Anomalies in an other family member; Dementia in his sister; Diabetes in his brother; Genetic Disease in his granddaughter; Heart Disease in his father, mother, sister, and sister; Heart Failure in his sister; Hypertension in his brother, father, mother, and sister; Kidney Disease in his sister; Learning Disorder in an other family member; Multiple Sclerosis in his sister; Neurologic Disorder in his sister; Osteoporosis in his mother; Other - See Comments in his son, son, and son; Other Cancer in his mother and another family member; Psoriasis in his maternal grandfather; Skin Cancer in his father and mother; Stomach Cancer in his maternal grandfather; Uterine Cancer in his mother.     Physical Examination  Blood pressure 136/76, pulse 63, resp. rate 20, weight 138.6 kg (305 lb 8 oz), SpO2 98%. Body mass index is 39.22 kg/m .    Objective   Pertinent Laboratory and Imaging Results  Most Recent Serum Chemistries  Lab Results   Component Value Date    WBC 3.8 (L) 10/08/2024    HGB 12.3 (L) 10/08/2024    HCT 39.1 (L) 10/08/2024     10/08/2024      10/08/2024    POTASSIUM 4.9 10/08/2024    CHLORIDE 105 10/08/2024    CO2 24 10/08/2024    BUN 27.3 (H) 10/08/2024    CR 1.42 (H) 10/08/2024     (H) 10/08/2024    SED 14 10/29/2014    DD 0.3 12/04/2013    NTBNPI 233 12/04/2013    NTBNP 272 (H) 05/15/2018    TROPONIN 0.01 12/04/2013    AST 21 07/31/2024    ALT 16 07/31/2024    ALKPHOS 61 07/31/2024     Most Recent Urinalysis  Lab Results   Component Value Date    COLOR Yellow 10/08/2024    APPEARANCE Clear 10/08/2024    URINEGLC Negative 10/08/2024    URINEBILI Negative 10/08/2024    URINEKETONE Negative 10/08/2024    SG 1.025 10/08/2024    URINEPH 6.0 10/08/2024    PROTEIN Negative 10/08/2024    UROBILINOGEN 0.2 10/08/2024    NITRITE Negative 10/08/2024    LEUKEST Negative 10/08/2024     Current Outpatient Medications   Medication Sig Dispense Refill    famotidine (PEPCID) 20 MG tablet Take 1 tablet (20 mg) by mouth 2 times daily. 30 tablet 5    acetaminophen (TYLENOL) 500 MG tablet Take 1-2 tablets (500-1,000 mg) by mouth every 4 hours as needed for mild pain 30 tablet 0    acetaminophen (TYLENOL) 500 MG tablet 2 x 500mg by mouth 3 times per day: 7/730am, 4pm and 11pm  = 6/day      albuterol (PROAIR HFA/PROVENTIL HFA/VENTOLIN HFA) 108 (90 Base) MCG/ACT inhaler Inhale 2 puffs into the lungs every 4 hours as needed for shortness of breath or wheezing 18 g 3    Albuterol Sulfate, sensor, 108 (90 Base) MCG/ACT AEPB 2 puffs as needed by inhalation route.      amLODIPine (NORVASC) 5 MG tablet Take 1 tablet (5 mg) by mouth daily 90 tablet 3    cholecalciferol (HM VITAMIN D3) 25 MCG (1000 UT) TABS 1000 unit tab by mouth daily      folic acid (FOLVITE) 1 MG tablet Take 1 tablet (1 mg) by mouth daily 90 tablet 3    hydroxychloroquine (PLAQUENIL) 200 MG tablet Take 1 tablet (200 mg) by mouth daily Daily at 9am. 90 tablet 3    lisinopril-hydrochlorothiazide (ZESTORETIC) 20-25 MG tablet TAKE 1 TABLET BY MOUTH DAILY 90 tablet 3    methotrexate 2.5 MG tablet Take 8 tablets  (20 mg) by mouth every 7 days TAKE 8 TABLETS BY MOUTH ONCE PER WEEK. 96 tablet 2    metoprolol succinate ER (TOPROL XL) 50 MG 24 hr tablet 1 and 1/2 tabs daily. 135 tablet 3    ORDER FOR DME Use your BiPAP device as directed by your provider.      oxyBUTYnin (DITROPAN) 5 MG tablet Take 1 tablet (5 mg) by mouth daily 90 tablet 1    pregabalin (LYRICA) 50 MG capsule 50mg capsule by mouth at 7am and 4pm and 1-2 x 50mg capsules by mouth nightly at 11pm (4/day) 360 capsule 1    salmeterol (SEREVENT DISKUS) 50 MCG/ACT inhaler Inhale 1 puff into the lungs 2 times daily. 60 each 11    Vitamin D3 (CHOLECALCIFEROL) 25 mcg (1000 units) tablet 1 unit every day by oral route.       No current facility-administered medications for this visit.        Assessment & Plan   # Chronic kidney disease stage 3b  # Chronic hypertension  # Interstitial Lung Disease/Rheumatoid Arthritis, on methotrexate  # Prostate cancer status post radiation therapy in 2022  # Obesity BMI 39    The patient has chronic kidney disease, currently in stage IIIb.  Of note, his serum creatinine has slowly increased over the course of the last few years.  This is likely due to a combination of chronic hypertension, aging, as well as possibly related to his radiation therapy.  In addition, he has been on omeprazole for several years which can cause interstitial nephritis.    Given his history of rheumatoid arthritis, and interstitial lung disease, I think it is reasonable to obtain a serological evaluation for glomerulonephritis.  In addition, I would like to obtain a kidney ultrasound.  I will review the results when they are available.  However, his bland urine sediment argues against an active parenchymal kidney disease.    With regards to management, I emphasized to the patient the importance of good blood pressure in order to slow down the progression of his kidney disease.  I instructed him to stop omeprazole and instead take famotidine.  He should have his  kidney function checked every 6 months.  He plans to go to [place] this winter, and I am happy to see him when he comes back next spring.  All of his questions were answered to his satisfaction.    My recommendations are the following:  - Stop omeprazole. Instead start famotidine.  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 I will le you know when I get the results back.  - Return to Nephrology Clinic in 6 months to review your progress.     Total time was of this encounter on the date of service was 50 minutes. All questions were answered to the patient's satisfaction.  Chart documentation was completed, in part, with [employer] voice-recognition software. Even though reviewed, some grammatical, spelling, and word errors may remain.    Orders placed today:  Orders Placed This Encounter   Procedures    US Renal Complete Non-Vascular    US Bladder w Pre and Post Void Residual    ANCA IgG by IFA with Reflex to Titer    Hepatitis B Surface Antibody    Hepatitis B surface antigen    Hepatitis C antibody    Complement C3    Complement C4    DNA double stranded antibodies    Anti Nuclear Zoila IgG by IFA with Reflex     Marcus Cespedes MD  Division of Nephrology and Hypertension  Hotreader ([employer])   Vocera Web Console ([employer])

## 2024-10-16 ENCOUNTER — LAB (OUTPATIENT)
Dept: LAB | Facility: CLINIC | Age: 74
End: 2024-10-16
Payer: MEDICARE

## 2024-10-16 DIAGNOSIS — N18.32 CKD STAGE 3B, GFR 30-44 ML/MIN (H): ICD-10-CM

## 2024-10-16 DIAGNOSIS — Z79.899 HIGH RISK MEDICATION USE: ICD-10-CM

## 2024-10-16 DIAGNOSIS — Z11.59 ENCOUNTER FOR SCREENING FOR OTHER VIRAL DISEASES: ICD-10-CM

## 2024-10-16 LAB
ALBUMIN SERPL BCG-MCNC: 4.2 G/DL (ref 3.5–5.2)
ALT SERPL W P-5'-P-CCNC: 16 U/L (ref 0–70)
AST SERPL W P-5'-P-CCNC: 23 U/L (ref 0–45)
CREAT SERPL-MCNC: 1.48 MG/DL (ref 0.67–1.17)
EGFRCR SERPLBLD CKD-EPI 2021: 49 ML/MIN/1.73M2
ERYTHROCYTE [DISTWIDTH] IN BLOOD BY AUTOMATED COUNT: 15.8 % (ref 10–15)
HBV SURFACE AB SERPL IA-ACNC: <3.5 M[IU]/ML
HBV SURFACE AB SERPL IA-ACNC: NONREACTIVE M[IU]/ML
HBV SURFACE AG SERPL QL IA: NONREACTIVE
HCT VFR BLD AUTO: 40.6 % (ref 40–53)
HCV AB SERPL QL IA: NONREACTIVE
HGB BLD-MCNC: 12.4 G/DL (ref 13.3–17.7)
MCH RBC QN AUTO: 30 PG (ref 26.5–33)
MCHC RBC AUTO-ENTMCNC: 30.5 G/DL (ref 31.5–36.5)
MCV RBC AUTO: 98 FL (ref 78–100)
PLATELET # BLD AUTO: 181 10E3/UL (ref 150–450)
RBC # BLD AUTO: 4.13 10E6/UL (ref 4.4–5.9)
WBC # BLD AUTO: 3.6 10E3/UL (ref 4–11)

## 2024-10-16 PROCEDURE — 86225 DNA ANTIBODY NATIVE: CPT

## 2024-10-16 PROCEDURE — 84450 TRANSFERASE (AST) (SGOT): CPT

## 2024-10-16 PROCEDURE — 82565 ASSAY OF CREATININE: CPT

## 2024-10-16 PROCEDURE — 36415 COLL VENOUS BLD VENIPUNCTURE: CPT

## 2024-10-16 PROCEDURE — 86160 COMPLEMENT ANTIGEN: CPT

## 2024-10-16 PROCEDURE — 87340 HEPATITIS B SURFACE AG IA: CPT

## 2024-10-16 PROCEDURE — 85027 COMPLETE CBC AUTOMATED: CPT

## 2024-10-16 PROCEDURE — 84460 ALANINE AMINO (ALT) (SGPT): CPT

## 2024-10-16 PROCEDURE — 86036 ANCA SCREEN EACH ANTIBODY: CPT

## 2024-10-16 PROCEDURE — 86803 HEPATITIS C AB TEST: CPT

## 2024-10-16 PROCEDURE — 82040 ASSAY OF SERUM ALBUMIN: CPT

## 2024-10-16 PROCEDURE — 86038 ANTINUCLEAR ANTIBODIES: CPT

## 2024-10-16 PROCEDURE — 86706 HEP B SURFACE ANTIBODY: CPT

## 2024-10-17 ENCOUNTER — ANCILLARY PROCEDURE (OUTPATIENT)
Dept: ULTRASOUND IMAGING | Facility: CLINIC | Age: 74
End: 2024-10-17
Attending: INTERNAL MEDICINE
Payer: MEDICARE

## 2024-10-17 DIAGNOSIS — N18.32 CKD STAGE 3B, GFR 30-44 ML/MIN (H): ICD-10-CM

## 2024-10-17 LAB
ANA SER QL IF: NEGATIVE
ANCA AB PATTERN SER IF-IMP: NORMAL
C-ANCA TITR SER IF: NORMAL {TITER}
C3 SERPL-MCNC: 132 MG/DL (ref 81–157)
C4 SERPL-MCNC: 24 MG/DL (ref 13–39)
DSDNA AB SER-ACNC: <0.6 IU/ML

## 2024-10-17 PROCEDURE — 76857 US EXAM PELVIC LIMITED: CPT

## 2024-10-17 PROCEDURE — 76770 US EXAM ABDO BACK WALL COMP: CPT

## 2024-11-13 DIAGNOSIS — Z79.899 HIGH RISK MEDICATION USE: ICD-10-CM

## 2024-11-13 DIAGNOSIS — M06.09 RHEUMATOID ARTHRITIS OF MULTIPLE SITES WITH NEGATIVE RHEUMATOID FACTOR (H): ICD-10-CM

## 2024-11-13 DIAGNOSIS — D84.9 IMMUNOSUPPRESSED STATUS (H): ICD-10-CM

## 2024-11-15 RX ORDER — FOLIC ACID 1 MG/1
1 TABLET ORAL DAILY
Qty: 90 TABLET | Refills: 3 | OUTPATIENT
Start: 2024-11-15

## 2024-11-20 ENCOUNTER — MYC MEDICAL ADVICE (OUTPATIENT)
Dept: NEPHROLOGY | Facility: CLINIC | Age: 74
End: 2024-11-20
Payer: MEDICARE

## 2024-11-20 DIAGNOSIS — N18.32 CKD STAGE 3B, GFR 30-44 ML/MIN (H): ICD-10-CM

## 2024-11-20 RX ORDER — FAMOTIDINE 20 MG/1
20 TABLET, FILM COATED ORAL 2 TIMES DAILY
Qty: 180 TABLET | Refills: 1 | Status: SHIPPED | OUTPATIENT
Start: 2024-11-20

## 2024-11-21 ENCOUNTER — TELEPHONE (OUTPATIENT)
Dept: NEPHROLOGY | Facility: CLINIC | Age: 74
End: 2024-11-21
Payer: MEDICARE

## 2024-11-21 NOTE — TELEPHONE ENCOUNTER
[facility] Call Center    Phone Message    May a detailed message be left on voicemail: no     Reason for Call: Other: Patient would like to go back on old medication for acid reflux due to other medication not working. Please call patient to discuss.     Action Taken: Other: [facility] Nephrology    Travel Screening: Not Applicable     Date of Service:

## 2024-11-22 NOTE — TELEPHONE ENCOUNTER
Called patient to discuss; patient is experiencing a lot of stomach discomfort, heartburn, acid reflux symptoms with famotidine. Wondering if he can go back to the omeprazole or other suggestions?

## 2024-11-25 ENCOUNTER — MYC MEDICAL ADVICE (OUTPATIENT)
Dept: NEPHROLOGY | Facility: CLINIC | Age: 74
End: 2024-11-25
Payer: MEDICARE

## 2024-11-25 DIAGNOSIS — K21.9 ESOPHAGEAL REFLUX: ICD-10-CM

## 2024-11-25 DIAGNOSIS — N18.32 CKD STAGE 3B, GFR 30-44 ML/MIN (H): Primary | ICD-10-CM

## 2024-11-25 RX ORDER — OMEPRAZOLE 40 MG/1
40 CAPSULE, DELAYED RELEASE ORAL DAILY
Qty: 90 CAPSULE | Refills: 1 | Status: SHIPPED | OUTPATIENT
Start: 2024-11-25

## 2024-11-25 NOTE — TELEPHONE ENCOUNTER
Sent new rx according to patient's old prescription of omeprazole. Sent to [facility] [place] per patient request.

## 2024-12-16 ENCOUNTER — OFFICE VISIT (OUTPATIENT)
Dept: PULMONOLOGY | Facility: CLINIC | Age: 74
End: 2024-12-16
Payer: MEDICARE

## 2024-12-16 DIAGNOSIS — J84.9 ILD (INTERSTITIAL LUNG DISEASE) (H): ICD-10-CM

## 2024-12-16 PROCEDURE — 94150 VITAL CAPACITY TEST: CPT | Performed by: INTERNAL MEDICINE

## 2024-12-16 PROCEDURE — 94729 DIFFUSING CAPACITY: CPT | Performed by: INTERNAL MEDICINE

## 2024-12-16 PROCEDURE — 94726 PLETHYSMOGRAPHY LUNG VOLUMES: CPT | Performed by: INTERNAL MEDICINE

## 2024-12-16 PROCEDURE — 94375 RESPIRATORY FLOW VOLUME LOOP: CPT | Performed by: INTERNAL MEDICINE

## 2024-12-17 ENCOUNTER — OFFICE VISIT (OUTPATIENT)
Dept: PULMONOLOGY | Facility: CLINIC | Age: 74
End: 2024-12-17
Payer: MEDICARE

## 2024-12-17 VITALS
RESPIRATION RATE: 20 BRPM | BODY MASS INDEX: 39.16 KG/M2 | HEART RATE: 71 BPM | SYSTOLIC BLOOD PRESSURE: 144 MMHG | WEIGHT: 305 LBS | DIASTOLIC BLOOD PRESSURE: 72 MMHG | OXYGEN SATURATION: 96 %

## 2024-12-17 DIAGNOSIS — J44.89 FOLLICULAR BRONCHIOLITIS (H): Primary | ICD-10-CM

## 2024-12-17 LAB
DLCOUNC-%PRED-PRE: 104 %
DLCOUNC-PRE: 27.04 ML/MIN/MMHG
DLCOUNC-PRED: 25.99 ML/MIN/MMHG
ERV-%PRED-PRE: 93 %
ERV-PRE: 1.24 L
ERV-PRED: 1.33 L
EXPTIME-PRE: 8.21 SEC
FEF2575-%PRED-PRE: 104 %
FEF2575-PRE: 2.22 L/SEC
FEF2575-PRED: 2.13 L/SEC
FEFMAX-%PRED-PRE: 114 %
FEFMAX-PRE: 9.16 L/SEC
FEFMAX-PRED: 8.01 L/SEC
FEV1-%PRED-PRE: 96 %
FEV1-PRE: 2.94 L
FEV1FEV6-PRE: 76 %
FEV1FEV6-PRED: 77 %
FEV1FVC-PRE: 75 %
FEV1FVC-PRED: 76 %
FEV1SVC-PRE: 68 %
FEV1SVC-PRED: 68 %
FIFMAX-PRE: 7.86 L/SEC
FRCPLETH-%PRED-PRE: 84 %
FRCPLETH-PRE: 3.56 L
FRCPLETH-PRED: 4.21 L
FVC-%PRED-PRE: 94 %
FVC-PRE: 3.91 L
FVC-PRED: 4.12 L
IC-%PRED-PRE: 97 %
IC-PRE: 3.09 L
IC-PRED: 3.16 L
RVPLETH-%PRED-PRE: 82 %
RVPLETH-PRE: 2.33 L
RVPLETH-PRED: 2.84 L
TLCPLETH-%PRED-PRE: 89 %
TLCPLETH-PRE: 6.66 L
TLCPLETH-PRED: 7.45 L
VA-%PRED-PRE: 88 %
VA-PRE: 5.94 L
VC-%PRED-PRE: 97 %
VC-PRE: 4.33 L
VC-PRED: 4.44 L

## 2024-12-17 PROCEDURE — 99213 OFFICE O/P EST LOW 20 MIN: CPT | Mod: 25 | Performed by: INTERNAL MEDICINE

## 2024-12-17 NOTE — LETTER
12/17/2024      Lucio Daly  [address]  [place] [address]      Dear Colleague,    Thank you for referring your patient, Lucio Daly, to the [facility] [place]. Please see a copy of my visit note below.    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  He has previously been following with Dr. Mccarthy he has been quite stable for many years and has just been seeing Dr. Mccarthy on an annual  basis.  He follows up today overall stating that he is doing well from a respiratory standpoint he continues to use his Serevent inhaler and he feels this helps some.  He does use his albuterol occasionally although this is less than 1 time per week.  He otherwise generally feels well he has some mobility issues that limit his exertion.  Otherwise no other new complaints today.      Current Outpatient Medications   Medication Sig Dispense Refill     acetaminophen (TYLENOL) 500 MG tablet Take 1-2 tablets (500-1,000 mg) by mouth every 4 hours as needed for mild pain 30 tablet 0     acetaminophen (TYLENOL) 500 MG tablet 2 x 500mg by mouth 3 times per day: 7/730am, 4pm and 11pm  = 6/day       albuterol (PROAIR HFA/PROVENTIL HFA/VENTOLIN HFA) 108 (90 Base) MCG/ACT inhaler Inhale 2 puffs into the lungs every 4 hours as needed for shortness of breath or wheezing 18 g 3     Albuterol Sulfate, sensor, 108 (90 Base) MCG/ACT AEPB 2 puffs as needed by inhalation route.       amLODIPine (NORVASC) 5 MG tablet Take 1 tablet (5 mg) by mouth daily 90 tablet 3     cholecalciferol (HM VITAMIN D3) 25 MCG (1000 UT) TABS 1000 unit tab by mouth daily       folic acid (FOLVITE) 1 MG tablet Take 1 tablet (1 mg) by mouth daily 90 tablet 3     hydroxychloroquine (PLAQUENIL)  200 MG tablet Take 1 tablet (200 mg) by mouth daily Daily at 9am. 90 tablet 3     lisinopril-hydrochlorothiazide (ZESTORETIC) 20-25 MG tablet TAKE 1 TABLET BY MOUTH DAILY 90 tablet 3     methotrexate 2.5 MG tablet Take 8 tablets (20 mg) by mouth every 7 days TAKE 8 TABLETS BY MOUTH ONCE PER WEEK. 96 tablet 2     metoprolol succinate ER (TOPROL XL) 50 MG 24 hr tablet 1 and 1/2 tabs daily. 135 tablet 3     omeprazole (PRILOSEC) 40 MG DR capsule Take 1 capsule (40 mg) by mouth daily. 90 capsule 1     ORDER FOR DME Use your BiPAP device as directed by your provider.       oxyBUTYnin (DITROPAN) 5 MG tablet Take 1 tablet (5 mg) by mouth daily 90 tablet 1     pregabalin (LYRICA) 50 MG capsule 50mg capsule by mouth at 7am and 4pm and 1-2 x 50mg capsules by mouth nightly at 11pm (4/day) 360 capsule 1     salmeterol (SEREVENT DISKUS) 50 MCG/ACT inhaler Inhale 1 puff into the lungs 2 times daily. 60 each 11     Vitamin D3 (CHOLECALCIFEROL) 25 mcg (1000 units) tablet 1 unit every day by oral route.       No current facility-administered medications for this visit.     Allergies   Allergen Reactions     Adhesive Tape Hives     Bandages misc  Bandages misc     Alum & Mag Hydroxide-Simeth Hives     EXCESSIVE URINATION AND WEAKNESS, LIGHT-HEADED     Aluminum Hydroxide Hives     Calcium Carbonate Hives     Cortisone Hives, Headache, Nausea, Other (See Comments) and Rash     Cyclosporine      Burning eyes, problems with breathing, tightness in chest     Cyclosporine Hives     Burning eyes, problems with breathing, tightness in chest     Diphenhydramine Other (See Comments) and Shortness Of Breath     Other reaction(s): lightheadedness     Fexofenadine Other (See Comments) and Shortness Of Breath     EXCESSIVE URINATION AND WEAKNESS, LIGHT-HEADED       Levofloxacin Hives, Other (See Comments) and Nausea     From surgeon--? Joint pain ? Gerd aggravation. Insomnia, excess urination     Magnesium Carbonate Hives     Magnesium Hydroxide  Hives     Prednisone Hives, Headache and Other (See Comments)     Weakness, elevated bp, headache, eye pain, congestion      Simethicone Hives     Sulfamethoxazole-Trimethoprim Hives     Chest pain, angina  Chest pain, angina     Tree Extract Hives     Cephalexin Nausea and Other (See Comments)     Joint pain or gerd aggravation. Bloating excessive urination   Other reaction(s): GI Distress  Joint pain or gerd aggravation. Bloating excessive urination   Other reaction(s): chest pain     Doxycycline Nausea and Vomiting, Headache and Other (See Comments)     SWEATING,MIGRAINES,LOSS OF APPETITE,SWEATING,LIGHT HEADED, EXCESSIVE URINATION  Other reaction(s): lightheadedness     Iodine Hives     Other reaction(s): GI Distress     Oxcarbazepine Dizziness, Itching, Other (See Comments) and Rash     Other reaction(s): GI Distress  SEVERE JOINT AND TENDON PAIN, INSOMNIA, RESTLESSNESS, NAUSEA, EXCESS URINATION     Sulfamethoxazole-Trimethoprim Other (See Comments)     Other reaction(s): chest pain, flushing, tachycardia     Allegra      EXCESSIVE URINATION AND WEAKNESS, LIGHT-HEADED     Animal Dander      Benadryl Allergy      EXCESSIVE URINATION AND WEAKNESS, LIGHT-HEADED     Budesonide-Formoterol Fumarate GI Disturbance and Nausea     Other reaction(s): GI Distress     Cephalosporins      Other reaction(s): GI Distress     Chlorpheniramine Maleate      Dust     Doxycycline Hyclate Nausea     SWEATING,MIGRAINES,LOSS OF APPETITE,SWEATING,LIGHT HEADED, EXCESSIVE URINATION     Flonase [Fluticasone Propionate]      Fluticasone      Other reaction(s): GI Distress     Hydrocortisone Nausea and Vomiting     Iodine Solution [Povidone Iodine]      SKIN MELTS     Levaquin      From surgeon--? Joint pain ? Gerd aggravation. Insomnia, excess urination     Mylanta      EXCESSIVE URINATION AND WEAKNESS, LIGHT-HEADED     Oxcarbazepine      SEVERE JOINT AND TENDON PAIN, INSOMNIA, RESTLESSNESS, NAUSEA, EXCESS URINATION     Povidone Iodine       Other reaction(s): GI Distress  SKIN MELTS     Prednisone      Weakness, elevated bp, headache, eye pain, congestion      Seafood [Seafood]      Shellfish Allergy      Hives    Other reaction(s): GI Distress  Hives     Trees      Cortizone Rash     EXCESS URINATION,WEAKNESS,NAUSEA, HEADACHE     Ibuprofen Fatigue, Other (See Comments) and Palpitations     Past Medical History:   Diagnosis Date     Abnormal EMG 04/18/2013     Abnormal involuntary movements(781.0)     Movement Disorder     AK (actinic keratosis) 12/18/2011     Allergic rhinitis, cause unspecified     Allergic rhinitis     Balance problems 11/01/2011     Basal cell carcinoma      Bladder spasms 11/01/2011     Chronic osteoarthritis      COPD (chronic obstructive pulmonary disease) (H)     Interstial lung disease, obliderans bronchiolitis     Diaphragmatic hernia without mention of obstruction or gangrene      Earache or other ear, nose, or throat complaint      Esophageal reflux      Fatigue 11/01/2011     Fracture      H. pylori infection 05/12/2011     History of MRI of cervical spine 11/18/2013    EXAMINATION: CERVICAL SPINE G/E 5 VIEWS* 4/19/2013 4:18 PM  CLINICAL HISTORY: Pain in limb,Performing Location?->[facility] ([facility]),  COMPARISON:  FINDINGS: AP and lateral views in flexion and extension, as well as odontoid view of the cervical spine was obtained. There is no comparison available. The vertebral bodies of the cervical spine are normally aligned. There is posterior spurring and d     Incomplete defecation 11/01/2011     Interstitial lung disease (H) 11/29/2016     Laboratory test 08/07/2012     Lung disease 06/2015     Malignant basal cell neoplasm of skin 08/06/2008     Melanoma (H) 08/06/2008     Melanoma in situ of lower leg (H)     R calf     Moraxella catarrhalis bronchitis 04/19/2024     Neuropathy 05/16/2011     GALO (obstructive sleep apnea)      Other bladder disorder      Other color vision deficiencies      Other nervous system  complications      Parkinsonism (H) 11/01/2011     Parkinsons disease (H)     Parkinsonism/ balance, dropped foot, tremor     Personal history of colonic polyps      PLMD (periodic limb movement disorder) 12/16/2021     Polyneuropathy in other diseases classified elsewhere (H)      RA (rheumatoid arthritis) (H)      Rheumatoid arthritis of multiple sites with negative rheumatoid factor (H) 03/21/2016     Seronegative arthritis 11/18/2013     Shortness of breath      Shoulder arthritis 2016    acromioclavicular joint      Sialorrhea 04/18/2023     Somatization disorder 05/12/2011     Spider veins     Leg Vericise vein surgery     Squamous cell carcinoma      Tremor 11/01/2011     Unspecified essential hypertension      Unspecified hypothyroidism      Urinary tract infection      Urinary urgency 11/01/2011     Wears glasses 11/01/2011       Past Surgical History:   Procedure Laterality Date     BIOPSY OF SKIN LESION       COLONOSCOPY       COLONOSCOPY N/A 7/20/2022    Procedure: COLONOSCOPY (fv) polypectomy using jumbo bx forcep;  Surgeon: Gómez Mckinney MD;  Location:  GI     CYSTOSCOPY  Many years ago    Blood in urine/ bladder cystoscopy     ESOPHAGOSCOPY, GASTROSCOPY, DUODENOSCOPY (EGD), COMBINED N/A 7/20/2022    Procedure: ESOPHAGOGASTRODUODENOSCOPY (EGD) (fv) biopsies using cold bx forcep;  Surgeon: Gómez Mckinney MD;  Location:  GI     HEMORRHOID SURGERY       lip biopsy      for sicca complex     MOHS MICROGRAPHIC PROCEDURE       SOFT TISSUE SURGERY      removeal of basel cell carcinoma       Social History     Socioeconomic History     Marital status:      Spouse name: Not on file     Number of children: Not on file     Years of education: Not on file     Highest education level: Not on file   Occupational History     Not on file   Tobacco Use     Smoking status: Former     Current packs/day: 0.00     Average packs/day: 1.5 packs/day for 37.3 years (55.9 ttl pk-yrs)     Types: Cigarettes      Start date: 6/15/1963     Quit date: 2000     Years since quittin.2     Smokeless tobacco: Never   Vaping Use     Vaping status: Never Used   Substance and Sexual Activity     Alcohol use: Not Currently     Drug use: Never     Sexual activity: Not Currently     Partners: Female     Birth control/protection: None   Other Topics Concern     Parent/sibling w/ CABG, MI or angioplasty before 65F 55M? No   Social History Narrative    2013: Living in [place] in a townhouse with no steps    Has 3 sons that are doing okay.         Dairy/d 1 servings/d.     Caffeine 0 servings/d    Exercise 0 x week    Sunscreen used - No    Seatbelts used - Yes    Working smoke/CO detectors in the home - Yes    Guns stored in the home - Yes    Self Breast Exams - NA    Self Testicular Exam - Yes    Eye Exam up to date - Yes     Dental Exam up to date - Yes     Pap Smear up to date - NA    Mammogram up to date - NA    PSA up to date - Yes     Dexa Scan up to date - No    Flex Sig / Colonoscopy up to date - Yes less than 5 yrs ago    Immunizations up to date -today    Abuse: Current or Past(Physical, Sexual or Emotional)- No    Do you feel safe in your environment - Yes                     Social Drivers of Health     Financial Resource Strain: Low Risk  (2023)    Financial Resource Strain      Within the past 12 months, have you or your family members you live with been unable to get utilities (heat, electricity) when it was really needed?: No   Food Insecurity: Low Risk  (2023)    Food Insecurity      Within the past 12 months, did you worry that your food would run out before you got money to buy more?: No      Within the past 12 months, did the food you bought just not last and you didn t have money to get more?: No   Transportation Needs: Low Risk  (2023)    Transportation Needs      Within the past 12 months, has lack of transportation kept you from medical appointments, getting your medicines,  "non-medical meetings or appointments, work, or from getting things that you need?: No   Physical Activity: Insufficiently Active (12/16/2022)    Exercise Vital Sign      Days of Exercise per Week: 3 days      Minutes of Exercise per Session: 20 min   Stress: No Stress Concern Present (12/16/2022)    [employer] - Occupational Stress Questionnaire      Feeling of Stress : Not at all   Social Connections: Socially Integrated (12/16/2022)    Social Connection and Isolation Panel [NHANES]      Frequency of Communication with Friends and Family: Twice a week      Frequency of Social Gatherings with Friends and Family: Once a week      Attends Orthodoxy Services: More than 4 times per year      Active Member of Clubs or Organizations: Yes      Attends Club or Organization Meetings: Not on file      Marital Status:    Interpersonal Safety: Low Risk  (12/22/2023)    Interpersonal Safety      Do you feel physically and emotionally safe where you currently live?: Yes      Within the past 12 months, have you been hit, slapped, kicked or otherwise physically hurt by someone?: No      Within the past 12 months, have you been humiliated or emotionally abused in other ways by your partner or ex-partner?: No   Housing Stability: Low Risk  (12/21/2023)    Housing Stability      Do you have housing? : Yes      Are you worried about losing your housing?: No       Family History   Problem Relation Age of Onset     Hypertension Mother      Heart Disease Mother         a fib     Arthritis Mother         \"osteo\"     Osteoporosis Mother      Skin Cancer Mother      Uterine Cancer Mother      Other Cancer Mother      Cancer Mother      Hypertension Brother      Diabetes Brother         \" post pancreatitis\"     Neurologic Disorder Sister         multiple sclerosis     Hypertension Sister      Heart Disease Sister      Multiple Sclerosis Sister      Heart Disease Sister         Chf     Arthritis Sister      " Heart Failure Sister      Kidney Disease Sister      Dementia Sister      Hypertension Father      Cerebrovascular Disease Father         ,     Skin Cancer Father      Colon Polyps Father      Heart Disease Father      Other - See Comments Son         [place]     Other - See Comments Son         [place]     Other - See Comments Son         day gonzalez     Psoriasis Maternal Grandfather      Stomach Cancer Maternal Grandfather      Cancer Maternal Grandfather      Congenital Anomalies Other         granddaughter with a chromosome defect     Other Cancer Other         Grandaughter     Cancer Other         Grand daughter     Cerebrovascular Disease Paternal Grandmother         ,     Cerebrovascular Disease Paternal Grandfather         ,     Cancer Granddaughter         Langerhans Cell Histiocytosis     Genetic Disease Granddaughter         gene translocation     Learning Disorder Other         Gene translocation     Melanoma No family hx of      Colon Cancer No family hx of        ROS Pulmonary    A complete ROS was otherwise negative except as noted in the HPI.  Vitals:    12/17/24 1441   BP: (!) 144/72   BP Location: Right arm   Patient Position: Sitting   Cuff Size: Adult Large   Pulse: 71   Resp: 20   SpO2: 96%   Weight: 138.3 kg (305 lb)     Exam:   GENERAL APPEARANCE: Well developed, well nourished, alert, and in no apparent distress.  EYES: PERRL, EOMI  HENT: nasal mucosa with out hyperemia or edema, no nasal polyps.  MOUTH: Oral mucosa is moist, without any lesions, no tonsillar enlargement, no oropharyngeal exudate.  NECK: supple, no masses, no thyromegaly.  LYMPHATICS: No significant axillary, cervical, or supraclavicular nodes.  RESP: good air flow throughout, - no crackles, rhonchi or wheezes.  CV: Normal S1, S2, regular rhythm, normal rate, no rub, no murmur,  no gallop, no LE edema.   ABDOMEN:  Bowel sounds normal, soft, nontender, no HSM or masses.   MS: extremities normal- no clubbing, no  "cyanosis.  SKIN: no rash on limited exam  NEURO: Mentation intact, speech normal, normal strength and tone, normal gait and stance  PSYCH: mentation appears normal. and affect normal/bright  Results: I have reviewed all imaging, PFTs and other relavent tests, please see below for details, PFT and imaging results were reviewed with the patient.  PFTs: Normal spirometry, lung volumes and diffusion.  Stable from previous.    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  Overall quite stable.  Will continue to follow him on an annual basis he will continue his Serevent inhaler and intermittent albuterol.  He will continue his methotrexate and hydroxychloroquine for his rheumatoid arthritis.          CBC   Recent Labs   Lab Test 10/16/24  0857 10/08/24  0910   RBC 4.13* 4.00*   HGB 12.4* 12.3*   HCT 40.6 39.1*    191       Basic Metabolic Panel  Recent Labs   Lab Test 10/08/24  0910 07/31/24  0841    139   POTASSIUM 4.9 4.7   CHLORIDE 105 104   CO2 24 25   BUN 27.3* 29.2*   * 119*   BARBARA 9.5 9.5       INR  No results for input(s): \"INR\" in the last 15859 hours.    PFT      Latest Ref Rng & Units 12/16/2024    10:48 AM   PFT   FVC L 3.91  P   FEV1 L 2.94  P   FVC% % 94  P   FEV1% % 96  P      P Preliminary result           CC:        Again, thank you for allowing me to participate in the care of your patient.        Sincerely,        David Morris Perlman, MD  "

## 2024-12-17 NOTE — PROGRESS NOTES
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  He has previously been following with Dr. Mccarthy he has been quite stable for many years and has just been seeing Dr. Mccarthy on an annual  basis.  He follows up today overall stating that he is doing well from a respiratory standpoint he continues to use his Serevent inhaler and he feels this helps some.  He does use his albuterol occasionally although this is less than 1 time per week.  He otherwise generally feels well he has some mobility issues that limit his exertion.  Otherwise no other new complaints today.      Current Outpatient Medications   Medication Sig Dispense Refill    acetaminophen (TYLENOL) 500 MG tablet Take 1-2 tablets (500-1,000 mg) by mouth every 4 hours as needed for mild pain 30 tablet 0    acetaminophen (TYLENOL) 500 MG tablet 2 x 500mg by mouth 3 times per day: 7/730am, 4pm and 11pm  = 6/day      albuterol (PROAIR HFA/PROVENTIL HFA/VENTOLIN HFA) 108 (90 Base) MCG/ACT inhaler Inhale 2 puffs into the lungs every 4 hours as needed for shortness of breath or wheezing 18 g 3    Albuterol Sulfate, sensor, 108 (90 Base) MCG/ACT AEPB 2 puffs as needed by inhalation route.      amLODIPine (NORVASC) 5 MG tablet Take 1 tablet (5 mg) by mouth daily 90 tablet 3    cholecalciferol (HM VITAMIN D3) 25 MCG (1000 UT) TABS 1000 unit tab by mouth daily      folic acid (FOLVITE) 1 MG tablet Take 1 tablet (1 mg) by mouth daily 90 tablet 3    hydroxychloroquine (PLAQUENIL) 200 MG tablet Take 1 tablet (200 mg) by mouth daily Daily at 9am. 90 tablet 3    lisinopril-hydrochlorothiazide (ZESTORETIC) 20-25 MG tablet TAKE 1 TABLET BY MOUTH DAILY 90 tablet 3    methotrexate 2.5 MG tablet Take 8 tablets (20 mg) by mouth  every 7 days TAKE 8 TABLETS BY MOUTH ONCE PER WEEK. 96 tablet 2    metoprolol succinate ER (TOPROL XL) 50 MG 24 hr tablet 1 and 1/2 tabs daily. 135 tablet 3    omeprazole (PRILOSEC) 40 MG DR capsule Take 1 capsule (40 mg) by mouth daily. 90 capsule 1    ORDER FOR DME Use your BiPAP device as directed by your provider.      oxyBUTYnin (DITROPAN) 5 MG tablet Take 1 tablet (5 mg) by mouth daily 90 tablet 1    pregabalin (LYRICA) 50 MG capsule 50mg capsule by mouth at 7am and 4pm and 1-2 x 50mg capsules by mouth nightly at 11pm (4/day) 360 capsule 1    salmeterol (SEREVENT DISKUS) 50 MCG/ACT inhaler Inhale 1 puff into the lungs 2 times daily. 60 each 11    Vitamin D3 (CHOLECALCIFEROL) 25 mcg (1000 units) tablet 1 unit every day by oral route.       No current facility-administered medications for this visit.     Allergies   Allergen Reactions    Adhesive Tape Hives     Bandages misc  Bandages misc    Alum & Mag Hydroxide-Simeth Hives     EXCESSIVE URINATION AND WEAKNESS, LIGHT-HEADED    Aluminum Hydroxide Hives    Calcium Carbonate Hives    Cortisone Hives, Headache, Nausea, Other (See Comments) and Rash    Cyclosporine      Burning eyes, problems with breathing, tightness in chest    Cyclosporine Hives     Burning eyes, problems with breathing, tightness in chest    Diphenhydramine Other (See Comments) and Shortness Of Breath     Other reaction(s): lightheadedness    Fexofenadine Other (See Comments) and Shortness Of Breath     EXCESSIVE URINATION AND WEAKNESS, LIGHT-HEADED      Levofloxacin Hives, Other (See Comments) and Nausea     From surgeon--? Joint pain ? Gerd aggravation. Insomnia, excess urination    Magnesium Carbonate Hives    Magnesium Hydroxide Hives    Prednisone Hives, Headache and Other (See Comments)     Weakness, elevated bp, headache, eye pain, congestion     Simethicone Hives    Sulfamethoxazole-Trimethoprim Hives     Chest pain, angina  Chest pain, angina    Tree Extract Hives    Cephalexin Nausea  and Other (See Comments)     Joint pain or gerd aggravation. Bloating excessive urination   Other reaction(s): GI Distress  Joint pain or gerd aggravation. Bloating excessive urination   Other reaction(s): chest pain    Doxycycline Nausea and Vomiting, Headache and Other (See Comments)     SWEATING,MIGRAINES,LOSS OF APPETITE,SWEATING,LIGHT HEADED, EXCESSIVE URINATION  Other reaction(s): lightheadedness    Iodine Hives     Other reaction(s): GI Distress    Oxcarbazepine Dizziness, Itching, Other (See Comments) and Rash     Other reaction(s): GI Distress  SEVERE JOINT AND TENDON PAIN, INSOMNIA, RESTLESSNESS, NAUSEA, EXCESS URINATION    Sulfamethoxazole-Trimethoprim Other (See Comments)     Other reaction(s): chest pain, flushing, tachycardia    Allegra      EXCESSIVE URINATION AND WEAKNESS, LIGHT-HEADED    Animal Dander     Benadryl Allergy      EXCESSIVE URINATION AND WEAKNESS, LIGHT-HEADED    Budesonide-Formoterol Fumarate GI Disturbance and Nausea     Other reaction(s): GI Distress    Cephalosporins      Other reaction(s): GI Distress    Chlorpheniramine Maleate      Dust    Doxycycline Hyclate Nausea     SWEATING,MIGRAINES,LOSS OF APPETITE,SWEATING,LIGHT HEADED, EXCESSIVE URINATION    Flonase [Fluticasone Propionate]     Fluticasone      Other reaction(s): GI Distress    Hydrocortisone Nausea and Vomiting    Iodine Solution [Povidone Iodine]      SKIN MELTS    Levaquin      From surgeon--? Joint pain ? Gerd aggravation. Insomnia, excess urination    Mylanta      EXCESSIVE URINATION AND WEAKNESS, LIGHT-HEADED    Oxcarbazepine      SEVERE JOINT AND TENDON PAIN, INSOMNIA, RESTLESSNESS, NAUSEA, EXCESS URINATION    Povidone Iodine      Other reaction(s): GI Distress  SKIN MELTS    Prednisone      Weakness, elevated bp, headache, eye pain, congestion     Seafood [Seafood]     Shellfish Allergy      Hives    Other reaction(s): GI Distress  Hives    Trees     Cortizone Rash     EXCESS URINATION,WEAKNESS,NAUSEA, HEADACHE     Ibuprofen Fatigue, Other (See Comments) and Palpitations     Past Medical History:   Diagnosis Date    Abnormal EMG 04/18/2013    Abnormal involuntary movements(781.0)     Movement Disorder    AK (actinic keratosis) 12/18/2011    Allergic rhinitis, cause unspecified     Allergic rhinitis    Balance problems 11/01/2011    Basal cell carcinoma     Bladder spasms 11/01/2011    Chronic osteoarthritis     COPD (chronic obstructive pulmonary disease) (H)     Interstial lung disease, obliderans bronchiolitis    Diaphragmatic hernia without mention of obstruction or gangrene     Earache or other ear, nose, or throat complaint     Esophageal reflux     Fatigue 11/01/2011    Fracture     H. pylori infection 05/12/2011    History of MRI of cervical spine 11/18/2013    EXAMINATION: CERVICAL SPINE G/E 5 VIEWS* 4/19/2013 4:18 PM  CLINICAL HISTORY: Pain in limb,Performing Location?->[facility] ([facility]),  COMPARISON:  FINDINGS: AP and lateral views in flexion and extension, as well as odontoid view of the cervical spine was obtained. There is no comparison available. The vertebral bodies of the cervical spine are normally aligned. There is posterior spurring and d    Incomplete defecation 11/01/2011    Interstitial lung disease (H) 11/29/2016    Laboratory test 08/07/2012    Lung disease 06/2015    Malignant basal cell neoplasm of skin 08/06/2008    Melanoma (H) 08/06/2008    Melanoma in situ of lower leg (H)     R calf    Moraxella catarrhalis bronchitis 04/19/2024    Neuropathy 05/16/2011    GALO (obstructive sleep apnea)     Other bladder disorder     Other color vision deficiencies     Other nervous system complications     Parkinsonism (H) 11/01/2011    Parkinsons disease (H)     Parkinsonism/ balance, dropped foot, tremor    Personal history of colonic polyps     PLMD (periodic limb movement disorder) 12/16/2021    Polyneuropathy in other diseases classified elsewhere (H)     RA (rheumatoid arthritis) (H)     Rheumatoid  arthritis of multiple sites with negative rheumatoid factor (H) 2016    Seronegative arthritis 2013    Shortness of breath     Shoulder arthritis 2016    acromioclavicular joint     Sialorrhea 2023    Somatization disorder 2011    Spider veins     Leg Vericise vein surgery    Squamous cell carcinoma     Tremor 2011    Unspecified essential hypertension     Unspecified hypothyroidism     Urinary tract infection     Urinary urgency 2011    Wears glasses 2011       Past Surgical History:   Procedure Laterality Date    BIOPSY OF SKIN LESION      COLONOSCOPY      COLONOSCOPY N/A 2022    Procedure: COLONOSCOPY (fv) polypectomy using jumbo bx forcep;  Surgeon: Gómez Mckinney MD;  Location:  GI    CYSTOSCOPY  Many years ago    Blood in urine/ bladder cystoscopy    ESOPHAGOSCOPY, GASTROSCOPY, DUODENOSCOPY (EGD), COMBINED N/A 2022    Procedure: ESOPHAGOGASTRODUODENOSCOPY (EGD) (fv) biopsies using cold bx forcep;  Surgeon: Gómez Mckinney MD;  Location:  GI    HEMORRHOID SURGERY      lip biopsy      for sicca complex    MOHS MICROGRAPHIC PROCEDURE      SOFT TISSUE SURGERY      removeal of basel cell carcinoma       Social History     Socioeconomic History    Marital status:      Spouse name: Not on file    Number of children: Not on file    Years of education: Not on file    Highest education level: Not on file   Occupational History    Not on file   Tobacco Use    Smoking status: Former     Current packs/day: 0.00     Average packs/day: 1.5 packs/day for 37.3 years (55.9 ttl pk-yrs)     Types: Cigarettes     Start date: 6/15/1963     Quit date: 2000     Years since quittin.2    Smokeless tobacco: Never   Vaping Use    Vaping status: Never Used   Substance and Sexual Activity    Alcohol use: Not Currently    Drug use: Never    Sexual activity: Not Currently     Partners: Female     Birth control/protection: None   Other Topics Concern     Parent/sibling w/ CABG, MI or angioplasty before 65F 55M? No   Social History Narrative    2013: Living in [place] in a townhouse with no steps    Has 3 sons that are doing okay.         Dairy/d 1 servings/d.     Caffeine 0 servings/d    Exercise 0 x week    Sunscreen used - No    Seatbelts used - Yes    Working smoke/CO detectors in the home - Yes    Guns stored in the home - Yes    Self Breast Exams - NA    Self Testicular Exam - Yes    Eye Exam up to date - Yes 2008    Dental Exam up to date - Yes 2006    Pap Smear up to date - NA    Mammogram up to date - NA    PSA up to date - Yes 2008    Dexa Scan up to date - No    Flex Sig / Colonoscopy up to date - Yes less than 5 yrs ago    Immunizations up to date -today    Abuse: Current or Past(Physical, Sexual or Emotional)- No    Do you feel safe in your environment - Yes    2008                 Social Drivers of Health     Financial Resource Strain: Low Risk  (12/21/2023)    Financial Resource Strain     Within the past 12 months, have you or your family members you live with been unable to get utilities (heat, electricity) when it was really needed?: No   Food Insecurity: Low Risk  (12/21/2023)    Food Insecurity     Within the past 12 months, did you worry that your food would run out before you got money to buy more?: No     Within the past 12 months, did the food you bought just not last and you didn t have money to get more?: No   Transportation Needs: Low Risk  (12/21/2023)    Transportation Needs     Within the past 12 months, has lack of transportation kept you from medical appointments, getting your medicines, non-medical meetings or appointments, work, or from getting things that you need?: No   Physical Activity: Insufficiently Active (12/16/2022)    Exercise Vital Sign     Days of Exercise per Week: 3 days     Minutes of Exercise per Session: 20 min   Stress: No Stress Concern Present (12/16/2022)    [place] [employer] - Occupational  "Stress Questionnaire     Feeling of Stress : Not at all   Social Connections: Socially Integrated (12/16/2022)    Social Connection and Isolation Panel [NHANES]     Frequency of Communication with Friends and Family: Twice a week     Frequency of Social Gatherings with Friends and Family: Once a week     Attends Sabianism Services: More than 4 times per year     Active Member of Clubs or Organizations: Yes     Attends Club or Organization Meetings: Not on file     Marital Status:    Interpersonal Safety: Low Risk  (12/22/2023)    Interpersonal Safety     Do you feel physically and emotionally safe where you currently live?: Yes     Within the past 12 months, have you been hit, slapped, kicked or otherwise physically hurt by someone?: No     Within the past 12 months, have you been humiliated or emotionally abused in other ways by your partner or ex-partner?: No   Housing Stability: Low Risk  (12/21/2023)    Housing Stability     Do you have housing? : Yes     Are you worried about losing your housing?: No       Family History   Problem Relation Age of Onset    Hypertension Mother     Heart Disease Mother         a fib    Arthritis Mother         \"osteo\"    Osteoporosis Mother     Skin Cancer Mother     Uterine Cancer Mother     Other Cancer Mother     Cancer Mother     Hypertension Brother     Diabetes Brother         \" post pancreatitis\"    Neurologic Disorder Sister         multiple sclerosis    Hypertension Sister     Heart Disease Sister     Multiple Sclerosis Sister     Heart Disease Sister         Chf    Arthritis Sister     Heart Failure Sister     Kidney Disease Sister     Dementia Sister     Hypertension Father     Cerebrovascular Disease Father         ,    Skin Cancer Father     Colon Polyps Father     Heart Disease Father     Other - See Comments Son         [place]    Other - See Comments Abdon wagner    Other - See Comments Abdon gonzalez    Psoriasis Maternal " Grandfather     Stomach Cancer Maternal Grandfather     Cancer Maternal Grandfather     Congenital Anomalies Other         granddaughter with a chromosome defect    Other Cancer Other         Grandaughter    Cancer Other         Grand daughter    Cerebrovascular Disease Paternal Grandmother         ,    Cerebrovascular Disease Paternal Grandfather         ,    Cancer Granddaughter         Langerhans Cell Histiocytosis    Genetic Disease Granddaughter         gene translocation    Learning Disorder Other         Gene translocation    Melanoma No family hx of     Colon Cancer No family hx of        ROS Pulmonary    A complete ROS was otherwise negative except as noted in the HPI.  Vitals:    12/17/24 1441   BP: (!) 144/72   BP Location: Right arm   Patient Position: Sitting   Cuff Size: Adult Large   Pulse: 71   Resp: 20   SpO2: 96%   Weight: 138.3 kg (305 lb)     Exam:   GENERAL APPEARANCE: Well developed, well nourished, alert, and in no apparent distress.  EYES: PERRL, EOMI  HENT: nasal mucosa with out hyperemia or edema, no nasal polyps.  MOUTH: Oral mucosa is moist, without any lesions, no tonsillar enlargement, no oropharyngeal exudate.  NECK: supple, no masses, no thyromegaly.  LYMPHATICS: No significant axillary, cervical, or supraclavicular nodes.  RESP: good air flow throughout, - no crackles, rhonchi or wheezes.  CV: Normal S1, S2, regular rhythm, normal rate, no rub, no murmur,  no gallop, no LE edema.   ABDOMEN:  Bowel sounds normal, soft, nontender, no HSM or masses.   MS: extremities normal- no clubbing, no cyanosis.  SKIN: no rash on limited exam  NEURO: Mentation intact, speech normal, normal strength and tone, normal gait and stance  PSYCH: mentation appears normal. and affect normal/bright  Results: I have reviewed all imaging, PFTs and other relavent tests, please see below for details, PFT and imaging results were reviewed with the patient.  PFTs: Normal spirometry, lung volumes and diffusion.   "Stable from previous.    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  Overall quite stable.  Will continue to follow him on an annual basis he will continue his Serevent inhaler and intermittent albuterol.  He will continue his methotrexate and hydroxychloroquine for his rheumatoid arthritis.          CBC   Recent Labs   Lab Test 10/16/24  0857 10/08/24  0910   RBC 4.13* 4.00*   HGB 12.4* 12.3*   HCT 40.6 39.1*    191       Basic Metabolic Panel  Recent Labs   Lab Test 10/08/24  0910 07/31/24  0841    139   POTASSIUM 4.9 4.7   CHLORIDE 105 104   CO2 24 25   BUN 27.3* 29.2*   * 119*   BARBARA 9.5 9.5       INR  No results for input(s): \"INR\" in the last 69069 hours.    PFT      Latest Ref Rng & Units 12/16/2024    10:48 AM   PFT   FVC L 3.91  P   FEV1 L 2.94  P   FVC% % 94  P   FEV1% % 96  P      P Preliminary result           CC:      "

## 2024-12-17 NOTE — NURSING NOTE
Chief Complaint   Patient presents with    Follow Up      INTERSTITIAL LUNG       Vitals:    12/17/24 1441   BP: (!) 144/72   BP Location: Right arm   Patient Position: Sitting   Cuff Size: Adult Large   Pulse: 71   Resp: 20   SpO2: 96%   Weight: 138.3 kg (305 lb)       Body mass index is 39.16 kg/m .

## 2024-12-18 ENCOUNTER — APPOINTMENT (OUTPATIENT)
Age: 74
Setting detail: DERMATOLOGY
End: 2024-12-18

## 2024-12-18 ENCOUNTER — MYC MEDICAL ADVICE (OUTPATIENT)
Dept: PEDIATRICS | Facility: CLINIC | Age: 74
End: 2024-12-18
Payer: MEDICARE

## 2024-12-18 DIAGNOSIS — Z85.828 PERSONAL HISTORY OF OTHER MALIGNANT NEOPLASM OF SKIN: ICD-10-CM

## 2024-12-18 DIAGNOSIS — Z86.006 PERSONAL HISTORY OF MELANOMA IN-SITU: ICD-10-CM

## 2024-12-18 DIAGNOSIS — L57.0 ACTINIC KERATOSIS: ICD-10-CM

## 2024-12-18 DIAGNOSIS — I10 PRIMARY HYPERTENSION: ICD-10-CM

## 2024-12-18 DIAGNOSIS — D18.0 HEMANGIOMA: ICD-10-CM

## 2024-12-18 DIAGNOSIS — D22 MELANOCYTIC NEVI: ICD-10-CM

## 2024-12-18 DIAGNOSIS — L82.0 INFLAMED SEBORRHEIC KERATOSIS: ICD-10-CM

## 2024-12-18 DIAGNOSIS — Z71.89 OTHER SPECIFIED COUNSELING: ICD-10-CM

## 2024-12-18 DIAGNOSIS — L82.1 OTHER SEBORRHEIC KERATOSIS: ICD-10-CM

## 2024-12-18 DIAGNOSIS — D485 NEOPLASM OF UNCERTAIN BEHAVIOR OF SKIN: ICD-10-CM

## 2024-12-18 PROBLEM — D18.01 HEMANGIOMA OF SKIN AND SUBCUTANEOUS TISSUE: Status: ACTIVE | Noted: 2024-12-18

## 2024-12-18 PROBLEM — D48.5 NEOPLASM OF UNCERTAIN BEHAVIOR OF SKIN: Status: ACTIVE | Noted: 2024-12-18

## 2024-12-18 PROBLEM — D22.5 MELANOCYTIC NEVI OF TRUNK: Status: ACTIVE | Noted: 2024-12-18

## 2024-12-18 PROCEDURE — 11103 TANGNTL BX SKIN EA SEP/ADDL: CPT | Mod: 59

## 2024-12-18 PROCEDURE — 11102 TANGNTL BX SKIN SINGLE LES: CPT | Mod: 59

## 2024-12-18 PROCEDURE — ? OBSERVATION

## 2024-12-18 PROCEDURE — ? BIOPSY BY SHAVE METHOD

## 2024-12-18 PROCEDURE — ? COUNSELING

## 2024-12-18 PROCEDURE — ? PRESCRIPTION MEDICATION MANAGEMENT

## 2024-12-18 PROCEDURE — 17110 DESTRUCTION B9 LES UP TO 14: CPT

## 2024-12-18 PROCEDURE — 99214 OFFICE O/P EST MOD 30 MIN: CPT | Mod: 25

## 2024-12-18 PROCEDURE — ? SUNSCREEN RECOMMENDATIONS

## 2024-12-18 PROCEDURE — ? LIQUID NITROGEN

## 2024-12-18 RX ORDER — LISINOPRIL AND HYDROCHLOROTHIAZIDE 20; 25 MG/1; MG/1
1 TABLET ORAL DAILY
Qty: 90 TABLET | Refills: 3 | Status: SHIPPED | OUTPATIENT
Start: 2024-12-18

## 2024-12-18 ASSESSMENT — LOCATION DETAILED DESCRIPTION DERM
LOCATION DETAILED: LEFT SUPERIOR CENTRAL MALAR CHEEK
LOCATION DETAILED: LEFT INFERIOR MEDIAL FOREHEAD
LOCATION DETAILED: LEFT MEDIAL PROXIMAL PRETIBIAL REGION
LOCATION DETAILED: SUPERIOR THORACIC SPINE
LOCATION DETAILED: LEFT PROXIMAL PRETIBIAL REGION
LOCATION DETAILED: MID-FRONTAL SCALP
LOCATION DETAILED: RIGHT DISTAL PRETIBIAL REGION
LOCATION DETAILED: RIGHT SUPERIOR MEDIAL MIDBACK
LOCATION DETAILED: MIDDLE STERNUM
LOCATION DETAILED: RIGHT SUPERIOR CENTRAL MALAR CHEEK
LOCATION DETAILED: LEFT CENTRAL TEMPLE
LOCATION DETAILED: RIGHT POSTERIOR MEDIAL MALLEOLUS
LOCATION DETAILED: INFERIOR THORACIC SPINE
LOCATION DETAILED: EPIGASTRIC SKIN
LOCATION DETAILED: RIGHT CENTRAL TEMPLE

## 2024-12-18 ASSESSMENT — LOCATION SIMPLE DESCRIPTION DERM
LOCATION SIMPLE: RIGHT PRETIBIAL REGION
LOCATION SIMPLE: LEFT TEMPLE
LOCATION SIMPLE: RIGHT TEMPLE
LOCATION SIMPLE: UPPER BACK
LOCATION SIMPLE: LEFT CHEEK
LOCATION SIMPLE: RIGHT CHEEK
LOCATION SIMPLE: ABDOMEN
LOCATION SIMPLE: LEFT FOREHEAD
LOCATION SIMPLE: CHEST
LOCATION SIMPLE: RIGHT LOWER BACK
LOCATION SIMPLE: ANTERIOR SCALP
LOCATION SIMPLE: LEFT PRETIBIAL REGION
LOCATION SIMPLE: RIGHT ANKLE

## 2024-12-18 ASSESSMENT — LOCATION ZONE DERM
LOCATION ZONE: FACE
LOCATION ZONE: LEG
LOCATION ZONE: TRUNK
LOCATION ZONE: SCALP

## 2024-12-18 NOTE — PROCEDURE: PRESCRIPTION MEDICATION MANAGEMENT
Render In Strict Bullet Format?: No
Initiate Treatment: Efudex cream qhs for 4 weeks, triamcinolone 0.05% ointment bid for 5-7 days post Efudex treatment
Detail Level: Zone

## 2024-12-18 NOTE — PROCEDURE: OBSERVATION
Body Location Override (Optional - Billing Will Still Be Based On Selected Body Map Location If Applicable): right medial leg removed in 2008
Detail Level: Detailed
Size Of Lesion In Cm (Optional): 0

## 2024-12-18 NOTE — PROCEDURE: LIQUID NITROGEN
Detail Level: Detailed
Post-Care Instructions: I reviewed with the patient in detail post-care instructions. Patient is to wear sunprotection, and avoid picking at any of the treated lesions. Pt may apply Vaseline to crusted or scabbing areas.
Show Topical Anesthesia Variable?: Yes
Include Z78.9 (Other Specified Conditions Influencing Health Status) As An Associated Diagnosis?: No
Spray Paint Text: The liquid nitrogen was applied to the skin utilizing a spray paint frosting technique.
Medical Necessity Clause: This procedure was medically necessary because the lesions that were treated were:
Consent: The patient's consent was obtained including but not limited to risks of crusting, scabbing, blistering, scarring, darker or lighter pigmentary change, recurrence, incomplete removal and infection.
Medical Necessity Information: It is in your best interest to select a reason for this procedure from the list below. All of these items fulfill various CMS LCD requirements except the new and changing color options.

## 2024-12-23 ENCOUNTER — LAB (OUTPATIENT)
Dept: LAB | Facility: CLINIC | Age: 74
End: 2024-12-23
Payer: MEDICARE

## 2024-12-23 DIAGNOSIS — Z79.899 HIGH RISK MEDICATION USE: ICD-10-CM

## 2024-12-23 LAB
ALBUMIN SERPL BCG-MCNC: 4.2 G/DL (ref 3.5–5.2)
ALT SERPL W P-5'-P-CCNC: 24 U/L (ref 0–70)
AST SERPL W P-5'-P-CCNC: 26 U/L (ref 0–45)
CREAT SERPL-MCNC: 1.52 MG/DL (ref 0.67–1.17)
EGFRCR SERPLBLD CKD-EPI 2021: 47 ML/MIN/1.73M2
ERYTHROCYTE [DISTWIDTH] IN BLOOD BY AUTOMATED COUNT: 15.2 % (ref 10–15)
HCT VFR BLD AUTO: 38.2 % (ref 40–53)
HGB BLD-MCNC: 11.9 G/DL (ref 13.3–17.7)
MCH RBC QN AUTO: 29.6 PG (ref 26.5–33)
MCHC RBC AUTO-ENTMCNC: 31.2 G/DL (ref 31.5–36.5)
MCV RBC AUTO: 95 FL (ref 78–100)
PLATELET # BLD AUTO: 196 10E3/UL (ref 150–450)
RBC # BLD AUTO: 4.02 10E6/UL (ref 4.4–5.9)
WBC # BLD AUTO: 4 10E3/UL (ref 4–11)

## 2024-12-23 PROCEDURE — 82565 ASSAY OF CREATININE: CPT

## 2024-12-23 PROCEDURE — 82040 ASSAY OF SERUM ALBUMIN: CPT

## 2024-12-23 PROCEDURE — 36415 COLL VENOUS BLD VENIPUNCTURE: CPT

## 2024-12-23 PROCEDURE — 84450 TRANSFERASE (AST) (SGOT): CPT

## 2024-12-23 PROCEDURE — 85027 COMPLETE CBC AUTOMATED: CPT

## 2024-12-23 PROCEDURE — 84460 ALANINE AMINO (ALT) (SGPT): CPT

## 2024-12-26 ENCOUNTER — ANCILLARY PROCEDURE (OUTPATIENT)
Dept: GENERAL RADIOLOGY | Facility: CLINIC | Age: 74
End: 2024-12-26
Attending: INTERNAL MEDICINE
Payer: MEDICARE

## 2024-12-26 ENCOUNTER — OFFICE VISIT (OUTPATIENT)
Dept: RHEUMATOLOGY | Facility: CLINIC | Age: 74
End: 2024-12-26
Payer: MEDICARE

## 2024-12-26 VITALS
OXYGEN SATURATION: 97 % | DIASTOLIC BLOOD PRESSURE: 68 MMHG | SYSTOLIC BLOOD PRESSURE: 110 MMHG | HEART RATE: 61 BPM | WEIGHT: 301 LBS | HEIGHT: 74 IN | BODY MASS INDEX: 38.63 KG/M2

## 2024-12-26 DIAGNOSIS — G62.9 PERIPHERAL POLYNEUROPATHY: ICD-10-CM

## 2024-12-26 DIAGNOSIS — M25.551 RIGHT HIP PAIN: Primary | ICD-10-CM

## 2024-12-26 DIAGNOSIS — D84.9 IMMUNOSUPPRESSED STATUS (H): ICD-10-CM

## 2024-12-26 DIAGNOSIS — M23.90 KNEE PAIN WITH INTERNAL DERANGEMENT DETERMINED BY X-RAY: ICD-10-CM

## 2024-12-26 DIAGNOSIS — M06.09 RHEUMATOID ARTHRITIS OF MULTIPLE SITES WITH NEGATIVE RHEUMATOID FACTOR (H): ICD-10-CM

## 2024-12-26 DIAGNOSIS — Z79.899 HIGH RISK MEDICATION USE: ICD-10-CM

## 2024-12-26 DIAGNOSIS — M25.551 RIGHT HIP PAIN: ICD-10-CM

## 2024-12-26 PROCEDURE — 73562 X-RAY EXAM OF KNEE 3: CPT | Mod: TC | Performed by: RADIOLOGY

## 2024-12-26 PROCEDURE — 73522 X-RAY EXAM HIPS BI 3-4 VIEWS: CPT | Mod: TC | Performed by: RADIOLOGY

## 2024-12-26 RX ORDER — HYDROXYCHLOROQUINE SULFATE 200 MG/1
200 TABLET, FILM COATED ORAL DAILY
Qty: 90 TABLET | Refills: 3 | Status: SHIPPED | OUTPATIENT
Start: 2024-12-26

## 2024-12-26 RX ORDER — FOLIC ACID 1 MG/1
1 TABLET ORAL DAILY
Qty: 90 TABLET | Refills: 3 | Status: SHIPPED | OUTPATIENT
Start: 2024-12-26

## 2024-12-26 RX ORDER — METHOTREXATE 2.5 MG/1
20 TABLET ORAL
Qty: 96 TABLET | Refills: 2 | Status: SHIPPED | OUTPATIENT
Start: 2024-12-26

## 2024-12-26 ASSESSMENT — PAIN SCALES - GENERAL: PAINLEVEL_OUTOF10: SEVERE PAIN (7)

## 2024-12-26 NOTE — PATIENT INSTRUCTIONS
Diagnosis:  1.  Inflammatory arthritis: Inflammatory joint symptoms stable.  I recommend continued methotrexate and hydroxychloroquine.  2. Neuropathy: I recommend continuing Lyrica, followup with Dr. Montes  3. Spinal stenosis with progressive right greater than left leg/foot weakness.  4. Prostate CA  5. Chronic elevated creatinine: Stable, moderate reduction in kidney function.  6.  Knee and hip pain/instability; plan plain xray, sports medicine referral.    Plan:    Continue methotrexate 8 tablets (20 mg) once weekly.While on methotrexate:   -- Check labs every 4 months (AST/ALT, Albumin, CBC with platelets)   -- Limit alcohol intake to 2 drinks weekly; use folate 1 mg daily.  --Tylenol 1000 mg can be used scheduled three times daily for joint pain and for nausea/headache associated with methotrexate dosing.    3.  Continue hydroxychloroquine 200 mg once daily.  While taking hydroxychloroquine, undergo annual ophthalmology evaluation to monitor for rare retinal Plaquenil toxicity (last visit summer 2023, OCT and retinal exam found no evidence of Plaquenil toxicity.).    4  Acetaminophen 1000 mg 3 times daily for residual pain.    5.  Hip and knee x-rays today; sports medicine referral for evaluation of knee and hip instability and pain symptoms.

## 2024-12-31 ENCOUNTER — PATIENT OUTREACH (OUTPATIENT)
Dept: CARE COORDINATION | Facility: CLINIC | Age: 74
End: 2024-12-31
Payer: MEDICARE

## 2025-01-31 DIAGNOSIS — K21.9 ESOPHAGEAL REFLUX: ICD-10-CM

## 2025-01-31 RX ORDER — OMEPRAZOLE 40 MG/1
40 CAPSULE, DELAYED RELEASE ORAL DAILY
Qty: 90 CAPSULE | Refills: 1 | Status: CANCELLED | OUTPATIENT
Start: 2025-01-31

## 2025-01-31 NOTE — TELEPHONE ENCOUNTER
Last Written Prescription Date:  11/25/2024   Last Fill Quantity: 90,  # refills: 1   Last office visit: 10/15/2024 ; last virtual visit: Visit date not found with prescribing provider:     Future Office Visit:   Next 5 appointments (look out 90 days)      Apr 18, 2025 9:00 AM  (Arrive by 8:40 AM)  Annual Wellness Visit with Heladio Calixto MD  [facility] ([facility] ) [address] [place] [id number] [phone]

## 2025-02-05 NOTE — TELEPHONE ENCOUNTER
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 Patient states that he doesn't actually need a refill. Writer declined refill at this time.

## 2025-04-10 ENCOUNTER — LAB (OUTPATIENT)
Dept: LAB | Facility: CLINIC | Age: 75
End: 2025-04-10
Payer: MEDICARE

## 2025-04-10 DIAGNOSIS — Z79.899 HIGH RISK MEDICATION USE: ICD-10-CM

## 2025-04-10 LAB
ALBUMIN SERPL BCG-MCNC: 4.2 G/DL (ref 3.5–5.2)
ALT SERPL W P-5'-P-CCNC: 21 U/L (ref 0–70)
AST SERPL W P-5'-P-CCNC: 29 U/L (ref 0–45)
CREAT SERPL-MCNC: 1.63 MG/DL (ref 0.67–1.17)
EGFRCR SERPLBLD CKD-EPI 2021: 43 ML/MIN/1.73M2
ERYTHROCYTE [DISTWIDTH] IN BLOOD BY AUTOMATED COUNT: 15.6 % (ref 10–15)
HCT VFR BLD AUTO: 40 % (ref 40–53)
HGB BLD-MCNC: 12.4 G/DL (ref 13.3–17.7)
MCH RBC QN AUTO: 30.8 PG (ref 26.5–33)
MCHC RBC AUTO-ENTMCNC: 31 G/DL (ref 31.5–36.5)
MCV RBC AUTO: 99 FL (ref 78–100)
PLATELET # BLD AUTO: 194 10E3/UL (ref 150–450)
RBC # BLD AUTO: 4.03 10E6/UL (ref 4.4–5.9)
WBC # BLD AUTO: 4.9 10E3/UL (ref 4–11)

## 2025-04-13 ENCOUNTER — MYC REFILL (OUTPATIENT)
Dept: PEDIATRICS | Facility: CLINIC | Age: 75
End: 2025-04-13
Payer: MEDICARE

## 2025-04-13 DIAGNOSIS — R05.8 OTHER COUGH: Primary | ICD-10-CM

## 2025-04-14 PROBLEM — N18.30 CKD (CHRONIC KIDNEY DISEASE) STAGE 3, GFR 30-59 ML/MIN (H): Status: ACTIVE | Noted: 2025-04-14

## 2025-04-14 NOTE — TELEPHONE ENCOUNTER
Called and left voice message for patient to call [phone] and ask to speak to a nurse.     Upon call back please:  -inquire about medication usage  -encourage visit    LOV 23 with Dr. Calixto  -upcoming OV 25 with Dr. Calixto       Per chart review  Albuterol Sulfate, sensor, 108 (90 Base) MCG/ACT AEPB -- --  -- --   Si puffs as needed by inhalation route.     Awaiting call back at this time.    Tiffanie Franks, RN

## 2025-04-14 NOTE — TELEPHONE ENCOUNTER
Patient returned call back.  Patient only uses albuterol inhaler rarely like when he has bronchitis or pneumonia.  But still currently using as needed.  He needs a refill for a trip he is taking that will be at a higher elevation and he is anticipating he will be needing this more frequently- 4 times a day per pulmonology request.  Please refill as appropriate.  Thank you.    Lula Foster RN BSN  Clinic Nurse  [facility]

## 2025-04-14 NOTE — TELEPHONE ENCOUNTER
Clinic RN: Please investigate patient's chart or contact patient if the information cannot be found because the medication is listed as historical or discontinued. Confirm patient is taking this medication. Document findings and route refill encounter to provider for approval or denial.    Thank you  DEMARIO Mckeon

## 2025-04-15 DIAGNOSIS — J45.909 UNCOMPLICATED ASTHMA, UNSPECIFIED ASTHMA SEVERITY, UNSPECIFIED WHETHER PERSISTENT: ICD-10-CM

## 2025-04-15 RX ORDER — ALBUTEROL SULFATE 90 UG/1
2 INHALANT RESPIRATORY (INHALATION) EVERY 4 HOURS PRN
Qty: 18 G | Refills: 3 | Status: SHIPPED | OUTPATIENT
Start: 2025-04-15

## 2025-04-15 NOTE — PROGRESS NOTES
Diagnosis/Summary/Recommendations:    PATIENT: Lucio Daly  74 year old male     : 1950    EVELIA: 2025       MRN: [id number]  [place] [address]  [phone] (H)  [phone] (M)  [email or web address]  Lo Daly  [phone]       Assessment:  (R25.1) Tremor  (primary encounter diagnosis)  He never has had clear Parkinson's disease and his gait issue disorder may be multifactorial     Left hand tremor and has some left leg symptoms.      Has PLMS     Review of diagnosis    tremor     Avoidance of dopamine blockers   Not taking     Motor complication review   n/a     Review of Impulse control disorders   n/a     Review of surgical or medication options   reviewed     Gait/Balance/Falls   Walks slowly   Using a cane  Has had falls  Has problems with his foot - especially when tired.      Exercise/Therapy performed/offered   Tries to walk 15-20 minutes  He may fall back on the bed but has not fallen onto the floor     Cognitive/Driving      Mood   Denies depression/anxiety  [place]  9th grandchild     Hallucinations/delusions         Sleep   Sleep apnea  PLMS     Bladder/Renal/Prostate/Gyn/Other  Oxybutynin ditropan 5mg once daily      GI/Constipation/GERD   Omeprazole prilosec 40mg daily  Overweight   Had some constipation which recurred and has bowel movements every 3rd day  And had some diarrhea - cut out yoghurt  Trying to drink lots of water   He may have IBS  Has seen GI doctor in the past and there may be GI issues from his RA     ENDO/Lipid/DM/Bone density/Thyroid        Cardio/heart/Hyper or Hypotensive   Lisinopril zestril 10mg  Metoprolol toprolol XL 50mg 24 hr tablet  Ongoing challenges with blood pressure     Vision/Dry Eyes/Cataracts/Glaucoma/Macular   Wears glasses     Heme/Anticoagulation/Antiplatelet/Anemia/Other  Not taking aspirin     ENT/Resp  BiPAP  Seeing [facility] sleep medicine     intersititial lung disease seeing [facility] pulmonary medicine  Diaphragmatic  muscle weakness unclear etiology on NIPPV     Albuterol proair hfa/proventil hfa/ventolin hfa 108 (90 base) mcg/act inhaler prn   Proair hfa 108 (90 base) mcg/act inhaler prn (above)  Salmeterol serevent disku 50mcg/dose inhaler ---using      Drooling at day time     Skin/Cancer/Seborrhea/other        Musculoskeletal/Pain/Headache  Has had hand cramping and has to straighten h is fingers out and may need surgery     Acetaminophen tylenol 500mg  Pregabalin lyrica 50mg     Leg swelling   Variable use of leg braces  Tendons are tight in his legs      Other:  RA issues.   plaquenil and methotrexate.  Still taking folate, vitamin d  Had eye examination.  Seeing MONROE Snow and ZACK Goodrich  Folic acid folvite 1mg  Hydroxychloroquine plaquenil 200mg      Drooling daytime - referral to PMR and Neurology for botox for drooling at the [facility] vs [place]  Jossy Echols and colleagues in PMR  Easton Bhat and colleagues in Neurology.   Discussed atropine drops and robinul.      Refilled pregabalin lyrica      Was active and busy in [place] but tiring  Discussed use of pool and movement exercises.  Golf community      More stooped, numbness, tingling, movement issues, balance problems     He uses his walker outside and cane inside; today using cane   There are some strain issues with the walker - hands and arms.   He may benefit from seeing physical therapy again.   He has a new walker in 2022     Has problems driving due to numbness of the right leg after 40 minutes.   He is avoiding spinal surgery and burning of the nerves  Last lumbar spine MRI was 9/14/2022  1. Diffuse degenerative change of the lumbar spine as detailed above.  2. Moderate spinal canal stenosis at L4-L5. No other significant spinal canal narrowing of the lumbar spine.  3. Moderate neural foraminal stenosis bilaterally at L5-S1. No other significant neural foraminal narrowing of the lumbar spine.     Exertional related visual changes -  and associated with weakness and shakiness and has to rest to recover   Consider visit with cardiology or/and neurology or/and PCP to discuss further. He had an ECHO in 2018 and had Lexiscan in 2016 and had no signs of an aortic scan that did not reveal an aneurysm.     Consider another stress test and/or carotid study - would allow patient to talk with pcp and if needed can see cardiology or general neurology.      Return in one year's time.     Treated for Moraxella Catarrhalis - testing = nasal and sputum 4/1/2024  Treated with antibiotics for bronchitis.   Seeing Fabio for his lungs.      He has not proceeded with botox for drooling  Now it is manageable - saliva caught in his beard.      RA issues.   plaquenil and methotrexate.  Still taking folate, vitamin d  Had eye examination.  Seeing MONROE Snow and ZACK Goodrich  Folic acid folvite 1mg  Hydroxychloroquine plaquenil 200mg     He has numbness at night that may wake him up.   He lays on his back - has bi pap  He wakes up and then moves his legs he gets spasms  He has not tried magnesium   PLMS from sleep study - with myoclonic movements     I renewed his pregabalin/lyrica.      HIs blood pressure was fine today  He has not had carotid or heart stress studies.   He continues on the metoprolol, lisinopril and amlodipine     His legs have been an issue.   Had another MRI in December 2023 with narrowing of spinal canal  But unchanged. Consideration for spinal stenosis  This was before he went to [place] and set up physical therapy and he had more numbness/pain and increasing problems walking. He has ongoing weakness but walking daily but not walking as far. He has weakness with standing. He has problems getting up out of a chair, etc. As he falls backwards. He has a modified toilet and is hanging onto the trim getting up. He has a feeling of cold water running down his legs. Numbness and tingling. His balance is off. There is also a component of neuropathy in  addition to his spinal stenosis. He has not had back surgery before. He has a May appointment with spine doctor. He wonders if there is a combination of back and neuropathy issues. May be looking into nerve blocks or nerve ablation.      MR LUMBAR SPINE W/O & W CONTRAST 12/1/2023 5:17 PM     Provided History: progressive leg weakness; h/o spinal stenosis; Rheumatoid arthritis of multiple sites with negative rheumatoid factor  (H)     ICD-10: Rheumatoid arthritis of multiple sites with negative rheumatoid factor (H)     Comparison: Lumbar spine MRI 9/14/2022     Technique: Multiplanar, multisequence images of the lumbar spine without and with intravenous contrast.     Findings: There are 5 lumbar-type vertebrae, counting from the lowest rib bearing vertebrae. The tip of the conus medullaris is at L1.   There is trace retrolisthesis of L1 on L2 and grade 1 anterolisthesis of L4 on L5.  There is multilevel mild to moderate disc height  narrowing.  Normal marrow signal. There is opposing degenerative endplate edema at L5-S1 and mild facet edema at L5-S1 with associated  enhancement. No other abnormal enhancement.     On a level by level basis:     T12-L1: No spinal canal or neuroforaminal stenosis.     L1-2: Mild disc bulging and minimal facet arthropathy. No spinal canal or neuroforaminal stenosis     L2-3: Mild facet arthropathy. No spinal canal or neuroforaminal stenosis.     L3-4: Mild disc bulging and facet arthropathy. No significant spinal canal or neural foraminal stenosis.     L4-5: Grade 1 anterolisthesis with uncovering of the disc. There is bilateral facet arthropathy and ligamentum flavum hypertrophy. There  is moderate to severe spinal canal stenosis. Mild-to-moderate bilateral neuroforaminal stenosis. Right lateral recess narrowing.     L5-S1: Disc bulge and facet arthropathy. There is moderate bilateral neural foraminal stenosis. Questionable impingement of both exiting L5  nerves, left more than right.  No spinal canal stenosis.     Above-mentioned degenerative changes are grossly unchanged since 9/2022. Paraspinous tissues are within normal limits.                                                                   Impression:   No significant change in multilevel lumbar spondylosis greatest at L4-5 where there is severe spinal canal stenosis and at L5-S1 where there is bilateral moderate neural foraminal stenosis . Mild inflammatory facet joint arthropathy at L5-S1 and degenerative endplate changes at L5-S1.         Medications     7/730am 9a 2pm 4pm 11pm   Acetaminophen tylenol 500mg 2     2 2   Albuterol proair proventil  inhaler prn            Amlodipine norvasc 5mg 1           Cholecalciferol vitamin D 1000 1           Folic acid folvite 1mg   1         Hydroxychloroquine plaquenil 200mg   1         Ketoconazole nizoral 2% shampoo             Lisinopril hydrochlorothiazide zestoretic 20-25 1           Methotrexate 2.5mg      8 tues       Metoprolol toprolol XL 50mg 24 hr tablet 1.5           Omeprazole prilosec 40mg 1           Oxybutynin ditropan 5mg      1       Pregabalin lyrica 50mg 1     1 1or 2   Proair hfa 108 (90 base) mcg/act inhaler  above           Salmeterol serevent disku 50mcg/dose inhaler 2/day           Vit D3 cholecalciferol 25mcg 1000 units above                                        Plan:    RA issues.   plaquenil and methotrexate.  Still taking folate, vitamin d  Had eye examination.  Seeing ZACK Goodrich  Folic acid folvite 1mg  Hydroxychloroquine plaquenil 200mg     /72 (BP Location: Right arm, Patient Position: Sitting, Cuff Size: Adult Regular)   Pulse 59   Ht 1.829 m (6')   Wt (!) 137.7 kg (303 lb 9.6 oz)   SpO2 96%   BMI 41.18 kg/m    On amlodipine  On lisinopril  On metoprolol    BiPAP  Seeing [facility] sleep medicine  Sees dan perlman for his lungs.     Squamous cell removed from his left leg     Neuropathy has been ongoing with accompanying pain from spinal  stenosis  He has problems  one from the other   He has pain walking  Has no cartilage in either knee  Has back pain that may impact rehabilitation  He is on lyrica 50-50-50 or 50- varying dose at night.     He has numbness at night that may wake him up.   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     Coding statement:   Medical Decision Making:  #  Chronic progressive medical conditions addressed  - see above --   Review and/or interpretation of unique test or documentation from a provider outside of neurology yes   Independent historian provided additional details  no I  Prescription drug management and review of potential side effects and/or monitoring for side effects  -- see above ---  Health impacted by social determinants of health  no    I have reviewed the note as documented above.  This accurately captures the substance of my conversation with the patient and total time spent preparing for visit, executing visit and completing visit on the day of the visit:  20 minutes.  The portion of this total time included face to face time     The longitudinal plan of care for Lucio Daly was addressed during this visit. Due to the added complexity in care, I will continue to support Lucio Daly in the subsequent management of this condition(s) and with the ongoing continuity of care of this condition(s).      Thong Montes MD     ______________________________________    Last visit date and details:             ______________________________________      Patient was asked about 14 Review of systems including changes in vision (dry eyes, double vision), hearing, heart, lungs, musculoskeletal, depression, anxiety, snoring, RBD, insomnia, urinary frequency, urinary urgency, constipation, swallowing  problems, hematological, ID, allergies, skin problems: seborrhea, endocrinological: thyroid, diabetes, cholesterol; balance, weight changes, and other neurological problems and these were not significant at this time except for   Patient Active Problem List   Diagnosis    Diaphragmatic hernia    Esophageal reflux    Hx of melanoma of skin    Malignant basal cell neoplasm of skin    GALO (obstructive sleep apnea)    Chronic maxillary sinusitis    Urinary incontinence    Sicca syndrome    Tremor    Incomplete defecation    AK (actinic keratosis)    Abnormal EMG    Seronegative arthritis    Adenomatous polyp of colon    Peripheral polyneuropathy    Morbid obesity (H)    Neuromuscular disease (H)    Bronchiolitis obliterans (H)    Hypertension    Rheumatoid arthritis (H)    Immunosuppressed status    PLMD (periodic limb movement disorder)    Prostate cancer (H)    Lumbosacral radiculopathy    Facet arthropathy, lumbosacral    Lumbosacral spinal stenosis    Internal hemorrhoids    Sialorrhea    Gastro-esophageal reflux disease with esophagitis    Essential hypertension    Obesity    Sleep apnea    Primary osteoarthritis of first carpometacarpal joint of left hand    CKD (chronic kidney disease) stage 3, GFR 30-59 ml/min (H)          Allergies   Allergen Reactions    Adhesive Tape Hives     Bandages misc    Alum & Mag Hydroxide-Simeth Hives     EXCESSIVE URINATION AND WEAKNESS, LIGHT-HEADED    Aluminum Hydroxide Hives    Calcium Carbonate Hives    Cortisone Hives, Headache, Nausea, Other (See Comments) and Rash    Cyclosporine      Burning eyes, problems with breathing, tightness in chest    Cyclosporine Hives     Burning eyes, problems with breathing, tightness in chest    Diphenhydramine Other (See Comments) and Shortness Of Breath     Other reaction(s): lightheadedness    Fexofenadine Other (See Comments) and Shortness Of Breath     EXCESSIVE URINATION AND WEAKNESS, LIGHT-HEADED      Gabapentin Hives and Other (See  Comments)     Neurontin: mosd changes and excess urination    Levofloxacin Hives, Other (See Comments) and Nausea     From surgeon--? Joint pain ? Gerd aggravation. Insomnia, excess urination    Magnesium Carbonate Hives    Magnesium Hydroxide Hives    Prednisone Hives, Headache and Other (See Comments)     Weakness, elevated bp, headache, eye pain, congestion     Simethicone Hives    Sulfamethoxazole-Trimethoprim Hives     Chest pain, angina  Chest pain, angina    Tree Extract Hives    Cephalexin Nausea and Other (See Comments)     Joint pain or gerd aggravation. Bloating excessive urination   Other reaction(s): GI Distress  Joint pain or gerd aggravation. Bloating excessive urination   Other reaction(s): chest pain    Doxycycline Nausea and Vomiting, Headache and Other (See Comments)     SWEATING,MIGRAINES,LOSS OF APPETITE,SWEATING,LIGHT HEADED, EXCESSIVE URINATION  Other reaction(s): lightheadedness    Iodine Hives     Other reaction(s): GI Distress    Oxcarbazepine Dizziness, Itching, Other (See Comments) and Rash     Other reaction(s): GI Distress  SEVERE JOINT AND TENDON PAIN, INSOMNIA, RESTLESSNESS, NAUSEA, EXCESS URINATION    Sulfamethoxazole-Trimethoprim Other (See Comments)     Other reaction(s): chest pain, flushing, tachycardia    Allegra      EXCESSIVE URINATION AND WEAKNESS, LIGHT-HEADED    Animal Dander     Benadryl Allergy      EXCESSIVE URINATION AND WEAKNESS, LIGHT-HEADED    Budesonide-Formoterol Fumarate GI Disturbance and Nausea     Other reaction(s): GI Distress    Cephalosporins      Other reaction(s): GI Distress    Chlorpheniramine Maleate      Dust    Doxycycline Hyclate Nausea     SWEATING,MIGRAINES,LOSS OF APPETITE,SWEATING,LIGHT HEADED, EXCESSIVE URINATION    Flonase [Fluticasone Propionate]     Fluticasone      Other reaction(s): GI Distress    Hydrocortisone Nausea and Vomiting    Iodine Solution [Povidone Iodine]      SKIN MELTS    Levaquin      From surgeon--? Joint pain ? Gerd  aggravation. Insomnia, excess urination    Mylanta      EXCESSIVE URINATION AND WEAKNESS, LIGHT-HEADED    Oxcarbazepine      SEVERE JOINT AND TENDON PAIN, INSOMNIA, RESTLESSNESS, NAUSEA, EXCESS URINATION    Povidone Iodine      Other reaction(s): GI Distress  SKIN MELTS    Prednisone      Weakness, elevated bp, headache, eye pain, congestion     Seafood [Seafood]     Shellfish Allergy      Hives    Other reaction(s): GI Distress  Hives    Trees     Cortizone Rash     EXCESS URINATION,WEAKNESS,NAUSEA, HEADACHE    Ibuprofen Fatigue, Other (See Comments) and Palpitations     Past Surgical History:   Procedure Laterality Date    BIOPSY OF SKIN LESION      COLONOSCOPY      COLONOSCOPY N/A 7/20/2022    Procedure: COLONOSCOPY (fv) polypectomy using jumbo bx forcep;  Surgeon: Gómez Mckinney MD;  Location:  GI    CYSTOSCOPY  Many years ago    Blood in urine/ bladder cystoscopy    ESOPHAGOSCOPY, GASTROSCOPY, DUODENOSCOPY (EGD), COMBINED N/A 7/20/2022    Procedure: ESOPHAGOGASTRODUODENOSCOPY (EGD) (fv) biopsies using cold bx forcep;  Surgeon: Gómez Mckinney MD;  Location:  GI    HEMORRHOID SURGERY      lip biopsy      for sicca complex    MOHS MICROGRAPHIC PROCEDURE      SOFT TISSUE SURGERY      removeal of basel cell carcinoma     Past Medical History:   Diagnosis Date    Abnormal EMG 04/18/2013    Abnormal involuntary movements(781.0)     Movement Disorder    AK (actinic keratosis) 12/18/2011    Allergic rhinitis, cause unspecified     Allergic rhinitis    Balance problems 11/01/2011    Basal cell carcinoma     Bladder spasms 11/01/2011    Chronic osteoarthritis     COPD (chronic obstructive pulmonary disease) (H)     Interstial lung disease, obliderans bronchiolitis    Diaphragmatic hernia without mention of obstruction or gangrene     Earache or other ear, nose, or throat complaint     Esophageal reflux     Fatigue 11/01/2011    Fracture     H. pylori infection 05/12/2011    History of MRI of cervical spine  11/18/2013    EXAMINATION: CERVICAL SPINE G/E 5 VIEWS* 4/19/2013 4:18 PM  CLINICAL HISTORY: Pain in limb,Performing Location?->[facility]),  COMPARISON:  FINDINGS: AP and lateral views in flexion and extension, as well as odontoid view of the cervical spine was obtained. There is no comparison available. The vertebral bodies of the cervical spine are normally aligned. There is posterior spurring and d    Incomplete defecation 11/01/2011    Interstitial lung disease (H) 11/29/2016    Laboratory test 08/07/2012    Lung disease 06/2015    Malignant basal cell neoplasm of skin 08/06/2008    Melanoma (H) 08/06/2008    Melanoma in situ of lower leg (H)     R calf    Moraxella catarrhalis bronchitis 04/19/2024    Neuropathy 05/16/2011    GALO (obstructive sleep apnea)     Other bladder disorder     Other color vision deficiencies     Other nervous system complications     Parkinsonism (H) 11/01/2011    Parkinsons disease (H)     Parkinsonism/ balance, dropped foot, tremor    Personal history of colonic polyps     PLMD (periodic limb movement disorder) 12/16/2021    Polyneuropathy in other diseases classified elsewhere     RA (rheumatoid arthritis) (H)     Rheumatoid arthritis of multiple sites with negative rheumatoid factor (H) 03/21/2016    Seronegative arthritis 11/18/2013    Shortness of breath     Shoulder arthritis 2016    acromioclavicular joint     Sialorrhea 04/18/2023    Somatization disorder 05/12/2011    Spider veins     Leg Vericise vein surgery    Squamous cell carcinoma     Tremor 11/01/2011    Unspecified essential hypertension     Unspecified hypothyroidism     Urinary tract infection     Urinary urgency 11/01/2011    Wears glasses 11/01/2011     Social History     Socioeconomic History    Marital status:      Spouse name: Not on file    Number of children: Not on file    Years of education: Not on file    Highest education level: Not on file   Occupational History    Not on file   Tobacco  Use    Smoking status: Former     Current packs/day: 0.00     Average packs/day: 1.5 packs/day for 37.3 years (55.9 ttl pk-yrs)     Types: Cigarettes     Start date: 6/15/1963     Quit date: 2000     Years since quittin.5    Smokeless tobacco: Never   Vaping Use    Vaping status: Never Used   Substance and Sexual Activity    Alcohol use: Not Currently    Drug use: Never    Sexual activity: Not Currently     Partners: Female     Birth control/protection: None   Other Topics Concern    Parent/sibling w/ CABG, MI or angioplasty before 65F 55M? No   Social History Narrative    2013: Living in [place] in a townhouse with no steps    Has 3 sons that are doing okay.         Dairy/d 1 servings/d.     Caffeine 0 servings/d    Exercise 0 x week    Sunscreen used - No    Seatbelts used - Yes    Working smoke/CO detectors in the home - Yes    Guns stored in the home - Yes    Self Breast Exams - NA    Self Testicular Exam - Yes    Eye Exam up to date - Yes     Dental Exam up to date - Yes     Pap Smear up to date - NA    Mammogram up to date - NA    PSA up to date - Yes     Dexa Scan up to date - No    Flex Sig / Colonoscopy up to date - Yes less than 5 yrs ago    Immunizations up to date -today    Abuse: Current or Past(Physical, Sexual or Emotional)- No    Do you feel safe in your environment - Yes                     Social Drivers of Health     Financial Resource Strain: Low Risk  (2023)    Financial Resource Strain     Within the past 12 months, have you or your family members you live with been unable to get utilities (heat, electricity) when it was really needed?: No   Food Insecurity: Low Risk  (2023)    Food Insecurity     Within the past 12 months, did you worry that your food would run out before you got money to buy more?: No     Within the past 12 months, did the food you bought just not last and you didn t have money to get more?: No   Transportation Needs: Low Risk  (2023)     Transportation Needs     Within the past 12 months, has lack of transportation kept you from medical appointments, getting your medicines, non-medical meetings or appointments, work, or from getting things that you need?: No   Physical Activity: Insufficiently Active (12/16/2022)    Exercise Vital Sign     Days of Exercise per Week: 3 days     Minutes of Exercise per Session: 20 min   Stress: No Stress Concern Present (12/16/2022)    [employer] - Occupational Stress Questionnaire     Feeling of Stress : Not at all   Social Connections: Socially Integrated (12/16/2022)    Social Connection and Isolation Panel [NHANES]     Frequency of Communication with Friends and Family: Twice a week     Frequency of Social Gatherings with Friends and Family: Once a week     Attends Rastafari Services: More than 4 times per year     Active Member of Clubs or Organizations: Yes     Attends Club or Organization Meetings: Not on file     Marital Status:    Interpersonal Safety: Low Risk  (12/22/2023)    Interpersonal Safety     Do you feel physically and emotionally safe where you currently live?: Yes     Within the past 12 months, have you been hit, slapped, kicked or otherwise physically hurt by someone?: No     Within the past 12 months, have you been humiliated or emotionally abused in other ways by your partner or ex-partner?: No   Housing Stability: Low Risk  (12/21/2023)    Housing Stability     Do you have housing? : Yes     Are you worried about losing your housing?: No       Drug and lactation database from the [place] [employer]:  http://toxnet.nlm.nih.gov/cgi-bin/sis/htmlgen?LACT      B/P: Data Unavailable, T: Data Unavailable, P: Data Unavailable, R: Data Unavailable 0 lbs 0 oz  There were no vitals taken for this visit., There is no height or weight on file to calculate BMI.  Medications and Vitals not listed above were documented in the cart and reviewed by  me.     Current Outpatient Medications   Medication Sig Dispense Refill    acetaminophen (TYLENOL) 500 MG tablet Take 1-2 tablets (500-1,000 mg) by mouth every 4 hours as needed for mild pain 30 tablet 0    acetaminophen (TYLENOL) 500 MG tablet 2 x 500mg by mouth 3 times per day: 7/730am, 4pm and 11pm  = 6/day      albuterol (PROAIR HFA/PROVENTIL HFA/VENTOLIN HFA) 108 (90 Base) MCG/ACT inhaler Inhale 2 puffs into the lungs every 4 hours as needed for shortness of breath or wheezing. 18 g 3    Albuterol Sulfate, sensor, 108 (90 Base) MCG/ACT AEPB Inhale 2 puffs into the lungs every 4 hours as needed for shortness of breath, wheezing or cough. 1 each 1    amLODIPine (NORVASC) 5 MG tablet Take 1 tablet (5 mg) by mouth daily 90 tablet 3    cholecalciferol (HM VITAMIN D3) 25 MCG (1000 UT) TABS 1000 unit tab by mouth daily      folic acid (FOLVITE) 1 MG tablet Take 1 tablet (1 mg) by mouth daily. 90 tablet 3    hydroxychloroquine (PLAQUENIL) 200 MG tablet Take 1 tablet (200 mg) by mouth daily. Daily at 9am. 90 tablet 3    lisinopril-hydrochlorothiazide (ZESTORETIC) 20-25 MG tablet Take 1 tablet by mouth daily. 90 tablet 3    methotrexate 2.5 MG tablet Take 8 tablets (20 mg) by mouth every 7 days. TAKE 8 TABLETS BY MOUTH ONCE PER WEEK. 96 tablet 2    metoprolol succinate ER (TOPROL XL) 50 MG 24 hr tablet TAKE 1 AND 1/2 TABLETS BY MOUTH DAILY 135 tablet 3    omeprazole (PRILOSEC) 40 MG DR capsule Take 1 capsule (40 mg) by mouth daily. 90 capsule 1    ORDER FOR DME Use your BiPAP device as directed by your provider.      oxyBUTYnin (DITROPAN) 5 MG tablet Take 1 tablet (5 mg) by mouth daily 90 tablet 1    pregabalin (LYRICA) 50 MG capsule 50mg capsule by mouth at 7am and 4pm and 1-2 x 50mg capsules by mouth nightly at 11pm (4/day) 360 capsule 1    salmeterol (SEREVENT DISKUS) 50 MCG/ACT inhaler Inhale 1 puff into the lungs 2 times daily. 60 each 11    Vitamin D3 (CHOLECALCIFEROL) 25 mcg (1000 units) tablet 1 unit every day by  oral route.           Thong Montes MD

## 2025-04-21 ENCOUNTER — TELEPHONE (OUTPATIENT)
Dept: ORTHOPEDICS | Facility: CLINIC | Age: 75
End: 2025-04-21
Payer: MEDICARE

## 2025-04-21 ENCOUNTER — TRANSFERRED RECORDS (OUTPATIENT)
Dept: HEALTH INFORMATION MANAGEMENT | Facility: CLINIC | Age: 75
End: 2025-04-21
Payer: MEDICARE

## 2025-04-21 ENCOUNTER — APPOINTMENT (OUTPATIENT)
Age: 75
Setting detail: DERMATOLOGY
End: 2025-04-21

## 2025-04-21 PROBLEM — C44.729 SQUAMOUS CELL CARCINOMA OF SKIN OF LEFT LOWER LIMB, INCLUDING HIP: Status: ACTIVE | Noted: 2025-04-21

## 2025-04-21 PROCEDURE — ? MOHS SURGERY

## 2025-04-21 PROCEDURE — 17313 MOHS 1 STAGE T/A/L: CPT

## 2025-04-21 PROCEDURE — 17313 MOHS 1 STAGE T/A/L: CPT | Mod: 76

## 2025-04-21 NOTE — TELEPHONE ENCOUNTER
Other: Pt calling has some questions regarding imaging appointments on 4/23     Could we send this information to you in [facility] or would you prefer to receive a phone call?:   Patient would prefer a phone call   Okay to leave a detailed message?: Yes at Cell number on file:    Telephone Information:   Mobile [phone]

## 2025-04-22 ENCOUNTER — OFFICE VISIT (OUTPATIENT)
Dept: NEUROLOGY | Facility: CLINIC | Age: 75
End: 2025-04-22
Payer: MEDICARE

## 2025-04-22 ENCOUNTER — APPOINTMENT (OUTPATIENT)
Age: 75
Setting detail: DERMATOLOGY
End: 2025-04-22

## 2025-04-22 VITALS
OXYGEN SATURATION: 96 % | HEART RATE: 59 BPM | HEIGHT: 72 IN | DIASTOLIC BLOOD PRESSURE: 72 MMHG | WEIGHT: 303.6 LBS | SYSTOLIC BLOOD PRESSURE: 130 MMHG | BODY MASS INDEX: 41.12 KG/M2

## 2025-04-22 DIAGNOSIS — R25.1 TREMOR: Primary | ICD-10-CM

## 2025-04-22 DIAGNOSIS — G62.9 PERIPHERAL POLYNEUROPATHY: ICD-10-CM

## 2025-04-22 PROBLEM — C44.729 SQUAMOUS CELL CARCINOMA OF SKIN OF LEFT LOWER LIMB, INCLUDING HIP: Status: ACTIVE | Noted: 2025-04-22

## 2025-04-22 PROCEDURE — ? REPAIR NOTE

## 2025-04-22 PROCEDURE — 13121 CMPLX RPR S/A/L 2.6-7.5 CM: CPT

## 2025-04-22 PROCEDURE — ? PRESCRIPTION

## 2025-04-22 PROCEDURE — 13122 CMPLX RPR S/A/L ADDL 5 CM/>: CPT

## 2025-04-22 RX ORDER — PREGABALIN 50 MG/1
CAPSULE ORAL
Qty: 360 CAPSULE | Refills: 1 | Status: SHIPPED | OUTPATIENT
Start: 2025-04-22

## 2025-04-22 RX ORDER — AZITHROMYCIN MONOHYDRATE 250 MG/1
TABLET, FILM COATED ORAL
Qty: 6 | Refills: 0 | Status: ERX | COMMUNITY
Start: 2025-04-22

## 2025-04-22 RX ADMIN — AZITHROMYCIN MONOHYDRATE: 250 TABLET, FILM COATED ORAL at 00:00

## 2025-04-22 ASSESSMENT — PAIN SCALES - GENERAL: PAINLEVEL_OUTOF10: SEVERE PAIN (8)

## 2025-04-22 NOTE — TELEPHONE ENCOUNTER
Called and canceled patient. He will call back to reschedule after his cancer treatments are handled

## 2025-04-22 NOTE — PATIENT INSTRUCTIONS
Medications     7/730am 9a 2pm 4pm 11pm   Acetaminophen tylenol 500mg 2     2 2   Albuterol proair proventil  inhaler prn            Amlodipine norvasc 5mg 1           Cholecalciferol vitamin D 1000 1           Folic acid folvite 1mg   1         Hydroxychloroquine plaquenil 200mg   1         Ketoconazole nizoral 2% shampoo             Lisinopril hydrochlorothiazide zestoretic 20-25 1           Methotrexate 2.5mg      8 tues       Metoprolol toprolol XL 50mg 24 hr tablet 1.5           Omeprazole prilosec 40mg 1           Oxybutynin ditropan 5mg      1       Pregabalin lyrica 50mg 1     1 1or 2   Proair hfa 108 (90 base) mcg/act inhaler  above           Salmeterol serevent disku 50mcg/dose inhaler 2/day           Vit D3 cholecalciferol 25mcg 1000 units above                                        Plan:    RA issues.   plaquenil and methotrexate.  Still taking folate, vitamin d  Had eye examination.  Seeing ZACK Goodrich  Folic acid folvite 1mg  Hydroxychloroquine plaquenil 200mg     /72 (BP Location: Right arm, Patient Position: Sitting, Cuff Size: Adult Regular)   Pulse 59   Ht 1.829 m (6')   Wt (!) 137.7 kg (303 lb 9.6 oz)   SpO2 96%   BMI 41.18 kg/m    On amlodipine  On lisinopril  On metoprolol    BiPAP  Seeing [facility] sleep medicine  Sees dan perlman for his lungs.     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     He has numbness at night that may wake him up.   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

## 2025-04-22 NOTE — LETTER
2025       RE: Lucio Daly  [address]  [place] [address]     Dear Colleague,    Thank you for referring your patient, Lucio Daly, to the [facility]. Please see a copy of my visit note below.      Diagnosis/Summary/Recommendations:    PATIENT: Lucio Daly  74 year old male     : 1950    EVELIA: 2025       MRN: [id number]  [place] [address]  [phone] (H)  [phone] ()  [email or web address]  Lo Daly  [phone]       Assessment:  (R25.1) Tremor  (primary encounter diagnosis)  He never has had clear Parkinson's disease and his gait issue disorder may be multifactorial     Left hand tremor and has some left leg symptoms.      Has PLMS     Review of diagnosis    tremor     Avoidance of dopamine blockers   Not taking     Motor complication review   n/a     Review of Impulse control disorders   n/a     Review of surgical or medication options   reviewed     Gait/Balance/Falls   Walks slowly   Using a cane  Has had falls  Has problems with his foot - especially when tired.      Exercise/Therapy performed/offered   Tries to walk 15-20 minutes  He may fall back on the bed but has not fallen onto the floor     Cognitive/Driving      Mood   Denies depression/anxiety  [place]  9th grandchild     Hallucinations/delusions         Sleep   Sleep apnea  PLMS     Bladder/Renal/Prostate/Gyn/Other  Oxybutynin ditropan 5mg once daily      GI/Constipation/GERD   Omeprazole prilosec 40mg daily  Overweight   Had some constipation which recurred and has bowel movements every 3rd day  And had some diarrhea - cut out yoghurt  Trying to drink lots of water   He may have IBS  Has seen GI doctor in the past and there may be GI issues from his RA     ENDO/Lipid/DM/Bone density/Thyroid        Cardio/heart/Hyper or Hypotensive   Lisinopril zestril 10mg  Metoprolol toprolol XL 50mg 24 hr tablet  Ongoing  challenges with blood pressure     Vision/Dry Eyes/Cataracts/Glaucoma/Macular   Wears glasses     Heme/Anticoagulation/Antiplatelet/Anemia/Other  Not taking aspirin     ENT/Resp  BiPAP  [facility]     intersititial lung disease seeing [facility] pulmonary medicine  Diaphragmatic muscle weakness unclear etiology on NIPPV     Albuterol proair hfa/proventil hfa/ventolin hfa 108 (90 base) mcg/act inhaler prn   Proair hfa 108 (90 base) mcg/act inhaler prn (above)  Salmeterol serevent disku 50mcg/dose inhaler ---using      Drooling at day time     Skin/Cancer/Seborrhea/other        Musculoskeletal/Pain/Headache  Has had hand cramping and has to straighten h is fingers out and may need surgery     Acetaminophen tylenol 500mg  Pregabalin lyrica 50mg     Leg swelling   Variable use of leg braces  Tendons are tight in his legs      Other:  RA issues.   plaquenil and methotrexate.  Still taking folate, vitamin d  Had eye examination.  Seeing MONROE Snow and ZACK Goodrich  Folic acid folvite 1mg  Hydroxychloroquine plaquenil 200mg      Drooling daytime - referral to PMR and Neurology for botox for drooling at the [facility] vs [place]  Jossy Echols and colleagues in PMR  Easton Bhat and colleagues in Neurology.   Discussed atropine drops and robinul.      Refilled pregabalin lyrica      Was active and busy in [place] but tiring  Discussed use of pool and movement exercises.  Golf community      More stooped, numbness, tingling, movement issues, balance problems     He uses his walker outside and cane inside; today using cane   There are some strain issues with the walker - hands and arms.   He may benefit from seeing physical therapy again.   He has a new walker in 2022     Has problems driving due to numbness of the right leg after 40 minutes.   He is avoiding spinal surgery and burning of the nerves  Last lumbar spine MRI was 9/14/2022  1. Diffuse degenerative change of the  lumbar spine as detailed above.  2. Moderate spinal canal stenosis at L4-L5. No other significant spinal canal narrowing of the lumbar spine.  3. Moderate neural foraminal stenosis bilaterally at L5-S1. No other significant neural foraminal narrowing of the lumbar spine.     Exertional related visual changes - and associated with weakness and shakiness and has to rest to recover   Consider visit with cardiology or/and neurology or/and PCP to discuss further. He had an ECHO in 2018 and had Lexiscan in 2016 and had no signs of an aortic scan that did not reveal an aneurysm.     Consider another stress test and/or carotid study - would allow patient to talk with pcp and if needed can see cardiology or general neurology.      Return in one year's time.     Treated for Moraxella Catarrhalis - testing = nasal and sputum 4/1/2024  Treated with antibiotics for bronchitis.   Seeing Fabio for his lungs.      He has not proceeded with botox for drooling  Now it is manageable - saliva caught in his beard.      RA issues.   plaquenil and methotrexate.  Still taking folate, vitamin d  Had eye examination.  Seeing MONROE Snow and ZACK Goodrich  Folic acid folvite 1mg  Hydroxychloroquine plaquenil 200mg     He has numbness at night that may wake him up.   He lays on his back - has bi pap  He wakes up and then moves his legs he gets spasms  He has not tried magnesium   PLMS from sleep study - with myoclonic movements     I renewed his pregabalin/lyrica.      HIs blood pressure was fine today  He has not had carotid or heart stress studies.   He continues on the metoprolol, lisinopril and amlodipine     His legs have been an issue.   Had another MRI in December 2023 with narrowing of spinal canal  But unchanged. Consideration for spinal stenosis  This was before he went to [place] and set up physical therapy and he had more numbness/pain and increasing problems walking. He has ongoing weakness but walking daily but not walking as far.  He has weakness with standing. He has problems getting up out of a chair, etc. As he falls backwards. He has a modified toilet and is hanging onto the trim getting up. He has a feeling of cold water running down his legs. Numbness and tingling. His balance is off. There is also a component of neuropathy in addition to his spinal stenosis. He has not had back surgery before. He has a May appointment with spine doctor. He wonders if there is a combination of back and neuropathy issues. May be looking into nerve blocks or nerve ablation.      MR LUMBAR SPINE W/O & W CONTRAST 12/1/2023 5:17 PM     Provided History: progressive leg weakness; h/o spinal stenosis; Rheumatoid arthritis of multiple sites with negative rheumatoid factor  (H)     ICD-10: Rheumatoid arthritis of multiple sites with negative rheumatoid factor (H)     Comparison: Lumbar spine MRI 9/14/2022     Technique: Multiplanar, multisequence images of the lumbar spine without and with intravenous contrast.     Findings: There are 5 lumbar-type vertebrae, counting from the lowest rib bearing vertebrae. The tip of the conus medullaris is at L1.   There is trace retrolisthesis of L1 on L2 and grade 1 anterolisthesis of L4 on L5.  There is multilevel mild to moderate disc height  narrowing.  Normal marrow signal. There is opposing degenerative endplate edema at L5-S1 and mild facet edema at L5-S1 with associated  enhancement. No other abnormal enhancement.     On a level by level basis:     T12-L1: No spinal canal or neuroforaminal stenosis.     L1-2: Mild disc bulging and minimal facet arthropathy. No spinal canal or neuroforaminal stenosis     L2-3: Mild facet arthropathy. No spinal canal or neuroforaminal stenosis.     L3-4: Mild disc bulging and facet arthropathy. No significant spinal canal or neural foraminal stenosis.     L4-5: Grade 1 anterolisthesis with uncovering of the disc. There is bilateral facet arthropathy and ligamentum flavum hypertrophy.  There  is moderate to severe spinal canal stenosis. Mild-to-moderate bilateral neuroforaminal stenosis. Right lateral recess narrowing.     L5-S1: Disc bulge and facet arthropathy. There is moderate bilateral neural foraminal stenosis. Questionable impingement of both exiting L5  nerves, left more than right. No spinal canal stenosis.     Above-mentioned degenerative changes are grossly unchanged since 9/2022. Paraspinous tissues are within normal limits.                                                                   Impression:   No significant change in multilevel lumbar spondylosis greatest at L4-5 where there is severe spinal canal stenosis and at L5-S1 where there is bilateral moderate neural foraminal stenosis . Mild inflammatory facet joint arthropathy at L5-S1 and degenerative endplate changes at L5-S1.         Medications     7/730am 9a 2pm 4pm 11pm   Acetaminophen tylenol 500mg 2     2 2   Albuterol proair proventil  inhaler prn            Amlodipine norvasc 5mg 1           Cholecalciferol vitamin D 1000 1           Folic acid folvite 1mg   1         Hydroxychloroquine plaquenil 200mg   1         Ketoconazole nizoral 2% shampoo             Lisinopril hydrochlorothiazide zestoretic 20-25 1           Methotrexate 2.5mg      8 tues       Metoprolol toprolol XL 50mg 24 hr tablet 1.5           Omeprazole prilosec 40mg 1           Oxybutynin ditropan 5mg      1       Pregabalin lyrica 50mg 1     1 1or 2   Proair hfa 108 (90 base) mcg/act inhaler  above           Salmeterol serevent disku 50mcg/dose inhaler 2/day           Vit D3 cholecalciferol 25mcg 1000 units above                                        Plan:    RA issues.   plaquenil and methotrexate.  Still taking folate, vitamin d  Had eye examination.  Seeing ZACK Goodrich  Folic acid folvite 1mg  Hydroxychloroquine plaquenil 200mg     /72 (BP Location: Right arm, Patient Position: Sitting, Cuff Size: Adult Regular)   Pulse 59   Ht 1.829 m (6')    Wt (!) 137.7 kg (303 lb 9.6 oz)   SpO2 96%   BMI 41.18 kg/m    On amlodipine  On lisinopril  On metoprolol    BiPAP  Seeing [facility] sleep medicine  Sees dan perlman for his lungs.     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     He has numbness at night that may wake him up.   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     Coding statement:   Medical Decision Making:  #  Chronic progressive medical conditions addressed  - see above --   Review and/or interpretation of unique test or documentation from a provider outside of neurology yes   Independent historian provided additional details  no I  Prescription drug management and review of potential side effects and/or monitoring for side effects  -- see above ---  Health impacted by social determinants of health  no    I have reviewed the note as documented above.  This accurately captures the substance of my conversation with the patient and total time spent preparing for visit, executing visit and completing visit on the day of the visit:  20 minutes.  The portion of this total time included face to face time     The longitudinal plan of care for Lucio Daly was addressed during this visit. Due to the added complexity in care, I will continue to support Lucio Daly in the subsequent management of this condition(s) and with the ongoing continuity of care of this condition(s).      Thong Montes MD     ______________________________________    Last visit date and details:              ______________________________________      Patient was asked about 14 Review of systems including changes in vision (dry eyes, double vision), hearing, heart, lungs, musculoskeletal, depression, anxiety, snoring, RBD, insomnia, urinary frequency, urinary urgency, constipation, swallowing problems, hematological, ID, allergies, skin problems: seborrhea, endocrinological: thyroid, diabetes, cholesterol; balance, weight changes, and other neurological problems and these were not significant at this time except for   Patient Active Problem List   Diagnosis     Diaphragmatic hernia     Esophageal reflux     Hx of melanoma of skin     Malignant basal cell neoplasm of skin     GALO (obstructive sleep apnea)     Chronic maxillary sinusitis     Urinary incontinence     Sicca syndrome     Tremor     Incomplete defecation     AK (actinic keratosis)     Abnormal EMG     Seronegative arthritis     Adenomatous polyp of colon     Peripheral polyneuropathy     Morbid obesity (H)     Neuromuscular disease (H)     Bronchiolitis obliterans (H)     Hypertension     Rheumatoid arthritis (H)     Immunosuppressed status     PLMD (periodic limb movement disorder)     Prostate cancer (H)     Lumbosacral radiculopathy     Facet arthropathy, lumbosacral     Lumbosacral spinal stenosis     Internal hemorrhoids     Sialorrhea     Gastro-esophageal reflux disease with esophagitis     Essential hypertension     Obesity     Sleep apnea     Primary osteoarthritis of first carpometacarpal joint of left hand     CKD (chronic kidney disease) stage 3, GFR 30-59 ml/min (H)          Allergies   Allergen Reactions     Adhesive Tape Hives     Bandages misc     Alum & Mag Hydroxide-Simeth Hives     EXCESSIVE URINATION AND WEAKNESS, LIGHT-HEADED     Aluminum Hydroxide Hives     Calcium Carbonate Hives     Cortisone Hives, Headache, Nausea, Other (See Comments) and Rash     Cyclosporine      Burning eyes, problems with breathing, tightness in  chest     Cyclosporine Hives     Burning eyes, problems with breathing, tightness in chest     Diphenhydramine Other (See Comments) and Shortness Of Breath     Other reaction(s): lightheadedness     Fexofenadine Other (See Comments) and Shortness Of Breath     EXCESSIVE URINATION AND WEAKNESS, LIGHT-HEADED       Gabapentin Hives and Other (See Comments)     Neurontin: mosd changes and excess urination     Levofloxacin Hives, Other (See Comments) and Nausea     From surgeon--? Joint pain ? Gerd aggravation. Insomnia, excess urination     Magnesium Carbonate Hives     Magnesium Hydroxide Hives     Prednisone Hives, Headache and Other (See Comments)     Weakness, elevated bp, headache, eye pain, congestion      Simethicone Hives     Sulfamethoxazole-Trimethoprim Hives     Chest pain, angina  Chest pain, angina     Tree Extract Hives     Cephalexin Nausea and Other (See Comments)     Joint pain or gerd aggravation. Bloating excessive urination   Other reaction(s): GI Distress  Joint pain or gerd aggravation. Bloating excessive urination   Other reaction(s): chest pain     Doxycycline Nausea and Vomiting, Headache and Other (See Comments)     SWEATING,MIGRAINES,LOSS OF APPETITE,SWEATING,LIGHT HEADED, EXCESSIVE URINATION  Other reaction(s): lightheadedness     Iodine Hives     Other reaction(s): GI Distress     Oxcarbazepine Dizziness, Itching, Other (See Comments) and Rash     Other reaction(s): GI Distress  SEVERE JOINT AND TENDON PAIN, INSOMNIA, RESTLESSNESS, NAUSEA, EXCESS URINATION     Sulfamethoxazole-Trimethoprim Other (See Comments)     Other reaction(s): chest pain, flushing, tachycardia     Allegra      EXCESSIVE URINATION AND WEAKNESS, LIGHT-HEADED     Animal Dander      Benadryl Allergy      EXCESSIVE URINATION AND WEAKNESS, LIGHT-HEADED     Budesonide-Formoterol Fumarate GI Disturbance and Nausea     Other reaction(s): GI Distress     Cephalosporins      Other reaction(s): GI Distress     Chlorpheniramine  Maleate      Dust     Doxycycline Hyclate Nausea     SWEATING,MIGRAINES,LOSS OF APPETITE,SWEATING,LIGHT HEADED, EXCESSIVE URINATION     Flonase [Fluticasone Propionate]      Fluticasone      Other reaction(s): GI Distress     Hydrocortisone Nausea and Vomiting     Iodine Solution [Povidone Iodine]      SKIN MELTS     Levaquin      From surgeon--? Joint pain ? Gerd aggravation. Insomnia, excess urination     Mylanta      EXCESSIVE URINATION AND WEAKNESS, LIGHT-HEADED     Oxcarbazepine      SEVERE JOINT AND TENDON PAIN, INSOMNIA, RESTLESSNESS, NAUSEA, EXCESS URINATION     Povidone Iodine      Other reaction(s): GI Distress  SKIN MELTS     Prednisone      Weakness, elevated bp, headache, eye pain, congestion      Seafood [Seafood]      Shellfish Allergy      Hives    Other reaction(s): GI Distress  Hives     Trees      Cortizone Rash     EXCESS URINATION,WEAKNESS,NAUSEA, HEADACHE     Ibuprofen Fatigue, Other (See Comments) and Palpitations     Past Surgical History:   Procedure Laterality Date     BIOPSY OF SKIN LESION       COLONOSCOPY       COLONOSCOPY N/A 7/20/2022    Procedure: COLONOSCOPY (fv) polypectomy using jumbo bx forcep;  Surgeon: Gómez Mckinney MD;  Location:  GI     CYSTOSCOPY  Many years ago    Blood in urine/ bladder cystoscopy     ESOPHAGOSCOPY, GASTROSCOPY, DUODENOSCOPY (EGD), COMBINED N/A 7/20/2022    Procedure: ESOPHAGOGASTRODUODENOSCOPY (EGD) (fv) biopsies using cold bx forcep;  Surgeon: Gómez Mckinney MD;  Location:  GI     HEMORRHOID SURGERY       lip biopsy      for sicca complex     MOHS MICROGRAPHIC PROCEDURE       SOFT TISSUE SURGERY      removeal of basel cell carcinoma     Past Medical History:   Diagnosis Date     Abnormal EMG 04/18/2013     Abnormal involuntary movements(781.0)     Movement Disorder     AK (actinic keratosis) 12/18/2011     Allergic rhinitis, cause unspecified     Allergic rhinitis     Balance problems 11/01/2011     Basal cell carcinoma      Bladder spasms  11/01/2011     Chronic osteoarthritis      COPD (chronic obstructive pulmonary disease) (H)     Interstial lung disease, obliderans bronchiolitis     Diaphragmatic hernia without mention of obstruction or gangrene      Earache or other ear, nose, or throat complaint      Esophageal reflux      Fatigue 11/01/2011     Fracture      H. pylori infection 05/12/2011     History of MRI of cervical spine 11/18/2013    EXAMINATION: CERVICAL SPINE G/E 5 VIEWS* 4/19/2013 4:18 PM  CLINICAL HISTORY: Pain in limb,Performing Location?->[facility]),  COMPARISON:  FINDINGS: AP and lateral views in flexion and extension, as well as odontoid view of the cervical spine was obtained. There is no comparison available. The vertebral bodies of the cervical spine are normally aligned. There is posterior spurring and d     Incomplete defecation 11/01/2011     Interstitial lung disease (H) 11/29/2016     Laboratory test 08/07/2012     Lung disease 06/2015     Malignant basal cell neoplasm of skin 08/06/2008     Melanoma (H) 08/06/2008     Melanoma in situ of lower leg (H)     R calf     Moraxella catarrhalis bronchitis 04/19/2024     Neuropathy 05/16/2011     GALO (obstructive sleep apnea)      Other bladder disorder      Other color vision deficiencies      Other nervous system complications      Parkinsonism (H) 11/01/2011     Parkinsons disease (H)     Parkinsonism/ balance, dropped foot, tremor     Personal history of colonic polyps      PLMD (periodic limb movement disorder) 12/16/2021     Polyneuropathy in other diseases classified elsewhere      RA (rheumatoid arthritis) (H)      Rheumatoid arthritis of multiple sites with negative rheumatoid factor (H) 03/21/2016     Seronegative arthritis 11/18/2013     Shortness of breath      Shoulder arthritis 2016    acromioclavicular joint      Sialorrhea 04/18/2023     Somatization disorder 05/12/2011     Spider veins     Leg Vericise vein surgery     Squamous cell carcinoma       Tremor 2011     Unspecified essential hypertension      Unspecified hypothyroidism      Urinary tract infection      Urinary urgency 2011     Wears glasses 2011     Social History     Socioeconomic History     Marital status:      Spouse name: Not on file     Number of children: Not on file     Years of education: Not on file     Highest education level: Not on file   Occupational History     Not on file   Tobacco Use     Smoking status: Former     Current packs/day: 0.00     Average packs/day: 1.5 packs/day for 37.3 years (55.9 ttl pk-yrs)     Types: Cigarettes     Start date: 6/15/1963     Quit date: 2000     Years since quittin.5     Smokeless tobacco: Never   Vaping Use     Vaping status: Never Used   Substance and Sexual Activity     Alcohol use: Not Currently     Drug use: Never     Sexual activity: Not Currently     Partners: Female     Birth control/protection: None   Other Topics Concern     Parent/sibling w/ CABG, MI or angioplasty before 65F 55M? No   Social History Narrative    2013: Living in [place] in a townhouse with no steps    Has 3 sons that are doing okay.         Dairy/d 1 servings/d.     Caffeine 0 servings/d    Exercise 0 x week    Sunscreen used - No    Seatbelts used - Yes    Working smoke/CO detectors in the home - Yes    Guns stored in the home - Yes    Self Breast Exams - NA    Self Testicular Exam - Yes    Eye Exam up to date - Yes     Dental Exam up to date - Yes     Pap Smear up to date - NA    Mammogram up to date - NA    PSA up to date - Yes 2008    Dexa Scan up to date - No    Flex Sig / Colonoscopy up to date - Yes less than 5 yrs ago    Immunizations up to date -today    Abuse: Current or Past(Physical, Sexual or Emotional)- No    Do you feel safe in your environment - Yes    2008                 Social Drivers of Health     Financial Resource Strain: Low Risk  (2023)    Financial Resource Strain      Within the past 12 months, have  you or your family members you live with been unable to get utilities (heat, electricity) when it was really needed?: No   Food Insecurity: Low Risk  (12/21/2023)    Food Insecurity      Within the past 12 months, did you worry that your food would run out before you got money to buy more?: No      Within the past 12 months, did the food you bought just not last and you didn t have money to get more?: No   Transportation Needs: Low Risk  (12/21/2023)    Transportation Needs      Within the past 12 months, has lack of transportation kept you from medical appointments, getting your medicines, non-medical meetings or appointments, work, or from getting things that you need?: No   Physical Activity: Insufficiently Active (12/16/2022)    Exercise Vital Sign      Days of Exercise per Week: 3 days      Minutes of Exercise per Session: 20 min   Stress: No Stress Concern Present (12/16/2022)    [employer] [place] [employer] - Occupational Stress Questionnaire      Feeling of Stress : Not at all   Social Connections: Socially Integrated (12/16/2022)    Social Connection and Isolation Panel [NHANES]      Frequency of Communication with Friends and Family: Twice a week      Frequency of Social Gatherings with Friends and Family: Once a week      Attends [employer] Services: More than 4 times per year      Active Member of Clubs or Organizations: Yes      Attends Club or Organization Meetings: Not on file      Marital Status:    Interpersonal Safety: Low Risk  (12/22/2023)    Interpersonal Safety      Do you feel physically and emotionally safe where you currently live?: Yes      Within the past 12 months, have you been hit, slapped, kicked or otherwise physically hurt by someone?: No      Within the past 12 months, have you been humiliated or emotionally abused in other ways by your partner or ex-partner?: No   Housing Stability: Low Risk  (12/21/2023)    Housing Stability      Do you have housing? : Yes       Are you worried about losing your housing?: No       Drug and lactation database from the [place] National Library of Medicine:  http://toxnet.nlm.nih.gov/cgi-bin/sis/htmlgen?LACT      B/P: Data Unavailable, T: Data Unavailable, P: Data Unavailable, R: Data Unavailable 0 lbs 0 oz  There were no vitals taken for this visit., There is no height or weight on file to calculate BMI.  Medications and Vitals not listed above were documented in the cart and reviewed by me.     Current Outpatient Medications   Medication Sig Dispense Refill     acetaminophen (TYLENOL) 500 MG tablet Take 1-2 tablets (500-1,000 mg) by mouth every 4 hours as needed for mild pain 30 tablet 0     acetaminophen (TYLENOL) 500 MG tablet 2 x 500mg by mouth 3 times per day: 7/730am, 4pm and 11pm  = 6/day       albuterol (PROAIR HFA/PROVENTIL HFA/VENTOLIN HFA) 108 (90 Base) MCG/ACT inhaler Inhale 2 puffs into the lungs every 4 hours as needed for shortness of breath or wheezing. 18 g 3     Albuterol Sulfate, sensor, 108 (90 Base) MCG/ACT AEPB Inhale 2 puffs into the lungs every 4 hours as needed for shortness of breath, wheezing or cough. 1 each 1     amLODIPine (NORVASC) 5 MG tablet Take 1 tablet (5 mg) by mouth daily 90 tablet 3     cholecalciferol (HM VITAMIN D3) 25 MCG (1000 UT) TABS 1000 unit tab by mouth daily       folic acid (FOLVITE) 1 MG tablet Take 1 tablet (1 mg) by mouth daily. 90 tablet 3     hydroxychloroquine (PLAQUENIL) 200 MG tablet Take 1 tablet (200 mg) by mouth daily. Daily at 9am. 90 tablet 3     lisinopril-hydrochlorothiazide (ZESTORETIC) 20-25 MG tablet Take 1 tablet by mouth daily. 90 tablet 3     methotrexate 2.5 MG tablet Take 8 tablets (20 mg) by mouth every 7 days. TAKE 8 TABLETS BY MOUTH ONCE PER WEEK. 96 tablet 2     metoprolol succinate ER (TOPROL XL) 50 MG 24 hr tablet TAKE 1 AND 1/2 TABLETS BY MOUTH DAILY 135 tablet 3     omeprazole (PRILOSEC) 40 MG DR capsule Take 1 capsule (40 mg) by mouth daily. 90 capsule 1      ORDER FOR DME Use your BiPAP device as directed by your provider.       oxyBUTYnin (DITROPAN) 5 MG tablet Take 1 tablet (5 mg) by mouth daily 90 tablet 1     pregabalin (LYRICA) 50 MG capsule 50mg capsule by mouth at 7am and 4pm and 1-2 x 50mg capsules by mouth nightly at 11pm (4/day) 360 capsule 1     salmeterol (SEREVENT DISKUS) 50 MCG/ACT inhaler Inhale 1 puff into the lungs 2 times daily. 60 each 11     Vitamin D3 (CHOLECALCIFEROL) 25 mcg (1000 units) tablet 1 unit every day by oral route.           Thong Montes MD      Again, thank you for allowing me to participate in the care of your patient.      Sincerely,    Thong Montes MD

## 2025-04-22 NOTE — HPI: DELAYED REPAIR FOLLOWING MOHS SURGERY
When Was Your Cancer Treated With Mohs Surgery?: one day ago
Body Location Override (Optional): Left medial proximal pretibial region and the left proximal pretibial region

## 2025-04-22 NOTE — PROGRESS NOTES
Past Medical History:   Diagnosis Date     Abnormal EMG 04/18/2013     Abnormal involuntary movements(781.0)     Movement Disorder     AK (actinic keratosis) 12/18/2011     Allergic rhinitis, cause unspecified     Allergic rhinitis     Balance problems 11/01/2011     Basal cell carcinoma      Bladder spasms 11/01/2011     Chronic osteoarthritis      COPD (chronic obstructive pulmonary disease) (H)     Interstial lung disease, obliderans bronchiolitis     Diaphragmatic hernia without mention of obstruction or gangrene      Earache or other ear, nose, or throat complaint      Esophageal reflux      Fatigue 11/01/2011     Fracture      H. pylori infection 05/12/2011     History of MRI of cervical spine 11/18/2013    EXAMINATION: CERVICAL SPINE G/E 5 VIEWS* 4/19/2013 4:18 PM  CLINICAL HISTORY: Pain in limb,Performing Location?->[facility]),  COMPARISON:  FINDINGS: AP and lateral views in flexion and extension, as well as odontoid view of the cervical spine was obtained. There is no comparison available. The vertebral bodies of the cervical spine are normally aligned. There is posterior spurring and d     Incomplete defecation 11/01/2011     Interstitial lung disease (H) 11/29/2016     Laboratory test 08/07/2012     Lung disease 06/2015     Malignant basal cell neoplasm of skin 08/06/2008     Melanoma (H) 08/06/2008     Melanoma in situ of lower leg (H)     R calf     Neuropathy 05/16/2011     Other bladder disorder      Other color vision deficiencies      Other nervous system complications      Parkinsonism (H) 11/01/2011     Parkinsons disease (H)     Parkinsonism/ balance, dropped foot, tremor     Personal history of colonic polyps      PLMD (periodic limb movement disorder) 12/16/2021     Polyneuropathy in other diseases classified elsewhere (H)      RA (rheumatoid arthritis) (H)      Rheumatoid arthritis of multiple sites with negative rheumatoid factor (H) 03/21/2016     Seronegative arthritis 11/18/2013      Shortness of breath      Shoulder arthritis 2016    acromioclavicular joint      Somatization disorder 05/12/2011     Spider veins     Leg Vericise vein surgery     Squamous cell carcinoma      Tremor 11/01/2011     Unspecified essential hypertension      Unspecified hypothyroidism      Urinary tract infection      Urinary urgency 11/01/2011     Wears glasses 11/01/2011     Patient Active Problem List   Diagnosis     Diaphragmatic hernia     Esophageal reflux     Hx of melanoma of skin     Malignant basal cell neoplasm of skin     GALO (obstructive sleep apnea)     Chronic maxillary sinusitis     Urinary incontinence     Sicca syndrome (H)     Tremor     Incomplete defecation     AK (actinic keratosis)     Abnormal EMG     Seronegative arthritis     Adenomatous polyp of colon     Peripheral polyneuropathy     Morbid obesity (H)     Neuromuscular disease (H)     Bronchiolitis obliterans (H)     Hypertension     Rheumatoid arthritis (H)     Immunosuppressed status (H)     PLMD (periodic limb movement disorder)     Elevated prostate specific antigen (PSA)     Prostate cancer (H)     Lumbosacral radiculopathy     Facet arthropathy, lumbosacral     Spondylolisthesis of lumbar region     Lumbosacral spinal stenosis     Past Surgical History:   Procedure Laterality Date     BIOPSY OF SKIN LESION       COLONOSCOPY       COLONOSCOPY N/A 7/20/2022    Procedure: COLONOSCOPY (fv) polypectomy using jumbo bx forcep;  Surgeon: Gómez Mckinney MD;  Location:  GI     CYSTOSCOPY  Many years ago    Blood in urine/ bladder cystoscopy     ESOPHAGOSCOPY, GASTROSCOPY, DUODENOSCOPY (EGD), COMBINED N/A 7/20/2022    Procedure: ESOPHAGOGASTRODUODENOSCOPY (EGD) (fv) biopsies using cold bx forcep;  Surgeon: Gómez Mckinney MD;  Location:  GI     HEMORRHOID SURGERY       lip biopsy      for sicca complex     MOHS MICROGRAPHIC PROCEDURE       SOFT TISSUE SURGERY      removeal of basel cell carcinoma     Social History      Socioeconomic History     Marital status:      Spouse name: Not on file     Number of children: Not on file     Years of education: Not on file     Highest education level: Not on file   Occupational History     Not on file   Tobacco Use     Smoking status: Former     Packs/day: 1.50     Years: 37.00     Pack years: 55.50     Types: Cigarettes     Start date: 6/15/1963     Quit date: 2000     Years since quittin.0     Smokeless tobacco: Never   Substance and Sexual Activity     Alcohol use: Not Currently     Drug use: Never     Sexual activity: Not Currently     Partners: Female     Birth control/protection: None   Other Topics Concern     Parent/sibling w/ CABG, MI or angioplasty before 65F 55M? No   Social History Narrative    2013: Living in [place] in a townhouse with no steps    Has 3 sons that are doing okay.         Dairy/d 1 servings/d.     Caffeine 0 servings/d    Exercise 0 x week    Sunscreen used - No    Seatbelts used - Yes    Working smoke/CO detectors in the home - Yes    Guns stored in the home - Yes    Self Breast Exams - NA    Self Testicular Exam - Yes    Eye Exam up to date - Yes 2008    Dental Exam up to date - Yes 2006    Pap Smear up to date - NA    Mammogram up to date - NA    PSA up to date - Yes 2008    Dexa Scan up to date - No    Flex Sig / Colonoscopy up to date - Yes less than 5 yrs ago    Immunizations up to date -today    Abuse: Current or Past(Physical, Sexual or Emotional)- No    Do you feel safe in your environment - Yes    2008                 Social Determinants of Health     Financial Resource Strain: Low Risk      Difficulty of Paying Living Expenses: Not hard at all   Food Insecurity: No Food Insecurity     Worried About Running Out of Food in the Last Year: Never true     Ran Out of Food in the Last Year: Never true   Transportation Needs: No Transportation Needs     Lack of Transportation (Medical): No     Lack of Transportation (Non-Medical): No  "  Physical Activity: Unknown     Days of Exercise per Week: 4 days     Minutes of Exercise per Session: Not on file   Stress: No Stress Concern Present     Feeling of Stress : Not at all   Social Connections: Moderately Integrated     Frequency of Communication with Friends and Family: Three times a week     Frequency of Social Gatherings with Friends and Family: Once a week     Attends Yazidism Services: More than 4 times per year     Active Member of Clubs or Organizations: No     Attends Club or Organization Meetings: Not on file     Marital Status:    Intimate Partner Violence: Not on file   Housing Stability: Low Risk      Unable to Pay for Housing in the Last Year: No     Number of Places Lived in the Last Year: 2     Unstable Housing in the Last Year: No     Family History   Problem Relation Age of Onset     Hypertension Mother      Heart Disease Mother         a fib     Arthritis Mother         \"osteo\"     Osteoporosis Mother      Skin Cancer Mother      Uterine Cancer Mother      Other Cancer Mother      Cancer Mother      Hypertension Brother      Diabetes Brother         \" post pancreatitis\"     Neurologic Disorder Sister         multiple sclerosis     Hypertension Sister      Heart Disease Sister      Multiple Sclerosis Sister      Heart Disease Sister         Chf     Arthritis Sister      Heart Failure Sister      Kidney Disease Sister      Dementia Sister      Hypertension Father      Cerebrovascular Disease Father         ,     Skin Cancer Father      Colon Polyps Father      Heart Disease Father      Other - See Comments Son         [place]     Other - See Comments Abdon wagner     Other - See Comments Abdon gonzalez     Psoriasis Maternal Grandfather      Stomach Cancer Maternal Grandfather      Cancer Maternal Grandfather      Congenital Anomalies Other         granddaughter with a chromosome defect     Other Cancer Other         Grand daughter     Cancer Other "         Grand daughter     Cerebrovascular Disease Paternal Grandmother         ,     Cerebrovascular Disease Paternal Grandfather         ,     Cancer Granddaughter         Langerhans Cell Histiocytosis     Genetic Disease Granddaughter         gene translocation     Learning Disorder Other         Gene translocation     Melanoma No family hx of      Colon Cancer No family hx of      Lab Results   Component Value Date    WBC 4.8 08/18/2022    WBC 6.1 07/09/2021     Lab Results   Component Value Date    RBC 4.21 08/18/2022    RBC 4.50 07/09/2021     Lab Results   Component Value Date    HGB 13.0 08/18/2022    HGB 13.4 07/09/2021     Lab Results   Component Value Date    HCT 41.1 08/18/2022    HCT 41.6 07/09/2021     No components found for: MCT  Lab Results   Component Value Date    MCV 98 08/18/2022    MCV 92 07/09/2021     Lab Results   Component Value Date    MCH 30.9 08/18/2022    MCH 29.8 07/09/2021     Lab Results   Component Value Date    MCHC 31.6 08/18/2022    MCHC 32.2 07/09/2021     Lab Results   Component Value Date    RDW 14.0 08/18/2022    RDW 14.3 07/09/2021     Lab Results   Component Value Date     08/18/2022     07/09/2021     Lab Results   Component Value Date    CRP 5.63 08/18/2022    CRP 5.5 05/09/2022    CRP 9.5 07/09/2021    CRP 7.3 02/08/2010     Lab Results   Component Value Date    AST 18 08/18/2022    AST 17 07/09/2021     Lab Results   Component Value Date    ALT 22 08/18/2022    ALT 25 07/09/2021     No results found for: BILICONJ   Lab Results   Component Value Date    BILITOTAL 0.4 05/15/2018     Lab Results   Component Value Date    ALBUMIN 3.9 08/18/2022    ALBUMIN 3.8 07/09/2021     Lab Results   Component Value Date    PROTTOTAL 7.5 05/15/2018      Lab Results   Component Value Date    ALKPHOS 68 05/15/2018         SUBJECTIVE FINDINGS:  72-year-old with rheumatoid arthritis and peripheral neuropathy returns to clinic.  His AFOs are about 10 or so years old and he went  in to see if he could get them fixed because they were not fitting right and they told him he needed to come in and get an order for a new pair if he needs a new pair.  He has a drop foot and he catches his toes at times.  He has fallen from this.  He has seen his rheumatologist who did x-rays of his right foot because he had a nodule on the right lateral hallux.  I reviewed 10/03/2022 x-rays.  Joint and cortical margins are intact.  There are no fractures.  I did review Dr. Goodrich's previous notes.    OBJECTIVE FINDINGS:  DP and PT are 2/4 bilaterally.  He has dorsally contracted digits 1 through 5 bilaterally.  He has decreased muscle strength in dorsiflexion and eversion of the foot bilaterally.  There are no tendon voids bilaterally.  No erythema, no drainage, no odor, no calor, some mild venous stasis bilaterally.    ASSESSMENT AND PLAN:  Peripheral neuropathy with drop foot bilaterally.  Diagnosis and treatment options discussed with him.  Prescription for new AFOs given and use discussed with him.  He is given the phone number and address to [facility] to get [facility].  Return to clinic and see me as needed.       FAMILY HISTORY:  Mother  Still living? Unknown  FH: breast cancer, Age at diagnosis: Age Unknown    Grandparent  Still living? Unknown  Family history of breast cancer, Age at diagnosis: Age Unknown  Family history of diabetes mellitus (DM), Age at diagnosis: Age Unknown    Aunt  Still living? Unknown  Family history of breast cancer, Age at diagnosis: Age Unknown

## 2025-04-22 NOTE — PROCEDURE: REPAIR NOTE
Secondary Defect Length (In Cm): 0
Estimated Blood Loss (Cc): minimal
Eyelid Full Thickness Repair - 72554: The eyelid defect was full thickness which required a wedge repair of the eyelid. Special care was taken to ensure that the eyelid margin was realligned when placing sutures.
Keystone Flap Text: The defect edges were debeveled with a #15 scalpel blade. Given the location of the defect, shape of the defect a keystone flap was deemed most appropriate. Using a sterile surgical marker, an appropriate keystone flap was drawn incorporating the defect, outlining the appropriate donor tissue and placing the expected incisions within the relaxed skin tension lines where possible. The area thus outlined was incised deep to adipose tissue with a #15 scalpel blade. The skin margins were undermined to an appropriate distance in all directions around the primary defect and laterally outward around the flap utilizing iris scissors. Following this, the designed flap was carried into the primary defect and sutured into place.
O-Z Plasty Text: The defect edges were debeveled with a #15 scalpel blade. Given the location of the defect, shape of the defect and the proximity to free margins an O-Z plasty (double transposition flap) was deemed most appropriate. Using a sterile surgical marker, the appropriate transposition flaps were drawn incorporating the defect and placing the expected incisions within the relaxed skin tension lines where possible. The area thus outlined was incised deep to adipose tissue with a #15 scalpel blade. The skin margins were undermined to an appropriate distance in all directions utilizing iris scissors. Hemostasis was achieved with electrocautery. The flaps were then transposed and carried over into place, one clockwise and the other counterclockwise, and anchored with interrupted buried subcutaneous sutures.
Hemigard Retention Suture: 2-0 Nylon
Width Of Defect Perpendicular To Closure In Cm (Required): 1.1
Pinch Graft Text: The defect edges were debeveled with a #15 scalpel blade. Given the location of the defect, shape of the defect and the proximity to free margins a pinch graft was deemed most appropriate. Using a sterile surgical marker, the primary defect shape was transferred to the donor site. The area thus outlined was incised deep to adipose tissue with a #15 scalpel blade.  The harvested graft was then trimmed of adipose tissue until only dermis and epidermis was left. The skin graft was then placed in the primary defect and oriented appropriately.
Localized Dermabrasion With Sand Papertext: The patient was draped in routine manner.  Localized dermabrasion using sterile sand paper was performed in routine manner to papillary dermis. This spot dermabrasion is being performed to complete skin cancer reconstruction. It also will eliminate the other sun damaged precancerous cells that are known to be part of the regional effect of a lifetime's worth of sun exposure. This localized dermabrasion is therapeutic and should not be considered cosmetic in any regard.
Preparation Of Recipient Site - Graft: The eschar was removed surgically with sharp dissection to facilitate appropriate survival of the following graft.
Retention Suture Text: Retention sutures were placed to support the closure and prevent dehiscence.
Tarsorrhaphy Performed?: No
Flip-Flop Flap Text: The defect edges were debeveled with a #15 blade scalpel.  Given the location of the defect and the proximity to free margins a flip-flop flap was deemed most appropriate. Using a sterile surgical marker, the appropriate flap was drawn incorporating the defect and placing the expected incisions between the helical rim and antihelix where possible.  The area thus outlined was incised through and through with a #15 scalpel blade. Following this, the designed flap was carried over into the primary defect and sutured into place.
Mastoid Interpolation Flap Division And Inset Text: Division and inset of the mastoid interpolation flap was performed to achieve optimal aesthetic result, restore normal anatomic appearance and avoid distortion of normal anatomy, expedite and facilitate wound healing, achieve optimal functional result and because linear closure either not possible or would produce suboptimal result. The patient was prepped and draped in the usual manner. The pedicle was infiltrated with local anesthesia. The pedicle was sectioned with a #15 blade. The pedicle was de-bulked and trimmed to match the shape of the defect. Hemostasis was achieved. The flap donor site and free margin of the flap were secured with deep buried sutures and the wound edges were re-approximated.
Nasalis Myocutaneous Flap Text: Using a #15 blade, an incision was made around the donor flap to the level of the nasalis muscle. Wide lateral undermining was then performed in both the subcutaneous plane above the nasalis muscle, and in a submuscular plane just above periosteum. This allowed the formation of a free nasalis muscle axial pedicle which was still attached to the actual cutaneous flap, increasing its mobility and vascular viability. Hemostasis was obtained with pinpoint electrocoagulation. The flap was mobilized into position and the pivotal anchor points positioned and stabilized with buried interrupted sutures. Subcutaneous and dermal tissues were closed in a multilayered fashion with sutures. Tissue redundancies were excised, and the epidermal edges were apposed without significant tension and sutured with sutures.
Transposition Flap Text: The defect edges were debeveled with a #15 scalpel blade. Given the location of the defect and the proximity to free margins a transposition flap was deemed most appropriate. Using a sterile surgical marker, an appropriate transposition flap was drawn incorporating the defect. The area thus outlined was incised deep to adipose tissue with a #15 scalpel blade. The skin margins were undermined to an appropriate distance in all directions utilizing iris scissors. Following this, the designed flap was carried over into the primary defect and sutured into place.
Hemigard Postcare Instructions: The HEMIGARD strips are to remain completely dry for at least 5-7 days.
Tissue Cultured Epidermal Autograft Text: The defect edges were debeveled with a #15 scalpel blade. Given the location of the defect, shape of the defect and the proximity to free margins a tissue cultured epidermal autograft was deemed most appropriate.  The graft was then trimmed to fit the size of the defect.  The graft was then placed in the primary defect and oriented appropriately.
Estlander Flap (Lower To Upper Lip) Text: The defect of the lower lip was assessed and measured.  Given the location and size of the defect, an Estlander flap was deemed most appropriate. Using a sterile surgical marker, an appropriate Estlander flap was measured and drawn on the upper lip. Local anesthesia was then infiltrated. A scalpel was then used to incise the lateral aspect of the flap, through skin, muscle and mucosa, leaving the flap pedicled medially.  The flap was then rotated and positioned to fill the lower lip defect.  The flap was then sutured into place with a three layer technique, closing the orbicularis oris muscle layer with subcutaneous buried sutures, followed by a mucosal layer and an epidermal layer.
Crescentic Advancement Flap Text: The defect edges were debeveled with a #15 scalpel blade. Given the location of the defect and the proximity to free margins a crescentic advancement flap was deemed most appropriate. Using a sterile surgical marker, the appropriate advancement flap was drawn incorporating the defect and placing the expected incisions within the relaxed skin tension lines where possible. The area thus outlined was incised deep to adipose tissue with a #15 scalpel blade. The skin margins were undermined to an appropriate distance in all directions utilizing iris scissors. Following this, the designed flap was advanced and carried over into the primary defect and sutured into place.
Crescentic Intermediate Repair Preamble Text (Leave Blank If You Do Not Want): Undermining was performed with blunt dissection.
Graft Donor Site Bandage (Optional-Leave Blank If You Don't Want In Note): Telfa and a pressure bandage were applied to the donor site.
Anesthesia Volume In Cc: 6
Adjacent Tissue Transfer Text: The defect edges were debeveled with a #15 scalpel blade. Given the location of the defect and the proximity to free margins an adjacent tissue transfer was deemed most appropriate. Using a sterile surgical marker, an appropriate flap was drawn incorporating the defect and placing the expected incisions within the relaxed skin tension lines where possible. The area thus outlined was incised deep to adipose tissue with a #15 scalpel blade. The skin margins were undermined to an appropriate distance in all directions utilizing iris scissors and carried over to close the primary defect.
Mercedes Flap Text: The defect edges were debeveled with a #15 scalpel blade. Given the location of the defect, shape of the defect and the proximity to free margins a Mercedes flap was deemed most appropriate. Using a sterile surgical marker, an appropriate advancement flap was drawn incorporating the defect and placing the expected incisions within the relaxed skin tension lines where possible. The area thus outlined was incised deep to adipose tissue with a #15 scalpel blade. The skin margins were undermined to an appropriate distance in all directions utilizing iris scissors. Following this, the designed flap was advanced and carried over into the primary defect and sutured into place.
Rectangular Flap Text: The defect edges were debeveled with a #15 scalpel blade. Given the location of the defect and the proximity to free margins a rectangular flap was deemed most appropriate. Using a sterile surgical marker, an appropriate rectangular flap was drawn incorporating the defect. The area thus outlined was incised deep to adipose tissue with a #15 scalpel blade. The skin margins were undermined to an appropriate distance in all directions utilizing iris scissors. Following this, the designed flap was carried over into the primary defect and sutured into place.
Undermining Type: Entire Wound
Cartilage Graft Text: The defect edges were debeveled with a #15 scalpel blade. Given the location of the defect, shape of the defect, the fact the defect involved a full thickness cartilage defect a cartilage graft was deemed most appropriate.  An appropriate donor site was identified, cleansed, and anesthetized. The cartilage graft was then harvested and transferred to the recipient site, oriented appropriately and then sutured into place.  The secondary defect was then repaired using a primary closure.
Tarsorrhaphy Text: A tarsorrhaphy was performed using Frost sutures.
Anesthesia Type: 1% lidocaine with epinephrine
Bilateral Helical Rim Advancement Flap Text: The defect edges were debeveled with a #15 blade scalpel.  Given the location of the defect and the proximity to free margins (helical rim) a bilateral helical rim advancement flap was deemed most appropriate. Using a sterile surgical marker, the appropriate advancement flaps were drawn incorporating the defect and placing the expected incisions between the helical rim and antihelix where possible.  The area thus outlined was incised through and through with a #15 scalpel blade.  With a skin hook and iris scissors, the flaps were gently and sharply undermined and freed up. Following this, the designed flaps were placed into the primary defect and sutured into place.
Epidermal Sutures: 2-0 Ethilon
Secondary Intention Text (Leave Blank If You Do Not Want): The defect will heal with secondary intention.
Complex Repair Preamble Text (Leave Blank If You Do Not Want): Extensive wide undermining was performed.
Referred To Oculoplastics For Closure Text (Leave Blank If You Do Not Want): After obtaining clear surgical margins the patient was sent to oculoplastics for surgical repair.  The patient understands they will receive post-surgical care and follow-up from the referring physician's office.
V-Y Flap Text: The defect edges were debeveled with a #15 scalpel blade. Given the location of the defect, shape of the defect and the proximity to free margins a V-Y flap was deemed most appropriate. Using a sterile surgical marker, an appropriate advancement flap was drawn incorporating the defect and placing the expected incisions within the relaxed skin tension lines where possible. The area thus outlined was incised deep to adipose tissue with a #15 scalpel blade. The skin margins were undermined to an appropriate distance in all directions utilizing iris scissors. Following this, the designed flap was advanced and carried over into the primary defect and sutured into place.
Helical Rim Advancement Flap Text: The defect edges were debeveled with a #15 blade scalpel.  Given the location of the defect and the proximity to free margins (helical rim) a double helical rim advancement flap was deemed most appropriate. Using a sterile surgical marker, the appropriate advancement flaps were drawn incorporating the defect and placing the expected incisions between the helical rim and antihelix where possible.  The area thus outlined was incised through and through with a #15 scalpel blade.  With a skin hook and iris scissors, the flaps were gently and sharply undermined and freed up. Folllowing this, the designed flaps were carried over into the primary defect and sutured into place.
Posterior Auricular Interpolation Flap Division And Inset Text: Division and inset of the posterior auricular interpolation flap was performed to achieve optimal aesthetic result, restore normal anatomic appearance and avoid distortion of normal anatomy, expedite and facilitate wound healing, achieve optimal functional result and because linear closure either not possible or would produce suboptimal result. The patient was prepped and draped in the usual manner. The pedicle was infiltrated with local anesthesia. The pedicle was sectioned with a #15 blade. The pedicle was de-bulked and trimmed to match the shape of the defect. Hemostasis was achieved. The flap donor site and free margin of the flap were secured with deep buried sutures and the wound edges were re-approximated.
Orbicularis Oris Muscle Flap Text: The defect edges were debeveled with a #15 scalpel blade.  Given that the defect affected the competency of the oral sphincter an orbicularis oris muscle flap was deemed most appropriate to restore this competency and normal muscle function.  Using a sterile surgical marker, an appropriate flap was drawn incorporating the defect. The area thus outlined was incised with a #15 scalpel blade. Following this, the designed flap was carried over into the primary defect and sutured into place.
Cheiloplasty (Complex) Text: A decision was made to reconstruct the defect with a  cheiloplasty.  The defect was undermined extensively.  Additional orbicularis oris muscle was excised with a 15 blade scalpel.  The defect was converted into a full thickness wedge to facilite a better cosmetic result.  Small vessels were then tied off with 5-0 monocyrl. The orbicularis oris, superficial fascia, adipose and dermis were then reapproximated.  After the deeper layers were approximated the epidermis was reapproximated with particular care given to realign the vermilion border.
Purse String (Simple) Text: Given the location of the defect and the characteristics of the surrounding skin a purse string closure was deemed most appropriate.  Undermining was performed circumferentially around the surgical defect.  A purse string suture was then placed and tightened.
Interpolation Flap Text: A decision was made to reconstruct the defect utilizing an interpolation axial flap and a staged reconstruction.  A telfa template was made of the defect.  This telfa template was then used to outline the interpolation flap.  The donor area for the pedicle flap was then injected with anesthesia.  The flap was excised through the skin and subcutaneous tissue down to the layer of the underlying musculature.  The interpolation flap was carefully excised within this deep plane to maintain its blood supply.  The edges of the donor site were undermined.   The donor site was closed in a primary fashion.  The pedicle was then rotated into position and sutured.  Once the tube was sutured into place, adequate blood supply was confirmed with blanching and refill.  The pedicle was then wrapped with xeroform gauze and dressed appropriately with a telfa and gauze bandage to ensure continued blood supply and protect the attached pedicle.
Double Island Pedicle Flap Text: The defect edges were debeveled with a #15 scalpel blade. Given the location of the defect, shape of the defect and the proximity to free margins a double island pedicle advancement flap was deemed most appropriate. Using a sterile surgical marker, an appropriate advancement flap was drawn incorporating the defect, outlining the appropriate donor tissue and placing the expected incisions within the relaxed skin tension lines where possible. The area thus outlined was incised deep to adipose tissue with a #15 scalpel blade. The skin margins were undermined to an appropriate distance in all directions around the primary defect and laterally outward around the island pedicle utilizing iris scissors.  There was minimal undermining beneath the pedicle flap. Following this, the flap was carried over into the primary defect and sutured into place.
Brow Lift Text: A midfrontal incision was made medially to the defect to allow access to the tissues just superior to the left eyebrow. Following careful dissection inferiorly in a supraperiosteal plane to the level of the left eyebrow, several 3-0 monocryl sutures were used to resuspend the eyebrow orbicularis oculi muscular unit to the superior frontal bone periosteum. This resulted in an appropriate reapproximation of static eyebrow symmetry and correction of the left brow ptosis.
Closure 2 Information: This tab is for additional flaps and grafts, including complex repair and grafts and complex repair and flaps. You can also specify a different location for the additional defect, if the location is the same you do not need to select a new one. We will insert the automated text for the repair you select below just as we do for solitary flaps and grafts. Please note that at this time if you select a location with a different insurance zone you will need to override the ICD10 and CPT if appropriate.
H Plasty Text: Given the location of the defect, shape of the defect and the proximity to free margins a H-plasty was deemed most appropriate for repair. Using a sterile surgical marker, the appropriate advancement arms of the H-plasty were drawn incorporating the defect and placing the expected incisions within the relaxed skin tension lines where possible. The area thus outlined was incised deep to adipose tissue with a #15 scalpel blade. The skin margins were undermined to an appropriate distance in all directions utilizing iris scissors.  The opposing advancement arms were then advanced and carried over into place in opposite direction and anchored with interrupted buried subcutaneous sutures.
V-Y Plasty Text: The defect edges were debeveled with a #15 scalpel blade. Given the location of the defect, shape of the defect and the proximity to free margins an V-Y advancement flap was deemed most appropriate. Using a sterile surgical marker, an appropriate advancement flap was drawn incorporating the defect and placing the expected incisions within the relaxed skin tension lines where possible. The area thus outlined was incised deep to adipose tissue with a #15 scalpel blade. The skin margins were undermined to an appropriate distance in all directions utilizing iris scissors. Following this, the designed flap was advanced and carried over into the primary defect and sutured into place.
Closure 3 Information: This tab is for additional flaps and grafts above and beyond our usual structured repairs.  Please note if you enter information here it will not currently bill and you will need to add the billing information manually.
Manual Repair Warning Statement: We plan on removing the manually selected variable below in favor of our much easier automatic structured text blocks found in the previous tab. We decided to do this to help make the flow better and give you the full power of structured data. Manual selection is never going to be ideal in our platform and I would encourage you to avoid using manual selection from this point on, especially since I will be sunsetting this feature. It is important that you do one of two things with the customized text below. First, you can save all of the text in a word file so you can have it for future reference. Second, transfer the text to the appropriate area in the Library tab. Lastly, if there is a flap or graft type which we do not have you need to let us know right away so I can add it in before the variable is hidden. No need to panic, we plan to give you roughly 6 months to make the change.
O-T Advancement Flap Text: The defect edges were debeveled with a #15 scalpel blade. Given the location of the defect, shape of the defect and the proximity to free margins an O-T advancement flap was deemed most appropriate. Using a sterile surgical marker, an appropriate advancement flap was drawn incorporating the defect and placing the expected incisions within the relaxed skin tension lines where possible. The area thus outlined was incised deep to adipose tissue with a #15 scalpel blade. The skin margins were undermined to an appropriate distance in all directions utilizing iris scissors. Following this, the designed flap was advanced and carried over into the primary defect and sutured into place.
Ear Wedge Repair Text: A wedge excision was completed by carrying down an excision through the full thickness of the ear and cartilage with an inward facing Burow's triangle. The wound was then closed in a layered fashion.
Purse String (Intermediate) Text: Given the location of the defect and the characteristics of the surrounding skin a purse string intermediate closure was deemed most appropriate.  Undermining was performed circumferentially around the surgical defect.  A purse string suture was then placed and tightened.
Unna Boot Text: An Unna boot was placed to help immobilize the limb and facilitate more rapid healing.
Melolabial Interpolation Flap Text: A decision was made to reconstruct the defect utilizing an interpolation axial flap and a staged reconstruction.  A telfa template was made of the defect.  This telfa template was then used to outline the melolabial interpolation flap.  The donor area for the pedicle flap was then injected with anesthesia.  The flap was excised through the skin and subcutaneous tissue down to the layer of the underlying musculature.  The pedicle flap was carefully excised within this deep plane to maintain its blood supply.  The edges of the donor site were undermined.   The donor site was closed in a primary fashion.  The pedicle was then rotated into position and sutured.  Once the tube was sutured into place, adequate blood supply was confirmed with blanching and refill.  The pedicle was then wrapped with xeroform gauze and dressed appropriately with a telfa and gauze bandage to ensure continued blood supply and protect the attached pedicle.
Repair Type: Complex Repair
Retention Suture Bite Size: 3 mm
Simple / Intermediate / Complex Repair - Final Wound Length In Cm: 4.7
Referred To Asc For Closure Text (Leave Blank If You Do Not Want): After obtaining clear surgical margins the patient was sent to an ASC for surgical repair.  The patient understands they will receive post-surgical care and follow-up from the ASC physician.
Which Eyelid Repair Cpt Are You Using?: 66311
Muscle Hinge Flap Text: The defect edges were debeveled with a #15 scalpel blade.  Given the size, depth and location of the defect and the proximity to free margins a muscle hinge flap was deemed most appropriate. Using a sterile surgical marker, an appropriate hinge flap was drawn incorporating the defect. The area thus outlined was incised with a #15 scalpel blade. The skin margins were undermined to an appropriate distance in all directions utilizing iris scissors. Following this, the designed flap was carried into the primary defect and sutured into place.
Rotation Flap Text: The defect edges were debeveled with a #15 scalpel blade. Given the location of the defect, shape of the defect and the proximity to free margins a rotation flap was deemed most appropriate. Using a sterile surgical marker, an appropriate rotation flap was drawn incorporating the defect and placing the expected incisions within the relaxed skin tension lines where possible. The area thus outlined was incised deep to adipose tissue with a #15 scalpel blade. The skin margins were undermined to an appropriate distance in all directions utilizing iris scissors. Following this, the designed flap was carried over into the primary defect and sutured into place.
Length To Time In Minutes Device Was In Place: 10
Advancement-Rotation Flap Text: The defect edges were debeveled with a #15 scalpel blade. Given the location of the defect, shape of the defect and the proximity to free margins an advancement-rotation flap was deemed most appropriate. Using a sterile surgical marker, an appropriate flap was drawn incorporating the defect and placing the expected incisions within the relaxed skin tension lines where possible. The area thus outlined was incised deep to adipose tissue with a #15 scalpel blade. The skin margins were undermined to an appropriate distance in all directions utilizing iris scissors. Following this, the designed flap was carried over into the primary defect and sutured into place.
Dermal Autograft Text: The defect edges were debeveled with a #15 scalpel blade. Given the location of the defect, shape of the defect and the proximity to free margins a dermal autograft was deemed most appropriate. Using a sterile surgical marker, the primary defect shape was transferred to the donor site. The area thus outlined was incised deep to adipose tissue with a #15 scalpel blade.  The harvested graft was then trimmed of adipose and epidermal tissue until only dermis was left.  The skin graft was then placed in the primary defect and oriented appropriately.
Complex Repair And Flap Additional Text (Will Appearing After The Standard Complex Repair Text): The complex repair was not sufficient to completely close the primary defect. The remaining additional defect was repaired with the flap mentioned below.
Bilobed Transposition Flap Text: The defect edges were debeveled with a #15 scalpel blade. Given the location of the defect and the proximity to free margins a bilobed transposition flap was deemed most appropriate. Using a sterile surgical marker, an appropriate bilobe flap drawn around the defect. The area thus outlined was incised deep to adipose tissue with a #15 scalpel blade. The skin margins were undermined to an appropriate distance in all directions utilizing iris scissors. Following this, the designed flap was carried over into the primary defect and sutured into place.
Abbe Flap (Lower To Upper Lip) Text: The defect of the upper lip was assessed and measured.  Given the location and size of the defect, an Abbe flap was deemed most appropriate. Using a sterile surgical marker, an appropriate Abbe flap was measured and drawn on the lower lip. Local anesthesia was then infiltrated. A scalpel was then used to incise the upper lip through and through the skin, vermilion, muscle and mucosa, leaving the flap pedicled on the opposite side.  The flap was then rotated and transferred to the lower lip defect.  The flap was then sutured into place with a three layer technique, closing the orbicularis oris muscle layer with subcutaneous buried sutures, followed by a mucosal layer and an epidermal layer.
Double O-Z Flap Text: The defect edges were debeveled with a #15 scalpel blade. Given the location of the defect, shape of the defect and the proximity to free margins a Double O-Z flap was deemed most appropriate. Using a sterile surgical marker, an appropriate transposition flap was drawn incorporating the defect and placing the expected incisions within the relaxed skin tension lines where possible. The area thus outlined was incised deep to adipose tissue with a #15 scalpel blade. The skin margins were undermined to an appropriate distance in all directions utilizing iris scissors. Following this, the designed flap was carried over into the primary defect and sutured into place.
Flap Thinning Complex Repair Preamble Text (Leave Blank If You Do Not Want): An incision was made along the previous flap suture line. Undermining was performed beneath the flap and redundant tissue was removed to restore the normal contour of the skin.
Island Pedicle Flap With Canthal Suspension Text: The defect edges were debeveled with a #15 scalpel blade. Given the location of the defect, shape of the defect and the proximity to free margins an island pedicle advancement flap was deemed most appropriate. Using a sterile surgical marker, an appropriate advancement flap was drawn incorporating the defect, outlining the appropriate donor tissue and placing the expected incisions within the relaxed skin tension lines where possible. The area thus outlined was incised deep to adipose tissue with a #15 scalpel blade. The skin margins were undermined to an appropriate distance in all directions around the primary defect and laterally outward around the island pedicle utilizing iris scissors.  There was minimal undermining beneath the pedicle flap. A suspension suture was placed in the canthal tendon to prevent tension and prevent ectropion. Following this, the designed flap was placed into the primary defect and sutured into place.
Z Plasty Text: The lesion was extirpated to the level of the fat with a #15 scalpel blade. Given the location of the defect, shape of the defect and the proximity to free margins a Z-plasty was deemed most appropriate for repair. Using a sterile surgical marker, the appropriate transposition arms of the Z-plasty were drawn incorporating the defect and placing the expected incisions within the relaxed skin tension lines where possible. The area thus outlined was incised deep to adipose tissue with a #15 scalpel blade. The skin margins were undermined to an appropriate distance in all directions utilizing iris scissors. The opposing transposition arms were then transposed and carried over into place in opposite direction and anchored with interrupted buried subcutaneous sutures.
O-L Flap Text: The defect edges were debeveled with a #15 scalpel blade. Given the location of the defect, shape of the defect and the proximity to free margins an O-L flap was deemed most appropriate. Using a sterile surgical marker, an appropriate advancement flap was drawn incorporating the defect and placing the expected incisions within the relaxed skin tension lines where possible. The area thus outlined was incised deep to adipose tissue with a #15 scalpel blade. The skin margins were undermined to an appropriate distance in all directions utilizing iris scissors. Following this, the designed flap was advanced and carried over into the primary defect and sutured into place.
Deep Sutures: 2-0 Vicryl
Suture Removal: 21 days
Post-Care Instructions: I reviewed with the patient in detail post-care instructions. Patient is not to engage in any heavy lifting, exercise, or swimming for the next 14 days. Should the patient develop any fevers, chills, bleeding, severe pain patient will contact the office immediately.
Vermilion Border Text: The closure involved the vermilion border.
Ftsg Text: The defect edges were debeveled with a #15 scalpel blade. Given the location of the defect, shape of the defect and the proximity to free margins a full thickness skin graft was deemed most appropriate. Using a sterile surgical marker, the primary defect shape was transferred to the donor site. The area thus outlined was incised deep to adipose tissue with a #15 scalpel blade.  The harvested graft was then trimmed of adipose tissue until only dermis and epidermis was left.  The skin graft was then placed in the primary defect and oriented appropriately.
Partial Purse String (Intermediate) Text: Given the location of the defect and the characteristics of the surrounding skin an intermediate purse string closure was deemed most appropriate.  Undermining was performed circumferentially around the surgical defect.  A purse string suture was then placed and tightened. Wound tension only allowed a partial closure of the circular defect.
Suturegard Body: The suture ends were repeatedly re-tightened and re-clamped to achieve the desired tissue expansion.
Hemostasis: Electrocautery
Paramedian Forehead Flap Text: A decision was made to reconstruct the defect utilizing an interpolation axial flap and a staged reconstruction.  A telfa template was made of the defect.  This telfa template was then used to outline the paramedian forehead pedicle flap.  The donor area for the pedicle flap was then injected with anesthesia.  The flap was excised through the skin and subcutaneous tissue down to the layer of the underlying musculature.  The pedicle flap was carefully excised within this deep plane to maintain its blood supply.  The edges of the donor site were undermined.   The donor site was closed in a primary fashion.  The pedicle was then rotated into position and sutured.  Once the tube was sutured into place, adequate blood supply was confirmed with blanching and refill.  The pedicle was then wrapped with xeroform gauze and dressed appropriately with a telfa and gauze bandage to ensure continued blood supply and protect the attached pedicle.
Graft Cartilage Fenestration Text: The cartilage was fenestrated with a 2mm punch biopsy to help facilitate graft survival and healing.
Double Z Plasty Text: The lesion was extirpated to the level of the fat with a #15 scalpel blade. Given the location of the defect, shape of the defect and the proximity to free margins a double Z-plasty was deemed most appropriate for repair. Using a sterile surgical marker, the appropriate transposition arms of the double Z-plasty were drawn incorporating the defect and placing the expected incisions within the relaxed skin tension lines where possible. The area thus outlined was incised deep to adipose tissue with a #15 scalpel blade. The skin margins were undermined to an appropriate distance in all directions utilizing iris scissors. The opposing transposition arms were then transposed and carried over into place in opposite direction and anchored with interrupted buried subcutaneous sutures.
Repair Performed By Another Provider Text (Leave Blank If You Do Not Want): After obtaining clear surgical margins the defect was repaired by another provider.
Validate Note Data (See Information Below): Yes
Nasal Turnover Hinge Flap Text: The defect edges were debeveled with a #15 scalpel blade.  Given the size, depth, location of the defect and the defect being full thickness a nasal turnover hinge flap was deemed most appropriate. Using a sterile surgical marker, an appropriate hinge flap was drawn incorporating the defect. The area thus outlined was incised with a #15 scalpel blade. The flap was designed to recreate the nasal mucosal lining and the alar rim. The skin margins were undermined to an appropriate distance in all directions utilizing iris scissors. Following this, the designed flap was carried over into the primary defect and sutured into place
Cheek To Nose Interpolation Flap Division And Inset Text: Division and inset of the cheek to nose interpolation flap was performed to achieve optimal aesthetic result, restore normal anatomic appearance and avoid distortion of normal anatomy, expedite and facilitate wound healing, achieve optimal functional result and because linear closure either not possible or would produce suboptimal result. The patient was prepped and draped in the usual manner. The pedicle was infiltrated with local anesthesia. The pedicle was sectioned with a #15 blade. The pedicle was de-bulked and trimmed to match the shape of the defect. Hemostasis was achieved. The flap donor site and free margin of the flap were secured with deep buried sutures and the wound edges were re-approximated.
Staged Advancement Flap Text: The defect edges were debeveled with a #15 scalpel blade. Given the location of the defect, shape of the defect and the proximity to free margins a staged advancement flap was deemed most appropriate. Using a sterile surgical marker, an appropriate advancement flap was drawn incorporating the defect and placing the expected incisions within the relaxed skin tension lines where possible. The area thus outlined was incised deep to adipose tissue with a #15 scalpel blade. The skin margins were undermined to an appropriate distance in all directions utilizing iris scissors. Following this, the designed flap was carried over into the primary defect and sutured into place.
Skin Substitute Paste Text: The defect edges were debeveled with a #15 scalpel blade. Given the location of the defect, shape of the defect and the proximity to free margins a skin substitute micronized graft was deemed most appropriate.  The entire vial contents were admixed with 0.5ccs of sterile saline, formed into a paste and then evenly spread over the entire wound bed.
A-T Advancement Flap Text: The defect edges were debeveled with a #15 scalpel blade. Given the location of the defect, shape of the defect and the proximity to free margins an A-T advancement flap was deemed most appropriate. Using a sterile surgical marker, an appropriate advancement flap was drawn incorporating the defect and placing the expected incisions within the relaxed skin tension lines where possible. The area thus outlined was incised deep to adipose tissue with a #15 scalpel blade. The skin margins were undermined to an appropriate distance in all directions utilizing iris scissors. Following this, the designed flap was advanced and carried over into the primary defect and sutured into place.
Epidermal Closure Graft Donor Site (Optional): simple interrupted
Burow's Advancement Flap Text: The defect edges were debeveled with a #15 scalpel blade. Given the location of the defect and the proximity to free margins a Burow's advancement flap was deemed most appropriate. Using a sterile surgical marker, the appropriate advancement flap was drawn incorporating the defect and placing the expected incisions within the relaxed skin tension lines where possible. The area thus outlined was incised deep to adipose tissue with a #15 scalpel blade. The skin margins were undermined to an appropriate distance in all directions utilizing iris scissors. Following this, the designed flap was advanced and carried over into the primary defect and sutured into place.
Cheek Interpolation Flap Text: A decision was made to reconstruct the defect utilizing an interpolation axial flap and a staged reconstruction.  A telfa template was made of the defect.  This telfa template was then used to outline the Cheek Interpolation flap.  The donor area for the pedicle flap was then injected with anesthesia.  The flap was excised through the skin and subcutaneous tissue down to the layer of the underlying musculature.  The interpolation flap was carefully excised within this deep plane to maintain its blood supply.  The edges of the donor site were undermined.   The donor site was closed in a primary fashion.  The pedicle was then rotated into position and sutured.  Once the tube was sutured into place, adequate blood supply was confirmed with blanching and refill.  The pedicle was then wrapped with xeroform gauze and dressed appropriately with a telfa and gauze bandage to ensure continued blood supply and protect the attached pedicle.
Body Location Override (Optional - Billing Will Still Be Based On Selected Body Map Location If Applicable): left medial proximal pretibial region
Ear Star Wedge Flap Text: The defect edges were debeveled with a #15 blade scalpel.  Given the location of the defect and the proximity to free margins (helical rim) an ear star wedge flap was deemed most appropriate. Using a sterile surgical marker, the appropriate flap was drawn incorporating the defect and placing the expected incisions between the helical rim and antihelix where possible.  The area thus outlined was incised through and through with a #15 scalpel blade. Following this, the designed flap was carried over into the primary defect and sutured into place.
Melolabial Interpolation Flap Division And Inset Text: Division and inset of the melolabial interpolation flap was performed to achieve optimal aesthetic result, restore normal anatomic appearance and avoid distortion of normal anatomy, expedite and facilitate wound healing, achieve optimal functional result and because linear closure either not possible or would produce suboptimal result. The patient was prepped and draped in the usual manner. The pedicle was infiltrated with local anesthesia. The pedicle was sectioned with a #15 blade. The pedicle was de-bulked and trimmed to match the shape of the defect. Hemostasis was achieved. The flap donor site and free margin of the flap were secured with deep buried sutures and the wound edges were re-approximated.
Nasalis-Muscle-Based Myocutaneous Island Pedicle Flap Text: Using a #15 blade, an incision was made around the donor flap to the level of the nasalis muscle. Wide lateral undermining was then performed in both the subcutaneous plane above the nasalis muscle, and in a submuscular plane just above periosteum. This allowed the formation of a free nasalis muscle axial pedicle (based on the angular artery) which was still attached to the actual cutaneous flap, increasing its mobility and vascular viability. Hemostasis was obtained with pinpoint electrocoagulation. The flap was mobilized into position and the pivotal anchor points positioned and stabilized with buried interrupted sutures. Subcutaneous and dermal tissues were closed in a multilayered fashion with sutures. Tissue redundancies were excised, and the epidermal edges were apposed without significant tension and sutured with sutures.
Star Wedge Flap Text: The defect edges were debeveled with a #15 scalpel blade. Given the location of the defect, shape of the defect and the proximity to free margins a star wedge flap was deemed most appropriate. Using a sterile surgical marker, an appropriate rotation flap was drawn incorporating the defect and placing the expected incisions within the relaxed skin tension lines where possible. The area thus outlined was incised deep to adipose tissue with a #15 scalpel blade. The skin margins were undermined to an appropriate distance in all directions utilizing iris scissors. Following this, the designed flap was carried over into the primary defect and sutured into place.
Hemigard Intro: Due to skin fragility and wound tension, it was decided to use HEMIGARD adhesive retention suture devices to permit a linear closure. The skin was cleaned and dried for a 6cm distance away from the wound. Excessive hair, if present, was removed to allow for adhesion.
Skin Substitute Injection Text: The defect edges were debeveled with a #15 scalpel blade. Given the location of the defect, shape of the defect and the proximity to free margins a skin substitute micronized graft was deemed most appropriate.  The entire vial contents were admixed with 3.0ccs of sterile saline and then injected subcutaneously throughout the entire wound bed.
Banner Transposition Flap Text: The defect edges were debeveled with a #15 scalpel blade. Given the location of the defect and the proximity to free margins a Banner transposition flap was deemed most appropriate. Using a sterile surgical marker, an appropriate flap was drawn around the defect. The area thus outlined was incised deep to adipose tissue with a #15 scalpel blade. The skin margins were undermined to an appropriate distance in all directions utilizing iris scissors. Following this, the designed flap was carried into the primary defect and sutured into place.
Chonodrocutaneous Helical Advancement Flap Text: The defect edges were debeveled with a #15 scalpel blade. Given the location of the defect and the proximity to free margins a chondrocutaneous helical advancement flap was deemed most appropriate. Using a sterile surgical marker, the appropriate advancement flap was drawn incorporating the defect and placing the expected incisions within the relaxed skin tension lines where possible. The area thus outlined was incised deep to adipose tissue with a #15 scalpel blade. The skin margins were undermined to an appropriate distance in all directions utilizing iris scissors. Following this, the designed flap was advanced and carried over into the primary defect and sutured into place.
Cheek-To-Nose Interpolation Flap Text: A decision was made to reconstruct the defect utilizing an interpolation axial flap and a staged reconstruction.  A telfa template was made of the defect.  This telfa template was then used to outline the Cheek-To-Nose Interpolation flap.  The donor area for the pedicle flap was then injected with anesthesia.  The flap was excised through the skin and subcutaneous tissue down to the layer of the underlying musculature.  The interpolation flap was carefully excised within this deep plane to maintain its blood supply.  The edges of the donor site were undermined.   The donor site was closed in a primary fashion.  The pedicle was then rotated into position and sutured.  Once the tube was sutured into place, adequate blood supply was confirmed with blanching and refill.  The pedicle was then wrapped with xeroform gauze and dressed appropriately with a telfa and gauze bandage to ensure continued blood supply and protect the attached pedicle.
Wound Care: Petrolatum
Melolabial Transposition Flap Text: The defect edges were debeveled with a #15 scalpel blade. Given the location of the defect and the proximity to free margins a melolabial flap was deemed most appropriate. Using a sterile surgical marker, an appropriate melolabial transposition flap was drawn incorporating the defect. The area thus outlined was incised deep to adipose tissue with a #15 scalpel blade. The skin margins were undermined to an appropriate distance in all directions utilizing iris scissors. Following this, the designed flap was carried over into the primary defect and sutured into place.
Peng Advancement Flap Text: The defect edges were debeveled with a #15 scalpel blade. Given the location of the defect, shape of the defect and the proximity to free margins a Peng advancement flap was deemed most appropriate. Using a sterile surgical marker, an appropriate advancement flap was drawn incorporating the defect and placing the expected incisions within the relaxed skin tension lines where possible. The area thus outlined was incised deep to adipose tissue with a #15 scalpel blade. The skin margins were undermined to an appropriate distance in all directions utilizing iris scissors. Following this, the designed flap was advanced and carried over into the primary defect and sutured into place.
Distance Of Undermining In Cm (Required): 2
Burow's Graft Text: The defect edges were debeveled with a #15 scalpel blade. Given the location of the defect, shape of the defect, the proximity to free margins and the presence of a standing cone deformity a Burow's skin graft was deemed most appropriate. The standing cone was removed and this tissue was then trimmed to the shape of the primary defect. The adipose tissue was also removed until only dermis and epidermis were left.  The skin graft was then placed in the primary defect and oriented appropriately.
Localized Dermabrasion With 15 Blade Text: The patient was draped in routine manner.  Localized dermabrasion using a 15 blade was performed in routine manner to papillary dermis. This spot dermabrasion is being performed to complete skin cancer reconstruction. It also will eliminate the other sun damaged precancerous cells that are known to be part of the regional effect of a lifetime's worth of sun exposure. This localized dermabrasion is therapeutic and should not be considered cosmetic in any regard.
Dressing: pressure dressing with telfa
Preparation Of Recipient Site - Flap Takedown: The eschar and granulation tissue was removed surgically with sharp dissection to facilitate appropriate healing after division and inset of the proximal and distal interpolation flap.
Trilobed Flap Text: The defect edges were debeveled with a #15 scalpel blade. Given the location of the defect and the proximity to free margins a trilobed flap was deemed most appropriate. Using a sterile surgical marker, an appropriate trilobed flap was drawn around the defect. The area thus outlined was incised deep to adipose tissue with a #15 scalpel blade. The skin margins were undermined to an appropriate distance in all directions utilizing iris scissors. Following this, the designed flap was carried into the primary defect and sutured into place.
Hatchet Flap Text: The defect edges were debeveled with a #15 scalpel blade. Given the location of the defect, shape of the defect and the proximity to free margins a hatchet flap was deemed most appropriate. Using a sterile surgical marker, an appropriate hatchet flap was drawn incorporating the defect and placing the expected incisions within the relaxed skin tension lines where possible. The area thus outlined was incised deep to adipose tissue with a #15 scalpel blade. The skin margins were undermined to an appropriate distance in all directions utilizing iris scissors. Following this, the designed flap was carried over into the primary defect and sutured into place.
Paramedian Forehead Flap Division And Inset Text: Division and inset of the paramedian forehead flap was performed to achieve optimal aesthetic result, restore normal anatomic appearance and avoid distortion of normal anatomy, expedite and facilitate wound healing, achieve optimal functional result and because linear closure either not possible or would produce suboptimal result. The patient was prepped and draped in the usual manner. The pedicle was infiltrated with local anesthesia. The pedicle was sectioned with a #15 blade. The pedicle was de-bulked and trimmed to match the shape of the defect. Hemostasis was achieved. The flap donor site and free margin of the flap were secured with deep buried sutures and the wound edges were re-approximated.
Nasolabial Transposition Flap Text: The defect edges were debeveled with a #15 scalpel blade.  Given the size, depth and location of the defect and the proximity to free margins a nasolabial transposition flap was deemed most appropriate. Using a sterile surgical marker, an appropriate flap was drawn incorporating the defect. The area thus outlined was incised with a #15 scalpel blade. The skin margins were undermined to an appropriate distance in all directions utilizing iris scissors. Following this, the designed flap was carried into the primary defect and sutured into place.
Number Of Hemigard Strips Per Side: 1
Cheiloplasty (Less Than 50%) Text: A decision was made to reconstruct the defect with a  cheiloplasty.  The defect was undermined extensively.  Additional orbicularis oris muscle was excised with a 15 blade scalpel.  The defect was converted into a full thickness wedge, of less than 50% of the vertical height of the lip, to facilite a better cosmetic result.  Small vessels were then tied off with 5-0 monocyrl. The orbicularis oris, superficial fascia, adipose and dermis were then reapproximated.  After the deeper layers were approximated the epidermis was reapproximated with particular care given to realign the vermilion border.
Xenograft Text: The defect edges were debeveled with a #15 scalpel blade. Given the location of the defect, shape of the defect and the proximity to free margins a xenograft was deemed most appropriate.  The graft was then trimmed to fit the size of the defect.  The graft was then placed in the primary defect and oriented appropriately.
Information: Selecting Yes will display possible errors in your note based on the variables you have selected. This validation is only offered as a suggestion for you. PLEASE NOTE THAT THE VALIDATION TEXT WILL BE REMOVED WHEN YOU FINALIZE YOUR NOTE. IF YOU WANT TO FAX A PRELIMINARY NOTE YOU WILL NEED TO TOGGLE THIS TO 'NO' IF YOU DO NOT WANT IT IN YOUR FAXED NOTE.
Dorsal Nasal Flap Text: The defect edges were debeveled with a #15 scalpel blade. Given the location of the defect and the proximity to free margins a dorsal nasal flap was deemed most appropriate. Using a sterile surgical marker, an appropriate dorsal nasal flap was drawn around the defect. The area thus outlined was incised deep to adipose tissue with a #15 scalpel blade. The skin margins were undermined to an appropriate distance in all directions utilizing iris scissors. Following this, the designed flap was carried into the primary defect and sutured into place.
Skin Substitute: EpiFix Micronized
Modified Advancement Flap Text: The defect edges were debeveled with a #15 scalpel blade. Given the location of the defect, shape of the defect and the proximity to free margins a modified advancement flap was deemed most appropriate. Using a sterile surgical marker, an appropriate advancement flap was drawn incorporating the defect and placing the expected incisions within the relaxed skin tension lines where possible. The area thus outlined was incised deep to adipose tissue with a #15 scalpel blade. The skin margins were undermined to an appropriate distance in all directions utilizing iris scissors. Following this, the designed flap was advanced and carried over into the primary defect and sutured into place.
Rhombic Flap Text: The defect edges were debeveled with a #15 scalpel blade. Given the location of the defect and the proximity to free margins a rhombic flap was deemed most appropriate. Using a sterile surgical marker, an appropriate rhombic flap was drawn incorporating the defect. The area thus outlined was incised deep to adipose tissue with a #15 scalpel blade. The skin margins were undermined to an appropriate distance in all directions utilizing iris scissors. Following this, the designed flap was carried over into the primary defect and sutured into place.
Advancement Flap (Single) Text: The defect edges were debeveled with a #15 scalpel blade. Given the location of the defect and the proximity to free margins a single advancement flap was deemed most appropriate. Using a sterile surgical marker, an appropriate advancement flap was drawn incorporating the defect and placing the expected incisions within the relaxed skin tension lines where possible. The area thus outlined was incised deep to adipose tissue with a #15 scalpel blade. The skin margins were undermined to an appropriate distance in all directions utilizing iris scissors. Following this, the designed flap was advanced and carried over into the primary defect and sutured into place.
Composite Graft Text: The defect edges were debeveled with a #15 scalpel blade. Given the location of the defect, shape of the defect, the proximity to free margins and the fact the defect was full thickness a composite graft was deemed most appropriate.  The defect was outline and then transferred to the donor site.  A full thickness graft was then excised from the donor site. The graft was then placed in the primary defect, oriented appropriately and then sutured into place.  The secondary defect was then repaired using a primary closure.
Intermediate Repair And Flap Additional Text (Will Appearing After The Standard Complex Repair Text): The intermediate repair was not sufficient to completely close the primary defect. The remaining additional defect was repaired with the flap mentioned below.
Bilateral Rotation Flap Text: The defect edges were debeveled with a #15 scalpel blade. Given the location of the defect, shape of the defect and the proximity to free margins a bilateral rotation flap was deemed most appropriate. Using a sterile surgical marker, an appropriate rotation flap was drawn incorporating the defect and placing the expected incisions within the relaxed skin tension lines where possible. The area thus outlined was incised deep to adipose tissue with a #15 scalpel blade. The skin margins were undermined to an appropriate distance in all directions utilizing iris scissors. Following this, the designed flap was carried over into the primary defect and sutured into place.
Where Do You Want The Question To Include Opioid Counseling Located?: Case Summary Tab
Debridement Text: The wound edges were debrided prior to proceeding with the closure to facilitate wound healing.
Zygomaticofacial Flap Text: Given the location of the defect, shape of the defect and the proximity to free margins a zygomaticofacial flap was deemed most appropriate for repair. Using a sterile surgical marker, the appropriate flap was drawn incorporating the defect and placing the expected incisions within the relaxed skin tension lines where possible. The area thus outlined was incised deep to adipose tissue with a #15 scalpel blade with preservation of a vascular pedicle.  The skin margins were undermined to an appropriate distance in all directions utilizing iris scissors. The flap was then carried over into the defect and anchored with interrupted buried subcutaneous sutures.
No Repair - Repaired With Adjacent Surgical Defect Text (Leave Blank If You Do Not Want): After obtaining clear surgical margins the defect was repaired concurrently with another surgical defect which was in close approximation.
Referred To Otolaryngology For Closure Text (Leave Blank If You Do Not Want): After obtaining clear surgical margins the patient was sent to otolaryngology for surgical repair.  The patient understands they will receive post-surgical care and follow-up from the referring physician's office.
Referred To Plastics For Closure Text (Leave Blank If You Do Not Want): After obtaining clear surgical margins the patient was sent to plastics for surgical repair.  The patient understands they will receive post-surgical care and follow-up from the referring physician's office.
Mucosal Advancement Flap Text: Given the location of the defect, shape of the defect and the proximity to free margins a mucosal advancement flap was deemed most appropriate. Incisions were made with a 15 blade scalpel in the appropriate fashion along the cutaneous vermilion border and the mucosal lip. The remaining actinically damaged mucosal tissue was excised.  The mucosal advancement flap was then elevated to the gingival sulcus with care taken to preserve the neurovascular structures and advanced into the primary defect. Care was taken to ensure that precise realignment of the vermilion border was achieved.
Eyelid Partial Thickness Repair - [id number]: The eyelid defect was partial thickness which required a wedge repair of the eyelid. Special care was taken to ensure that the eyelid margin was realligned when placing sutures.
Rhomboid Transposition Flap Text: The defect edges were debeveled with a #15 scalpel blade. Given the location of the defect and the proximity to free margins a rhomboid transposition flap was deemed most appropriate. Using a sterile surgical marker, an appropriate rhomboid flap was drawn incorporating the defect. The area thus outlined was incised deep to adipose tissue with a #15 scalpel blade. The skin margins were undermined to an appropriate distance in all directions utilizing iris scissors. Following this, the designed flap was carried over into the primary defect and sutured into place.
Advancement Flap (Double) Text: The defect edges were debeveled with a #15 scalpel blade. Given the location of the defect and the proximity to free margins a double advancement flap was deemed most appropriate. Using a sterile surgical marker, the appropriate advancement flaps were drawn incorporating the defect and placing the expected incisions within the relaxed skin tension lines where possible. The area thus outlined was incised deep to adipose tissue with a #15 scalpel blade. The skin margins were undermined to an appropriate distance in all directions utilizing iris scissors. Following this, the designed flaps were advanced and carried over into the primary defect and sutured into place.
Epidermal Autograft Text: The defect edges were debeveled with a #15 scalpel blade. Given the location of the defect, shape of the defect and the proximity to free margins an epidermal autograft was deemed most appropriate. Using a sterile surgical marker, the primary defect shape was transferred to the donor site. The epidermal graft was then harvested.  The skin graft was then placed in the primary defect and oriented appropriately.
Intermediate Repair And Graft Additional Text (Will Appearing After The Standard Complex Repair Text): The intermediate repair was not sufficient to completely close the primary defect. The remaining additional defect was repaired with the graft mentioned below.
Detail Level: Detailed
Bilobed Flap Text: The defect edges were debeveled with a #15 scalpel blade. Given the location of the defect and the proximity to free margins a bilobe flap was deemed most appropriate. Using a sterile surgical marker, an appropriate bilobe flap drawn around the defect. The area thus outlined was incised deep to adipose tissue with a #15 scalpel blade. The skin margins were undermined to an appropriate distance in all directions utilizing iris scissors. Following this, the designed flap was carried over into the primary defect and sutured into place.
Primary Defect Length (In Cm): 1.2
Island Pedicle Flap Text: The defect edges were debeveled with a #15 scalpel blade. Given the location of the defect, shape of the defect and the proximity to free margins an island pedicle advancement flap was deemed most appropriate. Using a sterile surgical marker, an appropriate advancement flap was drawn incorporating the defect, outlining the appropriate donor tissue and placing the expected incisions within the relaxed skin tension lines where possible. The area thus outlined was incised deep to adipose tissue with a #15 scalpel blade. The skin margins were undermined to an appropriate distance in all directions around the primary defect and laterally outward around the island pedicle utilizing iris scissors.  There was minimal undermining beneath the pedicle flap. Following this, the flap was carried over into the primary defect and sutured into place.
W Plasty Text: The lesion was extirpated to the level of the fat with a #15 scalpel blade. Given the location of the defect, shape of the defect and the proximity to free margins a W-plasty was deemed most appropriate for repair. Using a sterile surgical marker, the appropriate transposition arms of the W-plasty were drawn incorporating the defect and placing the expected incisions within the relaxed skin tension lines where possible. The area thus outlined was incised deep to adipose tissue with a #15 scalpel blade. The skin margins were undermined to an appropriate distance in all directions utilizing iris scissors. The opposing transposition arms were then transposed and carried over into place in opposite direction and anchored with interrupted buried subcutaneous sutures.
Epidermal Closure: running
O-T Plasty Text: The defect edges were debeveled with a #15 scalpel blade. Given the location of the defect, shape of the defect and the proximity to free margins an O-T plasty was deemed most appropriate. Using a sterile surgical marker, an appropriate O-T plasty was drawn incorporating the defect and placing the expected incisions within the relaxed skin tension lines where possible. The area thus outlined was incised deep to adipose tissue with a #15 scalpel blade. The skin margins were undermined to an appropriate distance in all directions utilizing iris scissors. Following this, the designed flap was carried over into the primary defect and sutured into place.
Pain Refusal Text: I offered to prescribe pain medication but the patient refused to take this medication.
Consent: The rationale for the repair was explained to the patient and consent was obtained. The risks, benefits and alternatives to therapy were discussed in detail. Specifically, the risks of infection, scarring, bleeding, prolonged wound healing, incomplete removal, allergy to anesthesia, nerve injury and recurrence were addressed. Prior to the procedure, the treatment site was clearly identified and confirmed by the patient. All components of Universal Protocol/PAUSE Rule completed.
Helical Rim Text: The closure involved the helical rim.
Full Thickness Lip Wedge Repair (Flap) Text: Given the location of the defect and the proximity to free margins a full thickness wedge repair was deemed most appropriate. Using a sterile surgical marker, the appropriate repair was drawn incorporating the defect and placing the expected incisions perpendicular to the vermilion border.  The vermilion border was also meticulously outlined to ensure appropriate reapproximation during the repair.  The area thus outlined was incised through and through with a #15 scalpel blade.  The muscularis and dermis were reaproximated with deep sutures following hemostasis. Care was taken to realign the vermilion border before proceeding with the superficial closure.  Once the vermilion was realigned the superfical and mucosal closure was finished.
Partial Purse String (Simple) Text: Given the location of the defect and the characteristics of the surrounding skin a simple purse string closure was deemed most appropriate.  Undermining was performed circumferentially around the surgical defect.  A purse string suture was then placed and tightened. Wound tension only allowed a partial closure of the circular defect.
Suturegard Intro: Intraoperative tissue expansion was performed, utilizing the SUTUREGARD device, in order to reduce wound tension.
Mastoid Interpolation Flap Text: A decision was made to reconstruct the defect utilizing an interpolation axial flap and a staged reconstruction.  A telfa template was made of the defect.  This telfa template was then used to outline the mastoid interpolation flap.  The donor area for the pedicle flap was then injected with anesthesia.  The flap was excised through the skin and subcutaneous tissue down to the layer of the underlying musculature.  The pedicle flap was carefully excised within this deep plane to maintain its blood supply.  The edges of the donor site were undermined.   The donor site was closed in a primary fashion.  The pedicle was then rotated into position and sutured.  Once the tube was sutured into place, adequate blood supply was confirmed with blanching and refill.  The pedicle was then wrapped with xeroform gauze and dressed appropriately with a telfa and gauze bandage to ensure continued blood supply and protect the attached pedicle.
Non-Graft Cartilage Fenestration Text: The cartilage was fenestrated with a 2mm punch biopsy to help facilitate healing.
Referred To Mid-Level For Closure Text (Leave Blank If You Do Not Want): After obtaining clear surgical margins the patient was sent to a mid-level provider for surgical repair.  The patient understands they will receive post-surgical care and follow-up from the mid-level provider.
Mustarde Flap Text: The defect edges were debeveled with a #15 scalpel blade.  Given the size, depth and location of the defect and the proximity to free margins a Mustarde flap was deemed most appropriate. Using a sterile surgical marker, an appropriate flap was drawn incorporating the defect. The area thus outlined was incised with a #15 scalpel blade. The skin margins were undermined to an appropriate distance in all directions utilizing iris scissors. Following this, the designed flap was carried into the primary defect and sutured into place.
Cheek Interpolation Flap Division And Inset Text: Division and inset of the cheek interpolation flap was performed to achieve optimal aesthetic result, restore normal anatomic appearance and avoid distortion of normal anatomy, expedite and facilitate wound healing, achieve optimal functional result and because linear closure either not possible or would produce suboptimal result. The patient was prepped and draped in the usual manner. The pedicle was infiltrated with local anesthesia. The pedicle was sectioned with a #15 blade. The pedicle was de-bulked and trimmed to match the shape of the defect. Hemostasis was achieved. The flap donor site and free margin of the flap were secured with deep buried sutures and the wound edges were re-approximated.
Spiral Flap Text: The defect edges were debeveled with a #15 scalpel blade. Given the location of the defect, shape of the defect and the proximity to free margins a spiral flap was deemed most appropriate. Using a sterile surgical marker, an appropriate rotation flap was drawn incorporating the defect and placing the expected incisions within the relaxed skin tension lines where possible. The area thus outlined was incised deep to adipose tissue with a #15 scalpel blade. The skin margins were undermined to an appropriate distance in all directions utilizing iris scissors. Following this, the designed flap was carried over into the primary defect and sutured into place.
Alar Island Pedicle Flap Text: The defect edges were debeveled with a #15 scalpel blade. Given the location of the defect, shape of the defect and the proximity to the alar rim an island pedicle advancement flap was deemed most appropriate. Using a sterile surgical marker, an appropriate advancement flap was drawn incorporating the defect, outlining the appropriate donor tissue and placing the expected incisions within the nasal ala running parallel to the alar rim. The area thus outlined was incised with a #15 scalpel blade. The skin margins were undermined minimally to an appropriate distance in all directions around the primary defect and laterally outward around the island pedicle utilizing iris scissors.  There was minimal undermining beneath the pedicle flap. Following this, the designed flap was carried over into the primary defect and sutured into place.
Skin Substitute Text: The defect edges were debeveled with a #15 scalpel blade. Given the location of the defect, shape of the defect and the proximity to free margins a skin substitute graft was deemed most appropriate.  The graft material was trimmed to fit the size of the defect. The graft was then placed in the primary defect and oriented appropriately.
Bi-Rhombic Flap Text: The defect edges were debeveled with a #15 scalpel blade. Given the location of the defect and the proximity to free margins a bi-rhombic flap was deemed most appropriate. Using a sterile surgical marker, an appropriate rhombic flap was drawn incorporating the defect. The area thus outlined was incised deep to adipose tissue with a #15 scalpel blade. The skin margins were undermined to an appropriate distance in all directions utilizing iris scissors. Following this, the designed flap was carried over into the primary defect and sutured into place.
Abbe Flap (Upper To Lower Lip) Text: The defect of the lower lip was assessed and measured.  Given the location and size of the defect, an Abbe flap was deemed most appropriate. Using a sterile surgical marker, an appropriate Abbe flap was measured and drawn on the upper lip. Local anesthesia was then infiltrated.  A scalpel was then used to incise the upper lip through and through the skin, vermilion, muscle and mucosa, leaving the flap pedicled on the opposite side.  The flap was then rotated and transferred to the lower lip defect.  The flap was then sutured into place with a three layer technique, closing the orbicularis oris muscle layer with subcutaneous buried sutures, followed by a mucosal layer and an epidermal layer.
Double O-Z Plasty Text: The defect edges were debeveled with a #15 scalpel blade. Given the location of the defect, shape of the defect and the proximity to free margins a Double O-Z plasty (double transposition flap) was deemed most appropriate. Using a sterile surgical marker, the appropriate transposition flaps were drawn incorporating the defect and placing the expected incisions within the relaxed skin tension lines where possible. The area thus outlined was incised deep to adipose tissue with a #15 scalpel blade. The skin margins were undermined to an appropriate distance in all directions utilizing iris scissors. Hemostasis was achieved with electrocautery. The flaps were then transposed and carried over into place, one clockwise and the other counterclockwise, and anchored with interrupted buried subcutaneous sutures.
Island Pedicle Flap-Requiring Vessel Identification Text: The defect edges were debeveled with a #15 scalpel blade. Given the location of the defect, shape of the defect and the proximity to free margins an island pedicle advancement flap was deemed most appropriate. Using a sterile surgical marker, an appropriate advancement flap was drawn, based on the axial vessel mentioned above, incorporating the defect, outlining the appropriate donor tissue and placing the expected incisions within the relaxed skin tension lines where possible. The area thus outlined was incised deep to adipose tissue with a #15 scalpel blade. The skin margins were undermined to an appropriate distance in all directions around the primary defect and laterally outward around the island pedicle utilizing iris scissors.  There was minimal undermining beneath the pedicle flap. Following this, the designed flap was carried over into the primary defect and sutured into place.
Complex Repair And Graft Additional Text (Will Appearing After The Standard Complex Repair Text): The complex repair was not sufficient to completely close the primary defect. The remaining additional defect was repaired with the graft mentioned below.
O-Z Flap Text: The defect edges were debeveled with a #15 scalpel blade. Given the location of the defect, shape of the defect and the proximity to free margins an O-Z flap was deemed most appropriate. Using a sterile surgical marker, an appropriate transposition flap was drawn incorporating the defect and placing the expected incisions within the relaxed skin tension lines where possible. The area thus outlined was incised deep to adipose tissue with a #15 scalpel blade. The skin margins were undermined to an appropriate distance in all directions utilizing iris scissors. Following this, the designed flap was carried over into the primary defect and sutured into place.
Nostril Rim Text: The closure involved the nostril rim.
Split-Thickness Skin Graft Text: The defect edges were debeveled with a #15 scalpel blade. Given the location of the defect, shape of the defect and the proximity to free margins a split thickness skin graft was deemed most appropriate. Using a sterile surgical marker, the primary defect shape was transferred to the donor site. The split thickness graft was then harvested.  The skin graft was then placed in the primary defect and oriented appropriately.
Localized Dermabrasion With Wire Brush Text: The patient was draped in routine manner.  Localized dermabrasion using 3 x 17 mm wire brush was performed in routine manner to papillary dermis. This spot dermabrasion is being performed to complete skin cancer reconstruction. It also will eliminate the other sun damaged precancerous cells that are known to be part of the regional effect of a lifetime's worth of sun exposure. This localized dermabrasion is therapeutic and should not be considered cosmetic in any regard.
Posterior Auricular Interpolation Flap Text: A decision was made to reconstruct the defect utilizing an interpolation axial flap and a staged reconstruction.  A telfa template was made of the defect.  This telfa template was then used to outline the posterior auricular interpolation flap.  The donor area for the pedicle flap was then injected with anesthesia.  The flap was excised through the skin and subcutaneous tissue down to the layer of the underlying musculature.  The pedicle flap was carefully excised within this deep plane to maintain its blood supply.  The edges of the donor site were undermined.   The donor site was closed in a primary fashion.  The pedicle was then rotated into position and sutured.  Once the tube was sutured into place, adequate blood supply was confirmed with blanching and refill.  The pedicle was then wrapped with xeroform gauze and dressed appropriately with a telfa and gauze bandage to ensure continued blood supply and protect the attached pedicle.
Preparation Of Recipient Site - Flap: The eschar was removed surgically with sharp dissection to facilitate appropriate wound healing of the following adjacent tissue rearrangement.
Body Location Override (Optional - Billing Will Still Be Based On Selected Body Map Location If Applicable): left proximal pretibial region
Simple / Intermediate / Complex Repair - Final Wound Length In Cm: 5.1
Width Of Defect Perpendicular To Closure In Cm (Required): 1.7
Distance Of Undermining In Cm (Required): 2.5
Epidermal Closure: running vertical mattress
Graft Donor Site Bandage (Optional-Leave Blank If You Don't Want In Note): Steri-strips and a pressure bandage were applied to the donor site.

## 2025-04-22 NOTE — NURSING NOTE
Chief Complaint   Patient presents with    RECHECK     Annual follow up      Vitals were taken and medications were reconciled. EKG was performed   GENNARO Phelan  8:56 AM    Ht 1.829 m (6')   Wt (!) 137.7 kg (303 lb 9.6 oz)   BMI 41.18 kg/m

## 2025-04-25 SDOH — HEALTH STABILITY: PHYSICAL HEALTH: ON AVERAGE, HOW MANY MINUTES DO YOU ENGAGE IN EXERCISE AT THIS LEVEL?: 10 MIN

## 2025-04-25 SDOH — HEALTH STABILITY: PHYSICAL HEALTH: ON AVERAGE, HOW MANY DAYS PER WEEK DO YOU ENGAGE IN MODERATE TO STRENUOUS EXERCISE (LIKE A BRISK WALK)?: 5 DAYS

## 2025-04-25 ASSESSMENT — SOCIAL DETERMINANTS OF HEALTH (SDOH): HOW OFTEN DO YOU GET TOGETHER WITH FRIENDS OR RELATIVES?: ONCE A WEEK

## 2025-04-25 ASSESSMENT — ASTHMA QUESTIONNAIRES
QUESTION_4 LAST FOUR WEEKS HOW OFTEN HAVE YOU USED YOUR RESCUE INHALER OR NEBULIZER MEDICATION (SUCH AS ALBUTEROL): NOT AT ALL
ACT_TOTALSCORE: 22
QUESTION_5 LAST FOUR WEEKS HOW WOULD YOU RATE YOUR ASTHMA CONTROL: WELL CONTROLLED
QUESTION_1 LAST FOUR WEEKS HOW MUCH OF THE TIME DID YOUR ASTHMA KEEP YOU FROM GETTING AS MUCH DONE AT WORK, SCHOOL OR AT HOME: NONE OF THE TIME
QUESTION_2 LAST FOUR WEEKS HOW OFTEN HAVE YOU HAD SHORTNESS OF BREATH: ONCE OR TWICE A WEEK
QUESTION_3 LAST FOUR WEEKS HOW OFTEN DID YOUR ASTHMA SYMPTOMS (WHEEZING, COUGHING, SHORTNESS OF BREATH, CHEST TIGHTNESS OR PAIN) WAKE YOU UP AT NIGHT OR EARLIER THAN USUAL IN THE MORNING: ONCE OR TWICE

## 2025-04-28 ENCOUNTER — OFFICE VISIT (OUTPATIENT)
Dept: PEDIATRICS | Facility: CLINIC | Age: 75
End: 2025-04-28
Payer: MEDICARE

## 2025-04-28 VITALS
DIASTOLIC BLOOD PRESSURE: 72 MMHG | SYSTOLIC BLOOD PRESSURE: 134 MMHG | OXYGEN SATURATION: 96 % | TEMPERATURE: 98.8 F | HEIGHT: 73 IN | BODY MASS INDEX: 40.29 KG/M2 | RESPIRATION RATE: 12 BRPM | WEIGHT: 304 LBS | HEART RATE: 58 BPM

## 2025-04-28 DIAGNOSIS — N18.32 CKD STAGE 3B, GFR 30-44 ML/MIN (H): ICD-10-CM

## 2025-04-28 DIAGNOSIS — Z00.00 ENCOUNTER FOR MEDICARE ANNUAL WELLNESS EXAM: Primary | ICD-10-CM

## 2025-04-28 DIAGNOSIS — M17.0 PRIMARY OSTEOARTHRITIS OF BOTH KNEES: ICD-10-CM

## 2025-04-28 DIAGNOSIS — E66.01 MORBID OBESITY (H): ICD-10-CM

## 2025-04-28 DIAGNOSIS — M06.9 RHEUMATOID ARTHRITIS, INVOLVING UNSPECIFIED SITE, UNSPECIFIED WHETHER RHEUMATOID FACTOR PRESENT (H): ICD-10-CM

## 2025-04-28 DIAGNOSIS — C61 PROSTATE CANCER (H): ICD-10-CM

## 2025-04-28 DIAGNOSIS — J45.50 SEVERE PERSISTENT ASTHMA, UNSPECIFIED WHETHER COMPLICATED (H): ICD-10-CM

## 2025-04-28 DIAGNOSIS — N39.498 OTHER URINARY INCONTINENCE: ICD-10-CM

## 2025-04-28 DIAGNOSIS — G20.C PARKINSONISM, UNSPECIFIED PARKINSONISM TYPE (H): ICD-10-CM

## 2025-04-28 DIAGNOSIS — M54.17 LUMBOSACRAL RADICULOPATHY: ICD-10-CM

## 2025-04-28 DIAGNOSIS — Z79.899 HIGH RISK MEDICATION USE: ICD-10-CM

## 2025-04-28 DIAGNOSIS — G70.9 NEUROMUSCULAR DISEASE (H): ICD-10-CM

## 2025-04-28 DIAGNOSIS — I10 PRIMARY HYPERTENSION: ICD-10-CM

## 2025-04-28 PROBLEM — M47.817 FACET ARTHROPATHY, LUMBOSACRAL: Status: RESOLVED | Noted: 2022-09-09 | Resolved: 2025-04-28

## 2025-04-28 LAB
ALBUMIN SERPL BCG-MCNC: 4.5 G/DL (ref 3.5–5.2)
ALP SERPL-CCNC: 71 U/L (ref 40–150)
ALT SERPL W P-5'-P-CCNC: 21 U/L (ref 0–70)
ANION GAP SERPL CALCULATED.3IONS-SCNC: 9 MMOL/L (ref 7–15)
AST SERPL W P-5'-P-CCNC: 24 U/L (ref 0–45)
BILIRUB SERPL-MCNC: 0.5 MG/DL
BUN SERPL-MCNC: 31.1 MG/DL (ref 8–23)
CALCIUM SERPL-MCNC: 9.8 MG/DL (ref 8.8–10.4)
CHLORIDE SERPL-SCNC: 105 MMOL/L (ref 98–107)
CHOLEST SERPL-MCNC: 164 MG/DL
CREAT SERPL-MCNC: 1.53 MG/DL (ref 0.67–1.17)
EGFRCR SERPLBLD CKD-EPI 2021: 47 ML/MIN/1.73M2
ERYTHROCYTE [DISTWIDTH] IN BLOOD BY AUTOMATED COUNT: 15 % (ref 10–15)
EST. AVERAGE GLUCOSE BLD GHB EST-MCNC: 126 MG/DL
FASTING STATUS PATIENT QL REPORTED: ABNORMAL
FASTING STATUS PATIENT QL REPORTED: NORMAL
GLUCOSE SERPL-MCNC: 117 MG/DL (ref 70–99)
HBA1C MFR BLD: 6 % (ref 0–5.6)
HCO3 SERPL-SCNC: 26 MMOL/L (ref 22–29)
HCT VFR BLD AUTO: 39.5 % (ref 40–53)
HDLC SERPL-MCNC: 46 MG/DL
HGB BLD-MCNC: 12.4 G/DL (ref 13.3–17.7)
LDLC SERPL CALC-MCNC: 98 MG/DL
MCH RBC QN AUTO: 30.8 PG (ref 26.5–33)
MCHC RBC AUTO-ENTMCNC: 31.4 G/DL (ref 31.5–36.5)
MCV RBC AUTO: 98 FL (ref 78–100)
NONHDLC SERPL-MCNC: 118 MG/DL
PLATELET # BLD AUTO: 199 10E3/UL (ref 150–450)
POTASSIUM SERPL-SCNC: 4.9 MMOL/L (ref 3.4–5.3)
PROT SERPL-MCNC: 7.3 G/DL (ref 6.4–8.3)
PSA SERPL DL<=0.01 NG/ML-MCNC: 0.32 NG/ML (ref 0–6.5)
RBC # BLD AUTO: 4.02 10E6/UL (ref 4.4–5.9)
SODIUM SERPL-SCNC: 140 MMOL/L (ref 135–145)
TRIGL SERPL-MCNC: 102 MG/DL
WBC # BLD AUTO: 5.1 10E3/UL (ref 4–11)

## 2025-04-28 PROCEDURE — 3075F SYST BP GE 130 - 139MM HG: CPT | Performed by: INTERNAL MEDICINE

## 2025-04-28 PROCEDURE — G2211 COMPLEX E/M VISIT ADD ON: HCPCS | Performed by: INTERNAL MEDICINE

## 2025-04-28 PROCEDURE — 99213 OFFICE O/P EST LOW 20 MIN: CPT | Mod: 25 | Performed by: INTERNAL MEDICINE

## 2025-04-28 PROCEDURE — 85027 COMPLETE CBC AUTOMATED: CPT | Performed by: INTERNAL MEDICINE

## 2025-04-28 PROCEDURE — G0439 PPPS, SUBSEQ VISIT: HCPCS | Performed by: INTERNAL MEDICINE

## 2025-04-28 PROCEDURE — 84153 ASSAY OF PSA TOTAL: CPT | Performed by: INTERNAL MEDICINE

## 2025-04-28 PROCEDURE — 80053 COMPREHEN METABOLIC PANEL: CPT | Performed by: INTERNAL MEDICINE

## 2025-04-28 PROCEDURE — 80061 LIPID PANEL: CPT | Performed by: INTERNAL MEDICINE

## 2025-04-28 PROCEDURE — 83036 HEMOGLOBIN GLYCOSYLATED A1C: CPT | Performed by: INTERNAL MEDICINE

## 2025-04-28 PROCEDURE — 3078F DIAST BP <80 MM HG: CPT | Performed by: INTERNAL MEDICINE

## 2025-04-28 PROCEDURE — 36415 COLL VENOUS BLD VENIPUNCTURE: CPT | Performed by: INTERNAL MEDICINE

## 2025-04-28 RX ORDER — OXYBUTYNIN CHLORIDE 5 MG/1
5 TABLET ORAL DAILY
Qty: 90 TABLET | Refills: 3 | Status: SHIPPED | OUTPATIENT
Start: 2025-04-28

## 2025-04-28 RX ORDER — AMLODIPINE BESYLATE 5 MG/1
5 TABLET ORAL DAILY
Qty: 90 TABLET | Refills: 3 | Status: SHIPPED | OUTPATIENT
Start: 2025-04-28

## 2025-04-28 NOTE — PATIENT INSTRUCTIONS
Get your RSV shot at a pharmacy.    Get COVID at a pharmacy.    Lab work today:  We can do labs in the exam room today, or you can get them done downstairs in the lab.      Refilled needed meds today.    For now, acetaminophen and lyrica for your knee and hip and back pain.   Consider low back procedure per spine surgeon if symptoms become too restrictive, but for now, keep doing stretches and walking (with support)      Patient Education   Preventive Care Advice   This is general advice given by our system to help you stay healthy. However, your care team may have specific advice just for you. Please talk to your care team about your preventive care needs.  Nutrition  Eat 5 or more servings of fruits and vegetables each day.  Try wheat bread, brown rice and whole grain pasta (instead of white bread, rice, and pasta).  Get enough calcium and vitamin D. Check the label on foods and aim for 100% of the RDA (recommended daily allowance).  Lifestyle  Exercise at least 150 minutes each week  (30 minutes a day, 5 days a week).  Do muscle strengthening activities 2 days a week. These help control your weight and prevent disease.  No smoking.  Wear sunscreen to prevent skin cancer.  Have a dental exam and cleaning every 6 months.  Yearly exams  See your health care team every year to talk about:  Any changes in your health.  Any medicines your care team has prescribed.  Preventive care, family planning, and ways to prevent chronic diseases.  Shots (vaccines)   HPV shots (up to age 26), if you've never had them before.  Hepatitis B shots (up to age 59), if you've never had them before.  COVID-19 shot: Get this shot when it's due.  Flu shot: Get a flu shot every year.  Tetanus shot: Get a tetanus shot every 10 years.  Pneumococcal, hepatitis A, and RSV shots: Ask your care team if you need these based on your risk.  Shingles shot (for age 50 and up)  General health tests  Diabetes screening:  Starting at age 35, Get screened  for diabetes at least every 3 years.  If you are younger than age 35, ask your care team if you should be screened for diabetes.  Cholesterol test: At age 39, start having a cholesterol test every 5 years, or more often if advised.  Bone density scan (DEXA): At age 50, ask your care team if you should have this scan for osteoporosis (brittle bones).  Hepatitis C: Get tested at least once in your life.  STIs (sexually transmitted infections)  Before age 24: Ask your care team if you should be screened for STIs.  After age 24: Get screened for STIs if you're at risk. You are at risk for STIs (including HIV) if:  You are sexually active with more than one person.  You don't use condoms every time.  You or a partner was diagnosed with a sexually transmitted infection.  If you are at risk for HIV, ask about PrEP medicine to prevent HIV.  Get tested for HIV at least once in your life, whether you are at risk for HIV or not.  Cancer screening tests  Cervical cancer screening: If you have a cervix, begin getting regular cervical cancer screening tests starting at age 21.  Breast cancer scan (mammogram): If you've ever had breasts, begin having regular mammograms starting at age 40. This is a scan to check for breast cancer.  Colon cancer screening: It is important to start screening for colon cancer at age 45.  Have a colonoscopy test every 10 years (or more often if you're at risk) Or, ask your provider about stool tests like a FIT test every year or Cologuard test every 3 years.  To learn more about your testing options, visit:   .  For help making a decision, visit:   https://bit.ly/jn34603.  Prostate cancer screening test: If you have a prostate, ask your care team if a prostate cancer screening test (PSA) at age 55 is right for you.  Lung cancer screening: If you are a current or former smoker ages 50 to 80, ask your care team if ongoing lung cancer screenings are right for you.  For informational purposes only. Not to  replace the advice of your health care provider. Copyright   2023 [facility]. All rights reserved. Clinically reviewed by the [facility]. [employer] 750210 - REV 01/24.  Learning About Activities of Daily Living  What are activities of daily living?     Activities of daily living (ADLs) are the basic self-care tasks you do every day. These include eating, bathing, dressing, and moving around.  As you age, and if you have health problems, you may find that it's harder to do some of these tasks. If so, your doctor can suggest ideas that may help.  To measure what kind of help you may need, your doctor will ask how well you are able to do ADLs. Let your doctor know if there are any tasks that you are having trouble doing. This is an important first step to getting help. And when you have the help you need, you can stay as independent as possible.  How will a doctor assess your ADLs?  Asking about ADLs is part of a routine health checkup your doctor will likely do as you age. Your health check might be done in a doctor's office, in your home, or at a hospital. The goal is to find out if you are having any problems that could make it hard to care for yourself or that make it unsafe for you to be on your own.  To measure your ADLs, your doctor will ask how hard it is for you to do routine tasks. Your doctor may also want to know if you have changed the way you do a task because of a health problem. Your doctor may watch how you:  Walk back and forth.  Keep your balance while you stand or walk.  Move from sitting to standing or from a bed to a chair.  Button or unbutton a shirt or sweater.  Remove and put on your shoes.  It's common to feel a little worried or anxious if you find you can't do all the things you used to be able to do. Talking with your doctor about ADLs is a way to make sure you're as safe as possible and able to care for yourself as well as you can. You may want to  bring a caregiver, friend, or family member to your checkup. They can help you talk to your doctor.  Follow-up care is a key part of your treatment and safety. Be sure to make and go to all appointments, and call your doctor if you are having problems. It's also a good idea to know your test results and keep a list of the medicines you take.  Current as of: October 24, 2024  Content Version: 14.4    6260-7042 [employer].   Care instructions adapted under license by your healthcare professional. If you have questions about a medical condition or this instruction, always ask your healthcare professional. [employer] disclaims any warranty or liability for your use of this information.    Preventing Falls: Care Instructions  Injuries and health problems such as trouble walking or poor eyesight can increase your risk of falling. So can some medicines. But there are things you can do to help prevent falls. You can exercise to get stronger. You can also arrange your home to make it safer.    Talk to your doctor about the medicines you take. Ask if any of them increase the risk of falls and whether they can be changed or stopped.   Try to exercise regularly. It can help improve your strength and balance. This can help lower your risk of falling.         Practice fall safety and prevention.   Wear low-heeled shoes that fit well and give your feet good support. Talk to your doctor if you have foot problems that make this hard.  Carry a cellphone or wear a medical alert device that you can use to call for help.  Use stepladders instead of chairs to reach high objects. Don't climb if you're at risk for falls. Ask for help, if needed.  Wear the correct eyeglasses, if you need them.        Make your home safer.   Remove rugs, cords, clutter, and furniture from walkways.  Keep your house well lit. Use night-lights in hallways and bathrooms.  Install and use sturdy handrails on stairways.  Wear nonskid  "footwear, even inside. Don't walk barefoot or in socks without shoes.        Be safe outside.   Use handrails, curb cuts, and ramps whenever possible.  Keep your hands free by using a shoulder bag or backpack.  Try to walk in well-lit areas. Watch out for uneven ground, changes in pavement, and debris.  Be careful in the winter. Walk on the grass or gravel when sidewalks are slippery. Use de-icer on steps and walkways. Add non-slip devices to shoes.    Put grab bars and nonskid mats in your shower or tub and near the toilet. Try to use a shower chair or bath bench when bathing.   Get into a tub or shower by putting in your weaker leg first. Get out with your strong side first. Have a phone or medical alert device in the bathroom with you.   Where can you learn more?  Go to https://www.Shipzi.Domee/patiented  Enter G117 in the search box to learn more about \"Preventing Falls: Care Instructions.\"  Current as of: July 31, 2024  Content Version: 14.4    6350-4214 [employer].   Care instructions adapted under license by your healthcare professional. If you have questions about a medical condition or this instruction, always ask your healthcare professional. [employer] disclaims any warranty or liability for your use of this information.    Hearing Loss: Care Instructions  Overview     Hearing loss is a sudden or slow decrease in how well you hear. It can range from slight to profound. Permanent hearing loss can occur with aging. It also can happen when you are exposed long-term to loud noise. Examples include listening to loud music, riding motorcycles, or being around other loud machines.  Hearing loss can affect your work and home life. It can make you feel lonely or depressed. You may feel that you have lost your independence. But hearing aids and other devices can help you hear better and feel connected to others.  Follow-up care is a key part of your treatment and safety. Be sure to make " and go to all appointments, and call your doctor if you are having problems. It's also a good idea to know your test results and keep a list of the medicines you take.  How can you care for yourself at home?  Avoid loud noises whenever possible. This helps keep your hearing from getting worse.  Always wear hearing protection around loud noises.  Wear a hearing aid as directed.  A professional can help you pick a hearing aid that will work best for you.  You can also get hearing aids over the counter for mild to moderate hearing loss.  Have hearing tests as your doctor suggests. They can show whether your hearing has changed. Your hearing aid may need to be adjusted.  Use other devices as needed. These may include:  Telephone amplifiers and hearing aids that can connect to a television, stereo, radio, or microphone.  Devices that use lights or vibrations. These alert you to the doorbell, a ringing telephone, or a baby monitor.  Television closed-captioning. This shows the words at the bottom of the screen. Most new TVs can do this.  TTY (text telephone). This lets you type messages back and forth on the telephone instead of talking or listening. These devices are also called TDD. When messages are typed on the keyboard, they are sent over the phone line to a receiving TTY. The message is shown on a monitor.  Use text messaging, social media, and email if it is hard for you to communicate by telephone.  Try to learn a listening technique called speechreading. It is not lipreading. You pay attention to people's gestures, expressions, posture, and tone of voice. These clues can help you understand what a person is saying. Face the person you are talking to, and have them face you. Make sure the lighting is good. You need to see the other person's face clearly.  Think about counseling if you need help to adjust to your hearing loss.  When should you call for help?  Watch closely for changes in your health, and be sure to  "contact your doctor if:    You think your hearing is getting worse.     You have new symptoms, such as dizziness or nausea.   Where can you learn more?  Go to https://www.N-of-One.net/patiented  Enter R798 in the search box to learn more about \"Hearing Loss: Care Instructions.\"  Current as of: October 27, 2024  Content Version: 14.4    8459-6601 [employer].   Care instructions adapted under license by your healthcare professional. If you have questions about a medical condition or this instruction, always ask your healthcare professional. [employer] disclaims any warranty or liability for your use of this information.    Learning About Sleeping Well  What does sleeping well mean?     Sleeping well means getting enough sleep to feel good and stay healthy. How much sleep is enough varies among people.  The number of hours you sleep and how you feel when you wake up are both important. If you do not feel refreshed, you probably need more sleep. Another sign of not getting enough sleep is feeling tired during the day.  Experts recommend that adults get at least 7 or more hours of sleep per day. Children and older adults need more sleep.  Why is getting enough sleep important?  Getting enough quality sleep is a basic part of good health. When your sleep suffers, your physical health, mood, and your thoughts can suffer too. You may find yourself feeling more grumpy or stressed. Not getting enough sleep also can lead to serious problems, including injury, accidents, anxiety, and depression.  What might cause poor sleeping?  Many things can cause sleep problems, including:  Changes to your sleep schedule.  Stress. Stress can be caused by fear about a single event, such as giving a speech. Or you may have ongoing stress, such as worry about work or school.  Depression, anxiety, and other mental or emotional conditions.  Changes in your sleep habits or surroundings. This includes changes that happen " "where you sleep, such as noise, light, or sleeping in a different bed. It also includes changes in your sleep pattern, such as having jet lag or working a late shift.  Health problems, such as pain, breathing problems, and restless legs syndrome.  Lack of regular exercise.  Using alcohol, nicotine, or caffeine before bed.  How can you help yourself?  Here are some tips that may help you sleep more soundly and wake up feeling more refreshed.  Your sleeping area   Use your bedroom only for sleeping and sex. A bit of light reading may help you fall asleep. But if it doesn't, do your reading elsewhere in the house. Try not to use your TV, computer, smartphone, or tablet while you are in bed.  Be sure your bed is big enough to stretch out comfortably, especially if you have a sleep partner.  Keep your bedroom quiet, dark, and cool. Use curtains, blinds, or a sleep mask to block out light. To block out noise, use earplugs, soothing music, or a \"white noise\" machine.  Your evening and bedtime routine   Create a relaxing bedtime routine. You might want to take a warm shower or bath, or listen to soothing music.  Go to bed at the same time every night. And get up at the same time every morning, even if you feel tired.  What to avoid   Limit caffeine (coffee, tea, caffeinated sodas) during the day, and don't have any for at least 6 hours before bedtime.  Avoid drinking alcohol before bedtime. Alcohol can cause you to wake up more often during the night.  Try not to smoke or use tobacco, especially in the evening. Nicotine can keep you awake.  Limit naps during the day, especially close to bedtime.  Avoid lying in bed awake for too long. If you can't fall asleep or if you wake up in the middle of the night and can't get back to sleep within about 20 minutes, get out of bed and go to another room until you feel sleepy.  Avoid taking medicine right before bed that may keep you awake or make you feel hyper or energized. Your " "doctor can tell you if your medicine may do this and if you can take it earlier in the day.  If you can't sleep   Imagine yourself in a peaceful, pleasant scene. Focus on the details and feelings of being in a place that is relaxing.  Get up and do a quiet or boring activity until you feel sleepy.  Avoid drinking any liquids before going to bed to help prevent waking up often to use the bathroom.  Where can you learn more?  Go to https://www.Bookmate.net/patiented  Enter J942 in the search box to learn more about \"Learning About Sleeping Well.\"  Current as of: July 31, 2024  Content Version: 14.4    1624-5781 [employer].   Care instructions adapted under license by your healthcare professional. If you have questions about a medical condition or this instruction, always ask your healthcare professional. [employer] disclaims any warranty or liability for your use of this information.    Bladder Training: Care Instructions  Your Care Instructions     Bladder training is used to treat urge incontinence and stress incontinence. Urge incontinence means that the need to urinate comes on so fast that you can't get to a toilet in time. Stress incontinence means that you leak urine because of pressure on your bladder. For example, it may happen when you laugh, cough, or lift something heavy.  Bladder training can increase how long you can wait before you have to urinate. It can also help your bladder hold more urine. And it can give you better control over the urge to urinate.  It is important to remember that bladder training takes a few weeks to a few months to make a difference. You may not see results right away, but don't give up.  Follow-up care is a key part of your treatment and safety. Be sure to make and go to all appointments, and call your doctor if you are having problems. It's also a good idea to know your test results and keep a list of the medicines you take.  How can you care for " yourself at home?  Work with your doctor to come up with a bladder training program that is right for you. You may use one or more of the following methods.  Delayed urination  In the beginning, try to keep from urinating for 5 minutes after you first feel the need to go.  While you wait, take deep, slow breaths to relax. Kegel exercises can also help you delay the need to go to the bathroom.  After some practice, when you can easily wait 5 minutes to urinate, try to wait 10 minutes before you urinate.  Slowly increase the waiting period until you are able to control when you have to urinate.  Scheduled urination  Empty your bladder when you first wake up in the morning.  Schedule times throughout the day when you will urinate.  Start by going to the bathroom every hour, even if you don't need to go.  Slowly increase the time between trips to the bathroom.  When you have found a schedule that works well for you, keep doing it.  If you wake up during the night and have to urinate, do it. Apply your schedule to waking hours only.  Kegel exercises  These tighten and strengthen pelvic muscles, which can help you control the flow of urine. (If doing these exercises causes pain, stop doing them and talk with your doctor.) To do Kegel exercises:  Squeeze your muscles as if you were trying not to pass gas. Or squeeze your muscles as if you were stopping the flow of urine. Your belly, legs, and buttocks shouldn't move.  Hold the squeeze for 3 seconds, then relax for 5 to 10 seconds.  Start with 3 seconds, then add 1 second each week until you are able to squeeze for 10 seconds.  Repeat the exercise 10 times a session. Do 3 to 8 sessions a day.  When should you call for help?  Watch closely for changes in your health, and be sure to contact your doctor if:    Your incontinence is getting worse.     You do not get better as expected.   Where can you learn more?  Go to https://www.healthwise.net/patiented  Enter V684 in the  "search box to learn more about \"Bladder Training: Care Instructions.\"  Current as of: April 30, 2024  Content Version: 14.4 2024-2025 [employer].   Care instructions adapted under license by your healthcare professional. If you have questions about a medical condition or this instruction, always ask your healthcare professional. [employer] disclaims any warranty or liability for your use of this information.       "

## 2025-04-28 NOTE — PROGRESS NOTES
Preventive Care Visit  [facility] MADHU Calixto MD, Internal Medicine - Pediatrics  Apr 28, 2025      Assessment & Plan     Lumbosacral radiculopathy  Management per spine.  He is doing some home exercises, and has been evaluated in the past by spine surgery.  Apparently they offered him a lower lumbar laminectomy that could be palliative, but he would like to hold off on this, given his immunosuppressed status.  This is reasonable.  He is currently on Tylenol and Lyrica for pain.    Primary osteoarthritis of both knees  Similar to above, his rehabilitation potential from a bilateral knee replacement would be prohibitive, given his immunosuppressed status and the status of his lower lumbar radiculopathy.  Therefore, he is just doing exercises and Tylenol and Lyrica.    Severe persistent asthma, unspecified whether complicated (H)  This is not active, but instead he has bronchiolitis obliterans and some pulmonary fibrosis.  He is followed by pulmonology.    Parkinsonism, unspecified Parkinsonism type (H)  He is followed by neurology, and is currently on no medications.      Rheumatoid arthritis, involving unspecified site, unspecified whether rheumatoid factor present (H)  He is on both methotrexate and Plaquenil.    Neuromuscular disease (H)  As above.  Management by neurology.    Morbid obesity (H)      Prostate cancer (H)  We are following his PSA yearly, as he has had radiation therapy without a prostatectomy owing to comorbidities that might ensue where he to have surgery.  - PSA, tumor marker; Future  - PSA, tumor marker    CKD stage 3b, GFR 30-44 ml/min (H)  This has been stable.    Encounter for Medicare annual wellness exam  Discussed diet, exercise, testicular self exam, blood pressure, cholesterol, and need for cancer surveillance at appropriate ages.    Appropriate preventive services were addressed with this patient via screening, questionnaire, or discussion as appropriate for fall  "prevention, nutrition, physical activity, Tobacco-use cessation, social engagement, weight loss and cognition.  Checklist reviewing preventive services available has been given to the patient.  Reviewed patient's diet, addressing concerns and/or questions.     - Comprehensive metabolic panel (BMP + Alb, Alk Phos, ALT, AST, Total. Bili, TP); Future  - Lipid panel reflex to direct LDL Fasting; Future  - Hemoglobin A1c; Future  - Comprehensive metabolic panel (BMP + Alb, Alk Phos, ALT, AST, Total. Bili, TP)  - Lipid panel reflex to direct LDL Fasting  - Hemoglobin A1c    Primary hypertension    - amLODIPine (NORVASC) 5 MG tablet; Take 1 tablet (5 mg) by mouth daily.    Other urinary incontinence    - oxyBUTYnin (DITROPAN) 5 MG tablet; Take 1 tablet (5 mg) by mouth daily.    High risk medication use    - CBC with platelets            BMI  Estimated body mass index is 40.11 kg/m  as calculated from the following:    Height as of this encounter: 1.854 m (6' 1\").    Weight as of this encounter: 137.9 kg (304 lb).   Weight management plan: Discussed healthy diet and exercise guidelines    Counseling  Appropriate preventive services were addressed with this patient via screening, questionnaire, or discussion as appropriate for fall prevention, nutrition, physical activity, Tobacco-use cessation, social engagement, weight loss and cognition.  Checklist reviewing preventive services available has been given to the patient.  Reviewed patient's diet, addressing concerns and/or questions.   Discussed possible causes of fatigue. Updated plan of care.  Patient reported difficulty with activities of daily living were addressed today.Patient reported safety concerns were addressed today.The patient was provided with written information regarding signs of hearing loss.   Information on urinary incontinence and treatment options given to patient.       Follow-up    Follow-up Visit   Expected date:  May 05, 2026 (Approximate)      " Follow Up Appointment Details:     Follow-up with whom?: PCP    Follow-Up for what?: Medicare Wellness    Welcome or Annual?: Annual Wellness    How?: In Person                 Sherman Lynch is a 74 year old, presenting for the following:  Physical        4/28/2025     9:01 AM   Additional Questions   Roomed by FERNY KIMBROUGH  Still with significant lower lumbar spine pain.  Has numbness right anterior leg.  Is okay driving, but often has to get out of the car.  Sometimes wakes him up.  Had seen Dr. Griffiths (spine surgery). Physical therapy in [place] seemed to make things worse.   Then got COVID and could not really do it. Never did injections because has had reactions to predneisone and cortisone, caused neurogenic bladder with frequency.  Tylenol and gabapentin for now.     Had been seeing orthopedics for knees; bone on bone, and knee replacement suggest. Was to discuss this in more detail, but thought that spine issues would cause rehab problems with his knees.   Currently relying on walker to get out of bed at night.      Prostate cancer: had XRT instead of surgery.  We are monitoring prostate specific antigen (PSA).     Seeing pulmonary once yearly.             Advance Care Planning    Health Care Directive received at today's visit.  Forwarded to Jingdong.        4/25/2025   General Health   How would you rate your overall physical health? Good   Feel stress (tense, anxious, or unable to sleep) Only a little   (!) STRESS CONCERN      4/25/2025   Nutrition   Diet: Regular (no restrictions)         4/25/2025   Exercise   Days per week of moderate/strenous exercise 5 days   Average minutes spent exercising at this level 10 min         4/25/2025   Social Factors   Frequency of gathering with friends or relatives Once a week   Worry food won't last until get money to buy more No   Food not last or not have enough money for food? No   Do you have housing? (Housing is defined as stable permanent housing  and does not include staying outside in a car, in a tent, in an abandoned building, in an overnight shelter, or couch-surfing.) Yes   Are you worried about losing your housing? No   Lack of transportation? No   Unable to get utilities (heat,electricity)? No         2025   Fall Risk   Gait Speed Test (Document in seconds) 9   Gait Speed Test Interpretation Greater than 5.01 seconds - ABNORMAL          2025   Activities of Daily Living- Home Safety   Needs help with the following daily activites Dressing   Safety concerns in the home Throw rugs in the hallway         2025   Dental   Dentist two times every year? Yes         2025   Hearing Screening   Hearing concerns? (!) IT'S HARD TO FOLLOW A CONVERSATION IN A NOISY RESTAURANT OR CROWDED ROOM.         2025   Driving Risk Screening   Patient/family members have concerns about driving (!) DECLINE         2025   General Alertness/Fatigue Screening   Have you been more tired than usual lately? (!) YES         2025   Urinary Incontinence Screening   Bothered by leaking urine in past 6 months Yes         Today's PHQ-2 Score:       2025     8:57 AM   PHQ-2 ( 1999 Pf[employer])   Q1: Little interest or pleasure in doing things 0   Q2: Feeling down, depressed or hopeless 0   PHQ-2 Score 0    Q1: Little interest or pleasure in doing things Not at all   Q2: Feeling down, depressed or hopeless Not at all   PHQ-2 Score 0       Patient-reported           2025   Substance Use   Alcohol more than 3/day or more than 7/wk Not Applicable   Do you have a current opioid prescription? No   How severe/bad is pain from 1 to 10? 8/10   Do you use any other substances recreationally? No     Social History     Tobacco Use    Smoking status: Former     Current packs/day: 0.00     Average packs/day: 1.5 packs/day for 37.3 years (55.9 ttl pk-yrs)     Types: Cigarettes     Start date: 6/15/1963     Quit date: 2000     Years since quittin.5     Smokeless tobacco: Never   Vaping Use    Vaping status: Never Used   Substance Use Topics    Alcohol use: Not Currently    Drug use: Never       ASCVD Risk   The 10-year ASCVD risk score (Kraig MARCIAL, et al., 2019) is: 32.3%    Values used to calculate the score:      Age: 74 years      Sex: Male      Is Non- : No      Diabetic: No      Tobacco smoker: No      Systolic Blood Pressure: 134 mmHg      Is BP treated: Yes      HDL Cholesterol: 43 mg/dL      Total Cholesterol: 174 mg/dL            Reviewed and updated as needed this visit by Provider                    Past Medical History:   Diagnosis Date    Abnormal EMG 04/18/2013    Abnormal involuntary movements(781.0)     Movement Disorder    AK (actinic keratosis) 12/18/2011    Allergic rhinitis, cause unspecified     Allergic rhinitis    Balance problems 11/01/2011    Basal cell carcinoma     Bladder spasms 11/01/2011    Chronic osteoarthritis     COPD (chronic obstructive pulmonary disease) (H)     Interstial lung disease, obliderans bronchiolitis    Diaphragmatic hernia without mention of obstruction or gangrene     Earache or other ear, nose, or throat complaint     Esophageal reflux     Fatigue 11/01/2011    Fracture     H. pylori infection 05/12/2011    History of MRI of cervical spine 11/18/2013    EXAMINATION: CERVICAL SPINE G/E 5 VIEWS* 4/19/2013 4:18 PM  CLINICAL HISTORY: Pain in limb,Performing Location?->[facility]),  COMPARISON:  FINDINGS: AP and lateral views in flexion and extension, as well as odontoid view of the cervical spine was obtained. There is no comparison available. The vertebral bodies of the cervical spine are normally aligned. There is posterior spurring and d    Incomplete defecation 11/01/2011    Interstitial lung disease (H) 11/29/2016    Laboratory test 08/07/2012    Lung disease 06/2015    Malignant basal cell neoplasm of skin 08/06/2008    Melanoma (H) 08/06/2008    Melanoma in situ of  lower leg (H)     R calf    Moraxella catarrhalis bronchitis 04/19/2024    Neuropathy 05/16/2011    GALO (obstructive sleep apnea)     Other bladder disorder     Other color vision deficiencies     Other nervous system complications     Parkinsonism (H) 11/01/2011    Parkinsons disease (H)     Parkinsonism/ balance, dropped foot, tremor    Personal history of colonic polyps     PLMD (periodic limb movement disorder) 12/16/2021    Polyneuropathy in other diseases classified elsewhere     RA (rheumatoid arthritis) (H)     Rheumatoid arthritis of multiple sites with negative rheumatoid factor (H) 03/21/2016    Seronegative arthritis 11/18/2013    Shortness of breath     Shoulder arthritis 2016    acromioclavicular joint     Sialorrhea 04/18/2023    Somatization disorder 05/12/2011    Spider veins     Leg Vericise vein surgery    Squamous cell carcinoma     Tremor 11/01/2011    Unspecified essential hypertension     Unspecified hypothyroidism     Urinary tract infection     Urinary urgency 11/01/2011    Wears glasses 11/01/2011     Past Surgical History:   Procedure Laterality Date    BIOPSY OF SKIN LESION      COLONOSCOPY      COLONOSCOPY N/A 7/20/2022    Procedure: COLONOSCOPY (fv) polypectomy using jumbo bx forcep;  Surgeon: Gómez Mckinney MD;  Location:  GI    CYSTOSCOPY  Many years ago    Blood in urine/ bladder cystoscopy    ESOPHAGOSCOPY, GASTROSCOPY, DUODENOSCOPY (EGD), COMBINED N/A 7/20/2022    Procedure: ESOPHAGOGASTRODUODENOSCOPY (EGD) (fv) biopsies using cold bx forcep;  Surgeon: Gómez Mckinney MD;  Location: [facility]    HEMORRHOID SURGERY      lip biopsy      for sicca complex    MOHS MICROGRAPHIC PROCEDURE      SOFT TISSUE SURGERY      removeal of basel cell carcinoma     Current providers sharing in care for this patient include:  Patient Care Team:  Heladio Calixto MD as PCP - General (Internal Medicine - Pediatrics)  Jeremy Goodrich MD as MD (Rheumatology)  Benjamin Odroñez MD as MD  (Dermapathology)  Kristen Martinez MD as MD (Family Medicine, Sleep Medicine)  Rhett Shah MD as Surgeon (Surgery)  Sin Snow APRN CNP as Nurse Practitioner (Nurse Practitioner)  Leonel Farnsworth MD as MD (Ophthalmology)  Gómez Velásquez DPM (Podiatry)  Rodney íRos DPM (Podiatry)  Heladio Gonzalez MD as MD (Neurology)  Robb Zavala MD as MD (Ophthalmology)  Thong Montes MD as MD (Neurology)  Sin Snow APRN CNP as Referring Physician (Nurse Practitioner)  Jeremy Goodrich MD as MD (Rheumatology)  Ramón Camargo MD as MD (Hand Surgery)  Rodney Garcia MD as MD (Clinical Neurophysiology)  Jeremy Goodrich MD as Assigned Rheumatology Provider  Heladio Calixto MD as Referring Physician (Internal Medicine - Pediatrics)  Silviano Vargas MD as MD (Urology)  Silviano Vargas MD as Assigned Surgical Provider  Cristina Bhat DO as MD (Neurology)  Mike Griffiths MD as Assigned Musculoskeletal Provider  Heladio Calixto MD as Assigned PCP  Jyoti, Thong Stevens MD as Assigned Neuroscience Provider  Marcus Cespedes MD as MD (Nephrology)  Kaitlynn Guzmán PA-C as Assigned Sleep Provider  Marcus Cespedes MD as Assigned Nephrology Provider  Jeremy Goodrich MD as MD (Rheumatology)    The following health maintenance items are reviewed in Epic and correct as of today:  Health Maintenance   Topic Date Due    ZOSTER IMMUNIZATION (2 of 2) 12/13/2018    ASTHMA ACTION PLAN  11/05/2020    ANNUAL REVIEW OF HM ORDERS  09/30/2022    RSV VACCINE (1 - 1-dose 75+ series) Never done    MEDICARE ANNUAL WELLNESS VISIT  12/22/2024    LIPID  12/22/2024    COVID-19 Vaccine (7 - Moderna risk 2024-25 season) 03/16/2025    EYE EXAM  08/27/2025    BMP  10/08/2025    MICROALBUMIN  10/08/2025    ASTHMA CONTROL TEST  10/28/2025    HEMOGLOBIN  04/10/2026    FALL RISK ASSESSMENT  04/28/2026    DIABETES SCREENING  10/08/2027    DTAP/TDAP/TD  "IMMUNIZATION (3 - Td or Tdap) 08/21/2028    ADVANCE CARE PLANNING  12/22/2028    HEPATITIS C SCREENING  Completed    PHQ-2 (once per calendar year)  Completed    INFLUENZA VACCINE  Completed    Pneumococcal Vaccine: 50+ Years  Completed    URINALYSIS  Completed    HPV IMMUNIZATION  Aged Out    MENINGITIS IMMUNIZATION  Aged Out    COLORECTAL CANCER SCREENING  Discontinued         Review of Systems  Constitutional, HEENT, cardiovascular, pulmonary, GI, , musculoskeletal, neuro, skin, endocrine and psych systems are negative, except as otherwise noted.     Objective    Exam  /72 (BP Location: Right arm, Patient Position: Sitting, Cuff Size: Adult Large)   Pulse 58   Temp 98.8  F (37.1  C) (Temporal)   Resp 12   Ht 1.854 m (6' 1\")   Wt (!) 137.9 kg (304 lb)   SpO2 96%   BMI 40.11 kg/m     Estimated body mass index is 40.11 kg/m  as calculated from the following:    Height as of this encounter: 1.854 m (6' 1\").    Weight as of this encounter: 137.9 kg (304 lb).    Physical Exam  GENERAL: alert and no distress  EYES: Eyes grossly normal to inspection, PERRL and conjunctivae and sclerae normal  HENT: ear canals and TM's normal, nose and mouth without ulcers or lesions  NECK: no adenopathy, no asymmetry, masses, or scars  RESP: lungs clear to auscultation - no rales, rhonchi or wheezes  CV: regular rate and rhythm, normal S1 S2, no S3 or S4, no murmur, click or rub, no peripheral edema  ABDOMEN: soft, nontender, no hepatosplenomegaly, no masses and bowel sounds normal  MS: no gross musculoskeletal defects noted, no edema  SKIN: no suspicious lesions or rashes  NEURO: Normal strength and tone, mentation intact and speech normal  PSYCH: mentation appears normal, affect normal/bright        4/28/2025   Mini Cog   Clock Draw Score 2 Normal   3 Item Recall 3 objects recalled   Mini Cog Total Score 5              Signed Electronically by: Heladio Calixto MD      The longitudinal plan of care for the " diagnosis(es)/condition(s) as documented were addressed during this visit. Due to the added complexity in care, I will continue to support Rich in the subsequent management and with ongoing continuity of care.

## 2025-05-02 DIAGNOSIS — K21.9 ESOPHAGEAL REFLUX: ICD-10-CM

## 2025-05-02 NOTE — TELEPHONE ENCOUNTER
Requested Prescriptions   Pending Prescriptions Disp Refills    omeprazole (PRILOSEC) 40 MG DR capsule 90 capsule 1     Sig: Take 1 capsule (40 mg) by mouth daily.       There is no refill protocol information for this order        Last Written Prescription Date:  11/25/24  Last Fill Quantity: 90 capsule,  # refills: 1   Last office visit: 10/15/2024 ; last virtual visit: Visit date not found with prescribing provider:  Marcus Cespedes   Future Office Visit:  5/29/2025    Ellen Guzman

## 2025-05-06 RX ORDER — OMEPRAZOLE 40 MG/1
40 CAPSULE, DELAYED RELEASE ORAL DAILY
Qty: 90 CAPSULE | Refills: 1 | Status: SHIPPED | OUTPATIENT
Start: 2025-05-06

## 2025-05-13 ENCOUNTER — APPOINTMENT (OUTPATIENT)
Age: 75
Setting detail: DERMATOLOGY
End: 2025-05-13

## 2025-05-13 DIAGNOSIS — Z48.02 ENCOUNTER FOR REMOVAL OF SUTURES: ICD-10-CM

## 2025-05-13 PROCEDURE — ? SUTURE REMOVAL (GLOBAL PERIOD)

## 2025-05-13 PROCEDURE — 99024 POSTOP FOLLOW-UP VISIT: CPT

## 2025-05-13 ASSESSMENT — LOCATION SIMPLE DESCRIPTION DERM: LOCATION SIMPLE: LEFT PRETIBIAL REGION

## 2025-05-13 ASSESSMENT — LOCATION ZONE DERM: LOCATION ZONE: LEG

## 2025-05-13 ASSESSMENT — LOCATION DETAILED DESCRIPTION DERM: LOCATION DETAILED: LEFT PROXIMAL PRETIBIAL REGION

## 2025-05-19 ENCOUNTER — APPOINTMENT (OUTPATIENT)
Age: 75
Setting detail: DERMATOLOGY
End: 2025-05-19

## 2025-05-19 ENCOUNTER — MEDICAL CORRESPONDENCE (OUTPATIENT)
Dept: HEALTH INFORMATION MANAGEMENT | Facility: CLINIC | Age: 75
End: 2025-05-19
Payer: MEDICARE

## 2025-05-19 DIAGNOSIS — Z48.817 ENCOUNTER FOR SURGICAL AFTERCARE FOLLOWING SURGERY ON THE SKIN AND SUBCUTANEOUS TISSUE: ICD-10-CM

## 2025-05-19 PROCEDURE — ? COUNSELING

## 2025-05-19 PROCEDURE — 99212 OFFICE O/P EST SF 10 MIN: CPT

## 2025-05-22 ENCOUNTER — LAB (OUTPATIENT)
Dept: LAB | Facility: CLINIC | Age: 75
End: 2025-05-22
Payer: MEDICARE

## 2025-05-22 DIAGNOSIS — N18.32 CKD STAGE 3B, GFR 30-44 ML/MIN (H): ICD-10-CM

## 2025-05-22 DIAGNOSIS — N18.32 CKD STAGE 3B, GFR 30-44 ML/MIN (H): Primary | ICD-10-CM

## 2025-05-22 LAB
ERYTHROCYTE [DISTWIDTH] IN BLOOD BY AUTOMATED COUNT: 15.1 % (ref 10–15)
HCT VFR BLD AUTO: 36.9 % (ref 40–53)
HGB BLD-MCNC: 11.9 G/DL (ref 13.3–17.7)
MCH RBC QN AUTO: 31.1 PG (ref 26.5–33)
MCHC RBC AUTO-ENTMCNC: 32.2 G/DL (ref 31.5–36.5)
MCV RBC AUTO: 96 FL (ref 78–100)
PLATELET # BLD AUTO: 184 10E3/UL (ref 150–450)
RBC # BLD AUTO: 3.83 10E6/UL (ref 4.4–5.9)
WBC # BLD AUTO: 4.4 10E3/UL (ref 4–11)

## 2025-05-24 LAB
ALBUMIN SERPL BCG-MCNC: 4.2 G/DL (ref 3.5–5.2)
ANION GAP SERPL CALCULATED.3IONS-SCNC: 11 MMOL/L (ref 7–15)
BUN SERPL-MCNC: 37.7 MG/DL (ref 8–23)
CALCIUM SERPL-MCNC: 9.3 MG/DL (ref 8.8–10.4)
CHLORIDE SERPL-SCNC: 106 MMOL/L (ref 98–107)
CREAT SERPL-MCNC: 1.6 MG/DL (ref 0.67–1.17)
EGFRCR SERPLBLD CKD-EPI 2021: 44 ML/MIN/1.73M2
GLUCOSE SERPL-MCNC: 101 MG/DL (ref 70–99)
HCO3 SERPL-SCNC: 24 MMOL/L (ref 22–29)
PHOSPHATE SERPL-MCNC: 3.2 MG/DL (ref 2.5–4.5)
POTASSIUM SERPL-SCNC: 4.9 MMOL/L (ref 3.4–5.3)
SODIUM SERPL-SCNC: 141 MMOL/L (ref 135–145)

## 2025-05-29 ENCOUNTER — VIRTUAL VISIT (OUTPATIENT)
Dept: NEPHROLOGY | Facility: CLINIC | Age: 75
End: 2025-05-29
Attending: INTERNAL MEDICINE
Payer: MEDICARE

## 2025-05-29 DIAGNOSIS — I10 PRIMARY HYPERTENSION: ICD-10-CM

## 2025-05-29 DIAGNOSIS — N18.32 CHRONIC KIDNEY DISEASE, STAGE 3B (H): Primary | ICD-10-CM

## 2025-05-29 DIAGNOSIS — E66.01 MORBID OBESITY (H): ICD-10-CM

## 2025-05-29 DIAGNOSIS — C61 PROSTATE CANCER (H): ICD-10-CM

## 2025-05-29 NOTE — PATIENT INSTRUCTIONS
It was a pleasure taking care of you today.  I've included a brief summary of our discussion and care plan from today's visit below.  Please review this information with your primary care provider.    My recommendations are summarized as follows:  - Continue your current medications  - Return to Nephrology Clinic in 6 months to review your progress.     Who do I call with any questions after my visit?  Please be in touch if there are any further questions that arise following today's visit.  There are multiple ways to contact your nephrology care team.      You can always send a secure message through [employer] or call [phone]. [employer] messages are answered by your nurse or doctor typically within 24-48 hours. Please allow extra time on weekends and holidays. For urgent/emergent questions after business hours, you may reach the on-call Nephrology Fellow by contacting the [facility]  at [phone].    How do I schedule appointments?  During business hours, you may reach your Nephrology Care Team or schedule or reschedule an appointment or lab at [phone]. If you need to schedule imaging, please call [phone].      How will I get the results of any tests ordered?    You will receive all of your results.  If you have signed up for [facility], any tests ordered at your visit will be available to you once resulted on MyChart. Typically the physician reviews them and may or may not make further recommendations. If there are urgent results that require a change in your care plan, your physician or nurse will call you to discuss the next steps. If you are not on MyChart, a letter may be generated and mailed to you with your results.    Sincerely,    Marcus Cespedes MD  [facility]  Division of Nephrology and Hypertension

## 2025-05-29 NOTE — PROGRESS NOTES
Nephrology Outpatient Visit Note (05/29/2025)    Chief Complain/Reason for Visit  Chronic kidney disease. This is a billable video visit.     History of Present Illness  This is a 74 year old male here for a follow up visit. Please see my initial note dated 10/15/2024 for details. Briefly, His past medical history is notable for interstitial lung disease, rheumatoid arthritis, and obstructive sleep apnea .  In addition, he has a history of prostate cancer.   He completed external beam radiation therapy as of September 30, 2022, without androgen deprivation therapy      With regards to his kidney function, we have laboratory data going back to 2003.  Back then, serum creatinine ranged between 1.2 to 1.4 mg/dL.  Over the course of the last 1 to 2 years his serum creatinine measurements have run higher between 1.3 to 1.6 mg/dL.    Overall, the patient feels well today. He continues to live independently with his wife. He walks with the assistance of a walker. He does have a history of spinal stenosis contributing to his back issues. He does have a chronic cough related to his interstitial lung disease. He is on methotrexate for rheumatoid arthritis. He indicates stability in both rheumatoid arthritis and his interstitial lung disease. He denies abdominal pain, dysuria, gross hematuria. He does have mild lower extremity edema.      His most recent laboratory evaluation was earlier this month.  Serum creatinine is 1.6 mg/dL with an estimated GFR of 44 mL/min.  Electrolytes are within the normal range.  Serum albumin is normal.  Hemoglobin is 11.9.  Urine protein to cr ratio 0.08 g/g.    The following portions of the patient's history were reviewed and updated as appropriate: allergies, current medications, past family history, past medical history, past social history, past surgical history, and problem list.    Subjective   Review of Systems  A comprehensive review of systems was performed. Pertinent positives and  negatives are described above.    Social and Family History  Patient reports that he quit smoking about 24 years ago. His smoking use included cigarettes. He started smoking about 61 years ago. He has a 55.9 pack-year smoking history. He has never used smokeless tobacco. He reports that he does not currently use alcohol. He reports that he does not use drugs.  family history includes Arthritis in his mother and sister; Cancer in his granddaughter, maternal grandfather, mother, and another family member; Cerebrovascular Disease in his father, paternal grandfather, and paternal grandmother; Colon Polyps in his father; Congenital Anomalies in an other family member; Dementia in his sister; Diabetes in his brother; Genetic Disease in his granddaughter; Heart Disease in his father, mother, sister, and sister; Heart Failure in his sister; Hypertension in his brother, father, mother, and sister; Kidney Disease in his sister; Learning Disorder in an other family member; Multiple Sclerosis in his sister; Neurologic Disorder in his sister; Osteoporosis in his mother; Other - See Comments in his son, son, and son; Other Cancer in his mother and another family member; Psoriasis in his maternal grandfather; Skin Cancer in his father and mother; Stomach Cancer in his maternal grandfather; Uterine Cancer in his mother.     Examination  There were no vitals taken for this visit. There is no height or weight on file to calculate BMI.    Objective   Pertinent Laboratory and Imaging Results  Most Recent Serum Chemistries  Lab Results   Component Value Date    WBC 4.4 05/22/2025    HGB 11.9 (L) 05/22/2025    HCT 36.9 (L) 05/22/2025     05/22/2025     05/22/2025    POTASSIUM 4.9 05/22/2025    CHLORIDE 106 05/22/2025    CO2 24 05/22/2025    BUN 37.7 (H) 05/22/2025    CR 1.60 (H) 05/22/2025     (H) 05/22/2025    SED 14 10/29/2014    DD 0.3 12/04/2013    NTBNPI 233 12/04/2013    NTBNP 272 (H) 05/15/2018    TROPONIN 0.01  12/04/2013    AST 24 04/28/2025    ALT 21 04/28/2025    ALKPHOS 71 04/28/2025     Most Recent Urinalysis  Lab Results   Component Value Date    COLOR Yellow 10/08/2024    APPEARANCE Clear 10/08/2024    URINEGLC Negative 10/08/2024    URINEBILI Negative 10/08/2024    URINEKETONE Negative 10/08/2024    SG 1.025 10/08/2024    URINEPH 6.0 10/08/2024    PROTEIN Negative 10/08/2024    UROBILINOGEN 0.2 10/08/2024    NITRITE Negative 10/08/2024    LEUKEST Negative 10/08/2024     Current Outpatient Medications   Medication Sig Dispense Refill    acetaminophen (TYLENOL) 500 MG tablet Take 1-2 tablets (500-1,000 mg) by mouth every 4 hours as needed for mild pain 30 tablet 0    acetaminophen (TYLENOL) 500 MG tablet 2 x 500mg by mouth 3 times per day: 7/730am, 4pm and 11pm  = 6/day      albuterol (PROAIR HFA/PROVENTIL HFA/VENTOLIN HFA) 108 (90 Base) MCG/ACT inhaler Inhale 2 puffs into the lungs every 4 hours as needed for shortness of breath or wheezing. 18 g 3    Albuterol Sulfate, sensor, 108 (90 Base) MCG/ACT AEPB Inhale 2 puffs into the lungs every 4 hours as needed for shortness of breath, wheezing or cough. 1 each 1    amLODIPine (NORVASC) 5 MG tablet Take 1 tablet (5 mg) by mouth daily. 90 tablet 3    cholecalciferol (HM VITAMIN D3) 25 MCG (1000 UT) TABS 1000 unit tab by mouth daily      folic acid (FOLVITE) 1 MG tablet Take 1 tablet (1 mg) by mouth daily. 90 tablet 3    hydroxychloroquine (PLAQUENIL) 200 MG tablet Take 1 tablet (200 mg) by mouth daily. Daily at 9am. 90 tablet 3    lisinopril-hydrochlorothiazide (ZESTORETIC) 20-25 MG tablet Take 1 tablet by mouth daily. 90 tablet 3    methotrexate 2.5 MG tablet Take 8 tablets (20 mg) by mouth every 7 days. TAKE 8 TABLETS BY MOUTH ONCE PER WEEK. 96 tablet 2    metoprolol succinate ER (TOPROL XL) 50 MG 24 hr tablet TAKE 1 AND 1/2 TABLETS BY MOUTH DAILY 135 tablet 3    omeprazole (PRILOSEC) 40 MG DR capsule Take 1 capsule (40 mg) by mouth daily. 90 capsule 1    ORDER FOR DME  Use your BiPAP device as directed by your provider.      oxyBUTYnin (DITROPAN) 5 MG tablet Take 1 tablet (5 mg) by mouth daily. 90 tablet 3    pregabalin (LYRICA) 50 MG capsule 50mg capsule by mouth at 7am and 4pm and 1-2 x 50mg capsules by mouth nightly at 11pm (4/day) 360 capsule 1    salmeterol (SEREVENT DISKUS) 50 MCG/ACT inhaler Inhale 1 puff into the lungs 2 times daily. 60 each 11    Vitamin D3 (CHOLECALCIFEROL) 25 mcg (1000 units) tablet 1 unit every day by oral route.       No current facility-administered medications for this visit.        Assessment & Plan   # Chronic kidney disease stage 3b  The patient has chronic kidney disease, currently in stage IIIb.  This is likely due to a combination of chronic hypertension, aging, as well as possibly related to his radiation therapy.  Kidney ultrasound in 2024 was negative for obstruction or hydronephrosis.  Serological evaluation for parenchymal kidney disease in 2024 was unremarkable.    With regards to management, I emphasized to the patient the importance of good blood pressure in order to slow down the progression of his kidney disease.  He should have his kidney function checked every 6 months.     Of note, I suggested that he stops omeprazole in a previous visit.  He tried famotidine.  However, he was unable to control his GERD symptoms and he resumed omeprazole.    # Chronic hypertension  Blood pressure is well-controlled.    # Interstitial Lung Disease/Rheumatoid Arthritis, on methotrexate  # Prostate cancer status post radiation therapy in 2022  # Obesity BMI 39      My recommendations are the following:  - Continue your current medications  - Return to Nephrology Clinic in 6 months to review your progress.     Total time was of this encounter on the date of service was 30 minutes. All questions were answered to the patient's satisfaction.  Chart documentation was completed, in part, with [employer] voice-recognition software. Even though reviewed, some  grammatical, spelling, and word errors may remain.    Telemedicine Visit Addendum:  Consultation provided at the request of above provider for advice regarding the diagnosis and treatment of patient's condition. The patient's condition can be safely assessed via telemedicine. Patient has agreed to receiving services via telemedicine technology. Date of service is 05/29/25. Time Service Began: 1:21 PM. Time Service Ended: 1:40 PM. Originating Location (pt. Location): Home. Distant Location (provider location):  [facility].Reason that telemedicine is appropriate: Patient unable to travel. Mode of transmission: Secure real time interactive audio and visual telecommunication system  [employer]    Orders placed today:  Orders Placed This Encounter   Procedures    Hemoglobin and hematocrit    Iron and iron binding capacity    Ferritin    Renal panel    Parathyroid Hormone Intact    Vitamin D Deficiency    UA with Microscopic reflex to Culture    Albumin Random Urine Quantitative with Creat Ratio     Marcus Cespedes MD  Division of Nephrology and Hypertension  [employer] Web ([employer])   Vocera Web Console ([employer])

## 2025-05-29 NOTE — LETTER
5/29/2025       RE: Lucio Daly  [address]  [place] [address]     Dear Colleague,    Thank you for referring your patient, Lucio Daly, to the [facility] [place] [facility]. Please see a copy of my visit note below.        Nephrology Outpatient Visit Note (05/29/2025)    Chief Complain/Reason for Visit  Chronic kidney disease. This is a billable video visit.     History of Present Illness  This is a 74 year old male here for a follow up visit. Please see my initial note dated 10/15/2024 for details. Briefly, His past medical history is notable for interstitial lung disease, rheumatoid arthritis, and obstructive sleep apnea .  In addition, he has a history of prostate cancer.   He completed external beam radiation therapy as of September 30, 2022, without androgen deprivation therapy      With regards to his kidney function, we have laboratory data going back to 2003.  Back then, serum creatinine ranged between 1.2 to 1.4 mg/dL.  Over the course of the last 1 to 2 years his serum creatinine measurements have run higher between 1.3 to 1.6 mg/dL.    Overall, the patient feels well today. He continues to live independently with his wife. He walks with the assistance of a walker. He does have a history of spinal stenosis contributing to his back issues. He does have a chronic cough related to his interstitial lung disease. He is on methotrexate for rheumatoid arthritis. He indicates stability in both rheumatoid arthritis and his interstitial lung disease. He denies abdominal pain, dysuria, gross hematuria. He does have mild lower extremity edema.      His most recent laboratory evaluation was earlier this month.  Serum creatinine is 1.6 mg/dL with an estimated GFR of 44 mL/min.  Electrolytes are within the normal range.  Serum albumin is normal.  Hemoglobin is 11.9.  Urine protein to cr ratio 0.08 g/g.    The following portions of  the patient's history were reviewed and updated as appropriate: allergies, current medications, past family history, past medical history, past social history, past surgical history, and problem list.    Subjective  Review of Systems  A comprehensive review of systems was performed. Pertinent positives and negatives are described above.    Social and Family History  Patient reports that he quit smoking about 24 years ago. His smoking use included cigarettes. He started smoking about 61 years ago. He has a 55.9 pack-year smoking history. He has never used smokeless tobacco. He reports that he does not currently use alcohol. He reports that he does not use drugs.  family history includes Arthritis in his mother and sister; Cancer in his granddaughter, maternal grandfather, mother, and another family member; Cerebrovascular Disease in his father, paternal grandfather, and paternal grandmother; Colon Polyps in his father; Congenital Anomalies in an other family member; Dementia in his sister; Diabetes in his brother; Genetic Disease in his granddaughter; Heart Disease in his father, mother, sister, and sister; Heart Failure in his sister; Hypertension in his brother, father, mother, and sister; Kidney Disease in his sister; Learning Disorder in an other family member; Multiple Sclerosis in his sister; Neurologic Disorder in his sister; Osteoporosis in his mother; Other - See Comments in his son, son, and son; Other Cancer in his mother and another family member; Psoriasis in his maternal grandfather; Skin Cancer in his father and mother; Stomach Cancer in his maternal grandfather; Uterine Cancer in his mother.     Examination  There were no vitals taken for this visit. There is no height or weight on file to calculate BMI.    Objective  Pertinent Laboratory and Imaging Results  Most Recent Serum Chemistries  Lab Results   Component Value Date    WBC 4.4 05/22/2025    HGB 11.9 (L) 05/22/2025    HCT 36.9 (L) 05/22/2025      05/22/2025     05/22/2025    POTASSIUM 4.9 05/22/2025    CHLORIDE 106 05/22/2025    CO2 24 05/22/2025    BUN 37.7 (H) 05/22/2025    CR 1.60 (H) 05/22/2025     (H) 05/22/2025    SED 14 10/29/2014    DD 0.3 12/04/2013    NTBNPI 233 12/04/2013    NTBNP 272 (H) 05/15/2018    TROPONIN 0.01 12/04/2013    AST 24 04/28/2025    ALT 21 04/28/2025    ALKPHOS 71 04/28/2025     Most Recent Urinalysis  Lab Results   Component Value Date    COLOR Yellow 10/08/2024    APPEARANCE Clear 10/08/2024    URINEGLC Negative 10/08/2024    URINEBILI Negative 10/08/2024    URINEKETONE Negative 10/08/2024    SG 1.025 10/08/2024    URINEPH 6.0 10/08/2024    PROTEIN Negative 10/08/2024    UROBILINOGEN 0.2 10/08/2024    NITRITE Negative 10/08/2024    LEUKEST Negative 10/08/2024     Current Outpatient Medications   Medication Sig Dispense Refill     acetaminophen (TYLENOL) 500 MG tablet Take 1-2 tablets (500-1,000 mg) by mouth every 4 hours as needed for mild pain 30 tablet 0     acetaminophen (TYLENOL) 500 MG tablet 2 x 500mg by mouth 3 times per day: 7/730am, 4pm and 11pm  = 6/day       albuterol (PROAIR HFA/PROVENTIL HFA/VENTOLIN HFA) 108 (90 Base) MCG/ACT inhaler Inhale 2 puffs into the lungs every 4 hours as needed for shortness of breath or wheezing. 18 g 3     Albuterol Sulfate, sensor, 108 (90 Base) MCG/ACT AEPB Inhale 2 puffs into the lungs every 4 hours as needed for shortness of breath, wheezing or cough. 1 each 1     amLODIPine (NORVASC) 5 MG tablet Take 1 tablet (5 mg) by mouth daily. 90 tablet 3     cholecalciferol (HM VITAMIN D3) 25 MCG (1000 UT) TABS 1000 unit tab by mouth daily       folic acid (FOLVITE) 1 MG tablet Take 1 tablet (1 mg) by mouth daily. 90 tablet 3     hydroxychloroquine (PLAQUENIL) 200 MG tablet Take 1 tablet (200 mg) by mouth daily. Daily at 9am. 90 tablet 3     lisinopril-hydrochlorothiazide (ZESTORETIC) 20-25 MG tablet Take 1 tablet by mouth daily. 90 tablet 3     methotrexate 2.5 MG tablet  Take 8 tablets (20 mg) by mouth every 7 days. TAKE 8 TABLETS BY MOUTH ONCE PER WEEK. 96 tablet 2     metoprolol succinate ER (TOPROL XL) 50 MG 24 hr tablet TAKE 1 AND 1/2 TABLETS BY MOUTH DAILY 135 tablet 3     omeprazole (PRILOSEC) 40 MG DR capsule Take 1 capsule (40 mg) by mouth daily. 90 capsule 1     ORDER FOR DME Use your BiPAP device as directed by your provider.       oxyBUTYnin (DITROPAN) 5 MG tablet Take 1 tablet (5 mg) by mouth daily. 90 tablet 3     pregabalin (LYRICA) 50 MG capsule 50mg capsule by mouth at 7am and 4pm and 1-2 x 50mg capsules by mouth nightly at 11pm (4/day) 360 capsule 1     salmeterol (SEREVENT DISKUS) 50 MCG/ACT inhaler Inhale 1 puff into the lungs 2 times daily. 60 each 11     Vitamin D3 (CHOLECALCIFEROL) 25 mcg (1000 units) tablet 1 unit every day by oral route.       No current facility-administered medications for this visit.        Assessment & Plan  # Chronic kidney disease stage 3b  The patient has chronic kidney disease, currently in stage IIIb.  This is likely due to a combination of chronic hypertension, aging, as well as possibly related to his radiation therapy.  Kidney ultrasound in 2024 was negative for obstruction or hydronephrosis.  Serological evaluation for parenchymal kidney disease in 2024 was unremarkable.    With regards to management, I emphasized to the patient the importance of good blood pressure in order to slow down the progression of his kidney disease.  He should have his kidney function checked every 6 months.     Of note, I suggested that he stops omeprazole in a previous visit.  He tried famotidine.  However, he was unable to control his GERD symptoms and he resumed omeprazole.    # Chronic hypertension  Blood pressure is well-controlled.    # Interstitial Lung Disease/Rheumatoid Arthritis, on methotrexate  # Prostate cancer status post radiation therapy in 2022  # Obesity BMI 39      My recommendations are the following:  - Continue your current  medications  - Return to Nephrology Clinic in 6 months to review your progress.     Total time was of this encounter on the date of service was 30 minutes. All questions were answered to the patient's satisfaction.  Chart documentation was completed, in part, with [employer] voice-recognition software. Even though reviewed, some grammatical, spelling, and word errors may remain.    Telemedicine Visit Addendum:  Consultation provided at the request of above provider for advice regarding the diagnosis and treatment of patient's condition. The patient's condition can be safely assessed via telemedicine. Patient has agreed to receiving services via telemedicine technology. Date of service is 05/29/25. Time Service Began: 1:21 PM. Time Service Ended: 1:40 PM. Originating Location (pt. Location): Home. Distant Location (provider location):  [facility].Reason that telemedicine is appropriate: Patient unable to travel. Mode of transmission: Secure real time interactive audio and visual telecommunication system  [employer]    Orders placed today:  Orders Placed This Encounter   Procedures     Hemoglobin and hematocrit     Iron and iron binding capacity     Ferritin     Renal panel     Parathyroid Hormone Intact     Vitamin D Deficiency     UA with Microscopic reflex to Culture     Albumin Random Urine Quantitative with Creat Ratio     Marcus Cespedes MD  Division of Nephrology and Hypertension  [employer] Web ([email or web address])   Vocera Web Console ([email or web address])       Again, thank you for allowing me to participate in the care of your patient.      Sincerely,    Marcus Cespedes MD

## 2025-05-29 NOTE — NURSING NOTE
Current patient location: [address] [place] [id number]    Is the patient currently in the state of [place]? YES    Visit mode: VIDEO    If the visit is dropped, the patient can be reconnected by:VIDEO VISIT: Text to cell phone:   Telephone Information:   Mobile [phone]       Will anyone else be joining the visit? NO  (If patient encounters technical issues they should call [phone])    Are changes needed to the allergy or medication list? No    Are refills needed on medications prescribed by this physician? NO    Rooming Documentation:  Questionnaire(s) completed    Reason for visit: RECHECK    Zuri FRANKLINF

## 2025-06-04 ENCOUNTER — TELEPHONE (OUTPATIENT)
Dept: NEPHROLOGY | Facility: CLINIC | Age: 75
End: 2025-06-04
Payer: MEDICARE

## 2025-06-04 NOTE — TELEPHONE ENCOUNTER
Patient confirmed scheduled appointment:  Date: 12/11/25  Time: 1:00PM  Visit type: RETURN NEPH  Provider: JULIOCESAR  Location: [facility]  Testing/imaging: N/A  Additional notes: N/A

## 2025-06-16 ENCOUNTER — APPOINTMENT (OUTPATIENT)
Age: 75
Setting detail: DERMATOLOGY
End: 2025-06-16

## 2025-06-16 DIAGNOSIS — Z48.817 ENCOUNTER FOR SURGICAL AFTERCARE FOLLOWING SURGERY ON THE SKIN AND SUBCUTANEOUS TISSUE: ICD-10-CM

## 2025-06-16 DIAGNOSIS — D485 NEOPLASM OF UNCERTAIN BEHAVIOR OF SKIN: ICD-10-CM

## 2025-06-16 DIAGNOSIS — L57.0 ACTINIC KERATOSIS: ICD-10-CM

## 2025-06-16 PROBLEM — D48.5 NEOPLASM OF UNCERTAIN BEHAVIOR OF SKIN: Status: ACTIVE | Noted: 2025-06-16

## 2025-06-16 PROCEDURE — ? COUNSELING

## 2025-06-16 PROCEDURE — ? DEFER

## 2025-06-16 PROCEDURE — ? BIOPSY BY SHAVE METHOD

## 2025-06-16 ASSESSMENT — LOCATION DETAILED DESCRIPTION DERM
LOCATION DETAILED: LEFT ANTERIOR PROXIMAL UPPER ARM
LOCATION DETAILED: SUPERIOR MID FOREHEAD
LOCATION DETAILED: RIGHT ANTERIOR PROXIMAL UPPER ARM
LOCATION DETAILED: NASAL DORSUM

## 2025-06-16 ASSESSMENT — LOCATION SIMPLE DESCRIPTION DERM
LOCATION SIMPLE: LEFT UPPER ARM
LOCATION SIMPLE: SUPERIOR FOREHEAD
LOCATION SIMPLE: RIGHT UPPER ARM
LOCATION SIMPLE: NOSE

## 2025-06-16 ASSESSMENT — LOCATION ZONE DERM
LOCATION ZONE: FACE
LOCATION ZONE: ARM
LOCATION ZONE: NOSE

## 2025-06-18 ENCOUNTER — TRANSFERRED RECORDS (OUTPATIENT)
Dept: HEALTH INFORMATION MANAGEMENT | Facility: CLINIC | Age: 75
End: 2025-06-18
Payer: MEDICARE

## 2025-07-08 ENCOUNTER — ANCILLARY PROCEDURE (OUTPATIENT)
Dept: GENERAL RADIOLOGY | Facility: CLINIC | Age: 75
End: 2025-07-08
Attending: ORTHOPAEDIC SURGERY
Payer: MEDICARE

## 2025-07-08 ENCOUNTER — OFFICE VISIT (OUTPATIENT)
Dept: ORTHOPEDICS | Facility: CLINIC | Age: 75
End: 2025-07-08
Payer: MEDICARE

## 2025-07-08 VITALS — BODY MASS INDEX: 39.76 KG/M2 | WEIGHT: 300 LBS | HEIGHT: 73 IN

## 2025-07-08 DIAGNOSIS — M54.17 LUMBOSACRAL RADICULOPATHY: ICD-10-CM

## 2025-07-08 DIAGNOSIS — G60.9 IDIOPATHIC PERIPHERAL NEUROPATHY: Primary | ICD-10-CM

## 2025-07-08 DIAGNOSIS — M54.50 LOW BACK PAIN: ICD-10-CM

## 2025-07-08 PROCEDURE — 99214 OFFICE O/P EST MOD 30 MIN: CPT | Mod: GC | Performed by: ORTHOPAEDIC SURGERY

## 2025-07-08 PROCEDURE — 72110 X-RAY EXAM L-2 SPINE 4/>VWS: CPT | Performed by: RADIOLOGY

## 2025-07-08 NOTE — NURSING NOTE
Reason For Visit:   Chief Complaint   Patient presents with    RECHECK     Leg weakness        Primary MD: Heladio Calixto  Ref. MD: Arlen       There were no vitals taken for this visit.    Pain Assessment  Patient Currently in Pain: Yes  Primary Pain Location: Back    Oswestry (TMIBO) Questionnaire        7/3/2025     8:53 AM   OSWESTRY DISABILITY INDEX   Count 10    Sum 27    Oswestry Score (%) 54 %        Patient-reported            Neck Disability Index (NDI) Questionnaire         No data to display                       Visual Analog Pain Scale  Back Pain Scale 0-10: 7  Right leg pain: 7 (tingling, numbness through legs and feet)  Left leg pain: 7  Neck Pain Scale 0-10: 0  Right arm pain: 0  Left arm pain: 0    Promis 10 Assessment        4/20/2025    12:15 PM   PROMIS 10   In general, would you say your health is: Fair   In general, would you say your quality of life is: Fair   In general, how would you rate your physical health? Fair   In general, how would you rate your mental health, including your mood and your ability to think? Good   In general, how would you rate your satisfaction with your social activities and relationships? Fair   In general, please rate how well you carry out your usual social activities and roles Good   To what extent are you able to carry out your everyday physical activities such as walking, climbing stairs, carrying groceries, or moving a chair? A little   In the past 7 days, how often have you been bothered by emotional problems such as feeling anxious, depressed, or irritable? Sometimes   In the past 7 days, how would you rate your fatigue on average? Severe   In the past 7 days, how would you rate your pain on average, where 0 means no pain, and 10 means worst imaginable pain? 8   In general, would you say your health is: 2   In general, would you say your quality of life is: 2   In general, how would you rate your physical health? 2   In general, how would you rate your mental  health, including your mood and your ability to think? 3   In general, how would you rate your satisfaction with your social activities and relationships? 2   In general, please rate how well you carry out your usual social activities and roles. (This includes activities at home, at work and in your community, and responsibilities as a parent, child, spouse, employee, friend, etc.) 3   To what extent are you able to carry out your everyday physical activities such as walking, climbing stairs, carrying groceries, or moving a chair? 2   In the past 7 days, how often have you been bothered by emotional problems such as feeling anxious, depressed, or irritable? 3   In the past 7 days, how would you rate your fatigue on average? 4   In the past 7 days, how would you rate your pain on average, where 0 means no pain, and 10 means worst imaginable pain? 8   Global Mental Health Score 10    Global Physical Health Score 8    PROMIS TOTAL - SUBSCORES 18        Patient-reported                Jerrod Valadez CMA

## 2025-07-08 NOTE — PROGRESS NOTES
Spine Surgery Return Clinic Visit      Chief Complaint:   RECHECK (Leg weakness )      Interval HPI:  Symptom Profile Including: location of symptoms, onset, severity, exacerbating/alleviating factors, previous treatments:        Lucio Daly is a 74 year old male who was last seen just over 1 year ago for lower extremity radiculopathy and pain in the lower back.  At that time a physical therapy referral was given.  Patient reports that he did do physical therapy previously which seem to flareup his symptoms so he stopped.  Over the last several weeks he feels that his pain and symptoms have continued to worsen.  It is difficult for him to lay down in bed and when he gets up at night to use the bathroom it is quite painful to sit up.  Additionally he feels that he has had continuing numbness, tingling, and weakness of his legs, right greater than left.  This is particularly noticeable when driving long distances.  He states that after 45 minutes of driving he feels his entire right lower extremity has gotten numb.  He also notes occasional dropfoot particularly on the right side.    Of note he has also been found to have bilateral knee arthritis and was discussing the possibility of a knee replacement.  However he was told that in order to be a candidate for knee replacement surgery his back issues would likely have to be resolved first.            Past Medical History:     Past Medical History:   Diagnosis Date    Abnormal EMG 04/18/2013    Abnormal involuntary movements(781.0)     Movement Disorder    AK (actinic keratosis) 12/18/2011    Allergic rhinitis, cause unspecified     Allergic rhinitis    Balance problems 11/01/2011    Basal cell carcinoma     Bladder spasms 11/01/2011    Chronic osteoarthritis     COPD (chronic obstructive pulmonary disease) (H)     Interstial lung disease, obliderans bronchiolitis    Diaphragmatic hernia without mention of obstruction or gangrene     Earache or other ear, nose, or  throat complaint     Esophageal reflux     Fatigue 11/01/2011    Fracture     H. pylori infection 05/12/2011    History of MRI of cervical spine 11/18/2013    EXAMINATION: CERVICAL SPINE G/E 5 VIEWS* 4/19/2013 4:18 PM  CLINICAL HISTORY: Pain in limb,Performing Location?->[facility]),  COMPARISON:  FINDINGS: AP and lateral views in flexion and extension, as well as odontoid view of the cervical spine was obtained. There is no comparison available. The vertebral bodies of the cervical spine are normally aligned. There is posterior spurring and d    Incomplete defecation 11/01/2011    Interstitial lung disease (H) 11/29/2016    Laboratory test 08/07/2012    Lung disease 06/2015    Malignant basal cell neoplasm of skin 08/06/2008    Melanoma (H) 08/06/2008    Melanoma in situ of lower leg (H)     R calf    Moraxella catarrhalis bronchitis 04/19/2024    Neuropathy 05/16/2011    GALO (obstructive sleep apnea)     Other bladder disorder     Other color vision deficiencies     Other nervous system complications     Parkinsonism (H) 11/01/2011    Parkinsons disease (H)     Parkinsonism/ balance, dropped foot, tremor    Personal history of colonic polyps     PLMD (periodic limb movement disorder) 12/16/2021    Polyneuropathy in other diseases classified elsewhere     RA (rheumatoid arthritis) (H)     Rheumatoid arthritis of multiple sites with negative rheumatoid factor (H) 03/21/2016    Seronegative arthritis 11/18/2013    Shortness of breath     Shoulder arthritis 2016    acromioclavicular joint     Sialorrhea 04/18/2023    Somatization disorder 05/12/2011    Spider veins     Leg Vericise vein surgery    Squamous cell carcinoma     Tremor 11/01/2011    Unspecified essential hypertension     Unspecified hypothyroidism     Urinary tract infection     Urinary urgency 11/01/2011    Wears glasses 11/01/2011            Past Surgical History:     Past Surgical History:   Procedure Laterality Date    BIOPSY OF SKIN LESION   "    COLONOSCOPY      COLONOSCOPY N/A 2022    Procedure: COLONOSCOPY (fv) polypectomy using jumbo bx forcep;  Surgeon: Gómez Mckinney MD;  Location:  GI    CYSTOSCOPY  Many years ago    Blood in urine/ bladder cystoscopy    ESOPHAGOSCOPY, GASTROSCOPY, DUODENOSCOPY (EGD), COMBINED N/A 2022    Procedure: ESOPHAGOGASTRODUODENOSCOPY (EGD) (fv) biopsies using cold bx forcep;  Surgeon: Gómez Mckinney MD;  Location:  GI    HEMORRHOID SURGERY      lip biopsy      for sicca complex    MOHS MICROGRAPHIC PROCEDURE      SOFT TISSUE SURGERY      removeal of basel cell carcinoma            Social History:     Social History     Tobacco Use    Smoking status: Former     Current packs/day: 0.00     Average packs/day: 1.5 packs/day for 37.3 years (55.9 ttl pk-yrs)     Types: Cigarettes     Start date: 6/15/1963     Quit date: 2000     Years since quittin.7    Smokeless tobacco: Never   Substance Use Topics    Alcohol use: Not Currently            Family History:     Family History   Problem Relation Age of Onset    Hypertension Mother     Heart Disease Mother         a fib    Arthritis Mother         \"osteo\"    Osteoporosis Mother     Skin Cancer Mother     Uterine Cancer Mother     Other Cancer Mother     Cancer Mother     Hypertension Brother     Diabetes Brother         \" post pancreatitis\"    Neurologic Disorder Sister         multiple sclerosis    Hypertension Sister     Heart Disease Sister     Multiple Sclerosis Sister     Heart Disease Sister         Chf    Arthritis Sister     Heart Failure Sister     Kidney Disease Sister     Dementia Sister     Hypertension Father     Cerebrovascular Disease Father         ,    Skin Cancer Father     Colon Polyps Father     Heart Disease Father     Other - See Comments Son         [place]    Other - See Comments Abdon wagner    Other - See Comments Abdon gonzalez    Psoriasis Maternal Grandfather     Stomach Cancer Maternal " Grandfather     Cancer Maternal Grandfather     Congenital Anomalies Other         granddaughter with a chromosome defect    Other Cancer Other         Grandaughter    Cancer Other         Grand daughter    Cerebrovascular Disease Paternal Grandmother         ,    Cerebrovascular Disease Paternal Grandfather         ,    Cancer Granddaughter         Langerhans Cell Histiocytosis    Genetic Disease Granddaughter         gene translocation    Learning Disorder Other         Gene translocation    Melanoma No family hx of     Colon Cancer No family hx of             Allergies:     Allergies   Allergen Reactions    Adhesive Tape Hives     Bandages misc    Alum & Mag Hydroxide-Simeth Hives     EXCESSIVE URINATION AND WEAKNESS, LIGHT-HEADED    Aluminum Hydroxide Hives    Calcium Carbonate Hives    Cortisone Hives, Headache, Nausea, Other (See Comments) and Rash    Cyclosporine      Burning eyes, problems with breathing, tightness in chest    Cyclosporine Hives     Burning eyes, problems with breathing, tightness in chest    Diphenhydramine Other (See Comments) and Shortness Of Breath     Other reaction(s): lightheadedness    Fexofenadine Other (See Comments) and Shortness Of Breath     EXCESSIVE URINATION AND WEAKNESS, LIGHT-HEADED      Gabapentin Hives and Other (See Comments)     Neurontin: mosd changes and excess urination    Levofloxacin Hives, Other (See Comments) and Nausea     From surgeon--? Joint pain ? Gerd aggravation. Insomnia, excess urination    Magnesium Carbonate Hives    Magnesium Hydroxide Hives    Prednisone Hives, Headache and Other (See Comments)     Weakness, elevated bp, headache, eye pain, congestion     Simethicone Hives    Sulfamethoxazole-Trimethoprim Hives     Chest pain, angina  Chest pain, angina    Tree Extract Hives    Cephalexin Nausea and Other (See Comments)     Joint pain or gerd aggravation. Bloating excessive urination   Other reaction(s): GI Distress  Joint pain or gerd aggravation.  Bloating excessive urination   Other reaction(s): chest pain    Doxycycline Nausea and Vomiting, Headache and Other (See Comments)     SWEATING,MIGRAINES,LOSS OF APPETITE,SWEATING,LIGHT HEADED, EXCESSIVE URINATION  Other reaction(s): lightheadedness    Iodine Hives     Other reaction(s): GI Distress    Oxcarbazepine Dizziness, Itching, Other (See Comments), Rash and GI Disturbance     Other reaction(s): GI Distress    SEVERE JOINT AND TENDON PAIN, INSOMNIA, RESTLESSNESS, NAUSEA, EXCESS URINATION    Sulfamethoxazole-Trimethoprim Other (See Comments)     Other reaction(s): chest pain, flushing, tachycardia    Allegra      EXCESSIVE URINATION AND WEAKNESS, LIGHT-HEADED    Animal Dander     Benadryl Allergy      EXCESSIVE URINATION AND WEAKNESS, LIGHT-HEADED    Budesonide-Formoterol Fumarate GI Disturbance and Nausea     Other reaction(s): GI Distress    Cephalosporins      Other reaction(s): GI Distress    Chlorpheniramine Maleate      Dust    Doxycycline Hyclate Nausea     SWEATING,MIGRAINES,LOSS OF APPETITE,SWEATING,LIGHT HEADED, EXCESSIVE URINATION    Flonase [Fluticasone Propionate]     Fluticasone      Other reaction(s): GI Distress    Hydrocortisone Nausea and Vomiting    Iodine Solution [Povidone Iodine]      SKIN MELTS    Levaquin      From surgeon--? Joint pain ? Gerd aggravation. Insomnia, excess urination    Mylanta      EXCESSIVE URINATION AND WEAKNESS, LIGHT-HEADED    Oxcarbazepine      SEVERE JOINT AND TENDON PAIN, INSOMNIA, RESTLESSNESS, NAUSEA, EXCESS URINATION    Povidone Iodine      Other reaction(s): GI Distress  SKIN MELTS    Prednisone      Weakness, elevated bp, headache, eye pain, congestion     Seafood [Seafood]     Shellfish Allergy      Hives    Other reaction(s): GI Distress  Hives    Trees     Cortizone Rash     EXCESS URINATION,WEAKNESS,NAUSEA, HEADACHE    Ibuprofen Fatigue, Other (See Comments) and Palpitations            Medications:     Current Outpatient Medications   Medication Sig  Dispense Refill    acetaminophen (TYLENOL) 500 MG tablet Take 1-2 tablets (500-1,000 mg) by mouth every 4 hours as needed for mild pain 30 tablet 0    acetaminophen (TYLENOL) 500 MG tablet 2 x 500mg by mouth 3 times per day: 7/730am, 4pm and 11pm  = 6/day      albuterol (PROAIR HFA/PROVENTIL HFA/VENTOLIN HFA) 108 (90 Base) MCG/ACT inhaler Inhale 2 puffs into the lungs every 4 hours as needed for shortness of breath or wheezing. 18 g 3    Albuterol Sulfate, sensor, 108 (90 Base) MCG/ACT AEPB Inhale 2 puffs into the lungs every 4 hours as needed for shortness of breath, wheezing or cough. 1 each 1    amLODIPine (NORVASC) 5 MG tablet Take 1 tablet (5 mg) by mouth daily. 90 tablet 3    cholecalciferol (HM VITAMIN D3) 25 MCG (1000 UT) TABS 1000 unit tab by mouth daily      folic acid (FOLVITE) 1 MG tablet Take 1 tablet (1 mg) by mouth daily. 90 tablet 3    hydroxychloroquine (PLAQUENIL) 200 MG tablet Take 1 tablet (200 mg) by mouth daily. Daily at 9am. 90 tablet 3    lisinopril-hydrochlorothiazide (ZESTORETIC) 20-25 MG tablet Take 1 tablet by mouth daily. 90 tablet 3    methotrexate 2.5 MG tablet Take 8 tablets (20 mg) by mouth every 7 days. TAKE 8 TABLETS BY MOUTH ONCE PER WEEK. 96 tablet 2    metoprolol succinate ER (TOPROL XL) 50 MG 24 hr tablet TAKE 1 AND 1/2 TABLETS BY MOUTH DAILY 135 tablet 3    omeprazole (PRILOSEC) 40 MG DR capsule Take 1 capsule (40 mg) by mouth daily. 90 capsule 1    ORDER FOR DME Use your BiPAP device as directed by your provider.      oxyBUTYnin (DITROPAN) 5 MG tablet Take 1 tablet (5 mg) by mouth daily. 90 tablet 3    pregabalin (LYRICA) 50 MG capsule 50mg capsule by mouth at 7am and 4pm and 1-2 x 50mg capsules by mouth nightly at 11pm (4/day) 360 capsule 1    salmeterol (SEREVENT DISKUS) 50 MCG/ACT inhaler Inhale 1 puff into the lungs 2 times daily. 60 each 11    Vitamin D3 (CHOLECALCIFEROL) 25 mcg (1000 units) tablet 1 unit every day by oral route.       No current facility-administered  medications for this visit.             Review of Systems:   A focused musculoskeletal and neurologic ROS was performed with pertinent positives and negatives noted in the HPI.  Additional systems were also reviewed and are documented at the bottom of the note.         Physical Exam:   Vitals: There were no vitals taken for this visit.  Musculoskeletal, Neurologic, and Spine:     Cervical spine:    Appearance -no gross step-offs, kyphosis.    Motor -     C5: Deltoids R 5/5 and L 5/5 strength    C6: Biceps R 5/5 and L 5/5 strength     C7: Triceps R 5/5 and L 5/5 strength     C8:  R 5/5 and L 5/5 strength     T1: Dorsal interossei R 4/5 and L 4/5 strength        Sensation: intact to light touch in C5-T1      Special Tests -      Lhermitte's Test - Negative     Spurling's Test - Negative      Raygoza's Test - Negative      Lumbar Spine:    Appearance - No gross stepoffs or deformities    Motor -     L2-3: Hip flexion 5/5 R and 5/5 L strength          L3/4:  Knee extension R 5/5 and L 5/5 strength         L4/5:  Foot dorsiflexion R 4/5 L 4/5 and      EHL dorsiflexion R 4/5 L 4/5 strength         S1:  Plantarflexion/Peroneal Muscles  R 5/5 and L 5/5 strength    Sensation: intact to light touch L3-S1 distribution BLE      Neurologic:      REFLEXES Left Right   Biceps 3+ 3+   Triceps 3+ 3+   Brachioradialis 3+ 3+   Patella 3+ 3+   Ankle jerk 1+ 1+   Babinski No upgoing great toe No upgoing great toe   Clonus 0 beats 0 beats            Imaging:   We ordered and independently reviewed new radiographs at this clinic visit. The results were discussed with the patient. Findings include: increased spondylosis of L4-5 level compared to previous xray from 5/7/24.       Assessment and Plan:     74 year old male with L4-5 spondylosis and radiculopathy with worsening symptoms over the last several weeks.  He had undergone an injection several years ago which did provide some minimal relief for a couple of days.    Based on his  worsening symptoms we discussed both operative and nonoperative options.  We discussed that he could continue physical therapy and conservative management with medication use.  Additionally we discussed that a repeat injection may be beneficial since it has been quite sometime since his previous one.  Patient was interested in this however he expressed that previous steroid injections have caused urinary urgency.  At this time we will order a repeat lumbar injection as well as order physical therapy which he may begin.     An option for surgery was discussed due to the anterior translation of L4 compared to L5 which has worsened since his previous x-rays.  We discussed that in order to be a candidate for surgery he would need to have a BMI less than 35.  We discussed options including diet, exercise, and GLP-1 inhibitors to help with weight loss.  He should discuss this with his PCP.    Patient was also reporting some numbness, tingling, and weakness in his bilateral upper extremities.  He has not felt this has been a significant issue however it does bother him throughout the day and with his daily activities.  We will also plan to obtain a MRI of his C-spine for further evaluation.  We will set up a phone visit after MRI is complete to discuss results.       Ample time and opportunity was provided to the patient to ask questions, all of which were answered to their satisfaction prior to the conclusion of their visit.      Voice-to-text dictation software was utilized in the creation of this note therefore there may be unintended word substitutions, although errors are generally corrected real-time, there is the potential for a rare error to be present in the completed chart. Please do not hesitate to reach out for clarification.    Leon Garcia MD  Orthopaedic Surgery Resident  Pager: [phone]  07/08/25      Patient was seen and examined with Dr. Griffiths who independently evaluated the patient and agrees  with the assessment and exam.     Respectfully,  Mike Griffiths MD  Spine Surgery  [facility]

## 2025-07-08 NOTE — LETTER
7/8/2025      Lucio Daly  [address]  [place] [address]      Dear Colleague,    Thank you for referring your patient, Lucio Daly, to the [facility] [place]. Please see a copy of my visit note below.    Spine Surgery Return Clinic Visit      Chief Complaint:   RECHECK (Leg weakness )      Interval HPI:  Symptom Profile Including: location of symptoms, onset, severity, exacerbating/alleviating factors, previous treatments:        Lucio Daly is a 74 year old male who was last seen just over 1 year ago for lower extremity radiculopathy and pain in the lower back.  At that time a physical therapy referral was given.  Patient reports that he did do physical therapy previously which seem to flareup his symptoms so he stopped.  Over the last several weeks he feels that his pain and symptoms have continued to worsen.  It is difficult for him to lay down in bed and when he gets up at night to use the bathroom it is quite painful to sit up.  Additionally he feels that he has had continuing numbness, tingling, and weakness of his legs, right greater than left.  This is particularly noticeable when driving long distances.  He states that after 45 minutes of driving he feels his entire right lower extremity has gotten numb.  He also notes occasional dropfoot particularly on the right side.    Of note he has also been found to have bilateral knee arthritis and was discussing the possibility of a knee replacement.  However he was told that in order to be a candidate for knee replacement surgery his back issues would likely have to be resolved first.            Past Medical History:     Past Medical History:   Diagnosis Date     Abnormal EMG 04/18/2013     Abnormal involuntary movements(781.0)     Movement Disorder     AK (actinic keratosis) 12/18/2011     Allergic rhinitis, cause unspecified     Allergic rhinitis     Balance problems 11/01/2011     Basal cell carcinoma      Bladder  spasms 11/01/2011     Chronic osteoarthritis      COPD (chronic obstructive pulmonary disease) (H)     Interstial lung disease, obliderans bronchiolitis     Diaphragmatic hernia without mention of obstruction or gangrene      Earache or other ear, nose, or throat complaint      Esophageal reflux      Fatigue 11/01/2011     Fracture      H. pylori infection 05/12/2011     History of MRI of cervical spine 11/18/2013    EXAMINATION: CERVICAL SPINE G/E 5 VIEWS* 4/19/2013 4:18 PM  CLINICAL HISTORY: Pain in limb,Performing Location?->[facility] ([facility]),  COMPARISON:  FINDINGS: AP and lateral views in flexion and extension, as well as odontoid view of the cervical spine was obtained. There is no comparison available. The vertebral bodies of the cervical spine are normally aligned. There is posterior spurring and d     Incomplete defecation 11/01/2011     Interstitial lung disease (H) 11/29/2016     Laboratory test 08/07/2012     Lung disease 06/2015     Malignant basal cell neoplasm of skin 08/06/2008     Melanoma (H) 08/06/2008     Melanoma in situ of lower leg (H)     R calf     Moraxella catarrhalis bronchitis 04/19/2024     Neuropathy 05/16/2011     GALO (obstructive sleep apnea)      Other bladder disorder      Other color vision deficiencies      Other nervous system complications      Parkinsonism (H) 11/01/2011     Parkinsons disease (H)     Parkinsonism/ balance, dropped foot, tremor     Personal history of colonic polyps      PLMD (periodic limb movement disorder) 12/16/2021     Polyneuropathy in other diseases classified elsewhere      RA (rheumatoid arthritis) (H)      Rheumatoid arthritis of multiple sites with negative rheumatoid factor (H) 03/21/2016     Seronegative arthritis 11/18/2013     Shortness of breath      Shoulder arthritis 2016    acromioclavicular joint      Sialorrhea 04/18/2023     Somatization disorder 05/12/2011     Spider veins     Leg Vericise vein surgery     Squamous cell carcinoma   "    Tremor 2011     Unspecified essential hypertension      Unspecified hypothyroidism      Urinary tract infection      Urinary urgency 2011     Wears glasses 2011            Past Surgical History:     Past Surgical History:   Procedure Laterality Date     BIOPSY OF SKIN LESION       COLONOSCOPY       COLONOSCOPY N/A 2022    Procedure: COLONOSCOPY (fv) polypectomy using jumbo bx forcep;  Surgeon: Gómez Mckinney MD;  Location:  GI     CYSTOSCOPY  Many years ago    Blood in urine/ bladder cystoscopy     ESOPHAGOSCOPY, GASTROSCOPY, DUODENOSCOPY (EGD), COMBINED N/A 2022    Procedure: ESOPHAGOGASTRODUODENOSCOPY (EGD) (fv) biopsies using cold bx forcep;  Surgeon: Gómez Mckinney MD;  Location:  GI     HEMORRHOID SURGERY       lip biopsy      for sicca complex     MOHS MICROGRAPHIC PROCEDURE       SOFT TISSUE SURGERY      removeal of basel cell carcinoma            Social History:     Social History     Tobacco Use     Smoking status: Former     Current packs/day: 0.00     Average packs/day: 1.5 packs/day for 37.3 years (55.9 ttl pk-yrs)     Types: Cigarettes     Start date: 6/15/1963     Quit date: 2000     Years since quittin.7     Smokeless tobacco: Never   Substance Use Topics     Alcohol use: Not Currently            Family History:     Family History   Problem Relation Age of Onset     Hypertension Mother      Heart Disease Mother         a fib     Arthritis Mother         \"osteo\"     Osteoporosis Mother      Skin Cancer Mother      Uterine Cancer Mother      Other Cancer Mother      Cancer Mother      Hypertension Brother      Diabetes Brother         \" post pancreatitis\"     Neurologic Disorder Sister         multiple sclerosis     Hypertension Sister      Heart Disease Sister      Multiple Sclerosis Sister      Heart Disease Sister         Chf     Arthritis Sister      Heart Failure Sister      Kidney Disease Sister      Dementia Sister      Hypertension Father  "     Cerebrovascular Disease Father         ,     Skin Cancer Father      Colon Polyps Father      Heart Disease Father      Other - See Comments Son         [place]     Other - See Comments Son         [place]     Other - See Comments Son         day gonzalez     Psoriasis Maternal Grandfather      Stomach Cancer Maternal Grandfather      Cancer Maternal Grandfather      Congenital Anomalies Other         granddaughter with a chromosome defect     Other Cancer Other         Grandaughter     Cancer Other         Grand daughter     Cerebrovascular Disease Paternal Grandmother         ,     Cerebrovascular Disease Paternal Grandfather         ,     Cancer Granddaughter         Langerhans Cell Histiocytosis     Genetic Disease Granddaughter         gene translocation     Learning Disorder Other         Gene translocation     Melanoma No family hx of      Colon Cancer No family hx of             Allergies:     Allergies   Allergen Reactions     Adhesive Tape Hives     Bandages misc     Alum & Mag Hydroxide-Simeth Hives     EXCESSIVE URINATION AND WEAKNESS, LIGHT-HEADED     Aluminum Hydroxide Hives     Calcium Carbonate Hives     Cortisone Hives, Headache, Nausea, Other (See Comments) and Rash     Cyclosporine      Burning eyes, problems with breathing, tightness in chest     Cyclosporine Hives     Burning eyes, problems with breathing, tightness in chest     Diphenhydramine Other (See Comments) and Shortness Of Breath     Other reaction(s): lightheadedness     Fexofenadine Other (See Comments) and Shortness Of Breath     EXCESSIVE URINATION AND WEAKNESS, LIGHT-HEADED       Gabapentin Hives and Other (See Comments)     Neurontin: mosd changes and excess urination     Levofloxacin Hives, Other (See Comments) and Nausea     From surgeon--? Joint pain ? Gerd aggravation. Insomnia, excess urination     Magnesium Carbonate Hives     Magnesium Hydroxide Hives     Prednisone Hives, Headache and Other (See Comments)      Weakness, elevated bp, headache, eye pain, congestion      Simethicone Hives     Sulfamethoxazole-Trimethoprim Hives     Chest pain, angina  Chest pain, angina     Tree Extract Hives     Cephalexin Nausea and Other (See Comments)     Joint pain or gerd aggravation. Bloating excessive urination   Other reaction(s): GI Distress  Joint pain or gerd aggravation. Bloating excessive urination   Other reaction(s): chest pain     Doxycycline Nausea and Vomiting, Headache and Other (See Comments)     SWEATING,MIGRAINES,LOSS OF APPETITE,SWEATING,LIGHT HEADED, EXCESSIVE URINATION  Other reaction(s): lightheadedness     Iodine Hives     Other reaction(s): GI Distress     Oxcarbazepine Dizziness, Itching, Other (See Comments), Rash and GI Disturbance     Other reaction(s): GI Distress    SEVERE JOINT AND TENDON PAIN, INSOMNIA, RESTLESSNESS, NAUSEA, EXCESS URINATION     Sulfamethoxazole-Trimethoprim Other (See Comments)     Other reaction(s): chest pain, flushing, tachycardia     Allegra      EXCESSIVE URINATION AND WEAKNESS, LIGHT-HEADED     Animal Dander      Benadryl Allergy      EXCESSIVE URINATION AND WEAKNESS, LIGHT-HEADED     Budesonide-Formoterol Fumarate GI Disturbance and Nausea     Other reaction(s): GI Distress     Cephalosporins      Other reaction(s): GI Distress     Chlorpheniramine Maleate      Dust     Doxycycline Hyclate Nausea     SWEATING,MIGRAINES,LOSS OF APPETITE,SWEATING,LIGHT HEADED, EXCESSIVE URINATION     Flonase [Fluticasone Propionate]      Fluticasone      Other reaction(s): GI Distress     Hydrocortisone Nausea and Vomiting     Iodine Solution [Povidone Iodine]      SKIN MELTS     Levaquin      From surgeon--? Joint pain ? Gerd aggravation. Insomnia, excess urination     Mylanta      EXCESSIVE URINATION AND WEAKNESS, LIGHT-HEADED     Oxcarbazepine      SEVERE JOINT AND TENDON PAIN, INSOMNIA, RESTLESSNESS, NAUSEA, EXCESS URINATION     Povidone Iodine      Other reaction(s): GI Distress  SKIN MELTS      Prednisone      Weakness, elevated bp, headache, eye pain, congestion      Seafood [Seafood]      Shellfish Allergy      Hives    Other reaction(s): GI Distress  Hives     Trees      Cortizone Rash     EXCESS URINATION,WEAKNESS,NAUSEA, HEADACHE     Ibuprofen Fatigue, Other (See Comments) and Palpitations            Medications:     Current Outpatient Medications   Medication Sig Dispense Refill     acetaminophen (TYLENOL) 500 MG tablet Take 1-2 tablets (500-1,000 mg) by mouth every 4 hours as needed for mild pain 30 tablet 0     acetaminophen (TYLENOL) 500 MG tablet 2 x 500mg by mouth 3 times per day: 7/730am, 4pm and 11pm  = 6/day       albuterol (PROAIR HFA/PROVENTIL HFA/VENTOLIN HFA) 108 (90 Base) MCG/ACT inhaler Inhale 2 puffs into the lungs every 4 hours as needed for shortness of breath or wheezing. 18 g 3     Albuterol Sulfate, sensor, 108 (90 Base) MCG/ACT AEPB Inhale 2 puffs into the lungs every 4 hours as needed for shortness of breath, wheezing or cough. 1 each 1     amLODIPine (NORVASC) 5 MG tablet Take 1 tablet (5 mg) by mouth daily. 90 tablet 3     cholecalciferol (HM VITAMIN D3) 25 MCG (1000 UT) TABS 1000 unit tab by mouth daily       folic acid (FOLVITE) 1 MG tablet Take 1 tablet (1 mg) by mouth daily. 90 tablet 3     hydroxychloroquine (PLAQUENIL) 200 MG tablet Take 1 tablet (200 mg) by mouth daily. Daily at 9am. 90 tablet 3     lisinopril-hydrochlorothiazide (ZESTORETIC) 20-25 MG tablet Take 1 tablet by mouth daily. 90 tablet 3     methotrexate 2.5 MG tablet Take 8 tablets (20 mg) by mouth every 7 days. TAKE 8 TABLETS BY MOUTH ONCE PER WEEK. 96 tablet 2     metoprolol succinate ER (TOPROL XL) 50 MG 24 hr tablet TAKE 1 AND 1/2 TABLETS BY MOUTH DAILY 135 tablet 3     omeprazole (PRILOSEC) 40 MG DR capsule Take 1 capsule (40 mg) by mouth daily. 90 capsule 1     ORDER FOR DME Use your BiPAP device as directed by your provider.       oxyBUTYnin (DITROPAN) 5 MG tablet Take 1 tablet (5 mg) by mouth  daily. 90 tablet 3     pregabalin (LYRICA) 50 MG capsule 50mg capsule by mouth at 7am and 4pm and 1-2 x 50mg capsules by mouth nightly at 11pm (4/day) 360 capsule 1     salmeterol (SEREVENT DISKUS) 50 MCG/ACT inhaler Inhale 1 puff into the lungs 2 times daily. 60 each 11     Vitamin D3 (CHOLECALCIFEROL) 25 mcg (1000 units) tablet 1 unit every day by oral route.       No current facility-administered medications for this visit.             Review of Systems:   A focused musculoskeletal and neurologic ROS was performed with pertinent positives and negatives noted in the HPI.  Additional systems were also reviewed and are documented at the bottom of the note.         Physical Exam:   Vitals: There were no vitals taken for this visit.  Musculoskeletal, Neurologic, and Spine:     Cervical spine:    Appearance -no gross step-offs, kyphosis.    Motor -     C5: Deltoids R 5/5 and L 5/5 strength    C6: Biceps R 5/5 and L 5/5 strength     C7: Triceps R 5/5 and L 5/5 strength     C8:  R 5/5 and L 5/5 strength     T1: Dorsal interossei R 4/5 and L 4/5 strength        Sensation: intact to light touch in C5-T1      Special Tests -      Lhermitte's Test - Negative     Spurling's Test - Negative      Raygoza's Test - Negative      Lumbar Spine:    Appearance - No gross stepoffs or deformities    Motor -     L2-3: Hip flexion 5/5 R and 5/5 L strength          L3/4:  Knee extension R 5/5 and L 5/5 strength         L4/5:  Foot dorsiflexion R 4/5 L 4/5 and      EHL dorsiflexion R 4/5 L 4/5 strength         S1:  Plantarflexion/Peroneal Muscles  R 5/5 and L 5/5 strength    Sensation: intact to light touch L3-S1 distribution BLE      Neurologic:      REFLEXES Left Right   Biceps 3+ 3+   Triceps 3+ 3+   Brachioradialis 3+ 3+   Patella 3+ 3+   Ankle jerk 1+ 1+   Babinski No upgoing great toe No upgoing great toe   Clonus 0 beats 0 beats            Imaging:   We ordered and independently reviewed new radiographs at this clinic visit. The  results were discussed with the patient. Findings include: increased spondylosis of L4-5 level compared to previous xray from 5/7/24.    MRI from 2022 of lumbar spine reviewed showing moderate to severe lumbar stenosis at L4-5       Assessment and Plan:     74 year old male with L4-5 spondylosis and radiculopathy with worsening symptoms over the last several weeks.  He had undergone an injection several years ago which did provide some minimal relief for a couple of days.    Based on his worsening symptoms we discussed both operative and nonoperative options.  We discussed that he could continue physical therapy and conservative management with medication use.  Additionally we discussed that a repeat injection may be beneficial since it has been quite sometime since his previous one.  Patient was interested in this however he expressed that previous steroid injections have caused urinary urgency.  At this time we will order a repeat lumbar injection as well as order physical therapy which he may begin.     An option for surgery was discussed due to the anterior translation of L4 compared to L5 which has worsened since his previous x-rays.  We discussed that in order to be a candidate for surgery he would need to have a BMI less than 35.  We discussed options including diet, exercise, and GLP-1 inhibitors to help with weight loss.  He should discuss this with his PCP.    Patient was also reporting some numbness, tingling, and weakness in his bilateral upper extremities.  He has not felt this has been a significant issue however it does bother him throughout the day and with his daily activities.  We will also plan to obtain a MRI of his C-spine for further evaluation.  We will set up a phone visit after MRI is complete to discuss results.       Ample time and opportunity was provided to the patient to ask questions, all of which were answered to their satisfaction prior to the conclusion of their  visit.      Voice-to-text dictation software was utilized in the creation of this note therefore there may be unintended word substitutions, although errors are generally corrected real-time, there is the potential for a rare error to be present in the completed chart. Please do not hesitate to reach out for clarification.    Leon Garcia MD  Orthopaedic Surgery Resident  Pager: [phone]  07/08/25      Patient was seen and examined with Dr. Griffiths who independently evaluated the patient and agrees with the assessment and exam.     Respectfully,  Mike Griffiths MD  Spine Surgery  [facility]     Attending MD (Dr. Mike Griffiths) : I personally performed greater than 50% of the effort related to this patient visit and am responsible for the medical decision making and billing in this case.  I met with the patient, reviewed and verified the history and physical exam of the patient and discussed the patient's management with the other clinical providers involved in this patient's care including any involved residents or physicians assistants. I also personally reviewed the imaging and I personally formulated the treatment plan and diagnosis in their entirety.  I reviewed the above note and agree with the documented findings and plan of care, which were communicated to the patient.      Mike Griffiths MD      Again, thank you for allowing me to participate in the care of your patient.        Sincerely,        Mike Griffiths MD    Electronically signed

## 2025-07-15 ENCOUNTER — VIRTUAL VISIT (OUTPATIENT)
Dept: ORTHOPEDICS | Facility: CLINIC | Age: 75
End: 2025-07-15
Attending: ORTHOPAEDIC SURGERY
Payer: MEDICARE

## 2025-07-15 DIAGNOSIS — M21.371 BILATERAL FOOT-DROP: Primary | ICD-10-CM

## 2025-07-15 DIAGNOSIS — M21.372 BILATERAL FOOT-DROP: Primary | ICD-10-CM

## 2025-07-15 NOTE — PROGRESS NOTES
Virtual Visit Details    Type of service:  Telephone Visit   Phone call duration: 10 minutes   Originating Location (pt. Location): Home    Distant Location (provider location):  On-site  Telephone visit completed due to provider preference    Diagnosis: Hand Numbness    I reviewed the MRI and there is no severe stenosis and I delivered these results to the patient.    CTM

## 2025-07-15 NOTE — LETTER
7/15/2025      Lucio Daly  [address]  [place] [address]      Dear Colleague,    Thank you for referring your patient, Lucio Daly, to the [facility] [place]. Please see a copy of my visit note below.    Virtual Visit Details    Type of service:  Telephone Visit   Phone call duration: 10 minutes   Originating Location (pt. Location): Home    Distant Location (provider location):  On-site  Telephone visit completed due to provider preference    Diagnosis: Hand Numbness    I reviewed the MRI and there is no severe stenosis and I delivered these results to the patient.    CTM    Again, thank you for allowing me to participate in the care of your patient.        Sincerely,        Mike Griffiths MD    Electronically signed

## (undated) DEVICE — ENDO BITE BLOCK ADULT OLYMPUS LATEX FREE MAJ-1632

## (undated) DEVICE — KIT ENDO TURNOVER/PROCEDURE W/CLEAN A SCOPE LINERS 103888

## (undated) DEVICE — ENDO FORCEP BX CAPTURA JUMBO SPIKE 2.8MMX230CM G53042

## (undated) DEVICE — ENDO FORCEP ENDOJAW BIOPSY 2.8MMX160CM FB-220K

## (undated) RX ORDER — ALBUTEROL SULFATE 0.83 MG/ML
SOLUTION RESPIRATORY (INHALATION)
Status: DISPENSED
Start: 2019-06-12

## (undated) RX ORDER — FENTANYL CITRATE 0.05 MG/ML
INJECTION, SOLUTION INTRAMUSCULAR; INTRAVENOUS
Status: DISPENSED
Start: 2022-07-20